# Patient Record
Sex: FEMALE | Race: WHITE | HISPANIC OR LATINO | Employment: UNEMPLOYED | URBAN - METROPOLITAN AREA
[De-identification: names, ages, dates, MRNs, and addresses within clinical notes are randomized per-mention and may not be internally consistent; named-entity substitution may affect disease eponyms.]

---

## 2017-01-17 ENCOUNTER — GENERIC CONVERSION - ENCOUNTER (OUTPATIENT)
Dept: FAMILY MEDICINE CLINIC | Facility: CLINIC | Age: 77
End: 2017-01-17

## 2017-01-17 ENCOUNTER — GENERIC CONVERSION - ENCOUNTER (OUTPATIENT)
Dept: OTHER | Facility: OTHER | Age: 77
End: 2017-01-17

## 2017-01-26 ENCOUNTER — ALLSCRIPTS OFFICE VISIT (OUTPATIENT)
Dept: OTHER | Facility: OTHER | Age: 77
End: 2017-01-26

## 2017-01-30 ENCOUNTER — TRANSCRIBE ORDERS (OUTPATIENT)
Dept: LAB | Facility: CLINIC | Age: 77
End: 2017-01-30

## 2017-01-30 ENCOUNTER — ALLSCRIPTS OFFICE VISIT (OUTPATIENT)
Dept: OTHER | Facility: OTHER | Age: 77
End: 2017-01-30

## 2017-01-30 ENCOUNTER — APPOINTMENT (OUTPATIENT)
Dept: LAB | Facility: CLINIC | Age: 77
End: 2017-01-30
Payer: COMMERCIAL

## 2017-01-30 ENCOUNTER — GENERIC CONVERSION - ENCOUNTER (OUTPATIENT)
Dept: OTHER | Facility: OTHER | Age: 77
End: 2017-01-30

## 2017-01-30 DIAGNOSIS — E11.9 TYPE 2 DIABETES MELLITUS WITHOUT COMPLICATIONS (HCC): ICD-10-CM

## 2017-01-30 DIAGNOSIS — M79.10 MYALGIA: ICD-10-CM

## 2017-01-30 DIAGNOSIS — M79.10 MYALGIA: Primary | ICD-10-CM

## 2017-01-30 LAB
CRP SERPL QL: <3 MG/L
ERYTHROCYTE [SEDIMENTATION RATE] IN BLOOD: 22 MM/HOUR (ref 0–20)
T3 SERPL-MCNC: 0.6 NG/ML (ref 0.6–1.8)
T4 FREE SERPL-MCNC: 0.87 NG/DL (ref 0.76–1.46)
T4 SERPL-MCNC: 7.7 UG/DL (ref 4.7–13.3)
TSH SERPL DL<=0.05 MIU/L-ACNC: 1.87 UIU/ML (ref 0.36–3.74)
URATE SERPL-MCNC: 7.1 MG/DL (ref 2–6.8)

## 2017-01-30 PROCEDURE — 86038 ANTINUCLEAR ANTIBODIES: CPT

## 2017-01-30 PROCEDURE — 86618 LYME DISEASE ANTIBODY: CPT

## 2017-01-30 PROCEDURE — 84439 ASSAY OF FREE THYROXINE: CPT

## 2017-01-30 PROCEDURE — 36415 COLL VENOUS BLD VENIPUNCTURE: CPT

## 2017-01-30 PROCEDURE — 86140 C-REACTIVE PROTEIN: CPT

## 2017-01-30 PROCEDURE — 85652 RBC SED RATE AUTOMATED: CPT

## 2017-01-30 PROCEDURE — 86430 RHEUMATOID FACTOR TEST QUAL: CPT

## 2017-01-30 PROCEDURE — 84436 ASSAY OF TOTAL THYROXINE: CPT

## 2017-01-30 PROCEDURE — 84443 ASSAY THYROID STIM HORMONE: CPT

## 2017-01-30 PROCEDURE — 84480 ASSAY TRIIODOTHYRONINE (T3): CPT

## 2017-01-30 PROCEDURE — 84479 ASSAY OF THYROID (T3 OR T4): CPT

## 2017-01-30 PROCEDURE — 84550 ASSAY OF BLOOD/URIC ACID: CPT

## 2017-01-31 LAB
RHEUMATOID FACT SER QL LA: NEGATIVE
T3RU NFR SERPL: 23 % (ref 24–39)

## 2017-02-01 LAB
B BURGDOR IGG SER IA-ACNC: 0.07
B BURGDOR IGM SER IA-ACNC: 0.16
RYE IGE QN: NEGATIVE

## 2017-02-06 ENCOUNTER — ALLSCRIPTS OFFICE VISIT (OUTPATIENT)
Dept: OTHER | Facility: OTHER | Age: 77
End: 2017-02-06

## 2017-02-20 ENCOUNTER — GENERIC CONVERSION - ENCOUNTER (OUTPATIENT)
Dept: OTHER | Facility: OTHER | Age: 77
End: 2017-02-20

## 2017-03-07 ENCOUNTER — GENERIC CONVERSION - ENCOUNTER (OUTPATIENT)
Dept: OTHER | Facility: OTHER | Age: 77
End: 2017-03-07

## 2017-03-24 ENCOUNTER — GENERIC CONVERSION - ENCOUNTER (OUTPATIENT)
Dept: OTHER | Facility: OTHER | Age: 77
End: 2017-03-24

## 2017-05-09 ENCOUNTER — TRANSCRIBE ORDERS (OUTPATIENT)
Dept: LAB | Facility: CLINIC | Age: 77
End: 2017-05-09

## 2017-05-09 ENCOUNTER — APPOINTMENT (OUTPATIENT)
Dept: LAB | Facility: CLINIC | Age: 77
End: 2017-05-09
Payer: COMMERCIAL

## 2017-05-09 DIAGNOSIS — E11.9 TYPE 2 DIABETES MELLITUS WITHOUT COMPLICATIONS (HCC): ICD-10-CM

## 2017-05-09 LAB
EST. AVERAGE GLUCOSE BLD GHB EST-MCNC: 171 MG/DL
GLUCOSE P FAST SERPL-MCNC: 103 MG/DL (ref 65–99)
HBA1C MFR BLD: 7.6 % (ref 4.2–6.3)

## 2017-05-09 PROCEDURE — 36415 COLL VENOUS BLD VENIPUNCTURE: CPT

## 2017-05-09 PROCEDURE — 83036 HEMOGLOBIN GLYCOSYLATED A1C: CPT

## 2017-05-09 PROCEDURE — 82947 ASSAY GLUCOSE BLOOD QUANT: CPT

## 2017-05-11 ENCOUNTER — ALLSCRIPTS OFFICE VISIT (OUTPATIENT)
Dept: OTHER | Facility: OTHER | Age: 77
End: 2017-05-11

## 2017-06-21 ENCOUNTER — GENERIC CONVERSION - ENCOUNTER (OUTPATIENT)
Dept: OTHER | Facility: OTHER | Age: 77
End: 2017-06-21

## 2017-07-28 ENCOUNTER — GENERIC CONVERSION - ENCOUNTER (OUTPATIENT)
Dept: OTHER | Facility: OTHER | Age: 77
End: 2017-07-28

## 2017-08-04 ENCOUNTER — APPOINTMENT (OUTPATIENT)
Dept: LAB | Facility: CLINIC | Age: 77
End: 2017-08-04
Payer: COMMERCIAL

## 2017-08-04 ENCOUNTER — TRANSCRIBE ORDERS (OUTPATIENT)
Dept: LAB | Facility: CLINIC | Age: 77
End: 2017-08-04

## 2017-08-04 DIAGNOSIS — E11.21 TYPE 2 DIABETES MELLITUS WITH DIABETIC NEPHROPATHY, WITHOUT LONG-TERM CURRENT USE OF INSULIN (HCC): ICD-10-CM

## 2017-08-04 DIAGNOSIS — E55.9 UNSPECIFIED VITAMIN D DEFICIENCY: ICD-10-CM

## 2017-08-04 DIAGNOSIS — N18.30 CHRONIC KIDNEY DISEASE, STAGE III (MODERATE) (HCC): Primary | ICD-10-CM

## 2017-08-04 DIAGNOSIS — E11.9 TYPE 2 DIABETES MELLITUS WITHOUT COMPLICATIONS (HCC): ICD-10-CM

## 2017-08-04 DIAGNOSIS — N18.30 CHRONIC KIDNEY DISEASE, STAGE III (MODERATE) (HCC): ICD-10-CM

## 2017-08-04 DIAGNOSIS — E78.2 MIXED HYPERLIPIDEMIA: ICD-10-CM

## 2017-08-04 LAB
25(OH)D3 SERPL-MCNC: 26.4 NG/ML (ref 30–100)
ALBUMIN SERPL BCP-MCNC: 3.8 G/DL (ref 3.5–5)
ANION GAP SERPL CALCULATED.3IONS-SCNC: 8 MMOL/L (ref 4–13)
BACTERIA UR QL AUTO: ABNORMAL /HPF
BASOPHILS # BLD AUTO: 0.02 THOUSANDS/ΜL (ref 0–0.1)
BASOPHILS NFR BLD AUTO: 0 % (ref 0–1)
BILIRUB UR QL STRIP: NEGATIVE
BUN SERPL-MCNC: 39 MG/DL (ref 5–25)
CALCIUM SERPL-MCNC: 9.6 MG/DL (ref 8.3–10.1)
CHLORIDE SERPL-SCNC: 111 MMOL/L (ref 100–108)
CLARITY UR: CLEAR
CO2 SERPL-SCNC: 22 MMOL/L (ref 21–32)
COLOR UR: YELLOW
CREAT SERPL-MCNC: 1.33 MG/DL (ref 0.6–1.3)
CREAT UR-MCNC: 107 MG/DL
EOSINOPHIL # BLD AUTO: 0.62 THOUSAND/ΜL (ref 0–0.61)
EOSINOPHIL NFR BLD AUTO: 11 % (ref 0–6)
ERYTHROCYTE [DISTWIDTH] IN BLOOD BY AUTOMATED COUNT: 14.1 % (ref 11.6–15.1)
EST. AVERAGE GLUCOSE BLD GHB EST-MCNC: 183 MG/DL
GFR SERPL CREATININE-BSD FRML MDRD: 39 ML/MIN/1.73SQ M
GLUCOSE P FAST SERPL-MCNC: 132 MG/DL (ref 65–99)
GLUCOSE P FAST SERPL-MCNC: 136 MG/DL (ref 65–99)
GLUCOSE UR STRIP-MCNC: NEGATIVE MG/DL
HBA1C MFR BLD: 8 % (ref 4.2–6.3)
HCT VFR BLD AUTO: 37 % (ref 34.8–46.1)
HGB BLD-MCNC: 11.9 G/DL (ref 11.5–15.4)
HGB UR QL STRIP.AUTO: NEGATIVE
HYALINE CASTS #/AREA URNS LPF: ABNORMAL /LPF
KETONES UR STRIP-MCNC: NEGATIVE MG/DL
LEUKOCYTE ESTERASE UR QL STRIP: ABNORMAL
LYMPHOCYTES # BLD AUTO: 1.71 THOUSANDS/ΜL (ref 0.6–4.47)
LYMPHOCYTES NFR BLD AUTO: 30 % (ref 14–44)
MAGNESIUM SERPL-MCNC: 2.1 MG/DL (ref 1.6–2.6)
MCH RBC QN AUTO: 26.3 PG (ref 26.8–34.3)
MCHC RBC AUTO-ENTMCNC: 32.2 G/DL (ref 31.4–37.4)
MCV RBC AUTO: 82 FL (ref 82–98)
MONOCYTES # BLD AUTO: 0.39 THOUSAND/ΜL (ref 0.17–1.22)
MONOCYTES NFR BLD AUTO: 7 % (ref 4–12)
NEUTROPHILS # BLD AUTO: 3.02 THOUSANDS/ΜL (ref 1.85–7.62)
NEUTS SEG NFR BLD AUTO: 52 % (ref 43–75)
NITRITE UR QL STRIP: NEGATIVE
NON-SQ EPI CELLS URNS QL MICRO: ABNORMAL /HPF
NRBC BLD AUTO-RTO: 0 /100 WBCS
PH UR STRIP.AUTO: 6 [PH] (ref 4.5–8)
PHOSPHATE SERPL-MCNC: 3 MG/DL (ref 2.3–4.1)
PLATELET # BLD AUTO: 249 THOUSANDS/UL (ref 149–390)
PMV BLD AUTO: 10 FL (ref 8.9–12.7)
POTASSIUM SERPL-SCNC: 4.3 MMOL/L (ref 3.5–5.3)
PROT UR STRIP-MCNC: NEGATIVE MG/DL
PROT UR-MCNC: 28 MG/DL
PROT/CREAT UR: 0.26 MG/G{CREAT} (ref 0–0.1)
RBC # BLD AUTO: 4.52 MILLION/UL (ref 3.81–5.12)
RBC #/AREA URNS AUTO: ABNORMAL /HPF
SODIUM SERPL-SCNC: 141 MMOL/L (ref 136–145)
SP GR UR STRIP.AUTO: 1.02 (ref 1–1.03)
UROBILINOGEN UR QL STRIP.AUTO: 0.2 E.U./DL
WBC # BLD AUTO: 5.76 THOUSAND/UL (ref 4.31–10.16)
WBC #/AREA URNS AUTO: ABNORMAL /HPF

## 2017-08-04 PROCEDURE — 83036 HEMOGLOBIN GLYCOSYLATED A1C: CPT

## 2017-08-04 PROCEDURE — 84156 ASSAY OF PROTEIN URINE: CPT

## 2017-08-04 PROCEDURE — 85025 COMPLETE CBC W/AUTO DIFF WBC: CPT

## 2017-08-04 PROCEDURE — 83735 ASSAY OF MAGNESIUM: CPT

## 2017-08-04 PROCEDURE — 81001 URINALYSIS AUTO W/SCOPE: CPT

## 2017-08-04 PROCEDURE — 82570 ASSAY OF URINE CREATININE: CPT

## 2017-08-04 PROCEDURE — 80069 RENAL FUNCTION PANEL: CPT

## 2017-08-04 PROCEDURE — 36415 COLL VENOUS BLD VENIPUNCTURE: CPT

## 2017-08-04 PROCEDURE — 84681 ASSAY OF C-PEPTIDE: CPT

## 2017-08-04 PROCEDURE — 82306 VITAMIN D 25 HYDROXY: CPT

## 2017-08-04 PROCEDURE — 82947 ASSAY GLUCOSE BLOOD QUANT: CPT

## 2017-08-05 LAB — C PEPTIDE SERPL-MCNC: 8.8 NG/ML (ref 1.1–4.4)

## 2017-08-11 DIAGNOSIS — E11.9 TYPE 2 DIABETES MELLITUS WITHOUT COMPLICATIONS (HCC): ICD-10-CM

## 2017-08-11 DIAGNOSIS — Z78.0 ASYMPTOMATIC MENOPAUSAL STATE: ICD-10-CM

## 2017-08-14 ENCOUNTER — GENERIC CONVERSION - ENCOUNTER (OUTPATIENT)
Dept: OTHER | Facility: OTHER | Age: 77
End: 2017-08-14

## 2017-08-16 ENCOUNTER — GENERIC CONVERSION - ENCOUNTER (OUTPATIENT)
Dept: FAMILY MEDICINE CLINIC | Facility: CLINIC | Age: 77
End: 2017-08-16

## 2017-08-16 ENCOUNTER — ALLSCRIPTS OFFICE VISIT (OUTPATIENT)
Dept: OTHER | Facility: OTHER | Age: 77
End: 2017-08-16

## 2017-08-30 ENCOUNTER — GENERIC CONVERSION - ENCOUNTER (OUTPATIENT)
Dept: OTHER | Facility: OTHER | Age: 77
End: 2017-08-30

## 2017-09-12 ENCOUNTER — GENERIC CONVERSION - ENCOUNTER (OUTPATIENT)
Dept: OTHER | Facility: OTHER | Age: 77
End: 2017-09-12

## 2017-10-03 ENCOUNTER — GENERIC CONVERSION - ENCOUNTER (OUTPATIENT)
Dept: OTHER | Facility: OTHER | Age: 77
End: 2017-10-03

## 2017-11-16 DIAGNOSIS — F32.9 MAJOR DEPRESSIVE DISORDER, SINGLE EPISODE: ICD-10-CM

## 2017-11-16 DIAGNOSIS — K57.92 DIVERTICULITIS OF INTESTINE WITHOUT PERFORATION OR ABSCESS WITHOUT BLEEDING: ICD-10-CM

## 2017-11-16 DIAGNOSIS — Z00.00 ENCOUNTER FOR GENERAL ADULT MEDICAL EXAMINATION WITHOUT ABNORMAL FINDINGS: ICD-10-CM

## 2017-11-16 DIAGNOSIS — M54.9 DORSALGIA: ICD-10-CM

## 2017-11-16 DIAGNOSIS — E11.65 TYPE 2 DIABETES MELLITUS WITH HYPERGLYCEMIA (HCC): ICD-10-CM

## 2017-11-16 DIAGNOSIS — Z78.0 ASYMPTOMATIC MENOPAUSAL STATE: ICD-10-CM

## 2017-11-21 ENCOUNTER — APPOINTMENT (OUTPATIENT)
Dept: LAB | Facility: CLINIC | Age: 77
End: 2017-11-21
Payer: COMMERCIAL

## 2017-11-21 ENCOUNTER — TRANSCRIBE ORDERS (OUTPATIENT)
Dept: LAB | Facility: CLINIC | Age: 77
End: 2017-11-21

## 2017-11-21 DIAGNOSIS — K57.92 DIVERTICULITIS OF INTESTINE WITHOUT PERFORATION OR ABSCESS WITHOUT BLEEDING: ICD-10-CM

## 2017-11-21 DIAGNOSIS — F32.9 MAJOR DEPRESSIVE DISORDER, SINGLE EPISODE: ICD-10-CM

## 2017-11-21 DIAGNOSIS — M54.9 DORSALGIA: ICD-10-CM

## 2017-11-21 DIAGNOSIS — Z00.00 ENCOUNTER FOR GENERAL ADULT MEDICAL EXAMINATION WITHOUT ABNORMAL FINDINGS: ICD-10-CM

## 2017-11-21 DIAGNOSIS — Z78.0 ASYMPTOMATIC MENOPAUSAL STATE: ICD-10-CM

## 2017-11-21 DIAGNOSIS — E11.65 TYPE 2 DIABETES MELLITUS WITH HYPERGLYCEMIA (HCC): ICD-10-CM

## 2017-11-21 LAB
EST. AVERAGE GLUCOSE BLD GHB EST-MCNC: 180 MG/DL
GLUCOSE P FAST SERPL-MCNC: 113 MG/DL (ref 65–99)
HBA1C MFR BLD: 7.9 % (ref 4.2–6.3)

## 2017-11-21 PROCEDURE — 84681 ASSAY OF C-PEPTIDE: CPT

## 2017-11-21 PROCEDURE — 82947 ASSAY GLUCOSE BLOOD QUANT: CPT

## 2017-11-21 PROCEDURE — 36415 COLL VENOUS BLD VENIPUNCTURE: CPT

## 2017-11-21 PROCEDURE — 83036 HEMOGLOBIN GLYCOSYLATED A1C: CPT

## 2017-11-24 LAB — C PEPTIDE SERPL-MCNC: 6.4 NG/ML (ref 1.1–4.4)

## 2017-11-29 ENCOUNTER — GENERIC CONVERSION - ENCOUNTER (OUTPATIENT)
Dept: OTHER | Facility: OTHER | Age: 77
End: 2017-11-29

## 2018-01-12 VITALS
SYSTOLIC BLOOD PRESSURE: 130 MMHG | HEIGHT: 58 IN | WEIGHT: 134.25 LBS | TEMPERATURE: 97.9 F | BODY MASS INDEX: 28.18 KG/M2 | DIASTOLIC BLOOD PRESSURE: 68 MMHG

## 2018-01-13 VITALS
HEIGHT: 58 IN | TEMPERATURE: 98.8 F | OXYGEN SATURATION: 91 % | WEIGHT: 139.2 LBS | SYSTOLIC BLOOD PRESSURE: 138 MMHG | HEART RATE: 88 BPM | DIASTOLIC BLOOD PRESSURE: 72 MMHG | BODY MASS INDEX: 29.22 KG/M2

## 2018-01-14 VITALS
SYSTOLIC BLOOD PRESSURE: 120 MMHG | HEIGHT: 58 IN | TEMPERATURE: 97.9 F | BODY MASS INDEX: 28.36 KG/M2 | DIASTOLIC BLOOD PRESSURE: 64 MMHG | WEIGHT: 135.13 LBS

## 2018-01-14 VITALS
HEIGHT: 58 IN | RESPIRATION RATE: 18 BRPM | TEMPERATURE: 98.1 F | WEIGHT: 139.13 LBS | SYSTOLIC BLOOD PRESSURE: 118 MMHG | BODY MASS INDEX: 29.21 KG/M2 | DIASTOLIC BLOOD PRESSURE: 78 MMHG

## 2018-01-14 VITALS
WEIGHT: 135.38 LBS | SYSTOLIC BLOOD PRESSURE: 130 MMHG | TEMPERATURE: 99.2 F | HEIGHT: 58 IN | BODY MASS INDEX: 28.42 KG/M2 | DIASTOLIC BLOOD PRESSURE: 82 MMHG

## 2018-01-15 NOTE — PROGRESS NOTES
Assessment    1  Encounter for preventive health examination (V70 0) (Z00 00)    Plan   Acute back pain, Acute diverticulitis, Depression, Diverticulitis, Health Maintenance    · (1) C-PEPTIDE; Status:Active; Requested for:[id number];   Asymptomatic postmenopausal state    · * DXA BONE DENSITY SPINE HIP AND PELVIS; Status:Hold For -  Scheduling,Retrospective By Protocol Authorization; Requested for:[id number]; Uncontrolled type 2 diabetes mellitus (250 02) (E11 65)          Discussion/Summary  Impression: Subsequent Annual Wellness Visit  Cardiovascular screening and counseling: screening is current  Diabetes screening and counseling: screening is current  Colorectal cancer screening and counseling: screening is current  Breast cancer screening and counseling: screening not indicated  Cervical cancer screening and counseling: screening not indicated  Osteoporosis screening and counseling: the patient declines screening  Abdominal aortic aneurysm screening and counseling: screening not indicated  Glaucoma screening and counseling: screening is current  Immunizations: the patient declines the influenza vaccination, pneumococcal vaccination status is unknown, Td vaccination status is unknown and Tdap vaccination status is unknown  Advance Directive Planning: paperwork and instructions were given to the patient  Patient Discussion: plan discussed with the patient, follow-up visit needed in one year  The treatment plan was reviewed with the patient/guardian  The patient/guardian understands and agrees with the treatment plan      Chief Complaint    Med Well      History of Present Illness  Welcome to Medicare and Wellness Visits: The patient is being seen for the subsequent annual wellness visit  Medicare Screening and Risk Factors   Hospitalizations: no previous hospitalizations    Medicare Screening Tests Risk Questions   Osteoporosis risk assessment: , female gender and low calcium diet    Drug and Alcohol Use: The patient is a former cigarette smoker  The patient reports drinking 1 drinks per week  Alcohol concern:   The patient has no concerns about alcohol abuse  She has never used illicit drugs  Diet and Physical Activity: Current diet includes servings of fruit per day, servings of vegetables per day and servings of dairy products per day  The patient does not exercise  Mood Disorder and Cognitive Impairment Screening: She reports feeling down, depressed, or hopeless over the past two weeks  She reports feeling little interest or pleasure in doing things over the past two weeks  Functional Ability/Level of Safety: Hearing is normal in the right ear and slightly decreased in the left ear  She does not use a hearing aid  Activities of daily living details: does not need help using the phone, no transportation help needed, does not need help shopping, no meal preparation help needed, does not need help doing housework, does not need help doing laundry, does not need help managing medications and does not need help managing money  Fall risk factors: The patient fell 0 times in the past 12 months  Home safety risk factors:  no unfamiliar surroundings, no loose rugs, no poor household lighting, no uneven floors, no household clutter, grab bars in the bathroom and handrails on the stairs  Advance Directives: Advance directives: no living will, no durable power of  for health care directives and no advance directives  Co-Managers and Medical Equipment/Suppliers: See Patient Care Team      Patient Care Team    Care Team Member Role Specialty Office Number   [phone] MD  Colon and Rectal Surgery [phone]     Active Problems    1  Acute back pain (724 5) (M54 9)   2  Acute diverticulitis (562 11) (K57 92)   3  Depression (311) (F32 9)   4  Diverticulitis (562 11) (K57 92)   5  Hypercholesterolemia (272 0) (E78 00)   6  Hyperlipidemia (272 4) (E78 5)   7  Hypertriglyceridemia (272 1) (E78 1)   8  IBS (irritable bowel syndrome) (564 1) (K58 9)   9  Kidney disease, chronic, stage I (GFR over 89 ml/min) (585 1) (N18 1)   10  Lower abdominal pain (789 09) (R10 30)   11  Myalgia (729 1) (M79 1)   12  Paresthesia of arm (782 0) (R20 2)   13  Refused influenza vaccine (V64 06) (Z28 21)   14  Screening for colon cancer (V76 51) (Z12 11)   15  Simple chronic anemia (281 9) (D53 9)   16  Type II diabetes mellitus (250 00) (E11 9)    Past Medical History    · History of Constipation (564 00) (K59 00)   · History of Encounter for mammogram to establish baseline mammogram (V76 12)  (Z12 31)   · History of Encounter for preoperative examination for general surgical procedure  (V72 84) (Z01 818)   · History of Hernia (553 9) (K46 9)   · History of Hip pain (719 45) (M25 559)   · History of UTI (V13 02) (Z87 440)   · History of Leg pain (729 5) (M79 606)   · History of Microalbuminuria (791 0) (R80 9)   · History of Postherpetic neuralgia (053 19) (B02 29)   · History of Toothache (525 9) (K08 89)    The active problems and past medical history were reviewed and updated today  Surgical History    · History of Cholecystectomy   · History of Hernia Repair   · History of Leg Repair   · History of Varicose Vein Ligation    The surgical history was reviewed and updated today  Family History  Mother    · No pertinent family history  Father    · No pertinent family history    The family history was reviewed and updated today  Social History    · Always uses seat belt   · Never a smoker   · No caffeine use   · Rarely consumes alcohol (V49 89) (Z78 9)  The social history was reviewed and is unchanged  Current Meds   1  Enalapril Maleate 10 MG Oral Tablet; TAKE 1 TABLET DAILY; Therapy: (Recorded:[id number]) to Recorded   2  Gabapentin 300 MG TABS; TAKE 1 TABLET 3 TIMES DAILY; Therapy: (Recorded:12Jpw7013) to Recorded   3   Gemfibrozil 600 MG Oral Tablet; TAKE 1 TABLET BY MOUTH BID  Requested for:   02VXV0272; Last Rx:60Fth5434 Ordered   4  GlipiZIDE ER 5 MG Oral Tablet Extended Release 24 Hour; Take 1 tablet by mouth qam   before breakfast  Requested for: 10NOO1570; Last Rx:39Axe9499 Ordered   5  Januvia 25 MG Oral Tablet; TAKE 1 TABLET DAILY  Requested for: 17ABL7164; Last   Rx:[id number] Ordered   6  Lovastatin 20 MG Oral Tablet; TAKE 2 TABLETS DAILY WITH THE EVENING MEAL; Therapy: 57TUW6621 to (Last GT:[id number])  Requested for: 30PLR1473 Ordered   7  MetFORMIN HCl - 1000 MG Oral Tablet; TAKE 1 TABLET BY MOUTH BID  Requested for:   47NDD1342; Last Rx:42Cfc1110 Ordered   8  Sertraline HCl - 25 MG Oral Tablet; take 1 tablet by mouth once daily; Therapy: 60OGK4886 to (Lavella Schwab)  Requested for: 50CDE0219; Last   Rx:98Axt5423 Ordered   9  Tramadol-Acetaminophen 37 5-325 MG Oral Tablet; TAKE 1 TABLET 3 TIMES DAILY AS   NEEDED; Therapy: (Recorded:07Nov3389) to Recorded    The medication list was reviewed and updated today  Allergies    1  Demerol TABS   2  Cipro TABS    Immunizations   1 2 3    Hepatitis B  26-Oct-2007 29-Nov-2007 30-Apr-2008    Influenza  Temporarily Deferred: Pt refuses, As of: 49PSB0374, Defer for 1 Years      PPSV  18-May-2005       Vitals  Signs    Weight: 139 lb 3 2 oz  BMI Calculated: 29 09  BSA Calculated: 1 56   Temperature: 98 8 F, Tympanic  Heart Rate: 88  Systolic: 980  Diastolic: 72  Height: 4 ft 10 in  O2 Saturation: 91    Physical Exam    Constitutional   General appearance: No acute distress, well appearing and well nourished         Signatures   Electronically signed by : Charlotte Michelle DO; Aug 16 2017  2:28PM EST                       (Author)

## 2018-01-17 NOTE — MISCELLANEOUS
Provider Comments  Provider Comments:   Patient was a no show for todays appointment        Signatures   Electronically signed by : Yuko Abdul DO; May 20 2016  3:54PM EST

## 2018-01-18 NOTE — MISCELLANEOUS
Provider Comments  Provider Comments:   Patient was a no show for her appointment today        Signatures   Electronically signed by : Johnathon Kingston DO; Sep 22 2016  9:14AM EST

## 2018-01-22 VITALS
WEIGHT: 135 LBS | SYSTOLIC BLOOD PRESSURE: 138 MMHG | HEIGHT: 58 IN | BODY MASS INDEX: 28.34 KG/M2 | DIASTOLIC BLOOD PRESSURE: 80 MMHG

## 2018-02-19 ENCOUNTER — APPOINTMENT (OUTPATIENT)
Dept: LAB | Facility: CLINIC | Age: 78
End: 2018-02-19
Payer: COMMERCIAL

## 2018-02-19 ENCOUNTER — TRANSCRIBE ORDERS (OUTPATIENT)
Dept: LAB | Facility: CLINIC | Age: 78
End: 2018-02-19

## 2018-02-19 DIAGNOSIS — E11.65 TYPE 2 DIABETES MELLITUS WITH HYPERGLYCEMIA (HCC): ICD-10-CM

## 2018-02-19 LAB
EST. AVERAGE GLUCOSE BLD GHB EST-MCNC: 169 MG/DL
GLUCOSE P FAST SERPL-MCNC: 136 MG/DL (ref 65–99)
HBA1C MFR BLD: 7.5 % (ref 4.2–6.3)

## 2018-02-19 PROCEDURE — 83036 HEMOGLOBIN GLYCOSYLATED A1C: CPT

## 2018-02-19 PROCEDURE — 82947 ASSAY GLUCOSE BLOOD QUANT: CPT

## 2018-02-19 PROCEDURE — 84681 ASSAY OF C-PEPTIDE: CPT

## 2018-02-19 PROCEDURE — 36415 COLL VENOUS BLD VENIPUNCTURE: CPT

## 2018-02-20 LAB — C PEPTIDE SERPL-MCNC: 8.6 NG/ML (ref 1.1–4.4)

## 2018-02-27 RX ORDER — TRIAMCINOLONE ACETONIDE 1 MG/G
CREAM TOPICAL AS NEEDED
COMMUNITY
Start: 2017-11-29 | End: 2022-01-21 | Stop reason: HOSPADM

## 2018-02-27 RX ORDER — ENALAPRIL MALEATE 10 MG/1
1 TABLET ORAL DAILY
COMMUNITY
End: 2018-02-28 | Stop reason: SDUPTHER

## 2018-02-27 RX ORDER — LOVASTATIN 20 MG/1
2 TABLET ORAL DAILY
COMMUNITY
Start: 2016-06-27 | End: 2018-02-28 | Stop reason: SDUPTHER

## 2018-02-27 RX ORDER — GEMFIBROZIL 600 MG/1
1 TABLET, FILM COATED ORAL 2 TIMES DAILY
COMMUNITY
End: 2018-02-28 | Stop reason: SDUPTHER

## 2018-02-27 RX ORDER — GLIPIZIDE 5 MG/1
TABLET, FILM COATED, EXTENDED RELEASE ORAL DAILY
COMMUNITY
End: 2018-02-28 | Stop reason: SDUPTHER

## 2018-02-28 ENCOUNTER — OFFICE VISIT (OUTPATIENT)
Dept: FAMILY MEDICINE CLINIC | Facility: CLINIC | Age: 78
End: 2018-02-28
Payer: COMMERCIAL

## 2018-02-28 VITALS
HEART RATE: 83 BPM | TEMPERATURE: 97.9 F | WEIGHT: 137 LBS | SYSTOLIC BLOOD PRESSURE: 140 MMHG | HEIGHT: 60 IN | BODY MASS INDEX: 26.9 KG/M2 | OXYGEN SATURATION: 96 % | DIASTOLIC BLOOD PRESSURE: 84 MMHG

## 2018-02-28 DIAGNOSIS — E78.00 HYPERCHOLESTEROLEMIA: ICD-10-CM

## 2018-02-28 DIAGNOSIS — E78.1 HYPERTRIGLYCERIDEMIA: ICD-10-CM

## 2018-02-28 DIAGNOSIS — R19.7 DIARRHEA, UNSPECIFIED TYPE: ICD-10-CM

## 2018-02-28 DIAGNOSIS — I10 ESSENTIAL HYPERTENSION: ICD-10-CM

## 2018-02-28 DIAGNOSIS — Z23 ENCOUNTER FOR IMMUNIZATION: ICD-10-CM

## 2018-02-28 DIAGNOSIS — K58.9 IRRITABLE BOWEL SYNDROME, UNSPECIFIED TYPE: ICD-10-CM

## 2018-02-28 DIAGNOSIS — E11.9 TYPE 2 DIABETES MELLITUS WITHOUT COMPLICATION, WITHOUT LONG-TERM CURRENT USE OF INSULIN (HCC): Primary | ICD-10-CM

## 2018-02-28 PROCEDURE — 90670 PCV13 VACCINE IM: CPT

## 2018-02-28 PROCEDURE — G0009 ADMIN PNEUMOCOCCAL VACCINE: HCPCS

## 2018-02-28 PROCEDURE — 99214 OFFICE O/P EST MOD 30 MIN: CPT | Performed by: FAMILY MEDICINE

## 2018-02-28 RX ORDER — GLIPIZIDE 5 MG/1
5 TABLET, FILM COATED, EXTENDED RELEASE ORAL DAILY
Qty: 90 TABLET | Refills: 3 | Status: SHIPPED | OUTPATIENT
Start: 2018-02-28 | End: 2019-03-13

## 2018-02-28 RX ORDER — ENALAPRIL MALEATE 10 MG/1
10 TABLET ORAL DAILY
Qty: 90 TABLET | Refills: 3 | Status: SHIPPED | OUTPATIENT
Start: 2018-02-28 | End: 2019-04-01 | Stop reason: SDUPTHER

## 2018-02-28 RX ORDER — LOVASTATIN 20 MG/1
40 TABLET ORAL DAILY
Qty: 90 TABLET | Refills: 3 | Status: SHIPPED | OUTPATIENT
Start: 2018-02-28 | End: 2018-03-08 | Stop reason: SDUPTHER

## 2018-02-28 RX ORDER — GEMFIBROZIL 600 MG/1
600 TABLET, FILM COATED ORAL 2 TIMES DAILY
Qty: 180 TABLET | Refills: 3 | Status: SHIPPED | OUTPATIENT
Start: 2018-02-28 | End: 2019-04-01 | Stop reason: SDUPTHER

## 2018-02-28 NOTE — PATIENT INSTRUCTIONS
Basic Carbohydrate Counting   WHAT YOU NEED TO KNOW:   Carbohydrate counting is a way to plan your meals by counting the amount of carbohydrate in foods  Carbohydrates are the sugars, starches, and fiber found in fruit, grains, vegetables, and milk products  Carbohydrates increase your blood sugar levels  Carbohydrate counting can help you eat the right amount of carbohydrate to keep your blood sugar levels under control  DISCHARGE INSTRUCTIONS:   What you need to know about planning meals using carbohydrate counting:  · A dietitian or healthcare provider will help you develop a healthy meal plan that works best for you  You will be taught how much carbohydrate to eat or drink for each meal and snack  Your meal plan will be based on your age, weight, usual food intake, and physical activity level  If you have diabetes, it will also include your blood sugar levels and diabetes medicine  Once you know how much carbohydrate you should eat, you can decide what type of food you want to eat  · You will need to know what foods contain carbohydrate and how much they contain  Keep track of the amount of carbohydrate in meals and snacks in order to follow your meal plan  Do not avoid carbohydrates or skip meals  Your blood sugar may fall too low if you do not eat enough carbohydrate or you skip meals  Foods that contain carbohydrate:   · Breads:  Each serving of food listed below contains about 15 g of carbohydrate   ¨ 1 slice of bread (1 ounce) or 1 flour or corn tortilla (6 inch)    ¨ ½ of a hamburger bun or ¼ of a large bagel (about 1 ounce)    ¨ 1 pancake (about 4 inches across and ¼ inch thick)    · Cereals and grains:  Serving sizes of ready-to-eat cereals vary  Look at the serving size and the total carbohydrate amount listed on the food label  Each serving of food listed below contains about 15 g of carbohydrate       ¨ ¾ cup of dry, unsweetened, ready-to-eat cereal or ¼ cup of low-fat granola     ¨ ½ cup of oatmeal or other cooked cereal     ¨ ? cup of cooked rice or pasta    · Starchy vegetables and beans:  Each serving of food listed below contains about 15 g of carbohydrate   ¨ ½ cup of corn, green peas, sweet potatoes, or mashed potatoes    ¨ ¼ of a large baked potato    ¨ ½ cup of beans, lentils, and peas (garbanzo, quintanilla, kidney, white, split, black-eyed)    · Crackers and snacks:  Each serving of food listed below contains about 15 g of carbohydrate   ¨ 3 yung cracker squares or 8 animal crackers     ¨ 6 saltine-type crackers    ¨ 3 cups of popcorn or ¾ ounce of pretzels, potato chips, or tortilla chips    · Fruit:  Each serving of food listed below contains about 15 g of carbohydrate   ¨ 1 small (4 ounce) piece of fresh fruit or ¾ to 1 cup of fresh fruit    ¨ ½ cup of canned or frozen fruit, packed in natural juice    ¨ ½ cup (4 ounces) of unsweetened fruit juice    ¨ 2 tablespoons of dried fruit    · Desserts or sugary foods:  Each serving of food listed below contains about 15 g of carbohydrate   ¨ 2-inch square unfrosted cake or brownie     ¨ 2 small cookies    ¨ ½ cup of ice cream, frozen yogurt, or nondairy frozen yogurt    ¨ ¼ cup of sherbet or sorbet    ¨ 1 tablespoon of regular syrup, jam, or jelly    ¨ 2 tablespoons of light syrup    · Milk and yogurt:  Foods from the milk group contain about 12 g of carbohydrate per serving  ¨ 1 cup of fat-free or low-fat milk    ¨ 1 cup of soy milk    ¨ ? cup of fat-free, yogurt sweetened with artificial sweetener    · Non-starchy vegetables:  Each serving contains about 5 g of carbohydrate   Three servings of non-starch vegetables count as 1 carbohydrate serving  ¨ ½ cup of cooked vegetables or 1 cup of raw vegetables   This includes beets, broccoli, cabbage, cauliflower, cucumber, mushrooms, tomatoes, and zucchini    ¨ ½ cup of vegetable juice  How to use carbohydrate counting to plan meals:   · Count carbohydrate amounts using serving sizes: ¨ Pasta dinner example: You plan to have pasta, tossed salad, and an 8-ounce glass of milk  Your healthcare provider tells you that you may have 4 carbohydrate servings for dinner  One carbohydrate serving of pasta is ? cup  One cup of pasta will equal 3 carbohydrate servings  An 8-ounce glass of milk will count as 1 carbohydrate serving  These amounts of food would equal 4 carbohydrate servings  One cup of tossed salad does not count toward your carbohydrate servings  · Count carbohydrate amounts using food labels:  Find the total amount of carbohydrate in a packaged food by reading the food label  Food labels tell you the serving size of the food and the total carbohydrate amount in each serving  Find the serving size on the food label and then decide how many servings you will eat  Multiply the number of servings you plan to eat by the carbohydrate amount per serving  ¨ Granola bar snack example: Your meal plan allows you to have 2 carbohydrate servings (30 grams) of carbohydrate for a snack  You plan to eat 1 package of granola bars, which contains 2 bars  According to the food label, the serving size of food in this package is 1 bar  Each serving (1 bar) contains 25 grams of carbohydrate  The total amount of carbohydrate in this package of granola bars would be 50 g  Based on your meal plan, you should eat only 1 bar  Follow up with your healthcare provider as directed:  Write down your questions so you remember to ask them during your visits  © 2017 [employer] Information is for End User's use only and may not be sold, redistributed or otherwise used for commercial purposes  All illustrations and images included in CareNotes® are the copyrighted property of [employer]  or [employer]  The above information is an  only  It is not intended as medical advice for individual conditions or treatments   Talk to your doctor, nurse or pharmacist before following any medical regimen to see if it is safe and effective for you

## 2018-02-28 NOTE — PROGRESS NOTES
Assessment/Plan:    No problem-specific Assessment & Plan notes found for this encounter  Diagnoses and all orders for this visit:    Type 2 diabetes mellitus without complication, without long-term current use of insulin ([facility] )  Comments:  pt counseled on diet and exercise and weight loss  Orders:  -     glipiZIDE (GLUCOTROL XL) 5 mg 24 hr tablet; Take 1 tablet (5 mg total) by mouth daily  -     metFORMIN (GLUCOPHAGE) 1000 MG tablet; Take 1 tablet (1,000 mg total) by mouth 2 (two) times a day  -     sitaGLIPtin (JANUVIA) 100 mg tablet; Take 1 tablet (100 mg total) by mouth daily  -     TSH, 3rd generation with T4 reflex; Future  -     C-peptide; Future  -     Glucose, fasting; Future  -     HEMOGLOBIN A1C W/ EAG ESTIMATION; Future    Diarrhea, unspecified type  Comments:  metamucil OTC daily    Irritable bowel syndrome, unspecified type    Hypercholesterolemia  -     lovastatin (MEVACOR) 20 mg tablet; Take 2 tablets (40 mg total) by mouth daily  -     CBC and differential; Future  -     Comprehensive metabolic panel; Future  -     Lipid panel; Future  -     TSH, 3rd generation with T4 reflex; Future    Essential hypertension  -     enalapril (VASOTEC) 10 mg tablet; Take 1 tablet (10 mg total) by mouth daily    Hypertriglyceridemia  -     gemfibrozil (LOPID) 600 mg tablet; Take 1 tablet (600 mg total) by mouth 2 (two) times a day    Other orders  -     Discontinue: enalapril (VASOTEC) 10 mg tablet; Take 1 tablet by mouth daily  -     Discontinue: gemfibrozil (LOPID) 600 mg tablet; Take 1 tablet by mouth 2 (two) times a day  -     Discontinue: glipiZIDE (GLUCOTROL XL) 5 mg 24 hr tablet; Take by mouth daily  -     Discontinue: sitaGLIPtin (JANUVIA) 100 mg tablet; Take 1 tablet by mouth daily  -     Discontinue: lovastatin (MEVACOR) 20 mg tablet; Take 2 tablets by mouth daily  -     Discontinue: metFORMIN (GLUCOPHAGE) 1000 MG tablet;  Take 1 tablet by mouth 2 (two) times a day  -     triamcinolone (KENALOG) 0 1 % cream; Apply topically 2 (two) times a day          Subjective:      Patient ID: Pat Nguyen is a 69 y o  female  Pt had januvia increased from 50mg to 100mg last visit, pt complains for diarrhea says her IBS is acting up but admits to not having a very good diet, pt also wants to start the pneumonia vaccination today      Diabetes   She presents for her follow-up diabetic visit  She has type 2 diabetes mellitus  There are no hypoglycemic associated symptoms  Pertinent negatives for diabetes include no chest pain  There are no hypoglycemic complications  There are no diabetic complications  Risk factors for coronary artery disease include obesity, post-menopausal and sedentary lifestyle  She is compliant with treatment most of the time  Her weight is increasing steadily  Diabetic current diet: pt is not watching her diet  When asked about meal planning, she reported none  She rarely participates in exercise  Her overall blood glucose range is 130-140 mg/dl  An ACE inhibitor/angiotensin II receptor blocker is being taken  Eye exam is current  The following portions of the patient's history were reviewed and updated as appropriate: allergies, current medications, past family history, past medical history, past social history, past surgical history and problem list     Review of Systems   Eyes: Negative for visual disturbance  Cardiovascular: Negative for chest pain and palpitations  Gastrointestinal: Positive for diarrhea  Negative for abdominal pain, nausea and vomiting  Genitourinary: Negative for frequency  Skin: Negative for wound  Neurological: Negative for numbness  Objective:      /84 (BP Location: Left arm, Patient Position: Sitting, Cuff Size: Adult)   Pulse 83   Temp 97 9 °F (36 6 °C) (Tympanic)   Ht 5' (1 524 m)   Wt 62 1 kg (137 lb)   SpO2 96%   BMI 26 76 kg/m²          Physical Exam   Constitutional: She is oriented to person, place, and time   She appears well-developed and well-nourished  No distress  HENT:   Head: Normocephalic and atraumatic  Eyes: Conjunctivae and EOM are normal  Pupils are equal, round, and reactive to light  No scleral icterus  Neck: Normal range of motion  Neck supple  Cardiovascular: Normal rate, regular rhythm and normal heart sounds  No murmur heard  Pulmonary/Chest: Effort normal and breath sounds normal  No respiratory distress  She has no wheezes  She has no rales  Abdominal: Soft  Bowel sounds are normal  She exhibits no distension and no mass  There is no tenderness  There is no rebound and no guarding  Musculoskeletal: Normal range of motion  She exhibits no edema or deformity  Lymphadenopathy:     She has no cervical adenopathy  Neurological: She is alert and oriented to person, place, and time  Skin: Skin is warm and dry  She is not diaphoretic  Psychiatric: She has a normal mood and affect  Her behavior is normal  Judgment and thought content normal    Nursing note and vitals reviewed

## 2018-03-08 DIAGNOSIS — E78.00 HYPERCHOLESTEROLEMIA: ICD-10-CM

## 2018-03-08 RX ORDER — LOVASTATIN 20 MG/1
40 TABLET ORAL DAILY
Qty: 180 TABLET | Refills: 3 | Status: SHIPPED | OUTPATIENT
Start: 2018-03-08 | End: 2019-04-01 | Stop reason: SDUPTHER

## 2018-05-14 LAB
LEFT EYE DIABETIC RETINOPATHY: NORMAL
RIGHT EYE DIABETIC RETINOPATHY: NORMAL

## 2018-05-29 ENCOUNTER — APPOINTMENT (OUTPATIENT)
Dept: LAB | Facility: CLINIC | Age: 78
End: 2018-05-29
Payer: COMMERCIAL

## 2018-05-29 ENCOUNTER — TRANSCRIBE ORDERS (OUTPATIENT)
Dept: LAB | Facility: CLINIC | Age: 78
End: 2018-05-29

## 2018-05-29 DIAGNOSIS — E11.9 TYPE 2 DIABETES MELLITUS WITHOUT COMPLICATION, WITHOUT LONG-TERM CURRENT USE OF INSULIN (HCC): ICD-10-CM

## 2018-05-29 DIAGNOSIS — E78.00 HYPERCHOLESTEROLEMIA: ICD-10-CM

## 2018-05-29 LAB
ALBUMIN SERPL BCP-MCNC: 4.2 G/DL (ref 3.5–5)
ALP SERPL-CCNC: 87 U/L (ref 46–116)
ALT SERPL W P-5'-P-CCNC: 18 U/L (ref 12–78)
ANION GAP SERPL CALCULATED.3IONS-SCNC: 10 MMOL/L (ref 4–13)
AST SERPL W P-5'-P-CCNC: 11 U/L (ref 5–45)
BASOPHILS # BLD AUTO: 0.01 THOUSANDS/ΜL (ref 0–0.1)
BASOPHILS NFR BLD AUTO: 0 % (ref 0–1)
BILIRUB SERPL-MCNC: 0.48 MG/DL (ref 0.2–1)
BUN SERPL-MCNC: 28 MG/DL (ref 5–25)
CALCIUM SERPL-MCNC: 9.2 MG/DL (ref 8.3–10.1)
CHLORIDE SERPL-SCNC: 108 MMOL/L (ref 100–108)
CHOLEST SERPL-MCNC: 185 MG/DL (ref 50–200)
CO2 SERPL-SCNC: 22 MMOL/L (ref 21–32)
CREAT SERPL-MCNC: 1.31 MG/DL (ref 0.6–1.3)
EOSINOPHIL # BLD AUTO: 0.17 THOUSAND/ΜL (ref 0–0.61)
EOSINOPHIL NFR BLD AUTO: 3 % (ref 0–6)
ERYTHROCYTE [DISTWIDTH] IN BLOOD BY AUTOMATED COUNT: 14.1 % (ref 11.6–15.1)
EST. AVERAGE GLUCOSE BLD GHB EST-MCNC: 157 MG/DL
GFR SERPL CREATININE-BSD FRML MDRD: 40 ML/MIN/1.73SQ M
GLUCOSE P FAST SERPL-MCNC: 84 MG/DL (ref 65–99)
HBA1C MFR BLD: 7.1 % (ref 4.2–6.3)
HCT VFR BLD AUTO: 39.5 % (ref 34.8–46.1)
HDLC SERPL-MCNC: 41 MG/DL (ref 40–60)
HGB BLD-MCNC: 12.9 G/DL (ref 11.5–15.4)
LDLC SERPL CALC-MCNC: 100 MG/DL (ref 0–100)
LYMPHOCYTES # BLD AUTO: 1.8 THOUSANDS/ΜL (ref 0.6–4.47)
LYMPHOCYTES NFR BLD AUTO: 32 % (ref 14–44)
MCH RBC QN AUTO: 27 PG (ref 26.8–34.3)
MCHC RBC AUTO-ENTMCNC: 32.7 G/DL (ref 31.4–37.4)
MCV RBC AUTO: 83 FL (ref 82–98)
MONOCYTES # BLD AUTO: 0.32 THOUSAND/ΜL (ref 0.17–1.22)
MONOCYTES NFR BLD AUTO: 6 % (ref 4–12)
NEUTROPHILS # BLD AUTO: 3.3 THOUSANDS/ΜL (ref 1.85–7.62)
NEUTS SEG NFR BLD AUTO: 59 % (ref 43–75)
NONHDLC SERPL-MCNC: 144 MG/DL
NRBC BLD AUTO-RTO: 0 /100 WBCS
PLATELET # BLD AUTO: 257 THOUSANDS/UL (ref 149–390)
PMV BLD AUTO: 9.7 FL (ref 8.9–12.7)
POTASSIUM SERPL-SCNC: 4.6 MMOL/L (ref 3.5–5.3)
PROT SERPL-MCNC: 8.8 G/DL (ref 6.4–8.2)
RBC # BLD AUTO: 4.78 MILLION/UL (ref 3.81–5.12)
SODIUM SERPL-SCNC: 140 MMOL/L (ref 136–145)
T4 FREE SERPL-MCNC: 0.92 NG/DL (ref 0.76–1.46)
TRIGL SERPL-MCNC: 222 MG/DL
TSH SERPL DL<=0.05 MIU/L-ACNC: 4.13 UIU/ML (ref 0.36–3.74)
WBC # BLD AUTO: 5.6 THOUSAND/UL (ref 4.31–10.16)

## 2018-05-29 PROCEDURE — 80061 LIPID PANEL: CPT

## 2018-05-29 PROCEDURE — 80053 COMPREHEN METABOLIC PANEL: CPT

## 2018-05-29 PROCEDURE — 84443 ASSAY THYROID STIM HORMONE: CPT

## 2018-05-29 PROCEDURE — 83036 HEMOGLOBIN GLYCOSYLATED A1C: CPT

## 2018-05-29 PROCEDURE — 84681 ASSAY OF C-PEPTIDE: CPT

## 2018-05-29 PROCEDURE — 84439 ASSAY OF FREE THYROXINE: CPT

## 2018-05-29 PROCEDURE — 85025 COMPLETE CBC W/AUTO DIFF WBC: CPT

## 2018-05-29 PROCEDURE — 36415 COLL VENOUS BLD VENIPUNCTURE: CPT

## 2018-05-30 LAB — C PEPTIDE SERPL-MCNC: 6.4 NG/ML (ref 1.1–4.4)

## 2018-06-05 ENCOUNTER — OFFICE VISIT (OUTPATIENT)
Dept: FAMILY MEDICINE CLINIC | Facility: CLINIC | Age: 78
End: 2018-06-05
Payer: COMMERCIAL

## 2018-06-05 VITALS
TEMPERATURE: 98.1 F | DIASTOLIC BLOOD PRESSURE: 78 MMHG | HEART RATE: 99 BPM | HEIGHT: 60 IN | BODY MASS INDEX: 26.55 KG/M2 | OXYGEN SATURATION: 95 % | SYSTOLIC BLOOD PRESSURE: 120 MMHG | WEIGHT: 135.2 LBS

## 2018-06-05 VITALS
SYSTOLIC BLOOD PRESSURE: 120 MMHG | DIASTOLIC BLOOD PRESSURE: 78 MMHG | HEIGHT: 60 IN | OXYGEN SATURATION: 95 % | WEIGHT: 135.2 LBS | BODY MASS INDEX: 26.55 KG/M2 | HEART RATE: 99 BPM | TEMPERATURE: 98.1 F

## 2018-06-05 DIAGNOSIS — Z00.00 HEALTH CARE MAINTENANCE: ICD-10-CM

## 2018-06-05 DIAGNOSIS — K58.0 IRRITABLE BOWEL SYNDROME WITH DIARRHEA: ICD-10-CM

## 2018-06-05 DIAGNOSIS — E11.9 ENCOUNTER FOR DIABETIC FOOT EXAM (HCC): ICD-10-CM

## 2018-06-05 DIAGNOSIS — Z00.00 MEDICARE ANNUAL WELLNESS VISIT, SUBSEQUENT: Primary | ICD-10-CM

## 2018-06-05 DIAGNOSIS — E78.1 HYPERTRIGLYCERIDEMIA: ICD-10-CM

## 2018-06-05 DIAGNOSIS — E11.9 TYPE 2 DIABETES MELLITUS WITHOUT COMPLICATION, WITHOUT LONG-TERM CURRENT USE OF INSULIN (HCC): Primary | ICD-10-CM

## 2018-06-05 DIAGNOSIS — I10 ESSENTIAL HYPERTENSION: ICD-10-CM

## 2018-06-05 DIAGNOSIS — Z13.5 SCREENING FOR DIABETIC RETINOPATHY: ICD-10-CM

## 2018-06-05 PROCEDURE — G0439 PPPS, SUBSEQ VISIT: HCPCS | Performed by: FAMILY MEDICINE

## 2018-06-05 PROCEDURE — 99214 OFFICE O/P EST MOD 30 MIN: CPT | Performed by: FAMILY MEDICINE

## 2018-06-05 RX ORDER — SILDENAFIL CITRATE 20 MG/1
20 TABLET ORAL 3 TIMES DAILY
Qty: 10 TABLET | Refills: 3 | Status: SHIPPED | OUTPATIENT
Start: 2018-06-05 | End: 2019-07-25

## 2018-06-05 RX ORDER — DICYCLOMINE HYDROCHLORIDE 10 MG/1
10 CAPSULE ORAL
Qty: 90 CAPSULE | Refills: 3 | Status: SHIPPED | OUTPATIENT
Start: 2018-06-05 | End: 2019-04-01 | Stop reason: SDUPTHER

## 2018-06-05 NOTE — PROGRESS NOTES
Assessment and Plan:    Problem List Items Addressed This Visit     None      Visit Diagnoses     Medicare annual wellness visit, subsequent    -  Primary        Health Maintenance Due   Topic Date Due    Depression Screening PHQ-9  1940    Annual Wellnes Visit (AWV)  1940    URINE MICROALBUMIN  10/02/1951    DTaP,Tdap,and Td Vaccines (1 - Tdap) 10/02/1962    Fall Risk  10/02/2006    Urinary Incontinence Screening  10/02/2006    GLAUCOMA SCREENING 67+ YR  10/02/2008    OPHTHALMOLOGY EXAM  01/17/2018    Diabetic Foot Exam  01/26/2018         HPI:  Destinee Marks is a 69 y o  female here for her Subsequent Wellness Visit  There is no problem list on file for this patient  Past Medical History:   Diagnosis Date    Postherpetic neuralgia      Past Surgical History:   Procedure Laterality Date    CHOLECYSTECTOMY      HERNIA REPAIR      VARICOSE VEIN SURGERY      Impression:  2/12/16 Meng Chavez     History reviewed  No pertinent family history    History   Smoking Status    Never Smoker   Smokeless Tobacco    Never Used     History   Alcohol Use    Yes     Comment: Rarely      History   Drug Use No       Current Outpatient Prescriptions   Medication Sig Dispense Refill    enalapril (VASOTEC) 10 mg tablet Take 1 tablet (10 mg total) by mouth daily 90 tablet 3    gemfibrozil (LOPID) 600 mg tablet Take 1 tablet (600 mg total) by mouth 2 (two) times a day 180 tablet 3    glipiZIDE (GLUCOTROL XL) 5 mg 24 hr tablet Take 1 tablet (5 mg total) by mouth daily 90 tablet 3    lovastatin (MEVACOR) 20 mg tablet Take 2 tablets (40 mg total) by mouth daily 180 tablet 3    metFORMIN (GLUCOPHAGE) 1000 MG tablet Take 1 tablet (1,000 mg total) by mouth 2 (two) times a day 180 tablet 3    sitaGLIPtin (JANUVIA) 100 mg tablet Take 1 tablet (100 mg total) by mouth daily 90 tablet 3    triamcinolone (KENALOG) 0 1 % cream Apply topically 2 (two) times a day      dicyclomine (BENTYL) 10 mg capsule Take 1 capsule (10 mg total) by mouth 3 (three) times a day before meals 90 capsule 3     No current facility-administered medications for this visit  Allergies   Allergen Reactions    Ciprofloxacin GI Intolerance and Headache     Confusion, arm weakness, dizziness, headache    Meperidine GI Intolerance and Hallucinations     Demarol    Propoxyphene GI Intolerance     Immunization History   Administered Date(s) Administered    Hep B, adult 10/26/2007, 11/29/2007, 04/30/2008    Pneumococcal Conjugate 13-Valent 02/28/2018    Pneumococcal Polysaccharide PPV23 05/18/2005    Zoster 06/03/2014       Patient Care Team:  Heron Mcardle, DO as PCP - General    Physical Exam   Constitutional: She is oriented to person, place, and time  She appears well-developed and well-nourished  No distress  Neurological: She is alert and oriented to person, place, and time  Skin: She is not diaphoretic  Psychiatric: She has a normal mood and affect  Her behavior is normal  Judgment and thought content normal    Nursing note and vitals reviewed        Medicare Screening Tests and Risk Assessments:  AWV Clinical     ISAR:   Previous hospitalizations?:  No       Once in a Lifetime Medicare Screening:   EKG performed?:  Yes    AAA screening performed? (if performed, please add date to Health Maintenance):  No       Medicare Screening Tests and Risk Assessment:   AAA Risk Assessment     Family history of AAA:  No   Osteoporosis Risk Assessment     Female:  Yes   :  Yes    Age over 48:  Yes Low body weight (<127lbs):  No   Tobacco use:  No Alcohol use:  No   Low calcium diet:  Yes PMHX of fractures:  No   FHX of fractures:  No    HIV Risk Assessment    None indicated:  Yes        Drug and Alcohol Use:   Tobacco use    Cigarettes:  never smoker    Tobacco use duration    Tobacco Cessation Readiness    Alcohol use    Alcohol use:  rare use    Concern about alcohol use:  No    Alcohol Treatment Readiness   Illicit Drug Use Drug use:  never        Diet & Exercise:   Diet   What is your diet?:  Regular   How many servings a day of the following:   Exercise    Do you currently exercise?:  yes    Frequency:  daily    Type of exercise:  walking       Cognitive Impairment Screening:   Depression screening preformed:  Yes     PHQ-9 Depression scale score:  0   Depression screening results:  negative for symptoms   Cognitive Impairment Screening    Do you have difficulty learning or retaining new information?:  No Do you have difficulty handling new tasks?:  No   Do you have difficulty with reasoning?:  No Do you have difficulty with spatial ability and orientation?:  No   Do you have difficulty with language?:  No Do you have difficulty with behavior?:  No       Functional Ability/Level of Safety:   Hearing    Hearing difficulties:  No    Hearing aid:  No    Hearing Impairment Assessment    Current Activities    Status:  unlimited ADL's   Help needed with the folllowing:    ADL    Fall Risk   Have you fallen in the last 12 months?:  No Are you unsteady on your feet?:  Yes    Are you taking any medications that may cause fatigue or dizziness?:  No   Do you have any chronic conditions that may contribute to a fall?:  Diabetes Do you rush to the bathroom potentially risking a fall?:  No   Injury History       Home Safety:   Are there hazards in your environment?:  No   If you fell, would you need help to get back up from the ground?:  Yes Do you have problems or concerns getting in/out of a bed, chair, tub, or toilet?:  Yes   Do you feel unsteady when walking?:  Yes Is your activity limited by pain?:  No   Do you have handrails and grab-bars in the home?:  Yes Are emergency numbers kept by the phone and regularly updated?:  Yes   Are you and/or family members aware of the dangers of smoking in bed?:  Yes    Do you have working smoke alarms and fire extinguisher?:  Yes Do all household members know how to use them?:  Yes   Have you left the stove on unsupervised?:  No    Home Safety Risk Factors   Unfamilar with surroundings:  No Uneven floors:  No   Stairs or handrail saftey risk:  No Loose rugs:  No   Household clutter:  No Poor household lighting:  No   No grab bars in bathroom:  No Further evaluation needed:  No       Advanced Directives:   Advanced Directives    Living Will:  Yes    Patient's End of Life Decisions        Urinary Incontinence:   Do you have urinary incontinence?:  Yes    Do you urinate frequently?:  Yes        Glaucoma:            Provider Screening     Preventative Screening/Counseling:   Cardiovascular Screening/Counseling:   (Labs Q5 years, EKG optional one-time)   General:  Screening Current           Diabetes Screening/Counseling:   (2 tests/year if Pre-Diabetes or 1 test/year if no Diabetes)   General:  Screening Current           Colorectal Cancer Screening/Counseling:   (FOBT Q1 yr; Flex Sig Q4 yrs or Q10 yrs after Screening Colonoscopy; Screening Colonoscpy Q2 yrs High Risk or Q10 yrs Low Risk; Barium Enema Q2 yrs High Risk or Q4 yrs Low Risk)   General:  Screening Current           Prostate Cancer Screening/Counseling:   (Annual)          Breast Cancer Screening/Counseling:   (Baseline Age 28 - 43; Annual Age 36+)   General:  Patient Declines          Cervical Cancer Screening/Counseling:   (Annual for High Risk or Childbearing Age with Abnormal Pap in Last 3 yrs; Every 2 all others)   General:  Patient Declines           Osteoporosis Screening/Counseling:   (Every 2 Yrs if at risk or more if medically necessary)   General:  Patient Declines           AAA Screening/Counseling:   (Once per Lifetime with risk factors)    Family History of AAA:  No     General:  Screening Not Indicated           Glaucoma Screening/Counseling:   (Annual)   General:  Screening Current          HIV Screening/Counseling:   (Voluntary;  Once annually for high risk OR 3 times for Pregnancy at diagnosis of IUP; 3rd trimester; and at Labor Hepatitis C Screening:             Immunizations:   Influenza (annual):  Patient Declines   TD (Non-Medicare Wellness  Visit required):  Vaccine Status Unknown   Tdap (Non-Medicare Wellness Visit required):  Vaccine Status Unknown       Other Preventative Couseling (Non-Medicare Wellness Visit Required):       Referrals (Non-Medicare Wellness Visit Required):       Medical Equipment/Suppliers:

## 2018-06-05 NOTE — PROGRESS NOTES
Assessment/Plan:    No problem-specific Assessment & Plan notes found for this encounter  Diagnoses and all orders for this visit:    Type 2 diabetes mellitus without complication, without long-term current use of insulin ([facility] )  Comments:  watch the diet more carefully  Orders:  -     C-peptide; Future  -     Glucose, fasting; Future  -     HEMOGLOBIN A1C W/ EAG ESTIMATION; Future    Essential hypertension    Hypertriglyceridemia  Comments:  pt counseled on diet and exercise    Irritable bowel syndrome with diarrhea  -     dicyclomine (BENTYL) 10 mg capsule; Take 1 capsule (10 mg total) by mouth 3 (three) times a day before meals    Screening for diabetic retinopathy  -     Ambulatory referral to Ophthalmology; Future    Encounter for diabetic foot exam ([facility] )  -     Diabetic foot exam    Health care maintenance  -     sildenafil (REVATIO) 20 mg tablet; Take 1 tablet (20 mg total) by mouth 3 (three) times a day          Subjective:      Patient ID: Lilibeth Cuenca is a 69 y o  female  Follow up for HTN, pt is taking enalapril daily, pt is not checking Bps at home, pt complains of diarrhea quickly after eating, pt has had a partial colectomy, pt has to rush to the bathroom about 45 minutes after eating, it started over a month ago, pt is set up to see gastro in August      Diabetes   She presents for her follow-up diabetic visit  She has type 2 diabetes mellitus  There are no hypoglycemic associated symptoms  Pertinent negatives for diabetes include no chest pain  There are no hypoglycemic complications  There are no diabetic complications  Risk factors for coronary artery disease include diabetes mellitus, obesity, post-menopausal and sedentary lifestyle  Current diabetic treatment includes diet and oral agent (triple therapy)  She is compliant with treatment most of the time  Her overall blood glucose range is 70-90 mg/dl  An ACE inhibitor/angiotensin II receptor blocker is being taken  Eye exam is current  The following portions of the patient's history were reviewed and updated as appropriate: allergies, current medications, past family history, past medical history, past social history, past surgical history and problem list     Review of Systems   Eyes: Negative for visual disturbance  Cardiovascular: Negative for chest pain and palpitations  Gastrointestinal: Negative for abdominal pain, nausea and vomiting  Genitourinary: Negative for frequency  Skin: Negative for wound  Neurological: Negative for numbness  Objective:      /78 (BP Location: Left arm, Patient Position: Sitting, Cuff Size: Adult)   Pulse 99   Temp 98 1 °F (36 7 °C) (Tympanic)   Ht 5' (1 524 m)   Wt 61 3 kg (135 lb 3 2 oz)   SpO2 95%   BMI 26 40 kg/m²          Physical Exam   Constitutional: She is oriented to person, place, and time  She appears well-developed and well-nourished  No distress  HENT:   Head: Normocephalic and atraumatic  Eyes: Conjunctivae and EOM are normal  Pupils are equal, round, and reactive to light  No scleral icterus  Neck: Normal range of motion  Neck supple  Cardiovascular: Normal rate, regular rhythm and normal heart sounds  Pulses are no weak pulses  No murmur heard  Pulses:       Dorsalis pedis pulses are 2+ on the right side, and 2+ on the left side  Pulmonary/Chest: Effort normal and breath sounds normal  No respiratory distress  She has no wheezes  She has no rales  Abdominal: Soft  Bowel sounds are normal  She exhibits no distension and no mass  There is no tenderness  There is no rebound and no guarding  Musculoskeletal: Normal range of motion  She exhibits no edema or deformity  Feet:   Right Foot:   Skin Integrity: Negative for ulcer, skin breakdown, erythema, warmth, callus or dry skin  Left Foot:   Skin Integrity: Negative for ulcer, skin breakdown, erythema, warmth, callus or dry skin  Lymphadenopathy:     She has no cervical adenopathy  Neurological: She is alert and oriented to person, place, and time  She exhibits normal muscle tone  Skin: Skin is warm and dry  No rash noted  She is not diaphoretic  No erythema  No pallor  Psychiatric: She has a normal mood and affect  Her behavior is normal  Judgment and thought content normal    Nursing note and vitals reviewed  Patient's shoes and socks removed  Right Foot/Ankle   Right Foot Inspection  Skin Exam: skin normal and skin intact no dry skin, no warmth, no callus, no erythema, no maceration, no abnormal color, no pre-ulcer, no ulcer and no callus                          Toe Exam: ROM and strength within normal limits    Vascular  Capillary refills: < 3 seconds  The right DP pulse is 2+  Left Foot/Ankle  Left Foot Inspection  Skin Exam: skin normal and skin intactno dry skin, no warmth, no erythema, no maceration, normal color, no pre-ulcer, no ulcer and no callus                         Toe Exam: ROM and strength within normal limits                     Vascular  Capillary refills: < 3 seconds  The left DP pulse is 2+  Assign Risk Category:  No deformity present; No loss of protective sensation;  No weak pulses       Risk: 0

## 2018-09-07 ENCOUNTER — OFFICE VISIT (OUTPATIENT)
Dept: FAMILY MEDICINE CLINIC | Facility: CLINIC | Age: 78
End: 2018-09-07
Payer: COMMERCIAL

## 2018-09-07 VITALS
DIASTOLIC BLOOD PRESSURE: 72 MMHG | SYSTOLIC BLOOD PRESSURE: 132 MMHG | HEIGHT: 60 IN | HEART RATE: 90 BPM | OXYGEN SATURATION: 96 % | WEIGHT: 134 LBS | BODY MASS INDEX: 26.31 KG/M2 | TEMPERATURE: 98.8 F

## 2018-09-07 DIAGNOSIS — K58.0 IRRITABLE BOWEL SYNDROME WITH DIARRHEA: ICD-10-CM

## 2018-09-07 DIAGNOSIS — I10 ESSENTIAL HYPERTENSION: ICD-10-CM

## 2018-09-07 DIAGNOSIS — E11.9 TYPE 2 DIABETES MELLITUS WITHOUT COMPLICATION, WITHOUT LONG-TERM CURRENT USE OF INSULIN (HCC): Primary | ICD-10-CM

## 2018-09-07 PROCEDURE — 3008F BODY MASS INDEX DOCD: CPT | Performed by: FAMILY MEDICINE

## 2018-09-07 PROCEDURE — 3078F DIAST BP <80 MM HG: CPT | Performed by: FAMILY MEDICINE

## 2018-09-07 PROCEDURE — 3075F SYST BP GE 130 - 139MM HG: CPT | Performed by: FAMILY MEDICINE

## 2018-09-07 PROCEDURE — 99214 OFFICE O/P EST MOD 30 MIN: CPT | Performed by: FAMILY MEDICINE

## 2018-09-07 NOTE — PROGRESS NOTES
Assessment/Plan:    No problem-specific Assessment & Plan notes found for this encounter  Diagnoses and all orders for this visit:    Type 2 diabetes mellitus without complication, without long-term current use of insulin ([facility])  Comments:  no change in the medication HbA1c was 6 8  Orders:  -     Glucose, fasting; Future  -     C-peptide; Future  -     Hemoglobin A1C; Future    Essential hypertension  Comments:  no change in the medications    Irritable bowel syndrome with diarrhea  Comments:  controlled          Subjective:      Patient ID: Meera Zuluaga is a 69 y o  female  Follow up for HTN pt is well controlled, pt is also doing well with IBS which is well controlled      Diabetes   She presents for her follow-up diabetic visit  She has type 2 diabetes mellitus  There are no hypoglycemic associated symptoms  Pertinent negatives for diabetes include no chest pain  There are no hypoglycemic complications  Symptoms are stable  There are no diabetic complications  Risk factors for coronary artery disease include obesity, sedentary lifestyle and post-menopausal  Current diabetic treatment includes oral agent (triple therapy)  She is compliant with treatment most of the time  Her weight is stable  She is following a diabetic diet  She participates in exercise intermittently  Her overall blood glucose range is 110-130 mg/dl  An ACE inhibitor/angiotensin II receptor blocker is being taken  Eye exam is current  The following portions of the patient's history were reviewed and updated as appropriate: allergies, current medications, past family history, past medical history, past social history, past surgical history and problem list     Review of Systems   Eyes: Negative for visual disturbance  Cardiovascular: Negative for chest pain and palpitations  Gastrointestinal: Negative for abdominal pain, nausea and vomiting  Genitourinary: Negative for frequency  Skin: Negative for wound     Neurological: Negative for numbness  Objective:      /72   Pulse 90   Temp 98 8 °F (37 1 °C) (Tympanic)   Ht 5' (1 524 m)   Wt 60 8 kg (134 lb)   SpO2 96%   BMI 26 17 kg/m²          Physical Exam   Constitutional: She is oriented to person, place, and time  She appears well-developed and well-nourished  No distress  HENT:   Head: Normocephalic and atraumatic  Eyes: Conjunctivae and EOM are normal  Pupils are equal, round, and reactive to light  No scleral icterus  Neck: Normal range of motion  Neck supple  Cardiovascular: Normal rate, regular rhythm and normal heart sounds  No murmur heard  Pulmonary/Chest: Effort normal and breath sounds normal  No respiratory distress  She has no wheezes  She has no rales  Abdominal: Soft  Bowel sounds are normal  She exhibits no distension and no mass  There is no tenderness  There is no rebound and no guarding  Musculoskeletal: Normal range of motion  She exhibits no edema or deformity  Lymphadenopathy:     She has no cervical adenopathy  Neurological: She is alert and oriented to person, place, and time  She exhibits normal muscle tone  Skin: Skin is warm and dry  No rash noted  She is not diaphoretic  No erythema  No pallor  Psychiatric: She has a normal mood and affect  Her behavior is normal  Judgment and thought content normal    Nursing note and vitals reviewed

## 2018-10-25 ENCOUNTER — OFFICE VISIT (OUTPATIENT)
Dept: FAMILY MEDICINE CLINIC | Facility: CLINIC | Age: 78
End: 2018-10-25
Payer: COMMERCIAL

## 2018-10-25 VITALS
DIASTOLIC BLOOD PRESSURE: 80 MMHG | HEIGHT: 60 IN | TEMPERATURE: 98.8 F | BODY MASS INDEX: 26.5 KG/M2 | SYSTOLIC BLOOD PRESSURE: 144 MMHG | WEIGHT: 135 LBS

## 2018-10-25 DIAGNOSIS — R10.32 GROIN PAIN, CHRONIC, LEFT: Primary | ICD-10-CM

## 2018-10-25 DIAGNOSIS — G89.29 GROIN PAIN, CHRONIC, LEFT: Primary | ICD-10-CM

## 2018-10-25 PROCEDURE — 99213 OFFICE O/P EST LOW 20 MIN: CPT | Performed by: FAMILY MEDICINE

## 2018-10-25 NOTE — PROGRESS NOTES
Assessment/Plan:    No problem-specific Assessment & Plan notes found for this encounter  Diagnoses and all orders for this visit:    Groin pain, chronic, left  -     Ambulatory referral to General Surgery; Future          Subjective:      Patient ID: Tommy Edmondson is a 69 y o  female  Pt complains of left groin pain, pt had hernia surgery in that area years ago, pt states she had problems with it even right after the surgery, so it has been years and she is tired of it, it feels like a "piercing pain" in the left groin, pt has not taken anything for it        The following portions of the patient's history were reviewed and updated as appropriate: allergies, current medications, past family history, past medical history, past social history, past surgical history and problem list     Review of Systems   Constitutional: Negative for chills and fever  Gastrointestinal: Negative for abdominal distention, blood in stool, constipation, diarrhea, nausea and vomiting  Objective:      /80   Temp 98 8 °F (37 1 °C)   Ht 5' (1 524 m)   Wt 61 2 kg (135 lb)   BMI 26 37 kg/m²          Physical Exam   Constitutional: She is oriented to person, place, and time  She appears well-developed and well-nourished  No distress  HENT:   Head: Normocephalic and atraumatic  Eyes: Pupils are equal, round, and reactive to light  Conjunctivae and EOM are normal  No scleral icterus  Neck: Normal range of motion  Neck supple  Cardiovascular: Normal rate, regular rhythm and normal heart sounds  No murmur heard  Pulmonary/Chest: Effort normal and breath sounds normal  No respiratory distress  She has no wheezes  She has no rales  Abdominal: Soft  Bowel sounds are normal  She exhibits no distension and no mass  There is no tenderness  There is no rebound and no guarding  Musculoskeletal: She exhibits no edema  Lymphadenopathy:     She has no cervical adenopathy     Neurological: She is alert and oriented to person, place, and time  She exhibits normal muscle tone  Skin: Skin is warm and dry  No rash noted  She is not diaphoretic  No erythema  No pallor  Psychiatric: She has a normal mood and affect  Her behavior is normal  Judgment and thought content normal    Nursing note and vitals reviewed

## 2018-11-14 ENCOUNTER — OFFICE VISIT (OUTPATIENT)
Dept: SURGERY | Facility: CLINIC | Age: 78
End: 2018-11-14
Payer: COMMERCIAL

## 2018-11-14 VITALS
SYSTOLIC BLOOD PRESSURE: 126 MMHG | WEIGHT: 137.6 LBS | HEIGHT: 60 IN | DIASTOLIC BLOOD PRESSURE: 80 MMHG | BODY MASS INDEX: 27.01 KG/M2

## 2018-11-14 DIAGNOSIS — R10.32 GROIN PAIN, CHRONIC, LEFT: ICD-10-CM

## 2018-11-14 DIAGNOSIS — G89.29 GROIN PAIN, CHRONIC, LEFT: ICD-10-CM

## 2018-11-14 PROCEDURE — 99203 OFFICE O/P NEW LOW 30 MIN: CPT | Performed by: SURGERY

## 2018-11-14 NOTE — PROGRESS NOTES
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  Will arrange for her to come back and get a nerve block in see if that helps  I told her there is no hernia or anything else that I would fix surgically  Chief Complaint:  Karyle Hermanns is a 69 y o  female who presents for Abdominal Pain (Consult left groin pain)    Subjective  81-year-old female who is been having left groin pain for some time  She had a hernia repair on that side twice a number of years ago  She has pain in the left groin region off and on  She noted after surgery she had warmth in that area and numbness  Sometimes it is worse than others  Sometimes laying flat with pressure on that area it gets worse  It feels like a heavy aching sensation  Sometimes nausea with the pain  Nothing seems to make it worse, ibuprofen seems to help it  No radiation      Past Medical History  Past Medical History:   Diagnosis Date    Diabetes mellitus ([address] )     Postherpetic neuralgia        Past Surgical History  Past Surgical History:   Procedure Laterality Date    CHOLECYSTECTOMY      HERNIA REPAIR      ROTATOR CUFF REPAIR Right     VARICOSE VEIN SURGERY      Impression:  2/12/16 Brittany Edmond       Family History  Family History   Problem Relation Age of Onset    Diabetes Mother     No Known Problems Father        Medications  Current Outpatient Prescriptions on File Prior to Visit   Medication Sig Dispense Refill    dicyclomine (BENTYL) 10 mg capsule Take 1 capsule (10 mg total) by mouth 3 (three) times a day before meals 90 capsule 3    enalapril (VASOTEC) 10 mg tablet Take 1 tablet (10 mg total) by mouth daily 90 tablet 3    gemfibrozil (LOPID) 600 mg tablet Take 1 tablet (600 mg total) by mouth 2 (two) times a day 180 tablet 3    glipiZIDE (GLUCOTROL XL) 5 mg 24 hr tablet Take 1 tablet (5 mg total) by mouth daily 90 tablet 3    lovastatin (MEVACOR) 20 mg tablet Take 2 tablets (40 mg total) by mouth daily 180 tablet 3    metFORMIN (GLUCOPHAGE) 1000 MG tablet Take 1 tablet (1,000 mg total) by mouth 2 (two) times a day 180 tablet 3    sitaGLIPtin (JANUVIA) 100 mg tablet Take 1 tablet (100 mg total) by mouth daily 90 tablet 3    triamcinolone (KENALOG) 0 1 % cream Apply topically 2 (two) times a day      sildenafil (REVATIO) 20 mg tablet Take 1 tablet (20 mg total) by mouth 3 (three) times a day (Patient not taking: Reported on 10/25/2018 ) 10 tablet 3     No current facility-administered medications on file prior to visit  Allergies  Allergies   Allergen Reactions    Ciprofloxacin GI Intolerance and Headache     Confusion, arm weakness, dizziness, headache    Meperidine GI Intolerance and Hallucinations     Demarol    Propoxyphene GI Intolerance       Review of Systems   Constitutional: Negative for chills and fever  HENT: Negative for trouble swallowing and voice change  Eyes: Negative for visual disturbance  Eye strain with a lot of reading   Respiratory: Negative for cough and shortness of breath  Cardiovascular: Negative for chest pain and leg swelling  Gastrointestinal: Positive for abdominal pain  Negative for vomiting  Endocrine: Negative for cold intolerance, heat intolerance, polydipsia, polyphagia and polyuria  Genitourinary: Negative for difficulty urinating and flank pain  Musculoskeletal: Negative for arthralgias and gait problem  Skin: Negative for rash and wound  Allergic/Immunologic: Negative for food allergies  Neurological: Negative for seizures, syncope, weakness and headaches     Hematological: Negative for adenopathy  Psychiatric/Behavioral: Negative for confusion  All other systems reviewed and are negative  Objective  Vitals:    11/14/18 1313   BP: 126/80       Physical Exam   Constitutional: She is oriented to person, place, and time  She appears well-developed and well-nourished  No distress  HENT:   Head: Normocephalic and atraumatic  Right Ear: External ear normal    Left Ear: External ear normal    Eyes: Conjunctivae are normal  No scleral icterus  Neck: Neck supple  No tracheal deviation present  No thyromegaly present  Cardiovascular: Normal rate, regular rhythm and normal heart sounds  Exam reveals no friction rub  No murmur heard  Pulmonary/Chest: Effort normal and breath sounds normal  No respiratory distress  She has no wheezes  She has no rales  Abdominal: Soft  She exhibits no distension and no mass  There is no rebound and no guarding  Right subcostal incision well healed several laparoscopic incisions well healed lower midline incision well healed  Right transverse groin incision well healed left groin incision well healed soft  Tenderness at the pubic tubercle  No evidence of hernia on the left, no femoral hernia   Musculoskeletal: Normal range of motion  She exhibits no edema  Lymphadenopathy:     She has no cervical adenopathy  Neurological: She is alert and oriented to person, place, and time  Skin: Skin is warm and dry  She is not diaphoretic  Psychiatric: She has a normal mood and affect  Her behavior is normal  Judgment and thought content normal    Nursing note and vitals reviewed

## 2018-11-14 NOTE — ASSESSMENT & PLAN NOTE
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

## 2018-12-10 ENCOUNTER — APPOINTMENT (OUTPATIENT)
Dept: LAB | Facility: CLINIC | Age: 78
End: 2018-12-10
Payer: COMMERCIAL

## 2018-12-10 DIAGNOSIS — E11.9 TYPE 2 DIABETES MELLITUS WITHOUT COMPLICATION, WITHOUT LONG-TERM CURRENT USE OF INSULIN (HCC): ICD-10-CM

## 2018-12-10 LAB
EST. AVERAGE GLUCOSE BLD GHB EST-MCNC: 180 MG/DL
GLUCOSE P FAST SERPL-MCNC: 114 MG/DL (ref 65–99)
HBA1C MFR BLD: 7.9 % (ref 4.2–6.3)

## 2018-12-10 PROCEDURE — 83036 HEMOGLOBIN GLYCOSYLATED A1C: CPT

## 2018-12-10 PROCEDURE — 82947 ASSAY GLUCOSE BLOOD QUANT: CPT

## 2018-12-10 PROCEDURE — 84681 ASSAY OF C-PEPTIDE: CPT

## 2018-12-10 PROCEDURE — 36415 COLL VENOUS BLD VENIPUNCTURE: CPT

## 2018-12-11 LAB — C PEPTIDE SERPL-MCNC: 5.2 NG/ML (ref 1.1–4.4)

## 2018-12-17 ENCOUNTER — OFFICE VISIT (OUTPATIENT)
Dept: FAMILY MEDICINE CLINIC | Facility: CLINIC | Age: 78
End: 2018-12-17
Payer: COMMERCIAL

## 2018-12-17 VITALS
SYSTOLIC BLOOD PRESSURE: 124 MMHG | TEMPERATURE: 99.1 F | DIASTOLIC BLOOD PRESSURE: 82 MMHG | HEIGHT: 60 IN | BODY MASS INDEX: 26.86 KG/M2 | WEIGHT: 136.8 LBS

## 2018-12-17 DIAGNOSIS — E11.9 TYPE 2 DIABETES MELLITUS WITHOUT COMPLICATION, WITHOUT LONG-TERM CURRENT USE OF INSULIN (HCC): Primary | ICD-10-CM

## 2018-12-17 DIAGNOSIS — J01.00 ACUTE NON-RECURRENT MAXILLARY SINUSITIS: ICD-10-CM

## 2018-12-17 DIAGNOSIS — I10 ESSENTIAL HYPERTENSION: ICD-10-CM

## 2018-12-17 PROCEDURE — 1160F RVW MEDS BY RX/DR IN RCRD: CPT | Performed by: FAMILY MEDICINE

## 2018-12-17 PROCEDURE — 99214 OFFICE O/P EST MOD 30 MIN: CPT | Performed by: FAMILY MEDICINE

## 2018-12-17 PROCEDURE — 3008F BODY MASS INDEX DOCD: CPT | Performed by: FAMILY MEDICINE

## 2018-12-17 PROCEDURE — 4040F PNEUMOC VAC/ADMIN/RCVD: CPT | Performed by: FAMILY MEDICINE

## 2018-12-17 PROCEDURE — 1036F TOBACCO NON-USER: CPT | Performed by: FAMILY MEDICINE

## 2018-12-17 PROCEDURE — 3074F SYST BP LT 130 MM HG: CPT | Performed by: FAMILY MEDICINE

## 2018-12-17 PROCEDURE — 3079F DIAST BP 80-89 MM HG: CPT | Performed by: FAMILY MEDICINE

## 2018-12-17 RX ORDER — AZITHROMYCIN 250 MG/1
TABLET, FILM COATED ORAL
Qty: 6 TABLET | Refills: 0 | Status: SHIPPED | OUTPATIENT
Start: 2018-12-17 | End: 2018-12-22

## 2018-12-17 NOTE — PROGRESS NOTES
Assessment/Plan:    No problem-specific Assessment & Plan notes found for this encounter  Diagnoses and all orders for this visit:    Type 2 diabetes mellitus without complication, without long-term current use of insulin ([facility])  -     Microalbumin / creatinine urine ratio  -     C-peptide; Future  -     Glucose, fasting; Future  -     Hemoglobin A1C; Future    Essential hypertension    Acute non-recurrent maxillary sinusitis  -     azithromycin (ZITHROMAX) 250 mg tablet; Take 2 tablets day 1 and 1 tablet daily the next 4 days          Subjective:      Patient ID: Marilia Patient is a 69 y o  female  Follow up for HTN which is well controlled, pt complains of cough, stuffy nose runny for the last week, pt has not tried anything for it      Diabetes   She presents for her follow-up diabetic visit  She has type 2 diabetes mellitus  There are no hypoglycemic associated symptoms  Pertinent negatives for diabetes include no chest pain  There are no hypoglycemic complications  Symptoms are stable  Risk factors for coronary artery disease include obesity, sedentary lifestyle and post-menopausal  Current diabetic treatment includes oral agent (triple therapy)  She is compliant with treatment most of the time  Her weight is stable  She is following a diabetic diet  Her overall blood glucose range is 140-180 mg/dl  An ACE inhibitor/angiotensin II receptor blocker is being taken  Eye exam is current  The following portions of the patient's history were reviewed and updated as appropriate: allergies, current medications, past family history, past medical history, past social history, past surgical history and problem list     Review of Systems   Eyes: Negative for visual disturbance  Cardiovascular: Negative for chest pain and palpitations  Gastrointestinal: Negative for abdominal pain, nausea and vomiting  Genitourinary: Negative for frequency  Skin: Negative for wound     Neurological: Negative for numbness  Objective:      /82 (BP Location: Right arm, Patient Position: Sitting, Cuff Size: Adult)   Temp 99 1 °F (37 3 °C) (Tympanic)   Ht 5' (1 524 m)   Wt 62 1 kg (136 lb 12 8 oz)   BMI 26 72 kg/m²          Physical Exam   Constitutional: She is oriented to person, place, and time  She appears well-developed and well-nourished  No distress  HENT:   Head: Normocephalic and atraumatic  Right Ear: Tympanic membrane, external ear and ear canal normal    Left Ear: Tympanic membrane, external ear and ear canal normal    Nose: Mucosal edema and rhinorrhea present  Mouth/Throat: Mucous membranes are normal  No oropharyngeal exudate  [place] OP   Eyes: Pupils are equal, round, and reactive to light  Conjunctivae and EOM are normal  No scleral icterus  Neck: Normal range of motion  Neck supple  Cardiovascular: Normal rate, regular rhythm and normal heart sounds  No murmur heard  Pulmonary/Chest: Effort normal and breath sounds normal  No stridor  No respiratory distress  She has no wheezes  She has no rales  Abdominal: Soft  Bowel sounds are normal  She exhibits no distension and no mass  There is no tenderness  There is no rebound and no guarding  Musculoskeletal: Normal range of motion  She exhibits no edema or deformity  Lymphadenopathy:     She has no cervical adenopathy  Neurological: She is alert and oriented to person, place, and time  She exhibits normal muscle tone  Skin: Skin is warm and dry  No rash noted  She is not diaphoretic  No erythema  No pallor  Psychiatric: She has a normal mood and affect  Her behavior is normal  Judgment and thought content normal    Nursing note and vitals reviewed

## 2019-02-22 ENCOUNTER — TRANSCRIBE ORDERS (OUTPATIENT)
Dept: LAB | Facility: CLINIC | Age: 79
End: 2019-02-22

## 2019-02-22 ENCOUNTER — APPOINTMENT (OUTPATIENT)
Dept: LAB | Facility: CLINIC | Age: 79
End: 2019-02-22
Payer: COMMERCIAL

## 2019-02-22 DIAGNOSIS — E78.2 MIXED HYPERLIPIDEMIA: ICD-10-CM

## 2019-02-22 DIAGNOSIS — N18.30 CONTROLLED TYPE 2 DIABETES MELLITUS WITH STAGE 3 CHRONIC KIDNEY DISEASE, UNSPECIFIED WHETHER LONG TERM INSULIN USE (HCC): ICD-10-CM

## 2019-02-22 DIAGNOSIS — D50.9 IRON DEFICIENCY ANEMIA, UNSPECIFIED IRON DEFICIENCY ANEMIA TYPE: ICD-10-CM

## 2019-02-22 DIAGNOSIS — I12.9 HYPERTENSION, RENAL DISEASE, STAGE 1-4 OR UNSPECIFIED CHRONIC KIDNEY DISEASE: ICD-10-CM

## 2019-02-22 DIAGNOSIS — N18.30 CHRONIC KIDNEY DISEASE, STAGE III (MODERATE) (HCC): ICD-10-CM

## 2019-02-22 DIAGNOSIS — E11.22 CONTROLLED TYPE 2 DIABETES MELLITUS WITH STAGE 3 CHRONIC KIDNEY DISEASE, UNSPECIFIED WHETHER LONG TERM INSULIN USE (HCC): ICD-10-CM

## 2019-02-22 DIAGNOSIS — E11.9 TYPE 2 DIABETES MELLITUS WITHOUT COMPLICATION, WITHOUT LONG-TERM CURRENT USE OF INSULIN (HCC): ICD-10-CM

## 2019-02-22 DIAGNOSIS — E55.9 VITAMIN D DEFICIENCY, UNSPECIFIED: ICD-10-CM

## 2019-02-22 DIAGNOSIS — R80.9 PROTEINURIA, UNSPECIFIED TYPE: ICD-10-CM

## 2019-02-22 DIAGNOSIS — D53.9 DEFICIENCY ANEMIA: ICD-10-CM

## 2019-02-22 DIAGNOSIS — N18.30 CHRONIC KIDNEY DISEASE, STAGE III (MODERATE) (HCC): Primary | ICD-10-CM

## 2019-02-22 LAB
ALBUMIN SERPL BCP-MCNC: 4.1 G/DL (ref 3.5–5)
ANION GAP SERPL CALCULATED.3IONS-SCNC: 9 MMOL/L (ref 4–13)
BASOPHILS # BLD AUTO: 0.03 THOUSANDS/ΜL (ref 0–0.1)
BASOPHILS NFR BLD AUTO: 1 % (ref 0–1)
BUN SERPL-MCNC: 32 MG/DL (ref 5–25)
CALCIUM SERPL-MCNC: 9.4 MG/DL (ref 8.3–10.1)
CHLORIDE SERPL-SCNC: 110 MMOL/L (ref 100–108)
CO2 SERPL-SCNC: 23 MMOL/L (ref 21–32)
CREAT SERPL-MCNC: 1.26 MG/DL (ref 0.6–1.3)
CREAT UR-MCNC: 105 MG/DL
CREAT UR-MCNC: 105 MG/DL
EOSINOPHIL # BLD AUTO: 0.13 THOUSAND/ΜL (ref 0–0.61)
EOSINOPHIL NFR BLD AUTO: 2 % (ref 0–6)
ERYTHROCYTE [DISTWIDTH] IN BLOOD BY AUTOMATED COUNT: 13.9 % (ref 11.6–15.1)
EST. AVERAGE GLUCOSE BLD GHB EST-MCNC: 197 MG/DL
GFR SERPL CREATININE-BSD FRML MDRD: 41 ML/MIN/1.73SQ M
GLUCOSE P FAST SERPL-MCNC: 80 MG/DL (ref 65–99)
GLUCOSE P FAST SERPL-MCNC: 80 MG/DL (ref 65–99)
HBA1C MFR BLD: 8.5 % (ref 4.2–6.3)
HCT VFR BLD AUTO: 40.7 % (ref 34.8–46.1)
HGB BLD-MCNC: 13 G/DL (ref 11.5–15.4)
IMM GRANULOCYTES # BLD AUTO: 0.02 THOUSAND/UL (ref 0–0.2)
IMM GRANULOCYTES NFR BLD AUTO: 0 % (ref 0–2)
LYMPHOCYTES # BLD AUTO: 1.72 THOUSANDS/ΜL (ref 0.6–4.47)
LYMPHOCYTES NFR BLD AUTO: 27 % (ref 14–44)
MCH RBC QN AUTO: 27.1 PG (ref 26.8–34.3)
MCHC RBC AUTO-ENTMCNC: 31.9 G/DL (ref 31.4–37.4)
MCV RBC AUTO: 85 FL (ref 82–98)
MICROALBUMIN UR-MCNC: 137 MG/L (ref 0–20)
MICROALBUMIN/CREAT 24H UR: 130 MG/G CREATININE (ref 0–30)
MONOCYTES # BLD AUTO: 0.41 THOUSAND/ΜL (ref 0.17–1.22)
MONOCYTES NFR BLD AUTO: 6 % (ref 4–12)
NEUTROPHILS # BLD AUTO: 4.05 THOUSANDS/ΜL (ref 1.85–7.62)
NEUTS SEG NFR BLD AUTO: 64 % (ref 43–75)
NRBC BLD AUTO-RTO: 0 /100 WBCS
PHOSPHATE SERPL-MCNC: 2.8 MG/DL (ref 2.3–4.1)
PLATELET # BLD AUTO: 236 THOUSANDS/UL (ref 149–390)
PMV BLD AUTO: 10.4 FL (ref 8.9–12.7)
POTASSIUM SERPL-SCNC: 3.8 MMOL/L (ref 3.5–5.3)
PROT UR-MCNC: 49 MG/DL
PROT/CREAT UR: 0.47 MG/G{CREAT} (ref 0–0.1)
RBC # BLD AUTO: 4.79 MILLION/UL (ref 3.81–5.12)
SODIUM SERPL-SCNC: 142 MMOL/L (ref 136–145)
WBC # BLD AUTO: 6.36 THOUSAND/UL (ref 4.31–10.16)

## 2019-02-22 PROCEDURE — 84156 ASSAY OF PROTEIN URINE: CPT | Performed by: INTERNAL MEDICINE

## 2019-02-22 PROCEDURE — 85025 COMPLETE CBC W/AUTO DIFF WBC: CPT

## 2019-02-22 PROCEDURE — 80069 RENAL FUNCTION PANEL: CPT

## 2019-02-22 PROCEDURE — 82570 ASSAY OF URINE CREATININE: CPT | Performed by: INTERNAL MEDICINE

## 2019-02-22 PROCEDURE — 82570 ASSAY OF URINE CREATININE: CPT | Performed by: FAMILY MEDICINE

## 2019-02-22 PROCEDURE — 84681 ASSAY OF C-PEPTIDE: CPT

## 2019-02-22 PROCEDURE — 82043 UR ALBUMIN QUANTITATIVE: CPT | Performed by: FAMILY MEDICINE

## 2019-02-22 PROCEDURE — 82947 ASSAY GLUCOSE BLOOD QUANT: CPT

## 2019-02-22 PROCEDURE — 36415 COLL VENOUS BLD VENIPUNCTURE: CPT

## 2019-02-22 PROCEDURE — 83036 HEMOGLOBIN GLYCOSYLATED A1C: CPT

## 2019-02-23 LAB — C PEPTIDE SERPL-MCNC: 7.4 NG/ML (ref 1.1–4.4)

## 2019-03-12 ENCOUNTER — OFFICE VISIT (OUTPATIENT)
Dept: URGENT CARE | Facility: CLINIC | Age: 79
End: 2019-03-12
Payer: COMMERCIAL

## 2019-03-12 VITALS
HEART RATE: 96 BPM | TEMPERATURE: 98.9 F | BODY MASS INDEX: 26.7 KG/M2 | RESPIRATION RATE: 16 BRPM | HEIGHT: 60 IN | OXYGEN SATURATION: 95 % | DIASTOLIC BLOOD PRESSURE: 77 MMHG | WEIGHT: 136 LBS | SYSTOLIC BLOOD PRESSURE: 165 MMHG

## 2019-03-12 DIAGNOSIS — S16.1XXA STRAIN OF MUSCLE, FASCIA AND TENDON AT NECK LEVEL, INITIAL ENCOUNTER: ICD-10-CM

## 2019-03-12 DIAGNOSIS — S09.90XA INJURY OF HEAD, INITIAL ENCOUNTER: Primary | ICD-10-CM

## 2019-03-12 PROCEDURE — 99213 OFFICE O/P EST LOW 20 MIN: CPT | Performed by: NURSE PRACTITIONER

## 2019-03-12 NOTE — PROGRESS NOTES
[facility]        NAME: Julian Cohen is a 69 y o  female  : 1940    MRN: [id number]  DATE: 2019  TIME: 7:36 PM    Assessment and Plan   Injury of head, initial encounter [S09 90XA]  1  Injury of head, initial encounter     2  Strain of muscle, fascia and tendon at neck level, initial encounter           Patient Instructions     Patient Instructions   Pt  Given the option of seeing Dr Zahida Vance tomorrow for her scheduled appt or going to the ER tonight  Pt  Wishes to go to ER for a CT scan- due to her pain feeling worse and concerning symptoms worsening  Keep appt with Dr Zahida Vance tomorrow if d/c from [address] [place] [address]    Follow up with PCP in 3-5 days  Proceed to  ER if symptoms worsen  Chief Complaint     Chief Complaint   Patient presents with    Head Injury     x 2 weeks ago    Decreased Visual Acuity     x 2 weeks         History of Present Illness       8 days ago pt  Hit the top of her head on her car when she slipped  She states that she not able to focus her eyes " I can drive, I just can't focus right", c/o neck pain, burning sensation on the left side of her face, pain in jaw  Denies nausea, denies memory loss  C/o "bad headache"  Has been taking Tylenol every 4 hours since this happened "to numb the pain/headache"  Has appointment with Dr Zahida Vance tomorrow but decided to come to [facility] (7pm) because "the pain is pretty bad"  Pt  Admits that the headache is worsening      Review of Systems   Review of Systems   Constitutional: Negative for activity change, diaphoresis, fatigue and fever  HENT: Negative for congestion, facial swelling, hearing loss, rhinorrhea, sinus pressure, sinus pain, sneezing, sore throat and voice change  Eyes: Positive for visual disturbance  Negative for discharge  Respiratory: Negative for cough, choking, chest tightness, shortness of breath, wheezing and stridor      Cardiovascular: Negative for chest pain, palpitations and leg swelling  Gastrointestinal: Negative for abdominal distention, abdominal pain, constipation, diarrhea, nausea and vomiting  Endocrine: Negative for polydipsia, polyphagia and polyuria  Genitourinary: Negative for difficulty urinating, dysuria, frequency and urgency  Musculoskeletal: Positive for neck pain  Negative for arthralgias, back pain, gait problem, joint swelling, myalgias and neck stiffness  Skin: Negative for color change, rash and wound  Neurological: Positive for numbness and headaches  Negative for dizziness, syncope, speech difficulty, weakness and light-headedness  Hematological: Negative for adenopathy  Does not bruise/bleed easily  Psychiatric/Behavioral: Negative for agitation, behavioral problems, confusion, hallucinations, sleep disturbance and suicidal ideas  The patient is not nervous/anxious            Current Medications       Current Outpatient Medications:     dicyclomine (BENTYL) 10 mg capsule, Take 1 capsule (10 mg total) by mouth 3 (three) times a day before meals, Disp: 90 capsule, Rfl: 3    enalapril (VASOTEC) 10 mg tablet, Take 1 tablet (10 mg total) by mouth daily, Disp: 90 tablet, Rfl: 3    gemfibrozil (LOPID) 600 mg tablet, Take 1 tablet (600 mg total) by mouth 2 (two) times a day, Disp: 180 tablet, Rfl: 3    glipiZIDE (GLUCOTROL XL) 5 mg 24 hr tablet, Take 1 tablet (5 mg total) by mouth daily, Disp: 90 tablet, Rfl: 3    lovastatin (MEVACOR) 20 mg tablet, Take 2 tablets (40 mg total) by mouth daily, Disp: 180 tablet, Rfl: 3    metFORMIN (GLUCOPHAGE) 1000 MG tablet, Take 1 tablet (1,000 mg total) by mouth 2 (two) times a day, Disp: 180 tablet, Rfl: 3    sildenafil (REVATIO) 20 mg tablet, Take 1 tablet (20 mg total) by mouth 3 (three) times a day, Disp: 10 tablet, Rfl: 3    sitaGLIPtin (JANUVIA) 100 mg tablet, Take 1 tablet (100 mg total) by mouth daily, Disp: 90 tablet, Rfl: 3    triamcinolone (KENALOG) 0 1 % cream, Apply topically 2 (two) times a day, Disp: , Rfl:     Current Allergies     Allergies as of 03/12/2019 - Reviewed 03/12/2019   Allergen Reaction Noted    Ciprofloxacin GI Intolerance and Headache 06/19/2016    Meperidine GI Intolerance and Hallucinations 02/12/2016    Propoxyphene GI Intolerance 07/27/2016            The following portions of the patient's history were reviewed and updated as appropriate: allergies, current medications, past family history, past medical history, past social history, past surgical history and problem list      Past Medical History:   Diagnosis Date    Diabetes mellitus ([address] )     Postherpetic neuralgia        Past Surgical History:   Procedure Laterality Date    CHOLECYSTECTOMY      HERNIA REPAIR      ROTATOR CUFF REPAIR Right     VARICOSE VEIN SURGERY      Impression:  2/12/16 Theta Deshaun       Family History   Problem Relation Age of Onset    Diabetes Mother     No Known Problems Father          Medications have been verified  Objective   /77   Pulse 96   Temp 98 9 °F (37 2 °C)   Resp 16   Ht 5' (1 524 m)   Wt 61 7 kg (136 lb)   SpO2 95%   BMI 26 56 kg/m²        Physical Exam     Physical Exam   Constitutional: She is oriented to person, place, and time  Vital signs are normal  She appears well-developed and well-nourished  She is active and cooperative  No distress  Eyes: Pupils are equal, round, and reactive to light  Conjunctivae and EOM are normal  Right eye exhibits no discharge  Left eye exhibits no discharge  Neck:       Cardiovascular: Normal rate, regular rhythm and normal heart sounds  No murmur heard  Pulmonary/Chest: Effort normal and breath sounds normal  No respiratory distress  She has no wheezes  Neurological: She is alert and oriented to person, place, and time  Skin: Skin is warm and dry  No rash noted  She is not diaphoretic  No erythema (no signs of trauma to scalp)  Psychiatric: She has a normal mood and affect   Her behavior is normal  Judgment and thought content normal    Nursing note and vitals reviewed

## 2019-03-12 NOTE — PATIENT INSTRUCTIONS
Pt  Given the option of seeing Dr Yuly Bradley tomorrow for her scheduled appt or going to the ER tonight  Pt   Wishes to go to ER for a CT scan- due to her pain feeling worse and concerning symptoms worsening  Keep appt with Dr Yuly Bradley tomorrow if d/c from [address] [place] [address]

## 2019-03-13 ENCOUNTER — OFFICE VISIT (OUTPATIENT)
Dept: FAMILY MEDICINE CLINIC | Facility: CLINIC | Age: 79
End: 2019-03-13
Payer: COMMERCIAL

## 2019-03-13 VITALS
DIASTOLIC BLOOD PRESSURE: 88 MMHG | SYSTOLIC BLOOD PRESSURE: 132 MMHG | WEIGHT: 137 LBS | HEIGHT: 60 IN | BODY MASS INDEX: 26.9 KG/M2 | TEMPERATURE: 98 F

## 2019-03-13 DIAGNOSIS — G44.309 POST-TRAUMATIC HEADACHE, NOT INTRACTABLE, UNSPECIFIED CHRONICITY PATTERN: ICD-10-CM

## 2019-03-13 DIAGNOSIS — E11.9 TYPE 2 DIABETES MELLITUS WITHOUT COMPLICATION, WITHOUT LONG-TERM CURRENT USE OF INSULIN (HCC): Primary | ICD-10-CM

## 2019-03-13 DIAGNOSIS — Z23 ENCOUNTER FOR IMMUNIZATION: ICD-10-CM

## 2019-03-13 DIAGNOSIS — E11.9 TYPE 2 DIABETES MELLITUS WITHOUT COMPLICATION, WITHOUT LONG-TERM CURRENT USE OF INSULIN (HCC): ICD-10-CM

## 2019-03-13 DIAGNOSIS — E66.3 OVERWEIGHT (BMI 25.0-29.9): ICD-10-CM

## 2019-03-13 DIAGNOSIS — I10 ESSENTIAL HYPERTENSION: ICD-10-CM

## 2019-03-13 PROCEDURE — 3079F DIAST BP 80-89 MM HG: CPT | Performed by: FAMILY MEDICINE

## 2019-03-13 PROCEDURE — 3075F SYST BP GE 130 - 139MM HG: CPT | Performed by: FAMILY MEDICINE

## 2019-03-13 PROCEDURE — 99214 OFFICE O/P EST MOD 30 MIN: CPT | Performed by: FAMILY MEDICINE

## 2019-03-13 PROCEDURE — 1036F TOBACCO NON-USER: CPT | Performed by: FAMILY MEDICINE

## 2019-03-13 RX ORDER — GLIPIZIDE 10 MG/1
10 TABLET, FILM COATED, EXTENDED RELEASE ORAL DAILY
Qty: 90 TABLET | Refills: 3 | Status: SHIPPED | OUTPATIENT
Start: 2019-03-13 | End: 2020-03-23

## 2019-03-13 NOTE — PROGRESS NOTES
Assessment/Plan:    No problem-specific Assessment & Plan notes found for this encounter  Diagnoses and all orders for this visit:    Type 2 diabetes mellitus without complication, without long-term current use of insulin ([facility])  -     glipiZIDE (GLUCOTROL XL) 10 mg 24 hr tablet; Take 1 tablet (10 mg total) by mouth daily  -     sitaGLIPtin (JANUVIA) 100 mg tablet; Take 1 tablet (100 mg total) by mouth daily  -     C-peptide; Future  -     Glucose, fasting; Future  -     Hemoglobin A1C; Future    Essential hypertension  -     CBC and differential; Future  -     Comprehensive metabolic panel; Future  -     Lipid panel; Future  -     TSH, 3rd generation with Free T4 reflex; Future    Overweight (BMI 25 0-29  9)  Comments:  pt counseled on diet and exercise    Post-traumatic headache, not intractable, unspecified chronicity pattern  Comments:  tylenol for pain    Encounter for immunization  -     PNEUMOCOCCAL POLYSACCHARIDE VACCINE 23-VALENT =>3YO SQ IM    Type 2 diabetes mellitus without complication, without long-term current use of insulin ([facility] )  Comments:  pt counseled on diet and exercise and weight loss  Orders:  -     glipiZIDE (GLUCOTROL XL) 10 mg 24 hr tablet; Take 1 tablet (10 mg total) by mouth daily  -     sitaGLIPtin (JANUVIA) 100 mg tablet; Take 1 tablet (100 mg total) by mouth daily  -     C-peptide; Future  -     Glucose, fasting; Future  -     Hemoglobin A1C; Future        BMI Counseling: Body mass index is 26 76 kg/m²  Discussed the patient's BMI with her  The BMI is above average  BMI counseling and education was provided to the patient  Nutrition recommendations include reducing portion sizes and 3-5 servings of fruits/vegetables daily  Exercise recommendations include moderate aerobic physical activity for 150 minutes/week and exercising 3-5 times per week  Subjective:      Patient ID: Lilibeth Delarosa is a 69 y o  female      Pt hit her head 2 days ago accidentally standing up in the car hitting the crown of her head, pt has a mild headache as a result, there was no LOC, pt was seen in the urgent care and ER who felt she was ok, pt is taking tylenol which is helping    Diabetes   She presents for her follow-up diabetic visit  She has type 2 diabetes mellitus  Hypoglycemia symptoms include headaches  Pertinent negatives for hypoglycemia include no dizziness  There are no diabetic associated symptoms  Pertinent negatives for diabetes include no chest pain  There are no hypoglycemic complications  Symptoms are stable  There are no diabetic complications  Risk factors for coronary artery disease include obesity, sedentary lifestyle and post-menopausal  Current diabetic treatment includes oral agent (triple therapy)  She is compliant with treatment most of the time  Her weight is stable  She is following a diabetic diet  Her overall blood glucose range is  mg/dl  An ACE inhibitor/angiotensin II receptor blocker is being taken  The following portions of the patient's history were reviewed and updated as appropriate: allergies, current medications, past family history, past medical history, past social history, past surgical history and problem list     Review of Systems   Eyes: Negative for visual disturbance  Cardiovascular: Negative for chest pain and palpitations  Gastrointestinal: Negative for abdominal pain, nausea and vomiting  Genitourinary: Negative for frequency  Skin: Negative for wound  Neurological: Positive for headaches  Negative for dizziness and numbness  Objective:      /88   Temp 98 °F (36 7 °C)   Ht 5' (1 524 m)   Wt 62 1 kg (137 lb)   BMI 26 76 kg/m²          Physical Exam   Constitutional: She is oriented to person, place, and time  She appears well-developed and well-nourished  No distress  HENT:   Head: Normocephalic and atraumatic  Eyes: Pupils are equal, round, and reactive to light  Conjunctivae and EOM are normal  No scleral icterus  Neck: Normal range of motion  Neck supple  Cardiovascular: Normal rate, regular rhythm and normal heart sounds  No murmur heard  Pulmonary/Chest: Effort normal and breath sounds normal  No respiratory distress  She has no wheezes  She has no rales  Abdominal: Soft  Bowel sounds are normal  She exhibits no distension and no mass  There is no tenderness  There is no rebound and no guarding  Musculoskeletal: Normal range of motion  She exhibits no edema or deformity  Lymphadenopathy:     She has no cervical adenopathy  Neurological: She is alert and oriented to person, place, and time  Skin: Skin is warm and dry  She is not diaphoretic  Psychiatric: She has a normal mood and affect  Her behavior is normal  Judgment and thought content normal    Nursing note and vitals reviewed

## 2019-03-18 PROBLEM — E11.9 DIABETES MELLITUS (HCC): Chronic | Status: ACTIVE | Noted: 2019-03-18

## 2019-04-01 DIAGNOSIS — E78.00 HYPERCHOLESTEROLEMIA: ICD-10-CM

## 2019-04-01 DIAGNOSIS — E11.9 TYPE 2 DIABETES MELLITUS WITHOUT COMPLICATION, WITHOUT LONG-TERM CURRENT USE OF INSULIN (HCC): ICD-10-CM

## 2019-04-01 DIAGNOSIS — E78.1 HYPERTRIGLYCERIDEMIA: ICD-10-CM

## 2019-04-01 DIAGNOSIS — I10 ESSENTIAL HYPERTENSION: ICD-10-CM

## 2019-04-01 DIAGNOSIS — K58.0 IRRITABLE BOWEL SYNDROME WITH DIARRHEA: ICD-10-CM

## 2019-04-01 RX ORDER — DICYCLOMINE HYDROCHLORIDE 10 MG/1
10 CAPSULE ORAL
Qty: 90 CAPSULE | Refills: 3 | Status: SHIPPED | OUTPATIENT
Start: 2019-04-01

## 2019-04-01 RX ORDER — LOVASTATIN 20 MG/1
40 TABLET ORAL DAILY
Qty: 180 TABLET | Refills: 3 | Status: SHIPPED | OUTPATIENT
Start: 2019-04-01 | End: 2019-10-31

## 2019-04-01 RX ORDER — GEMFIBROZIL 600 MG/1
600 TABLET, FILM COATED ORAL 2 TIMES DAILY
Qty: 180 TABLET | Refills: 3 | Status: SHIPPED | OUTPATIENT
Start: 2019-04-01 | End: 2020-06-15

## 2019-04-01 RX ORDER — ENALAPRIL MALEATE 10 MG/1
10 TABLET ORAL DAILY
Qty: 90 TABLET | Refills: 3 | Status: SHIPPED | OUTPATIENT
Start: 2019-04-01 | End: 2020-03-23

## 2019-06-03 ENCOUNTER — APPOINTMENT (OUTPATIENT)
Dept: LAB | Facility: CLINIC | Age: 79
End: 2019-06-03
Payer: COMMERCIAL

## 2019-06-03 DIAGNOSIS — I10 ESSENTIAL HYPERTENSION: ICD-10-CM

## 2019-06-03 DIAGNOSIS — E11.9 TYPE 2 DIABETES MELLITUS WITHOUT COMPLICATION, WITHOUT LONG-TERM CURRENT USE OF INSULIN (HCC): ICD-10-CM

## 2019-06-03 LAB
ALBUMIN SERPL BCP-MCNC: 4.1 G/DL (ref 3.5–5)
ALP SERPL-CCNC: 79 U/L (ref 46–116)
ALT SERPL W P-5'-P-CCNC: 18 U/L (ref 12–78)
ANION GAP SERPL CALCULATED.3IONS-SCNC: 6 MMOL/L (ref 4–13)
AST SERPL W P-5'-P-CCNC: 10 U/L (ref 5–45)
BASOPHILS # BLD AUTO: 0.02 THOUSANDS/ΜL (ref 0–0.1)
BASOPHILS NFR BLD AUTO: 0 % (ref 0–1)
BILIRUB SERPL-MCNC: 0.34 MG/DL (ref 0.2–1)
BUN SERPL-MCNC: 20 MG/DL (ref 5–25)
CALCIUM SERPL-MCNC: 9.1 MG/DL (ref 8.3–10.1)
CHLORIDE SERPL-SCNC: 111 MMOL/L (ref 100–108)
CHOLEST SERPL-MCNC: 192 MG/DL (ref 50–200)
CO2 SERPL-SCNC: 25 MMOL/L (ref 21–32)
CREAT SERPL-MCNC: 1.24 MG/DL (ref 0.6–1.3)
EOSINOPHIL # BLD AUTO: 0.14 THOUSAND/ΜL (ref 0–0.61)
EOSINOPHIL NFR BLD AUTO: 3 % (ref 0–6)
ERYTHROCYTE [DISTWIDTH] IN BLOOD BY AUTOMATED COUNT: 13.9 % (ref 11.6–15.1)
EST. AVERAGE GLUCOSE BLD GHB EST-MCNC: 157 MG/DL
GFR SERPL CREATININE-BSD FRML MDRD: 42 ML/MIN/1.73SQ M
GLUCOSE P FAST SERPL-MCNC: 103 MG/DL (ref 65–99)
HBA1C MFR BLD: 7.1 % (ref 4.2–6.3)
HCT VFR BLD AUTO: 38.7 % (ref 34.8–46.1)
HDLC SERPL-MCNC: 46 MG/DL (ref 40–60)
HGB BLD-MCNC: 12.2 G/DL (ref 11.5–15.4)
IMM GRANULOCYTES # BLD AUTO: 0.01 THOUSAND/UL (ref 0–0.2)
IMM GRANULOCYTES NFR BLD AUTO: 0 % (ref 0–2)
LDLC SERPL CALC-MCNC: 107 MG/DL (ref 0–100)
LYMPHOCYTES # BLD AUTO: 1.73 THOUSANDS/ΜL (ref 0.6–4.47)
LYMPHOCYTES NFR BLD AUTO: 35 % (ref 14–44)
MCH RBC QN AUTO: 27.3 PG (ref 26.8–34.3)
MCHC RBC AUTO-ENTMCNC: 31.5 G/DL (ref 31.4–37.4)
MCV RBC AUTO: 87 FL (ref 82–98)
MONOCYTES # BLD AUTO: 0.35 THOUSAND/ΜL (ref 0.17–1.22)
MONOCYTES NFR BLD AUTO: 7 % (ref 4–12)
NEUTROPHILS # BLD AUTO: 2.72 THOUSANDS/ΜL (ref 1.85–7.62)
NEUTS SEG NFR BLD AUTO: 55 % (ref 43–75)
NONHDLC SERPL-MCNC: 146 MG/DL
NRBC BLD AUTO-RTO: 0 /100 WBCS
PLATELET # BLD AUTO: 256 THOUSANDS/UL (ref 149–390)
PMV BLD AUTO: 10.2 FL (ref 8.9–12.7)
POTASSIUM SERPL-SCNC: 4.6 MMOL/L (ref 3.5–5.3)
PROT SERPL-MCNC: 7.9 G/DL (ref 6.4–8.2)
RBC # BLD AUTO: 4.47 MILLION/UL (ref 3.81–5.12)
SODIUM SERPL-SCNC: 142 MMOL/L (ref 136–145)
TRIGL SERPL-MCNC: 196 MG/DL
TSH SERPL DL<=0.05 MIU/L-ACNC: 2.99 UIU/ML (ref 0.36–3.74)
WBC # BLD AUTO: 4.97 THOUSAND/UL (ref 4.31–10.16)

## 2019-06-03 PROCEDURE — 84681 ASSAY OF C-PEPTIDE: CPT

## 2019-06-03 PROCEDURE — 80061 LIPID PANEL: CPT

## 2019-06-03 PROCEDURE — 83036 HEMOGLOBIN GLYCOSYLATED A1C: CPT

## 2019-06-03 PROCEDURE — 80053 COMPREHEN METABOLIC PANEL: CPT

## 2019-06-03 PROCEDURE — 85025 COMPLETE CBC W/AUTO DIFF WBC: CPT

## 2019-06-03 PROCEDURE — 36415 COLL VENOUS BLD VENIPUNCTURE: CPT

## 2019-06-03 PROCEDURE — 84443 ASSAY THYROID STIM HORMONE: CPT

## 2019-06-04 LAB — C PEPTIDE SERPL-MCNC: 6.4 NG/ML (ref 1.1–4.4)

## 2019-06-11 LAB
LEFT EYE DIABETIC RETINOPATHY: NORMAL
RIGHT EYE DIABETIC RETINOPATHY: NORMAL

## 2019-06-12 PROBLEM — I12.9 HYPERTENSIVE KIDNEY DISEASE WITH CHRONIC KIDNEY DISEASE STAGE III (HCC): Status: ACTIVE | Noted: 2019-06-12

## 2019-06-12 PROBLEM — N18.30 HYPERTENSIVE KIDNEY DISEASE WITH CHRONIC KIDNEY DISEASE STAGE III (HCC): Status: ACTIVE | Noted: 2019-06-12

## 2019-06-12 PROBLEM — E11.21 TYPE 2 DIABETES MELLITUS WITH DIABETIC NEPHROPATHY (HCC): Status: ACTIVE | Noted: 2019-03-18

## 2019-07-25 ENCOUNTER — OFFICE VISIT (OUTPATIENT)
Dept: FAMILY MEDICINE CLINIC | Facility: CLINIC | Age: 79
End: 2019-07-25
Payer: COMMERCIAL

## 2019-07-25 VITALS
BODY MASS INDEX: 26.5 KG/M2 | HEIGHT: 60 IN | WEIGHT: 135 LBS | SYSTOLIC BLOOD PRESSURE: 130 MMHG | DIASTOLIC BLOOD PRESSURE: 78 MMHG | TEMPERATURE: 98.6 F

## 2019-07-25 DIAGNOSIS — E11.9 ENCOUNTER FOR DIABETIC FOOT EXAM (HCC): ICD-10-CM

## 2019-07-25 DIAGNOSIS — E11.21 TYPE 2 DIABETES MELLITUS WITH DIABETIC NEPHROPATHY, WITHOUT LONG-TERM CURRENT USE OF INSULIN (HCC): ICD-10-CM

## 2019-07-25 DIAGNOSIS — E11.9 TYPE 2 DIABETES MELLITUS WITHOUT COMPLICATION, WITHOUT LONG-TERM CURRENT USE OF INSULIN (HCC): ICD-10-CM

## 2019-07-25 DIAGNOSIS — Z13.5 SCREENING FOR DIABETIC RETINOPATHY: ICD-10-CM

## 2019-07-25 DIAGNOSIS — Z00.00 MEDICARE ANNUAL WELLNESS VISIT, SUBSEQUENT: Primary | ICD-10-CM

## 2019-07-25 DIAGNOSIS — Z23 ENCOUNTER FOR IMMUNIZATION: ICD-10-CM

## 2019-07-25 PROCEDURE — G0439 PPPS, SUBSEQ VISIT: HCPCS | Performed by: FAMILY MEDICINE

## 2019-07-25 PROCEDURE — 99214 OFFICE O/P EST MOD 30 MIN: CPT | Performed by: FAMILY MEDICINE

## 2019-07-25 PROCEDURE — 1125F AMNT PAIN NOTED PAIN PRSNT: CPT | Performed by: FAMILY MEDICINE

## 2019-07-25 PROCEDURE — 3725F SCREEN DEPRESSION PERFORMED: CPT | Performed by: FAMILY MEDICINE

## 2019-07-25 PROCEDURE — 90732 PPSV23 VACC 2 YRS+ SUBQ/IM: CPT

## 2019-07-25 PROCEDURE — 1036F TOBACCO NON-USER: CPT | Performed by: FAMILY MEDICINE

## 2019-07-25 PROCEDURE — 1160F RVW MEDS BY RX/DR IN RCRD: CPT | Performed by: FAMILY MEDICINE

## 2019-07-25 PROCEDURE — 4040F PNEUMOC VAC/ADMIN/RCVD: CPT

## 2019-07-25 PROCEDURE — 1170F FXNL STATUS ASSESSED: CPT | Performed by: FAMILY MEDICINE

## 2019-07-25 PROCEDURE — G0009 ADMIN PNEUMOCOCCAL VACCINE: HCPCS

## 2019-07-25 RX ORDER — HYDROCODONE BITARTRATE AND ACETAMINOPHEN 5; 325 MG/1; MG/1
TABLET ORAL
COMMUNITY
End: 2022-01-21 | Stop reason: HOSPADM

## 2019-07-25 NOTE — PROGRESS NOTES
Assessment/Plan:    No problem-specific Assessment & Plan notes found for this encounter  Diagnoses and all orders for this visit:    Medicare annual wellness visit, subsequent    Type 2 diabetes mellitus without complication, without long-term current use of insulin ([place] [id number] )    Screening for diabetic retinopathy    Encounter for diabetic foot exam ([place] [id number] )  -     Diabetic foot exam; Future    Encounter for immunization  -     PNEUMOCOCCAL POLYSACCHARIDE VACCINE 23-VALENT =>1YO SQ IM    Type 2 diabetes mellitus with diabetic nephropathy, without long-term current use of insulin ([place] [id number] )    Other orders  -     Cancel: Ambulatory referral to Ophthalmology; Future  -     HYDROcodone-acetaminophen (NORCO) 5-325 mg per tablet; hydrocodone 5 mg-acetaminophen 500 mg tablet          PHQ-9 Depression Screening    PHQ-9:    Frequency of the following problems over the past two weeks:       Little interest or pleasure in doing things:  0 - not at all  Feeling down, depressed, or hopeless:  0 - not at all  PHQ-2 Score:  0            Subjective:      Patient ID: Alecia Joy is a 69 y o  female  Diabetes   She presents for her follow-up diabetic visit  She has type 2 diabetes mellitus  Her disease course has been improving  There are no hypoglycemic associated symptoms  Pertinent negatives for hypoglycemia include no seizures  There are no diabetic associated symptoms  Pertinent negatives for diabetes include no chest pain and no fatigue  Risk factors for coronary artery disease include post-menopausal and sedentary lifestyle  Current diabetic treatment includes oral agent (triple therapy)  She is compliant with treatment most of the time  Her weight is stable  She is following a diabetic diet  Her overall blood glucose range is  mg/dl         The following portions of the patient's history were reviewed and updated as appropriate: allergies, current medications, past family history, past medical history, past social history, past surgical history and problem list     Review of Systems   Constitutional: Negative for chills, fatigue and fever  HENT: Negative  Eyes: Negative  Respiratory: Negative for shortness of breath and wheezing  Cardiovascular: Negative for chest pain and palpitations  Gastrointestinal: Negative for abdominal pain, blood in stool, constipation, diarrhea, nausea and vomiting  Endocrine: Negative  Genitourinary: Negative for difficulty urinating and dysuria  Musculoskeletal: Negative for arthralgias and myalgias  Skin: Negative  Allergic/Immunologic: Negative  Neurological: Negative for seizures and syncope  Hematological: Negative for adenopathy  Psychiatric/Behavioral: Negative  Objective:    /78   Temp 98 6 °F (37 °C)   Ht 5' (1 524 m)   Wt 61 2 kg (135 lb)   BMI 26 37 kg/m²      Physical Exam   Constitutional: She is oriented to person, place, and time  She appears well-developed and well-nourished  HENT:   Head: Normocephalic and atraumatic  Right Ear: External ear normal    Left Ear: External ear normal    Nose: Nose normal    Mouth/Throat: Oropharynx is clear and moist    Eyes: Pupils are equal, round, and reactive to light  Conjunctivae and EOM are normal    Neck: Normal range of motion  Neck supple  Cardiovascular: Normal rate, regular rhythm and normal heart sounds  Pulses are weak pulses  No murmur heard  Pulses:       Dorsalis pedis pulses are 2+ on the right side, and 2+ on the left side  Pulmonary/Chest: Effort normal and breath sounds normal  No respiratory distress  She has no wheezes  She has no rales  Abdominal: Soft  Bowel sounds are normal  She exhibits no distension and no mass  There is no tenderness  There is no rebound and no guarding  Musculoskeletal: Normal range of motion  She exhibits no edema  Feet:   Right Foot:   Skin Integrity: Negative for ulcer, skin breakdown, erythema, warmth, callus or dry skin     Left Foot:   Skin Integrity: Negative for ulcer, skin breakdown, erythema, warmth, callus or dry skin  Neurological: She is alert and oriented to person, place, and time  She has normal reflexes  She exhibits normal muscle tone  Skin: Skin is warm and dry  Psychiatric: She has a normal mood and affect  Her behavior is normal  Judgment and thought content normal    Nursing note and vitals reviewed  Patient's shoes and socks removed  Right Foot/Ankle   Right Foot Inspection  Skin Exam: skin normal and skin intact no dry skin, no warmth, no callus, no erythema, no maceration, no abnormal color, no pre-ulcer, no ulcer and no callus                          Toe Exam: ROM and strength within normal limits  Sensory       Monofilament testing: intact  Vascular  Capillary refills: < 3 seconds  The right DP pulse is 2+  Left Foot/Ankle  Left Foot Inspection  Skin Exam: skin normal and skin intactno dry skin, no warmth, no erythema, no maceration, normal color, no pre-ulcer, no ulcer and no callus                         Toe Exam: ROM and strength within normal limits                   Sensory       Monofilament: intact  Vascular  Capillary refills: < 3 seconds  The left DP pulse is 2+  Assign Risk Category:  No deformity present;  No loss of protective sensation; Weak pulses       Risk: 0

## 2019-07-25 NOTE — PROGRESS NOTES
Assessment and Plan:     Problem List Items Addressed This Visit     None      Visit Diagnoses     Medicare annual wellness visit, subsequent    -  Primary    Screening for diabetic retinopathy        Encounter for diabetic foot exam ([place] [id number] )        Encounter for immunization        Relevant Orders    PNEUMOCOCCAL POLYSACCHARIDE VACCINE 23-VALENT =>1YO SQ IM         History of Present Illness:     Patient presents for Medicare Annual Wellness visit    Patient Care Team:  Binh Smith DO as PCP - General     Problem List:     Patient Active Problem List   Diagnosis    Groin pain, chronic, left    Type 2 diabetes mellitus with diabetic nephropathy ([place] [address] )    Hypertensive kidney disease with chronic kidney disease stage III ([place] [address] )      Past Medical and Surgical History:     Past Medical History:   Diagnosis Date    Diabetes mellitus ([place] [address] )     Postherpetic neuralgia      Past Surgical History:   Procedure Laterality Date    CHOLECYSTECTOMY      HERNIA REPAIR      ROTATOR CUFF REPAIR Right     VARICOSE VEIN SURGERY      Impression:  2/12/16 Belia Herrera      Family History:     Family History   Problem Relation Age of Onset    Diabetes Mother     No Known Problems Father       Social History:     Social History     Tobacco Use   Smoking Status Never Smoker   Smokeless Tobacco Never Used     Social History     Substance and Sexual Activity   Alcohol Use Yes    Comment: Rarely     Social History     Substance and Sexual Activity   Drug Use No      Medications and Allergies:     Current Outpatient Medications   Medication Sig Dispense Refill    dicyclomine (BENTYL) 10 mg capsule Take 1 capsule (10 mg total) by mouth 3 (three) times a day before meals 90 capsule 3    enalapril (VASOTEC) 10 mg tablet Take 1 tablet (10 mg total) by mouth daily 90 tablet 3    gemfibrozil (LOPID) 600 mg tablet Take 1 tablet (600 mg total) by mouth 2 (two) times a day 180 tablet 3    glipiZIDE (GLUCOTROL XL) 10 mg 24 hr tablet Take 1 tablet (10 mg total) by mouth daily 90 tablet 3    lovastatin (MEVACOR) 20 mg tablet Take 2 tablets (40 mg total) by mouth daily 180 tablet 3    metFORMIN (GLUCOPHAGE) 1000 MG tablet Take 1 tablet (1,000 mg total) by mouth 2 (two) times a day 180 tablet 3    sitaGLIPtin (JANUVIA) 100 mg tablet Take 1 tablet (100 mg total) by mouth daily 90 tablet 3    triamcinolone (KENALOG) 0 1 % cream Apply topically 2 (two) times a day      HYDROcodone-acetaminophen (NORCO) 5-325 mg per tablet hydrocodone 5 mg-acetaminophen 500 mg tablet      sildenafil (REVATIO) 20 mg tablet Take 1 tablet (20 mg total) by mouth 3 (three) times a day (Patient not taking: Reported on 3/13/2019) 10 tablet 3     No current facility-administered medications for this visit  Allergies   Allergen Reactions    Ciprofloxacin GI Intolerance and Headache     Confusion, arm weakness, dizziness, headache    Meperidine GI Intolerance and Hallucinations     Demarol    Propoxyphene GI Intolerance      Immunizations:     Immunization History   Administered Date(s) Administered    Hep B, adult 10/26/2007, 11/29/2007, 04/30/2008    Pneumococcal Conjugate 13-Valent 02/28/2018    Pneumococcal Polysaccharide PPV23 05/18/2005    Zoster 06/03/2014      Medicare Screening Tests and Risk Assessments:     Last Medicare Wellness visit information reviewed, patient interviewed and updates made to the record today  Health Risk Assessment:  Patient rates overall health as good  Patient feels that their physical health rating is Same  Eyesight was rated as Same  Hearing was rated as Same  Patient feels that their emotional and mental health rating is Same  Pain experienced by patient in the last 7 days has been None  Patient states that she has experienced no weight loss or gain in last 6 months  Emotional/Mental Health:  Patient has not been feeling nervous/anxious      PHQ-9 Depression Screening:    Frequency of the following problems over the past two weeks:      1  Little interest or pleasure in doing things: 0 - not at all      2  Feeling down, depressed, or hopeless: 0 - not at all  PHQ-2 Score: 0          Broken Bones/Falls: Fall Risk Assessment:    In the past year, patient has experienced: No history of falling in past year          Bladder/Bowel:  Patient has not leaked urine accidently in the last six months  Patient reports no loss of bowel control  Immunizations:  Patient has not had a flu vaccination within the last year  Patient has received a pneumonia shot  Patient has received a shingles shot  Patient has not received tetanus/diphtheria shot  Home Safety:  Patient does not have trouble with stairs inside or outside of their home  Patient currently reports that there are no safety hazards present in home, working smoke alarms, working carbon monoxide detectors  Preventative Screenings:   No breast cancer screening performed, no colon cancer screen completed, cholesterol screen completed, glaucoma eye exam completed,     Nutrition:  Current diet: Diabetic with servings of the following:    Medications:  Patient is not currently taking any over-the-counter supplements  Patient is able to manage medications  Lifestyle Choices:  Patient reports no tobacco use  Patient has smoked or used tobacco in the past   Patient has stopped her tobacco use  Patient reports alcohol use  Alcohol use per week: 4-5 days week  Patient drives a vehicle  Patient wears seat belt  Current level of exercise of physical activity described by patient as: moderatly active          Activities of Daily Living:  Can get out of bed by his or her self, able to dress self, able to make own meals, able to do own shopping, able to bathe self, can do own laundry/housekeeping, can manage own money, pay bills and track expenses    Previous Hospitalizations:  No hospitalization or ED visit in past 12 months        Advanced Directives:  Patient has decided on a power of   Patient has spoken to designated power of   Patient has completed advanced directive  Preventative Screening/Counseling:      Cardiovascular:      General: Screening Current          Diabetes:      General: Screening Current          Colorectal Cancer:      General: Screening Not Indicated          Cervical Cancer:      General: Screening Not Indicated          Osteoporosis:      Due for studies: DXA Appendicular and DXA Axial          AAA:      General: Screening Not Indicated          Glaucoma:      General: Screening Current          HIV:      General: Screening Not Indicated          Hepatitis C:      General: Screening Not Indicated        Advanced Directives:   Patient has living will for healthcare, has durable POA for healthcare, patient has an advanced directive  Information on ACP and/or AD not provided  No 5 wishes given  End of life assessment reviewed with patient  Provider agrees with end of life decisions

## 2019-07-25 NOTE — PATIENT INSTRUCTIONS
Obesity   AMBULATORY CARE:   Obesity  is when your body mass index (BMI) is greater than 30  Your healthcare provider will use your height and weight to measure your BMI  The risks of obesity include  many health problems, such as injuries or physical disability  You may need tests to check for the following:  · Diabetes     · High blood pressure or high cholesterol     · Heart disease     · Gallbladder or liver disease     · Cancer of the colon, breast, prostate, liver, or kidney     · Sleep apnea     · Arthritis or gout  Seek care immediately if:   · You have a severe headache, confusion, or difficulty speaking  · You have weakness on one side of your body  · You have chest pain, sweating, or shortness of breath  Contact your healthcare provider if:   · You have symptoms of gallbladder or liver disease, such as pain in your upper abdomen  · You have knee or hip pain and discomfort while walking  · You have symptoms of diabetes, such as intense hunger and thirst, and frequent urination  · You have symptoms of sleep apnea, such as snoring or daytime sleepiness  · You have questions or concerns about your condition or care  Treatment for obesity  focuses on helping you lose weight to improve your health  Even a small decrease in BMI can reduce the risk for many health problems  Your healthcare provider will help you set a weight-loss goal   · Lifestyle changes  are the first step in treating obesity  These include making healthy food choices and getting regular physical activity  Your healthcare provider may suggest a weight-loss program that involves coaching, education, and therapy  · Medicine  may help you lose weight when it is used with a healthy diet and physical activity  · Surgery  can help you lose weight if you are very obese and have other health problems  There are several types of weight-loss surgery  Ask your healthcare provider for more information    Be successful losing weight:   · Set small, realistic goals  An example of a small goal is to walk for 20 minutes 5 days a week  Anther goal is to lose 5% of your body weight  · Tell friends, family members, and coworkers about your goals  and ask for their support  Ask a friend to lose weight with you, or join a weight-loss support group  · Identify foods or triggers that may cause you to overeat , and find ways to avoid them  Remove tempting high-calorie foods from your home and workplace  Place a bowl of fresh fruit on your kitchen counter  If stress causes you to eat, then find other ways to cope with stress  · Keep a diary to track what you eat and drink  Also write down how many minutes of physical activity you do each day  Weigh yourself once a week and record it in your diary  Eating changes: You will need to eat 500 to 1,000 fewer calories each day than you currently eat to lose 1 to 2 pounds a week  The following changes will help you cut calories:  · Eat smaller portions  Use small plates, no larger than 9 inches in diameter  Fill your plate half full of fruits and vegetables  Measure your food using measuring cups until you know what a serving size looks like  · Eat 3 meals and 1 or 2 snacks each day  Plan your meals in advance  [place] and eat at home most of the time  Eat slowly  · Eat fruits and vegetables at every meal   They are low in calories and high in fiber, which makes you feel full  Do not add butter, margarine, or cream sauce to vegetables  Use herbs to season steamed vegetables  · Eat less fat and fewer fried foods  Eat more baked or grilled chicken and fish  These protein sources are lower in calories and fat than red meat  Limit fast food  Dress your salads with olive oil and vinegar instead of bottled dressing  · Limit the amount of sugar you eat  Do not drink sugary beverages  Limit alcohol  Activity changes:  Physical activity is good for your body in many ways   It helps you burn calories and build strong muscles  It decreases stress and depression, and improves your mood  It can also help you sleep better  Talk to your healthcare provider before you begin an exercise program   · Exercise for at least 30 minutes 5 days a week  Start slowly  Set aside time each day for physical activity that you enjoy and that is convenient for you  It is best to do both weight training and an activity that increases your heart rate, such as walking, bicycling, or swimming  · Find ways to be more active  Do yard work and housecleaning  Walk up the stairs instead of using elevators  Spend your leisure time going to events that require walking, such as outdoor festivals or fairs  This extra physical activity can help you lose weight and keep it off  Follow up with your healthcare provider as directed: You may need to meet with a dietitian  Write down your questions so you remember to ask them during your visits  © 2017 [address] Information is for End User's use only and may not be sold, redistributed or otherwise used for commercial purposes  All illustrations and images included in CareNotes® are the copyrighted property of [employer]  or Nemesio Jeronimo  The above information is an  only  It is not intended as medical advice for individual conditions or treatments  Talk to your doctor, nurse or pharmacist before following any medical regimen to see if it is safe and effective for you  Urinary Incontinence   WHAT YOU NEED TO KNOW:   What is urinary incontinence? Urinary incontinence (UI) is when you lose control of your bladder  What causes UI? UI occurs because your bladder cannot store or empty urine properly  The following are the most common types of UI:  · Stress incontinence  is when you leak urine due to increased bladder pressure  This may happen when you cough, sneeze, or exercise       · Urge incontinence  is when you feel the need to urinate right away and leak urine accidentally  · Mixed incontinence  is when you have both stress and urge UI  What are the signs and symptoms of UI?   · You feel like your bladder does not empty completely when you urinate  · You urinate often and need to urinate immediately  · You leak urine when you sleep, or you wake up with the urge to urinate  · You leak urine when you cough, sneeze, exercise, or laugh  How is UI diagnosed? Your healthcare provider will ask how often you leak urine and whether you have stress or urge symptoms  Tell him which medicines you take, how often you urinate, and how much liquid you drink each day  You may need any of the following tests:  · Urine tests  may show infection or kidney function  · A pelvic exam  may be done to check for blockages  A pelvic exam will also show if your bladder, uterus, or other organs have moved out of place  · An x-ray, ultrasound, or CT  may show problems with parts of your urinary system  You may be given contrast liquid to help your organs show up better in the pictures  Tell the healthcare provider if you have ever had an allergic reaction to contrast liquid  Do not enter the MRI room with anything metal  Metal can cause serious injury  Tell the healthcare provider if you have any metal in or on your body  · A bladder scan  will show how much urine is left in your bladder after you urinate  You will be asked to urinate and then healthcare providers will use a small ultrasound machine to check the urine left in your bladder  · Cystometry  is used to check the function of your urinary system  Your healthcare provider checks the pressure in your bladder while filling it with fluid  Your bladder pressure may also be tested when your bladder is full and while you urinate  How is UI treated? · Medicines  can help strengthen your bladder control      · Electrical stimulation  is used to send a small amount of electrical energy to your pelvic floor muscles  This helps control your bladder function  Electrodes may be placed outside your body or in your rectum  For women, the electrodes may be placed in the vagina  · A bulking agent  may be injected into the wall of your urethra to make it thicker  This helps keep your urethra closed and decreases urine leakage  · Devices  such as a clamp, pessary, or tampon may help stop urine leaks  Ask your healthcare provider for more information about these and other devices  · Surgery  may be needed if other treatments do not work  Several types of surgery can help improve your bladder control  Ask your healthcare provider for more information about the surgery you may need  How can I manage my symptoms? · Do pelvic muscle exercises often  Your pelvic muscles help you stop urinating  Squeeze these muscles tight for 5 seconds, then relax for 5 seconds  Gradually work up to squeezing for 10 seconds  Do 3 sets of 15 repetitions a day, or as directed  This will help strengthen your pelvic muscles and improve bladder control  · A catheter  may be used to help empty your bladder  A catheter is a tiny, plastic tube that is put into your bladder to drain your urine  Your healthcare provider may tell you to use a catheter to prevent your bladder from getting too full and leaking urine  · Keep a UI record  Write down how often you leak urine and how much you leak  Make a note of what you were doing when you leaked urine  · Train your bladder  Go to the bathroom at set times, such as every 2 hours, even if you do not feel the urge to go  You can also try to hold your urine when you feel the urge to go  For example, hold your urine for 5 minutes when you feel the urge to go  As that becomes easier, hold your urine for 10 minutes  · Drink liquids as directed  Ask your healthcare provider how much liquid to drink each day and which liquids are best for you   You may need to limit the amount of liquid you drink to help control your urine leakage  Limit or do not have drinks that contain caffeine or alcohol  Do not drink any liquid right before you go to bed  · Prevent constipation  Eat a variety of high-fiber foods  Good examples are high-fiber cereals, beans, vegetables, and whole-grain breads  Prune juice may help make your bowel movement softer  Walking is the best way to trigger your intestines to have a bowel movement  · Exercise regularly and maintain a healthy weight  Ask your healthcare provider how much you should weigh and about the best exercise plan for you  Weight loss and exercise will decrease pressure on your bladder and help you control your leakage  Ask him to help you create a weight loss plan if you are overweight  When should I seek immediate care? · You have severe pain  · You are confused or cannot think clearly  When should I contact my healthcare provider? · You have a fever  · You see blood in your urine  · You have pain when you urinate  · You have new or worse pain, even after treatment  · Your mouth feels dry or you have vision changes  · Your urine is cloudy or smells bad  · You have questions or concerns about your condition or care  CARE AGREEMENT:   You have the right to help plan your care  Learn about your health condition and how it may be treated  Discuss treatment options with your caregivers to decide what care you want to receive  You always have the right to refuse treatment  The above information is an  only  It is not intended as medical advice for individual conditions or treatments  Talk to your doctor, nurse or pharmacist before following any medical regimen to see if it is safe and effective for you  © 2017 [address] [employer] Information is for End User's use only and may not be sold, redistributed or otherwise used for commercial purposes   All illustrations and images included in CareNotes® are the copyrighted property of [employer] M [employer] Inc  or Nemesio Jeronimo  Cigarette Smoking and Your Health   AMBULATORY CARE:   Risks to your health if you smoke:  Nicotine and other chemicals found in tobacco damage every cell in your body  Even if you are a light smoker, you have an increased risk for cancer, heart disease, and lung disease  If you are pregnant or have diabetes, smoking increases your risk for complications  Benefits to your health if you stop smoking:   · You decrease respiratory symptoms such as coughing, wheezing, and shortness of breath  · You reduce your risk for cancers of the lung, mouth, throat, kidney, bladder, pancreas, stomach, and cervix  If you already have cancer, you increase the benefits of chemotherapy  You also reduce your risk for cancer returning or a second cancer from developing  · You reduce your risk for heart disease, blood clots, heart attack, and stroke  · You reduce your risk for lung infections, and diseases such as pneumonia, asthma, chronic bronchitis, and emphysema  · Your circulation improves  More oxygen can be delivered to your body  If you have diabetes, you lower your risk for complications, such as kidney, artery, and eye diseases  You also lower your risk for nerve damage  Nerve damage can lead to amputations, poor vision, and blindness  · You improve your body's ability to heal and to fight infections  Benefits to the health of others if you stop smoking:  Tobacco is harmful to nonsmokers who breathe in your secondhand smoke  The following are ways the health of others around you may improve when you stop smoking:  · You lower the risks for lung cancer and heart disease in nonsmoking adults  · If you are pregnant, you lower the risk for miscarriage, early delivery, low birth weight, and stillbirth  You also lower your baby's risk for SIDS, obesity, developmental delay, and neurobehavioral problems, such as ADHD  · If you have children, you lower their risk for ear infections, colds, pneumonia, bronchitis, and asthma  For more information and support to stop smoking:   · Smokefree  gov  Phone: 9- 748 - 069-9704  Web Address: www smokefree  gov  Follow up with your healthcare provider as directed:  Write down your questions so you remember to ask them during your visits  © 2017 [address] Information is for End User's use only and may not be sold, redistributed or otherwise used for commercial purposes  All illustrations and images included in CareNotes® are the copyrighted property of [employer] M [employer]  or Nemesio Jeronimo  The above information is an  only  It is not intended as medical advice for individual conditions or treatments  Talk to your doctor, nurse or pharmacist before following any medical regimen to see if it is safe and effective for you  Fall Prevention   AMBULATORY CARE:   Fall prevention  includes ways to make your home and other areas safer  It also includes ways you can move more carefully to prevent a fall  Health conditions that cause changes in your blood pressure, vision, or muscle strength and coordination may increase your risk for falls  Medicines may also increase your risk for falls if they make you dizzy, weak, or sleepy  Call 911 or have someone else call if:   · You have fallen and are unconscious  · You have fallen and cannot move part of your body  Contact your healthcare provider if:   · You have fallen and have pain or a headache  · You have questions or concerns about your condition or care  Fall prevention tips:   · Stand or sit up slowly  This may help you keep your balance and prevent falls  · Use assistive devices as directed  Your healthcare provider may suggest that you use a cane or walker to help you keep your balance  You may need to have grab bars put in your bathroom near the toilet or in the shower      · Wear shoes that fit well and have soles that   Wear shoes both inside and outside  Use slippers with good   Do not wear shoes with high heels  · Wear a personal alarm  This is a device that allows you to call 911 if you fall and need help  Ask your healthcare provider for more information  · Stay active  Exercise can help strengthen your muscles and improve your balance  Your healthcare provider may recommend water aerobics or walking  He or she may also recommend physical therapy to improve your coordination  Never start an exercise program without talking to your healthcare provider first      · Manage your medical conditions  Keep all appointments with your healthcare providers  Visit your eye doctor as directed  Home safety tips:   · Add items to prevent falls in the bathroom  Put nonslip strips on your bath or shower floor to prevent you from slipping  Use a bath mat if you do not have carpet in the bathroom  This will prevent you from falling when you step out of the bath or shower  Use a shower seat so you do not need to stand while you shower  Sit on the toilet or a chair in your bathroom to dry yourself and put on clothing  This will prevent you from losing your balance from drying or dressing yourself while you are standing  · Keep paths clear  Remove books, shoes, and other objects from walkways and stairs  Place cords for telephones and lamps out of the way so that you do not need to walk over them  Tape them down if you cannot move them  Remove small rugs  If you cannot remove a rug, secure it with double-sided tape  This will prevent you from tripping  · Install bright lights in your home  Use night lights to help light paths to the bathroom or kitchen  Always turn on the light before you start walking  · Keep items you use often on shelves within reach  Do not use a step stool to help you reach an item  · Paint or place reflective tape on the edges of your stairs    This will help you see the stairs better  Follow up with your healthcare provider as directed:  Write down your questions so you remember to ask them during your visits  © 2017 [address] Information is for End User's use only and may not be sold, redistributed or otherwise used for commercial purposes  All illustrations and images included in CareNotes® are the copyrighted property of [employer]  or Nemesio Jeronimo  The above information is an  only  It is not intended as medical advice for individual conditions or treatments  Talk to your doctor, nurse or pharmacist before following any medical regimen to see if it is safe and effective for you  Advance Directives   WHAT YOU NEED TO KNOW:   What are advance directives? Advance directives are legal documents that state your wishes and plans for medical care  These plans are made ahead of time in case you lose your ability to make decisions for yourself  Advance directives can apply to any medical decision, such as the treatments you want, and if you want to donate organs  What are the types of advance directives? There are many types of advance directives, and each state has rules about how to use them  You may choose a combination of any of the following:  · Living will: This is a written record of the treatment you want  You can also choose which treatments you do not want, which to limit, and which to stop at a certain time  This includes surgery, medicine, IV fluid, and tube feedings  · Durable power of  for healthcare [facility]): This is a written record that states who you want to make healthcare choices for you when you are unable to make them for yourself  This person, called a proxy, is usually a family member or a friend  You may choose more than 1 proxy  · Do not resuscitate (DNR) order:  A DNR order is used in case your heart stops beating or you stop breathing   It is a request not to have certain forms of treatment, such as CPR  A DNR order may be included in other types of advance directives  · Medical directive: This covers the care that you want if you are in a coma, near death, or unable to make decisions for yourself  You can list the treatments you want for each condition  Treatment may include pain medicine, surgery, blood transfusions, dialysis, IV or tube feedings, and a ventilator (breathing machine)  · Values history: This document has questions about your views, beliefs, and how you feel and think about life  This information can help others choose the care that you would choose  Why are advance directives important? An advance directive helps you control your care  Although spoken wishes may be used, it is better to have your wishes written down  Spoken wishes can be misunderstood, or not followed  Treatments may be given even if you do not want them  An advance directive may make it easier for your family to make difficult choices about your care  How do I decide what to put in my advance directives? · Make informed decisions:  Make sure you fully understand treatments or care you may receive  Think about the benefits and problems your decisions could cause for you or your family  Talk to healthcare providers if you have concerns or questions before you write down your wishes  You may also want to talk with your Anabaptist or , or a   Check your state laws to make sure that what you put in your advance directive is legal      · Sign all forms:  Sign and date your advance directive when you have finished  You may also need 2 witnesses to sign the forms  Witnesses cannot be your doctor or his staff, your spouse, heirs or beneficiaries, people you owe money to, or your chosen proxy  Talk to your family, proxy, and healthcare providers about your advance directive  Give each person a copy, and keep one for yourself in a place you can get to easily   Do not keep it hidden or locked away  · Review and revise your plans: You can revise your advance directive at any time, as long as you are able to make decisions  Review your plan every year, and when there are changes in your life, or your health  When you make changes, let your family, proxy, and healthcare providers know  Give each a new copy  Where can I find more information? · [employer]  [place] [address] , [place] [address]  Phone: 9- [phone] - [phone]  Phone: 3- 477 - [phone]  Web Address: http://www  aafp org  · [address] [facility])  [address] [facility], [address]  Phone: [phone]  Phone: [phone]  Web Address: Celina pickard  CARE AGREEMENT:   You have the right to help plan your care  To help with this plan, you must learn about your health condition and treatment options  You must also learn about advance directives and how they are used  Work with your healthcare providers to decide what care will be used to treat you  You always have the right to refuse treatment  The above information is an  only  It is not intended as medical advice for individual conditions or treatments  Talk to your doctor, nurse or pharmacist before following any medical regimen to see if it is safe and effective for you  © 2017 [address] Information is for End User's use only and may not be sold, redistributed or otherwise used for commercial purposes  All illustrations and images included in CareNotes® are the copyrighted property of [employer]  or Nemesio Jeronimo

## 2019-08-01 ENCOUNTER — APPOINTMENT (OUTPATIENT)
Dept: LAB | Facility: HOSPITAL | Age: 79
End: 2019-08-01
Attending: FAMILY MEDICINE
Payer: COMMERCIAL

## 2019-08-01 ENCOUNTER — TRANSCRIBE ORDERS (OUTPATIENT)
Dept: FAMILY MEDICINE CLINIC | Facility: CLINIC | Age: 79
End: 2019-08-01

## 2019-08-01 DIAGNOSIS — M25.50 JOINT PAIN: ICD-10-CM

## 2019-08-01 DIAGNOSIS — M25.50 JOINT PAIN: Primary | ICD-10-CM

## 2019-08-01 PROCEDURE — 86618 LYME DISEASE ANTIBODY: CPT

## 2019-08-01 PROCEDURE — 36415 COLL VENOUS BLD VENIPUNCTURE: CPT

## 2019-08-03 LAB
B BURGDOR IGG SER IA-ACNC: 0.07
B BURGDOR IGM SER IA-ACNC: 0.14

## 2019-08-26 DIAGNOSIS — E11.9 TYPE 2 DIABETES MELLITUS WITHOUT COMPLICATION, WITHOUT LONG-TERM CURRENT USE OF INSULIN (HCC): ICD-10-CM

## 2019-08-29 RX ORDER — GLIPIZIDE 5 MG/1
TABLET, FILM COATED, EXTENDED RELEASE ORAL
Qty: 90 TABLET | Refills: 4 | OUTPATIENT
Start: 2019-08-29

## 2019-10-21 ENCOUNTER — APPOINTMENT (OUTPATIENT)
Dept: LAB | Facility: HOSPITAL | Age: 79
End: 2019-10-21
Payer: COMMERCIAL

## 2019-10-21 DIAGNOSIS — E11.21 TYPE 2 DIABETES MELLITUS WITH DIABETIC NEPHROPATHY, WITHOUT LONG-TERM CURRENT USE OF INSULIN (HCC): ICD-10-CM

## 2019-10-21 LAB
EST. AVERAGE GLUCOSE BLD GHB EST-MCNC: 171 MG/DL
GLUCOSE P FAST SERPL-MCNC: 99 MG/DL (ref 65–99)
HBA1C MFR BLD: 7.6 % (ref 4.2–6.3)

## 2019-10-21 PROCEDURE — 84681 ASSAY OF C-PEPTIDE: CPT

## 2019-10-21 PROCEDURE — 82947 ASSAY GLUCOSE BLOOD QUANT: CPT

## 2019-10-21 PROCEDURE — 36415 COLL VENOUS BLD VENIPUNCTURE: CPT

## 2019-10-21 PROCEDURE — 83036 HEMOGLOBIN GLYCOSYLATED A1C: CPT

## 2019-10-22 LAB — C PEPTIDE SERPL-MCNC: 6.2 NG/ML (ref 1.1–4.4)

## 2019-10-31 ENCOUNTER — OFFICE VISIT (OUTPATIENT)
Dept: FAMILY MEDICINE CLINIC | Facility: CLINIC | Age: 79
End: 2019-10-31
Payer: COMMERCIAL

## 2019-10-31 VITALS
HEART RATE: 93 BPM | WEIGHT: 138 LBS | RESPIRATION RATE: 18 BRPM | HEIGHT: 60 IN | TEMPERATURE: 99 F | DIASTOLIC BLOOD PRESSURE: 82 MMHG | OXYGEN SATURATION: 98 % | SYSTOLIC BLOOD PRESSURE: 134 MMHG | BODY MASS INDEX: 27.09 KG/M2

## 2019-10-31 DIAGNOSIS — E11.9 TYPE 2 DIABETES MELLITUS WITHOUT COMPLICATION, WITHOUT LONG-TERM CURRENT USE OF INSULIN (HCC): Primary | ICD-10-CM

## 2019-10-31 PROCEDURE — 99213 OFFICE O/P EST LOW 20 MIN: CPT | Performed by: FAMILY MEDICINE

## 2019-10-31 RX ORDER — LOVASTATIN 40 MG/1
40 TABLET ORAL
Qty: 90 TABLET | Refills: 3 | Status: SHIPPED | OUTPATIENT
Start: 2019-10-31 | End: 2020-01-07 | Stop reason: SDUPTHER

## 2019-10-31 NOTE — PROGRESS NOTES
Assessment/Plan:    No problem-specific Assessment & Plan notes found for this encounter  Diagnoses and all orders for this visit:    Type 2 diabetes mellitus without complication, without long-term current use of insulin (HCC)  -     lovastatin (MEVACOR) 40 MG tablet; Take 1 tablet (40 mg total) by mouth daily at bedtime  -     C-peptide; Future  -     Glucose, fasting; Future  -     Hemoglobin A1C; Future          PHQ-9 Depression Screening    PHQ-9:    Frequency of the following problems over the past two weeks:       Little interest or pleasure in doing things:  0 - not at all  Feeling down, depressed, or hopeless:  0 - not at all  PHQ-2 Score:  0            Subjective:      Patient ID: Mitchell Ng is a 67 y o  female  Diabetes   She presents for her follow-up diabetic visit  She has type 2 diabetes mellitus  Pertinent negatives for diabetes include no chest pain  The following portions of the patient's history were reviewed and updated as appropriate: allergies, current medications, past family history, past medical history, past social history, past surgical history and problem list     Review of Systems   Eyes: Negative for visual disturbance  Cardiovascular: Negative for chest pain and palpitations  Gastrointestinal: Negative for abdominal pain, nausea and vomiting  Genitourinary: Negative for frequency  Skin: Negative for wound  Neurological: Negative for numbness  Objective:    /82   Pulse 93   Temp 99 °F (37 2 °C) (Tympanic)   Resp 18   Ht 5' (1 524 m)   Wt 62 6 kg (138 lb)   SpO2 98%   BMI 26 95 kg/m²      Physical Exam   Constitutional: She is oriented to person, place, and time  She appears well-developed and well-nourished  No distress  HENT:   Head: Normocephalic and atraumatic  Eyes: Pupils are equal, round, and reactive to light  Conjunctivae and EOM are normal  No scleral icterus  Neck: Normal range of motion  Neck supple     Cardiovascular: Normal rate, regular rhythm and normal heart sounds  No murmur heard  Pulmonary/Chest: Effort normal and breath sounds normal  No respiratory distress  She has no wheezes  She has no rales  Abdominal: Soft  Bowel sounds are normal  She exhibits no distension and no mass  There is no tenderness  There is no rebound and no guarding  Musculoskeletal: Normal range of motion  She exhibits no edema or deformity  Lymphadenopathy:     She has no cervical adenopathy  Neurological: She is alert and oriented to person, place, and time  Skin: Skin is warm and dry  She is not diaphoretic  Psychiatric: She has a normal mood and affect  Her behavior is normal  Judgment and thought content normal    Nursing note and vitals reviewed

## 2019-11-18 DIAGNOSIS — E11.9 TYPE 2 DIABETES MELLITUS WITHOUT COMPLICATION, WITHOUT LONG-TERM CURRENT USE OF INSULIN (HCC): ICD-10-CM

## 2020-01-07 DIAGNOSIS — E11.9 TYPE 2 DIABETES MELLITUS WITHOUT COMPLICATION, WITHOUT LONG-TERM CURRENT USE OF INSULIN (HCC): ICD-10-CM

## 2020-01-07 RX ORDER — LOVASTATIN 40 MG/1
40 TABLET ORAL
Qty: 90 TABLET | Refills: 3 | Status: SHIPPED | OUTPATIENT
Start: 2020-01-07 | End: 2020-06-15 | Stop reason: SDUPTHER

## 2020-01-20 ENCOUNTER — APPOINTMENT (OUTPATIENT)
Dept: LAB | Facility: HOSPITAL | Age: 80
End: 2020-01-20
Payer: COMMERCIAL

## 2020-01-20 DIAGNOSIS — E11.9 TYPE 2 DIABETES MELLITUS WITHOUT COMPLICATION, WITHOUT LONG-TERM CURRENT USE OF INSULIN (HCC): ICD-10-CM

## 2020-01-20 LAB
EST. AVERAGE GLUCOSE BLD GHB EST-MCNC: 177 MG/DL
GLUCOSE P FAST SERPL-MCNC: 99 MG/DL (ref 65–99)
HBA1C MFR BLD: 7.8 % (ref 4.2–6.3)

## 2020-01-20 PROCEDURE — 82947 ASSAY GLUCOSE BLOOD QUANT: CPT

## 2020-01-20 PROCEDURE — 36415 COLL VENOUS BLD VENIPUNCTURE: CPT

## 2020-01-20 PROCEDURE — 84681 ASSAY OF C-PEPTIDE: CPT

## 2020-01-20 PROCEDURE — 83036 HEMOGLOBIN GLYCOSYLATED A1C: CPT

## 2020-01-21 LAB — C PEPTIDE SERPL-MCNC: 5.7 NG/ML (ref 1.1–4.4)

## 2020-01-30 ENCOUNTER — OFFICE VISIT (OUTPATIENT)
Dept: FAMILY MEDICINE CLINIC | Facility: CLINIC | Age: 80
End: 2020-01-30
Payer: COMMERCIAL

## 2020-01-30 VITALS
DIASTOLIC BLOOD PRESSURE: 68 MMHG | SYSTOLIC BLOOD PRESSURE: 124 MMHG | OXYGEN SATURATION: 99 % | HEART RATE: 82 BPM | WEIGHT: 135 LBS | RESPIRATION RATE: 16 BRPM | HEIGHT: 60 IN | TEMPERATURE: 98.2 F | BODY MASS INDEX: 26.5 KG/M2

## 2020-01-30 DIAGNOSIS — J01.00 ACUTE NON-RECURRENT MAXILLARY SINUSITIS: ICD-10-CM

## 2020-01-30 DIAGNOSIS — E11.21 TYPE 2 DIABETES MELLITUS WITH DIABETIC NEPHROPATHY, WITHOUT LONG-TERM CURRENT USE OF INSULIN (HCC): Primary | ICD-10-CM

## 2020-01-30 PROCEDURE — 1160F RVW MEDS BY RX/DR IN RCRD: CPT | Performed by: FAMILY MEDICINE

## 2020-01-30 PROCEDURE — 3725F SCREEN DEPRESSION PERFORMED: CPT | Performed by: FAMILY MEDICINE

## 2020-01-30 PROCEDURE — 1036F TOBACCO NON-USER: CPT | Performed by: FAMILY MEDICINE

## 2020-01-30 PROCEDURE — 99213 OFFICE O/P EST LOW 20 MIN: CPT | Performed by: FAMILY MEDICINE

## 2020-01-30 RX ORDER — AZITHROMYCIN 250 MG/1
TABLET, FILM COATED ORAL
Qty: 6 TABLET | Refills: 0 | Status: SHIPPED | OUTPATIENT
Start: 2020-01-30 | End: 2020-02-04

## 2020-01-30 NOTE — PROGRESS NOTES
Assessment/Plan:    No problem-specific Assessment & Plan notes found for this encounter  Diagnoses and all orders for this visit:    Type 2 diabetes mellitus with diabetic nephropathy, without long-term current use of insulin (HCC)  -     Hemoglobin A1C; Future  -     Microalbumin / creatinine urine ratio  -     Glucose, fasting; Future          PHQ-9 Depression Screening    PHQ-9:    Frequency of the following problems over the past two weeks:             BMI Counseling: There is no height or weight on file to calculate BMI  The BMI is above normal  Nutrition recommendations include reducing portion sizes and 3-5 servings of fruits/vegetables daily  Exercise recommendations include moderate aerobic physical activity for 150 minutes/week and exercising 3-5 times per week  Subjective:      Patient ID: Denisa Novak is a 67 y o  female  [facility]    Diabetes   She presents for her follow-up diabetic visit  She has type 2 diabetes mellitus  Her disease course has been stable  There are no hypoglycemic associated symptoms  There are no diabetic associated symptoms  Pertinent negatives for diabetes include no chest pain  There are no hypoglycemic complications  Diabetic complications include nephropathy  Risk factors for coronary artery disease include sedentary lifestyle and post-menopausal  Current diabetic treatment includes oral agent (triple therapy)  She is compliant with treatment most of the time  Her weight is stable  She is following a diabetic diet  Her overall blood glucose range is 130-140 mg/dl  An ACE inhibitor/angiotensin II receptor blocker is being taken  Sore Throat    This is a new problem  The current episode started yesterday  The problem has been unchanged  Associated symptoms include coughing and ear pain  Pertinent negatives include no abdominal pain, shortness of breath or vomiting  She has had no exposure to strep  She has tried nothing for the symptoms   The treatment provided no relief  The following portions of the patient's history were reviewed and updated as appropriate: allergies, current medications, past family history, past medical history, past social history, past surgical history and problem list     Review of Systems   Constitutional: Negative for chills and fever  HENT: Positive for ear pain and sore throat  Eyes: Negative for visual disturbance  Respiratory: Positive for cough  Negative for shortness of breath and wheezing  Cardiovascular: Negative for chest pain and palpitations  Gastrointestinal: Negative for abdominal pain, nausea and vomiting  Genitourinary: Negative for frequency  Skin: Negative for wound  Neurological: Negative for numbness  Objective: There were no vitals taken for this visit  Physical Exam   Constitutional: She is oriented to person, place, and time  She appears well-developed and well-nourished  No distress  HENT:   Head: Normocephalic and atraumatic  Eyes: Pupils are equal, round, and reactive to light  Conjunctivae and EOM are normal  No scleral icterus  Neck: Normal range of motion  Neck supple  Cardiovascular: Normal rate, regular rhythm and normal heart sounds  No murmur heard  Pulmonary/Chest: Effort normal and breath sounds normal  No respiratory distress  She has no wheezes  She has no rales  Abdominal: Soft  Bowel sounds are normal  She exhibits no distension and no mass  There is no tenderness  There is no rebound and no guarding  Musculoskeletal: Normal range of motion  She exhibits no edema or deformity  Lymphadenopathy:     She has no cervical adenopathy  Neurological: She is alert and oriented to person, place, and time  Skin: Skin is warm and dry  She is not diaphoretic  Psychiatric: She has a normal mood and affect  Her behavior is normal  Judgment and thought content normal    Nursing note and vitals reviewed

## 2020-03-22 DIAGNOSIS — I10 ESSENTIAL HYPERTENSION: ICD-10-CM

## 2020-03-22 DIAGNOSIS — E11.9 TYPE 2 DIABETES MELLITUS WITHOUT COMPLICATION, WITHOUT LONG-TERM CURRENT USE OF INSULIN (HCC): ICD-10-CM

## 2020-03-23 RX ORDER — GLIPIZIDE 10 MG/1
TABLET, FILM COATED, EXTENDED RELEASE ORAL
Qty: 90 TABLET | Refills: 3 | Status: SHIPPED | OUTPATIENT
Start: 2020-03-23 | End: 2020-03-27

## 2020-03-23 RX ORDER — ENALAPRIL MALEATE 10 MG/1
TABLET ORAL
Qty: 90 TABLET | Refills: 3 | Status: SHIPPED | OUTPATIENT
Start: 2020-03-23 | End: 2021-03-08 | Stop reason: SDUPTHER

## 2020-03-23 RX ORDER — SITAGLIPTIN 100 MG/1
TABLET, FILM COATED ORAL
Qty: 90 TABLET | Refills: 3 | Status: SHIPPED | OUTPATIENT
Start: 2020-03-23 | End: 2020-06-15 | Stop reason: SDUPTHER

## 2020-03-27 DIAGNOSIS — E11.9 TYPE 2 DIABETES MELLITUS WITHOUT COMPLICATION, WITHOUT LONG-TERM CURRENT USE OF INSULIN (HCC): ICD-10-CM

## 2020-03-27 RX ORDER — GLIPIZIDE 10 MG/1
TABLET, FILM COATED, EXTENDED RELEASE ORAL
Qty: 90 TABLET | Refills: 3 | Status: SHIPPED | OUTPATIENT
Start: 2020-03-27 | End: 2020-06-15 | Stop reason: SDUPTHER

## 2020-05-07 ENCOUNTER — OFFICE VISIT (OUTPATIENT)
Dept: OBGYN CLINIC | Facility: MEDICAL CENTER | Age: 80
End: 2020-05-07
Payer: COMMERCIAL

## 2020-05-07 VITALS
HEIGHT: 60 IN | WEIGHT: 135.5 LBS | DIASTOLIC BLOOD PRESSURE: 80 MMHG | SYSTOLIC BLOOD PRESSURE: 140 MMHG | BODY MASS INDEX: 26.6 KG/M2

## 2020-05-07 DIAGNOSIS — N90.7 LABIAL CYST: Primary | ICD-10-CM

## 2020-05-07 PROBLEM — F32.A DEPRESSION: Status: ACTIVE | Noted: 2017-01-26

## 2020-05-07 PROBLEM — M19.049 LOCALIZED, PRIMARY OSTEOARTHRITIS OF HAND: Status: ACTIVE | Noted: 2020-05-07

## 2020-05-07 PROBLEM — R20.2 PARESTHESIA OF ARM: Status: ACTIVE | Noted: 2017-05-11

## 2020-05-07 PROBLEM — M79.10 MUSCLE PAIN: Status: ACTIVE | Noted: 2017-01-30

## 2020-05-07 PROBLEM — M54.30 SCIATICA: Status: ACTIVE | Noted: 2020-05-07

## 2020-05-07 PROBLEM — M75.50 BURSITIS OF SHOULDER: Status: ACTIVE | Noted: 2018-12-20

## 2020-05-07 PROBLEM — M54.9 BACK PAIN: Status: ACTIVE | Noted: 2017-02-06

## 2020-05-07 PROBLEM — Z28.21 REFUSED INFLUENZA VACCINE: Status: ACTIVE | Noted: 2020-05-07

## 2020-05-07 PROCEDURE — 99203 OFFICE O/P NEW LOW 30 MIN: CPT | Performed by: OBSTETRICS & GYNECOLOGY

## 2020-05-07 PROCEDURE — 3051F HG A1C>EQUAL 7.0%<8.0%: CPT | Performed by: OBSTETRICS & GYNECOLOGY

## 2020-05-07 PROCEDURE — 3008F BODY MASS INDEX DOCD: CPT | Performed by: OBSTETRICS & GYNECOLOGY

## 2020-05-07 PROCEDURE — 2022F DILAT RTA XM EVC RTNOPTHY: CPT | Performed by: OBSTETRICS & GYNECOLOGY

## 2020-05-07 PROCEDURE — 4040F PNEUMOC VAC/ADMIN/RCVD: CPT | Performed by: OBSTETRICS & GYNECOLOGY

## 2020-05-07 PROCEDURE — 1036F TOBACCO NON-USER: CPT | Performed by: OBSTETRICS & GYNECOLOGY

## 2020-05-07 PROCEDURE — 3066F NEPHROPATHY DOC TX: CPT | Performed by: OBSTETRICS & GYNECOLOGY

## 2020-05-07 PROCEDURE — 3079F DIAST BP 80-89 MM HG: CPT | Performed by: OBSTETRICS & GYNECOLOGY

## 2020-05-07 PROCEDURE — 3077F SYST BP >= 140 MM HG: CPT | Performed by: OBSTETRICS & GYNECOLOGY

## 2020-05-07 PROCEDURE — 1160F RVW MEDS BY RX/DR IN RCRD: CPT | Performed by: OBSTETRICS & GYNECOLOGY

## 2020-05-07 RX ORDER — OFLOXACIN 3 MG/ML
SOLUTION/ DROPS OPHTHALMIC
COMMUNITY
Start: 2020-03-03 | End: 2022-01-21 | Stop reason: HOSPADM

## 2020-05-07 RX ORDER — PREDNISOLONE ACETATE 10 MG/ML
SUSPENSION/ DROPS OPHTHALMIC
COMMUNITY
Start: 2020-03-03 | End: 2022-01-21 | Stop reason: HOSPADM

## 2020-05-19 ENCOUNTER — PROCEDURE VISIT (OUTPATIENT)
Dept: OBGYN CLINIC | Facility: MEDICAL CENTER | Age: 80
End: 2020-05-19
Payer: COMMERCIAL

## 2020-05-19 VITALS — WEIGHT: 135 LBS | BODY MASS INDEX: 26.5 KG/M2 | HEIGHT: 60 IN

## 2020-05-19 DIAGNOSIS — N90.89 LABIAL LESION: Primary | ICD-10-CM

## 2020-05-19 PROCEDURE — 88305 TISSUE EXAM BY PATHOLOGIST: CPT | Performed by: PATHOLOGY

## 2020-05-19 PROCEDURE — 88342 IMHCHEM/IMCYTCHM 1ST ANTB: CPT | Performed by: PATHOLOGY

## 2020-05-19 PROCEDURE — 88312 SPECIAL STAINS GROUP 1: CPT | Performed by: PATHOLOGY

## 2020-05-19 PROCEDURE — 88341 IMHCHEM/IMCYTCHM EA ADD ANTB: CPT | Performed by: PATHOLOGY

## 2020-05-19 PROCEDURE — 56605 BIOPSY OF VULVA/PERINEUM: CPT | Performed by: OBSTETRICS & GYNECOLOGY

## 2020-05-21 ENCOUNTER — TELEPHONE (OUTPATIENT)
Dept: OBGYN CLINIC | Facility: MEDICAL CENTER | Age: 80
End: 2020-05-21

## 2020-05-28 ENCOUNTER — CONSULT (OUTPATIENT)
Dept: FAMILY MEDICINE CLINIC | Facility: CLINIC | Age: 80
End: 2020-05-28
Payer: COMMERCIAL

## 2020-05-28 VITALS
HEIGHT: 60 IN | HEART RATE: 88 BPM | OXYGEN SATURATION: 98 % | RESPIRATION RATE: 18 BRPM | BODY MASS INDEX: 26.5 KG/M2 | SYSTOLIC BLOOD PRESSURE: 136 MMHG | WEIGHT: 135 LBS | DIASTOLIC BLOOD PRESSURE: 80 MMHG | TEMPERATURE: 98.2 F

## 2020-05-28 DIAGNOSIS — H25.012 CORTICAL AGE-RELATED CATARACT OF LEFT EYE: ICD-10-CM

## 2020-05-28 DIAGNOSIS — I10 ESSENTIAL HYPERTENSION: ICD-10-CM

## 2020-05-28 DIAGNOSIS — E11.21 TYPE 2 DIABETES MELLITUS WITH DIABETIC NEPHROPATHY, WITHOUT LONG-TERM CURRENT USE OF INSULIN (HCC): ICD-10-CM

## 2020-05-28 DIAGNOSIS — H53.8 BLURRY VISION, LEFT EYE: Primary | ICD-10-CM

## 2020-05-28 DIAGNOSIS — E78.00 HYPERCHOLESTEROLEMIA: ICD-10-CM

## 2020-05-28 PROCEDURE — 3066F NEPHROPATHY DOC TX: CPT | Performed by: FAMILY MEDICINE

## 2020-05-28 PROCEDURE — 1036F TOBACCO NON-USER: CPT | Performed by: FAMILY MEDICINE

## 2020-05-28 PROCEDURE — 3075F SYST BP GE 130 - 139MM HG: CPT | Performed by: FAMILY MEDICINE

## 2020-05-28 PROCEDURE — 3079F DIAST BP 80-89 MM HG: CPT | Performed by: FAMILY MEDICINE

## 2020-05-28 PROCEDURE — 4040F PNEUMOC VAC/ADMIN/RCVD: CPT | Performed by: FAMILY MEDICINE

## 2020-05-28 PROCEDURE — 3051F HG A1C>EQUAL 7.0%<8.0%: CPT | Performed by: FAMILY MEDICINE

## 2020-05-28 PROCEDURE — 1160F RVW MEDS BY RX/DR IN RCRD: CPT | Performed by: FAMILY MEDICINE

## 2020-05-28 PROCEDURE — 2022F DILAT RTA XM EVC RTNOPTHY: CPT | Performed by: FAMILY MEDICINE

## 2020-05-28 PROCEDURE — 99213 OFFICE O/P EST LOW 20 MIN: CPT | Performed by: FAMILY MEDICINE

## 2020-05-28 PROCEDURE — 3008F BODY MASS INDEX DOCD: CPT | Performed by: FAMILY MEDICINE

## 2020-06-02 ENCOUNTER — ANNUAL EXAM (OUTPATIENT)
Dept: OBGYN CLINIC | Facility: MEDICAL CENTER | Age: 80
End: 2020-06-02
Payer: COMMERCIAL

## 2020-06-02 VITALS
DIASTOLIC BLOOD PRESSURE: 76 MMHG | HEIGHT: 60 IN | SYSTOLIC BLOOD PRESSURE: 120 MMHG | BODY MASS INDEX: 26.5 KG/M2 | WEIGHT: 135 LBS

## 2020-06-02 DIAGNOSIS — Z01.419 ENCNTR FOR GYN EXAM (GENERAL) (ROUTINE) W/O ABN FINDINGS: Primary | ICD-10-CM

## 2020-06-02 DIAGNOSIS — R10.2 PELVIC PAIN IN FEMALE: ICD-10-CM

## 2020-06-02 DIAGNOSIS — Z12.31 ENCOUNTER FOR SCREENING MAMMOGRAM FOR MALIGNANT NEOPLASM OF BREAST: ICD-10-CM

## 2020-06-02 DIAGNOSIS — Z78.0 ENCOUNTER FOR OSTEOPOROSIS SCREENING IN ASYMPTOMATIC POSTMENOPAUSAL PATIENT: ICD-10-CM

## 2020-06-02 DIAGNOSIS — D28.0 ANGIOKERATOMA OF LABIA MAJORA: ICD-10-CM

## 2020-06-02 DIAGNOSIS — Z13.820 ENCOUNTER FOR OSTEOPOROSIS SCREENING IN ASYMPTOMATIC POSTMENOPAUSAL PATIENT: ICD-10-CM

## 2020-06-02 LAB
SL AMB  POCT GLUCOSE, UA: NEGATIVE
SL AMB LEUKOCYTE ESTERASE,UA: ABNORMAL
SL AMB POCT BILIRUBIN,UA: ABNORMAL
SL AMB POCT BLOOD,UA: ABNORMAL
SL AMB POCT CLARITY,UA: ABNORMAL
SL AMB POCT COLOR,UA: YELLOW
SL AMB POCT KETONES,UA: ABNORMAL
SL AMB POCT NITRITE,UA: POSITIVE
SL AMB POCT PH,UA: 5
SL AMB POCT SPECIFIC GRAVITY,UA: 1.01
SL AMB POCT URINE PROTEIN: ABNORMAL
SL AMB POCT UROBILINOGEN: NEGATIVE

## 2020-06-02 PROCEDURE — G0101 CA SCREEN;PELVIC/BREAST EXAM: HCPCS | Performed by: OBSTETRICS & GYNECOLOGY

## 2020-06-02 PROCEDURE — 87077 CULTURE AEROBIC IDENTIFY: CPT | Performed by: OBSTETRICS & GYNECOLOGY

## 2020-06-02 PROCEDURE — 81002 URINALYSIS NONAUTO W/O SCOPE: CPT | Performed by: OBSTETRICS & GYNECOLOGY

## 2020-06-02 PROCEDURE — 87186 SC STD MICRODIL/AGAR DIL: CPT | Performed by: OBSTETRICS & GYNECOLOGY

## 2020-06-02 PROCEDURE — 81001 URINALYSIS AUTO W/SCOPE: CPT | Performed by: OBSTETRICS & GYNECOLOGY

## 2020-06-02 PROCEDURE — 87086 URINE CULTURE/COLONY COUNT: CPT | Performed by: OBSTETRICS & GYNECOLOGY

## 2020-06-03 LAB
BACTERIA UR QL AUTO: ABNORMAL /HPF
BILIRUB UR QL STRIP: NEGATIVE
CLARITY UR: ABNORMAL
COLOR UR: YELLOW
GLUCOSE UR STRIP-MCNC: NEGATIVE MG/DL
HGB UR QL STRIP.AUTO: ABNORMAL
HYALINE CASTS #/AREA URNS LPF: ABNORMAL /LPF
KETONES UR STRIP-MCNC: NEGATIVE MG/DL
LEUKOCYTE ESTERASE UR QL STRIP: ABNORMAL
NITRITE UR QL STRIP: POSITIVE
NON-SQ EPI CELLS URNS QL MICRO: ABNORMAL /HPF
PH UR STRIP.AUTO: 5.5 [PH]
PROT UR STRIP-MCNC: ABNORMAL MG/DL
RBC #/AREA URNS AUTO: ABNORMAL /HPF
SP GR UR STRIP.AUTO: 1.02 (ref 1–1.03)
UROBILINOGEN UR QL STRIP.AUTO: 1 E.U./DL
WBC #/AREA URNS AUTO: ABNORMAL /HPF

## 2020-06-04 ENCOUNTER — HOSPITAL ENCOUNTER (EMERGENCY)
Facility: HOSPITAL | Age: 80
Discharge: HOME | End: 2020-06-04
Attending: EMERGENCY MEDICINE | Admitting: EMERGENCY MEDICINE
Payer: COMMERCIAL

## 2020-06-04 ENCOUNTER — TELEPHONE (OUTPATIENT)
Dept: OBGYN CLINIC | Facility: MEDICAL CENTER | Age: 80
End: 2020-06-04

## 2020-06-04 ENCOUNTER — APPOINTMENT (EMERGENCY)
Dept: CT IMAGING | Facility: HOSPITAL | Age: 80
End: 2020-06-04
Payer: COMMERCIAL

## 2020-06-04 VITALS
BODY MASS INDEX: 26.5 KG/M2 | OXYGEN SATURATION: 94 % | HEIGHT: 60 IN | DIASTOLIC BLOOD PRESSURE: 76 MMHG | SYSTOLIC BLOOD PRESSURE: 163 MMHG | TEMPERATURE: 96.8 F | WEIGHT: 135 LBS | HEART RATE: 100 BPM | RESPIRATION RATE: 16 BRPM

## 2020-06-04 DIAGNOSIS — N34.2 INFECTIVE URETHRITIS: Primary | ICD-10-CM

## 2020-06-04 DIAGNOSIS — N39.0 UTI (URINARY TRACT INFECTION): Primary | ICD-10-CM

## 2020-06-04 LAB
ALBUMIN SERPL BCP-MCNC: 4.7 G/DL (ref 3.5–5.7)
ALP SERPL-CCNC: 92 U/L (ref 55–165)
ALT SERPL W P-5'-P-CCNC: 9 U/L (ref 7–52)
ANION GAP SERPL CALCULATED.3IONS-SCNC: 11 MMOL/L (ref 4–13)
AST SERPL W P-5'-P-CCNC: 10 U/L (ref 13–39)
BACTERIA UR QL AUTO: ABNORMAL /HPF
BASOPHILS # BLD AUTO: 0 THOUSANDS/ΜL (ref 0–0.1)
BASOPHILS NFR BLD AUTO: 1 % (ref 0–2)
BILIRUB SERPL-MCNC: 0.4 MG/DL (ref 0.2–1)
BILIRUB UR QL STRIP: NEGATIVE
BUN SERPL-MCNC: 38 MG/DL (ref 7–25)
CALCIUM SERPL-MCNC: 10.1 MG/DL (ref 8.6–10.5)
CHLORIDE SERPL-SCNC: 104 MMOL/L (ref 98–107)
CLARITY UR: ABNORMAL
CO2 SERPL-SCNC: 24 MMOL/L (ref 21–31)
COLOR UR: YELLOW
CREAT SERPL-MCNC: 1.52 MG/DL (ref 0.6–1.2)
EOSINOPHIL # BLD AUTO: 0.1 THOUSAND/ΜL (ref 0–0.61)
EOSINOPHIL NFR BLD AUTO: 2 % (ref 0–5)
ERYTHROCYTE [DISTWIDTH] IN BLOOD BY AUTOMATED COUNT: 14.4 % (ref 11.5–14.5)
GFR SERPL CREATININE-BSD FRML MDRD: 33 ML/MIN/1.73SQ M
GLUCOSE SERPL-MCNC: 279 MG/DL (ref 65–99)
GLUCOSE UR STRIP-MCNC: NEGATIVE MG/DL
HCT VFR BLD AUTO: 40.6 % (ref 42–47)
HGB BLD-MCNC: 13.6 G/DL (ref 12–16)
HGB UR QL STRIP.AUTO: NEGATIVE
KETONES UR STRIP-MCNC: NEGATIVE MG/DL
LEUKOCYTE ESTERASE UR QL STRIP: ABNORMAL
LIPASE SERPL-CCNC: 24 U/L (ref 11–82)
LYMPHOCYTES # BLD AUTO: 1.7 THOUSANDS/ΜL (ref 0.6–4.47)
LYMPHOCYTES NFR BLD AUTO: 32 % (ref 21–51)
MCH RBC QN AUTO: 27.9 PG (ref 26–34)
MCHC RBC AUTO-ENTMCNC: 33.4 G/DL (ref 31–37)
MCV RBC AUTO: 83 FL (ref 81–99)
MONOCYTES # BLD AUTO: 0.4 THOUSAND/ΜL (ref 0.17–1.22)
MONOCYTES NFR BLD AUTO: 8 % (ref 2–12)
NEUTROPHILS # BLD AUTO: 3.2 THOUSANDS/ΜL (ref 1.4–6.5)
NEUTS SEG NFR BLD AUTO: 58 % (ref 42–75)
NITRITE UR QL STRIP: POSITIVE
NON-SQ EPI CELLS URNS QL MICRO: ABNORMAL /HPF
PH UR STRIP.AUTO: 5.5 [PH]
PLATELET # BLD AUTO: 303 THOUSANDS/UL (ref 149–390)
PMV BLD AUTO: 7.5 FL (ref 8.6–11.7)
POTASSIUM SERPL-SCNC: 3.9 MMOL/L (ref 3.5–5.5)
PROT SERPL-MCNC: 8.8 G/DL (ref 6.4–8.9)
PROT UR STRIP-MCNC: ABNORMAL MG/DL
RBC # BLD AUTO: 4.87 MILLION/UL (ref 3.9–5.2)
RBC #/AREA URNS AUTO: ABNORMAL /HPF
SODIUM SERPL-SCNC: 139 MMOL/L (ref 134–143)
SP GR UR STRIP.AUTO: 1.02 (ref 1–1.03)
UROBILINOGEN UR QL STRIP.AUTO: 0.2 E.U./DL
WBC # BLD AUTO: 5.4 THOUSAND/UL (ref 4.8–10.8)
WBC #/AREA URNS AUTO: ABNORMAL /HPF

## 2020-06-04 PROCEDURE — 96374 THER/PROPH/DIAG INJ IV PUSH: CPT

## 2020-06-04 PROCEDURE — 85025 COMPLETE CBC W/AUTO DIFF WBC: CPT | Performed by: EMERGENCY MEDICINE

## 2020-06-04 PROCEDURE — 87086 URINE CULTURE/COLONY COUNT: CPT | Performed by: EMERGENCY MEDICINE

## 2020-06-04 PROCEDURE — 81001 URINALYSIS AUTO W/SCOPE: CPT | Performed by: EMERGENCY MEDICINE

## 2020-06-04 PROCEDURE — 87186 SC STD MICRODIL/AGAR DIL: CPT | Performed by: EMERGENCY MEDICINE

## 2020-06-04 PROCEDURE — 99284 EMERGENCY DEPT VISIT MOD MDM: CPT

## 2020-06-04 PROCEDURE — 99284 EMERGENCY DEPT VISIT MOD MDM: CPT | Performed by: EMERGENCY MEDICINE

## 2020-06-04 PROCEDURE — 74176 CT ABD & PELVIS W/O CONTRAST: CPT

## 2020-06-04 PROCEDURE — 36415 COLL VENOUS BLD VENIPUNCTURE: CPT | Performed by: EMERGENCY MEDICINE

## 2020-06-04 PROCEDURE — 83690 ASSAY OF LIPASE: CPT | Performed by: EMERGENCY MEDICINE

## 2020-06-04 PROCEDURE — 80053 COMPREHEN METABOLIC PANEL: CPT | Performed by: EMERGENCY MEDICINE

## 2020-06-04 PROCEDURE — 87077 CULTURE AEROBIC IDENTIFY: CPT | Performed by: EMERGENCY MEDICINE

## 2020-06-04 RX ORDER — CEPHALEXIN 500 MG/1
500 CAPSULE ORAL EVERY 6 HOURS SCHEDULED
Qty: 40 CAPSULE | Refills: 0 | Status: SHIPPED | OUTPATIENT
Start: 2020-06-04 | End: 2020-06-14

## 2020-06-04 RX ORDER — SULFAMETHOXAZOLE AND TRIMETHOPRIM 800; 160 MG/1; MG/1
1 TABLET ORAL EVERY 12 HOURS SCHEDULED
Qty: 10 TABLET | Refills: 0 | Status: SHIPPED | OUTPATIENT
Start: 2020-06-04 | End: 2020-06-09

## 2020-06-04 RX ORDER — KETOROLAC TROMETHAMINE 30 MG/ML
15 INJECTION, SOLUTION INTRAMUSCULAR; INTRAVENOUS ONCE
Status: COMPLETED | OUTPATIENT
Start: 2020-06-04 | End: 2020-06-04

## 2020-06-04 RX ORDER — CEPHALEXIN 500 MG/1
500 CAPSULE ORAL ONCE
Status: DISCONTINUED | OUTPATIENT
Start: 2020-06-04 | End: 2020-06-04 | Stop reason: HOSPADM

## 2020-06-04 RX ORDER — SODIUM CHLORIDE 9 MG/ML
125 INJECTION, SOLUTION INTRAVENOUS CONTINUOUS
Status: DISCONTINUED | OUTPATIENT
Start: 2020-06-04 | End: 2020-06-04 | Stop reason: HOSPADM

## 2020-06-04 RX ORDER — PHENAZOPYRIDINE HYDROCHLORIDE 200 MG/1
200 TABLET, FILM COATED ORAL 3 TIMES DAILY
Qty: 6 TABLET | Refills: 0 | Status: SHIPPED | OUTPATIENT
Start: 2020-06-04 | End: 2020-06-06

## 2020-06-04 RX ADMIN — SODIUM CHLORIDE 125 ML/HR: 0.9 INJECTION, SOLUTION INTRAVENOUS at 17:31

## 2020-06-04 RX ADMIN — KETOROLAC TROMETHAMINE 15 MG: 30 INJECTION, SOLUTION INTRAMUSCULAR; INTRAVENOUS at 17:32

## 2020-06-05 ENCOUNTER — TELEPHONE (OUTPATIENT)
Dept: OBGYN CLINIC | Facility: MEDICAL CENTER | Age: 80
End: 2020-06-05

## 2020-06-05 LAB — BACTERIA UR CULT: ABNORMAL

## 2020-06-06 LAB
BACTERIA UR CULT: ABNORMAL
BACTERIA UR CULT: ABNORMAL

## 2020-06-13 DIAGNOSIS — E78.1 HYPERTRIGLYCERIDEMIA: ICD-10-CM

## 2020-06-15 DIAGNOSIS — E11.9 TYPE 2 DIABETES MELLITUS WITHOUT COMPLICATION, WITHOUT LONG-TERM CURRENT USE OF INSULIN (HCC): ICD-10-CM

## 2020-06-15 DIAGNOSIS — E78.1 HYPERTRIGLYCERIDEMIA: ICD-10-CM

## 2020-06-15 RX ORDER — GEMFIBROZIL 600 MG/1
TABLET, FILM COATED ORAL
Qty: 180 TABLET | Refills: 3 | Status: SHIPPED | OUTPATIENT
Start: 2020-06-15 | End: 2021-06-09 | Stop reason: SDUPTHER

## 2020-06-23 ENCOUNTER — APPOINTMENT (OUTPATIENT)
Dept: LAB | Facility: CLINIC | Age: 80
End: 2020-06-23
Payer: COMMERCIAL

## 2020-06-23 ENCOUNTER — TRANSCRIBE ORDERS (OUTPATIENT)
Dept: URGENT CARE | Facility: CLINIC | Age: 80
End: 2020-06-23

## 2020-06-23 DIAGNOSIS — N18.30 CHRONIC KIDNEY DISEASE, STAGE III (MODERATE) (HCC): ICD-10-CM

## 2020-06-23 DIAGNOSIS — R80.9 PROTEINURIA, UNSPECIFIED TYPE: Primary | ICD-10-CM

## 2020-06-23 DIAGNOSIS — E78.00 HYPERCHOLESTEROLEMIA: ICD-10-CM

## 2020-06-23 DIAGNOSIS — I10 ESSENTIAL HYPERTENSION: ICD-10-CM

## 2020-06-23 DIAGNOSIS — R80.9 PROTEINURIA, UNSPECIFIED TYPE: ICD-10-CM

## 2020-06-23 DIAGNOSIS — N18.3 TYPE 2 DIABETES MELLITUS WITH STAGE 3 CHRONIC KIDNEY DISEASE, UNSPECIFIED WHETHER LONG TERM INSULIN USE: ICD-10-CM

## 2020-06-23 DIAGNOSIS — D53.9 DEFICIENCY ANEMIA: ICD-10-CM

## 2020-06-23 DIAGNOSIS — I12.9 HYPERTENSIVE KIDNEY DISEASE: ICD-10-CM

## 2020-06-23 DIAGNOSIS — E11.21 TYPE 2 DIABETES MELLITUS WITH DIABETIC NEPHROPATHY, WITHOUT LONG-TERM CURRENT USE OF INSULIN (HCC): ICD-10-CM

## 2020-06-23 DIAGNOSIS — E11.22 TYPE 2 DIABETES MELLITUS WITH STAGE 3 CHRONIC KIDNEY DISEASE, UNSPECIFIED WHETHER LONG TERM INSULIN USE: ICD-10-CM

## 2020-06-23 DIAGNOSIS — D50.9 IRON DEFICIENCY ANEMIA, UNSPECIFIED IRON DEFICIENCY ANEMIA TYPE: ICD-10-CM

## 2020-06-23 LAB
ALBUMIN SERPL BCP-MCNC: 4.2 G/DL (ref 3.5–5)
ALP SERPL-CCNC: 95 U/L (ref 46–116)
ALT SERPL W P-5'-P-CCNC: 14 U/L (ref 12–78)
ANION GAP SERPL CALCULATED.3IONS-SCNC: 6 MMOL/L (ref 4–13)
AST SERPL W P-5'-P-CCNC: 6 U/L (ref 5–45)
BACTERIA UR QL AUTO: ABNORMAL /HPF
BASOPHILS # BLD AUTO: 0.04 THOUSANDS/ΜL (ref 0–0.1)
BASOPHILS NFR BLD AUTO: 1 % (ref 0–1)
BILIRUB SERPL-MCNC: 0.42 MG/DL (ref 0.2–1)
BILIRUB UR QL STRIP: NEGATIVE
BUN SERPL-MCNC: 40 MG/DL (ref 5–25)
CALCIUM SERPL-MCNC: 9.8 MG/DL (ref 8.3–10.1)
CHLORIDE SERPL-SCNC: 113 MMOL/L (ref 100–108)
CHOLEST SERPL-MCNC: 199 MG/DL (ref 50–200)
CLARITY UR: CLEAR
CO2 SERPL-SCNC: 22 MMOL/L (ref 21–32)
COLOR UR: YELLOW
CREAT SERPL-MCNC: 1.65 MG/DL (ref 0.6–1.3)
CREAT UR-MCNC: 101 MG/DL
CREAT UR-MCNC: 101 MG/DL
EOSINOPHIL # BLD AUTO: 0.13 THOUSAND/ΜL (ref 0–0.61)
EOSINOPHIL NFR BLD AUTO: 3 % (ref 0–6)
ERYTHROCYTE [DISTWIDTH] IN BLOOD BY AUTOMATED COUNT: 14.1 % (ref 11.6–15.1)
GFR SERPL CREATININE-BSD FRML MDRD: 30 ML/MIN/1.73SQ M
GLUCOSE P FAST SERPL-MCNC: 79 MG/DL (ref 65–99)
GLUCOSE UR STRIP-MCNC: NEGATIVE MG/DL
HCT VFR BLD AUTO: 36.2 % (ref 34.8–46.1)
HDLC SERPL-MCNC: 46 MG/DL
HGB BLD-MCNC: 11.3 G/DL (ref 11.5–15.4)
HGB UR QL STRIP.AUTO: NEGATIVE
HYALINE CASTS #/AREA URNS LPF: ABNORMAL /LPF
IMM GRANULOCYTES # BLD AUTO: 0.02 THOUSAND/UL (ref 0–0.2)
IMM GRANULOCYTES NFR BLD AUTO: 0 % (ref 0–2)
KETONES UR STRIP-MCNC: NEGATIVE MG/DL
LDLC SERPL CALC-MCNC: 116 MG/DL (ref 0–100)
LEUKOCYTE ESTERASE UR QL STRIP: NEGATIVE
LYMPHOCYTES # BLD AUTO: 1.5 THOUSANDS/ΜL (ref 0.6–4.47)
LYMPHOCYTES NFR BLD AUTO: 31 % (ref 14–44)
MAGNESIUM SERPL-MCNC: 2.1 MG/DL (ref 1.6–2.6)
MCH RBC QN AUTO: 27.4 PG (ref 26.8–34.3)
MCHC RBC AUTO-ENTMCNC: 31.2 G/DL (ref 31.4–37.4)
MCV RBC AUTO: 88 FL (ref 82–98)
MICROALBUMIN UR-MCNC: 82.7 MG/L (ref 0–20)
MICROALBUMIN/CREAT 24H UR: 82 MG/G CREATININE (ref 0–30)
MONOCYTES # BLD AUTO: 0.4 THOUSAND/ΜL (ref 0.17–1.22)
MONOCYTES NFR BLD AUTO: 8 % (ref 4–12)
NEUTROPHILS # BLD AUTO: 2.78 THOUSANDS/ΜL (ref 1.85–7.62)
NEUTS SEG NFR BLD AUTO: 57 % (ref 43–75)
NITRITE UR QL STRIP: NEGATIVE
NON-SQ EPI CELLS URNS QL MICRO: ABNORMAL /HPF
NONHDLC SERPL-MCNC: 153 MG/DL
NRBC BLD AUTO-RTO: 0 /100 WBCS
PH UR STRIP.AUTO: 6 [PH]
PHOSPHATE SERPL-MCNC: 3 MG/DL (ref 2.3–4.1)
PLATELET # BLD AUTO: 241 THOUSANDS/UL (ref 149–390)
PMV BLD AUTO: 10.2 FL (ref 8.9–12.7)
POTASSIUM SERPL-SCNC: 4.5 MMOL/L (ref 3.5–5.3)
PROT SERPL-MCNC: 8.4 G/DL (ref 6.4–8.2)
PROT UR STRIP-MCNC: ABNORMAL MG/DL
PROT UR-MCNC: 36 MG/DL
PROT/CREAT UR: 0.36 MG/G{CREAT} (ref 0–0.1)
RBC # BLD AUTO: 4.12 MILLION/UL (ref 3.81–5.12)
RBC #/AREA URNS AUTO: ABNORMAL /HPF
SODIUM SERPL-SCNC: 141 MMOL/L (ref 136–145)
SP GR UR STRIP.AUTO: 1.02 (ref 1–1.03)
TRIGL SERPL-MCNC: 184 MG/DL
TSH SERPL DL<=0.05 MIU/L-ACNC: 2.94 UIU/ML (ref 0.36–3.74)
UROBILINOGEN UR QL STRIP.AUTO: 0.2 E.U./DL
WBC # BLD AUTO: 4.87 THOUSAND/UL (ref 4.31–10.16)
WBC #/AREA URNS AUTO: ABNORMAL /HPF

## 2020-06-23 PROCEDURE — 83735 ASSAY OF MAGNESIUM: CPT

## 2020-06-23 PROCEDURE — 84443 ASSAY THYROID STIM HORMONE: CPT

## 2020-06-23 PROCEDURE — 85025 COMPLETE CBC W/AUTO DIFF WBC: CPT | Performed by: INTERNAL MEDICINE

## 2020-06-23 PROCEDURE — 82570 ASSAY OF URINE CREATININE: CPT | Performed by: INTERNAL MEDICINE

## 2020-06-23 PROCEDURE — 82570 ASSAY OF URINE CREATININE: CPT | Performed by: FAMILY MEDICINE

## 2020-06-23 PROCEDURE — 80053 COMPREHEN METABOLIC PANEL: CPT

## 2020-06-23 PROCEDURE — 36415 COLL VENOUS BLD VENIPUNCTURE: CPT | Performed by: INTERNAL MEDICINE

## 2020-06-23 PROCEDURE — 83036 HEMOGLOBIN GLYCOSYLATED A1C: CPT

## 2020-06-23 PROCEDURE — 84156 ASSAY OF PROTEIN URINE: CPT | Performed by: INTERNAL MEDICINE

## 2020-06-23 PROCEDURE — 82043 UR ALBUMIN QUANTITATIVE: CPT | Performed by: FAMILY MEDICINE

## 2020-06-23 PROCEDURE — 81001 URINALYSIS AUTO W/SCOPE: CPT | Performed by: INTERNAL MEDICINE

## 2020-06-23 PROCEDURE — 80061 LIPID PANEL: CPT

## 2020-06-23 PROCEDURE — 84100 ASSAY OF PHOSPHORUS: CPT

## 2020-06-24 LAB
EST. AVERAGE GLUCOSE BLD GHB EST-MCNC: 151 MG/DL
HBA1C MFR BLD: 6.9 %

## 2020-07-10 RX ORDER — GLIPIZIDE 10 MG/1
10 TABLET, FILM COATED, EXTENDED RELEASE ORAL DAILY
Qty: 90 TABLET | Refills: 3 | Status: SHIPPED | OUTPATIENT
Start: 2020-07-10 | End: 2021-03-08 | Stop reason: SDUPTHER

## 2020-07-10 RX ORDER — LOVASTATIN 40 MG/1
40 TABLET ORAL
Qty: 90 TABLET | Refills: 3 | Status: SHIPPED | OUTPATIENT
Start: 2020-07-10 | End: 2021-01-04

## 2020-07-10 RX ORDER — GEMFIBROZIL 600 MG/1
600 TABLET, FILM COATED ORAL 2 TIMES DAILY
Qty: 180 TABLET | Refills: 3 | Status: SHIPPED | OUTPATIENT
Start: 2020-07-10 | End: 2021-03-08 | Stop reason: SDUPTHER

## 2020-08-24 RX ORDER — VITAMIN B COMPLEX
TABLET ORAL DAILY
COMMUNITY

## 2020-08-24 RX ORDER — CALCIUM CARBONATE 200(500)MG
1 TABLET,CHEWABLE ORAL
COMMUNITY

## 2020-08-24 RX ORDER — MULTIVITAMIN
1 TABLET ORAL
COMMUNITY

## 2020-08-24 RX ORDER — FERROUS SULFATE 325(65) MG
325 TABLET ORAL
COMMUNITY

## 2020-08-27 ENCOUNTER — OFFICE VISIT (OUTPATIENT)
Dept: FAMILY MEDICINE CLINIC | Facility: CLINIC | Age: 80
End: 2020-08-27
Payer: COMMERCIAL

## 2020-08-27 VITALS
TEMPERATURE: 98.8 F | WEIGHT: 133 LBS | HEART RATE: 103 BPM | HEIGHT: 60 IN | DIASTOLIC BLOOD PRESSURE: 76 MMHG | BODY MASS INDEX: 26.11 KG/M2 | OXYGEN SATURATION: 94 % | RESPIRATION RATE: 22 BRPM | SYSTOLIC BLOOD PRESSURE: 134 MMHG

## 2020-08-27 DIAGNOSIS — E11.9 ENCOUNTER FOR DIABETIC FOOT EXAM (HCC): ICD-10-CM

## 2020-08-27 DIAGNOSIS — E11.21 TYPE 2 DIABETES MELLITUS WITH DIABETIC NEPHROPATHY, WITHOUT LONG-TERM CURRENT USE OF INSULIN (HCC): ICD-10-CM

## 2020-08-27 DIAGNOSIS — Z00.00 MEDICARE ANNUAL WELLNESS VISIT, SUBSEQUENT: Primary | ICD-10-CM

## 2020-08-27 DIAGNOSIS — E78.00 HYPERCHOLESTEROLEMIA: ICD-10-CM

## 2020-08-27 DIAGNOSIS — Z23 ENCOUNTER FOR IMMUNIZATION: ICD-10-CM

## 2020-08-27 PROCEDURE — 1160F RVW MEDS BY RX/DR IN RCRD: CPT | Performed by: FAMILY MEDICINE

## 2020-08-27 PROCEDURE — 3078F DIAST BP <80 MM HG: CPT | Performed by: FAMILY MEDICINE

## 2020-08-27 PROCEDURE — G0439 PPPS, SUBSEQ VISIT: HCPCS | Performed by: FAMILY MEDICINE

## 2020-08-27 PROCEDURE — 1036F TOBACCO NON-USER: CPT | Performed by: FAMILY MEDICINE

## 2020-08-27 PROCEDURE — 3060F POS MICROALBUMINURIA REV: CPT | Performed by: FAMILY MEDICINE

## 2020-08-27 PROCEDURE — 3044F HG A1C LEVEL LT 7.0%: CPT | Performed by: FAMILY MEDICINE

## 2020-08-27 PROCEDURE — 3075F SYST BP GE 130 - 139MM HG: CPT | Performed by: FAMILY MEDICINE

## 2020-08-27 PROCEDURE — 3008F BODY MASS INDEX DOCD: CPT | Performed by: FAMILY MEDICINE

## 2020-08-27 PROCEDURE — 1170F FXNL STATUS ASSESSED: CPT | Performed by: FAMILY MEDICINE

## 2020-08-27 PROCEDURE — 99214 OFFICE O/P EST MOD 30 MIN: CPT | Performed by: FAMILY MEDICINE

## 2020-08-27 PROCEDURE — 3066F NEPHROPATHY DOC TX: CPT | Performed by: FAMILY MEDICINE

## 2020-08-27 PROCEDURE — 2022F DILAT RTA XM EVC RTNOPTHY: CPT | Performed by: FAMILY MEDICINE

## 2020-08-27 PROCEDURE — 4040F PNEUMOC VAC/ADMIN/RCVD: CPT | Performed by: FAMILY MEDICINE

## 2020-08-27 PROCEDURE — 1125F AMNT PAIN NOTED PAIN PRSNT: CPT | Performed by: FAMILY MEDICINE

## 2020-08-27 NOTE — PROGRESS NOTES
Assessment/Plan:    No problem-specific Assessment & Plan notes found for this encounter  Diagnoses and all orders for this visit:    Medicare annual wellness visit, subsequent    Encounter for immunization    Encounter for diabetic foot exam ([facility] )  -     Diabetic foot exam; Future    Type 2 diabetes mellitus with diabetic nephropathy, without long-term current use of insulin ([facility] )    Hypercholesterolemia  Comments:  pt counseled on diet and exercise    Other orders  -     Multiple Vitamin (multivitamin) tablet; Take 1 tablet by mouth  -     ferrous sulfate 325 (65 Fe) mg tablet; Take 325 mg by mouth  -     cholecalciferol (VITAMIN D3) 25 mcg (1,000 units) tablet; daily  -     calcium carbonate (TUMS) 500 mg chewable tablet; Chew 1 tablet          PHQ-9 Depression Screening    PHQ-9:    Frequency of the following problems over the past two weeks:                 Subjective:      Patient ID: Karyle Hermanns is a 67 y o  female  Follow up for cholesterol    Diabetes   She presents for her follow-up diabetic visit  She has type 2 diabetes mellitus  Her disease course has been stable  There are no hypoglycemic associated symptoms  Pertinent negatives for hypoglycemia include no seizures  There are no diabetic associated symptoms  Pertinent negatives for diabetes include no chest pain and no fatigue  There are no hypoglycemic complications  Symptoms are stable  Risk factors for coronary artery disease include sedentary lifestyle and post-menopausal  Current diabetic treatment includes oral agent (triple therapy)  She is compliant with treatment most of the time  Her weight is stable  She is following a diabetic diet  Her overall blood glucose range is  mg/dl  An ACE inhibitor/angiotensin II receptor blocker is being taken         The following portions of the patient's history were reviewed and updated as appropriate: allergies, current medications, past family history, past medical history, past social history, past surgical history and problem list     Review of Systems   Constitutional: Negative for chills, fatigue and fever  HENT: Negative  Eyes: Negative  Respiratory: Negative for shortness of breath and wheezing  Cardiovascular: Negative for chest pain and palpitations  Gastrointestinal: Negative for abdominal pain, blood in stool, constipation, diarrhea, nausea and vomiting  Endocrine: Negative  Genitourinary: Negative for dysuria  Musculoskeletal: Negative for arthralgias and myalgias  Skin: Negative  Allergic/Immunologic: Negative  Neurological: Negative for seizures and syncope  Hematological: Negative for adenopathy  Psychiatric/Behavioral: Negative  Objective:    /76   Pulse 103   Temp 98 8 °F (37 1 °C) (Tympanic)   Resp 22   Ht 5' (1 524 m)   Wt 60 3 kg (133 lb)   LMP  (LMP Unknown)   SpO2 94%   BMI 25 97 kg/m²      Physical Exam  Vitals signs and nursing note reviewed  Constitutional:       General: She is not in acute distress  Appearance: Normal appearance  She is well-developed  She is not ill-appearing, toxic-appearing or diaphoretic  HENT:      Head: Normocephalic and atraumatic  Right Ear: Tympanic membrane, ear canal and external ear normal  There is no impacted cerumen  Left Ear: Tympanic membrane, ear canal and external ear normal  There is no impacted cerumen  Nose: Nose normal  No congestion or rhinorrhea  Mouth/Throat:      Mouth: Mucous membranes are moist       Pharynx: No oropharyngeal exudate or posterior oropharyngeal erythema  Eyes:      Conjunctiva/sclera: Conjunctivae normal       Pupils: Pupils are equal, round, and reactive to light  Neck:      Musculoskeletal: Normal range of motion and neck supple  No neck rigidity  Cardiovascular:      Rate and Rhythm: Normal rate and regular rhythm        Pulses: no weak pulses          Dorsalis pedis pulses are 2+ on the right side and 2+ on the left side       Heart sounds: Normal heart sounds  No murmur  Pulmonary:      Effort: Pulmonary effort is normal  No respiratory distress  Breath sounds: Normal breath sounds  No wheezing or rales  Abdominal:      General: Bowel sounds are normal  There is no distension  Palpations: Abdomen is soft  There is no mass  Tenderness: There is no abdominal tenderness  There is no guarding or rebound  Musculoskeletal: Normal range of motion  Feet:      Right foot:      Skin integrity: No ulcer, skin breakdown, erythema, warmth, callus or dry skin  Left foot:      Skin integrity: No ulcer, skin breakdown, erythema, warmth, callus or dry skin  Lymphadenopathy:      Cervical: No cervical adenopathy  Skin:     General: Skin is warm and dry  Neurological:      General: No focal deficit present  Mental Status: She is alert and oriented to person, place, and time  Sensory: No sensory deficit  Motor: No weakness or abnormal muscle tone  Coordination: Coordination normal       Gait: Gait normal       Deep Tendon Reflexes: Reflexes are normal and symmetric  Psychiatric:         Mood and Affect: Mood normal          Behavior: Behavior normal          Thought Content: Thought content normal          Judgment: Judgment normal        Patient's shoes and socks removed  Right Foot/Ankle   Right Foot Inspection  Skin Exam: skin normal and skin intact no dry skin, no warmth, no callus, no erythema, no maceration, no abnormal color, no pre-ulcer, no ulcer and no callus                          Toe Exam: ROM and strength within normal limits  Sensory       Monofilament testing: intact  Vascular  Capillary refills: < 3 seconds  The right DP pulse is 2+       Left Foot/Ankle  Left Foot Inspection  Skin Exam: skin normal and skin intactno dry skin, no warmth, no erythema, no maceration, normal color, no pre-ulcer, no ulcer and no callus                         Toe Exam: ROM and strength within normal limits                   Sensory       Monofilament: intact  Vascular  Capillary refills: < 3 seconds  The left DP pulse is 2+  Assign Risk Category:  No deformity present; No loss of protective sensation;  No weak pulses       Risk: 0

## 2020-08-27 NOTE — PATIENT INSTRUCTIONS
Medicare Preventive Visit Patient Instructions  Thank you for completing your Welcome to Medicare Visit or Medicare Annual Wellness Visit today  Your next wellness visit will be due in one year (8/27/2021)  The screening/preventive services that you may require over the next 5-10 years are detailed below  Some tests may not apply to you based off risk factors and/or age  Screening tests ordered at today's visit but not completed yet may show as past due  Also, please note that scanned in results may not display below  Preventive Screenings:  Service Recommendations Previous Testing/Comments   Colorectal Cancer Screening  * Colonoscopy    * Fecal Occult Blood Test (FOBT)/Fecal Immunochemical Test (FIT)  * Fecal DNA/Cologuard Test  * Flexible Sigmoidoscopy Age: 54-65 years old   Colonoscopy: every 10 years (may be performed more frequently if at higher risk)  OR  FOBT/FIT: every 1 year  OR  Cologuard: every 3 years  OR  Sigmoidoscopy: every 5 years  Screening may be recommended earlier than age 48 if at higher risk for colorectal cancer  Also, an individualized decision between you and your healthcare provider will decide whether screening between the ages of 74-80 would be appropriate  Colonoscopy: 05/26/2011  FOBT/FIT: Not on file  Cologuard: Not on file  Sigmoidoscopy: Not on file         Breast Cancer Screening Age: 36 years old  Frequency: every 1-2 years  Not required if history of left and right mastectomy Mammogram: Not on file       Cervical Cancer Screening Between the ages of 21-29, pap smear recommended once every 3 years  Between the ages of 33-67, can perform pap smear with HPV co-testing every 5 years     Recommendations may differ for women with a history of total hysterectomy, cervical cancer, or abnormal pap smears in past  Pap Smear: 06/02/2020       Hepatitis C Screening Once for adults born between 1945 and 1965  More frequently in patients at high risk for Hepatitis C Hep C Antibody: Not on file       Diabetes Screening 1-2 times per year if you're at risk for diabetes or have pre-diabetes Fasting glucose: 79 mg/dL   A1C: 6 9 %       Cholesterol Screening Once every 5 years if you don't have a lipid disorder  May order more often based on risk factors  Lipid panel: 06/23/2020         Other Preventive Screenings Covered by Medicare:  1  Abdominal Aortic Aneurysm (AAA) Screening: covered once if your at risk  You're considered to be at risk if you have a family history of AAA  2  Lung Cancer Screening: covers low dose CT scan once per year if you meet all of the following conditions: (1) Age 50-69; (2) No signs or symptoms of lung cancer; (3) Current smoker or have quit smoking within the last 15 years; (4) You have a tobacco smoking history of at least 30 pack years (packs per day multiplied by number of years you smoked); (5) You get a written order from a healthcare provider  3  Glaucoma Screening: covered annually if you're considered high risk: (1) You have diabetes OR (2) Family history of glaucoma OR (3)  aged 48 and older OR (3)  American aged 72 and older  3  Osteoporosis Screening: covered every 2 years if you meet one of the following conditions: (1) You're estrogen deficient and at risk for osteoporosis based off medical history and other findings; (2) Have a vertebral abnormality; (3) On glucocorticoid therapy for more than 3 months; (4) Have primary hyperparathyroidism; (5) On osteoporosis medications and need to assess response to drug therapy  · Last bone density test (DXA Scan): Not on file  5  HIV Screening: covered annually if you're between the age of 12-76  Also covered annually if you are younger than 13 and older than 72 with risk factors for HIV infection  For pregnant patients, it is covered up to 3 times per pregnancy      Immunizations:  Immunization Recommendations   Influenza Vaccine Annual influenza vaccination during flu season is recommended for all persons aged >= 6 months who do not have contraindications   Pneumococcal Vaccine (Prevnar and Pneumovax)  * Prevnar = PCV13  * Pneumovax = PPSV23   Adults 25-60 years old: 1-3 doses may be recommended based on certain risk factors  Adults 72 years old: Prevnar (PCV13) vaccine recommended followed by Pneumovax (PPSV23) vaccine  If already received PPSV23 since turning 65, then PCV13 recommended at least one year after PPSV23 dose  Hepatitis B Vaccine 3 dose series if at intermediate or high risk (ex: diabetes, end stage renal disease, liver disease)   Tetanus (Td) Vaccine - COST NOT COVERED BY MEDICARE PART B Following completion of primary series, a booster dose should be given every 10 years to maintain immunity against tetanus  Td may also be given as tetanus wound prophylaxis  Tdap Vaccine - COST NOT COVERED BY MEDICARE PART B Recommended at least once for all adults  For pregnant patients, recommended with each pregnancy  Shingles Vaccine (Shingrix) - COST NOT COVERED BY MEDICARE PART B  2 shot series recommended in those aged 48 and above     Health Maintenance Due:  There are no preventive care reminders to display for this patient  Immunizations Due:      Topic Date Due    DTaP,Tdap,and Td Vaccines (1 - Tdap) 10/02/1961    Influenza Vaccine  07/01/2020     Advance Directives   What are advance directives? Advance directives are legal documents that state your wishes and plans for medical care  These plans are made ahead of time in case you lose your ability to make decisions for yourself  Advance directives can apply to any medical decision, such as the treatments you want, and if you want to donate organs  What are the types of advance directives? There are many types of advance directives, and each state has rules about how to use them  You may choose a combination of any of the following:  · Living will: This is a written record of the treatment you want   You can also choose which treatments you do not want, which to limit, and which to stop at a certain time  This includes surgery, medicine, IV fluid, and tube feedings  · Durable power of  for healthcare [facility]): This is a written record that states who you want to make healthcare choices for you when you are unable to make them for yourself  This person, called a proxy, is usually a family member or a friend  You may choose more than 1 proxy  · Do not resuscitate (DNR) order:  A DNR order is used in case your heart stops beating or you stop breathing  It is a request not to have certain forms of treatment, such as CPR  A DNR order may be included in other types of advance directives  · Medical directive: This covers the care that you want if you are in a coma, near death, or unable to make decisions for yourself  You can list the treatments you want for each condition  Treatment may include pain medicine, surgery, blood transfusions, dialysis, IV or tube feedings, and a ventilator (breathing machine)  · Values history: This document has questions about your views, beliefs, and how you feel and think about life  This information can help others choose the care that you would choose  Why are advance directives important? An advance directive helps you control your care  Although spoken wishes may be used, it is better to have your wishes written down  Spoken wishes can be misunderstood, or not followed  Treatments may be given even if you do not want them  An advance directive may make it easier for your family to make difficult choices about your care  Weight Management   Why it is important to manage your weight:  Being overweight increases your risk of health conditions such as heart disease, high blood pressure, type 2 diabetes, and certain types of cancer  It can also increase your risk for osteoarthritis, sleep apnea, and other respiratory problems  Aim for a slow, steady weight loss   Even a small amount of weight loss can lower your risk of health problems  How to lose weight safely:  A safe and healthy way to lose weight is to eat fewer calories and get regular exercise  You can lose up about 1 pound a week by decreasing the number of calories you eat by 500 calories each day  Healthy meal plan for weight management:  A healthy meal plan includes a variety of foods, contains fewer calories, and helps you stay healthy  A healthy meal plan includes the following:  · Eat whole-grain foods more often  A healthy meal plan should contain fiber  Fiber is the part of grains, fruits, and vegetables that is not broken down by your body  Whole-grain foods are healthy and provide extra fiber in your diet  Some examples of whole-grain foods are whole-wheat breads and pastas, oatmeal, brown rice, and bulgur  · Eat a variety of vegetables every day  Include dark, leafy greens such as spinach, kale, ryan greens, and mustard greens  Eat yellow and orange vegetables such as carrots, sweet potatoes, and winter squash  · Eat a variety of fruits every day  Choose fresh or canned fruit (canned in its own juice or light syrup) instead of juice  Fruit juice has very little or no fiber  · Eat low-fat dairy foods  Drink fat-free (skim) milk or 1% milk  Eat fat-free yogurt and low-fat cottage cheese  Try low-fat cheeses such as mozzarella and other reduced-fat cheeses  · Choose meat and other protein foods that are low in fat  Choose beans or other legumes such as split peas or lentils  Choose fish, skinless poultry (chicken or turkey), or lean cuts of red meat (beef or pork)  Before you cook meat or poultry, cut off any visible fat  · Use less fat and oil  Try baking foods instead of frying them  Add less fat, such as margarine, sour cream, regular salad dressing and mayonnaise to foods  Eat fewer high-fat foods  Some examples of high-fat foods include french fries, doughnuts, ice cream, and cakes  · Eat fewer sweets    Limit foods and drinks that are high in sugar  This includes candy, cookies, regular soda, and sweetened drinks  Exercise:  Exercise at least 30 minutes per day on most days of the week  Some examples of exercise include walking, biking, dancing, and swimming  You can also fit in more physical activity by taking the stairs instead of the elevator or parking farther away from stores  Ask your healthcare provider about the best exercise plan for you  © Copyright [employer] 2018 Information is for End User's use only and may not be sold, redistributed or otherwise used for commercial purposes   All illustrations and images included in CareNotes® are the copyrighted property of [employer]  or [address] [place] [address]

## 2020-08-27 NOTE — PROGRESS NOTES
Assessment and Plan:     Problem List Items Addressed This Visit     None      Visit Diagnoses     Medicare annual wellness visit, subsequent    -  Primary    Encounter for immunization        Encounter for diabetic foot exam [facility])               Preventive health issues were discussed with patient, and age appropriate screening tests were ordered as noted in patient's After Visit Summary  Personalized health advice and appropriate referrals for health education or preventive services given if needed, as noted in patient's After Visit Summary       History of Present Illness:     Patient presents for Medicare Annual Wellness visit    Patient Care Team:  Maldonado Finn, DO as PCP - General     Problem List:     Patient Active Problem List   Diagnosis    Groin pain, chronic, left    Type 2 diabetes mellitus with diabetic nephropathy [facility] [address] [facility]    Hypertensive kidney disease with chronic kidney disease stage III ([facility] [address] )    Abnormal ECG    Articular cartilage disorder of shoulder region    Back pain    Bursitis of shoulder    Atrophic vaginitis    Depression    Disorder of shoulder    Diverticulitis large intestine w/o perforation or abscess w/o bleeding    HTN (hypertension)    Hypercholesterolemia    Irritable bowel syndrome    Localized, primary osteoarthritis of hand    Loose body in joint of shoulder region    Muscle pain    Paresthesia of arm    Refused influenza vaccine    Sciatica    Simple chronic anemia    Lower abdominal pain      Past Medical and Surgical History:     Past Medical History:   Diagnosis Date    Diabetes mellitus ([address] )     Diverticulitis     Postherpetic neuralgia      Past Surgical History:   Procedure Laterality Date    CHOLECYSTECTOMY      COLON SURGERY      HERNIA REPAIR      ROTATOR CUFF REPAIR Right     VARICOSE VEIN SURGERY      Impression:  2/12/16 Regla Wright      Family History:     Family History   Problem Relation Age of Onset    Diabetes Mother     No Known Problems Father       Social History:        Social History     Socioeconomic History    Marital status: /Civil Union     Spouse name: None    Number of children: None    Years of education: None    Highest education level: None   Occupational History    None   Social Needs    Financial resource strain: None    Food insecurity     Worry: None     Inability: None    Transportation needs     Medical: None     Non-medical: None   Tobacco Use    Smoking status: Never Smoker    Smokeless tobacco: Never Used   Substance and Sexual Activity    Alcohol use: Yes     Frequency: 2-4 times a month     Drinks per session: 3 or 4     Comment: Rarely    Drug use: No    Sexual activity: None   Lifestyle    Physical activity     Days per week: None     Minutes per session: None    Stress: None   Relationships    Social connections     Talks on phone: None     Gets together: None     Attends [employer] service: None     Active member of club or organization: None     Attends meetings of clubs or organizations: None     Relationship status: None    Intimate partner violence     Fear of current or ex partner: None     Emotionally abused: None     Physically abused: None     Forced sexual activity: None   Other Topics Concern    None   Social History Narrative    Always uses seatbelt    No caffeine use      Medications and Allergies:     Current Outpatient Medications   Medication Sig Dispense Refill    calcium carbonate (TUMS) 500 mg chewable tablet Chew 1 tablet      cholecalciferol (VITAMIN D3) 25 mcg (1,000 units) tablet daily      dicyclomine (BENTYL) 10 mg capsule Take 1 capsule (10 mg total) by mouth 3 (three) times a day before meals (Patient not taking: Reported on 6/4/2020) 90 capsule 3    enalapril (VASOTEC) 10 mg tablet TAKE 1 TABLET DAILY 90 tablet 3    ferrous sulfate 325 (65 Fe) mg tablet Take 325 mg by mouth      gemfibrozil (LOPID) 600 mg tablet TAKE 1 TABLET TWICE A  tablet 3  gemfibrozil (LOPID) 600 mg tablet Take 1 tablet (600 mg total) by mouth 2 (two) times a day 180 tablet 3    glipiZIDE (GLUCOTROL XL) 10 mg 24 hr tablet Take 1 tablet (10 mg total) by mouth daily 90 tablet 3    HYDROcodone-acetaminophen (NORCO) 5-325 mg per tablet hydrocodone 5 mg-acetaminophen 500 mg tablet      lovastatin (MEVACOR) 40 MG tablet Take 1 tablet (40 mg total) by mouth daily at bedtime 90 tablet 3    metFORMIN (GLUCOPHAGE) 1000 MG tablet Take 1 tablet (1,000 mg total) by mouth 2 (two) times a day 180 tablet 3    Multiple Vitamin (multivitamin) tablet Take 1 tablet by mouth      ofloxacin (OCUFLOX) 0 3 % ophthalmic solution instill 1 drop into left eye four times a day START 3 DAYS PRIOR      (REFER TO PRESCRIPTION NOTES)   prednisoLONE acetate (PRED FORTE) 1 % ophthalmic suspension instill 1 drop into left eye every 2 hours TO BE STARTED AFTER THE SURGERY IS COMPLETED      sitaGLIPtin (Januvia) 100 mg tablet Take 1 tablet (100 mg total) by mouth daily 90 tablet 3    triamcinolone (KENALOG) 0 1 % cream Apply topically 2 (two) times a day       No current facility-administered medications for this visit  Allergies   Allergen Reactions    Ciprofloxacin GI Intolerance and Headache     Confusion, arm weakness, dizziness, headache    Meperidine GI Intolerance, Hallucinations and Other (See Comments)     Demarol  Reaction Date:Unknown    Propoxyphene GI Intolerance      Immunizations:     Immunization History   Administered Date(s) Administered    Hep B, adult 10/26/2007, 11/29/2007, 04/30/2008    Pneumococcal Conjugate 13-Valent 02/28/2018    Pneumococcal Polysaccharide PPV23 05/18/2005, 07/25/2019    Zoster 06/03/2014      Health Maintenance: There are no preventive care reminders to display for this patient        Topic Date Due    DTaP,Tdap,and Td Vaccines (1 - Tdap) [birth year]/1962    Influenza Vaccine  07/01/2020      Medicare Health Risk Assessment:     /76   Pulse 103   Temp 98 8 °F (37 1 °C) (Tympanic)   Resp 22   Ht 5' (1 524 m)   Wt 60 3 kg (133 lb)   LMP  (LMP Unknown)   SpO2 94%   BMI 25 97 kg/m²      Debbie is here for her Subsequent Wellness visit  Last Medicare Wellness visit information reviewed, patient interviewed and updates made to the record today  Health Risk Assessment:   Patient rates overall health as fair  Patient feels that their physical health rating is slightly worse  Eyesight was rated as slightly worse  Hearing was rated as slightly worse  Patient feels that their emotional and mental health rating is same  Pain experienced in the last 7 days has been none  Patient states that she has experienced no weight loss or gain in last 6 months  Fall Risk Screening: In the past year, patient has experienced: no history of falling in past year      Urinary Incontinence Screening:   Patient has not leaked urine accidently in the last six months  Home Safety:  Patient does not have trouble with stairs inside or outside of their home  Patient has working smoke alarms and has working carbon monoxide detector  Home safety hazards include: none  Nutrition:   Current diet is Regular  Medications:   Patient is currently taking over-the-counter supplements  OTC medications include: see medication list  Patient is able to manage medications  Activities of Daily Living (ADLs)/Instrumental Activities of Daily Living (IADLs):   Walk and transfer into and out of bed and chair?: Yes  Dress and groom yourself?: Yes    Bathe or shower yourself?: Yes    Feed yourself? Yes  Do your laundry/housekeeping?: Yes  Manage your money, pay your bills and track your expenses?: Yes  Make your own meals?: Yes    Do your own shopping?: Yes    Previous Hospitalizations:   Any hospitalizations or ED visits within the last 12 months?: No      Advance Care Planning:   Living will: Yes    Durable POA for healthcare:  Yes    Advanced directive: Yes    Advanced directive counseling given: No    Five wishes given: No    Patient declined ACP directive: No    End of Life Decisions reviewed with patient: Yes    Provider agrees with end of life decisions: Yes      Cognitive Screening:   Provider or family/friend/caregiver concerned regarding cognition?: No    PREVENTIVE SCREENINGS      Cardiovascular Screening:    General: Screening Not Indicated and History Lipid Disorder      Diabetes Screening:     General: Screening Not Indicated and History Diabetes      Colorectal Cancer Screening:     General: Screening Current      Breast Cancer Screening:     General: Screening Not Indicated      Cervical Cancer Screening:    General: Screening Not Indicated      Osteoporosis Screening:    General: Screening Not Indicated      Abdominal Aortic Aneurysm (AAA) Screening:        General: Screening Not Indicated      Lung Cancer Screening:     General: Screening Not Indicated      Hepatitis C Screening:    General: Screening Not Indicated      Shaneka Wallace DO

## 2020-09-09 DIAGNOSIS — E11.9 TYPE 2 DIABETES MELLITUS WITHOUT COMPLICATION, WITHOUT LONG-TERM CURRENT USE OF INSULIN (HCC): ICD-10-CM

## 2020-10-29 ENCOUNTER — TRANSCRIBE ORDERS (OUTPATIENT)
Dept: URGENT CARE | Facility: CLINIC | Age: 80
End: 2020-10-29

## 2020-10-29 ENCOUNTER — LAB (OUTPATIENT)
Dept: LAB | Facility: CLINIC | Age: 80
End: 2020-10-29
Payer: COMMERCIAL

## 2020-10-29 DIAGNOSIS — R80.1 PERSISTENT PROTEINURIA: ICD-10-CM

## 2020-10-29 DIAGNOSIS — I10 ESSENTIAL HYPERTENSION: ICD-10-CM

## 2020-10-29 DIAGNOSIS — E11.21 TYPE 2 DIABETES MELLITUS WITH DIABETIC NEPHROPATHY, WITHOUT LONG-TERM CURRENT USE OF INSULIN (HCC): ICD-10-CM

## 2020-10-29 DIAGNOSIS — N18.30 STAGE 3 CHRONIC KIDNEY DISEASE, UNSPECIFIED WHETHER STAGE 3A OR 3B CKD (HCC): Primary | ICD-10-CM

## 2020-10-29 DIAGNOSIS — N18.30 STAGE 3 CHRONIC KIDNEY DISEASE, UNSPECIFIED WHETHER STAGE 3A OR 3B CKD (HCC): ICD-10-CM

## 2020-10-29 LAB
25(OH)D3 SERPL-MCNC: 27.1 NG/ML (ref 30–100)
ALBUMIN SERPL BCP-MCNC: 4.2 G/DL (ref 3.5–5)
ANION GAP SERPL CALCULATED.3IONS-SCNC: 5 MMOL/L (ref 4–13)
BUN SERPL-MCNC: 26 MG/DL (ref 5–25)
CALCIUM SERPL-MCNC: 9.5 MG/DL (ref 8.3–10.1)
CHLORIDE SERPL-SCNC: 108 MMOL/L (ref 100–108)
CO2 SERPL-SCNC: 26 MMOL/L (ref 21–32)
CREAT SERPL-MCNC: 1.28 MG/DL (ref 0.6–1.3)
CREAT UR-MCNC: 98.8 MG/DL
ERYTHROCYTE [DISTWIDTH] IN BLOOD BY AUTOMATED COUNT: 13.3 % (ref 11.6–15.1)
EST. AVERAGE GLUCOSE BLD GHB EST-MCNC: 169 MG/DL
FERRITIN SERPL-MCNC: 157 NG/ML (ref 8–388)
GFR SERPL CREATININE-BSD FRML MDRD: 40 ML/MIN/1.73SQ M
GLUCOSE P FAST SERPL-MCNC: 160 MG/DL (ref 65–99)
GLUCOSE P FAST SERPL-MCNC: 170 MG/DL (ref 65–99)
HBA1C MFR BLD: 7.5 %
HCT VFR BLD AUTO: 37.1 % (ref 34.8–46.1)
HGB BLD-MCNC: 11.9 G/DL (ref 11.5–15.4)
IRON SATN MFR SERPL: 25 %
IRON SERPL-MCNC: 88 UG/DL (ref 50–170)
MCH RBC QN AUTO: 27.5 PG (ref 26.8–34.3)
MCHC RBC AUTO-ENTMCNC: 32.1 G/DL (ref 31.4–37.4)
MCV RBC AUTO: 86 FL (ref 82–98)
PHOSPHATE SERPL-MCNC: 2.8 MG/DL (ref 2.3–4.1)
PLATELET # BLD AUTO: 277 THOUSANDS/UL (ref 149–390)
PMV BLD AUTO: 9.9 FL (ref 8.9–12.7)
POTASSIUM SERPL-SCNC: 3.9 MMOL/L (ref 3.5–5.3)
PROT UR-MCNC: 108 MG/DL
PROT/CREAT UR: 1.09 MG/G{CREAT} (ref 0–0.1)
PTH-INTACT SERPL-MCNC: 70.4 PG/ML (ref 18.4–80.1)
RBC # BLD AUTO: 4.33 MILLION/UL (ref 3.81–5.12)
SODIUM SERPL-SCNC: 139 MMOL/L (ref 136–145)
TIBC SERPL-MCNC: 347 UG/DL (ref 250–450)
WBC # BLD AUTO: 5.45 THOUSAND/UL (ref 4.31–10.16)

## 2020-10-29 PROCEDURE — 83970 ASSAY OF PARATHORMONE: CPT

## 2020-10-29 PROCEDURE — 82947 ASSAY GLUCOSE BLOOD QUANT: CPT

## 2020-10-29 PROCEDURE — 83540 ASSAY OF IRON: CPT

## 2020-10-29 PROCEDURE — 36415 COLL VENOUS BLD VENIPUNCTURE: CPT

## 2020-10-29 PROCEDURE — 85027 COMPLETE CBC AUTOMATED: CPT

## 2020-10-29 PROCEDURE — 82570 ASSAY OF URINE CREATININE: CPT | Performed by: NURSE PRACTITIONER

## 2020-10-29 PROCEDURE — 84156 ASSAY OF PROTEIN URINE: CPT | Performed by: NURSE PRACTITIONER

## 2020-10-29 PROCEDURE — 82306 VITAMIN D 25 HYDROXY: CPT

## 2020-10-29 PROCEDURE — 83550 IRON BINDING TEST: CPT

## 2020-10-29 PROCEDURE — 80069 RENAL FUNCTION PANEL: CPT

## 2020-10-29 PROCEDURE — 82728 ASSAY OF FERRITIN: CPT

## 2020-10-29 PROCEDURE — 83036 HEMOGLOBIN GLYCOSYLATED A1C: CPT

## 2021-01-03 DIAGNOSIS — E11.9 TYPE 2 DIABETES MELLITUS WITHOUT COMPLICATION, WITHOUT LONG-TERM CURRENT USE OF INSULIN (HCC): ICD-10-CM

## 2021-01-04 RX ORDER — LOVASTATIN 40 MG/1
TABLET ORAL
Qty: 90 TABLET | Refills: 3 | Status: SHIPPED | OUTPATIENT
Start: 2021-01-04 | End: 2021-03-08 | Stop reason: SDUPTHER

## 2021-01-07 ENCOUNTER — OFFICE VISIT (OUTPATIENT)
Dept: FAMILY MEDICINE CLINIC | Facility: CLINIC | Age: 81
End: 2021-01-07
Payer: COMMERCIAL

## 2021-01-07 VITALS
SYSTOLIC BLOOD PRESSURE: 139 MMHG | DIASTOLIC BLOOD PRESSURE: 80 MMHG | HEIGHT: 60 IN | HEART RATE: 83 BPM | BODY MASS INDEX: 25.68 KG/M2 | TEMPERATURE: 98.3 F | WEIGHT: 130.8 LBS | OXYGEN SATURATION: 96 %

## 2021-01-07 DIAGNOSIS — F41.9 ANXIETY: ICD-10-CM

## 2021-01-07 DIAGNOSIS — I10 ESSENTIAL HYPERTENSION: ICD-10-CM

## 2021-01-07 DIAGNOSIS — E11.9 TYPE 2 DIABETES MELLITUS WITHOUT COMPLICATION, WITHOUT LONG-TERM CURRENT USE OF INSULIN (HCC): Primary | ICD-10-CM

## 2021-01-07 DIAGNOSIS — F32.A DEPRESSION, UNSPECIFIED DEPRESSION TYPE: ICD-10-CM

## 2021-01-07 DIAGNOSIS — E66.3 OVERWEIGHT (BMI 25.0-29.9): ICD-10-CM

## 2021-01-07 PROBLEM — Z13.31 NEGATIVE DEPRESSION SCREENING: Status: ACTIVE | Noted: 2021-01-07

## 2021-01-07 PROCEDURE — 99214 OFFICE O/P EST MOD 30 MIN: CPT | Performed by: FAMILY MEDICINE

## 2021-01-07 PROCEDURE — 1160F RVW MEDS BY RX/DR IN RCRD: CPT | Performed by: FAMILY MEDICINE

## 2021-01-07 PROCEDURE — 3079F DIAST BP 80-89 MM HG: CPT | Performed by: FAMILY MEDICINE

## 2021-01-07 PROCEDURE — 3075F SYST BP GE 130 - 139MM HG: CPT | Performed by: FAMILY MEDICINE

## 2021-01-07 PROCEDURE — 1036F TOBACCO NON-USER: CPT | Performed by: FAMILY MEDICINE

## 2021-01-07 PROCEDURE — 3725F SCREEN DEPRESSION PERFORMED: CPT | Performed by: FAMILY MEDICINE

## 2021-01-07 RX ORDER — ALPRAZOLAM 0.25 MG/1
0.25 TABLET ORAL
Qty: 30 TABLET | Refills: 2 | Status: SHIPPED | OUTPATIENT
Start: 2021-01-07

## 2021-01-07 NOTE — PROGRESS NOTES
Assessment/Plan:    No problem-specific Assessment & Plan notes found for this encounter  Diagnoses and all orders for this visit:    Type 2 diabetes mellitus without complication, without long-term current use of insulin ([facility])  Comments:  no change in medication  Orders:  -     Glucose, fasting; Future  -     Hemoglobin A1C; Future    Depression, unspecified depression type  -     sertraline (ZOLOFT) 50 mg tablet; Take 1 tablet (50 mg total) by mouth daily    Essential hypertension  Comments:  no change in the medication    Overweight (BMI 25 0-29  9)    Anxiety  -     ALPRAZolam (XANAX) 0 25 mg tablet; Take 1 tablet (0 25 mg total) by mouth daily at bedtime as needed for anxiety          PHQ-9 Depression Screening    PHQ-9:   Frequency of the following problems over the past two weeks:      Little interest or pleasure in doing things: 2 - more than half the days  Feeling down, depressed, or hopeless: 2 - more than half the days  Trouble falling or staying asleep, or sleeping too much: 2 - more than half the days  Feeling tired or having little energy: 2 - more than half the days  Poor appetite or overeatin - more than half the days  Feeling bad about yourself - or that you are a failure or have let yourself or your family down: 0 - not at all  Trouble concentrating on things, such as reading the newspaper or watching television: 0 - not at all  Moving or speaking so slowly that other people could have noticed  Or the opposite - being so fidgety or restless that you have been moving around a lot more than usual: 0 - not at all  Thoughts that you would be better off dead, or of hurting yourself in some way: 0 - not at all  PHQ-2 Score: 4  PHQ-9 Score: 10        Depression Screening Follow-up Plan: Patient's depression screening was positive with a PHQ-2 score of 4  Their PHQ-9 score was 10  Patient assessed for underlying major depression  They have no active suicidal ideations   Brief counseling provided and recommend additional follow-up/re-evaluation next office visit  BMI Counseling: Body mass index is 25 55 kg/m²  The BMI is above normal  Nutrition recommendations include reducing portion sizes and 3-5 servings of fruits/vegetables daily  Exercise recommendations include moderate aerobic physical activity for 150 minutes/week and exercising 3-5 times per week  Subjective:      Patient ID: Brenton Roque is a 79 y o  female  Follow up for HTN which is controlled    Diabetes  She presents for her follow-up diabetic visit  She has type 2 diabetes mellitus  Her disease course has been stable  There are no hypoglycemic associated symptoms  There are no diabetic associated symptoms  Pertinent negatives for diabetes include no chest pain  There are no hypoglycemic complications  Symptoms are stable  Risk factors for coronary artery disease include sedentary lifestyle and post-menopausal  Current diabetic treatment includes oral agent (dual therapy)  She is compliant with treatment most of the time  Her weight is decreasing steadily  She is following a diabetic diet  Her overall blood glucose range is 70-90 mg/dl  An ACE inhibitor/angiotensin II receptor blocker is being taken  The following portions of the patient's history were reviewed and updated as appropriate: allergies, current medications, past family history, past medical history, past social history, past surgical history and problem list     Review of Systems   Eyes: Negative for visual disturbance  Cardiovascular: Negative for chest pain and palpitations  Gastrointestinal: Negative for abdominal pain, nausea and vomiting  Genitourinary: Negative for frequency  Skin: Negative for wound  Neurological: Negative for numbness           Objective:    /80   Pulse 83   Temp 98 3 °F (36 8 °C)   Ht 5' (1 524 m)   Wt 59 3 kg (130 lb 12 8 oz)   LMP  (LMP Unknown)   SpO2 96%   BMI 25 55 kg/m²      Physical Exam  Vitals signs and nursing note reviewed  Constitutional:       General: She is not in acute distress  Appearance: She is well-developed  She is not diaphoretic  HENT:      Head: Normocephalic and atraumatic  Eyes:      General: No scleral icterus  Conjunctiva/sclera: Conjunctivae normal       Pupils: Pupils are equal, round, and reactive to light  Neck:      Musculoskeletal: Normal range of motion and neck supple  Cardiovascular:      Rate and Rhythm: Normal rate and regular rhythm  Pulses: no weak pulses          Dorsalis pedis pulses are 2+ on the right side and 2+ on the left side  Heart sounds: Normal heart sounds  No murmur  Pulmonary:      Effort: Pulmonary effort is normal  No respiratory distress  Breath sounds: Normal breath sounds  No wheezing or rales  Abdominal:      General: Bowel sounds are normal  There is no distension  Palpations: Abdomen is soft  There is no mass  Tenderness: There is no abdominal tenderness  There is no guarding or rebound  Musculoskeletal: Normal range of motion  General: No deformity  Feet:      Right foot:      Skin integrity: No ulcer, skin breakdown, erythema, warmth, callus or dry skin  Left foot:      Skin integrity: No ulcer, skin breakdown, erythema, warmth, callus or dry skin  Lymphadenopathy:      Cervical: No cervical adenopathy  Skin:     General: Skin is warm and dry  Neurological:      Mental Status: She is alert and oriented to person, place, and time  Psychiatric:         Behavior: Behavior normal          Thought Content: Thought content normal          Judgment: Judgment normal        Patient's shoes and socks removed  Right Foot/Ankle   Right Foot Inspection  Skin Exam: skin normal and skin intact no dry skin, no warmth, no callus, no erythema, no maceration, no abnormal color, no pre-ulcer, no ulcer and no callus                          Toe Exam:  no right toe deformity  Sensory       Monofilament testing: intact  Vascular  Capillary refills: < 3 seconds  The right DP pulse is 2+  Left Foot/Ankle  Left Foot Inspection  Skin Exam: skin normal and skin intactno dry skin, no warmth, no erythema, no maceration, normal color, no pre-ulcer, no ulcer and no callus                         Toe Exam: no left toe deformity                   Sensory       Monofilament: intact  Vascular  Capillary refills: < 3 seconds  The left DP pulse is 2+  Assign Risk Category:  No deformity present; No loss of protective sensation;  No weak pulses       Risk: 0

## 2021-02-24 LAB — HBA1C MFR BLD HPLC: 7.9 %

## 2021-03-01 ENCOUNTER — RA CDI HCC (OUTPATIENT)
Dept: OTHER | Facility: HOSPITAL | Age: 81
End: 2021-03-01

## 2021-03-01 NOTE — PROGRESS NOTES
Based on clinical documentation indicated in your record, it appears that the patient may have the following conditions:    F33 9 Major depressive disorder, recurrent    If this is correct, please document and assess at your next visit March 8th  [facility]  coding oppertunities             Chart reviewed, (number of) suggestions sent to provider: 1                 NO RESPONSE

## 2021-03-08 ENCOUNTER — OFFICE VISIT (OUTPATIENT)
Dept: FAMILY MEDICINE CLINIC | Facility: CLINIC | Age: 81
End: 2021-03-08
Payer: COMMERCIAL

## 2021-03-08 VITALS
DIASTOLIC BLOOD PRESSURE: 78 MMHG | HEIGHT: 60 IN | OXYGEN SATURATION: 97 % | WEIGHT: 131 LBS | HEART RATE: 96 BPM | BODY MASS INDEX: 25.72 KG/M2 | SYSTOLIC BLOOD PRESSURE: 122 MMHG | TEMPERATURE: 98.8 F

## 2021-03-08 DIAGNOSIS — E11.9 TYPE 2 DIABETES MELLITUS WITHOUT COMPLICATION, WITHOUT LONG-TERM CURRENT USE OF INSULIN (HCC): ICD-10-CM

## 2021-03-08 DIAGNOSIS — E78.1 HYPERTRIGLYCERIDEMIA: ICD-10-CM

## 2021-03-08 DIAGNOSIS — I10 ESSENTIAL HYPERTENSION: ICD-10-CM

## 2021-03-08 DIAGNOSIS — E11.9 TYPE 2 DIABETES MELLITUS WITHOUT COMPLICATION, WITHOUT LONG-TERM CURRENT USE OF INSULIN (HCC): Primary | ICD-10-CM

## 2021-03-08 PROCEDURE — 3078F DIAST BP <80 MM HG: CPT | Performed by: FAMILY MEDICINE

## 2021-03-08 PROCEDURE — 3725F SCREEN DEPRESSION PERFORMED: CPT | Performed by: FAMILY MEDICINE

## 2021-03-08 PROCEDURE — 3074F SYST BP LT 130 MM HG: CPT | Performed by: FAMILY MEDICINE

## 2021-03-08 PROCEDURE — 1160F RVW MEDS BY RX/DR IN RCRD: CPT | Performed by: FAMILY MEDICINE

## 2021-03-08 PROCEDURE — 99213 OFFICE O/P EST LOW 20 MIN: CPT | Performed by: FAMILY MEDICINE

## 2021-03-08 PROCEDURE — 1036F TOBACCO NON-USER: CPT | Performed by: FAMILY MEDICINE

## 2021-03-08 RX ORDER — GEMFIBROZIL 600 MG/1
600 TABLET, FILM COATED ORAL 2 TIMES DAILY
Qty: 180 TABLET | Refills: 0 | Status: SHIPPED | OUTPATIENT
Start: 2021-03-08 | End: 2022-01-21 | Stop reason: HOSPADM

## 2021-03-08 RX ORDER — LOVASTATIN 40 MG/1
40 TABLET ORAL
Qty: 90 TABLET | Refills: 0 | Status: SHIPPED | OUTPATIENT
Start: 2021-03-08 | End: 2021-06-09 | Stop reason: SDUPTHER

## 2021-03-08 RX ORDER — GLIPIZIDE 10 MG/1
10 TABLET, FILM COATED, EXTENDED RELEASE ORAL DAILY
Qty: 90 TABLET | Refills: 0 | Status: SHIPPED | OUTPATIENT
Start: 2021-03-08 | End: 2021-06-09 | Stop reason: SDUPTHER

## 2021-03-08 RX ORDER — ENALAPRIL MALEATE 10 MG/1
10 TABLET ORAL DAILY
Qty: 90 TABLET | Refills: 0 | Status: SHIPPED | OUTPATIENT
Start: 2021-03-08 | End: 2021-06-09 | Stop reason: SDUPTHER

## 2021-03-08 NOTE — PROGRESS NOTES
Assessment/Plan:    No problem-specific Assessment & Plan notes found for this encounter  Diagnoses and all orders for this visit:    Type 2 diabetes mellitus without complication, without long-term current use of insulin ([facility])  Comments:  pt to take both dosages of metformin  Orders:  -     metFORMIN (GLUCOPHAGE) 1000 MG tablet; Take 1 tablet (1,000 mg total) by mouth 2 (two) times a day  -     sitaGLIPtin (Januvia) 100 mg tablet; Take 1 tablet (100 mg total) by mouth daily  -     lovastatin (MEVACOR) 40 MG tablet; Take 1 tablet (40 mg total) by mouth daily at bedtime  -     glipiZIDE (GLUCOTROL XL) 10 mg 24 hr tablet; Take 1 tablet (10 mg total) by mouth daily  -     Glucose, fasting; Future  -     Hemoglobin A1C; Future  -     C-peptide; Future    Essential hypertension  Comments:  no change in the medication  Orders:  -     enalapril (VASOTEC) 10 mg tablet; Take 1 tablet (10 mg total) by mouth daily    Type 2 diabetes mellitus without complication, without long-term current use of insulin ([facility])  Comments:  pt counseled on diet and exercise and weight loss  Orders:  -     metFORMIN (GLUCOPHAGE) 1000 MG tablet; Take 1 tablet (1,000 mg total) by mouth 2 (two) times a day  -     sitaGLIPtin (Januvia) 100 mg tablet; Take 1 tablet (100 mg total) by mouth daily  -     lovastatin (MEVACOR) 40 MG tablet; Take 1 tablet (40 mg total) by mouth daily at bedtime  -     glipiZIDE (GLUCOTROL XL) 10 mg 24 hr tablet; Take 1 tablet (10 mg total) by mouth daily  -     Glucose, fasting; Future  -     Hemoglobin A1C; Future  -     C-peptide; Future    Hypertriglyceridemia  -     gemfibrozil (LOPID) 600 mg tablet;  Take 1 tablet (600 mg total) by mouth 2 (two) times a day          PHQ-9 Depression Screening    PHQ-9:   Frequency of the following problems over the past two weeks:      Little interest or pleasure in doing things: 0 - not at all  Feeling down, depressed, or hopeless: 0 - not at all  Trouble falling or staying asleep, or sleeping too much: 0 - not at all  Feeling tired or having little energy: 0 - not at all  Poor appetite or overeatin - not at all  Feeling bad about yourself - or that you are a failure or have let yourself or your family down: 0 - not at all  Trouble concentrating on things, such as reading the newspaper or watching television: 0 - not at all  Moving or speaking so slowly that other people could have noticed  Or the opposite - being so fidgety or restless that you have been moving around a lot more than usual: 0 - not at all  Thoughts that you would be better off dead, or of hurting yourself in some way: 0 - not at all  PHQ-2 Score: 0  PHQ-9 Score: 0            Subjective:      Patient ID: Isa Davis is a 79 y o  female  Pt is skipping 2nd metformin dose    Diabetes  She presents for her follow-up diabetic visit  She has type 2 diabetes mellitus  Her disease course has been stable  There are no hypoglycemic associated symptoms  There are no diabetic associated symptoms  Pertinent negatives for diabetes include no chest pain  There are no hypoglycemic complications  Symptoms are stable  There are no diabetic complications  Risk factors for coronary artery disease include post-menopausal and sedentary lifestyle  Current diabetic treatment includes oral agent (triple therapy)  She is compliant with treatment most of the time  Her weight is stable  She is following a diabetic diet  Her overall blood glucose range is 140-180 mg/dl  An ACE inhibitor/angiotensin II receptor blocker is being taken  The following portions of the patient's history were reviewed and updated as appropriate: allergies, current medications, past family history, past medical history, past social history, past surgical history and problem list     Review of Systems   Eyes: Negative for visual disturbance  Cardiovascular: Negative for chest pain and palpitations     Gastrointestinal: Negative for abdominal pain, nausea and vomiting  Genitourinary: Negative for frequency  Skin: Negative for wound  Neurological: Negative for numbness  Objective:    /78 (BP Location: Left arm, Patient Position: Sitting, Cuff Size: Standard)   Pulse 96   Temp 98 8 °F (37 1 °C) (Tympanic)   Ht 5' (1 524 m)   Wt 59 4 kg (131 lb)   LMP  (LMP Unknown)   SpO2 97%   BMI 25 58 kg/m²      Physical Exam  Vitals signs and nursing note reviewed  Constitutional:       General: She is not in acute distress  Appearance: Normal appearance  She is well-developed  She is not ill-appearing, toxic-appearing or diaphoretic  HENT:      Head: Normocephalic and atraumatic  Eyes:      General: No scleral icterus  Conjunctiva/sclera: Conjunctivae normal       Pupils: Pupils are equal, round, and reactive to light  Neck:      Musculoskeletal: Normal range of motion and neck supple  Cardiovascular:      Rate and Rhythm: Normal rate and regular rhythm  Heart sounds: Normal heart sounds  No murmur  Pulmonary:      Effort: Pulmonary effort is normal  No respiratory distress  Breath sounds: Normal breath sounds  No wheezing, rhonchi or rales  Abdominal:      General: Bowel sounds are normal  There is no distension  Palpations: Abdomen is soft  There is no mass  Tenderness: There is no abdominal tenderness  There is no guarding or rebound  Musculoskeletal: Normal range of motion  General: No deformity  Right lower leg: No edema  Left lower leg: No edema  Lymphadenopathy:      Cervical: No cervical adenopathy  Skin:     General: Skin is warm and dry  Neurological:      General: No focal deficit present  Mental Status: She is alert and oriented to person, place, and time  Psychiatric:         Mood and Affect: Mood normal          Behavior: Behavior normal          Thought Content:  Thought content normal          Judgment: Judgment normal

## 2021-05-07 ENCOUNTER — APPOINTMENT (OUTPATIENT)
Dept: RADIOLOGY | Facility: CLINIC | Age: 81
End: 2021-05-07
Payer: COMMERCIAL

## 2021-05-07 ENCOUNTER — TELEPHONE (OUTPATIENT)
Dept: URGENT CARE | Facility: CLINIC | Age: 81
End: 2021-05-07

## 2021-05-07 ENCOUNTER — OFFICE VISIT (OUTPATIENT)
Dept: URGENT CARE | Facility: CLINIC | Age: 81
End: 2021-05-07
Payer: COMMERCIAL

## 2021-05-07 VITALS
DIASTOLIC BLOOD PRESSURE: 82 MMHG | HEIGHT: 60 IN | OXYGEN SATURATION: 95 % | TEMPERATURE: 97.3 F | HEART RATE: 103 BPM | SYSTOLIC BLOOD PRESSURE: 188 MMHG | RESPIRATION RATE: 18 BRPM | BODY MASS INDEX: 25.13 KG/M2 | WEIGHT: 128 LBS

## 2021-05-07 DIAGNOSIS — S29.9XXA RIB INJURY: Primary | ICD-10-CM

## 2021-05-07 DIAGNOSIS — S29.9XXA RIB INJURY: ICD-10-CM

## 2021-05-07 PROCEDURE — 71101 X-RAY EXAM UNILAT RIBS/CHEST: CPT

## 2021-05-07 PROCEDURE — 99213 OFFICE O/P EST LOW 20 MIN: CPT | Performed by: NURSE PRACTITIONER

## 2021-05-07 NOTE — TELEPHONE ENCOUNTER
Patient notified of positive chest x-ray results  Discussed use of incentive spirometer and icing left ribs  Return precautions discussed in great length  Patient verbalizes understanding  Will call PCP for follow-up

## 2021-05-07 NOTE — PATIENT INSTRUCTIONS
Rest and ice  Use incentive spirometer as directed  tylenol as needed for pain  If you develop any increased pain, swelling, numbness, tingling,  Shortness of breath, chest pain, palpitations, fever  or any new or concerning symptoms please return or proceed to ER  Follow up with pcp in 3-5 days  Rib Contusion   WHAT YOU NEED TO KNOW:   A rib contusion is a bruise on one or more of your ribs  DISCHARGE INSTRUCTIONS:   Return to the emergency department if:   · You have increased chest pain  · You have shortness of breath  · You start to cough up blood  · Your pain does not improve with pain medicine  Contact your healthcare provider if:   · You have a cough  · You have a fever  · You have questions or concerns about your condition or care  Medicines: You may need any of the following:  · NSAIDs , such as ibuprofen, help decrease swelling, pain, and fever  This medicine is available with or without a doctor's order  NSAIDs can cause stomach bleeding or kidney problems in certain people  If you take blood thinner medicine, always ask if NSAIDs are safe for you  Always read the medicine label and follow directions  Do not give these medicines to children under 10months of age without direction from your child's healthcare provider  · Prescription pain medicine  may be given  Ask how to take this medicine safely  · Take your medicine as directed  Contact your healthcare provider if you think your medicine is not helping or if you have side effects  Tell him of her if you are allergic to any medicine  Keep a list of the medicines, vitamins, and herbs you take  Include the amounts, and when and why you take them  Bring the list or the pill bottles to follow-up visits  Carry your medicine list with you in case of an emergency  Deep breathing:   · To help prevent pneumonia, take 10 deep breaths every hour, even when you wake up during the night   Brace your ribs with your hands or a pillow while you take deep breaths or cough  This will help decrease your pain  · You may need to use an incentive spirometer to help you take deeper breaths  Put the plastic piece into your mouth and take a very deep breath  Hold your breath as long as you can  Then let out your breath  Do this 10 times in a row every hour while you are awake  Rest:  Rest your ribs to decrease swelling and allow the injury to heal faster  Avoid activities that may cause more pain or damage to your ribs  As your pain decreases, begin movements slowly  Ice:  Ice helps decrease swelling and pain  Ice may also help prevent tissue damage  Use an ice pack or put crushed ice in a plastic bag  Cover it with a towel and place it on your bruised area for 15 to 20 minutes every hour as directed  Follow up with your healthcare provider as directed:  Write down your questions so you remember to ask them during your visits  © Copyright [address] Information is for End User's use only and may not be sold, redistributed or otherwise used for commercial purposes  All illustrations and images included in CareNotes® are the copyrighted property of [employer]  or [address] [place], [place] [address]tan[place]   The above information is an  only  It is not intended as medical advice for individual conditions or treatments  Talk to your doctor, nurse or pharmacist before following any medical regimen to see if it is safe and effective for you

## 2021-05-07 NOTE — PROGRESS NOTES
[facility]        NAME: Vicente Cassidy is a 79 y o  female  : 1940    MRN: [id number]  DATE: May 7, 2021  TIME: 2:15 PM    Assessment and Plan   Rib injury [S29  9XXA]  1  Rib injury  XR ribs left w pa chest min 3 views      x-ray shows no acute osseous abnormality per my read  In tin spirometer provided  Patient Instructions     Patient Instructions     Rest and ice  Use incentive spirometer as directed  tylenol as needed for pain  If you develop any increased pain, swelling, numbness, tingling,  Shortness of breath, chest pain, palpitations, fever  or any new or concerning symptoms please return or proceed to ER  Follow up with pcp in 3-5 days  Rib Contusion   WHAT YOU NEED TO KNOW:   A rib contusion is a bruise on one or more of your ribs  DISCHARGE INSTRUCTIONS:   Return to the emergency department if:   · You have increased chest pain  · You have shortness of breath  · You start to cough up blood  · Your pain does not improve with pain medicine  Contact your healthcare provider if:   · You have a cough  · You have a fever  · You have questions or concerns about your condition or care  Medicines: You may need any of the following:  · NSAIDs , such as ibuprofen, help decrease swelling, pain, and fever  This medicine is available with or without a doctor's order  NSAIDs can cause stomach bleeding or kidney problems in certain people  If you take blood thinner medicine, always ask if NSAIDs are safe for you  Always read the medicine label and follow directions  Do not give these medicines to children under 10months of age without direction from your child's healthcare provider  · Prescription pain medicine  may be given  Ask how to take this medicine safely  · Take your medicine as directed  Contact your healthcare provider if you think your medicine is not helping or if you have side effects  Tell him of her if you are allergic to any medicine  Keep a list of the medicines, vitamins, and herbs you take  Include the amounts, and when and why you take them  Bring the list or the pill bottles to follow-up visits  Carry your medicine list with you in case of an emergency  Deep breathing:   · To help prevent pneumonia, take 10 deep breaths every hour, even when you wake up during the night  Brace your ribs with your hands or a pillow while you take deep breaths or cough  This will help decrease your pain  · You may need to use an incentive spirometer to help you take deeper breaths  Put the plastic piece into your mouth and take a very deep breath  Hold your breath as long as you can  Then let out your breath  Do this 10 times in a row every hour while you are awake  Rest:  Rest your ribs to decrease swelling and allow the injury to heal faster  Avoid activities that may cause more pain or damage to your ribs  As your pain decreases, begin movements slowly  Ice:  Ice helps decrease swelling and pain  Ice may also help prevent tissue damage  Use an ice pack or put crushed ice in a plastic bag  Cover it with a towel and place it on your bruised area for 15 to 20 minutes every hour as directed  Follow up with your healthcare provider as directed:  Write down your questions so you remember to ask them during your visits  © Copyright [address] Information is for End User's use only and may not be sold, redistributed or otherwise used for commercial purposes  All illustrations and images included in CareNotes® are the copyrighted property of [employer] Inc  or [facility] [employer]   The above information is an  only  It is not intended as medical advice for individual conditions or treatments  Talk to your doctor, nurse or pharmacist before following any medical regimen to see if it is safe and effective for you  Follow up with PCP in 3-5 days  Proceed to  ER if symptoms worsen      Chief Complaint     Chief Complaint   Patient presents with    Rib Injury     has some rib pain on left side started on wednesday injury it with seat belt         History of Present Illness         Patient is a 71-year-old female who presents with a 2 day history of left lower rib pain  Patient states that she was sitting in the passenger seat of the car when she saw a piece of paper on the ground and leaned to the left side  To pick it up  States that she hit her left lower ribs on the plastic buckle where the seat belt clicks in  Patient is complaining of pain with inspiration  Denies any chest pain, shortness of breath, palpitations, or hemoptysis  Denies any previous rib injury or trauma  Denies any fever, chills, or body aches  Has not tried any over-the-counter medication  Review of Systems   Review of Systems   Constitutional: Negative for chills, diaphoresis, fatigue and fever  HENT: Negative  Respiratory: Negative for cough, chest tightness, shortness of breath, wheezing and stridor  Cardiovascular: Negative for chest pain and palpitations  Left sided rib pain     Gastrointestinal: Negative  Musculoskeletal: Negative  Skin: Negative  Negative for wound  Neurological: Negative            Current Medications       Current Outpatient Medications:     ALPRAZolam (XANAX) 0 25 mg tablet, Take 1 tablet (0 25 mg total) by mouth daily at bedtime as needed for anxiety, Disp: 30 tablet, Rfl: 2    calcium carbonate (TUMS) 500 mg chewable tablet, Chew 1 tablet, Disp: , Rfl:     cholecalciferol (VITAMIN D3) 25 mcg (1,000 units) tablet, daily, Disp: , Rfl:     enalapril (VASOTEC) 10 mg tablet, Take 1 tablet (10 mg total) by mouth daily, Disp: 90 tablet, Rfl: 0    ferrous sulfate 325 (65 Fe) mg tablet, Take 325 mg by mouth, Disp: , Rfl:     gemfibrozil (LOPID) 600 mg tablet, TAKE 1 TABLET TWICE A DAY, Disp: 180 tablet, Rfl: 3    gemfibrozil (LOPID) 600 mg tablet, Take 1 tablet (600 mg total) by mouth 2 (two) times a day, Disp: 180 tablet, Rfl: 0    glipiZIDE (GLUCOTROL XL) 10 mg 24 hr tablet, Take 1 tablet (10 mg total) by mouth daily, Disp: 90 tablet, Rfl: 0    HYDROcodone-acetaminophen (NORCO) 5-325 mg per tablet, hydrocodone 5 mg-acetaminophen 500 mg tablet, Disp: , Rfl:     lovastatin (MEVACOR) 40 MG tablet, Take 1 tablet (40 mg total) by mouth daily at bedtime, Disp: 90 tablet, Rfl: 0    metFORMIN (GLUCOPHAGE) 1000 MG tablet, Take 1 tablet (1,000 mg total) by mouth 2 (two) times a day, Disp: 180 tablet, Rfl: 0    Multiple Vitamin (multivitamin) tablet, Take 1 tablet by mouth, Disp: , Rfl:     ofloxacin (OCUFLOX) 0 3 % ophthalmic solution, instill 1 drop into left eye four times a day START 3 DAYS PRIOR      (REFER TO PRESCRIPTION NOTES)  , Disp: , Rfl:     prednisoLONE acetate (PRED FORTE) 1 % ophthalmic suspension, instill 1 drop into left eye every 2 hours TO BE STARTED AFTER THE SURGERY IS COMPLETED, Disp: , Rfl:     sertraline (ZOLOFT) 50 mg tablet, Take 1 tablet (50 mg total) by mouth daily, Disp: 30 tablet, Rfl: 6    sitaGLIPtin (Januvia) 100 mg tablet, Take 1 tablet (100 mg total) by mouth daily, Disp: 30 tablet, Rfl: 3    triamcinolone (KENALOG) 0 1 % cream, Apply topically 2 (two) times a day, Disp: , Rfl:     dicyclomine (BENTYL) 10 mg capsule, Take 1 capsule (10 mg total) by mouth 3 (three) times a day before meals (Patient not taking: Reported on 6/4/2020), Disp: 90 capsule, Rfl: 3    Current Allergies     Allergies as of 05/07/2021 - Reviewed 05/07/2021   Allergen Reaction Noted    Ciprofloxacin GI Intolerance and Headache 06/19/2016    Meperidine GI Intolerance, Hallucinations, and Other (See Comments) 02/12/2016    Propoxyphene GI Intolerance 07/27/2016            The following portions of the patient's history were reviewed and updated as appropriate: allergies, current medications, past family history, past medical history, past social history, past surgical history and problem list      Past Medical History:   Diagnosis Date    Diabetes mellitus ([address] )     Diverticulitis     Postherpetic neuralgia        Past Surgical History:   Procedure Laterality Date    CHOLECYSTECTOMY      COLON SURGERY      HERNIA REPAIR      ROTATOR CUFF REPAIR Right     VARICOSE VEIN SURGERY      Impression:  2/12/16 Nayely Montague       Family History   Problem Relation Age of Onset    Diabetes Mother     No Known Problems Father          Medications have been verified  Objective   BP (!) 188/82   Pulse 103   Temp (!) 97 3 °F (36 3 °C)   Resp 18   Ht 5' (1 524 m)   Wt 58 1 kg (128 lb)   LMP  (LMP Unknown)   SpO2 95%   BMI 25 00 kg/m²   No LMP recorded (lmp unknown)  Patient is postmenopausal        Physical Exam     Physical Exam  Constitutional:       General: She is not in acute distress  Appearance: Normal appearance  She is not diaphoretic  HENT:      Head: Normocephalic and atraumatic  Cardiovascular:      Rate and Rhythm: Normal rate and regular rhythm  Pulses: Normal pulses  Heart sounds: Normal heart sounds, S1 normal and S2 normal    Pulmonary:      Effort: Pulmonary effort is normal       Breath sounds: Normal breath sounds and air entry  Chest:      Chest wall: Tenderness (TTP over left lower anterior ribs  no crepitus  lung expansion symmetrical  ) present  No lacerations, deformity, swelling or crepitus  Skin:     General: Skin is warm and dry  Capillary Refill: Capillary refill takes less than 2 seconds  Neurological:      Mental Status: She is alert

## 2021-05-25 ENCOUNTER — APPOINTMENT (OUTPATIENT)
Dept: LAB | Facility: CLINIC | Age: 81
End: 2021-05-25
Payer: COMMERCIAL

## 2021-05-25 DIAGNOSIS — E11.9 TYPE 2 DIABETES MELLITUS WITHOUT COMPLICATION, WITHOUT LONG-TERM CURRENT USE OF INSULIN (HCC): ICD-10-CM

## 2021-05-25 LAB
EST. AVERAGE GLUCOSE BLD GHB EST-MCNC: 148 MG/DL
GLUCOSE P FAST SERPL-MCNC: 167 MG/DL (ref 65–99)
HBA1C MFR BLD: 6.8 %

## 2021-05-25 PROCEDURE — 36415 COLL VENOUS BLD VENIPUNCTURE: CPT

## 2021-05-25 PROCEDURE — 82947 ASSAY GLUCOSE BLOOD QUANT: CPT

## 2021-05-25 PROCEDURE — 84681 ASSAY OF C-PEPTIDE: CPT

## 2021-05-25 PROCEDURE — 83036 HEMOGLOBIN GLYCOSYLATED A1C: CPT

## 2021-05-26 LAB — C PEPTIDE SERPL-MCNC: 4.9 NG/ML (ref 1.1–4.4)

## 2021-06-02 ENCOUNTER — RA CDI HCC (OUTPATIENT)
Dept: OTHER | Facility: HOSPITAL | Age: 81
End: 2021-06-02

## 2021-06-02 NOTE — PROGRESS NOTES
Based on clinical documentation indicated in your record, it appears that the patient may have the following conditions:     F33 9 Major depressive disorder, recurrent     If this is correct, please document and assess at your next visit June 9th  [facility] [place]  coding opportunities             Chart reviewed, (number of) suggestions sent to provider: 1           Patients insurance company: [employer] (Medicare Advantage and [employer])             [facility] [place] [facility]  coding opportunities             Chart reviewed, (number of) suggestions sent to provider: 1                  Patients insurance company: [employer] (Medicare Advantage and [employer])     Visit status: Patient arrived for their scheduled appointment     Provider never responded to [employer]  coding request     Did not use

## 2021-06-09 ENCOUNTER — OFFICE VISIT (OUTPATIENT)
Dept: FAMILY MEDICINE CLINIC | Facility: CLINIC | Age: 81
End: 2021-06-09
Payer: COMMERCIAL

## 2021-06-09 VITALS
WEIGHT: 128.5 LBS | BODY MASS INDEX: 25.23 KG/M2 | SYSTOLIC BLOOD PRESSURE: 108 MMHG | HEART RATE: 100 BPM | HEIGHT: 60 IN | OXYGEN SATURATION: 95 % | DIASTOLIC BLOOD PRESSURE: 68 MMHG | TEMPERATURE: 99.1 F

## 2021-06-09 DIAGNOSIS — E11.9 TYPE 2 DIABETES MELLITUS WITHOUT COMPLICATION, WITHOUT LONG-TERM CURRENT USE OF INSULIN (HCC): ICD-10-CM

## 2021-06-09 DIAGNOSIS — I10 ESSENTIAL HYPERTENSION: ICD-10-CM

## 2021-06-09 DIAGNOSIS — E11.9 TYPE 2 DIABETES MELLITUS WITHOUT COMPLICATION, WITHOUT LONG-TERM CURRENT USE OF INSULIN (HCC): Primary | ICD-10-CM

## 2021-06-09 DIAGNOSIS — E78.1 HYPERTRIGLYCERIDEMIA: ICD-10-CM

## 2021-06-09 DIAGNOSIS — E78.00 HYPERCHOLESTEROLEMIA: ICD-10-CM

## 2021-06-09 PROCEDURE — 3725F SCREEN DEPRESSION PERFORMED: CPT | Performed by: FAMILY MEDICINE

## 2021-06-09 PROCEDURE — 99214 OFFICE O/P EST MOD 30 MIN: CPT | Performed by: FAMILY MEDICINE

## 2021-06-09 PROCEDURE — 1160F RVW MEDS BY RX/DR IN RCRD: CPT | Performed by: FAMILY MEDICINE

## 2021-06-09 PROCEDURE — 1036F TOBACCO NON-USER: CPT | Performed by: FAMILY MEDICINE

## 2021-06-09 RX ORDER — GLIPIZIDE 10 MG/1
10 TABLET, FILM COATED, EXTENDED RELEASE ORAL DAILY
Qty: 90 TABLET | Refills: 0 | Status: SHIPPED | OUTPATIENT
Start: 2021-06-09 | End: 2021-09-13 | Stop reason: SDUPTHER

## 2021-06-09 RX ORDER — GEMFIBROZIL 600 MG/1
600 TABLET, FILM COATED ORAL 2 TIMES DAILY
Qty: 180 TABLET | Refills: 0 | Status: SHIPPED | OUTPATIENT
Start: 2021-06-09 | End: 2022-01-21 | Stop reason: HOSPADM

## 2021-06-09 RX ORDER — LOVASTATIN 40 MG/1
40 TABLET ORAL
Qty: 90 TABLET | Refills: 0 | Status: SHIPPED | OUTPATIENT
Start: 2021-06-09 | End: 2021-09-13 | Stop reason: SDUPTHER

## 2021-06-09 RX ORDER — ENALAPRIL MALEATE 10 MG/1
10 TABLET ORAL DAILY
Qty: 90 TABLET | Refills: 0 | Status: SHIPPED | OUTPATIENT
Start: 2021-06-09 | End: 2021-09-13 | Stop reason: SDUPTHER

## 2021-06-09 NOTE — PROGRESS NOTES
Assessment/Plan:    No problem-specific Assessment & Plan notes found for this encounter  Diagnoses and all orders for this visit:    Type 2 diabetes mellitus without complication, without long-term current use of insulin ([facility])  Comments:  no change in the medication  Orders:  -     sitaGLIPtin (Januvia) 100 mg tablet; Take 1 tablet (100 mg total) by mouth daily  -     metFORMIN (GLUCOPHAGE) 1000 MG tablet; Take 1 tablet (1,000 mg total) by mouth 2 (two) times a day  -     glipiZIDE (GLUCOTROL XL) 10 mg 24 hr tablet; Take 1 tablet (10 mg total) by mouth daily  -     lovastatin (MEVACOR) 40 MG tablet; Take 1 tablet (40 mg total) by mouth daily at bedtime  -     Hemoglobin A1C; Future    Type 2 diabetes mellitus without complication, without long-term current use of insulin ([facility])  Comments:  pt counseled on diet and exercise and weight loss  Orders:  -     sitaGLIPtin (Januvia) 100 mg tablet; Take 1 tablet (100 mg total) by mouth daily  -     metFORMIN (GLUCOPHAGE) 1000 MG tablet; Take 1 tablet (1,000 mg total) by mouth 2 (two) times a day  -     glipiZIDE (GLUCOTROL XL) 10 mg 24 hr tablet; Take 1 tablet (10 mg total) by mouth daily  -     lovastatin (MEVACOR) 40 MG tablet; Take 1 tablet (40 mg total) by mouth daily at bedtime  -     Hemoglobin A1C; Future    Type 2 diabetes mellitus without complication, without long-term current use of insulin ([facility])  Comments:  pt to take both dosages of metformin  Orders:  -     sitaGLIPtin (Januvia) 100 mg tablet; Take 1 tablet (100 mg total) by mouth daily  -     metFORMIN (GLUCOPHAGE) 1000 MG tablet; Take 1 tablet (1,000 mg total) by mouth 2 (two) times a day  -     glipiZIDE (GLUCOTROL XL) 10 mg 24 hr tablet; Take 1 tablet (10 mg total) by mouth daily  -     lovastatin (MEVACOR) 40 MG tablet; Take 1 tablet (40 mg total) by mouth daily at bedtime  -     Hemoglobin A1C; Future    Hypertriglyceridemia  -     gemfibrozil (LOPID) 600 mg tablet;  Take 1 tablet (600 mg total) by mouth 2 (two) times a day    Essential hypertension  Comments:  no change in the medication  Orders:  -     enalapril (VASOTEC) 10 mg tablet; Take 1 tablet (10 mg total) by mouth daily  -     CBC and differential; Future  -     TSH, 3rd generation with Free T4 reflex; Future    Hypercholesterolemia  -     Comprehensive metabolic panel; Future  -     Lipid panel; Future          PHQ-9 Depression Screening    PHQ-9:   Frequency of the following problems over the past two weeks:      Little interest or pleasure in doing things: 0 - not at all  Feeling down, depressed, or hopeless: 0 - not at all  Trouble falling or staying asleep, or sleeping too much: 0 - not at all  Feeling tired or having little energy: 0 - not at all  Poor appetite or overeatin - not at all  Feeling bad about yourself - or that you are a failure or have let yourself or your family down: 0 - not at all  Trouble concentrating on things, such as reading the newspaper or watching television: 0 - not at all  Moving or speaking so slowly that other people could have noticed  Or the opposite - being so fidgety or restless that you have been moving around a lot more than usual: 0 - not at all  Thoughts that you would be better off dead, or of hurting yourself in some way: 0 - not at all  PHQ-2 Score: 0  PHQ-9 Score: 0            Subjective:      Patient ID: Ed Murphy is a 79 y o  female  Diabetes  She presents for her follow-up diabetic visit  She has type 2 diabetes mellitus  Her disease course has been improving  There are no hypoglycemic associated symptoms  Pertinent negatives for diabetes include no chest pain  There are no hypoglycemic complications  Symptoms are stable  There are no diabetic complications  Current diabetic treatment includes oral agent (triple therapy)  She is compliant with treatment most of the time  Her weight is stable  She is following a diabetic diet  Her breakfast blood glucose range is generally 70-90 mg/dl  The following portions of the patient's history were reviewed and updated as appropriate: allergies, current medications, past family history, past medical history, past social history, past surgical history and problem list     Review of Systems   Eyes: Negative for visual disturbance  Cardiovascular: Negative for chest pain and palpitations  Gastrointestinal: Negative for abdominal pain, nausea and vomiting  Genitourinary: Negative for frequency  Skin: Negative for wound  Neurological: Negative for numbness  Objective:    /68 (BP Location: Left arm, Patient Position: Sitting, Cuff Size: Standard)   Pulse 100   Temp 99 1 °F (37 3 °C) (Tympanic)   Ht 5' (1 524 m)   Wt 58 3 kg (128 lb 8 oz)   LMP  (LMP Unknown)   SpO2 95%   BMI 25 10 kg/m²      Physical Exam  Vitals signs and nursing note reviewed  Constitutional:       General: She is not in acute distress  Appearance: Normal appearance  She is not ill-appearing or diaphoretic  HENT:      Head: Normocephalic and atraumatic  Mouth/Throat:      Mouth: Mucous membranes are moist    Eyes:      Conjunctiva/sclera: Conjunctivae normal    Neck:      Musculoskeletal: Normal range of motion  No neck rigidity  Cardiovascular:      Rate and Rhythm: Normal rate and regular rhythm  Heart sounds: Normal heart sounds  No murmur  Pulmonary:      Effort: Pulmonary effort is normal  No respiratory distress  Breath sounds: Normal breath sounds  No wheezing, rhonchi or rales  Abdominal:      General: There is no distension  Palpations: Abdomen is soft  There is no mass  Tenderness: There is no abdominal tenderness  There is no guarding or rebound  Musculoskeletal:      Right lower leg: No edema  Left lower leg: No edema  Lymphadenopathy:      Cervical: No cervical adenopathy  Neurological:      Mental Status: She is alert and oriented to person, place, and time     Psychiatric:         Mood and Affect: Mood normal          Behavior: Behavior normal          Thought Content:  Thought content normal          Judgment: Judgment normal

## 2021-07-23 ENCOUNTER — APPOINTMENT (OUTPATIENT)
Dept: LAB | Facility: CLINIC | Age: 81
End: 2021-07-23
Payer: COMMERCIAL

## 2021-07-23 DIAGNOSIS — N18.32 CHRONIC KIDNEY DISEASE (CKD) STAGE G3B/A1, MODERATELY DECREASED GLOMERULAR FILTRATION RATE (GFR) BETWEEN 30-44 ML/MIN/1.73 SQUARE METER AND ALBUMINURIA CREATININE RATIO LESS THAN 30 MG/G (HCC): ICD-10-CM

## 2021-07-23 DIAGNOSIS — I10 ESSENTIAL HYPERTENSION: ICD-10-CM

## 2021-07-23 DIAGNOSIS — E78.00 HYPERCHOLESTEROLEMIA: ICD-10-CM

## 2021-07-23 DIAGNOSIS — E11.9 TYPE 2 DIABETES MELLITUS WITHOUT COMPLICATION, WITHOUT LONG-TERM CURRENT USE OF INSULIN (HCC): ICD-10-CM

## 2021-07-23 LAB
ALBUMIN SERPL BCP-MCNC: 4.1 G/DL (ref 3.5–5)
ALP SERPL-CCNC: 98 U/L (ref 46–116)
ALT SERPL W P-5'-P-CCNC: 32 U/L (ref 12–78)
ANION GAP SERPL CALCULATED.3IONS-SCNC: 9 MMOL/L (ref 4–13)
AST SERPL W P-5'-P-CCNC: 15 U/L (ref 5–45)
BASOPHILS # BLD AUTO: 0.03 THOUSANDS/ΜL (ref 0–0.1)
BASOPHILS NFR BLD AUTO: 1 % (ref 0–1)
BILIRUB SERPL-MCNC: 0.29 MG/DL (ref 0.2–1)
BUN SERPL-MCNC: 35 MG/DL (ref 5–25)
CALCIUM SERPL-MCNC: 9.1 MG/DL (ref 8.3–10.1)
CHLORIDE SERPL-SCNC: 112 MMOL/L (ref 100–108)
CHOLEST SERPL-MCNC: 160 MG/DL (ref 50–200)
CO2 SERPL-SCNC: 20 MMOL/L (ref 21–32)
CREAT SERPL-MCNC: 1.72 MG/DL (ref 0.6–1.3)
EOSINOPHIL # BLD AUTO: 0.2 THOUSAND/ΜL (ref 0–0.61)
EOSINOPHIL NFR BLD AUTO: 4 % (ref 0–6)
ERYTHROCYTE [DISTWIDTH] IN BLOOD BY AUTOMATED COUNT: 14 % (ref 11.6–15.1)
EST. AVERAGE GLUCOSE BLD GHB EST-MCNC: 137 MG/DL
GFR SERPL CREATININE-BSD FRML MDRD: 28 ML/MIN/1.73SQ M
GLUCOSE P FAST SERPL-MCNC: 64 MG/DL (ref 65–99)
HBA1C MFR BLD: 6.4 %
HCT VFR BLD AUTO: 38.6 % (ref 34.8–46.1)
HDLC SERPL-MCNC: 41 MG/DL
HGB BLD-MCNC: 12.4 G/DL (ref 11.5–15.4)
IMM GRANULOCYTES # BLD AUTO: 0.02 THOUSAND/UL (ref 0–0.2)
IMM GRANULOCYTES NFR BLD AUTO: 0 % (ref 0–2)
LDLC SERPL CALC-MCNC: 77 MG/DL (ref 0–100)
LYMPHOCYTES # BLD AUTO: 1.68 THOUSANDS/ΜL (ref 0.6–4.47)
LYMPHOCYTES NFR BLD AUTO: 32 % (ref 14–44)
MCH RBC QN AUTO: 27.9 PG (ref 26.8–34.3)
MCHC RBC AUTO-ENTMCNC: 32.1 G/DL (ref 31.4–37.4)
MCV RBC AUTO: 87 FL (ref 82–98)
MONOCYTES # BLD AUTO: 0.39 THOUSAND/ΜL (ref 0.17–1.22)
MONOCYTES NFR BLD AUTO: 8 % (ref 4–12)
NEUTROPHILS # BLD AUTO: 2.87 THOUSANDS/ΜL (ref 1.85–7.62)
NEUTS SEG NFR BLD AUTO: 55 % (ref 43–75)
NONHDLC SERPL-MCNC: 119 MG/DL
NRBC BLD AUTO-RTO: 0 /100 WBCS
PHOSPHATE SERPL-MCNC: 3.4 MG/DL (ref 2.3–4.1)
PLATELET # BLD AUTO: 299 THOUSANDS/UL (ref 149–390)
PMV BLD AUTO: 10.3 FL (ref 8.9–12.7)
POTASSIUM SERPL-SCNC: 4.1 MMOL/L (ref 3.5–5.3)
PROT SERPL-MCNC: 8.2 G/DL (ref 6.4–8.2)
PTH-INTACT SERPL-MCNC: 73.6 PG/ML (ref 18.4–80.1)
RBC # BLD AUTO: 4.45 MILLION/UL (ref 3.81–5.12)
SODIUM SERPL-SCNC: 141 MMOL/L (ref 136–145)
TRIGL SERPL-MCNC: 208 MG/DL
TSH SERPL DL<=0.05 MIU/L-ACNC: 3.2 UIU/ML (ref 0.36–3.74)
WBC # BLD AUTO: 5.19 THOUSAND/UL (ref 4.31–10.16)

## 2021-07-23 PROCEDURE — 36415 COLL VENOUS BLD VENIPUNCTURE: CPT

## 2021-07-23 PROCEDURE — 83036 HEMOGLOBIN GLYCOSYLATED A1C: CPT

## 2021-07-23 PROCEDURE — 83970 ASSAY OF PARATHORMONE: CPT

## 2021-07-23 PROCEDURE — 80061 LIPID PANEL: CPT

## 2021-07-23 PROCEDURE — 84100 ASSAY OF PHOSPHORUS: CPT

## 2021-07-23 PROCEDURE — 84443 ASSAY THYROID STIM HORMONE: CPT

## 2021-07-23 PROCEDURE — 80053 COMPREHEN METABOLIC PANEL: CPT

## 2021-07-23 PROCEDURE — 85025 COMPLETE CBC W/AUTO DIFF WBC: CPT

## 2021-08-16 ENCOUNTER — APPOINTMENT (OUTPATIENT)
Dept: LAB | Facility: CLINIC | Age: 81
End: 2021-08-16
Payer: COMMERCIAL

## 2021-08-16 DIAGNOSIS — N18.32 CHRONIC KIDNEY DISEASE (CKD) STAGE G3B/A1, MODERATELY DECREASED GLOMERULAR FILTRATION RATE (GFR) BETWEEN 30-44 ML/MIN/1.73 SQUARE METER AND ALBUMINURIA CREATININE RATIO LESS THAN 30 MG/G (HCC): ICD-10-CM

## 2021-08-16 LAB
ANION GAP SERPL CALCULATED.3IONS-SCNC: 6 MMOL/L (ref 4–13)
BUN SERPL-MCNC: 31 MG/DL (ref 5–25)
CALCIUM SERPL-MCNC: 9.9 MG/DL (ref 8.3–10.1)
CHLORIDE SERPL-SCNC: 112 MMOL/L (ref 100–108)
CO2 SERPL-SCNC: 22 MMOL/L (ref 21–32)
CREAT SERPL-MCNC: 1.51 MG/DL (ref 0.6–1.3)
GFR SERPL CREATININE-BSD FRML MDRD: 33 ML/MIN/1.73SQ M
GLUCOSE P FAST SERPL-MCNC: 134 MG/DL (ref 65–99)
POTASSIUM SERPL-SCNC: 4.2 MMOL/L (ref 3.5–5.3)
SODIUM SERPL-SCNC: 140 MMOL/L (ref 136–145)

## 2021-08-16 PROCEDURE — 80048 BASIC METABOLIC PNL TOTAL CA: CPT

## 2021-08-16 PROCEDURE — 36415 COLL VENOUS BLD VENIPUNCTURE: CPT

## 2021-08-23 ENCOUNTER — RA CDI HCC (OUTPATIENT)
Dept: OTHER | Facility: HOSPITAL | Age: 81
End: 2021-08-23

## 2021-08-23 NOTE — PROGRESS NOTES
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     If this is correct, please document and assess at your next visit August 30th    [facility]  coding opportunities             Chart Reviewed * (Number of) Inbasket suggestions sent to Provider: 2                  Patients insurance company: [employer] (Medicare Advantage and [employer])             [facility]  coding opportunities             Chart Reviewed * (Number of) Inbasket suggestions sent to Provider: 2               Number of suggestions NOT actually used: 2     Patients insurance company: [employer] (Medicare Advantage and [employer])        Provider never responded to [place] [address]  coding request

## 2021-08-30 ENCOUNTER — OFFICE VISIT (OUTPATIENT)
Dept: FAMILY MEDICINE CLINIC | Facility: CLINIC | Age: 81
End: 2021-08-30
Payer: COMMERCIAL

## 2021-08-30 VITALS
BODY MASS INDEX: 24.81 KG/M2 | OXYGEN SATURATION: 96 % | WEIGHT: 126.38 LBS | DIASTOLIC BLOOD PRESSURE: 64 MMHG | HEIGHT: 60 IN | SYSTOLIC BLOOD PRESSURE: 122 MMHG | HEART RATE: 90 BPM | TEMPERATURE: 98.5 F

## 2021-08-30 DIAGNOSIS — I10 ESSENTIAL HYPERTENSION: ICD-10-CM

## 2021-08-30 DIAGNOSIS — E11.9 TYPE 2 DIABETES MELLITUS WITHOUT COMPLICATION, WITHOUT LONG-TERM CURRENT USE OF INSULIN (HCC): ICD-10-CM

## 2021-08-30 DIAGNOSIS — Z00.00 MEDICARE ANNUAL WELLNESS VISIT, SUBSEQUENT: Primary | ICD-10-CM

## 2021-08-30 PROCEDURE — 3078F DIAST BP <80 MM HG: CPT | Performed by: FAMILY MEDICINE

## 2021-08-30 PROCEDURE — 1170F FXNL STATUS ASSESSED: CPT | Performed by: FAMILY MEDICINE

## 2021-08-30 PROCEDURE — 3288F FALL RISK ASSESSMENT DOCD: CPT | Performed by: FAMILY MEDICINE

## 2021-08-30 PROCEDURE — 3074F SYST BP LT 130 MM HG: CPT | Performed by: FAMILY MEDICINE

## 2021-08-30 PROCEDURE — 1160F RVW MEDS BY RX/DR IN RCRD: CPT | Performed by: FAMILY MEDICINE

## 2021-08-30 PROCEDURE — G0439 PPPS, SUBSEQ VISIT: HCPCS | Performed by: FAMILY MEDICINE

## 2021-08-30 PROCEDURE — 99213 OFFICE O/P EST LOW 20 MIN: CPT | Performed by: FAMILY MEDICINE

## 2021-08-30 PROCEDURE — 1125F AMNT PAIN NOTED PAIN PRSNT: CPT | Performed by: FAMILY MEDICINE

## 2021-08-30 PROCEDURE — 1036F TOBACCO NON-USER: CPT | Performed by: FAMILY MEDICINE

## 2021-08-30 PROCEDURE — 3725F SCREEN DEPRESSION PERFORMED: CPT | Performed by: FAMILY MEDICINE

## 2021-08-30 NOTE — PROGRESS NOTES
Assessment and Plan:     Problem List Items Addressed This Visit        Endocrine    Type 2 diabetes mellitus without complication, without long-term current use of insulin ([facility] [address] )       Cardiovascular and Mediastinum    Essential hypertension      Other Visit Diagnoses     Medicare annual wellness visit, subsequent    -  Primary           Preventive health issues were discussed with patient, and age appropriate screening tests were ordered as noted in patient's After Visit Summary  Personalized health advice and appropriate referrals for health education or preventive services given if needed, as noted in patient's After Visit Summary  History of Present Illness:     Patient presents for Medicare Annual Wellness visit    Patient Care Team:  Mardell Kanner, DO as PCP - General     Problem List:     Patient Active Problem List   Diagnosis    Groin pain, chronic, left    Type 2 diabetes mellitus without complication, without long-term current use of insulin ([facility] [address] )    Hypertensive kidney disease with chronic kidney disease stage III (HCC)    Abnormal ECG    Articular cartilage disorder of shoulder region    Back pain    Bursitis of shoulder    Atrophic vaginitis    Depression    Disorder of shoulder    Diverticulitis large intestine w/o perforation or abscess w/o bleeding    Essential hypertension    Hypercholesterolemia    Irritable bowel syndrome    Localized, primary osteoarthritis of hand    Loose body in joint of shoulder region    Muscle pain    Paresthesia of arm    Refused influenza vaccine    Sciatica    Simple chronic anemia    Lower abdominal pain    Encounter for diabetic foot exam ([place] [address] )    Negative depression screening    Overweight (BMI 25 0-29  9)      Past Medical and Surgical History:     Past Medical History:   Diagnosis Date    Diabetes mellitus ([facility] [address] )     Diverticulitis     Postherpetic neuralgia      Past Surgical History:   Procedure Laterality Date    CHOLECYSTECTOMY      COLON SURGERY      HERNIA REPAIR      ROTATOR CUFF REPAIR Right     VARICOSE VEIN SURGERY      Impression:  2/12/16 Singh Sams      Family History:     Family History   Problem Relation Age of Onset    Diabetes Mother     No Known Problems Father       Social History:     Social History     Socioeconomic History    Marital status: /Civil Union     Spouse name: None    Number of children: None    Years of education: None    Highest education level: None   Occupational History    None   Tobacco Use    Smoking status: Never Smoker    Smokeless tobacco: Never Used   Vaping Use    Vaping Use: Never used   Substance and Sexual Activity    Alcohol use: Yes     Comment: Rarely    Drug use: No    Sexual activity: None   Other Topics Concern    None   Social History Narrative    Always uses seatbelt    No caffeine use     Social Determinants of Health     Financial Resource Strain:     Difficulty of Paying Living Expenses:    Food Insecurity:     Worried About Running Out of Food in the Last Year:     Ran Out of Food in the Last Year:    Transportation Needs:     Lack of Transportation (Medical):      Lack of Transportation (Non-Medical):    Physical Activity:     Days of Exercise per Week:     Minutes of Exercise per Session:    Stress:     Feeling of Stress :    Social Connections:     Frequency of Communication with Friends and Family:     Frequency of Social Gatherings with Friends and Family:     Attends Faith Services:     Active Member of Clubs or Organizations:     Attends Club or Organization Meetings:     Marital Status:    Intimate Partner Violence:     Fear of Current or Ex-Partner:     Emotionally Abused:     Physically Abused:     Sexually Abused:       Medications and Allergies:     Current Outpatient Medications   Medication Sig Dispense Refill    ALPRAZolam (XANAX) 0 25 mg tablet Take 1 tablet (0 25 mg total) by mouth daily at bedtime as needed for anxiety 30 tablet 2    calcium carbonate (TUMS) 500 mg chewable tablet Chew 1 tablet      cholecalciferol (VITAMIN D3) 25 mcg (1,000 units) tablet daily      enalapril (VASOTEC) 10 mg tablet Take 1 tablet (10 mg total) by mouth daily 90 tablet 0    ferrous sulfate 325 (65 Fe) mg tablet Take 325 mg by mouth      gemfibrozil (LOPID) 600 mg tablet Take 1 tablet (600 mg total) by mouth 2 (two) times a day 180 tablet 0    glipiZIDE (GLUCOTROL XL) 10 mg 24 hr tablet Take 1 tablet (10 mg total) by mouth daily 90 tablet 0    HYDROcodone-acetaminophen (NORCO) 5-325 mg per tablet hydrocodone 5 mg-acetaminophen 500 mg tablet      lovastatin (MEVACOR) 40 MG tablet Take 1 tablet (40 mg total) by mouth daily at bedtime 90 tablet 0    metFORMIN (GLUCOPHAGE) 1000 MG tablet Take 1 tablet (1,000 mg total) by mouth 2 (two) times a day 180 tablet 0    Multiple Vitamin (multivitamin) tablet Take 1 tablet by mouth      ofloxacin (OCUFLOX) 0 3 % ophthalmic solution instill 1 drop into left eye four times a day START 3 DAYS PRIOR      (REFER TO PRESCRIPTION NOTES)        sitaGLIPtin (Januvia) 100 mg tablet Take 1 tablet (100 mg total) by mouth daily 90 tablet 1    triamcinolone (KENALOG) 0 1 % cream Apply topically 2 (two) times a day      dicyclomine (BENTYL) 10 mg capsule Take 1 capsule (10 mg total) by mouth 3 (three) times a day before meals (Patient not taking: Reported on 6/4/2020) 90 capsule 3    gemfibrozil (LOPID) 600 mg tablet Take 1 tablet (600 mg total) by mouth 2 (two) times a day (Patient not taking: Reported on 6/9/2021) 180 tablet 0    prednisoLONE acetate (PRED FORTE) 1 % ophthalmic suspension instill 1 drop into left eye every 2 hours TO BE STARTED AFTER THE SURGERY IS COMPLETED (Patient not taking: Reported on 8/30/2021)      sertraline (ZOLOFT) 50 mg tablet Take 1 tablet (50 mg total) by mouth daily (Patient not taking: Reported on 8/30/2021) 30 tablet 6     No current facility-administered medications for this visit  Allergies   Allergen Reactions    Ciprofloxacin GI Intolerance and Headache     Confusion, arm weakness, dizziness, headache    Meperidine GI Intolerance, Hallucinations and Other (See Comments)     Demarol  Reaction Date:Unknown    Propoxyphene GI Intolerance      Immunizations:     Immunization History   Administered Date(s) Administered    Hep B, adult 10/26/2007, 11/29/2007, 04/30/2008    Pneumococcal Conjugate 13-Valent 02/28/2018    Pneumococcal Polysaccharide PPV23 05/18/2005, 07/25/2019    Tdap 01/09/2021    Zoster 06/03/2014      Health Maintenance:         Topic Date Due    Hepatitis C Screening  Never done         Topic Date Due    COVID-19 Vaccine (1) Never done    Influenza Vaccine (1) 09/01/2021      Medicare Health Risk Assessment:     /64 (BP Location: Left arm, Patient Position: Sitting, Cuff Size: Standard)   Pulse 90   Temp 98 5 °F (36 9 °C) (Tympanic)   Ht 5' (1 524 m)   Wt 57 3 kg (126 lb 6 oz)   LMP  (LMP Unknown)   SpO2 96%   BMI 24 68 kg/m²      Debbie is here for her Subsequent Wellness visit  Last Medicare Wellness visit information reviewed, patient interviewed and updates made to the record today  Health Risk Assessment:   Patient rates overall health as good  Patient feels that their physical health rating is same  Eyesight was rated as same  Hearing was rated as same  Patient feels that their emotional and mental health rating is same  Patients states they are often angry  Patient states they are sometimes unusually tired/fatigued  Pain experienced in the last 7 days has been some  Patient's pain rating has been 5/10  Patient states that she has experienced no weight loss or gain in last 6 months  Depression Screening:   PHQ-2 Score: 0  PHQ-9 Score: 0      Fall Risk Screening:    In the past year, patient has experienced: no history of falling in past year      Urinary Incontinence Screening:   Patient has not leaked urine accidently in the last six months  Home Safety:  Patient has trouble with stairs inside or outside of their home  Patient has working smoke alarms and has working carbon monoxide detector  Home safety hazards include: none  Nutrition:   Current diet is Regular  Medications:   Patient is currently taking over-the-counter supplements  OTC medications include: see medication list  Patient is able to manage medications  Activities of Daily Living (ADLs)/Instrumental Activities of Daily Living (IADLs):   Walk and transfer into and out of bed and chair?: Yes  Dress and groom yourself?: Yes    Bathe or shower yourself?: Yes    Feed yourself?  Yes  Do your laundry/housekeeping?: Yes  Manage your money, pay your bills and track your expenses?: Yes  Make your own meals?: Yes    Do your own shopping?: Yes    Previous Hospitalizations:   Any hospitalizations or ED visits within the last 12 months?: No      Advance Care Planning:   Living will: No    Durable POA for healthcare: No    Advanced directive: No    Advanced directive counseling given: Yes    Five wishes given: Yes    Patient declined ACP directive: Yes    End of Life Decisions reviewed with patient: No    Provider agrees with end of life decisions: No      Cognitive Screening:   Provider or family/friend/caregiver concerned regarding cognition?: No    PREVENTIVE SCREENINGS      Cardiovascular Screening:    General: Screening Not Indicated and History Lipid Disorder      Diabetes Screening:     General: Screening Not Indicated and History Diabetes      Breast Cancer Screening:     General: Screening Current      Cervical Cancer Screening:    General: Screening Not Indicated      Osteoporosis Screening:    General: Screening Not Indicated      Abdominal Aortic Aneurysm (AAA) Screening:        General: Screening Not Indicated      Lung Cancer Screening:     General: Screening Not Indicated    Screening, Brief Intervention, and Referral to Treatment (SBIRT)    Screening  Typical number of drinks in a day: 1  Typical number of drinks in a week: 1  Interpretation: Low risk drinking behavior      Single Item Drug Screening:  How often have you used an illegal drug (including marijuana) or a prescription medication for non-medical reasons in the past year? never    Single Item Drug Screen Score: 0  Interpretation: Negative screen for possible drug use disorder    Review of Current Opioid Use    Opioid Risk Tool (ORT) Interpretation: Complete Opioid Risk Tool (ORT)      Demaris Govern, DO

## 2021-08-30 NOTE — PATIENT INSTRUCTIONS
Medicare Preventive Visit Patient Instructions  Thank you for completing your Welcome to Medicare Visit or Medicare Annual Wellness Visit today  Your next wellness visit will be due in one year (8/31/2022)  The screening/preventive services that you may require over the next 5-10 years are detailed below  Some tests may not apply to you based off risk factors and/or age  Screening tests ordered at today's visit but not completed yet may show as past due  Also, please note that scanned in results may not display below  Preventive Screenings:  Service Recommendations Previous Testing/Comments   Colorectal Cancer Screening  * Colonoscopy    * Fecal Occult Blood Test (FOBT)/Fecal Immunochemical Test (FIT)  * Fecal DNA/Cologuard Test  * Flexible Sigmoidoscopy Age: 54-65 years old   Colonoscopy: every 10 years (may be performed more frequently if at higher risk)  OR  FOBT/FIT: every 1 year  OR  Cologuard: every 3 years  OR  Sigmoidoscopy: every 5 years  Screening may be recommended earlier than age 48 if at higher risk for colorectal cancer  Also, an individualized decision between you and your healthcare provider will decide whether screening between the ages of 74-80 would be appropriate  Colonoscopy: 05/26/2011  FOBT/FIT: Not on file  Cologuard: Not on file  Sigmoidoscopy: Not on file          Breast Cancer Screening Age: 36 years old  Frequency: every 1-2 years  Not required if history of left and right mastectomy Mammogram: 03/23/2021    Screening Current   Cervical Cancer Screening Between the ages of 21-29, pap smear recommended once every 3 years  Between the ages of 33-67, can perform pap smear with HPV co-testing every 5 years     Recommendations may differ for women with a history of total hysterectomy, cervical cancer, or abnormal pap smears in past  Pap Smear: 06/02/2020    Screening Not Indicated   Hepatitis C Screening Once for adults born between 1945 and 1965  More frequently in patients at high risk for Hepatitis C Hep C Antibody: Not on file        Diabetes Screening 1-2 times per year if you're at risk for diabetes or have pre-diabetes Fasting glucose: 134 mg/dL   A1C: 6 4 %    Screening Not Indicated  History Diabetes   Cholesterol Screening Once every 5 years if you don't have a lipid disorder  May order more often based on risk factors  Lipid panel: 07/23/2021    Screening Not Indicated  History Lipid Disorder     Other Preventive Screenings Covered by Medicare:  1  Abdominal Aortic Aneurysm (AAA) Screening: covered once if your at risk  You're considered to be at risk if you have a family history of AAA  2  Lung Cancer Screening: covers low dose CT scan once per year if you meet all of the following conditions: (1) Age 50-69; (2) No signs or symptoms of lung cancer; (3) Current smoker or have quit smoking within the last 15 years; (4) You have a tobacco smoking history of at least 30 pack years (packs per day multiplied by number of years you smoked); (5) You get a written order from a healthcare provider  3  Glaucoma Screening: covered annually if you're considered high risk: (1) You have diabetes OR (2) Family history of glaucoma OR (3)  aged 48 and older OR (3)  American aged 72 and older  3  Osteoporosis Screening: covered every 2 years if you meet one of the following conditions: (1) You're estrogen deficient and at risk for osteoporosis based off medical history and other findings; (2) Have a vertebral abnormality; (3) On glucocorticoid therapy for more than 3 months; (4) Have primary hyperparathyroidism; (5) On osteoporosis medications and need to assess response to drug therapy  · Last bone density test (DXA Scan): Not on file  5  HIV Screening: covered annually if you're between the age of 12-76  Also covered annually if you are younger than 13 and older than 72 with risk factors for HIV infection   For pregnant patients, it is covered up to 3 times per pregnancy  Immunizations:  Immunization Recommendations   Influenza Vaccine Annual influenza vaccination during flu season is recommended for all persons aged >= 6 months who do not have contraindications   Pneumococcal Vaccine (Prevnar and Pneumovax)  * Prevnar = PCV13  * Pneumovax = PPSV23   Adults 25-60 years old: 1-3 doses may be recommended based on certain risk factors  Adults 72 years old: Prevnar (PCV13) vaccine recommended followed by Pneumovax (PPSV23) vaccine  If already received PPSV23 since turning 65, then PCV13 recommended at least one year after PPSV23 dose  Hepatitis B Vaccine 3 dose series if at intermediate or high risk (ex: diabetes, end stage renal disease, liver disease)   Tetanus (Td) Vaccine - COST NOT COVERED BY MEDICARE PART B Following completion of primary series, a booster dose should be given every 10 years to maintain immunity against tetanus  Td may also be given as tetanus wound prophylaxis  Tdap Vaccine - COST NOT COVERED BY MEDICARE PART B Recommended at least once for all adults  For pregnant patients, recommended with each pregnancy  Shingles Vaccine (Shingrix) - COST NOT COVERED BY MEDICARE PART B  2 shot series recommended in those aged 48 and above     Health Maintenance Due:      Topic Date Due    Hepatitis C Screening  Never done     Immunizations Due:      Topic Date Due    COVID-19 Vaccine (1) Never done    Influenza Vaccine (1) 09/01/2021     Advance Directives   What are advance directives? Advance directives are legal documents that state your wishes and plans for medical care  These plans are made ahead of time in case you lose your ability to make decisions for yourself  Advance directives can apply to any medical decision, such as the treatments you want, and if you want to donate organs  What are the types of advance directives? There are many types of advance directives, and each state has rules about how to use them   You may choose a combination of any of the following:  · Living will: This is a written record of the treatment you want  You can also choose which treatments you do not want, which to limit, and which to stop at a certain time  This includes surgery, medicine, IV fluid, and tube feedings  · Durable power of  for healthcare [facility]): This is a written record that states who you want to make healthcare choices for you when you are unable to make them for yourself  This person, called a proxy, is usually a family member or a friend  You may choose more than 1 proxy  · Do not resuscitate (DNR) order:  A DNR order is used in case your heart stops beating or you stop breathing  It is a request not to have certain forms of treatment, such as CPR  A DNR order may be included in other types of advance directives  · Medical directive: This covers the care that you want if you are in a coma, near death, or unable to make decisions for yourself  You can list the treatments you want for each condition  Treatment may include pain medicine, surgery, blood transfusions, dialysis, IV or tube feedings, and a ventilator (breathing machine)  · Values history: This document has questions about your views, beliefs, and how you feel and think about life  This information can help others choose the care that you would choose  Why are advance directives important? An advance directive helps you control your care  Although spoken wishes may be used, it is better to have your wishes written down  Spoken wishes can be misunderstood, or not followed  Treatments may be given even if you do not want them  An advance directive may make it easier for your family to make difficult choices about your care     Narcotic (Opioid) Safety    Use narcotics safely:  · Take prescribed narcotics exactly as directed  · Do not give narcotics to others or take narcotics that belong to someone else  · Do not mix narcotics without medicines or alcohol  · Do not drive or operate heavy machinery after you take the narcotic  · Monitor for side effects and notify your healthcare provider if you experienced side effects such as nausea, sleepiness, itching, or trouble thinking clearly  Manage constipation:    Constipation is the most common side effect of narcotic medicine  Constipation is when you have hard, dry bowel movements, or you go longer than usual between bowel movements  Tell your healthcare provider about all changes in your bowel movements while you are taking narcotics  He or she may recommend laxative medicine to help you have a bowel movement  He or she may also change the kind of narcotic you are taking, or change when you take it  The following are more ways you can prevent or relieve constipation:    · Drink liquids as directed  You may need to drink extra liquids to help soften and move your bowels  Ask how much liquid to drink each day and which liquids are best for you  · Eat high-fiber foods  This may help decrease constipation by adding bulk to your bowel movements  High-fiber foods include fruits, vegetables, whole-grain breads and cereals, and beans  Your healthcare provider or dietitian can help you create a high-fiber meal plan  Your provider may also recommend a fiber supplement if you cannot get enough fiber from food  · Exercise regularly  Regular physical activity can help stimulate your intestines  Walking is a good exercise to prevent or relieve constipation  Ask which exercises are best for you  · Schedule a time each day to have a bowel movement  This may help train your body to have regular bowel movements  Bend forward while you are on the toilet to help move the bowel movement out  Sit on the toilet for at least 10 minutes, even if you do not have a bowel movement  Store narcotics safely:   · Store narcotics where others cannot easily get them  Keep them in a locked cabinet or secure area   Do not  keep them in a purse or other bag you carry with you  A person may be looking for something else and find the narcotics  · Make sure narcotics are stored out of the reach of children  A child can easily overdose on narcotics  Narcotics may look like candy to a small child  The best way to dispose of narcotics: The laws vary by country and area  In the [place], the best way is to return the narcotics through a take-back program  This program is offered by the [employer])  The following are options for using the program:  · Take the narcotics to a [employer] collection site  The site is often a law enforcement center  Call your local law enforcement center for scheduled take-back days in your area  You will be given information on where to go if the collection site is in a different location  · Take the narcotics to an approved pharmacy or hospital   A pharmacy or hospital may be set up as a collection site  You will need to ask if it is a [employer] collection site if you were not directed there  A pharmacy or doctor's office may not be able to take back narcotics unless it is a [employer] site  · Use a mail-back system  This means you are given containers to put the narcotics into  You will then mail them in the containers  · Use a take-back drop box  This is a place to leave the narcotics at any time  People and animals will not be able to get into the box  Your local law enforcement agency can tell you where to find a drop box in your area  Other ways to manage pain:   · Ask your healthcare provider about non-narcotic medicines to control pain  Nonprescription medicines include NSAIDs (such as ibuprofen) and acetaminophen  Prescription medicines include muscle relaxers, antidepressants, and steroids  · Pain may be managed without any medicines  Some ways to relieve pain include massage, aromatherapy, or meditation  Physical or occupational therapy may also help      For more information:   · Drug Enforcement Administration  [address] [facility]  [address]  Phone: 6- 544 - 169-5201  Web Address: [facility]/drug_disposal/    · [address]  and Drug Administration  [place]  [place] , [address]  Phone: 7- 914 - 179-6886  Web Address: http://[employer]/     © Copyright [employer] 2018 Information is for End User's use only and may not be sold, redistributed or otherwise used for commercial purposes   All illustrations and images included in CareNotes® are the copyrighted property of [employer]  or [address] [place], [place] [address]tania

## 2021-08-30 NOTE — PROGRESS NOTES
Assessment/Plan:    No problem-specific Assessment & Plan notes found for this encounter  Diagnoses and all orders for this visit:    Medicare annual wellness visit, subsequent    Type 2 diabetes mellitus without complication, without long-term current use of insulin ([facility] [address] )  Comments:  no change in the medication  Orders:  -     Ambulatory Referral to Ophthalmology; Future  -     Glucose, fasting; Future  -     Hemoglobin A1C; Future    Essential hypertension  Comments:  no change in the medication          PHQ-9 Depression Screening    PHQ-9:   Frequency of the following problems over the past two weeks:      Little interest or pleasure in doing things: 0 - not at all  Feeling down, depressed, or hopeless: 0 - not at all  Trouble falling or staying asleep, or sleeping too much: 0 - not at all  Feeling tired or having little energy: 0 - not at all  Poor appetite or overeatin - not at all  Feeling bad about yourself - or that you are a failure or have let yourself or your family down: 0 - not at all  Trouble concentrating on things, such as reading the newspaper or watching television: 0 - not at all  Moving or speaking so slowly that other people could have noticed  Or the opposite - being so fidgety or restless that you have been moving around a lot more than usual: 0 - not at all  Thoughts that you would be better off dead, or of hurting yourself in some way: 0 - not at all  PHQ-2 Score: 0  PHQ-9 Score: 0            Subjective:      Patient ID: Blair Prescott is a 79 y o  female  Diabetes  She presents for her follow-up diabetic visit  She has type 2 diabetes mellitus  Her disease course has been improving  There are no hypoglycemic associated symptoms  Pertinent negatives for hypoglycemia include no seizures  Associated symptoms include fatigue  Pertinent negatives for diabetes include no chest pain  Symptoms are stable  Her breakfast blood glucose range is generally 70-90 mg/dl         The following portions of the patient's history were reviewed and updated as appropriate: allergies, current medications, past family history, past medical history, past social history, past surgical history and problem list     Review of Systems   Constitutional: Positive for fatigue  Negative for chills and fever  HENT: Negative  Negative for hearing loss  Eyes: Negative  Negative for visual disturbance  Respiratory: Negative for shortness of breath and wheezing  Cardiovascular: Negative for chest pain and palpitations  Gastrointestinal: Positive for diarrhea  Negative for abdominal pain, blood in stool, constipation, nausea and vomiting  Endocrine: Negative  Genitourinary: Negative for difficulty urinating and dysuria  Musculoskeletal: Negative for arthralgias and myalgias  Skin: Negative  Allergic/Immunologic: Negative  Neurological: Negative for seizures and syncope  Hematological: Negative for adenopathy  Psychiatric/Behavioral: Negative  Objective:    /64 (BP Location: Left arm, Patient Position: Sitting, Cuff Size: Standard)   Pulse 90   Temp 98 5 °F (36 9 °C) (Tympanic)   Ht 5' (1 524 m)   Wt 57 3 kg (126 lb 6 oz)   LMP  (LMP Unknown)   SpO2 96%   BMI 24 68 kg/m²      Physical Exam  Vitals and nursing note reviewed  Constitutional:       General: She is not in acute distress  Appearance: Normal appearance  She is well-developed  She is not ill-appearing, toxic-appearing or diaphoretic  HENT:      Head: Normocephalic and atraumatic  Right Ear: Tympanic membrane, ear canal and external ear normal  There is no impacted cerumen  Left Ear: Tympanic membrane, ear canal and external ear normal  There is no impacted cerumen  Nose: Nose normal  No congestion or rhinorrhea  Mouth/Throat:      Mouth: Mucous membranes are moist       Pharynx: Oropharynx is clear  No oropharyngeal exudate or posterior oropharyngeal erythema     Eyes:      General: No scleral icterus  Right eye: No discharge  Left eye: No discharge  Conjunctiva/sclera: Conjunctivae normal       Pupils: Pupils are equal, round, and reactive to light  Cardiovascular:      Rate and Rhythm: Normal rate and regular rhythm  Pulses: Normal pulses  Heart sounds: Normal heart sounds  No murmur heard  Pulmonary:      Effort: Pulmonary effort is normal  No respiratory distress  Breath sounds: Normal breath sounds  No wheezing, rhonchi or rales  Abdominal:      General: Bowel sounds are normal  There is no distension  Palpations: Abdomen is soft  There is no mass  Tenderness: There is no abdominal tenderness  There is no guarding or rebound  Musculoskeletal:         General: Normal range of motion  Cervical back: Normal range of motion and neck supple  No rigidity  Right lower leg: No edema  Left lower leg: No edema  Lymphadenopathy:      Cervical: No cervical adenopathy  Skin:     General: Skin is warm and dry  Findings: No rash  Neurological:      General: No focal deficit present  Mental Status: She is alert and oriented to person, place, and time  Sensory: No sensory deficit  Motor: No weakness or abnormal muscle tone  Coordination: Coordination normal       Gait: Gait normal       Deep Tendon Reflexes: Reflexes are normal and symmetric  Psychiatric:         Mood and Affect: Mood normal          Behavior: Behavior normal          Thought Content:  Thought content normal          Judgment: Judgment normal

## 2021-09-13 DIAGNOSIS — E11.9 TYPE 2 DIABETES MELLITUS WITHOUT COMPLICATION, WITHOUT LONG-TERM CURRENT USE OF INSULIN (HCC): ICD-10-CM

## 2021-09-13 DIAGNOSIS — I10 ESSENTIAL HYPERTENSION: ICD-10-CM

## 2021-09-13 RX ORDER — LOVASTATIN 40 MG/1
40 TABLET ORAL
Qty: 90 TABLET | Refills: 1 | Status: SHIPPED | OUTPATIENT
Start: 2021-09-13 | End: 2022-05-06 | Stop reason: SDUPTHER

## 2021-09-13 RX ORDER — GLIPIZIDE 10 MG/1
10 TABLET, FILM COATED, EXTENDED RELEASE ORAL DAILY
Qty: 90 TABLET | Refills: 1 | Status: SHIPPED | OUTPATIENT
Start: 2021-09-13 | End: 2021-12-02 | Stop reason: SDUPTHER

## 2021-09-13 RX ORDER — ENALAPRIL MALEATE 10 MG/1
10 TABLET ORAL DAILY
Qty: 90 TABLET | Refills: 1 | Status: SHIPPED | OUTPATIENT
Start: 2021-09-13 | End: 2022-01-21 | Stop reason: HOSPADM

## 2021-09-23 ENCOUNTER — VBI (OUTPATIENT)
Dept: ADMINISTRATIVE | Facility: OTHER | Age: 81
End: 2021-09-23

## 2021-11-10 DIAGNOSIS — E11.9 TYPE 2 DIABETES MELLITUS WITHOUT COMPLICATION, WITHOUT LONG-TERM CURRENT USE OF INSULIN (HCC): ICD-10-CM

## 2021-11-22 ENCOUNTER — APPOINTMENT (OUTPATIENT)
Dept: LAB | Facility: CLINIC | Age: 81
End: 2021-11-22
Payer: COMMERCIAL

## 2021-11-22 DIAGNOSIS — E11.9 TYPE 2 DIABETES MELLITUS WITHOUT COMPLICATION, WITHOUT LONG-TERM CURRENT USE OF INSULIN (HCC): ICD-10-CM

## 2021-11-22 DIAGNOSIS — I10 ESSENTIAL HYPERTENSION: ICD-10-CM

## 2021-11-22 DIAGNOSIS — R80.1 PERSISTENT PROTEINURIA, UNSPECIFIED: ICD-10-CM

## 2021-11-22 DIAGNOSIS — N18.32 STAGE 3B CHRONIC KIDNEY DISEASE (HCC): ICD-10-CM

## 2021-11-22 LAB
ALBUMIN SERPL BCP-MCNC: 3.9 G/DL (ref 3.5–5)
ANION GAP SERPL CALCULATED.3IONS-SCNC: 8 MMOL/L (ref 4–13)
BUN SERPL-MCNC: 32 MG/DL (ref 5–25)
CALCIUM SERPL-MCNC: 9.8 MG/DL (ref 8.3–10.1)
CHLORIDE SERPL-SCNC: 110 MMOL/L (ref 100–108)
CO2 SERPL-SCNC: 24 MMOL/L (ref 21–32)
CREAT SERPL-MCNC: 1.72 MG/DL (ref 0.6–1.3)
ERYTHROCYTE [DISTWIDTH] IN BLOOD BY AUTOMATED COUNT: 13.5 % (ref 11.6–15.1)
EST. AVERAGE GLUCOSE BLD GHB EST-MCNC: 131 MG/DL
GFR SERPL CREATININE-BSD FRML MDRD: 28 ML/MIN/1.73SQ M
GLUCOSE P FAST SERPL-MCNC: 73 MG/DL (ref 65–99)
GLUCOSE P FAST SERPL-MCNC: 76 MG/DL (ref 65–99)
HBA1C MFR BLD: 6.2 %
HCT VFR BLD AUTO: 35.9 % (ref 34.8–46.1)
HGB BLD-MCNC: 11.5 G/DL (ref 11.5–15.4)
MCH RBC QN AUTO: 27.7 PG (ref 26.8–34.3)
MCHC RBC AUTO-ENTMCNC: 32 G/DL (ref 31.4–37.4)
MCV RBC AUTO: 87 FL (ref 82–98)
PHOSPHATE SERPL-MCNC: 3.4 MG/DL (ref 2.3–4.1)
PLATELET # BLD AUTO: 270 THOUSANDS/UL (ref 149–390)
PMV BLD AUTO: 10.1 FL (ref 8.9–12.7)
POTASSIUM SERPL-SCNC: 4.1 MMOL/L (ref 3.5–5.3)
RBC # BLD AUTO: 4.15 MILLION/UL (ref 3.81–5.12)
SODIUM SERPL-SCNC: 142 MMOL/L (ref 136–145)
WBC # BLD AUTO: 4.93 THOUSAND/UL (ref 4.31–10.16)

## 2021-11-22 PROCEDURE — 85027 COMPLETE CBC AUTOMATED: CPT

## 2021-11-22 PROCEDURE — 83036 HEMOGLOBIN GLYCOSYLATED A1C: CPT

## 2021-11-22 PROCEDURE — 80069 RENAL FUNCTION PANEL: CPT

## 2021-11-22 PROCEDURE — 82947 ASSAY GLUCOSE BLOOD QUANT: CPT

## 2021-11-22 PROCEDURE — 36415 COLL VENOUS BLD VENIPUNCTURE: CPT

## 2021-11-24 ENCOUNTER — RA CDI HCC (OUTPATIENT)
Dept: OTHER | Facility: HOSPITAL | Age: 81
End: 2021-11-24

## 2021-12-02 ENCOUNTER — OFFICE VISIT (OUTPATIENT)
Dept: FAMILY MEDICINE CLINIC | Facility: CLINIC | Age: 81
End: 2021-12-02
Payer: COMMERCIAL

## 2021-12-02 VITALS
OXYGEN SATURATION: 95 % | TEMPERATURE: 98.1 F | HEART RATE: 68 BPM | WEIGHT: 123 LBS | HEIGHT: 60 IN | DIASTOLIC BLOOD PRESSURE: 80 MMHG | SYSTOLIC BLOOD PRESSURE: 128 MMHG | BODY MASS INDEX: 24.15 KG/M2

## 2021-12-02 DIAGNOSIS — Z13.5 SCREENING FOR DIABETIC RETINOPATHY: ICD-10-CM

## 2021-12-02 DIAGNOSIS — E11.9 TYPE 2 DIABETES MELLITUS WITHOUT COMPLICATION, WITHOUT LONG-TERM CURRENT USE OF INSULIN (HCC): ICD-10-CM

## 2021-12-02 DIAGNOSIS — E11.9 TYPE 2 DIABETES MELLITUS WITHOUT COMPLICATION, WITHOUT LONG-TERM CURRENT USE OF INSULIN (HCC): Primary | ICD-10-CM

## 2021-12-02 DIAGNOSIS — Z23 ENCOUNTER FOR IMMUNIZATION: ICD-10-CM

## 2021-12-02 PROCEDURE — 1160F RVW MEDS BY RX/DR IN RCRD: CPT | Performed by: FAMILY MEDICINE

## 2021-12-02 PROCEDURE — 99213 OFFICE O/P EST LOW 20 MIN: CPT | Performed by: FAMILY MEDICINE

## 2021-12-02 PROCEDURE — 3074F SYST BP LT 130 MM HG: CPT | Performed by: FAMILY MEDICINE

## 2021-12-02 PROCEDURE — 3725F SCREEN DEPRESSION PERFORMED: CPT | Performed by: FAMILY MEDICINE

## 2021-12-02 PROCEDURE — 1036F TOBACCO NON-USER: CPT | Performed by: FAMILY MEDICINE

## 2021-12-02 PROCEDURE — 3079F DIAST BP 80-89 MM HG: CPT | Performed by: FAMILY MEDICINE

## 2021-12-02 RX ORDER — GLIPIZIDE 10 MG/1
10 TABLET, FILM COATED, EXTENDED RELEASE ORAL DAILY
Qty: 90 TABLET | Refills: 1 | Status: SHIPPED | OUTPATIENT
Start: 2021-12-02 | End: 2022-05-06 | Stop reason: SDUPTHER

## 2021-12-13 ENCOUNTER — APPOINTMENT (OUTPATIENT)
Dept: RADIOLOGY | Facility: CLINIC | Age: 81
End: 2021-12-13
Payer: COMMERCIAL

## 2021-12-13 DIAGNOSIS — M25.562 LEFT KNEE PAIN, UNSPECIFIED CHRONICITY: Primary | ICD-10-CM

## 2021-12-13 DIAGNOSIS — M25.562 LEFT KNEE PAIN, UNSPECIFIED CHRONICITY: ICD-10-CM

## 2021-12-13 PROCEDURE — 73562 X-RAY EXAM OF KNEE 3: CPT

## 2021-12-16 ENCOUNTER — HOSPITAL ENCOUNTER (INPATIENT)
Facility: HOSPITAL | Age: 81
LOS: 26 days | DRG: 871 | End: 2022-01-12
Attending: EMERGENCY MEDICINE | Admitting: INTERNAL MEDICINE
Payer: COMMERCIAL

## 2021-12-16 ENCOUNTER — APPOINTMENT (EMERGENCY)
Dept: RADIOLOGY | Facility: HOSPITAL | Age: 81
DRG: 871 | End: 2021-12-16
Payer: COMMERCIAL

## 2021-12-16 DIAGNOSIS — J93.12 SECONDARY SPONTANEOUS PNEUMOTHORAX: ICD-10-CM

## 2021-12-16 DIAGNOSIS — R09.02 HYPOXIA: ICD-10-CM

## 2021-12-16 DIAGNOSIS — J93.9 PNEUMOTHORAX ON RIGHT: ICD-10-CM

## 2021-12-16 DIAGNOSIS — U07.1 COVID-19: Primary | ICD-10-CM

## 2021-12-16 DIAGNOSIS — D62 ACUTE BLOOD LOSS ANEMIA: ICD-10-CM

## 2021-12-16 DIAGNOSIS — R93.5 ABNORMAL CT OF THE ABDOMEN: ICD-10-CM

## 2021-12-16 PROBLEM — J96.01 ACUTE RESPIRATORY FAILURE WITH HYPOXIA (HCC): Status: ACTIVE | Noted: 2021-12-16

## 2021-12-16 LAB
2HR DELTA HS TROPONIN: -3 NG/L
4HR DELTA HS TROPONIN: -4 NG/L
ALBUMIN SERPL BCP-MCNC: 4.4 G/DL (ref 3.5–5)
ALP SERPL-CCNC: 78 U/L (ref 34–104)
ALT SERPL W P-5'-P-CCNC: 11 U/L (ref 7–52)
ANION GAP SERPL CALCULATED.3IONS-SCNC: 11 MMOL/L (ref 4–13)
AST SERPL W P-5'-P-CCNC: 20 U/L (ref 13–39)
BASOPHILS # BLD AUTO: 0.01 THOUSANDS/ΜL (ref 0–0.1)
BASOPHILS NFR BLD AUTO: 0 % (ref 0–1)
BILIRUB SERPL-MCNC: 0.36 MG/DL (ref 0.2–1)
BNP SERPL-MCNC: 51 PG/ML (ref 1–100)
BUN SERPL-MCNC: 24 MG/DL (ref 5–25)
CA-I BLD-SCNC: 1.08 MMOL/L (ref 1.12–1.32)
CALCIUM SERPL-MCNC: 8.9 MG/DL (ref 8.4–10.2)
CARDIAC TROPONIN I PNL SERPL HS: 12 NG/L
CARDIAC TROPONIN I PNL SERPL HS: 13 NG/L
CARDIAC TROPONIN I PNL SERPL HS: 16 NG/L
CHLORIDE SERPL-SCNC: 104 MMOL/L (ref 96–108)
CK MB SERPL-MCNC: 0.4 % (ref 0–2.5)
CK MB SERPL-MCNC: 0.6 NG/ML (ref 0.6–6.3)
CK SERPL-CCNC: 137 U/L (ref 26–192)
CO2 SERPL-SCNC: 24 MMOL/L (ref 21–32)
CREAT SERPL-MCNC: 1.56 MG/DL (ref 0.6–1.3)
CRP SERPL QL: 90.9 MG/L
D DIMER PPP FEU-MCNC: 1.21 UG/ML FEU
EOSINOPHIL # BLD AUTO: 0 THOUSAND/ΜL (ref 0–0.61)
EOSINOPHIL NFR BLD AUTO: 0 % (ref 0–6)
ERYTHROCYTE [DISTWIDTH] IN BLOOD BY AUTOMATED COUNT: 13.1 % (ref 11.6–15.1)
FERRITIN SERPL-MCNC: 424 NG/ML (ref 8–388)
FLUAV RNA RESP QL NAA+PROBE: NEGATIVE
FLUBV RNA RESP QL NAA+PROBE: NEGATIVE
GFR SERPL CREATININE-BSD FRML MDRD: 31 ML/MIN/1.73SQ M
GLUCOSE SERPL-MCNC: 48 MG/DL (ref 65–140)
GLUCOSE SERPL-MCNC: 57 MG/DL (ref 65–140)
GLUCOSE SERPL-MCNC: 82 MG/DL (ref 65–140)
HCT VFR BLD AUTO: 33.7 % (ref 34.8–46.1)
HGB BLD-MCNC: 10.8 G/DL (ref 11.5–15.4)
IMM GRANULOCYTES # BLD AUTO: 0.01 THOUSAND/UL (ref 0–0.2)
IMM GRANULOCYTES NFR BLD AUTO: 0 % (ref 0–2)
INR PPP: 1.12 (ref 0.84–1.19)
LACTATE SERPL-SCNC: 0.5 MMOL/L (ref 0.5–2)
LYMPHOCYTES # BLD AUTO: 0.73 THOUSANDS/ΜL (ref 0.6–4.47)
LYMPHOCYTES NFR BLD AUTO: 18 % (ref 14–44)
MAGNESIUM SERPL-MCNC: 1.9 MG/DL (ref 1.9–2.7)
MCH RBC QN AUTO: 27.2 PG (ref 26.8–34.3)
MCHC RBC AUTO-ENTMCNC: 32 G/DL (ref 31.4–37.4)
MCV RBC AUTO: 85 FL (ref 82–98)
MONOCYTES # BLD AUTO: 0.2 THOUSAND/ΜL (ref 0.17–1.22)
MONOCYTES NFR BLD AUTO: 5 % (ref 4–12)
NEUTROPHILS # BLD AUTO: 3.18 THOUSANDS/ΜL (ref 1.85–7.62)
NEUTS SEG NFR BLD AUTO: 77 % (ref 43–75)
NRBC BLD AUTO-RTO: 0 /100 WBCS
PLATELET # BLD AUTO: 143 THOUSANDS/UL (ref 149–390)
PLATELET # BLD AUTO: 158 THOUSANDS/UL (ref 149–390)
PMV BLD AUTO: 9.5 FL (ref 8.9–12.7)
PMV BLD AUTO: 9.9 FL (ref 8.9–12.7)
POTASSIUM SERPL-SCNC: 3.5 MMOL/L (ref 3.5–5.3)
PROCALCITONIN SERPL-MCNC: <0.05 NG/ML
PROT SERPL-MCNC: 7.9 G/DL (ref 6.4–8.4)
PROTHROMBIN TIME: 14.3 SECONDS (ref 11.6–14.5)
RBC # BLD AUTO: 3.97 MILLION/UL (ref 3.81–5.12)
RSV RNA RESP QL NAA+PROBE: NEGATIVE
SARS-COV-2 RNA RESP QL NAA+PROBE: POSITIVE
SODIUM SERPL-SCNC: 139 MMOL/L (ref 135–147)
TRIGL SERPL-MCNC: 170 MG/DL
WBC # BLD AUTO: 4.13 THOUSAND/UL (ref 4.31–10.16)

## 2021-12-16 PROCEDURE — 82948 REAGENT STRIP/BLOOD GLUCOSE: CPT

## 2021-12-16 PROCEDURE — 82728 ASSAY OF FERRITIN: CPT | Performed by: EMERGENCY MEDICINE

## 2021-12-16 PROCEDURE — 85610 PROTHROMBIN TIME: CPT | Performed by: EMERGENCY MEDICINE

## 2021-12-16 PROCEDURE — 99285 EMERGENCY DEPT VISIT HI MDM: CPT

## 2021-12-16 PROCEDURE — 84484 ASSAY OF TROPONIN QUANT: CPT | Performed by: INTERNAL MEDICINE

## 2021-12-16 PROCEDURE — 82955 ASSAY OF G6PD ENZYME: CPT | Performed by: EMERGENCY MEDICINE

## 2021-12-16 PROCEDURE — 80053 COMPREHEN METABOLIC PANEL: CPT | Performed by: EMERGENCY MEDICINE

## 2021-12-16 PROCEDURE — 96360 HYDRATION IV INFUSION INIT: CPT

## 2021-12-16 PROCEDURE — 93005 ELECTROCARDIOGRAM TRACING: CPT

## 2021-12-16 PROCEDURE — 36415 COLL VENOUS BLD VENIPUNCTURE: CPT | Performed by: EMERGENCY MEDICINE

## 2021-12-16 PROCEDURE — 85049 AUTOMATED PLATELET COUNT: CPT | Performed by: INTERNAL MEDICINE

## 2021-12-16 PROCEDURE — 99285 EMERGENCY DEPT VISIT HI MDM: CPT | Performed by: EMERGENCY MEDICINE

## 2021-12-16 PROCEDURE — 82550 ASSAY OF CK (CPK): CPT | Performed by: EMERGENCY MEDICINE

## 2021-12-16 PROCEDURE — 99220 PR INITIAL OBSERVATION CARE/DAY 70 MINUTES: CPT | Performed by: INTERNAL MEDICINE

## 2021-12-16 PROCEDURE — 87040 BLOOD CULTURE FOR BACTERIA: CPT | Performed by: EMERGENCY MEDICINE

## 2021-12-16 PROCEDURE — 87186 SC STD MICRODIL/AGAR DIL: CPT | Performed by: EMERGENCY MEDICINE

## 2021-12-16 PROCEDURE — 83880 ASSAY OF NATRIURETIC PEPTIDE: CPT | Performed by: EMERGENCY MEDICINE

## 2021-12-16 PROCEDURE — 85025 COMPLETE CBC W/AUTO DIFF WBC: CPT | Performed by: EMERGENCY MEDICINE

## 2021-12-16 PROCEDURE — 84484 ASSAY OF TROPONIN QUANT: CPT | Performed by: EMERGENCY MEDICINE

## 2021-12-16 PROCEDURE — 83735 ASSAY OF MAGNESIUM: CPT | Performed by: EMERGENCY MEDICINE

## 2021-12-16 PROCEDURE — 86140 C-REACTIVE PROTEIN: CPT | Performed by: EMERGENCY MEDICINE

## 2021-12-16 PROCEDURE — 84145 PROCALCITONIN (PCT): CPT | Performed by: EMERGENCY MEDICINE

## 2021-12-16 PROCEDURE — 82553 CREATINE MB FRACTION: CPT | Performed by: EMERGENCY MEDICINE

## 2021-12-16 PROCEDURE — 84478 ASSAY OF TRIGLYCERIDES: CPT | Performed by: EMERGENCY MEDICINE

## 2021-12-16 PROCEDURE — 0241U HB NFCT DS VIR RESP RNA 4 TRGT: CPT | Performed by: EMERGENCY MEDICINE

## 2021-12-16 PROCEDURE — 83605 ASSAY OF LACTIC ACID: CPT | Performed by: EMERGENCY MEDICINE

## 2021-12-16 PROCEDURE — XW033E5 INTRODUCTION OF REMDESIVIR ANTI-INFECTIVE INTO PERIPHERAL VEIN, PERCUTANEOUS APPROACH, NEW TECHNOLOGY GROUP 5: ICD-10-PCS | Performed by: INTERNAL MEDICINE

## 2021-12-16 PROCEDURE — 85379 FIBRIN DEGRADATION QUANT: CPT | Performed by: EMERGENCY MEDICINE

## 2021-12-16 PROCEDURE — 71045 X-RAY EXAM CHEST 1 VIEW: CPT

## 2021-12-16 PROCEDURE — 82330 ASSAY OF CALCIUM: CPT | Performed by: EMERGENCY MEDICINE

## 2021-12-16 RX ORDER — LISINOPRIL 20 MG/1
20 TABLET ORAL DAILY
Status: DISCONTINUED | OUTPATIENT
Start: 2021-12-17 | End: 2021-12-25

## 2021-12-16 RX ORDER — PRAVASTATIN SODIUM 40 MG
40 TABLET ORAL
Status: DISCONTINUED | OUTPATIENT
Start: 2021-12-16 | End: 2021-12-20

## 2021-12-16 RX ORDER — ACETAMINOPHEN 325 MG/1
975 TABLET ORAL ONCE
Status: COMPLETED | OUTPATIENT
Start: 2021-12-16 | End: 2021-12-16

## 2021-12-16 RX ORDER — HEPARIN SODIUM 5000 [USP'U]/ML
5000 INJECTION, SOLUTION INTRAVENOUS; SUBCUTANEOUS EVERY 8 HOURS SCHEDULED
Status: DISCONTINUED | OUTPATIENT
Start: 2021-12-16 | End: 2021-12-21

## 2021-12-16 RX ORDER — ACETAMINOPHEN 325 MG/1
650 TABLET ORAL EVERY 4 HOURS PRN
Status: DISCONTINUED | OUTPATIENT
Start: 2021-12-16 | End: 2022-01-02

## 2021-12-16 RX ORDER — FERROUS SULFATE 325(65) MG
325 TABLET ORAL
Status: DISCONTINUED | OUTPATIENT
Start: 2021-12-17 | End: 2022-01-12 | Stop reason: HOSPADM

## 2021-12-16 RX ORDER — DEXAMETHASONE SODIUM PHOSPHATE 4 MG/ML
6 INJECTION, SOLUTION INTRA-ARTICULAR; INTRALESIONAL; INTRAMUSCULAR; INTRAVENOUS; SOFT TISSUE EVERY 24 HOURS
Status: DISCONTINUED | OUTPATIENT
Start: 2021-12-16 | End: 2021-12-21

## 2021-12-16 RX ORDER — ALPRAZOLAM 0.25 MG/1
0.25 TABLET ORAL
Status: DISCONTINUED | OUTPATIENT
Start: 2021-12-16 | End: 2022-01-04

## 2021-12-16 RX ORDER — ONDANSETRON 2 MG/ML
4 INJECTION INTRAMUSCULAR; INTRAVENOUS EVERY 6 HOURS PRN
Status: DISCONTINUED | OUTPATIENT
Start: 2021-12-16 | End: 2022-01-12 | Stop reason: HOSPADM

## 2021-12-16 RX ORDER — CALCIUM GLUCONATE 20 MG/ML
1 INJECTION, SOLUTION INTRAVENOUS ONCE
Status: COMPLETED | OUTPATIENT
Start: 2021-12-16 | End: 2021-12-16

## 2021-12-16 RX ADMIN — PRAVASTATIN SODIUM 40 MG: 20 TABLET ORAL at 18:26

## 2021-12-16 RX ADMIN — ACETAMINOPHEN 975 MG: 325 TABLET ORAL at 17:12

## 2021-12-16 RX ADMIN — DEXAMETHASONE SODIUM PHOSPHATE 6 MG: 4 INJECTION, SOLUTION INTRA-ARTICULAR; INTRALESIONAL; INTRAMUSCULAR; INTRAVENOUS; SOFT TISSUE at 18:27

## 2021-12-16 RX ADMIN — SODIUM CHLORIDE 1000 ML: 0.9 INJECTION, SOLUTION INTRAVENOUS at 17:12

## 2021-12-16 RX ADMIN — CALCIUM GLUCONATE 1 G: 20 INJECTION, SOLUTION INTRAVENOUS at 18:28

## 2021-12-16 RX ADMIN — HEPARIN SODIUM 5000 UNITS: 5000 INJECTION INTRAVENOUS; SUBCUTANEOUS at 21:28

## 2021-12-16 RX ADMIN — REMDESIVIR 200 MG: 100 INJECTION, POWDER, LYOPHILIZED, FOR SOLUTION INTRAVENOUS at 20:15

## 2021-12-16 NOTE — ASSESSMENT & PLAN NOTE
Secondary to COVID -19 viral pneumonia  Requiring 2 L nasal cannula O2 on presentation    · Wean O2 as tolerated  · Goal SpO2 > 90%

## 2021-12-16 NOTE — ED PROVIDER NOTES
History  Chief Complaint   Patient presents with    Medical Problem     Patient arrived via EMS, per EMS patient Covid positive 88% on room air upon EMS arrival      Patient is an 81-year-old female with history of diabetes, who presents for evaluation of fever, chills, cough, shortness of breath  Patient has had the symptoms over the las 3 days  She was brought in by EMS along with her  who has similar symptoms  According to EMS, the patient's daughter tested positive for COVID in the recent past   The patient admits to some lightheadedness, headache, chills  She denies any chest pain, significant shortness of breath, abdominal pain, vomiting  Per EMS, the patient was satting 88% on room air on their arrival           Prior to Admission Medications   Prescriptions Last Dose Informant Patient Reported? Taking?    ALPRAZolam (XANAX) 0 25 mg tablet  Self No No   Sig: Take 1 tablet (0 25 mg total) by mouth daily at bedtime as needed for anxiety   HYDROcodone-acetaminophen (NORCO) 5-325 mg per tablet  Self Yes No   Sig: hydrocodone 5 mg-acetaminophen 500 mg tablet   Multiple Vitamin (multivitamin) tablet  Self Yes No   Sig: Take 1 tablet by mouth   calcium carbonate (TUMS) 500 mg chewable tablet  Self Yes No   Sig: Chew 1 tablet   cholecalciferol (VITAMIN D3) 25 mcg (1,000 units) tablet  Self Yes No   Sig: daily   dicyclomine (BENTYL) 10 mg capsule  Self No No   Sig: Take 1 capsule (10 mg total) by mouth 3 (three) times a day before meals   Patient not taking: Reported on 6/4/2020   enalapril (VASOTEC) 10 mg tablet   No No   Sig: Take 1 tablet (10 mg total) by mouth daily   ferrous sulfate 325 (65 Fe) mg tablet  Self Yes No   Sig: Take 325 mg by mouth   gemfibrozil (LOPID) 600 mg tablet  Self No No   Sig: Take 1 tablet (600 mg total) by mouth 2 (two) times a day   Patient not taking: Reported on 6/9/2021   gemfibrozil (LOPID) 600 mg tablet  Self No No   Sig: Take 1 tablet (600 mg total) by mouth 2 (two) times a day   Patient not taking: Reported on 12/2/2021    glipiZIDE (GLUCOTROL XL) 10 mg 24 hr tablet   No No   Sig: Take 1 tablet (10 mg total) by mouth daily   lovastatin (MEVACOR) 40 MG tablet   No No   Sig: Take 1 tablet (40 mg total) by mouth daily at bedtime   metFORMIN (GLUCOPHAGE) 1000 MG tablet   No No   Sig: Take 1 tablet (1,000 mg total) by mouth 2 (two) times a day   ofloxacin (OCUFLOX) 0 3 % ophthalmic solution  Self Yes No   Sig: instill 1 drop into left eye four times a day START 3 DAYS PRIOR      (REFER TO PRESCRIPTION NOTES)  prednisoLONE acetate (PRED FORTE) 1 % ophthalmic suspension  Self Yes No   Sig: instill 1 drop into left eye every 2 hours TO BE STARTED AFTER THE SURGERY IS COMPLETED   Patient not taking: Reported on 8/30/2021   sertraline (ZOLOFT) 50 mg tablet  Self No No   Sig: Take 1 tablet (50 mg total) by mouth daily   Patient not taking: Reported on 8/30/2021   sitaGLIPtin (Januvia) 100 mg tablet   No No   Sig: Take 1 tablet (100 mg total) by mouth daily   triamcinolone (KENALOG) 0 1 % cream  Self Yes No   Sig: Apply topically 2 (two) times a day      Facility-Administered Medications: None       Past Medical History:   Diagnosis Date    Diabetes mellitus ([address] )     Diverticulitis     Postherpetic neuralgia        Past Surgical History:   Procedure Laterality Date    CHOLECYSTECTOMY      COLON SURGERY      HERNIA REPAIR      ROTATOR CUFF REPAIR Right     VARICOSE VEIN SURGERY      Impression:  2/12/16 Teresa Alanis       Family History   Problem Relation Age of Onset    Diabetes Mother     No Known Problems Father      I have reviewed and agree with the history as documented      E-Cigarette/Vaping    E-Cigarette Use Never User      E-Cigarette/Vaping Substances     Social History     Tobacco Use    Smoking status: Never Smoker    Smokeless tobacco: Never Used   Vaping Use    Vaping Use: Never used   Substance Use Topics    Alcohol use: Yes     Comment: Rarely    Drug use: No       Review of Systems   Constitutional: Positive for chills and fever  Negative for diaphoresis  HENT: Negative for congestion, sinus pressure, sore throat and trouble swallowing  Eyes: Negative for pain, discharge and itching  Respiratory: Positive for cough and shortness of breath  Negative for chest tightness and wheezing  Cardiovascular: Negative for chest pain, palpitations and leg swelling  Gastrointestinal: Negative for abdominal distention, abdominal pain, blood in stool, diarrhea, nausea and vomiting  Endocrine: Negative for polyphagia and polyuria  Genitourinary: Negative for difficulty urinating, dysuria, flank pain, hematuria, pelvic pain and vaginal bleeding  Musculoskeletal: Negative for arthralgias and back pain  Skin: Negative for rash  Neurological: Positive for light-headedness and headaches  Negative for dizziness, syncope and weakness  Physical Exam  Physical Exam  Vitals and nursing note reviewed  Constitutional:       General: She is not in acute distress  Appearance: She is well-developed  HENT:      Head: Normocephalic and atraumatic  Right Ear: External ear normal       Left Ear: External ear normal       Nose: Nose normal       Mouth/Throat:      Mouth: Mucous membranes are moist       Pharynx: No oropharyngeal exudate  Eyes:      Conjunctiva/sclera: Conjunctivae normal       Pupils: Pupils are equal, round, and reactive to light  Cardiovascular:      Rate and Rhythm: Regular rhythm  Tachycardia present  Heart sounds: Normal heart sounds  No murmur heard  No friction rub  No gallop  Pulmonary:      Effort: Pulmonary effort is normal  No respiratory distress  Breath sounds: Normal breath sounds  No wheezing or rales  Abdominal:      General: There is no distension  Palpations: Abdomen is soft  Tenderness: There is no abdominal tenderness  There is no guarding     Musculoskeletal:         General: No swelling, tenderness or deformity  Normal range of motion  Cervical back: Normal range of motion and neck supple  Lymphadenopathy:      Cervical: No cervical adenopathy  Skin:     General: Skin is warm and dry  Neurological:      General: No focal deficit present  Mental Status: She is alert and oriented to person, place, and time  Mental status is at baseline  Cranial Nerves: No cranial nerve deficit  Sensory: No sensory deficit  Motor: No weakness or abnormal muscle tone        Coordination: Coordination normal          Vital Signs  ED Triage Vitals   Temperature Pulse Respirations Blood Pressure SpO2   12/16/21 1659 12/16/21 1659 12/16/21 1659 12/16/21 1659 12/16/21 1659   (!) 102 2 °F (39 °C) 105 18 142/68 96 %      Temp Source Heart Rate Source Patient Position - Orthostatic VS BP Location FiO2 (%)   12/16/21 1659 12/16/21 1659 12/16/21 1659 12/16/21 1659 --   Oral Monitor Lying Left arm       Pain Score       12/16/21 1748       No Pain           Vitals:    12/16/21 1659   BP: 142/68   Pulse: 105   Patient Position - Orthostatic VS: Lying         Visual Acuity      ED Medications  Medications   calcium gluconate 1 g in sodium chloride 0 9% 50 mL (premix) (has no administration in time range)   ALPRAZolam (XANAX) tablet 0 25 mg (has no administration in time range)   lisinopril (ZESTRIL) tablet 20 mg (has no administration in time range)   ferrous sulfate tablet 325 mg (has no administration in time range)   pravastatin (PRAVACHOL) tablet 40 mg (has no administration in time range)   acetaminophen (TYLENOL) tablet 650 mg (has no administration in time range)   ondansetron (ZOFRAN) injection 4 mg (has no administration in time range)   heparin (porcine) subcutaneous injection 5,000 Units (has no administration in time range)   dexamethasone (DECADRON) injection 6 mg (has no administration in time range)   remdesivir (Veklury) 200 mg in sodium chloride 0 9 % 290 mL IVPB (has no administration in time range)     Followed by   remdesivir Monisha Dace) 100 mg in sodium chloride 0 9 % 270 mL IVPB (has no administration in time range)   insulin lispro (HumaLOG) 100 units/mL subcutaneous injection 1-5 Units (has no administration in time range)   insulin lispro (HumaLOG) 100 units/mL subcutaneous injection 1-5 Units (has no administration in time range)   sodium chloride 0 9 % bolus 1,000 mL (1,000 mL Intravenous New Bag 12/16/21 1712)   acetaminophen (TYLENOL) tablet 975 mg (975 mg Oral Given 12/16/21 1712)       Diagnostic Studies  Results Reviewed     Procedure Component Value Units Date/Time    Platelet count [[id number]]     Lab Status: No result Specimen: Blood     CKMB [[id number]]  (Normal) Collected: 12/16/21 1707    Lab Status: Final result Specimen: Blood from Arm, Right Updated: 12/16/21 1807     CK-MB Index 0 4 %      CK-MB 0 6 ng/mL     COVID/FLU/RSV - 2 hour TAT [[id number]]  (Abnormal) Collected: 12/16/21 1707    Lab Status: Final result Specimen: Nares from Nose Updated: 12/16/21 1806     SARS-CoV-2 Positive     INFLUENZA A PCR Negative     INFLUENZA B PCR Negative     RSV PCR Negative    Narrative:      FOR PEDIATRIC PATIENTS - copy/paste COVID Guidelines URL to browser: [email or web address]     This test has been authorized by FDA under an EUA (Emergency Use Assay) for use by authorized laboratories  Clinical caution and judgement should be used with the interpretation of these results with consideration of the clinical impression and other laboratory testing  Testing reported as "Positive" or "Negative" has been proven to be accurate according to standard laboratory validation requirements  All testing is performed with control materials showing appropriate reactivity at standard intervals      B-Type Natriuretic Peptide(BNP) CA, GH, EA Campuses Only [[id number]]  (Normal) Collected: 12/16/21 1707    Lab Status: Final result Specimen: Blood from Arm, Right Updated: 12/16/21 1751     BNP 51 pg/mL     HS Troponin I 2hr [[id number]]     Lab Status: No result Specimen: Blood     HS Troponin 0hr (reflex protocol) [[id number]]  (Normal) Collected: 12/16/21 1707    Lab Status: Final result Specimen: Blood from Arm, Right Updated: 12/16/21 1748     hs TnI 0hr 16 ng/L     Lactic acid, plasma [[id number]]  (Normal) Collected: 12/16/21 1707    Lab Status: Final result Specimen: Blood from Arm, Right Updated: 12/16/21 1744     LACTIC ACID 0 5 mmol/L     Narrative:      Result may be elevated if tourniquet was used during collection      CK (with reflex to MB) [[id number]]  (Normal) Collected: 12/16/21 1707    Lab Status: Final result Specimen: Blood from Arm, Right Updated: 12/16/21 1743     Total  U/L     Comprehensive metabolic panel [[id number]]  (Abnormal) Collected: 12/16/21 1707    Lab Status: Final result Specimen: Blood from Arm, Right Updated: 12/16/21 1743     Sodium 139 mmol/L      Potassium 3 5 mmol/L      Chloride 104 mmol/L      CO2 24 mmol/L      ANION GAP 11 mmol/L      BUN 24 mg/dL      Creatinine 1 56 mg/dL      Glucose 57 mg/dL      Calcium 8 9 mg/dL      AST 20 U/L      ALT 11 U/L      Alkaline Phosphatase 78 U/L      Total Protein 7 9 g/dL      Albumin 4 4 g/dL      Total Bilirubin 0 36 mg/dL      eGFR 31 ml/min/1 73sq m     Narrative:      Rima guidelines for Chronic Kidney Disease (CKD):     Stage 1 with normal or high GFR (GFR > 90 mL/min/1 73 square meters)    Stage 2 Mild CKD (GFR = 60-89 mL/min/1 73 square meters)    Stage 3A Moderate CKD (GFR = 45-59 mL/min/1 73 square meters)    Stage 3B Moderate CKD (GFR = 30-44 mL/min/1 73 square meters)    Stage 4 Severe CKD (GFR = 15-29 mL/min/1 73 square meters)    Stage 5 End Stage CKD (GFR <15 mL/min/1 73 square meters)  Note: GFR calculation is accurate only with a steady state creatinine    Magnesium [[id number]]  (Normal) Collected: 12/16/21 1707    Lab Status: Final result Specimen: Blood from Arm, Right Updated: 12/16/21 1743     Magnesium 1 9 mg/dL     C-reactive protein [[id number]]  (Abnormal) Collected: 12/16/21 1707    Lab Status: Final result Specimen: Blood from Arm, Right Updated: 12/16/21 1743     CRP 90 9 mg/L     Triglycerides [[id number]]  (Abnormal) Collected: 12/16/21 1707    Lab Status: Final result Specimen: Blood from Arm, Right Updated: 12/16/21 1743     Triglycerides 170 mg/dL     D-dimer, quantitative [[id number]]  (Abnormal) Collected: 12/16/21 1707    Lab Status: Final result Specimen: Blood from Arm, Right Updated: 12/16/21 1738     D-Dimer, Quant 1 21 ug/ml FEU     Protime-INR [[id number]]  (Normal) Collected: 12/16/21 1707    Lab Status: Final result Specimen: Blood from Arm, Right Updated: 12/16/21 1734     Protime 14 3 seconds      INR 1 12    Calcium, ionized [[id number]]  (Abnormal) Collected: 12/16/21 1707    Lab Status: Final result Specimen: Blood from Arm, Right Updated: 12/16/21 1724     Calcium, Ionized 1 08 mmol/L     CBC and differential [[id number]]  (Abnormal) Collected: 12/16/21 1707    Lab Status: Final result Specimen: Blood from Arm, Right Updated: 12/16/21 1720     WBC 4 13 Thousand/uL      RBC 3 97 Million/uL      Hemoglobin 10 8 g/dL      Hematocrit 33 7 %      MCV 85 fL      MCH 27 2 pg      MCHC 32 0 g/dL      RDW 13 1 %      MPV 9 5 fL      Platelets 721 Thousands/uL      nRBC 0 /100 WBCs      Neutrophils Relative 77 %      Immat GRANS % 0 %      Lymphocytes Relative 18 %      Monocytes Relative 5 %      Eosinophils Relative 0 %      Basophils Relative 0 %      Neutrophils Absolute 3 18 Thousands/µL      Immature Grans Absolute 0 01 Thousand/uL      Lymphocytes Absolute 0 73 Thousands/µL      Monocytes Absolute 0 20 Thousand/µL      Eosinophils Absolute 0 00 Thousand/µL      Basophils Absolute 0 01 Thousands/µL     Blood culture #2 [[id number]] Collected: 12/16/21 1707    Lab Status:  In process Specimen: Blood from Arm, Right Updated: 12/16/21 1717    Blood culture #1 [[id number]] Collected: 12/16/21 1708    Lab Status: In process Specimen: Blood from Arm, Left Updated: 12/16/21 1717    Ferritin [[id number]] Collected: 12/16/21 1707    Lab Status: In process Specimen: Blood from Arm, Right Updated: 12/16/21 1717    Glucose 6 phosphate dehydrogenase [[id number]] Collected: 12/16/21 1707    Lab Status: In process Specimen: Blood from Arm, Right Updated: 12/16/21 1716    Procalcitonin with AM Reflex [[id number]] Collected: 12/16/21 1707    Lab Status: In process Specimen: Blood from Arm, Right Updated: 12/16/21 1716                 XR chest 1 view portable    (Results Pending)              Procedures  Procedures         ED Course                               SBIRT 22yo+      Most Recent Value   SBIRT (25 yo +)    In order to provide better care to our patients, we are screening all of our patients for alcohol and drug use  Would it be okay to ask you these screening questions? Yes Filed at: 12/16/2021 1722   Initial Alcohol Screen: US AUDIT-C     1  How often do you have a drink containing alcohol? 0 Filed at: 12/16/2021 1722   2  How many drinks containing alcohol do you have on a typical day you are drinking? 0 Filed at: 12/16/2021 1722   3a  Male UNDER 65: How often do you have five or more drinks on one occasion? 0 Filed at: 12/16/2021 1722   3b  FEMALE Any Age, or MALE 65+: How often do you have 4 or more drinks on one occassion? 0 Filed at: 12/16/2021 1722   Audit-C Score 0 Filed at: 12/16/2021 1722   TK: How many times in the past year have you    Used an illegal drug or used a prescription medication for non-medical reasons? Never Filed at: 12/16/2021 1722                    MDM  Number of Diagnoses or Management Options  Diagnosis management comments: 73-year-old female presenting for cough, fevers, chills, shortness of breath x3 days  Has been here with similar symptoms for the past 5 days    Was on vaccinated against COVID-19  Was satting 80% for EMS on arrival   Will obtain COVID workup, septic workup  COVID positive  Patient requiring 2 L of nasal cannula oxygen while in the department after oxygen dropped to 88%  Will admit for observation  Disposition  Final diagnoses:   COVID-19   Hypoxia     Time reflects when diagnosis was documented in both MDM as applicable and the Disposition within this note     Time User Action Codes Description Comment    12/16/2021  6:23 PM Yenny Momin Add [U07 1] COVID-19     12/16/2021  6:23 PM Yenny Momin Add [R09 02] Hypoxia       ED Disposition     ED Disposition Condition Date/Time Comment    Admit Stable Thu Dec 16, 2021  6:15 PM Case was discussed with MERNA and the patient's admission status was agreed to be Admission Status: observation status to the service of Dr Jamil Duval    Follow-up Information    None         Patient's Medications   Discharge Prescriptions    No medications on file       No discharge procedures on file      PDMP Review     None          ED Provider  Electronically Signed by           Shelton Bernal DO  12/16/21 1660

## 2021-12-16 NOTE — H&P
42 [address] 1940, [de-identified] y o  female MRN: [id number]  Unit/Bed#: ED 20 Encounter: [id number]  Primary Care Provider: Delbert Romo DO   Date and time admitted to hospital: 12/16/2021  4:49 PM    * COVID-19 virus RNA test result positive at limit of detection  Assessment & Plan  Unvaccinated  COVID-19 positive on 12/16/2021  Endorses cough, shortness of breath  Requiring 2 L nasal cannula O2  · Remdesivir  · Decadron  · Heparin DVT ppx  · Pravastatin  · Incentive spirometry  · Ambulate patient  · Encourage self-proning    Acute respiratory failure with hypoxia ([address] )  Assessment & Plan  Secondary to COVID -19 viral pneumonia  Requiring 2 L nasal cannula O2 on presentation  · Wean O2 as tolerated  · Goal SpO2 > 90%    Type 2 diabetes mellitus without complication, without long-term current use of insulin ([facility])  Assessment & Plan  Lab Results   Component Value Date    HGBA1C 6 2 (H) 11/22/2021       No results for input(s): POCGLU in the last 72 hours  Blood Sugar Average: Last 72 hrs:     Hold home oral hypoglycemic regimen  SSI algorithm 1 TID AC & qHS      VTE Prophylaxis: Heparin  Code Status: Level 1 Full Code    Anticipated Length of Stay:  Patient will be admitted on an Observation basis with an anticipated length of stay of  < 2 midnights  Justification for Hospital Stay:  COVID -19 viral pneumonia    Total Time for Visit, including Counseling / Coordination of Care: 60 minutes  Greater than 50% of this total time spent on direct patient counseling and coordination of care  Chief Complaint:  Fevers/chills    History of Present Illness:    Karyle Hermanns is a [de-identified] y o  female with a past medical history significant for type 2 diabetes mellitus who presents with complaints of fevers, chills, cough, shortness of breath  The patient is unvaccinated against COVID-19  She tested positive in the ED on 12/16/2021 for COVID -19   She was found to be hypoxic to 88% on room air which improved with 2 L nasal cannula O2 supplementation  She was admitted under observation for further evaluation and treatment of COVID -19 viral pneumonia  Review of Systems:    Review of Systems   Constitutional: Negative for chills and fever  HENT: Negative for ear pain and sore throat  Eyes: Negative for pain and visual disturbance  Respiratory: Positive for cough and shortness of breath  Cardiovascular: Negative for chest pain and palpitations  Gastrointestinal: Negative for abdominal pain and vomiting  Genitourinary: Negative for dysuria and hematuria  Musculoskeletal: Negative for arthralgias and back pain  Skin: Negative for color change and rash  Neurological: Negative for seizures and syncope  All other systems reviewed and are negative  Past Medical and Surgical History:     Past Medical History:   Diagnosis Date    Diabetes mellitus ([facility] [address] )     Diverticulitis     Postherpetic neuralgia        Past Surgical History:   Procedure Laterality Date    CHOLECYSTECTOMY      COLON SURGERY      HERNIA REPAIR      ROTATOR CUFF REPAIR Right     VARICOSE VEIN SURGERY      Impression:  2/12/16 Kristie Coffman       Meds/Allergies:    Prior to Admission medications    Medication Sig Start Date End Date Taking?  Authorizing Provider   ALPRAZolam Erling Le) 0 25 mg tablet Take 1 tablet (0 25 mg total) by mouth daily at bedtime as needed for anxiety 1/7/21   Twilla Self, DO   calcium carbonate (TUMS) 500 mg chewable tablet Chew 1 tablet    Historical Provider, MD   cholecalciferol (VITAMIN D3) 25 mcg (1,000 units) tablet daily    Historical Provider, MD   dicyclomine (BENTYL) 10 mg capsule Take 1 capsule (10 mg total) by mouth 3 (three) times a day before meals  Patient not taking: Reported on 6/4/2020 4/1/19   Twilla Self, DO   enalapril (VASOTEC) 10 mg tablet Take 1 tablet (10 mg total) by mouth daily 9/13/21   Twilla Self, DO   ferrous sulfate 325 (65 Fe) mg tablet Take 325 mg by mouth    Historical Provider, MD   gemfibrozil (LOPID) 600 mg tablet Take 1 tablet (600 mg total) by mouth 2 (two) times a day  Patient not taking: Reported on 6/9/2021 3/8/21   Re Guerrero DO   gemfibrozil (LOPID) 600 mg tablet Take 1 tablet (600 mg total) by mouth 2 (two) times a day  Patient not taking: Reported on 12/2/2021 6/9/21   Re Guerrero DO   glipiZIDE (GLUCOTROL XL) 10 mg 24 hr tablet Take 1 tablet (10 mg total) by mouth daily 12/2/21   Re Guerrero, DO   HYDROcodone-acetaminophen (NORCO) 5-325 mg per tablet hydrocodone 5 mg-acetaminophen 500 mg tablet    Historical Provider, MD   lovastatin (MEVACOR) 40 MG tablet Take 1 tablet (40 mg total) by mouth daily at bedtime 9/13/21   Re Guerrero, DO   metFORMIN (GLUCOPHAGE) 1000 MG tablet Take 1 tablet (1,000 mg total) by mouth 2 (two) times a day 12/2/21   Re Guerrero,    Multiple Vitamin (multivitamin) tablet Take 1 tablet by mouth    Historical Provider, MD   ofloxacin (OCUFLOX) 0 3 % ophthalmic solution instill 1 drop into left eye four times a day START 3 DAYS PRIOR      (REFER TO PRESCRIPTION NOTES)  3/3/20   Historical Provider, MD   prednisoLONE acetate (PRED FORTE) 1 % ophthalmic suspension instill 1 drop into left eye every 2 hours TO BE STARTED AFTER THE SURGERY IS COMPLETED  Patient not taking: Reported on 8/30/2021 3/3/20   Historical Provider, MD   sertraline (ZOLOFT) 50 mg tablet Take 1 tablet (50 mg total) by mouth daily  Patient not taking: Reported on 8/30/2021 1/7/21   Re Guerrero DO   sitaGLIPtin (Januvia) 100 mg tablet Take 1 tablet (100 mg total) by mouth daily 12/2/21   Re Guerrero, DO   triamcinolone (KENALOG) 0 1 % cream Apply topically 2 (two) times a day 11/29/17   Historical Provider, MD       Allergies:    Allergies   Allergen Reactions    Ciprofloxacin GI Intolerance and Headache     Confusion, arm weakness, dizziness, headache    Meperidine GI Intolerance, Hallucinations and Other (See Comments)     Demarol  Reaction Date:Unknown    Propoxyphene GI Intolerance       Social History:     Marital Status: /Civil Union   Substance Use History:   Social History     Substance and Sexual Activity   Alcohol Use Yes    Comment: Rarely     Social History     Tobacco Use   Smoking Status Never Smoker   Smokeless Tobacco Never Used     Social History     Substance and Sexual Activity   Drug Use No       Family History:    non-contributory    Physical Exam:     Vitals:   Blood Pressure: 142/68 (12/16/21 1659)  Pulse: 105 (12/16/21 1659)  Temperature: (!) 102 2 °F (39 °C) (12/16/21 1659)  Temp Source: Oral (12/16/21 1659)  Respirations: 18 (12/16/21 1659)  SpO2: 96 % (12/16/21 1659)    Physical Exam  Vitals and nursing note reviewed  Constitutional:       General: She is not in acute distress  Appearance: She is well-developed  HENT:      Head: Normocephalic and atraumatic  Eyes:      Conjunctiva/sclera: Conjunctivae normal    Cardiovascular:      Rate and Rhythm: Normal rate and regular rhythm  Heart sounds: No murmur heard  Pulmonary:      Effort: Pulmonary effort is normal  No respiratory distress  Breath sounds: Normal breath sounds  Abdominal:      Palpations: Abdomen is soft  Tenderness: There is no abdominal tenderness  Musculoskeletal:      Cervical back: Neck supple  Skin:     General: Skin is warm and dry  Neurological:      Mental Status: She is alert            Additional Data:     Lab Results: I have reviewed pertinent results     Results from last 7 days   Lab Units 12/16/21  1707   WBC Thousand/uL 4 13*   HEMOGLOBIN g/dL 10 8*   HEMATOCRIT % 33 7*   PLATELETS Thousands/uL 158   NEUTROS PCT % 77*   LYMPHS PCT % 18   MONOS PCT % 5   EOS PCT % 0     Results from last 7 days   Lab Units 12/16/21  1707   SODIUM mmol/L 139   POTASSIUM mmol/L 3 5   CHLORIDE mmol/L 104   CO2 mmol/L 24   BUN mg/dL 24   CREATININE mg/dL 1 56*   ANION GAP mmol/L 11   CALCIUM mg/dL 8 9   ALBUMIN g/dL 4 4   TOTAL BILIRUBIN mg/dL 0 36   ALK PHOS U/L 78   ALT U/L 11   AST U/L 20   GLUCOSE RANDOM mg/dL 57*     Results from last 7 days   Lab Units 12/16/21  1707   INR  1 12             Results from last 7 days   Lab Units 12/16/21  1707   LACTIC ACID mmol/L 0 5       Imaging: I have reviewed pertinent imaging     XR chest 1 view portable    (Results Pending)       EKG, Pathology, and Other Studies Reviewed on Admission:   · EKG: Reviewed     Allscripts / Epic Records Reviewed    ** Please Note: This note has been constructed using a voice recognition system   **

## 2021-12-16 NOTE — ASSESSMENT & PLAN NOTE
Unvaccinated  COVID-19 positive on 12/16/2021  Endorses cough, shortness of breath  Requiring 2 L nasal cannula O2    · Remdesivir  · Decadron  · Heparin DVT ppx  · Pravastatin  · Incentive spirometry  · Ambulate patient  · Encourage self-proning

## 2021-12-16 NOTE — ASSESSMENT & PLAN NOTE
Lab Results   Component Value Date    HGBA1C 6 2 (H) 11/22/2021       No results for input(s): POCGLU in the last 72 hours      Blood Sugar Average: Last 72 hrs:     Hold home oral hypoglycemic regimen  SSI algorithm 1 TID AC & qHS

## 2021-12-17 LAB
ANION GAP SERPL CALCULATED.3IONS-SCNC: 11 MMOL/L (ref 4–13)
ATRIAL RATE: 101 BPM
BASOPHILS # BLD AUTO: 0.02 THOUSANDS/ΜL (ref 0–0.1)
BASOPHILS NFR BLD AUTO: 1 % (ref 0–1)
BUN SERPL-MCNC: 23 MG/DL (ref 5–25)
CALCIUM SERPL-MCNC: 8.9 MG/DL (ref 8.4–10.2)
CHLORIDE SERPL-SCNC: 107 MMOL/L (ref 96–108)
CO2 SERPL-SCNC: 21 MMOL/L (ref 21–32)
CREAT SERPL-MCNC: 1.39 MG/DL (ref 0.6–1.3)
EOSINOPHIL # BLD AUTO: 0 THOUSAND/ΜL (ref 0–0.61)
EOSINOPHIL NFR BLD AUTO: 0 % (ref 0–6)
ERYTHROCYTE [DISTWIDTH] IN BLOOD BY AUTOMATED COUNT: 13.2 % (ref 11.6–15.1)
GFR SERPL CREATININE-BSD FRML MDRD: 35 ML/MIN/1.73SQ M
GLUCOSE P FAST SERPL-MCNC: 110 MG/DL (ref 65–99)
GLUCOSE SERPL-MCNC: 106 MG/DL (ref 65–140)
GLUCOSE SERPL-MCNC: 110 MG/DL (ref 65–140)
GLUCOSE SERPL-MCNC: 130 MG/DL (ref 65–140)
GLUCOSE SERPL-MCNC: 90 MG/DL (ref 65–140)
GLUCOSE SERPL-MCNC: 93 MG/DL (ref 65–140)
HCT VFR BLD AUTO: 33.9 % (ref 34.8–46.1)
HGB BLD-MCNC: 10.7 G/DL (ref 11.5–15.4)
IMM GRANULOCYTES # BLD AUTO: 0.01 THOUSAND/UL (ref 0–0.2)
IMM GRANULOCYTES NFR BLD AUTO: 0 % (ref 0–2)
LYMPHOCYTES # BLD AUTO: 0.75 THOUSANDS/ΜL (ref 0.6–4.47)
LYMPHOCYTES NFR BLD AUTO: 24 % (ref 14–44)
MCH RBC QN AUTO: 27.2 PG (ref 26.8–34.3)
MCHC RBC AUTO-ENTMCNC: 31.6 G/DL (ref 31.4–37.4)
MCV RBC AUTO: 86 FL (ref 82–98)
MONOCYTES # BLD AUTO: 0.17 THOUSAND/ΜL (ref 0.17–1.22)
MONOCYTES NFR BLD AUTO: 6 % (ref 4–12)
NEUTROPHILS # BLD AUTO: 2.16 THOUSANDS/ΜL (ref 1.85–7.62)
NEUTS SEG NFR BLD AUTO: 69 % (ref 43–75)
NRBC BLD AUTO-RTO: 0 /100 WBCS
P AXIS: 14 DEGREES
PLATELET # BLD AUTO: 162 THOUSANDS/UL (ref 149–390)
PMV BLD AUTO: 9.6 FL (ref 8.9–12.7)
POTASSIUM SERPL-SCNC: 3.8 MMOL/L (ref 3.5–5.3)
PR INTERVAL: 166 MS
PROCALCITONIN SERPL-MCNC: 0.05 NG/ML
QRS AXIS: -41 DEGREES
QRSD INTERVAL: 86 MS
QT INTERVAL: 348 MS
QTC INTERVAL: 451 MS
RBC # BLD AUTO: 3.93 MILLION/UL (ref 3.81–5.12)
SODIUM SERPL-SCNC: 139 MMOL/L (ref 135–147)
T WAVE AXIS: 25 DEGREES
VENTRICULAR RATE: 101 BPM
WBC # BLD AUTO: 3.11 THOUSAND/UL (ref 4.31–10.16)

## 2021-12-17 PROCEDURE — 80048 BASIC METABOLIC PNL TOTAL CA: CPT | Performed by: INTERNAL MEDICINE

## 2021-12-17 PROCEDURE — 36415 COLL VENOUS BLD VENIPUNCTURE: CPT | Performed by: EMERGENCY MEDICINE

## 2021-12-17 PROCEDURE — 82948 REAGENT STRIP/BLOOD GLUCOSE: CPT

## 2021-12-17 PROCEDURE — 84145 PROCALCITONIN (PCT): CPT | Performed by: EMERGENCY MEDICINE

## 2021-12-17 PROCEDURE — 99232 SBSQ HOSP IP/OBS MODERATE 35: CPT | Performed by: STUDENT IN AN ORGANIZED HEALTH CARE EDUCATION/TRAINING PROGRAM

## 2021-12-17 PROCEDURE — 93010 ELECTROCARDIOGRAM REPORT: CPT | Performed by: INTERNAL MEDICINE

## 2021-12-17 PROCEDURE — 85025 COMPLETE CBC W/AUTO DIFF WBC: CPT | Performed by: INTERNAL MEDICINE

## 2021-12-17 RX ADMIN — HEPARIN SODIUM 5000 UNITS: 5000 INJECTION INTRAVENOUS; SUBCUTANEOUS at 07:42

## 2021-12-17 RX ADMIN — DEXAMETHASONE SODIUM PHOSPHATE 6 MG: 4 INJECTION, SOLUTION INTRA-ARTICULAR; INTRALESIONAL; INTRAMUSCULAR; INTRAVENOUS; SOFT TISSUE at 19:19

## 2021-12-17 RX ADMIN — FERROUS SULFATE TAB 325 MG (65 MG ELEMENTAL FE) 325 MG: 325 (65 FE) TAB at 07:48

## 2021-12-17 RX ADMIN — LISINOPRIL 20 MG: 20 TABLET ORAL at 13:55

## 2021-12-17 RX ADMIN — PRAVASTATIN SODIUM 40 MG: 20 TABLET ORAL at 16:02

## 2021-12-17 RX ADMIN — HEPARIN SODIUM 5000 UNITS: 5000 INJECTION INTRAVENOUS; SUBCUTANEOUS at 23:13

## 2021-12-17 RX ADMIN — ACETAMINOPHEN 650 MG: 325 TABLET ORAL at 20:45

## 2021-12-17 RX ADMIN — REMDESIVIR 100 MG: 100 INJECTION, POWDER, LYOPHILIZED, FOR SOLUTION INTRAVENOUS at 19:18

## 2021-12-17 RX ADMIN — HEPARIN SODIUM 5000 UNITS: 5000 INJECTION INTRAVENOUS; SUBCUTANEOUS at 13:55

## 2021-12-17 NOTE — ASSESSMENT & PLAN NOTE
Lab Results   Component Value Date    HGBA1C 6 2 (H) 11/22/2021       Recent Labs     12/16/21 1927 12/16/21 2118 12/17/21  0629   POCGLU 48* 82 90       Blood Sugar Average: Last 72 hrs:  (P) 81 [id number]   Hold home oral hypoglycemic regimen  SSI algorithm 1 TID AC & qHS

## 2021-12-17 NOTE — PROGRESS NOTES
[address]  Progress Note - Karyle Hermanns 1940, [de-identified] y o  female MRN: [id number]  Unit/Bed#: ED 20 Encounter: [id number]  Primary Care Provider: Delbert Romo DO   Date and time admitted to hospital: 12/16/2021  4:49 PM    * COVID-19 virus RNA test result positive at limit of detection  Assessment & Plan  Unvaccinated  COVID-19 positive on 12/16/2021  Endorses cough, shortness of breath  Requiring 2 L nasal cannula O2  · Remdesivir  · Decadron  · Heparin DVT ppx  · Pravastatin  · Incentive spirometry  · Ambulate patient  · Encourage self-proning    Acute respiratory failure with hypoxia ([facility] [address] )  Assessment & Plan  Secondary to COVID -19 viral pneumonia  Requiring 2 L nasal cannula O2 on presentation  · Wean O2 as tolerated  · Goal SpO2 > 90%    Type 2 diabetes mellitus without complication, without long-term current use of insulin [facility])  Assessment & Plan  Lab Results   Component Value Date    HGBA1C 6 2 (H) 11/22/2021       Recent Labs     12/16/21 1927 12/16/21 2118 12/17/21  0629   POCGLU 48* 82 90       Blood Sugar Average: Last 72 hrs:  (P) 46 [id number]   Hold home oral hypoglycemic regimen  SSI algorithm 1 TID AC & qHS        VTE Pharmacologic Prophylaxis: VTE Score: 6 High Risk (Score >/= 5) - Pharmacological DVT Prophylaxis Ordered: enoxaparin (Lovenox)  Sequential Compression Devices Ordered  Patient Centered Rounds: I performed bedside rounds with nursing staff today  Discussions with Specialists or Other Care Team Provider:  None    Education and Discussions with Family / Patient: Patient declined call to   Time Spent for Care: 45 minutes  More than 50% of total time spent on counseling and coordination of care as described above      Current Length of Stay: 0 day(s)  Current Patient Status: Inpatient   Certification Statement: The patient will continue to require additional inpatient hospital stay due to COVID-19 pneumonia  Discharge Plan: Anticipate discharge tomorrow to home  Code Status: Level 1 - Full Code    Subjective:   Patient seen and examined the bedside  Prior to being seen by me this morning patient vomiting, states he feels somewhat better than yesterday but still feels pretty sick overall  Objective:     Vitals:   Temp (24hrs), Av 2 °F (39 °C), Min:102 2 °F (39 °C), Max:102 2 °F (39 °C)    Temp:  [102 2 °F (39 °C)] 102 2 °F (39 °C)  HR:  [] 79  Resp:  [18] 18  BP: (107-142)/(54-68) 107/54  SpO2:  [96 %-97 %] 97 %  There is no height or weight on file to calculate BMI  Input and Output Summary (last 24 hours):   No intake or output data in the 24 hours ending 21 1121    Physical Exam:   Physical Exam  Cardiovascular:      Rate and Rhythm: Normal rate and regular rhythm  Pulses: Normal pulses  Pulmonary:      Comments: Coarse breath sounds throughout  2 L nasala cannula  Abdominal:      General: Abdomen is flat  Bowel sounds are normal       Palpations: Abdomen is soft  Musculoskeletal:         General: Normal range of motion  Cervical back: Normal range of motion  Skin:     General: Skin is warm and dry  Neurological:      General: No focal deficit present  Mental Status: She is alert and oriented to person, place, and time  Mental status is at baseline  Psychiatric:         Mood and Affect: Mood normal          Behavior: Behavior normal          Thought Content:  Thought content normal          Judgment: Judgment normal           Additional Data:     Labs:  Results from last 7 days   Lab Units 21  0459   WBC Thousand/uL 3 11*   HEMOGLOBIN g/dL 10 7*   HEMATOCRIT % 33 9*   PLATELETS Thousands/uL 162   NEUTROS PCT % 69   LYMPHS PCT % 24   MONOS PCT % 6   EOS PCT % 0     Results from last 7 days   Lab Units 21  0459 21  1707 21  1707   SODIUM mmol/L 139   < > 139   POTASSIUM mmol/L 3 8   < > 3 5   CHLORIDE mmol/L 107   < > 104   CO2 mmol/L 21   < > 24   BUN mg/dL 23   < > 24   CREATININE mg/dL 1 39*   < > 1 56*   ANION GAP mmol/L 11   < > 11   CALCIUM mg/dL 8 9   < > 8 9   ALBUMIN g/dL  --   --  4 4   TOTAL BILIRUBIN mg/dL  --   --  0 36   ALK PHOS U/L  --   --  78   ALT U/L  --   --  11   AST U/L  --   --  20   GLUCOSE RANDOM mg/dL 110   < > 57*    < > = values in this interval not displayed  Results from last 7 days   Lab Units 12/16/21  1707   INR  1 12     Results from last 7 days   Lab Units 12/17/21  0629 12/16/21  2118 12/16/21  1927   POC GLUCOSE mg/dl 90 82 48*         Results from last 7 days   Lab Units 12/17/21  0459 12/16/21  1707   LACTIC ACID mmol/L  --  0 5   PROCALCITONIN ng/ml 0 05 <0 05       Lines/Drains:  Invasive Devices  Report    Peripheral Intravenous Line            Peripheral IV 12/16/21 Left Antecubital <1 day                      Imaging: No pertinent imaging reviewed  Recent Cultures (last 7 days):   Results from last 7 days   Lab Units 12/16/21  1708 12/16/21  1707   BLOOD CULTURE  Received in Microbiology Lab  Culture in Progress  Received in Microbiology Lab  Culture in Progress         Last 24 Hours Medication List:   Current Facility-Administered Medications   Medication Dose Route Frequency Provider Last Rate    acetaminophen  650 mg Oral Q4H PRN Phyllis Earnest, DO      ALPRAZolam  0 25 mg Oral HS PRN Phyllis Earnest, DO      dexamethasone  6 mg Intravenous Q24H Phyllis Earnest, DO      ferrous sulfate  325 mg Oral Daily With Breakfast Phyllis Earnest, DO      heparin (porcine)  5,000 Units Subcutaneous [facility] Phyllis Earnest, DO      insulin lispro  1-5 Units Subcutaneous TID AC Phyllis Earnest, DO      insulin lispro  1-5 Units Subcutaneous HS Phyllis Earnest, DO      lisinopril  20 mg Oral Daily Phyllis Earnest, DO      ondansetron  4 mg Intravenous Q6H PRN Phyllis Earnest, DO      pravastatin  40 mg Oral Daily With [employer], DO      remdesivir  100 mg Intravenous Q24H Phyllis Earnest, DO Today, Patient Was Seen By: Francheska Kaet DO    **Please Note: This note may have been constructed using a voice recognition system  **

## 2021-12-17 NOTE — UTILIZATION REVIEW
Initial Clinical Review    PLEASE NOTE: Patient admitted to Observation 12/16 @ [id number];  Converted to IP 12/17 @ 1120 d/t   COVID Pneumonia  Admission: Date/Time/Statement:   Start   Ordered   12/17/21 1120  Inpatient Admission  Once        Transfer Service: Hospitalist       Question Answer Comment   Level of Care Med Surg    Estimated length of stay More than 2 Midnights    Certification I certify that inpatient services are medically necessary for this patient for a duration of greater than two midnights  See H&P and MD Progress Notes for additional information about the patient's course of treatment  12/17/21 1120       ED Arrival Information     Expected Arrival Acuity    - 12/16/2021 16:43 Urgent         Means of arrival Escorted by Service Admission type    Ambulance [facility] Hospitalist Urgent         Arrival complaint    [address]        Chief Complaint   Patient presents with   [facility] Medical Problem     Patient arrived via EMS, per EMS patient Covid positive 88% on room air upon EMS arrival        Initial Presentation: [de-identified] y o  female with Hx of type 2 DM  presents to ED via EMS from home with c/o  fevers, chills, cough, SOB x 3 days  The patient is unvaccinated against COVID-19  Per EMS RA sat 88%  Tested + for COVID in ED  Febril, tachycardic  Cr 1 56, Glu 57,  Hg 10 8, elevated ferritin, CRP, D-dimer  CXR shows Low lung volumes with left basilar patchy density could reflect developing infiltrate  Patient requiring 2 L NC while in the ED after oxygen dropped to 88%  Admitted to Observation COVID -19 viral pneumonia, Acute Respiratory Failure w/ hypoxia and Plan is for supplemental O2, IV decadron & remdesivir, IS, self proning  Date: 12/17  Day 2: Vomitted this am - states feels somewhat better than yesterday but still feels pretty sick overall  Initially requiring O2 @ 2 L - weaned to RA   Continue decadron & remdesivir    ED Triage Vitals   Temperature Pulse Respirations Blood Pressure SpO2   12/16/21 1659 12/16/21 1659 12/16/21 1659 12/16/21 1659 12/16/21 1659   (!) 102 2 °F (39 °C) 105 18 142/68 96 %      Temp Source Heart Rate Source Patient Position - Orthostatic VS BP Location FiO2 (%)   12/16/21 1659 12/16/21 1659 12/16/21 1659 12/16/21 1659 --   Oral Monitor Lying Left arm       Pain Score       12/16/21 1748       No Pain          Wt Readings from Last 1 Encounters:   12/02/21 55 8 kg (123 lb)     Additional Vital Signs:   12/17/21 1530 -- 97 -- 147/72 104 95 % -- -- -- --   12/17/21 1500 -- 87 -- 162/72 103 94 % -- -- -- --   12/17/21 1330 -- 88 -- 168/70 101 96 % -- -- -- --   12/17/21 1230 -- 80 17 139/65 94 97 % -- -- -- --   12/17/21 1229 -- 79 -- 139/65 -- 97 % -- -- None (Room air) Lying   12/16/21 2230 -- 79 18 107/54 78 97 % 28 2 L/min Nasal cannula Lying   12/16/21 2030 -- 84 18 134/63 91 96 % 28 2 L/min Nasal cannula Lying   12/16/21 1830 -- 100 18 120/60 84 96 % 28 2 L/min Nasal cannula Lying         Pertinent Labs/Diagnostic Test Results:   Results from last 7 days   Lab Units 12/16/21  1707   SARS-COV-2  Positive*     Results from last 7 days   Lab Units 12/17/21  0459 12/16/21  1926 12/16/21  1707   WBC Thousand/uL 3 11*  --  4 13*   HEMOGLOBIN g/dL 10 7*  --  10 8*   HEMATOCRIT % 33 9*  --  33 7*   PLATELETS Thousands/uL 162 143* 158   NEUTROS ABS Thousands/µL 2 16  --  3 18     Results from last 7 days   Lab Units 12/17/21  0459 12/16/21  1707   SODIUM mmol/L 139 139   POTASSIUM mmol/L 3 8 3 5   CHLORIDE mmol/L 107 104   CO2 mmol/L 21 24   ANION GAP mmol/L 11 11   BUN mg/dL 23 24   CREATININE mg/dL 1 39* 1 56*   EGFR ml/min/1 73sq m 35 31   CALCIUM mg/dL 8 9 8 9   CALCIUM, IONIZED mmol/L  --  1 08*   MAGNESIUM mg/dL  --  1 9     Results from last 7 days   Lab Units 12/16/21  1707   AST U/L 20   ALT U/L 11   ALK PHOS U/L 78   TOTAL PROTEIN g/dL 7 9   ALBUMIN g/dL 4 4   TOTAL BILIRUBIN mg/dL 0 36     Results from last 7 days   Lab Units 12/17/21  5574 12/16/21 2118 12/16/21  1927   POC GLUCOSE mg/dl 90 82 48*     Results from last 7 days   Lab Units 12/17/21  0459 12/16/21  1707   GLUCOSE RANDOM mg/dL 110 57*     Results from last 7 days   Lab Units 12/16/21  1707   CK TOTAL U/L 137   CK MB INDEX % 0 4   CK MB ng/mL 0 6     Results from last 7 days   Lab Units 12/16/21 2128 12/16/21  1926 12/16/21  1707   HS TNI 0HR ng/L  --   --  16   HS TNI 2HR ng/L  --  13  --    HSTNI D2 ng/L  --  -3  --    HS TNI 4HR ng/L 12  --   --    HSTNI D4 ng/L -4  --   --      Results from last 7 days   Lab Units 12/16/21  1707   D-DIMER QUANTITATIVE ug/ml FEU 1 21*     Results from last 7 days   Lab Units 12/16/21  1707   PROTIME seconds 14 3   INR  1 12     Results from last 7 days   Lab Units 12/16/21  1707   PROCALCITONIN ng/ml <0 05     Results from last 7 days   Lab Units 12/16/21  1707   LACTIC ACID mmol/L 0 5     Results from last 7 days   Lab Units 12/16/21  1707   BNP pg/mL 51     Results from last 7 days   Lab Units 12/16/21  1707   FERRITIN ng/mL 424*     Results from last 7 days   Lab Units 12/16/21  1707   CRP mg/L 90 9*     Results from last 7 days   Lab Units 12/16/21  1707   INFLUENZA A PCR  Negative   INFLUENZA B PCR  Negative   RSV PCR  Negative     Results from last 7 days   Lab Units 12/16/21  1708 12/16/21  1707   BLOOD CULTURE  Received in Microbiology Lab  Culture in Progress  Received in Microbiology Lab  Culture in Progress       12/16 CXR: Low lung volumes with left basilar patchy density could reflect developing infiltrate    ED Treatment:   Medication Administration from 12/16/2021 1643 to 12/17/2021 2839       Date/Time Order Dose Route Action     12/16/2021 1712 sodium chloride 0 9 % bolus 1,000 mL 1,000 mL Intravenous New Bag     12/16/2021 1712 acetaminophen (TYLENOL) tablet 975 mg 975 mg Oral Given     12/16/2021 1828 calcium gluconate 1 g in sodium chloride 0 9% 50 mL (premix) 1 g Intravenous New Bag     12/17/2021 0753 ferrous sulfate tablet 325 mg 325 mg Oral Given     12/16/2021 1826 pravastatin (PRAVACHOL) tablet 40 mg 40 mg Oral Given     12/17/2021 0742 heparin (porcine) subcutaneous injection 5,000 Units 5,000 Units Subcutaneous Given     12/16/2021 2128 heparin (porcine) subcutaneous injection 5,000 Units 5,000 Units Subcutaneous Given     12/16/2021 1827 dexamethasone (DECADRON) injection 6 mg 6 mg Intravenous Given     12/16/2021 2015 remdesivir (Veklury) 200 mg in sodium chloride 0 9 % 290 mL IVPB 200 mg Intravenous Given        Past Medical History:   Diagnosis Date    Diabetes mellitus ([facility] [address] )     Diverticulitis     Postherpetic neuralgia      Present on Admission:   COVID-19 virus RNA test result positive at limit of detection   Type 2 diabetes mellitus without complication, without long-term current use of insulin (HCC)   Acute respiratory failure with hypoxia (HCC)      Admitting Diagnosis: COVID-19 virus RNA test result positive at limit of detection [U07 1]  Shortness of breath [R06 02]  Age/Sex: [de-identified] y o  female     Admission Orders:  Scheduled Medications:  dexamethasone, 6 mg, Intravenous, Q24H  ferrous sulfate, 325 mg, Oral, Daily With Breakfast  heparin (porcine), 5,000 Units, Subcutaneous, Q8H [facility]  insulin lispro, 1-5 Units, Subcutaneous, TID AC  insulin lispro, 1-5 Units, Subcutaneous, HS  lisinopril, 20 mg, Oral, Daily  pravastatin, 40 mg, Oral, Daily With Dinner  remdesivir, 100 mg, Intravenous, Q24H    Continuous IV Infusions:     PRN Meds:  acetaminophen, 650 mg, Oral, Q4H PRN  ALPRAZolam, 0 25 mg, Oral, HS PRN  ondansetron, 4 mg, Intravenous, Q6H PRN    IS q1h w/a  SCDS    Network Utilization Review Department  ATTENTION: Please call with any questions or concerns to [phone] and carefully listen to the prompts so that you are directed to the right person   All voicemails are confidential   Prajapati Marilin all requests for admission clinical reviews, approved or denied determinations and any other requests to dedicated fax number below belonging to the campus where the patient is receiving treatment   List of dedicated fax numbers for the Facilities:  [address] [place] [address] DENIALS (Administrative/Medical Necessity) [phone]   [address] (Maternity/NICU/Pediatrics) [phone]   [address]  [address] [facility] [place] [address] [address] [facility] [place] [address] [phone]

## 2021-12-18 LAB
ALBUMIN SERPL BCP-MCNC: 3.8 G/DL (ref 3.5–5)
ALP SERPL-CCNC: 65 U/L (ref 34–104)
ALT SERPL W P-5'-P-CCNC: 12 U/L (ref 7–52)
ANION GAP SERPL CALCULATED.3IONS-SCNC: 15 MMOL/L (ref 4–13)
AST SERPL W P-5'-P-CCNC: 25 U/L (ref 13–39)
BILIRUB SERPL-MCNC: 0.39 MG/DL (ref 0.2–1)
BUN SERPL-MCNC: 33 MG/DL (ref 5–25)
CALCIUM SERPL-MCNC: 8.7 MG/DL (ref 8.4–10.2)
CHLORIDE SERPL-SCNC: 105 MMOL/L (ref 96–108)
CO2 SERPL-SCNC: 17 MMOL/L (ref 21–32)
CREAT SERPL-MCNC: 1.48 MG/DL (ref 0.6–1.3)
CRP SERPL QL: 109 MG/L
ERYTHROCYTE [DISTWIDTH] IN BLOOD BY AUTOMATED COUNT: 13.1 % (ref 11.6–15.1)
GFR SERPL CREATININE-BSD FRML MDRD: 33 ML/MIN/1.73SQ M
GLUCOSE SERPL-MCNC: 191 MG/DL (ref 65–140)
GLUCOSE SERPL-MCNC: 207 MG/DL (ref 65–140)
GLUCOSE SERPL-MCNC: 221 MG/DL (ref 65–140)
GLUCOSE SERPL-MCNC: 225 MG/DL (ref 65–140)
GLUCOSE SERPL-MCNC: 246 MG/DL (ref 65–140)
HCT VFR BLD AUTO: 32.8 % (ref 34.8–46.1)
HGB BLD-MCNC: 10.7 G/DL (ref 11.5–15.4)
MCH RBC QN AUTO: 27.6 PG (ref 26.8–34.3)
MCHC RBC AUTO-ENTMCNC: 32.6 G/DL (ref 31.4–37.4)
MCV RBC AUTO: 85 FL (ref 82–98)
PLATELET # BLD AUTO: 156 THOUSANDS/UL (ref 149–390)
PMV BLD AUTO: 9.5 FL (ref 8.9–12.7)
POTASSIUM SERPL-SCNC: 3.7 MMOL/L (ref 3.5–5.3)
PROT SERPL-MCNC: 7 G/DL (ref 6.4–8.4)
RBC # BLD AUTO: 3.87 MILLION/UL (ref 3.81–5.12)
SODIUM SERPL-SCNC: 137 MMOL/L (ref 135–147)
WBC # BLD AUTO: 3.32 THOUSAND/UL (ref 4.31–10.16)

## 2021-12-18 PROCEDURE — 85027 COMPLETE CBC AUTOMATED: CPT | Performed by: STUDENT IN AN ORGANIZED HEALTH CARE EDUCATION/TRAINING PROGRAM

## 2021-12-18 PROCEDURE — 99232 SBSQ HOSP IP/OBS MODERATE 35: CPT | Performed by: STUDENT IN AN ORGANIZED HEALTH CARE EDUCATION/TRAINING PROGRAM

## 2021-12-18 PROCEDURE — 86140 C-REACTIVE PROTEIN: CPT | Performed by: STUDENT IN AN ORGANIZED HEALTH CARE EDUCATION/TRAINING PROGRAM

## 2021-12-18 PROCEDURE — 36415 COLL VENOUS BLD VENIPUNCTURE: CPT | Performed by: STUDENT IN AN ORGANIZED HEALTH CARE EDUCATION/TRAINING PROGRAM

## 2021-12-18 PROCEDURE — 82948 REAGENT STRIP/BLOOD GLUCOSE: CPT

## 2021-12-18 PROCEDURE — 80053 COMPREHEN METABOLIC PANEL: CPT | Performed by: STUDENT IN AN ORGANIZED HEALTH CARE EDUCATION/TRAINING PROGRAM

## 2021-12-18 RX ADMIN — DEXAMETHASONE SODIUM PHOSPHATE 6 MG: 4 INJECTION, SOLUTION INTRA-ARTICULAR; INTRALESIONAL; INTRAMUSCULAR; INTRAVENOUS; SOFT TISSUE at 18:07

## 2021-12-18 RX ADMIN — REMDESIVIR 100 MG: 100 INJECTION, POWDER, LYOPHILIZED, FOR SOLUTION INTRAVENOUS at 19:00

## 2021-12-18 RX ADMIN — LISINOPRIL 20 MG: 20 TABLET ORAL at 09:12

## 2021-12-18 RX ADMIN — FERROUS SULFATE TAB 325 MG (65 MG ELEMENTAL FE) 325 MG: 325 (65 FE) TAB at 09:12

## 2021-12-18 RX ADMIN — INSULIN LISPRO 1 UNITS: 100 INJECTION, SOLUTION INTRAVENOUS; SUBCUTANEOUS at 13:51

## 2021-12-18 RX ADMIN — INSULIN LISPRO 1 UNITS: 100 INJECTION, SOLUTION INTRAVENOUS; SUBCUTANEOUS at 17:58

## 2021-12-18 RX ADMIN — INSULIN LISPRO 1 UNITS: 100 INJECTION, SOLUTION INTRAVENOUS; SUBCUTANEOUS at 21:36

## 2021-12-18 RX ADMIN — HEPARIN SODIUM 5000 UNITS: 5000 INJECTION INTRAVENOUS; SUBCUTANEOUS at 21:35

## 2021-12-18 RX ADMIN — ONDANSETRON 4 MG: 2 INJECTION INTRAMUSCULAR; INTRAVENOUS at 18:43

## 2021-12-18 RX ADMIN — INSULIN LISPRO 2 UNITS: 100 INJECTION, SOLUTION INTRAVENOUS; SUBCUTANEOUS at 09:14

## 2021-12-18 RX ADMIN — PRAVASTATIN SODIUM 40 MG: 20 TABLET ORAL at 18:06

## 2021-12-18 RX ADMIN — HEPARIN SODIUM 5000 UNITS: 5000 INJECTION INTRAVENOUS; SUBCUTANEOUS at 05:15

## 2021-12-18 RX ADMIN — HEPARIN SODIUM 5000 UNITS: 5000 INJECTION INTRAVENOUS; SUBCUTANEOUS at 13:53

## 2021-12-18 NOTE — ASSESSMENT & PLAN NOTE
Unvaccinated  COVID-19 positive on 12/16/2021  Endorses cough, shortness of breath     · Remdesivir  · Decadron  · Heparin DVT ppx  · Pravastatin  · Incentive spirometry  · Ambulate patient  · Encourage self-proning

## 2021-12-18 NOTE — UTILIZATION REVIEW
Inpatient Admission Authorization Request   NOTIFICATION OF INPATIENT ADMISSION/INPATIENT AUTHORIZATION REQUEST   SERVICING FACILITY:   [place] [address]  [address] [facility] [address]ison Wheeler, [address]  Tax ID: [id number]  NPI: [id number]  Place of Service: Inpatient [address] [facility]  [address]  Place of Service Code: 24     ATTENDING PROVIDER:  Attending Name and NPI#: Bridget Frederick [[id number]]  Address: [address] [facility] [address], [address]  Phone: [phone]     UTILIZATION REVIEW CONTACT:  Kaylee Berg, Utilization Review Supervisor  Network Utilization Review Department  Phone: [phone]  Fax [phone]  Email: [email or web address]     PHYSICIAN ADVISORY SERVICES:  FOR XZSN-PI-FNKI REVIEW - MEDICAL NECESSITY DENIAL  Phone: [phone]  Fax: [phone]  Email: [email or web address]     TYPE OF REQUEST:  Inpatient Status     ADMISSION INFORMATION:  ADMISSION DATE/TIME: 12/17/21 11:20 AM  PATIENT DIAGNOSIS CODE/DESCRIPTION:  COVID-19 virus RNA test result positive at limit of detection [U07 1]  Shortness of breath [R06 02]  DISCHARGE DATE/TIME: No discharge date for patient encounter  DISCHARGE DISPOSITION (IF DISCHARGED): Home/Self Care     IMPORTANT INFORMATION:  Please contact the Kaylee Berg directly with any questions or concerns regarding this request  Department voicemails are confidential     Send requests for admission clinical reviews, concurrent reviews, approvals, and administrative denials due to lack of clinical to fax [phone]

## 2021-12-18 NOTE — PLAN OF CARE
Problem: Potential for Falls  Goal: Patient will remain free of falls  Description: INTERVENTIONS:  - Educate patient/family on patient safety including physical limitations  - Instruct patient to call for assistance with activity   - Consult OT/PT to assist with strengthening/mobility   - Keep Call bell within reach  - Keep bed low and locked with side rails adjusted as appropriate  - Keep care items and personal belongings within reach  - Initiate and maintain comfort rounds  - Make Fall Risk Sign visible to staff  - Offer Toileting every  Hours, in advance of need  - Initiate/Maintain alarm  - Obtain necessary fall risk management equipment:   - Apply yellow socks and bracelet for high fall risk patients  - Consider moving patient to room near nurses station  Outcome: Progressing     Problem: PAIN - ADULT  Goal: Verbalizes/displays adequate comfort level or baseline comfort level  Description: Interventions:  - Encourage patient to monitor pain and request assistance  - Assess pain using appropriate pain scale  - Administer analgesics based on type and severity of pain and evaluate response  - Implement non-pharmacological measures as appropriate and evaluate response  - Consider cultural and social influences on pain and pain management  - Notify physician/advanced practitioner if interventions unsuccessful or patient reports new pain  Outcome: Progressing     Problem: INFECTION - ADULT  Goal: Absence or prevention of progression during hospitalization  Description: INTERVENTIONS:  - Assess and monitor for signs and symptoms of infection  - Monitor lab/diagnostic results  - Monitor all insertion sites, i e  indwelling lines, tubes, and drains  - Monitor endotracheal if appropriate and nasal secretions for changes in amount and color  - [place] appropriate cooling/warming therapies per order  - Administer medications as ordered  - Instruct and encourage patient and family to use good hand hygiene technique  - Identify and instruct in appropriate isolation precautions for identified infection/condition  Outcome: Progressing  Goal: Absence of fever/infection during neutropenic period  Description: INTERVENTIONS:  - Monitor WBC    Outcome: Progressing     Problem: SAFETY ADULT  Goal: Patient will remain free of falls  Description: INTERVENTIONS:  - Educate patient/family on patient safety including physical limitations  - Instruct patient to call for assistance with activity   - Consult OT/PT to assist with strengthening/mobility   - Keep Call bell within reach  - Keep bed low and locked with side rails adjusted as appropriate  - Keep care items and personal belongings within reach  - Initiate and maintain comfort rounds  - Make Fall Risk Sign visible to staff  - Offer Toileting every  Hours, in advance of need  - Initiate/Maintain alarm  - Obtain necessary fall risk management equipment:   - Apply yellow socks and bracelet for high fall risk patients  - Consider moving patient to room near nurses station  Outcome: Progressing  Goal: Maintain or return to baseline ADL function  Description: INTERVENTIONS:  -  Assess patient's ability to carry out ADLs; assess patient's baseline for ADL function and identify physical deficits which impact ability to perform ADLs (bathing, care of mouth/teeth, toileting, grooming, dressing, etc )  - Assess/evaluate cause of self-care deficits   - Assess range of motion  - Assess patient's mobility; develop plan if impaired  - Assess patient's need for assistive devices and provide as appropriate  - Encourage maximum independence but intervene and supervise when necessary  - Involve family in performance of ADLs  - Assess for home care needs following discharge   - Consider OT consult to assist with ADL evaluation and planning for discharge  - Provide patient education as appropriate  Outcome: Progressing  Goal: Maintains/Returns to pre admission functional level  Description: INTERVENTIONS:  - Perform BMAT or MOVE assessment daily    - Set and communicate daily mobility goal to care team and patient/family/caregiver  - Collaborate with rehabilitation services on mobility goals if consulted  - Perform Range of Motion  times a day  - Reposition patient every  hours  - Dangle patient  times a day  - Stand patient  times a day  - Ambulate patient  times a day  - Out of bed to chair  times a day   - Out of bed for meals times a day  - Out of bed for toileting  - Record patient progress and toleration of activity level   Outcome: Progressing     Problem: DISCHARGE PLANNING  Goal: Discharge to home or other facility with appropriate resources  Description: INTERVENTIONS:  - Identify barriers to discharge w/patient and caregiver  - Arrange for needed discharge resources and transportation as appropriate  - Identify discharge learning needs (meds, wound care, etc )  - Arrange for interpretive services to assist at discharge as needed  - Refer to Case Management Department for coordinating discharge planning if the patient needs post-hospital services based on physician/advanced practitioner order or complex needs related to functional status, cognitive ability, or social support system  Outcome: Progressing     Problem: Knowledge Deficit  Goal: Patient/family/caregiver demonstrates understanding of disease process, treatment plan, medications, and discharge instructions  Description: Complete learning assessment and assess knowledge base    Interventions:  - Provide teaching at level of understanding  - Provide teaching via preferred learning methods  Outcome: Progressing

## 2021-12-18 NOTE — PROGRESS NOTES
[address]  Progress Note - Mariajose Rolle 1940, [de-identified] y o  female MRN: [id number]  Unit/Bed#: ED 20 Encounter: [id number]  Primary Care Provider: Cuba Muro DO   Date and time admitted to hospital: 12/16/2021  4:49 PM    * COVID-19 virus RNA test result positive at limit of detection  Assessment & Plan  Unvaccinated  COVID-19 positive on 12/16/2021  Endorses cough, shortness of breath  · Remdesivir  · Decadron  · Heparin DVT ppx  · Pravastatin  · Incentive spirometry  · Ambulate patient  · Encourage self-proning    Acute respiratory failure with hypoxia ([address] )  Assessment & Plan  Secondary to COVID -19 viral pneumonia  Requiring 2 L nasal cannula O2 on presentation  · Wean O2 as tolerated  · Goal SpO2 > 90%    Type 2 diabetes mellitus without complication, without long-term current use of insulin [facility])  Assessment & Plan  Lab Results   Component Value Date    HGBA1C 6 2 (H) 11/22/2021       Recent Labs     12/17/21  1230 12/17/21  1522 12/17/21  2119 12/18/21  0724   POCGLU 93 106 130 225*       Blood Sugar Average: Last 72 hrs:  (P) 110 [id number]   Hold home oral hypoglycemic regimen  SSI algorithm 1 TID AC & qHS          VTE Pharmacologic Prophylaxis: VTE Score: 6 High Risk (Score >/= 5) - Pharmacological DVT Prophylaxis Ordered: enoxaparin (Lovenox)  Sequential Compression Devices Ordered  Patient Centered Rounds: I performed bedside rounds with nursing staff today  Discussions with Specialists or Other Care Team Provider: None    Education and Discussions with Family / Patient: Updated  (daughter) via phone  Time Spent for Care: 45 minutes  More than 50% of total time spent on counseling and coordination of care as described above      Current Length of Stay: 1 day(s)  Current Patient Status: Inpatient   Certification Statement: The patient will continue to require additional inpatient hospital stay due to COVID-19 pneumonia  Discharge Plan: Anticipate discharge in 24-48 hrs to home  Code Status: Level 1 - Full Code    Subjective:   Patient seen and examined at the bedside  Patient resting comfortably in bed in no apparent distress  Patient saturating 90% on room air, with desaturations with minimal exertion  Objective:     Vitals:   Temp (24hrs), Av 2 °F (37 3 °C), Min:98 °F (36 7 °C), Max:102 2 °F (39 °C)    Temp:  [98 °F (36 7 °C)-102 2 °F (39 °C)] 98 °F (36 7 °C)  HR:  [63-97] 63  Resp:  [16-19] 16  BP: (116-168)/(57-77) 129/61  SpO2:  [93 %-98 %] 98 %  Body mass index is 23 63 kg/m²  Input and Output Summary (last 24 hours): Intake/Output Summary (Last 24 hours) at 2021 1100  Last data filed at 2021  Gross per 24 hour   Intake 500 ml   Output --   Net 500 ml       Physical Exam:   Physical Exam  Cardiovascular:      Rate and Rhythm: Normal rate  Pulmonary:      Comments: 2 L nasal cannula  Coarse breath sounds throughout  Abdominal:      General: Abdomen is flat  Bowel sounds are normal       Palpations: Abdomen is soft  Musculoskeletal:         General: Normal range of motion  Cervical back: Normal range of motion  Skin:     General: Skin is warm  Neurological:      General: No focal deficit present  Mental Status: She is alert and oriented to person, place, and time  Mental status is at baseline  Psychiatric:         Mood and Affect: Mood normal          Behavior: Behavior normal          Thought Content: Thought content normal          Judgment: Judgment normal           Additional Data:     Labs:  Results from last 7 days   Lab Units 21  0524 21  0459 21  0459   WBC Thousand/uL 3 32*   < > 3 11*   HEMOGLOBIN g/dL 10 7*   < > 10 7*   HEMATOCRIT % 32 8*   < > 33 9*   PLATELETS Thousands/uL 156   < > 162   NEUTROS PCT %  --   --  69   LYMPHS PCT %  --   --  24   MONOS PCT %  --   --  6   EOS PCT %  --   --  0    < > = values in this interval not displayed       Results from last 7 days   Lab Units 12/18/21  0524   SODIUM mmol/L 137   POTASSIUM mmol/L 3 7   CHLORIDE mmol/L 105   CO2 mmol/L 17*   BUN mg/dL 33*   CREATININE mg/dL 1 48*   ANION GAP mmol/L 15*   CALCIUM mg/dL 8 7   ALBUMIN g/dL 3 8   TOTAL BILIRUBIN mg/dL 0 39   ALK PHOS U/L 65   ALT U/L 12   AST U/L 25   GLUCOSE RANDOM mg/dL 246*     Results from last 7 days   Lab Units 12/16/21  1707   INR  1 12     Results from last 7 days   Lab Units 12/18/21  0724 12/17/21  2119 12/17/21  1522 12/17/21  1230 12/17/21  0629 12/16/21  2118 12/16/21  1927   POC GLUCOSE mg/dl 225* 130 106 93 90 82 48*         Results from last 7 days   Lab Units 12/17/21  0459 12/16/21  1707   LACTIC ACID mmol/L  --  0 5   PROCALCITONIN ng/ml 0 05 <0 05       Lines/Drains:  Invasive Devices  Report    Peripheral Intravenous Line            Peripheral IV 12/16/21 Left Antecubital 1 day                      Imaging: No pertinent imaging reviewed  Recent Cultures (last 7 days):   Results from last 7 days   Lab Units 12/16/21  1708 12/16/21  1707   BLOOD CULTURE  No Growth at 24 hrs  No Growth at 24 hrs         Last 24 Hours Medication List:   Current Facility-Administered Medications   Medication Dose Route Frequency Provider Last Rate    acetaminophen  650 mg Oral Q4H PRN Rexie Awais, DO      ALPRAZolam  0 25 mg Oral HS PRN Rexie Awais, DO      dexamethasone  6 mg Intravenous Q24H Rexie Awais, DO      ferrous sulfate  325 mg Oral Daily With Breakfast Rexie Awais, DO      heparin (porcine)  5,000 Units Subcutaneous [facility] Rexie Awais, DO      insulin lispro  1-5 Units Subcutaneous TID AC Rexie Awais, DO      insulin lispro  1-5 Units Subcutaneous HS Rexie Awais, DO      lisinopril  20 mg Oral Daily Rexie Awais, DO      ondansetron  4 mg Intravenous Q6H PRN Rexie Awais, DO      pravastatin  40 mg Oral Daily With [employer], DO      remdesivir  100 mg Intravenous Q24H Rexie Awais, DO Stopped (12/17/21 2035)        Today, Patient Was Seen By: Nilsa Aschoff, DO    **Please Note: This note may have been constructed using a voice recognition system  **

## 2021-12-18 NOTE — UTILIZATION REVIEW
Continued Stay Review    Date: 12/18/21                    Current Patient Class: IP Current Level of Care: MS    HPI:81 y o  female initially admitted on 12/17 with Covid     Assessment/Plan:   Continue covid specific therapy  Ambulate  Wean oxygen - still on 2L today  Has desats with minimal exertion and coarse breath sounds          Vital Signs:   12/18/21 1115 -- 66 -- -- -- 96 % -- -- -- --   12/18/21 1030 -- 63 -- -- -- 98 % -- -- -- --   12/18/21 0751 98 °F (36 7 °C) 64 16 -- -- 97 % 28 2 L/min Nasal cannula Lying   12/18/21 0500 98 2 °F (36 8 °C) 64 16 129/61 -- 96 % 28 2 L/min Nasal cannula Sitting   12/17/21 2313 98 5 °F (36 9 °C) 78 16 116/57 -- 95 % 28 2 L/min Nasal cannula Sitting   12/17/21 2036 102 2 °F (39 °C) Abnormal  89 19 152/77 -- 95 % -- -- -- --   12/17/21 1800 -- 83 -- 167/77 110 94 % -- -- -- --   12/17/21 1730 -- 90 -- 167/70 101 93 % 28 2 L/min Nasal cannula --   12/17/21 1645 -- 77 -- -- -- 94 % -- -- -- --   12/17/21 1530 -- 97 -- 147/72 104 95 % -- -- -- --   12/17/21 1500 -- 87 -- 162/72 103 94 % -- -- -- --   12/17/21 1330 -- 88 -- 168/70 101 96 % -- -- -- --   12/17/21 1230 -- 80 17 139/65 94 97 % -- -- -- --   12/17/21 1229 -- 79 -- 139/65 -- 97 % -- -- -- Lying   12/16/21 2230 -- 79 18 107/54 78 97 % 28 2 L/min Nasal cannula Lying   12/16/21 2030 -- 84 18 134/63 91 96 % 28 2 L/min Nasal cannula Lying   12/16/21 1830 -- 100 18 120/60 84 96 % 28 2 L/min Nasal cannula Lying   12/16/21 1722 -- -- -- -- -- -- -- -- Nasal cannula --   12/16/21 1659 102 2 °F (39 °C) Abnormal  105 18 142/68 -- 96 % 28 2 L/min Nasal cannula Lying     Pertinent Labs/Diagnostic Results:   Results from last 7 days   Lab Units 12/16/21  1707   SARS-COV-2  Positive*     Results from last 7 days   Lab Units 12/18/21  0524 12/17/21  0459 12/16/21  1926 12/16/21  1707 12/16/21  1707   WBC Thousand/uL 3 32* 3 11*  --   --  4 13*   HEMOGLOBIN g/dL 10 7* 10 7*  --   --  10 8*   HEMATOCRIT % 32 8* 33 9*  --   --  33 7* PLATELETS Thousands/uL 156 162 143*   < > 158   NEUTROS ABS Thousands/µL  --  2 16  --   --  3 18    < > = values in this interval not displayed           Results from last 7 days   Lab Units 12/18/21  0524 12/17/21 0459 12/16/21  1707   SODIUM mmol/L 137 139 139   POTASSIUM mmol/L 3 7 3 8 3 5   CHLORIDE mmol/L 105 107 104   CO2 mmol/L 17* 21 24   ANION GAP mmol/L 15* 11 11   BUN mg/dL 33* 23 24   CREATININE mg/dL 1 48* 1 39* 1 56*   EGFR ml/min/1 73sq m 33 35 31   CALCIUM mg/dL 8 7 8 9 8 9   CALCIUM, IONIZED mmol/L  --   --  1 08*   MAGNESIUM mg/dL  --   --  1 9     Results from last 7 days   Lab Units 12/18/21  0524 12/16/21  1707   AST U/L 25 20   ALT U/L 12 11   ALK PHOS U/L 65 78   TOTAL PROTEIN g/dL 7 0 7 9   ALBUMIN g/dL 3 8 4 4   TOTAL BILIRUBIN mg/dL 0 39 0 36     Results from last 7 days   Lab Units 12/18/21  0724 12/17/21 2119 12/17/21  1522 12/17/21  1230 12/17/21  0629 12/16/21 2118 12/16/21  1927   POC GLUCOSE mg/dl 225* 130 106 93 90 82 48*     Results from last 7 days   Lab Units 12/18/21  0524 12/17/21 0459 12/16/21  1707   GLUCOSE RANDOM mg/dL 246* 110 57*     Results from last 7 days   Lab Units 12/16/21  1707   CK TOTAL U/L 137   CK MB INDEX % 0 4   CK MB ng/mL 0 6     Results from last 7 days   Lab Units 12/16/21 2128 12/16/21 1926 12/16/21  1707   HS TNI 0HR ng/L  --   --  16   HS TNI 2HR ng/L  --  13  --    HSTNI D2 ng/L  --  -3  --    HS TNI 4HR ng/L 12  --   --    HSTNI D4 ng/L -4  --   --      Results from last 7 days   Lab Units 12/16/21  1707   D-DIMER QUANTITATIVE ug/ml FEU 1 21*     Results from last 7 days   Lab Units 12/16/21  1707   PROTIME seconds 14 3   INR  1 12         Results from last 7 days   Lab Units 12/17/21  0459 12/16/21  1707   PROCALCITONIN ng/ml 0 05 <0 05     Results from last 7 days   Lab Units 12/16/21  1707   LACTIC ACID mmol/L 0 5             Results from last 7 days   Lab Units 12/16/21  1707   BNP pg/mL 51     Results from last 7 days   Lab Units 12/16/21  1707   FERRITIN ng/mL 424*             Results from last 7 days   Lab Units 12/16/21  1707   CRP mg/L 90 9*             Results from last 7 days   Lab Units 12/16/21  1707   INFLUENZA A PCR  Negative   INFLUENZA B PCR  Negative   RSV PCR  Negative     Results from last 7 days   Lab Units 12/16/21  1708 12/16/21  1707   BLOOD CULTURE  No Growth at 24 hrs  No Growth at 24 hrs  Medications:   Scheduled Medications:  dexamethasone, 6 mg, Intravenous, Q24H  ferrous sulfate, 325 mg, Oral, Daily With Breakfast  heparin (porcine), 5,000 Units, Subcutaneous, Q8H JUAN ANTONIO  insulin lispro, 1-5 Units, Subcutaneous, TID AC  insulin lispro, 1-5 Units, Subcutaneous, HS  lisinopril, 20 mg, Oral, Daily  pravastatin, 40 mg, Oral, Daily With Dinner  remdesivir, 100 mg, Intravenous, Q24H      Continuous IV Infusions:     PRN Meds:  acetaminophen, 650 mg, Oral, Q4H PRN - x 1 12/17  ALPRAZolam, 0 25 mg, Oral, HS PRN  ondansetron, 4 mg, Intravenous, Q6H PRN    Discharge Plan: D    Network Utilization Review Department  ATTENTION: Please call with any questions or concerns to [phone] and carefully listen to the prompts so that you are directed to the right person  All voicemails are confidential   Kelsey Grand all requests for admission clinical reviews, approved or denied determinations and any other requests to dedicated fax number below belonging to the campus where the patient is receiving treatment   List of dedicated fax numbers for the Facilities:  [address] DENIALS (Administrative/Medical Necessity) [phone]   [address] (Maternity/NICU/Pediatrics) [phone]   [address] 125 [facility]  [address] [facility] [place] [address]   [address] [place] [address] Samantha Tereza [address] [phone]

## 2021-12-18 NOTE — ASSESSMENT & PLAN NOTE
Lab Results   Component Value Date    HGBA1C 6 2 (H) 11/22/2021       Recent Labs     12/17/21  1230 12/17/21  1522 12/17/21  2119 12/18/21  0724   POCGLU 93 106 130 225*       Blood Sugar Average: Last 72 hrs:  (P) 110 [id number]   Hold home oral hypoglycemic regimen  SSI algorithm 1 TID AC & qHS

## 2021-12-19 LAB
ALBUMIN SERPL BCP-MCNC: 3.8 G/DL (ref 3.5–5)
ALP SERPL-CCNC: 64 U/L (ref 34–104)
ALT SERPL W P-5'-P-CCNC: 12 U/L (ref 7–52)
ANION GAP SERPL CALCULATED.3IONS-SCNC: 12 MMOL/L (ref 4–13)
AST SERPL W P-5'-P-CCNC: 24 U/L (ref 13–39)
BILIRUB SERPL-MCNC: 0.44 MG/DL (ref 0.2–1)
BUN SERPL-MCNC: 43 MG/DL (ref 5–25)
CALCIUM SERPL-MCNC: 9 MG/DL (ref 8.4–10.2)
CHLORIDE SERPL-SCNC: 104 MMOL/L (ref 96–108)
CO2 SERPL-SCNC: 20 MMOL/L (ref 21–32)
CREAT SERPL-MCNC: 1.39 MG/DL (ref 0.6–1.3)
CRP SERPL QL: 88.7 MG/L
ERYTHROCYTE [DISTWIDTH] IN BLOOD BY AUTOMATED COUNT: 13.1 % (ref 11.6–15.1)
GFR SERPL CREATININE-BSD FRML MDRD: 35 ML/MIN/1.73SQ M
GLUCOSE SERPL-MCNC: 259 MG/DL (ref 65–140)
GLUCOSE SERPL-MCNC: 296 MG/DL (ref 65–140)
GLUCOSE SERPL-MCNC: 332 MG/DL (ref 65–140)
GLUCOSE SERPL-MCNC: 354 MG/DL (ref 65–140)
GLUCOSE SERPL-MCNC: 375 MG/DL (ref 65–140)
HCT VFR BLD AUTO: 33.6 % (ref 34.8–46.1)
HGB BLD-MCNC: 10.9 G/DL (ref 11.5–15.4)
MCH RBC QN AUTO: 27 PG (ref 26.8–34.3)
MCHC RBC AUTO-ENTMCNC: 32.4 G/DL (ref 31.4–37.4)
MCV RBC AUTO: 83 FL (ref 82–98)
PLATELET # BLD AUTO: 178 THOUSANDS/UL (ref 149–390)
PMV BLD AUTO: 10 FL (ref 8.9–12.7)
POTASSIUM SERPL-SCNC: 4.2 MMOL/L (ref 3.5–5.3)
PROT SERPL-MCNC: 7 G/DL (ref 6.4–8.4)
RBC # BLD AUTO: 4.04 MILLION/UL (ref 3.81–5.12)
SODIUM SERPL-SCNC: 136 MMOL/L (ref 135–147)
WBC # BLD AUTO: 3.79 THOUSAND/UL (ref 4.31–10.16)

## 2021-12-19 PROCEDURE — 97163 PT EVAL HIGH COMPLEX 45 MIN: CPT

## 2021-12-19 PROCEDURE — 85027 COMPLETE CBC AUTOMATED: CPT | Performed by: STUDENT IN AN ORGANIZED HEALTH CARE EDUCATION/TRAINING PROGRAM

## 2021-12-19 PROCEDURE — 82948 REAGENT STRIP/BLOOD GLUCOSE: CPT

## 2021-12-19 PROCEDURE — 86140 C-REACTIVE PROTEIN: CPT | Performed by: STUDENT IN AN ORGANIZED HEALTH CARE EDUCATION/TRAINING PROGRAM

## 2021-12-19 PROCEDURE — 99232 SBSQ HOSP IP/OBS MODERATE 35: CPT | Performed by: STUDENT IN AN ORGANIZED HEALTH CARE EDUCATION/TRAINING PROGRAM

## 2021-12-19 PROCEDURE — 97167 OT EVAL HIGH COMPLEX 60 MIN: CPT

## 2021-12-19 PROCEDURE — 80053 COMPREHEN METABOLIC PANEL: CPT | Performed by: STUDENT IN AN ORGANIZED HEALTH CARE EDUCATION/TRAINING PROGRAM

## 2021-12-19 RX ADMIN — DEXAMETHASONE SODIUM PHOSPHATE 6 MG: 4 INJECTION, SOLUTION INTRA-ARTICULAR; INTRALESIONAL; INTRAMUSCULAR; INTRAVENOUS; SOFT TISSUE at 18:13

## 2021-12-19 RX ADMIN — PRAVASTATIN SODIUM 40 MG: 20 TABLET ORAL at 18:19

## 2021-12-19 RX ADMIN — INSULIN LISPRO 4 UNITS: 100 INJECTION, SOLUTION INTRAVENOUS; SUBCUTANEOUS at 18:16

## 2021-12-19 RX ADMIN — HEPARIN SODIUM 5000 UNITS: 5000 INJECTION INTRAVENOUS; SUBCUTANEOUS at 05:48

## 2021-12-19 RX ADMIN — INSULIN LISPRO 3 UNITS: 100 INJECTION, SOLUTION INTRAVENOUS; SUBCUTANEOUS at 07:59

## 2021-12-19 RX ADMIN — ONDANSETRON 4 MG: 2 INJECTION INTRAMUSCULAR; INTRAVENOUS at 18:13

## 2021-12-19 RX ADMIN — HEPARIN SODIUM 5000 UNITS: 5000 INJECTION INTRAVENOUS; SUBCUTANEOUS at 13:32

## 2021-12-19 RX ADMIN — ACETAMINOPHEN 650 MG: 325 TABLET ORAL at 18:14

## 2021-12-19 RX ADMIN — LISINOPRIL 20 MG: 20 TABLET ORAL at 08:00

## 2021-12-19 RX ADMIN — REMDESIVIR 100 MG: 100 INJECTION, POWDER, LYOPHILIZED, FOR SOLUTION INTRAVENOUS at 18:48

## 2021-12-19 RX ADMIN — HEPARIN SODIUM 5000 UNITS: 5000 INJECTION INTRAVENOUS; SUBCUTANEOUS at 21:05

## 2021-12-19 RX ADMIN — INSULIN LISPRO 2 UNITS: 100 INJECTION, SOLUTION INTRAVENOUS; SUBCUTANEOUS at 12:09

## 2021-12-19 RX ADMIN — FERROUS SULFATE TAB 325 MG (65 MG ELEMENTAL FE) 325 MG: 325 (65 FE) TAB at 08:00

## 2021-12-19 RX ADMIN — INSULIN LISPRO 2 UNITS: 100 INJECTION, SOLUTION INTRAVENOUS; SUBCUTANEOUS at 21:06

## 2021-12-19 NOTE — PROGRESS NOTES
[address]  Progress Note - Cecilio Simon 1940, [de-identified] y o  female MRN: [id number]  Unit/Bed#: -01 Encounter: [id number]  Primary Care Provider: Daniela Nino DO   Date and time admitted to hospital: 12/16/2021  4:49 PM    * COVID-19 virus RNA test result positive at limit of detection  Assessment & Plan  Unvaccinated  COVID-19 positive on 12/16/2021  Endorses cough, shortness of breath  · Remdesivir  · Decadron  · Heparin DVT ppx  · Pravastatin  · Incentive spirometry  · Ambulate patient  · Encourage self-proning    Acute respiratory failure with hypoxia ([facility] [address] )  Assessment & Plan  Secondary to COVID -19 viral pneumonia  Requiring 2 L nasal cannula O2 on presentation  · Wean O2 as tolerated  · Goal SpO2 > 90%    Type 2 diabetes mellitus without complication, without long-term current use of insulin [facility])  Assessment & Plan  Lab Results   Component Value Date    HGBA1C 6 2 (H) 11/22/2021       Recent Labs     12/18/21  1752 12/18/21 2012 12/19/21  0534 12/19/21  1149   POCGLU 207* 191* 332* 259*       Blood Sugar Average: Last 72 hrs:  (P) 797 [id number]   Hold home oral hypoglycemic regimen  SSI algorithm 1 TID AC & qHS          VTE Pharmacologic Prophylaxis: VTE Score: 6 High Risk (Score >/= 5) - Pharmacological DVT Prophylaxis Ordered: heparin  Sequential Compression Devices Ordered  Patient Centered Rounds: I performed bedside rounds with nursing staff today  Discussions with Specialists or Other Care Team Provider:  None    Education and Discussions with Family / Patient: Updated  (son and daughter) via phone  Time Spent for Care: 45 minutes  More than 50% of total time spent on counseling and coordination of care as described above      Current Length of Stay: 2 day(s)  Current Patient Status: Inpatient   Certification Statement: The patient will continue to require additional inpatient hospital stay due to COVID-19 pneumonia  Discharge Plan: Anticipate discharge in 48-72 hrs to home  Code Status: Level 1 - Full Code    Subjective:   Patient seen and examined the bedside  Patient with increased oxygen requirements from 2 L to 3 L  Patient states she otherwise feels better than yesterday  Objective:     Vitals:   Temp (24hrs), Av °F (37 2 °C), Min:98 °F (36 7 °C), Max:100 °F (37 8 °C)    Temp:  [98 °F (36 7 °C)-100 °F (37 8 °C)] 98 °F (36 7 °C)  HR:  [68-80] 68  Resp:  [18] 18  BP: (133-141)/(67-96) 133/67  SpO2:  [87 %-92 %] 92 %  Body mass index is 23 63 kg/m²  Input and Output Summary (last 24 hours):   No intake or output data in the 24 hours ending 21 1430    Physical Exam:   Physical Exam  Constitutional:       General: She is not in acute distress  Appearance: She is not ill-appearing  Cardiovascular:      Rate and Rhythm: Normal rate and regular rhythm  Pulses: Normal pulses  Heart sounds: Normal heart sounds  Pulmonary:      Comments: Coarse breath sounds throughout  3 L nasal cannula  Abdominal:      General: Abdomen is flat  Bowel sounds are normal       Palpations: Abdomen is soft  Musculoskeletal:         General: Normal range of motion  Cervical back: Normal range of motion  Skin:     General: Skin is warm and dry  Neurological:      General: No focal deficit present  Mental Status: She is alert and oriented to person, place, and time  Mental status is at baseline  Psychiatric:         Mood and Affect: Mood normal          Behavior: Behavior normal          Thought Content:  Thought content normal          Judgment: Judgment normal           Additional Data:     Labs:  Results from last 7 days   Lab Units 21  0429 21  0524 21  0459   WBC Thousand/uL 3 79*   < > 3 11*   HEMOGLOBIN g/dL 10 9*   < > 10 7*   HEMATOCRIT % 33 6*   < > 33 9*   PLATELETS Thousands/uL 178   < > 162   NEUTROS PCT %  --   --  69   LYMPHS PCT %  --   --  24   MONOS PCT %  --   --  6   EOS PCT % --   --  0    < > = values in this interval not displayed  Results from last 7 days   Lab Units 12/19/21  0429   SODIUM mmol/L 136   POTASSIUM mmol/L 4 2   CHLORIDE mmol/L 104   CO2 mmol/L 20*   BUN mg/dL 43*   CREATININE mg/dL 1 39*   ANION GAP mmol/L 12   CALCIUM mg/dL 9 0   ALBUMIN g/dL 3 8   TOTAL BILIRUBIN mg/dL 0 44   ALK PHOS U/L 64   ALT U/L 12   AST U/L 24   GLUCOSE RANDOM mg/dL 354*     Results from last 7 days   Lab Units 12/16/21  1707   INR  1 12     Results from last 7 days   Lab Units 12/19/21  1149 12/19/21  0534 12/18/21  2012 12/18/21  1752 12/18/21  1348 12/18/21  0724 12/17/21  2119 12/17/21  1522 12/17/21  1230 12/17/21  0629 12/16/21  2118 12/16/21  1927   POC GLUCOSE mg/dl 259* 332* 191* 207* 221* 225* 130 106 93 90 82 48*         Results from last 7 days   Lab Units 12/17/21  0459 12/16/21  1707   LACTIC ACID mmol/L  --  0 5   PROCALCITONIN ng/ml 0 05 <0 05       Lines/Drains:  Invasive Devices  Report    Peripheral Intravenous Line            Peripheral IV 12/16/21 Left Antecubital 2 days          Drain            External Urinary Catheter <1 day                      Imaging: No pertinent imaging reviewed  Recent Cultures (last 7 days):   Results from last 7 days   Lab Units 12/16/21  1708 12/16/21  1707   BLOOD CULTURE  No Growth at 48 hrs  No Growth at 48 hrs         Last 24 Hours Medication List:   Current Facility-Administered Medications   Medication Dose Route Frequency Provider Last Rate    acetaminophen  650 mg Oral Q4H PRN Ron Lundberg DO      ALPRAZolam  0 25 mg Oral HS PRN Ron Lundberg DO      dexamethasone  6 mg Intravenous Q24H Ron Lundberg DO      ferrous sulfate  325 mg Oral Daily With Breakfast Ron Lundberg DO      heparin (porcine)  5,000 Units Subcutaneous [facility] Ron Lundberg DO      insulin lispro  1-5 Units Subcutaneous TID AC Malvin Kraft, DO      insulin lispro  1-5 Units Subcutaneous HS Ron Lundberg DO      lisinopril  20 mg Oral Daily Jesica Beebe DO      ondansetron  4 mg Intravenous Q6H PRN Jesica Beebe DO      pravastatin  40 mg Oral Daily With [employer] DO Kiara      remdesivir  100 mg Intravenous Q24H Jesica Beebe DO 0 mg (12/17/21 2035)        Today, Patient Was Seen By: Alexx Bae DO    **Please Note: This note may have been constructed using a voice recognition system  **

## 2021-12-19 NOTE — NURSING NOTE
ASLEEP AND 02 MAINTAINED 3 LITERS N/C AND 02 SAT ABOVE 90% WITH SAME  RESP ARE EASY AT REST NOW SELF PRONED ENCOURAGED AND PATIENT IS LYING TO LEFT SIDE IN BED   36 [facility] Detail Level: Generalized General Sunscreen Counseling: I recommended a broad spectrum sunscreen with a SPF of 30 or higher.  I explained that SPF 30 sunscreens block approximately 97 percent of the sun's harmful rays.  Sunscreens should be applied at least 15 minutes prior to expected sun exposure and then every 2 hours after that as long as sun exposure continues. If swimming or exercising sunscreen should be reapplied every 45 minutes to an hour after getting wet or sweating.  One ounce, or the equivalent of a shot glass full of sunscreen, is adequate to protect the skin not covered by a bathing suit. I also recommended a lip balm with a sunscreen as well. Sun protective clothing can be used in lieu of sunscreen but must be worn the entire time you are exposed to the sun's rays.

## 2021-12-19 NOTE — OCCUPATIONAL THERAPY NOTE
Occupational Therapy Evaluation      Debbie Manrique    12/19/2021    Principal Problem:    COVID-19 virus RNA test result positive at limit of detection  Active Problems:    Type 2 diabetes mellitus without complication, without long-term current use of insulin (HCC)    Acute respiratory failure with hypoxia [facility])      Past Medical History:   Diagnosis Date    Diabetes mellitus ([address] )     Diverticulitis     Postherpetic neuralgia        Past Surgical History:   Procedure Laterality Date    CHOLECYSTECTOMY      COLON SURGERY      HERNIA REPAIR      ROTATOR CUFF REPAIR Right     VARICOSE VEIN SURGERY      Impression:  2/12/16 Polo Railing      12/19/21 1037   Note Type   Note type Evaluation   Restrictions/Precautions   Other Precautions O2;Fall Risk;Droplet precautions; Airborne/isolation  (4 L cont O2 via NC)   Pain Assessment   Pain Assessment Tool 0-10   Pain Score No Pain   Home Living   Type of Home House   Home Layout Two level;Bed/bath upstairs;Stairs to enter with rails; Laundry in basement   Status Overload Grab bars in shower; Shower chair;Grab bars around toilet   [address]   (none used prior)   Additional Comments drives   Prior Function   Level of [place] Independent with ADLs and functional mobility   Lives With Spouse;Daughter  (cares for handicapped daughter)   ADL Assistance Independent   IADLs Independent   Falls in the last 6 months 0   Vocational Retired   Comments enjoys going shooting   ADL   Eating Assistance 6  Modified independent   [facility] 4  700 [facility] 4  700 [facility] 2  Maximal [facility] 200 4  [address] 2  Maximal [address]  2  Maximal Assistance   Bed Mobility   Supine to Sit 4  Minimal assistance   Sit to Supine 4 Minimal assistance   Transfers   Sit to Stand 4  Minimal assistance   Stand to Sit 4  Minimal assistance   Toilet transfer 4  Minimal assistance  (bedside commode)   Functional Mobility   Functional Mobility 4  Minimal assistance  (a few steps to bedside commode)   Balance   Static Sitting Good   Dynamic Sitting Good   Static Standing Fair +   Dynamic Standing Fair   Activity Tolerance   Activity Tolerance Patient limited by fatigue  (O2 sat remained between 87 and 85% throughout eval)   RUE Assessment   RUE Assessment WFL   LUE Assessment   LUE Assessment WFL   Hand Function   Gross Motor Coordination Functional   Fine Motor Coordination Functional   Vision-Basic Assessment   Current Vision Wears glasses only for reading   Cognition   Overall Cognitive Status WFL   Arousal/Participation Lethargic;Cooperative;Responsive   Orientation Level Oriented X4   Memory Within functional limits   Following Commands Follows all commands and directions without difficulty   Assessment   Limitation Decreased ADL status; Decreased endurance   Prognosis Fair   Assessment Pt is a [de-identified] y o  female seen for OT evaluation s/p admit to [place] [id number] on 12/16/2021 w/ COVID-19 virus RNA test result positive at limit of detection  Comorbidities affecting pt's functional performance at time of assessment include: DM and acute respiratory failure with hypoxia  Personal factors affecting pt at time of IE include:steps to enter environment, difficulty performing ADLS and difficulty performing IADLS   Prior to admission, pt was independent with ADL's and IADL's and functional mobility without DME or AD  Upon evaluation: the following deficits impact occupational performance: weakness, decreased balance and decreased tolerance  Pt to benefit from continued skilled OT tx while in the hospital to address deficits as defined above and maximize level of functional independence w ADL's and functional mobility   Occupational Performance areas to address include: grooming, bathing/shower, toilet hygiene, dressing, functional mobility and clothing management  From OT standpoint, recommendation at time of d/c would be post acute rehab  Goals   Patient Goals " to feel better and go home"   Short Term Goal #1 patient will participate in ADLs and maintaing O2 sat above 90%   Functional Transfer Goals   Pt Will Perform All Functional Transfers   (CGA)   ADL Goals   Pt Will Perform Grooming With mod indep   Pt Will Perform UE Dressing With stand by assist   Pt Will Perform LE Dressing With min assist   Pt Will Perform Toileting With min assist   Plan   Treatment Interventions ADL retraining;Functional transfer training; Endurance training;Patient/family training   Goal Expiration Date 12/29/21   OT Frequency 3-5x/wk   Recommendation   OT Discharge Recommendation Post acute rehabilitation services   Additional Comments   The patient's raw score on the AM-PAC Daily Activity inpatient short form is 16, standardized score is 35 96, less than 39 4  Patients at this level are likely to benefit from discharge to post-acute rehabilitation services  Please refer to the recommendation of the Occupational Therapist for safe discharge planning     AM-PAC Daily Activity Inpatient   Lower Body Dressing 2   Bathing 2   Toileting 2   Upper Body Dressing 3   Grooming 3   Eating 4   Daily Activity Raw Score 16   Daily Activity Standardized Score (Calc for Raw Score >=11) 35 96   AM-PAC Applied Cognition Inpatient   Following a Speech/Presentation 4   Understanding Ordinary Conversation 4   Taking Medications 4   Remembering Where Things Are Placed or Put Away 4   Remembering List of 4-5 Errands 4   Taking Care of Complicated Tasks 3   Applied Cognition Raw Score 23   Applied Cognition Standardized Score 53 08   Tone Motley, OT

## 2021-12-19 NOTE — PLAN OF CARE
Problem: OCCUPATIONAL THERAPY ADULT  Goal: Performs self-care activities at highest level of function for planned discharge setting  See evaluation for individualized goals  Description: Treatment Interventions: ADL retraining,Functional transfer training,Endurance training,Patient/family training          See flowsheet documentation for full assessment, interventions and recommendations  Outcome: Progressing  Note: Limitation: Decreased ADL status,Decreased endurance  Prognosis: Fair  Assessment: Pt is a [de-identified] y o  female seen for OT evaluation s/p admit to [place] [id number] on 12/16/2021 w/ COVID-19 virus RNA test result positive at limit of detection  Comorbidities affecting pt's functional performance at time of assessment include: DM and acute respiratory failure with hypoxia  Personal factors affecting pt at time of IE include:steps to enter environment, difficulty performing ADLS and difficulty performing IADLS   Prior to admission, pt was independent with ADL's and IADL's and functional mobility without DME or AD  Upon evaluation: the following deficits impact occupational performance: weakness, decreased balance and decreased tolerance  Pt to benefit from continued skilled OT tx while in the hospital to address deficits as defined above and maximize level of functional independence w ADL's and functional mobility  Occupational Performance areas to address include: grooming, bathing/shower, toilet hygiene, dressing, functional mobility and clothing management  From OT standpoint, recommendation at time of d/c would be post acute rehab         OT Discharge Recommendation: Post acute rehabilitation services

## 2021-12-19 NOTE — PLAN OF CARE
Problem: PHYSICAL THERAPY ADULT  Goal: Performs mobility at highest level of function for planned discharge setting  See evaluation for individualized goals  Description: Treatment/Interventions: Functional transfer training,Elevations,Therapeutic exercise,Endurance training,Bed mobility,Gait training  Equipment Recommended:  (unknown at this time)       See flowsheet documentation for full assessment, interventions and recommendations  Outcome: Progressing  Note: Prognosis: Good  Problem List: Decreased endurance,Impaired balance,Decreased mobility  Assessment: Pt is [de-identified] y o  female seen for PT evaluation s/p admit to [address] on 12/16/2021 w/ COVID-19 virus RNA test result positive at limit of detection  PT consulted to assess pt's functional mobility and d/c needs  Order placed for PT eval and tx, w/ ambulate patient order  Pt agreeable to PT  session upon arrival, pt found supine in bed  Pt requires Min A assistance for bed mobility, transfers, and ambulation with HHA  PTA, pt was independent w/ all functional mobility w/ no AD, ambulates community distances and elevations, has 6 TRAM, lives w/ family in 2 level  home and retired  The following objective measures performed on IE also reveal limitations: AM-PAC 6-Clicks: 51/57  Pt to benefit from continued PT tx to address deficits and maximize level of functional independent mobility and consistency  From PT/mobility standpoint, recommendation at time of d/c would be post acute rehabilitation services pending progress in order to facilitate return to PLOF  Upon conclusion pt  supine in bed  Barriers to Discharge: Decreased caregiver support        PT Discharge Recommendation: Post acute rehabilitation services          See flowsheet documentation for full assessment   Jolie Norton PT

## 2021-12-19 NOTE — ASSESSMENT & PLAN NOTE
Lab Results   Component Value Date    HGBA1C 6 2 (H) 11/22/2021       Recent Labs     12/18/21  1752 12/18/21 2012 12/19/21  0534 12/19/21  1149   POCGLU 207* 191* 332* 259*       Blood Sugar Average: Last 72 hrs:  (P) 206 [id number]   Hold home oral hypoglycemic regimen  SSI algorithm 1 TID AC & qHS

## 2021-12-19 NOTE — NURSING NOTE
02 SAT 86%  PT AWAKENED AND DEEP BREATHING ENCOURAGED, WITH 02 MAINTAINED 3 LITERS N/C  SAT TO 89% ONLY  INCREASED 02 TO 4 LITERS N/C AND SAT TO 92%  MOIST NP COUGH    Christopher Lu

## 2021-12-19 NOTE — PLAN OF CARE
Problem: Potential for Falls  Goal: Patient will remain free of falls  Description: INTERVENTIONS:  - Educate patient/family on patient safety including physical limitations  - Instruct patient to call for assistance with activity   - Consult OT/PT to assist with strengthening/mobility   - Keep Call bell within reach  - Keep bed low and locked with side rails adjusted as appropriate  - Keep care items and personal belongings within reach  - Initiate and maintain comfort rounds  - Make Fall Risk Sign visible to staff  - Offer Toileting every 2 Hours, in advance of need  - Initiate/Maintain bed alarm  - Obtain necessary fall risk management equipment: non slip socks  - Apply yellow socks and bracelet for high fall risk patients  - Consider moving patient to room near nurses station  Outcome: Progressing     Problem: PAIN - ADULT  Goal: Verbalizes/displays adequate comfort level or baseline comfort level  Description: Interventions:  - Encourage patient to monitor pain and request assistance  - Assess pain using appropriate pain scale  - Administer analgesics based on type and severity of pain and evaluate response  - Implement non-pharmacological measures as appropriate and evaluate response  - Consider cultural and social influences on pain and pain management  - Notify physician/advanced practitioner if interventions unsuccessful or patient reports new pain  Outcome: Progressing     Problem: INFECTION - ADULT  Goal: Absence or prevention of progression during hospitalization  Description: INTERVENTIONS:  - Assess and monitor for signs and symptoms of infection  - Monitor lab/diagnostic results  - Monitor all insertion sites, i e  indwelling lines, tubes, and drains  - Monitor endotracheal if appropriate and nasal secretions for changes in amount and color  - [place] appropriate cooling/warming therapies per order  - Administer medications as ordered  - Instruct and encourage patient and family to use good hand hygiene technique  - Identify and instruct in appropriate isolation precautions for identified infection/condition  Outcome: Progressing  Goal: Absence of fever/infection during neutropenic period  Description: INTERVENTIONS:  - Monitor WBC    Outcome: Progressing     Problem: SAFETY ADULT  Goal: Patient will remain free of falls  Description: INTERVENTIONS:  - Educate patient/family on patient safety including physical limitations  - Instruct patient to call for assistance with activity   - Consult OT/PT to assist with strengthening/mobility   - Keep Call bell within reach  - Keep bed low and locked with side rails adjusted as appropriate  - Keep care items and personal belongings within reach  - Initiate and maintain comfort rounds  - Make Fall Risk Sign visible to staff  - Offer Toileting every 2 Hours, in advance of need  - Initiate/Maintain bed alarm  - Obtain necessary fall risk management equipment: side rails  - Apply yellow socks and bracelet for high fall risk patients  - Consider moving patient to room near nurses station  Outcome: Progressing  Goal: Maintain or return to baseline ADL function  Description: INTERVENTIONS:  -  Assess patient's ability to carry out ADLs; assess patient's baseline for ADL function and identify physical deficits which impact ability to perform ADLs (bathing, care of mouth/teeth, toileting, grooming, dressing, etc )  - Assess/evaluate cause of self-care deficits   - Assess range of motion  - Assess patient's mobility; develop plan if impaired  - Assess patient's need for assistive devices and provide as appropriate  - Encourage maximum independence but intervene and supervise when necessary  - Involve family in performance of ADLs  - Assess for home care needs following discharge   - Consider OT consult to assist with ADL evaluation and planning for discharge  - Provide patient education as appropriate  Outcome: Progressing  Goal: Maintains/Returns to pre admission functional level  Description: INTERVENTIONS:  - Perform BMAT or MOVE assessment daily    - Set and communicate daily mobility goal to care team and patient/family/caregiver  - Collaborate with rehabilitation services on mobility goals if consulted  - Perform Range of Motion 2 times a day  - Reposition patient every 2 hours  - Dangle patient 2 times a day  - Stand patient 2 times a day  - Ambulate patient 2 times a day  - Out of bed to chair 2 times a day   - Out of bed for meals 2 times a day  - Out of bed for toileting  - Record patient progress and toleration of activity level   Outcome: Progressing     Problem: DISCHARGE PLANNING  Goal: Discharge to home or other facility with appropriate resources  Description: INTERVENTIONS:  - Identify barriers to discharge w/patient and caregiver  - Arrange for needed discharge resources and transportation as appropriate  - Identify discharge learning needs (meds, wound care, etc )  - Arrange for interpretive services to assist at discharge as needed  - Refer to Case Management Department for coordinating discharge planning if the patient needs post-hospital services based on physician/advanced practitioner order or complex needs related to functional status, cognitive ability, or social support system  Outcome: Progressing     Problem: Knowledge Deficit  Goal: Patient/family/caregiver demonstrates understanding of disease process, treatment plan, medications, and discharge instructions  Description: Complete learning assessment and assess knowledge base    Interventions:  - Provide teaching at level of understanding  - Provide teaching via preferred learning methods  Outcome: Progressing     Problem: MOBILITY - ADULT  Goal: Maintain or return to baseline ADL function  Description: INTERVENTIONS:  -  Assess patient's ability to carry out ADLs; assess patient's baseline for ADL function and identify physical deficits which impact ability to perform ADLs (bathing, care of mouth/teeth, toileting, grooming, dressing, etc )  - Assess/evaluate cause of self-care deficits   - Assess range of motion  - Assess patient's mobility; develop plan if impaired  - Assess patient's need for assistive devices and provide as appropriate  - Encourage maximum independence but intervene and supervise when necessary  - Involve family in performance of ADLs  - Assess for home care needs following discharge   - Consider OT consult to assist with ADL evaluation and planning for discharge  - Provide patient education as appropriate  Outcome: Progressing  Goal: Maintains/Returns to pre admission functional level  Description: INTERVENTIONS:  - Perform BMAT or MOVE assessment daily    - Set and communicate daily mobility goal to care team and patient/family/caregiver  - Collaborate with rehabilitation services on mobility goals if consulted  - Perform Range of Motion 2 times a day  - Reposition patient every 2 hours    - Dangle patient 2 times a day  - Stand patient 2 times a day  - Ambulate patient 2 times a day  - Out of bed to chair 2 times a day   - Out of bed for meals 2 times a day  - Out of bed for toileting  - Record patient progress and toleration of activity level   Outcome: Progressing

## 2021-12-19 NOTE — PHYSICAL THERAPY NOTE
PHYSICAL THERAPY EVALUATION  NAME:  Gerson Rodriguez  DATE: 12/19/21    AGE:   [de-identified] y o  Mrn:   [id number]  ADMIT DX:  Shortness of breath [R06 02]  Hypoxia [R09 02]  COVID-19 virus RNA test result positive at limit of detection [U07 1]  COVID-19 [U07 1]  Problem List:   Patient Active Problem List   Diagnosis    Groin pain, chronic, left    Type 2 diabetes mellitus without complication, without long-term current use of insulin (HCC)    Hypertensive kidney disease with chronic kidney disease stage III (HCC)    Abnormal ECG    Articular cartilage disorder of shoulder region    Back pain    Bursitis of shoulder    Atrophic vaginitis    Depression    Disorder of shoulder    Diverticulitis large intestine w/o perforation or abscess w/o bleeding    Essential hypertension    Hypercholesterolemia    Irritable bowel syndrome    Localized, primary osteoarthritis of hand    Loose body in joint of shoulder region    Muscle pain    Paresthesia of arm    Refused influenza vaccine    Sciatica    Simple chronic anemia    Lower abdominal pain    Medicare annual wellness visit, subsequent    Negative depression screening    Overweight (BMI 25 0-29  9)    COVID-19 virus RNA test result positive at limit of detection    Acute respiratory failure with hypoxia [facility])       Past Medical History  Past Medical History:   Diagnosis Date    Diabetes mellitus ([facility] [address] )     Diverticulitis     Postherpetic neuralgia        Past Surgical History  Past Surgical History:   Procedure Laterality Date    CHOLECYSTECTOMY      COLON SURGERY      HERNIA REPAIR      ROTATOR CUFF REPAIR Right     VARICOSE VEIN SURGERY      Impression:  2/12/16 Joey Pagan       Length Of Stay: 2  Performed at least 2 patient identifiers during session: Name and Birthday       12/19/21 1036   PT Last Visit   PT Visit Date 12/19/21   Note Type   Note type Evaluation   Pain Assessment   Pain Assessment Tool 0-10   Pain Score No Pain Restrictions/Precautions   Weight Bearing Precautions Per Order No   Braces or Orthoses   (none reported)   Other Precautions O2;Fall Risk; Airborne/isolation;Droplet precautions  (4L/min)   Home Living   Type of [address] Two level;Bed/bath upstairs;Stairs to enter with rails; Laundry in basement  (10 steps to 2nd floor with unilater HE; 6 TRAM)   Bathroom Shower/Tub Walk-in shower   Bathroom Toilet Standard   Bathroom Equipment Grab bars in shower; Shower chair;Grab bars around toilet   [address]   (none used at baseline)   Additional Comments +    Prior Function   Level of [place] Independent with ADLs and functional mobility   Lives With Spouse;Daughter  (daughter is handicapped)   ADL Assistance Independent  ([address])   IADLs Independent   Falls in the last 6 months 0   Vocational Retired   Comments pt enjoys going shooting   General   Family/Caregiver Present No   Cognition   Overall Cognitive Status WFL   Arousal/Participation Alert   Orientation Level Oriented X4   Memory Within functional limits   Following Commands Follows all commands and directions without difficulty   RLE Assessment   RLE Assessment WFL   LLE Assessment   LLE Assessment WFL   Vision-Basic Assessment   Current Vision Wears glasses only for reading   Coordination   Movements are Fluid and Coordinated 1   Sensation   (pt reports having arthritis in hands)   Bed Mobility   Supine to Sit 4  Minimal assistance   Additional items Assist x 1;Verbal cues; Increased time required;HOB elevated   Sit to Supine 4  Minimal assistance   Additional items Assist x 1;Verbal cues; Increased time required   Additional Comments pt reported slight dizziness with sitting EOB that resolved within 20 seconds   Transfers   Sit to Stand 4  Minimal assistance   Additional items Assist x 1;Verbal cues; Increased time required   Stand to Sit 4  Minimal assistance   Additional items Assist x 1;Verbal cues;Increased time required;Armrests   Toilet transfer 4  Minimal assistance   Additional items Assist x 1;Commode; Increased time required;Armrests   Additional Comments pt incontinent of urine, pericare performed; clothing and bedding changed   Ambulation/Elevation   Gait pattern Short stride; Excessively slow; Foward flexed;Decreased foot clearance; Improper Weight shift   Gait Assistance 4  Minimal assist   Additional items Assist x 1;Verbal cues   Assistive Device   (HHA)   Distance 4 ft x 2   Balance   Static Sitting Good   Dynamic Sitting Good   Static Standing Fair +   Dynamic Standing Fair   Ambulatory Fair   Endurance Deficit   Endurance Deficit Yes   Endurance Deficit Description SaO2 ranged from 85-87%; mobility limted due to this   Activity Tolerance   Activity Tolerance Patient limited by fatigue   Nurse Made Aware KYLER Wilde made aware of findings   Assessment   Prognosis Good   Problem List Decreased endurance; Impaired balance;Decreased mobility   Assessment Pt is [de-identified] y o  female seen for PT evaluation s/p admit to [address] on 12/16/2021 w/ COVID-19 virus RNA test result positive at limit of detection  PT consulted to assess pt's functional mobility and d/c needs  Order placed for PT eval and tx, w/ ambulate patient order  Pt agreeable to PT  session upon arrival, pt found supine in bed  Pt requires Min A assistance for bed mobility, transfers, and ambulation with HHA  PTA, pt was independent w/ all functional mobility w/ no AD, ambulates community distances and elevations, has 6 TRAM, lives w/ family in 2 level  home and retired  The following objective measures performed on IE also reveal limitations: AM-PAC 6-Clicks: 20/25  Pt to benefit from continued PT tx to address deficits and maximize level of functional independent mobility and consistency   From PT/mobility standpoint, recommendation at time of d/c would be post acute rehabilitation services pending progress in order to facilitate return to PLOF  Upon conclusion pt  supine in bed   Barriers to Discharge Decreased caregiver support   Goals   Patient Goals to get better   LTG Expiration Date 12/29/21   Long Term Goal #1 Pt will: Perform bed mobility tasks to modified I to improve ease of bed mobility  Perform transfers to modified I to improve ease of transfers  Perform ambulation with MI and AD for 250 feet to  increase Indep in home environment  Increase dynamic standing balance to F+ to decrease fall risk  Increase OOB activity tolerance to 10 minutes without s/s of exertion to decrease fall risk  Navigate up and down 6 steps with MI so patient can enter and exit home  PT Treatment Day 0   Plan   Treatment/Interventions Functional transfer training;Elevations; Therapeutic exercise; Endurance training;Bed mobility;Gait training   PT Frequency 3-5x/wk   Recommendation   PT Discharge Recommendation Post acute rehabilitation services   Equipment Recommended   (unknown at this time)   AM-PAC Basic Mobility Inpatient   Turning in Bed Without Bedrails 4   Lying on Back to Sitting on Edge of Flat Bed 3   Moving Bed to Chair 3   Standing Up From Chair 3   Walk in Room 3   Climb 3-5 Stairs 3   Basic Mobility Inpatient Raw Score 19   Basic Mobility Standardized Score 42 48   Highest Level Of Mobility   -[facility] Goal 6: Walk 10 steps or more     Pt seen as a co-eval with OT due to the patient's co-morbidities, clinically unstable presentation, and present impairments which are a regression from the patient's baseline       Time In: 1036  Time Out: 1104  Total Evaluation Minutes: [address], [place]

## 2021-12-20 ENCOUNTER — APPOINTMENT (INPATIENT)
Dept: RADIOLOGY | Facility: HOSPITAL | Age: 81
DRG: 871 | End: 2021-12-20
Payer: COMMERCIAL

## 2021-12-20 ENCOUNTER — TELEPHONE (OUTPATIENT)
Dept: OTHER | Facility: OTHER | Age: 81
End: 2021-12-20

## 2021-12-20 PROBLEM — R26.2 AMBULATORY DYSFUNCTION: Status: ACTIVE | Noted: 2021-12-20

## 2021-12-20 LAB
ALBUMIN SERPL BCP-MCNC: 3.6 G/DL (ref 3.5–5)
ALP SERPL-CCNC: 64 U/L (ref 34–104)
ALT SERPL W P-5'-P-CCNC: 13 U/L (ref 7–52)
ANION GAP SERPL CALCULATED.3IONS-SCNC: 9 MMOL/L (ref 4–13)
AST SERPL W P-5'-P-CCNC: 21 U/L (ref 13–39)
BILIRUB SERPL-MCNC: 0.42 MG/DL (ref 0.2–1)
BUN SERPL-MCNC: 50 MG/DL (ref 5–25)
CALCIUM SERPL-MCNC: 9 MG/DL (ref 8.4–10.2)
CHLORIDE SERPL-SCNC: 104 MMOL/L (ref 96–108)
CO2 SERPL-SCNC: 22 MMOL/L (ref 21–32)
CREAT SERPL-MCNC: 1.45 MG/DL (ref 0.6–1.3)
CRP SERPL QL: 58.2 MG/L
ERYTHROCYTE [DISTWIDTH] IN BLOOD BY AUTOMATED COUNT: 13.1 % (ref 11.6–15.1)
G6PD BLD QN: 248 U/10E12 RBC (ref 127–427)
GFR SERPL CREATININE-BSD FRML MDRD: 34 ML/MIN/1.73SQ M
GLUCOSE SERPL-MCNC: 284 MG/DL (ref 65–140)
GLUCOSE SERPL-MCNC: 285 MG/DL (ref 65–140)
GLUCOSE SERPL-MCNC: 302 MG/DL (ref 65–140)
GLUCOSE SERPL-MCNC: 400 MG/DL (ref 65–140)
GLUCOSE SERPL-MCNC: 406 MG/DL (ref 65–140)
GLUCOSE SERPL-MCNC: 420 MG/DL (ref 65–140)
HCT VFR BLD AUTO: 32.6 % (ref 34.8–46.1)
HGB BLD-MCNC: 10.5 G/DL (ref 11.5–15.4)
MCH RBC QN AUTO: 27.2 PG (ref 26.8–34.3)
MCHC RBC AUTO-ENTMCNC: 32.2 G/DL (ref 31.4–37.4)
MCV RBC AUTO: 85 FL (ref 82–98)
PLATELET # BLD AUTO: 197 THOUSANDS/UL (ref 149–390)
PMV BLD AUTO: 9.9 FL (ref 8.9–12.7)
POTASSIUM SERPL-SCNC: 4.5 MMOL/L (ref 3.5–5.3)
PROT SERPL-MCNC: 6.9 G/DL (ref 6.4–8.4)
RBC # BLD AUTO: 3.86 MILLION/UL (ref 3.81–5.12)
RBC # BLD AUTO: 3.95 X10E6/UL (ref 3.77–5.28)
SODIUM SERPL-SCNC: 135 MMOL/L (ref 135–147)
WBC # BLD AUTO: 2.92 THOUSAND/UL (ref 4.31–10.16)

## 2021-12-20 PROCEDURE — 80053 COMPREHEN METABOLIC PANEL: CPT | Performed by: STUDENT IN AN ORGANIZED HEALTH CARE EDUCATION/TRAINING PROGRAM

## 2021-12-20 PROCEDURE — 86140 C-REACTIVE PROTEIN: CPT | Performed by: STUDENT IN AN ORGANIZED HEALTH CARE EDUCATION/TRAINING PROGRAM

## 2021-12-20 PROCEDURE — 71045 X-RAY EXAM CHEST 1 VIEW: CPT

## 2021-12-20 PROCEDURE — 99232 SBSQ HOSP IP/OBS MODERATE 35: CPT | Performed by: STUDENT IN AN ORGANIZED HEALTH CARE EDUCATION/TRAINING PROGRAM

## 2021-12-20 PROCEDURE — 82948 REAGENT STRIP/BLOOD GLUCOSE: CPT

## 2021-12-20 PROCEDURE — 85027 COMPLETE CBC AUTOMATED: CPT | Performed by: STUDENT IN AN ORGANIZED HEALTH CARE EDUCATION/TRAINING PROGRAM

## 2021-12-20 PROCEDURE — 94760 N-INVAS EAR/PLS OXIMETRY 1: CPT

## 2021-12-20 RX ORDER — INSULIN GLARGINE 100 [IU]/ML
10 INJECTION, SOLUTION SUBCUTANEOUS EVERY MORNING
Status: DISCONTINUED | OUTPATIENT
Start: 2021-12-20 | End: 2021-12-21

## 2021-12-20 RX ORDER — ATORVASTATIN CALCIUM 40 MG/1
40 TABLET, FILM COATED ORAL
Status: DISCONTINUED | OUTPATIENT
Start: 2021-12-20 | End: 2022-01-12 | Stop reason: HOSPADM

## 2021-12-20 RX ADMIN — ONDANSETRON 4 MG: 2 INJECTION INTRAMUSCULAR; INTRAVENOUS at 15:17

## 2021-12-20 RX ADMIN — INSULIN LISPRO 4 UNITS: 100 INJECTION, SOLUTION INTRAVENOUS; SUBCUTANEOUS at 08:58

## 2021-12-20 RX ADMIN — LISINOPRIL 20 MG: 20 TABLET ORAL at 08:56

## 2021-12-20 RX ADMIN — HEPARIN SODIUM 5000 UNITS: 5000 INJECTION INTRAVENOUS; SUBCUTANEOUS at 22:26

## 2021-12-20 RX ADMIN — REMDESIVIR 100 MG: 100 INJECTION, POWDER, LYOPHILIZED, FOR SOLUTION INTRAVENOUS at 18:06

## 2021-12-20 RX ADMIN — ATORVASTATIN CALCIUM 40 MG: 40 TABLET, FILM COATED ORAL at 22:26

## 2021-12-20 RX ADMIN — INSULIN LISPRO 4 UNITS: 100 INJECTION, SOLUTION INTRAVENOUS; SUBCUTANEOUS at 22:57

## 2021-12-20 RX ADMIN — DEXAMETHASONE SODIUM PHOSPHATE 6 MG: 4 INJECTION, SOLUTION INTRA-ARTICULAR; INTRALESIONAL; INTRAMUSCULAR; INTRAVENOUS; SOFT TISSUE at 18:06

## 2021-12-20 RX ADMIN — INSULIN LISPRO 2 UNITS: 100 INJECTION, SOLUTION INTRAVENOUS; SUBCUTANEOUS at 18:07

## 2021-12-20 RX ADMIN — HEPARIN SODIUM 5000 UNITS: 5000 INJECTION INTRAVENOUS; SUBCUTANEOUS at 14:47

## 2021-12-20 RX ADMIN — INSULIN GLARGINE 10 UNITS: 100 INJECTION, SOLUTION SUBCUTANEOUS at 14:47

## 2021-12-20 RX ADMIN — HEPARIN SODIUM 5000 UNITS: 5000 INJECTION INTRAVENOUS; SUBCUTANEOUS at 05:55

## 2021-12-20 RX ADMIN — BARICITINIB 2 MG: 2 TABLET, FILM COATED ORAL at 14:47

## 2021-12-20 RX ADMIN — INSULIN LISPRO 3 UNITS: 100 INJECTION, SOLUTION INTRAVENOUS; SUBCUTANEOUS at 12:24

## 2021-12-20 RX ADMIN — FERROUS SULFATE TAB 325 MG (65 MG ELEMENTAL FE) 325 MG: 325 (65 FE) TAB at 08:56

## 2021-12-20 NOTE — PHYSICAL THERAPY NOTE
Physical Therapy Cancellation Note       12/20/21 1040   PT Last Visit   PT Visit Date 12/20/21   Note Type   Note Type Cancelled Session   Cancel Reasons   (pt refusal d/t fatigue and tiredness)       Chart review performed  At this time, PT treatment session cancelled as pt refusing d/t fatigue/tiredness  PT will follow and provide PT interventions as appropriate      Juan Ramon Yeboah, PT

## 2021-12-20 NOTE — ASSESSMENT & PLAN NOTE
Unvaccinated  COVID-19 positive on 12/16/2021  Endorses cough, shortness of breath     · Remdesivir 5/5  · Decadron 5/10  · Baricitinib day 1/14  · Heparin DVT ppx  · Lipitor  · Incentive spirometry  · Ambulate patient  · Encourage self-proning  · Currently saturating 91% on 4 L    Starting on 12/21 please call son Christel Lara for updates as daughter will be unavailable

## 2021-12-20 NOTE — NURSING NOTE
Patient's oxygen demand increasing, respiratory therapist made aware  Patient is placed on 12L midflow at this time  Hospitalist made aware

## 2021-12-20 NOTE — NURSING NOTE
Awake and alert/oriented x4  02 is maintained 3 liters n/c lungs are decreased/corse  Heart is regular  No edema  Plus 2 pedal/radial pulses  Denies pain  Iv is capped and maintained  scds maintained lbm today/loose  Call bell near   No complaints or distress 02 sat is 92%

## 2021-12-20 NOTE — UTILIZATION REVIEW
Continued Stay Review    Date: 12/20    Current Patient Class: INpatient   Current Level of Care: MEd/surg    HPI:81 y o  female initially admitted on 12/17     Assessment/Plan:   Increase in Oxygen needs today  Now requiring 12L Midflow  Continue with COVID treatment  Encourage self proning  Vital Signs:   Date/Time Temp Pulse Resp BP MAP (mmHg) SpO2 Calculated FIO2 (%) - Nasal Cannula Nasal Cannula O2 Flow Rate (L/min) O2 Device Patient Position - Orthostatic VS   12/20/21 15:07:35 -- 78 19 128/72 91 88 % Abnormal  -- -- -- --   12/20/21 0908 -- -- -- -- -- 89 % Abnormal  36 4 L/min Nasal cannula --   12/20/21 0903 -- -- -- -- -- -- 36 4 L/min Nasal cannula --   12/20/21 08:07:06 99 3 °F (37 4 °C) 68 18 156/83 107 91 % -- -- -- --   12/20/21 0603 -- -- -- -- -- 88 % Abnormal  36 4 L/min Nasal cannula --   12/20/21 0602 -- -- -- -- -- -- 36 4 L/min Nasal cannula --   12/19/21 23:47:50 -- 65 19 128/64 85 93 % -- -- -- --   12/19/21 1946 -- -- -- -- -- -- -- -- Nasal cannula --   12/19/21 14:35:53 97 7 °F (36 5 °C) 71 19 130/69 89 91 % -- -- -- --   12/19/21 07:26:46 98 °F (36 7 °C) 68 18 133/67 89 92 % -- -- -- --   12/18/21 2000 -- -- -- -- -- -- 32 3 L/min Nasal cannula --   12/18/21 17:53:17 100 °F (37 8 °C) 80 18 141/96 111 87 % Abnormal  -- -- --          Pertinent Labs/Diagnostic Results:   Results from last 7 days   Lab Units 12/16/21  1707   SARS-COV-2  Positive*     Results from last 7 days   Lab Units 12/20/21  7564 12/19/21  2329 12/18/21  0524 12/17/21  0459 12/17/21  0459 12/16/21  1926 12/16/21  1707   WBC Thousand/uL 2 92* 3 79* 3 32*   < > 3 11*   < > 4 13*   HEMOGLOBIN g/dL 10 5* 10 9* 10 7*  --  10 7*  --  10 8*   HEMATOCRIT % 32 6* 33 6* 32 8*  --  33 9*  --  33 7*   PLATELETS Thousands/uL 197 178 156   < > 162   < > 158   NEUTROS ABS Thousands/µL  --   --   --   --  2 16  --  3 18    < > = values in this interval not displayed           Results from last 7 days   Lab Units 12/20/21  0434 12/19/21  0429 12/18/21  0524 12/17/21  0459 12/16/21  1707   SODIUM mmol/L 135 136 137 139 139   POTASSIUM mmol/L 4 5 4 2 3 7 3 8 3 5   CHLORIDE mmol/L 104 104 105 107 104   CO2 mmol/L 22 20* 17* 21 24   ANION GAP mmol/L 9 12 15* 11 11   BUN mg/dL 50* 43* 33* 23 24   CREATININE mg/dL 1 45* 1 39* 1 48* 1 39* 1 56*   EGFR ml/min/1 73sq m 34 35 33 35 31   CALCIUM mg/dL 9 0 9 0 8 7 8 9 8 9   CALCIUM, IONIZED mmol/L  --   --   --   --  1 08*   MAGNESIUM mg/dL  --   --   --   --  1 9     Results from last 7 days   Lab Units 12/20/21  0434 12/19/21  0429 12/18/21  0524 12/16/21  1707   AST U/L 21 24 25 20   ALT U/L 13 12 12 11   ALK PHOS U/L 64 64 65 78   TOTAL PROTEIN g/dL 6 9 7 0 7 0 7 9   ALBUMIN g/dL 3 6 3 8 3 8 4 4   TOTAL BILIRUBIN mg/dL 0 42 0 44 0 39 0 36     Results from last 7 days   Lab Units 12/20/21  1130 12/20/21  0604 12/19/21  1942 12/19/21  1548 12/19/21  1149 12/19/21  0534 12/18/21  2012 12/18/21  1752 12/18/21  1348 12/18/21  0724 12/17/21  2119 12/17/21  1522   POC GLUCOSE mg/dl 302* 400* 296* 375* 259* 332* 191* 207* 221* 225* 130 106     Results from last 7 days   Lab Units 12/20/21  0434 12/19/21  0429 12/18/21  0524 12/17/21  0459 12/16/21  1707   GLUCOSE RANDOM mg/dL 420* 354* 246* 110 57*           Results from last 7 days   Lab Units 12/16/21  1707   CK TOTAL U/L 137   CK MB INDEX % 0 4   CK MB ng/mL 0 6     Results from last 7 days   Lab Units 12/16/21 2128 12/16/21  1926 12/16/21  1707   HS TNI 0HR ng/L  --   --  16   HS TNI 2HR ng/L  --  13  --    HSTNI D2 ng/L  --  -3  --    HS TNI 4HR ng/L 12  --   --    HSTNI D4 ng/L -4  --   --      Results from last 7 days   Lab Units 12/16/21  1707   D-DIMER QUANTITATIVE ug/ml FEU 1 21*     Results from last 7 days   Lab Units 12/16/21  1707   PROTIME seconds 14 3   INR  1 12         Results from last 7 days   Lab Units 12/17/21  0459 12/16/21  1707   PROCALCITONIN ng/ml 0 05 <0 05     Results from last 7 days   Lab Units 12/16/21  1707   LACTIC ACID mmol/L 0 5             Results from last 7 days   Lab Units 12/16/21  1707   BNP pg/mL 51     Results from last 7 days   Lab Units 12/16/21  1707   FERRITIN ng/mL 424*             Results from last 7 days   Lab Units 12/20/21  0434 12/19/21  0429 12/18/21  0524 12/16/21  1707   CRP mg/L 58 2* 88 7* 109 0* 90 9*           Results from last 7 days   Lab Units 12/16/21  1707   INFLUENZA A PCR  Negative   INFLUENZA B PCR  Negative   RSV PCR  Negative     Results from last 7 days   Lab Units 12/16/21  1708 12/16/21  1707   BLOOD CULTURE  No Growth at 72 hrs   --    GRAM STAIN RESULT   --  Gram positive cocci in pairs*         Medications:   Scheduled Medications:  atorvastatin, 40 mg, Oral, HS  Baricitinib, 2 mg, Oral, Q24H  dexamethasone, 6 mg, Intravenous, Q24H  ferrous sulfate, 325 mg, Oral, Daily With Breakfast  heparin (porcine), 5,000 Units, Subcutaneous, Q8H JUAN ANTONIO  insulin glargine, 10 Units, Subcutaneous, QAM  insulin lispro, 1-5 Units, Subcutaneous, TID AC  insulin lispro, 1-5 Units, Subcutaneous, HS  lisinopril, 20 mg, Oral, Daily  remdesivir, 100 mg, Intravenous, Q24H      Continuous IV Infusions:     PRN Meds:  acetaminophen, 650 mg, Oral, Q4H PRN  ALPRAZolam, 0 25 mg, Oral, HS PRN  ondansetron, 4 mg, Intravenous, Q6H PRN        Discharge Plan: D  Network Utilization Review Department  ATTENTION: Please call with any questions or concerns to [phone] and carefully listen to the prompts so that you are directed to the right person  All voicemails are confidential   Candia Siemens all requests for admission clinical reviews, approved or denied determinations and any other requests to dedicated fax number below belonging to the campus where the patient is receiving treatment   List of dedicated fax numbers for the Facilities:  [address] DENIALS (Administrative/Medical Necessity) [phone]   [address] (Maternity/NICU/Pediatrics) [phone]   [address] [facility] 40 125 [facility]  [phone]   [address] [facility] [address] [address] [place] [address] Samantha Razo [address] [phone]

## 2021-12-20 NOTE — ASSESSMENT & PLAN NOTE
Lab Results   Component Value Date    HGBA1C 6 2 (H) 11/22/2021       Recent Labs     12/19/21  1548 12/19/21  1942 12/20/21  0604 12/20/21  1130   POCGLU 375* 296* 400* 302*       Blood Sugar Average: Last 72 hrs:  (P) 230 5   Hold home oral hypoglycemic regimen  SSI algorithm 1 TID AC & qHS  Will add 10 units Lantus q a m

## 2021-12-20 NOTE — NURSING NOTE
Pt out to commode and disconnected 02   02 sat to 80%   reapplied same and 02 sat to increase to 85 INCREASED 02 N/C TO 4 L  AND SAT 88%

## 2021-12-20 NOTE — PROGRESS NOTES
[facility]  Progress Note - Rene Duran 1940, [de-identified] y o  female MRN: [id number]  Unit/Bed#: -[facility]: [id number]  Primary Care Provider: Kasey Ramos DO   Date and time admitted to hospital: 12/16/2021  4:49 PM    * COVID-19 virus RNA test result positive at limit of detection  Assessment & Plan  Unvaccinated  COVID-19 positive on 12/16/2021  Endorses cough, shortness of breath  · Remdesivir 5/5  · Decadron 5/10  · Baricitinib day 1/14  · Heparin DVT ppx  · Lipitor  · Incentive spirometry  · Ambulate patient  · Encourage self-proning  · Currently saturating 91% on 4 L    Starting on 12/21 please call son Christel Lara for updates as daughter will be unavailable    Ambulatory dysfunction  Assessment & Plan  PT OT recommending acute care rehab    Acute respiratory failure with hypoxia [facility])  Assessment & Plan  Secondary to COVID -19 viral pneumonia  · Wean O2 as tolerated  · Goal SpO2 > 90%    Type 2 diabetes mellitus without complication, without long-term current use of insulin [facility])  Assessment & Plan  Lab Results   Component Value Date    HGBA1C 6 2 (H) 11/22/2021       Recent Labs     12/19/21  1548 12/19/21  1942 12/20/21  0604 12/20/21  1130   POCGLU 375* 296* 400* 302*       Blood Sugar Average: Last 72 hrs:  (P) 230 5   Hold home oral hypoglycemic regimen  SSI algorithm 1 TID AC & qHS  Will add 10 units Lantus q a m  VTE Pharmacologic Prophylaxis: VTE Score: 6 High Risk (Score >/= 5) - Pharmacological DVT Prophylaxis Ordered: heparin  Sequential Compression Devices Ordered  Patient Centered Rounds: I performed bedside rounds with nursing staff today  Discussions with Specialists or Other Care Team Provider: None    Education and Discussions with Family / Patient: Updated  (daughter) via phone  Time Spent for Care: 30 minutes  More than 50% of total time spent on counseling and coordination of care as described above      Current Length of Stay: 3 day(s)  Current Patient Status: Inpatient   Certification Statement: The patient will continue to require additional inpatient hospital stay due to COVID-19 pneumonia  Discharge Plan: Anticipate discharge in >72 hrs to rehab facility  Code Status: Level 1 - Full Code    Subjective:   Patient seen and examined at bedside  Patient resting comfortably in no apparent distress  Patient with increased oxygen requirements as opposed yesterday  Currently on 4 L nasal cannula saturating 91%  Patient otherwise states she feels better today as compared to yesterday    Objective:     Vitals:   Temp (24hrs), Av 5 °F (36 9 °C), Min:97 7 °F (36 5 °C), Max:99 3 °F (37 4 °C)    Temp:  [97 7 °F (36 5 °C)-99 3 °F (37 4 °C)] 99 3 °F (37 4 °C)  HR:  [65-71] 68  Resp:  [18-19] 18  BP: (128-156)/(64-83) 156/83  SpO2:  [88 %-93 %] 89 %  Body mass index is 23 63 kg/m²  Input and Output Summary (last 24 hours): Intake/Output Summary (Last 24 hours) at 2021 1249  Last data filed at 2021 1946  Gross per 24 hour   Intake 240 ml   Output 300 ml   Net -60 ml       Physical Exam:   Physical Exam  Cardiovascular:      Rate and Rhythm: Normal rate and regular rhythm  Pulses: Normal pulses  Pulmonary:      Comments: Coarse breath sounds throughout  4 L nasal cannula  Abdominal:      General: Abdomen is flat  Bowel sounds are normal       Palpations: Abdomen is soft  Musculoskeletal:      Cervical back: Normal range of motion  Skin:     General: Skin is warm and dry  Neurological:      General: No focal deficit present  Mental Status: She is alert and oriented to person, place, and time  Mental status is at baseline  Psychiatric:         Mood and Affect: Mood normal          Behavior: Behavior normal          Thought Content:  Thought content normal          Judgment: Judgment normal           Additional Data:     Labs:  Results from last 7 days   Lab Units 21  0435 21  3978 12/17/21  0459   WBC Thousand/uL 2 92*   < > 3 11*   HEMOGLOBIN g/dL 10 5*   < > 10 7*   HEMATOCRIT % 32 6*   < > 33 9*   PLATELETS Thousands/uL 197   < > 162   NEUTROS PCT %  --   --  69   LYMPHS PCT %  --   --  24   MONOS PCT %  --   --  6   EOS PCT %  --   --  0    < > = values in this interval not displayed  Results from last 7 days   Lab Units 12/20/21  0434   SODIUM mmol/L 135   POTASSIUM mmol/L 4 5   CHLORIDE mmol/L 104   CO2 mmol/L 22   BUN mg/dL 50*   CREATININE mg/dL 1 45*   ANION GAP mmol/L 9   CALCIUM mg/dL 9 0   ALBUMIN g/dL 3 6   TOTAL BILIRUBIN mg/dL 0 42   ALK PHOS U/L 64   ALT U/L 13   AST U/L 21   GLUCOSE RANDOM mg/dL 420*     Results from last 7 days   Lab Units 12/16/21  1707   INR  1 12     Results from last 7 days   Lab Units 12/20/21  1130 12/20/21  0604 12/19/21  1942 12/19/21  1548 12/19/21  1149 12/19/21  0534 12/18/21  2012 12/18/21  1752 12/18/21  1348 12/18/21  0724 12/17/21  2119 12/17/21  1522   POC GLUCOSE mg/dl 302* 400* 296* 375* 259* 332* 191* 207* 221* 225* 130 106         Results from last 7 days   Lab Units 12/17/21  0459 12/16/21  1707   LACTIC ACID mmol/L  --  0 5   PROCALCITONIN ng/ml 0 05 <0 05       Lines/Drains:  Invasive Devices  Report    Peripheral Intravenous Line            Peripheral IV 12/16/21 Left Antecubital 3 days          Drain            External Urinary Catheter 1 day                      Imaging: No pertinent imaging reviewed      Recent Cultures (last 7 days):   Results from last 7 days   Lab Units 12/16/21  1708 12/16/21  1707   BLOOD CULTURE  No Growth at 72 hrs   --    GRAM STAIN RESULT   --  Gram positive cocci in pairs*       Last 24 Hours Medication List:   Current Facility-Administered Medications   Medication Dose Route Frequency Provider Last Rate    acetaminophen  650 mg Oral Q4H PRN [place], DO      ALPRAZolam  0 25 mg Oral HS PRN [place], DO      atorvastatin  40 mg Oral HS Guido [place], DO      Baricitinib  2 mg Oral Q24H Marcelino Sepulveda,       dexamethasone  6 mg Intravenous Q24H Cady Sheets, DO      ferrous sulfate  325 mg Oral Daily With Breakfast Cady Sheets, DO      heparin (porcine)  5,000 Units Subcutaneous [facility] Cady Sheets, DO      insulin glargine  10 Units Subcutaneous QAM Marcelino Sepulveda, DO      insulin lispro  1-5 Units Subcutaneous TID AC Cady Sheets, DO      insulin lispro  1-5 Units Subcutaneous HS Cady Sheets, DO      lisinopril  20 mg Oral Daily Cady Sheets, DO      ondansetron  4 mg Intravenous Q6H PRN Cady Sheets, DO      remdesivir  100 mg Intravenous Q24H Cady Sheets, DO 0 mg (12/17/21 2035)        Today, Patient Was Seen By: Marcelino Sepulveda DO    **Please Note: This note may have been constructed using a voice recognition system  **

## 2021-12-21 ENCOUNTER — APPOINTMENT (INPATIENT)
Dept: RADIOLOGY | Facility: HOSPITAL | Age: 81
DRG: 871 | End: 2021-12-21
Payer: COMMERCIAL

## 2021-12-21 PROBLEM — F41.9 ANXIETY: Status: ACTIVE | Noted: 2017-01-26

## 2021-12-21 PROBLEM — D64.9 ANEMIA: Status: ACTIVE | Noted: 2021-12-21

## 2021-12-21 PROBLEM — R65.10 SIRS (SYSTEMIC INFLAMMATORY RESPONSE SYNDROME) (HCC): Status: ACTIVE | Noted: 2021-12-21

## 2021-12-21 PROBLEM — N18.32 CHRONIC KIDNEY DISEASE, STAGE 3B (HCC): Status: ACTIVE | Noted: 2021-12-21

## 2021-12-21 LAB
ALBUMIN SERPL BCP-MCNC: 3.7 G/DL (ref 3.5–5)
ALP SERPL-CCNC: 68 U/L (ref 34–104)
ALT SERPL W P-5'-P-CCNC: 15 U/L (ref 7–52)
ANION GAP SERPL CALCULATED.3IONS-SCNC: 8 MMOL/L (ref 4–13)
APTT PPP: 46 SECONDS (ref 23–37)
AST SERPL W P-5'-P-CCNC: 19 U/L (ref 13–39)
BACTERIA BLD CULT: NORMAL
BILIRUB SERPL-MCNC: 0.49 MG/DL (ref 0.2–1)
BUN SERPL-MCNC: 41 MG/DL (ref 5–25)
CALCIUM SERPL-MCNC: 9.4 MG/DL (ref 8.4–10.2)
CHLORIDE SERPL-SCNC: 105 MMOL/L (ref 96–108)
CO2 SERPL-SCNC: 24 MMOL/L (ref 21–32)
CREAT SERPL-MCNC: 1.34 MG/DL (ref 0.6–1.3)
CRP SERPL QL: 57.8 MG/L
D DIMER PPP FEU-MCNC: 0.93 UG/ML FEU
ERYTHROCYTE [DISTWIDTH] IN BLOOD BY AUTOMATED COUNT: 13.1 % (ref 11.6–15.1)
GFR SERPL CREATININE-BSD FRML MDRD: 37 ML/MIN/1.73SQ M
GLUCOSE SERPL-MCNC: 209 MG/DL (ref 65–140)
GLUCOSE SERPL-MCNC: 265 MG/DL (ref 65–140)
GLUCOSE SERPL-MCNC: 278 MG/DL (ref 65–140)
GLUCOSE SERPL-MCNC: 289 MG/DL (ref 65–140)
GLUCOSE SERPL-MCNC: 300 MG/DL (ref 65–140)
GLUCOSE SERPL-MCNC: 300 MG/DL (ref 65–140)
GLUCOSE SERPL-MCNC: 381 MG/DL (ref 65–140)
HCT VFR BLD AUTO: 32.4 % (ref 34.8–46.1)
HGB BLD-MCNC: 10.7 G/DL (ref 11.5–15.4)
INR PPP: 1.18 (ref 0.84–1.19)
MCH RBC QN AUTO: 27.4 PG (ref 26.8–34.3)
MCHC RBC AUTO-ENTMCNC: 33 G/DL (ref 31.4–37.4)
MCV RBC AUTO: 83 FL (ref 82–98)
PLATELET # BLD AUTO: 234 THOUSANDS/UL (ref 149–390)
PMV BLD AUTO: 10.3 FL (ref 8.9–12.7)
POTASSIUM SERPL-SCNC: 4.5 MMOL/L (ref 3.5–5.3)
PROCALCITONIN SERPL-MCNC: 0.09 NG/ML
PROT SERPL-MCNC: 7.2 G/DL (ref 6.4–8.4)
PROTHROMBIN TIME: 14.9 SECONDS (ref 11.6–14.5)
RBC # BLD AUTO: 3.91 MILLION/UL (ref 3.81–5.12)
SODIUM SERPL-SCNC: 137 MMOL/L (ref 135–147)
WBC # BLD AUTO: 6.15 THOUSAND/UL (ref 4.31–10.16)

## 2021-12-21 PROCEDURE — 82948 REAGENT STRIP/BLOOD GLUCOSE: CPT

## 2021-12-21 PROCEDURE — 86140 C-REACTIVE PROTEIN: CPT | Performed by: STUDENT IN AN ORGANIZED HEALTH CARE EDUCATION/TRAINING PROGRAM

## 2021-12-21 PROCEDURE — 93005 ELECTROCARDIOGRAM TRACING: CPT

## 2021-12-21 PROCEDURE — 94760 N-INVAS EAR/PLS OXIMETRY 1: CPT

## 2021-12-21 PROCEDURE — 99291 CRITICAL CARE FIRST HOUR: CPT | Performed by: STUDENT IN AN ORGANIZED HEALTH CARE EDUCATION/TRAINING PROGRAM

## 2021-12-21 PROCEDURE — 94762 N-INVAS EAR/PLS OXIMTRY CONT: CPT

## 2021-12-21 PROCEDURE — 84145 PROCALCITONIN (PCT): CPT | Performed by: NURSE PRACTITIONER

## 2021-12-21 PROCEDURE — 85027 COMPLETE CBC AUTOMATED: CPT | Performed by: STUDENT IN AN ORGANIZED HEALTH CARE EDUCATION/TRAINING PROGRAM

## 2021-12-21 PROCEDURE — 80053 COMPREHEN METABOLIC PANEL: CPT | Performed by: STUDENT IN AN ORGANIZED HEALTH CARE EDUCATION/TRAINING PROGRAM

## 2021-12-21 PROCEDURE — 85610 PROTHROMBIN TIME: CPT | Performed by: NURSE PRACTITIONER

## 2021-12-21 PROCEDURE — 71045 X-RAY EXAM CHEST 1 VIEW: CPT

## 2021-12-21 PROCEDURE — 85379 FIBRIN DEGRADATION QUANT: CPT | Performed by: NURSE PRACTITIONER

## 2021-12-21 PROCEDURE — 85730 THROMBOPLASTIN TIME PARTIAL: CPT | Performed by: NURSE PRACTITIONER

## 2021-12-21 RX ORDER — INSULIN GLARGINE 100 [IU]/ML
10 INJECTION, SOLUTION SUBCUTANEOUS EVERY 12 HOURS SCHEDULED
Status: DISCONTINUED | OUTPATIENT
Start: 2021-12-21 | End: 2021-12-21

## 2021-12-21 RX ORDER — HEPARIN SODIUM 5000 [USP'U]/ML
5000 INJECTION, SOLUTION INTRAVENOUS; SUBCUTANEOUS EVERY 8 HOURS SCHEDULED
Status: DISCONTINUED | OUTPATIENT
Start: 2021-12-21 | End: 2022-01-12

## 2021-12-21 RX ORDER — HEPARIN SODIUM 10000 [USP'U]/100ML
3-20 INJECTION, SOLUTION INTRAVENOUS
Status: DISCONTINUED | OUTPATIENT
Start: 2021-12-21 | End: 2021-12-21

## 2021-12-21 RX ORDER — DEXAMETHASONE SODIUM PHOSPHATE 4 MG/ML
0.1 INJECTION, SOLUTION INTRA-ARTICULAR; INTRALESIONAL; INTRAMUSCULAR; INTRAVENOUS; SOFT TISSUE EVERY 12 HOURS
Status: COMPLETED | OUTPATIENT
Start: 2021-12-21 | End: 2021-12-30

## 2021-12-21 RX ORDER — CEFTRIAXONE 1 G/50ML
1000 INJECTION, SOLUTION INTRAVENOUS EVERY 24 HOURS
Status: DISCONTINUED | OUTPATIENT
Start: 2021-12-21 | End: 2021-12-22

## 2021-12-21 RX ORDER — DOXYCYCLINE HYCLATE 100 MG/1
100 CAPSULE ORAL EVERY 12 HOURS
Status: DISCONTINUED | OUTPATIENT
Start: 2021-12-21 | End: 2021-12-22

## 2021-12-21 RX ORDER — HEPARIN SODIUM 1000 [USP'U]/ML
3000 INJECTION, SOLUTION INTRAVENOUS; SUBCUTANEOUS
Status: DISCONTINUED | OUTPATIENT
Start: 2021-12-21 | End: 2021-12-21

## 2021-12-21 RX ORDER — HEPARIN SODIUM 1000 [USP'U]/ML
1500 INJECTION, SOLUTION INTRAVENOUS; SUBCUTANEOUS
Status: DISCONTINUED | OUTPATIENT
Start: 2021-12-21 | End: 2021-12-21

## 2021-12-21 RX ORDER — FUROSEMIDE 10 MG/ML
20 INJECTION INTRAMUSCULAR; INTRAVENOUS ONCE
Status: COMPLETED | OUTPATIENT
Start: 2021-12-21 | End: 2021-12-21

## 2021-12-21 RX ADMIN — INSULIN LISPRO 3 UNITS: 100 INJECTION, SOLUTION INTRAVENOUS; SUBCUTANEOUS at 16:45

## 2021-12-21 RX ADMIN — DOXYCYCLINE 100 MG: 100 CAPSULE ORAL at 22:15

## 2021-12-21 RX ADMIN — FUROSEMIDE 20 MG: 10 INJECTION, SOLUTION INTRAMUSCULAR; INTRAVENOUS at 11:48

## 2021-12-21 RX ADMIN — INSULIN LISPRO 2 UNITS: 100 INJECTION, SOLUTION INTRAVENOUS; SUBCUTANEOUS at 11:51

## 2021-12-21 RX ADMIN — HEPARIN SODIUM 5000 UNITS: 5000 INJECTION INTRAVENOUS; SUBCUTANEOUS at 22:15

## 2021-12-21 RX ADMIN — INSULIN GLARGINE 10 UNITS: 100 INJECTION, SOLUTION SUBCUTANEOUS at 08:45

## 2021-12-21 RX ADMIN — ATORVASTATIN CALCIUM 40 MG: 40 TABLET, FILM COATED ORAL at 22:15

## 2021-12-21 RX ADMIN — HEPARIN SODIUM 5000 UNITS: 5000 INJECTION INTRAVENOUS; SUBCUTANEOUS at 14:26

## 2021-12-21 RX ADMIN — DEXAMETHASONE SODIUM PHOSPHATE 5.48 MG: 4 INJECTION, SOLUTION INTRA-ARTICULAR; INTRALESIONAL; INTRAMUSCULAR; INTRAVENOUS; SOFT TISSUE at 11:46

## 2021-12-21 RX ADMIN — BARICITINIB 2 MG: 2 TABLET, FILM COATED ORAL at 12:30

## 2021-12-21 RX ADMIN — DEXAMETHASONE SODIUM PHOSPHATE 5.48 MG: 4 INJECTION, SOLUTION INTRA-ARTICULAR; INTRALESIONAL; INTRAMUSCULAR; INTRAVENOUS; SOFT TISSUE at 22:15

## 2021-12-21 RX ADMIN — DOXYCYCLINE 100 MG: 100 CAPSULE ORAL at 11:50

## 2021-12-21 RX ADMIN — SODIUM CHLORIDE 8 UNITS/HR: 9 INJECTION, SOLUTION INTRAVENOUS at 18:24

## 2021-12-21 RX ADMIN — FERROUS SULFATE TAB 325 MG (65 MG ELEMENTAL FE) 325 MG: 325 (65 FE) TAB at 07:26

## 2021-12-21 RX ADMIN — ONDANSETRON 4 MG: 2 INJECTION INTRAMUSCULAR; INTRAVENOUS at 08:05

## 2021-12-21 RX ADMIN — INSULIN LISPRO 3 UNITS: 100 INJECTION, SOLUTION INTRAVENOUS; SUBCUTANEOUS at 11:50

## 2021-12-21 RX ADMIN — ONDANSETRON 4 MG: 2 INJECTION INTRAMUSCULAR; INTRAVENOUS at 22:32

## 2021-12-21 RX ADMIN — INSULIN LISPRO 2 UNITS: 100 INJECTION, SOLUTION INTRAVENOUS; SUBCUTANEOUS at 07:27

## 2021-12-21 RX ADMIN — HEPARIN SODIUM 5000 UNITS: 5000 INJECTION INTRAVENOUS; SUBCUTANEOUS at 05:36

## 2021-12-21 RX ADMIN — CEFTRIAXONE 1000 MG: 1 INJECTION, SOLUTION INTRAVENOUS at 11:53

## 2021-12-21 RX ADMIN — ACETAMINOPHEN 650 MG: 325 TABLET ORAL at 22:13

## 2021-12-21 NOTE — ASSESSMENT & PLAN NOTE
Lab Results   Component Value Date    EGFR 37 12/21/2021    EGFR 34 12/20/2021    EGFR 35 12/19/2021    CREATININE 1 34 (H) 12/21/2021    CREATININE 1 45 (H) 12/20/2021    CREATININE 1 39 (H) 12/19/2021     · Creatinine 1 56 on admission  · Baseline creatinine appears to be around 1 2 - 1 6 - currently at baseline  · Received IV Lasix 20 mg x 1 today  · Avoid nephrotoxic agents and hypotension  · Trend renal indices  · Strict I&O  · Daily weights

## 2021-12-21 NOTE — QUICK NOTE
Patient has an increase requirement for O2 from 4L yesterday up to 11L NC this AM and now on 15L high flow with non breather with SpO2 88-89%  Discussed case with critical care  Patient will be upgraded to critical care  Updates given to Deny Whitley via phone

## 2021-12-21 NOTE — PLAN OF CARE
Problem: Potential for Falls  Goal: Patient will remain free of falls  Description: INTERVENTIONS:  - Educate patient/family on patient safety including physical limitations  - Instruct patient to call for assistance with activity   - Consult OT/PT to assist with strengthening/mobility   - Keep Call bell within reach  - Keep bed low and locked with side rails adjusted as appropriate  - Keep care items and personal belongings within reach  - Initiate and maintain comfort rounds  - Make Fall Risk Sign visible to staff  - Offer Toileting every 2 Hours, in advance of need  - Initiate/Maintain bed alarm  - Apply yellow socks and bracelet for high fall risk patients  - Consider moving patient to room near nurses station  Outcome: Progressing     Problem: PAIN - ADULT  Goal: Verbalizes/displays adequate comfort level or baseline comfort level  Description: Interventions:  - Encourage patient to monitor pain and request assistance  - Assess pain using appropriate pain scale  - Administer analgesics based on type and severity of pain and evaluate response  - Implement non-pharmacological measures as appropriate and evaluate response  - Consider cultural and social influences on pain and pain management  - Notify physician/advanced practitioner if interventions unsuccessful or patient reports new pain  Outcome: Progressing     Problem: INFECTION - ADULT  Goal: Absence or prevention of progression during hospitalization  Description: INTERVENTIONS:  - Assess and monitor for signs and symptoms of infection  - Monitor lab/diagnostic results  - Monitor all insertion sites, i e  indwelling lines, tubes, and drains  - Monitor endotracheal if appropriate and nasal secretions for changes in amount and color  - [place] appropriate cooling/warming therapies per order  - Administer medications as ordered  - Instruct and encourage patient and family to use good hand hygiene technique  - Identify and instruct in appropriate isolation precautions for identified infection/condition  Outcome: Progressing  Goal: Absence of fever/infection during neutropenic period  Description: INTERVENTIONS:  - Monitor WBC    Outcome: Progressing     Problem: SAFETY ADULT  Goal: Patient will remain free of falls  Description: INTERVENTIONS:  - Educate patient/family on patient safety including physical limitations  - Instruct patient to call for assistance with activity   - Consult OT/PT to assist with strengthening/mobility   - Keep Call bell within reach  - Keep bed low and locked with side rails adjusted as appropriate  - Keep care items and personal belongings within reach  - Initiate and maintain comfort rounds  - Make Fall Risk Sign visible to staff  - Offer Toileting every 2 Hours, in advance of need  - Initiate/Maintain bed alarm  - Apply yellow socks and bracelet for high fall risk patients  - Consider moving patient to room near nurses station  Outcome: Progressing  Goal: Maintain or return to baseline ADL function  Description: INTERVENTIONS:  -  Assess patient's ability to carry out ADLs; assess patient's baseline for ADL function and identify physical deficits which impact ability to perform ADLs (bathing, care of mouth/teeth, toileting, grooming, dressing, etc )  - Assess/evaluate cause of self-care deficits   - Assess range of motion  - Assess patient's mobility; develop plan if impaired  - Assess patient's need for assistive devices and provide as appropriate  - Encourage maximum independence but intervene and supervise when necessary  - Involve family in performance of ADLs  - Assess for home care needs following discharge   - Consider OT consult to assist with ADL evaluation and planning for discharge  - Provide patient education as appropriate  Outcome: Progressing  Goal: Maintains/Returns to pre admission functional level  Description: INTERVENTIONS:  - Perform BMAT or MOVE assessment daily    - Set and communicate daily mobility goal to care team and patient/family/caregiver  - Collaborate with rehabilitation services on mobility goals if consulted  - Perform Range of Motion 3 times a day  - Reposition patient every 2 hours  - Dangle patient 3 times a day  - Stand patient 2 times a day  - Ambulate patient 2 times a day  - Out of bed to chair 3 times a day   - Out of bed for meals 3 times a day  - Out of bed for toileting  - Record patient progress and toleration of activity level   Outcome: Progressing     Problem: DISCHARGE PLANNING  Goal: Discharge to home or other facility with appropriate resources  Description: INTERVENTIONS:  - Identify barriers to discharge w/patient and caregiver  - Arrange for needed discharge resources and transportation as appropriate  - Identify discharge learning needs (meds, wound care, etc )  - Arrange for interpretive services to assist at discharge as needed  - Refer to Case Management Department for coordinating discharge planning if the patient needs post-hospital services based on physician/advanced practitioner order or complex needs related to functional status, cognitive ability, or social support system  Outcome: Progressing     Problem: Knowledge Deficit  Goal: Patient/family/caregiver demonstrates understanding of disease process, treatment plan, medications, and discharge instructions  Description: Complete learning assessment and assess knowledge base    Interventions:  - Provide teaching at level of understanding  - Provide teaching via preferred learning methods  Outcome: Progressing     Problem: MOBILITY - ADULT  Goal: Maintain or return to baseline ADL function  Description: INTERVENTIONS:  -  Assess patient's ability to carry out ADLs; assess patient's baseline for ADL function and identify physical deficits which impact ability to perform ADLs (bathing, care of mouth/teeth, toileting, grooming, dressing, etc )  - Assess/evaluate cause of self-care deficits   - Assess range of motion  - Assess patient's mobility; develop plan if impaired  - Assess patient's need for assistive devices and provide as appropriate  - Encourage maximum independence but intervene and supervise when necessary  - Involve family in performance of ADLs  - Assess for home care needs following discharge   - Consider OT consult to assist with ADL evaluation and planning for discharge  - Provide patient education as appropriate  Outcome: Progressing  Goal: Maintains/Returns to pre admission functional level  Description: INTERVENTIONS:  - Perform BMAT or MOVE assessment daily    - Set and communicate daily mobility goal to care team and patient/family/caregiver  - Collaborate with rehabilitation services on mobility goals if consulted  - Perform Range of Motion 3 times a day  - Reposition patient every 2 hours    - Dangle patient 2 times a day  - Stand patient 2 times a day  - Ambulate patient 2 times a day  - Out of bed to chair 3 times a day   - Out of bed for meals 3 times a day  - Out of bed for toileting  - Record patient progress and toleration of activity level   Outcome: Progressing     Problem: Prexisting or High Potential for Compromised Skin Integrity  Goal: Skin integrity is maintained or improved  Description: INTERVENTIONS:  - Identify patients at risk for skin breakdown  - Assess and monitor skin integrity  - Assess and monitor nutrition and hydration status  - Monitor labs   - Assess for incontinence   - Turn and reposition patient  - Assist with mobility/ambulation  - Relieve pressure over bony prominences  - Avoid friction and shearing  - Provide appropriate hygiene as needed including keeping skin clean and dry  - Evaluate need for skin moisturizer/barrier cream  - Collaborate with interdisciplinary team   - Patient/family teaching  - Consider wound care consult   Outcome: Progressing

## 2021-12-21 NOTE — PROGRESS NOTES
[address]  ICU Acceptance Note - Andrew Gibson 1940, [de-identified] y o  female MRN: [id number]  Unit/Bed#: ICU 04-01 Encounter: [id number]  Primary Care Provider: Rick Ferrara DO   Date and time admitted to hospital: 12/16/2021  4:49 PM    * COVID-19  Assessment & Plan  · Presented on 12/16 with fever, chills, shortness of breath, cough  · COVID-19 positive on 12/16  · Unvaccinated  · Transferred on 12/21 to ICU 2/2 escalating O2 requirements  · Severe pathway:  · Decadron 0 1 mg/kg D1, total D6  · Baricitinib D2 - maintain lower dose 2/2 CKD3b with decreased GFR  · Remdesivir completed  · Prophylactic heparin  · Statin  · Ceftriaxone/Doxy D1 - consider DC if procalcitonin negative x2  · Check D-Dimer  · Encourage side-lying/prone positioning  · Encourage I/S, aggressive pulmonary hygiene  · Titrate oxygen to maintain SpO2 > 88% - currently requiring HFNC 100%/55L +/- NRB  · Trend CRP    Acute respiratory failure with hypoxia (HCC)  Assessment & Plan  · Upgraded to SD1 2/2 increasing O2 requirement  · CXR today reveals RML/LLL infiltrates concerning for superimposed bacterial pneumonia  · Continue COVID-19 protocol as noted above  · Started on IV Ceftriaxone/Doxycycline per COVID-19 protocol  · Will give IV Lasix 20 mg x1 - monitor I/O  · Reassess daily for diuresis  · PRN Xopenex nebs  · Consider addition of Veletri if oxygenation status continues to worsen  · Titrate O2 to maintain SpO2 > 88% - currently requiring HFNC 100%/55L +/- NRB  · Aggressive pulmonary hygiene    SIRS (systemic inflammatory response syndrome) ([facility] [address] )  Assessment & Plan  · As evidenced by tachycardia, tachypnea, hypoxia  · Suspect this is in setting of worsening COVID-19 pneumonia vs superimposed bacterial infection  · Procalcitonin pending  · Continue IV antibiotics as noted above  · Trend fever and WBC curve  · Trend procalcitonin    Type 2 diabetes mellitus without complication, without long-term current use of insulin [facility])  Assessment & Plan  Lab Results   Component Value Date    HGBA1C 6 2 (H) 11/22/2021       Recent Labs     12/21/21  0639 12/21/21  1100 12/21/21  1533 12/21/21  1822   POCGLU 278* 289* 300* 300*       Blood Sugar Average: Last 72 hrs:  (P) 291 [id number]     · Takes metformin, Januvia, glipizide at home  · Was taking Lantus, mealtime/SSI, but continues to be hyperglycemic  · Will start insulin gtt  · Fingersticks q2 hours  · Diabetic diet when able to tolerate PO intake    Anemia  Assessment & Plan  · Hemoglobin 10 8 on admission, currently 10 7  · Likely multifactorial in setting of iron deficiency anemia vs CKD  · Baseline appears to be around 11 5 - 13  · Concurrently with no signs of active bleeding  · Continue home iron supplementation  · Transfuse for hemoglobin < 7 or with hemodynamic compromise  · Trend hemoglobin and monitor for signs of active bleeding    Chronic kidney disease, stage 3b [facility])  Assessment & Plan  Lab Results   Component Value Date    EGFR 37 12/21/2021    EGFR 34 12/20/2021    EGFR 35 12/19/2021    CREATININE 1 34 (H) 12/21/2021    CREATININE 1 45 (H) 12/20/2021    CREATININE 1 39 (H) 12/19/2021     · Creatinine 1 56 on admission  · Baseline creatinine appears to be around 1 2 - 1 6 - currently at baseline  · Received IV Lasix 20 mg x 1 today  · Avoid nephrotoxic agents and hypotension  · Trend renal indices  · Strict I&O  · Daily weights    Ambulatory dysfunction  Assessment & Plan  · PT/OT - recommending acute rehab on discharge    Hypercholesterolemia  Assessment & Plan  · Takes lovastatin at home - continue statin here per COVID protocol    Essential hypertension  Assessment & Plan  · Takes enalapril at home - appears to be taking lisinopril while inpatient  · Continue as patient seems to be at baseline renal function  · Monitor BP    Depression  Assessment & Plan  · Continue home PRN Xanax  · Zoloft appears to be on patient's home medication list, however patient states she is not taking this      ----------------------------------------------------------------------------------------  HPI/24hr events:  Su Hernandez is an 81-year-old female with past medical history of hypertension, hyperlipidemia, CKD3b, iron deficiency anemia as well as anemia of chronic disease, DM2, anxiety who initially presented on 12/16 for fever, chills, cough, shortness of breath  On admission, she was found to be COVID positive and was admitted to hospitalist service  Over last couple of days, patient has had increasing O2 requirements  Today her oxygen saturations dropped into the upper 70s despite 15 L MFNC + NRB and so she was transferred to ICU for HFNC  She was escalated to severe pathway and started on antibiotics per protocol  She received IV Lasix 20 mg x 1  Patient confirms that she does want to remain a full code at this time  She will remain in ICU as a step-down level 1 at this time 2/2 increased O2 requirements  Patient appropriate for transfer out of the ICU today?: No  Disposition: Transfer to Stepdown Level 1   Code Status: Level 1 - Full Code  ---------------------------------------------------------------------------------------  SUBJECTIVE  "I feel a little short of breath and I am not hungry " Denies any chest pain  States that she has no appetite  Review of Systems   Constitutional: Positive for fatigue  Negative for chills and fever  HENT: Positive for congestion  Eyes: Negative for visual disturbance  Respiratory: Positive for cough and shortness of breath  Negative for wheezing  Cardiovascular: Negative for chest pain, palpitations and leg swelling  Gastrointestinal: Negative for abdominal distention, abdominal pain, nausea and vomiting  Genitourinary: Negative for difficulty urinating  Musculoskeletal:        Generalized achiness   Neurological: Negative for dizziness, light-headedness and headaches  Psychiatric/Behavioral: Negative  ---------------------------------------------------------------------------------------  OBJECTIVE    Vitals   Vitals:    21 0935 21 1054 21 1204 21 1535   BP:  169/92  150/84   BP Location:  Right arm     Pulse:  97  95   Resp:  22     Temp:  98 9 °F (37 2 °C)  99 2 °F (37 3 °C)   TempSrc:       SpO2: (!) 89% 91% (!) 89% 92%   Weight:       Height:         Temp (24hrs), Av 4 °F (37 4 °C), Min:98 9 °F (37 2 °C), Max:99 9 °F (37 7 °C)  Current: Temperature: 99 2 °F (37 3 °C)          Respiratory:  SpO2: SpO2: 92 %, SpO2 Activity: SpO2 Activity: At Rest, SpO2 Device: O2 Device: High flow nasal cannula  Nasal Cannula O2 Flow Rate (L/min): 12 L/min    Invasive/non-invasive ventilation settings   Respiratory  Report   Lab Data (Last 4 hours)    None         O2/Vent Data (Last 4 hours)    None                Physical Exam  Vitals and nursing note reviewed  Constitutional:       General: She is awake  She is in acute distress  Appearance: She is ill-appearing  Interventions: Nasal cannula in place  Comments: Placed on [facility]   HENT:      Head: Normocephalic and atraumatic  Mouth/Throat:      Mouth: Mucous membranes are dry  Eyes:      Extraocular Movements: Extraocular movements intact  Conjunctiva/sclera: Conjunctivae normal       Pupils: Pupils are equal, round, and reactive to light  Cardiovascular:      Rate and Rhythm: Regular rhythm  Tachycardia present  Pulses: Normal pulses  Heart sounds: Normal heart sounds  No murmur heard  Pulmonary:      Effort: Tachypnea present  No accessory muscle usage or respiratory distress  Breath sounds: Decreased breath sounds and rales present  No wheezing or rhonchi  Abdominal:      General: Bowel sounds are normal  There is no distension  Palpations: Abdomen is soft  Tenderness: There is no abdominal tenderness  There is no guarding     Musculoskeletal:         General: Normal range of motion  Cervical back: Normal range of motion and neck supple  Right lower leg: Edema present  Left lower leg: Edema present  Comments: Trace bilateral lower extremity edema   Skin:     General: Skin is warm and dry  Capillary Refill: Capillary refill takes less than 2 seconds  Neurological:      General: No focal deficit present  Mental Status: She is alert and oriented to person, place, and time  Psychiatric:         Mood and Affect: Mood normal          Behavior: Behavior normal  Behavior is cooperative  Thought Content:  Thought content normal          Judgment: Judgment normal          Laboratory and Diagnostics:  Results from last 7 days   Lab Units 12/21/21  0435 12/20/21 0434 12/19/21  0429 12/18/21  0524 12/17/21  0459 12/16/21  1926 12/16/21  1707   WBC Thousand/uL 6 15 2 92* 3 79* 3 32* 3 11*  --  4 13*   HEMOGLOBIN g/dL 10 7* 10 5* 10 9* 10 7* 10 7*  --  10 8*   HEMATOCRIT % 32 4* 32 6* 33 6* 32 8* 33 9*  --  33 7*   PLATELETS Thousands/uL 234 197 178 156 162 143* 158   NEUTROS PCT %  --   --   --   --  69  --  77*   MONOS PCT %  --   --   --   --  6  --  5     Results from last 7 days   Lab Units 12/21/21  0435 12/20/21 0434 12/19/21 0429 12/18/21  0524 12/17/21  0459 12/16/21  1707   SODIUM mmol/L 137 135 136 137 139 139   POTASSIUM mmol/L 4 5 4 5 4 2 3 7 3 8 3 5   CHLORIDE mmol/L 105 104 104 105 107 104   CO2 mmol/L 24 22 20* 17* 21 24   ANION GAP mmol/L 8 9 12 15* 11 11   BUN mg/dL 41* 50* 43* 33* 23 24   CREATININE mg/dL 1 34* 1 45* 1 39* 1 48* 1 39* 1 56*   CALCIUM mg/dL 9 4 9 0 9 0 8 7 8 9 8 9   GLUCOSE RANDOM mg/dL 381* 420* 354* 246* 110 57*   ALT U/L 15 13 12 12  --  11   AST U/L 19 21 24 25  --  20   ALK PHOS U/L 68 64 64 65  --  78   ALBUMIN g/dL 3 7 3 6 3 8 3 8  --  4 4   TOTAL BILIRUBIN mg/dL 0 49 0 42 0 44 0 39  --  0 36     Results from last 7 days   Lab Units 12/16/21  1707   MAGNESIUM mg/dL 1 9      Results from last 7 days   Lab Units 12/21/21  1133 12/16/21  1707   INR  1 18 1 12   PTT seconds 46*  --           Results from last 7 days   Lab Units 12/16/21  1707   LACTIC ACID mmol/L 0 5     ABG:    VBG:    Results from last 7 days   Lab Units 12/17/21  0459 12/16/21  1707   PROCALCITONIN ng/ml 0 05 <0 05       Micro  Results from last 7 days   Lab Units 12/16/21  1708 12/16/21  1707   BLOOD CULTURE  No Growth After 4 Days  --    GRAM STAIN RESULT   --  Gram positive cocci in pairs*       EKG:  Sinus tachycardia  Imaging: I have personally reviewed pertinent reports  and I have personally reviewed pertinent films in PACS    Intake and Output  I/O       12/19 0701 12/20 0700 12/20 0701 12/21 0700 12/21 0701 12/22 0700    P  O  240 240 220    Total Intake(mL/kg) 240 (4 4) 240 (4 4) 220 (4)    Urine (mL/kg/hr) 300 (0 2) 200 (0 2)     Total Output 300 200     Net -60 +40 +220                 Height and Weights   Height: 5' (152 4 cm)     Body mass index is 23 63 kg/m²  Weight (last 2 days)     None            Nutrition       Diet Orders   (From admission, onward)             Start     Ordered    12/21/21 1750  Diet Rodríguez/CHO Controlled; Consistent Carbohydrate Diet Level 2 (5 carb servings/75 grams CHO/meal)  Diet effective now        References:    Nutrtion Support Algorithm Enteral vs  Parenteral   Question Answer Comment   Diet Type Rodríguez/CHO Controlled    Rodríguez/CHO Controlled Consistent Carbohydrate Diet Level 2 (5 carb servings/75 grams CHO/meal)    RD to adjust diet per protocol?  Yes        12/21/21 1749                  Active Medications  Scheduled Meds:  Current Facility-Administered Medications   Medication Dose Route Frequency Provider Last Rate    acetaminophen  650 mg Oral Q4H PRN BENTON Brown      ALPRAZolam  0 25 mg Oral HS PRN BENTON Kirby      atorvastatin  40 mg Oral HS BENTON Brown      Baricitinib  2 mg Oral Q24H BENTON Brown      cefTRIAXone  1,000 mg Intravenous Q24H Matrha JAMISON BENTON Conway 1,000 mg (12/21/21 1153)    dexamethasone  0 1 mg/kg Intravenous Q12H BENTON Brown      doxycycline hyclate  100 mg Oral Q12H BENTON Brown      ferrous sulfate  325 mg Oral Daily With Breakfast BENTON Brown      heparin (porcine)  5,000 Units Subcutaneous Q8H BernaBENTON Sotomayor      insulin regular (HumuLIN R,NovoLIN R) infusion  0 3-21 Units/hr Intravenous Titrated BENTON Brown 9 Units/hr (12/21/21 1955)    lisinopril  20 mg Oral Daily BENTON Brown      ondansetron  4 mg Intravenous Q6H PRN BENTON Brown       Continuous Infusions:  insulin regular (HumuLIN R,NovoLIN R) infusion, 0 3-21 Units/hr, Last Rate: 9 Units/hr (12/21/21 1955)      PRN Meds:   acetaminophen, 650 mg, Q4H PRN  ALPRAZolam, 0 25 mg, HS PRN  ondansetron, 4 mg, Q6H PRN        Invasive Devices Review  Invasive Devices  Report    Peripheral Intravenous Line            Peripheral IV 12/20/21 Left Forearm <1 day          Drain            External Urinary Catheter 3 days                ---------------------------------------------------------------------------------------  Advance Directive and Living Will:      Power of :    POLST:    ---------------------------------------------------------------------------------------  Care Time Delivered:   Upon my evaluation, this patient had a high probability of imminent or life-threatening deterioration due to Acute respiratory failure in setting of COVID-19 pneumonia, SIRS, DM 2 with hyperglycemia, which required my direct attention, intervention, and personal management  I have personally provided 25 minutes ([phone]) of critical care time, exclusive of procedures, teaching, family meetings, and any prior time recorded by providers other than myself  BENTON Ellison      Portions of the record may have been created with voice recognition software    Occasional wrong word or "sound a like" substitutions may have occurred due to the inherent limitations of voice recognition software    Read the chart carefully and recognize, using context, where substitutions have occurred

## 2021-12-21 NOTE — RESPIRATORY THERAPY NOTE
12/21/21 1204   Non-Invasive Information   O2 Interface Device HFNC prongs   Non-Invasive Ventilation Mode HFNC (High flow)   SpO2 (!) 89 %   $ Pulse Oximetry Spot Check Charge Completed   Non-Invasive Settings   FiO2 (%) 100   Flow (lpm) 55   Temperature (Set) 33   Non-Invasive Readings   Heater Temperature (Obs) 33   Skin Intervention Skin intact

## 2021-12-21 NOTE — QUICK NOTE
I updated patient's daughter, Tonya Almodovar, and patient's , Kathy Miller, regarding patient's overall status and plan of care  All questions/concerns were answered and addressed

## 2021-12-21 NOTE — PHYSICAL THERAPY NOTE
PHYSICAL THERAPY NOTE          Patient Name: Ronnell Gomes     12/21/21 4388   Note Type   Note Type Cancelled Session   Cancel Reasons   (Pt refusing)     Chart reviewed  Attempted to see pt this AM with pt refusing stating she was feeling unwell and did not want to move  SpO2 reading at 86% with pt sidelying on left side  Will continue to follow and attempt to see as schedule allows and as pt is appropriate       Simin Sharp, PTA

## 2021-12-21 NOTE — NURSING NOTE
Unable to maintain adequate oxygenation levels on 15L mid janie nasal cannula  Discussed care with Dr Eleno York and to place patient on non-rebreather along with the 15 liters of mid janie  Respiratory at bedside to apply non-rebreather  Will monitor

## 2021-12-21 NOTE — NURSING NOTE
S:  CC: Worsening shortness of breath today  HPI: Patient is an [de-identified]year old female who presented to the ER 12/16 with shortness of breath that required 2L of oxygen via nasal cannula  Per conversation with nursing staff today, she arrived to the floor on 4L yesterday and continued to have increased oxygen needs overnight  The RN in charge of the patient received her on 12Lmidflow and patient's oxygen saturation was in the lower 80's  The attending was made aware and she was increased to 15L midflow plus a non rebreather was added at 15L  Patient is currently on same oxygen regimen and SaO2 is 89% on same  She has labored respirations at rest and an occasional moist productive cough for white sputum  O: Vitals: [id number], 91% on midflow nasal cannula at 15L plus a nonrebreather at 15L  ROS: Patient reports fatigue and shortness of breath at rest  Reports productive cough  Denies and chest pain or discomfort  Physical assessment:  On exam patient appears visibly short of bretah at rest, SaO2 running 89% on midflow nasal cannula at 15L plus NRB at 15L  Fine crackles noted in B/L bases on ascultation  Moist cough noted with thick white sputum small amounts  She denies any chest pain or any discomfort  Assessment/Plan:  1  Acute respiratory failure with hypoxemia due to COVID 19  -Patient presented to ED on 12/16 with shortness of breath requiring 2L of oxygen   Worsening oxygen requirements overnight currently on 15l midflow plus 15NRB mask to achieve SaO2 89%  -COVID positive 12/16  -Patient receiving finished 5 days of Remdesivir yesterday, receiving Decadron to  day is day 6 of 10, and baricitinib day 2 of 14   -trend procalcitonin yesterday <0 05, today 0 05  -trend CRP, continues to trend downward 12/18 was 109, today 57 8  -D dimer trending down on 12/16 was 1 21 today 0 93  - was on heparin infusion discontinued today  -continue lipitor 40mg daily  -monitor CBC  Daily  -monitor chemistries daily, BUN has been in 40-50's last 3 days creatinine down today yesterday was 1 45 today 1 34  -encourage proning  -encourage cough and deep breathe  -encourage use of IS  -encourage mobilization and ambulation    2  Type II diabetes  -Continue blood sugars ac/hs with sliding scale coverage as ordered  -monitor chemistries daily  -HgA1C trending downward since 2/21 was 7 9, 11/21 was 6 2  -continue lantus insulin 10 units at bedtime  -resume glipizide, januvia, and metformin  home meds on discharge    3  Ambulatory dysfunction  -pt/ot eval and treatment    The patient was ordered 20mg of IV Lasix and is being transferred to the intensive care unit due to increasing oxygen requirements

## 2021-12-21 NOTE — PLAN OF CARE
Problem: Potential for Falls  Goal: Patient will remain free of falls  Description: INTERVENTIONS:  - Educate patient/family on patient safety including physical limitations  - Instruct patient to call for assistance with activity   - Consult OT/PT to assist with strengthening/mobility   - Keep Call bell within reach  - Keep bed low and locked with side rails adjusted as appropriate  - Keep care items and personal belongings within reach  - Initiate and maintain comfort rounds  - Make Fall Risk Sign visible to staff  Problem: MOBILITY - ADULT  Goal: Maintain or return to baseline ADL function  Description: INTERVENTIONS:  -  Assess patient's ability to carry out ADLs; assess patient's baseline for ADL function and identify physical deficits which impact ability to perform ADLs (bathing, care of mouth/teeth, toileting, grooming, dressing, etc )  - Assess/evaluate cause of self-care deficits   - Assess range of motion  - Assess patient's mobility; develop plan if impaired  - Assess patient's need for assistive devices and provide as appropriate  - Encourage maximum independence but intervene and supervise when necessary  - Involve family in performance of ADLs  - Assess for home care needs following discharge   - Consider OT consult to assist with ADL evaluation and planning for discharge  - Provide patient education as appropriate  Outcome: Progressing  Goal: Maintains/Returns to pre admission functional level  Description: INTERVENTIONS:  - Perform BMAT or MOVE assessment daily    - Set and communicate daily mobility goal to care team and patient/family/caregiver     - Out of bed for toileting  - Record patient progress and toleration of activity level   Outcome: Progressing     - Apply yellow socks and bracelet for high fall risk patients  - Consider moving patient to room near nurses station  Outcome: Progressing

## 2021-12-21 NOTE — CASE MANAGEMENT
Case Management Assessment & Discharge Planning Note    Patient name Terri Candelaria  Location [address] [id number] MRN [id number]  : 1940 Date 2021       Current Admission Date: 2021  Current Admission Diagnosis:COVID-19 virus RNA test result positive at limit of detection   Patient Active Problem List    Diagnosis Date Noted    Ambulatory dysfunction 2021    COVID-19 virus RNA test result positive at limit of detection 2021    Acute respiratory failure with hypoxia ([facility] ) 2021    Negative depression screening 2021    Overweight (BMI 25 0-29 9) 2021    Medicare annual wellness visit, subsequent 2020    Localized, primary osteoarthritis of hand 2020    Refused influenza vaccine 2020    Sciatica 2020    Hypertensive kidney disease with chronic kidney disease stage III ([facility] [facility] ) 2019    Type 2 diabetes mellitus without complication, without long-term current use of insulin ([facility] ) 2019    Bursitis of shoulder 2018    Groin pain, chronic, left 2018    Paresthesia of arm 2017    Back pain 2017    Muscle pain 2017    Depression 2017    Abnormal ECG 2016    Diverticulitis large intestine w/o perforation or abscess w/o bleeding 2016    Essential hypertension 2016    Lower abdominal pain 2016    Atrophic vaginitis 2016    Hypercholesterolemia 2016    Irritable bowel syndrome 2016    Simple chronic anemia 2016    Disorder of shoulder 2008    Articular cartilage disorder of shoulder region 2008    Loose body in joint of shoulder region 2008      LOS (days): 3  Geometric Mean LOS (GMLOS) (days): 5 40  Days to GMLOS:2     OBJECTIVE:    Risk of Unplanned Readmission Score: 15         Current admission status: Inpatient  Referral Reason: Other (d/c planning)    Preferred Pharmacy:   [address] [facility] [address]   [place] [facility] [address] [address]  Phone: [phone] Fax: [phone]    [address], [place] [address]  Phone: [phone] Fax: [phone]    Primary Care Provider: Bhavin Portillo DO    Primary Insurance: [address] [facility] RE[facility]  Secondary Insurance:     ASSESSMENT:  Active Health Care Agents    There are no active Health Care Agents on file  Readmission Root Cause  30 Day Readmission: No    Patient Information  Admitted from[de-identified] Home  Mental Status: Alert  During Assessment patient was accompanied by: Not accompanied during assessment  Assessment information provided by[de-identified] Daughter  Primary Caregiver: Self  Support Systems: Spouse/significant other,Daughter  [place] of Residence: [address] do you live in?: [address] entry access options   Select all that apply : Stairs (there is an outside elevator to a ramp for her handicap child)  Number of steps to enter home : 10  Do the steps have railings?: Yes  Type of Current Residence: 2 story home  Upon entering residence, is there a bathroom on the main floor (no further steps)?: Yes (handicap bathroom on 1st  and a bathroom on the 2nd)  Indicate which floors of current residence have a bathroom (select all the apply):: 2nd Floor  Number of steps to 2nd floor from main floor: One Flight  In the last 12 months, was there a time when you were not able to pay the mortgage or rent on time?: No  In the last 12 months, how many places have you lived?: 1  In the last 12 months, was there a time when you did not have a steady place to sleep or slept in a shelter (including now)?: No  Homeless/housing insecurity resource given?: N/A  Living Arrangements: Lives w/ Spouse/significant other  Is patient a ?: No    Activities of Daily Living Prior to Admission  Functional Status: Independent  Completes ADLs independently?: Yes  Ambulates independently?: Yes  Does patient use assisted devices?: No  Does patient currently own DME?: Yes  What DME does the patient currently own?: Other (Comment) (pulse ox and bp cuff)  Does patient have a history of Outpatient Therapy (PT/OT)?: Yes  Does the patient have a history of Short-Term Rehab?: No  Does patient have a history of HHC?: No  Does patient currently have [facility]?: No         Patient Information Continued  Income Source: Pension/residential  Does patient have prescription coverage?: Yes  Within the past 12 months, you worried that your food would run out before you got the money to buy more : Never true  Within the past 12 months, the food you bought just didnt last and you didnt have money to get more : Never true  Food insecurity resource given?: N/A  Does patient receive dialysis treatments?: No  Does patient have a history of substance abuse?: No  Does patient have a history of Mental Health Diagnosis?: No         Means of Transportation  Means of Transport to Appts[de-identified] Drives Self  In the past 12 months, has lack of transportation kept you from medical appointments or from getting medications?: No  In the past 12 months, has lack of transportation kept you from meetings, work, or from getting things needed for daily living?: No  Was application for public transport provided?: N/A        DISCHARGE DETAILS:    Discharge planning discussed with[de-identified] cm attempted to call the pt's room and there was no answer,  judson did meet wiht her daughter Mary Stern of Choice: Yes  Comments - Freedom of Choice: rehab was recommended  permission given to snen to facilities that accept covid patients  CM contacted family/caregiver?: Yes             Contacts  Patient Contacts: Unique(daughter)  Relationship to Patient[de-identified] Family (daughter)  Contact Method: Phone,In Person  Phone Number: her  phone # [phone]  cm met her in person  Reason/Outcome: Discharge Planning              Other Referral/Resources/Interventions Provided:  Interventions: Short Term Rehab  Referral Comments: permission was given to send referrals to [facility], Ajay Lenz  pt will need authorization         Treatment Team Recommendation: Short Term Rehab (referrals have been sent, authorization will be needed)

## 2021-12-22 LAB
ALBUMIN SERPL BCP-MCNC: 3.6 G/DL (ref 3.5–5)
ALP SERPL-CCNC: 66 U/L (ref 34–104)
ALT SERPL W P-5'-P-CCNC: 14 U/L (ref 7–52)
ANION GAP SERPL CALCULATED.3IONS-SCNC: 9 MMOL/L (ref 4–13)
AST SERPL W P-5'-P-CCNC: 16 U/L (ref 13–39)
ATRIAL RATE: 113 BPM
ATRIAL RATE: 114 BPM
BASOPHILS # BLD AUTO: 0.01 THOUSANDS/ΜL (ref 0–0.1)
BASOPHILS NFR BLD AUTO: 0 % (ref 0–1)
BILIRUB SERPL-MCNC: 0.6 MG/DL (ref 0.2–1)
BUN SERPL-MCNC: 48 MG/DL (ref 5–25)
CALCIUM SERPL-MCNC: 9.4 MG/DL (ref 8.4–10.2)
CHLORIDE SERPL-SCNC: 105 MMOL/L (ref 96–108)
CO2 SERPL-SCNC: 26 MMOL/L (ref 21–32)
CREAT SERPL-MCNC: 1.63 MG/DL (ref 0.6–1.3)
CRP SERPL QL: 116 MG/L
EOSINOPHIL # BLD AUTO: 0 THOUSAND/ΜL (ref 0–0.61)
EOSINOPHIL NFR BLD AUTO: 0 % (ref 0–6)
ERYTHROCYTE [DISTWIDTH] IN BLOOD BY AUTOMATED COUNT: 13.2 % (ref 11.6–15.1)
GFR SERPL CREATININE-BSD FRML MDRD: 29 ML/MIN/1.73SQ M
GLUCOSE SERPL-MCNC: 132 MG/DL (ref 65–140)
GLUCOSE SERPL-MCNC: 140 MG/DL (ref 65–140)
GLUCOSE SERPL-MCNC: 159 MG/DL (ref 65–140)
GLUCOSE SERPL-MCNC: 165 MG/DL (ref 65–140)
GLUCOSE SERPL-MCNC: 183 MG/DL (ref 65–140)
GLUCOSE SERPL-MCNC: 183 MG/DL (ref 65–140)
GLUCOSE SERPL-MCNC: 188 MG/DL (ref 65–140)
GLUCOSE SERPL-MCNC: 191 MG/DL (ref 65–140)
GLUCOSE SERPL-MCNC: 196 MG/DL (ref 65–140)
GLUCOSE SERPL-MCNC: 203 MG/DL (ref 65–140)
GLUCOSE SERPL-MCNC: 204 MG/DL (ref 65–140)
GLUCOSE SERPL-MCNC: 227 MG/DL (ref 65–140)
GLUCOSE SERPL-MCNC: 243 MG/DL (ref 65–140)
GLUCOSE SERPL-MCNC: 52 MG/DL (ref 65–140)
GLUCOSE SERPL-MCNC: 71 MG/DL (ref 65–140)
GLUCOSE SERPL-MCNC: 81 MG/DL (ref 65–140)
HCT VFR BLD AUTO: 31.5 % (ref 34.8–46.1)
HGB BLD-MCNC: 10.4 G/DL (ref 11.5–15.4)
IMM GRANULOCYTES # BLD AUTO: 0.04 THOUSAND/UL (ref 0–0.2)
IMM GRANULOCYTES NFR BLD AUTO: 1 % (ref 0–2)
LYMPHOCYTES # BLD AUTO: 1.14 THOUSANDS/ΜL (ref 0.6–4.47)
LYMPHOCYTES NFR BLD AUTO: 14 % (ref 14–44)
MAGNESIUM SERPL-MCNC: 1.9 MG/DL (ref 1.9–2.7)
MCH RBC QN AUTO: 27.4 PG (ref 26.8–34.3)
MCHC RBC AUTO-ENTMCNC: 33 G/DL (ref 31.4–37.4)
MCV RBC AUTO: 83 FL (ref 82–98)
MONOCYTES # BLD AUTO: 0.33 THOUSAND/ΜL (ref 0.17–1.22)
MONOCYTES NFR BLD AUTO: 4 % (ref 4–12)
NEUTROPHILS # BLD AUTO: 6.85 THOUSANDS/ΜL (ref 1.85–7.62)
NEUTS SEG NFR BLD AUTO: 81 % (ref 43–75)
NRBC BLD AUTO-RTO: 0 /100 WBCS
P AXIS: 11 DEGREES
P AXIS: 6 DEGREES
PHOSPHATE SERPL-MCNC: 2.8 MG/DL (ref 2.3–4.1)
PLATELET # BLD AUTO: 283 THOUSANDS/UL (ref 149–390)
PMV BLD AUTO: 9.7 FL (ref 8.9–12.7)
POTASSIUM SERPL-SCNC: 3.9 MMOL/L (ref 3.5–5.3)
PR INTERVAL: 146 MS
PR INTERVAL: 150 MS
PROCALCITONIN SERPL-MCNC: 0.21 NG/ML
PROT SERPL-MCNC: 7.2 G/DL (ref 6.4–8.4)
QRS AXIS: -39 DEGREES
QRS AXIS: -40 DEGREES
QRSD INTERVAL: 84 MS
QRSD INTERVAL: 86 MS
QT INTERVAL: 292 MS
QT INTERVAL: 332 MS
QTC INTERVAL: 402 MS
QTC INTERVAL: 455 MS
RBC # BLD AUTO: 3.8 MILLION/UL (ref 3.81–5.12)
SODIUM SERPL-SCNC: 140 MMOL/L (ref 135–147)
T WAVE AXIS: 4 DEGREES
T WAVE AXIS: 9 DEGREES
VENTRICULAR RATE: 113 BPM
VENTRICULAR RATE: 114 BPM
WBC # BLD AUTO: 8.37 THOUSAND/UL (ref 4.31–10.16)

## 2021-12-22 PROCEDURE — 94760 N-INVAS EAR/PLS OXIMETRY 1: CPT

## 2021-12-22 PROCEDURE — 87186 SC STD MICRODIL/AGAR DIL: CPT | Performed by: PHYSICIAN ASSISTANT

## 2021-12-22 PROCEDURE — 84100 ASSAY OF PHOSPHORUS: CPT | Performed by: NURSE PRACTITIONER

## 2021-12-22 PROCEDURE — 82948 REAGENT STRIP/BLOOD GLUCOSE: CPT

## 2021-12-22 PROCEDURE — 99291 CRITICAL CARE FIRST HOUR: CPT | Performed by: STUDENT IN AN ORGANIZED HEALTH CARE EDUCATION/TRAINING PROGRAM

## 2021-12-22 PROCEDURE — 93010 ELECTROCARDIOGRAM REPORT: CPT | Performed by: INTERNAL MEDICINE

## 2021-12-22 PROCEDURE — 87040 BLOOD CULTURE FOR BACTERIA: CPT | Performed by: PHYSICIAN ASSISTANT

## 2021-12-22 PROCEDURE — 85025 COMPLETE CBC W/AUTO DIFF WBC: CPT | Performed by: NURSE PRACTITIONER

## 2021-12-22 PROCEDURE — 83735 ASSAY OF MAGNESIUM: CPT | Performed by: NURSE PRACTITIONER

## 2021-12-22 PROCEDURE — 80053 COMPREHEN METABOLIC PANEL: CPT | Performed by: NURSE PRACTITIONER

## 2021-12-22 PROCEDURE — 84145 PROCALCITONIN (PCT): CPT | Performed by: NURSE PRACTITIONER

## 2021-12-22 PROCEDURE — 86140 C-REACTIVE PROTEIN: CPT | Performed by: NURSE PRACTITIONER

## 2021-12-22 RX ORDER — DOCUSATE SODIUM 100 MG/1
100 CAPSULE, LIQUID FILLED ORAL DAILY
Status: DISCONTINUED | OUTPATIENT
Start: 2021-12-22 | End: 2022-01-12 | Stop reason: HOSPADM

## 2021-12-22 RX ORDER — FUROSEMIDE 10 MG/ML
20 INJECTION INTRAMUSCULAR; INTRAVENOUS ONCE
Status: COMPLETED | OUTPATIENT
Start: 2021-12-22 | End: 2021-12-22

## 2021-12-22 RX ORDER — MAGNESIUM SULFATE HEPTAHYDRATE 40 MG/ML
2 INJECTION, SOLUTION INTRAVENOUS ONCE
Status: COMPLETED | OUTPATIENT
Start: 2021-12-22 | End: 2021-12-22

## 2021-12-22 RX ADMIN — DOCUSATE SODIUM 100 MG: 100 CAPSULE, LIQUID FILLED ORAL at 10:45

## 2021-12-22 RX ADMIN — CEFTRIAXONE 1000 MG: 1 INJECTION, SOLUTION INTRAVENOUS at 11:15

## 2021-12-22 RX ADMIN — HEPARIN SODIUM 5000 UNITS: 5000 INJECTION INTRAVENOUS; SUBCUTANEOUS at 14:26

## 2021-12-22 RX ADMIN — SODIUM CHLORIDE 6 UNITS/HR: 9 INJECTION, SOLUTION INTRAVENOUS at 19:47

## 2021-12-22 RX ADMIN — ATORVASTATIN CALCIUM 40 MG: 40 TABLET, FILM COATED ORAL at 21:13

## 2021-12-22 RX ADMIN — DEXAMETHASONE SODIUM PHOSPHATE 5.48 MG: 4 INJECTION, SOLUTION INTRA-ARTICULAR; INTRALESIONAL; INTRAMUSCULAR; INTRAVENOUS; SOFT TISSUE at 11:02

## 2021-12-22 RX ADMIN — HEPARIN SODIUM 5000 UNITS: 5000 INJECTION INTRAVENOUS; SUBCUTANEOUS at 21:13

## 2021-12-22 RX ADMIN — BARICITINIB 2 MG: 2 TABLET, FILM COATED ORAL at 14:00

## 2021-12-22 RX ADMIN — FUROSEMIDE 20 MG: 10 INJECTION, SOLUTION INTRAMUSCULAR; INTRAVENOUS at 21:14

## 2021-12-22 RX ADMIN — SODIUM CHLORIDE 6 UNITS/HR: 9 INJECTION, SOLUTION INTRAVENOUS at 14:50

## 2021-12-22 RX ADMIN — DEXAMETHASONE SODIUM PHOSPHATE 5.48 MG: 4 INJECTION, SOLUTION INTRA-ARTICULAR; INTRALESIONAL; INTRAMUSCULAR; INTRAVENOUS; SOFT TISSUE at 22:06

## 2021-12-22 RX ADMIN — POTASSIUM PHOSPHATE, MONOBASIC POTASSIUM PHOSPHATE, DIBASIC 12 MMOL: 224; 236 INJECTION, SOLUTION, CONCENTRATE INTRAVENOUS at 08:30

## 2021-12-22 RX ADMIN — FUROSEMIDE 20 MG: 10 INJECTION, SOLUTION INTRAMUSCULAR; INTRAVENOUS at 10:46

## 2021-12-22 RX ADMIN — MAGNESIUM SULFATE HEPTAHYDRATE 2 G: 40 INJECTION, SOLUTION INTRAVENOUS at 08:30

## 2021-12-22 RX ADMIN — LISINOPRIL 20 MG: 20 TABLET ORAL at 09:21

## 2021-12-22 RX ADMIN — FERROUS SULFATE TAB 325 MG (65 MG ELEMENTAL FE) 325 MG: 325 (65 FE) TAB at 09:20

## 2021-12-22 RX ADMIN — HEPARIN SODIUM 5000 UNITS: 5000 INJECTION INTRAVENOUS; SUBCUTANEOUS at 05:48

## 2021-12-22 RX ADMIN — DOXYCYCLINE 100 MG: 100 CAPSULE ORAL at 10:45

## 2021-12-22 NOTE — RESPIRATORY THERAPY NOTE
12/21/21 2035   Respiratory Assessment   Assessment Type Assess only   General Appearance Awake; Alert   Respiratory Pattern Dyspnea with exertion;Dyspnea at rest   Chest Assessment Chest expansion symmetrical   Bilateral Breath Sounds Diminished;Crackles   Resp Comments pt natacha optiflo well will cont to monitor no changes made    O2 Device optiflo    Non-Invasive Information   O2 Interface Device HFNC prongs   Non-Invasive Ventilation Mode HFNC (High flow)  (opti )   SpO2 90 %   $ Pulse Oximetry Spot Check Charge Completed   Non-Invasive Settings   FiO2 (%) 100   Flow (lpm) 55   Temperature (Set) 31   Non-Invasive Readings   Heater Temperature (Obs) 31   Skin Intervention Skin intact   Maintenance   Water bag changed No

## 2021-12-22 NOTE — ASSESSMENT & PLAN NOTE
· Takes enalapril at home - appears to be taking lisinopril while inpatient  · Continue as patient seems to be at baseline renal function  · Monitor BP

## 2021-12-22 NOTE — ASSESSMENT & PLAN NOTE
Lab Results   Component Value Date    HGBA1C 6 2 (H) 11/22/2021       Recent Labs     12/21/21  0639 12/21/21  1100 12/21/21  1533 12/21/21  1822   POCGLU 278* 289* 300* 300*       Blood Sugar Average: Last 72 hrs:  (P) 291 [id number]     · Takes metformin, Januvia, glipizide at home  · Was taking Lantus, mealtime/SSI, but continues to be hyperglycemic  · Will start insulin gtt  · Fingersticks q2 hours  · Diabetic diet when able to tolerate PO intake

## 2021-12-22 NOTE — ASSESSMENT & PLAN NOTE
Lab Results   Component Value Date    HGBA1C 6 2 (H) 11/22/2021       Recent Labs     12/21/21  2207 12/22/21  0006 12/22/21  0035 12/22/21  0141   POCGLU 209* 52* 71 140       Blood Sugar Average: Last 72 hrs:  (P) 869 [id number]     · Takes metformin, Januvia, glipizide at home  · Was taking Lantus, mealtime/SSI, but continues to be hyperglycemic  · Will start insulin gtt  · Fingersticks q2 hours  · Diabetic diet when able to tolerate PO intake

## 2021-12-22 NOTE — ASSESSMENT & PLAN NOTE
· As evidenced by tachycardia, tachypnea, hypoxia  · Suspect this is in setting of worsening COVID-19 pneumonia vs superimposed bacterial infection  · Procalcitonin pending  · Continue IV antibiotics as noted above  · Trend fever and WBC curve  · Trend procalcitonin

## 2021-12-22 NOTE — NURSING NOTE
POC glucose down to 52  Insulin gtt decreased to 0 5 units per hour  Pt drank 222 ml OJ  POC glucose is now 71  Will recheck glucose in 1hour

## 2021-12-22 NOTE — CASE MANAGEMENT
Case Management Discharge Planning Note    Patient name Nicki Found  Location ICU 04/ICU 04- MRN [id number]  : 1940 Date 2021       Current Admission Date: 2021  Current Admission Diagnosis:COVID-19   Patient Active Problem List    Diagnosis Date Noted    Chronic kidney disease, stage 3b ([facility] ) 2021    Anemia 2021    S[facility]ic inflammatory response syndrome) [facility] 2021    Ambulatory dysfunction 2021    COVID-19 2021    Acute respiratory failure with hypoxia ([facility] ) 2021    Negative depression screening 2021    Overweight (BMI 25 0-29 9) 2021    Medicare annual wellness visit, subsequent 2020    Localized, primary osteoarthritis of hand 2020    Refused influenza vaccine 2020    Sciatica 2020    Hypertensive kidney disease with chronic kidney disease stage III ([facility] ) 2019    Type 2 diabetes mellitus without complication, without long-term current use of insulin ([facility] ) 2019    Bursitis of shoulder 2018    Groin pain, chronic, left 2018    Paresthesia of arm 2017    Back pain 2017    Muscle pain 2017    Anxiety 2017    Abnormal ECG 2016    Diverticulitis large intestine w/o perforation or abscess w/o bleeding 2016    Essential hypertension 2016    Lower abdominal pain 2016    Atrophic vaginitis 2016    Hypercholesterolemia 2016    Irritable bowel syndrome 2016    Simple chronic anemia 2016    Disorder of shoulder 2008    Articular cartilage disorder of shoulder region 2008    Loose body in joint of shoulder region 2008      LOS (days): 5  Geometric Mean LOS (GMLOS) (days): 5 40  Days to GMLOS:0 5     OBJECTIVE:  Risk of Unplanned Readmission Score: 18         Current admission status: Inpatient   Preferred Pharmacy:   [address] [facility] [address]   - [place] - [address] [facility]  [place] [address]  Phone: [phone] Fax: [phone]    [facility] [address], [facility] [address]  Phone: [phone] Fax: [phone]    Primary Care Provider: Shaneka Wallace DO    Primary Insurance: [address] [facility]  Secondary Insurance:     DISCHARGE DETAILS:          Comments - [place] of Choice: [address] [facility], they do not have beds at present, pt remains in ICU and is not stable for d/c                               Other Referral/Resources/Interventions Provided:  Interventions: Short Term Rehab  Referral Comments: [facility]

## 2021-12-22 NOTE — ASSESSMENT & PLAN NOTE
Lab Results   Component Value Date    EGFR 37 12/21/2021    EGFR 34 12/20/2021    EGFR 35 12/19/2021    CREATININE 1 34 (H) 12/21/2021    CREATININE 1 45 (H) 12/20/2021    CREATININE 1 39 (H) 12/19/2021     · Creatinine 1 56 on admission  · Baseline creatinine appears to be around 1 2 - 1 6 - currently at baseline  · Received IV Lasix 20 mg 12/21  · Avoid nephrotoxic agents and hypotension  · Trend renal indices  · Strict I&O  · Daily weights

## 2021-12-22 NOTE — ASSESSMENT & PLAN NOTE
· As evidenced by tachycardia, tachypnea, hypoxia  · Suspect this is in setting of worsening COVID-19 pneumonia vs superimposed bacterial infection  · Procalcitonin negative  · Continue IV antibiotics as noted above  · Trend fever and WBC curve  · Trend procalcitonin

## 2021-12-22 NOTE — ASSESSMENT & PLAN NOTE
· Upgraded to SD1 2/2 increasing O2 requirement  · CXR today reveals RML/LLL infiltrates concerning for superimposed bacterial pneumonia  · Continue COVID-19 protocol as noted above  · Started on IV Ceftriaxone/Doxycycline per COVID-19 protocol  · Will give IV Lasix 20 mg x1 - monitor I/O  · Reassess daily for diuresis  · PRN Xopenex nebs  · Consider addition of Veletri if oxygenation status continues to worsen  · Titrate O2 to maintain SpO2 > 88% - currently requiring HFNC 100%/55L +/- NRB  · Aggressive pulmonary hygiene

## 2021-12-22 NOTE — PLAN OF CARE
Problem: RESPIRATORY - ADULT  Goal: Achieves optimal ventilation and oxygenation  Description: INTERVENTIONS:  - Assess for changes in respiratory status  - Assess for changes in mentation and behavior  - Position to facilitate oxygenation and minimize respiratory effort  - Oxygen administered by appropriate delivery if ordered  - Initiate smoking cessation education as indicated  - Encourage broncho-pulmonary hygiene including cough, deep breathe, Incentive Spirometry  - Assess the need for suctioning and aspirate as needed  - Assess and instruct to report SOB or any respiratory difficulty  - Respiratory Therapy support as indicated  Outcome: Progressing   Assumed care  Full assessment complete and documented  Pt on optiflow 100% 55liters  T pt denies resp distress at this time  Her vitals are stable  She denies pain  Will cont to monitor

## 2021-12-22 NOTE — ASSESSMENT & PLAN NOTE
· Presented on 12/16 with fever, chills, shortness of breath, cough  · COVID-19 positive on 12/16  · Unvaccinated  · Transferred on 12/21 to ICU 2/2 escalating O2 requirements  · Severe pathway:  · Decadron 0 1 mg/kg D1, total D6  · Baricitinib D2 - maintain lower dose 2/2 CKD3b with decreased GFR  · Remdesivir completed  · Prophylactic heparin  · Statin  · Ceftriaxone/Doxy D1 - consider DC if procalcitonin negative x2  · Check D-Dimer  · Encourage side-lying/prone positioning  · Encourage I/S, aggressive pulmonary hygiene  · Titrate oxygen to maintain SpO2 > 88% - currently requiring HFNC 100%/55L +/- NRB  · Trend CRP

## 2021-12-22 NOTE — RESPIRATORY THERAPY NOTE
12/22/21 0814   Oxygen Therapy/Pulse Ox   O2 Device High flow nasal cannula   O2 Therapy Oxygen humidified   FiO2 (%) 70   O2 Flow Rate (L/min) 40 L/min   SpO2 91 %   SpO2 Activity At Rest   $ Pulse Oximetry Spot Check Charge Completed

## 2021-12-22 NOTE — RESPIRATORY THERAPY NOTE
12/22/21 0010   Respiratory Assessment   Assessment Type Assess only   General Appearance Sleeping   Respiratory Pattern Dyspnea with exertion;Dyspnea at rest   Chest Assessment Chest expansion symmetrical   Bilateral Breath Sounds Diminished;Crackles   Resp Comments pt asleep natacha optiflo well    O2 Device optiflo    Non-Invasive Information   O2 Interface Device HFNC prongs   Non-Invasive Ventilation Mode HFNC (High flow)  (opti)   SpO2 93 %   $ Pulse Oximetry Spot Check Charge Completed   Non-Invasive Settings   FiO2 (%) 90   Flow (lpm) 50   Temperature (Set) 31   Non-Invasive Readings   Heater Temperature (Obs) 31   Skin Intervention Skin intact   Maintenance   Water bag changed No        12/22/21 0010   Respiratory Assessment   Assessment Type Assess only   General Appearance Sleeping   Respiratory Pattern Dyspnea with exertion;Dyspnea at rest   Chest Assessment Chest expansion symmetrical   Bilateral Breath Sounds Diminished;Crackles   Resp Comments pt asleep natacha optiflo well    O2 Device optiflo    Non-Invasive Information   O2 Interface Device HFNC prongs   Non-Invasive Ventilation Mode HFNC (High flow)  (opti)   SpO2 93 %   $ Pulse Oximetry Spot Check Charge Completed   Non-Invasive Settings   FiO2 (%) 90   Flow (lpm) 50   Temperature (Set) 31   Non-Invasive Readings   Heater Temperature (Obs) 31   Skin Intervention Skin intact   Maintenance   Water bag changed No

## 2021-12-22 NOTE — ASSESSMENT & PLAN NOTE
· Continue home PRN Xanax  · Zoloft appears to be on patient's home medication list, however patient states she is not taking this

## 2021-12-22 NOTE — ASSESSMENT & PLAN NOTE
· Hemoglobin 10 8 on admission, currently 10 7  · Likely multifactorial in setting of iron deficiency anemia vs CKD  · Baseline appears to be around 11 5 - 13  · Concurrently with no signs of active bleeding  · Continue home iron supplementation  · Transfuse for hemoglobin < 7 or with hemodynamic compromise  · Trend hemoglobin and monitor for signs of active bleeding

## 2021-12-22 NOTE — RESPIRATORY THERAPY NOTE
12/22/21 1153   Non-Invasive Information   O2 Interface Device HFNC prongs   Non-Invasive Ventilation Mode HFNC (High flow)   SpO2 (!) 88 %   $ Pulse Oximetry Spot Check Charge Completed   Non-Invasive Settings   FiO2 (%) 70   Flow (lpm) 40   Temperature (Set) 31   Non-Invasive Readings   Heater Temperature (Obs) 31   Skin Intervention Skin intact

## 2021-12-22 NOTE — PROGRESS NOTES
[address]  Progress Note - Yane Patterson 1940, [de-identified] y o  female MRN: [id number]  Unit/Bed#: ICU 04-01 Encounter: [id number]  Primary Care Provider: Vazquez Bird DO   Date and time admitted to hospital: 12/16/2021  4:49 PM    * COVID-19  Assessment & Plan  · Presented on 12/16 with fever, chills, shortness of breath, cough  · COVID-19 positive on 12/16  · Unvaccinated  · Transferred on 12/21 to ICU 2/2 escalating O2 requirements  · Severe pathway:  · Decadron 0 1 mg/kg D2, total D6  · Baricitinib D3 - maintain lower dose 2/2 CKD3b with decreased GFR  · Remdesivir completed  · Prophylactic heparin  · Statin  · Ceftriaxone/Doxy D1 - consider DC if procalcitonin negative x2  · Check D-Dimer  · Encourage side-lying/prone positioning  · Encourage I/S, aggressive pulmonary hygiene  · Titrate oxygen to maintain SpO2 > 88% - currently requiring HFNC 100%/55L +/- NRB  · Trend CRP    Acute respiratory failure with hypoxia (HCC)  Assessment & Plan  · Upgraded to SD1 2/2 increasing O2 requirement  · CXR today reveals RML/LLL infiltrates concerning for superimposed bacterial pneumonia  · Continue COVID-19 protocol as noted above  · Started on IV Ceftriaxone/Doxycycline per COVID-19 protocol  · Will give IV Lasix 20 mg x1 - monitor I/O  · Reassess daily for diuresis  · PRN Xopenex nebs  · Consider addition of Veletri if oxygenation status continues to worsen  · Titrate O2 to maintain SpO2 > 88% - currently requiring HFNC 90%/50L +/- NRB  · Aggressive pulmonary hygiene    SIRS (systemic inflammatory response syndrome) ([facility] [address] )  Assessment & Plan  · As evidenced by tachycardia, tachypnea, hypoxia  · Suspect this is in setting of worsening COVID-19 pneumonia vs superimposed bacterial infection  · Procalcitonin negative  · Continue IV antibiotics as noted above  · Trend fever and WBC curve  · Trend procalcitonin    Type 2 diabetes mellitus without complication, without long-term current use of insulin [facility])  Assessment & Plan  Lab Results   Component Value Date    HGBA1C 6 2 (H) 11/22/2021       Recent Labs     12/21/21  2207 12/22/21  0006 12/22/21  0035 12/22/21  0141   POCGLU 209* 52* 71 140       Blood Sugar Average: Last 72 hrs:  (P) 320 [id number]     · Takes metformin, Januvia, glipizide at home  · Was taking Lantus, mealtime/SSI, but continues to be hyperglycemic  · Will start insulin gtt  · Fingersticks q2 hours  · Diabetic diet when able to tolerate PO intake    Anemia  Assessment & Plan  · Hemoglobin 10 8 on admission, currently 10 7  · Likely multifactorial in setting of iron deficiency anemia vs CKD  · Baseline appears to be around 11 5 - 13  · Concurrently with no signs of active bleeding  · Continue home iron supplementation  · Transfuse for hemoglobin < 7 or with hemodynamic compromise  · Trend hemoglobin and monitor for signs of active bleeding    Chronic kidney disease, stage 3b [facility])  Assessment & Plan  Lab Results   Component Value Date    EGFR 37 12/21/2021    EGFR 34 12/20/2021    EGFR 35 12/19/2021    CREATININE 1 34 (H) 12/21/2021    CREATININE 1 45 (H) 12/20/2021    CREATININE 1 39 (H) 12/19/2021     · Creatinine 1 56 on admission  · Baseline creatinine appears to be around 1 2 - 1 6 - currently at baseline  · Received IV Lasix 20 mg 12/21  · Avoid nephrotoxic agents and hypotension  · Trend renal indices  · Strict I&O  · Daily weights    Ambulatory dysfunction  Assessment & Plan  · PT/OT - recommending acute rehab on discharge    Hypercholesterolemia  Assessment & Plan  · Takes lovastatin at home - continue statin here per COVID protocol    Essential hypertension  Assessment & Plan  · Takes enalapril at home - appears to be taking lisinopril while inpatient  · Continue as patient seems to be at baseline renal function  · Monitor BP    Anxiety  Assessment & Plan  · Continue home PRN Xanax  · Zoloft appears to be on patient's home medication list, however patient states she is not taking this        ----------------------------------------------------------------------------------------  HPI/24hr events: Transferred to the ICU for escalating oxygen requirements of HFNC  Side lying in the bed and tolerating HFNC  Patient appropriate for transfer out of the ICU today?: No  Disposition: Continue Stepdown Level 1 level of care   Code Status: Level 1 - Full Code  ---------------------------------------------------------------------------------------  SUBJECTIVE  " I feel a little short of breath with movement " Denied harsh cough and increased shortness of breath  Review of Systems  Review of systems was reviewed and negative unless stated above in HPI/24-hour events   ---------------------------------------------------------------------------------------  OBJECTIVE    Vitals   Vitals:    21 0010 21 0100 21 0200 21 0300   BP:  109/59 111/66 96/54   BP Location:       Pulse: 84 85 80 73   Resp: 20  (!) 23 (!) 23   Temp:       TempSrc:       SpO2: 93% 92% 94% 92%   Weight:       Height:         Temp (24hrs), Av 8 °F (37 1 °C), Min:97 7 °F (36 5 °C), Max:99 9 °F (37 7 °C)  Current: Temperature: 98 4 °F (36 9 °C)          Respiratory:  HFNC 50L/90%    Invasive/non-invasive ventilation settings   Respiratory  Report   Lab Data (Last 4 hours)    None         O2/Vent Data (Last 4 hours)       0010          Non-Invasive Ventilation Mode HFNC (High flow)  Comment: opti                   Physical Exam  Constitutional:       Appearance: She is ill-appearing  HENT:      Head: Normocephalic and atraumatic  Eyes:      Extraocular Movements: Extraocular movements intact  Conjunctiva/sclera: Conjunctivae normal       Pupils: Pupils are equal, round, and reactive to light  Cardiovascular:      Rate and Rhythm: Normal rate and regular rhythm  Pulses: Normal pulses  Heart sounds: Normal heart sounds  No murmur heard        Pulmonary:      Effort: Pulmonary effort is normal  No respiratory distress  Breath sounds: No wheezing or rales  Comments: Diminished at marga bases and fine crackles at right base  Musculoskeletal:         General: Normal range of motion  Cervical back: Normal range of motion  Right lower leg: No edema  Left lower leg: No edema  Skin:     General: Skin is warm and dry  Capillary Refill: Capillary refill takes 2 to 3 seconds  Neurological:      GCS: GCS eye subscore is 4  GCS verbal subscore is 5  GCS motor subscore is 6  Cranial Nerves: Cranial nerves are intact  Sensory: Sensation is intact  Psychiatric:         Attention and Perception: Attention and perception normal          Mood and Affect: Mood normal          Speech: Speech normal          Behavior: Behavior normal          Thought Content:  Thought content normal              Laboratory and Diagnostics:  Results from last 7 days   Lab Units 12/21/21  0435 12/20/21  0434 12/19/21  0429 12/18/21  0524 12/17/21  0459 12/16/21  1926 12/16/21  1707   WBC Thousand/uL 6 15 2 92* 3 79* 3 32* 3 11*  --  4 13*   HEMOGLOBIN g/dL 10 7* 10 5* 10 9* 10 7* 10 7*  --  10 8*   HEMATOCRIT % 32 4* 32 6* 33 6* 32 8* 33 9*  --  33 7*   PLATELETS Thousands/uL 234 197 178 156 162 143* 158   NEUTROS PCT %  --   --   --   --  69  --  77*   MONOS PCT %  --   --   --   --  6  --  5     Results from last 7 days   Lab Units 12/21/21  0435 12/20/21  0434 12/19/21  0429 12/18/21  0524 12/17/21  0459 12/16/21  1707   SODIUM mmol/L 137 135 136 137 139 139   POTASSIUM mmol/L 4 5 4 5 4 2 3 7 3 8 3 5   CHLORIDE mmol/L 105 104 104 105 107 104   CO2 mmol/L 24 22 20* 17* 21 24   ANION GAP mmol/L 8 9 12 15* 11 11   BUN mg/dL 41* 50* 43* 33* 23 24   CREATININE mg/dL 1 34* 1 45* 1 39* 1 48* 1 39* 1 56*   CALCIUM mg/dL 9 4 9 0 9 0 8 7 8 9 8 9   GLUCOSE RANDOM mg/dL 381* 420* 354* 246* 110 57*   ALT U/L 15 13 12 12  --  11   AST U/L 19 21 24 25  --  20   ALK PHOS U/L 68 64 64 65 --  78   ALBUMIN g/dL 3 7 3 6 3 8 3 8  --  4 4   TOTAL BILIRUBIN mg/dL 0 49 0 42 0 44 0 39  --  0 36     Results from last 7 days   Lab Units 12/16/21  1707   MAGNESIUM mg/dL 1 9      Results from last 7 days   Lab Units 12/21/21  1133 12/16/21  1707   INR  1 18 1 12   PTT seconds 46*  --           Results from last 7 days   Lab Units 12/16/21  1707   LACTIC ACID mmol/L 0 5     ABG:    VBG:    Results from last 7 days   Lab Units 12/21/21  1133 12/17/21  0459 12/16/21  1707   PROCALCITONIN ng/ml 0 09 0 05 <0 05       Micro  Results from last 7 days   Lab Units 12/16/21  1708 12/16/21  1707   BLOOD CULTURE  No Growth After 5 Days  --    GRAM STAIN RESULT   --  Gram positive cocci in pairs*       EKG: NSR  Imaging: I have personally reviewed pertinent reports  and I have personally reviewed pertinent films in PACS    Intake and Output  I/O       12/20 0701  12/21 0700 12/21 0701  12/22 0700    P  O  240 220    I V  (mL/kg)  51 4 (0 9)    Total Intake(mL/kg) 240 (4 4) 271 4 (4 9)    Urine (mL/kg/hr) 200 (0 2) 250 (0 2)    Total Output 200 250    Net +40 +21 4                Height and Weights   Height: 5' (152 4 cm)     Body mass index is 23 63 kg/m²  Weight (last 2 days)     None            Nutrition       Diet Orders   (From admission, onward)             Start     Ordered    12/21/21 1750  Diet Rodríguez/CHO Controlled; Consistent Carbohydrate Diet Level 2 (5 carb servings/75 grams CHO/meal)  Diet effective now        References:    Nutrtion Support Algorithm Enteral vs  Parenteral   Question Answer Comment   Diet Type Rodríguez/CHO Controlled    Rodríguez/CHO Controlled Consistent Carbohydrate Diet Level 2 (5 carb servings/75 grams CHO/meal)    RD to adjust diet per protocol?  Yes        12/21/21 1749                  Active Medications  Scheduled Meds:  Current Facility-Administered Medications   Medication Dose Route Frequency Provider Last Rate    acetaminophen  650 mg Oral Q4H PRN BENTON Waldron      ALPRAZolam 0 25 mg Oral HS PRN Andrea Castleman, CRNP      atorvastatin  40 mg Oral HS BENTON Brown      Baricitinib  2 mg Oral Q24H BENTON Brown      cefTRIAXone  1,000 mg Intravenous Q24H BENTON Brown 1,000 mg (12/21/21 1153)    dexamethasone  0 1 mg/kg Intravenous Q12H BENTON Brown      doxycycline hyclate  100 mg Oral Q12H BENTON Brown      ferrous sulfate  325 mg Oral Daily With Breakfast BENTON Brown      heparin (porcine)  5,000 Units Subcutaneous Q8H [facility]      insulin regular (HumuLIN R,NovoLIN R) infusion  0 3-21 Units/hr Intravenous Titrated BENTON Brown 1 Units/hr (12/22/21 0140)    lisinopril  20 mg Oral Daily BENTON Brown      ondansetron  4 mg Intravenous Q6H PRN BENTON Brown       Continuous Infusions:  insulin regular (HumuLIN R,NovoLIN R) infusion, 0 3-21 Units/hr, Last Rate: 1 Units/hr (12/22/21 0140)      PRN Meds:   acetaminophen, 650 mg, Q4H PRN  ALPRAZolam, 0 25 mg, HS PRN  ondansetron, 4 mg, Q6H PRN        Invasive Devices Review  Invasive Devices  Report    Peripheral Intravenous Line            Peripheral IV 12/20/21 Left Forearm 1 day    Peripheral IV 12/21/21 Right;Ventral (anterior) Forearm <1 day          Drain            External Urinary Catheter 3 days                Rationale for remaining devices: NA  ---------------------------------------------------------------------------------------  Advance Directive and Living Will:      Power of :    POLST:    ---------------------------------------------------------------------------------------  Care Time Delivered:   No Critical Care time spent       [employer]      Portions of the record may have been created with voice recognition software  Occasional wrong word or "sound a like" substitutions may have occurred due to the inherent limitations of voice recognition software    Read the chart carefully and recognize, using context, where substitutions have occurred

## 2021-12-22 NOTE — RESPIRATORY THERAPY NOTE
12/22/21 1734   Oxygen Therapy/Pulse Ox   O2 Device High flow nasal cannula   O2 Therapy Oxygen humidified   FiO2 (%) 65   O2 Flow Rate (L/min) 35 L/min   SpO2 93 %   SpO2 Activity At Rest   $ Pulse Oximetry Spot Check Charge Completed

## 2021-12-22 NOTE — RESPIRATORY THERAPY NOTE
12/22/21 0434   Respiratory Assessment   Assessment Type Assess only   General Appearance Sleeping   Respiratory Pattern Dyspnea with exertion;Dyspnea at rest   Chest Assessment Chest expansion symmetrical   Bilateral Breath Sounds Diminished;Crackles   Resp Comments pt asleep natacha well    O2 Device optiflo    Non-Invasive Information   O2 Interface Device HFNC prongs  (opti)   Non-Invasive Ventilation Mode HFNC (High flow)   SpO2 95 %   $ Pulse Oximetry Spot Check Charge Completed   Non-Invasive Settings   FiO2 (%) 70  (found on 80)   Flow (lpm) 40  (found on 45)   Temperature (Set) 31   Non-Invasive Readings   Heater Temperature (Obs) 31   Skin Intervention Skin intact   Maintenance   Water bag changed Yes Home Suture Removal Text: Patient was provided instructions on removing sutures and will remove their sutures at home.  If they have any questions or difficulties they will call the office.

## 2021-12-22 NOTE — QUICK NOTE
I updated patient's daughter, Delaney Spears, and patient's , Pritesh Murdock, regarding patient's overall status and plan of care  All questions/concerns were answered and addressed

## 2021-12-22 NOTE — ASSESSMENT & PLAN NOTE
· Upgraded to SD1 2/2 increasing O2 requirement  · CXR today reveals RML/LLL infiltrates concerning for superimposed bacterial pneumonia  · Continue COVID-19 protocol as noted above  · Started on IV Ceftriaxone/Doxycycline per COVID-19 protocol  · Will give IV Lasix 20 mg x1 - monitor I/O  · Reassess daily for diuresis  · PRN Xopenex nebs  · Consider addition of Veletri if oxygenation status continues to worsen  · Titrate O2 to maintain SpO2 > 88% - currently requiring HFNC 90%/50L +/- NRB  · Aggressive pulmonary hygiene

## 2021-12-22 NOTE — ASSESSMENT & PLAN NOTE
· Presented on 12/16 with fever, chills, shortness of breath, cough  · COVID-19 positive on 12/16  · Unvaccinated  · Transferred on 12/21 to ICU 2/2 escalating O2 requirements  · Severe pathway:  · Decadron 0 1 mg/kg D2, total D6  · Baricitinib D3 - maintain lower dose 2/2 CKD3b with decreased GFR  · Remdesivir completed  · Prophylactic heparin  · Statin  · Ceftriaxone/Doxy D1 - consider DC if procalcitonin negative x2  · Check D-Dimer  · Encourage side-lying/prone positioning  · Encourage I/S, aggressive pulmonary hygiene  · Titrate oxygen to maintain SpO2 > 88% - currently requiring HFNC 100%/55L +/- NRB  · Trend CRP

## 2021-12-23 LAB
ANION GAP SERPL CALCULATED.3IONS-SCNC: 12 MMOL/L (ref 4–13)
BUN SERPL-MCNC: 58 MG/DL (ref 5–25)
CALCIUM SERPL-MCNC: 9.7 MG/DL (ref 8.4–10.2)
CHLORIDE SERPL-SCNC: 102 MMOL/L (ref 96–108)
CO2 SERPL-SCNC: 26 MMOL/L (ref 21–32)
CREAT SERPL-MCNC: 1.83 MG/DL (ref 0.6–1.3)
ERYTHROCYTE [DISTWIDTH] IN BLOOD BY AUTOMATED COUNT: 13.2 % (ref 11.6–15.1)
ERYTHROCYTE [DISTWIDTH] IN BLOOD BY AUTOMATED COUNT: 13.2 % (ref 11.6–15.1)
GFR SERPL CREATININE-BSD FRML MDRD: 25 ML/MIN/1.73SQ M
GLUCOSE SERPL-MCNC: 100 MG/DL (ref 65–140)
GLUCOSE SERPL-MCNC: 117 MG/DL (ref 65–140)
GLUCOSE SERPL-MCNC: 132 MG/DL (ref 65–140)
GLUCOSE SERPL-MCNC: 137 MG/DL (ref 65–140)
GLUCOSE SERPL-MCNC: 145 MG/DL (ref 65–140)
GLUCOSE SERPL-MCNC: 145 MG/DL (ref 65–140)
GLUCOSE SERPL-MCNC: 147 MG/DL (ref 65–140)
GLUCOSE SERPL-MCNC: 193 MG/DL (ref 65–140)
GLUCOSE SERPL-MCNC: 258 MG/DL (ref 65–140)
GLUCOSE SERPL-MCNC: 272 MG/DL (ref 65–140)
GLUCOSE SERPL-MCNC: 279 MG/DL (ref 65–140)
GLUCOSE SERPL-MCNC: 58 MG/DL (ref 65–140)
GLUCOSE SERPL-MCNC: 90 MG/DL (ref 65–140)
HCT VFR BLD AUTO: 36 % (ref 34.8–46.1)
HCT VFR BLD AUTO: 36 % (ref 34.8–46.1)
HGB BLD-MCNC: 11.8 G/DL (ref 11.5–15.4)
HGB BLD-MCNC: 11.8 G/DL (ref 11.5–15.4)
MCH RBC QN AUTO: 27.4 PG (ref 26.8–34.3)
MCH RBC QN AUTO: 27.4 PG (ref 26.8–34.3)
MCHC RBC AUTO-ENTMCNC: 32.8 G/DL (ref 31.4–37.4)
MCHC RBC AUTO-ENTMCNC: 32.8 G/DL (ref 31.4–37.4)
MCV RBC AUTO: 84 FL (ref 82–98)
MCV RBC AUTO: 84 FL (ref 82–98)
PLATELET # BLD AUTO: 400 THOUSANDS/UL (ref 149–390)
PLATELET # BLD AUTO: 400 THOUSANDS/UL (ref 149–390)
PMV BLD AUTO: 10.2 FL (ref 8.9–12.7)
PMV BLD AUTO: 10.2 FL (ref 8.9–12.7)
POTASSIUM SERPL-SCNC: 4 MMOL/L (ref 3.5–5.3)
RBC # BLD AUTO: 4.31 MILLION/UL (ref 3.81–5.12)
RBC # BLD AUTO: 4.31 MILLION/UL (ref 3.81–5.12)
SODIUM SERPL-SCNC: 140 MMOL/L (ref 135–147)
WBC # BLD AUTO: 13.01 THOUSAND/UL (ref 4.31–10.16)
WBC # BLD AUTO: 13.01 THOUSAND/UL (ref 4.31–10.16)

## 2021-12-23 PROCEDURE — 82948 REAGENT STRIP/BLOOD GLUCOSE: CPT

## 2021-12-23 PROCEDURE — 99291 CRITICAL CARE FIRST HOUR: CPT | Performed by: STUDENT IN AN ORGANIZED HEALTH CARE EDUCATION/TRAINING PROGRAM

## 2021-12-23 PROCEDURE — 80048 BASIC METABOLIC PNL TOTAL CA: CPT | Performed by: NURSE PRACTITIONER

## 2021-12-23 PROCEDURE — 97110 THERAPEUTIC EXERCISES: CPT

## 2021-12-23 PROCEDURE — 94760 N-INVAS EAR/PLS OXIMETRY 1: CPT

## 2021-12-23 PROCEDURE — 85027 COMPLETE CBC AUTOMATED: CPT | Performed by: NURSE PRACTITIONER

## 2021-12-23 PROCEDURE — 85025 COMPLETE CBC W/AUTO DIFF WBC: CPT | Performed by: STUDENT IN AN ORGANIZED HEALTH CARE EDUCATION/TRAINING PROGRAM

## 2021-12-23 RX ADMIN — SODIUM CHLORIDE 3 UNITS/HR: 9 INJECTION, SOLUTION INTRAVENOUS at 14:25

## 2021-12-23 RX ADMIN — FERROUS SULFATE TAB 325 MG (65 MG ELEMENTAL FE) 325 MG: 325 (65 FE) TAB at 08:26

## 2021-12-23 RX ADMIN — HEPARIN SODIUM 5000 UNITS: 5000 INJECTION INTRAVENOUS; SUBCUTANEOUS at 14:24

## 2021-12-23 RX ADMIN — DOCUSATE SODIUM 100 MG: 100 CAPSULE, LIQUID FILLED ORAL at 08:26

## 2021-12-23 RX ADMIN — LISINOPRIL 20 MG: 20 TABLET ORAL at 08:26

## 2021-12-23 RX ADMIN — HEPARIN SODIUM 5000 UNITS: 5000 INJECTION INTRAVENOUS; SUBCUTANEOUS at 05:01

## 2021-12-23 RX ADMIN — DEXAMETHASONE SODIUM PHOSPHATE 5.48 MG: 4 INJECTION, SOLUTION INTRA-ARTICULAR; INTRALESIONAL; INTRAMUSCULAR; INTRAVENOUS; SOFT TISSUE at 22:15

## 2021-12-23 RX ADMIN — BARICITINIB 2 MG: 2 TABLET, FILM COATED ORAL at 14:00

## 2021-12-23 RX ADMIN — DEXAMETHASONE SODIUM PHOSPHATE 5.48 MG: 4 INJECTION, SOLUTION INTRA-ARTICULAR; INTRALESIONAL; INTRAMUSCULAR; INTRAVENOUS; SOFT TISSUE at 12:15

## 2021-12-23 RX ADMIN — HEPARIN SODIUM 5000 UNITS: 5000 INJECTION INTRAVENOUS; SUBCUTANEOUS at 22:05

## 2021-12-23 RX ADMIN — ATORVASTATIN CALCIUM 40 MG: 40 TABLET, FILM COATED ORAL at 22:05

## 2021-12-23 NOTE — ASSESSMENT & PLAN NOTE
· Presented on 12/16 with fever, chills, shortness of breath, cough  · COVID-19 positive on 12/16  · Unvaccinated  · Transferred on 12/21 to ICU 2/2 escalating O2 requirements  · Severe pathway:  · Decadron 0 1 mg/kg D3, total D8  · Baricitinib D4 - maintain lower dose 2/2 CKD3b with decreased GFR  · Remdesivir completed  · Prophylactic heparin  · Statin  · Ceftriaxone/Doxy D1 -  DC'd procalcitonin negative x2  · Encourage side-lying/prone positioning  · Encourage I/S, aggressive pulmonary hygiene  · Titrate oxygen to maintain SpO2 > 88% - currently requiring HFNC 100%/55L +/- NRB  · Trend CRP

## 2021-12-23 NOTE — ASSESSMENT & PLAN NOTE
Lab Results   Component Value Date    EGFR 29 12/22/2021    EGFR 37 12/21/2021    EGFR 34 12/20/2021    CREATININE 1 63 (H) 12/22/2021    CREATININE 1 34 (H) 12/21/2021    CREATININE 1 45 (H) 12/20/2021     · Creatinine 1 56 on admission  · Baseline creatinine appears to be around 1 2 - 1 6 - currently at baseline  · Received IV Lasix 20 mg 12/21  · Avoid nephrotoxic agents and hypotension  · Trend renal indices  · Strict I&O  · Daily weights

## 2021-12-23 NOTE — PLAN OF CARE
Problem: Potential for Falls  Goal: Patient will remain free of falls  Description: INTERVENTIONS:  - Educate patient/family on patient safety including physical limitations  - Instruct patient to call for assistance with activity   - Consult OT/PT to assist with strengthening/mobility   - Keep Call bell within reach  - Keep bed low and locked with side rails adjusted as appropriate  - Keep care items and personal belongings within reach  - Initiate and maintain comfort rounds  - Make Fall Risk Sign visible to staff  - Offer Toileting every  Hours, in advance of need  - Initiate/Maintain alarm  - Obtain necessary fall risk management equipment:   - Apply yellow socks and bracelet for high fall risk patients  - Consider moving patient to room near nurses station  Outcome: Progressing     Problem: INFECTION - ADULT  Goal: Absence or prevention of progression during hospitalization  Description: INTERVENTIONS:  - Assess and monitor for signs and symptoms of infection  - Monitor lab/diagnostic results  - Monitor all insertion sites, i e  indwelling lines, tubes, and drains  - Monitor endotracheal if appropriate and nasal secretions for changes in amount and color  - [place] appropriate cooling/warming therapies per order  - Administer medications as ordered  - Instruct and encourage patient and family to use good hand hygiene technique  - Identify and instruct in appropriate isolation precautions for identified infection/condition  Outcome: Progressing     Problem: SAFETY ADULT  Goal: Patient will remain free of falls  Description: INTERVENTIONS:  - Educate patient/family on patient safety including physical limitations  - Instruct patient to call for assistance with activity   - Consult OT/PT to assist with strengthening/mobility   - Keep Call bell within reach  - Keep bed low and locked with side rails adjusted as appropriate  - Keep care items and personal belongings within reach  - Initiate and maintain comfort rounds  - Make Fall Risk Sign visible to staff  - Offer Toileting even Hours, in advance of need  - Initiate/Maintain alarm  - Obtain necessary fall risk management equipment:   - Apply yellow socks and bracelet for high fall risk patients  - Consider moving patient to room near nurses station  Outcome: Progressing

## 2021-12-23 NOTE — PLAN OF CARE
Problem: OCCUPATIONAL THERAPY ADULT  Goal: Performs self-care activities at highest level of function for planned discharge setting  See evaluation for individualized goals  Outcome: Progressing  Note: Limitation: Decreased ADL status,Decreased endurance  Prognosis: Fair  Assessment: Patient participated in Skilled OT session this date with interventions consisting of Energy Conservation techniques and therapeutic exercise to: increase functional use of BUEs, increase BUE muscle strength    Patient agreeable to OT treatment session, upon arrival patient was found supine in bed  Patient performs UB TE AROM x 20 reps as detailed in flow sheet  Patient requires frequent rest breaks after each set due to mild fatigue with activity  O2 sats remained in 90s for duration of OT session  Patient had no c/o of pain  Patient declined further activity following TE  Session ended with patient supine in bed and left with all needs met  In comparison to previous session, patient with improvements in UB strength and endurance   Patient requiring verbal cues for correct technique, verbal cues for pacing thru activity steps and frequent rest periods  Patient performance demonstrated good carryover of learned techniques and strategies to facilitate safety during functional tasks  Patient continues to be functioning below baseline level, occupational performance remains limited secondary to factors listed above and increased risk for falls and injury  From OT standpoint, recommendation at time of d/c would be Short Term Rehab  Patient to benefit from continued Occupational Therapy treatment while in the hospital to address deficits as defined above and maximize level of functional independence with ADLs and functional mobility in order to return to OF        OT Discharge Recommendation: Post acute rehabilitation services

## 2021-12-23 NOTE — ASSESSMENT & PLAN NOTE
· As evidenced by tachycardia, tachypnea, hypoxia  · Suspect this is in setting of worsening COVID-19 pneumonia vs superimposed bacterial infection  · Procalcitonin negative  · ABX DC'd  · Trend fever and WBC curve  · Trend procalcitonin

## 2021-12-23 NOTE — OCCUPATIONAL THERAPY NOTE
12/23/21 0925   OT Last Visit   OT Visit Date 12/23/21   Note Type   Note Type Treatment   Restrictions/Precautions   Weight Bearing Precautions Per Order No   Other Precautions Airborne/isolation;Droplet precautions;O2;Fall Risk   Pain Assessment   Pain Assessment Tool 0-10   Pain Score No Pain   Therapeutic Exercise - ROM   UE-ROM Yes   ROM- Right Upper Extremities   R Shoulder AROM; Flexion; Extension;Horizontal ABduction  (horiz  add, scapular pro/ret)   R Elbow AROM;Elbow flexion;Elbow extension   R Wrist Wrist flexion;Wrist extension  (wrist rotation)   R Weight/Reps/Sets 20 reps, 1 set with no weight   ROM - Left Upper Extremities    L Weight/Reps/Sets TE same as R UE above   Cognition   Overall Cognitive Status WFL   Arousal/Participation Lethargic;Cooperative   Orientation Level Oriented X4   Memory Within functional limits   Following Commands Follows all commands and directions without difficulty   Comments PT agreeable to Ot treatment   Activity Tolerance   Activity Tolerance Patient limited by fatigue   Assessment   Assessment Patient participated in Skilled OT session this date with interventions consisting of Energy Conservation techniques and therapeutic exercise to: increase functional use of BUEs, increase BUE muscle strength    Patient agreeable to OT treatment session, upon arrival patient was found supine in bed  Patient performs UB TE AROM x 20 reps as detailed in flow sheet  Patient requires frequent rest breaks after each set due to mild fatigue with activity  O2 sats remained in 90s for duration of OT session  Patient had no c/o of pain  Patient declined further activity following TE  Session ended with patient supine in bed and left with all needs met  In comparison to previous session, patient with improvements in UB strength and endurance   Patient requiring verbal cues for correct technique, verbal cues for pacing thru activity steps and frequent rest periods   Patient performance demonstrated good carryover of learned techniques and strategies to facilitate safety during functional tasks  Patient continues to be functioning below baseline level, occupational performance remains limited secondary to factors listed above and increased risk for falls and injury  From OT standpoint, recommendation at time of d/c would be Short Term Rehab  Patient to benefit from continued Occupational Therapy treatment while in the hospital to address deficits as defined above and maximize level of functional independence with ADLs and functional mobility in order to return to PLOF  Plan   Treatment Interventions UE strengthening/ROM; Endurance training;Energy conservation   Goal Expiration Date 12/29/21   OT Treatment Day 1   OT Frequency 3-5x/wk   Recommendation   OT Discharge Recommendation Post acute rehabilitation services   AM-PAC Daily Activity Inpatient   Lower Body Dressing 2   Bathing 2   Toileting 2   Upper Body Dressing 3   Grooming 3   Eating 4   Daily Activity Raw Score 16   Daily Activity Standardized Score (Calc for Raw Score >=11) 35 96   AM-PAC Applied Cognition Inpatient   Following a Speech/Presentation 4   Understanding Ordinary Conversation 4   Taking Medications 4   Remembering Where Things Are Placed or Put Away 4   Remembering List of 4-5 Errands 4   Taking Care of Complicated Tasks 3   Applied Cognition Raw Score 23   Applied Cognition Standardized Score 53 08   STAN Doherty

## 2021-12-23 NOTE — UTILIZATION REVIEW
Continued Stay Review    Date: 12/23                          Current Patient Class: IP  Current Level of Care: ICU    HPI:81 y o  female initially admitted on 12/17 w/ COVID      Assessment/Plan: diuresed w / lasix , tolerating high flow and lying on side  C/o cough   + rhonchi and rales  currently requiring HFNC 90%/50L   Cont decadron and baricitinib          Vital Signs:   12/23/21 0929 -- -- -- -- -- 93 % 80   -- 45 L/min   -- High flow nasal cannula  HFNC prongs  --   12/23/21 0826 -- -- -- 114/59 -- -- -- -- -- -- -- -- --   12/23/21 0800 97 8 °F (36 6 °C) 70 16 114/59 82 -- -- -- -- -- -- -- --   12/23/21 0700 -- 83 26 Abnormal  122/65 88 90 % -- -- -- -- -- -- --   12/23/21 0600 -- 62 19 135/64 92 91 % -- -- -- -- -- -- --   12/23/21 0537 -- -- -- -- -- 90 % 90 220 15 L/min  50 L/min High flow nasal cannula HFNC prongs --   12/23/21 0500 -- 72 17 147/73 103 90 % -- -- -- -- -- -- --   12/23/21 0400 98 °F (36 7 °C) 82 25 Abnormal  144/81 105 89 % Abnormal  -- -- -- -- -- -- --   12/23/21 0300 -- 70 19 118/64 85 87 % Abnormal  -- -- -- -- -- -- --   12/23/21 0200 -- 68 21 120/63 86 88 % Abnormal  -- -- -- -- -- -- --   12/23/21 0113 -- -- -- -- -- -- -- -- -- -- -- HFNC prongs --   12/23/21 0100 -- 66 15 128/69 92 93 % -- -- -- -- -- -- --   12/23/21 0000 -- 78 23 Abnormal  161/83 115 91 % 90 --              Pertinent Labs/Diagnostic Results:   Results from last 7 days   Lab Units 12/16/21  1707   SARS-COV-2  Positive*     Results from last 7 days   Lab Units 12/23/21  0509 12/22/21  0546 12/21/21  0435 12/20/21  0434 12/20/21  0434 12/19/21  0429 12/19/21  0429 12/18/21  0524 12/17/21  0459 12/16/21  1926 12/16/21  1707   WBC Thousand/uL 13 01* 8 37 6 15   < > 2 92*   < > 3 79*   < > 3 11*   < > 4 13*   HEMOGLOBIN g/dL 11 8 10 4* 10 7*  --  10 5*  --  10 9*   < > 10 7*   < > 10 8*   HEMATOCRIT % 36 0 31 5* 32 4*  --  32 6*  --  33 6*   < > 33 9*   < > 33 7*   PLATELETS Thousands/uL 400* 283 234   < > 197   < > 178   < > 162   < > 158   NEUTROS ABS Thousands/µL  --  6 85  --   --   --   --   --   --  2 16  --  3 18    < > = values in this interval not displayed  Results from last 7 days   Lab Units 12/23/21  0509 12/22/21  0546 12/21/21  0435 12/20/21  0434 12/19/21  0429 12/17/21  0459 12/16/21  1707   SODIUM mmol/L 140 140 137 135 136   < > 139   POTASSIUM mmol/L 4 0 3 9 4 5 4 5 4 2   < > 3 5   CHLORIDE mmol/L 102 105 105 104 104   < > 104   CO2 mmol/L 26 26 24 22 20*   < > 24   ANION GAP mmol/L 12 9 8 9 12   < > 11   BUN mg/dL 58* 48* 41* 50* 43*   < > 24   CREATININE mg/dL 1 83* 1 63* 1 34* 1 45* 1 39*   < > 1 56*   EGFR ml/min/1 73sq m 25 29 37 34 35   < > 31   CALCIUM mg/dL 9 7 9 4 9 4 9 0 9 0   < > 8 9   CALCIUM, IONIZED mmol/L  --   --   --   --   --   --  1 08*   MAGNESIUM mg/dL  --  1 9  --   --   --   --  1 9   PHOSPHORUS mg/dL  --  2 8  --   --   --   --   --     < > = values in this interval not displayed       Results from last 7 days   Lab Units 12/22/21  0546 12/21/21  0435 12/20/21  0434 12/19/21  0429 12/18/21  0524   AST U/L 16 19 21 24 25   ALT U/L 14 15 13 12 12   ALK PHOS U/L 66 68 64 64 65   TOTAL PROTEIN g/dL 7 2 7 2 6 9 7 0 7 0   ALBUMIN g/dL 3 6 3 7 3 6 3 8 3 8   TOTAL BILIRUBIN mg/dL 0 60 0 49 0 42 0 44 0 39     Results from last 7 days   Lab Units 12/23/21  1011 12/23/21  0823 12/23/21  0615 12/23/21  0507 12/23/21  0414 12/23/21  0217 12/23/21  0110 12/22/21  2352 12/22/21  2157 12/22/21  2105 12/22/21  1943 12/22/21  1748   POC GLUCOSE mg/dl 193* 117 145* 90 58* 258* 279* 81 183* 159* 203* 132     Results from last 7 days   Lab Units 12/23/21  0509 12/22/21  0546 12/21/21  0435 12/20/21  0434 12/19/21  0429 12/18/21  0524 12/17/21  0459 12/16/21  1707   GLUCOSE RANDOM mg/dL 100 188* 381* 420* 354* 246* 110 57*       Results from last 7 days   Lab Units 12/16/21  1707   CK TOTAL U/L 137   CK MB INDEX % 0 4   CK MB ng/mL 0 6     Results from last 7 days   Lab Units 12/16/21 2128 12/16/21  1926 12/16/21  1707   HS TNI 0HR ng/L  --   --  16   HS TNI 2HR ng/L  --  13  --    HSTNI D2 ng/L  --  -3  --    HS TNI 4HR ng/L 12  --   --    HSTNI D4 ng/L -4  --   --      Results from last 7 days   Lab Units 12/21/21  1221 12/16/21  1707   D-DIMER QUANTITATIVE ug/ml FEU 0 93* 1 21*     Results from last 7 days   Lab Units 12/21/21  1133 12/16/21  1707   PROTIME seconds 14 9* 14 3   INR  1 18 1 12   PTT seconds 46*  --      Results from last 7 days   Lab Units 12/22/21  0546 12/21/21  1133 12/17/21  0459 12/16/21  1707   PROCALCITONIN ng/ml 0 21 0 09 0 05 <0 05     Results from last 7 days   Lab Units 12/16/21  1707   LACTIC ACID mmol/L 0 5     Results from last 7 days   Lab Units 12/16/21  1707   BNP pg/mL 51     Results from last 7 days   Lab Units 12/16/21  1707   FERRITIN ng/mL 424*     Results from last 7 days   Lab Units 12/22/21  0546 12/21/21  0435 12/20/21  0434 12/19/21  0429 12/18/21  0524   CRP mg/L 116 0* 57 8* 58 2* 88 7* 109 0*     Results from last 7 days   Lab Units 12/16/21  1707   INFLUENZA A PCR  Negative   INFLUENZA B PCR  Negative   RSV PCR  Negative     Results from last 7 days   Lab Units 12/22/21  1043 12/16/21  1708 12/16/21  1707   BLOOD CULTURE  Received in Microbiology Lab  Culture in Progress  Received in Microbiology Lab  Culture in Progress  No Growth After 5 Days    --    GRAM STAIN RESULT   --   --  Gram positive cocci in pairs*       Medications:   Scheduled Medications:  atorvastatin, 40 mg, Oral, HS  Baricitinib, 2 mg, Oral, Q24H  dexamethasone, 0 1 mg/kg, Intravenous, Q12H  docusate sodium, 100 mg, Oral, Daily  ferrous sulfate, 325 mg, Oral, Daily With Breakfast  heparin (porcine), 5,000 Units, Subcutaneous, Q8H JUAN ANTONIO  lisinopril, 20 mg, Oral, Daily      Continuous IV Infusions:  insulin regular (HumuLIN R,NovoLIN R) infusion, 0 3-21 Units/hr, Intravenous, Titrated      PRN Meds:  acetaminophen, 650 mg, Oral, Q4H PRN  ALPRAZolam, 0 25 mg, Oral, HS PRN  ondansetron, 4 mg, Intravenous, Q6H PRN        Discharge Plan: TBD     Network Utilization Review Department  ATTENTION: Please call with any questions or concerns to [phone] and carefully listen to the prompts so that you are directed to the right person  All voicemails are confidential   Brandy Edgar all requests for admission clinical reviews, approved or denied determinations and any other requests to dedicated fax number below belonging to the campus where the patient is receiving treatment   List of dedicated fax numbers for the Facilities:  [address] DENIALS (Administrative/Medical Necessity) [phone]   [address] (Maternity/NICU/Pediatrics) [phone]   [address]  [address] [facility] [place] [address] [address] [address] [facility] [facility] [phone]

## 2021-12-23 NOTE — PLAN OF CARE
Problem: RESPIRATORY - ADULT  Goal: Achieves optimal ventilation and oxygenation  Description: INTERVENTIONS:  - Assess for changes in respiratory status  - Assess for changes in mentation and behavior  - Position to facilitate oxygenation and minimize respiratory effort  - Oxygen administered by appropriate delivery if ordered  - Initiate smoking cessation education as indicated  - Encourage broncho-pulmonary hygiene including cough, deep breathe, Incentive Spirometry  - Assess the need for suctioning and aspirate as needed  - Assess and instruct to report SOB or any respiratory difficulty  - Respiratory Therapy support as indicated  Outcome: Progressing   Assumed pt care  Assessment complete  Pt now on high flow 35 liters 65%  Pt with crackles in the bases she has a moist productive cough  Her vitals are stable  Will cont to monitor

## 2021-12-23 NOTE — PROGRESS NOTES
[address] [address]  Progress Note - Haydee Dior 1940, [de-identified] y o  female MRN: [id number]  Unit/Bed#: ICU 04-01 Encounter: [id number]  Primary Care Provider: Stacie Taylor DO   Date and time admitted to hospital: 12/16/2021  4:49 PM    * COVID-19  Assessment & Plan  · Presented on 12/16 with fever, chills, shortness of breath, cough  · COVID-19 positive on 12/16  · Unvaccinated  · Transferred on 12/21 to ICU 2/2 escalating O2 requirements  · Severe pathway:  · Decadron 0 1 mg/kg D3, total D8  · Baricitinib D4 - maintain lower dose 2/2 CKD3b with decreased GFR  · Remdesivir completed  · Prophylactic heparin  · Statin  · Ceftriaxone/Doxy D1 -  DC'd procalcitonin negative x2  · Encourage side-lying/prone positioning  · Encourage I/S, aggressive pulmonary hygiene  · Titrate oxygen to maintain SpO2 > 88% - currently requiring HFNC 100%/55L +/- NRB  · Trend CRP    Acute respiratory failure with hypoxia (HCC)  Assessment & Plan  · Upgraded to SD1 2/2 increasing O2 requirement  · CXR  reveals RML/LLL infiltrates concerning for superimposed bacterial pneumonia  · Continue COVID-19 protocol as noted above  · Started on IV Ceftriaxone/Doxycycline per COVID-19 protocol  · monitor I/O  · Reassess daily for diuresis  · PRN Xopenex nebs  · Consider addition of Veletri if oxygenation status continues to worsen  · Titrate O2 to maintain SpO2 > 88% - currently requiring HFNC 90%/50L   · Aggressive pulmonary hygiene    SIRS (systemic inflammatory response syndrome) ([facility] [address] )  Assessment & Plan  · As evidenced by tachycardia, tachypnea, hypoxia  · Suspect this is in setting of worsening COVID-19 pneumonia vs superimposed bacterial infection  · Procalcitonin negative  · ABX DC'd  · Trend fever and WBC curve  · Trend procalcitonin    Type 2 diabetes mellitus without complication, without long-term current use of insulin [facility])  Assessment & Plan  Lab Results   Component Value Date    HGBA1C 6 2 (H) 11/22/2021 Recent Labs     12/22/21  2352 12/23/21  0110 12/23/21  0217 12/23/21  0414   POCGLU 81 279* 258* 58*       Blood Sugar Average: Last 72 hrs:  (P) 813 [id number]     · Takes metformin, Januvia, glipizide at home  · Was taking Lantus, mealtime/SSI, but continues to be hyperglycemic  · Will start insulin gtt  · Fingersticks q2 hours  · Diabetic diet when able to tolerate PO intake    Anemia  Assessment & Plan  · Hemoglobin 10 8 on admission, currently 10 7  · Likely multifactorial in setting of iron deficiency anemia vs CKD  · Baseline appears to be around 11 5 - 13  · Concurrently with no signs of active bleeding  · Continue home iron supplementation  · Transfuse for hemoglobin < 7 or with hemodynamic compromise  · Trend hemoglobin and monitor for signs of active bleeding    Chronic kidney disease, stage 3b [facility])  Assessment & Plan  Lab Results   Component Value Date    EGFR 29 12/22/2021    EGFR 37 12/21/2021    EGFR 34 12/20/2021    CREATININE 1 63 (H) 12/22/2021    CREATININE 1 34 (H) 12/21/2021    CREATININE 1 45 (H) 12/20/2021     · Creatinine 1 56 on admission  · Baseline creatinine appears to be around 1 2 - 1 6 - currently at baseline  · Received IV Lasix 20 mg 12/21  · Avoid nephrotoxic agents and hypotension  · Trend renal indices  · Strict I&O  · Daily weights    Ambulatory dysfunction  Assessment & Plan  · PT/OT - recommending acute rehab on discharge    Hypercholesterolemia  Assessment & Plan  · Takes lovastatin at home - continue statin here per COVID protocol    Essential hypertension  Assessment & Plan  · Takes enalapril at home - appears to be taking lisinopril while inpatient  · Continue as patient seems to be at baseline renal function  · Monitor BP    Anxiety  Assessment & Plan  · Continue home PRN Xanax  · Zoloft appears to be on patient's home medication list, however patient states she is not taking this      ----------------------------------------------------------------------------------------  HPI/24hr events: diuresed with lasix  Tolerating high flow and lying on side  Patient appropriate for transfer out of the ICU today?: No  Disposition: Continue Stepdown Level 1 level of care   Code Status: Level 1 - Full Code  ---------------------------------------------------------------------------------------  SUBJECTIVE  "I feel okay  Feel little tired" denied increased shortness of breath  Complained of cough  Denied nausea vomiting or diarrhea  Review of Systems  Review of systems was reviewed and negative unless stated above in HPI/24-hour events   ---------------------------------------------------------------------------------------  OBJECTIVE    Vitals   Vitals:    21 0100 21 0200 21 0300 21 0400   BP: 128/69 120/63 118/64 144/81   BP Location:       Pulse: 66 68 70 82   Resp: 15 21 19 (!) 25   Temp:    98 °F (36 7 °C)   TempSrc:    Temporal   SpO2: 93% (!) 88% (!) 87% (!) 89%   Weight:       Height:         Temp (24hrs), Av 2 °F (36 8 °C), Min:97 9 °F (36 6 °C), Max:99 °F (37 2 °C)  Current: Temperature: 98 °F (36 7 °C)          Respiratory:  50L/90%    Invasive/non-invasive ventilation settings   Respiratory  Report   Lab Data (Last 4 hours)    None         O2/Vent Data (Last 4 hours)       0113          Non-Invasive Ventilation Mode HFNC (High flow)                   Physical Exam  Constitutional:       Appearance: She is ill-appearing  HENT:      Head: Normocephalic and atraumatic  Mouth/Throat:      Mouth: Mucous membranes are dry  Eyes:      Extraocular Movements: Extraocular movements intact  Conjunctiva/sclera: Conjunctivae normal       Pupils: Pupils are equal, round, and reactive to light  Cardiovascular:      Rate and Rhythm: Normal rate and regular rhythm  Pulses: Normal pulses  Heart sounds: Murmur heard         Pulmonary: Effort: Pulmonary effort is normal  No respiratory distress  Breath sounds: Rhonchi and rales present  Abdominal:      General: Abdomen is flat  Bowel sounds are normal  There is no distension  Palpations: Abdomen is soft  Tenderness: There is no abdominal tenderness  Musculoskeletal:         General: Normal range of motion  Cervical back: Normal range of motion  Skin:     General: Skin is warm and dry  Capillary Refill: Capillary refill takes 2 to 3 seconds  Neurological:      Mental Status: She is oriented to person, place, and time  Mental status is at baseline  Psychiatric:         Mood and Affect: Mood normal          Behavior: Behavior normal          Thought Content: Thought content normal          Judgment: Judgment normal              Laboratory and Diagnostics:  Results from last 7 days   Lab Units 12/22/21  0546 12/21/21  0435 12/20/21  0434 12/19/21  0429 12/18/21  0524 12/17/21  0459 12/16/21  1926 12/16/21  1707 12/16/21  1707   WBC Thousand/uL 8 37 6 15 2 92* 3 79* 3 32* 3 11*  --   --  4 13*   HEMOGLOBIN g/dL 10 4* 10 7* 10 5* 10 9* 10 7* 10 7*  --   --  10 8*   HEMATOCRIT % 31 5* 32 4* 32 6* 33 6* 32 8* 33 9*  --   --  33 7*   PLATELETS Thousands/uL 283 234 197 178 156 162 143*   < > 158   NEUTROS PCT % 81*  --   --   --   --  69  --   --  77*   MONOS PCT % 4  --   --   --   --  6  --   --  5    < > = values in this interval not displayed       Results from last 7 days   Lab Units 12/22/21  0546 12/21/21  0435 12/20/21  0434 12/19/21 0429 12/18/21  0524 12/17/21  0459 12/16/21  1707   SODIUM mmol/L 140 137 135 136 137 139 139   POTASSIUM mmol/L 3 9 4 5 4 5 4 2 3 7 3 8 3 5   CHLORIDE mmol/L 105 105 104 104 105 107 104   CO2 mmol/L 26 24 22 20* 17* 21 24   ANION GAP mmol/L 9 8 9 12 15* 11 11   BUN mg/dL 48* 41* 50* 43* 33* 23 24   CREATININE mg/dL 1 63* 1 34* 1 45* 1 39* 1 48* 1 39* 1 56*   CALCIUM mg/dL 9 4 9 4 9 0 9 0 8 7 8 9 8 9   GLUCOSE RANDOM mg/dL 188* 381* 420* 354* 246* 110 57*   ALT U/L 14 15 13 12 12  --  11   AST U/L 16 19 21 24 25  --  20   ALK PHOS U/L 66 68 64 64 65  --  78   ALBUMIN g/dL 3 6 3 7 3 6 3 8 3 8  --  4 4   TOTAL BILIRUBIN mg/dL 0 60 0 49 0 42 0 44 0 39  --  0 36     Results from last 7 days   Lab Units 12/22/21  0546 12/16/21  1707   MAGNESIUM mg/dL 1 9 1 9   PHOSPHORUS mg/dL 2 8  --       Results from last 7 days   Lab Units 12/21/21  1133 12/16/21  1707   INR  1 18 1 12   PTT seconds 46*  --           Results from last 7 days   Lab Units 12/16/21  1707   LACTIC ACID mmol/L 0 5     ABG:    VBG:    Results from last 7 days   Lab Units 12/22/21  0546 12/21/21  1133 12/17/21  0459 12/16/21  1707   PROCALCITONIN ng/ml 0 21 0 09 0 05 <0 05       Micro  Results from last 7 days   Lab Units 12/22/21  1043 12/16/21  1708 12/16/21  1707   BLOOD CULTURE  Received in Microbiology Lab  Culture in Progress  Received in Microbiology Lab  Culture in Progress  No Growth After 5 Days  --    GRAM STAIN RESULT   --   --  Gram positive cocci in pairs*       EKG:  Normal sinus rhythm  Imaging: I have personally reviewed pertinent reports  and I have personally reviewed pertinent films in PACS    Intake and Output  I/O       12/21 0701  12/22 0700 12/22 0701  12/23 0700    P  O  220 480    I V  (mL/kg) 57 5 (1) 23 5 (0 4)    IV Piggyback  350    Total Intake(mL/kg) 277 5 (5) 853 5 (15 3)    Urine (mL/kg/hr) 350 (0 3) 675 (0 5)    Total Output 350 675    Net -72 5 +178 5                Height and Weights   Height: 5' (152 4 cm)     Body mass index is 24 03 kg/m²    Weight (last 2 days)     Date/Time Weight    12/22/21 0550 55 8 (123 02)            Nutrition       Diet Orders   (From admission, onward)             Start     Ordered    12/21/21 1750  Diet Rodríguez/CHO Controlled; Consistent Carbohydrate Diet Level 2 (5 carb servings/75 grams CHO/meal)  Diet effective now        References:    Nutrtion Support Algorithm Enteral vs  Parenteral   Question Answer Comment   Diet Type Rodríguez/CHO Controlled    Rodríguez/CHO Controlled Consistent Carbohydrate Diet Level 2 (5 carb servings/75 grams CHO/meal)    RD to adjust diet per protocol?  Yes        12/21/21 6019                  Active Medications  Scheduled Meds:  Current Facility-Administered Medications   Medication Dose Route Frequency Provider Last Rate    acetaminophen  650 mg Oral Q4H PRN BENTON Brown      ALPRAZolam  0 25 mg Oral HS PRN BENTON Simons      atorvastatin  40 mg Oral HS BENTON Brown      Baricitinib  2 mg Oral Q24H BENTON Brown      dexamethasone  0 1 mg/kg Intravenous Q12H BENTON Brown      docusate sodium  100 mg Oral Daily Megan Lynn PA-C      ferrous sulfate  325 mg Oral Daily With Breakfast BENTON Brown      heparin (porcine)  5,000 Units Subcutaneous Q8H [address] BENTON Brown      insulin regular (HumuLIN R,NovoLIN R) infusion  0 3-21 Units/hr Intravenous Titrated BENTON Brown Stopped (12/23/21 0414)    lisinopril  20 mg Oral Daily BENTON Brown      ondansetron  4 mg Intravenous Q6H PRN BENTON Brown       Continuous Infusions:  insulin regular (HumuLIN R,NovoLIN R) infusion, 0 3-21 Units/hr, Last Rate: Stopped (12/23/21 0414)      PRN Meds:   acetaminophen, 650 mg, Q4H PRN  ALPRAZolam, 0 25 mg, HS PRN  ondansetron, 4 mg, Q6H PRN        Invasive Devices Review  Invasive Devices  Report    Peripheral Intravenous Line            Peripheral IV 12/20/21 Left Forearm 2 days    Peripheral IV 12/21/21 Right;Ventral (anterior) Forearm 1 day                Rationale for remaining devices: NA  ---------------------------------------------------------------------------------------  Advance Directive and Living Will:      Power of :    POLST:    ---------------------------------------------------------------------------------------  Care Time Delivered:   No Critical Care time spent       Sarah JAMISON Jonelle Arenas      Portions of the record may have been created with voice recognition software  Occasional wrong word or "sound a like" substitutions may have occurred due to the inherent limitations of voice recognition software    Read the chart carefully and recognize, using context, where substitutions have occurred

## 2021-12-23 NOTE — RESPIRATORY THERAPY NOTE
12/23/21 1538   Oxygen Therapy/Pulse Ox   O2 Device High flow nasal cannula   O2 Therapy Oxygen humidified   FiO2 (%) 65   O2 Flow Rate (L/min) 40 L/min   SpO2 95 %   SpO2 Activity At Rest   $ Pulse Oximetry Spot Check Charge Completed

## 2021-12-23 NOTE — ASSESSMENT & PLAN NOTE
Lab Results   Component Value Date    HGBA1C 6 2 (H) 11/22/2021       Recent Labs     12/22/21  2352 12/23/21  0110 12/23/21  0217 12/23/21  0414   POCGLU 81 279* 258* 58*       Blood Sugar Average: Last 72 hrs:  (P) 008 [id number]     · Takes metformin, Januvia, glipizide at home  · Was taking Lantus, mealtime/SSI, but continues to be hyperglycemic  · Will start insulin gtt  · Fingersticks q2 hours  · Diabetic diet when able to tolerate PO intake

## 2021-12-23 NOTE — CASE MANAGEMENT
Case Management Discharge Planning Note    Patient name Adán Lloyd  Location ICU 04/ICU 04- MRN [id number]  : 1940 Date 2021       Current Admission Date: 2021  Current Admission Diagnosis:COVID-19   Patient Active Problem List    Diagnosis Date Noted    [facility] 2021    Anemia 2021    SIRS (systemic inflammatory response syndrome) ([facility] ) 2021    Ambulatory dysfunction 2021    COVID-19 2021    Acute respiratory failure with hypoxia ([place] [address] ) 2021    Negative depression screening 2021    Overweight (BMI 25 0-29 9) 2021    Medicare annual wellness visit, subsequent 2020    Localized, primary osteoarthritis of hand 2020    Refused influenza vaccine 2020    Sciatica 2020    Hypertensive kidney disease with chronic kidney disease stage III ([facility] ) 2019    Type 2 diabetes mellitus without complication, without long-term current use of insulin ([place] [address] ) 2019    Bursitis of shoulder 2018    Groin pain, chronic, left 2018    Paresthesia of arm 2017    Back pain 2017    Muscle pain 2017    Anxiety 2017    Abnormal ECG 2016    Diverticulitis large intestine w/o perforation or abscess w/o bleeding 2016    Essential hypertension 2016    Lower abdominal pain 2016    Atrophic vaginitis 2016    Hypercholesterolemia 2016    Irritable bowel syndrome 2016    Simple chronic anemia 2016    Disorder of shoulder 2008    Articular cartilage disorder of shoulder region 2008    Loose body in joint of shoulder region 2008      LOS (days): 6  Geometric Mean LOS (GMLOS) (days): 5 40  Days to GMLOS:-0 9     OBJECTIVE:  Risk of Unplanned Readmission Score: 16         Current admission status: Inpatient   Preferred Pharmacy:   [address] [facility] [address]   [place] [address] [address] [facility] [address]  Phone: [phone] Fax: [phone]    [facility] [place] , [facility] [address]  Phone: [phone] Fax: [phone]    Primary Care Provider: Christin Balderas DO    Primary Insurance: [address] REP  Secondary Insurance:     DISCHARGE DETAILS:    Discharge planning discussed with[de-identified] Flako Christensen called cm at 11:16 am  Freedom of Choice: Yes  Comments - Freedom of Choice: rehab has been recommended, permission given to send additonal referral  CM contacted family/caregiver?: Yes             Contacts  Patient Contacts: Lauryn Weber  Relationship to Patient[de-identified] Family (daughter)  Contact Method: Phone  Phone Number: [phone]  Reason/Outcome: Discharge Planning              Other Referral/Resources/Interventions Provided:  Interventions: Short Term Rehab  Referral Comments: referrals et cherry Acevedo,,  chris unable to accept, [facility] and [facility] are interested

## 2021-12-23 NOTE — ASSESSMENT & PLAN NOTE
· Upgraded to SD1 2/2 increasing O2 requirement  · CXR  reveals RML/LLL infiltrates concerning for superimposed bacterial pneumonia  · Continue COVID-19 protocol as noted above  · Started on IV Ceftriaxone/Doxycycline per COVID-19 protocol  · monitor I/O  · Reassess daily for diuresis  · PRN Xopenex nebs  · Consider addition of Veletri if oxygenation status continues to worsen  · Titrate O2 to maintain SpO2 > 88% - currently requiring HFNC 90%/50L   · Aggressive pulmonary hygiene

## 2021-12-24 LAB
ALBUMIN SERPL BCP-MCNC: 3.7 G/DL (ref 3.5–5)
ALP SERPL-CCNC: 70 U/L (ref 34–104)
ALT SERPL W P-5'-P-CCNC: 19 U/L (ref 7–52)
ANION GAP SERPL CALCULATED.3IONS-SCNC: 10 MMOL/L (ref 4–13)
ANISOCYTOSIS BLD QL SMEAR: PRESENT
AST SERPL W P-5'-P-CCNC: 20 U/L (ref 13–39)
BASOPHILS # BLD MANUAL: 0 THOUSAND/UL (ref 0–0.1)
BASOPHILS NFR MAR MANUAL: 0 % (ref 0–1)
BILIRUB SERPL-MCNC: 0.48 MG/DL (ref 0.2–1)
BUN SERPL-MCNC: 71 MG/DL (ref 5–25)
CALCIUM SERPL-MCNC: 9.6 MG/DL (ref 8.4–10.2)
CHLORIDE SERPL-SCNC: 106 MMOL/L (ref 96–108)
CO2 SERPL-SCNC: 25 MMOL/L (ref 21–32)
CREAT SERPL-MCNC: 1.72 MG/DL (ref 0.6–1.3)
EOSINOPHIL # BLD MANUAL: 0 THOUSAND/UL (ref 0–0.4)
EOSINOPHIL NFR BLD MANUAL: 0 % (ref 0–6)
ERYTHROCYTE [DISTWIDTH] IN BLOOD BY AUTOMATED COUNT: 13.2 % (ref 11.6–15.1)
GFR SERPL CREATININE-BSD FRML MDRD: 27 ML/MIN/1.73SQ M
GLUCOSE SERPL-MCNC: 115 MG/DL (ref 65–140)
GLUCOSE SERPL-MCNC: 115 MG/DL (ref 65–140)
GLUCOSE SERPL-MCNC: 118 MG/DL (ref 65–140)
GLUCOSE SERPL-MCNC: 127 MG/DL (ref 65–140)
GLUCOSE SERPL-MCNC: 144 MG/DL (ref 65–140)
GLUCOSE SERPL-MCNC: 177 MG/DL (ref 65–140)
GLUCOSE SERPL-MCNC: 200 MG/DL (ref 65–140)
GLUCOSE SERPL-MCNC: 204 MG/DL (ref 65–140)
GLUCOSE SERPL-MCNC: 253 MG/DL (ref 65–140)
GLUCOSE SERPL-MCNC: 278 MG/DL (ref 65–140)
GLUCOSE SERPL-MCNC: 92 MG/DL (ref 65–140)
GLUCOSE SERPL-MCNC: 97 MG/DL (ref 65–140)
HCT VFR BLD AUTO: 35.8 % (ref 34.8–46.1)
HGB BLD-MCNC: 11.5 G/DL (ref 11.5–15.4)
LG PLATELETS BLD QL SMEAR: PRESENT
LYMPHOCYTES # BLD AUTO: 1.65 THOUSAND/UL (ref 0.6–4.47)
LYMPHOCYTES # BLD AUTO: 13 % (ref 14–44)
MAGNESIUM SERPL-MCNC: 2.6 MG/DL (ref 1.9–2.7)
MCH RBC QN AUTO: 26.6 PG (ref 26.8–34.3)
MCHC RBC AUTO-ENTMCNC: 32.1 G/DL (ref 31.4–37.4)
MCV RBC AUTO: 83 FL (ref 82–98)
MONOCYTES # BLD AUTO: 0.51 THOUSAND/UL (ref 0–1.22)
MONOCYTES NFR BLD: 4 % (ref 4–12)
NEUTROPHILS # BLD MANUAL: 10.51 THOUSAND/UL (ref 1.85–7.62)
NEUTS BAND NFR BLD MANUAL: 5 % (ref 0–8)
NEUTS SEG NFR BLD AUTO: 78 % (ref 43–75)
OVALOCYTES BLD QL SMEAR: PRESENT
PHOSPHATE SERPL-MCNC: 4.5 MG/DL (ref 2.3–4.1)
PLATELET # BLD AUTO: 450 THOUSANDS/UL (ref 149–390)
PLATELET BLD QL SMEAR: ADEQUATE
PMV BLD AUTO: 9.8 FL (ref 8.9–12.7)
POTASSIUM SERPL-SCNC: 4.1 MMOL/L (ref 3.5–5.3)
PROT SERPL-MCNC: 7.5 G/DL (ref 6.4–8.4)
RBC # BLD AUTO: 4.33 MILLION/UL (ref 3.81–5.12)
RBC MORPH BLD: PRESENT
SODIUM SERPL-SCNC: 141 MMOL/L (ref 135–147)
WBC # BLD AUTO: 12.66 THOUSAND/UL (ref 4.31–10.16)

## 2021-12-24 PROCEDURE — 94760 N-INVAS EAR/PLS OXIMETRY 1: CPT

## 2021-12-24 PROCEDURE — 83735 ASSAY OF MAGNESIUM: CPT | Performed by: NURSE PRACTITIONER

## 2021-12-24 PROCEDURE — 80053 COMPREHEN METABOLIC PANEL: CPT | Performed by: NURSE PRACTITIONER

## 2021-12-24 PROCEDURE — 82948 REAGENT STRIP/BLOOD GLUCOSE: CPT

## 2021-12-24 PROCEDURE — 97116 GAIT TRAINING THERAPY: CPT

## 2021-12-24 PROCEDURE — XW0DXM6 INTRODUCTION OF BARICITINIB INTO MOUTH AND PHARYNX, EXTERNAL APPROACH, NEW TECHNOLOGY GROUP 6: ICD-10-PCS | Performed by: STUDENT IN AN ORGANIZED HEALTH CARE EDUCATION/TRAINING PROGRAM

## 2021-12-24 PROCEDURE — 84100 ASSAY OF PHOSPHORUS: CPT | Performed by: NURSE PRACTITIONER

## 2021-12-24 PROCEDURE — 85007 BL SMEAR W/DIFF WBC COUNT: CPT | Performed by: NURSE PRACTITIONER

## 2021-12-24 PROCEDURE — 97530 THERAPEUTIC ACTIVITIES: CPT

## 2021-12-24 PROCEDURE — 97535 SELF CARE MNGMENT TRAINING: CPT

## 2021-12-24 PROCEDURE — 99291 CRITICAL CARE FIRST HOUR: CPT | Performed by: STUDENT IN AN ORGANIZED HEALTH CARE EDUCATION/TRAINING PROGRAM

## 2021-12-24 PROCEDURE — 85027 COMPLETE CBC AUTOMATED: CPT | Performed by: NURSE PRACTITIONER

## 2021-12-24 RX ADMIN — HEPARIN SODIUM 5000 UNITS: 5000 INJECTION INTRAVENOUS; SUBCUTANEOUS at 22:38

## 2021-12-24 RX ADMIN — DOCUSATE SODIUM 100 MG: 100 CAPSULE, LIQUID FILLED ORAL at 08:08

## 2021-12-24 RX ADMIN — HEPARIN SODIUM 5000 UNITS: 5000 INJECTION INTRAVENOUS; SUBCUTANEOUS at 05:59

## 2021-12-24 RX ADMIN — ATORVASTATIN CALCIUM 40 MG: 40 TABLET, FILM COATED ORAL at 22:39

## 2021-12-24 RX ADMIN — LISINOPRIL 20 MG: 20 TABLET ORAL at 08:09

## 2021-12-24 RX ADMIN — DEXAMETHASONE SODIUM PHOSPHATE 5.48 MG: 4 INJECTION, SOLUTION INTRA-ARTICULAR; INTRALESIONAL; INTRAMUSCULAR; INTRAVENOUS; SOFT TISSUE at 12:12

## 2021-12-24 RX ADMIN — HEPARIN SODIUM 5000 UNITS: 5000 INJECTION INTRAVENOUS; SUBCUTANEOUS at 14:05

## 2021-12-24 RX ADMIN — DEXAMETHASONE SODIUM PHOSPHATE 5.48 MG: 4 INJECTION, SOLUTION INTRA-ARTICULAR; INTRALESIONAL; INTRAMUSCULAR; INTRAVENOUS; SOFT TISSUE at 22:38

## 2021-12-24 RX ADMIN — BARICITINIB 2 MG: 2 TABLET, FILM COATED ORAL at 12:11

## 2021-12-24 RX ADMIN — FERROUS SULFATE TAB 325 MG (65 MG ELEMENTAL FE) 325 MG: 325 (65 FE) TAB at 08:08

## 2021-12-24 RX ADMIN — SODIUM CHLORIDE 2 UNITS/HR: 9 INJECTION, SOLUTION INTRAVENOUS at 06:25

## 2021-12-24 NOTE — ASSESSMENT & PLAN NOTE
Lab Results   Component Value Date    EGFR 25 12/23/2021    EGFR 29 12/22/2021    EGFR 37 12/21/2021    CREATININE 1 83 (H) 12/23/2021    CREATININE 1 63 (H) 12/22/2021    CREATININE 1 34 (H) 12/21/2021     · Creatinine 1 56 on admission  · Baseline creatinine appears to be around 1 2 - 1 6   · 12/23 held further diuretics due to increase in creat  · Avoid nephrotoxic agents and hypotension  · Trend renal indices  · Strict I&O  · Daily weights

## 2021-12-24 NOTE — PLAN OF CARE
Problem: RESPIRATORY - ADULT  Goal: Achieves optimal ventilation and oxygenation  Description: INTERVENTIONS:  - Assess for changes in respiratory status  - Assess for changes in mentation and behavior  - Position to facilitate oxygenation and minimize respiratory effort  - Oxygen administered by appropriate delivery if ordered  - Initiate smoking cessation education as indicated  - Encourage broncho-pulmonary hygiene including cough, deep breathe, Incentive Spirometry  - Assess the need for suctioning and aspirate as needed  - Assess and instruct to report SOB or any respiratory difficulty  - Respiratory Therapy support as indicated  Outcome: Progressing   Pt progressing  Fio2 down to 65%  She denies resp distress  Her vitals are stable  She denies pain or needs  Will cont to monitor

## 2021-12-24 NOTE — ASSESSMENT & PLAN NOTE
Lab Results   Component Value Date    HGBA1C 6 2 (H) 11/22/2021       Recent Labs     12/23/21  1619 12/23/21  1754 12/23/21 2013 12/24/21  0001   POCGLU 132 137 272* 204*       Blood Sugar Average: Last 72 hrs:  (P) 949 [id number]     · Takes metformin, Januvia and glipizide at home  · Was taking Lantus, mealtime/SSI, but continues to be hyperglycemic  · Continue insulin infusion  · Monitor glucose  · Diabetic diet

## 2021-12-24 NOTE — ASSESSMENT & PLAN NOTE
Lab Results   Component Value Date    HGBA1C 6 2 (H) 11/22/2021       Recent Labs     12/23/21  1423 12/23/21  1619 12/23/21  1754 12/23/21 2013   POCGLU 145* 132 137 272*       Blood Sugar Average: Last 72 hrs:  (P) 183 [id number]     · Takes metformin, Januvia, glipizide at home  · Was taking Lantus, mealtime/SSI, but continues to be hyperglycemic  · Will start insulin gtt  · Fingersticks q2 hours  · Diabetic diet when able to tolerate PO intake

## 2021-12-24 NOTE — ASSESSMENT & PLAN NOTE
· Presented on 12/16 with fever, chills, shortness of breath, cough  · COVID-19 positive on 12/16  · Unvaccinated  · Transferred on 12/21 to ICU 2/2 escalating O2 requirements  · Severe pathway:  · Decadron 0 1 mg/kg D5, total D10  · Baricitinib D6 - maintain lower dose 2/2 CKD3b with decreased GFR  · Remdesivir completed  · Prophylactic heparin  · Statin  · Ceftriaxone/Doxy-  DC'd procalcitonin negative x2  · Encourage side-lying/prone positioning  · Encourage I/S, aggressive pulmonary hygiene  · Titrate oxygen to maintain SpO2 > 88% - currently requiring HFNC 60%/40L  · Trend CRP as needed

## 2021-12-24 NOTE — PROGRESS NOTES
[facility] [address]  Progress Note - Aura Michael 1940, [de-identified] y o  female MRN: [id number]  Unit/Bed#: ICU 04-01 Encounter: [id number]  Primary Care Provider: Alisha Nieto DO   Date and time admitted to hospital: 12/16/2021  4:49 PM    * COVID-19  Assessment & Plan  · Presented on 12/16 with fever, chills, shortness of breath, cough  · COVID-19 positive on 12/16  · Unvaccinated  · Transferred on 12/21 to ICU 2/2 escalating O2 requirements  · Severe pathway:  · Decadron 0 1 mg/kg D4, total D9  · Baricitinib D5 - maintain lower dose 2/2 CKD3b with decreased GFR  · Remdesivir completed  · Prophylactic heparin  · Statin  · Ceftriaxone/Doxy D1 -  DC'd procalcitonin negative x2  · Encourage side-lying/prone positioning  · Encourage I/S, aggressive pulmonary hygiene  · Titrate oxygen to maintain SpO2 > 88% - currently requiring HFNC 65%/40L  · Trend CRP as needed    Acute respiratory failure with hypoxia (HCC)  Assessment & Plan  · Upgraded to SD1 2/2 increasing O2 requirement  · CXR  reveals RML/LLL infiltrates concerning for superimposed bacterial pneumonia  · Continue COVID-19 protocol as noted above  · Started on IV Ceftriaxone/Doxycycline per COVID-19 protocol- now d/c'd  · monitor I/O  · Reassess daily for diuresis  · PRN Xopenex nebs  · Titrate O2 to maintain SpO2 > 88%   · Aggressive pulmonary hygiene    SIRS (systemic inflammatory response syndrome) ([facility], [place] [address] )  Assessment & Plan  · As evidenced by tachycardia, tachypnea, hypoxia  · Suspect this is in setting of worsening COVID-19 pneumonia vs superimposed bacterial infection  · Procalcitonin negative  · ABX DC'd  · Trend fever and WBC curve  · Trend procalcitonin    Type 2 diabetes mellitus without complication, without long-term current use of insulin [facility])  Assessment & Plan  Lab Results   Component Value Date    HGBA1C 6 2 (H) 11/22/2021       Recent Labs     12/23/21  1619 12/23/21  1754 12/23/21 2013 12/24/21  0001   POCGLU 132 137 272* 204*       Blood Sugar Average: Last 72 hrs:  (P) 825 [id number]     · Takes metformin, Januvia, glipizide at home  · Was taking Lantus, mealtime/SSI, but continues to be hyperglycemic  · Will start insulin gtt  · Fingersticks q2 hours  · Diabetic diet when able to tolerate PO intake    Anemia  Assessment & Plan  · Hemoglobin 10 8 on admission, currently 10 7  · Likely multifactorial in setting of iron deficiency anemia vs CKD  · Baseline appears to be around 11 5 - 13  · Concurrently with no signs of active bleeding  · Continue home iron supplementation  · Transfuse for hemoglobin < 7 or with hemodynamic compromise  · Trend hemoglobin and monitor for signs of active bleeding    Chronic kidney disease, stage 3b [facility])  Assessment & Plan  Lab Results   Component Value Date    EGFR 25 12/23/2021    EGFR 29 12/22/2021    EGFR 37 12/21/2021    CREATININE 1 83 (H) 12/23/2021    CREATININE 1 63 (H) 12/22/2021    CREATININE 1 34 (H) 12/21/2021     · Creatinine 1 56 on admission  · Baseline creatinine appears to be around 1 2 - 1 6   · 12/23 held further diuretics due to increase in creat  · Avoid nephrotoxic agents and hypotension  · Trend renal indices  · Strict I&O  · Daily weights    Ambulatory dysfunction  Assessment & Plan  · PT/OT - recommending acute rehab on discharge    Hypercholesterolemia  Assessment & Plan  · Takes lovastatin at home - continue statin here per COVID protocol    Essential hypertension  Assessment & Plan  · Takes enalapril at home - continue lisinopril 20 mg while inpatient   · Monitor BP    Anxiety  Assessment & Plan  · Continue home PRN Xanax  · Zoloft appears to be on patient's home medication list, however patient states she is not taking this    ----------------------------------------------------------------------------------------  HPI/24hr events: Weaning oxygen       Patient appropriate for transfer out of the ICU today?: No  Disposition: Continue Stepdown Level 1 level of care Code Status: Level 1 - Full Code  ---------------------------------------------------------------------------------------  SUBJECTIVE  "My breathing feels OK"    Review of Systems   Respiratory: Negative for chest tightness and shortness of breath  Cardiovascular: Negative for chest pain and palpitations  Gastrointestinal: Negative for nausea and vomiting  All other systems reviewed and are negative     ---------------------------------------------------------------------------------------  OBJECTIVE    Vitals   Vitals:    21 1939 21 2100 21 2200 21 0000   BP:  140/65 118/69    BP Location:       Pulse: 77 81 73    Resp: (!) 35 (!) 24 (!) 29    Temp: 97 7 °F (36 5 °C)   97 9 °F (36 6 °C)   TempSrc: Temporal   Axillary   SpO2: 91% 91% 92%    Weight:       Height:         Temp (24hrs), Av 9 °F (36 6 °C), Min:97 7 °F (36 5 °C), Max:98 °F (36 7 °C)  Current: Temperature: 97 9 °F (36 6 °C)    Respiratory:  SpO2: SpO2: 92 %, SpO2 Activity: SpO2 Activity: At Rest, SpO2 Device: O2 Device: High flow nasal cannula  Nasal Cannula O2 Flow Rate (L/min): 40 L/min   55% FiO2    Invasive/non-invasive ventilation settings   Respiratory  Report   Lab Data (Last 4 hours)    None         O2/Vent Data (Last 4 hours)      2234          Non-Invasive Ventilation Mode HFNC (High flow)                 Physical Exam  Vitals reviewed  Constitutional:       General: She is awake  Interventions: Nasal cannula in place  Cardiovascular:      Rate and Rhythm: Normal rate and regular rhythm  Heart sounds: No murmur heard  No friction rub  Pulmonary:      Effort: Pulmonary effort is normal       Breath sounds: Normal breath sounds  No wheezing, rhonchi or rales  Skin:     General: Skin is warm and dry  Neurological:      General: No focal deficit present  Mental Status: She is alert and oriented to person, place, and time  Mental status is at baseline     Psychiatric:         Mood and Affect: Mood and affect normal          Speech: Speech normal          Behavior: Behavior normal  Behavior is cooperative  Laboratory and Diagnostics:  Results from last 7 days   Lab Units 12/23/21  0509 12/22/21  0546 12/21/21  0435 12/20/21  0434 12/19/21  0429 12/18/21  0524 12/17/21  0459   WBC Thousand/uL 13 01*  13 01* 8 37 6 15 2 92* 3 79* 3 32* 3 11*   HEMOGLOBIN g/dL 11 8  11 8 10 4* 10 7* 10 5* 10 9* 10 7* 10 7*   HEMATOCRIT % 36 0  36 0 31 5* 32 4* 32 6* 33 6* 32 8* 33 9*   PLATELETS Thousands/uL 400*  400* 283 234 197 178 156 162   NEUTROS PCT %  --  81*  --   --   --   --  69   MONOS PCT %  --  4  --   --   --   --  6     Results from last 7 days   Lab Units 12/23/21  0509 12/22/21  0546 12/21/21  0435 12/20/21 0434 12/19/21  0429 12/18/21  0524 12/17/21  0459   SODIUM mmol/L 140 140 137 135 136 137 139   POTASSIUM mmol/L 4 0 3 9 4 5 4 5 4 2 3 7 3 8   CHLORIDE mmol/L 102 105 105 104 104 105 107   CO2 mmol/L 26 26 24 22 20* 17* 21   ANION GAP mmol/L 12 9 8 9 12 15* 11   BUN mg/dL 58* 48* 41* 50* 43* 33* 23   CREATININE mg/dL 1 83* 1 63* 1 34* 1 45* 1 39* 1 48* 1 39*   CALCIUM mg/dL 9 7 9 4 9 4 9 0 9 0 8 7 8 9   GLUCOSE RANDOM mg/dL 100 188* 381* 420* 354* 246* 110   ALT U/L  --  14 15 13 12 12  --    AST U/L  --  16 19 21 24 25  --    ALK PHOS U/L  --  66 68 64 64 65  --    ALBUMIN g/dL  --  3 6 3 7 3 6 3 8 3 8  --    TOTAL BILIRUBIN mg/dL  --  0 60 0 49 0 42 0 44 0 39  --      Results from last 7 days   Lab Units 12/22/21  0546   MAGNESIUM mg/dL 1 9   PHOSPHORUS mg/dL 2 8      Results from last 7 days   Lab Units 12/21/21  1133   INR  1 18   PTT seconds 46*      VBG:    Results from last 7 days   Lab Units 12/22/21  0546 12/21/21  1133 12/17/21  0459   PROCALCITONIN ng/ml 0 21 0 09 0 05       Micro  Results from last 7 days   Lab Units 12/22/21  1043   BLOOD CULTURE  No Growth at 24 hrs  No Growth at 24 hrs       Intake and Output  I/O       12/22 0701  12/23 0700 12/23 0701  12/24 0700 P O  480 380    I V  (mL/kg) 107 3 (2) 31 1 (0 6)    IV Piggyback 350     Total Intake(mL/kg) 937 3 (17 6) 411 1 (7 7)    Urine (mL/kg/hr) 925 (0 7) 510 (0 4)    Total Output 925 510    Net +12 3 -98 9              Height and Weights   Height: 5' (152 4 cm)     Body mass index is 22 95 kg/m²  Weight (last 2 days)     Date/Time Weight    12/23/21 0600 53 3 (117 51)    12/22/21 0550 55 8 (123 02)        Nutrition       Diet Orders   (From admission, onward)             Start     Ordered    12/21/21 1750  Diet Rodríguez/CHO Controlled; Consistent Carbohydrate Diet Level 2 (5 carb servings/75 grams CHO/meal)  Diet effective now        References:    Nutrtion Support Algorithm Enteral vs  Parenteral   Question Answer Comment   Diet Type Rodríguez/CHO Controlled    Rodríguez/CHO Controlled Consistent Carbohydrate Diet Level 2 (5 carb servings/75 grams CHO/meal)    RD to adjust diet per protocol?  Yes        12/21/21 5229              Active Medications  Scheduled Meds:  Current Facility-Administered Medications   Medication Dose Route Frequency Provider Last Rate    acetaminophen  650 mg Oral Q4H PRN BENTON Brown      ALPRAZolam  0 25 mg Oral HS PRN BENTON Gordon      atorvastatin  40 mg Oral HS BENTON Brown      Baricitinib  2 mg Oral Q24H BENTON Brown      dexamethasone  0 1 mg/kg Intravenous Q12H BENTON Brown      docusate sodium  100 mg Oral Daily Megan Lynn PA-C      ferrous sulfate  325 mg Oral Daily With Breakfast BENTON Brown      heparin (porcine)  5,000 Units Subcutaneous Q8H [address] BENTON Brown      insulin regular (HumuLIN R,NovoLIN R) infusion  0 3-21 Units/hr Intravenous Titrated BENTON Brown 9 Units/hr (12/24/21 0003)    lisinopril  20 mg Oral Daily BENTON Brown      ondansetron  4 mg Intravenous Q6H PRN BENTON Brown       Continuous Infusions:  insulin regular (HumuLIN R,NovoLIN R) infusion, 0 3-21 Units/hr, Last Rate: 9 Units/hr (12/24/21 0003)      PRN Meds:   acetaminophen, 650 mg, Q4H PRN  ALPRAZolam, 0 25 mg, HS PRN  ondansetron, 4 mg, Q6H PRN        Invasive Devices Review  Invasive Devices  Report    Peripheral Intravenous Line            Peripheral IV 12/20/21 Left Forearm 3 days    Peripheral IV 12/21/21 Right;Ventral (anterior) Forearm 2 days              Rationale for remaining devices: n/a  ---------------------------------------------------------------------------------------  Care Time Delivered:   No Critical Care time spent     Zaria Schneider PA-C    Portions of the record may have been created with voice recognition software  Occasional wrong word or "sound a like" substitutions may have occurred due to the inherent limitations of voice recognition software    Read the chart carefully and recognize, using context, where substitutions have occurred

## 2021-12-24 NOTE — PHYSICAL THERAPY NOTE
Physical Therapy Treatment Session Note    Patient's Name: Aura Rodriguez    Admitting Diagnosis  Shortness of breath [R06 02]  Hypoxia [R09 02]  COVID-19 virus RNA test result positive at limit of detection [U07 1]  COVID-19 [U07 1]    Problem List  Patient Active Problem List   Diagnosis    Groin pain, chronic, left    Type 2 diabetes mellitus without complication, without long-term current use of insulin (HCC)    Hypertensive kidney disease with chronic kidney disease stage III (HCC)    Abnormal ECG    Articular cartilage disorder of shoulder region    Back pain    Bursitis of shoulder    Atrophic vaginitis    Anxiety    Disorder of shoulder    Diverticulitis large intestine w/o perforation or abscess w/o bleeding    Essential hypertension    Hypercholesterolemia    Irritable bowel syndrome    Localized, primary osteoarthritis of hand    Loose body in joint of shoulder region    Muscle pain    Paresthesia of arm    Refused influenza vaccine    Sciatica    Simple chronic anemia    Lower abdominal pain    Medicare annual wellness visit, subsequent    Negative depression screening    Overweight (BMI 25 0-29  9)    COVID-19    Acute respiratory failure with hypoxia (HCC)    Ambulatory dysfunction    Chronic kidney disease, stage 3b (HCC)    Anemia    SIRS (systemic inflammatory response syndrome) (HCC)       Past Medical History  Past Medical History:   Diagnosis Date    Diabetes mellitus ([address] )     Diverticulitis     Postherpetic neuralgia        Past Surgical History  Past Surgical History:   Procedure Laterality Date    CHOLECYSTECTOMY      COLON SURGERY      HERNIA REPAIR      ROTATOR CUFF REPAIR Right     VARICOSE VEIN SURGERY      Impression:  2/12/16 Arethatyjo Found        12/24/21 1001   PT Last Visit   PT Visit Date 12/24/21   Note Type   Note Type Treatment   Pain Assessment   Pain Assessment Tool 0-10   Pain Score No Pain  (denies)   Restrictions/Precautions   Weight Bearing Precautions Per Order No   Other Precautions Contact/isolation; Airborne/isolation;Multiple lines;Telemetry;O2;Fall Risk  (35 L HFNC)   General   Chart Reviewed Yes   Additional Pertinent History Rosibel OT present for co-treatment due to medical complexity,required skilled interventions of 2 clinicians for delivery of tasks  Response to Previous Treatment Patient unable to report, no changes reported from family or staff   Family/Caregiver Present No   Cognition   Overall Cognitive Status [facility]   Arousal/Participation Alert; Responsive; Cooperative   Attention Within functional limits   Orientation Level Oriented X4   Memory Within functional limits   Following Commands Follows one step commands with increased time or repetition   Comments Pt  agreeable to PT assessment, pleasant  Subjective   Subjective "I feel ok"   Bed Mobility   Supine to Sit 3  Moderate assistance   Additional items Assist x 2;HOB elevated; Bedrails; Increased time required;Verbal cues;LE management   Sit to Supine   (DNT as pt  was sitting OOB on recliner upon conclusion )   Additional Comments Dizziness reported upon achieving static sitting posture,close S maintained without needed external physical support  BP taken 108/70->sitting 150/72  Pt's symptoms resolved w/increased time  Transfers   Sit to Stand 4  Minimal assistance   Additional items Assist x 2;Bedrails; Increased time required;Verbal cues   Stand to Sit 4  Minimal assistance   Additional items Assist x 2;Bedrails; Increased time required;Verbal cues   Stand pivot 4  Minimal assistance   Additional items Assist x 2; Increased time required;Verbal cues   Additional Comments Full ADLs completed w/OT-please see documentation for additional information  Ambulation/Elevation   Gait pattern Improper Weight shift;Narrow ERICA; Forward Flexion; Short stride; Step to   Gait Assistance 4  Minimal assist   Additional items Assist x 2;Verbal cues; Tactile cues   Assistive Device Rolling walker   Distance 5 feet  (to recliner,could not safely advance due to medical complications)   Stair Management Assistance Not tested   Balance   Static Sitting Fair +   Dynamic Sitting Fair   Static Standing Fair   Dynamic Standing Fair -   Ambulatory Fair -   Endurance Deficit   Endurance Deficit Yes   Endurance Deficit Description O2 desaturation noted w/HFNC maintained throughout session  At lowest, reading noted 72% w/increased time & breathing conservation technique education/utilization for recovery  Improved to 80% upon conclusion  Activity Tolerance   Activity Tolerance Patient limited by fatigue;Treatment limited secondary to medical complications (O2 desaturation)   Nurse Made Aware yes, Michell RN & Aaliyah PCA   Assessment   Prognosis Fair   Problem List Decreased endurance; Impaired balance;Decreased mobility; Decreased strength   Assessment Pt seen for PT treatment session this date with interventions consisting of gait training to reduce the risk of medical complications w/ emphasis on improving pt's ability to ambulate level surfaces x 5 feet with min A of 2 provided by therapist with RW and therapeutic activity to reduce fall risk consisting of training: supine<>sit transfers, sit<>stand transfers, static sitting tolerance at EOB for 10 minutes w/ o UE support, static standing tolerance for 2 minutes w/ B UE support, vc and tactile cues for static sitting posture faciliation, vc and tactile cues for static standing posture faciliation, stand pivot transfers towards R direction and breathing conservation technique education,utilization  Pt agreeable to PT treatment session upon arrival, pt found supine in bed w/ HOB elevated, A&O x 4  In comparison to previous session, pt with improvements in ambulatory distance, OOB mobilization  Post session: pt returned back to recliner, all needs in reach and RN notified of session findings/recommendations   Continue to recommend post acute rehabilitation services at time of d/c in order to maximize pt's functional independence and safety w/ mobility  Pt continues to be functioning below baseline level, and remains limited 2* factors listed above and including weakness, impaired balance, decreased endurance, activity intolerance  PT will continue to see pt during current hospitalization in order to address the deficits listed above and provide interventions consistent w/ POC in effort to achieve LTGs  Barriers to Discharge Decreased caregiver support   Goals   Patient Goals to feel better soon   LTG Expiration Date 12/29/21   Long Term Goal #1 LTGs updated: 1 )Patient will complete bed mobility supervision of 1 for decrease need for caregiver assistance, decrease burden of care  2 ) Patient will complete transfers CGA of 1 to decrease risk of falls, facilitate upright standing posture  3 ) BLE strength to greater than/equal to 4/5 gross musculature to increase ability to safely transfer, control descent to chair  4 ) Patient will exhibit increase dynamic standing to Fair+ > 4 minutes  without LOB CGA of 1 to improve activity tolerance  5 ) Patient will exhibit increase dynamic ambulatory balance to Fair > 50 feet w/AD prn CGA of 1 to improve ability to mobilize to toilet, chair and decrease risk for additional medical complications  6 ) Patient will exhibit good self monitoring and ability to follow 2 step commands to increase complexity of tasks and resume ADL's without LOB  PT Treatment Day 1   Plan   Treatment/Interventions Functional transfer training;LE strengthening/ROM; Therapeutic exercise; Endurance training;Elevations; Patient/family training;Equipment eval/education; Bed mobility;Gait training; Compensatory technique education;Spoke to nursing   Progress Slow progress, decreased activity tolerance   PT Frequency 3-5x/wk   Recommendation   PT Discharge Recommendation Post acute rehabilitation services  (continued recommendation)   Additional Comments Upon conclusion, pt  was resting OOB on recliner with all needs within reach  Additional Comments 2 Pt's raw score on the AM-PAC Basic Mobility inpatient short form is 14, standardized score is 35 55  Patients at this level are likely to benefit from DC to [facility]  However, please refer to therapist recommendation for safe DC planning     AM-PAC Basic Mobility Inpatient   Turning in Bed Without Bedrails 3   Lying on Back to Sitting on Edge of Flat Bed 3   Moving Bed to Chair 2   Standing Up From Chair 2   Walk in Room 2   Climb 3-5 Stairs 2   Basic Mobility Inpatient Raw Score 14   Basic Mobility Standardized Score 35 55   Highest Level Of Mobility   -[facility] Goal 4: Move to chair/commode     Treatment Time: 0589-0647    Brittani Richter PT

## 2021-12-24 NOTE — OCCUPATIONAL THERAPY NOTE
Occupational Therapy Treatment Note     Patient Name: Pat Nguyen  LZNMI'X Date: 12/24/2021  Problem List  Principal Problem:    COVID-19  Active Problems:    Type 2 diabetes mellitus without complication, without long-term current use of insulin (HCC)    Anxiety    Essential hypertension    Hypercholesterolemia    Acute respiratory failure with hypoxia (HCC)    Ambulatory dysfunction    Chronic kidney disease, stage 3b (HCC)    Anemia    SIRS (systemic inflammatory response syndrome) ([facility] [address] )            12/24/21 0954   OT Last Visit   OT Visit Date 12/24/21   Note Type   Note Type Treatment   Restrictions/Precautions   Weight Bearing Precautions Per Order No   Other Precautions Contact/isolation; Airborne/isolation;Telemetry;O2;Fall Risk;Multiple lines  (35 L via HFNC )   General   Response to Previous Treatment Patient with no complaints from previous session   Lifestyle   Autonomy Pt reports being (I) c ADL/IADLs prior to admission, lives with  and daughter in 2 story home, does not use AD, is retired, and does drive     Reciprocal Relationships daughter, daughter    Service to [employer] retired; cares for handicapped daughter    [address] enjoys going shooting    Pain Assessment   Pain Assessment Tool 0-10   Pain Score No Pain   ADL   Eating Assistance 5  Supervision/Setup   Eating Deficit   (Pt eating banana without assistance at end of session )   Grooming Assistance 5  Supervision/Setup   Grooming Deficit Steadying;Verbal cueing;Supervision/safety; Increased time to complete   Grooming Comments Pt washed face and applied deoderant    UB Bathing Assistance 5  Supervision/Setup   UB Bathing Deficit Setup;Verbal cueing;Supervision/safety; Increased time to complete   LB Bathing Assistance 2  Maximal Assistance   LB Bathing Deficit Setup;Verbal cueing;Supervision/safety; Increased time to complete;Perineal area; Buttocks;Right lower leg including foot; Left lower leg including foot   LB Bathing Comments Pt able to wash lap and legs to knees, unable to reach to lower legs and feet due to decreased endurance and functional reach    UB Dressing Assistance 4  Minimal Assistance   UB Dressing Deficit Fasteners;Pull around back;Verbal cueing;Steadying   UB Dressing Comments Pt donned hospital gown    LB Dressing Assistance 2  Maximal Assistance   LB Dressing Deficit Setup;Steadying;Supervision/safety; Increased time to complete; Don/doff R sock; Don/doff L sock   LB Dressing Comments Pt total A to don/doff socks    Functional Standing Tolerance   Time 1 min   Bed Mobility   Supine to Sit 3  Moderate assistance   Additional items Assist x 2;HOB elevated; Bedrails;Verbal cues; Increased time required;LE management   Sit to Supine   (DNT: pt seated OOB in recliner upon conclusion )   Additional Comments Dizziness reported upon sitting EOB, pt sat EOB ~4 min  BP supine: 108-70mmHg, sitting 150/72 mmHg  Symptomatic resolution ~3 min seated rest     Transfers   Sit to Stand 4  Minimal assistance   Additional items Assist x 2;Bedrails; Increased time required;Verbal cues   Stand to Sit 4  Minimal assistance   Additional items Assist x 2;Bedrails; Increased time required;Verbal cues   Additional Comments VC for safe transfer techniques, external postural support for dynamic standing balance and functional mobility    Functional Mobility   Functional Mobility 5  Supervision  (CGA )   Additional Comments Pt ambulated bed--> recliner, MOREAU/SOB noted  Persistent VC to encourage deep breathing/pursed lip breathing  SpO2 76% following functional tasks in seated position, returned to mid 80s ~4 min with deep breathing  Additional items Rolling walker   Cognition   Overall Cognitive Status WFL   Arousal/Participation Alert; Responsive; Cooperative   Attention Within functional limits   Orientation Level Oriented X4   Memory Within functional limits   Following Commands Follows one step commands with increased time or repetition Comments Pt agreeable to OT session  Activity Tolerance   Activity Tolerance Patient limited by fatigue;Treatment limited secondary to medical complications (Comment)  (MOREAU/SOB )   Medical Staff Made Aware PT Surinder Sneed present for co-treat d/t pt's medical complexity and unstable medical presentation  Assessment   Assessment Patient seen for OT treatment on 12/24/2021 s/p admission for COVID-19 Patient presents with active orders for OT eval and treat and Up with assistance   Upon arrival, pt found resting in bed showing no signs of distress  Patient agreeable to OT session; elected to cotreat with PT d/t Pt's medical complexity and unstable medical presentation  Patient participated in bathing/showering, toileting, dressing , bed mobility , functional mobility, activity engagement , safety awareness , fall prevention , energy conservation  and home management  with intervention focus on increasing strength and activity tolerance for increased ADL safety and independence  Pt completed transfers with functional mobility with supervision and use of RW  Pt completed UB ADLs with supervision, but required max assist for LB ADLs d/t decreased activity tolerance, SOB, and decreased functional reach  Ronnell Gomes is showing improvements in activity tolerance   Patient is continuing to perform below baseline due to the following deficits: endurance , muscular strength , balance , functional reach  and trunk control   From OT standpoint, patient would benefit from skilled intervention to maximize independence with ADLs and functional mobility  Goals remain appropriate, continue POC  At this time, recommending D/C to: post-acute rehabilitation    Plan   Treatment Interventions ADL retraining;Functional transfer training; Activityengagement;Patient/family training   Goal Expiration Date 12/29/21   OT Treatment Day 2   OT Frequency 3-5x/wk   Recommendation   OT Discharge Recommendation Post acute rehabilitation services OT - OK to Discharge Yes  (Once medically clear)   Additional Comments  Pt seated OOB in recliner at end of session c meal set up  All needs met, call button in reach  Additional Comments 2 The patient's raw score on the AM-PAC Daily Activity inpatient short form is 15, standardized score is 34 69, less than 39 4  Patients at this level are likely to benefit from discharge to post-acute rehabilitation services  Please refer to the recommendation of the Occupational Therapist for safe discharge planning     AM-PAC Daily Activity Inpatient   Lower Body Dressing 1   Bathing 2   Toileting 1   Upper Body Dressing 3   Grooming 4   Eating 4   Daily Activity Raw Score 15   Daily Activity Standardized Score (Calc for Raw Score >=11) 34 69   AM-PAC Applied Cognition Inpatient   Following a Speech/Presentation 4   Understanding Ordinary Conversation 4   Taking Medications 4   Remembering Where Things Are Placed or Put Away 4   Remembering List of 4-5 Errands 4   Taking Care of Complicated Tasks 4   Applied Cognition Raw Score 24   Applied Cognition Standardized Score 62 21     Queen Alejandrina, OT

## 2021-12-24 NOTE — ASSESSMENT & PLAN NOTE
· Presented on 12/16 with fever, chills, shortness of breath, cough  · COVID-19 positive on 12/16  · Unvaccinated  · Transferred on 12/21 to ICU 2/2 escalating O2 requirements  · Severe pathway:  · Decadron 0 1 mg/kg D4, total D7  · Baricitinib D5 - maintain lower dose 2/2 CKD3b with decreased GFR  · Remdesivir completed  · Prophylactic heparin  · Statin  · Ceftriaxone/Doxy D1 -  DC'd procalcitonin negative x2  · Encourage side-lying/prone positioning  · Encourage I/S, aggressive pulmonary hygiene  · Titrate oxygen to maintain SpO2 > 88% - currently requiring HFNC 65%/40L  · Trend CRP as needed

## 2021-12-24 NOTE — CASE MANAGEMENT
Case Management Discharge Planning Note    Patient name Marilia Patient  Location ICU 04/ICU 04- MRN [id number]  : 1940 Date 2021       Current Admission Date: 2021  Current Admission Diagnosis:COVID-19   Patient Active Problem List    Diagnosis Date Noted    Chronic kidney disease, stage 3b ([facility] [address] ) 2021    Anemia 2021    [facility]    Ambulatory dysfunction 2021    COVID-19 2021    Acute respiratory failure with hypoxia ([place] [address] ) 2021    Negative depression screening 2021    Overweight (BMI 25 0-29 9) 2021    Medicare annual wellness visit, subsequent 2020    Localized, primary osteoarthritis of hand 2020    Refused influenza vaccine 2020    Sciatica 2020    Hypertensive kidney disease with chronic kidney disease stage III ([facility] ) 2019    Type 2 diabetes mellitus without complication, without long-term current use of insulin ([place] [address] ) 2019    Bursitis of shoulder 2018    Groin pain, chronic, left 2018    Paresthesia of arm 2017    Back pain 2017    Muscle pain 2017    Anxiety 2017    Abnormal ECG 2016    Diverticulitis large intestine w/o perforation or abscess w/o bleeding 2016    Essential hypertension 2016    Lower abdominal pain 2016    Atrophic vaginitis 2016    Hypercholesterolemia 2016    Irritable bowel syndrome 2016    Simple chronic anemia 2016    Disorder of shoulder 2008    Articular cartilage disorder of shoulder region 2008    Loose body in joint of shoulder region 2008      LOS (days): 7  Geometric Mean LOS (GMLOS) (days): 5 40  Days to GMLOS:-1 6     OBJECTIVE:  Risk of Unplanned Readmission Score: 16         Current admission status: Inpatient   Preferred Pharmacy:   [address] [facility] [address]   - [place] - [address] [facility] [address]  Phone: [phone] Fax: [phone] [address]  [place] [address]  Phone: [phone] Fax: [phone]    Primary Care Provider: Dennis Hernandez DO    Primary Insurance: [address] [facility] REP  Secondary Insurance:     DISCHARGE DETAILS:       Freedom of Choice: Yes  Comments - Freedom of Choice: referrals were sent as requested for rehab                               Other Referral/Resources/Interventions Provided:  Interventions: Short Term Rehab  Referral Comments: [facility] unable to accept, [address]  interested,  [facility] referral sent         Treatment Team Recommendation: Short Term Rehab     Transport at Discharge : BLS Ambulance                                      Additional Comments: pt remains in ICU with covid and high flow oxygen, insulin drip, plan is for the pt to go to rehab, referrals have been snet, pt will need authroization and transportation upon d/c , cm will continue follow and work on a safe d/c plan

## 2021-12-24 NOTE — ASSESSMENT & PLAN NOTE
· Upgraded to SD1 2/2 increasing O2 requirement  · CXR  reveals RML/LLL infiltrates concerning for superimposed bacterial pneumonia  · Continue COVID-19 protocol as noted above  · Started on IV Ceftriaxone/Doxycycline per COVID-19 protocol- now d/c'd  · monitor I/O  · Reassess daily for diuresis  · PRN Xopenex nebs  · Titrate O2 to maintain SpO2 > 88%   · Aggressive pulmonary hygiene

## 2021-12-24 NOTE — PLAN OF CARE
Problem: PHYSICAL THERAPY ADULT  Goal: Performs mobility at highest level of function for planned discharge setting  See evaluation for individualized goals  Description: Treatment/Interventions: Functional transfer training,Elevations,Therapeutic exercise,Endurance training,Bed mobility,Gait training     See flowsheet documentation for full assessment, interventions and recommendations  Outcome: Progressing  Note: Prognosis: Fair  Problem List: Decreased endurance,Impaired balance,Decreased mobility,Decreased strength  Assessment: Pt seen for PT treatment session this date with interventions consisting of gait training to reduce the risk of medical complications w/ emphasis on improving pt's ability to ambulate level surfaces x 5 feet with min A of 2 provided by therapist with RW and therapeutic activity to reduce fall risk consisting of training: supine<>sit transfers, sit<>stand transfers, static sitting tolerance at EOB for 10 minutes w/ o UE support, static standing tolerance for 2 minutes w/ B UE support, vc and tactile cues for static sitting posture faciliation, vc and tactile cues for static standing posture faciliation, stand pivot transfers towards R direction and breathing conservation technique education,utilization  Pt agreeable to PT treatment session upon arrival, pt found supine in bed w/ HOB elevated, A&O x 4  In comparison to previous session, pt with improvements in ambulatory distance, OOB mobilization  Post session: pt returned back to recliner, all needs in reach and RN notified of session findings/recommendations  Continue to recommend post acute rehabilitation services at time of d/c in order to maximize pt's functional independence and safety w/ mobility  Pt continues to be functioning below baseline level, and remains limited 2* factors listed above and including weakness, impaired balance, decreased endurance, activity intolerance   PT will continue to see pt during current hospitalization in order to address the deficits listed above and provide interventions consistent w/ POC in effort to achieve LTGs  Barriers to Discharge: Decreased caregiver support        PT Discharge Recommendation: Post acute rehabilitation services (continued recommendation)          See flowsheet documentation for full assessment

## 2021-12-24 NOTE — PLAN OF CARE
Problem: OCCUPATIONAL THERAPY ADULT  Goal: Performs self-care activities at highest level of function for planned discharge setting  See evaluation for individualized goals  Description: Treatment Interventions: ADL retraining,Functional transfer training,Activityengagement,Patient/family training          See flowsheet documentation for full assessment, interventions and recommendations  Outcome: Progressing  Note: Limitation: Decreased ADL status,Decreased endurance  Prognosis: Fair  Assessment: Patient seen for OT treatment on 12/24/2021 s/p admission for COVID-19 Patient presents with active orders for OT eval and treat and Up with assistance   Upon arrival, pt found resting in bed showing no signs of distress  Patient agreeable to OT session; elected to cotreat with PT d/t Pt's medical complexity and unstable medical presentation  Patient participated in bathing/showering, toileting, dressing , bed mobility , functional mobility, activity engagement , safety awareness , fall prevention , energy conservation  and home management  with intervention focus on increasing strength and activity tolerance for increased ADL safety and independence  Pt completed transfers with functional mobility with supervision and use of RW  Pt completed UB ADLs with supervision, but required max assist for LB ADLs d/t decreased activity tolerance, SOB, and decreased functional reach  Mireille Cummings is showing improvements in activity tolerance   Patient is continuing to perform below baseline due to the following deficits: endurance , muscular strength , balance , functional reach  and trunk control   From OT standpoint, patient would benefit from skilled intervention to maximize independence with ADLs and functional mobility  Goals remain appropriate, continue POC   At this time, recommending D/C to: post-acute rehabilitation      OT Discharge Recommendation: Post acute rehabilitation services  OT - OK to Discharge: Yes (Once medically clear)  Adriane Mins, OT

## 2021-12-25 ENCOUNTER — APPOINTMENT (INPATIENT)
Dept: RADIOLOGY | Facility: HOSPITAL | Age: 81
DRG: 871 | End: 2021-12-25
Payer: COMMERCIAL

## 2021-12-25 LAB
ALBUMIN SERPL BCP-MCNC: 3.9 G/DL (ref 3.5–5)
ALP SERPL-CCNC: 74 U/L (ref 34–104)
ALT SERPL W P-5'-P-CCNC: 21 U/L (ref 7–52)
ANION GAP SERPL CALCULATED.3IONS-SCNC: 9 MMOL/L (ref 4–13)
AST SERPL W P-5'-P-CCNC: 21 U/L (ref 13–39)
BILIRUB SERPL-MCNC: 0.6 MG/DL (ref 0.2–1)
BUN SERPL-MCNC: 68 MG/DL (ref 5–25)
CALCIUM SERPL-MCNC: 9.8 MG/DL (ref 8.4–10.2)
CHLORIDE SERPL-SCNC: 104 MMOL/L (ref 96–108)
CO2 SERPL-SCNC: 25 MMOL/L (ref 21–32)
CREAT SERPL-MCNC: 1.54 MG/DL (ref 0.6–1.3)
CRP SERPL QL: 37.8 MG/L
D DIMER PPP FEU-MCNC: 0.63 UG/ML FEU
ERYTHROCYTE [DISTWIDTH] IN BLOOD BY AUTOMATED COUNT: 13.2 % (ref 11.6–15.1)
GFR SERPL CREATININE-BSD FRML MDRD: 31 ML/MIN/1.73SQ M
GLUCOSE SERPL-MCNC: 122 MG/DL (ref 65–140)
GLUCOSE SERPL-MCNC: 122 MG/DL (ref 65–140)
GLUCOSE SERPL-MCNC: 129 MG/DL (ref 65–140)
GLUCOSE SERPL-MCNC: 139 MG/DL (ref 65–140)
GLUCOSE SERPL-MCNC: 171 MG/DL (ref 65–140)
GLUCOSE SERPL-MCNC: 263 MG/DL (ref 65–140)
GLUCOSE SERPL-MCNC: 362 MG/DL (ref 65–140)
GLUCOSE SERPL-MCNC: 391 MG/DL (ref 65–140)
GLUCOSE SERPL-MCNC: 69 MG/DL (ref 65–140)
GLUCOSE SERPL-MCNC: 82 MG/DL (ref 65–140)
GLUCOSE SERPL-MCNC: 91 MG/DL (ref 65–140)
GLUCOSE SERPL-MCNC: 95 MG/DL (ref 65–140)
HCT VFR BLD AUTO: 38.4 % (ref 34.8–46.1)
HGB BLD-MCNC: 12.5 G/DL (ref 11.5–15.4)
MAGNESIUM SERPL-MCNC: 2.5 MG/DL (ref 1.9–2.7)
MCH RBC QN AUTO: 27.4 PG (ref 26.8–34.3)
MCHC RBC AUTO-ENTMCNC: 32.6 G/DL (ref 31.4–37.4)
MCV RBC AUTO: 84 FL (ref 82–98)
PHOSPHATE SERPL-MCNC: 3.9 MG/DL (ref 2.3–4.1)
PLATELET # BLD AUTO: 497 THOUSANDS/UL (ref 149–390)
PMV BLD AUTO: 9.3 FL (ref 8.9–12.7)
POTASSIUM SERPL-SCNC: 4.2 MMOL/L (ref 3.5–5.3)
PROT SERPL-MCNC: 7.8 G/DL (ref 6.4–8.4)
RBC # BLD AUTO: 4.57 MILLION/UL (ref 3.81–5.12)
SODIUM SERPL-SCNC: 138 MMOL/L (ref 135–147)
WBC # BLD AUTO: 15.31 THOUSAND/UL (ref 4.31–10.16)

## 2021-12-25 PROCEDURE — NC001 PR NO CHARGE: Performed by: STUDENT IN AN ORGANIZED HEALTH CARE EDUCATION/TRAINING PROGRAM

## 2021-12-25 PROCEDURE — 85379 FIBRIN DEGRADATION QUANT: CPT | Performed by: NURSE PRACTITIONER

## 2021-12-25 PROCEDURE — 32551 INSERTION OF CHEST TUBE: CPT | Performed by: STUDENT IN AN ORGANIZED HEALTH CARE EDUCATION/TRAINING PROGRAM

## 2021-12-25 PROCEDURE — 71045 X-RAY EXAM CHEST 1 VIEW: CPT

## 2021-12-25 PROCEDURE — 85027 COMPLETE CBC AUTOMATED: CPT | Performed by: NURSE PRACTITIONER

## 2021-12-25 PROCEDURE — 86140 C-REACTIVE PROTEIN: CPT | Performed by: NURSE PRACTITIONER

## 2021-12-25 PROCEDURE — 99292 CRITICAL CARE ADDL 30 MIN: CPT | Performed by: STUDENT IN AN ORGANIZED HEALTH CARE EDUCATION/TRAINING PROGRAM

## 2021-12-25 PROCEDURE — 99291 CRITICAL CARE FIRST HOUR: CPT | Performed by: NURSE PRACTITIONER

## 2021-12-25 PROCEDURE — 80053 COMPREHEN METABOLIC PANEL: CPT | Performed by: NURSE PRACTITIONER

## 2021-12-25 PROCEDURE — 84100 ASSAY OF PHOSPHORUS: CPT | Performed by: NURSE PRACTITIONER

## 2021-12-25 PROCEDURE — 0W9930Z DRAINAGE OF RIGHT PLEURAL CAVITY WITH DRAINAGE DEVICE, PERCUTANEOUS APPROACH: ICD-10-PCS | Performed by: STUDENT IN AN ORGANIZED HEALTH CARE EDUCATION/TRAINING PROGRAM

## 2021-12-25 PROCEDURE — 82948 REAGENT STRIP/BLOOD GLUCOSE: CPT

## 2021-12-25 PROCEDURE — 94760 N-INVAS EAR/PLS OXIMETRY 1: CPT

## 2021-12-25 PROCEDURE — 83735 ASSAY OF MAGNESIUM: CPT | Performed by: NURSE PRACTITIONER

## 2021-12-25 RX ORDER — INSULIN GLARGINE 100 [IU]/ML
15 INJECTION, SOLUTION SUBCUTANEOUS
Status: DISCONTINUED | OUTPATIENT
Start: 2021-12-26 | End: 2021-12-26

## 2021-12-25 RX ORDER — OXYCODONE HYDROCHLORIDE 5 MG/1
2.5 TABLET ORAL EVERY 4 HOURS PRN
Status: DISCONTINUED | OUTPATIENT
Start: 2021-12-25 | End: 2021-12-30

## 2021-12-25 RX ORDER — INSULIN GLARGINE 100 [IU]/ML
15 INJECTION, SOLUTION SUBCUTANEOUS ONCE
Status: COMPLETED | OUTPATIENT
Start: 2021-12-25 | End: 2021-12-25

## 2021-12-25 RX ADMIN — HEPARIN SODIUM 5000 UNITS: 5000 INJECTION INTRAVENOUS; SUBCUTANEOUS at 22:23

## 2021-12-25 RX ADMIN — INSULIN LISPRO 5 UNITS: 100 INJECTION, SOLUTION INTRAVENOUS; SUBCUTANEOUS at 22:24

## 2021-12-25 RX ADMIN — INSULIN GLARGINE 15 UNITS: 100 INJECTION, SOLUTION SUBCUTANEOUS at 17:20

## 2021-12-25 RX ADMIN — DEXAMETHASONE SODIUM PHOSPHATE 5.48 MG: 4 INJECTION, SOLUTION INTRA-ARTICULAR; INTRALESIONAL; INTRAMUSCULAR; INTRAVENOUS; SOFT TISSUE at 12:44

## 2021-12-25 RX ADMIN — ACETAMINOPHEN 650 MG: 325 TABLET ORAL at 06:28

## 2021-12-25 RX ADMIN — FERROUS SULFATE TAB 325 MG (65 MG ELEMENTAL FE) 325 MG: 325 (65 FE) TAB at 12:46

## 2021-12-25 RX ADMIN — ATORVASTATIN CALCIUM 40 MG: 40 TABLET, FILM COATED ORAL at 22:17

## 2021-12-25 RX ADMIN — LISINOPRIL 20 MG: 20 TABLET ORAL at 12:46

## 2021-12-25 RX ADMIN — ACETAMINOPHEN 650 MG: 325 TABLET ORAL at 20:32

## 2021-12-25 RX ADMIN — HEPARIN SODIUM 5000 UNITS: 5000 INJECTION INTRAVENOUS; SUBCUTANEOUS at 05:29

## 2021-12-25 RX ADMIN — BARICITINIB 2 MG: 2 TABLET, FILM COATED ORAL at 12:46

## 2021-12-25 RX ADMIN — OXYCODONE HYDROCHLORIDE 2.5 MG: 5 TABLET ORAL at 13:16

## 2021-12-25 RX ADMIN — HEPARIN SODIUM 5000 UNITS: 5000 INJECTION INTRAVENOUS; SUBCUTANEOUS at 14:00

## 2021-12-25 RX ADMIN — DEXAMETHASONE SODIUM PHOSPHATE 5.48 MG: 4 INJECTION, SOLUTION INTRA-ARTICULAR; INTRALESIONAL; INTRAMUSCULAR; INTRAVENOUS; SOFT TISSUE at 22:22

## 2021-12-25 RX ADMIN — ONDANSETRON 4 MG: 2 INJECTION INTRAMUSCULAR; INTRAVENOUS at 06:28

## 2021-12-25 NOTE — RESPIRATORY THERAPY NOTE
12/24/21 2100   Respiratory Assessment   Assessment Type Assess only   General Appearance Awake;Drowsy   Respiratory Pattern Dyspnea with exertion   Chest Assessment Chest expansion symmetrical   Bilateral Breath Sounds Diminished;Crackles   Resp Comments pt was increased by Ap natacha well will cont to monitor    O2 Device optiflo    Non-Invasive Information   O2 Interface Device HFNC prongs   Non-Invasive Ventilation Mode HFNC (High flow)   $ Pulse Oximetry Spot Check Charge Completed   Non-Invasive Settings   FiO2 (%) 60   Flow (lpm) 40   Temperature (Set) 31   Non-Invasive Readings   Heater Temperature (Obs) 31   Skin Intervention Skin intact   Maintenance   Water bag changed No

## 2021-12-25 NOTE — PROCEDURES
Chest Tube Insertion    Date/Time: 12/25/2021 12:43 PM  Performed by: BENTON Langley  Authorized by: BENTON Langley     Patient location:  Bedside  Other Assisting Provider: Yes (comment) (Dr Mirtha Quiroz)    Consent:     Consent obtained:  Verbal    Consent given by:  Patient    Risks discussed:  Damage to surrounding structures, bleeding, infection, pain, nerve damage and incomplete drainage  Universal protocol:     Procedure explained and questions answered to patient or proxy's satisfaction: yes      Relevant documents present and verified: yes      Test results available and properly labeled: yes      Radiology Images displayed and confirmed  If images not available, report reviewed: yes      Required blood products, implants, devices, and special equipment available: yes      Site/side marked: yes      Immediately prior to procedure a time out was called: yes      Patient identity confirmed:  Verbally with patient  Pre-procedure details:     Skin preparation:  ChloraPrep  Indications:     Indications: pneumothroax    Anesthesia (see MAR for exact dosages): Anesthesia method:  Local infiltration    Local anesthetic:  Lidocaine 1% w/o epi  Procedure details:     Placement location:  Lateral    Laterality:  Right    Approach:  Percutaneous    Scalpel size:  11    Thal-Quick Chest Tube Kit:  16 Fr    Tube size (Fr):  16    Tension pneumothorax: no      Needle Decompression: no      Tube connected to:  Suction    Drainage characteristics:  Air only    Suture material:  0 silk    Dressing:  4x4 sterile gauze (with CT tape)  Post-procedure details:     Post-insertion x-ray findings: tube repositioned      Patient tolerance of procedure:   Tolerated well, no immediate complications  Comments:      Tube retracted 3 cm

## 2021-12-25 NOTE — NURSING NOTE
Cxr this am showed right pneumo  Pt to have chest tube placement this am  Pt awake, alert, and oriented x4 and without complaints of sob at rest    0900 physician and PA at bedside to place chest tube  Right chest tube placed and attached to suction  Cxr complete  Pt remains awake and without complaints

## 2021-12-25 NOTE — RESPIRATORY THERAPY NOTE
12/25/21 0200   Respiratory Assessment   Assessment Type Assess only   General Appearance Sleeping   Respiratory Pattern Normal   Chest Assessment Chest expansion symmetrical   Bilateral Breath Sounds Diminished;Crackles   Resp Comments pt asleep natacha optiflo will monitor    O2 Device opti   Non-Invasive Information   O2 Interface Device HFNC prongs  (optiflo )   Non-Invasive Ventilation Mode HFNC (High flow)   SpO2 94 %   $ Pulse Oximetry Spot Check Charge Completed   Non-Invasive Settings   FiO2 (%) 75  (found ion 85% decreased to 75% 7)   Flow (lpm)   (found on 55 l decreased to 45)   Temperature (Set) 31   Non-Invasive Readings   Heater Temperature (Obs) 31   Skin Intervention Skin intact   Maintenance   Water bag changed Yes

## 2021-12-25 NOTE — PLAN OF CARE
Problem: Potential for Falls  Goal: Patient will remain free of falls  Description: INTERVENTIONS:  - Educate patient/family on patient safety including physical limitations  - Instruct patient to call for assistance with activity   - Consult OT/PT to assist with strengthening/mobility   - Keep Call bell within reach  - Keep bed low and locked with side rails adjusted as appropriate  - Keep care items and personal belongings within reach  - Initiate and maintain comfort rounds  - Make Fall Risk Sign visible to staff  - Offer Toileting every 2 Hours, in advance of need  - Initiate/Maintain fall alarm  - Obtain necessary fall risk management equipment: fall alarm  - Apply yellow socks and bracelet for high fall risk patients  - Consider moving patient to room near nurses station  Outcome: Progressing     Problem: PAIN - ADULT  Goal: Verbalizes/displays adequate comfort level or baseline comfort level  Description: Interventions:  - Encourage patient to monitor pain and request assistance  - Assess pain using appropriate pain scale  - Administer analgesics based on type and severity of pain and evaluate response  - Implement non-pharmacological measures as appropriate and evaluate response  - Consider cultural and social influences on pain and pain management  - Notify physician/advanced practitioner if interventions unsuccessful or patient reports new pain  Outcome: Progressing     Problem: INFECTION - ADULT  Goal: Absence or prevention of progression during hospitalization  Description: INTERVENTIONS:  - Assess and monitor for signs and symptoms of infection  - Monitor lab/diagnostic results  - Monitor all insertion sites, i e  indwelling lines, tubes, and drains  - Monitor endotracheal if appropriate and nasal secretions for changes in amount and color  - Sod appropriate cooling/warming therapies per order  - Administer medications as ordered  - Instruct and encourage patient and family to use good hand hygiene technique  - Identify and instruct in appropriate isolation precautions for identified infection/condition  Outcome: Progressing  Goal: Absence of fever/infection during neutropenic period  Description: INTERVENTIONS:  - Monitor WBC    Outcome: Progressing     Problem: SAFETY ADULT  Goal: Patient will remain free of falls  Description: INTERVENTIONS:  - Educate patient/family on patient safety including physical limitations  - Instruct patient to call for assistance with activity   - Consult OT/PT to assist with strengthening/mobility   - Keep Call bell within reach  - Keep bed low and locked with side rails adjusted as appropriate  - Keep care items and personal belongings within reach  - Initiate and maintain comfort rounds  - Make Fall Risk Sign visible to staff  - Offer Toileting every 2 Hours, in advance of need  - Initiate/Maintain fall alarm  - Obtain necessary fall risk management equipment: fall alarm  - Apply yellow socks and bracelet for high fall risk patients  - Consider moving patient to room near nurses station  Outcome: Progressing  Goal: Maintain or return to baseline ADL function  Description: INTERVENTIONS:  - Educate patient/family on patient safety including physical limitations  - Instruct patient to call for assistance with activity   - Consult OT/PT to assist with strengthening/mobility   - Keep Call bell within reach  - Keep bed low and locked with side rails adjusted as appropriate  - Keep care items and personal belongings within reach  - Initiate and maintain comfort rounds  - Make Fall Risk Sign visible to staff  - Offer Toileting every 2 Hours, in advance of need  - Initiate/Maintain fall alarm  - Obtain necessary fall risk management equipment: fall alarm  - Apply yellow socks and bracelet for high fall risk patients  - Consider moving patient to room near nurses station  Outcome: Progressing  Goal: Maintains/Returns to pre admission functional level  Description: INTERVENTIONS:  - Perform BMAT or MOVE assessment daily    - Set and communicate daily mobility goal to care team and patient/family/caregiver  - Collaborate with rehabilitation services on mobility goals if consulted  - Perform Range of Motion 3 times a day  - Reposition patient every 2 hours  - Dangle patient 3 times a day  - Stand patient 3 times a day  - Ambulate patient 3 times a day  - Out of bed to chair 3 times a day   - Out of bed for meals 3 times a day  - Out of bed for toileting  - Record patient progress and toleration of activity level   Outcome: Progressing     Problem: DISCHARGE PLANNING  Goal: Discharge to home or other facility with appropriate resources  Description: INTERVENTIONS:  - Identify barriers to discharge w/patient and caregiver  - Arrange for needed discharge resources and transportation as appropriate  - Identify discharge learning needs (meds, wound care, etc )  - Arrange for interpretive services to assist at discharge as needed  - Refer to Case Management Department for coordinating discharge planning if the patient needs post-hospital services based on physician/advanced practitioner order or complex needs related to functional status, cognitive ability, or social support system  Outcome: Progressing     Problem: Knowledge Deficit  Goal: Patient/family/caregiver demonstrates understanding of disease process, treatment plan, medications, and discharge instructions  Description: Complete learning assessment and assess knowledge base    Interventions:  - Provide teaching at level of understanding  - Provide teaching via preferred learning methods  Outcome: Progressing     Problem: MOBILITY - ADULT  Goal: Maintain or return to baseline ADL function  Description: INTERVENTIONS:  - Educate patient/family on patient safety including physical limitations  - Instruct patient to call for assistance with activity   - Consult OT/PT to assist with strengthening/mobility   - Keep Call bell within reach  - Keep bed low and locked with side rails adjusted as appropriate  - Keep care items and personal belongings within reach  - Initiate and maintain comfort rounds  - Make Fall Risk Sign visible to staff  - Offer Toileting every 2 Hours, in advance of need  - Initiate/Maintain fall alarm  - Obtain necessary fall risk management equipment: fall alarm  - Apply yellow socks and bracelet for high fall risk patients  - Consider moving patient to room near nurses station  Outcome: Progressing  Goal: Maintains/Returns to pre admission functional level  Description: INTERVENTIONS:  - Perform BMAT or MOVE assessment daily    - Set and communicate daily mobility goal to care team and patient/family/caregiver  - Collaborate with rehabilitation services on mobility goals if consulted  - Perform Range of Motion 3 times a day  - Reposition patient every 2 hours    - Dangle patient 3 times a day  - Stand patient 3 times a day  - Ambulate patient 3 times a day  - Out of bed to chair 3 times a day   - Out of bed for meals 3 times a day  - Out of bed for toileting  - Record patient progress and toleration of activity level   Outcome: Progressing     Problem: Prexisting or High Potential for Compromised Skin Integrity  Goal: Skin integrity is maintained or improved  Description: INTERVENTIONS:  - Identify patients at risk for skin breakdown  - Assess and monitor skin integrity  - Assess and monitor nutrition and hydration status  - Monitor labs   - Assess for incontinence   - Turn and reposition patient  - Assist with mobility/ambulation  - Relieve pressure over bony prominences  - Avoid friction and shearing  - Provide appropriate hygiene as needed including keeping skin clean and dry  - Evaluate need for skin moisturizer/barrier cream  - Collaborate with interdisciplinary team   - Patient/family teaching  - Consider wound care consult   Outcome: Progressing     Problem: RESPIRATORY - ADULT  Goal: Achieves optimal ventilation and oxygenation  Description: INTERVENTIONS:  - Assess for changes in respiratory status  - Assess for changes in mentation and behavior  - Position to facilitate oxygenation and minimize respiratory effort  - Oxygen administered by appropriate delivery if ordered  - Initiate smoking cessation education as indicated  - Encourage broncho-pulmonary hygiene including cough, deep breathe, Incentive Spirometry  - Assess the need for suctioning and aspirate as needed  - Assess and instruct to report SOB or any respiratory difficulty  - Respiratory Therapy support as indicated  Outcome: Progressing

## 2021-12-25 NOTE — QUICK NOTE
Call placed to patients  THE [facility] to provide update   All questions answered

## 2021-12-25 NOTE — RESPIRATORY THERAPY NOTE
12/25/21 0500   Respiratory Assessment   Assessment Type Assess only   General Appearance Awake; Alert   Respiratory Pattern Dyspnea with exertion   Chest Assessment Chest expansion symmetrical   Bilateral Breath Sounds Diminished;Crackles   Resp Comments pt woke with prongs out placedd on NrB TO RECOVER THAN REMOVED    O2 Device OPTI    Oxygen Therapy/Pulse Ox   O2 Device High flow nasal cannula   O2 Therapy Oxygen humidified   FiO2 (%) 75   O2 Flow Rate (L/min) 45 L/min   SpO2 Activity At Rest   $ Pulse Oximetry Spot Check Charge Completed

## 2021-12-25 NOTE — PROGRESS NOTES
[address]  Progress Note - Terri Candelaria 1940, [de-identified] y o  female MRN: [id number]  Unit/Bed#: ICU 04-01 Encounter: [id number]  Primary Care Provider: Lilian Kwok DO   Date and time admitted to hospital: 12/16/2021  4:49 PM    * COVID-19  Assessment & Plan  · Presented on 12/16 with fever, chills, shortness of breath, cough  · COVID-19 positive on 12/16  · Unvaccinated  · Transferred on 12/21 to ICU 2/2 escalating O2 requirements  · Severe pathway:  · Decadron 0 1 mg/kg D5, total D10  · Baricitinib D6 - maintain lower dose 2/2 CKD3b with decreased GFR  · Remdesivir completed  · Prophylactic heparin  · Statin  · Ceftriaxone/Doxy-  DC'd procalcitonin negative x2  · Encourage side-lying/prone positioning  · Encourage I/S, aggressive pulmonary hygiene  · Titrate oxygen to maintain SpO2 > 88% - currently requiring HFNC 60%/40L  · Trend CRP as needed    Acute respiratory failure with hypoxia (HCC)  Assessment & Plan  · Upgraded to SD1 2/2 increasing O2 requirement  · CXR  reveals RML/LLL infiltrates concerning for superimposed bacterial pneumonia  · Continue COVID-19 protocol as noted above  · Started on IV Ceftriaxone/Doxycycline per COVID-19 protocol- now d/c'd  · Monitor I/O  · Reassess daily for diuresis  · PRN Xopenex nebs  · Titrate O2 to maintain SpO2 > 88%   · Aggressive pulmonary hygiene    SIRS (systemic inflammatory response syndrome) ([facility] [address] )  Assessment & Plan  · As evidenced by tachycardia, tachypnea, hypoxia  · Suspect this is in setting of worsening COVID-19 pneumonia vs superimposed bacterial infection  · Procalcitonin negative  · ABX DC'd  · Trend fever and WBC curve  · Trend procalcitonin    Type 2 diabetes mellitus without complication, without long-term current use of insulin [facility])  Assessment & Plan  Lab Results   Component Value Date    HGBA1C 6 2 (H) 11/22/2021       Recent Labs     12/23/21  1619 12/23/21  1754 12/23/21 2013 12/24/21  0001   POCGLU 132 137 272* 204* Blood Sugar Average: Last 72 hrs:  (P) 036 [id number]     · Takes metformin, Januvia and glipizide at home  · Was taking Lantus, mealtime/SSI, but continues to be hyperglycemic  · Continue insulin infusion  · Monitor glucose  · Diabetic diet     Anemia  Assessment & Plan  · Hemoglobin 10 8 on admission, currently 10 7  · Likely multifactorial in setting of iron deficiency anemia vs CKD  · Baseline appears to be around 11 5 - 13  · Concurrently with no signs of active bleeding  · Continue home iron supplementation  · Transfuse for hemoglobin < 7 or with hemodynamic compromise  · Trend hemoglobin and monitor for signs of active bleeding    Chronic kidney disease, stage 3b [facility])  Assessment & Plan  Lab Results   Component Value Date    EGFR 25 12/23/2021    EGFR 29 12/22/2021    EGFR 37 12/21/2021    CREATININE 1 83 (H) 12/23/2021    CREATININE 1 63 (H) 12/22/2021    CREATININE 1 34 (H) 12/21/2021     · Creatinine 1 56 on admission  · Baseline creatinine appears to be around 1 2 - 1 6   · 12/23 held further diuretics due to increase in creat  · Avoid nephrotoxic agents and hypotension  · Trend renal indices  · Strict I&O  · Daily weights    Ambulatory dysfunction  Assessment & Plan  · PT/OT - recommending acute rehab on discharge  · Encourage mobilization    Hypercholesterolemia  Assessment & Plan  · Takes lovastatin at home - continue statin here per COVID protocol    Essential hypertension  Assessment & Plan  · Takes enalapril at home - continue lisinopril 20 mg while inpatient   · Monitor BP    Anxiety  Assessment & Plan  · Continue home PRN Xanax  · Zoloft appears to be on patient's home medication list, however patient states she is not taking this    ----------------------------------------------------------------------------------------  HPI/24hr events: No acute events overnight      Patient appropriate for transfer out of the ICU today?: No  Disposition: Continue Stepdown Level 1 level of care Code Status: Level 1 - Full Code  ---------------------------------------------------------------------------------------  SUBJECTIVE  Offers no complaints    Review of Systems   Constitutional: Positive for fatigue  Respiratory: Negative for shortness of breath  Review of systems was reviewed and negative unless stated above in HPI/24-hour events   ---------------------------------------------------------------------------------------  OBJECTIVE    Vitals   Vitals:    21 0000 21 0018 21 0200 21 0400   BP: (!) 183/86 162/81  141/73   BP Location: Right arm      Pulse: 65  59 63   Resp: 19  (!) 24 15   Temp: 97 6 °F (36 4 °C)   98 1 °F (36 7 °C)   TempSrc: Axillary   Oral   SpO2: 97%  94% 93%   Weight:       Height:         Temp (24hrs), Av 1 °F (36 7 °C), Min:97 6 °F (36 4 °C), Max:98 8 °F (37 1 °C)  Current: Temperature: 98 1 °F (36 7 °C)          Respiratory:  SpO2: SpO2: 93 %, SpO2 Activity: SpO2 Activity: At Rest, SpO2 Device: O2 Device: High flow nasal cannula  Nasal Cannula O2 Flow Rate (L/min): 55 L/min    Invasive/non-invasive ventilation settings   Respiratory  Report   Lab Data (Last 4 hours)    None         O2/Vent Data (Last 4 hours)      200          Non-Invasive Ventilation Mode HFNC (High flow)                   Physical Exam  Vitals and nursing note reviewed  Constitutional:       Appearance: She is ill-appearing  HENT:      Head: Normocephalic  Mouth/Throat:      Mouth: Mucous membranes are dry  Pharynx: Oropharynx is clear  Eyes:      Pupils: Pupils are equal, round, and reactive to light  Cardiovascular:      Rate and Rhythm: Normal rate and regular rhythm  Pulses: Normal pulses  Heart sounds: Normal heart sounds  Pulmonary:      Effort: Pulmonary effort is normal       Breath sounds: Decreased breath sounds present  Abdominal:      General: Bowel sounds are normal       Palpations: Abdomen is soft     Musculoskeletal: General: Normal range of motion  Cervical back: Normal range of motion  Skin:     General: Skin is warm and dry  Neurological:      General: No focal deficit present  Mental Status: She is alert and oriented to person, place, and time               Laboratory and Diagnostics:  Results from last 7 days   Lab Units 12/24/21  0429 12/23/21  0509 12/22/21  0546 12/21/21  0435 12/20/21  0434 12/19/21  0429 12/18/21  0524   WBC Thousand/uL 12 66* 13 01*  13 01* 8 37 6 15 2 92* 3 79* 3 32*   HEMOGLOBIN g/dL 11 5 11 8  11 8 10 4* 10 7* 10 5* 10 9* 10 7*   HEMATOCRIT % 35 8 36 0  36 0 31 5* 32 4* 32 6* 33 6* 32 8*   PLATELETS Thousands/uL 450* 400*  400* 283 234 197 178 156   NEUTROS PCT %  --   --  81*  --   --   --   --    BANDS PCT % 5  --   --   --   --   --   --    MONOS PCT %  --   --  4  --   --   --   --    MONO PCT % 4  --   --   --   --   --   --      Results from last 7 days   Lab Units 12/24/21  0429 12/23/21  0509 12/22/21  0546 12/21/21  0435 12/20/21  0434 12/19/21  0429 12/18/21  0524   SODIUM mmol/L 141 140 140 137 135 136 137   POTASSIUM mmol/L 4 1 4 0 3 9 4 5 4 5 4 2 3 7   CHLORIDE mmol/L 106 102 105 105 104 104 105   CO2 mmol/L 25 26 26 24 22 20* 17*   ANION GAP mmol/L 10 12 9 8 9 12 15*   BUN mg/dL 71* 58* 48* 41* 50* 43* 33*   CREATININE mg/dL 1 72* 1 83* 1 63* 1 34* 1 45* 1 39* 1 48*   CALCIUM mg/dL 9 6 9 7 9 4 9 4 9 0 9 0 8 7   GLUCOSE RANDOM mg/dL 97 100 188* 381* 420* 354* 246*   ALT U/L 19  --  14 15 13 12 12   AST U/L 20  --  16 19 21 24 25   ALK PHOS U/L 70  --  66 68 64 64 65   ALBUMIN g/dL 3 7  --  3 6 3 7 3 6 3 8 3 8   TOTAL BILIRUBIN mg/dL 0 48  --  0 60 0 49 0 42 0 44 0 39     Results from last 7 days   Lab Units 12/24/21  0429 12/22/21  0546   MAGNESIUM mg/dL 2 6 1 9   PHOSPHORUS mg/dL 4 5* 2 8      Results from last 7 days   Lab Units 12/21/21  1133   INR  1 18   PTT seconds 46*              ABG:    VBG:    Results from last 7 days   Lab Units 12/22/21  0546 12/21/21  1133   PROCALCITONIN ng/ml 0 21 0 09       Micro  Results from last 7 days   Lab Units 12/22/21  1043   BLOOD CULTURE  No Growth at 48 hrs  No Growth at 48 hrs  EKG: normal sinus rhythm  Imaging: I have personally reviewed pertinent reports  and I have personally reviewed pertinent films in PACS    Intake and Output  I/O       12/23 0701  12/24 0700 12/24 0701  12/25 0700    P  O  380 480    I V  (mL/kg) 102 3 (1 9) 108 4 (2)    Total Intake(mL/kg) 482 3 (8 8) 588 4 (10 7)    Urine (mL/kg/hr) 735 (0 6) 250 (0 2)    Total Output 735 250    Net -252 7 +338 4          Unmeasured Urine Occurrence  2 x          Height and Weights   Height: 5' (152 4 cm)     Body mass index is 23 72 kg/m²  Weight (last 2 days)     Date/Time Weight    12/24/21 0600 55 1 (121 47)    12/23/21 0600 53 3 (117 51)            Nutrition       Diet Orders   (From admission, onward)             Start     Ordered    12/21/21 1750  Diet Rodríguez/CHO Controlled; Consistent Carbohydrate Diet Level 2 (5 carb servings/75 grams CHO/meal)  Diet effective now        References:    Nutrtion Support Algorithm Enteral vs  Parenteral   Question Answer Comment   Diet Type Rodríguez/CHO Controlled    Rodríguez/CHO Controlled Consistent Carbohydrate Diet Level 2 (5 carb servings/75 grams CHO/meal)    RD to adjust diet per protocol?  Yes        12/21/21 1749                  Active Medications  Scheduled Meds:  Current Facility-Administered Medications   Medication Dose Route Frequency Provider Last Rate    acetaminophen  650 mg Oral Q4H PRN BENTON Brown      ALPRAZolam  0 25 mg Oral HS PRN BENTON Julian      atorvastatin  40 mg Oral HS BENTON Brown      Baricitinib  2 mg Oral Q24H BENTON Brown      dexamethasone  0 1 mg/kg Intravenous Q12H BENTON Brown      docusate sodium  100 mg Oral Daily Megan Lynn PA-C      ferrous sulfate  325 mg Oral Daily With [address] heparin (porcine)  5,000 Units Subcutaneous Q8H [address] BENTON Brown      insulin regular (HumuLIN R,NovoLIN R) infusion  0 3-21 Units/hr Intravenous Titrated BENTON Brown Stopped (12/25/21 0405)    lisinopril  20 mg Oral Daily BENTON Brown      ondansetron  4 mg Intravenous Q6H PRN BENTON Brown       Continuous Infusions:  insulin regular (HumuLIN R,NovoLIN R) infusion, 0 3-21 Units/hr, Last Rate: Stopped (12/25/21 0405)      PRN Meds:   acetaminophen, 650 mg, Q4H PRN  ALPRAZolam, 0 25 mg, HS PRN  ondansetron, 4 mg, Q6H PRN        Invasive Devices Review  Invasive Devices  Report    Peripheral Intravenous Line            Peripheral IV 12/20/21 Left Forearm 4 days    Peripheral IV 12/21/21 Right;Ventral (anterior) Forearm 3 days                Rationale for remaining devices: n/a  ---------------------------------------------------------------------------------------  Advance Directive and Living Will:      Power of :    POLST:    ---------------------------------------------------------------------------------------  Care Time Delivered:   Upon my evaluation, this patient had a high probability of imminent or life-threatening deterioration due to acute hypoxic respiratory failure, which required my direct attention, intervention, and personal management  I have personally provided 35 minutes (0230 to 0305) of critical care time, exclusive of procedures, teaching, family meetings, and any prior time recorded by providers other than myself  Aracely Ouch, CRNP      Portions of the record may have been created with voice recognition software  Occasional wrong word or "sound a like" substitutions may have occurred due to the inherent limitations of voice recognition software    Read the chart carefully and recognize, using context, where substitutions have occurred

## 2021-12-26 ENCOUNTER — APPOINTMENT (INPATIENT)
Dept: RADIOLOGY | Facility: HOSPITAL | Age: 81
DRG: 871 | End: 2021-12-26
Payer: COMMERCIAL

## 2021-12-26 PROBLEM — J93.9 PNEUMOTHORAX: Status: ACTIVE | Noted: 2021-12-26

## 2021-12-26 LAB
ALBUMIN SERPL BCP-MCNC: 3.3 G/DL (ref 3.5–5)
ALP SERPL-CCNC: 64 U/L (ref 34–104)
ALT SERPL W P-5'-P-CCNC: 18 U/L (ref 7–52)
ANION GAP SERPL CALCULATED.3IONS-SCNC: 6 MMOL/L (ref 4–13)
AST SERPL W P-5'-P-CCNC: 16 U/L (ref 13–39)
BACTERIA BLD CULT: ABNORMAL
BASOPHILS # BLD AUTO: 0.03 THOUSANDS/ΜL (ref 0–0.1)
BASOPHILS NFR BLD AUTO: 0 % (ref 0–1)
BILIRUB SERPL-MCNC: 0.72 MG/DL (ref 0.2–1)
BUN SERPL-MCNC: 54 MG/DL (ref 5–25)
CALCIUM ALBUM COR SERPL-MCNC: 9.5 MG/DL (ref 8.3–10.1)
CALCIUM SERPL-MCNC: 8.9 MG/DL (ref 8.4–10.2)
CHLORIDE SERPL-SCNC: 104 MMOL/L (ref 96–108)
CO2 SERPL-SCNC: 25 MMOL/L (ref 21–32)
CREAT SERPL-MCNC: 1.31 MG/DL (ref 0.6–1.3)
EOSINOPHIL # BLD AUTO: 0 THOUSAND/ΜL (ref 0–0.61)
EOSINOPHIL NFR BLD AUTO: 0 % (ref 0–6)
ERYTHROCYTE [DISTWIDTH] IN BLOOD BY AUTOMATED COUNT: 13 % (ref 11.6–15.1)
ERYTHROCYTE [DISTWIDTH] IN BLOOD BY AUTOMATED COUNT: 13.2 % (ref 11.6–15.1)
GFR SERPL CREATININE-BSD FRML MDRD: 38 ML/MIN/1.73SQ M
GLUCOSE SERPL-MCNC: 304 MG/DL (ref 65–140)
GLUCOSE SERPL-MCNC: 348 MG/DL (ref 65–140)
GLUCOSE SERPL-MCNC: 384 MG/DL (ref 65–140)
GLUCOSE SERPL-MCNC: 422 MG/DL (ref 65–140)
GRAM STN SPEC: ABNORMAL
HCT VFR BLD AUTO: 32.2 % (ref 34.8–46.1)
HCT VFR BLD AUTO: 34.2 % (ref 34.8–46.1)
HGB BLD-MCNC: 10.4 G/DL (ref 11.5–15.4)
HGB BLD-MCNC: 10.9 G/DL (ref 11.5–15.4)
IMM GRANULOCYTES # BLD AUTO: 0.32 THOUSAND/UL (ref 0–0.2)
IMM GRANULOCYTES NFR BLD AUTO: 2 % (ref 0–2)
LYMPHOCYTES # BLD AUTO: 1.23 THOUSANDS/ΜL (ref 0.6–4.47)
LYMPHOCYTES NFR BLD AUTO: 8 % (ref 14–44)
MAGNESIUM SERPL-MCNC: 2.5 MG/DL (ref 1.9–2.7)
MCH RBC QN AUTO: 27 PG (ref 26.8–34.3)
MCH RBC QN AUTO: 27.1 PG (ref 26.8–34.3)
MCHC RBC AUTO-ENTMCNC: 31.9 G/DL (ref 31.4–37.4)
MCHC RBC AUTO-ENTMCNC: 32.3 G/DL (ref 31.4–37.4)
MCV RBC AUTO: 84 FL (ref 82–98)
MCV RBC AUTO: 85 FL (ref 82–98)
MONOCYTES # BLD AUTO: 0.46 THOUSAND/ΜL (ref 0.17–1.22)
MONOCYTES NFR BLD AUTO: 3 % (ref 4–12)
NEUTROPHILS # BLD AUTO: 12.89 THOUSANDS/ΜL (ref 1.85–7.62)
NEUTS SEG NFR BLD AUTO: 87 % (ref 43–75)
NRBC BLD AUTO-RTO: 0 /100 WBCS
PHOSPHATE SERPL-MCNC: 4.3 MG/DL (ref 2.3–4.1)
PLATELET # BLD AUTO: 432 THOUSANDS/UL (ref 149–390)
PLATELET # BLD AUTO: 444 THOUSANDS/UL (ref 149–390)
PMV BLD AUTO: 9.4 FL (ref 8.9–12.7)
PMV BLD AUTO: 9.5 FL (ref 8.9–12.7)
POTASSIUM SERPL-SCNC: 4.8 MMOL/L (ref 3.5–5.3)
PROT SERPL-MCNC: 6.6 G/DL (ref 6.4–8.4)
RBC # BLD AUTO: 3.84 MILLION/UL (ref 3.81–5.12)
RBC # BLD AUTO: 4.04 MILLION/UL (ref 3.81–5.12)
SODIUM SERPL-SCNC: 135 MMOL/L (ref 135–147)
WBC # BLD AUTO: 14.93 THOUSAND/UL (ref 4.31–10.16)
WBC # BLD AUTO: 15.15 THOUSAND/UL (ref 4.31–10.16)

## 2021-12-26 PROCEDURE — 85027 COMPLETE CBC AUTOMATED: CPT | Performed by: NURSE PRACTITIONER

## 2021-12-26 PROCEDURE — 99291 CRITICAL CARE FIRST HOUR: CPT | Performed by: NURSE PRACTITIONER

## 2021-12-26 PROCEDURE — 80053 COMPREHEN METABOLIC PANEL: CPT | Performed by: NURSE PRACTITIONER

## 2021-12-26 PROCEDURE — 83735 ASSAY OF MAGNESIUM: CPT | Performed by: NURSE PRACTITIONER

## 2021-12-26 PROCEDURE — 84100 ASSAY OF PHOSPHORUS: CPT | Performed by: NURSE PRACTITIONER

## 2021-12-26 PROCEDURE — 87040 BLOOD CULTURE FOR BACTERIA: CPT | Performed by: NURSE PRACTITIONER

## 2021-12-26 PROCEDURE — 94760 N-INVAS EAR/PLS OXIMETRY 1: CPT

## 2021-12-26 PROCEDURE — 82948 REAGENT STRIP/BLOOD GLUCOSE: CPT

## 2021-12-26 PROCEDURE — 85025 COMPLETE CBC W/AUTO DIFF WBC: CPT | Performed by: NURSE PRACTITIONER

## 2021-12-26 PROCEDURE — 71045 X-RAY EXAM CHEST 1 VIEW: CPT

## 2021-12-26 RX ORDER — INSULIN GLARGINE 100 [IU]/ML
15 INJECTION, SOLUTION SUBCUTANEOUS EVERY 12 HOURS SCHEDULED
Status: DISCONTINUED | OUTPATIENT
Start: 2021-12-26 | End: 2021-12-27

## 2021-12-26 RX ORDER — LIDOCAINE 50 MG/G
1 PATCH TOPICAL DAILY
Status: DISCONTINUED | OUTPATIENT
Start: 2021-12-26 | End: 2022-01-12 | Stop reason: HOSPADM

## 2021-12-26 RX ORDER — INSULIN GLARGINE 100 [IU]/ML
10 INJECTION, SOLUTION SUBCUTANEOUS EVERY 12 HOURS SCHEDULED
Status: DISCONTINUED | OUTPATIENT
Start: 2021-12-26 | End: 2021-12-26

## 2021-12-26 RX ADMIN — LIDOCAINE 1 PATCH: 50 PATCH TOPICAL at 12:02

## 2021-12-26 RX ADMIN — INSULIN GLARGINE 10 UNITS: 100 INJECTION, SOLUTION SUBCUTANEOUS at 08:31

## 2021-12-26 RX ADMIN — INSULIN LISPRO 3 UNITS: 100 INJECTION, SOLUTION INTRAVENOUS; SUBCUTANEOUS at 08:32

## 2021-12-26 RX ADMIN — BARICITINIB 2 MG: 2 TABLET, FILM COATED ORAL at 12:00

## 2021-12-26 RX ADMIN — INSULIN LISPRO 5 UNITS: 100 INJECTION, SOLUTION INTRAVENOUS; SUBCUTANEOUS at 23:48

## 2021-12-26 RX ADMIN — INSULIN GLARGINE 15 UNITS: 100 INJECTION, SOLUTION SUBCUTANEOUS at 23:47

## 2021-12-26 RX ADMIN — INSULIN LISPRO 4 UNITS: 100 INJECTION, SOLUTION INTRAVENOUS; SUBCUTANEOUS at 07:30

## 2021-12-26 RX ADMIN — DOCUSATE SODIUM 100 MG: 100 CAPSULE, LIQUID FILLED ORAL at 08:31

## 2021-12-26 RX ADMIN — HEPARIN SODIUM 5000 UNITS: 5000 INJECTION INTRAVENOUS; SUBCUTANEOUS at 23:47

## 2021-12-26 RX ADMIN — INSULIN LISPRO 4 UNITS: 100 INJECTION, SOLUTION INTRAVENOUS; SUBCUTANEOUS at 12:04

## 2021-12-26 RX ADMIN — DEXAMETHASONE SODIUM PHOSPHATE 5.48 MG: 4 INJECTION, SOLUTION INTRA-ARTICULAR; INTRALESIONAL; INTRAMUSCULAR; INTRAVENOUS; SOFT TISSUE at 23:47

## 2021-12-26 RX ADMIN — FERROUS SULFATE TAB 325 MG (65 MG ELEMENTAL FE) 325 MG: 325 (65 FE) TAB at 08:31

## 2021-12-26 RX ADMIN — OXYCODONE HYDROCHLORIDE 2.5 MG: 5 TABLET ORAL at 12:00

## 2021-12-26 RX ADMIN — DEXAMETHASONE SODIUM PHOSPHATE 5.48 MG: 4 INJECTION, SOLUTION INTRA-ARTICULAR; INTRALESIONAL; INTRAMUSCULAR; INTRAVENOUS; SOFT TISSUE at 11:57

## 2021-12-26 RX ADMIN — ATORVASTATIN CALCIUM 40 MG: 40 TABLET, FILM COATED ORAL at 23:46

## 2021-12-26 RX ADMIN — HEPARIN SODIUM 5000 UNITS: 5000 INJECTION INTRAVENOUS; SUBCUTANEOUS at 05:55

## 2021-12-26 RX ADMIN — INSULIN LISPRO 4 UNITS: 100 INJECTION, SOLUTION INTRAVENOUS; SUBCUTANEOUS at 15:54

## 2021-12-26 RX ADMIN — INSULIN LISPRO 4 UNITS: 100 INJECTION, SOLUTION INTRAVENOUS; SUBCUTANEOUS at 16:30

## 2021-12-26 RX ADMIN — HEPARIN SODIUM 5000 UNITS: 5000 INJECTION INTRAVENOUS; SUBCUTANEOUS at 15:54

## 2021-12-26 NOTE — PROGRESS NOTES
[facility] [address]  Progress Note - Julian Cohen 1940, [de-identified] y o  female MRN: [id number]  Unit/Bed#: ICU 04-01 Encounter: [id number]  Primary Care Provider: Mohan Krause DO   Date and time admitted to hospital: 12/16/2021  4:49 PM    * COVID-19  Assessment & Plan  · Presented on 12/16 with fever, chills, shortness of breath, cough  · COVID-19 positive on 12/16  · Unvaccinated  · Transferred on 12/21 to ICU 2/2 escalating O2 requirements  · Severe pathway:  · Decadron 0 1 mg/kg D6, total D11  · Baricitinib D7 - maintain lower dose 2/2 CKD3b with decreased GFR  · Remdesivir completed  · Prophylactic heparin  · Statin  · Ceftriaxone/Doxy-  DC'd procalcitonin negative x2  · Encourage side-lying/prone positioning  · Encourage I/S, aggressive pulmonary hygiene  · Titrate oxygen to maintain SpO2 > 88% - currently requiring HFNC 100%/55L  · Trend CRP as needed    Acute respiratory failure with hypoxia (HCC)  Assessment & Plan  · Upgraded to SD1 2/2 increasing O2 requirement  · CXR  reveals RML/LLL infiltrates concerning for superimposed bacterial pneumonia  · Continue COVID-19 protocol as noted above  · Started on IV Ceftriaxone/Doxycycline per COVID-19 protocol- now d/c'd  · Monitor I/O  · Reassess daily for diuresis  · PRN Xopenex nebs  · Titrate O2 to maintain SpO2 > 88%   · Aggressive pulmonary hygiene    SIRS (systemic inflammatory response syndrome) ([facility] [address] )  Assessment & Plan  · As evidenced by tachycardia, tachypnea, hypoxia  · Suspect this is in setting of worsening COVID-19 pneumonia vs superimposed bacterial infection  · Procalcitonin negative  · ABX DC'd  · Trend fever and WBC curve  · Trend procalcitonin    Type 2 diabetes mellitus without complication, without long-term current use of insulin [facility])  Assessment & Plan  Lab Results   Component Value Date    HGBA1C 6 2 (H) 11/22/2021       Recent Labs     12/25/21  1401 12/25/21  1617 12/25/21 2006 12/25/21 2053   POCGLU 171* 263* 362* 391*       Blood Sugar Average: Last 72 hrs:  (P) 105 [id number]     · Takes metformin, Januvia and glipizide at home  · Transitioned to Lantus and SSI, may require increase in dosing/algorithm today  · Monitor glucose  · Diabetic diet     Anemia  Assessment & Plan  · Hemoglobin 10 8 on admission, currently 10 7  · Likely multifactorial in setting of iron deficiency anemia vs CKD  · Baseline appears to be around 11 5 - 13  · Concurrently with no signs of active bleeding  · Continue home iron supplementation  · Transfuse for hemoglobin < 7 or with hemodynamic compromise  · Trend hemoglobin and monitor for signs of active bleeding    Chronic kidney disease, stage 3b [facility])  Assessment & Plan  Lab Results   Component Value Date    EGFR 31 12/25/2021    EGFR 27 12/24/2021    EGFR 25 12/23/2021    CREATININE 1 54 (H) 12/25/2021    CREATININE 1 72 (H) 12/24/2021    CREATININE 1 83 (H) 12/23/2021     · Creatinine 1 56 on admission  · Baseline creatinine appears to be around 1 2 - 1 6   · Avoid nephrotoxic agents and hypotension  · Trend renal indices  · Strict I&O  · Daily weights    Pneumothorax  Assessment & Plan  · Became hypoxic 12/25 AM, CXR obtained demonstrating right pneumothorax  · 16 Fr   CT placed   · Follow up CXR demonstrated improvement but not complete resolution  · Continue CT to -20cm wall suction  · Monitor air leak  · Repeat CXR pending this AM     Ambulatory dysfunction  Assessment & Plan  · PT/OT - recommending acute rehab on discharge  · Encourage mobilization    Hypercholesterolemia  Assessment & Plan  · Takes lovastatin at home - continue statin here per COVID protocol    Essential hypertension  Assessment & Plan  · Takes enalapril at home - was being maintained on Lisinopril 20mg, now discontinued  · Consider resuming today  · Monitor BP    Anxiety  Assessment & Plan  · Continue home PRN Xanax  · Zoloft appears to be on patient's home medication list, however patient has not been taking      ----------------------------------------------------------------------------------------  HPI/24hr events: No acute events overnight  Patient appropriate for transfer out of the ICU today?: No  Disposition: Continue Stepdown Level 1 level of care   Code Status: Level 1 - Full Code  ---------------------------------------------------------------------------------------  SUBJECTIVE  Offers no complaints    Review of Systems   Constitutional: Positive for fatigue  Respiratory: Positive for cough  Negative for shortness of breath  Review of systems was reviewed and negative unless stated above in HPI/24-hour events   ---------------------------------------------------------------------------------------  OBJECTIVE    Vitals   Vitals:    21 2356 21 0000 21 0400 21 0432   BP:  114/59 112/57    BP Location:  Right arm     Pulse:  73 75    Resp:  14 14    Temp:  98 5 °F (36 9 °C)     TempSrc:  Temporal Temporal    SpO2: 95% 95% 94% 96%   Weight:       Height:         Temp (24hrs), Av 7 °F (37 1 °C), Min:98 1 °F (36 7 °C), Max:99 5 °F (37 5 °C)  Current: Temperature: 98 5 °F (36 9 °C)          Respiratory:  SpO2: SpO2: 96 %, SpO2 Activity: SpO2 Activity: At Rest, SpO2 Device: O2 Device: High flow nasal cannula  Nasal Cannula O2 Flow Rate (L/min): 55 L/min    Invasive/non-invasive ventilation settings   Respiratory  Report   Lab Data (Last 4 hours)    None         O2/Vent Data (Last 4 hours)       0432          Non-Invasive Ventilation Mode HFNC (High flow)                   Physical Exam  Vitals and nursing note reviewed  HENT:      Head: Normocephalic  Mouth/Throat:      Mouth: Mucous membranes are dry  Pharynx: Oropharynx is clear  Eyes:      Pupils: Pupils are equal, round, and reactive to light  Cardiovascular:      Rate and Rhythm: Normal rate and regular rhythm  Pulses: Normal pulses  Heart sounds: Normal heart sounds     Pulmonary: Breath sounds: Decreased breath sounds present  Comments: Right chest tube in place to -20cm suction   Abdominal:      General: Bowel sounds are normal       Palpations: Abdomen is soft  Musculoskeletal:         General: Normal range of motion  Cervical back: Normal range of motion  Skin:     General: Skin is warm and dry  Neurological:      General: No focal deficit present  Mental Status: She is alert and oriented to person, place, and time               Laboratory and Diagnostics:  Results from last 7 days   Lab Units 12/25/21  0455 12/24/21  0429 12/23/21  0509 12/22/21  0546 12/21/21  0435 12/20/21  0434   WBC Thousand/uL 15 31* 12 66* 13 01*  13 01* 8 37 6 15 2 92*   HEMOGLOBIN g/dL 12 5 11 5 11 8  11 8 10 4* 10 7* 10 5*   HEMATOCRIT % 38 4 35 8 36 0  36 0 31 5* 32 4* 32 6*   PLATELETS Thousands/uL 497* 450* 400*  400* 283 234 197   NEUTROS PCT %  --   --   --  81*  --   --    BANDS PCT %  --  5  --   --   --   --    MONOS PCT %  --   --   --  4  --   --    MONO PCT %  --  4  --   --   --   --      Results from last 7 days   Lab Units 12/25/21  0455 12/24/21  0429 12/23/21  0509 12/22/21  0546 12/21/21  0435 12/20/21  0434   SODIUM mmol/L 138 141 140 140 137 135   POTASSIUM mmol/L 4 2 4 1 4 0 3 9 4 5 4 5   CHLORIDE mmol/L 104 106 102 105 105 104   CO2 mmol/L 25 25 26 26 24 22   ANION GAP mmol/L 9 10 12 9 8 9   BUN mg/dL 68* 71* 58* 48* 41* 50*   CREATININE mg/dL 1 54* 1 72* 1 83* 1 63* 1 34* 1 45*   CALCIUM mg/dL 9 8 9 6 9 7 9 4 9 4 9 0   GLUCOSE RANDOM mg/dL 82 97 100 188* 381* 420*   ALT U/L 21 19  --  14 15 13   AST U/L 21 20  --  16 19 21   ALK PHOS U/L 74 70  --  66 68 64   ALBUMIN g/dL 3 9 3 7  --  3 6 3 7 3 6   TOTAL BILIRUBIN mg/dL 0 60 0 48  --  0 60 0 49 0 42     Results from last 7 days   Lab Units 12/25/21  0455 12/24/21  0429 12/22/21  0546   MAGNESIUM mg/dL 2 5 2 6 1 9   PHOSPHORUS mg/dL 3 9 4 5* 2 8      Results from last 7 days   Lab Units 12/21/21  1133   INR  1 18 PTT seconds 46*              ABG:    VBG:    Results from last 7 days   Lab Units 12/22/21  0546 12/21/21  1133   PROCALCITONIN ng/ml 0 21 0 09       Micro  Results from last 7 days   Lab Units 12/22/21  1043   BLOOD CULTURE  No Growth at 72 hrs  GRAM STAIN RESULT  Gram positive cocci in clusters*       EKG: normal sinus rhythm  Imaging: I have personally reviewed pertinent reports  and I have personally reviewed pertinent films in PACS    Intake and Output  I/O       12/24 0701 12/25 0700 12/25 0701  12/26 0700    P  O  480 480    I V  (mL/kg) 119 1 (2 1) 31 5 (0 5)    Total Intake(mL/kg) 599 1 (10 5) 511 5 (8 9)    Urine (mL/kg/hr) 250 (0 2) 850 (0 6)    Chest Tube  30    Total Output 250 880    Net +349 1 -368 5          Unmeasured Urine Occurrence 2 x           Height and Weights   Height: 5' (152 4 cm)     Body mass index is 24 67 kg/m²  Weight (last 2 days)     Date/Time Weight    12/25/21 0532 57 3 (126 32)    12/24/21 0600 55 1 (121 47)            Nutrition       Diet Orders   (From admission, onward)             Start     Ordered    12/21/21 1750  Diet Rodríguez/CHO Controlled; Consistent Carbohydrate Diet Level 2 (5 carb servings/75 grams CHO/meal)  Diet effective now        References:    Nutrtion Support Algorithm Enteral vs  Parenteral   Question Answer Comment   Diet Type Rodríguez/CHO Controlled    Rodríguez/CHO Controlled Consistent Carbohydrate Diet Level 2 (5 carb servings/75 grams CHO/meal)    RD to adjust diet per protocol?  Yes        12/21/21 1749                  Active Medications  Scheduled Meds:  Current Facility-Administered Medications   Medication Dose Route Frequency Provider Last Rate    acetaminophen  650 mg Oral Q4H PRN BENTON Brown      ALPRAZolam  0 25 mg Oral HS PRN BENTON Norman      atorvastatin  40 mg Oral HS BENTON Brown      Baricitinib  2 mg Oral Q24H BENTON Brown      dexamethasone  0 1 mg/kg Intravenous Q12H BENTON Norman  docusate sodium  100 mg Oral Daily Megan Lynn PA-C      ferrous sulfate  325 mg Oral Daily With Breakfast BENTON Brown      heparin (porcine)  5,000 Units Subcutaneous Q8H [address] BENTON Brown      insulin glargine  15 Units Subcutaneous HS Sushma L Rachael, BENTON      insulin lispro  1-5 Units Subcutaneous HS Sushma L Georgesbert, BENTON      insulin lispro  1-5 Units Subcutaneous TID AC Sushma L Rachael, BENTON      insulin lispro  4 Units Subcutaneous Daily With Breakfast Sushma Stephanie Acevedo, BENTON      insulin lispro  4 Units Subcutaneous Daily With Lunch Sushmagilbert Cano, BENTON      insulin lispro  4 Units Subcutaneous Daily With Dinner SushmaBENTON Kohli      ondansetron  4 mg Intravenous Q6H PRN BENTON Brown      oxyCODONE  2 5 mg Oral Q4H PRN BENTON Davidson       Continuous Infusions:     PRN Meds:   acetaminophen, 650 mg, Q4H PRN  ALPRAZolam, 0 25 mg, HS PRN  ondansetron, 4 mg, Q6H PRN  oxyCODONE, 2 5 mg, Q4H PRN        Invasive Devices Review  Invasive Devices  Report    Peripheral Intravenous Line            Peripheral IV 12/20/21 Left Forearm 5 days    Peripheral IV 12/21/21 Right;Ventral (anterior) Forearm 4 days          Drain            External Urinary Catheter 3 days    Chest Tube Right <1 day                Rationale for remaining devices: right pneumothorax  ---------------------------------------------------------------------------------------  Advance Directive and Living Will:      Power of :    POLST:    ---------------------------------------------------------------------------------------  Care Time Delivered:   Upon my evaluation, this patient had a high probability of imminent or life-threatening deterioration due to acute hypoxic respiratory failure , which required my direct attention, intervention, and personal management    I have personally provided 35 minutes (0330 to 0405) of critical care time, exclusive of procedures, teaching, family meetings, and any prior time recorded by providers other than myself  BENTON Lovelace      Portions of the record may have been created with voice recognition software  Occasional wrong word or "sound a like" substitutions may have occurred due to the inherent limitations of voice recognition software    Read the chart carefully and recognize, using context, where substitutions have occurred

## 2021-12-26 NOTE — ASSESSMENT & PLAN NOTE
· Presented on 12/16 with fever, chills, shortness of breath, cough  · COVID-19 positive on 12/16  · Unvaccinated  · Transferred on 12/21 to ICU 2/2 escalating O2 requirements  · Severe pathway:  · Decadron 0 1 mg/kg D6, total D11  · Baricitinib D7 - maintain lower dose 2/2 CKD3b with decreased GFR  · Remdesivir completed  · Prophylactic heparin  · Statin  · Ceftriaxone/Doxy-  DC'd procalcitonin negative x2  · Encourage side-lying/prone positioning  · Encourage I/S, aggressive pulmonary hygiene  · Titrate oxygen to maintain SpO2 > 88% - currently requiring HFNC 100%/55L  · Trend CRP as needed Appropriate/Calm

## 2021-12-26 NOTE — PLAN OF CARE
Problem: Potential for Falls  Goal: Patient will remain free of falls  Description: INTERVENTIONS:  - Educate patient/family on patient safety including physical limitations  - Instruct patient to call for assistance with activity   - Consult OT/PT to assist with strengthening/mobility   - Keep Call bell within reach  - Keep bed low and locked with side rails adjusted as appropriate  - Keep care items and personal belongings within reach  - Initiate and maintain comfort rounds  - Make Fall Risk Sign visible to staff  - Offer Toileting every 2 Hours, in advance of need  - Initiate/Maintain fall alarm  - Obtain necessary fall risk management equipment: fall alarm  - Apply yellow socks and bracelet for high fall risk patients  - Consider moving patient to room near nurses station  Outcome: Progressing     Problem: PAIN - ADULT  Goal: Verbalizes/displays adequate comfort level or baseline comfort level  Description: Interventions:  - Encourage patient to monitor pain and request assistance  - Assess pain using appropriate pain scale  - Administer analgesics based on type and severity of pain and evaluate response  - Implement non-pharmacological measures as appropriate and evaluate response  - Consider cultural and social influences on pain and pain management  - Notify physician/advanced practitioner if interventions unsuccessful or patient reports new pain  Outcome: Progressing     Problem: INFECTION - ADULT  Goal: Absence or prevention of progression during hospitalization  Description: INTERVENTIONS:  - Assess and monitor for signs and symptoms of infection  - Monitor lab/diagnostic results  - Monitor all insertion sites, i e  indwelling lines, tubes, and drains  - Monitor endotracheal if appropriate and nasal secretions for changes in amount and color  - [place] appropriate cooling/warming therapies per order  - Administer medications as ordered  - Instruct and encourage patient and family to use good hand hygiene technique  - Identify and instruct in appropriate isolation precautions for identified infection/condition  Outcome: Progressing  Goal: Absence of fever/infection during neutropenic period  Description: INTERVENTIONS:  - Monitor WBC    Outcome: Progressing     Problem: SAFETY ADULT  Goal: Patient will remain free of falls  Description: INTERVENTIONS:  - Educate patient/family on patient safety including physical limitations  - Instruct patient to call for assistance with activity   - Consult OT/PT to assist with strengthening/mobility   - Keep Call bell within reach  - Keep bed low and locked with side rails adjusted as appropriate  - Keep care items and personal belongings within reach  - Initiate and maintain comfort rounds  - Make Fall Risk Sign visible to staff  - Offer Toileting every 2 Hours, in advance of need  - Initiate/Maintain fall alarm  - Obtain necessary fall risk management equipment: fall alarm  - Apply yellow socks and bracelet for high fall risk patients  - Consider moving patient to room near nurses station  Outcome: Progressing  Goal: Maintain or return to baseline ADL function  Description: INTERVENTIONS:  - Educate patient/family on patient safety including physical limitations  - Instruct patient to call for assistance with activity   - Consult OT/PT to assist with strengthening/mobility   - Keep Call bell within reach  - Keep bed low and locked with side rails adjusted as appropriate  - Keep care items and personal belongings within reach  - Initiate and maintain comfort rounds  - Make Fall Risk Sign visible to staff  - Offer Toileting every 2 Hours, in advance of need  - Initiate/Maintain fall alarm  - Obtain necessary fall risk management equipment: fall alarm  - Apply yellow socks and bracelet for high fall risk patients  - Consider moving patient to room near nurses station  Outcome: Progressing  Goal: Maintains/Returns to pre admission functional level  Description: INTERVENTIONS:  - Perform BMAT or MOVE assessment daily    - Set and communicate daily mobility goal to care team and patient/family/caregiver  - Collaborate with rehabilitation services on mobility goals if consulted  - Perform Range of Motion 3 times a day  - Reposition patient every 2 hours  - Dangle patient 3 times a day  - Stand patient 3 times a day  - Ambulate patient 3 times a day  - Out of bed to chair 3 times a day   - Out of bed for meals 3 times a day  - Out of bed for toileting  - Record patient progress and toleration of activity level   Outcome: Progressing     Problem: DISCHARGE PLANNING  Goal: Discharge to home or other facility with appropriate resources  Description: INTERVENTIONS:  - Identify barriers to discharge w/patient and caregiver  - Arrange for needed discharge resources and transportation as appropriate  - Identify discharge learning needs (meds, wound care, etc )  - Arrange for interpretive services to assist at discharge as needed  - Refer to Case Management Department for coordinating discharge planning if the patient needs post-hospital services based on physician/advanced practitioner order or complex needs related to functional status, cognitive ability, or social support system  Outcome: Progressing     Problem: Knowledge Deficit  Goal: Patient/family/caregiver demonstrates understanding of disease process, treatment plan, medications, and discharge instructions  Description: Complete learning assessment and assess knowledge base    Interventions:  - Provide teaching at level of understanding  - Provide teaching via preferred learning methods  Outcome: Progressing     Problem: MOBILITY - ADULT  Goal: Maintain or return to baseline ADL function  Description: INTERVENTIONS:  - Educate patient/family on patient safety including physical limitations  - Instruct patient to call for assistance with activity   - Consult OT/PT to assist with strengthening/mobility   - Keep Call bell within reach  - Keep bed low and locked with side rails adjusted as appropriate  - Keep care items and personal belongings within reach  - Initiate and maintain comfort rounds  - Make Fall Risk Sign visible to staff  - Offer Toileting every 2 Hours, in advance of need  - Initiate/Maintain fall alarm  - Obtain necessary fall risk management equipment: fall alarm  - Apply yellow socks and bracelet for high fall risk patients  - Consider moving patient to room near nurses station  Outcome: Progressing  Goal: Maintains/Returns to pre admission functional level  Description: INTERVENTIONS:  - Perform BMAT or MOVE assessment daily    - Set and communicate daily mobility goal to care team and patient/family/caregiver  - Collaborate with rehabilitation services on mobility goals if consulted  - Perform Range of Motion 3 times a day  - Reposition patient every 2 hours    - Dangle patient 3 times a day  - Stand patient 3 times a day  - Ambulate patient 3 times a day  - Out of bed to chair 3 times a day   - Out of bed for meals 3 times a day  - Out of bed for toileting  - Record patient progress and toleration of activity level   Outcome: Progressing     Problem: Prexisting or High Potential for Compromised Skin Integrity  Goal: Skin integrity is maintained or improved  Description: INTERVENTIONS:  - Identify patients at risk for skin breakdown  - Assess and monitor skin integrity  - Assess and monitor nutrition and hydration status  - Monitor labs   - Assess for incontinence   - Turn and reposition patient  - Assist with mobility/ambulation  - Relieve pressure over bony prominences  - Avoid friction and shearing  - Provide appropriate hygiene as needed including keeping skin clean and dry  - Evaluate need for skin moisturizer/barrier cream  - Collaborate with interdisciplinary team   - Patient/family teaching  - Consider wound care consult   Outcome: Progressing     Problem: RESPIRATORY - ADULT  Goal: Achieves optimal ventilation and oxygenation  Description: INTERVENTIONS:  - Assess for changes in respiratory status  - Assess for changes in mentation and behavior  - Position to facilitate oxygenation and minimize respiratory effort  - Oxygen administered by appropriate delivery if ordered  - Initiate smoking cessation education as indicated  - Encourage broncho-pulmonary hygiene including cough, deep breathe, Incentive Spirometry  - Assess the need for suctioning and aspirate as needed  - Assess and instruct to report SOB or any respiratory difficulty  - Respiratory Therapy support as indicated  Outcome: Progressing

## 2021-12-26 NOTE — ASSESSMENT & PLAN NOTE
· Became hypoxic 12/25 AM, CXR obtained demonstrating right pneumothorax  · 16 Fr   CT placed   · Follow up CXR demonstrated improvement but not complete resolution  · Continue CT to -20cm wall suction  · Monitor air leak  · Repeat CXR pending this AM

## 2021-12-26 NOTE — ASSESSMENT & PLAN NOTE
· Takes enalapril at home - was being maintained on Lisinopril 20mg, now discontinued  · Consider resuming today  · Monitor BP

## 2021-12-26 NOTE — ASSESSMENT & PLAN NOTE
· Continue home PRN Xanax  · Zoloft appears to be on patient's home medication list, however patient has not been taking

## 2021-12-26 NOTE — RESPIRATORY THERAPY NOTE
12/26/21 0432   Non-Invasive Information   O2 Interface Device HFNC prongs   Non-Invasive Ventilation Mode HFNC (High flow)   SpO2 96 %   $ Pulse Oximetry Spot Check Charge Completed   Non-Invasive Settings   FiO2 (%) 90   Flow (lpm) 55   Temperature (Set) 31   Non-Invasive Readings   Heater Temperature (Obs) 31   Skin Intervention Skin intact   Maintenance   Water bag changed Yes

## 2021-12-26 NOTE — RESPIRATORY THERAPY NOTE
12/25/21 2038   Non-Invasive Information   O2 Interface Device HFNC prongs   Non-Invasive Ventilation Mode HFNC (High flow)   SpO2 92 %   $ Pulse Oximetry Spot Check Charge Completed   Non-Invasive Settings   FiO2 (%) 100   Flow (lpm) 55   Temperature (Set) 31   Non-Invasive Readings   Heater Temperature (Obs) 31   Skin Intervention Skin intact   Maintenance   Water bag changed No

## 2021-12-26 NOTE — ASSESSMENT & PLAN NOTE
Lab Results   Component Value Date    HGBA1C 6 2 (H) 11/22/2021       Recent Labs     12/25/21  1401 12/25/21  1617 12/25/21 2006 12/25/21 2053   POCGLU 171* 263* 362* 391*       Blood Sugar Average: Last 72 hrs:  (P) 217 [id number]     · Takes metformin, Januvia and glipizide at home  · Transitioned to Lantus and SSI, may require increase in dosing/algorithm today  · Monitor glucose  · Diabetic diet

## 2021-12-26 NOTE — ASSESSMENT & PLAN NOTE
Lab Results   Component Value Date    EGFR 31 12/25/2021    EGFR 27 12/24/2021    EGFR 25 12/23/2021    CREATININE 1 54 (H) 12/25/2021    CREATININE 1 72 (H) 12/24/2021    CREATININE 1 83 (H) 12/23/2021     · Creatinine 1 56 on admission  · Baseline creatinine appears to be around 1 2 - 1 6   · Avoid nephrotoxic agents and hypotension  · Trend renal indices  · Strict I&O  · Daily weights

## 2021-12-26 NOTE — RESPIRATORY THERAPY NOTE
12/25/21 7810   Non-Invasive Information   O2 Interface Device HFNC prongs   Non-Invasive Ventilation Mode HFNC (High flow)   SpO2 95 %   $ Pulse Oximetry Spot Check Charge Completed   Non-Invasive Settings   FiO2 (%) 100   Flow (lpm) 55   Temperature (Set) 31   Non-Invasive Readings   Heater Temperature (Obs) 31   Skin Intervention Skin intact

## 2021-12-27 ENCOUNTER — APPOINTMENT (INPATIENT)
Dept: RADIOLOGY | Facility: HOSPITAL | Age: 81
DRG: 871 | End: 2021-12-27
Payer: COMMERCIAL

## 2021-12-27 LAB
ALBUMIN SERPL BCP-MCNC: 3.2 G/DL (ref 3.5–5)
ALP SERPL-CCNC: 66 U/L (ref 34–104)
ALT SERPL W P-5'-P-CCNC: 18 U/L (ref 7–52)
ANION GAP SERPL CALCULATED.3IONS-SCNC: 7 MMOL/L (ref 4–13)
AST SERPL W P-5'-P-CCNC: 12 U/L (ref 13–39)
BILIRUB SERPL-MCNC: 0.45 MG/DL (ref 0.2–1)
BUN SERPL-MCNC: 54 MG/DL (ref 5–25)
CALCIUM ALBUM COR SERPL-MCNC: 9.7 MG/DL (ref 8.3–10.1)
CALCIUM SERPL-MCNC: 9.1 MG/DL (ref 8.4–10.2)
CHLORIDE SERPL-SCNC: 103 MMOL/L (ref 96–108)
CO2 SERPL-SCNC: 24 MMOL/L (ref 21–32)
CREAT SERPL-MCNC: 1.33 MG/DL (ref 0.6–1.3)
ERYTHROCYTE [DISTWIDTH] IN BLOOD BY AUTOMATED COUNT: 13 % (ref 11.6–15.1)
GFR SERPL CREATININE-BSD FRML MDRD: 37 ML/MIN/1.73SQ M
GLUCOSE SERPL-MCNC: 349 MG/DL (ref 65–140)
GLUCOSE SERPL-MCNC: 357 MG/DL (ref 65–140)
GLUCOSE SERPL-MCNC: 364 MG/DL (ref 65–140)
GLUCOSE SERPL-MCNC: 366 MG/DL (ref 65–140)
GLUCOSE SERPL-MCNC: 407 MG/DL (ref 65–140)
HCT VFR BLD AUTO: 30.4 % (ref 34.8–46.1)
HGB BLD-MCNC: 9.9 G/DL (ref 11.5–15.4)
MAGNESIUM SERPL-MCNC: 2.4 MG/DL (ref 1.9–2.7)
MCH RBC QN AUTO: 27.3 PG (ref 26.8–34.3)
MCHC RBC AUTO-ENTMCNC: 32.6 G/DL (ref 31.4–37.4)
MCV RBC AUTO: 84 FL (ref 82–98)
PHOSPHATE SERPL-MCNC: 3.4 MG/DL (ref 2.3–4.1)
PLATELET # BLD AUTO: 433 THOUSANDS/UL (ref 149–390)
PMV BLD AUTO: 9.3 FL (ref 8.9–12.7)
POTASSIUM SERPL-SCNC: 4.9 MMOL/L (ref 3.5–5.3)
PROT SERPL-MCNC: 6.6 G/DL (ref 6.4–8.4)
RBC # BLD AUTO: 3.63 MILLION/UL (ref 3.81–5.12)
SODIUM SERPL-SCNC: 134 MMOL/L (ref 135–147)
WBC # BLD AUTO: 14.11 THOUSAND/UL (ref 4.31–10.16)

## 2021-12-27 PROCEDURE — 82948 REAGENT STRIP/BLOOD GLUCOSE: CPT

## 2021-12-27 PROCEDURE — 94760 N-INVAS EAR/PLS OXIMETRY 1: CPT

## 2021-12-27 PROCEDURE — 80053 COMPREHEN METABOLIC PANEL: CPT | Performed by: NURSE PRACTITIONER

## 2021-12-27 PROCEDURE — 99233 SBSQ HOSP IP/OBS HIGH 50: CPT | Performed by: ANESTHESIOLOGY

## 2021-12-27 PROCEDURE — 83735 ASSAY OF MAGNESIUM: CPT | Performed by: NURSE PRACTITIONER

## 2021-12-27 PROCEDURE — 85027 COMPLETE CBC AUTOMATED: CPT | Performed by: NURSE PRACTITIONER

## 2021-12-27 PROCEDURE — 84100 ASSAY OF PHOSPHORUS: CPT | Performed by: NURSE PRACTITIONER

## 2021-12-27 PROCEDURE — 71045 X-RAY EXAM CHEST 1 VIEW: CPT

## 2021-12-27 RX ORDER — INSULIN GLARGINE 100 [IU]/ML
20 INJECTION, SOLUTION SUBCUTANEOUS EVERY 12 HOURS SCHEDULED
Status: DISCONTINUED | OUTPATIENT
Start: 2021-12-27 | End: 2021-12-28

## 2021-12-27 RX ORDER — LISINOPRIL 20 MG/1
20 TABLET ORAL DAILY
Status: DISCONTINUED | OUTPATIENT
Start: 2021-12-27 | End: 2022-01-01

## 2021-12-27 RX ORDER — BISACODYL 10 MG
10 SUPPOSITORY, RECTAL RECTAL DAILY PRN
Status: DISCONTINUED | OUTPATIENT
Start: 2021-12-27 | End: 2022-01-12 | Stop reason: HOSPADM

## 2021-12-27 RX ORDER — POLYETHYLENE GLYCOL 3350 17 G/17G
17 POWDER, FOR SOLUTION ORAL DAILY
Status: DISCONTINUED | OUTPATIENT
Start: 2021-12-27 | End: 2022-01-12 | Stop reason: HOSPADM

## 2021-12-27 RX ORDER — SENNOSIDES 8.6 MG
1 TABLET ORAL
Status: DISCONTINUED | OUTPATIENT
Start: 2021-12-27 | End: 2022-01-12 | Stop reason: HOSPADM

## 2021-12-27 RX ADMIN — FERROUS SULFATE TAB 325 MG (65 MG ELEMENTAL FE) 325 MG: 325 (65 FE) TAB at 09:08

## 2021-12-27 RX ADMIN — ATORVASTATIN CALCIUM 40 MG: 40 TABLET, FILM COATED ORAL at 22:16

## 2021-12-27 RX ADMIN — INSULIN LISPRO 4 UNITS: 100 INJECTION, SOLUTION INTRAVENOUS; SUBCUTANEOUS at 09:08

## 2021-12-27 RX ADMIN — INSULIN LISPRO 6 UNITS: 100 INJECTION, SOLUTION INTRAVENOUS; SUBCUTANEOUS at 11:50

## 2021-12-27 RX ADMIN — DOCUSATE SODIUM 100 MG: 100 CAPSULE, LIQUID FILLED ORAL at 09:08

## 2021-12-27 RX ADMIN — HEPARIN SODIUM 5000 UNITS: 5000 INJECTION INTRAVENOUS; SUBCUTANEOUS at 13:43

## 2021-12-27 RX ADMIN — INSULIN LISPRO 4 UNITS: 100 INJECTION, SOLUTION INTRAVENOUS; SUBCUTANEOUS at 09:07

## 2021-12-27 RX ADMIN — HEPARIN SODIUM 5000 UNITS: 5000 INJECTION INTRAVENOUS; SUBCUTANEOUS at 05:19

## 2021-12-27 RX ADMIN — DEXAMETHASONE SODIUM PHOSPHATE 5.48 MG: 4 INJECTION, SOLUTION INTRA-ARTICULAR; INTRALESIONAL; INTRAMUSCULAR; INTRAVENOUS; SOFT TISSUE at 12:11

## 2021-12-27 RX ADMIN — INSULIN GLARGINE 15 UNITS: 100 INJECTION, SOLUTION SUBCUTANEOUS at 09:07

## 2021-12-27 RX ADMIN — HEPARIN SODIUM 5000 UNITS: 5000 INJECTION INTRAVENOUS; SUBCUTANEOUS at 22:17

## 2021-12-27 RX ADMIN — INSULIN LISPRO 6 UNITS: 100 INJECTION, SOLUTION INTRAVENOUS; SUBCUTANEOUS at 22:17

## 2021-12-27 RX ADMIN — LIDOCAINE 1 PATCH: 50 PATCH TOPICAL at 09:08

## 2021-12-27 RX ADMIN — BARICITINIB 2 MG: 2 TABLET, FILM COATED ORAL at 13:43

## 2021-12-27 RX ADMIN — SENNOSIDES 8.6 MG: 8.6 TABLET, FILM COATED ORAL at 22:16

## 2021-12-27 RX ADMIN — INSULIN LISPRO 4 UNITS: 100 INJECTION, SOLUTION INTRAVENOUS; SUBCUTANEOUS at 17:30

## 2021-12-27 RX ADMIN — POLYETHYLENE GLYCOL 3350 17 G: 17 POWDER, FOR SOLUTION ORAL at 12:10

## 2021-12-27 RX ADMIN — LISINOPRIL 20 MG: 20 TABLET ORAL at 09:08

## 2021-12-27 RX ADMIN — INSULIN GLARGINE 20 UNITS: 100 INJECTION, SOLUTION SUBCUTANEOUS at 22:16

## 2021-12-27 RX ADMIN — INSULIN LISPRO 6 UNITS: 100 INJECTION, SOLUTION INTRAVENOUS; SUBCUTANEOUS at 16:31

## 2021-12-27 RX ADMIN — INSULIN LISPRO 4 UNITS: 100 INJECTION, SOLUTION INTRAVENOUS; SUBCUTANEOUS at 12:12

## 2021-12-27 RX ADMIN — DEXAMETHASONE SODIUM PHOSPHATE 5.48 MG: 4 INJECTION, SOLUTION INTRA-ARTICULAR; INTRALESIONAL; INTRAMUSCULAR; INTRAVENOUS; SOFT TISSUE at 22:17

## 2021-12-27 NOTE — ASSESSMENT & PLAN NOTE
· Presented on 12/16 with fever, chills, shortness of breath, cough  · COVID-19 positive on 12/16  · Unvaccinated  · Transferred on 12/21 to ICU 2/2 escalating O2 requirements  · Severe pathway:  · Decadron 0 1 mg/kg D6, total D12  · Baricitinib D8 - maintain lower dose 2/2 CKD3b with decreased GFR  · Remdesivir completed  · Prophylactic heparin  · Statin  · Ceftriaxone/Doxy-  DC'd procalcitonin negative x2  · Encourage side-lying/prone positioning  · Encourage I/S, aggressive pulmonary hygiene  · Titrate oxygen to maintain SpO2 > 88% - currently requiring HFNC 80%/55L  · Trend CRP as needed

## 2021-12-27 NOTE — ASSESSMENT & PLAN NOTE
Lab Results   Component Value Date    HGBA1C 6 2 (H) 11/22/2021       Recent Labs     12/26/21  1553 12/26/21  2035 12/27/21  0906 12/27/21  1114   POCGLU 348* 422* 364* 407*       Blood Sugar Average: Last 72 hrs:  (P) 834 [id number]     · Takes metformin, Januvia and glipizide at home  · Transitioned to Lantus and SSI-increased sliding scale to Algorithm 5 and lantus to 20 units BID  · Monitor glucose  · Diabetic diet

## 2021-12-27 NOTE — RESPIRATORY THERAPY NOTE
12/27/21 1302   Non-Invasive Information   O2 Interface Device HFNC prongs   Non-Invasive Ventilation Mode HFNC (High flow)   SpO2 (!) 88 %   $ Pulse Oximetry Spot Check Charge Completed   Non-Invasive Settings   FiO2 (%) 70   Flow (lpm) 50   Temperature (Set) 31   Non-Invasive Readings   Heater Temperature (Obs) 31   Skin Intervention Skin intact

## 2021-12-27 NOTE — ASSESSMENT & PLAN NOTE
· Takes enalapril at home - was being maintained on Lisinopril 20mg, discontinued but will resume today  · Monitor BP

## 2021-12-27 NOTE — RESPIRATORY THERAPY NOTE
12/27/21 1633   Non-Invasive Information   O2 Interface Device HFNC prongs   Non-Invasive Ventilation Mode HFNC (High flow)   SpO2 94 %   $ Pulse Oximetry Spot Check Charge Completed   Non-Invasive Settings   FiO2 (%) 65   Flow (lpm) 50   Temperature (Set) 31   Non-Invasive Readings   Heater Temperature (Obs) 31   Skin Intervention Skin intact

## 2021-12-27 NOTE — RESPIRATORY THERAPY NOTE
12/27/21 0810   Non-Invasive Information   O2 Interface Device HFNC prongs   Non-Invasive Ventilation Mode HFNC (High flow)   SpO2 95 %   $ Pulse Oximetry Spot Check Charge Completed   Non-Invasive Settings   FiO2 (%) 75  (decreased to 70)   Flow (lpm) 55  (decreased to 50)   Temperature (Set) 31   Non-Invasive Readings   Heater Temperature (Obs) 31   Skin Intervention Skin intact   Maintenance   Water bag changed Yes

## 2021-12-27 NOTE — ASSESSMENT & PLAN NOTE
Lab Results   Component Value Date    HGBA1C 6 2 (H) 11/22/2021       Recent Labs     12/25/21 2053 12/26/21  1022 12/26/21  1553 12/26/21 2035   POCGLU 391* 384* 348* 422*       Blood Sugar Average: Last 72 hrs:  (P) 106 [id number]     · Takes metformin, Januvia and glipizide at home  · Transitioned to Lantus and SSI, may require increase in dosing/algorithm today  · Monitor glucose  · Diabetic diet

## 2021-12-27 NOTE — PLAN OF CARE
Problem: INFECTION - ADULT  Goal: Absence or prevention of progression during hospitalization  Description: INTERVENTIONS:  - Assess and monitor for signs and symptoms of infection  - Monitor lab/diagnostic results  - Monitor all insertion sites, i e  indwelling lines, tubes, and drains  - Monitor endotracheal if appropriate and nasal secretions for changes in amount and color  - Stanton appropriate cooling/warming therapies per order  - Administer medications as ordered  - Instruct and encourage patient and family to use good hand hygiene technique  - Identify and instruct in appropriate isolation precautions for identified infection/condition  Outcome: Progressing     Problem: SAFETY ADULT  Goal: Maintain or return to baseline ADL function  Description: INTERVENTIONS:  - Educate patient/family on patient safety including physical limitations  - Instruct patient to call for assistance with activity   - Consult OT/PT to assist with strengthening/mobility   - Keep Call bell within reach  - Keep bed low and locked with side rails adjusted as appropriate  - Keep care items and personal belongings within reach  - Initiate and maintain comfort rounds  - Make Fall Risk Sign visible to staff  - Offer Toileting every 2 Hours, in advance of need  - Initiate/Maintain fall alarm  - Obtain necessary fall risk management equipment: fall alarm  - Apply yellow socks and bracelet for high fall risk patients  - Consider moving patient to room near nurses station  Outcome: Progressing     Problem: MOBILITY - ADULT  Goal: Maintain or return to baseline ADL function  Description: INTERVENTIONS:  - Educate patient/family on patient safety including physical limitations  - Instruct patient to call for assistance with activity   - Consult OT/PT to assist with strengthening/mobility   - Keep Call bell within reach  - Keep bed low and locked with side rails adjusted as appropriate  - Keep care items and personal belongings within reach  - Initiate and maintain comfort rounds  - Make Fall Risk Sign visible to staff  - Offer Toileting every 2 Hours, in advance of need  - Initiate/Maintain fall alarm  - Obtain necessary fall risk management equipment: fall alarm  - Apply yellow socks and bracelet for high fall risk patients  - Consider moving patient to room near nurses station  Outcome: Progressing

## 2021-12-27 NOTE — ASSESSMENT & PLAN NOTE
· Hemoglobin 10 8 on admission, currently 10 9  · Likely multifactorial in setting of iron deficiency anemia vs CKD  · Baseline appears to be around 11 5 - 13  · Concurrently with no signs of active bleeding  · Continue home iron supplementation  · Transfuse for hemoglobin < 7 or with hemodynamic compromise  · Trend hemoglobin and monitor for signs of active bleeding

## 2021-12-27 NOTE — UTILIZATION REVIEW
Continued Stay Review    Date: 12/26/21                          Current Patient Class: inpatient  Current Level of Care: ICU    HPI:81 y o  female initially admitted on 12/17/21     Assessment/Plan: Overnight Events: Patient found to have a right sided ptx after acute SOB last night  Chest tube placed this am   Acute hypoxic resp failure 2/2 COVID requiring escalating oxygen requirements  On HFNC this am 80% 55L/min  Continue decadron (day 6), barcitinib (day 7)  Remdesivir completed -400cc on her own  Diuresis was held 12/25 2/2 FERCHO  Continue to hold diuresis today  CR now at baseline 1 3  Ptx: chest tube placed on right for pneumothorax which has now resolved on today's CXR  Daily CXR  T2DM: Dc'd insulin gtt  1/2 blood cultures positive 12/22 likely a contaminant: will resend cultures  No indication for abx at this time: no worsening WBC, no fevers, no change in quantity or color of sputum production  Continue ICU level of care      Vital Signs:   Date/Time Temp Pulse Resp BP MAP (mmHg) SpO2 FiO2 (%) Calculated FIO2 (%) - Nasal Cannula O2 Flow Rate (L/min) Nasal Cannula O2 Flow Rate (L/min) O2 Device O2 Interface Device Patient Position - Orthostatic VS   12/27/21 0900 98 °F (36 7 °C) 60 17 145/66 95 93 % 70 -- 50 L/min -- High flow nasal cannula -- --   12/27/21 0810 -- -- -- -- -- 95 % 70  -- 50 L/min  -- High flow nasal cannula HFNC prongs --   12/27/21 0800 -- 59 19 126/60 86 95 % -- -- -- -- -- -- --   12/27/21 0700 98 4 °F (36 9 °C) 62 18 130/63 90 94 % -- -- -- -- -- -- --   12/27/21 0600 -- 58 19 125/59 85 95 % 75 -- 55 L/min -- High flow nasal cannula -- Lying   12/27/21 0500 -- 70 15 148/71 102 94 % 75 -- 55 L/min -- High flow nasal cannula -- Lying   12/27/21 0400 98 6 °F (37 °C) 60 12 120/58 84 95 % 75 -- 55 L/min -- High flow nasal cannula -- Lying   12/27/21 0300 -- 60 14 135/65 93 96 % 80 -- 55 L/min -- High flow nasal cannula -- Lying   12/27/21 0200 -- 61 14 132/64 92 95 % 80 -- 55 L/min -- High flow nasal cannula -- Lying   12/27/21 0100 -- 61 28 Abnormal  141/69 99 98 % 80 -- 55 L/min -- High flow nasal cannula -- Lying   12/27/21 0000 98 5 °F (36 9 °C) 80 32 Abnormal  168/79 114 93 % 80 -- 55 L/min -- High flow nasal cannula -- Lying   12/26/21 2300 -- 77 29 Abnormal  183/80 Abnormal  115 96 % 80 -- 55 L/min -- High flow nasal cannula -- Lying   12/26/21 2200 -- 80 23 Abnormal  160/72 104 94 % 80 -- 55 L/min -- High flow nasal cannula -- Lying   12/26/21 2100 -- 86 24 Abnormal  147/68 98 92 % 80 -- 55 L/min -- High flow nasal cannula -- Lying   12/26/21 2000 97 9 °F (36 6 °C) 82 29 Abnormal  161/68 98 94 % 80 -- 55 L/min -- High flow nasal cannula -- Lying   12/26/21 1900 -- 80 30 Abnormal  134/64 92 91 % -- -- -- -- -- -- --   12/26/21 1800 -- 76 26 Abnormal  146/70 101 96 % -- -- -- -- -- -- --   12/26/21 1747 -- -- -- -- -- 95 % 80 -- 55 L/min -- High flow nasal cannula HFNC prongs --   12/26/21 1700 -- 67 27 Abnormal  159/72 103 95 % -- -- -- -- -- -- --   12/26/21 1600 -- 69 34 Abnormal  158/74 106 94 % -- -- -- -- High flow nasal cannula -- --   12/26/21 1500 98 6 °F (37 °C) 67 17 133/65 93 93 % 80 -- 55 L/min -- High flow nasal cannula -- Lying   12/26/21 1400 -- 74 26 Abnormal  120/60 86 91 % -- -- -- -- -- -- --   12/26/21 1300 -- 86 36 Abnormal  140/68 98 86 % Abnormal  -- -- -- -- -- -- --   12/26/21 1207 -- -- -- -- -- 91 % 80 -- 55 L/min -- High flow nasal cannula HFNC prongs --   12/26/21 1200 -- 84 37 Abnormal  -- -- 89 % Abnormal  -- -- -- -- -- -- --   12/26/21 1100 98 6 °F (37 °C) 75 31 Abnormal  148/66 95 94 % 80 -- 55 L/min -- High flow nasal cannula -- Lying   12/26/21 1000 -- 68 27 Abnormal  130/63 91 93 % -- -- -- -- -- -- --   12/26/21 0900 -- 79 48 Abnormal  144/66 95 91 % -- -- -- -- -- -- --   12/26/21 0844 -- -- -- -- -- 91 % 80 -- 55 L/min -- High flow nasal cannula HFNC prongs --   12/26/21 0800 97 4 °F (36 3 °C) Abnormal  63 20 146/70 101 95 % 80 -- 55 L/min -- High flow nasal cannula -- Lying   12/26/21 0700 -- 62 21 130/64 89 94 % -- -- -- -- -- -- --   12/26/21 0600 -- 67 33 Abnormal  166/74 107 93 % -- -- -- -- -- -- --   12/26/21 0432 -- -- -- -- -- 96 % 90 -- 55 L/min -- High flow nasal cannula HFNC prongs --   12/26/21 0400 98 6 °F (37 °C) 75 14 112/57 79 94 % -- -- -- -- -- -- --   12/26/21 0000 98 5 °F (36 9 °C) 73 14 114/59 84 95 % -- -- -- -- High flow nasal cannula -- Lying   12/25/21 2356 -- -- -- -- -- 95 % 100 -- 55 L/min -- High flow nasal cannula HFNC prongs --   12/25/21 2038 -- -- -- -- -- 92 % 100 -- 55 L/min -- High flow nasal cannula HFNC prongs --   12/25/21 2000 98 1 °F (36 7 °C) 70 37 Abnormal  155/73 105 96 % 100 240 -- 55 L/min High flow nasal cannula -- Lying   12/25/21 1800 -- 90 44 Abnormal  143/67 97 88 % Abnormal  -- -- -- -- -- -- --   12/25/21 1700 -- 77 32 Abnormal  149/65 93 97 % -- -- -- -- -- -- --   12/25/21 1600 -- 77 30 Abnormal  138/63 90 93 % -- -- -- -- -- -- --   12/25/21 1532 -- -- -- -- -- 92 % 100 -- 55 L/min -- High flow nasal cannula HFNC prongs --   12/25/21 1500 98 1 °F (36 7 °C) 75 22 114/59 84 95 % 100 -- 55 L/min -- High flow nasal cannula -- Lying   12/25/21 1400 -- 87 32 Abnormal  125/59 85 92 % -- -- -- -- -- -- --   12/25/21 1300 -- 85 37 Abnormal  124/67 88 93 % -- -- -- -- -- -- --   12/25/21 1200 99 2 °F (37 3 °C) 83 19 104/56 75 95 % 100 -- 55 L/min -- High flow nasal cannula -- Lying   12/25/21 1137 -- -- -- -- -- 94 % 100 -- 55 L/min -- High flow nasal cannula HFNC prongs --   12/25/21 1030 -- 85 36 Abnormal  124/61 86 94 % -- -- -- -- -- -- --   12/25/21 1000 -- 86 32 Abnormal  129/62 89 94 % -- -- -- -- -- -- --   12/25/21 0930 -- 90 30 Abnormal  143/65 97 90 % -- -- -- -- -- -- --   12/25/21 0900 -- 89 33 Abnormal  144/65 93 84 % Abnormal  -- -- -- -- -- -- --   12/25/21 0830 -- 97 33 Abnormal  149/70 101 82 % Abnormal  -- -- -- -- -- -- --   12/25/21 0815 -- -- -- -- -- 83 % Abnormal  100 -- 55 L/min  -- High flow nasal cannula HFNC prongs --   12/25/21 0800 99 5 °F (37 5 °C) 87 25 Abnormal  128/61 88 79 % Abnormal  100 -- 55 L/min -- High flow nasal cannula  -- Lying   12/25/21 0500 -- -- -- -- -- -- 75 -- 45 L/min -- High flow nasal cannula HFNC prongs  --   12/25/21 0400 98 1 °F (36 7 °C) 63 15 141/73 103 93 % -- -- -- -- -- -- --   12/25/21 0200 -- 59 24 Abnormal  -- -- 94 % 75 -- 45 L/min -- High flow nasal cannula HFNC prongs  --   12/25/21 0000 97 6 °F (36 4 °C) 65 19 183/86 Abnormal  123 97 % -- -- -- -- -- -- Lying     Pertinent Labs/Diagnostic Results:     Results from last 7 days   Lab Units 12/27/21  0525 12/26/21  1117 12/26/21  0555 12/25/21  0455 12/24/21  0429 12/22/21  0546   WBC Thousand/uL 14 11* 15 15* 14 93* 15 31* 12 66* 8 37   HEMOGLOBIN g/dL 9 9* 10 9* 10 4* 12 5 11 5 10 4*   HEMATOCRIT % 30 4* 34 2* 32 2* 38 4 35 8 31 5*   PLATELETS Thousands/uL 433* 444* 432* 497* 450* 283   NEUTROS ABS Thousands/µL  --   --  12 89*  --   --  6 85   BANDS PCT %  --   --   --   --  5  --      Results from last 7 days   Lab Units 12/27/21  0525 12/26/21  0555 12/25/21  0455 12/24/21  0429 12/23/21  0509 12/22/21  0546   SODIUM mmol/L 134* 135 138 141 140 140   POTASSIUM mmol/L 4 9 4 8 4 2 4 1 4 0 3 9   CHLORIDE mmol/L 103 104 104 106 102 105   CO2 mmol/L 24 25 25 25 26 26   ANION GAP mmol/L 7 6 9 10 12 9   BUN mg/dL 54* 54* 68* 71* 58* 48*   CREATININE mg/dL 1 33* 1 31* 1 54* 1 72* 1 83* 1 63*   EGFR ml/min/1 73sq m 37 38 31 27 25 29   CALCIUM mg/dL 9 1 8 9 9 8 9 6 9 7 9 4   MAGNESIUM mg/dL 2 4 2 5 2 5 2 6  --  1 9   PHOSPHORUS mg/dL 3 4 4 3* 3 9 4 5*  --  2 8     Results from last 7 days   Lab Units 12/27/21  0525 12/26/21  0555 12/25/21  0455 12/24/21  0429 12/22/21  0546   AST U/L 12* 16 21 20 16   ALT U/L 18 18 21 19 14   ALK PHOS U/L 66 64 74 70 66   TOTAL PROTEIN g/dL 6 6 6 6 7 8 7 5 7 2   ALBUMIN g/dL 3 2* 3 3* 3 9 3 7 3 6   TOTAL BILIRUBIN mg/dL 0 45 0 72 0 60 0 48 0 60     Results from last 7 days   Lab Units 12/27/21  0906 12/26/21  2035 12/26/21  1553 12/26/21  1022 12/25/21  2053 12/25/21  2006 12/25/21  1617 12/25/21  1401 12/25/21  1235 12/25/21  0951 12/25/21  0823 12/25/21  0611   POC GLUCOSE mg/dl 364* 422* 348* 384* 391* 362* 263* 171* 122 139 122 95     Results from last 7 days   Lab Units 12/27/21  0525 12/26/21  0555 12/25/21  0455 12/24/21  0429 12/23/21  0509 12/22/21  0546 12/21/21  0435   GLUCOSE RANDOM mg/dL 349* 304* 82 97 100 188* 381*     Results from last 7 days   Lab Units 12/25/21  1258 12/21/21  1221   D-DIMER QUANTITATIVE ug/ml FEU 0 63* 0 93*     Results from last 7 days   Lab Units 12/21/21  1133   PROTIME seconds 14 9*   INR  1 18   PTT seconds 46*     Results from last 7 days   Lab Units 12/22/21  0546 12/21/21  1133   PROCALCITONIN ng/ml 0 21 0 09     Results from last 7 days   Lab Units 12/25/21  1258 12/22/21  0546 12/21/21  0435   CRP mg/L 37 8* 116 0* 57 8*     Results from last 7 days   Lab Units 12/26/21  1156 12/22/21  1043   BLOOD CULTURE  Received in Microbiology Lab  Culture in Progress  Received in Microbiology Lab  Culture in Progress  No Growth After 4 Days    Staphylococcus aureus*   GRAM STAIN RESULT   --  Gram positive cocci in clusters*     Medications:   Scheduled Medications:  atorvastatin, 40 mg, Oral, HS  Baricitinib, 2 mg, Oral, Q24H  dexamethasone, 0 1 mg/kg, Intravenous, Q12H  docusate sodium, 100 mg, Oral, Daily  ferrous sulfate, 325 mg, Oral, Daily With Breakfast  heparin (porcine), 5,000 Units, Subcutaneous, Q8H JUAN ANTONIO  insulin glargine, 15 Units, Subcutaneous, Q12H JUAN ANTONIO  insulin lispro, 1-5 Units, Subcutaneous, HS  insulin lispro, 1-5 Units, Subcutaneous, TID AC  insulin lispro, 4 Units, Subcutaneous, Daily With Breakfast  insulin lispro, 4 Units, Subcutaneous, Daily With Lunch  insulin lispro, 4 Units, Subcutaneous, Daily With Dinner  lidocaine, 1 patch, Topical, Daily  lisinopril, 20 mg, Oral, Daily      Continuous IV Infusions:      insulin regular (HumuLIN R,NovoLIN R) 1 Units/mL in sodium chloride 0 9 % 100 mL infusion  Rate: 0 3-21 mL/hr Dose: 0 3-21 Units/hr  Freq: Titrated Route: IV  Last Dose: Stopped (12/25/21 1835)  Start: 12/21/21 1800 End: 12/25/21 1700    PRN Meds:  acetaminophen, 650 mg, Oral, Q4H PRN x2 thus far  ALPRAZolam, 0 25 mg, Oral, HS PRN  ondansetron, 4 mg, Intravenous, Q6H PRN x6 thus far  oxyCODONE, 2 5 mg, Oral, Q4H PRN x2 thus far        Discharge Plan: d    Network Utilization Review Department  ATTENTION: Please call with any questions or concerns to [phone] and carefully listen to the prompts so that you are directed to the right person  All voicemails are confidential   Harlo Large all requests for admission clinical reviews, approved or denied determinations and any other requests to dedicated fax number below belonging to the campus where the patient is receiving treatment   List of dedicated fax numbers for the Facilities:  [address] DENIALS (Administrative/Medical Necessity) [phone]   [address] (Maternity/NICU/Pediatrics) [phone] [address]  [address] Medical [place] [address] [address] [place] [address] [address]antha Tere[address] [facility] [place] [address] [phone]

## 2021-12-27 NOTE — ASSESSMENT & PLAN NOTE
· Became hypoxic 12/25 AM, CXR obtained demonstrating right pneumothorax  · 16 Fr   CT placed   · Follow up CXR demonstrated improvement in pneumothorax  · Can consider trialing chest tube to water seal today

## 2021-12-27 NOTE — QUICK NOTE
Called and updated family  ( spouse and daughter) on past 24 hours events and current treatment plan  All questions answered to their satisfaction

## 2021-12-27 NOTE — ASSESSMENT & PLAN NOTE
Lab Results   Component Value Date    EGFR 38 12/26/2021    EGFR 31 12/25/2021    EGFR 27 12/24/2021    CREATININE 1 31 (H) 12/26/2021    CREATININE 1 54 (H) 12/25/2021    CREATININE 1 72 (H) 12/24/2021     · Creatinine 1 56 on admission  · Baseline creatinine appears to be around 1 2 - 1 6   · Avoid nephrotoxic agents and hypotension  · Trend renal indices  · Strict I&O  · Daily weights

## 2021-12-27 NOTE — CASE MANAGEMENT
Case Management Discharge Planning Note    Patient name Sola Sepulveda  Location ICU 04/ICU 04- MRN [id number]  : 1940 Date 2021       Current Admission Date: 2021  Current Admission Diagnosis:COVID-19   Patient Active Problem List    Diagnosis Date Noted    Pneumothorax 2021    Chronic kidney disease, stage 3b ([facility] ) 2021    Anemia 2021    SIRS [facility]ic inflammatory response syndrome) [facility] 2021    Ambulatory dysfunction 2021    COVID-19 2021    Acute respiratory failure with hypoxia ([facility] ) 2021    Negative depression screening 2021    Overweight (BMI 25 0-29 9) 2021    Medicare annual wellness visit, subsequent 2020    Localized, primary osteoarthritis of hand 2020    Refused influenza vaccine 2020    Sciatica 2020    Hypertensive kidney disease with chronic kidney disease stage III ([facility] ) 2019    Type 2 diabetes mellitus without complication, without long-term current use of insulin ([facility] ) 2019    Bursitis of shoulder 2018    Groin pain, chronic, left 2018    Paresthesia of arm 2017    Back pain 2017    Muscle pain 2017    Anxiety 2017    Abnormal ECG 2016    Diverticulitis large intestine w/o perforation or abscess w/o bleeding 2016    Essential hypertension 2016    Lower abdominal pain 2016    Atrophic vaginitis 2016    Hypercholesterolemia 2016    Irritable bowel syndrome 2016    Simple chronic anemia 2016    Disorder of shoulder 2008    Articular cartilage disorder of shoulder region 2008    Loose body in joint of shoulder region 2008      LOS (days): 10  Geometric Mean LOS (GMLOS) (days): 5 40  Days to GMLOS:-4 5     OBJECTIVE:  Risk of Unplanned Readmission Score: 20         Current admission status: Inpatient   Preferred Pharmacy:   [facility]  [id number]   - [address], [address] [facility]  [place] [address]  Phone: [phone] Fax: [phone]    [address], [facility] [address]  Phone: [phone] Fax: [phone]    Primary Care Provider: Amadeo Varghese DO    Primary Insurance: [facility] REP  Secondary Insurance:     DISCHARGE DETAILS:       Freedom of Choice: Yes  Comments - Freedom of Choice: referrals [facility] for rehab whne pt is stable for d/c                               Other Referral/Resources/Interventions Provided:  Interventions: Short Term Rehab  Referral Comments: [facility] not able to accept,  [address] [facility], [address] [facility] are interested  pt will need authorizatriopnm            Discharge Destination Plan[de-identified] Short Term Rehab  Transport at Discharge : [facility] Ambulance

## 2021-12-27 NOTE — PROGRESS NOTES
Merlin 46  Progress Note - Bryn Alvarez 1940, [de-identified] y o  female MRN: [id number]  Unit/Bed#: ICU 04-01 Encounter: [id number]  Primary Care Provider: Emilee Miller DO   Date and time admitted to hospital: 12/16/2021  4:49 PM    * COVID-19  Assessment & Plan  · Presented on 12/16 with fever, chills, shortness of breath, cough  · COVID-19 positive on 12/16  · Unvaccinated  · Transferred on 12/21 to ICU 2/2 escalating O2 requirements  · Severe pathway:  · Decadron 0 1 mg/kg D6, total D12  · Baricitinib D8 - maintain lower dose 2/2 CKD3b with decreased GFR  · Remdesivir completed  · Prophylactic heparin  · Statin  · Ceftriaxone/Doxy-  DC'd procalcitonin negative x2  · Encourage side-lying/prone positioning  · Encourage I/S, aggressive pulmonary hygiene  · Titrate oxygen to maintain SpO2 > 88% - currently requiring HFNC 80%/55L  · Trend CRP as needed    Acute respiratory failure with hypoxia ([facility] [address] )  Assessment & Plan  · Upgraded to SD1 2/2 increasing O2 requirement  · CXR  reveals RML/LLL infiltrates concerning for superimposed bacterial pneumonia  · Continue COVID-19 protocol as noted above  · Started on IV Ceftriaxone/Doxycycline per COVID-19 protocol- now d/c'd  · Monitor I/O  · Reassess daily for diuresis  · PRN Xopenex nebs  · Titrate O2 to maintain SpO2 > 88%   · Aggressive pulmonary hygiene    SIRS (systemic inflammatory response syndrome) ([facility] [address] )  Assessment & Plan  · As evidenced by tachycardia, tachypnea, hypoxia  · Suspect this is in setting of worsening COVID-19 pneumonia vs superimposed bacterial infection  · Procalcitonin negative  · ABX DC'd  · Trend fever and WBC curve  · Trend procalcitonin    Type 2 diabetes mellitus without complication, without long-term current use of insulin [facility])  Assessment & Plan  Lab Results   Component Value Date    HGBA1C 6 2 (H) 11/22/2021       Recent Labs     12/25/21 2053 12/26/21  1022 12/26/21  1553 12/26/21 2035   POCGLU 391* 384* 348* 422* Blood Sugar Average: Last 72 hrs:  (P) 278 [id number]     · Takes metformin, Januvia and glipizide at home  · Transitioned to Lantus and SSI, may require increase in dosing/algorithm today  · Monitor glucose  · Diabetic diet     Anemia  Assessment & Plan  · Hemoglobin 10 8 on admission, currently 10 9  · Likely multifactorial in setting of iron deficiency anemia vs CKD  · Baseline appears to be around 11 5 - 13  · Concurrently with no signs of active bleeding  · Continue home iron supplementation  · Transfuse for hemoglobin < 7 or with hemodynamic compromise  · Trend hemoglobin and monitor for signs of active bleeding    Chronic kidney disease, stage 3b [facility])  Assessment & Plan  Lab Results   Component Value Date    EGFR 38 12/26/2021    EGFR 31 12/25/2021    EGFR 27 12/24/2021    CREATININE 1 31 (H) 12/26/2021    CREATININE 1 54 (H) 12/25/2021    CREATININE 1 72 (H) 12/24/2021     · Creatinine 1 56 on admission  · Baseline creatinine appears to be around 1 2 - 1 6   · Avoid nephrotoxic agents and hypotension  · Trend renal indices  · Strict I&O  · Daily weights    Pneumothorax  Assessment & Plan  · Became hypoxic 12/25 AM, CXR obtained demonstrating right pneumothorax  · 16 Fr   CT placed   · Follow up CXR demonstrated improvement in pneumothorax  · Can consider trialing chest tube to water seal today    Ambulatory dysfunction  Assessment & Plan  · PT/OT - recommending acute rehab on discharge  · Encourage mobilization    Hypercholesterolemia  Assessment & Plan  · Takes lovastatin at home - continue statin here per COVID protocol    Essential hypertension  Assessment & Plan  · Takes enalapril at home - was being maintained on Lisinopril 20mg, discontinued but will resume today  · Monitor BP    Anxiety  Assessment & Plan  · Continue home PRN Xanax  · Zoloft appears to be on patient's home medication list, however patient has not been taking      ----------------------------------------------------------------------------------------  HPI/24hr events: No acute events overnight  Patient appropriate for transfer out of the ICU today?: No  Disposition: Continue Stepdown Level 1 level of care   Code Status: Level 1 - Full Code  ---------------------------------------------------------------------------------------  SUBJECTIVE  Offers no complaints    Review of Systems   Constitutional: Positive for fatigue  Respiratory: Negative for shortness of breath  All other systems reviewed and are negative  Review of systems was reviewed and negative unless stated above in HPI/24-hour events   ---------------------------------------------------------------------------------------  OBJECTIVE    Vitals   Vitals:    21 0100 21 0200 21 0300 21 0400   BP: 141/69 132/64 135/65 120/58   BP Location: Left arm Left arm Left arm Left arm   Pulse: 61 61 60 60   Resp: (!) 28 14 14 12   Temp:    98 6 °F (37 °C)   TempSrc:    Axillary   SpO2: 98% 95% 96% 95%   Weight:       Height:         Temp (24hrs), Av 3 °F (36 8 °C), Min:97 4 °F (36 3 °C), Max:98 6 °F (37 °C)  Current: Temperature: 98 6 °F (37 °C)          Respiratory:  SpO2: SpO2: 95 %, SpO2 Activity: SpO2 Activity: At Rest, SpO2 Device: O2 Device: High flow nasal cannula  Nasal Cannula O2 Flow Rate (L/min): 55 L/min    Invasive/non-invasive ventilation settings   Respiratory  Report   Lab Data (Last 4 hours)    None         O2/Vent Data (Last 4 hours)       0345          Non-Invasive Ventilation Mode HFNC (High flow)                   Physical Exam  Vitals and nursing note reviewed  Constitutional:       Appearance: She is ill-appearing  HENT:      Head: Normocephalic  Mouth/Throat:      Mouth: Mucous membranes are dry  Pharynx: Oropharynx is clear  Eyes:      Pupils: Pupils are equal, round, and reactive to light     Cardiovascular:      Rate and Rhythm: Normal rate and regular rhythm  Pulses: Normal pulses  Heart sounds: Normal heart sounds  Pulmonary:      Effort: Pulmonary effort is normal       Breath sounds: Decreased breath sounds present  Comments: Right chest tube in place to -20cm suction  Abdominal:      General: Bowel sounds are normal       Palpations: Abdomen is soft  Musculoskeletal:         General: Normal range of motion  Cervical back: Normal range of motion  Skin:     General: Skin is warm and dry  Neurological:      General: No focal deficit present  Mental Status: She is alert and oriented to person, place, and time               Laboratory and Diagnostics:  Results from last 7 days   Lab Units 12/26/21  1117 12/26/21  0555 12/25/21  0455 12/24/21  0429 12/23/21  0509 12/22/21  0546 12/21/21  0435   WBC Thousand/uL 15 15* 14 93* 15 31* 12 66* 13 01*  13 01* 8 37 6 15   HEMOGLOBIN g/dL 10 9* 10 4* 12 5 11 5 11 8  11 8 10 4* 10 7*   HEMATOCRIT % 34 2* 32 2* 38 4 35 8 36 0  36 0 31 5* 32 4*   PLATELETS Thousands/uL 444* 432* 497* 450* 400*  400* 283 234   NEUTROS PCT %  --  87*  --   --   --  81*  --    BANDS PCT %  --   --   --  5  --   --   --    MONOS PCT %  --  3*  --   --   --  4  --    MONO PCT %  --   --   --  4  --   --   --      Results from last 7 days   Lab Units 12/26/21  0555 12/25/21  0455 12/24/21  0429 12/23/21  0509 12/22/21  0546 12/21/21  0435   SODIUM mmol/L 135 138 141 140 140 137   POTASSIUM mmol/L 4 8 4 2 4 1 4 0 3 9 4 5   CHLORIDE mmol/L 104 104 106 102 105 105   CO2 mmol/L 25 25 25 26 26 24   ANION GAP mmol/L 6 9 10 12 9 8   BUN mg/dL 54* 68* 71* 58* 48* 41*   CREATININE mg/dL 1 31* 1 54* 1 72* 1 83* 1 63* 1 34*   CALCIUM mg/dL 8 9 9 8 9 6 9 7 9 4 9 4   GLUCOSE RANDOM mg/dL 304* 82 97 100 188* 381*   ALT U/L 18 21 19  --  14 15   AST U/L 16 21 20  --  16 19   ALK PHOS U/L 64 74 70  --  66 68   ALBUMIN g/dL 3 3* 3 9 3 7  --  3 6 3 7   TOTAL BILIRUBIN mg/dL 0 72 0 60 0 48  --  0 60 0 49 Results from last 7 days   Lab Units 12/26/21  0555 12/25/21  0455 12/24/21  0429 12/22/21  0546   MAGNESIUM mg/dL 2 5 2 5 2 6 1 9   PHOSPHORUS mg/dL 4 3* 3 9 4 5* 2 8      Results from last 7 days   Lab Units 12/21/21  1133   INR  1 18   PTT seconds 46*              ABG:    VBG:    Results from last 7 days   Lab Units 12/22/21  0546 12/21/21  1133   PROCALCITONIN ng/ml 0 21 0 09       Micro  Results from last 7 days   Lab Units 12/26/21  1156 12/22/21  1043   BLOOD CULTURE  Received in Microbiology Lab  Culture in Progress  Received in Microbiology Lab  Culture in Progress  No Growth After 4 Days  Staphylococcus aureus*   GRAM STAIN RESULT   --  Gram positive cocci in clusters*       EKG: normal sinus rhythm  Imaging: I have personally reviewed pertinent reports  and I have personally reviewed pertinent films in PACS    Intake and Output  I/O       12/25 0701  12/26 0700 12/26 0701  12/27 0700    P  O  480 480    I V  (mL/kg) 31 5 (0 5) 10 (0 2)    Total Intake(mL/kg) 511 5 (8 5) 490 (8 2)    Urine (mL/kg/hr) 870 (0 6) 1160 (0 8)    Chest Tube 33 2    Total Output 903 1162    Net -391 5 -672                Height and Weights   Height: 5' (152 4 cm)     Body mass index is 25 88 kg/m²  Weight (last 2 days)     Date/Time Weight    12/26/21 0558 60 1 (132 5)    12/25/21 0532 57 3 (126 32)            Nutrition       Diet Orders   (From admission, onward)             Start     Ordered    12/21/21 1750  Diet Rodríguez/CHO Controlled; Consistent Carbohydrate Diet Level 2 (5 carb servings/75 grams CHO/meal)  Diet effective now        References:    Nutrtion Support Algorithm Enteral vs  Parenteral   Question Answer Comment   Diet Type Rodríguez/CHO Controlled    Rodríguez/CHO Controlled Consistent Carbohydrate Diet Level 2 (5 carb servings/75 grams CHO/meal)    RD to adjust diet per protocol?  Yes        12/21/21 1749                  Active Medications  Scheduled Meds:  Current Facility-Administered Medications   Medication Dose Route Frequency Provider Last Rate    acetaminophen  650 mg Oral Q4H PRN Martha Conway, [place]      ALPRAZolam  0 25 mg Oral HS PRN Debbie Matos, [place]      atorvastatin  40 mg Oral HS Martha Conway, [place]      Baricitinib  2 mg Oral Q24H Martha Conway, [place]      dexamethasone  0 1 mg/kg Intravenous Q12H Martha Conway, [place]      docusate sodium  100 mg Oral Daily Megan Lynn PA-C      ferrous sulfate  325 mg Oral Daily With Breakfast Martha Conway, [place]      heparin (porcine)  5,000 Units Subcutaneous Q8H [address] Martha Conway, [place]      insulin glargine  15 Units Subcutaneous Q12H [address] Sushmagilbert Cano, [place]      insulin lispro  1-5 Units Subcutaneous HS Sushmagilbert Cano, ROSA ISELANP      insulin lispro  1-5 Units Subcutaneous TID AC Sushma JOHNNY Cano, CRNP      insulin lispro  4 Units Subcutaneous Daily With Breakfast Sushma Cano, ROSA ISELANP      insulin lispro  4 Units Subcutaneous Daily With Lunch Sushma Cano, ROSA ISELANP      insulin lispro  4 Units Subcutaneous Daily With Dinner Sia Shelton, [place]      lidocaine  1 patch Topical Daily Sushma Cano, [place]      lisinopril  20 mg Oral Daily [place] Wilson      ondansetron  4 mg Intravenous Q6H PRN Debbie Matos, [place]      oxyCODONE  2 5 mg Oral Q4H PRN [place] Davidson       Continuous Infusions:     PRN Meds:   acetaminophen, 650 mg, Q4H PRN  ALPRAZolam, 0 25 mg, HS PRN  ondansetron, 4 mg, Q6H PRN  oxyCODONE, 2 5 mg, Q4H PRN        Invasive Devices Review  Invasive Devices  Report    Peripheral Intravenous Line            Peripheral IV 12/20/21 Left Forearm 6 days    Peripheral IV 12/21/21 Right;Ventral (anterior) Forearm 5 days          Drain            External Urinary Catheter 4 days    Chest Tube Right 1 day                Rationale for remaining devices: n/a  ---------------------------------------------------------------------------------------  Advance Directive and Living Will:      Power of :    POLST:    ---------------------------------------------------------------------------------------  Care Time Delivered:   Upon my evaluation, this patient had a high probability of imminent or life-threatening deterioration due to acute hypoxic respiratory failure, which required my direct attention, intervention, and personal management  I have personally provided 20 minutes (0340 to 0400) of critical care time, exclusive of procedures, teaching, family meetings, and any prior time recorded by providers other than myself  [place] Olson      Portions of the record may have been created with voice recognition software  Occasional wrong word or "sound a like" substitutions may have occurred due to the inherent limitations of voice recognition software    Read the chart carefully and recognize, using context, where substitutions have occurred

## 2021-12-28 ENCOUNTER — APPOINTMENT (INPATIENT)
Dept: RADIOLOGY | Facility: HOSPITAL | Age: 81
DRG: 871 | End: 2021-12-28
Payer: COMMERCIAL

## 2021-12-28 ENCOUNTER — APPOINTMENT (INPATIENT)
Dept: NON INVASIVE DIAGNOSTICS | Facility: HOSPITAL | Age: 81
DRG: 871 | End: 2021-12-28
Payer: COMMERCIAL

## 2021-12-28 PROBLEM — R78.81 BACTEREMIA DUE TO METHICILLIN SUSCEPTIBLE STAPHYLOCOCCUS AUREUS (MSSA): Status: ACTIVE | Noted: 2021-12-28

## 2021-12-28 PROBLEM — B95.61 BACTEREMIA DUE TO METHICILLIN SUSCEPTIBLE STAPHYLOCOCCUS AUREUS (MSSA): Status: ACTIVE | Noted: 2021-12-28

## 2021-12-28 LAB
AORTIC ROOT: 3.4 CM
AORTIC VALVE MEAN VELOCITY: 7.7 M/S
APICAL FOUR CHAMBER EJECTION FRACTION: 56 %
AV LVOT MEAN GRADIENT: 1 MMHG
AV LVOT PEAK GRADIENT: 3 MMHG
AV MEAN GRADIENT: 3 MMHG
AV PEAK GRADIENT: 5 MMHG
DOP CALC AO VTI: 21.53 CM
DOP CALC LVOT PEAK VEL VTI: 12.6 CM
DOP CALC LVOT PEAK VEL: 0.82 M/S
E WAVE DECELERATION TIME: 211 MS
GLUCOSE SERPL-MCNC: 236 MG/DL (ref 65–140)
GLUCOSE SERPL-MCNC: 249 MG/DL (ref 65–140)
GLUCOSE SERPL-MCNC: 276 MG/DL (ref 65–140)
GLUCOSE SERPL-MCNC: 295 MG/DL (ref 65–140)
GLUCOSE SERPL-MCNC: 426 MG/DL (ref 65–140)
LEFT VENTRICLE DIASTOLIC VOLUME (MOD BIPLANE): 46 ML
LEFT VENTRICLE SYSTOLIC VOLUME (MOD BIPLANE): 21 ML
LV EF: 55 %
MV E'TISSUE VEL-SEP: 4 CM/S
MV PEAK A VEL: 1.11 M/S
MV PEAK E VEL: 59 CM/S
MV STENOSIS PRESSURE HALF TIME: 0 MS
SL CV LV EF: 50
TRICUSPID VALVE S': 0.8 CM/S

## 2021-12-28 PROCEDURE — NC001 PR NO CHARGE: Performed by: NURSE PRACTITIONER

## 2021-12-28 PROCEDURE — 93325 DOPPLER ECHO COLOR FLOW MAPG: CPT | Performed by: INTERNAL MEDICINE

## 2021-12-28 PROCEDURE — 93325 DOPPLER ECHO COLOR FLOW MAPG: CPT

## 2021-12-28 PROCEDURE — 82948 REAGENT STRIP/BLOOD GLUCOSE: CPT

## 2021-12-28 PROCEDURE — 93321 DOPPLER ECHO F-UP/LMTD STD: CPT | Performed by: INTERNAL MEDICINE

## 2021-12-28 PROCEDURE — 99291 CRITICAL CARE FIRST HOUR: CPT | Performed by: NURSE PRACTITIONER

## 2021-12-28 PROCEDURE — 93308 TTE F-UP OR LMTD: CPT

## 2021-12-28 PROCEDURE — 94760 N-INVAS EAR/PLS OXIMETRY 1: CPT

## 2021-12-28 PROCEDURE — 93308 TTE F-UP OR LMTD: CPT | Performed by: INTERNAL MEDICINE

## 2021-12-28 PROCEDURE — 71045 X-RAY EXAM CHEST 1 VIEW: CPT

## 2021-12-28 PROCEDURE — 93321 DOPPLER ECHO F-UP/LMTD STD: CPT

## 2021-12-28 PROCEDURE — 0WP9X0Z REMOVAL OF DRAINAGE DEVICE FROM RIGHT PLEURAL CAVITY, EXTERNAL APPROACH: ICD-10-PCS | Performed by: ANESTHESIOLOGY

## 2021-12-28 RX ORDER — CEFAZOLIN SODIUM 2 G/50ML
2000 SOLUTION INTRAVENOUS EVERY 8 HOURS
Status: COMPLETED | OUTPATIENT
Start: 2021-12-28 | End: 2022-01-03

## 2021-12-28 RX ORDER — INSULIN GLARGINE 100 [IU]/ML
25 INJECTION, SOLUTION SUBCUTANEOUS EVERY 12 HOURS SCHEDULED
Status: DISCONTINUED | OUTPATIENT
Start: 2021-12-28 | End: 2022-01-01

## 2021-12-28 RX ADMIN — CEFAZOLIN SODIUM 2000 MG: 2 SOLUTION INTRAVENOUS at 11:40

## 2021-12-28 RX ADMIN — INSULIN LISPRO 4 UNITS: 100 INJECTION, SOLUTION INTRAVENOUS; SUBCUTANEOUS at 17:30

## 2021-12-28 RX ADMIN — INSULIN LISPRO 4 UNITS: 100 INJECTION, SOLUTION INTRAVENOUS; SUBCUTANEOUS at 17:00

## 2021-12-28 RX ADMIN — DEXAMETHASONE SODIUM PHOSPHATE 5.48 MG: 4 INJECTION, SOLUTION INTRA-ARTICULAR; INTRALESIONAL; INTRAMUSCULAR; INTRAVENOUS; SOFT TISSUE at 12:00

## 2021-12-28 RX ADMIN — INSULIN LISPRO 3 UNITS: 100 INJECTION, SOLUTION INTRAVENOUS; SUBCUTANEOUS at 08:00

## 2021-12-28 RX ADMIN — INSULIN LISPRO 4 UNITS: 100 INJECTION, SOLUTION INTRAVENOUS; SUBCUTANEOUS at 12:32

## 2021-12-28 RX ADMIN — INSULIN GLARGINE 20 UNITS: 100 INJECTION, SOLUTION SUBCUTANEOUS at 08:23

## 2021-12-28 RX ADMIN — HEPARIN SODIUM 5000 UNITS: 5000 INJECTION INTRAVENOUS; SUBCUTANEOUS at 22:42

## 2021-12-28 RX ADMIN — ATORVASTATIN CALCIUM 40 MG: 40 TABLET, FILM COATED ORAL at 22:43

## 2021-12-28 RX ADMIN — HEPARIN SODIUM 5000 UNITS: 5000 INJECTION INTRAVENOUS; SUBCUTANEOUS at 05:41

## 2021-12-28 RX ADMIN — BARICITINIB 2 MG: 2 TABLET, FILM COATED ORAL at 13:42

## 2021-12-28 RX ADMIN — DEXAMETHASONE SODIUM PHOSPHATE 5.48 MG: 4 INJECTION, SOLUTION INTRA-ARTICULAR; INTRALESIONAL; INTRAMUSCULAR; INTRAVENOUS; SOFT TISSUE at 22:42

## 2021-12-28 RX ADMIN — INSULIN LISPRO 4 UNITS: 100 INJECTION, SOLUTION INTRAVENOUS; SUBCUTANEOUS at 08:23

## 2021-12-28 RX ADMIN — SENNOSIDES 8.6 MG: 8.6 TABLET, FILM COATED ORAL at 22:43

## 2021-12-28 RX ADMIN — FERROUS SULFATE TAB 325 MG (65 MG ELEMENTAL FE) 325 MG: 325 (65 FE) TAB at 08:22

## 2021-12-28 RX ADMIN — LIDOCAINE 1 PATCH: 50 PATCH TOPICAL at 08:24

## 2021-12-28 RX ADMIN — POLYETHYLENE GLYCOL 3350 17 G: 17 POWDER, FOR SOLUTION ORAL at 08:24

## 2021-12-28 RX ADMIN — DOCUSATE SODIUM 100 MG: 100 CAPSULE, LIQUID FILLED ORAL at 08:24

## 2021-12-28 RX ADMIN — CEFAZOLIN SODIUM 2000 MG: 2 SOLUTION INTRAVENOUS at 18:21

## 2021-12-28 RX ADMIN — INSULIN LISPRO 6 UNITS: 100 INJECTION, SOLUTION INTRAVENOUS; SUBCUTANEOUS at 22:41

## 2021-12-28 RX ADMIN — HEPARIN SODIUM 5000 UNITS: 5000 INJECTION INTRAVENOUS; SUBCUTANEOUS at 13:43

## 2021-12-28 RX ADMIN — INSULIN LISPRO 4 UNITS: 100 INJECTION, SOLUTION INTRAVENOUS; SUBCUTANEOUS at 11:45

## 2021-12-28 RX ADMIN — INSULIN GLARGINE 25 UNITS: 100 INJECTION, SOLUTION SUBCUTANEOUS at 22:41

## 2021-12-28 RX ADMIN — LISINOPRIL 20 MG: 20 TABLET ORAL at 08:24

## 2021-12-28 NOTE — ASSESSMENT & PLAN NOTE
· Upgraded to SD1 2/2 increasing O2 requirement  · CXR  reveals RML/LLL infiltrates concerning for superimposed bacterial pneumonia  · Continue COVID-19 protocol as noted above  · Started on IV Ceftriaxone/Doxycycline per COVID-19 protocol - now d/c'd  · Monitor I/O  · Reassess daily for diuresis  · PRN Xopenex nebs  · Titrate O2 to maintain SpO2 > 88%   · Aggressive pulmonary hygiene

## 2021-12-28 NOTE — QUICK NOTE
Dr Sami Hampton updated family on the past 24 hour events and current treatment plan  All questions answered to their satisfaction

## 2021-12-28 NOTE — ASSESSMENT & PLAN NOTE
· Became hypoxic 12/25 AM, CXR obtained demonstrating right pneumothorax  · 16 Fr   CT placed   · Follow up CXR demonstrated improvement in pneumothorax  · Chest tube to water seal, CXR this AM pending

## 2021-12-28 NOTE — PROGRESS NOTES
Patient's right chest tubed removed intact and dressed with vaseline occlusive guaze, 4x4 guaze, ABD and elastoplast tape  Tolerated removal without difficulty  Patient saturating 93% on HFNC at 45L/60%

## 2021-12-28 NOTE — RESPIRATORY THERAPY NOTE
12/28/21 6551   Non-Invasive Information   O2 Interface Device HFNC prongs   Non-Invasive Ventilation Mode HFNC (High flow)   SpO2 92 %   $ Pulse Oximetry Spot Check Charge Completed   Non-Invasive Settings   FiO2 (%) 60   Flow (lpm) 40   Temperature (Set) 31   Non-Invasive Readings   Heater Temperature (Obs) 31   Skin Intervention Skin intact   Maintenance   Water bag changed Yes

## 2021-12-28 NOTE — RESPIRATORY THERAPY NOTE
12/28/21 0922   Non-Invasive Information   O2 Interface Device HFNC prongs   Non-Invasive Ventilation Mode HFNC (High flow)   SpO2 96 %   $ Pulse Oximetry Spot Check Charge Completed   Non-Invasive Settings   FiO2 (%) 60   Flow (lpm) 45   Temperature (Set) 31   Non-Invasive Readings   Heater Temperature (Obs) 31   Skin Intervention Skin intact

## 2021-12-28 NOTE — ASSESSMENT & PLAN NOTE
· 2/2 BC with MSSA  · Continue Ancef D2/5  · TTE:  · LV - EF 50% with increased wall thickness and G1DD   · Mitral Valve: mild regurgitation  · Tricuspid Valve: mild regurgitation

## 2021-12-28 NOTE — ASSESSMENT & PLAN NOTE
Lab Results   Component Value Date    EGFR 37 12/27/2021    EGFR 38 12/26/2021    EGFR 31 12/25/2021    CREATININE 1 33 (H) 12/27/2021    CREATININE 1 31 (H) 12/26/2021    CREATININE 1 54 (H) 12/25/2021     · Creatinine 1 56 on admission  · Baseline creatinine appears to be around 1 2 - 1 6   · Avoid nephrotoxic agents and hypotension  · Trend renal indices  · Strict I&O  · Daily weights

## 2021-12-28 NOTE — ASSESSMENT & PLAN NOTE
· As evidenced by tachycardia, tachypnea, hypoxia  · Suspect this is in setting of worsening COVID-19 pneumonia vs superimposed bacterial infection  · Blood cultures 1/2 positive, repeat blood cultures 2/2 positive for gram positive cocci in clusters, repeats pending   · Procalcitonin negative  · ABX DC'd  · Trend fever and WBC curve  · Trend procalcitonin

## 2021-12-28 NOTE — PROGRESS NOTES
Merlin 46  Progress Note - Scott Cheung 1940, [de-identified] y o  female MRN: [id number]  Unit/Bed#: ICU 04-01 Encounter: [id number]  Primary Care Provider: Alise Lam DO   Date and time admitted to hospital: 12/16/2021  4:49 PM    * COVID-19  Assessment & Plan  · Presented on 12/16 with fever, chills, shortness of breath, cough  · COVID-19 positive on 12/16  · Unvaccinated  · Transferred on 12/21 to ICU 2/2 escalating O2 requirements  · Severe pathway:  · Decadron 0 1 mg/kg D8  · Baricitinib D9 - maintain lower dose 2/2 CKD3b with decreased GFR  · Remdesivir completed  · Prophylactic heparin  · Statin  · Ceftriaxone/Doxy-  DC'd procalcitonin negative x2  · Encourage side-lying/prone positioning  · Encourage I/S, aggressive pulmonary hygiene  · Titrate oxygen to maintain SpO2 > 88% - currently requiring HFNC 70%/50L  · Trend CRP as needed    Acute respiratory failure with hypoxia (HCC)  Assessment & Plan  · Upgraded to SD1 2/2 increasing O2 requirement  · CXR  reveals RML/LLL infiltrates concerning for superimposed bacterial pneumonia  · Continue COVID-19 protocol as noted above  · Started on IV Ceftriaxone/Doxycycline per COVID-19 protocol- now d/c'd  · Monitor I/O  · Reassess daily for diuresis  · PRN Xopenex nebs  · Titrate O2 to maintain SpO2 > 88%   · Aggressive pulmonary hygiene    SIRS (systemic inflammatory response syndrome) ([facility] [address] )  Assessment & Plan  · As evidenced by tachycardia, tachypnea, hypoxia  · Suspect this is in setting of worsening COVID-19 pneumonia vs superimposed bacterial infection  · Blood cultures 1/2 positive, repeat blood cultures 2/2 positive for gram positive cocci in clusters, repeats pending   · Procalcitonin negative  · ABX DC'd  · Trend fever and WBC curve  · Trend procalcitonin    Type 2 diabetes mellitus without complication, without long-term current use of insulin [facility])  Assessment & Plan  Lab Results   Component Value Date    HGBA1C 6 2 (H) 11/22/2021 Recent Labs     12/26/21  1553 12/26/21  2035 12/27/21  0906 12/27/21  1114   POCGLU 348* 422* 364* 407*       Blood Sugar Average: Last 72 hrs:  (P) 282 [id number]     · Takes metformin, Januvia and glipizide at home  · Transitioned to Lantus and SSI-increased sliding scale to Algorithm 5 and lantus to 20 units BID  · Monitor glucose  · Diabetic diet     Anemia  Assessment & Plan  · Hemoglobin 10 8 on admission, currently 9 9, has been slowly downtrending  · Likely multifactorial in setting of iron deficiency anemia vs CKD  · Baseline appears to be around 11 5 - 13  · Concurrently with no signs of active bleeding  · Continue home iron supplementation  · Transfuse for hemoglobin < 7 or with hemodynamic compromise  · Trend hemoglobin and monitor for signs of active bleeding    Chronic kidney disease, stage 3b [facility])  Assessment & Plan  Lab Results   Component Value Date    EGFR 37 12/27/2021    EGFR 38 12/26/2021    EGFR 31 12/25/2021    CREATININE 1 33 (H) 12/27/2021    CREATININE 1 31 (H) 12/26/2021    CREATININE 1 54 (H) 12/25/2021     · Creatinine 1 56 on admission  · Baseline creatinine appears to be around 1 2 - 1 6   · Avoid nephrotoxic agents and hypotension  · Trend renal indices  · Strict I&O  · Daily weights    Pneumothorax  Assessment & Plan  · Became hypoxic 12/25 AM, CXR obtained demonstrating right pneumothorax  · 16 Fr   CT placed   · Follow up CXR demonstrated improvement in pneumothorax  · Chest tube to water seal, CXR this AM pending    Ambulatory dysfunction  Assessment & Plan  · PT/OT - recommending acute rehab on discharge  · Encourage mobilization    Hypercholesterolemia  Assessment & Plan  · Takes lovastatin at home - continue statin here per COVID protocol    Essential hypertension  Assessment & Plan  · Takes enalapril at home - was being maintained on Lisinopril 20mg, discontinued but will resume today  · Monitor BP    Anxiety  Assessment & Plan  · Continue home PRN Xanax  · Zoloft appears to be on patient's home medication list, however patient has not been taking      ----------------------------------------------------------------------------------------  HPI/24hr events: No acute events overnight, remains on HFNC     Patient appropriate for transfer out of the ICU today?: No  Disposition: Continue Stepdown Level 1 level of care   Code Status: Level 1 - Full Code  ---------------------------------------------------------------------------------------  SUBJECTIVE  Offers no complaints    Review of Systems   Constitutional: Positive for fatigue  Respiratory: Negative for shortness of breath  All other systems reviewed and are negative  Review of systems was reviewed and negative unless stated above in HPI/24-hour events   ---------------------------------------------------------------------------------------  OBJECTIVE    Vitals   Vitals:    21 1900 21 0000 21 0400   BP: 142/65 155/72 155/68 132/64   BP Location:  Right arm Right arm Right arm   Pulse: 86 80 68 64   Resp: (!)  (!)  17 14   Temp:  97 6 °F (36 4 °C) 98 °F (36 7 °C) 97 9 °F (36 6 °C)   TempSrc:  Oral Axillary Axillary   SpO2: 94% 96% 95% 96%   Weight:       Height:         Temp (24hrs), Av 8 °F (36 6 °C), Min:97 5 °F (36 4 °C), Max:98 4 °F (36 9 °C)  Current: Temperature: 97 9 °F (36 6 °C)          Respiratory:  SpO2: SpO2: 96 %, SpO2 Activity: SpO2 Activity: At Rest, SpO2 Device: O2 Device: High flow nasal cannula (OPTI)  Nasal Cannula O2 Flow Rate (L/min): 55 L/min    Invasive/non-invasive ventilation settings   Respiratory  Report   Lab Data (Last 4 hours)    None         O2/Vent Data (Last 4 hours)    None                Physical Exam  Vitals and nursing note reviewed  Constitutional:       Appearance: She is ill-appearing  HENT:      Head: Normocephalic  Mouth/Throat:      Mouth: Mucous membranes are dry  Pharynx: Oropharynx is clear     Eyes:      Pupils: Pupils are equal, round, and reactive to light  Cardiovascular:      Rate and Rhythm: Normal rate and regular rhythm  Pulses: Normal pulses  Heart sounds: Normal heart sounds  Pulmonary:      Effort: Pulmonary effort is normal       Breath sounds: Decreased breath sounds present  Abdominal:      General: Bowel sounds are normal       Palpations: Abdomen is soft  Musculoskeletal:         General: Normal range of motion  Cervical back: Normal range of motion  Skin:     General: Skin is warm and dry  Neurological:      General: No focal deficit present  Mental Status: She is alert and oriented to person, place, and time               Laboratory and Diagnostics:  Results from last 7 days   Lab Units 12/27/21  0525 12/26/21  1117 12/26/21  0555 12/25/21  0455 12/24/21  0429 12/23/21  0509 12/22/21  0546   WBC Thousand/uL 14 11* 15 15* 14 93* 15 31* 12 66* 13 01*  13 01* 8 37   HEMOGLOBIN g/dL 9 9* 10 9* 10 4* 12 5 11 5 11 8  11 8 10 4*   HEMATOCRIT % 30 4* 34 2* 32 2* 38 4 35 8 36 0  36 0 31 5*   PLATELETS Thousands/uL 433* 444* 432* 497* 450* 400*  400* 283   NEUTROS PCT %  --   --  87*  --   --   --  81*   BANDS PCT %  --   --   --   --  5  --   --    MONOS PCT %  --   --  3*  --   --   --  4   MONO PCT %  --   --   --   --  4  --   --      Results from last 7 days   Lab Units 12/27/21  0525 12/26/21  0555 12/25/21  0455 12/24/21  0429 12/23/21  0509 12/22/21  0546   SODIUM mmol/L 134* 135 138 141 140 140   POTASSIUM mmol/L 4 9 4 8 4 2 4 1 4 0 3 9   CHLORIDE mmol/L 103 104 104 106 102 105   CO2 mmol/L 24 25 25 25 26 26   ANION GAP mmol/L 7 6 9 10 12 9   BUN mg/dL 54* 54* 68* 71* 58* 48*   CREATININE mg/dL 1 33* 1 31* 1 54* 1 72* 1 83* 1 63*   CALCIUM mg/dL 9 1 8 9 9 8 9 6 9 7 9 4   GLUCOSE RANDOM mg/dL 349* 304* 82 97 100 188*   ALT U/L 18 18 21 19  --  14   AST U/L 12* 16 21 20  --  16   ALK PHOS U/L 66 64 74 70  --  66   ALBUMIN g/dL 3 2* 3 3* 3 9 3 7  --  3 6   TOTAL BILIRUBIN mg/dL 0 45 0  72 0 60 0 48  --  0 60     Results from last 7 days   Lab Units 12/27/21  0525 12/26/21  0555 12/25/21  0455 12/24/21  0429 12/22/21  0546   MAGNESIUM mg/dL 2 4 2 5 2 5 2 6 1 9   PHOSPHORUS mg/dL 3 4 4 3* 3 9 4 5* 2 8      Results from last 7 days   Lab Units 12/21/21  1133   INR  1 18   PTT seconds 46*              ABG:    VBG:    Results from last 7 days   Lab Units 12/22/21  0546 12/21/21  1133   PROCALCITONIN ng/ml 0 21 0 09       Micro  Results from last 7 days   Lab Units 12/26/21  1156 12/22/21  1043   BLOOD CULTURE  No Growth at 24 hrs  No Growth at 24 hrs  Staphylococcus aureus*   GRAM STAIN RESULT   --  Gram positive cocci in clusters*  Gram positive cocci in clusters*       EKG: normal sinus rhythm  Imaging: I have personally reviewed pertinent reports  and I have personally reviewed pertinent films in PACS    Intake and Output  I/O       12/26 0701  12/27 0700 12/27 0701  12/28 0700    P  O  480 1175    I V  (mL/kg) 10 (0 2)     Total Intake(mL/kg) 490 (8 3) 1175 (19 9)    Urine (mL/kg/hr) 1160 (0 8) 920 (0 7)    Chest Tube 2 0    Total Output 1162 920    Net -672 +255                Height and Weights   Height: 5' (152 4 cm)     Body mass index is 25 36 kg/m²    Weight (last 2 days)     Date/Time Weight    12/27/21 0532 58 9 (129 85)    12/26/21 0558 60 1 (132 5)            Nutrition       Diet Orders   (From admission, onward)             Start     Ordered    12/27/21 0958  Dietary nutrition supplements  Once        Question Answer Comment   Select Supplement: Glucerna-Vanilla    Frequency Breakfast, Lunch, Dinner        12/27/21 0958    12/21/21 1750  Diet Rodríguez/CHO Controlled; Consistent Carbohydrate Diet Level 2 (5 carb servings/75 grams CHO/meal)  Diet effective now        References:    Nutrtion Support Algorithm Enteral vs  Parenteral   Question Answer Comment   Diet Type Rodríguez/CHO Controlled    Rodríguez/CHO Controlled Consistent Carbohydrate Diet Level 2 (5 carb servings/75 grams CHO/meal)    RD to adjust diet per protocol?  Yes        12/21/21 1749                  Active Medications  Scheduled Meds:  Current Facility-Administered Medications   Medication Dose Route Frequency Provider Last Rate    acetaminophen  650 mg Oral Q4H PRN BENTON Brown      ALPRAZolam  0 25 mg Oral HS PRN Mardeconchita Yamilka, ROSA ISELANP      atorvastatin  40 mg Oral HS Martha Conway, BENTON      Baricitinib  2 mg Oral Q24H Martha Conway, BENTON      bisacodyl  10 mg Rectal Daily PRN Sarah A Niki, BENTON      dexamethasone  0 1 mg/kg Intravenous Q12H Martha Conway, BENTON      docusate sodium  100 mg Oral Daily Megan Lynn PA-C      ferrous sulfate  325 mg Oral Daily With Breakfast BENTON Brown      heparin (porcine)  5,000 Units Subcutaneous Q8H [facility] Martha Conway, BENTON      insulin glargine  20 Units Subcutaneous Q12H [facility] Sarah A Fabiolorenzo, BENTON      insulin lispro  1-6 Units Subcutaneous TID AC Sarah A Sedora, CRNP      insulin lispro  1-6 Units Subcutaneous HS Sarah A Sedora, CRNP      insulin lispro  4 Units Subcutaneous Daily With Breakfast Sushma Cano, CRNP      insulin lispro  4 Units Subcutaneous Daily With Lunch Sushma JOHNNY Cano, CRNP      insulin lispro  4 Units Subcutaneous Daily With Dinner Chago Pierre, BENTON      lidocaine  1 patch Topical Daily Sushmagilbert Cano, BENTON      lisinopril  20 mg Oral Daily Davilla Springfield, CRNP      ondansetron  4 mg Intravenous Q6H PRN Apolonia Prather, CREUSEBIO      oxyCODONE  2 5 mg Oral Q4H PRN Ramond Ficks, CRNP      polyethylene glycol  17 g Oral Daily Sarah A Sedora, CRNP      senna  1 tablet Oral HS Sarah A Sedora, BENTON       Continuous Infusions:     PRN Meds:   acetaminophen, 650 mg, Q4H PRN  ALPRAZolam, 0 25 mg, HS PRN  bisacodyl, 10 mg, Daily PRN  ondansetron, 4 mg, Q6H PRN  oxyCODONE, 2 5 mg, Q4H PRN        Invasive Devices Review  Invasive Devices  Report    Peripheral Intravenous Line Peripheral IV 12/20/21 Left Forearm 7 days    Peripheral IV 12/21/21 Right;Ventral (anterior) Forearm 6 days          Drain            External Urinary Catheter 5 days    Chest Tube Right 2 days                Rationale for remaining devices: n/a  ---------------------------------------------------------------------------------------  Advance Directive and Living Will:      Power of :    POLST:    ---------------------------------------------------------------------------------------  Care Time Delivered:   Upon my evaluation, this patient had a high probability of imminent or life-threatening deterioration due to acute hypoxic respiratory failure , which required my direct attention, intervention, and personal management  I have personally provided 30 minutes (0330 to 0400) of critical care time, exclusive of procedures, teaching, family meetings, and any prior time recorded by providers other than myself  BENTON Lovelace      Portions of the record may have been created with voice recognition software  Occasional wrong word or "sound a like" substitutions may have occurred due to the inherent limitations of voice recognition software    Read the chart carefully and recognize, using context, where substitutions have occurred

## 2021-12-28 NOTE — ASSESSMENT & PLAN NOTE
· Became hypoxic 12/25 AM, CXR obtained demonstrating right pneumothorax  · 16 Fr   CT placed   · Follow up CXR demonstrated improvement in pneumothorax  · Right sided chest tube removed 12/28  · Repeat CXR today pending

## 2021-12-28 NOTE — PLAN OF CARE
Problem: Potential for Falls  Goal: Patient will remain free of falls  Description: INTERVENTIONS:  - Educate patient/family on patient safety including physical limitations  - Instruct patient to call for assistance with activity   - Consult OT/PT to assist with strengthening/mobility   - Keep Call bell within reach  - Keep bed low and locked with side rails adjusted as appropriate  - Keep care items and personal belongings within reach  - Initiate and maintain comfort rounds  - Make Fall Risk Sign visible to staff  - Offer Toileting every 2 Hours, in advance of need  - Initiate/Maintain fall alarm  - Obtain necessary fall risk management equipment: fall alarm  - Apply yellow socks and bracelet for high fall risk patients  - Consider moving patient to room near nurses station  Outcome: Progressing     Problem: INFECTION - ADULT  Goal: Absence or prevention of progression during hospitalization  Description: INTERVENTIONS:  - Assess and monitor for signs and symptoms of infection  - Monitor lab/diagnostic results  - Monitor all insertion sites, i e  indwelling lines, tubes, and drains  - Monitor endotracheal if appropriate and nasal secretions for changes in amount and color  - [place] appropriate cooling/warming therapies per order  - Administer medications as ordered  - Instruct and encourage patient and family to use good hand hygiene technique  - Identify and instruct in appropriate isolation precautions for identified infection/condition  Outcome: Progressing     Problem: MOBILITY - ADULT  Goal: Maintain or return to baseline ADL function  Description: INTERVENTIONS:  - Educate patient/family on patient safety including physical limitations  - Instruct patient to call for assistance with activity   - Consult OT/PT to assist with strengthening/mobility   - Keep Call bell within reach  - Keep bed low and locked with side rails adjusted as appropriate  - Keep care items and personal belongings within reach  - Initiate and maintain comfort rounds  - Make Fall Risk Sign visible to staff  - Offer Toileting every 2 Hours, in advance of need  - Initiate/Maintain fall alarm  - Obtain necessary fall risk management equipment: fall alarm  - Apply yellow socks and bracelet for high fall risk patients  - Consider moving patient to room near nurses station  Outcome: Progressing

## 2021-12-28 NOTE — PLAN OF CARE
Problem: Potential for Falls  Goal: Patient will remain free of falls  Description: INTERVENTIONS:  - Educate patient/family on patient safety including physical limitations  - Instruct patient to call for assistance with activity   - Consult OT/PT to assist with strengthening/mobility   - Keep Call bell within reach  - Keep bed low and locked with side rails adjusted as appropriate  - Keep care items and personal belongings within reach  - Initiate and maintain comfort rounds  - Make Fall Risk Sign visible to staff  - Offer Toileting every 2 Hours, in advance of need  - Initiate/Maintain fall alarm  - Obtain necessary fall risk management equipment: fall alarm  - Apply yellow socks and bracelet for high fall risk patients  - Consider moving patient to room near nurses station  Outcome: Progressing     Problem: PAIN - ADULT  Goal: Verbalizes/displays adequate comfort level or baseline comfort level  Description: Interventions:  - Encourage patient to monitor pain and request assistance  - Assess pain using appropriate pain scale  - Administer analgesics based on type and severity of pain and evaluate response  - Implement non-pharmacological measures as appropriate and evaluate response  - Consider cultural and social influences on pain and pain management  - Notify physician/advanced practitioner if interventions unsuccessful or patient reports new pain  Outcome: Progressing     Problem: INFECTION - ADULT  Goal: Absence or prevention of progression during hospitalization  Description: INTERVENTIONS:  - Assess and monitor for signs and symptoms of infection  - Monitor lab/diagnostic results  - Monitor all insertion sites, i e  indwelling lines, tubes, and drains  - Monitor endotracheal if appropriate and nasal secretions for changes in amount and color  - [place] appropriate cooling/warming therapies per order  - Administer medications as ordered  - Instruct and encourage patient and family to use good hand hygiene technique  - Identify and instruct in appropriate isolation precautions for identified infection/condition  Outcome: Progressing  Goal: Absence of fever/infection during neutropenic period  Description: INTERVENTIONS:  - Monitor WBC    Outcome: Progressing     Problem: SAFETY ADULT  Goal: Patient will remain free of falls  Description: INTERVENTIONS:  - Educate patient/family on patient safety including physical limitations  - Instruct patient to call for assistance with activity   - Consult OT/PT to assist with strengthening/mobility   - Keep Call bell within reach  - Keep bed low and locked with side rails adjusted as appropriate  - Keep care items and personal belongings within reach  - Initiate and maintain comfort rounds  - Make Fall Risk Sign visible to staff  - Offer Toileting every 2 Hours, in advance of need  - Initiate/Maintain fall alarm  - Obtain necessary fall risk management equipment: fall alarm  - Apply yellow socks and bracelet for high fall risk patients  - Consider moving patient to room near nurses station  Outcome: Progressing  Goal: Maintain or return to baseline ADL function  Description: INTERVENTIONS:  - Educate patient/family on patient safety including physical limitations  - Instruct patient to call for assistance with activity   - Consult OT/PT to assist with strengthening/mobility   - Keep Call bell within reach  - Keep bed low and locked with side rails adjusted as appropriate  - Keep care items and personal belongings within reach  - Initiate and maintain comfort rounds  - Make Fall Risk Sign visible to staff  - Offer Toileting every 2 Hours, in advance of need  - Initiate/Maintain fall alarm  - Obtain necessary fall risk management equipment: fall alarm  - Apply yellow socks and bracelet for high fall risk patients  - Consider moving patient to room near nurses station  Outcome: Progressing  Goal: Maintains/Returns to pre admission functional level  Description: INTERVENTIONS:  - Perform BMAT or MOVE assessment daily    - Set and communicate daily mobility goal to care team and patient/family/caregiver  - Collaborate with rehabilitation services on mobility goals if consulted  - Perform Range of Motion 3 times a day  - Reposition patient every 2 hours  - Dangle patient 3 times a day  - Stand patient 3 times a day  - Ambulate patient 3 times a day  - Out of bed to chair 3 times a day   - Out of bed for meals 3 times a day  - Out of bed for toileting  - Record patient progress and toleration of activity level   Outcome: Progressing     Problem: DISCHARGE PLANNING  Goal: Discharge to home or other facility with appropriate resources  Description: INTERVENTIONS:  - Identify barriers to discharge w/patient and caregiver  - Arrange for needed discharge resources and transportation as appropriate  - Identify discharge learning needs (meds, wound care, etc )  - Arrange for interpretive services to assist at discharge as needed  - Refer to Case Management Department for coordinating discharge planning if the patient needs post-hospital services based on physician/advanced practitioner order or complex needs related to functional status, cognitive ability, or social support system  Outcome: Progressing     Problem: Knowledge Deficit  Goal: Patient/family/caregiver demonstrates understanding of disease process, treatment plan, medications, and discharge instructions  Description: Complete learning assessment and assess knowledge base    Interventions:  - Provide teaching at level of understanding  - Provide teaching via preferred learning methods  Outcome: Progressing     Problem: MOBILITY - ADULT  Goal: Maintain or return to baseline ADL function  Description: INTERVENTIONS:  - Educate patient/family on patient safety including physical limitations  - Instruct patient to call for assistance with activity   - Consult OT/PT to assist with strengthening/mobility   - Keep Call bell within reach  - Keep bed low and locked with side rails adjusted as appropriate  - Keep care items and personal belongings within reach  - Initiate and maintain comfort rounds  - Make Fall Risk Sign visible to staff  - Offer Toileting every 2 Hours, in advance of need  - Initiate/Maintain fall alarm  - Obtain necessary fall risk management equipment: fall alarm  - Apply yellow socks and bracelet for high fall risk patients  - Consider moving patient to room near nurses station  Outcome: Progressing  Goal: Maintains/Returns to pre admission functional level  Description: INTERVENTIONS:  - Perform BMAT or MOVE assessment daily    - Set and communicate daily mobility goal to care team and patient/family/caregiver  - Collaborate with rehabilitation services on mobility goals if consulted  - Perform Range of Motion 3 times a day  - Reposition patient every 2 hours    - Dangle patient 3 times a day  - Stand patient 3 times a day  - Ambulate patient 3 times a day  - Out of bed to chair 3 times a day   - Out of bed for meals 3 times a day  - Out of bed for toileting  - Record patient progress and toleration of activity level   Outcome: Progressing     Problem: Prexisting or High Potential for Compromised Skin Integrity  Goal: Skin integrity is maintained or improved  Description: INTERVENTIONS:  - Identify patients at risk for skin breakdown  - Assess and monitor skin integrity  - Assess and monitor nutrition and hydration status  - Monitor labs   - Assess for incontinence   - Turn and reposition patient  - Assist with mobility/ambulation  - Relieve pressure over bony prominences  - Avoid friction and shearing  - Provide appropriate hygiene as needed including keeping skin clean and dry  - Evaluate need for skin moisturizer/barrier cream  - Collaborate with interdisciplinary team   - Patient/family teaching  - Consider wound care consult   Outcome: Progressing     Problem: RESPIRATORY - ADULT  Goal: Achieves optimal ventilation and oxygenation  Description: INTERVENTIONS:  - Assess for changes in respiratory status  - Assess for changes in mentation and behavior  - Position to facilitate oxygenation and minimize respiratory effort  - Oxygen administered by appropriate delivery if ordered  - Initiate smoking cessation education as indicated  - Encourage broncho-pulmonary hygiene including cough, deep breathe, Incentive Spirometry  - Assess the need for suctioning and aspirate as needed  - Assess and instruct to report SOB or any respiratory difficulty  - Respiratory Therapy support as indicated  Outcome: Progressing     Problem: Nutrition/Hydration-ADULT  Goal: Nutrient/Hydration intake appropriate for improving, restoring or maintaining nutritional needs  Description: Monitor and assess patient's nutrition/hydration status for malnutrition  Collaborate with interdisciplinary team and initiate plan and interventions as ordered  Monitor patient's weight and dietary intake as ordered or per policy  Utilize nutrition screening tool and intervene as necessary  Determine patient's food preferences and provide high-protein, high-caloric foods as appropriate       INTERVENTIONS:  - Monitor oral intake, urinary output, labs, and treatment plans  - Assess nutrition and hydration status and recommend course of action  - Evaluate amount of meals eaten  - Assist patient with eating if necessary   - Allow adequate time for meals  - Recommend/ encourage appropriate diets, oral nutritional supplements, and vitamin/mineral supplements  - Order, calculate, and assess calorie counts as needed  - Recommend, monitor, and adjust tube feedings and TPN/PPN based on assessed needs  - Assess need for intravenous fluids  - Provide specific nutrition/hydration education as appropriate  - Include patient/family/caregiver in decisions related to nutrition  Outcome: Progressing

## 2021-12-28 NOTE — ASSESSMENT & PLAN NOTE
Lab Results   Component Value Date    HGBA1C 6 2 (H) 11/22/2021       Recent Labs     12/27/21  1550 12/27/21 2059 12/28/21  0522 12/28/21  0707   POCGLU 357* 366* 236* 249*       Blood Sugar Average: Last 72 hrs:  (P) 254 35     · Takes metformin, Januvia and glipizide at home  · Was on insulin gtt - now on Lantus 25 units BID, mealtime insulin 4 units TID, SSI Algorithm 5   · May require insulin gtt if BS continues to be elevated  · ACHS fingersticks  · Diabetic diet

## 2021-12-28 NOTE — ASSESSMENT & PLAN NOTE
· Hemoglobin 10 8 on admission, currently 9 9, has been slowly downtrending  · Likely multifactorial in setting of iron deficiency anemia vs CKD  · Baseline appears to be around 11 5 - 13  · Concurrently with no signs of active bleeding  · Continue home iron supplementation  · Transfuse for hemoglobin < 7 or with hemodynamic compromise  · Trend hemoglobin and monitor for signs of active bleeding

## 2021-12-28 NOTE — ASSESSMENT & PLAN NOTE
· Presented on 12/16 with fever, chills, shortness of breath, cough  · COVID-19 positive on 12/16  · Unvaccinated  · Transferred on 12/21 to ICU 2/2 escalating O2 requirements  · Severe pathway:  · Decadron 0 1 mg/kg D8  · Baricitinib D9 - maintain lower dose 2/2 CKD3b with decreased GFR  · Remdesivir completed  · Prophylactic heparin  · Statin  · Ceftriaxone/Doxy-  DC'd procalcitonin negative x2  · Encourage side-lying/prone positioning  · Encourage I/S, aggressive pulmonary hygiene  · Titrate oxygen to maintain SpO2 > 88% - currently requiring HFNC 70%/50L  · Trend CRP as needed

## 2021-12-29 ENCOUNTER — APPOINTMENT (INPATIENT)
Dept: RADIOLOGY | Facility: HOSPITAL | Age: 81
DRG: 871 | End: 2021-12-29
Payer: COMMERCIAL

## 2021-12-29 LAB
ANION GAP SERPL CALCULATED.3IONS-SCNC: 8 MMOL/L (ref 4–13)
BACTERIA BLD CULT: ABNORMAL
BACTERIA BLD CULT: ABNORMAL
BUN SERPL-MCNC: 53 MG/DL (ref 5–25)
CALCIUM SERPL-MCNC: 9.4 MG/DL (ref 8.4–10.2)
CHLORIDE SERPL-SCNC: 103 MMOL/L (ref 96–108)
CO2 SERPL-SCNC: 24 MMOL/L (ref 21–32)
CREAT SERPL-MCNC: 1.25 MG/DL (ref 0.6–1.3)
ERYTHROCYTE [DISTWIDTH] IN BLOOD BY AUTOMATED COUNT: 13 % (ref 11.6–15.1)
GFR SERPL CREATININE-BSD FRML MDRD: 40 ML/MIN/1.73SQ M
GLUCOSE SERPL-MCNC: 104 MG/DL (ref 65–140)
GLUCOSE SERPL-MCNC: 198 MG/DL (ref 65–140)
GLUCOSE SERPL-MCNC: 208 MG/DL (ref 65–140)
GLUCOSE SERPL-MCNC: 252 MG/DL (ref 65–140)
GLUCOSE SERPL-MCNC: 269 MG/DL (ref 65–140)
GLUCOSE SERPL-MCNC: 280 MG/DL (ref 65–140)
GRAM STN SPEC: ABNORMAL
GRAM STN SPEC: ABNORMAL
HCT VFR BLD AUTO: 32.2 % (ref 34.8–46.1)
HGB BLD-MCNC: 10.4 G/DL (ref 11.5–15.4)
MCH RBC QN AUTO: 26.7 PG (ref 26.8–34.3)
MCHC RBC AUTO-ENTMCNC: 32.3 G/DL (ref 31.4–37.4)
MCV RBC AUTO: 83 FL (ref 82–98)
PLATELET # BLD AUTO: 518 THOUSANDS/UL (ref 149–390)
PMV BLD AUTO: 9.3 FL (ref 8.9–12.7)
POTASSIUM SERPL-SCNC: 4.9 MMOL/L (ref 3.5–5.3)
RBC # BLD AUTO: 3.9 MILLION/UL (ref 3.81–5.12)
SODIUM SERPL-SCNC: 135 MMOL/L (ref 135–147)
WBC # BLD AUTO: 13.8 THOUSAND/UL (ref 4.31–10.16)

## 2021-12-29 PROCEDURE — 82948 REAGENT STRIP/BLOOD GLUCOSE: CPT

## 2021-12-29 PROCEDURE — 85027 COMPLETE CBC AUTOMATED: CPT | Performed by: NURSE PRACTITIONER

## 2021-12-29 PROCEDURE — 80048 BASIC METABOLIC PNL TOTAL CA: CPT | Performed by: NURSE PRACTITIONER

## 2021-12-29 PROCEDURE — 99233 SBSQ HOSP IP/OBS HIGH 50: CPT | Performed by: ANESTHESIOLOGY

## 2021-12-29 PROCEDURE — 97110 THERAPEUTIC EXERCISES: CPT

## 2021-12-29 PROCEDURE — 97530 THERAPEUTIC ACTIVITIES: CPT

## 2021-12-29 PROCEDURE — 97116 GAIT TRAINING THERAPY: CPT

## 2021-12-29 PROCEDURE — 71045 X-RAY EXAM CHEST 1 VIEW: CPT

## 2021-12-29 PROCEDURE — 94760 N-INVAS EAR/PLS OXIMETRY 1: CPT

## 2021-12-29 PROCEDURE — 87040 BLOOD CULTURE FOR BACTERIA: CPT | Performed by: NURSE PRACTITIONER

## 2021-12-29 RX ORDER — ONDANSETRON 2 MG/ML
4 INJECTION INTRAMUSCULAR; INTRAVENOUS ONCE
Status: COMPLETED | OUTPATIENT
Start: 2021-12-29 | End: 2021-12-29

## 2021-12-29 RX ADMIN — INSULIN GLARGINE 25 UNITS: 100 INJECTION, SOLUTION SUBCUTANEOUS at 22:50

## 2021-12-29 RX ADMIN — CEFAZOLIN SODIUM 2000 MG: 2 SOLUTION INTRAVENOUS at 18:10

## 2021-12-29 RX ADMIN — FERROUS SULFATE TAB 325 MG (65 MG ELEMENTAL FE) 325 MG: 325 (65 FE) TAB at 10:07

## 2021-12-29 RX ADMIN — LIDOCAINE 1 PATCH: 50 PATCH TOPICAL at 09:51

## 2021-12-29 RX ADMIN — INSULIN GLARGINE 25 UNITS: 100 INJECTION, SOLUTION SUBCUTANEOUS at 09:52

## 2021-12-29 RX ADMIN — HEPARIN SODIUM 5000 UNITS: 5000 INJECTION INTRAVENOUS; SUBCUTANEOUS at 22:48

## 2021-12-29 RX ADMIN — CEFAZOLIN SODIUM 2000 MG: 2 SOLUTION INTRAVENOUS at 10:58

## 2021-12-29 RX ADMIN — POLYETHYLENE GLYCOL 3350 17 G: 17 POWDER, FOR SOLUTION ORAL at 09:55

## 2021-12-29 RX ADMIN — INSULIN LISPRO 2 UNITS: 100 INJECTION, SOLUTION INTRAVENOUS; SUBCUTANEOUS at 22:46

## 2021-12-29 RX ADMIN — HEPARIN SODIUM 5000 UNITS: 5000 INJECTION INTRAVENOUS; SUBCUTANEOUS at 14:55

## 2021-12-29 RX ADMIN — LISINOPRIL 20 MG: 20 TABLET ORAL at 09:49

## 2021-12-29 RX ADMIN — INSULIN LISPRO 4 UNITS: 100 INJECTION, SOLUTION INTRAVENOUS; SUBCUTANEOUS at 08:50

## 2021-12-29 RX ADMIN — DOCUSATE SODIUM 100 MG: 100 CAPSULE, LIQUID FILLED ORAL at 09:49

## 2021-12-29 RX ADMIN — DEXAMETHASONE SODIUM PHOSPHATE 5.48 MG: 4 INJECTION, SOLUTION INTRA-ARTICULAR; INTRALESIONAL; INTRAMUSCULAR; INTRAVENOUS; SOFT TISSUE at 10:58

## 2021-12-29 RX ADMIN — INSULIN LISPRO 4 UNITS: 100 INJECTION, SOLUTION INTRAVENOUS; SUBCUTANEOUS at 09:00

## 2021-12-29 RX ADMIN — DEXAMETHASONE SODIUM PHOSPHATE 5.48 MG: 4 INJECTION, SOLUTION INTRA-ARTICULAR; INTRALESIONAL; INTRAMUSCULAR; INTRAVENOUS; SOFT TISSUE at 22:48

## 2021-12-29 RX ADMIN — ONDANSETRON 4 MG: 2 INJECTION INTRAMUSCULAR; INTRAVENOUS at 12:23

## 2021-12-29 RX ADMIN — ATORVASTATIN CALCIUM 40 MG: 40 TABLET, FILM COATED ORAL at 22:49

## 2021-12-29 RX ADMIN — INSULIN LISPRO 6 UNITS: 100 INJECTION, SOLUTION INTRAVENOUS; SUBCUTANEOUS at 12:00

## 2021-12-29 RX ADMIN — INSULIN LISPRO 4 UNITS: 100 INJECTION, SOLUTION INTRAVENOUS; SUBCUTANEOUS at 12:34

## 2021-12-29 RX ADMIN — BARICITINIB 2 MG: 2 TABLET, FILM COATED ORAL at 12:22

## 2021-12-29 RX ADMIN — HEPARIN SODIUM 5000 UNITS: 5000 INJECTION INTRAVENOUS; SUBCUTANEOUS at 05:13

## 2021-12-29 RX ADMIN — SENNOSIDES 8.6 MG: 8.6 TABLET, FILM COATED ORAL at 22:49

## 2021-12-29 RX ADMIN — CEFAZOLIN SODIUM 2000 MG: 2 SOLUTION INTRAVENOUS at 02:32

## 2021-12-29 NOTE — ASSESSMENT & PLAN NOTE
· Presented on 12/16 with fever, chills, shortness of breath, cough  · COVID-19 positive on 12/16  · Unvaccinated  · Transferred on 12/21 to ICU 2/2 escalating O2 requirements  · Severe pathway:  · Decadron 0 1 mg/kg D9, total D14  · Baricitinib D10 - maintain lower dose 2/2 CKD3b with decreased GFR  · Remdesivir completed  · DVT ppx with SQ heparin  · Statin  · Ceftriaxone/Doxy-  DC'd procalcitonin negative x2  · Encourage side-lying/prone positioning  · Encourage I/S, aggressive pulmonary hygiene  · Titrate oxygen to maintain SpO2 > 88% - currently requiring HFNC 70%/50L   · Trend CRP as needed Cheek Interpolation Flap Text: A decision was made to reconstruct the defect utilizing an interpolation axial flap and a staged reconstruction.  A telfa template was made of the defect.  This telfa template was then used to outline the Cheek Interpolation flap.  The donor area for the pedicle flap was then injected with anesthesia.  The flap was excised through the skin and subcutaneous tissue down to the layer of the underlying musculature.  The interpolation flap was carefully excised within this deep plane to maintain its blood supply.  The edges of the donor site were undermined.   The donor site was closed in a primary fashion.  The pedicle was then rotated into position and sutured.  Once the tube was sutured into place, adequate blood supply was confirmed with blanching and refill.  The pedicle was then wrapped with xeroform gauze and dressed appropriately with a telfa and gauze bandage to ensure continued blood supply and protect the attached pedicle.

## 2021-12-29 NOTE — PROGRESS NOTES
[address]  Progress Note - Rene Duran 1940, [de-identified] y o  female MRN: [id number]  Unit/Bed#: ICU 04-01 Encounter: [id number]  Primary Care Provider: Kasey Ramos DO   Date and time admitted to hospital: 12/16/2021  4:49 PM    * COVID-19  Assessment & Plan  · Presented on 12/16 with fever, chills, shortness of breath, cough  · COVID-19 positive on 12/16  · Unvaccinated  · Transferred on 12/21 to ICU 2/2 escalating O2 requirements  · Severe pathway:  · Decadron 0 1 mg/kg D9, total D14  · Baricitinib D10 - maintain lower dose 2/2 CKD3b with decreased GFR  · Remdesivir completed  · DVT ppx with SQ heparin  · Statin  · Ceftriaxone/Doxy-  DC'd procalcitonin negative x2  · Encourage side-lying/prone positioning  · Encourage I/S, aggressive pulmonary hygiene  · Titrate oxygen to maintain SpO2 > 88% - currently requiring HFNC 70%/50L   · Trend CRP as needed    Acute respiratory failure with hypoxia (HCC)  Assessment & Plan  · Upgraded to SD1 2/2 increasing O2 requirement  · CXR  reveals RML/LLL infiltrates concerning for superimposed bacterial pneumonia  · Continue COVID-19 protocol as noted above  · Started on IV Ceftriaxone/Doxycycline per COVID-19 protocol - now d/c'd  · Monitor I/O  · Reassess daily for diuresis  · PRN Xopenex nebs  · Titrate O2 to maintain SpO2 > 88%   · Aggressive pulmonary hygiene    Bacteremia due to methicillin susceptible Staphylococcus aureus (MSSA)  Assessment & Plan  · 2/2 BC with MSSA  · Continue Ancef D2/5  · TTE:  · LV - EF 50% with increased wall thickness and G1DD   · Mitral Valve: mild regurgitation  · Tricuspid Valve: mild regurgitation      Pneumothorax  Assessment & Plan  · Became hypoxic 12/25 AM, CXR obtained demonstrating right pneumothorax  · 16 Fr   CT placed   · Follow up CXR demonstrated improvement in pneumothorax  · Right sided chest tube removed 12/28  · Repeat CXR today pending    SIRS (systemic inflammatory response syndrome) (HCC)  Assessment & Plan  · As evidenced by tachycardia, tachypnea, hypoxia  · Suspect this is in setting of worsening COVID-19 pneumonia vs superimposed bacterial infection  · Blood cultures 1/2 positive, repeat blood cultures 2/2 positive for Staph, repeats from 12/26 NGTD  · Procalcitonin initially negative, repeat pending  · Continue Ancef D2  · Trend fever and WBC curve  · Trend procalcitonin    Type 2 diabetes mellitus without complication, without long-term current use of insulin [facility])  Assessment & Plan  Lab Results   Component Value Date    HGBA1C 6 2 (H) 11/22/2021       Recent Labs     12/27/21  1550 12/27/21  2059 12/28/21  0522 12/28/21  0707   POCGLU 357* 366* 236* 249*       Blood Sugar Average: Last 72 hrs:  (P) 254 35     · Takes metformin, Januvia and glipizide at home  · Was on insulin gtt - now on Lantus 25 units BID, mealtime insulin 4 units TID, SSI Algorithm 5   · May require insulin gtt if BS continues to be elevated  · ACHS fingersticks  · Diabetic diet     Anemia  Assessment & Plan  · Hemoglobin 10 8 on admission  · Likely multifactorial in setting of iron deficiency anemia vs CKD  · Baseline appears to be around 11 5 - 13  · Concurrently with no signs of active bleeding  · Continue home iron supplementation  · Transfuse for hemoglobin < 7 or with hemodynamic compromise  · Trend hemoglobin and monitor for signs of active bleeding    Chronic kidney disease, stage 3b [facility])  Assessment & Plan  Lab Results   Component Value Date    EGFR 37 12/27/2021    EGFR 38 12/26/2021    EGFR 31 12/25/2021    CREATININE 1 33 (H) 12/27/2021    CREATININE 1 31 (H) 12/26/2021    CREATININE 1 54 (H) 12/25/2021     · Creatinine 1 56 on admission  · Baseline creatinine appears to be around 1 2 - 1 6   · Avoid nephrotoxic agents and hypotension  · Trend renal indices  · Strict I&O  · Daily weights    Ambulatory dysfunction  Assessment & Plan  · PT/OT - recommending acute rehab on discharge  · Encourage mobilization    Hypercholesterolemia  Assessment & Plan  · Takes lovastatin at home - continue statin here per COVID protocol    Essential hypertension  Assessment & Plan  · Takes enalapril at home - continue lisinopril  · Monitor BP    Anxiety  Assessment & Plan  · Continue home PRN Xanax  · Zoloft appears to be on patient's home medication list, however patient has not been taking    ----------------------------------------------------------------------------------------  HPI/24hr events:     · R CT removed  · Currently requiring HFNC 60%/40L  · Started on Ancef for 2/2 MSSA positive blood cultures  · TTE completed and negative for vegetation  · Insulin regimen increased 2/2 persistent hyperglycemia    Patient appropriate for transfer out of the ICU today?: No  Disposition: Continue Stepdown Level 1 level of care   Code Status: Level 1 - Full Code  ---------------------------------------------------------------------------------------  SUBJECTIVE  Denies worsening SOB, pain      Review of Systems  Review of systems was reviewed and negative unless stated above in HPI/24-hour events   ---------------------------------------------------------------------------------------  OBJECTIVE    Vitals   Vitals:    21 0000 21 04021 0512   BP:  129/60     BP Location:  Right arm     Pulse: 74 65 63    Resp: 16 16 18    Temp: 97 9 °F (36 6 °C) 98 2 °F (36 8 °C)     TempSrc: Tympanic Axillary     SpO2: 94% 92% 93%    Weight:    57 7 kg (127 lb 3 3 oz)   Height:         Temp (24hrs), Av 1 °F (36 7 °C), Min:97 7 °F (36 5 °C), Max:98 4 °F (36 9 °C)  Current: Temperature: 98 2 °F (36 8 °C)          Respiratory:  SpO2: SpO2: 93 %, SpO2 Activity: SpO2 Activity: At Rest, SpO2 Device: O2 Device: High flow nasal cannula  Nasal Cannula O2 Flow Rate (L/min): 40 L/min    Invasive/non-invasive ventilation settings   Respiratory  Report   Lab Data (Last 4 hours)    None         O2/Vent Data (Last 4 hours) 12/29 0409          Non-Invasive Ventilation Mode HFNC (High flow)                   Physical Exam  Vitals and nursing note reviewed  Constitutional:       General: She is awake  She is not in acute distress  Appearance: She is ill-appearing  Interventions: Nasal cannula in place  Comments: HFNC   HENT:      Head: Normocephalic and atraumatic  Mouth/Throat:      Mouth: Mucous membranes are dry  Eyes:      Extraocular Movements: Extraocular movements intact  Conjunctiva/sclera: Conjunctivae normal       Pupils: Pupils are equal, round, and reactive to light  Cardiovascular:      Rate and Rhythm: Normal rate and regular rhythm  Pulses: Normal pulses  Heart sounds: Normal heart sounds  No murmur heard  Pulmonary:      Effort: Pulmonary effort is normal  No respiratory distress  Breath sounds: Examination of the right-lower field reveals rales  Examination of the left-lower field reveals rales  Decreased breath sounds and rales present  No wheezing or rhonchi  Abdominal:      General: Bowel sounds are normal  There is no distension  Palpations: Abdomen is soft  Tenderness: There is no abdominal tenderness  There is no guarding  Musculoskeletal:         General: Normal range of motion  Cervical back: Normal range of motion and neck supple  Skin:     General: Skin is warm and dry  Capillary Refill: Capillary refill takes less than 2 seconds  Neurological:      General: No focal deficit present  Mental Status: She is alert and oriented to person, place, and time  Psychiatric:         Mood and Affect: Mood normal          Behavior: Behavior normal  Behavior is cooperative  Thought Content:  Thought content normal          Judgment: Judgment normal        Laboratory and Diagnostics:  Results from last 7 days   Lab Units 12/29/21  0513 12/27/21  0525 12/26/21  1117 12/26/21  0555 12/25/21  0455 12/24/21  0429 12/23/21  0509   WBC Thousand/uL 13 80* 14 11* 15 15* 14 93* 15 31* 12 66* 13 01*  13 01*   HEMOGLOBIN g/dL 10 4* 9 9* 10 9* 10 4* 12 5 11 5 11 8  11 8   HEMATOCRIT % 32 2* 30 4* 34 2* 32 2* 38 4 35 8 36 0  36 0   PLATELETS Thousands/uL 518* 433* 444* 432* 497* 450* 400*  400*   NEUTROS PCT %  --   --   --  87*  --   --   --    BANDS PCT %  --   --   --   --   --  5  --    MONOS PCT %  --   --   --  3*  --   --   --    MONO PCT %  --   --   --   --   --  4  --      Results from last 7 days   Lab Units 12/29/21  0513 12/27/21  0525 12/26/21  0555 12/25/21  0455 12/24/21  0429 12/23/21  0509   SODIUM mmol/L 135 134* 135 138 141 140   POTASSIUM mmol/L 4 9 4 9 4 8 4 2 4 1 4 0   CHLORIDE mmol/L 103 103 104 104 106 102   CO2 mmol/L 24 24 25 25 25 26   ANION GAP mmol/L 8 7 6 9 10 12   BUN mg/dL 53* 54* 54* 68* 71* 58*   CREATININE mg/dL 1 25 1 33* 1 31* 1 54* 1 72* 1 83*   CALCIUM mg/dL 9 4 9 1 8 9 9 8 9 6 9 7   GLUCOSE RANDOM mg/dL 252* 349* 304* 82 97 100   ALT U/L  --  18 18 21 19  --    AST U/L  --  12* 16 21 20  --    ALK PHOS U/L  --  66 64 74 70  --    ALBUMIN g/dL  --  3 2* 3 3* 3 9 3 7  --    TOTAL BILIRUBIN mg/dL  --  0 45 0 72 0 60 0 48  --      Results from last 7 days   Lab Units 12/27/21  0525 12/26/21  0555 12/25/21  0455 12/24/21  0429   MAGNESIUM mg/dL 2 4 2 5 2 5 2 6   PHOSPHORUS mg/dL 3 4 4 3* 3 9 4 5*                   ABG:    VBG:          Micro  Results from last 7 days   Lab Units 12/26/21  1156 12/22/21  1043   BLOOD CULTURE  No Growth at 48 hrs  No Growth at 48 hrs  Staphylococcus aureus*  Staphylococcus aureus*   GRAM STAIN RESULT   --  Gram positive cocci in clusters*  Gram positive cocci in clusters*       EKG: sinus rhythm  Imaging: I have personally reviewed pertinent reports  and I have personally reviewed pertinent films in PACS    Intake and Output  I/O       12/27 0701 12/28 0700 12/28 0701 12/29 0700    P  O  1175 537    I V  (mL/kg)  60 (1)    IV Piggyback  100    Total Intake(mL/kg) 1175 (20 3) 697 (12 1)    Urine (mL/kg/hr) 1220 (0 9) 550 (0 4)    Chest Tube 0 0    Total Output 1220 550    Net -45 +147          Unmeasured Urine Occurrence  3 x          Height and Weights   Height: 5' (152 4 cm)     Body mass index is 24 84 kg/m²  Weight (last 2 days)     Date/Time Weight    12/29/21 0512 57 7 (127 21)    12/28/21 1131 57 6 (127)    12/28/21 0544 57 9 (127 65)    12/27/21 0532 58 9 (129 85)            Nutrition       Diet Orders   (From admission, onward)             Start     Ordered    12/27/21 0958  Dietary nutrition supplements  Once        Question Answer Comment   Select Supplement: Glucerna-Vanilla    Frequency Breakfast, Lunch, Dinner        12/27/21 0958    12/21/21 1750  Diet Rodríguez/CHO Controlled; Consistent Carbohydrate Diet Level 2 (5 carb servings/75 grams CHO/meal)  Diet effective now        References:    Nutrtion Support Algorithm Enteral vs  Parenteral   Question Answer Comment   Diet Type Rodríguez/CHO Controlled    Rodríguez/CHO Controlled Consistent Carbohydrate Diet Level 2 (5 carb servings/75 grams CHO/meal)    RD to adjust diet per protocol?  Yes        12/21/21 1749                  Active Medications  Scheduled Meds:  Current Facility-Administered Medications   Medication Dose Route Frequency Provider Last Rate    acetaminophen  650 mg Oral Q4H PRN BENTON Brown      ALPRAZolam  0 25 mg Oral HS PRN BENTON Brown      atorvastatin  40 mg Oral HS BENTON Brown      Baricitinib  2 mg Oral Q24H BENTON Brown      bisacodyl  10 mg Rectal Daily PRN BENTON Dhillon      cefazolin  2,000 mg Intravenous Q8H BENTON Dhillon Stopped (12/29/21 0342)    dexamethasone  0 1 mg/kg Intravenous Q12H BENTON Brown      docusate sodium  100 mg Oral Daily Megan Lynn PA-C      ferrous sulfate  325 mg Oral Daily With Breakfast BENTON Brown      heparin (porcine)  5,000 Units Subcutaneous Q8H [facility]  insulin glargine  25 Units Subcutaneous Q12H [address] Sarah Prince, BENTON      insulin lispro  1-6 Units Subcutaneous TID AC Sarah Prince, CRNP      insulin lispro  1-6 Units Subcutaneous HS Sarah Prince, CRNP      insulin lispro  4 Units Subcutaneous Daily With Breakfast Sushma Gonzalez, BENTON      insulin lispro  4 Units Subcutaneous Daily With Lunch Sushma Cano, BENTON      insulin lispro  4 Units Subcutaneous Daily With Dinner Kelsey Chaidez, BENTON      lidocaine  1 patch Topical Daily Sushma Gonzalez, BENTON      lisinopril  20 mg Oral Daily Shine Tierney, BENTON      ondansetron  4 mg Intravenous Q6H PRN Coletoby Jeonguce, BENTON      oxyCODONE  2 5 mg Oral Q4H PRN Kelsey Chaidez, BENTON      polyethylene glycol  17 g Oral Daily Sarah Prince, BENTON      senna  1 tablet Oral HS Sarah Prince, BENTON       Continuous Infusions:     PRN Meds:   acetaminophen, 650 mg, Q4H PRN  ALPRAZolam, 0 25 mg, HS PRN  bisacodyl, 10 mg, Daily PRN  ondansetron, 4 mg, Q6H PRN  oxyCODONE, 2 5 mg, Q4H PRN        Invasive Devices Review  Invasive Devices  Report    Peripheral Intravenous Line            Peripheral IV 12/28/21 Left Forearm 1 day          Drain            External Urinary Catheter 6 days                ---------------------------------------------------------------------------------------  Advance Directive and Living Will:      Power of :    POLST:    ---------------------------------------------------------------------------------------  Care Time Delivered:   Upon my evaluation, this patient had a high probability of imminent or life-threatening deterioration due to acute respiratory failure in setting of COVID-19 pneumonia, MSSA bacteremia, which required my direct attention, intervention, and personal management    I have personally provided 15 minutes ([id number] to [phone]) of critical care time, exclusive of procedures, teaching, family meetings, and any prior time recorded by providers other than myself  BENTON Marques      Portions of the record may have been created with voice recognition software  Occasional wrong word or "sound a like" substitutions may have occurred due to the inherent limitations of voice recognition software    Read the chart carefully and recognize, using context, where substitutions have occurred

## 2021-12-29 NOTE — RESPIRATORY THERAPY NOTE
12/29/21 0838   Non-Invasive Information   O2 Interface Device HFNC prongs   Non-Invasive Ventilation Mode HFNC (High flow)   SpO2 (!) 89 %   $ Pulse Oximetry Spot Check Charge Completed   Non-Invasive Settings   FiO2 (%) 60   Flow (lpm) 40   Temperature (Set) 31   Non-Invasive Readings   Heater Temperature (Obs) 31   Skin Intervention Skin intact

## 2021-12-29 NOTE — CASE MANAGEMENT
Case Management Discharge Planning Note    Patient name Ban Cheung  Location ICU 04/ICU 04- MRN [id number]  : 1940 Date 2021       Current Admission Date: 2021  Current Admission Diagnosis:COVID-19   Patient Active Problem List    Diagnosis Date Noted    Bacteremia due to methicillin susceptible Staphylococcus aureus (MSSA) 2021    Pneumothorax 2021    Chronic kidney disease, stage 3b ([facility] [address] ) 2021    Anemia 2021    SIRS (systemic inflammatory response syndrome) ([facility] ) 2021    Ambulatory dysfunction 2021    COVID-19 2021    Acute respiratory failure with hypoxia ([place] [address] ) 2021    Negative depression screening 2021    Overweight (BMI 25 0-29 9) 2021    Medicare annual wellness visit, subsequent 2020    Localized, primary osteoarthritis of hand 2020    Refused influenza vaccine 2020    Sciatica 2020    Hypertensive kidney disease with chronic [facility] disease stage III [facility] ) 2019    Type 2 diabetes mellitus without complication, without long-term current use of insulin ([place] [address] ) 2019    Bursitis of shoulder 2018    Groin pain, chronic, left 2018    Paresthesia of arm 2017    Back pain 2017    Muscle pain 2017    Anxiety 2017    Abnormal ECG 2016    Diverticulitis large intestine w/o perforation or abscess w/o bleeding 2016    Essential hypertension 2016    Lower abdominal pain 2016    Atrophic vaginitis 2016    Hypercholesterolemia 2016    Irritable bowel syndrome 2016    Simple chronic anemia 2016    Disorder of shoulder 2008    Articular cartilage disorder of shoulder region 2008    Loose body in joint of shoulder region 2008      LOS (days): 12  Geometric Mean LOS (GMLOS) (days): 5 40  Days to GMLOS:-6 8     OBJECTIVE:  Risk of Unplanned Readmission Score: 20 Current admission status: Inpatient   Preferred Pharmacy:   [address]  [place], [address]  [facility] [address]  Phone: [phone] Fax: [phone]    [address], [place]  [facility], [facility] [address]  Phone: [phone] Fax: [phone]    Primary Care Provider: Pavithra Chen DO    Primary Insurance: [address] REP  Secondary Insurance:     DISCHARGE DETAILS:    Discharge planning discussed with[de-identified] Tenzin Yanes  daughter  Freedom of Choice: Yes  Comments - Freedom of Choice: rehab recommended, permission was given to send additional referrals  pt is t is 10 days past covid  test,  CM contacted family/caregiver?: Yes             Contacts  Patient Contacts: Tenzin Yanes  Relationship to Patient[de-identified] Family (daughter)  Contact Method: Phone  Phone Number: [phone]  Reason/Outcome: Discharge Planning              Other Referral/Resources/Interventions Provided:  Interventions: Short Term Rehab  Referral Comments: [facility] aware [facility], [facility] and [facility] are interested but do not have nay open beds yet, she was made aware [employer] can accpet?  they need to fill out a financial application and the pt will have a $184 00 copay days ,  family gave permission to expand the snf search, pt is 10 days past postive test, family wants ne to find out what facilities allow unvaccinated vistitors,, new referrals sent            Discharge Destination Plan[de-identified] Short Term Rehab (new referrals sent)                                      Family notified[de-identified] spoke wiht the daughter, pt's  is in the ER she stated

## 2021-12-29 NOTE — UTILIZATION REVIEW
Continued Stay Review    Date: 12/29                          Current Patient Class: inpatient    Current Level of Care: level 1 ICU stepdown since 12/21 due to escalating o2 needs    HPI:81 y o  female initially admitted on 12/16 under obs, converted to IP 12/17     Assessment/Plan: 12/29 currently on HFNC 60%, 40 liters,  Episodes of desat into high 80's despite o2  Dry mm, has rales and decreased breath sounds  Chest tube removed yesterday; IV steroids, renal dose baricitinib due to decreased GFR, SQ heparin, statin  Determined to have MSSA bacteremia and is on day 2 of IV ancef  Echo negative for vegetation  Insulin needs increased to treat persistent hyperglycemia  Sugars were up to 400's yesterday, today in the 200's  If does not improve, will need insulin gtt again       Vital Signs:   Date/Time Temp Pulse Resp BP MAP (mmHg) SpO2 FiO2 (%) Calculated FIO2 (%) - Nasal Cannula O2 Flow Rate (L/min) Nasal Cannula O2 Flow Rate (L/min) O2 Device O2 Interface Device Patient Position - Orthostatic VS   12/29/21 0900 -- 85 22 -- -- 88 % Abnormal  -- -- -- -- -- -- --   12/29/21 0838 -- -- -- -- -- 89 % Abnormal  60 -- 40 L/min -- High flow nasal cannula HFNC prongs --   12/29/21 0800 97 6 °F (36 4 °C) 89 23 Abnormal  120/62 86 90 % -- -- -- -- -- -- Lying   12/29/21 0700 -- 74 16 109/60 80 91 % -- -- -- -- -- -- --   12/29/21 0409 -- 63 18 -- -- 93 % 60 -- 40 L/min -- High flow nasal cannula HFNC prongs --   12/29/21 0400 98 2 °F (36 8 °C) 65 16 129/60 86 92 % 60 180 -- 40 L/min High flow nasal cannula -- Lying   12/29/21 0000 97 9 °F (36 6 °C) 74 16 -- -- 94 % 60 -- 40 L/min -- High flow nasal cannula HFNC prongs  --   12/28/21 2100 -- -- -- -- -- -- 60 -- 40 L/min -- High flow nasal cannula HFNC prongs  --   12/28/21 2000 -- 89 41 Abnormal  158/76 109 90 % 60 180 -- 40 L/min High flow nasal cannula  -- Lying   12/28/21 1950 98 4 °F (36 9 °C) -- -- -- -- -- -- -- -- -- -- -- --   12/28/21 1800 -- 104 45 Abnormal  123/66 89 89 % Abnormal  -- -- -- -- -- -- --   12/28/21 1703 -- -- -- -- -- 92 % 60 -- 40 L/min -- High flow nasal cannula HFNC prongs --   12/28/21 1700 -- 79 27 Abnormal  138/73 101 92 % -- -- -- -- -- -- --   12/28/21 1600 -- 76 24 Abnormal  131/63 88 92 % -- -- -- -- -- -- --   12/28/21 1500 97 7 °F (36 5 °C) -- -- -- -- -- -- -- -- -- -- -- --   12/28/21 1400 -- 91 30 Abnormal  142/70 95 94 % -- -- -- -- High flow nasal cannula -- --   12/28/21 1300 -- 92 37 Abnormal  151/76 107 88 % Abnormal  60 -- 45 L/min -- High flow nasal cannula -- --   12/28/21 1221 -- -- -- -- -- 90 % 60 -- 45 L/min -- High flow nasal cannula HFNC prongs --   12/28/21 1131 98 3 °F (36 8 °C) 70 -- 148/66 -- -- -- -- -- -- -- -- --   12/28/21 1000 -- 70 18 148/66 95 97 % -- -- -- -- High flow nasal cannula -- --   12/28/21 0922 -- -- -- -- -- 96 % 60 -- 45 L/min  -- High flow nasal cannula HFNC prongs --   12/28/21 0800 -- 65 18 136/74 99 95 % 60 220 -- 50 L/min High flow nasal cannula -- --   12/28/21 0700 97 7 °F (36 5 °C) 65 18 148/73 104 96 % 60 220 -- 50 L/min High flow nasal cannula -- --   12/28/21 0518 -- -- -- -- -- -- -- -- -- -- -- HFNC prongs --   12/28/21 0400 97 9 °F (36 6 °C) 64 14 132/64 92 96 % 60 240 -- 55 L/min High flow nasal cannula  -- Lying   12/28/21 0000 98 °F (36 7 °C) 68 17 155/68 98 95 % 60 240 -- 55 L/min High flow nasal cannula  -- Lying   12/27/21 2125 -- -- -- -- -- -- -- -- -- -- -- HFNC prongs --   12/27/21 2000 97 6 °F (36 4 °C) 80 28 Abnormal  155/72 103 96 % 60 240 -- 55 L/min High flow nasal cannula  -- Lying   12/27/21 1900 -- 86 27 Abnormal  142/65 94 94 % 60 240 -- 55 L/min High flow nasal cannula  -- --   12/27/21 1700 -- 75 22 164/76 109 95 % -- -- -- -- -- -- --   12/27/21 1633 -- -- -- -- -- 94 % 65 -- 50 L/min -- High flow nasal cannula HFNC prongs --   12/27/21 1500 97 5 °F (36 4 °C) 79 26 Abnormal  146/66 95 93 % -- -- -- -- -- -- --   12/27/21 1400 -- 84 34 Abnormal  163/77 110 90 % -- -- -- -- -- -- --   12/27/21 1302 -- -- -- -- -- 88 % Abnormal  70 -- 50 L/min -- High flow nasal cannula HFNC prongs --   12/27/21 1200 -- 85 22 157/76 109 93 % -- -- -- -- -- -- --   12/27/21 1100 97 5 °F (36 4 °C) 72 24 Abnormal  152/72 103 92 % -- -- -- -- -- -- --   12/27/21 0900 98 °F (36 7 °C) 60 17 145/66 95 93 % 70 -- 50 L/min -- High flow nasal cannula -- --   12/27/21 0810 -- -- -- -- -- 95 % 70  -- 50 L/min  -- High flow nasal cannula HFNC prongs --   12/27/21 0800 -- 59 19 126/60 86 95 % -- -- -- -- High flow nasal cannula -- --   12/27/21 0700 98 4 °F (36 9 °C) 62 18 130/63 90 94 % -- -- -- -- -- -- --   12/27/21 0600 -- 58 19 125/59 85 95 % 75 -- 55 L/min -- High flow nasal cannula -- Lying   12/27/21 0500 -- 70 15 148/71 102 94 % 75 -- 55 L/min -- High flow nasal cannula -- Lying   12/27/21 0400 98 6 °F (37 °C) 60 12 120/58 84 95 % 75 -- 55 L/min -- High flow nasal cannula -- Lying   12/27/21 0345 -- -- -- -- -- -- -- -- -- -- -- HFNC prongs --   12/27/21 0300 -- 60 14 135/65 93 96 % 80 -- 55 L/min -- High flow nasal cannula -- Lying   12/27/21 0200 -- 61 14 132/64 92 95 % 80 -- 55 L/min -- High flow nasal cannula -- Lying   12/27/21 0100 -- 61 28 Abnormal  141/69 99 98 % 80 -- 55 L/min -- High flow nasal cannula -- Lying   12/27/21 0000 98 5 °F (36 9 °C) 80 32 Abnormal  168/79 114 93 % 80 -- 55 L/min -- High flow nasal cannula -- Lying         Pertinent Labs/Diagnostic Results:        XR chest portable ICU   Final Result by Dahlia Dobson MD (12/29 2849)      No pneumothorax  Unchanged left greater than right basilar airspace opacities, compatible with known Covid 19 pneumonia  Workstation performed: [id number]         XR chest portable   Final Result by Ross Summers MD (12/27 5622)      Right chest tube with no pneumothorax  Persistent moderate bibasilar groundglass opacity due to Covid-19                    Workstation performed: [id number]         XR chest portable ICU   Final Result by Mariela Alejo MD (12/27 1057)      Persistent small right apical pneumothorax with chest tube over right base  Persistent moderate bibasilar groundglass opacity due to Covid-19      12/28 echo   Left Ventricle: Left ventricular cavity size is normal  Wall thickness is increased  The left ventricular ejection fraction is 50%  Systolic function is low normal  Diastolic function is mildly abnormal, consistent with grade I (abnormal) relaxation    Right Ventricle: Systolic function is normal     Mitral Valve: There is mild regurgitation    Tricuspid Valve: There is mild regurgitation  Results from last 7 days   Lab Units 12/29/21  0513 12/27/21  0525 12/26/21  1117 12/26/21  0555 12/26/21  0555 12/25/21  0455 12/25/21  0455 12/24/21  0429 12/24/21  0429   WBC Thousand/uL 13 80* 14 11* 15 15*   < > 14 93*   < > 15 31*   < > 12 66*   HEMOGLOBIN g/dL 10 4* 9 9* 10 9*  --  10 4*  --  12 5   < > 11 5   HEMATOCRIT % 32 2* 30 4* 34 2*  --  32 2*  --  38 4   < > 35 8   PLATELETS Thousands/uL 518* 433* 444*   < > 432*   < > 497*   < > 450*   NEUTROS ABS Thousands/µL  --   --   --   --  12 89*  --   --   --   --    BANDS PCT %  --   --   --   --   --   --   --   --  5    < > = values in this interval not displayed           Results from last 7 days   Lab Units 12/29/21  0513 12/27/21  0525 12/26/21  0555 12/25/21 0455 12/24/21  0429   SODIUM mmol/L 135 134* 135 138 141   POTASSIUM mmol/L 4 9 4 9 4 8 4 2 4 1   CHLORIDE mmol/L 103 103 104 104 106   CO2 mmol/L 24 24 25 25 25   ANION GAP mmol/L 8 7 6 9 10   BUN mg/dL 53* 54* 54* 68* 71*   CREATININE mg/dL 1 25 1 33* 1 31* 1 54* 1 72*   EGFR ml/min/1 73sq m 40 37 38 31 27   CALCIUM mg/dL 9 4 9 1 8 9 9 8 9 6   MAGNESIUM mg/dL  --  2 4 2 5 2 5 2 6   PHOSPHORUS mg/dL  --  3 4 4 3* 3 9 4 5*     Results from last 7 days   Lab Units 12/27/21  0525 12/26/21  0555 12/25/21  0455 12/24/21  0429   AST U/L 12* 16 21 20   ALT U/L 18 18 21 19 ALK PHOS U/L 66 64 74 70   TOTAL PROTEIN g/dL 6 6 6 6 7 8 7 5   ALBUMIN g/dL 3 2* 3 3* 3 9 3 7   TOTAL BILIRUBIN mg/dL 0 45 0 72 0 60 0 48     Results from last 7 days   Lab Units 12/29/21  1105 12/29/21  0731 12/29/21  0520 12/28/21  2106 12/28/21  1618 12/28/21  1117 12/28/21  0707 12/28/21  0522 12/27/21  2059 12/27/21  1550 12/27/21  1114 12/27/21  0906   POC GLUCOSE mg/dl 280* 208* 269* 426* 276* 295* 249* 236* 366* 357* 407* 364*     Results from last 7 days   Lab Units 12/29/21  0513 12/27/21  0525 12/26/21  0555 12/25/21  0455 12/24/21  0429 12/23/21  0509   GLUCOSE RANDOM mg/dL 252* 349* 304* 82 97 100       Results from last 7 days   Lab Units 12/25/21  1258   D-DIMER QUANTITATIVE ug/ml FEU 0 63*     Results from last 7 days   Lab Units 12/25/21  1258   CRP mg/L 37 8*           Results from last 7 days   Lab Units 12/26/21  1156   BLOOD CULTURE  No Growth at 48 hrs  No Growth at 48 hrs         Medications:   Scheduled Medications:  atorvastatin, 40 mg, Oral, HS  Baricitinib, 2 mg, Oral, Q24H  cefazolin, 2,000 mg, Intravenous, Q8H started on 12/28  dexamethasone, 0 1 mg/kg, Intravenous, Q12H  docusate sodium, 100 mg, Oral, Daily  ferrous sulfate, 325 mg, Oral, Daily With Breakfast  heparin (porcine), 5,000 Units, Subcutaneous, Q8H JUAN ANTONIO  insulin glargine, 25 Units, Subcutaneous, Q12H JUAN ANTONIO  insulin lispro, 2-12 Units, Subcutaneous, HS  insulin lispro, 2-12 Units, Subcutaneous, TID AC  insulin lispro, 4 Units, Subcutaneous, Daily With Breakfast  insulin lispro, 4 Units, Subcutaneous, Daily With Lunch  insulin lispro, 4 Units, Subcutaneous, Daily With Dinner  lidocaine, 1 patch, Topical, Daily  lisinopril, 20 mg, Oral, Daily  polyethylene glycol, 17 g, Oral, Daily  senna, 1 tablet, Oral, HS      Continuous IV Infusions:none   PRN Meds: no doses diben  acetaminophen, 650 mg, Oral, Q4H PRN  ALPRAZolam, 0 25 mg, Oral, HS PRN  bisacodyl, 10 mg, Rectal, Daily PRN  ondansetron, 4 mg, Intravenous, Q6H PRN  oxyCODONE, 2 5 mg, Oral, Q4H PRN        Discharge Plan: TBD    Network Utilization Review Department  ATTENTION: Please call with any questions or concerns to [phone] and carefully listen to the prompts so that you are directed to the right person  All voicemails are confidential   Jhoana Bunch all requests for admission clinical reviews, approved or denied determinations and any other requests to dedicated fax number below belonging to the campus where the patient is receiving treatment   List of dedicated fax numbers for the Facilities:  [address] DENIALS (Administrative/Medical Necessity) [phone]   [address] (Maternity/NICU/Pediatrics) [phone]   [address]  [address] [facility] [place] [address] [address] [place] [address] Samantha Tereza [address] [facility] [facility] [phone]

## 2021-12-29 NOTE — RESPIRATORY THERAPY NOTE
12/29/21 0000   Respiratory Assessment   Assessment Type Assess only   General Appearance Alert; Awake   Respiratory Pattern Dyspnea with exertion   Chest Assessment Chest expansion symmetrical   Bilateral Breath Sounds Diminished   R Breath Sounds Diminished   L Breath Sounds Diminished   Cough Non-productive   Resp Comments pt asleep natacha well    O2 Device opti    Oxygen Therapy/Pulse Ox   O2 Device High flow nasal cannula   O2 Therapy Oxygen humidified   FiO2 (%) 60   O2 Flow Rate (L/min) 40 L/min   SpO2 94 %   SpO2 Activity At Rest   $ Pulse Oximetry Spot Check Charge Completed

## 2021-12-29 NOTE — PLAN OF CARE
Problem: Potential for Falls  Goal: Patient will remain free of falls  Description: INTERVENTIONS:  - Educate patient/family on patient safety including physical limitations  - Instruct patient to call for assistance with activity   - Consult OT/PT to assist with strengthening/mobility   - Keep Call bell within reach  - Keep bed low and locked with side rails adjusted as appropriate  - Keep care items and personal belongings within reach  - Initiate and maintain comfort rounds  - Make Fall Risk Sign visible to staff  - Offer Toileting every 2 Hours, in advance of need  - Initiate/Maintain fall alarm  - Obtain necessary fall risk management equipment: fall alarm  - Apply yellow socks and bracelet for high fall risk patients  - Consider moving patient to room near nurses station  Outcome: Progressing     Problem: PAIN - ADULT  Goal: Verbalizes/displays adequate comfort level or baseline comfort level  Description: Interventions:  - Encourage patient to monitor pain and request assistance  - Assess pain using appropriate pain scale  - Administer analgesics based on type and severity of pain and evaluate response  - Implement non-pharmacological measures as appropriate and evaluate response  - Consider cultural and social influences on pain and pain management  - Notify physician/advanced practitioner if interventions unsuccessful or patient reports new pain  Outcome: Progressing     Problem: INFECTION - ADULT  Goal: Absence or prevention of progression during hospitalization  Description: INTERVENTIONS:  - Assess and monitor for signs and symptoms of infection  - Monitor lab/diagnostic results  - Monitor all insertion sites, i e  indwelling lines, tubes, and drains  - Monitor endotracheal if appropriate and nasal secretions for changes in amount and color  - Indian appropriate cooling/warming therapies per order  - Administer medications as ordered  - Instruct and encourage patient and family to use good hand hygiene technique  - Identify and instruct in appropriate isolation precautions for identified infection/condition  Outcome: Progressing  Goal: Absence of fever/infection during neutropenic period  Description: INTERVENTIONS:  - Monitor WBC    Outcome: Progressing     Problem: SAFETY ADULT  Goal: Patient will remain free of falls  Description: INTERVENTIONS:  - Educate patient/family on patient safety including physical limitations  - Instruct patient to call for assistance with activity   - Consult OT/PT to assist with strengthening/mobility   - Keep Call bell within reach  - Keep bed low and locked with side rails adjusted as appropriate  - Keep care items and personal belongings within reach  - Initiate and maintain comfort rounds  - Make Fall Risk Sign visible to staff  - Offer Toileting every 2 Hours, in advance of need  - Initiate/Maintain fall alarm  - Obtain necessary fall risk management equipment: fall alarm  - Apply yellow socks and bracelet for high fall risk patients  - Consider moving patient to room near nurses station  Outcome: Progressing  Goal: Maintain or return to baseline ADL function  Description: INTERVENTIONS:  - Educate patient/family on patient safety including physical limitations  - Instruct patient to call for assistance with activity   - Consult OT/PT to assist with strengthening/mobility   - Keep Call bell within reach  - Keep bed low and locked with side rails adjusted as appropriate  - Keep care items and personal belongings within reach  - Initiate and maintain comfort rounds  - Make Fall Risk Sign visible to staff  - Offer Toileting every 2 Hours, in advance of need  - Initiate/Maintain fall alarm  - Obtain necessary fall risk management equipment: fall alarm  - Apply yellow socks and bracelet for high fall risk patients  - Consider moving patient to room near nurses station  Outcome: Progressing  Goal: Maintains/Returns to pre admission functional level  Description: INTERVENTIONS:  - Perform BMAT or MOVE assessment daily    - Set and communicate daily mobility goal to care team and patient/family/caregiver  - Collaborate with rehabilitation services on mobility goals if consulted  - Perform Range of Motion 3 times a day  - Reposition patient every 2 hours  - Dangle patient 3 times a day  - Stand patient 3 times a day  - Ambulate patient 3 times a day  - Out of bed to chair 3 times a day   - Out of bed for meals 3 times a day  - Out of bed for toileting  - Record patient progress and toleration of activity level   Outcome: Progressing     Problem: DISCHARGE PLANNING  Goal: Discharge to home or other facility with appropriate resources  Description: INTERVENTIONS:  - Identify barriers to discharge w/patient and caregiver  - Arrange for needed discharge resources and transportation as appropriate  - Identify discharge learning needs (meds, wound care, etc )  - Arrange for interpretive services to assist at discharge as needed  - Refer to Case Management Department for coordinating discharge planning if the patient needs post-hospital services based on physician/advanced practitioner order or complex needs related to functional status, cognitive ability, or social support system  Outcome: Progressing     Problem: Knowledge Deficit  Goal: Patient/family/caregiver demonstrates understanding of disease process, treatment plan, medications, and discharge instructions  Description: Complete learning assessment and assess knowledge base    Interventions:  - Provide teaching at level of understanding  - Provide teaching via preferred learning methods  Outcome: Progressing     Problem: MOBILITY - ADULT  Goal: Maintain or return to baseline ADL function  Description: INTERVENTIONS:  - Educate patient/family on patient safety including physical limitations  - Instruct patient to call for assistance with activity   - Consult OT/PT to assist with strengthening/mobility   - Keep Call bell within reach  - Keep bed low and locked with side rails adjusted as appropriate  - Keep care items and personal belongings within reach  - Initiate and maintain comfort rounds  - Make Fall Risk Sign visible to staff  - Offer Toileting every 2 Hours, in advance of need  - Initiate/Maintain fall alarm  - Obtain necessary fall risk management equipment: fall alarm  - Apply yellow socks and bracelet for high fall risk patients  - Consider moving patient to room near nurses station  Outcome: Progressing  Goal: Maintains/Returns to pre admission functional level  Description: INTERVENTIONS:  - Perform BMAT or MOVE assessment daily    - Set and communicate daily mobility goal to care team and patient/family/caregiver  - Collaborate with rehabilitation services on mobility goals if consulted  - Perform Range of Motion 3 times a day  - Reposition patient every 2 hours    - Dangle patient 3 times a day  - Stand patient 3 times a day  - Ambulate patient 3 times a day  - Out of bed to chair 3 times a day   - Out of bed for meals 3 times a day  - Out of bed for toileting  - Record patient progress and toleration of activity level   Outcome: Progressing     Problem: Prexisting or High Potential for Compromised Skin Integrity  Goal: Skin integrity is maintained or improved  Description: INTERVENTIONS:  - Identify patients at risk for skin breakdown  - Assess and monitor skin integrity  - Assess and monitor nutrition and hydration status  - Monitor labs   - Assess for incontinence   - Turn and reposition patient  - Assist with mobility/ambulation  - Relieve pressure over bony prominences  - Avoid friction and shearing  - Provide appropriate hygiene as needed including keeping skin clean and dry  - Evaluate need for skin moisturizer/barrier cream  - Collaborate with interdisciplinary team   - Patient/family teaching  - Consider wound care consult   Outcome: Progressing     Problem: RESPIRATORY - ADULT  Goal: Achieves optimal ventilation and oxygenation  Description: INTERVENTIONS:  - Assess for changes in respiratory status  - Assess for changes in mentation and behavior  - Position to facilitate oxygenation and minimize respiratory effort  - Oxygen administered by appropriate delivery if ordered  - Initiate smoking cessation education as indicated  - Encourage broncho-pulmonary hygiene including cough, deep breathe, Incentive Spirometry  - Assess the need for suctioning and aspirate as needed  - Assess and instruct to report SOB or any respiratory difficulty  - Respiratory Therapy support as indicated  Outcome: Progressing     Problem: Nutrition/Hydration-ADULT  Goal: Nutrient/Hydration intake appropriate for improving, restoring or maintaining nutritional needs  Description: Monitor and assess patient's nutrition/hydration status for malnutrition  Collaborate with interdisciplinary team and initiate plan and interventions as ordered  Monitor patient's weight and dietary intake as ordered or per policy  Utilize nutrition screening tool and intervene as necessary  Determine patient's food preferences and provide high-protein, high-caloric foods as appropriate       INTERVENTIONS:  - Monitor oral intake, urinary output, labs, and treatment plans  - Assess nutrition and hydration status and recommend course of action  - Evaluate amount of meals eaten  - Assist patient with eating if necessary   - Allow adequate time for meals  - Recommend/ encourage appropriate diets, oral nutritional supplements, and vitamin/mineral supplements  - Order, calculate, and assess calorie counts as needed  - Recommend, monitor, and adjust tube feedings and TPN/PPN based on assessed needs  - Assess need for intravenous fluids  - Provide specific nutrition/hydration education as appropriate  - Include patient/family/caregiver in decisions related to nutrition  Outcome: Progressing

## 2021-12-29 NOTE — PLAN OF CARE
Problem: PHYSICAL THERAPY ADULT  Goal: Performs mobility at highest level of function for planned discharge setting  See evaluation for individualized goals  Description: Treatment/Interventions: Functional transfer training,Elevations,Therapeutic exercise,Endurance training,Bed mobility,Gait training  Equipment Recommended:  (unknown at this time)       See flowsheet documentation for full assessment, interventions and recommendations  Outcome: Progressing  Note: Prognosis: Fair  Problem List: Decreased endurance,Impaired balance,Decreased mobility  Assessment: Pt seen for PT treatment session this date, consisting of ther act focused on functional transfer training + static standing attempt, ther ex focused on LB strengthening in order to facilitate improved activity tolerance and gt training on level surfaces to improve pt safety in household environment  Since previous session, pt has made minimal progress in terms of continued OOB mobility attempt d/t hypoxia and MOREAU/SOB  Pertinent barriers during this session include SOB  Current goals and POC remain appropriate, pt continues to have rehab potential and is making progress towards STGs  Pt prognosis for achieving goals is fair, pending pt progress with hospitalization/medical status improvements, and indicated by orientation, ability to follow directions, attention span, self-expression (thoughts, feelings, needs) and motivation  Pt limited d/t medical instability  PT recommends post acute rehabilitation services upon discharge  Pt continues to be functioning below baseline level, and remains limited 2* factors listed above  PT will continue to see pt during current hospitalization in order to address the deficits listed above and provide interventions consistent w/ POC in effort to achieve STGs    Barriers to Discharge: Decreased caregiver support        PT Discharge Recommendation: Post acute rehabilitation services          See flowsheet documentation for full assessment

## 2021-12-29 NOTE — ASSESSMENT & PLAN NOTE
Lab Results   Component Value Date    HGBA1C 6 2 (H) 11/22/2021       Recent Labs     12/28/21  1618 12/28/21  2106 12/29/21  0520 12/29/21  0731   POCGLU 276* 426* 269* 208*       Blood Sugar Average: Last 72 hrs:  (P) 410 [id number]     · Takes metformin, Januvia and glipizide at home  · Was on insulin gtt - now on Lantus 25 units BID, mealtime insulin 4 units TID, SSI Algorithm 4  · May require insulin gtt if BS continues to be elevated  · ACHS fingersticks  · Diabetic diet

## 2021-12-29 NOTE — QUICK NOTE
Dr Rouse Public called and updated daughter Juanis Danielle about past 24 hour events including removal of chest tube and treatment plan  All questions answered to her satisfaction

## 2021-12-29 NOTE — ASSESSMENT & PLAN NOTE
· As evidenced by tachycardia, tachypnea, hypoxia  · Suspect this is in setting of worsening COVID-19 pneumonia vs superimposed bacterial infection  · Blood cultures 1/2 positive, repeat blood cultures 2/2 positive for Staph, repeats from 12/26 NGTD  · Procalcitonin initially negative, repeat pending  · Continue Ancef D2  · Trend fever and WBC curve  · Trend procalcitonin

## 2021-12-29 NOTE — PLAN OF CARE
Problem: Potential for Falls  Goal: Patient will remain free of falls  Description: INTERVENTIONS:  - Educate patient/family on patient safety including physical limitations  - Instruct patient to call for assistance with activity   - Consult OT/PT to assist with strengthening/mobility   - Keep Call bell within reach  - Keep bed low and locked with side rails adjusted as appropriate  - Keep care items and personal belongings within reach  - Initiate and maintain comfort rounds  - Make Fall Risk Sign visible to staff  - Offer Toileting every 2 Hours, in advance of need  - Initiate/Maintain fall alarm  - Obtain necessary fall risk management equipment: fall alarm  - Apply yellow socks and bracelet for high fall risk patients  - Consider moving patient to room near nurses station  Outcome: Progressing     Problem: PAIN - ADULT  Goal: Verbalizes/displays adequate comfort level or baseline comfort level  Description: Interventions:  - Encourage patient to monitor pain and request assistance  - Assess pain using appropriate pain scale  - Administer analgesics based on type and severity of pain and evaluate response  - Implement non-pharmacological measures as appropriate and evaluate response  - Consider cultural and social influences on pain and pain management  - Notify physician/advanced practitioner if interventions unsuccessful or patient reports new pain  Outcome: Progressing     Problem: INFECTION - ADULT  Goal: Absence of fever/infection during neutropenic period  Description: INTERVENTIONS:  - Monitor WBC    Outcome: Progressing

## 2021-12-29 NOTE — RESPIRATORY THERAPY NOTE
12/29/21 1317   Non-Invasive Information   O2 Interface Device HFNC prongs   Non-Invasive Ventilation Mode HFNC (High flow)   SpO2 (!) 88 %   $ Pulse Oximetry Spot Check Charge Completed   Non-Invasive Settings   FiO2 (%) 60   Flow (lpm) 40   Temperature (Set) 31   Non-Invasive Readings   Heater Temperature (Obs) 31   Skin Intervention Skin intact

## 2021-12-29 NOTE — PHYSICAL THERAPY NOTE
Physical Therapy Treatment Note       12/29/21 1204   PT Last Visit   PT Visit Date 12/29/21   Note Type   Note Type Treatment   Pain Assessment   Pain Assessment Tool 0-10   Pain Score No Pain   Pain Location/Orientation   ("my stomach feels upset")   Restrictions/Precautions   Weight Bearing Precautions Per Order No   Other Precautions Airborne/isolation;Contact/isolation;Multiple lines;Telemetry;O2;Fall Risk  (40 L/min HFNC)   General   Chart Reviewed Yes   Response to Previous Treatment Patient unable to report, no changes reported from family or staff   Family/Caregiver Present No   Cognition   Arousal/Participation Alert; Responsive; Cooperative   Attention Within functional limits   Orientation Level Oriented X4   Memory Within functional limits   Following Commands Follows all commands and directions without difficulty   Comments pt agreeable to PT session   Subjective   Subjective "I can sit up in the chair for another hour or so"   Bed Mobility   Supine to Sit Unable to assess  (pt received OOB in recliner upon arrival)   Transfers   Sit to Stand 5  Supervision  (SBA for safety)   Additional items Assist x 1; Armrests; Verbal cues   Stand to Sit 5  Supervision   Additional items Assist x 1; Armrests   Additional Comments VCs for improving awareness with hand placement   Ambulation/Elevation   Gait pattern Improper Weight shift;Decreased foot clearance; Short stride   Gait Assistance 5  Supervision  (SBA for safety and line management)   Additional items Assist x 1   Assistive Device Rolling walker   Distance 5 ft forward/backward x 2 trials with seated rest period btwn x 10 mins for energy conservation/recovery  (further distance unable to be performed d/t HFNC tubing)   Balance   Static Sitting Fair +   Dynamic Sitting Fair   Static Standing Fair -  (static standing attempts x 2 of 1 min duration)   Dynamic Standing Fair -   Ambulatory Fair -   Endurance Deficit   Endurance Deficit Yes   Endurance Deficit Description SpO2 ranged from 82% at lowest following 2nd amb trial, recovered to >88-90% on HFNC within 3-4 mins   Activity Tolerance   Activity Tolerance Patient limited by fatigue;Treatment limited secondary to medical complications (Comment)  (hypoxia)   Nurse Made Aware Yes, RN Letha Thakkar verbalized pt appropriate for PT session   Exercises   Glute Sets Sitting;20 reps;AROM; Bilateral   Hip Abduction Sitting;20 reps;AROM; Right;Left   Hip Adduction Sitting;20 reps;AROM; Right;Left   Knee AROM Long Arc Quad Sitting;20 reps;AROM; Right;Left   Ankle Pumps Sitting;20 reps;AROM; Bilateral   Assessment   Prognosis Fair   Problem List Decreased endurance; Impaired balance;Decreased mobility   Assessment Pt seen for PT treatment session this date, consisting of ther act focused on functional transfer training + static standing attempt, ther ex focused on LB strengthening in order to facilitate improved activity tolerance and gt training on level surfaces to improve pt safety in household environment  Since previous session, pt has made minimal progress in terms of continued OOB mobility attempt d/t hypoxia and MOREAU/SOB  Pertinent barriers during this session include SOB  Current goals and POC remain appropriate, pt continues to have rehab potential and is making progress towards STGs  Pt prognosis for achieving goals is fair, pending pt progress with hospitalization/medical status improvements, and indicated by orientation, ability to follow directions, attention span, self-expression (thoughts, feelings, needs) and motivation  Pt limited d/t medical instability  PT recommends post acute rehabilitation services upon discharge  Pt continues to be functioning below baseline level, and remains limited 2* factors listed above  PT will continue to see pt during current hospitalization in order to address the deficits listed above and provide interventions consistent w/ POC in effort to achieve STGs     Barriers to Discharge Decreased caregiver support   Goals   Patient Goals "to feel better"   LTG Expiration Date 01/08/22   Long Term Goal #1 goals remain appropriate   PT Treatment Day 2   Plan   Treatment/Interventions Functional transfer training;LE strengthening/ROM; Therapeutic exercise; Endurance training;Patient/family training;Equipment eval/education;Gait training;Spoke to nursing   Progress Slow progress, decreased activity tolerance   PT Frequency 3-5x/wk   Recommendation   PT Discharge Recommendation Post acute rehabilitation services   Equipment Recommended   (continue RW use)   Additional Comments Pt's raw score on the AM-PAC Basic Mobility inpatient short form is 17, standardized score is 39 67  Patients at this level are likely to benefit from DC to [facility], however, please refer to therapist recommendation for safe DC planning  AM-[facility] Basic Mobility Inpatient   Turning in Bed Without Bedrails 3   Lying on Back to Sitting on Edge of Flat Bed 3   Moving Bed to Chair 3   Standing Up From Chair 3   Walk in Room 3   Climb 3-5 Stairs 2   Basic Mobility Inpatient Raw Score 17   Basic Mobility Standardized Score 39 67   Highest Level Of Mobility   JH-HLM Goal 5: Stand one or more mins   JH-HLM Highest Level of Mobility 5: Stand (1 or more minutes)   JH-HLM Goal Achieved Yes   Education   Education Provided Mobility training;Assistive device;Home exercise program   Patient Demonstrates acceptance/verbal understanding;Explanation/teachback used;Demonstrates verbal understanding   End of Consult   Patient Position at End of Consult Bedside chair; All needs within reach       [facility], PT    Time of PT treatment session: 5044-8060 (total treatment time = 40 minutes)

## 2021-12-30 ENCOUNTER — APPOINTMENT (INPATIENT)
Dept: RADIOLOGY | Facility: HOSPITAL | Age: 81
DRG: 871 | End: 2021-12-30
Payer: COMMERCIAL

## 2021-12-30 LAB
GLUCOSE SERPL-MCNC: 132 MG/DL (ref 65–140)
GLUCOSE SERPL-MCNC: 152 MG/DL (ref 65–140)
GLUCOSE SERPL-MCNC: 156 MG/DL (ref 65–140)
GLUCOSE SERPL-MCNC: 193 MG/DL (ref 65–140)
GLUCOSE SERPL-MCNC: 97 MG/DL (ref 65–140)

## 2021-12-30 PROCEDURE — 71045 X-RAY EXAM CHEST 1 VIEW: CPT

## 2021-12-30 PROCEDURE — NC001 PR NO CHARGE: Performed by: ANESTHESIOLOGY

## 2021-12-30 PROCEDURE — 99291 CRITICAL CARE FIRST HOUR: CPT | Performed by: NURSE PRACTITIONER

## 2021-12-30 PROCEDURE — 94760 N-INVAS EAR/PLS OXIMETRY 1: CPT

## 2021-12-30 PROCEDURE — 0W9930Z DRAINAGE OF RIGHT PLEURAL CAVITY WITH DRAINAGE DEVICE, PERCUTANEOUS APPROACH: ICD-10-PCS | Performed by: ANESTHESIOLOGY

## 2021-12-30 PROCEDURE — 82948 REAGENT STRIP/BLOOD GLUCOSE: CPT

## 2021-12-30 PROCEDURE — 97110 THERAPEUTIC EXERCISES: CPT

## 2021-12-30 PROCEDURE — 32551 INSERTION OF CHEST TUBE: CPT | Performed by: ANESTHESIOLOGY

## 2021-12-30 PROCEDURE — 99292 CRITICAL CARE ADDL 30 MIN: CPT | Performed by: ANESTHESIOLOGY

## 2021-12-30 RX ORDER — LIDOCAINE HYDROCHLORIDE 10 MG/ML
10 INJECTION, SOLUTION EPIDURAL; INFILTRATION; INTRACAUDAL; PERINEURAL ONCE
Status: DISCONTINUED | OUTPATIENT
Start: 2021-12-30 | End: 2022-01-01

## 2021-12-30 RX ORDER — FENTANYL CITRATE 50 UG/ML
INJECTION, SOLUTION INTRAMUSCULAR; INTRAVENOUS
Status: COMPLETED
Start: 2021-12-30 | End: 2021-12-30

## 2021-12-30 RX ORDER — METHYLPREDNISOLONE SODIUM SUCCINATE 125 MG/2ML
50 INJECTION, POWDER, LYOPHILIZED, FOR SOLUTION INTRAMUSCULAR; INTRAVENOUS DAILY
Status: DISCONTINUED | OUTPATIENT
Start: 2021-12-31 | End: 2022-01-02

## 2021-12-30 RX ORDER — FENTANYL CITRATE 50 UG/ML
25 INJECTION, SOLUTION INTRAMUSCULAR; INTRAVENOUS ONCE
Status: COMPLETED | OUTPATIENT
Start: 2021-12-30 | End: 2021-12-30

## 2021-12-30 RX ORDER — OXYCODONE HYDROCHLORIDE 5 MG/1
2.5 TABLET ORAL EVERY 6 HOURS PRN
Status: DISCONTINUED | OUTPATIENT
Start: 2021-12-30 | End: 2022-01-12 | Stop reason: HOSPADM

## 2021-12-30 RX ADMIN — CEFAZOLIN SODIUM 2000 MG: 2 SOLUTION INTRAVENOUS at 02:35

## 2021-12-30 RX ADMIN — DEXAMETHASONE SODIUM PHOSPHATE 5.48 MG: 4 INJECTION, SOLUTION INTRA-ARTICULAR; INTRALESIONAL; INTRAMUSCULAR; INTRAVENOUS; SOFT TISSUE at 22:22

## 2021-12-30 RX ADMIN — HEPARIN SODIUM 5000 UNITS: 5000 INJECTION INTRAVENOUS; SUBCUTANEOUS at 13:02

## 2021-12-30 RX ADMIN — OXYCODONE HYDROCHLORIDE 2.5 MG: 5 TABLET ORAL at 22:21

## 2021-12-30 RX ADMIN — ATORVASTATIN CALCIUM 40 MG: 40 TABLET, FILM COATED ORAL at 22:22

## 2021-12-30 RX ADMIN — OXYCODONE HYDROCHLORIDE 2.5 MG: 5 TABLET ORAL at 16:50

## 2021-12-30 RX ADMIN — INSULIN GLARGINE 25 UNITS: 100 INJECTION, SOLUTION SUBCUTANEOUS at 22:20

## 2021-12-30 RX ADMIN — HEPARIN SODIUM 5000 UNITS: 5000 INJECTION INTRAVENOUS; SUBCUTANEOUS at 22:21

## 2021-12-30 RX ADMIN — DOCUSATE SODIUM 100 MG: 100 CAPSULE, LIQUID FILLED ORAL at 09:53

## 2021-12-30 RX ADMIN — FENTANYL CITRATE 25 MCG: 50 INJECTION, SOLUTION INTRAMUSCULAR; INTRAVENOUS at 09:02

## 2021-12-30 RX ADMIN — BARICITINIB 2 MG: 2 TABLET, FILM COATED ORAL at 12:56

## 2021-12-30 RX ADMIN — SENNOSIDES 8.6 MG: 8.6 TABLET, FILM COATED ORAL at 22:22

## 2021-12-30 RX ADMIN — HEPARIN SODIUM 5000 UNITS: 5000 INJECTION INTRAVENOUS; SUBCUTANEOUS at 05:40

## 2021-12-30 RX ADMIN — ACETAMINOPHEN 650 MG: 325 TABLET ORAL at 13:14

## 2021-12-30 RX ADMIN — CEFAZOLIN SODIUM 2000 MG: 2 SOLUTION INTRAVENOUS at 18:44

## 2021-12-30 RX ADMIN — LIDOCAINE 1 PATCH: 50 PATCH TOPICAL at 09:53

## 2021-12-30 RX ADMIN — CEFAZOLIN SODIUM 2000 MG: 2 SOLUTION INTRAVENOUS at 09:53

## 2021-12-30 RX ADMIN — FERROUS SULFATE TAB 325 MG (65 MG ELEMENTAL FE) 325 MG: 325 (65 FE) TAB at 09:53

## 2021-12-30 RX ADMIN — DEXAMETHASONE SODIUM PHOSPHATE 5.48 MG: 4 INJECTION, SOLUTION INTRA-ARTICULAR; INTRALESIONAL; INTRAMUSCULAR; INTRAVENOUS; SOFT TISSUE at 12:57

## 2021-12-30 RX ADMIN — INSULIN GLARGINE 25 UNITS: 100 INJECTION, SOLUTION SUBCUTANEOUS at 09:53

## 2021-12-30 RX ADMIN — INSULIN LISPRO 2 UNITS: 100 INJECTION, SOLUTION INTRAVENOUS; SUBCUTANEOUS at 07:34

## 2021-12-30 RX ADMIN — INSULIN LISPRO 2 UNITS: 100 INJECTION, SOLUTION INTRAVENOUS; SUBCUTANEOUS at 22:20

## 2021-12-30 RX ADMIN — LISINOPRIL 20 MG: 20 TABLET ORAL at 09:53

## 2021-12-30 RX ADMIN — INSULIN LISPRO 4 UNITS: 100 INJECTION, SOLUTION INTRAVENOUS; SUBCUTANEOUS at 13:02

## 2021-12-30 NOTE — ASSESSMENT & PLAN NOTE
· Became hypoxic 12/25 AM, CXR obtained demonstrating right pneumothorax  · 16 Fr   CT placed   · Follow up CXR demonstrated improvement in pneumothorax  · Right sided chest tube removed 12/28  · R chest tube replaced on 12/30 for recurrent R ptx  · Continue R CT to wall suction  · Follow up on CXR this AM

## 2021-12-30 NOTE — ASSESSMENT & PLAN NOTE
· As evidenced by tachycardia, tachypnea, hypoxia  · Suspect this is in setting of worsening COVID-19 pneumonia vs superimposed bacterial infection  · Blood cultures 1/2 positive, repeat blood cultures 2/2 positive for Staph, repeats from 12/26 NGTD  · Procal negative thus far  · Continue Ancef D3  · Trend fever and WBC curve  · Trend procalcitonin

## 2021-12-30 NOTE — RESPIRATORY THERAPY NOTE
12/29/21 2300   Respiratory Assessment   Assessment Type Assess only   General Appearance Awake;Drowsy   Respiratory Pattern Dyspnea with exertion   Chest Assessment Chest expansion symmetrical   Bilateral Breath Sounds Diminished;Clear   Resp Comments no changes will ocnt to monitor    O2 Device optiflo    Non-Invasive Information   O2 Interface Device HFNC prongs  (opti)   Non-Invasive Ventilation Mode HFNC (High flow)   $ Pulse Oximetry Spot Check Charge Completed   Non-Invasive Settings   FiO2 (%) 55   Flow (lpm) 40   Temperature (Set) 31   Non-Invasive Readings   Heater Temperature (Obs) 31   Skin Intervention Skin intact   Maintenance   Water bag changed No

## 2021-12-30 NOTE — QUICK NOTE
Spoke to daughter Debora Lozano and updated her on the past 24 hours events, placement of chest tube and current treatment plan  All questions were answered to her satisfaction

## 2021-12-30 NOTE — ASSESSMENT & PLAN NOTE
· 2/2 BC with MSSA  · Continue Ancef D3/5  · TTE:  · LV - EF 50% with increased wall thickness and G1DD   · Mitral Valve: mild regurgitation  · Tricuspid Valve: mild regurgitation  · Follow up on repeat BC, currently NGTD

## 2021-12-30 NOTE — RESPIRATORY THERAPY NOTE
12/29/21 2049   Respiratory Assessment   Assessment Type Assess only   General Appearance Alert; Awake   Respiratory Pattern Dyspnea with exertion   Chest Assessment Chest expansion symmetrical   Bilateral Breath Sounds Diminished;Clear   Resp Comments pt natacha optiflo well will cont to monitor and titrate when able    O2 Device optilfo    Non-Invasive Information   O2 Interface Device HFNC prongs  (opti)   Non-Invasive Ventilation Mode HFNC (High flow)   SpO2 91 %   $ Pulse Oximetry Spot Check Charge Completed   Non-Invasive Settings   FiO2 (%) 55   Flow (lpm) 40   Temperature (Set) 31   Non-Invasive Readings   Heater Temperature (Obs) 31   Skin Intervention Skin intact   Maintenance   Water bag changed No

## 2021-12-30 NOTE — PLAN OF CARE
Problem: PAIN - ADULT  Goal: Verbalizes/displays adequate comfort level or baseline comfort level  Description: Interventions:  - Encourage patient to monitor pain and request assistance  - Assess pain using appropriate pain scale  - Administer analgesics based on type and severity of pain and evaluate response  - Implement non-pharmacological measures as appropriate and evaluate response  - Consider cultural and social influences on pain and pain management  - Notify physician/advanced practitioner if interventions unsuccessful or patient reports new pain  Outcome: Progressing     Problem: INFECTION - ADULT  Goal: Absence or prevention of progression during hospitalization  Description: INTERVENTIONS:  - Assess and monitor for signs and symptoms of infection  - Monitor lab/diagnostic results  - Monitor all insertion sites, i e  indwelling lines, tubes, and drains  - Monitor endotracheal if appropriate and nasal secretions for changes in amount and color  - [place] appropriate cooling/warming therapies per order  - Administer medications as ordered  - Instruct and encourage patient and family to use good hand hygiene technique  - Identify and instruct in appropriate isolation precautions for identified infection/condition  Outcome: Progressing     Problem: SAFETY ADULT  Goal: Maintain or return to baseline ADL function  Description: INTERVENTIONS:  - Educate patient/family on patient safety including physical limitations  - Instruct patient to call for assistance with activity   - Consult OT/PT to assist with strengthening/mobility   - Keep Call bell within reach  - Keep bed low and locked with side rails adjusted as appropriate  - Keep care items and personal belongings within reach  - Initiate and maintain comfort rounds  - Make Fall Risk Sign visible to staff  - Offer Toileting every 2 Hours, in advance of need  - Initiate/Maintain fall alarm  - Obtain necessary fall risk management equipment: fall alarm  - Apply yellow socks and bracelet for high fall risk patients  - Consider moving patient to room near nurses station  Outcome: Progressing

## 2021-12-30 NOTE — PHYSICAL THERAPY NOTE
Physical Therapy Treatment Note       12/30/21 1503   PT Last Visit   PT Visit Date 12/30/21   Note Type   Note Type Treatment   Pain Assessment   Pain Assessment Tool 0-10   Pain Score 4   Pain Location/Orientation Orientation: Right;Location: Rib Cage   Pain Onset/Description Onset: Ongoing   Patient's Stated Pain Goal No pain   Hospital Pain Intervention(s) Repositioned; Emotional support   Restrictions/Precautions   Weight Bearing Precautions Per Order No   Other Precautions Airborne/isolation;Contact/isolation;Multiple lines;Telemetry;O2;Fall Risk;Pain  (50 L HFNC, chest tube)   General   Chart Reviewed Yes   Response to Previous Treatment Patient with no complaints from previous session  Family/Caregiver Present No   Cognition   Arousal/Participation Alert; Responsive; Cooperative   Attention Attends with cues to redirect   Orientation Level Oriented X4   Memory Within functional limits   Following Commands Follows all commands and directions without difficulty   Comments pt agreeable to PT session   Subjective   Subjective "I can try a few exercises"   Bed Mobility   Supine to Sit Unable to assess  (PT opted for bed-level [place] per critical care team convo)   Balance   Static Sitting Fair +  (in chair position of bed)   Endurance Deficit   Endurance Deficit Yes   Activity Tolerance   Activity Tolerance Patient tolerated treatment well  (SpO2 ranged btwn 88-96% during session)   Nurse Made Aware Yes, RN Hayden Nogueira verbalized pt appropriate for PT session, made aware of outcomes/recs   Exercises   Heelslides Supine;20 reps;AROM; Right;Left   Glute Sets Supine;20 reps;AROM; Bilateral   Hip Abduction Supine;20 reps;AROM; Right;Left   Hip Adduction Supine;20 reps;AROM; Right;Left   Knee AROM Short Arc Quad Supine;20 reps;AROM; Right;Left   Ankle Pumps Supine;20 reps;AROM; Bilateral   Assessment   Prognosis Fair   Problem List Decreased endurance; Impaired balance;Decreased mobility   Assessment Pt seen for PT treatment session this date, consisting of ther ex focused on LB strengthening in order to facilitate improved LB endurance and activity tolerance  Since previous session, pt has made good progress in terms of continued progression of HEP  Pertinent barriers during this session include pain and SOB  Current goals and POC remain appropriate, pt continues to have rehab potential and is making progress towards STGs  Pt prognosis for achieving goals is fair, pending pt progress with hospitalization/medical status improvements, and indicated by ability to follow directions, attention span, self-expression (thoughts, feelings, needs), learning potential and eye contact  Pt limited d/t fear of pain provocation and medical instability  PT recommends post acute rehabilitation services upon discharge  Pt continues to be functioning below baseline level, and remains limited 2* factors listed above  PT will continue to see pt during current hospitalization in order to address the deficits listed above and provide interventions consistent w/ POC in effort to achieve STGs  Barriers to Discharge Decreased caregiver support   Goals   Patient Goals "to get home"   LTG Expiration Date 01/08/22   Long Term Goal #1 goals remain appropriate   PT Treatment Day 3   Plan   Treatment/Interventions Functional transfer training;LE strengthening/ROM; Therapeutic exercise; Endurance training;Patient/family training;Equipment eval/education; Bed mobility;Gait training;Spoke to nursing;Spoke to advanced practitioner   Progress Slow progress, decreased activity tolerance   PT Frequency 3-5x/wk   Recommendation   PT Discharge Recommendation Post acute rehabilitation services   Equipment Recommended   (continue RW use)   AM-PAC Basic Mobility Inpatient   Turning in Bed Without Bedrails 3   Lying on Back to Sitting on Edge of Flat Bed 3   Moving Bed to Chair 3   Standing Up From Chair 3   Walk in Room 3   Climb 3-5 Stairs 2   Basic Mobility Inpatient Raw Score 17   Basic Mobility Standardized Score 39 67   Highest Level Of Mobility   -HL Goal 5: Stand one or more mins   JH-HLM Highest Level of Mobility 5: Stand (1 or more minutes)   -HL Goal Achieved Yes   Education   Education Provided Home exercise program   Patient Demonstrates acceptance/verbal understanding;Explanation/teachback used   End of Consult   Patient Position at End of Consult Supine;Bed/Chair alarm activated; All needs within reach  (pt in chair position of bed at end of session)             Dionne Wisdom, PT    Time of PT treatment session: 1289-6378 (total treatment time = 26 minutes)

## 2021-12-30 NOTE — ASSESSMENT & PLAN NOTE
Lab Results   Component Value Date    EGFR 40 12/29/2021    EGFR 37 12/27/2021    EGFR 38 12/26/2021    CREATININE 1 25 12/29/2021    CREATININE 1 33 (H) 12/27/2021    CREATININE 1 31 (H) 12/26/2021     · Creatinine 1 56 on admission, now improved   · Baseline creatinine appears to be around 1 2 - 1 6   · Avoid nephrotoxic agents and hypotension  · Trend renal indices  · Strict I&O  · Daily weights

## 2021-12-30 NOTE — ASSESSMENT & PLAN NOTE
· Became hypoxic 12/25 AM, CXR obtained demonstrating right pneumothorax  · 16 Fr   CT placed   · Follow up CXR demonstrated improvement in pneumothorax  · Right sided chest tube removed 12/28  · Repeat CXR shows small residual R pneumothorax  · Repeat CXR this AM

## 2021-12-30 NOTE — ASSESSMENT & PLAN NOTE
· Presented on 12/16 with fever, chills, shortness of breath, cough  · COVID-19 positive on 12/16  · Unvaccinated  · Transferred on 12/21 to ICU 2/2 escalating O2 requirements  · Severe pathway:  · Decadron 0 1 mg/kg D10, total D15  · Baricitinib D11 - maintain lower dose 2/2 CKD3b with decreased GFR  · Remdesivir completed  · DVT ppx with SQ heparin  · Statin  · Ceftriaxone/Doxy-  DC'd procalcitonin negative x2  · Encourage side-lying/prone positioning  · Encourage I/S, aggressive pulmonary hygiene  · Titrate oxygen to maintain SpO2 > 88% - currently requiring HFNC 70%/50L   · Trend CRP as needed

## 2021-12-30 NOTE — PROGRESS NOTES
Merlin 46  Progress Note - Mariajose Rolle 1940, [de-identified] y o  female MRN: [id number]  Unit/Bed#: ICU 04-01 Encounter: [id number]  Primary Care Provider: Cuba Muro DO   Date and time admitted to hospital: 12/16/2021  4:49 PM    * COVID-19  Assessment & Plan  · Presented on 12/16 with fever, chills, shortness of breath, cough  · COVID-19 positive on 12/16  · Unvaccinated  · Transferred on 12/21 to ICU 2/2 escalating O2 requirements  · Severe pathway:  · Decadron 0 1 mg/kg D10, total D15  · Baricitinib D11 - maintain lower dose 2/2 CKD3b with decreased GFR  · Remdesivir completed  · DVT ppx with SQ heparin  · Statin  · Ceftriaxone/Doxy-  DC'd procalcitonin negative x2  · Encourage side-lying/prone positioning  · Encourage I/S, aggressive pulmonary hygiene  · Titrate oxygen to maintain SpO2 > 88% - currently requiring HFNC 70%/50L   · Trend CRP as needed    Acute respiratory failure with hypoxia (HCC)  Assessment & Plan  · In the setting of COVID 19 viral PNA and R ptx   · Continue HFNC 55% 40L  · CXR  reveals RML/LLL infiltrates concerning for superimposed bacterial pneumonia  · Continue COVID-19 protocol as noted above  · PRN Xopenex nebs  · Titrate O2 to maintain SpO2 > 88%   · Aggressive pulmonary hygiene    Bacteremia due to methicillin susceptible Staphylococcus aureus (MSSA)  Assessment & Plan  · 2/2 BC with MSSA  · Continue Ancef D3/5  · TTE:  · LV - EF 50% with increased wall thickness and G1DD   · Mitral Valve: mild regurgitation  · Tricuspid Valve: mild regurgitation  · Follow up on repeat BC, currently NGTD    Pneumothorax  Assessment & Plan  · Became hypoxic 12/25 AM, CXR obtained demonstrating right pneumothorax  · 16 Fr   CT placed   · Follow up CXR demonstrated improvement in pneumothorax  · Right sided chest tube removed 12/28  · Repeat CXR shows small residual R pneumothorax  · Repeat CXR this AM     SIRS (systemic inflammatory response syndrome) ([facility] [address] )  Assessment & Plan  · As evidenced by tachycardia, tachypnea, hypoxia  · Suspect this is in setting of worsening COVID-19 pneumonia vs superimposed bacterial infection  · Blood cultures 1/2 positive, repeat blood cultures 2/2 positive for Staph, repeats from 12/26 NGTD  · Procal negative thus far  · Continue Ancef D3  · Trend fever and WBC curve  · Trend procalcitonin    Type 2 diabetes mellitus without complication, without long-term current use of insulin [facility])  Assessment & Plan  Lab Results   Component Value Date    HGBA1C 6 2 (H) 11/22/2021       Recent Labs     12/28/21  1618 12/28/21  2106 12/29/21  0520 12/29/21  0731   POCGLU 276* 426* 269* 208*       Blood Sugar Average: Last 72 hrs:  (P) 258 [id number]     · Takes metformin, Januvia and glipizide at home  · Was on insulin gtt - now on Lantus 25 units BID, mealtime insulin 4 units TID, SSI Algorithm 4  · May require insulin gtt if BS continues to be elevated  · ACHS fingersticks  · Diabetic diet     Anemia  Assessment & Plan  · Hemoglobin 10 8 on admission  · Likely multifactorial in setting of iron deficiency anemia vs CKD  · Baseline appears to be around 11 5 - 13  · Concurrently with no signs of active bleeding  · Continue home iron supplementation  · Transfuse for hemoglobin < 7 or with hemodynamic compromise  · Trend hemoglobin and monitor for signs of active bleeding    Chronic kidney disease, stage 3b [facility])  Assessment & Plan  Lab Results   Component Value Date    EGFR 40 12/29/2021    EGFR 37 12/27/2021    EGFR 38 12/26/2021    CREATININE 1 25 12/29/2021    CREATININE 1 33 (H) 12/27/2021    CREATININE 1 31 (H) 12/26/2021     · Creatinine 1 56 on admission, now improved   · Baseline creatinine appears to be around 1 2 - 1 6   · Avoid nephrotoxic agents and hypotension  · Trend renal indices  · Strict I&O  · Daily weights    Ambulatory dysfunction  Assessment & Plan  · PT/OT - recommending acute rehab on discharge  · Encourage mobilization    Hypercholesterolemia  Assessment & Plan  · Continue statin    Essential hypertension  Assessment & Plan  · Takes enalapril at home - continue lisinopril  · Monitor BP    Anxiety  Assessment & Plan  · Continue home PRN Xanax  · Zoloft appears to be on patient's home medication list, however patient has not been taking      ----------------------------------------------------------------------------------------  HPI/24hr events: FiO2 titrated to 55%40L  CXR shows small R residual ptx  No other acute events  Patient appropriate for transfer out of the ICU today?: No  Disposition: Continue Stepdown Level 1 level of care   Code Status: Level 1 - Full Code  ---------------------------------------------------------------------------------------  SUBJECTIVE  "I feel good"    Review of Systems   Constitutional: Negative  HENT: Negative  Eyes: Negative  Respiratory: Positive for cough  Cardiovascular: Negative  Gastrointestinal: Negative  Endocrine: Negative  Genitourinary: Negative  Musculoskeletal: Negative  Skin: Negative  Allergic/Immunologic: Negative  Neurological: Negative  Hematological: Negative  Psychiatric/Behavioral: Negative        Review of systems was reviewed and negative unless stated above in HPI/24-hour events   ---------------------------------------------------------------------------------------  OBJECTIVE    Vitals   Vitals:    21 1600 21 0000   BP: 132/64 145/68  148/72   BP Location:       Pulse: 84 86 76 65   Resp: (!) 28 (!) 27 20 16   Temp: 97 7 °F (36 5 °C) 98 2 °F (36 8 °C)  97 6 °F (36 4 °C)   TempSrc:  Tympanic  Temporal   SpO2: (!) 89% 90% 91% 90%   Weight:       Height:         Temp (24hrs), Av 9 °F (36 6 °C), Min:97 6 °F (36 4 °C), Max:98 2 °F (36 8 °C)  Current: Temperature: 97 6 °F (36 4 °C)          Respiratory:  SpO2: SpO2: 90 %, SpO2 Device: O2 Device: High flow nasal cannula  Nasal Cannula O2 Flow Rate (L/min): 40 L/min    Invasive/non-invasive ventilation settings   Respiratory  Report   Lab Data (Last 4 hours)    None         O2/Vent Data (Last 4 hours)      12/29 2049 12/29 2300        Non-Invasive Ventilation Mode HFNC (High flow) HFNC (High flow)                  Physical Exam  Constitutional:       General: She is not in acute distress  Appearance: She is ill-appearing  HENT:      Head: Normocephalic and atraumatic  Mouth/Throat:      Mouth: Mucous membranes are moist    Eyes:      Pupils: Pupils are equal, round, and reactive to light  Cardiovascular:      Rate and Rhythm: Normal rate and regular rhythm  Pulses: Normal pulses  Heart sounds: Normal heart sounds  No murmur heard  No friction rub  No gallop  Pulmonary:      Effort: Pulmonary effort is normal  No respiratory distress  Breath sounds: Normal breath sounds  No wheezing, rhonchi or rales  Abdominal:      General: Bowel sounds are normal  There is no distension  Palpations: Abdomen is soft  Tenderness: There is no abdominal tenderness  Musculoskeletal:         General: No swelling  Normal range of motion  Cervical back: Neck supple  Skin:     General: Skin is warm and dry  Capillary Refill: Capillary refill takes less than 2 seconds  Neurological:      General: No focal deficit present  Mental Status: She is alert and oriented to person, place, and time  Cranial Nerves: No cranial nerve deficit  Sensory: No sensory deficit  Motor: No weakness               Laboratory and Diagnostics:  Results from last 7 days   Lab Units 12/29/21  0513 12/27/21  0525 12/26/21  1117 12/26/21  0555 12/25/21  0455 12/24/21  0429 12/23/21  0509   WBC Thousand/uL 13 80* 14 11* 15 15* 14 93* 15 31* 12 66* 13 01*  13 01*   HEMOGLOBIN g/dL 10 4* 9 9* 10 9* 10 4* 12 5 11 5 11 8  11 8   HEMATOCRIT % 32 2* 30 4* 34 2* 32 2* 38 4 35 8 36 0  36 0   PLATELETS Thousands/uL 518* 433* 444* 432* 497* 450* 400*  400*   NEUTROS PCT %  --   --   --  87*  --   --   --    BANDS PCT %  --   --   --   --   --  5  --    MONOS PCT %  --   --   --  3*  --   --   --    MONO PCT %  --   --   --   --   --  4  --      Results from last 7 days   Lab Units 12/29/21  0513 12/27/21  0525 12/26/21  0555 12/25/21  0455 12/24/21  0429 12/23/21  0509   SODIUM mmol/L 135 134* 135 138 141 140   POTASSIUM mmol/L 4 9 4 9 4 8 4 2 4 1 4 0   CHLORIDE mmol/L 103 103 104 104 106 102   CO2 mmol/L 24 24 25 25 25 26   ANION GAP mmol/L 8 7 6 9 10 12   BUN mg/dL 53* 54* 54* 68* 71* 58*   CREATININE mg/dL 1 25 1 33* 1 31* 1 54* 1 72* 1 83*   CALCIUM mg/dL 9 4 9 1 8 9 9 8 9 6 9 7   GLUCOSE RANDOM mg/dL 252* 349* 304* 82 97 100   ALT U/L  --  18 18 21 19  --    AST U/L  --  12* 16 21 20  --    ALK PHOS U/L  --  66 64 74 70  --    ALBUMIN g/dL  --  3 2* 3 3* 3 9 3 7  --    TOTAL BILIRUBIN mg/dL  --  0 45 0 72 0 60 0 48  --      Results from last 7 days   Lab Units 12/27/21  0525 12/26/21  0555 12/25/21  0455 12/24/21  0429   MAGNESIUM mg/dL 2 4 2 5 2 5 2 6   PHOSPHORUS mg/dL 3 4 4 3* 3 9 4 5*                   ABG:    VBG:          Micro  Results from last 7 days   Lab Units 12/29/21  1357 12/29/21  1356 12/26/21  1156   BLOOD CULTURE  Received in Microbiology Lab  Culture in Progress  Received in Microbiology Lab  Culture in Progress  No Growth at 72 hrs  No Growth at 72 hrs  EKG: NSR rate 80  Imaging: I have personally reviewed pertinent reports  and I have personally reviewed pertinent films in PACS    Intake and Output  I/O       12/28 0701 12/29 0700 12/29 0701  12/30 0700    P  O  537 460    I V  (mL/kg) 60 (1) 30 (0 5)    IV Piggyback 100 50    Total Intake(mL/kg) 697 (12 1) 540 (9 4)    Urine (mL/kg/hr) 550 (0 4) 735 (0 5)    Chest Tube 0     Total Output 550 735    Net +147 -195          Unmeasured Urine Occurrence 3 x           Height and Weights   Height: 5' (152 4 cm)     Body mass index is 24 84 kg/m²  Weight (last 2 days)     Date/Time Weight    12/29/21 0512 57 7 (127 21)    12/28/21 1131 57 6 (127)    12/28/21 0544 57 9 (127 65)            Nutrition       Diet Orders   (From admission, onward)             Start     Ordered    12/27/21 0958  Dietary nutrition supplements  Once        Question Answer Comment   Select Supplement: Glucerna-Vanilla    Frequency Breakfast, Lunch, Dinner        12/27/21 0958    12/21/21 1750  Diet Rodríguez/CHO Controlled; Consistent Carbohydrate Diet Level 2 (5 carb servings/75 grams CHO/meal)  Diet effective now        References:    Nutrtion Support Algorithm Enteral vs  Parenteral   Question Answer Comment   Diet Type Rodríguez/CHO Controlled    Rodríguez/CHO Controlled Consistent Carbohydrate Diet Level 2 (5 carb servings/75 grams CHO/meal)    RD to adjust diet per protocol?  Yes        12/21/21 1749                  Active Medications  Scheduled Meds:  Current Facility-Administered Medications   Medication Dose Route Frequency Provider Last Rate    acetaminophen  650 mg Oral Q4H PRN BENTON Brown      ALPRAZolam  0 25 mg Oral HS PRN BENTON Iyer      atorvastatin  40 mg Oral HS BENTON Brown      Baricitinib  2 mg Oral Q24H BENTON Brown      bisacodyl  10 mg Rectal Daily PRN BENTON Kim      cefazolin  2,000 mg Intravenous Q8H BENTON Dhillon Stopped (12/1940)    dexamethasone  0 1 mg/kg Intravenous Q12H BENTON Brown      docusate sodium  100 mg Oral Daily Megan Lynn PA-C      ferrous sulfate  325 mg Oral Daily With Breakfast BENTON Borwn      heparin (porcine)  5,000 Units Subcutaneous Q8H [address] BENTON Brown      insulin glargine  25 Units Subcutaneous Q12H [address] BENTON Dhillon      insulin lispro  2-12 Units Subcutaneous HS BENTON Dhillon      insulin lispro  2-12 Units Subcutaneous TID AC BENTON Dhillon      insulin lispro  4 Units Subcutaneous Daily With Breakfast Sushma Lobo, BENTON      insulin lispro  4 Units Subcutaneous Daily With Lunch Phineas Vladislav Kirk, BENTON      insulin lispro  4 Units Subcutaneous Daily With Dinner BENTON Farmer      lidocaine  1 patch Topical Daily Sushmagilbert Fernandez Mutbeltran, BENTON      lisinopril  20 mg Oral Daily [place], BENT[place]      ondansetron  4 mg Intravenous Q6H PRN Andrea Castleman, B[place]ON      oxyCODONE  2 5 mg Oral Q4H PRN BENTON Farmer      polyethylene glycol  17 g Oral Daily Sarah A Sedora, [place]ON      senna  1 tablet Oral HS Sarah A [place], [place]       Continuous Infusions:     PRN Meds:   acetaminophen, 650 mg, Q4H PRN  ALPRAZolam, 0 25 mg, HS PRN  bisacodyl, 10 mg, Daily PRN  ondansetron, 4 mg, Q6H PRN  oxyCODONE, 2 5 mg, Q4H PRN        Invasive Devices Review  Invasive Devices  Report    Peripheral Intravenous Line            Peripheral IV 12/28/21 Left Forearm 1 day          Drain            External Urinary Catheter 7 days                ---------------------------------------------------------------------------------------  Advance Directive and Living Will:      Power of :    POLST:    ---------------------------------------------------------------------------------------  Care Time Delivered:   Upon my evaluation, this patient had a high probability of imminent or life-threatening deterioration due to hypoxia, COVID, which required my direct attention, intervention, and personal management  I have personally provided 32 minutes ([phone] [id number] [phone]) of critical care time, exclusive of procedures, teaching, family meetings, and any prior time recorded by providers other than myself  BENTON Farmer      Portions of the record may have been created with voice recognition software  Occasional wrong word or "sound a like" substitutions may have occurred due to the inherent limitations of voice recognition software    Read the chart carefully and recognize, using context, where substitutions have occurred

## 2021-12-30 NOTE — PLAN OF CARE
Problem: PHYSICAL THERAPY ADULT  Goal: Performs mobility at highest level of function for planned discharge setting  See evaluation for individualized goals  Description: Treatment/Interventions: Functional transfer training,Elevations,Therapeutic exercise,Endurance training,Bed mobility,Gait training  Equipment Recommended:  (unknown at this time)       See flowsheet documentation for full assessment, interventions and recommendations  Outcome: Progressing  Note: Prognosis: Fair  Problem List: Decreased endurance,Impaired balance,Decreased mobility  Assessment: Pt seen for PT treatment session this date, consisting of ther ex focused on LB strengthening in order to facilitate improved LB endurance and activity tolerance  Since previous session, pt has made good progress in terms of continued progression of HEP  Pertinent barriers during this session include pain and SOB  Current goals and POC remain appropriate, pt continues to have rehab potential and is making progress towards STGs  Pt prognosis for achieving goals is fair, pending pt progress with hospitalization/medical status improvements, and indicated by ability to follow directions, attention span, self-expression (thoughts, feelings, needs), learning potential and eye contact  Pt limited d/t fear of pain provocation and medical instability  PT recommends post acute rehabilitation services upon discharge  Pt continues to be functioning below baseline level, and remains limited 2* factors listed above  PT will continue to see pt during current hospitalization in order to address the deficits listed above and provide interventions consistent w/ POC in effort to achieve STGs  Barriers to Discharge: Decreased caregiver support        PT Discharge Recommendation: Post acute rehabilitation services          See flowsheet documentation for full assessment

## 2021-12-30 NOTE — ASSESSMENT & PLAN NOTE
· Presented on 12/16 with fever, chills, shortness of breath, cough  · COVID-19 positive on 12/16  · Unvaccinated  · Transferred on 12/21 to ICU 2/2 escalating O2 requirements  · Severe pathway:  · Decadron 0 1 mg/kg D10, total [id number] 12/30  · Continue solumedrol 50 mg daily D 2/5  · Baricitinib D12 - maintain lower dose 2/2 CKD3b with decreased GFR  · Remdesivir completed  · DVT ppx with SQ heparin  · Statin  · Ceftriaxone/Doxy-  DC'd procalcitonin negative x2  · Encourage side-lying/prone positioning  · Encourage I/S, aggressive pulmonary hygiene  · Titrate oxygen to maintain SpO2 > 88% - currently requiring HFNC 70%/50L   · Trend CRP as needed

## 2021-12-30 NOTE — ASSESSMENT & PLAN NOTE
· 2/2 BC with MSSA  · Continue Ancef D4/7  · TTE:  · LV - EF 50% with increased wall thickness and G1DD   · Mitral Valve: mild regurgitation  · Tricuspid Valve: mild regurgitation  · Follow up on repeat BC, currently NGTD

## 2021-12-30 NOTE — ASSESSMENT & PLAN NOTE
· In the setting of COVID 19 viral PNA and R ptx   · Continue HFNC 55% 40L  · CXR  reveals RML/LLL infiltrates concerning for superimposed bacterial pneumonia  · Continue COVID-19 protocol as noted above  · PRN Xopenex nebs  · Titrate O2 to maintain SpO2 > 88%   · Aggressive pulmonary hygiene

## 2021-12-30 NOTE — NURSING NOTE
Pt is alert and oriented  C/o pain to chest tube site  Will medicate per order  Moves gingerly in bed  No focal neuro deficit  C/o fatigue and very weak  Heart tones clear with palpable pulses  No edema  Color is fair  Skin is warm and dry  Lungs are diminished throughout  Respirations are labored and shallow  Chest tube to oasis at -20 is positive for visible air evacuation  No drainage  Dressing intact with no drainage noted at window  Abdomen is large and soft with good bowel sounds  No BM  purwick in place  IV site intact

## 2021-12-30 NOTE — ASSESSMENT & PLAN NOTE
· Hemoglobin 10 8 on admission  · Likely multifactorial in setting of iron deficiency anemia vs CKD  · Baseline appears to be around 11 5 - 13  · Concurrently with no signs of active bleeding  · Continue home iron supplementation  · Transfuse for hemoglobin < 7 or with hemodynamic compromise  · Trend hemoglobin and monitor for signs of active bleeding

## 2021-12-30 NOTE — PLAN OF CARE
Problem: Potential for Falls  Goal: Patient will remain free of falls  Description: INTERVENTIONS:  - Educate patient/family on patient safety including physical limitations  - Instruct patient to call for assistance with activity   - Consult OT/PT to assist with strengthening/mobility   - Keep Call bell within reach  - Keep bed low and locked with side rails adjusted as appropriate  - Keep care items and personal belongings within reach  - Initiate and maintain comfort rounds  - Make Fall Risk Sign visible to staff  - Offer Toileting every 2 Hours, in advance of need  - Initiate/Maintain fall alarm  - Obtain necessary fall risk management equipment: fall alarm  - Apply yellow socks and bracelet for high fall risk patients  - Consider moving patient to room near nurses station  Outcome: Progressing     Problem: PAIN - ADULT  Goal: Verbalizes/displays adequate comfort level or baseline comfort level  Description: Interventions:  - Encourage patient to monitor pain and request assistance  - Assess pain using appropriate pain scale  - Administer analgesics based on type and severity of pain and evaluate response  - Implement non-pharmacological measures as appropriate and evaluate response  - Consider cultural and social influences on pain and pain management  - Notify physician/advanced practitioner if interventions unsuccessful or patient reports new pain  Outcome: Progressing     Problem: INFECTION - ADULT  Goal: Absence or prevention of progression during hospitalization  Description: INTERVENTIONS:  - Assess and monitor for signs and symptoms of infection  - Monitor lab/diagnostic results  - Monitor all insertion sites, i e  indwelling lines, tubes, and drains  - Monitor endotracheal if appropriate and nasal secretions for changes in amount and color  - [place] appropriate cooling/warming therapies per order  - Administer medications as ordered  - Instruct and encourage patient and family to use good hand hygiene technique  - Identify and instruct in appropriate isolation precautions for identified infection/condition  Outcome: Progressing  Goal: Absence of fever/infection during neutropenic period  Description: INTERVENTIONS:  - Monitor WBC    Outcome: Progressing     Problem: SAFETY ADULT  Goal: Patient will remain free of falls  Description: INTERVENTIONS:  - Educate patient/family on patient safety including physical limitations  - Instruct patient to call for assistance with activity   - Consult OT/PT to assist with strengthening/mobility   - Keep Call bell within reach  - Keep bed low and locked with side rails adjusted as appropriate  - Keep care items and personal belongings within reach  - Initiate and maintain comfort rounds  - Make Fall Risk Sign visible to staff  - Offer Toileting every 2 Hours, in advance of need  - Initiate/Maintain fall alarm  - Obtain necessary fall risk management equipment: fall alarm  - Apply yellow socks and bracelet for high fall risk patients  - Consider moving patient to room near nurses station  Outcome: Progressing  Goal: Maintain or return to baseline ADL function  Description: INTERVENTIONS:  - Educate patient/family on patient safety including physical limitations  - Instruct patient to call for assistance with activity   - Consult OT/PT to assist with strengthening/mobility   - Keep Call bell within reach  - Keep bed low and locked with side rails adjusted as appropriate  - Keep care items and personal belongings within reach  - Initiate and maintain comfort rounds  - Make Fall Risk Sign visible to staff  - Offer Toileting every 2 Hours, in advance of need  - Initiate/Maintain fall alarm  - Obtain necessary fall risk management equipment: fall alarm  - Apply yellow socks and bracelet for high fall risk patients  - Consider moving patient to room near nurses station  Outcome: Progressing  Goal: Maintains/Returns to pre admission functional level  Description: INTERVENTIONS:  - Perform BMAT or MOVE assessment daily    - Set and communicate daily mobility goal to care team and patient/family/caregiver  - Collaborate with rehabilitation services on mobility goals if consulted  - Perform Range of Motion 3 times a day  - Reposition patient every 2 hours  - Dangle patient 3 times a day  - Stand patient 3 times a day  - Ambulate patient 3 times a day  - Out of bed to chair 3 times a day   - Out of bed for meals 3 times a day  - Out of bed for toileting  - Record patient progress and toleration of activity level   Outcome: Progressing     Problem: DISCHARGE PLANNING  Goal: Discharge to home or other facility with appropriate resources  Description: INTERVENTIONS:  - Identify barriers to discharge w/patient and caregiver  - Arrange for needed discharge resources and transportation as appropriate  - Identify discharge learning needs (meds, wound care, etc )  - Arrange for interpretive services to assist at discharge as needed  - Refer to Case Management Department for coordinating discharge planning if the patient needs post-hospital services based on physician/advanced practitioner order or complex needs related to functional status, cognitive ability, or social support system  Outcome: Progressing     Problem: Knowledge Deficit  Goal: Patient/family/caregiver demonstrates understanding of disease process, treatment plan, medications, and discharge instructions  Description: Complete learning assessment and assess knowledge base    Interventions:  - Provide teaching at level of understanding  - Provide teaching via preferred learning methods  Outcome: Progressing     Problem: MOBILITY - ADULT  Goal: Maintain or return to baseline ADL function  Description: INTERVENTIONS:  - Educate patient/family on patient safety including physical limitations  - Instruct patient to call for assistance with activity   - Consult OT/PT to assist with strengthening/mobility   - Keep Call bell within reach  - Keep bed low and locked with side rails adjusted as appropriate  - Keep care items and personal belongings within reach  - Initiate and maintain comfort rounds  - Make Fall Risk Sign visible to staff  - Offer Toileting every 2 Hours, in advance of need  - Initiate/Maintain fall alarm  - Obtain necessary fall risk management equipment: fall alarm  - Apply yellow socks and bracelet for high fall risk patients  - Consider moving patient to room near nurses station  Outcome: Progressing  Goal: Maintains/Returns to pre admission functional level  Description: INTERVENTIONS:  - Perform BMAT or MOVE assessment daily    - Set and communicate daily mobility goal to care team and patient/family/caregiver  - Collaborate with rehabilitation services on mobility goals if consulted  - Perform Range of Motion 3 times a day  - Reposition patient every 2 hours    - Dangle patient 3 times a day  - Stand patient 3 times a day  - Ambulate patient 3 times a day  - Out of bed to chair 3 times a day   - Out of bed for meals 3 times a day  - Out of bed for toileting  - Record patient progress and toleration of activity level   Outcome: Progressing     Problem: Prexisting or High Potential for Compromised Skin Integrity  Goal: Skin integrity is maintained or improved  Description: INTERVENTIONS:  - Identify patients at risk for skin breakdown  - Assess and monitor skin integrity  - Assess and monitor nutrition and hydration status  - Monitor labs   - Assess for incontinence   - Turn and reposition patient  - Assist with mobility/ambulation  - Relieve pressure over bony prominences  - Avoid friction and shearing  - Provide appropriate hygiene as needed including keeping skin clean and dry  - Evaluate need for skin moisturizer/barrier cream  - Collaborate with interdisciplinary team   - Patient/family teaching  - Consider wound care consult   Outcome: Progressing     Problem: RESPIRATORY - ADULT  Goal: Achieves optimal ventilation and oxygenation  Description: INTERVENTIONS:  - Assess for changes in respiratory status  - Assess for changes in mentation and behavior  - Position to facilitate oxygenation and minimize respiratory effort  - Oxygen administered by appropriate delivery if ordered  - Initiate smoking cessation education as indicated  - Encourage broncho-pulmonary hygiene including cough, deep breathe, Incentive Spirometry  - Assess the need for suctioning and aspirate as needed  - Assess and instruct to report SOB or any respiratory difficulty  - Respiratory Therapy support as indicated  Outcome: Progressing     Problem: Nutrition/Hydration-ADULT  Goal: Nutrient/Hydration intake appropriate for improving, restoring or maintaining nutritional needs  Description: Monitor and assess patient's nutrition/hydration status for malnutrition  Collaborate with interdisciplinary team and initiate plan and interventions as ordered  Monitor patient's weight and dietary intake as ordered or per policy  Utilize nutrition screening tool and intervene as necessary  Determine patient's food preferences and provide high-protein, high-caloric foods as appropriate       INTERVENTIONS:  - Monitor oral intake, urinary output, labs, and treatment plans  - Assess nutrition and hydration status and recommend course of action  - Evaluate amount of meals eaten  - Assist patient with eating if necessary   - Allow adequate time for meals  - Recommend/ encourage appropriate diets, oral nutritional supplements, and vitamin/mineral supplements  - Order, calculate, and assess calorie counts as needed  - Recommend, monitor, and adjust tube feedings and TPN/PPN based on assessed needs  - Assess need for intravenous fluids  - Provide specific nutrition/hydration education as appropriate  - Include patient/family/caregiver in decisions related to nutrition  Outcome: Progressing

## 2021-12-31 ENCOUNTER — APPOINTMENT (INPATIENT)
Dept: RADIOLOGY | Facility: HOSPITAL | Age: 81
DRG: 871 | End: 2021-12-31
Payer: COMMERCIAL

## 2021-12-31 PROBLEM — D75.839 THROMBOCYTOSIS: Status: ACTIVE | Noted: 2021-12-31

## 2021-12-31 LAB
ANION GAP SERPL CALCULATED.3IONS-SCNC: 7 MMOL/L (ref 4–13)
BUN SERPL-MCNC: 57 MG/DL (ref 5–25)
CALCIUM SERPL-MCNC: 9.2 MG/DL (ref 8.4–10.2)
CHLORIDE SERPL-SCNC: 105 MMOL/L (ref 96–108)
CO2 SERPL-SCNC: 25 MMOL/L (ref 21–32)
CREAT SERPL-MCNC: 1.38 MG/DL (ref 0.6–1.3)
ERYTHROCYTE [DISTWIDTH] IN BLOOD BY AUTOMATED COUNT: 13.3 % (ref 11.6–15.1)
GFR SERPL CREATININE-BSD FRML MDRD: 36 ML/MIN/1.73SQ M
GLUCOSE SERPL-MCNC: 136 MG/DL (ref 65–140)
GLUCOSE SERPL-MCNC: 164 MG/DL (ref 65–140)
GLUCOSE SERPL-MCNC: 222 MG/DL (ref 65–140)
GLUCOSE SERPL-MCNC: 314 MG/DL (ref 65–140)
GLUCOSE SERPL-MCNC: 92 MG/DL (ref 65–140)
HCT VFR BLD AUTO: 37.5 % (ref 34.8–46.1)
HGB BLD-MCNC: 11.8 G/DL (ref 11.5–15.4)
MCH RBC QN AUTO: 27.3 PG (ref 26.8–34.3)
MCHC RBC AUTO-ENTMCNC: 31.5 G/DL (ref 31.4–37.4)
MCV RBC AUTO: 87 FL (ref 82–98)
PLATELET # BLD AUTO: 523 THOUSANDS/UL (ref 149–390)
PMV BLD AUTO: 9.3 FL (ref 8.9–12.7)
POTASSIUM SERPL-SCNC: 5 MMOL/L (ref 3.5–5.3)
RBC # BLD AUTO: 4.33 MILLION/UL (ref 3.81–5.12)
SODIUM SERPL-SCNC: 137 MMOL/L (ref 135–147)
WBC # BLD AUTO: 14.58 THOUSAND/UL (ref 4.31–10.16)

## 2021-12-31 PROCEDURE — 85027 COMPLETE CBC AUTOMATED: CPT | Performed by: NURSE PRACTITIONER

## 2021-12-31 PROCEDURE — 82948 REAGENT STRIP/BLOOD GLUCOSE: CPT

## 2021-12-31 PROCEDURE — 80048 BASIC METABOLIC PNL TOTAL CA: CPT | Performed by: NURSE PRACTITIONER

## 2021-12-31 PROCEDURE — 99291 CRITICAL CARE FIRST HOUR: CPT | Performed by: NURSE PRACTITIONER

## 2021-12-31 PROCEDURE — 94760 N-INVAS EAR/PLS OXIMETRY 1: CPT

## 2021-12-31 PROCEDURE — 71045 X-RAY EXAM CHEST 1 VIEW: CPT

## 2021-12-31 RX ADMIN — HEPARIN SODIUM 5000 UNITS: 5000 INJECTION INTRAVENOUS; SUBCUTANEOUS at 05:07

## 2021-12-31 RX ADMIN — METHYLPREDNISOLONE SODIUM SUCCINATE 50 MG: 125 INJECTION, POWDER, FOR SOLUTION INTRAMUSCULAR; INTRAVENOUS at 08:28

## 2021-12-31 RX ADMIN — INSULIN LISPRO 4 UNITS: 100 INJECTION, SOLUTION INTRAVENOUS; SUBCUTANEOUS at 21:30

## 2021-12-31 RX ADMIN — CEFAZOLIN SODIUM 2000 MG: 2 SOLUTION INTRAVENOUS at 02:37

## 2021-12-31 RX ADMIN — HEPARIN SODIUM 5000 UNITS: 5000 INJECTION INTRAVENOUS; SUBCUTANEOUS at 13:12

## 2021-12-31 RX ADMIN — BARICITINIB 2 MG: 2 TABLET, FILM COATED ORAL at 13:12

## 2021-12-31 RX ADMIN — LISINOPRIL 20 MG: 20 TABLET ORAL at 08:27

## 2021-12-31 RX ADMIN — ATORVASTATIN CALCIUM 40 MG: 40 TABLET, FILM COATED ORAL at 21:30

## 2021-12-31 RX ADMIN — LIDOCAINE 1 PATCH: 50 PATCH TOPICAL at 08:28

## 2021-12-31 RX ADMIN — INSULIN GLARGINE 25 UNITS: 100 INJECTION, SOLUTION SUBCUTANEOUS at 21:30

## 2021-12-31 RX ADMIN — CEFAZOLIN SODIUM 2000 MG: 2 SOLUTION INTRAVENOUS at 10:24

## 2021-12-31 RX ADMIN — FERROUS SULFATE TAB 325 MG (65 MG ELEMENTAL FE) 325 MG: 325 (65 FE) TAB at 08:28

## 2021-12-31 RX ADMIN — INSULIN LISPRO 8 UNITS: 100 INJECTION, SOLUTION INTRAVENOUS; SUBCUTANEOUS at 16:03

## 2021-12-31 RX ADMIN — DOCUSATE SODIUM 100 MG: 100 CAPSULE, LIQUID FILLED ORAL at 08:27

## 2021-12-31 RX ADMIN — OXYCODONE HYDROCHLORIDE 2.5 MG: 5 TABLET ORAL at 21:29

## 2021-12-31 RX ADMIN — HEPARIN SODIUM 5000 UNITS: 5000 INJECTION INTRAVENOUS; SUBCUTANEOUS at 21:30

## 2021-12-31 RX ADMIN — CEFAZOLIN SODIUM 2000 MG: 2 SOLUTION INTRAVENOUS at 18:12

## 2021-12-31 RX ADMIN — INSULIN LISPRO 2 UNITS: 100 INJECTION, SOLUTION INTRAVENOUS; SUBCUTANEOUS at 11:08

## 2021-12-31 RX ADMIN — SENNOSIDES 8.6 MG: 8.6 TABLET, FILM COATED ORAL at 21:30

## 2021-12-31 NOTE — PLAN OF CARE
Problem: Potential for Falls  Goal: Patient will remain free of falls  Description: INTERVENTIONS:  - Educate patient/family on patient safety including physical limitations  - Instruct patient to call for assistance with activity   - Consult OT/PT to assist with strengthening/mobility   - Keep Call bell within reach  - Keep bed low and locked with side rails adjusted as appropriate  - Keep care items and personal belongings within reach  - Initiate and maintain comfort rounds  - Make Fall Risk Sign visible to staff  - Offer Toileting every 2 Hours, in advance of need  - Initiate/Maintain fall alarm  - Obtain necessary fall risk management equipment: fall alarm  - Apply yellow socks and bracelet for high fall risk patients  - Consider moving patient to room near nurses station  Outcome: Progressing     Problem: PAIN - ADULT  Goal: Verbalizes/displays adequate comfort level or baseline comfort level  Description: Interventions:  - Encourage patient to monitor pain and request assistance  - Assess pain using appropriate pain scale  - Administer analgesics based on type and severity of pain and evaluate response  - Implement non-pharmacological measures as appropriate and evaluate response  - Consider cultural and social influences on pain and pain management  - Notify physician/advanced practitioner if interventions unsuccessful or patient reports new pain  Outcome: Progressing     Problem: INFECTION - ADULT  Goal: Absence or prevention of progression during hospitalization  Description: INTERVENTIONS:  - Assess and monitor for signs and symptoms of infection  - Monitor lab/diagnostic results  - Monitor all insertion sites, i e  indwelling lines, tubes, and drains  - Monitor endotracheal if appropriate and nasal secretions for changes in amount and color  - Mather appropriate cooling/warming therapies per order  - Administer medications as ordered  - Instruct and encourage patient and family to use good hand hygiene technique  - Identify and instruct in appropriate isolation precautions for identified infection/condition  Outcome: Progressing  Goal: Absence of fever/infection during neutropenic period  Description: INTERVENTIONS:  - Monitor WBC    Outcome: Progressing     Problem: SAFETY ADULT  Goal: Patient will remain free of falls  Description: INTERVENTIONS:  - Educate patient/family on patient safety including physical limitations  - Instruct patient to call for assistance with activity   - Consult OT/PT to assist with strengthening/mobility   - Keep Call bell within reach  - Keep bed low and locked with side rails adjusted as appropriate  - Keep care items and personal belongings within reach  - Initiate and maintain comfort rounds  - Make Fall Risk Sign visible to staff  - Offer Toileting every 2 Hours, in advance of need  - Initiate/Maintain fall alarm  - Obtain necessary fall risk management equipment: fall alarm  - Apply yellow socks and bracelet for high fall risk patients  - Consider moving patient to room near nurses station  Outcome: Progressing  Goal: Maintain or return to baseline ADL function  Description: INTERVENTIONS:  - Educate patient/family on patient safety including physical limitations  - Instruct patient to call for assistance with activity   - Consult OT/PT to assist with strengthening/mobility   - Keep Call bell within reach  - Keep bed low and locked with side rails adjusted as appropriate  - Keep care items and personal belongings within reach  - Initiate and maintain comfort rounds  - Make Fall Risk Sign visible to staff  - Offer Toileting every 2 Hours, in advance of need  - Initiate/Maintain fall alarm  - Obtain necessary fall risk management equipment: fall alarm  - Apply yellow socks and bracelet for high fall risk patients  - Consider moving patient to room near nurses station  Outcome: Progressing  Goal: Maintains/Returns to pre admission functional level  Description: INTERVENTIONS:  - Perform BMAT or MOVE assessment daily    - Set and communicate daily mobility goal to care team and patient/family/caregiver  - Collaborate with rehabilitation services on mobility goals if consulted  - Perform Range of Motion 3 times a day  - Reposition patient every 2 hours  - Dangle patient 3 times a day  - Stand patient 3 times a day  - Ambulate patient 3 times a day  - Out of bed to chair 3 times a day   - Out of bed for meals 3 times a day  - Out of bed for toileting  - Record patient progress and toleration of activity level   Outcome: Progressing     Problem: DISCHARGE PLANNING  Goal: Discharge to home or other facility with appropriate resources  Description: INTERVENTIONS:  - Identify barriers to discharge w/patient and caregiver  - Arrange for needed discharge resources and transportation as appropriate  - Identify discharge learning needs (meds, wound care, etc )  - Arrange for interpretive services to assist at discharge as needed  - Refer to Case Management Department for coordinating discharge planning if the patient needs post-hospital services based on physician/advanced practitioner order or complex needs related to functional status, cognitive ability, or social support system  Outcome: Progressing     Problem: Knowledge Deficit  Goal: Patient/family/caregiver demonstrates understanding of disease process, treatment plan, medications, and discharge instructions  Description: Complete learning assessment and assess knowledge base    Interventions:  - Provide teaching at level of understanding  - Provide teaching via preferred learning methods  Outcome: Progressing     Problem: MOBILITY - ADULT  Goal: Maintain or return to baseline ADL function  Description: INTERVENTIONS:  - Educate patient/family on patient safety including physical limitations  - Instruct patient to call for assistance with activity   - Consult OT/PT to assist with strengthening/mobility   - Keep Call bell within reach  - Keep bed low and locked with side rails adjusted as appropriate  - Keep care items and personal belongings within reach  - Initiate and maintain comfort rounds  - Make Fall Risk Sign visible to staff  - Offer Toileting every 2 Hours, in advance of need  - Initiate/Maintain fall alarm  - Obtain necessary fall risk management equipment: fall alarm  - Apply yellow socks and bracelet for high fall risk patients  - Consider moving patient to room near nurses station  Outcome: Progressing  Goal: Maintains/Returns to pre admission functional level  Description: INTERVENTIONS:  - Perform BMAT or MOVE assessment daily    - Set and communicate daily mobility goal to care team and patient/family/caregiver  - Collaborate with rehabilitation services on mobility goals if consulted  - Perform Range of Motion 3 times a day  - Reposition patient every 2 hours    - Dangle patient 3 times a day  - Stand patient 3 times a day  - Ambulate patient 3 times a day  - Out of bed to chair 3 times a day   - Out of bed for meals 3 times a day  - Out of bed for toileting  - Record patient progress and toleration of activity level   Outcome: Progressing     Problem: Prexisting or High Potential for Compromised Skin Integrity  Goal: Skin integrity is maintained or improved  Description: INTERVENTIONS:  - Identify patients at risk for skin breakdown  - Assess and monitor skin integrity  - Assess and monitor nutrition and hydration status  - Monitor labs   - Assess for incontinence   - Turn and reposition patient  - Assist with mobility/ambulation  - Relieve pressure over bony prominences  - Avoid friction and shearing  - Provide appropriate hygiene as needed including keeping skin clean and dry  - Evaluate need for skin moisturizer/barrier cream  - Collaborate with interdisciplinary team   - Patient/family teaching  - Consider wound care consult   Outcome: Progressing     Problem: RESPIRATORY - ADULT  Goal: Achieves optimal ventilation and oxygenation  Description: INTERVENTIONS:  - Assess for changes in respiratory status  - Assess for changes in mentation and behavior  - Position to facilitate oxygenation and minimize respiratory effort  - Oxygen administered by appropriate delivery if ordered  - Initiate smoking cessation education as indicated  - Encourage broncho-pulmonary hygiene including cough, deep breathe, Incentive Spirometry  - Assess the need for suctioning and aspirate as needed  - Assess and instruct to report SOB or any respiratory difficulty  - Respiratory Therapy support as indicated  Outcome: Progressing     Problem: Nutrition/Hydration-ADULT  Goal: Nutrient/Hydration intake appropriate for improving, restoring or maintaining nutritional needs  Description: Monitor and assess patient's nutrition/hydration status for malnutrition  Collaborate with interdisciplinary team and initiate plan and interventions as ordered  Monitor patient's weight and dietary intake as ordered or per policy  Utilize nutrition screening tool and intervene as necessary  Determine patient's food preferences and provide high-protein, high-caloric foods as appropriate       INTERVENTIONS:  - Monitor oral intake, urinary output, labs, and treatment plans  - Assess nutrition and hydration status and recommend course of action  - Evaluate amount of meals eaten  - Assist patient with eating if necessary   - Allow adequate time for meals  - Recommend/ encourage appropriate diets, oral nutritional supplements, and vitamin/mineral supplements  - Order, calculate, and assess calorie counts as needed  - Recommend, monitor, and adjust tube feedings and TPN/PPN based on assessed needs  - Assess need for intravenous fluids  - Provide specific nutrition/hydration education as appropriate  - Include patient/family/caregiver in decisions related to nutrition  Outcome: Progressing     Problem: Potential for Falls  Goal: Patient will remain free of falls  Description: INTERVENTIONS:  - Educate patient/family on patient safety including physical limitations  - Instruct patient to call for assistance with activity   - Consult OT/PT to assist with strengthening/mobility   - Keep Call bell within reach  - Keep bed low and locked with side rails adjusted as appropriate  - Keep care items and personal belongings within reach  - Initiate and maintain comfort rounds  - Make Fall Risk Sign visible to staff  - Offer Toileting every 2 Hours, in advance of need  - Initiate/Maintain fall alarm  - Obtain necessary fall risk management equipment: fall alarm  - Apply yellow socks and bracelet for high fall risk patients  - Consider moving patient to room near nurses station  Outcome: Progressing     Problem: PAIN - ADULT  Goal: Verbalizes/displays adequate comfort level or baseline comfort level  Description: Interventions:  - Encourage patient to monitor pain and request assistance  - Assess pain using appropriate pain scale  - Administer analgesics based on type and severity of pain and evaluate response  - Implement non-pharmacological measures as appropriate and evaluate response  - Consider cultural and social influences on pain and pain management  - Notify physician/advanced practitioner if interventions unsuccessful or patient reports new pain  Outcome: Progressing     Problem: INFECTION - ADULT  Goal: Absence or prevention of progression during hospitalization  Description: INTERVENTIONS:  - Assess and monitor for signs and symptoms of infection  - Monitor lab/diagnostic results  - Monitor all insertion sites, i e  indwelling lines, tubes, and drains  - Monitor endotracheal if appropriate and nasal secretions for changes in amount and color  - Alfred appropriate cooling/warming therapies per order  - Administer medications as ordered  - Instruct and encourage patient and family to use good hand hygiene technique  - Identify and instruct in appropriate isolation precautions for identified infection/condition  Outcome: Progressing  Goal: Absence of fever/infection during neutropenic period  Description: INTERVENTIONS:  - Monitor WBC    Outcome: Progressing     Problem: SAFETY ADULT  Goal: Patient will remain free of falls  Description: INTERVENTIONS:  - Educate patient/family on patient safety including physical limitations  - Instruct patient to call for assistance with activity   - Consult OT/PT to assist with strengthening/mobility   - Keep Call bell within reach  - Keep bed low and locked with side rails adjusted as appropriate  - Keep care items and personal belongings within reach  - Initiate and maintain comfort rounds  - Make Fall Risk Sign visible to staff  - Offer Toileting every 2 Hours, in advance of need  - Initiate/Maintain fall alarm  - Obtain necessary fall risk management equipment: fall alarm  - Apply yellow socks and bracelet for high fall risk patients  - Consider moving patient to room near nurses station  Outcome: Progressing  Goal: Maintain or return to baseline ADL function  Description: INTERVENTIONS:  - Educate patient/family on patient safety including physical limitations  - Instruct patient to call for assistance with activity   - Consult OT/PT to assist with strengthening/mobility   - Keep Call bell within reach  - Keep bed low and locked with side rails adjusted as appropriate  - Keep care items and personal belongings within reach  - Initiate and maintain comfort rounds  - Make Fall Risk Sign visible to staff  - Offer Toileting every 2 Hours, in advance of need  - Initiate/Maintain fall alarm  - Obtain necessary fall risk management equipment: fall alarm  - Apply yellow socks and bracelet for high fall risk patients  - Consider moving patient to room near nurses station  Outcome: Progressing  Goal: Maintains/Returns to pre admission functional level  Description: INTERVENTIONS:  - Perform BMAT or MOVE assessment daily    - Set and communicate daily mobility goal to care team and patient/family/caregiver  - Collaborate with rehabilitation services on mobility goals if consulted  - Perform Range of Motion 3 times a day  - Reposition patient every 2 hours  - Dangle patient 3 times a day  - Stand patient 3 times a day  - Ambulate patient 3 times a day  - Out of bed to chair 3 times a day   - Out of bed for meals 3 times a day  - Out of bed for toileting  - Record patient progress and toleration of activity level   Outcome: Progressing     Problem: DISCHARGE PLANNING  Goal: Discharge to home or other facility with appropriate resources  Description: INTERVENTIONS:  - Identify barriers to discharge w/patient and caregiver  - Arrange for needed discharge resources and transportation as appropriate  - Identify discharge learning needs (meds, wound care, etc )  - Arrange for interpretive services to assist at discharge as needed  - Refer to Case Management Department for coordinating discharge planning if the patient needs post-hospital services based on physician/advanced practitioner order or complex needs related to functional status, cognitive ability, or social support system  Outcome: Progressing     Problem: Knowledge Deficit  Goal: Patient/family/caregiver demonstrates understanding of disease process, treatment plan, medications, and discharge instructions  Description: Complete learning assessment and assess knowledge base    Interventions:  - Provide teaching at level of understanding  - Provide teaching via preferred learning methods  Outcome: Progressing     Problem: MOBILITY - ADULT  Goal: Maintain or return to baseline ADL function  Description: INTERVENTIONS:  - Educate patient/family on patient safety including physical limitations  - Instruct patient to call for assistance with activity   - Consult OT/PT to assist with strengthening/mobility   - Keep Call bell within reach  - Keep bed low and locked with side rails adjusted as appropriate  - Keep care items and personal belongings within reach  - Initiate and maintain comfort rounds  - Make Fall Risk Sign visible to staff  - Offer Toileting every 2 Hours, in advance of need  - Initiate/Maintain fall alarm  - Obtain necessary fall risk management equipment: fall alarm  - Apply yellow socks and bracelet for high fall risk patients  - Consider moving patient to room near nurses station  Outcome: Progressing  Goal: Maintains/Returns to pre admission functional level  Description: INTERVENTIONS:  - Perform BMAT or MOVE assessment daily    - Set and communicate daily mobility goal to care team and patient/family/caregiver  - Collaborate with rehabilitation services on mobility goals if consulted  - Perform Range of Motion 3 times a day  - Reposition patient every 2 hours    - Dangle patient 3 times a day  - Stand patient 3 times a day  - Ambulate patient 3 times a day  - Out of bed to chair 3 times a day   - Out of bed for meals 3 times a day  - Out of bed for toileting  - Record patient progress and toleration of activity level   Outcome: Progressing     Problem: Prexisting or High Potential for Compromised Skin Integrity  Goal: Skin integrity is maintained or improved  Description: INTERVENTIONS:  - Identify patients at risk for skin breakdown  - Assess and monitor skin integrity  - Assess and monitor nutrition and hydration status  - Monitor labs   - Assess for incontinence   - Turn and reposition patient  - Assist with mobility/ambulation  - Relieve pressure over bony prominences  - Avoid friction and shearing  - Provide appropriate hygiene as needed including keeping skin clean and dry  - Evaluate need for skin moisturizer/barrier cream  - Collaborate with interdisciplinary team   - Patient/family teaching  - Consider wound care consult   Outcome: Progressing     Problem: RESPIRATORY - ADULT  Goal: Achieves optimal ventilation and oxygenation  Description: INTERVENTIONS:  - Assess for changes in respiratory status  - Assess for changes in mentation and behavior  - Position to facilitate oxygenation and minimize respiratory effort  - Oxygen administered by appropriate delivery if ordered  - Initiate smoking cessation education as indicated  - Encourage broncho-pulmonary hygiene including cough, deep breathe, Incentive Spirometry  - Assess the need for suctioning and aspirate as needed  - Assess and instruct to report SOB or any respiratory difficulty  - Respiratory Therapy support as indicated  Outcome: Progressing     Problem: Nutrition/Hydration-ADULT  Goal: Nutrient/Hydration intake appropriate for improving, restoring or maintaining nutritional needs  Description: Monitor and assess patient's nutrition/hydration status for malnutrition  Collaborate with interdisciplinary team and initiate plan and interventions as ordered  Monitor patient's weight and dietary intake as ordered or per policy  Utilize nutrition screening tool and intervene as necessary  Determine patient's food preferences and provide high-protein, high-caloric foods as appropriate       INTERVENTIONS:  - Monitor oral intake, urinary output, labs, and treatment plans  - Assess nutrition and hydration status and recommend course of action  - Evaluate amount of meals eaten  - Assist patient with eating if necessary   - Allow adequate time for meals  - Recommend/ encourage appropriate diets, oral nutritional supplements, and vitamin/mineral supplements  - Order, calculate, and assess calorie counts as needed  - Recommend, monitor, and adjust tube feedings and TPN/PPN based on assessed needs  - Assess need for intravenous fluids  - Provide specific nutrition/hydration education as appropriate  - Include patient/family/caregiver in decisions related to nutrition  Outcome: Progressing

## 2021-12-31 NOTE — PROCEDURES
Chest Tube Insertion    Date/Time: 12/30/2021 8:45 AM  Performed by: Ladonna Hunter MD  Authorized by: Ladonna Hunter MD     Patient location:  Bedside  Other Assisting Provider: Yes (comment) Lupe Solomon)    Consent:     Consent obtained:  Written    Consent given by:  Patient    Risks discussed:  Bleeding, incomplete drainage, damage to surrounding structures, infection, nerve damage and pain    Alternatives discussed:  No treatment (No treatment which would likley lead to death from CV collapse if left untreated)  Universal protocol:     Procedure explained and questions answered to patient or proxy's satisfaction: yes      Radiology Images displayed and confirmed  If images not available, report reviewed: yes      Immediately prior to procedure a time out was called: yes      Patient identity confirmed:  Verbally with patient, hospital-assigned identification number, arm band, anonymous protocol, patient vented/unresponsive and provided demographic data  Pre-procedure details:     Skin preparation:  ChloraPrep  Indications:     Indications: pneumothroax    Anesthesia (see MAR for exact dosages): Anesthesia method:  Local infiltration    Local anesthetic:  Lidocaine 1% w/o epi  Procedure details:     Placement location:  Lateral    Laterality:  Right    Approach:  Percutaneous    Scalpel size:  11    Angiocath size:  16G x 1 1/4    Access kit used: 12 F  Tube size (Fr):  12    Ultrasound guidance: yes      Tension pneumothorax: no      Needle Decompression: no      Tube connected to:  Suction    Drainage characteristics:  Air only    Suture material:  2-0 silk    Dressing:  4x4 sterile gauze and Xeroform gauze  Post-procedure details:     Post-insertion x-ray findings: tube in good position      Patient tolerance of procedure:   Tolerated well, no immediate complications

## 2021-12-31 NOTE — RESPIRATORY THERAPY NOTE
12/30/21 2040   Respiratory Assessment   Assessment Type Assess only   General Appearance Awake;Drowsy   Respiratory Pattern Labored; Accessory muscle use; Dyspnea with exertion;Dyspnea at rest;Hyperventilation; Shallow; Tachypneic   Chest Assessment Chest expansion symmetrical   Bilateral Breath Sounds Diminished;Clear   Resp Comments pt has Rt chest tube doing well natacha optiflo no changes made will cont to monitor    O2 Device optiflo    Non-Invasive Information   O2 Interface Device HFNC prongs  (optiflo )   Non-Invasive Ventilation Mode HFNC (High flow)   SpO2 98 %   $ Pulse Oximetry Spot Check Charge Completed   Non-Invasive Settings   FiO2 (%) 85   Flow (lpm) 50   Temperature (Set) 31   Non-Invasive Readings   Heater Temperature (Obs) 31   Skin Intervention Skin intact   Maintenance   Water bag changed No

## 2021-12-31 NOTE — ASSESSMENT & PLAN NOTE
Lab Results   Component Value Date    HGBA1C 6 2 (H) 11/22/2021       Recent Labs     12/30/21  0656 12/30/21  1053 12/30/21  1631 12/30/21  2104   POCGLU 156* 132 97 193*       Blood Sugar Average: Last 72 hrs:  (P) 900 [id number]     · Takes metformin, Januvia and glipizide at home  · Was on insulin gtt - now on Lantus 25 units BID, mealtime insulin 4 units TID, SSI Algorithm 4  · ACHS fingersticks  · Diabetic diet

## 2021-12-31 NOTE — RESPIRATORY THERAPY NOTE
12/31/21 0020   Respiratory Assessment   Assessment Type Assess only   Respiratory Pattern Dyspnea with exertion   Chest Assessment Chest expansion symmetrical   Bilateral Breath Sounds Diminished;Clear   Resp Comments pt natacha optiflo well titrated to 60% 35 from 75% 40 will cont to monitor    O2 Device optiflo    Non-Invasive Information   O2 Interface Device HFNC prongs  (opti)   Non-Invasive Ventilation Mode HFNC (High flow)   SpO2 97 %   $ Pulse Oximetry Spot Check Charge Completed   Non-Invasive Settings   FiO2 (%) 60   Flow (lpm) 35   Temperature (Set) 31   Non-Invasive Readings   Heater Temperature (Obs) 31   Skin Intervention Skin intact   Maintenance   Water bag changed Yes

## 2021-12-31 NOTE — PLAN OF CARE
Problem: Potential for Falls  Goal: Patient will remain free of falls  Description: INTERVENTIONS:  - Educate patient/family on patient safety including physical limitations  - Instruct patient to call for assistance with activity   - Consult OT/PT to assist with strengthening/mobility   - Keep Call bell within reach  - Keep bed low and locked with side rails adjusted as appropriate  - Keep care items and personal belongings within reach  - Initiate and maintain comfort rounds  - Make Fall Risk Sign visible to staff  - Offer Toileting every 2 Hours, in advance of need  - Initiate/Maintain fall alarm  - Obtain necessary fall risk management equipment: fall alarm  - Apply yellow socks and bracelet for high fall risk patients  - Consider moving patient to room near nurses station  Outcome: Progressing     Problem: PAIN - ADULT  Goal: Verbalizes/displays adequate comfort level or baseline comfort level  Description: Interventions:  - Encourage patient to monitor pain and request assistance  - Assess pain using appropriate pain scale  - Administer analgesics based on type and severity of pain and evaluate response  - Implement non-pharmacological measures as appropriate and evaluate response  - Consider cultural and social influences on pain and pain management  - Notify physician/advanced practitioner if interventions unsuccessful or patient reports new pain  Outcome: Progressing     Problem: SAFETY ADULT  Goal: Patient will remain free of falls  Description: INTERVENTIONS:  - Educate patient/family on patient safety including physical limitations  - Instruct patient to call for assistance with activity   - Consult OT/PT to assist with strengthening/mobility   - Keep Call bell within reach  - Keep bed low and locked with side rails adjusted as appropriate  - Keep care items and personal belongings within reach  - Initiate and maintain comfort rounds  - Make Fall Risk Sign visible to staff  - Offer Toileting every 2 Hours, in advance of need  - Initiate/Maintain fall alarm  - Obtain necessary fall risk management equipment: fall alarm  - Apply yellow socks and bracelet for high fall risk patients  - Consider moving patient to room near nurses station  Outcome: Progressing

## 2021-12-31 NOTE — PROGRESS NOTES
[address]  Progress Note - Tommy Edmondson 1940, [de-identified] y o  female MRN: [id number]  Unit/Bed#: ICU 04-01 Encounter: [id number]  Primary Care Provider: Gene Magallon DO   Date and time admitted to hospital: 12/16/2021  4:49 PM    * COVID-19  Assessment & Plan  · Presented on 12/16 with fever, chills, shortness of breath, cough  · COVID-19 positive on 12/16  · Unvaccinated  · Transferred on 12/21 to ICU 2/2 escalating O2 requirements  · Severe pathway:  · Decadron 0 1 mg/kg D10, total [id number] 12/30  · Continue solumedrol 50 mg daily D 2/5  · Baricitinib D12 - maintain lower dose 2/2 CKD3b with decreased GFR  · Remdesivir completed  · DVT ppx with SQ heparin  · Statin  · Ceftriaxone/Doxy-  DC'd procalcitonin negative x2  · Encourage side-lying/prone positioning  · Encourage I/S, aggressive pulmonary hygiene  · Titrate oxygen to maintain SpO2 > 88% - currently requiring HFNC 70%/50L   · Trend CRP as needed    Acute respiratory failure with hypoxia (HCC)  Assessment & Plan  · In the setting of COVID 19 viral PNA and R ptx s/p R chest tube   · Continue HFNC 85% 50L  · Continue COVID-19 protocol as noted above  · PRN Xopenex nebs  · Titrate O2 to maintain SpO2 > 88%   · Aggressive pulmonary hygiene    Bacteremia due to methicillin susceptible Staphylococcus aureus (MSSA)  Assessment & Plan  · 2/2 BC with MSSA  · Continue Ancef D4/7  · TTE:  · LV - EF 50% with increased wall thickness and G1DD   · Mitral Valve: mild regurgitation  · Tricuspid Valve: mild regurgitation  · Follow up on repeat BC, currently NGTD    Pneumothorax  Assessment & Plan  · Became hypoxic 12/25 AM, CXR obtained demonstrating right pneumothorax  · 16 Fr   CT placed   · Follow up CXR demonstrated improvement in pneumothorax  · Right sided chest tube removed 12/28  · R chest tube replaced on 12/30 for recurrent R ptx  · Continue R CT to wall suction  · Follow up on CXR this AM     SIRS (systemic inflammatory response syndrome) ([facility] [address] )  Assessment & Plan  · As evidenced by tachycardia, tachypnea, hypoxia  · Suspect this is in setting of worsening COVID-19 pneumonia vs superimposed bacterial infection  · Blood cultures 1/2 positive, repeat blood cultures 2/2 positive for Staph, repeats from 12/26 NGTD  · Procal negative thus far  · Continue Ancef D4  · Trend fever and WBC curve  · Trend procalcitonin    Type 2 diabetes mellitus without complication, without long-term current use of insulin [facility])  Assessment & Plan  Lab Results   Component Value Date    HGBA1C 6 2 (H) 11/22/2021       Recent Labs     12/30/21  0656 12/30/21  1053 12/30/21  1631 12/30/21  2104   POCGLU 156* 132 97 193*       Blood Sugar Average: Last 72 hrs:  (P) 585 [id number]     · Takes metformin, Januvia and glipizide at home  · Was on insulin gtt - now on Lantus 25 units BID, mealtime insulin 4 units TID, SSI Algorithm 4  · ACHS fingersticks  · Diabetic diet     Anemia  Assessment & Plan  · Hemoglobin 10 8 on admission  · Likely multifactorial in setting of iron deficiency anemia vs CKD  · Baseline appears to be around 11 5 - 13  · Concurrently with no signs of active bleeding  · Continue home iron supplementation  · Transfuse for hemoglobin < 7 or with hemodynamic compromise  · Trend hemoglobin and monitor for signs of active bleeding    Chronic kidney disease, stage 3b [facility])  Assessment & Plan  Lab Results   Component Value Date    EGFR 40 12/29/2021    EGFR 37 12/27/2021    EGFR 38 12/26/2021    CREATININE 1 25 12/29/2021    CREATININE 1 33 (H) 12/27/2021    CREATININE 1 31 (H) 12/26/2021     · Creatinine 1 56 on admission, now improved   · Baseline creatinine appears to be around 1 2 - 1 6   · Avoid nephrotoxic agents and hypotension  · Trend renal indices  · Strict I&O  · Daily weights    Ambulatory dysfunction  Assessment & Plan  · PT/OT - recommending acute rehab on discharge  · Encourage mobilization    Hypercholesterolemia  Assessment & Plan  · Continue statin    Essential hypertension  Assessment & Plan  · Takes enalapril at home - continue lisinopril  · Monitor BP    Anxiety  Assessment & Plan  · Continue home PRN Xanax  · Zoloft appears to be on patient's home medication list, however patient has not been taking        ----------------------------------------------------------------------------------------  HPI/24hr events: Follow up CXR showed large R ptx, R 12F CT placed to wall suction with resolution  Remained on HFNC 85% 50L  Patient appropriate for transfer out of the ICU today?: No  Disposition: Continue Critical Care   Code Status: Level 1 - Full Code  ---------------------------------------------------------------------------------------  SUBJECTIVE  "I feel better today"    Review of Systems   Constitutional: Negative  HENT: Negative  Eyes: Negative  Respiratory: Negative  Cardiovascular: Negative  Gastrointestinal: Negative  Endocrine: Negative  Genitourinary: Negative  Musculoskeletal: Negative  Skin: Negative  Allergic/Immunologic: Negative  Neurological: Negative  Hematological: Negative  Psychiatric/Behavioral: Negative        Review of systems was reviewed and negative unless stated above in HPI/24-hour events   ---------------------------------------------------------------------------------------  OBJECTIVE    Vitals   Vitals:    21 0000 21 0020   BP: 105/58  150/74    Pulse: 82 76 65 64   Resp: 22 20 17 18   Temp: (!) 96 8 °F (36 °C)      TempSrc: Axillary      SpO2: 98% 98% 100% 97%   Weight:       Height:         Temp (24hrs), Av 6 °F (36 4 °C), Min:96 8 °F (36 °C), Max:98 5 °F (36 9 °C)  Current: Temperature: (!) 96 8 °F (36 °C)          Respiratory:  SpO2: SpO2: 97 %, SpO2 Device: O2 Device: High flow nasal cannula  Nasal Cannula O2 Flow Rate (L/min): 40 L/min    Invasive/non-invasive ventilation settings   Respiratory  Report   Lab Data (Last 4 hours)    None         O2/Vent Data (Last 4 hours)      12/30 2040 12/31 0020        Non-Invasive Ventilation Mode HFNC (High flow) HFNC (High flow)                  Physical Exam  Constitutional:       General: She is not in acute distress  Appearance: She is ill-appearing  HENT:      Head: Normocephalic and atraumatic  Mouth/Throat:      Mouth: Mucous membranes are moist    Eyes:      Pupils: Pupils are equal, round, and reactive to light  Cardiovascular:      Rate and Rhythm: Normal rate and regular rhythm  Pulses: Normal pulses  Heart sounds: Normal heart sounds  Pulmonary:      Effort: Pulmonary effort is normal  No respiratory distress  Breath sounds: No wheezing, rhonchi or rales  Comments: Diminished  R CT present to wall suction   Abdominal:      General: Bowel sounds are normal  There is no distension  Palpations: Abdomen is soft  Tenderness: There is no abdominal tenderness  Musculoskeletal:         General: No swelling  Normal range of motion  Cervical back: Neck supple  Skin:     General: Skin is warm and dry  Capillary Refill: Capillary refill takes less than 2 seconds  Neurological:      General: No focal deficit present  Mental Status: She is alert and oriented to person, place, and time  Cranial Nerves: No cranial nerve deficit  Sensory: No sensory deficit  Motor: No weakness               Laboratory and Diagnostics:  Results from last 7 days   Lab Units 12/29/21  0513 12/27/21  0525 12/26/21  1117 12/26/21  0555 12/25/21  0455 12/24/21  0429   WBC Thousand/uL 13 80* 14 11* 15 15* 14 93* 15 31* 12 66*   HEMOGLOBIN g/dL 10 4* 9 9* 10 9* 10 4* 12 5 11 5   HEMATOCRIT % 32 2* 30 4* 34 2* 32 2* 38 4 35 8   PLATELETS Thousands/uL 518* 433* 444* 432* 497* 450*   NEUTROS PCT %  --   --   --  87*  --   --    BANDS PCT %  --   --   --   --   --  5   MONOS PCT %  --   --   --  3*  --   --    MONO PCT %  --   --   --   -- --  4     Results from last 7 days   Lab Units 12/29/21  0513 12/27/21  0525 12/26/21  0555 12/25/21  0455 12/24/21  0429   SODIUM mmol/L 135 134* 135 138 141   POTASSIUM mmol/L 4 9 4 9 4 8 4 2 4 1   CHLORIDE mmol/L 103 103 104 104 106   CO2 mmol/L 24 24 25 25 25   ANION GAP mmol/L 8 7 6 9 10   BUN mg/dL 53* 54* 54* 68* 71*   CREATININE mg/dL 1 25 1 33* 1 31* 1 54* 1 72*   CALCIUM mg/dL 9 4 9 1 8 9 9 8 9 6   GLUCOSE RANDOM mg/dL 252* 349* 304* 82 97   ALT U/L  --  18 18 21 19   AST U/L  --  12* 16 21 20   ALK PHOS U/L  --  66 64 74 70   ALBUMIN g/dL  --  3 2* 3 3* 3 9 3 7   TOTAL BILIRUBIN mg/dL  --  0 45 0 72 0 60 0 48     Results from last 7 days   Lab Units 12/27/21  0525 12/26/21  0555 12/25/21  0455 12/24/21  0429   MAGNESIUM mg/dL 2 4 2 5 2 5 2 6   PHOSPHORUS mg/dL 3 4 4 3* 3 9 4 5*                   ABG:    VBG:          Micro  Results from last 7 days   Lab Units 12/29/21  1357 12/29/21  1356 12/26/21  1156   BLOOD CULTURE  No Growth at 24 hrs  No Growth at 24 hrs  No Growth After 4 Days  No Growth After 4 Days  EKG: NSR rate 70  Imaging: I have personally reviewed pertinent reports  and I have personally reviewed pertinent films in PACS    Intake and Output  I/O       12/29 0701 12/30 0700 12/30 0701  12/31 0700    P  O  460 170    I V  (mL/kg) 60 (1) 30 (0 5)    IV Piggyback 100 100    Total Intake(mL/kg) 620 (10 7) 300 (5 2)    Urine (mL/kg/hr) 735 (0 5) 700 (0 5)    Chest Tube  10    Total Output 735 710    Net -115 -410                Height and Weights   Height: 5' (152 4 cm)     Body mass index is 25 02 kg/m²    Weight (last 2 days)     Date/Time Weight    12/30/21 0539 58 1 (128 09)    12/29/21 0512 57 7 (127 21)            Nutrition       Diet Orders   (From admission, onward)             Start     Ordered    12/27/21 0958  Dietary nutrition supplements  Once        Question Answer Comment   Select Supplement: Glucerna-Vanilla    Frequency Breakfast, Lunch, Dinner        12/27/21 6477 12/21/21 1750  Diet Rodríguez/CHO Controlled; Consistent Carbohydrate Diet Level 2 (5 carb servings/75 grams CHO/meal)  Diet effective now        References:    Nutrtion Support Algorithm Enteral vs  Parenteral   Question Answer Comment   Diet Type Rodríguez/CHO Controlled    Rodríguez/CHO Controlled Consistent Carbohydrate Diet Level 2 (5 carb servings/75 grams CHO/meal)    RD to adjust diet per protocol?  Yes        12/21/21 1749                  Active Medications  Scheduled Meds:  Current Facility-Administered Medications   Medication Dose Route Frequency Provider Last Rate    acetaminophen  650 mg Oral Q4H PRN BENTON Brown      ALPRAZolam  0 25 mg Oral HS PRN Andrea Castleman, CRNP      atorvastatin  40 mg Oral HS BENTON Brown      Baricitinib  2 mg Oral Q24H BENTON Brown      bisacodyl  10 mg Rectal Daily PRN BENTON Wright      cefazolin  2,000 mg Intravenous Q8H BENTON Dhillon Stopped (12/30/21 2030)    docusate sodium  100 mg Oral Daily Megan Lynn PA-C      ferrous sulfate  325 mg Oral Daily With Breakfast BENTON Brown      heparin (porcine)  5,000 Units Subcutaneous Q8H [facility] BENTON Brown      insulin glargine  25 Units Subcutaneous Q12H [facility] BENTON Dhillon      insulin lispro  2-12 Units Subcutaneous HS Sarah A BENTON Prince      insulin lispro  2-12 Units Subcutaneous TID AC Sarah A BENTON Prince      insulin lispro  4 Units Subcutaneous Daily With Breakfast BENTON Davidson      insulin lispro  4 Units Subcutaneous Daily With Lunch BENTON Davidson      insulin lispro  4 Units Subcutaneous Daily With Dinner BENTON Farmer      lidocaine  1 patch Topical Daily BENTON Davidson      lidocaine (PF)  10 mL Infiltration Once BENTON Dhillon      lisinopril  20 mg Oral Daily BENTON Araiza      methylPREDNISolone sodium succinate  50 mg Intravenous Daily Sarah Reyez, [facility]  ondansetron  4 mg Intravenous Q6H PRN BENTON Bocanegra      oxyCODONE  2 5 mg Oral Q6H PRN BENTON Dhillon      polyethylene glycol  17 g Oral Daily BENTON Dhillon      senna  1 tablet Oral HS BENTON Dhillon       Continuous Infusions:     PRN Meds:   acetaminophen, 650 mg, Q4H PRN  ALPRAZolam, 0 25 mg, HS PRN  bisacodyl, 10 mg, Daily PRN  ondansetron, 4 mg, Q6H PRN  oxyCODONE, 2 5 mg, Q6H PRN        Invasive Devices Review  Invasive Devices  Report    Peripheral Intravenous Line            Peripheral IV 12/28/21 Left Forearm 2 days          Drain            External Urinary Catheter 8 days    Chest Tube Right Midaxillary 1 day                ---------------------------------------------------------------------------------------  Advance Directive and Living Will:      Power of :    POLST:    ---------------------------------------------------------------------------------------  Care Time Delivered:   Upon my evaluation, this patient had a high probability of imminent or life-threatening deterioration due to hypoxia, COVID, which required my direct attention, intervention, and personal management  I have personally provided 31 minutes (0400 to [phone]) of critical care time, exclusive of procedures, teaching, family meetings, and any prior time recorded by providers other than myself  BENTON Bhagat      Portions of the record may have been created with voice recognition software  Occasional wrong word or "sound a like" substitutions may have occurred due to the inherent limitations of voice recognition software    Read the chart carefully and recognize, using context, where substitutions have occurred

## 2021-12-31 NOTE — ASSESSMENT & PLAN NOTE
· As evidenced by tachycardia, tachypnea, hypoxia  · Suspect this is in setting of worsening COVID-19 pneumonia vs superimposed bacterial infection  · Blood cultures 1/2 positive, repeat blood cultures 2/2 positive for Staph, repeats from 12/26 NGTD  · Procal negative thus far  · Continue Ancef D4  · Trend fever and WBC curve  · Trend procalcitonin

## 2022-01-01 LAB
ANION GAP SERPL CALCULATED.3IONS-SCNC: 7 MMOL/L (ref 4–13)
BACTERIA BLD CULT: NORMAL
BACTERIA BLD CULT: NORMAL
BUN SERPL-MCNC: 65 MG/DL (ref 5–25)
CALCIUM SERPL-MCNC: 9 MG/DL (ref 8.4–10.2)
CHLORIDE SERPL-SCNC: 105 MMOL/L (ref 96–108)
CO2 SERPL-SCNC: 26 MMOL/L (ref 21–32)
CREAT SERPL-MCNC: 1.5 MG/DL (ref 0.6–1.3)
ERYTHROCYTE [DISTWIDTH] IN BLOOD BY AUTOMATED COUNT: 13.6 % (ref 11.6–15.1)
GFR SERPL CREATININE-BSD FRML MDRD: 32 ML/MIN/1.73SQ M
GLUCOSE SERPL-MCNC: 101 MG/DL (ref 65–140)
GLUCOSE SERPL-MCNC: 269 MG/DL (ref 65–140)
GLUCOSE SERPL-MCNC: 291 MG/DL (ref 65–140)
GLUCOSE SERPL-MCNC: 440 MG/DL (ref 65–140)
GLUCOSE SERPL-MCNC: 59 MG/DL (ref 65–140)
GLUCOSE SERPL-MCNC: 60 MG/DL (ref 65–140)
HCT VFR BLD AUTO: 33.6 % (ref 34.8–46.1)
HGB BLD-MCNC: 10.7 G/DL (ref 11.5–15.4)
MCH RBC QN AUTO: 27.4 PG (ref 26.8–34.3)
MCHC RBC AUTO-ENTMCNC: 31.8 G/DL (ref 31.4–37.4)
MCV RBC AUTO: 86 FL (ref 82–98)
PLATELET # BLD AUTO: 449 THOUSANDS/UL (ref 149–390)
PMV BLD AUTO: 9.3 FL (ref 8.9–12.7)
POTASSIUM SERPL-SCNC: 4.8 MMOL/L (ref 3.5–5.3)
RBC # BLD AUTO: 3.9 MILLION/UL (ref 3.81–5.12)
SODIUM SERPL-SCNC: 138 MMOL/L (ref 135–147)
WBC # BLD AUTO: 14.06 THOUSAND/UL (ref 4.31–10.16)

## 2022-01-01 PROCEDURE — 80048 BASIC METABOLIC PNL TOTAL CA: CPT | Performed by: NURSE PRACTITIONER

## 2022-01-01 PROCEDURE — 85027 COMPLETE CBC AUTOMATED: CPT | Performed by: NURSE PRACTITIONER

## 2022-01-01 PROCEDURE — 82948 REAGENT STRIP/BLOOD GLUCOSE: CPT

## 2022-01-01 PROCEDURE — 99233 SBSQ HOSP IP/OBS HIGH 50: CPT | Performed by: ANESTHESIOLOGY

## 2022-01-01 PROCEDURE — 97530 THERAPEUTIC ACTIVITIES: CPT

## 2022-01-01 PROCEDURE — 94760 N-INVAS EAR/PLS OXIMETRY 1: CPT

## 2022-01-01 PROCEDURE — 97116 GAIT TRAINING THERAPY: CPT

## 2022-01-01 RX ORDER — INSULIN GLARGINE 100 [IU]/ML
15 INJECTION, SOLUTION SUBCUTANEOUS EVERY 12 HOURS SCHEDULED
Status: DISCONTINUED | OUTPATIENT
Start: 2022-01-01 | End: 2022-01-02

## 2022-01-01 RX ADMIN — INSULIN LISPRO 6 UNITS: 100 INJECTION, SOLUTION INTRAVENOUS; SUBCUTANEOUS at 22:12

## 2022-01-01 RX ADMIN — BARICITINIB 2 MG: 2 TABLET, FILM COATED ORAL at 13:22

## 2022-01-01 RX ADMIN — INSULIN LISPRO 5 UNITS: 100 INJECTION, SOLUTION INTRAVENOUS; SUBCUTANEOUS at 18:24

## 2022-01-01 RX ADMIN — CEFAZOLIN SODIUM 2000 MG: 2 SOLUTION INTRAVENOUS at 10:30

## 2022-01-01 RX ADMIN — LISINOPRIL 20 MG: 20 TABLET ORAL at 08:00

## 2022-01-01 RX ADMIN — INSULIN GLARGINE 15 UNITS: 100 INJECTION, SOLUTION SUBCUTANEOUS at 22:12

## 2022-01-01 RX ADMIN — ATORVASTATIN CALCIUM 40 MG: 40 TABLET, FILM COATED ORAL at 22:12

## 2022-01-01 RX ADMIN — SENNOSIDES 8.6 MG: 8.6 TABLET, FILM COATED ORAL at 22:12

## 2022-01-01 RX ADMIN — CEFAZOLIN SODIUM 2000 MG: 2 SOLUTION INTRAVENOUS at 18:24

## 2022-01-01 RX ADMIN — HEPARIN SODIUM 5000 UNITS: 5000 INJECTION INTRAVENOUS; SUBCUTANEOUS at 13:22

## 2022-01-01 RX ADMIN — LIDOCAINE 1 PATCH: 50 PATCH TOPICAL at 08:00

## 2022-01-01 RX ADMIN — METHYLPREDNISOLONE SODIUM SUCCINATE 50 MG: 125 INJECTION, POWDER, FOR SOLUTION INTRAMUSCULAR; INTRAVENOUS at 08:00

## 2022-01-01 RX ADMIN — CEFAZOLIN SODIUM 2000 MG: 2 SOLUTION INTRAVENOUS at 02:49

## 2022-01-01 RX ADMIN — HEPARIN SODIUM 5000 UNITS: 5000 INJECTION INTRAVENOUS; SUBCUTANEOUS at 06:01

## 2022-01-01 RX ADMIN — INSULIN LISPRO 6 UNITS: 100 INJECTION, SOLUTION INTRAVENOUS; SUBCUTANEOUS at 11:26

## 2022-01-01 RX ADMIN — INSULIN LISPRO 12 UNITS: 100 INJECTION, SOLUTION INTRAVENOUS; SUBCUTANEOUS at 16:09

## 2022-01-01 RX ADMIN — HEPARIN SODIUM 5000 UNITS: 5000 INJECTION INTRAVENOUS; SUBCUTANEOUS at 22:12

## 2022-01-01 RX ADMIN — FERROUS SULFATE TAB 325 MG (65 MG ELEMENTAL FE) 325 MG: 325 (65 FE) TAB at 07:58

## 2022-01-01 RX ADMIN — DOCUSATE SODIUM 100 MG: 100 CAPSULE, LIQUID FILLED ORAL at 08:00

## 2022-01-01 NOTE — ASSESSMENT & PLAN NOTE
· POA: tachycardia, tachypnea, hypoxia  · Suspect this is in setting of worsening COVID-19 pneumonia vs superimposed bacterial infection  · Blood cultures 1/2 positive, repeat blood cultures 2/2 positive for Staph, repeats from 12/26 NGTD  · Procal negative thus far  · Continue Ancef D5  · Trend fever and WBC curve  · Trend procalcitonin

## 2022-01-01 NOTE — PLAN OF CARE
Problem: Potential for Falls  Goal: Patient will remain free of falls  Description: INTERVENTIONS:  - Educate patient/family on patient safety including physical limitations  - Instruct patient to call for assistance with activity   - Consult OT/PT to assist with strengthening/mobility   - Keep Call bell within reach  - Keep bed low and locked with side rails adjusted as appropriate  - Keep care items and personal belongings within reach  - Initiate and maintain comfort rounds  - Make Fall Risk Sign visible to staff  - Offer Toileting every 2 Hours, in advance of need  - Initiate/Maintain fall alarm  - Obtain necessary fall risk management equipment: fall alarm  - Apply yellow socks and bracelet for high fall risk patients  - Consider moving patient to room near nurses station  Outcome: Progressing

## 2022-01-01 NOTE — PLAN OF CARE
Problem: PHYSICAL THERAPY ADULT  Goal: Performs mobility at highest level of function for planned discharge setting  See evaluation for individualized goals  Description: Treatment/Interventions: Functional transfer training,Elevations,Therapeutic exercise,Endurance training,Bed mobility,Gait training  Equipment Recommended:  (unknown at this time)       See flowsheet documentation for full assessment, interventions and recommendations  Outcome: Progressing  Note: Prognosis: Fair  Problem List: Decreased endurance,Impaired balance,Decreased mobility  Assessment: Pt seen for PT treatment session this date, consisting of ther act focused on bed mobility and transfer training and gt training on level surfaces to improve pt safety in household environment x 6 ft with RW use from bed>recliner - further mobility unable to be achieved d/t line management of optiflo tubing and hypoxia evident following short mobility attempt  Since previous session, pt has made good progress in terms of continued level surface mobility attempts, however + hypoxia following amb x short household distance  RN made aware of O2 drop to 81% at lowest  Pertinent barriers during this session include SOB  Current goals and POC remain appropriate, pt continues to have rehab potential and is making progress towards STGs  Pt prognosis for achieving goals is fair, pending pt progress with hospitalization/medical status improvements, and indicated by ability to follow directions, attention span and self-expression (thoughts, feelings, needs)  Pt limited d/t medical instability  PT recommends post acute rehabilitation services upon discharge  Pt continues to be functioning below baseline level, and remains limited 2* factors listed above  PT will continue to see pt during current hospitalization in order to address the deficits listed above and provide interventions consistent w/ POC in effort to achieve STGs    Barriers to Discharge: Decreased caregiver support        PT Discharge Recommendation: Post acute rehabilitation services          See flowsheet documentation for full assessment

## 2022-01-01 NOTE — PHYSICAL THERAPY NOTE
Physical Therapy Treatment Note       01/01/22 1308   PT Last Visit   PT Visit Date 01/01/22   Note Type   Note Type Treatment   Pain Assessment   Pain Assessment Tool 0-10   Pain Score No Pain   Restrictions/Precautions   Weight Bearing Precautions Per Order No   Other Precautions Airborne/isolation;Contact/isolation;Multiple lines;Telemetry;O2;Fall Risk  (optiflo 40% 30L O2, +chest tube to R side)   General   Chart Reviewed Yes   Response to Previous Treatment Patient with no complaints from previous session  Family/Caregiver Present No   Cognition   Arousal/Participation Alert; Responsive; Cooperative   Attention Within functional limits   Orientation Level Oriented X4   Memory Within functional limits   Following Commands Follows all commands and directions without difficulty   Comments pt agreeable to PT session   Subjective   Subjective "I want to sit up over by the window"   Bed Mobility   Supine to Sit 4  Minimal assistance   Additional items Assist x 1;HOB elevated; Increased time required   Transfers   Sit to Stand   (CGA for stability)   Additional items Assist x 1; Increased time required;Verbal cues  (VCs for management and increasing safety awareness)   Stand to Sit 5  Supervision   Additional items Assist x 1; Armrests; Increased time required   Ambulation/Elevation   Gait pattern Improper Weight shift;Decreased foot clearance; Short stride   Gait Assistance 5  Supervision   Additional items Assist x 1   Assistive Device Rolling walker   Distance 6 ft  (unable to progress d/t optiflo tubing)   Stair Management Assistance Not tested   Balance   Static Sitting Fair +   Dynamic Sitting Fair   Static Standing Fair -   Dynamic Standing Fair -   Ambulatory Fair -   Endurance Deficit   Endurance Deficit Yes   Endurance Deficit Description SpO2 prior to OOB mobility = 92-94% on 30 L optiflo, declined to 81% at lowest following transition into bedside recliner, recovered to >84% following 10 mins seated rest period Activity Tolerance   Activity Tolerance Patient limited by fatigue   Nurse Made Aware Yes, RN Michael Valdivia verbalized pt appropriate for PT session, made aware of outcomes/recs   Assessment   Prognosis Fair   Problem List Decreased endurance; Impaired balance;Decreased mobility   Assessment Pt seen for PT treatment session this date, consisting of ther act focused on bed mobility and transfer training and gt training on level surfaces to improve pt safety in household environment x 6 ft with RW use from bed>recliner - further mobility unable to be achieved d/t line management of optiflo tubing and hypoxia evident following short mobility attempt  Since previous session, pt has made good progress in terms of continued level surface mobility attempts, however + hypoxia following amb x short household distance  RN made aware of O2 drop to 81% at lowest  Pertinent barriers during this session include SOB  Current goals and POC remain appropriate, pt continues to have rehab potential and is making progress towards STGs  Pt prognosis for achieving goals is fair, pending pt progress with hospitalization/medical status improvements, and indicated by ability to follow directions, attention span and self-expression (thoughts, feelings, needs)  Pt limited d/t medical instability  PT recommends post acute rehabilitation services upon discharge  Pt continues to be functioning below baseline level, and remains limited 2* factors listed above  PT will continue to see pt during current hospitalization in order to address the deficits listed above and provide interventions consistent w/ POC in effort to achieve STGs  Barriers to Discharge Decreased caregiver support   Goals   Patient Goals "to get better"   LTG Expiration Date 01/08/22   Long Term Goal #1 goals remain appropriate   PT Treatment Day 4   Plan   Treatment/Interventions Functional transfer training;LE strengthening/ROM; Therapeutic exercise; Endurance training;Patient/family training;Equipment eval/education; Bed mobility;Gait training;Spoke to nursing;OT;Family   Progress Slow progress, decreased activity tolerance   PT Frequency 3-5x/wk   Recommendation   PT Discharge Recommendation Post acute rehabilitation services   Equipment Recommended   (continue RW use)   Additional Comments Pt's raw score on the [facility] Basic Mobility inpatient short form is 17, standardized score is 39 67  Patients at this level are likely to benefit from DC to [facility], however, please refer to therapist recommendation for safe DC planning  [facility] Basic Mobility Inpatient   Turning in Bed Without Bedrails 4   Lying on Back to Sitting on Edge of Flat Bed 3   Moving Bed to Chair 3   Standing Up From Chair 3   Walk in Room 2   Climb 3-5 Stairs 2   Basic Mobility Inpatient Raw Score 17   Basic Mobility Standardized Score 39 67   Highest Level Of Mobility   JH-HLM Goal 5: Stand one or more mins   J[facility]-HLM Highest Level of Mobility 5: Stand (1 or more minutes)   JH-HLM Goal Achieved Yes   Education   Education Provided Mobility training;Assistive device   Patient Demonstrates acceptance/verbal understanding;Explanation/teachback used   End of Consult   Patient Position at End of Consult Bedside chair; All needs within reach       Rubin Singh PT    Time of PT treatment session: [id number] (total treatment time = 27 minutes)

## 2022-01-01 NOTE — PROGRESS NOTES
[id number]: Received report from day shift RN  2130: Pt given 2 5 mg PO Oxy for c/o 4/10 "annoying" pain in RCW near chest tube site  0400: Pt cleaned incontinence of urine/malfunction purewick  New purewick applied  5431: Attempted to insert new peripheral iv, unsuccessful  Morning blood work obtained via peripheral stick & sent to lab

## 2022-01-01 NOTE — ASSESSMENT & PLAN NOTE
Lab Results   Component Value Date    EGFR 36 12/31/2021    EGFR 40 12/29/2021    EGFR 37 12/27/2021    CREATININE 1 38 (H) 12/31/2021    CREATININE 1 25 12/29/2021    CREATININE 1 33 (H) 12/27/2021     · Creatinine 1 56 on admission, now improved   · Baseline creatinine appears to be around 1 2 - 1 6   · Avoid nephrotoxic agents and hypotension  · Consider holding lisinopril  · Trend renal indices  · Strict I&O  · Daily weights

## 2022-01-01 NOTE — PROGRESS NOTES
[address]  Progress Note - Tommy Edmondson 1940, [de-identified] y o  female MRN: [id number]  Unit/Bed#: ICU 04-01 Encounter: [id number]  Primary Care Provider: Gene Magallon DO   Date and time admitted to hospital: 12/16/2021  4:49 PM    * COVID-19  Assessment & Plan  · Presented on 12/16 with fever, chills, shortness of breath, cough  · COVID-19 positive on 12/16  Admitted to [address] [place] [address] on 2L NC  · Unvaccinated  · Transferred on 12/21 to ICU 2/2 escalating O2 requirements  · Severe pathway:  · Decadron 0 1 mg/kg D10, total [id number] 12/30  · Continue solumedrol 50 mg daily D 2/5  · Baricitinib D13 - maintain lower dose 2/2 CKD3b with decreased GFR  · Remdesivir completed 12/20  · DVT ppx with SQ heparin 5000 q8  D-dimer1 21-0 93-0 63  · Statin-continue given takes at home  · Encourage side-lying/prone positioning  · Encourage I/S, aggressive pulmonary hygiene  · Titrate oxygen to maintain SpO2 > 88% - currently requiring HFNC 70%/50L   · Trend CRP as needed    Acute respiratory failure with hypoxia ([address] )  Assessment & Plan  · In the setting of COVID 19/viral PNA/ recurrent R ptx s/p R chest tube   · Continue to titrate HFNC for SpO2 >88%  · Continue COVID-19 protocol as noted above  · F/u am CXR  Continue chest tube to wall suction  · Titrate O2 to maintain SpO2 > 88%   · Aggressive pulmonary hygiene    Bacteremia due to methicillin susceptible Staphylococcus aureus (MSSA)  Assessment & Plan  · BC + MSSA 12/22  Repeat BC on 12/26 negative 4 days  · Unclear source  · Continue 2g q8 Cefazolin D5/7 for empiric coverage  · TTE:  · LV - EF 50% with increased wall thickness and G1DD   · Mitral Valve: mild regurgitation  · Tricuspid Valve: mild regurgitation  · Follow up on repeat BC, currently NGTD    Pneumothorax  Assessment & Plan  · Became hypoxic 12/25 AM, CXR obtained demonstrating right pneumothorax  · 16 Fr   CT placed   · Follow up CXR demonstrated improvement in pneumothorax  · Right sided chest tube removed 12/28  · R chest tube replaced on 12/30 for recurrent R ptx  · Continue R CT to wall suction  · STAT CXR at any respiratory decompensation    SIRS (systemic inflammatory response syndrome) (HCC)  Assessment & Plan  · POA: tachycardia, tachypnea, hypoxia  · Suspect this is in setting of worsening COVID-19 pneumonia vs superimposed bacterial infection  · Blood cultures 1/2 positive, repeat blood cultures 2/2 positive for Staph, repeats from 12/26 NGTD  · Procal negative thus far  · Continue Ancef D5  · Trend fever and WBC curve  · Trend procalcitonin    Type 2 diabetes mellitus without complication, without long-term current use of insulin [facility])  Assessment & Plan  Lab Results   Component Value Date    HGBA1C 6 2 (H) 11/22/2021       Recent Labs     12/30/21  2104 12/31/21  0749 12/31/21  1048 12/31/21  1547   POCGLU 193* 136 164* 314*       Blood Sugar Average: Last 72 hrs:  (P) 791 [id number]     · Home regimen: metformin, Januvia and glipizide at home  · Likely exacerbated by steroids  Was on insulin gtt - now on Lantus 25 units BID, mealtime insulin 4 units TID, SSI Algorithm 4  · ACHS fingersticks  · Diabetic diet     Anemia  Assessment & Plan  · Normocytic/Normochromic   Hemoglobin 10 8 on admission  · Baseline appears to be around 11 5 - 13  · Concurrently with no signs of active bleeding  · Continue home iron supplementation  · Transfuse for hemoglobin < 7 or with hemodynamic compromise  · Trend hemoglobin and monitor for signs of active bleeding    Chronic kidney disease, stage 3b [facility])  Assessment & Plan  Lab Results   Component Value Date    EGFR 36 12/31/2021    EGFR 40 12/29/2021    EGFR 37 12/27/2021    CREATININE 1 38 (H) 12/31/2021    CREATININE 1 25 12/29/2021    CREATININE 1 33 (H) 12/27/2021     · Creatinine 1 56 on admission, now improved   · Baseline creatinine appears to be around 1 2 - 1 6   · Avoid nephrotoxic agents and hypotension  · Consider holding lisinopril  · Trend renal indices  · Strict I&O  · Daily weights    Thrombocytosis  Assessment & Plan  · Suspect reactive  Trend    Ambulatory dysfunction  Assessment & Plan  · PT/OT - recommending acute rehab on discharge  · Encourage mobilization    Hypercholesterolemia  Assessment & Plan  · Continue statin    Essential hypertension  Assessment & Plan  · Takes enalapril at home - consider holding lisinopril  · Monitor BP    Anxiety  Assessment & Plan  · Continue home PRN Xanax  · Zoloft appears to be on patient's home medication list, however patient has not been taking      ----------------------------------------------------------------------------------------  HPI/24hr events:   No significant overnight events    O2 requirements: HFNC 65/35    Lines/Drains:   R Chest tube to suction  day #3  Peripherals    Infusions:   none    Patient appropriate for transfer out of the ICU today?: No  Disposition: Continue Stepdown Level 1 level of care   Code Status: Level 1 - Full Code  ---------------------------------------------------------------------------------------  SUBJECTIVE  n/a    Review of Systems   Respiratory: Positive for shortness of breath  Cardiovascular: Negative for chest pain  All other systems reviewed and are negative      Review of systems was reviewed and negative unless stated above in HPI/24-hour events   ---------------------------------------------------------------------------------------  OBJECTIVE    Vitals   Vitals:    21/01/22 0000   BP: 122/63 110/58 132/67    BP Location: Right arm      Pulse: 82 77 75    Resp: 21 15 (!) 25    Temp: (!) 97 2 °F (36 2 °C)   97 8 °F (36 6 °C)   TempSrc: Axillary   Oral   SpO2: 94% 96% 98%    Weight:       Height:         Temp (24hrs), Av 7 °F (36 5 °C), Min:97 2 °F (36 2 °C), Max:98 2 °F (36 8 °C)  Current: Temperature: 97 8 °F (36 6 °C)          Respiratory:  SpO2: SpO2: 98 %  Nasal Cannula O2 Flow Rate (L/min): 40 L/min    Invasive/non-invasive ventilation settings   Respiratory  Report   Lab Data (Last 4 hours)    None         O2/Vent Data (Last 4 hours)      01/01 0020          Non-Invasive Ventilation Mode HFNC (High flow)                   Physical Exam  Vitals reviewed  HENT:      Head: Normocephalic  Nose: Nose normal       Mouth/Throat:      Mouth: Mucous membranes are moist    Eyes:      Pupils: Pupils are equal, round, and reactive to light  Cardiovascular:      Rate and Rhythm: Normal rate and regular rhythm  Pulses: Normal pulses  Pulmonary:      Effort: Pulmonary effort is normal       Breath sounds: Decreased air movement present  Comments: R chest tube to suction  No leak  Abdominal:      General: Bowel sounds are normal  There is no distension  Palpations: Abdomen is soft  Tenderness: There is no abdominal tenderness  Skin:     General: Skin is warm and dry  Capillary Refill: Capillary refill takes less than 2 seconds  Neurological:      General: No focal deficit present  Mental Status: She is alert  GCS: GCS eye subscore is 4  GCS verbal subscore is 5  GCS motor subscore is 6  Motor: Motor function is intact     Psychiatric:         Attention and Perception: Attention normal              Laboratory and Diagnostics:  Results from last 7 days   Lab Units 12/31/21  0507 12/29/21  0513 12/27/21  0525 12/26/21  1117 12/26/21  0555 12/25/21  0455   WBC Thousand/uL 14 58* 13 80* 14 11* 15 15* 14 93* 15 31*   HEMOGLOBIN g/dL 11 8 10 4* 9 9* 10 9* 10 4* 12 5   HEMATOCRIT % 37 5 32 2* 30 4* 34 2* 32 2* 38 4   PLATELETS Thousands/uL 523* 518* 433* 444* 432* 497*   NEUTROS PCT %  --   --   --   --  87*  --    MONOS PCT %  --   --   --   --  3*  --      Results from last 7 days   Lab Units 12/31/21  0507 12/29/21  0513 12/27/21  0525 12/26/21  0555 12/25/21  0455   SODIUM mmol/L 137 135 134* 135 138   POTASSIUM mmol/L 5 0 4 9 4 9 4 8 4 2   CHLORIDE mmol/L 105 103 103 104 104   CO2 mmol/L 25 24 24 25 25   ANION GAP mmol/L 7 8 7 6 9   BUN mg/dL 57* 53* 54* 54* 68*   CREATININE mg/dL 1 38* 1 25 1 33* 1 31* 1 54*   CALCIUM mg/dL 9 2 9 4 9 1 8 9 9 8   GLUCOSE RANDOM mg/dL 92 252* 349* 304* 82   ALT U/L  --   --  18 18 21   AST U/L  --   --  12* 16 21   ALK PHOS U/L  --   --  66 64 74   ALBUMIN g/dL  --   --  3 2* 3 3* 3 9   TOTAL BILIRUBIN mg/dL  --   --  0 45 0 72 0 60     Results from last 7 days   Lab Units 12/27/21  0525 12/26/21  0555 12/25/21  0455   MAGNESIUM mg/dL 2 4 2 5 2 5   PHOSPHORUS mg/dL 3 4 4 3* 3 9                   ABG:    VBG:          Micro  Results from last 7 days   Lab Units 12/29/21  1357 12/29/21  1356 12/26/21  1156   BLOOD CULTURE  No Growth at 48 hrs  No Growth at 48 hrs  No Growth After 5 Days  No Growth After 5 Days  EKG: NSR  Imaging: I have personally reviewed pertinent reports  CXR 12/31: Trace right pneumothorax with persistent subcutaneous emphysema  Redemonstration of ground glass opacity due to Covid 19  Intake and Output  I/O       12/30 0701  12/31 0700 12/31 0701  01/01 0700    P  O  170     I V  (mL/kg) 60 (1)     IV Piggyback 150 50    Total Intake(mL/kg) 380 (6 4) 50 (0 8)    Urine (mL/kg/hr) 700 (0 5) 350 (0 2)    Chest Tube 10 3    Total Output 710 353    Net -330 -303          Unmeasured Urine Occurrence  2 x          Height and Weights   Height: 5' (152 4 cm)     Body mass index is 25 49 kg/m²    Weight (last 2 days)     Date/Time Weight    12/31/21 0516 59 2 (130 51)    12/30/21 0539 58 1 (128 09)            Nutrition       Diet Orders   (From admission, onward)             Start     Ordered    12/27/21 0958  Dietary nutrition supplements  Once        Question Answer Comment   Select Supplement: Glucerna-Vanilla    Frequency Breakfast, Lunch, Dinner        12/27/21 0958    12/21/21 3481  Diet Rodríguez/CHO Controlled; Consistent Carbohydrate Diet Level 2 (5 carb servings/75 grams CHO/meal)  Diet effective now        References:    Nutrtion Support Algorithm Enteral vs  Parenteral   Question Answer Comment   Diet Type Rodríguez/CHO Controlled    Rodríguez/CHO Controlled Consistent Carbohydrate Diet Level 2 (5 carb servings/75 grams CHO/meal)    RD to adjust diet per protocol?  Yes        12/21/21 1749                Active Medications  Scheduled Meds:  Current Facility-Administered Medications   Medication Dose Route Frequency Provider Last Rate    acetaminophen  650 mg Oral Q4H PRN BENTON Brown      ALPRAZolam  0 25 mg Oral HS PRN BENTON Johnson      atorvastatin  40 mg Oral HS BENTON Brown      Baricitinib  2 mg Oral Q24H BENTON Brown      bisacodyl  10 mg Rectal Daily PRN BENTON Kasper      cefazolin  2,000 mg Intravenous Q8H Sarah A Niki, ROSA ISELANP 2,000 mg (12/31/21 1812)    docusate sodium  100 mg Oral Daily Megan Lynn PA-C      ferrous sulfate  325 mg Oral Daily With Breakfast BENTON Brown      heparin (porcine)  5,000 Units Subcutaneous Q8H [facility] BENTON Brown      insulin glargine  25 Units Subcutaneous Q12H [facility] Sarah A Sedora, BENTON      insulin lispro  2-12 Units Subcutaneous HS Sarah A Sedora, BENTON      insulin lispro  2-12 Units Subcutaneous TID AC Sarah A Sedora, BENTON      lidocaine  1 patch Topical Daily BENTON Davidson      lidocaine (PF)  10 mL Infiltration Once Sarah A Sedora, BENTON      lisinopril  20 mg Oral Daily Syliva Christopher, BENTON      methylPREDNISolone sodium succinate  50 mg Intravenous Daily Sarah A Sedora, BENTON      ondansetron  4 mg Intravenous Q6H PRN BENTON Johnson      oxyCODONE  2 5 mg Oral Q6H PRN Sarah A Sedora, BENTON      polyethylene glycol  17 g Oral Daily Sarah A Sedora, BENTON      senna  1 tablet Oral HS Sarah A Sedora, BENTON       Continuous Infusions:     PRN Meds:   acetaminophen, 650 mg, Q4H PRN  ALPRAZolam, 0 25 mg, HS PRN  bisacodyl, 10 mg, Daily PRN  ondansetron, 4 mg, Q6H PRN  oxyCODONE, 2 5 mg, Q6H PRN        Invasive Devices Review  Invasive Devices  Report    Peripheral Intravenous Line            Peripheral IV 12/28/21 Left Forearm 3 days          Drain            External Urinary Catheter 9 days    Chest Tube Right Midaxillary 2 days                Rationale for remaining devices: persistent pneumothorax  ---------------------------------------------------------------------------------------  Advance Directive and Living Will:      Power of :    POLST:    ---------------------------------------------------------------------------------------  Care Time Delivered:   see attending attestation      Ronna Frazier PA-C      Portions of the record may have been created with voice recognition software  Occasional wrong word or "sound a like" substitutions may have occurred due to the inherent limitations of voice recognition software    Read the chart carefully and recognize, using context, where substitutions have occurred

## 2022-01-01 NOTE — ASSESSMENT & PLAN NOTE
· In the setting of COVID 19/viral PNA/ recurrent R ptx s/p R chest tube   · Continue to titrate HFNC for SpO2 >88%  · Continue COVID-19 protocol as noted above  · F/u am CXR   Continue chest tube to wall suction  · Titrate O2 to maintain SpO2 > 88%   · Aggressive pulmonary hygiene

## 2022-01-01 NOTE — ASSESSMENT & PLAN NOTE
· Became hypoxic 12/25 AM, CXR obtained demonstrating right pneumothorax  · 16 Fr   CT placed   · Follow up CXR demonstrated improvement in pneumothorax  · Right sided chest tube removed 12/28  · R chest tube replaced on 12/30 for recurrent R ptx  · Continue R CT to wall suction  · STAT CXR at any respiratory decompensation

## 2022-01-01 NOTE — ASSESSMENT & PLAN NOTE
Lab Results   Component Value Date    HGBA1C 6 2 (H) 11/22/2021       Recent Labs     12/30/21  2104 12/31/21  0749 12/31/21  1048 12/31/21  1547   POCGLU 193* 136 164* 314*       Blood Sugar Average: Last 72 hrs:  (P) 435 [id number]     · Home regimen: metformin, Januvia and glipizide at home  · Likely exacerbated by steroids   Was on insulin gtt - now on Lantus 25 units BID, mealtime insulin 4 units TID, SSI Algorithm 4  · ACHS fingersticks  · Diabetic diet

## 2022-01-01 NOTE — ASSESSMENT & PLAN NOTE
· BC + MSSA 12/22  Repeat BC on 12/26 negative 4 days  · Unclear source  · Continue 2g q8 Cefazolin D5/7 for empiric coverage     · TTE:  · LV - EF 50% with increased wall thickness and G1DD   · Mitral Valve: mild regurgitation  · Tricuspid Valve: mild regurgitation  · Follow up on repeat BC, currently NGTD

## 2022-01-01 NOTE — ASSESSMENT & PLAN NOTE
· Normocytic/Normochromic   Hemoglobin 10 8 on admission  · Baseline appears to be around 11 5 - 13  · Concurrently with no signs of active bleeding  · Continue home iron supplementation  · Transfuse for hemoglobin < 7 or with hemodynamic compromise  · Trend hemoglobin and monitor for signs of active bleeding

## 2022-01-01 NOTE — ASSESSMENT & PLAN NOTE
· Presented on 12/16 with fever, chills, shortness of breath, cough  · COVID-19 positive on 12/16   Admitted to [facility] on 2L NC  · Unvaccinated  · Transferred on 12/21 to ICU 2/2 escalating O2 requirements  · Severe pathway:  · Decadron 0 1 mg/kg D10, total [id number] 12/30  · Continue solumedrol 50 mg daily D 2/5  · Baricitinib D13 - maintain lower dose 2/2 CKD3b with decreased GFR  · Remdesivir completed 12/20  · DVT ppx with SQ heparin 5000 q8  D-dimer1 21-0 93-0 63  · Statin-continue given takes at home  · Encourage side-lying/prone positioning  · Encourage I/S, aggressive pulmonary hygiene  · Titrate oxygen to maintain SpO2 > 88% - currently requiring HFNC 70%/50L   · Trend CRP as needed

## 2022-01-01 NOTE — QUICK NOTE
Called patient's daughter Lisa Polo and updated over the phone  All questions and concerns addressed

## 2022-01-02 ENCOUNTER — APPOINTMENT (INPATIENT)
Dept: RADIOLOGY | Facility: HOSPITAL | Age: 82
DRG: 871 | End: 2022-01-02
Payer: COMMERCIAL

## 2022-01-02 LAB
ANION GAP SERPL CALCULATED.3IONS-SCNC: 7 MMOL/L (ref 4–13)
BUN SERPL-MCNC: 70 MG/DL (ref 5–25)
CALCIUM SERPL-MCNC: 9 MG/DL (ref 8.4–10.2)
CHLORIDE SERPL-SCNC: 107 MMOL/L (ref 96–108)
CO2 SERPL-SCNC: 24 MMOL/L (ref 21–32)
CREAT SERPL-MCNC: 1.38 MG/DL (ref 0.6–1.3)
ERYTHROCYTE [DISTWIDTH] IN BLOOD BY AUTOMATED COUNT: 13.5 % (ref 11.6–15.1)
GFR SERPL CREATININE-BSD FRML MDRD: 36 ML/MIN/1.73SQ M
GLUCOSE SERPL-MCNC: 123 MG/DL (ref 65–140)
GLUCOSE SERPL-MCNC: 222 MG/DL (ref 65–140)
GLUCOSE SERPL-MCNC: 227 MG/DL (ref 65–140)
GLUCOSE SERPL-MCNC: 274 MG/DL (ref 65–140)
GLUCOSE SERPL-MCNC: 285 MG/DL (ref 65–140)
GLUCOSE SERPL-MCNC: 78 MG/DL (ref 65–140)
GLUCOSE SERPL-MCNC: 92 MG/DL (ref 65–140)
HCT VFR BLD AUTO: 32.3 % (ref 34.8–46.1)
HGB BLD-MCNC: 10.3 G/DL (ref 11.5–15.4)
MCH RBC QN AUTO: 27.1 PG (ref 26.8–34.3)
MCHC RBC AUTO-ENTMCNC: 31.9 G/DL (ref 31.4–37.4)
MCV RBC AUTO: 85 FL (ref 82–98)
PLATELET # BLD AUTO: 429 THOUSANDS/UL (ref 149–390)
PMV BLD AUTO: 9.4 FL (ref 8.9–12.7)
POTASSIUM SERPL-SCNC: 4.7 MMOL/L (ref 3.5–5.3)
RBC # BLD AUTO: 3.8 MILLION/UL (ref 3.81–5.12)
SODIUM SERPL-SCNC: 138 MMOL/L (ref 135–147)
WBC # BLD AUTO: 11.7 THOUSAND/UL (ref 4.31–10.16)

## 2022-01-02 PROCEDURE — 82948 REAGENT STRIP/BLOOD GLUCOSE: CPT

## 2022-01-02 PROCEDURE — 85027 COMPLETE CBC AUTOMATED: CPT | Performed by: NURSE PRACTITIONER

## 2022-01-02 PROCEDURE — 99233 SBSQ HOSP IP/OBS HIGH 50: CPT | Performed by: ANESTHESIOLOGY

## 2022-01-02 PROCEDURE — 94760 N-INVAS EAR/PLS OXIMETRY 1: CPT

## 2022-01-02 PROCEDURE — 71045 X-RAY EXAM CHEST 1 VIEW: CPT

## 2022-01-02 PROCEDURE — 80048 BASIC METABOLIC PNL TOTAL CA: CPT | Performed by: NURSE PRACTITIONER

## 2022-01-02 RX ORDER — METHYLPREDNISOLONE SODIUM SUCCINATE 40 MG/ML
40 INJECTION, POWDER, LYOPHILIZED, FOR SOLUTION INTRAMUSCULAR; INTRAVENOUS DAILY
Status: DISCONTINUED | OUTPATIENT
Start: 2022-01-03 | End: 2022-01-03

## 2022-01-02 RX ORDER — INSULIN GLARGINE 100 [IU]/ML
10 INJECTION, SOLUTION SUBCUTANEOUS EVERY 12 HOURS SCHEDULED
Status: DISCONTINUED | OUTPATIENT
Start: 2022-01-02 | End: 2022-01-05

## 2022-01-02 RX ORDER — ACETAMINOPHEN 325 MG/1
975 TABLET ORAL EVERY 6 HOURS SCHEDULED
Status: DISCONTINUED | OUTPATIENT
Start: 2022-01-02 | End: 2022-01-12 | Stop reason: HOSPADM

## 2022-01-02 RX ADMIN — CEFAZOLIN SODIUM 2000 MG: 2 SOLUTION INTRAVENOUS at 01:53

## 2022-01-02 RX ADMIN — LIDOCAINE 1 PATCH: 50 PATCH TOPICAL at 08:11

## 2022-01-02 RX ADMIN — INSULIN LISPRO 8 UNITS: 100 INJECTION, SOLUTION INTRAVENOUS; SUBCUTANEOUS at 22:27

## 2022-01-02 RX ADMIN — METHYLPREDNISOLONE SODIUM SUCCINATE 50 MG: 125 INJECTION, POWDER, FOR SOLUTION INTRAMUSCULAR; INTRAVENOUS at 08:11

## 2022-01-02 RX ADMIN — DOCUSATE SODIUM 100 MG: 100 CAPSULE, LIQUID FILLED ORAL at 08:11

## 2022-01-02 RX ADMIN — INSULIN LISPRO 5 UNITS: 100 INJECTION, SOLUTION INTRAVENOUS; SUBCUTANEOUS at 16:25

## 2022-01-02 RX ADMIN — ACETAMINOPHEN 975 MG: 325 TABLET ORAL at 11:23

## 2022-01-02 RX ADMIN — BARICITINIB 2 MG: 2 TABLET, FILM COATED ORAL at 13:13

## 2022-01-02 RX ADMIN — CEFAZOLIN SODIUM 2000 MG: 2 SOLUTION INTRAVENOUS at 10:19

## 2022-01-02 RX ADMIN — CEFAZOLIN SODIUM 2000 MG: 2 SOLUTION INTRAVENOUS at 18:00

## 2022-01-02 RX ADMIN — SENNOSIDES 8.6 MG: 8.6 TABLET, FILM COATED ORAL at 22:27

## 2022-01-02 RX ADMIN — ACETAMINOPHEN 975 MG: 325 TABLET ORAL at 17:59

## 2022-01-02 RX ADMIN — HEPARIN SODIUM 5000 UNITS: 5000 INJECTION INTRAVENOUS; SUBCUTANEOUS at 13:12

## 2022-01-02 RX ADMIN — INSULIN GLARGINE 10 UNITS: 100 INJECTION, SOLUTION SUBCUTANEOUS at 22:26

## 2022-01-02 RX ADMIN — ATORVASTATIN CALCIUM 40 MG: 40 TABLET, FILM COATED ORAL at 22:27

## 2022-01-02 RX ADMIN — INSULIN LISPRO 5 UNITS: 100 INJECTION, SOLUTION INTRAVENOUS; SUBCUTANEOUS at 11:24

## 2022-01-02 RX ADMIN — FERROUS SULFATE TAB 325 MG (65 MG ELEMENTAL FE) 325 MG: 325 (65 FE) TAB at 08:11

## 2022-01-02 RX ADMIN — HEPARIN SODIUM 5000 UNITS: 5000 INJECTION INTRAVENOUS; SUBCUTANEOUS at 22:27

## 2022-01-02 RX ADMIN — HEPARIN SODIUM 5000 UNITS: 5000 INJECTION INTRAVENOUS; SUBCUTANEOUS at 06:19

## 2022-01-02 NOTE — PLAN OF CARE

## 2022-01-02 NOTE — ASSESSMENT & PLAN NOTE
· Presented on 12/16 with fever, chills, shortness of breath, cough  · COVID-19 positive on 12/16   Admitted to [facility] on 2L NC  · Unvaccinated  · Transferred on 12/21 to ICU 2/2 escalating O2 requirements  · Severe pathway:  · Decadron 0 1 mg/kg D10, total [id number] 12/30  · Continue solumedrol 50 mg daily D 3/5  · Baricitinib D14 - maintain lower dose 2/2 CKD3b with decreased GFR  · Remdesivir completed 12/20  · DVT ppx with SQ heparin 5000 q8  D-dimer1 21-0 93-0 63  · Statin-continue given takes at home  · Encourage side-lying/prone positioning  · Encourage I/S, aggressive pulmonary hygiene  · Titrate oxygen to maintain SpO2 > 88% - currently requiring HFNC 25L 40%  · Trend CRP as needed

## 2022-01-02 NOTE — ASSESSMENT & PLAN NOTE
· POA: tachycardia, tachypnea, hypoxia  · Suspect this is in setting of worsening COVID-19 pneumonia vs superimposed bacterial infection  · Blood cultures 1/2 positive, repeat blood cultures 2/2 positive for Staph, repeats from 12/26 NGTD  · Procal negative thus far  · Continue Ancef D6  · Trend fever and WBC curve  · Trend procalcitonin

## 2022-01-02 NOTE — ASSESSMENT & PLAN NOTE
· In the setting of COVID 19/viral PNA/ recurrent R ptx s/p R chest tube   · Continue to titrate HFNC for SpO2 >88%  · Continue COVID-19 protocol as noted above  · F/u am CXR   Continue chest tube to water seal  · Titrate O2 to maintain SpO2 > 88%   · Aggressive pulmonary hygiene

## 2022-01-02 NOTE — ASSESSMENT & PLAN NOTE
· BC positive MSSA 12/22; Repeat BC on 12/26 negative 4 days  · Unclear source  · Continue 2g q8 Cefazolin D7/7 for empiric coverage  · TTE:  · LV - EF 50% with increased wall thickness and G1DD   · Mitral Valve: mild regurgitation  · Tricuspid Valve: mild regurgitation  · Follow up on repeat BC, currently NGTD

## 2022-01-02 NOTE — ASSESSMENT & PLAN NOTE
Lab Results   Component Value Date    HGBA1C 6 2 (H) 11/22/2021       Recent Labs     01/01/22  0752 01/01/22  1118 01/01/22  1600 01/01/22 2042   POCGLU 101 269* 440* 291*       Blood Sugar Average: Last 72 hrs:  (P) 194 [id number]     · Home regimen: metformin, Januvia and glipizide at home  · Likely exacerbated by steroids   Was on insulin gtt - now on Lantus 25 units BID, mealtime insulin 4 units TID, SSI Algorithm 4  · ACHS fingersticks  · Diabetic diet

## 2022-01-02 NOTE — ASSESSMENT & PLAN NOTE
Lab Results   Component Value Date    EGFR 32 01/01/2022    EGFR 36 12/31/2021    EGFR 40 12/29/2021    CREATININE 1 50 (H) 01/01/2022    CREATININE 1 38 (H) 12/31/2021    CREATININE 1 25 12/29/2021     · Creatinine 1 56 on admission, now improved   · Baseline creatinine appears to be around 1 2 - 1 6   · Avoid nephrotoxic agents and hypotension  · Lisinopril on hold  · Trend renal indices  · Strict I&O  · Daily weights

## 2022-01-02 NOTE — PROGRESS NOTES
Merlin 46  Progress Note - Cecilio Simon 1940, [de-identified] y o  female MRN: [id number]  Unit/Bed#: ICU 04-01 Encounter: [id number]  Primary Care Provider: Daniela Nino DO   Date and time admitted to hospital: 12/16/2021  4:49 PM    * COVID-19  Assessment & Plan  · Presented on 12/16 with fever, chills, shortness of breath, cough  · COVID-19 positive on 12/16  Admitted to [address] [place] [address] on 2L NC  · Unvaccinated  · Transferred on 12/21 to ICU 2/2 escalating O2 requirements  · Severe pathway:  · Decadron 0 1 mg/kg D10, total [id number] 12/30  · Continue solumedrol 50 mg daily D 3/5  · Baricitinib D14 - maintain lower dose 2/2 CKD3b with decreased GFR  · Remdesivir completed 12/20  · DVT ppx with SQ heparin 5000 q8  D-dimer1 21-0 93-0 63  · Statin-continue given takes at home  · Encourage side-lying/prone positioning  · Encourage I/S, aggressive pulmonary hygiene  · Titrate oxygen to maintain SpO2 > 88% - currently requiring HFNC 25L 40%  · Trend CRP as needed    Acute respiratory failure with hypoxia (HCC)  Assessment & Plan  · In the setting of COVID 19/viral PNA/ recurrent R ptx s/p R chest tube   · Continue to titrate HFNC for SpO2 >88%  · Continue COVID-19 protocol as noted above  · F/u am CXR  Continue chest tube to wall suction  · Titrate O2 to maintain SpO2 > 88%   · Aggressive pulmonary hygiene    Bacteremia due to methicillin susceptible Staphylococcus aureus (MSSA)  Assessment & Plan  · BC + MSSA 12/22  Repeat BC on 12/26 negative 4 days  · Unclear source  · Continue 2g q8 Cefazolin D5/7 for empiric coverage  · TTE:  · LV - EF 50% with increased wall thickness and G1DD   · Mitral Valve: mild regurgitation  · Tricuspid Valve: mild regurgitation  · Follow up on repeat BC, currently NGTD    Pneumothorax  Assessment & Plan  · Became hypoxic 12/25 AM, CXR obtained demonstrating right pneumothorax  · 16 Fr   CT placed   · Follow up CXR demonstrated improvement in pneumothorax  · Right sided chest tube removed 12/28  · R chest tube replaced on 12/30 for recurrent R ptx  · Continue R CT to wall suction  · STAT CXR at any respiratory decompensation    SIRS (systemic inflammatory response syndrome) (HCC)  Assessment & Plan  · POA: tachycardia, tachypnea, hypoxia  · Suspect this is in setting of worsening COVID-19 pneumonia vs superimposed bacterial infection  · Blood cultures 1/2 positive, repeat blood cultures 2/2 positive for Staph, repeats from 12/26 NGTD  · Procal negative thus far  · Continue Ancef D6  · Trend fever and WBC curve  · Trend procalcitonin    Type 2 diabetes mellitus without complication, without long-term current use of insulin [facility])  Assessment & Plan  Lab Results   Component Value Date    HGBA1C 6 2 (H) 11/22/2021       Recent Labs     01/01/22  0752 01/01/22  1118 01/01/22  1600 01/01/22 2042   POCGLU 101 269* 440* 291*       Blood Sugar Average: Last 72 hrs:  (P) 194 [id number]     · Home regimen: metformin, Januvia and glipizide at home  · Likely exacerbated by steroids  Was on insulin gtt - now on Lantus 25 units BID, mealtime insulin 4 units TID, SSI Algorithm 4  · ACHS fingersticks  · Diabetic diet     Anemia  Assessment & Plan  · Normocytic/Normochromic   Hemoglobin 10 8 on admission  · Baseline appears to be around 11 5 - 13  · Concurrently with no signs of active bleeding  · Continue home iron supplementation  · Transfuse for hemoglobin < 7 or with hemodynamic compromise  · Trend hemoglobin and monitor for signs of active bleeding    Chronic kidney disease, stage 3b [facility])  Assessment & Plan  Lab Results   Component Value Date    EGFR 32 01/01/2022    EGFR 36 12/31/2021    EGFR 40 12/29/2021    CREATININE 1 50 (H) 01/01/2022    CREATININE 1 38 (H) 12/31/2021    CREATININE 1 25 12/29/2021     · Creatinine 1 56 on admission, now improved   · Baseline creatinine appears to be around 1 2 - 1 6   · Avoid nephrotoxic agents and hypotension  · Lisinopril on hold  · Trend renal indices  · Strict I&O  · Daily weights    Thrombocytosis  Assessment & Plan  · Suspect reactive  Trend    Ambulatory dysfunction  Assessment & Plan  · PT/OT - recommending acute rehab on discharge  · Encourage mobilization    Hypercholesterolemia  Assessment & Plan  · Continue statin    Essential hypertension  Assessment & Plan  · Takes enalapril at home - consider holding lisinopril  · Monitor BP    Anxiety  Assessment & Plan  · Continue home PRN Xanax  · Zoloft appears to be on patient's home medication list, however patient has not been taking        ----------------------------------------------------------------------------------------  HPI/24hr events: NO acute events overnight   HFNC weaned to 25L 40%/     Patient appropriate for transfer out of the ICU today?: No  Disposition: Continue Stepdown Level 1 level of care   Code Status: Level 1 - Full Code  ---------------------------------------------------------------------------------------  SUBJECTIVE  " I feel good"    Review of Systems  Review of systems was reviewed and negative unless stated above in HPI/24-hour events   ---------------------------------------------------------------------------------------  OBJECTIVE    Vitals   Vitals:    22 2100 22 2200 22 2300 22 0000   BP: 119/69 137/66 139/62    BP Location:       Pulse: 78 77 88    Resp: (!) 29 (!) 25 20    Temp:    98 °F (36 7 °C)   TempSrc:    Oral   SpO2: 91% 91% 92%    Weight:       Height:         Temp (24hrs), Av 5 °F (36 4 °C), Min:97 °F (36 1 °C), Max:98 °F (36 7 °C)  Current: Temperature: 98 °F (36 7 °C)          Respiratory:  SpO2: SpO2: 92 %  Nasal Cannula O2 Flow Rate (L/min): 40 L/min    Invasive/non-invasive ventilation settings   Respiratory  Report   Lab Data (Last 4 hours)    None         O2/Vent Data (Last 4 hours)       2334        Non-Invasive Ventilation Mode HFNC (High flow) HFNC (High flow)                  Physical Exam  Constitutional:       General: She is not in acute distress  Appearance: She is not toxic-appearing  HENT:      Head: Normocephalic  Mouth/Throat:      Mouth: Mucous membranes are moist    Eyes:      Extraocular Movements: Extraocular movements intact  Pupils: Pupils are equal, round, and reactive to light  Cardiovascular:      Rate and Rhythm: Normal rate and regular rhythm  Pulses: Normal pulses  Heart sounds: No murmur heard  No friction rub  No gallop  Pulmonary:      Effort: Pulmonary effort is normal       Breath sounds: No wheezing, rhonchi or rales  Comments: R CT in place to suction    Abdominal:      General: Bowel sounds are normal       Palpations: Abdomen is soft  Musculoskeletal:         General: Normal range of motion  Cervical back: Neck supple  Skin:     General: Skin is warm  Neurological:      General: No focal deficit present  Mental Status: She is alert and oriented to person, place, and time  Mental status is at baseline     Psychiatric:         Mood and Affect: Mood normal              Laboratory and Diagnostics:  Results from last 7 days   Lab Units 01/01/22  0622 12/31/21  0507 12/29/21  0513 12/27/21  0525 12/26/21  1117 12/26/21  0555   WBC Thousand/uL 14 06* 14 58* 13 80* 14 11* 15 15* 14 93*   HEMOGLOBIN g/dL 10 7* 11 8 10 4* 9 9* 10 9* 10 4*   HEMATOCRIT % 33 6* 37 5 32 2* 30 4* 34 2* 32 2*   PLATELETS Thousands/uL 449* 523* 518* 433* 444* 432*   NEUTROS PCT %  --   --   --   --   --  87*   MONOS PCT %  --   --   --   --   --  3*     Results from last 7 days   Lab Units 01/01/22  0622 12/31/21  0507 12/29/21  0513 12/27/21  0525 12/26/21  0555   SODIUM mmol/L 138 137 135 134* 135   POTASSIUM mmol/L 4 8 5 0 4 9 4 9 4 8   CHLORIDE mmol/L 105 105 103 103 104   CO2 mmol/L 26 25 24 24 25   ANION GAP mmol/L 7 7 8 7 6   BUN mg/dL 65* 57* 53* 54* 54*   CREATININE mg/dL 1 50* 1 38* 1 25 1 33* 1 31*   CALCIUM mg/dL 9 0 9 2 9 4 9 1 8 9 GLUCOSE RANDOM mg/dL 60* 92 252* 349* 304*   ALT U/L  --   --   --  18 18   AST U/L  --   --   --  12* 16   ALK PHOS U/L  --   --   --  66 64   ALBUMIN g/dL  --   --   --  3 2* 3 3*   TOTAL BILIRUBIN mg/dL  --   --   --  0 45 0 72     Results from last 7 days   Lab Units 12/27/21  0525 12/26/21  0555   MAGNESIUM mg/dL 2 4 2 5   PHOSPHORUS mg/dL 3 4 4 3*                   ABG:    VBG:          Micro  Results from last 7 days   Lab Units 12/29/21  1357 12/29/21  1356 12/26/21  1156   BLOOD CULTURE  No Growth at 72 hrs  No Growth at 72 hrs  No Growth After 5 Days  No Growth After 5 Days  EKG: Per tele NSR  Imaging: I have personally reviewed pertinent reports  and I have personally reviewed pertinent films in PACS    Intake and Output  I/O       12/31 0701  01/01 0700 01/01 0701  01/02 0700    P  O   420    IV Piggyback 50 50    Total Intake(mL/kg) 50 (0 9) 470 (8 7)    Urine (mL/kg/hr) 350 (0 3) 900 (0 7)    Chest Tube 10 10    Total Output 360 910    Net -310 -440          Unmeasured Urine Occurrence 2 x           Height and Weights   Height: 5' (152 4 cm)     Body mass index is 23 29 kg/m²  Weight (last 2 days)     Date/Time Weight    01/01/22 0600 54 1 (119 27)    12/31/21 0516 59 2 (130 51)            Nutrition       Diet Orders   (From admission, onward)             Start     Ordered    12/27/21 0958  Dietary nutrition supplements  Once        Question Answer Comment   Select Supplement: Glucerna-Vanilla    Frequency Breakfast, Lunch, Dinner        12/27/21 0958    12/21/21 1750  Diet Rodríguez/CHO Controlled; Consistent Carbohydrate Diet Level 2 (5 carb servings/75 grams CHO/meal)  Diet effective now        References:    Nutrtion Support Algorithm Enteral vs  Parenteral   Question Answer Comment   Diet Type Rodríguez/CHO Controlled    Rodríguez/CHO Controlled Consistent Carbohydrate Diet Level 2 (5 carb servings/75 grams CHO/meal)    RD to adjust diet per protocol?  Yes        12/21/21 8620                  Active Medications  Scheduled Meds:  Current Facility-Administered Medications   Medication Dose Route Frequency Provider Last Rate    acetaminophen  650 mg Oral Q4H PRN BENTON Brown      ALPRAZolam  0 25 mg Oral HS PRN [facility], BENTON      atorvastatin  40 mg Oral HS Martha Conway, [place]      Baricitinib  2 mg Oral Q24H Martha Conway, [place]      bisacodyl  10 mg Rectal Daily PRN Samantha Elizabeth, [place]      cefazolin  2,000 mg Intravenous Q8H Sarah Prince, ROSA ISELANP 2,000 mg (01/01/22 1824)    docusate sodium  100 mg Oral Daily Megan Lynn PA-C      ferrous sulfate  325 mg Oral Daily With Breakfast BENTON Brown      heparin (porcine)  5,000 Units Subcutaneous Q8H [facility] BENTON Brown      insulin glargine  15 Units Subcutaneous Q12H [facility] Mercedes Maldonado, [place]      insulin lispro  2-12 Units Subcutaneous HS Sarah A Fabioora, [place]      insulin lispro  2-12 Units Subcutaneous TID AC Sarah A Fabioora, CRNP      insulin lispro  5 Units Subcutaneous TID With Meals BENTON Worthy      lidocaine  1 patch Topical Daily Sushma Flores, [place]      methylPREDNISolone sodium succinate  50 mg Intravenous Daily Sarah A Fabioora, [place]      ondansetron  4 mg Intravenous Q6H PRN [employer], [place]      oxyCODONE  2 5 mg Oral Q6H PRN Sarah A Sedora, CREUSEBIO      polyethylene glycol  17 g Oral Daily Sarah A Sedora, [place]      senna  1 tablet Oral HS Sarah A Niki, CRNP       Continuous Infusions:     PRN Meds:   acetaminophen, 650 mg, Q4H PRN  ALPRAZolam, 0 25 mg, HS PRN  bisacodyl, 10 mg, Daily PRN  ondansetron, 4 mg, Q6H PRN  oxyCODONE, 2 5 mg, Q6H PRN        Invasive Devices Review  Invasive Devices  Report    Peripheral Intravenous Line            Peripheral IV 12/28/21 Left Forearm 4 days    Peripheral IV 01/01/22 Left;Ventral (anterior) Forearm <1 day          Drain            External Urinary Catheter 10 days    Chest Tube Right Midaxillary 3 days Rationale for remaining devices: Treatment therapy   ---------------------------------------------------------------------------------------  Advance Directive and Living Will:      Power of :    POLST:    ---------------------------------------------------------------------------------------  Care Time Delivered:   No Critical Care time spent       [facility]      Portions of the record may have been created with voice recognition software  Occasional wrong word or "sound a like" substitutions may have occurred due to the inherent limitations of voice recognition software    Read the chart carefully and recognize, using context, where substitutions have occurred

## 2022-01-02 NOTE — ASSESSMENT & PLAN NOTE
· Presented on 12/16 with fever, chills, shortness of breath, cough - admitted to [facility] on 2L NC  · COVID-19 positive on 12/16  · Unvaccinated  · Transferred on 12/21 to ICU 2/2 escalating O2 requirements  · Severe pathway:  · Decadron 0 1 mg/kg D10, total D14 - completed 12/30  · Continue Solu-Medrol 50 mg daily D4/5  · Baricitinib D14 - maintain lower dose 2/2 CKD3b with decreased GFR  · Remdesivir completed 12/20  · DVT ppx with SQ heparin 5000 q8 - D-Dimer (1 21 - 0 93 - 0 63)  · Encourage side-lying/prone positioning  · Encourage I/S, aggressive pulmonary hygiene  · Titrate oxygen to maintain SpO2 > 88% - currently requiring  6L NC  · Trend CRP as needed (57 - 174 - 37)

## 2022-01-03 ENCOUNTER — APPOINTMENT (INPATIENT)
Dept: RADIOLOGY | Facility: HOSPITAL | Age: 82
DRG: 871 | End: 2022-01-03
Payer: COMMERCIAL

## 2022-01-03 LAB
ALBUMIN SERPL BCP-MCNC: 3 G/DL (ref 3.5–5)
ALP SERPL-CCNC: 85 U/L (ref 34–104)
ALT SERPL W P-5'-P-CCNC: 3 U/L (ref 7–52)
ANION GAP SERPL CALCULATED.3IONS-SCNC: 6 MMOL/L (ref 4–13)
AST SERPL W P-5'-P-CCNC: 18 U/L (ref 13–39)
BACTERIA BLD CULT: NORMAL
BACTERIA BLD CULT: NORMAL
BILIRUB SERPL-MCNC: 0.34 MG/DL (ref 0.2–1)
BUN SERPL-MCNC: 66 MG/DL (ref 5–25)
CALCIUM ALBUM COR SERPL-MCNC: 9.6 MG/DL (ref 8.3–10.1)
CALCIUM SERPL-MCNC: 8.8 MG/DL (ref 8.4–10.2)
CHLORIDE SERPL-SCNC: 106 MMOL/L (ref 96–108)
CO2 SERPL-SCNC: 23 MMOL/L (ref 21–32)
CREAT SERPL-MCNC: 1.46 MG/DL (ref 0.6–1.3)
CRP SERPL QL: <3 MG/L
ERYTHROCYTE [DISTWIDTH] IN BLOOD BY AUTOMATED COUNT: 13.4 % (ref 11.6–15.1)
GFR SERPL CREATININE-BSD FRML MDRD: 33 ML/MIN/1.73SQ M
GLUCOSE SERPL-MCNC: 117 MG/DL (ref 65–140)
GLUCOSE SERPL-MCNC: 151 MG/DL (ref 65–140)
GLUCOSE SERPL-MCNC: 212 MG/DL (ref 65–140)
GLUCOSE SERPL-MCNC: 246 MG/DL (ref 65–140)
GLUCOSE SERPL-MCNC: 259 MG/DL (ref 65–140)
HCT VFR BLD AUTO: 31.1 % (ref 34.8–46.1)
HGB BLD-MCNC: 10 G/DL (ref 11.5–15.4)
MAGNESIUM SERPL-MCNC: 2.4 MG/DL (ref 1.9–2.7)
MCH RBC QN AUTO: 27.7 PG (ref 26.8–34.3)
MCHC RBC AUTO-ENTMCNC: 32.2 G/DL (ref 31.4–37.4)
MCV RBC AUTO: 86 FL (ref 82–98)
PHOSPHATE SERPL-MCNC: 3.8 MG/DL (ref 2.3–4.1)
PLATELET # BLD AUTO: 380 THOUSANDS/UL (ref 149–390)
PMV BLD AUTO: 9.4 FL (ref 8.9–12.7)
POTASSIUM SERPL-SCNC: 4.7 MMOL/L (ref 3.5–5.3)
PROT SERPL-MCNC: 5.6 G/DL (ref 6.4–8.4)
RBC # BLD AUTO: 3.61 MILLION/UL (ref 3.81–5.12)
SODIUM SERPL-SCNC: 135 MMOL/L (ref 135–147)
WBC # BLD AUTO: 10.03 THOUSAND/UL (ref 4.31–10.16)

## 2022-01-03 PROCEDURE — 99233 SBSQ HOSP IP/OBS HIGH 50: CPT | Performed by: INTERNAL MEDICINE

## 2022-01-03 PROCEDURE — 86140 C-REACTIVE PROTEIN: CPT | Performed by: NURSE PRACTITIONER

## 2022-01-03 PROCEDURE — 97116 GAIT TRAINING THERAPY: CPT

## 2022-01-03 PROCEDURE — 94760 N-INVAS EAR/PLS OXIMETRY 1: CPT

## 2022-01-03 PROCEDURE — 97535 SELF CARE MNGMENT TRAINING: CPT

## 2022-01-03 PROCEDURE — 71045 X-RAY EXAM CHEST 1 VIEW: CPT

## 2022-01-03 PROCEDURE — 82948 REAGENT STRIP/BLOOD GLUCOSE: CPT

## 2022-01-03 PROCEDURE — 97530 THERAPEUTIC ACTIVITIES: CPT

## 2022-01-03 PROCEDURE — 80053 COMPREHEN METABOLIC PANEL: CPT | Performed by: NURSE PRACTITIONER

## 2022-01-03 PROCEDURE — 84100 ASSAY OF PHOSPHORUS: CPT | Performed by: NURSE PRACTITIONER

## 2022-01-03 PROCEDURE — 83735 ASSAY OF MAGNESIUM: CPT | Performed by: NURSE PRACTITIONER

## 2022-01-03 PROCEDURE — 85027 COMPLETE CBC AUTOMATED: CPT | Performed by: NURSE PRACTITIONER

## 2022-01-03 PROCEDURE — NC001 PR NO CHARGE: Performed by: INTERNAL MEDICINE

## 2022-01-03 RX ORDER — METHYLPREDNISOLONE SODIUM SUCCINATE 40 MG/ML
40 INJECTION, POWDER, LYOPHILIZED, FOR SOLUTION INTRAMUSCULAR; INTRAVENOUS DAILY
Status: COMPLETED | OUTPATIENT
Start: 2022-01-04 | End: 2022-01-05

## 2022-01-03 RX ORDER — METHYLPREDNISOLONE SODIUM SUCCINATE 40 MG/ML
20 INJECTION, POWDER, LYOPHILIZED, FOR SOLUTION INTRAMUSCULAR; INTRAVENOUS DAILY
Status: COMPLETED | OUTPATIENT
Start: 2022-01-09 | End: 2022-01-11

## 2022-01-03 RX ORDER — METHYLPREDNISOLONE SODIUM SUCCINATE 40 MG/ML
30 INJECTION, POWDER, LYOPHILIZED, FOR SOLUTION INTRAMUSCULAR; INTRAVENOUS DAILY
Status: COMPLETED | OUTPATIENT
Start: 2022-01-06 | End: 2022-01-08

## 2022-01-03 RX ORDER — METHYLPREDNISOLONE SODIUM SUCCINATE 40 MG/ML
10 INJECTION, POWDER, LYOPHILIZED, FOR SOLUTION INTRAMUSCULAR; INTRAVENOUS DAILY
Status: DISCONTINUED | OUTPATIENT
Start: 2022-01-12 | End: 2022-01-12 | Stop reason: HOSPADM

## 2022-01-03 RX ADMIN — DOCUSATE SODIUM 100 MG: 100 CAPSULE, LIQUID FILLED ORAL at 08:07

## 2022-01-03 RX ADMIN — POLYETHYLENE GLYCOL 3350 17 G: 17 POWDER, FOR SOLUTION ORAL at 08:08

## 2022-01-03 RX ADMIN — INSULIN GLARGINE 10 UNITS: 100 INJECTION, SOLUTION SUBCUTANEOUS at 21:55

## 2022-01-03 RX ADMIN — INSULIN LISPRO 8 UNITS: 100 INJECTION, SOLUTION INTRAVENOUS; SUBCUTANEOUS at 17:33

## 2022-01-03 RX ADMIN — INSULIN LISPRO 5 UNITS: 100 INJECTION, SOLUTION INTRAVENOUS; SUBCUTANEOUS at 08:08

## 2022-01-03 RX ADMIN — ACETAMINOPHEN 975 MG: 325 TABLET ORAL at 00:45

## 2022-01-03 RX ADMIN — FERROUS SULFATE TAB 325 MG (65 MG ELEMENTAL FE) 325 MG: 325 (65 FE) TAB at 08:07

## 2022-01-03 RX ADMIN — INSULIN LISPRO 5 UNITS: 100 INJECTION, SOLUTION INTRAVENOUS; SUBCUTANEOUS at 11:55

## 2022-01-03 RX ADMIN — INSULIN LISPRO 8 UNITS: 100 INJECTION, SOLUTION INTRAVENOUS; SUBCUTANEOUS at 11:55

## 2022-01-03 RX ADMIN — HEPARIN SODIUM 5000 UNITS: 5000 INJECTION INTRAVENOUS; SUBCUTANEOUS at 21:54

## 2022-01-03 RX ADMIN — INSULIN LISPRO 8 UNITS: 100 INJECTION, SOLUTION INTRAVENOUS; SUBCUTANEOUS at 21:55

## 2022-01-03 RX ADMIN — HEPARIN SODIUM 5000 UNITS: 5000 INJECTION INTRAVENOUS; SUBCUTANEOUS at 14:04

## 2022-01-03 RX ADMIN — HEPARIN SODIUM 5000 UNITS: 5000 INJECTION INTRAVENOUS; SUBCUTANEOUS at 06:42

## 2022-01-03 RX ADMIN — ACETAMINOPHEN 975 MG: 325 TABLET ORAL at 17:34

## 2022-01-03 RX ADMIN — ATORVASTATIN CALCIUM 40 MG: 40 TABLET, FILM COATED ORAL at 21:53

## 2022-01-03 RX ADMIN — SENNOSIDES 8.6 MG: 8.6 TABLET, FILM COATED ORAL at 21:53

## 2022-01-03 RX ADMIN — LIDOCAINE 1 PATCH: 50 PATCH TOPICAL at 08:08

## 2022-01-03 RX ADMIN — ACETAMINOPHEN 975 MG: 325 TABLET ORAL at 11:52

## 2022-01-03 RX ADMIN — INSULIN GLARGINE 10 UNITS: 100 INJECTION, SOLUTION SUBCUTANEOUS at 08:09

## 2022-01-03 RX ADMIN — METHYLPREDNISOLONE SODIUM SUCCINATE 40 MG: 40 INJECTION, POWDER, FOR SOLUTION INTRAMUSCULAR; INTRAVENOUS at 08:09

## 2022-01-03 RX ADMIN — CEFAZOLIN SODIUM 2000 MG: 2 SOLUTION INTRAVENOUS at 04:36

## 2022-01-03 RX ADMIN — INSULIN LISPRO 5 UNITS: 100 INJECTION, SOLUTION INTRAVENOUS; SUBCUTANEOUS at 17:34

## 2022-01-03 RX ADMIN — ACETAMINOPHEN 975 MG: 325 TABLET ORAL at 06:42

## 2022-01-03 NOTE — ASSESSMENT & PLAN NOTE
Lab Results   Component Value Date    HGBA1C 6 2 (H) 11/22/2021       Recent Labs     01/02/22  1114 01/02/22  1615 01/02/22 2043 01/02/22  2224   POCGLU 222* 285* 274* 227*       Blood Sugar Average: Last 72 hrs:  (P) 214 6     · Home regimen: metformin, Januvia and glipizide at home  · Likely exacerbated by steroids   Was on insulin gtt - now on Lantus 10 units BID, mealtime insulin 5 units TID, SSI Algorithm 4  · ACHS fingersticks  · Diabetic diet

## 2022-01-03 NOTE — PLAN OF CARE
Problem: PAIN - ADULT  Goal: Verbalizes/displays adequate comfort level or baseline comfort level  Description: Interventions:  - Encourage patient to monitor pain and request assistance  - Assess pain using appropriate pain scale  - Administer analgesics based on type and severity of pain and evaluate response  - Implement non-pharmacological measures as appropriate and evaluate response  - Consider cultural and social influences on pain and pain management  - Notify physician/advanced practitioner if interventions unsuccessful or patient reports new pain  Outcome: Progressing     Problem: RESPIRATORY - ADULT  Goal: Achieves optimal ventilation and oxygenation  Description: INTERVENTIONS:  - Assess for changes in respiratory status  - Assess for changes in mentation and behavior  - Position to facilitate oxygenation and minimize respiratory effort  - Oxygen administered by appropriate delivery if ordered  - Initiate smoking cessation education as indicated  - Encourage broncho-pulmonary hygiene including cough, deep breathe, Incentive Spirometry  - Assess the need for suctioning and aspirate as needed  - Assess and instruct to report SOB or any respiratory difficulty  - Respiratory Therapy support as indicated  Outcome: Progressing

## 2022-01-03 NOTE — PLAN OF CARE
Complex physical assessment as noted, see chart for details  Pleasant and talkative  Denies discomfort  Chest tube to water seal right chest wall  Call bell in reach  Verbalizing needs

## 2022-01-03 NOTE — PLAN OF CARE
Problem: PHYSICAL THERAPY ADULT  Goal: Performs mobility at highest level of function for planned discharge setting  See evaluation for individualized goals  Description: Treatment/Interventions: Functional transfer training,Elevations,Therapeutic exercise,Endurance training,Bed mobility,Gait training  Equipment Recommended:  (unknown at this time)       See flowsheet documentation for full assessment, interventions and recommendations  Outcome: Progressing  Note: Prognosis: Good  Problem List: Decreased endurance,Impaired balance,Decreased mobility  Assessment: Pt seen for PT treatment session this date, consisting of ther act focused on functional mobility tasks including bed mobility & transfers and gt training on level surfaces to improve pt safety in household environment  Since previous session, pt has made good progress in terms of continuation of OOB mobility, pt on less O2 requirement  Pertinent barriers during this session include SOB  Current goals and POC remain appropriate, pt continues to have rehab potential and is making progress towards STGs  Pt prognosis for achieving goals is good, pending pt progress with hospitalization/medical status improvements, and indicated by ability to follow directions, attention span, self-expression (thoughts, feelings, needs), motivation to walk, talk, and achieve self-help skills and recent history of independence with functional skills/High PLOF  PT recommends post acute rehabilitation services vs HHPT, upon discharge  Pt continues to be functioning below baseline level, and remains limited 2* factors listed above  PT will continue to see pt during current hospitalization in order to address the deficits listed above and provide interventions consistent w/ POC in effort to achieve STGs    Barriers to Discharge: Decreased caregiver support        PT Discharge Recommendation: Post acute rehabilitation services (vs HHPT, pending progress)          See flowsheet documentation for full assessment

## 2022-01-03 NOTE — PROGRESS NOTES
Merlin 46  Progress Note - Andrew Lópezo 1940, [de-identified] y o  female MRN: [id number]  Unit/Bed#: ICU 04-01 Encounter: [id number]  Primary Care Provider: Rick Ferrara DO   Date and time admitted to hospital: 12/16/2021  4:49 PM    * COVID-19  Assessment & Plan  · Presented on 12/16 with fever, chills, shortness of breath, cough - admitted to [facility] on 2L NC  · COVID-19 positive on 12/16  · Unvaccinated  · Transferred on 12/21 to ICU 2/2 escalating O2 requirements  · Severe pathway:  · Decadron 0 1 mg/kg D10, total D14 - completed 12/30  · Continue Solu-Medrol 50 mg daily D4/5  · Baricitinib D14 - maintain lower dose 2/2 CKD3b with decreased GFR  · Remdesivir completed 12/20  · DVT ppx with SQ heparin 5000 q8 - D-Dimer (1 21 - 0 93 - 0 63)  · Encourage side-lying/prone positioning  · Encourage I/S, aggressive pulmonary hygiene  · Titrate oxygen to maintain SpO2 > 88% - currently requiring  6L NC  · Trend CRP as needed (57  116 - 37)    Acute respiratory failure with hypoxia (HCC)  Assessment & Plan  · In the setting of COVID 19/viral PNA/ recurrent R ptx s/p R chest tube   · Continue to titrate HFNC for SpO2 >88%  · Continue COVID-19 protocol as noted above  · F/u am CXR  Continue chest tube to water seal  · Titrate O2 to maintain SpO2 > 88%   · Aggressive pulmonary hygiene    Bacteremia due to methicillin susceptible Staphylococcus aureus (MSSA)  Assessment & Plan  · BC positive MSSA 12/22; Repeat BC on 12/26 negative 4 days  · Unclear source  · Continue 2g q8 Cefazolin D7/7 for empiric coverage  · TTE:  · LV - EF 50% with increased wall thickness and G1DD   · Mitral Valve: mild regurgitation  · Tricuspid Valve: mild regurgitation  · Follow up on repeat BC, currently NGTD    Pneumothorax  Assessment & Plan  · Became hypoxic 12/25 AM, CXR obtained demonstrating right pneumothorax  · 16 Fr   CT placed   · Follow up CXR demonstrated improvement in pneumothorax  · Right sided chest tube removed 12/28  · R chest tube replaced on 12/30 for recurrent R ptx  · R CT placed to water seal 1/2  · Follow AM CXR  · STAT CXR at any respiratory decompensation    SIRS (systemic inflammatory response syndrome) (HCC)  Assessment & Plan  · POA: tachycardia, tachypnea, hypoxia  · Suspect this is in setting of worsening COVID-19 pneumonia vs superimposed bacterial infection  · Blood cultures 1/2 positive, repeat blood cultures 2/2 positive for Staph, repeats from 12/26 NGTD  · Procal negative thus far  · Continue Ancef D7/7  · Trend fever and WBC curve  · Trend procalcitonin    Type 2 diabetes mellitus without complication, without long-term current use of insulin [facility])  Assessment & Plan  Lab Results   Component Value Date    HGBA1C 6 2 (H) 11/22/2021       Recent Labs     01/02/22  1114 01/02/22  1615 01/02/22  2043 01/02/22  2224   POCGLU 222* 285* 274* 227*       Blood Sugar Average: Last 72 hrs:  (P) 214 6     · Home regimen: metformin, Januvia and glipizide at home  · Likely exacerbated by steroids  Was on insulin gtt - now on Lantus 10 units BID, mealtime insulin 5 units TID, SSI Algorithm 4  · ACHS fingersticks  · Diabetic diet     Anemia  Assessment & Plan  · Normocytic/Normochromic   Hemoglobin 10 8 on admission  · Baseline appears to be around 11 5 - 13  · Concurrently with no signs of active bleeding  · Continue home iron supplementation  · Transfuse for hemoglobin < 7 or with hemodynamic compromise  · Trend hemoglobin and monitor for signs of active bleeding    Chronic kidney disease, stage 3b [facility])  Assessment & Plan  Lab Results   Component Value Date    EGFR 36 01/02/2022    EGFR 32 01/01/2022    EGFR 36 12/31/2021    CREATININE 1 38 (H) 01/02/2022    CREATININE 1 50 (H) 01/01/2022    CREATININE 1 38 (H) 12/31/2021     · Creatinine 1 56 on admission, now improved   · Baseline creatinine appears to be around 1 2 - 1 6   · Avoid nephrotoxic agents and hypotension  · Lisinopril on hold  · Trend renal indices  · Strict I&O  · Daily weights    Thrombocytosis  Assessment & Plan  · Suspect reactive  Trend    Ambulatory dysfunction  Assessment & Plan  · PT/OT - recommending acute rehab on discharge  · Encourage mobilization    Hypercholesterolemia  Assessment & Plan  · Continue statin    Essential hypertension  Assessment & Plan  · Takes enalapril at home - consider holding lisinopril  · Monitor BP    Anxiety  Assessment & Plan  · Continue home PRN Xanax  · Zoloft appears to be on patient's home medication list, however patient has not been taking        ----------------------------------------------------------------------------------------  HPI/24hr events:   · CT placed to water seal yesterday   · Weaned to 6L NC    Patient appropriate for transfer out of the ICU today?: No  Disposition: Transfer to Med-Surg   Code Status: Level 1 - Full Code  ---------------------------------------------------------------------------------------  SUBJECTIVE  No complaints this morning  Resting comfortably       Review of Systems  Review of systems was reviewed and negative unless stated above in HPI/24-hour events   ---------------------------------------------------------------------------------------  OBJECTIVE    Vitals   Vitals:    22 2100 22 2200 22 2300   BP:  126/72 117/62 139/69   BP Location:  Right arm     Pulse: 76 72 63 72   Resp: (!) 24 (!) 23 18 21   Temp: 98 2 °F (36 8 °C)      TempSrc: Tympanic      SpO2: 97% 97% 98% 98%   Weight:       Height:         Temp (24hrs), Av 9 °F (36 6 °C), Min:97 7 °F (36 5 °C), Max:98 2 °F (36 8 °C)  Current: Temperature: 98 2 °F (36 8 °C)          Respiratory:  SpO2: SpO2: 98 %  Nasal Cannula O2 Flow Rate (L/min): (S) 5 L/min    Invasive/non-invasive ventilation settings   Respiratory  Report   Lab Data (Last 4 hours)    None         O2/Vent Data (Last 4 hours)    None                Physical Exam  Constitutional:       General: She is not in acute distress  Appearance: She is not ill-appearing or diaphoretic  Comments: Appears younger than stated age     HENT:      Head: Normocephalic  Mouth/Throat:      Mouth: Mucous membranes are moist    Eyes:      Extraocular Movements: Extraocular movements intact  Pupils: Pupils are equal, round, and reactive to light  Cardiovascular:      Rate and Rhythm: Normal rate and regular rhythm  Pulses: Normal pulses  Heart sounds: No murmur heard  No friction rub  No gallop  Pulmonary:      Effort: Pulmonary effort is normal       Breath sounds: No wheezing, rhonchi or rales  Comments: R CT to water seal    Abdominal:      General: Bowel sounds are normal  There is no distension  Palpations: Abdomen is soft  Musculoskeletal:         General: No swelling  Normal range of motion  Cervical back: Neck supple  Skin:     General: Skin is warm  Neurological:      General: No focal deficit present  Mental Status: She is alert and oriented to person, place, and time  Mental status is at baseline     Psychiatric:         Mood and Affect: Mood normal          Behavior: Behavior normal              Laboratory and Diagnostics:  Results from last 7 days   Lab Units 01/02/22  0629 01/01/22  0622 12/31/21  0507 12/29/21  0513 12/27/21  0525   WBC Thousand/uL 11 70* 14 06* 14 58* 13 80* 14 11*   HEMOGLOBIN g/dL 10 3* 10 7* 11 8 10 4* 9 9*   HEMATOCRIT % 32 3* 33 6* 37 5 32 2* 30 4*   PLATELETS Thousands/uL 429* 449* 523* 518* 433*     Results from last 7 days   Lab Units 01/02/22  0629 01/01/22  0622 12/31/21  0507 12/29/21  0513 12/27/21  0525   SODIUM mmol/L 138 138 137 135 134*   POTASSIUM mmol/L 4 7 4 8 5 0 4 9 4 9   CHLORIDE mmol/L 107 105 105 103 103   CO2 mmol/L 24 26 25 24 24   ANION GAP mmol/L 7 7 7 8 7   BUN mg/dL 70* 65* 57* 53* 54*   CREATININE mg/dL 1 38* 1 50* 1 38* 1 25 1 33*   CALCIUM mg/dL 9 0 9 0 9 2 9 4 9 1   GLUCOSE RANDOM mg/dL 78 60* 92 252* 349*   ALT U/L  -- --   --   --  18   AST U/L  --   --   --   --  12*   ALK PHOS U/L  --   --   --   --  66   ALBUMIN g/dL  --   --   --   --  3 2*   TOTAL BILIRUBIN mg/dL  --   --   --   --  0 45     Results from last 7 days   Lab Units 12/27/21  0525   MAGNESIUM mg/dL 2 4   PHOSPHORUS mg/dL 3 4                   ABG:    VBG:          Micro  Results from last 7 days   Lab Units 12/29/21  1357 12/29/21  1356   BLOOD CULTURE  No Growth After 4 Days  No Growth After 4 Days  EKG: per tele NSR  Imaging: I have personally reviewed pertinent reports  and I have personally reviewed pertinent films in PACS    Intake and Output  I/O       01/01 0701 01/02 0700 01/02 0701 01/03 0700    P  O  420 237    IV Piggyback 100 100    Total Intake(mL/kg) 520 (9 6) 337 (6 2)    Urine (mL/kg/hr) 1350 (1) 600 (0 5)    Chest Tube 10 5    Total Output 1360 605    Net -840 -268          Unmeasured Urine Occurrence  1 x          Height and Weights   Height: 5' (152 4 cm)     Body mass index is 23 34 kg/m²  Weight (last 2 days)     Date/Time Weight    01/02/22 0600 54 2 (119 49)    01/01/22 0600 54 1 (119 27)            Nutrition       Diet Orders   (From admission, onward)             Start     Ordered    12/27/21 0958  Dietary nutrition supplements  Once        Question Answer Comment   Select Supplement: Glucerna-Vanilla    Frequency Breakfast, Lunch, Dinner        12/27/21 0958    12/21/21 1750  Diet Rodríguez/CHO Controlled; Consistent Carbohydrate Diet Level 2 (5 carb servings/75 grams CHO/meal)  Diet effective now        References:    Nutrtion Support Algorithm Enteral vs  Parenteral   Question Answer Comment   Diet Type Rodríguez/CHO Controlled    Rodríguez/CHO Controlled Consistent Carbohydrate Diet Level 2 (5 carb servings/75 grams CHO/meal)    RD to adjust diet per protocol?  Yes        12/21/21 1749                  Active Medications  Scheduled Meds:  Current Facility-Administered Medications   Medication Dose Route Frequency Provider Last Rate    acetaminophen  975 mg Oral Q6H [facility] Martha Conway, ROSA ISELANP      ALPRAZolam  0 25 mg Oral HS PRN Don Gibson, ROSA ISELANP      atorvastatin  40 mg Oral HS Martha Conway, BENTON      bisacodyl  10 mg Rectal Daily PRN Talib Sailors, CRNP      cefazolin  2,000 mg Intravenous Q8H Sarah A Fabioora, CRNP Stopped (01/02/22 1845)    docusate sodium  100 mg Oral Daily Megan Lynn PA-C      ferrous sulfate  325 mg Oral Daily With Breakfast Martha Conway, BENTON      heparin (porcine)  5,000 Units Subcutaneous Q8H [facility] Martha Conway, ROSA ISELANP      insulin glargine  10 Units Subcutaneous Q12[facility], ROSA ISELANP      insulin lispro  4-20 Units Subcutaneous TID AC Martha Conway, ROSA ISELANP      insulin lispro  4-20 Units Subcutaneous HS Martha Conway, ROSA ISELANP      insulin lispro  5 Units Subcutaneous TID With Meals Mercedes Maldonado, BENTON      lidocaine  1 patch Topical Daily Sushma Donald, CRNP      methylPREDNISolone sodium succinate  40 mg Intravenous Daily Martha Conway, CRNP      ondansetron  4 mg Intravenous Q6H PRN Don Gibson, BENTON      oxyCODONE  2 5 mg Oral Q6H PRN Sarah A Sedora, CRNP      polyethylene glycol  17 g Oral Daily Sarah A Sedora, CRNP      senna  1 tablet Oral HS Sarah A Niki, CRNP       Continuous Infusions:     PRN Meds:   ALPRAZolam, 0 25 mg, HS PRN  bisacodyl, 10 mg, Daily PRN  ondansetron, 4 mg, Q6H PRN  oxyCODONE, 2 5 mg, Q6H PRN        Invasive Devices Review  Invasive Devices  Report    Peripheral Intravenous Line            Peripheral IV 12/28/21 Left Forearm 5 days    Peripheral IV 01/01/22 Left;Ventral (anterior) Forearm 1 day          Drain            External Urinary Catheter 11 days    Chest Tube Right Midaxillary 4 days                Rationale for remaining devices: Treatment therapy   ---------------------------------------------------------------------------------------  Advance Directive and Living Will:      Power of : POLST:    ---------------------------------------------------------------------------------------  Care Time Delivered:   No Critical Care time spent       [facility]      Portions of the record may have been created with voice recognition software  Occasional wrong word or "sound a like" substitutions may have occurred due to the inherent limitations of voice recognition software    Read the chart carefully and recognize, using context, where substitutions have occurred

## 2022-01-03 NOTE — ASSESSMENT & PLAN NOTE
· Became hypoxic 12/25 AM, CXR obtained demonstrating right pneumothorax  · 16 Fr   CT placed   · Follow up CXR demonstrated improvement in pneumothorax  · Right sided chest tube removed 12/28  · R chest tube replaced on 12/30 for recurrent R ptx  · R CT placed to water seal 1/2  · Follow AM CXR  · STAT CXR at any respiratory decompensation

## 2022-01-03 NOTE — ASSESSMENT & PLAN NOTE
Lab Results   Component Value Date    EGFR 36 01/02/2022    EGFR 32 01/01/2022    EGFR 36 12/31/2021    CREATININE 1 38 (H) 01/02/2022    CREATININE 1 50 (H) 01/01/2022    CREATININE 1 38 (H) 12/31/2021     · Creatinine 1 56 on admission, now improved   · Baseline creatinine appears to be around 1 2 - 1 6   · Avoid nephrotoxic agents and hypotension  · Lisinopril on hold  · Trend renal indices  · Strict I&O  · Daily weights

## 2022-01-03 NOTE — PROGRESS NOTES
[id number]: Received report from day shift RN  1930: Pt bathed, turned/repositioned  Pt denies pain  Chest tube to water seal  See flowsheets for full assessment  2230: Pt given yung crackers with bedtime insulin dose, as she had been hypoglycemic the previous two mornings & hadn't taken a snack at HS      0100: Midflow oxygen titrated from 5 to 4L  O2 sats 96%  0445: Morning blood work obtained & sent to lab

## 2022-01-03 NOTE — PHYSICAL THERAPY NOTE
Physical Therapy Treatment Note       01/03/22 1132   PT Last Visit   PT Visit Date 01/03/22   Note Type   Note Type Treatment   Pain Assessment   Pain Assessment Tool 0-10   Pain Score No Pain   Restrictions/Precautions   Weight Bearing Precautions Per Order No   Other Precautions Airborne/isolation;Contact/isolation;O2;Fall Risk  (4 L O2 via NC, + chest tube)   General   Chart Reviewed Yes   Additional Pertinent History Rosibel OT present for co-treatment due to medical complexity,required skilled interventions of 2 clinicians for delivery of tasks  Response to Previous Treatment Patient unable to report, no changes reported from family or staff   Family/Caregiver Present No   Cognition   Arousal/Participation Alert; Responsive; Cooperative   Attention Within functional limits   Orientation Level Oriented X4   Memory Within functional limits   Following Commands Follows all commands and directions without difficulty   Comments pt agreeable to PT session   Subjective   Subjective "I want to get home"   Bed Mobility   Supine to Sit 5  Supervision   Additional items Assist x 1; Increased time required;Verbal cues   Transfers   Sit to Stand 4  Minimal assistance   Additional items Assist x 1; Increased time required   Stand to Sit 4  Minimal assistance   Additional items Assist x 1; Armrests; Increased time required   Ambulation/Elevation   Gait pattern Forward Flexion;Decreased foot clearance; Short stride   Gait Assistance 4  Minimal assist   Additional items Assist x 1   Assistive Device Rolling walker   Distance 10 ft   Stair Management Assistance Not tested   Balance   Static Sitting Good   Dynamic Sitting Fair +   Static Standing Fair   Dynamic Standing Fair -   Ambulatory Fair -   Endurance Deficit   Endurance Deficit Yes   Endurance Deficit Description SpO2 prior to OOB mobility = 95-96% on 4 L NC, declined to 80% at lowest following transition into bedside recliner, recovered to >89% following 10 mins seated rest period   Activity Tolerance   Activity Tolerance Patient limited by fatigue   Nurse Made Aware Yes, RN Jenna Sales made aware of outcomes/recs   Assessment   Prognosis Good   Problem List Decreased endurance; Impaired balance;Decreased mobility   Assessment Pt seen for PT treatment session this date, consisting of ther act focused on functional mobility tasks including bed mobility & transfers and gt training on level surfaces to improve pt safety in household environment  Since previous session, pt has made good progress in terms of continuation of OOB mobility, pt on less O2 requirement  Pertinent barriers during this session include SOB  Current goals and POC remain appropriate, pt continues to have rehab potential and is making progress towards STGs  Pt prognosis for achieving goals is good, pending pt progress with hospitalization/medical status improvements, and indicated by ability to follow directions, attention span, self-expression (thoughts, feelings, needs), motivation to walk, talk, and achieve self-help skills and recent history of independence with functional skills/High PLOF  PT recommends post acute rehabilitation services vs HHPT, upon discharge  Pt continues to be functioning below baseline level, and remains limited 2* factors listed above  PT will continue to see pt during current hospitalization in order to address the deficits listed above and provide interventions consistent w/ POC in effort to achieve STGs  Barriers to Discharge Decreased caregiver support   Goals   Patient Goals "to keep getting better"   LTG Expiration Date 01/08/22   Long Term Goal #1 goals remain appropriate   PT Treatment Day 5   Plan   Treatment/Interventions Functional transfer training;LE strengthening/ROM; Elevations; Therapeutic exercise; Endurance training;Patient/family training;Gait training;Spoke to nursing   Progress Slow progress, decreased activity tolerance   PT Frequency 3-5x/wk   Recommendation   PT Discharge Recommendation Post acute rehabilitation services  (vs HHPT, pending progress)   Equipment Recommended   (continue RW use)   Additional Comments Pt's raw score on the AM-PAC Basic Mobility inpatient short form is 17, standardized score is 39 67  Patients at this level are likely to benefit from DC to [facility], however, please refer to therapist recommendation for safe DC planning  AM-PAC Basic Mobility Inpatient   Turning in Bed Without Bedrails 4   Lying on Back to Sitting on Edge of Flat Bed 3   Moving Bed to Chair 3   Standing Up From Chair 3   Walk in Room 2   Climb 3-5 Stairs 2   Basic Mobility Inpatient Raw Score 17   Basic Mobility Standardized Score 39 67   Highest Level Of Mobility   JH-HLM Goal 5: Stand one or more mins   JH-HLM Highest Level of Mobility 6: Walk 10 steps or more   JH-HLM Goal Achieved Yes   Education   Education Provided Mobility training;Assistive device   Patient Demonstrates acceptance/verbal understanding;Demonstrates verbal understanding   End of Consult   Patient Position at End of Consult Bedside chair; All needs within reach       [facility], PT    Time of PT treatment session: [id number] (total treatment time = 26 minutes)

## 2022-01-03 NOTE — ASSESSMENT & PLAN NOTE
· POA: tachycardia, tachypnea, hypoxia  · Suspect this is in setting of worsening COVID-19 pneumonia vs superimposed bacterial infection  · Blood cultures 1/2 positive, repeat blood cultures 2/2 positive for Staph, repeats from 12/26 NGTD  · Procal negative thus far  · Continue Ancef D7/7  · Trend fever and WBC curve  · Trend procalcitonin

## 2022-01-03 NOTE — OCCUPATIONAL THERAPY NOTE
Occupational Therapy Treatment Note     Patient Name: Mariajose KARIMI'S Date: 1/3/2022  Problem List  Principal Problem:    COVID-19  Active Problems:    Type 2 diabetes mellitus without complication, without long-term current use of insulin ([address] )    Anxiety    Essential hypertension    Hypercholesterolemia    Acute respiratory failure with hypoxia (HCC)    Ambulatory dysfunction    Chronic kidney disease, stage 3b (HCC)    Anemia    SIRS (systemic inflammatory response syndrome) (HCC)    Pneumothorax    Bacteremia due to methicillin susceptible Staphylococcus aureus (MSSA)    Thrombocytosis              01/03/22 1112   OT Last Visit   OT Visit Date 01/03/22   Note Type   Note Type Treatment   Restrictions/Precautions   Weight Bearing Precautions Per Order No   Other Precautions Contact/isolation; Airborne/isolation; Fall Risk;O2  (4L O2 via NC + chest tube )   General   Response to Previous Treatment Patient with no complaints from previous session   Lifestyle   Autonomy Pt reports being (I) c ADL/IADLs prior to admission, lives with  and daughter in 2 story home, does not use AD, is retired, and does drive     Reciprocal Relationships daughter,     [employer] retired, cares for handicapped daughter    Kylee Hudson enjoys going shooting    Pain Assessment   Pain Assessment Tool 0-10   Pain Score No Pain   ADL   Where Assessed Chair  (recliner)   Grooming Assistance 5  Supervision/Setup   Grooming Deficit Setup; Increased time to complete   Grooming Comments pt washed face, applied lotion to face, combed hair c set up    [address] 5  Supervision/Setup   UB Bathing Deficit Setup; Increased time to complete;Supervision/safety   UB Bathing Comments pt washed UB body with set up    LB Bathing Assistance 2  Maximal Assistance   LB Bathing Deficit Setup;Supervision/safety; Increased time to complete;Right lower leg including foot; Left lower leg including foot   LB Bathing Comments Pt able to wash lap and legs to knees, unable to reach to lower legs and feet due to decreased endurance and functional reach    UB Dressing Assistance 4  Minimal Assistance   UB Dressing Deficit Fasteners;Pull around back;Verbal cueing;Steadying   UB Dressing Comments pt [facility] gown, assistance for line management    LB Dressing Deficit Setup;Verbal cueing; Don/doff R sock; Don/doff L sock; Increased time to complete   LB Dressing Comments pt total A to don/doff B/L socks    Bed Mobility   Supine to Sit 5  Supervision   Additional items Assist x 1; Increased time required;Verbal cues   Additional items   (DNT: pt seated OOB in recliner at end of session )   Additional Comments denied lightheaded/dizziness c positional changes    Transfers   Sit to Stand 4  Minimal assistance   Additional items Assist x 1; Increased time required   Stand to Sit 4  Minimal assistance   Additional items Assist x 1; Armrests; Increased time required   Additional Comments VCs for proper hand placement    Functional Mobility   Functional Mobility 4  Minimal assistance   Additional Comments Pt ambulated bed--> recliner, min SOB noted  Persistent VC to encourage deep breathing/pursed lip breathing  SpO2 down to 81% following functional mobility, returned to mid 90% ~1 min with deep breathing  Additional items Rolling walker   Cognition   Overall Cognitive Status WFL   Arousal/Participation Alert; Responsive; Cooperative   Attention Within functional limits   Orientation Level Oriented X4   Memory Within functional limits   Following Commands Follows all commands and directions without difficulty   Comments Pt agreeable to OT session, pleasant  "I am feeling much better"    Activity Tolerance   Activity Tolerance Patient tolerated treatment well   Medical Staff Made Aware PT Megan present for co-treat d/t pt's medical complexity and unstable medical presentation     (KYLER Hayes made aware of outcomes)   Assessment   Assessment Patient seen for OT treatment on 1/3/22 s/p admission for COVID-19 Patient presents with active orders for OT eval and treat and Up with assistance   Upon arrival, pt found resting in bed showing no signs of distress  Patient agreeable to OT session; elected to cotreat with PT d/t Pt's medical complexity and unstable medical presentation  Patient participated in bathing/showering, dressing , bed mobility , functional mobility, functional transfer training, activity engagement , safety awareness , fall prevention, with intervention focus on increasing strength and activity tolerance for increased ADL safety and independence  Pt completed transfers with functional mobility with supervision and use of RW  Pt completed UB ADLs with supervision, but required max assist for LB ADLs d/t decreased activity tolerance, SOB, and decreased functional reach  Terri Candelaria is showing improvements in activity tolerance, but is continuing to perform below baseline due to the following deficits: decreased endurance , muscular strength , balance , functional reach  and trunk control   From OT standpoint, patient would benefit from skilled intervention to maximize independence with ADLs and functional mobility  Goals remain appropriate, continue POC  At this time, recommending D/C to: post-acute rehabilitation    Plan   Treatment Interventions ADL retraining;Functional transfer training; Endurance training;Patient/family training;Energy conservation   Goal Expiration Date 01/13/22  (pt goals exp, continue POC )   OT Treatment Day 3   OT Frequency 3-5x/wk   Recommendation   OT Discharge Recommendation Post acute rehabilitation services   OT - OK to Discharge Yes  (once medically clear)   Additional Comments  Pt seated OOB in recliner at end of session c all needs met, call bell in reach    Additional Comments 2 The patient's raw score on the AM-PAC Daily Activity inpatient short form is 15, standardized score is 34 69, less than 39 4  Patients at this level are likely to benefit from discharge to post-acute rehabilitation services  Please refer to the recommendation of the Occupational Therapist for safe discharge planning  AM-PAC Daily Activity Inpatient   Lower Body Dressing 1   Bathing 2   Toileting 2   Upper Body Dressing 3   Grooming 4   Eating 4   Daily Activity Raw Score 16   Daily Activity Standardized Score (Calc for Raw Score >=11) 35 96   AM-PAC Applied Cognition Inpatient   Following a Speech/Presentation 3   Understanding Ordinary Conversation 4   Taking Medications 3   Remembering Where Things Are Placed or Put Away 3   Remembering List of 4-5 Errands 3   Taking Care of Complicated Tasks 3   Applied Cognition Raw Score 19   Applied Cognition Standardized Score 39 77     Pt's goals ; chart review and reassessment of functional independence performed  Goals remain appropriate, extended to 22, continue POC       Chayo Zhang, OT

## 2022-01-03 NOTE — UTILIZATION REVIEW
Continued Stay Review    Date: 1/3/22                          Current Patient Class: inpatient  Current Level of Care: level 1 stepdown    HPI:81 y o  female initially admitted on 12/17/21  · Acute hypoxic respiratory failure  · COVID 19 PNA - unvaccinated +PCR on 12/16  · MSSA bacteremia - 7 days of cefazolin completed on 1/2  TTE was negative  · Left spontaneous pneumothorax  · Sepsis POA  · CKD 3  · DM2  · HTN  · Depression     Assessment/Plan:   Chest tube replaced 12/30, pending CXR this am  May start clamp trial today if no pneumothorax  Continue drainage to water seal  Completed 10 ds of decadron 12/30, on solumedrol taper, now on 40mg daily day 2/3 then go down by 10 every 3 days  Barcitinib completed 1/2 and remdsivir on 12/20  Respiratory failure 2nd COVID 19, bacteremia, PTX  L chest tube in place, IV steroids continued  O2 weaned to 4ltr/36% FiO2 via nc  Lungs with coarse breath sounds posteiorly, mild right upper chest crepitus  Vital Signs:   /61   Pulse 91   Temp 97 8 °F (36 6 °C) (Tympanic)   Resp (!) 23   Ht 5' (1 524 m)   Wt 54 5 kg (120 lb 2 4 oz)   LMP  (LMP Unknown)   SpO2 94%   BMI 23 47 kg/m²     Pertinent Labs/Diagnostic Results:   Results from last 7 days   Lab Units 01/03/22 0446 01/02/22  0629 01/01/22  0622 12/31/21  0507 12/31/21  0507 12/29/21  0513 12/29/21  0513   WBC Thousand/uL 10 03 11 70* 14 06*   < > 14 58*   < > 13 80*   HEMOGLOBIN g/dL 10 0* 10 3* 10 7*  --  11 8  --  10 4*   HEMATOCRIT % 31 1* 32 3* 33 6*  --  37 5  --  32 2*   PLATELETS Thousands/uL 380 429* 449*   < > 523*   < > 518*    < > = values in this interval not displayed       Results from last 7 days   Lab Units 01/03/22  0446 01/02/22  0629 01/01/22  0622 12/31/21  0507 12/29/21  0513   SODIUM mmol/L 135 138 138 137 135   POTASSIUM mmol/L 4 7 4 7 4 8 5 0 4 9   CHLORIDE mmol/L 106 107 105 105 103   CO2 mmol/L 23 24 26 25 24   ANION GAP mmol/L 6 7 7 7 8   BUN mg/dL 66* 70* 65* 57* 53* CREATININE mg/dL 1 46* 1 38* 1 50* 1 38* 1 25   EGFR ml/min/1 73sq m 33 36 32 36 40   CALCIUM mg/dL 8 8 9 0 9 0 9 2 9 4   MAGNESIUM mg/dL 2 4  --   --   --   --    PHOSPHORUS mg/dL 3 8  --   --   --   --      Results from last 7 days   Lab Units 01/03/22  0446   AST U/L 18   ALT U/L 3*   ALK PHOS U/L 85   TOTAL PROTEIN g/dL 5 6*   ALBUMIN g/dL 3 0*   TOTAL BILIRUBIN mg/dL 0 34     Results from last 7 days   Lab Units 01/03/22  1154 01/03/22  0756 01/02/22  2224 01/02/22  2043 01/02/22  1615 01/02/22  1114 01/02/22  0934 01/02/22  0728 01/01/22  2042 01/01/22  1600 01/01/22  1118 01/01/22  0752   POC GLUCOSE mg/dl 246* 117 227* 274* 285* 222* 123 92 291* 440* 269* 101     Results from last 7 days   Lab Units 01/03/22  0446 01/02/22  0629 01/01/22  0622 12/31/21  0507 12/29/21  0513   GLUCOSE RANDOM mg/dL 151* 78 60* 92 252*     Results from last 7 days   Lab Units 01/03/22  0446   CRP mg/L <3 0     Results from last 7 days   Lab Units 12/29/21  1357 12/29/21  1356   BLOOD CULTURE  No Growth After 4 Days  No Growth After 4 Days  1/3  Cxr=  Bilateral subsegmental atelectasis  Tiny right apical pneumothorax, decreased in size since 1/2/2022      Medications:   acetaminophen, 975 mg, Oral, Q6H JUAN ANTONIO  atorvastatin, 40 mg, Oral, HS  docusate sodium, 100 mg, Oral, Daily  ferrous sulfate, 325 mg, Oral, Daily With Breakfast  heparin (porcine), 5,000 Units, Subcutaneous, Q8H JUAN ANTONIO  insulin glargine, 10 Units, Subcutaneous, Q12H JUAN ANTONIO  insulin lispro, 4-20 Units, Subcutaneous, TID AC  insulin lispro, 4-20 Units, Subcutaneous, HS  insulin lispro, 5 Units, Subcutaneous, TID With Meals  lidocaine, 1 patch, Topical, Daily  methylPREDNISolone sodium succinate, 40 mg, Intravenous, Daily  polyethylene glycol, 17 g, Oral, Daily  senna, 1 tablet, Oral, HS    PRN Meds:  ALPRAZolam, 0 25 mg, Oral, HS PRN  bisacodyl, 10 mg, Rectal, Daily PRN  ondansetron, 4 mg, Intravenous, Q6H PRN  oxyCODONE, 2 5 mg, Oral, Q6H PRN    Discharge Plan: tbd    Network Utilization Review Department  ATTENTION: Please call with any questions or concerns to [phone] and carefully listen to the prompts so that you are directed to the right person  All voicemails are confidential   Bee Dean all requests for admission clinical reviews, approved or denied determinations and any other requests to dedicated fax number below belonging to the campus where the patient is receiving treatment   List of dedicated fax numbers for the Facilities:  [address] DENIALS (Administrative/Medical Necessity) [phone]   [address] (Maternity/NICU/Pediatrics) [phone]   [address]  [address] [facility] [place] [address] [address] [address] [place] [address] [address] [facility] [place] [address] [phone]

## 2022-01-03 NOTE — PLAN OF CARE
Problem: OCCUPATIONAL THERAPY ADULT  Goal: Performs self-care activities at highest level of function for planned discharge setting  See evaluation for individualized goals  Description: Treatment Interventions: ADL retraining,Functional transfer training,Endurance training,Patient/family training,Energy conservation          See flowsheet documentation for full assessment, interventions and recommendations  Outcome: Progressing  Note: Limitation: Decreased ADL status,Decreased endurance  Prognosis: Fair  Assessment: Patient seen for OT treatment on 1/3/22 s/p admission for COVID-19 Patient presents with active orders for OT eval and treat and Up with assistance   Upon arrival, pt found resting in bed showing no signs of distress  Patient agreeable to OT session; elected to cotreat with PT d/t Pt's medical complexity and unstable medical presentation  Patient participated in bathing/showering, dressing , bed mobility , functional mobility, functional transfer training, activity engagement , safety awareness , fall prevention, with intervention focus on increasing strength and activity tolerance for increased ADL safety and independence  Pt completed transfers with functional mobility with supervision and use of RW  Pt completed UB ADLs with supervision, but required max assist for LB ADLs d/t decreased activity tolerance, SOB, and decreased functional reach  Gene Laming is showing improvements in activity tolerance, but is continuing to perform below baseline due to the following deficits: decreased endurance , muscular strength , balance , functional reach  and trunk control   From OT standpoint, patient would benefit from skilled intervention to maximize independence with ADLs and functional mobility  Goals remain appropriate, continue POC   At this time, recommending D/C to: post-acute rehabilitation      OT Discharge Recommendation: Post acute rehabilitation services  OT - OK to Discharge: Yes (once medically clear)    Pt's goals ; chart review and reassessment of functional independence performed  Goals remain appropriate, extended to 22, continue POC       Ramy Muhammad, OT

## 2022-01-03 NOTE — NURSING NOTE
Patient was OOB in chair since just before lunch, requests to go back to bed at this time for nap  Transferred to bed, made comfortable  Chest tube right chest to water seal  Patients SaO2 drops to mid 80's with transfer but rebounds quickly back to low 90's

## 2022-01-04 ENCOUNTER — APPOINTMENT (INPATIENT)
Dept: RADIOLOGY | Facility: HOSPITAL | Age: 82
DRG: 871 | End: 2022-01-04
Payer: COMMERCIAL

## 2022-01-04 LAB
ANION GAP SERPL CALCULATED.3IONS-SCNC: 6 MMOL/L (ref 4–13)
BUN SERPL-MCNC: 61 MG/DL (ref 5–25)
CALCIUM SERPL-MCNC: 8.8 MG/DL (ref 8.4–10.2)
CHLORIDE SERPL-SCNC: 110 MMOL/L (ref 96–108)
CO2 SERPL-SCNC: 23 MMOL/L (ref 21–32)
CREAT SERPL-MCNC: 1.27 MG/DL (ref 0.6–1.3)
ERYTHROCYTE [DISTWIDTH] IN BLOOD BY AUTOMATED COUNT: 13.4 % (ref 11.6–15.1)
GFR SERPL CREATININE-BSD FRML MDRD: 39 ML/MIN/1.73SQ M
GLUCOSE SERPL-MCNC: 108 MG/DL (ref 65–140)
GLUCOSE SERPL-MCNC: 118 MG/DL (ref 65–140)
GLUCOSE SERPL-MCNC: 126 MG/DL (ref 65–140)
GLUCOSE SERPL-MCNC: 227 MG/DL (ref 65–140)
GLUCOSE SERPL-MCNC: 330 MG/DL (ref 65–140)
HCT VFR BLD AUTO: 31.2 % (ref 34.8–46.1)
HGB BLD-MCNC: 9.6 G/DL (ref 11.5–15.4)
MCH RBC QN AUTO: 26.5 PG (ref 26.8–34.3)
MCHC RBC AUTO-ENTMCNC: 30.8 G/DL (ref 31.4–37.4)
MCV RBC AUTO: 86 FL (ref 82–98)
PLATELET # BLD AUTO: 356 THOUSANDS/UL (ref 149–390)
PMV BLD AUTO: 9.4 FL (ref 8.9–12.7)
POTASSIUM SERPL-SCNC: 4.7 MMOL/L (ref 3.5–5.3)
RBC # BLD AUTO: 3.62 MILLION/UL (ref 3.81–5.12)
SODIUM SERPL-SCNC: 139 MMOL/L (ref 135–147)
WBC # BLD AUTO: 9.66 THOUSAND/UL (ref 4.31–10.16)

## 2022-01-04 PROCEDURE — 71045 X-RAY EXAM CHEST 1 VIEW: CPT

## 2022-01-04 PROCEDURE — 82948 REAGENT STRIP/BLOOD GLUCOSE: CPT

## 2022-01-04 PROCEDURE — 97530 THERAPEUTIC ACTIVITIES: CPT

## 2022-01-04 PROCEDURE — 85027 COMPLETE CBC AUTOMATED: CPT | Performed by: NURSE PRACTITIONER

## 2022-01-04 PROCEDURE — 97116 GAIT TRAINING THERAPY: CPT

## 2022-01-04 PROCEDURE — 99233 SBSQ HOSP IP/OBS HIGH 50: CPT | Performed by: INTERNAL MEDICINE

## 2022-01-04 PROCEDURE — 80048 BASIC METABOLIC PNL TOTAL CA: CPT | Performed by: NURSE PRACTITIONER

## 2022-01-04 PROCEDURE — 94760 N-INVAS EAR/PLS OXIMETRY 1: CPT

## 2022-01-04 PROCEDURE — 97535 SELF CARE MNGMENT TRAINING: CPT

## 2022-01-04 PROCEDURE — 97110 THERAPEUTIC EXERCISES: CPT

## 2022-01-04 RX ADMIN — INSULIN LISPRO 16 UNITS: 100 INJECTION, SOLUTION INTRAVENOUS; SUBCUTANEOUS at 11:54

## 2022-01-04 RX ADMIN — ACETAMINOPHEN 975 MG: 325 TABLET ORAL at 11:56

## 2022-01-04 RX ADMIN — INSULIN LISPRO 5 UNITS: 100 INJECTION, SOLUTION INTRAVENOUS; SUBCUTANEOUS at 08:42

## 2022-01-04 RX ADMIN — DOCUSATE SODIUM 100 MG: 100 CAPSULE, LIQUID FILLED ORAL at 08:41

## 2022-01-04 RX ADMIN — METHYLPREDNISOLONE SODIUM SUCCINATE 40 MG: 40 INJECTION, POWDER, FOR SOLUTION INTRAMUSCULAR; INTRAVENOUS at 08:42

## 2022-01-04 RX ADMIN — SENNOSIDES 8.6 MG: 8.6 TABLET, FILM COATED ORAL at 22:54

## 2022-01-04 RX ADMIN — HEPARIN SODIUM 5000 UNITS: 5000 INJECTION INTRAVENOUS; SUBCUTANEOUS at 14:42

## 2022-01-04 RX ADMIN — ATORVASTATIN CALCIUM 40 MG: 40 TABLET, FILM COATED ORAL at 22:54

## 2022-01-04 RX ADMIN — INSULIN LISPRO 8 UNITS: 100 INJECTION, SOLUTION INTRAVENOUS; SUBCUTANEOUS at 17:27

## 2022-01-04 RX ADMIN — INSULIN GLARGINE 10 UNITS: 100 INJECTION, SOLUTION SUBCUTANEOUS at 23:54

## 2022-01-04 RX ADMIN — INSULIN LISPRO 5 UNITS: 100 INJECTION, SOLUTION INTRAVENOUS; SUBCUTANEOUS at 17:27

## 2022-01-04 RX ADMIN — HEPARIN SODIUM 5000 UNITS: 5000 INJECTION INTRAVENOUS; SUBCUTANEOUS at 22:54

## 2022-01-04 RX ADMIN — HEPARIN SODIUM 5000 UNITS: 5000 INJECTION INTRAVENOUS; SUBCUTANEOUS at 06:11

## 2022-01-04 RX ADMIN — LIDOCAINE 1 PATCH: 50 PATCH TOPICAL at 08:40

## 2022-01-04 RX ADMIN — INSULIN LISPRO 5 UNITS: 100 INJECTION, SOLUTION INTRAVENOUS; SUBCUTANEOUS at 11:55

## 2022-01-04 RX ADMIN — ACETAMINOPHEN 975 MG: 325 TABLET ORAL at 23:54

## 2022-01-04 RX ADMIN — INSULIN GLARGINE 10 UNITS: 100 INJECTION, SOLUTION SUBCUTANEOUS at 08:41

## 2022-01-04 RX ADMIN — FERROUS SULFATE TAB 325 MG (65 MG ELEMENTAL FE) 325 MG: 325 (65 FE) TAB at 08:41

## 2022-01-04 RX ADMIN — ACETAMINOPHEN 975 MG: 325 TABLET ORAL at 17:27

## 2022-01-04 RX ADMIN — ACETAMINOPHEN 975 MG: 325 TABLET ORAL at 00:07

## 2022-01-04 NOTE — PLAN OF CARE
Complex physical assessment as noted see chart for details  Pleasant, offers no c/o discomfort  Call bell in reach  Verbalizing needs

## 2022-01-04 NOTE — PHYSICAL THERAPY NOTE
Physical Therapy Treatment Note       01/04/22 1052   PT Last Visit   PT Visit Date 01/04/22   Note Type   Note Type Treatment   Pain Assessment   Pain Assessment Tool 0-10   Pain Score No Pain   Restrictions/Precautions   Weight Bearing Precautions Per Order No   Other Precautions Airborne/isolation;Contact/isolation; Fall Risk;O2;Telemetry;Multiple lines   General   Chart Reviewed Yes   Response to Previous Treatment Patient with no complaints from previous session  Family/Caregiver Present No   Cognition   Overall Cognitive Status WFL   Arousal/Participation Alert; Responsive; Cooperative   Attention Within functional limits   Orientation Level Oriented X4   Memory Within functional limits   Following Commands Follows all commands and directions without difficulty   Comments pt agreeable to PT session   Subjective   Subjective "I feel ok right now"   Bed Mobility   Supine to Sit Unable to assess  (pt received OOB in recliner upon arrival)   Transfers   Sit to Stand 4  Minimal assistance   Additional items Assist x 1; Armrests; Increased time required   Stand to Sit 4  Minimal assistance   Additional items Assist x 1; Increased time required;Verbal cues   Toilet transfer 4  Minimal assistance   Additional items Assist x 1;Commode;Verbal cues; Increased time required  (VCs for hand placement)   Additional Comments [facility] recliner = 144/63   Ambulation/Elevation   Gait pattern Improper Weight shift;Decreased foot clearance;Shuffling; Step to   Gait Assistance 4  Minimal assist   Additional items Assist x 1;Verbal cues  (VCs for RW management)   Assistive Device Rolling walker   Distance 5 ft forward/backward   Stair Management Assistance Not tested   Balance   Static Sitting Good   Dynamic Sitting Fair +   Static Standing Fair   Dynamic Standing Fair -   Ambulatory Fair -   Endurance Deficit   Endurance Deficit Yes   Endurance Deficit Description O2 initially on 4 L = 90-91%, decreased to high 70's following short mobility tasks, PT required to increase supplemental O2 throughout session to 6 L->8L->10L in order to maintain O2 sats > 80%  PT able to decrease O2 requirement to 6 L at end of session, Lindsay RN made aware  Activity Tolerance   Activity Tolerance Patient limited by fatigue   Nurse Made Aware Yes, RN Lindsay made aware of outcomes/recs   Exercises   Knee AROM Long Arc Quad Sitting;20 reps;AROM; Right;Left   Marching Sitting;20 reps;AROM; Right;Left   Assessment   Prognosis Good   Problem List Decreased endurance; Impaired balance;Decreased mobility   Assessment Pt seen for PT treatment session this date, consisting of ther act focused on functional mobility tasks including transfers and gt training on level surfaces to improve pt safety in household environment x 5 ft forward/backward  Since previous session, pt has made good progress in terms of continuation of OOB mobility, pt on less O2 requirement  Pertinent barriers during this session include SOB  Current goals and POC remain appropriate, pt continues to have rehab potential and is making progress towards STGs  Pt prognosis for achieving goals is good, pending pt progress with hospitalization/medical status improvements, and indicated by ability to follow directions, attention span, self-expression (thoughts, feelings, needs), motivation to walk, talk, and achieve self-help skills and recent history of independence with functional skills/High PLOF  PT recommends post acute rehabilitation services vs HHPT, upon discharge  Pt continues to be functioning below baseline level, and remains limited 2* factors listed above  PT will continue to see pt during current hospitalization in order to address the deficits listed above and provide interventions consistent w/ POC in effort to achieve STGs     Barriers to Discharge Decreased caregiver support   Goals   Patient Goals "to get home"   LTG Expiration Date 01/08/22   Long Term Goal #1 goals remain appropriate   PT Treatment Day 6   Plan   Treatment/Interventions Functional transfer training;LE strengthening/ROM; Endurance training; Therapeutic exercise;Patient/family training;Equipment eval/education; Bed mobility;Gait training   Progress Slow progress, decreased activity tolerance   PT Frequency 3-5x/wk   Recommendation   PT Discharge Recommendation Post acute rehabilitation services   Equipment Recommended   (continue RW use)   AM-PAC Basic Mobility Inpatient   Turning in Bed Without Bedrails 4   Lying on Back to Sitting on Edge of Flat Bed 3   Moving Bed to Chair 3   Standing Up From Chair 3   Walk in Room 2   Climb 3-5 Stairs 2   Basic Mobility Inpatient Raw Score 17   Basic Mobility Standardized Score 39 67   Highest Level Of Mobility   -HL Goal 5: Stand one or more mins   -Health system Highest Level of Mobility 5: Stand (1 or more minutes)   -HL Goal Achieved Yes   Education   Education Provided Mobility training;Assistive device   Patient Demonstrates acceptance/verbal understanding;Explanation/teachback used;Demonstrates verbal understanding;Reinforcement needed   End of Consult   Patient Position at End of Consult Bedside chair; All needs within reach       [facility], PT    Time of PT treatment session: 4853-3974 (total treatment time = 54 minutes)

## 2022-01-04 NOTE — PLAN OF CARE
Problem: Potential for Falls  Goal: Patient will remain free of falls  Description: INTERVENTIONS:  - Educate patient/family on patient safety including physical limitations  - Instruct patient to call for assistance with activity   - Consult OT/PT to assist with strengthening/mobility   - Keep Call bell within reach  - Keep bed low and locked with side rails adjusted as appropriate  - Keep care items and personal belongings within reach  - Initiate and maintain comfort rounds  - Make Fall Risk Sign visible to staff  - Offer Toileting every 2 Hours, in advance of need  - Initiate/Maintain fall alarm  - Obtain necessary fall risk management equipment: fall alarm  - Apply yellow socks and bracelet for high fall risk patients  - Consider moving patient to room near nurses station  Outcome: Progressing     Problem: PAIN - ADULT  Goal: Verbalizes/displays adequate comfort level or baseline comfort level  Description: Interventions:  - Encourage patient to monitor pain and request assistance  - Assess pain using appropriate pain scale  - Administer analgesics based on type and severity of pain and evaluate response  - Implement non-pharmacological measures as appropriate and evaluate response  - Consider cultural and social influences on pain and pain management  - Notify physician/advanced practitioner if interventions unsuccessful or patient reports new pain  Outcome: Progressing     Problem: INFECTION - ADULT  Goal: Absence or prevention of progression during hospitalization  Description: INTERVENTIONS:  - Assess and monitor for signs and symptoms of infection  - Monitor lab/diagnostic results  - Monitor all insertion sites, i e  indwelling lines, tubes, and drains  - Monitor endotracheal if appropriate and nasal secretions for changes in amount and color  - [place] appropriate cooling/warming therapies per order  - Administer medications as ordered  - Instruct and encourage patient and family to use good hand hygiene technique  - Identify and instruct in appropriate isolation precautions for identified infection/condition  Outcome: Progressing  Goal: Absence of fever/infection during neutropenic period  Description: INTERVENTIONS:  - Monitor WBC    Outcome: Progressing     Problem: SAFETY ADULT  Goal: Patient will remain free of falls  Description: INTERVENTIONS:  - Educate patient/family on patient safety including physical limitations  - Instruct patient to call for assistance with activity   - Consult OT/PT to assist with strengthening/mobility   - Keep Call bell within reach  - Keep bed low and locked with side rails adjusted as appropriate  - Keep care items and personal belongings within reach  - Initiate and maintain comfort rounds  - Make Fall Risk Sign visible to staff  - Offer Toileting every 2 Hours, in advance of need  - Initiate/Maintain fall alarm  - Obtain necessary fall risk management equipment: fall alarm  - Apply yellow socks and bracelet for high fall risk patients  - Consider moving patient to room near nurses station  Outcome: Progressing  Goal: Maintain or return to baseline ADL function  Description: INTERVENTIONS:  - Educate patient/family on patient safety including physical limitations  - Instruct patient to call for assistance with activity   - Consult OT/PT to assist with strengthening/mobility   - Keep Call bell within reach  - Keep bed low and locked with side rails adjusted as appropriate  - Keep care items and personal belongings within reach  - Initiate and maintain comfort rounds  - Make Fall Risk Sign visible to staff  - Offer Toileting every 2 Hours, in advance of need  - Initiate/Maintain fall alarm  - Obtain necessary fall risk management equipment: fall alarm  - Apply yellow socks and bracelet for high fall risk patients  - Consider moving patient to room near nurses station  Outcome: Progressing  Goal: Maintains/Returns to pre admission functional level  Description: INTERVENTIONS:  - Perform BMAT or MOVE assessment daily    - Set and communicate daily mobility goal to care team and patient/family/caregiver  - Collaborate with rehabilitation services on mobility goals if consulted  - Perform Range of Motion 3 times a day  - Reposition patient every 2 hours  - Dangle patient 3 times a day  - Stand patient 3 times a day  - Ambulate patient 3 times a day  - Out of bed to chair 3 times a day   - Out of bed for meals 3 times a day  - Out of bed for toileting  - Record patient progress and toleration of activity level   Outcome: Progressing     Problem: DISCHARGE PLANNING  Goal: Discharge to home or other facility with appropriate resources  Description: INTERVENTIONS:  - Identify barriers to discharge w/patient and caregiver  - Arrange for needed discharge resources and transportation as appropriate  - Identify discharge learning needs (meds, wound care, etc )  - Arrange for interpretive services to assist at discharge as needed  - Refer to Case Management Department for coordinating discharge planning if the patient needs post-hospital services based on physician/advanced practitioner order or complex needs related to functional status, cognitive ability, or social support system  Outcome: Progressing     Problem: Knowledge Deficit  Goal: Patient/family/caregiver demonstrates understanding of disease process, treatment plan, medications, and discharge instructions  Description: Complete learning assessment and assess knowledge base    Interventions:  - Provide teaching at level of understanding  - Provide teaching via preferred learning methods  Outcome: Progressing     Problem: MOBILITY - ADULT  Goal: Maintain or return to baseline ADL function  Description: INTERVENTIONS:  - Educate patient/family on patient safety including physical limitations  - Instruct patient to call for assistance with activity   - Consult OT/PT to assist with strengthening/mobility   - Keep Call bell within reach  - Keep bed low and locked with side rails adjusted as appropriate  - Keep care items and personal belongings within reach  - Initiate and maintain comfort rounds  - Make Fall Risk Sign visible to staff  - Offer Toileting every 2 Hours, in advance of need  - Initiate/Maintain fall alarm  - Obtain necessary fall risk management equipment: fall alarm  - Apply yellow socks and bracelet for high fall risk patients  - Consider moving patient to room near nurses station  Outcome: Progressing  Goal: Maintains/Returns to pre admission functional level  Description: INTERVENTIONS:  - Perform BMAT or MOVE assessment daily    - Set and communicate daily mobility goal to care team and patient/family/caregiver  - Collaborate with rehabilitation services on mobility goals if consulted  - Perform Range of Motion 3 times a day  - Reposition patient every 2 hours    - Dangle patient 3 times a day  - Stand patient 3 times a day  - Ambulate patient 3 times a day  - Out of bed to chair 3 times a day   - Out of bed for meals 3 times a day  - Out of bed for toileting  - Record patient progress and toleration of activity level   Outcome: Progressing     Problem: Prexisting or High Potential for Compromised Skin Integrity  Goal: Skin integrity is maintained or improved  Description: INTERVENTIONS:  - Identify patients at risk for skin breakdown  - Assess and monitor skin integrity  - Assess and monitor nutrition and hydration status  - Monitor labs   - Assess for incontinence   - Turn and reposition patient  - Assist with mobility/ambulation  - Relieve pressure over bony prominences  - Avoid friction and shearing  - Provide appropriate hygiene as needed including keeping skin clean and dry  - Evaluate need for skin moisturizer/barrier cream  - Collaborate with interdisciplinary team   - Patient/family teaching  - Consider wound care consult   Outcome: Progressing     Problem: RESPIRATORY - ADULT  Goal: Achieves optimal ventilation and oxygenation  Description: INTERVENTIONS:  - Assess for changes in respiratory status  - Assess for changes in mentation and behavior  - Position to facilitate oxygenation and minimize respiratory effort  - Oxygen administered by appropriate delivery if ordered  - Initiate smoking cessation education as indicated  - Encourage broncho-pulmonary hygiene including cough, deep breathe, Incentive Spirometry  - Assess the need for suctioning and aspirate as needed  - Assess and instruct to report SOB or any respiratory difficulty  - Respiratory Therapy support as indicated  Outcome: Progressing     Problem: Nutrition/Hydration-ADULT  Goal: Nutrient/Hydration intake appropriate for improving, restoring or maintaining nutritional needs  Description: Monitor and assess patient's nutrition/hydration status for malnutrition  Collaborate with interdisciplinary team and initiate plan and interventions as ordered  Monitor patient's weight and dietary intake as ordered or per policy  Utilize nutrition screening tool and intervene as necessary  Determine patient's food preferences and provide high-protein, high-caloric foods as appropriate       INTERVENTIONS:  - Monitor oral intake, urinary output, labs, and treatment plans  - Assess nutrition and hydration status and recommend course of action  - Evaluate amount of meals eaten  - Assist patient with eating if necessary   - Allow adequate time for meals  - Recommend/ encourage appropriate diets, oral nutritional supplements, and vitamin/mineral supplements  - Order, calculate, and assess calorie counts as needed  - Recommend, monitor, and adjust tube feedings and TPN/PPN based on assessed needs  - Assess need for intravenous fluids  - Provide specific nutrition/hydration education as appropriate  - Include patient/family/caregiver in decisions related to nutrition  Outcome: Progressing

## 2022-01-04 NOTE — ASSESSMENT & PLAN NOTE
· POA: tachycardia, tachypnea, hypoxia  · Suspect this is in setting of worsening COVID-19 pneumonia vs superimposed bacterial infection  · Blood cultures 1/2 positive, repeat blood cultures 2/2 positive for Staph, repeats from 12/26 NGTD  · Procal negative thus far  · Completed 7 days of Ancef on 1/3  · Trend fever and WBC curve  · Trend procalcitonin

## 2022-01-04 NOTE — PROGRESS NOTES
Merlin 46  Progress Note - Nicki Found 1940, [de-identified] y o  female MRN: [id number]  Unit/Bed#: ICU 04-01 Encounter: [id number]  Primary Care Provider: Senthil Bhatia DO   Date and time admitted to hospital: 12/16/2021  4:49 PM    * COVID-19  Assessment & Plan  · Presented on 12/16 with fever, chills, shortness of breath, cough - admitted to [address] [place] [address] on 2L NC  · COVID-19 positive on 12/16  · Unvaccinated  · Transferred on 12/21 to ICU 2/2 escalating O2 requirements  · Severe pathway:  · Decadron 0 1 mg/kg completed 12/30  · Completed Solu-Medrol 50 mg daily x 5 days  · Baricitinib x 14 days - completed  · Remdesivir completed 12/20  · DVT ppx with SQ heparin 5000 q8 - D-Dimer (1 21 - 0 93 - 0 63)  · Encourage side-lying/prone positioning  · Encourage I/S, aggressive pulmonary hygiene  · Titrate oxygen to maintain SpO2 > 88% - currently requiring  6L NC  · Trend CRP as needed (57  116 - 37)    Acute respiratory failure with hypoxia (HCC)  Assessment & Plan  · In the setting of COVID 19/viral PNA/ recurrent R ptx s/p R chest tube   · Continue to titrate HFNC for SpO2 >88%  · Continue COVID-19 protocol as noted above  · F/u am CXR  Continue chest tube to water seal  · Titrate O2 to maintain SpO2 > 88%   · Aggressive pulmonary hygiene    Bacteremia due to methicillin susceptible Staphylococcus aureus (MSSA)  Assessment & Plan  · BC positive MSSA 12/22; Repeat BC on 12/26 negative 4 days  · Unclear source  · Completed 7 days of Ancef 1/3  · TTE:  · LV - EF 50% with increased wall thickness and G1DD   · Mitral Valve: mild regurgitation  · Tricuspid Valve: mild regurgitation  · Follow up on repeat BC, currently NGTD    Pneumothorax  Assessment & Plan  · Became hypoxic 12/25 AM, CXR obtained demonstrating right pneumothorax  · 16 Fr   CT placed   · Follow up CXR demonstrated improvement in pneumothorax  · Right sided chest tube removed 12/28  · R chest tube replaced on 12/30 for recurrent R ptx  · R CT placed to water seal 1/2  · Follow AM CXR, can consider removal today if no ptx  · STAT CXR at any respiratory decompensation    SIRS (systemic inflammatory response syndrome) (HCC)  Assessment & Plan  · POA: tachycardia, tachypnea, hypoxia  · Suspect this is in setting of worsening COVID-19 pneumonia vs superimposed bacterial infection  · Blood cultures 1/2 positive, repeat blood cultures 2/2 positive for Staph, repeats from 12/26 NGTD  · Procal negative thus far  · Completed 7 days of Ancef on 1/3  · Trend fever and WBC curve  · Trend procalcitonin    Type 2 diabetes mellitus without complication, without long-term current use of insulin [facility])  Assessment & Plan  Lab Results   Component Value Date    HGBA1C 6 2 (H) 11/22/2021       Recent Labs     01/02/22  2224 01/03/22  0756 01/03/22  1154 01/03/22  1610   POCGLU 227* 117 246* 212*       Blood Sugar Average: Last 72 hrs:  (P) 210 [id number]     · Home regimen: metformin, Januvia and glipizide at home  · Likely exacerbated by steroids  Was on insulin gtt - now on Lantus 10 units BID, mealtime insulin 5 units TID, SSI Algorithm 4  · ACHS fingersticks  · Diabetic diet     Anemia  Assessment & Plan  · Normocytic/Normochromic   Hemoglobin 10 8 on admission  · Baseline appears to be around 11 5 - 13  · Concurrently with no signs of active bleeding  · Continue home iron supplementation  · Transfuse for hemoglobin < 7 or with hemodynamic compromise  · Trend hemoglobin and monitor for signs of active bleeding    Chronic kidney disease, stage 3b [facility])  Assessment & Plan  Lab Results   Component Value Date    EGFR 33 01/03/2022    EGFR 36 01/02/2022    EGFR 32 01/01/2022    CREATININE 1 46 (H) 01/03/2022    CREATININE 1 38 (H) 01/02/2022    CREATININE 1 50 (H) 01/01/2022     · Creatinine 1 56 on admission, now improved   · Baseline creatinine appears to be around 1 2 - 1 6   · Avoid nephrotoxic agents and hypotension  · Lisinopril on hold  · Trend renal indices  · Strict I&O  · Daily weights    Thrombocytosis  Assessment & Plan  · Suspect reactive  Trend    Ambulatory dysfunction  Assessment & Plan  · PT/OT - recommending acute rehab on discharge  · Encourage mobilization    Hypercholesterolemia  Assessment & Plan  · Continue statin    Essential hypertension  Assessment & Plan  · Takes enalapril at home - holding lisinopril  · Monitor BP    Anxiety  Assessment & Plan  · Continue home PRN Xanax  · Zoloft appears to be on patient's home medication list, however patient has not been taking    ----------------------------------------------------------------------------------------  HPI/24hr events: Weaned to 4L during the day yesterday  CT remains on water seal x 48 hours  Patient appropriate for transfer out of the ICU today?: Patient does not meet criteria for referral to the ICU Follow-Up Clinic; referral has not been made      Disposition: Transfer to Med-Surg   Code Status: Level 1 - Full Code  ---------------------------------------------------------------------------------------  SUBJECTIVE  Offers no complaints this morning    Review of Systems  Review of systems was reviewed and negative unless stated above in HPI/24-hour events   ---------------------------------------------------------------------------------------  OBJECTIVE    Vitals   Vitals:    22 2200 22 2300 22 0000 22 0100   BP: 148/67 111/52 127/60 107/56   BP Location:       Pulse: 73 76 81 74   Resp: 21 14 18 13   Temp:   (!) 96 5 °F (35 8 °C)    TempSrc:       SpO2: 97% 96% 94% 98%   Weight:       Height:         Temp (24hrs), Av 8 °F (36 6 °C), Min:96 5 °F (35 8 °C), Max:99 °F (37 2 °C)  Current: Temperature: (!) 96 5 °F (35 8 °C)          Respiratory:  SpO2: SpO2: 98 %  Nasal Cannula O2 Flow Rate (L/min): 4 L/min    Invasive/non-invasive ventilation settings   Respiratory  Report   Lab Data (Last 4 hours)    None         O2/Vent Data (Last 4 hours)    None Physical Exam  Vitals and nursing note reviewed  Constitutional:       General: She is not in acute distress  Appearance: She is normal weight  She is not ill-appearing  Interventions: Nasal cannula in place  HENT:      Head: Normocephalic and atraumatic  Eyes:      Pupils: Pupils are equal, round, and reactive to light  Cardiovascular:      Rate and Rhythm: Normal rate and regular rhythm  Heart sounds: Normal heart sounds  Heart sounds not distant  No murmur heard  No friction rub  No gallop  Pulmonary:      Effort: Pulmonary effort is normal       Breath sounds: Examination of the right-lower field reveals decreased breath sounds  Examination of the left-lower field reveals decreased breath sounds  Decreased breath sounds present  No wheezing, rhonchi or rales  Abdominal:      General: There is no distension  Palpations: Abdomen is soft  Tenderness: There is no abdominal tenderness  Musculoskeletal:      Right lower leg: Edema present  Left lower leg: Edema present  Skin:     General: Skin is warm and dry  Neurological:      General: No focal deficit present  Mental Status: She is alert and oriented to person, place, and time  Psychiatric:         Behavior: Behavior is cooperative         Laboratory and Diagnostics:  Results from last 7 days   Lab Units 01/03/22  0446 01/02/22  0629 01/01/22  0622 12/31/21  0507 12/29/21  0513   WBC Thousand/uL 10 03 11 70* 14 06* 14 58* 13 80*   HEMOGLOBIN g/dL 10 0* 10 3* 10 7* 11 8 10 4*   HEMATOCRIT % 31 1* 32 3* 33 6* 37 5 32 2*   PLATELETS Thousands/uL 380 429* 449* 523* 518*     Results from last 7 days   Lab Units 01/03/22  0446 01/02/22  0629 01/01/22  0622 12/31/21  0507 12/29/21  0513   SODIUM mmol/L 135 138 138 137 135   POTASSIUM mmol/L 4 7 4 7 4 8 5 0 4 9   CHLORIDE mmol/L 106 107 105 105 103   CO2 mmol/L 23 24 26 25 24   ANION GAP mmol/L 6 7 7 7 8   BUN mg/dL 66* 70* 65* 57* 53*   CREATININE mg/dL 1 46* 1 38* 1 50* 1 38* 1 25   CALCIUM mg/dL 8 8 9 0 9 0 9 2 9 4   GLUCOSE RANDOM mg/dL 151* 78 60* 92 252*   ALT U/L 3*  --   --   --   --    AST U/L 18  --   --   --   --    ALK PHOS U/L 85  --   --   --   --    ALBUMIN g/dL 3 0*  --   --   --   --    TOTAL BILIRUBIN mg/dL 0 34  --   --   --   --      Results from last 7 days   Lab Units 01/03/22  0446   MAGNESIUM mg/dL 2 4   PHOSPHORUS mg/dL 3 8                   ABG:    VBG:          Micro  Results from last 7 days   Lab Units 12/29/21  1357 12/29/21  1356   BLOOD CULTURE  No Growth After 5 Days  No Growth After 5 Days  Imaging: AM CXR pending I have personally reviewed pertinent reports  Intake and Output  I/O       01/02 0701 01/03 0700 01/03 0701 01/04 0700    P  O  237 237    IV Piggyback 150     Total Intake(mL/kg) 387 (7 1) 237 (4 3)    Urine (mL/kg/hr) 1000 (0 8) 700 (0 5)    Stool  0    Chest Tube 5     Total Output 1005 700    Net -618 463          Unmeasured Urine Occurrence 1 x     Unmeasured Stool Occurrence  1 x          Height and Weights   Height: 5' (152 4 cm)     Body mass index is 23 47 kg/m²  Weight (last 2 days)     Date/Time Weight    01/03/22 0600 54 5 (120 15)    01/02/22 0600 54 2 (119 49)            Nutrition       Diet Orders   (From admission, onward)             Start     Ordered    12/27/21 0958  Dietary nutrition supplements  Once        Question Answer Comment   Select Supplement: Glucerna-Vanilla    Frequency Breakfast, Lunch, Dinner        12/27/21 0958    12/21/21 1750  Diet Rodríguez/CHO Controlled; Consistent Carbohydrate Diet Level 2 (5 carb servings/75 grams CHO/meal)  Diet effective now        References:    Nutrtion Support Algorithm Enteral vs  Parenteral   Question Answer Comment   Diet Type Rodríguez/CHO Controlled    Rodríguez/CHO Controlled Consistent Carbohydrate Diet Level 2 (5 carb servings/75 grams CHO/meal)    RD to adjust diet per protocol?  Yes        12/21/21 1749                  Active Medications  Scheduled Meds:  Current Facility-Administered Medications   Medication Dose Route Frequency Provider Last Rate    acetaminophen  975 mg Oral Q6H [address] BENTON Brown      ALPRAZolam  0 25 mg Oral HS PRN Foster MedalBENTON      atorvastatin  40 mg Oral HS BENTON Brown      bisacodyl  10 mg Rectal Daily PRN BENTON Hewitt      docusate sodium  100 mg Oral Daily Megan Lynn PA-C      ferrous sulfate  325 mg Oral Daily With Breakfast BENTON Brown      heparin (porcine)  5,000 Units Subcutaneous Q8H [address] BENTON Brown      insulin glargine  10 Units Subcutaneous Q12H [address] BENTON Brown      insulin lispro  4-20 Units Subcutaneous TID AC BENTON Brown      insulin lispro  4-20 Units Subcutaneous HS BENTON Brown      insulin lispro  5 Units Subcutaneous TID With Meals BENTON Worthy      lidocaine  1 patch Topical Daily BENTON Lund      methylPREDNISolone sodium succinate  40 mg Intravenous Daily BENTON Brown      Followed by   Leslie Mills ON 1/6/2022] methylPREDNISolone sodium succinate  30 mg Intravenous Daily BENTON Brown      Followed by   Leslie Mills ON 1/9/2022] methylPREDNISolone sodium succinate  20 mg Intravenous Daily BENTON Brown      Followed by   Leslie Mills ON 1/12/2022] methylPREDNISolone sodium succinate  10 mg Intravenous Daily BENTON Brown      ondansetron  4 mg Intravenous Q6H PRN BENTON Gordon      oxyCODONE  2 5 mg Oral Q6H PRN BENTON Dhillon      polyethylene glycol  17 g Oral Daily BENTON Dhillon      senna  1 tablet Oral HS BENTON Dhillon       Continuous Infusions:     PRN Meds:   ALPRAZolam, 0 25 mg, HS PRN  bisacodyl, 10 mg, Daily PRN  ondansetron, 4 mg, Q6H PRN  oxyCODONE, 2 5 mg, Q6H PRN        Invasive Devices Review  Invasive Devices  Report    Peripheral Intravenous Line            Peripheral IV 12/28/21 Left Forearm 6 days    Peripheral IV 01/01/22 Left;Ventral (anterior) Forearm 2 days          Drain            External Urinary Catheter 12 days    Chest Tube Right Midaxillary 5 days                ---------------------------------------------------------------------------------------  Advance Directive and Living Will:      Power of :    POLST:    ---------------------------------------------------------------------------------------  Care Time Delivered:   No Critical Care time spent       Valentino Fordyce, PA-C

## 2022-01-04 NOTE — ASSESSMENT & PLAN NOTE
· BC positive MSSA 12/22; Repeat BC on 12/26 negative 4 days  · Unclear source  · Completed 7 days of Ancef 1/3  · TTE:  · LV - EF 50% with increased wall thickness and G1DD   · Mitral Valve: mild regurgitation  · Tricuspid Valve: mild regurgitation  · Follow up on repeat BC, currently NGTD

## 2022-01-04 NOTE — PLAN OF CARE
Problem: PHYSICAL THERAPY ADULT  Goal: Performs mobility at highest level of function for planned discharge setting  See evaluation for individualized goals  Description: Treatment/Interventions: Functional transfer training,Elevations,Therapeutic exercise,Endurance training,Bed mobility,Gait training  Equipment Recommended:  (unknown at this time)       See flowsheet documentation for full assessment, interventions and recommendations  Outcome: Progressing  Note: Prognosis: Good  Problem List: Decreased endurance,Impaired balance,Decreased mobility  Assessment: Pt seen for PT treatment session this date, consisting of ther act focused on functional mobility tasks including transfers and gt training on level surfaces to improve pt safety in household environment x 5 ft forward/backward  Since previous session, pt has made good progress in terms of continuation of OOB mobility, pt on less O2 requirement  Pertinent barriers during this session include SOB  Current goals and POC remain appropriate, pt continues to have rehab potential and is making progress towards STGs  Pt prognosis for achieving goals is good, pending pt progress with hospitalization/medical status improvements, and indicated by ability to follow directions, attention span, self-expression (thoughts, feelings, needs), motivation to walk, talk, and achieve self-help skills and recent history of independence with functional skills/High PLOF  PT recommends post acute rehabilitation services vs HHPT, upon discharge  Pt continues to be functioning below baseline level, and remains limited 2* factors listed above  PT will continue to see pt during current hospitalization in order to address the deficits listed above and provide interventions consistent w/ POC in effort to achieve STGs    Barriers to Discharge: Decreased caregiver support        PT Discharge Recommendation: Post acute rehabilitation services          See flowsheet documentation for full assessment

## 2022-01-04 NOTE — ASSESSMENT & PLAN NOTE
Lab Results   Component Value Date    HGBA1C 6 2 (H) 11/22/2021       Recent Labs     01/02/22  2224 01/03/22  0756 01/03/22  1154 01/03/22  1610   POCGLU 227* 117 246* 212*       Blood Sugar Average: Last 72 hrs:  (P) 210 [id number]     · Home regimen: metformin, Januvia and glipizide at home  · Likely exacerbated by steroids   Was on insulin gtt - now on Lantus 10 units BID, mealtime insulin 5 units TID, SSI Algorithm 4  · ACHS fingersticks  · Diabetic diet

## 2022-01-04 NOTE — NURSING NOTE
Pt is alert and oriented with no c/o pain  Moves gingerly in bed  No focal neuro deficit  Heart tones clear with palpable pulses  No edema  Color is tan  Skin is warm and dry   Lungs are decreased with fine bibasilar crackles  Cough is dry and non productive  Chest tube to water seal with no air leak  Site dressing is intact  Respirations are easy and unlabored at rest   Mild dyspnea on exertion  Using IS with encouragement  Abdomen is unremarkable  No BM  Voiding clear yellow with purewick  IV sites intact

## 2022-01-04 NOTE — ASSESSMENT & PLAN NOTE
· Presented on 12/16 with fever, chills, shortness of breath, cough - admitted to [facility] on 2L NC  · COVID-19 positive on 12/16  · Unvaccinated  · Transferred on 12/21 to ICU 2/2 escalating O2 requirements  · Severe pathway:  · Decadron 0 1 mg/kg completed 12/30  · Completed Solu-Medrol 50 mg daily x 5 days  · Baricitinib x 14 days - completed  · Remdesivir completed 12/20  · DVT ppx with SQ heparin 5000 q8 - D-Dimer (1 21 - 0 93 - 0 63)  · Encourage side-lying/prone positioning  · Encourage I/S, aggressive pulmonary hygiene  · Titrate oxygen to maintain SpO2 > 88% - currently requiring  6L NC  · Trend CRP as needed (69 - 264 - 43)

## 2022-01-04 NOTE — OCCUPATIONAL THERAPY NOTE
Occupational Therapy Treatment Note     Patient Name: Adán BELLO Date: 1/4/2022  Problem List  Principal Problem:    COVID-19  Active Problems:    Type 2 diabetes mellitus without complication, without long-term current use of insulin ([facility] [address] )    Anxiety    Essential hypertension    Hypercholesterolemia    Acute respiratory failure with hypoxia (HCC)    Ambulatory dysfunction    Chronic kidney disease, stage 3b (HCC)    Anemia    SIRS (systemic inflammatory response syndrome) (HCC)    Pneumothorax    Bacteremia due to methicillin susceptible Staphylococcus aureus (MSSA)    Thrombocytosis          01/04/22 1058   OT Last Visit   OT Visit Date 01/04/22   Note Type   Note Type Treatment   Restrictions/Precautions   Weight Bearing Precautions Per Order No   Other Precautions Airborne/isolation;Contact/isolation;Multiple lines;Telemetry;O2;Fall Risk  (4 L O2 via NC upon arrival- increased throughout session )   General   Response to Previous Treatment Patient with no complaints from previous session   Lifestyle   Autonomy Pt reports being (I) c ADL/IADLs prior to admission, lives with  and daughter in 2 story home, does not use AD, is retired, and does drive     Reciprocal Relationships daughter,     [employer] to [employer] retired, cares for handicapped daughter    Sammie Loredo enjoys going shooting    Pain Assessment   Pain Assessment Tool 0-10   Pain Score No Pain   ADL   Where Assessed   (Recliner )   Grooming Assistance 5  Supervision/Setup   Grooming Deficit Setup;Supervision/safety; Increased time to complete   Grooming Comments Pt completed oral hyigene with set-up assist   UB Bathing Assistance 5  Supervision/Setup   UB Bathing Deficit Setup;Verbal cueing;Supervision/safety; Increased time to complete   LB Bathing Assistance 3  Moderate Assistance   LB Bathing Deficit Setup;Steadying;Verbal cueing;Supervision/safety; Increased time to complete; Buttocks;Right lower leg including foot;Left lower leg including foot   UB Dressing Assistance 4  Minimal Assistance   UB Dressing Deficit Setup;Verbal cueing;Supervision/safety; Increased time to complete;Pull around back; Fasteners   UB Dressing Comments Pt donned hospital gown    LB Dressing Comments Pt total A to don/doff socks    Toileting Assistance  2  Maximal Assistance   Toileting Deficit Setup;Steadying;Verbal cueing;Supervison/safety; Increased time to complete; Bedside commode;Perineal hygiene   Bed Mobility   Additional Comments DNT bed mobility: pt seated OOB in recliner upon arrival and at end of session    Transfers   Sit to Stand 4  Minimal assistance   Additional items Assist x 1; Armrests; Increased time required;Verbal cues   Stand to Sit 4  Minimal assistance   Additional items Assist x 1; Armrests; Increased time required;Verbal cues   Functional Mobility   Functional Mobility 4  Minimal assistance   Additional Comments Pt ambulated ~5 feet forwards/backwards and from recliner<>commode  Pt noted to have significant desaturation with mobility to 78%  O2 increased with desaturation and encouraged pursed lip breathing  SpO2 returned to 88% with increased seated rest after each mobility trial     Additional items Rolling walker   Toilet Transfers   Toilet Transfer From   (Recliner )   Toilet Transfer Type To and from   Toilet Transfer to Standard bedside commode   Toilet Transfer Technique Ambulating   Toilet Transfers Minimal assistance   Cognition   Overall Cognitive Status WFL   Arousal/Participation Alert; Responsive; Cooperative   Attention Within functional limits   Orientation Level Oriented X4   Memory Within functional limits   Following Commands Follows all commands and directions without difficulty   Comments Pt agreeable to OT session    Activity Tolerance   Activity Tolerance Patient limited by fatigue;Treatment limited secondary to medical complications (Comment)  (SOB/MOREAU)   Medical Staff Made Aware PT Megan present for co-treat d/t pt's medical complexity and unstable medical presentation  Pt unable to tolerate more than one therapy session at this time d/t decreased endurance and desaturation  Assessment   Assessment Patient participated in Skilled OT session this date with interventions consisting of ADL re training with the use of correct body mechnaics, deep breathing technique and  therapeutic activities to: increase activity tolerance   Patient agreeable to OT treatment session, upon arrival patient was found seated OOB to Recliner and in no apparent distress  Patient completed functional transfers and mobility with min assist  While seated on recliner, patient required sup-min assist for UB ADLs and mod-total assist for LB ADLs  Patient completed toilet transfer with min assist and required max assist for daniela care  In comparison to previous session, patient with improvements in endurance and LB bathing  Patient requiring verbal cues for pacing thru activity steps, one step directives and frequent rest periods  Patient continues to be functioning below baseline level, occupational performance remains limited secondary to factors listed above and increased risk for falls and injury  From OT standpoint, recommendation at time of d/c would be Post acute rehabilitation services  Patient to benefit from continued Occupational Therapy treatment while in the hospital to address deficits as defined above and maximize level of functional independence with ADLs and functional mobility  Plan   Treatment Interventions ADL retraining;Functional transfer training; Endurance training;Patient/family training   Goal Expiration Date 01/13/22   OT Treatment Day 4   OT Frequency 3-5x/wk   Recommendation   OT Discharge Recommendation Post acute rehabilitation services   OT - OK to Discharge Yes  (Once medically cleared )   Additional Comments  At end of session, pt seated OOB in recliner on 6 L O2 and SpO2 reading 87%   KYLER tracy aware of all outcomes  Additional Comments 2 The patient's raw score on the AM-PAC Daily Activity inpatient short form is 14, standardized score is 33 39, less than 39 4  Patients at this level are likely to benefit from discharge to post-acute rehabilitation services  Please refer to the recommendation of the Occupational Therapist for safe discharge planning     AM-PAC Daily Activity Inpatient   Lower Body Dressing 1   Bathing 2   Toileting 1   Upper Body Dressing 3   Grooming 3   Eating 4   Daily Activity Raw Score 14   Daily Activity Standardized Score (Calc for Raw Score >=11) 33 39   AM-[facility] Applied Cognition Inpatient   Following a Speech/Presentation 3   Understanding Ordinary Conversation 4   Taking Medications 3   Remembering Where Things Are Placed or Put Away 3   Remembering List of 4-5 Errands 3   Taking Care of Complicated Tasks 3   Applied Cognition Raw Score 19   Applied Cognition Standardized Score 39 77     Ro Hernandez, OT

## 2022-01-04 NOTE — ASSESSMENT & PLAN NOTE
Lab Results   Component Value Date    EGFR 33 01/03/2022    EGFR 36 01/02/2022    EGFR 32 01/01/2022    CREATININE 1 46 (H) 01/03/2022    CREATININE 1 38 (H) 01/02/2022    CREATININE 1 50 (H) 01/01/2022     · Creatinine 1 56 on admission, now improved   · Baseline creatinine appears to be around 1 2 - 1 6   · Avoid nephrotoxic agents and hypotension  · Lisinopril on hold  · Trend renal indices  · Strict I&O  · Daily weights

## 2022-01-04 NOTE — ASSESSMENT & PLAN NOTE
· Became hypoxic 12/25 AM, CXR obtained demonstrating right pneumothorax  · 16 Fr   CT placed   · Follow up CXR demonstrated improvement in pneumothorax  · Right sided chest tube removed 12/28  · R chest tube replaced on 12/30 for recurrent R ptx  · R CT placed to water seal 1/2  · Follow AM CXR, can consider removal today if no ptx  · STAT CXR at any respiratory decompensation

## 2022-01-04 NOTE — PLAN OF CARE
Problem: OCCUPATIONAL THERAPY ADULT  Goal: Performs self-care activities at highest level of function for planned discharge setting  See evaluation for individualized goals  Description: Treatment Interventions: ADL retraining,Functional transfer training,Endurance training,Patient/family training          See flowsheet documentation for full assessment, interventions and recommendations  Outcome: Progressing  Note: Limitation: Decreased ADL status,Decreased endurance  Prognosis: Fair  Assessment: Patient participated in Skilled OT session this date with interventions consisting of ADL re training with the use of correct body mechnaics, deep breathing technique and  therapeutic activities to: increase activity tolerance   Patient agreeable to OT treatment session, upon arrival patient was found seated OOB to Recliner and in no apparent distress  Patient completed functional transfers and mobility with min assist  While seated on recliner, patient required sup-min assist for UB ADLs and mod-total assist for LB ADLs  Patient completed toilet transfer with min assist and required max assist for daniela care  In comparison to previous session, patient with improvements in endurance and LB bathing  Patient requiring verbal cues for pacing thru activity steps, one step directives and frequent rest periods  Patient continues to be functioning below baseline level, occupational performance remains limited secondary to factors listed above and increased risk for falls and injury  From OT standpoint, recommendation at time of d/c would be Post acute rehabilitation services  Patient to benefit from continued Occupational Therapy treatment while in the hospital to address deficits as defined above and maximize level of functional independence with ADLs and functional mobility        OT Discharge Recommendation: Post acute rehabilitation services  OT - OK to Discharge: Yes (Once medically cleared )     Dru Matta OT

## 2022-01-05 ENCOUNTER — APPOINTMENT (INPATIENT)
Dept: RADIOLOGY | Facility: HOSPITAL | Age: 82
DRG: 871 | End: 2022-01-05
Payer: COMMERCIAL

## 2022-01-05 PROBLEM — A41.01 SEPSIS DUE TO METHICILLIN SUSCEPTIBLE STAPHYLOCOCCUS AUREUS (HCC): Status: ACTIVE | Noted: 2021-12-21

## 2022-01-05 LAB
ANION GAP SERPL CALCULATED.3IONS-SCNC: 7 MMOL/L (ref 4–13)
BUN SERPL-MCNC: 51 MG/DL (ref 5–25)
CALCIUM SERPL-MCNC: 8.9 MG/DL (ref 8.4–10.2)
CHLORIDE SERPL-SCNC: 108 MMOL/L (ref 96–108)
CO2 SERPL-SCNC: 24 MMOL/L (ref 21–32)
CREAT SERPL-MCNC: 1.19 MG/DL (ref 0.6–1.3)
ERYTHROCYTE [DISTWIDTH] IN BLOOD BY AUTOMATED COUNT: 13.8 % (ref 11.6–15.1)
GFR SERPL CREATININE-BSD FRML MDRD: 43 ML/MIN/1.73SQ M
GLUCOSE SERPL-MCNC: 120 MG/DL (ref 65–140)
GLUCOSE SERPL-MCNC: 184 MG/DL (ref 65–140)
GLUCOSE SERPL-MCNC: 61 MG/DL (ref 65–140)
GLUCOSE SERPL-MCNC: 71 MG/DL (ref 65–140)
GLUCOSE SERPL-MCNC: 73 MG/DL (ref 65–140)
HCT VFR BLD AUTO: 31 % (ref 34.8–46.1)
HGB BLD-MCNC: 9.7 G/DL (ref 11.5–15.4)
MCH RBC QN AUTO: 26.9 PG (ref 26.8–34.3)
MCHC RBC AUTO-ENTMCNC: 31.3 G/DL (ref 31.4–37.4)
MCV RBC AUTO: 86 FL (ref 82–98)
PLATELET # BLD AUTO: 352 THOUSANDS/UL (ref 149–390)
PMV BLD AUTO: 9.6 FL (ref 8.9–12.7)
POTASSIUM SERPL-SCNC: 4.9 MMOL/L (ref 3.5–5.3)
RBC # BLD AUTO: 3.6 MILLION/UL (ref 3.81–5.12)
SODIUM SERPL-SCNC: 139 MMOL/L (ref 135–147)
WBC # BLD AUTO: 10.15 THOUSAND/UL (ref 4.31–10.16)

## 2022-01-05 PROCEDURE — 71045 X-RAY EXAM CHEST 1 VIEW: CPT

## 2022-01-05 PROCEDURE — 80048 BASIC METABOLIC PNL TOTAL CA: CPT | Performed by: NURSE PRACTITIONER

## 2022-01-05 PROCEDURE — 82948 REAGENT STRIP/BLOOD GLUCOSE: CPT

## 2022-01-05 PROCEDURE — 85027 COMPLETE CBC AUTOMATED: CPT | Performed by: NURSE PRACTITIONER

## 2022-01-05 PROCEDURE — 99232 SBSQ HOSP IP/OBS MODERATE 35: CPT | Performed by: INTERNAL MEDICINE

## 2022-01-05 PROCEDURE — 94760 N-INVAS EAR/PLS OXIMETRY 1: CPT

## 2022-01-05 PROCEDURE — 0WP9X0Z REMOVAL OF DRAINAGE DEVICE FROM RIGHT PLEURAL CAVITY, EXTERNAL APPROACH: ICD-10-PCS | Performed by: INTERNAL MEDICINE

## 2022-01-05 RX ORDER — INSULIN GLARGINE 100 [IU]/ML
10 INJECTION, SOLUTION SUBCUTANEOUS
Status: DISCONTINUED | OUTPATIENT
Start: 2022-01-05 | End: 2022-01-06

## 2022-01-05 RX ADMIN — FERROUS SULFATE TAB 325 MG (65 MG ELEMENTAL FE) 325 MG: 325 (65 FE) TAB at 08:51

## 2022-01-05 RX ADMIN — METHYLPREDNISOLONE SODIUM SUCCINATE 40 MG: 40 INJECTION, POWDER, FOR SOLUTION INTRAMUSCULAR; INTRAVENOUS at 08:52

## 2022-01-05 RX ADMIN — ATORVASTATIN CALCIUM 40 MG: 40 TABLET, FILM COATED ORAL at 22:00

## 2022-01-05 RX ADMIN — INSULIN GLARGINE 10 UNITS: 100 INJECTION, SOLUTION SUBCUTANEOUS at 22:00

## 2022-01-05 RX ADMIN — ACETAMINOPHEN 975 MG: 325 TABLET ORAL at 12:07

## 2022-01-05 RX ADMIN — INSULIN LISPRO 5 UNITS: 100 INJECTION, SOLUTION INTRAVENOUS; SUBCUTANEOUS at 08:54

## 2022-01-05 RX ADMIN — HEPARIN SODIUM 5000 UNITS: 5000 INJECTION INTRAVENOUS; SUBCUTANEOUS at 21:59

## 2022-01-05 RX ADMIN — ACETAMINOPHEN 975 MG: 325 TABLET ORAL at 17:49

## 2022-01-05 RX ADMIN — ACETAMINOPHEN 975 MG: 325 TABLET ORAL at 06:06

## 2022-01-05 RX ADMIN — LIDOCAINE 1 PATCH: 50 PATCH TOPICAL at 08:52

## 2022-01-05 RX ADMIN — INSULIN LISPRO 4 UNITS: 100 INJECTION, SOLUTION INTRAVENOUS; SUBCUTANEOUS at 17:49

## 2022-01-05 RX ADMIN — INSULIN LISPRO 5 UNITS: 100 INJECTION, SOLUTION INTRAVENOUS; SUBCUTANEOUS at 17:49

## 2022-01-05 RX ADMIN — HEPARIN SODIUM 5000 UNITS: 5000 INJECTION INTRAVENOUS; SUBCUTANEOUS at 15:00

## 2022-01-05 RX ADMIN — HEPARIN SODIUM 5000 UNITS: 5000 INJECTION INTRAVENOUS; SUBCUTANEOUS at 06:06

## 2022-01-05 NOTE — PROGRESS NOTES
Merlin 46  Progress Note - [place] 1940, [de-identified] y o  female MRN: [id number]  Unit/Bed#: -Golden Encounter: [id number]  Primary Care Provider: Daniela Nino DO   Date and time admitted to hospital: 12/16/2021  4:49 PM    * Acute respiratory failure with hypoxia ([address] )  Assessment & Plan  · Likely due  to COVID -19 viral pneumonia and pneumothorax  · Oxygen saturations 97% on 5 L nasal cannula this morning  · Further management as below  · Wean oxygen as tolerated      COVID-19  Assessment & Plan  · COVID-19 positive on 12/16/2021; Unvaccinated  · Decadron 0 1 mg/kg completed 12/30  · Completed 14 day course of Baricitinib   · Completed 5 day course of Remdesivir   · Continue Solu-Medrol taper as recommended by Pulmonary/Critical Care  · Isolation precautions may be discontinued at this time      Pneumothorax  Assessment & Plan  · 12/25/2021 CXR demonstrated right pneumothorax and 16 Fr chest tube was placed; Chest tube was removed on 12/28  · R chest tube replaced on 12/30 for recurrent R ptx  · Chest tube clamped yesterday  · CXR (1/5): Chest tube over right base with no pneumothorax  No change in left greater than right ground glass opacity due to Covid-19  · Pulmonology following, hopefully can remove chest tube at this time    Ambulatory dysfunction  Assessment & Plan  · PT OT recommending post acute rehab placement  · Case management following to aid with discharge plan    Type 2 diabetes mellitus without complication, without long-term current use of insulin [facility])  Assessment & Plan  Lab Results   Component Value Date    HGBA1C 6 2 (H) 11/22/2021       Recent Labs     01/04/22  1631 01/04/22  2039 01/05/22  0601 01/05/22  1048   POCGLU 227* 126 71 61*       Blood Sugar Average: Last 72 hrs:  (P) 186 875     · Decrease Lantus to 10 units q h s  Only  · Initiate list pro 5 units t i d   With meals  · Continue Accu-Cheks, corrective sliding-scale insulin, and hypoglycemic protocol    Sepsis due to methicillin susceptible Staphylococcus aureus ([facility] [address] )  Assessment & Plan  · Sepsis present on admission due to COVID pneumonia and MSSA bacteremia  · TTE was negative  · Completed 7 days of cefazolin completed on     Bacteremia due to methicillin susceptible Staphylococcus aureus (MSSA)  Assessment & Plan  · Blood Clx: MSSA   · Repeat BClx : NGTD  · Completed course of cefazolin    Chronic kidney disease, stage 3b [facility])  Assessment & Plan  Lab Results   Component Value Date    EGFR 43 2022    EGFR 39 2022    EGFR 33 2022    CREATININE 1 19 2022    CREATININE 1 27 2022    CREATININE 1 46 (H) 2022     · Creatinine back to baseline  · Avoid nephrotoxic agents and hypotension  · Continue to monitor kidney function periodically      VTE Pharmacologic Prophylaxis: VTE Score: 6 High Risk (Score >/= 5) - Pharmacological DVT Prophylaxis Ordered: heparin  Sequential Compression Devices Ordered  Patient Centered Rounds: I performed bedside rounds with nursing staff today  Discussions with Specialists or Other Care Team Provider:  Case Management, nursing, PT    Education and Discussions with Family / Patient: Updated  (daughter) via phone  Time Spent for Care: 45 minutes  More than 50% of total time spent on counseling and coordination of care as described above  Current Length of Stay: 19 day(s)  Current Patient Status: Inpatient   Certification Statement: The patient will continue to require additional inpatient hospital stay due to Hypoxic respiratory failure and pneumothorax  Discharge Plan: To be determined based on clinical improvement    Code Status: Level 1 - Full Code    Subjective:   Patient resting comfortably in bed without any acute complaints  No overnight events      Objective:     Vitals:   Temp (24hrs), Av 6 °F (36 4 °C), Min:96 6 °F (35 9 °C), Max:98 °F (36 7 °C)    Temp:  [96 6 °F (35 9 °C)-98 °F (36 7 °C)] 97 9 °F (36 6 °C)  HR:  [70-97] 78  Resp:  [18-30] 18  BP: (110-143)/(53-78) 120/66  SpO2:  [90 %-99 %] 95 %  Body mass index is 23 42 kg/m²  Input and Output Summary (last 24 hours): Intake/Output Summary (Last 24 hours) at 1/5/2022 1327  Last data filed at 1/5/2022 0945  Gross per 24 hour   Intake 440 ml   Output 700 ml   Net -260 ml       Physical Exam:   Physical Exam  Vitals and nursing note reviewed  Constitutional:       General: She is not in acute distress  Appearance: Normal appearance  She is well-developed  HENT:      Head: Normocephalic and atraumatic  Mouth/Throat:      Mouth: Mucous membranes are moist       Pharynx: Oropharynx is clear  Eyes:      Extraocular Movements: Extraocular movements intact  Conjunctiva/sclera: Conjunctivae normal    Cardiovascular:      Rate and Rhythm: Normal rate and regular rhythm  Pulses: Normal pulses  Heart sounds: Normal heart sounds  No murmur heard  Pulmonary:      Effort: Pulmonary effort is normal  No respiratory distress  Breath sounds: Normal breath sounds  Comments: Right-sided chest tube in place  Abdominal:      General: Bowel sounds are normal  There is no distension  Palpations: Abdomen is soft  Tenderness: There is no abdominal tenderness  Musculoskeletal:         General: Normal range of motion  Cervical back: Normal range of motion and neck supple  Skin:     General: Skin is warm and dry  Neurological:      General: No focal deficit present  Mental Status: She is alert and oriented to person, place, and time     Psychiatric:         Mood and Affect: Mood normal          Behavior: Behavior normal          Judgment: Judgment normal         Labs:  Results from last 7 days   Lab Units 01/05/22  0430   WBC Thousand/uL 10 15   HEMOGLOBIN g/dL 9 7*   HEMATOCRIT % 31 0*   PLATELETS Thousands/uL 352     Results from last 7 days   Lab Units 01/05/22  0430 01/04/22  0509 01/03/22  0446 SODIUM mmol/L 139   < > 135   POTASSIUM mmol/L 4 9   < > 4 7   CHLORIDE mmol/L 108   < > 106   CO2 mmol/L 24   < > 23   BUN mg/dL 51*   < > 66*   CREATININE mg/dL 1 19   < > 1 46*   ANION GAP mmol/L 7   < > 6   CALCIUM mg/dL 8 9   < > 8 8   ALBUMIN g/dL  --   --  3 0*   TOTAL BILIRUBIN mg/dL  --   --  0 34   ALK PHOS U/L  --   --  85   ALT U/L  --   --  3*   AST U/L  --   --  18   GLUCOSE RANDOM mg/dL 73   < > 151*    < > = values in this interval not displayed  Results from last 7 days   Lab Units 01/05/22  1048 01/05/22  0601 01/04/22  2039 01/04/22  1631 01/04/22  1112 01/04/22  0728 01/03/22  2058 01/03/22  1610 01/03/22  1154 01/03/22  0756 01/02/22  2224 01/02/22  2043   POC GLUCOSE mg/dl 61* 71 126 227* 330* 118 259* 212* 246* 117 227* 274*               Lines/Drains:  Invasive Devices  Report    Peripheral Intravenous Line            Peripheral IV 01/01/22 Left;Ventral (anterior) Forearm 3 days          Drain            External Urinary Catheter 13 days    Chest Tube Right Midaxillary 6 days              Imaging: Reviewed radiology reports from this admission including: chest xray    Recent Cultures (last 7 days):   Results from last 7 days   Lab Units 12/29/21  1357 12/29/21  1356   BLOOD CULTURE  No Growth After 5 Days  No Growth After 5 Days         Last 24 Hours Medication List:   Current Facility-Administered Medications   Medication Dose Route Frequency Provider Last Rate    acetaminophen  975 mg Oral Q6H [address] BENTON Worthy      atorvastatin  40 mg Oral HS BENTON Worthy      bisacodyl  10 mg Rectal Daily PRN BENTON Burton      docusate sodium  100 mg Oral Daily BENTON Worthy      ferrous sulfate  325 mg Oral Daily With Breakfast BENTON Worthy      heparin (porcine)  5,000 Units Subcutaneous Counts include [facility] beds at the [facility] BENTON Worthy      insulin glargine  10 Units Subcutaneous HS [place] JOHNNY Bearden, DO      insulin lispro  4-20 Units Subcutaneous TID AC BENTON Worthy      insulin lispro  4-20 Units Subcutaneous HS BENTON Worthy      insulin lispro  5 [facility]cutaneous [facility]      lidocaine  1 patch Topical Daily [facility]      [START ON 1/6/2022] methylPREDNISolone sodium succinate  30 mg Intravenous Daily BENTON Worthy      Followed by   Clemente Martinez ON 1/9/2022] methylPREDNISolone sodium succinate  20 mg Intravenous Daily B[place]ON Worthy      Followed by   Clemente Martinez ON 1/12/2022] methylPREDNISolone sodium succinate  10 mg Intravenous Daily BENTON Worthy      ondansetron  4 mg Intravenous Q6H PRN [facility]      oxyCODONE  2 5 mg Oral Q6H PRN BENTON Worthy      polyethylene glycol  17 g [facility]      senna  1 tablet [facility] HS [facility]          Today, Patient Was Seen By: Damián Hartman DO    **Please Note: This note may have been constructed using a voice recognition system  **

## 2022-01-05 NOTE — PLAN OF CARE
Problem: Potential for Falls  Goal: Patient will remain free of falls  Description: INTERVENTIONS:  - Educate patient/family on patient safety including physical limitations  - Instruct patient to call for assistance with activity   - Consult OT/PT to assist with strengthening/mobility   - Keep Call bell within reach  - Keep bed low and locked with side rails adjusted as appropriate  - Keep care items and personal belongings within reach  - Initiate and maintain comfort rounds  - Make Fall Risk Sign visible to staff  - Offer Toileting every 2 Hours, in advance of need  - Initiate/Maintain fall alarm  - Obtain necessary fall risk management equipment: fall alarm  - Apply yellow socks and bracelet for high fall risk patients  - Consider moving patient to room near nurses station  Outcome: Progressing     Problem: PAIN - ADULT  Goal: Verbalizes/displays adequate comfort level or baseline comfort level  Description: Interventions:  - Encourage patient to monitor pain and request assistance  - Assess pain using appropriate pain scale  - Administer analgesics based on type and severity of pain and evaluate response  - Implement non-pharmacological measures as appropriate and evaluate response  - Consider cultural and social influences on pain and pain management  - Notify physician/advanced practitioner if interventions unsuccessful or patient reports new pain  Outcome: Progressing     Problem: INFECTION - ADULT  Goal: Absence or prevention of progression during hospitalization  Description: INTERVENTIONS:  - Assess and monitor for signs and symptoms of infection  - Monitor lab/diagnostic results  - Monitor all insertion sites, i e  indwelling lines, tubes, and drains  - Monitor endotracheal if appropriate and nasal secretions for changes in amount and color  - [place] appropriate cooling/warming therapies per order  - Administer medications as ordered  - Instruct and encourage patient and family to use good hand hygiene technique  - Identify and instruct in appropriate isolation precautions for identified infection/condition  Outcome: Progressing  Goal: Absence of fever/infection during neutropenic period  Description: INTERVENTIONS:  - Monitor WBC    Outcome: Progressing     Problem: SAFETY ADULT  Goal: Patient will remain free of falls  Description: INTERVENTIONS:  - Educate patient/family on patient safety including physical limitations  - Instruct patient to call for assistance with activity   - Consult OT/PT to assist with strengthening/mobility   - Keep Call bell within reach  - Keep bed low and locked with side rails adjusted as appropriate  - Keep care items and personal belongings within reach  - Initiate and maintain comfort rounds  - Make Fall Risk Sign visible to staff  - Offer Toileting every 2 Hours, in advance of need  - Initiate/Maintain fall alarm  - Obtain necessary fall risk management equipment: fall alarm  - Apply yellow socks and bracelet for high fall risk patients  - Consider moving patient to room near nurses station  Outcome: Progressing  Goal: Maintain or return to baseline ADL function  Description: INTERVENTIONS:  - Educate patient/family on patient safety including physical limitations  - Instruct patient to call for assistance with activity   - Consult OT/PT to assist with strengthening/mobility   - Keep Call bell within reach  - Keep bed low and locked with side rails adjusted as appropriate  - Keep care items and personal belongings within reach  - Initiate and maintain comfort rounds  - Make Fall Risk Sign visible to staff  - Offer Toileting every 2 Hours, in advance of need  - Initiate/Maintain fall alarm  - Obtain necessary fall risk management equipment: fall alarm  - Apply yellow socks and bracelet for high fall risk patients  - Consider moving patient to room near nurses station  Outcome: Progressing  Goal: Maintains/Returns to pre admission functional level  Description: INTERVENTIONS:  - Perform BMAT or MOVE assessment daily    - Set and communicate daily mobility goal to care team and patient/family/caregiver  - Collaborate with rehabilitation services on mobility goals if consulted  - Perform Range of Motion 3 times a day  - Reposition patient every 2 hours  - Dangle patient 3 times a day  - Stand patient 3 times a day  - Ambulate patient 3 times a day  - Out of bed to chair 3 times a day   - Out of bed for meals 3 times a day  - Out of bed for toileting  - Record patient progress and toleration of activity level   Outcome: Progressing     Problem: DISCHARGE PLANNING  Goal: Discharge to home or other facility with appropriate resources  Description: INTERVENTIONS:  - Identify barriers to discharge w/patient and caregiver  - Arrange for needed discharge resources and transportation as appropriate  - Identify discharge learning needs (meds, wound care, etc )  - Arrange for interpretive services to assist at discharge as needed  - Refer to Case Management Department for coordinating discharge planning if the patient needs post-hospital services based on physician/advanced practitioner order or complex needs related to functional status, cognitive ability, or social support system  Outcome: Progressing     Problem: Knowledge Deficit  Goal: Patient/family/caregiver demonstrates understanding of disease process, treatment plan, medications, and discharge instructions  Description: Complete learning assessment and assess knowledge base    Interventions:  - Provide teaching at level of understanding  - Provide teaching via preferred learning methods  Outcome: Progressing     Problem: MOBILITY - ADULT  Goal: Maintain or return to baseline ADL function  Description: INTERVENTIONS:  - Educate patient/family on patient safety including physical limitations  - Instruct patient to call for assistance with activity   - Consult OT/PT to assist with strengthening/mobility   - Keep Call bell within reach  - Keep bed low and locked with side rails adjusted as appropriate  - Keep care items and personal belongings within reach  - Initiate and maintain comfort rounds  - Make Fall Risk Sign visible to staff  - Offer Toileting every 2 Hours, in advance of need  - Initiate/Maintain fall alarm  - Obtain necessary fall risk management equipment: fall alarm  - Apply yellow socks and bracelet for high fall risk patients  - Consider moving patient to room near nurses station  Outcome: Progressing  Goal: Maintains/Returns to pre admission functional level  Description: INTERVENTIONS:  - Perform BMAT or MOVE assessment daily    - Set and communicate daily mobility goal to care team and patient/family/caregiver  - Collaborate with rehabilitation services on mobility goals if consulted  - Perform Range of Motion 3 times a day  - Reposition patient every 2 hours    - Dangle patient 3 times a day  - Stand patient 3 times a day  - Ambulate patient 3 times a day  - Out of bed to chair 3 times a day   - Out of bed for meals 3 times a day  - Out of bed for toileting  - Record patient progress and toleration of activity level   Outcome: Progressing     Problem: Prexisting or High Potential for Compromised Skin Integrity  Goal: Skin integrity is maintained or improved  Description: INTERVENTIONS:  - Identify patients at risk for skin breakdown  - Assess and monitor skin integrity  - Assess and monitor nutrition and hydration status  - Monitor labs   - Assess for incontinence   - Turn and reposition patient  - Assist with mobility/ambulation  - Relieve pressure over bony prominences  - Avoid friction and shearing  - Provide appropriate hygiene as needed including keeping skin clean and dry  - Evaluate need for skin moisturizer/barrier cream  - Collaborate with interdisciplinary team   - Patient/family teaching  - Consider wound care consult   Outcome: Progressing     Problem: RESPIRATORY - ADULT  Goal: Achieves optimal ventilation and oxygenation  Description: INTERVENTIONS:  - Assess for changes in respiratory status  - Assess for changes in mentation and behavior  - Position to facilitate oxygenation and minimize respiratory effort  - Oxygen administered by appropriate delivery if ordered  - Initiate smoking cessation education as indicated  - Encourage broncho-pulmonary hygiene including cough, deep breathe, Incentive Spirometry  - Assess the need for suctioning and aspirate as needed  - Assess and instruct to report SOB or any respiratory difficulty  - Respiratory Therapy support as indicated  Outcome: Progressing     Problem: Nutrition/Hydration-ADULT  Goal: Nutrient/Hydration intake appropriate for improving, restoring or maintaining nutritional needs  Description: Monitor and assess patient's nutrition/hydration status for malnutrition  Collaborate with interdisciplinary team and initiate plan and interventions as ordered  Monitor patient's weight and dietary intake as ordered or per policy  Utilize nutrition screening tool and intervene as necessary  Determine patient's food preferences and provide high-protein, high-caloric foods as appropriate       INTERVENTIONS:  - Monitor oral intake, urinary output, labs, and treatment plans  - Assess nutrition and hydration status and recommend course of action  - Evaluate amount of meals eaten  - Assist patient with eating if necessary   - Allow adequate time for meals  - Recommend/ encourage appropriate diets, oral nutritional supplements, and vitamin/mineral supplements  - Order, calculate, and assess calorie counts as needed  - Recommend, monitor, and adjust tube feedings and TPN/PPN based on assessed needs  - Assess need for intravenous fluids  - Provide specific nutrition/hydration education as appropriate  - Include patient/family/caregiver in decisions related to nutrition  Outcome: Progressing

## 2022-01-05 NOTE — ASSESSMENT & PLAN NOTE
· Sepsis present on admission due to COVID pneumonia and MSSA bacteremia  · TTE was negative  · Completed 7 days of cefazolin completed on 1/2

## 2022-01-05 NOTE — RESPIRATORY THERAPY NOTE
01/05/22 1441   Oxygen Therapy/Pulse Ox   O2 Device None (Room air)   SpO2 93 %   SpO2 Activity At Rest   $ Pulse Oximetry Spot Check Charge Completed

## 2022-01-05 NOTE — ASSESSMENT & PLAN NOTE
· 12/25/2021 CXR demonstrated right pneumothorax and 16 Fr chest tube was placed; Chest tube was removed on 12/28  · R chest tube replaced on 12/30 for recurrent R ptx  · Chest tube clamped yesterday  · CXR (1/5): Chest tube over right base with no pneumothorax  No change in left greater than right ground glass opacity due to Covid-19     · Pulmonology following, hopefully can remove chest tube at this time

## 2022-01-05 NOTE — ASSESSMENT & PLAN NOTE
· Likely due  to COVID -19 viral pneumonia and pneumothorax  · Oxygen saturations 97% on 5 L nasal cannula this morning  · Further management as below  · Wean oxygen as tolerated

## 2022-01-05 NOTE — ASSESSMENT & PLAN NOTE
· COVID-19 positive on 12/16/2021;  Unvaccinated  · Decadron 0 1 mg/kg completed 12/30  · Completed 14 day course of Baricitinib   · Completed 5 day course of Remdesivir   · Continue Solu-Medrol taper as recommended by Pulmonary/Critical Care  · Isolation precautions may be discontinued at this time

## 2022-01-05 NOTE — CASE MANAGEMENT
Case Management Discharge Planning Note    Patient name Rene Duran  Location /-75 MRN [id number]  : 1940 Date 2022       Current Admission Date: 2021  Current Admission Diagnosis:Acute respiratory failure with hypoxia [facility])   Patient Active Problem List    Diagnosis Date Noted    Thrombocytosis 2021    Bacteremia due to methicillin susceptible Staphylococcus aureus (MSSA) 2021    Pneumothorax 2021    Chronic kidney disease, stage 3b ([facility] ) 2021    Anemia 2021    Sepsis due to methicillin susceptible Staphylococcus aureus ([facility] ) 2021    Ambulatory dysfunction 2021    COVID-19 2021    Acute respiratory failure with hypoxia ([facility] ) 2021    Negative depression screening 2021    Overweight (BMI 25 0-29 9) 2021    Medicare annual wellness visit, subsequent 2020    Localized, primary osteoarthritis of hand 2020    Refused influenza vaccine 2020    Sciatica 2020    Hypertensive kidney disease with chronic kidney disease stage III ([facility] ) 2019    Type 2 diabetes mellitus without complication, without long-term current use of insulin ([facility] ) 2019    Bursitis of shoulder 2018    Groin pain, chronic, left 2018    Paresthesia of arm 2017    Back pain 2017    Muscle pain 2017    Anxiety 2017    Abnormal ECG 2016    Diverticulitis large intestine w/o perforation or abscess w/o bleeding 2016    Essential hypertension 2016    Lower abdominal pain 2016    Atrophic vaginitis 2016    Hypercholesterolemia 2016    Irritable bowel syndrome 2016    Simple chronic anemia 2016    Disorder of shoulder 2008    Articular cartilage disorder of shoulder region 2008    Loose body in joint of shoulder region 2008      LOS (days): 19  Geometric Mean LOS (GMLOS) (days): 5 40  Days to GMLOS:-13 9     OBJECTIVE:  Risk of Unplanned Readmission Score: 21         Current admission status: Inpatient   Preferred Pharmacy:   [address] [facility] [address]   [place], [address]  [facility] [address]  Phone: [phone] Fax: [phone]    [facility] [address]  Phone: [phone] Fax: [phone]    Primary Care Provider: Zach Bergman DO    Primary Insurance: [address] [place] [address]  Secondary Insurance:     DISCHARGE DETAILS:    Discharge planning discussed with[de-identified] patient  Freedom of Choice: Yes  Comments - Freedom of Choice: rehab was recommended   pt was made aware of the referrals that have been sent, she would like to go home with [facility] if possibel, therapy will need to reassess the pt tomorrow to see if the d/c plan is able to be changed                               Other Referral/Resources/Interventions Provided:  Interventions: Short Term Rehab  Referral Comments: [facility] are interested, pt will need authorization

## 2022-01-05 NOTE — PROGRESS NOTES
Patient transferred to 209  All belongings returned to patient; transferred to room 209  IV intact  Report given to [facility] by Malia Pineda during prior shift; all questions answered to satisfaction

## 2022-01-05 NOTE — PLAN OF CARE
Problem: PAIN - ADULT  Goal: Verbalizes/displays adequate comfort level or baseline comfort level  Description: Interventions:  - Encourage patient to monitor pain and request assistance  - Assess pain using appropriate pain scale  - Administer analgesics based on type and severity of pain and evaluate response  - Implement non-pharmacological measures as appropriate and evaluate response  - Consider cultural and social influences on pain and pain management  - Notify physician/advanced practitioner if interventions unsuccessful or patient reports new pain  Outcome: Progressing     Problem: INFECTION - ADULT  Goal: Absence or prevention of progression during hospitalization  Description: INTERVENTIONS:  - Assess and monitor for signs and symptoms of infection  - Monitor lab/diagnostic results  - Monitor all insertion sites, i e  indwelling lines, tubes, and drains  - Monitor endotracheal if appropriate and nasal secretions for changes in amount and color  - [place] appropriate cooling/warming therapies per order  - Administer medications as ordered  - Instruct and encourage patient and family to use good hand hygiene technique  - Identify and instruct in appropriate isolation precautions for identified infection/condition  Outcome: Progressing  Goal: Absence of fever/infection during neutropenic period  Description: INTERVENTIONS:  - Monitor WBC    Outcome: Progressing no

## 2022-01-05 NOTE — ASSESSMENT & PLAN NOTE
· PT OT recommending post acute rehab placement  · Case management following to aid with discharge plan

## 2022-01-05 NOTE — PROGRESS NOTES
Progress Note - Pulmonary   Debbie Peraza [de-identified] y o  female MRN: [id number]  Unit/Bed#: -01 Encounter: [id number]      Assessment:  1  Acute hypoxemic respiratory failure  2  COVID-19 pneumonia  3  Right pneumothorax-spontaneous, likely from COVID-19 pneumonia  Initial chest tube 16 Sami placed 12/25, removed on 12/28  Noted recurrence of moderate to large pneumothorax on the right, 2nd chest tube 12 Sami placed on 12/30    Plan:  · Chest tube clamped since yesterday a m  · Serial chest x-ray while it is clamped showed resolution of the pneumothorax  · I removed the chest tube at bedside this afternoon  · Repeat chest x-ray at 3:30 a m  today  · Continue all other treatment per the COVID 19 protocol per the primary team including steroid taper    Subjective:   No overnight acute events  Remains stable on 1-2 LPM via nasal cannula  Reports improving right chest soreness around the chest tube    Vitals: Blood pressure 120/66, pulse 78, temperature 97 9 °F (36 6 °C), temperature source Oral, resp  rate 18, height 5' (1 524 m), weight 54 4 kg (119 lb 14 9 oz), SpO2 95 %, not currently breastfeeding , Body mass index is 23 42 kg/m²  Intake/Output Summary (Last 24 hours) at 1/5/2022 1429  Last data filed at 1/5/2022 0945  Gross per 24 hour   Intake 440 ml   Output 700 ml   Net -260 ml       Physical Exam  Gen: not in acute distress, Body mass index is 23 42 kg/m²  HEENT:supple, no accessory muscle use, no JVD appreciated  Cardiac: RRR, no murmurs appreciated  Lungs: normal respiratory effort, fine crackles posteriorly/basal, otherwise clear breath sounds bilaterally  Abdomen: soft, nontender, normoactive bowel sounds  Extremities: no cyanosis, no clubbing, no edema  Neuro: AAO X3, no focal motor deficit    Labs: I have personally reviewed pertinent lab results          Aleja Hernadez MD

## 2022-01-05 NOTE — ASSESSMENT & PLAN NOTE
Lab Results   Component Value Date    EGFR 43 01/05/2022    EGFR 39 01/04/2022    EGFR 33 01/03/2022    CREATININE 1 19 01/05/2022    CREATININE 1 27 01/04/2022    CREATININE 1 46 (H) 01/03/2022     · Creatinine back to baseline  · Avoid nephrotoxic agents and hypotension  · Continue to monitor kidney function periodically

## 2022-01-05 NOTE — ASSESSMENT & PLAN NOTE
Lab Results   Component Value Date    HGBA1C 6 2 (H) 11/22/2021       Recent Labs     01/04/22  1631 01/04/22 2039 01/05/22  0601 01/05/22  1048   POCGLU 227* 126 71 61*       Blood Sugar Average: Last 72 hrs:  (P) 186 875     · Decrease Lantus to 10 units q h s  Only  · Initiate list pro 5 units t i d   With meals  · Continue Accu-Cheks, corrective sliding-scale insulin, and hypoglycemic protocol

## 2022-01-06 ENCOUNTER — APPOINTMENT (INPATIENT)
Dept: RADIOLOGY | Facility: HOSPITAL | Age: 82
DRG: 871 | End: 2022-01-06
Payer: COMMERCIAL

## 2022-01-06 LAB
ANION GAP SERPL CALCULATED.3IONS-SCNC: 6 MMOL/L (ref 4–13)
BUN SERPL-MCNC: 54 MG/DL (ref 5–25)
CALCIUM SERPL-MCNC: 8.7 MG/DL (ref 8.4–10.2)
CHLORIDE SERPL-SCNC: 108 MMOL/L (ref 96–108)
CO2 SERPL-SCNC: 23 MMOL/L (ref 21–32)
CREAT SERPL-MCNC: 1.22 MG/DL (ref 0.6–1.3)
ERYTHROCYTE [DISTWIDTH] IN BLOOD BY AUTOMATED COUNT: 13.7 % (ref 11.6–15.1)
GFR SERPL CREATININE-BSD FRML MDRD: 41 ML/MIN/1.73SQ M
GLUCOSE SERPL-MCNC: 107 MG/DL (ref 65–140)
GLUCOSE SERPL-MCNC: 126 MG/DL (ref 65–140)
GLUCOSE SERPL-MCNC: 202 MG/DL (ref 65–140)
GLUCOSE SERPL-MCNC: 215 MG/DL (ref 65–140)
GLUCOSE SERPL-MCNC: 244 MG/DL (ref 65–140)
HCT VFR BLD AUTO: 30.3 % (ref 34.8–46.1)
HGB BLD-MCNC: 9.6 G/DL (ref 11.5–15.4)
MCH RBC QN AUTO: 27.7 PG (ref 26.8–34.3)
MCHC RBC AUTO-ENTMCNC: 31.7 G/DL (ref 31.4–37.4)
MCV RBC AUTO: 87 FL (ref 82–98)
PLATELET # BLD AUTO: 283 THOUSANDS/UL (ref 149–390)
PMV BLD AUTO: 9.3 FL (ref 8.9–12.7)
POTASSIUM SERPL-SCNC: 5.1 MMOL/L (ref 3.5–5.3)
POTASSIUM SERPL-SCNC: 5.2 MMOL/L (ref 3.5–5.3)
RBC # BLD AUTO: 3.47 MILLION/UL (ref 3.81–5.12)
SODIUM SERPL-SCNC: 137 MMOL/L (ref 135–147)
WBC # BLD AUTO: 8.67 THOUSAND/UL (ref 4.31–10.16)

## 2022-01-06 PROCEDURE — 71045 X-RAY EXAM CHEST 1 VIEW: CPT

## 2022-01-06 PROCEDURE — 99232 SBSQ HOSP IP/OBS MODERATE 35: CPT | Performed by: INTERNAL MEDICINE

## 2022-01-06 PROCEDURE — 97110 THERAPEUTIC EXERCISES: CPT

## 2022-01-06 PROCEDURE — 97530 THERAPEUTIC ACTIVITIES: CPT

## 2022-01-06 PROCEDURE — 97116 GAIT TRAINING THERAPY: CPT

## 2022-01-06 PROCEDURE — 82948 REAGENT STRIP/BLOOD GLUCOSE: CPT

## 2022-01-06 PROCEDURE — 85027 COMPLETE CBC AUTOMATED: CPT | Performed by: INTERNAL MEDICINE

## 2022-01-06 PROCEDURE — 80048 BASIC METABOLIC PNL TOTAL CA: CPT | Performed by: INTERNAL MEDICINE

## 2022-01-06 PROCEDURE — 97535 SELF CARE MNGMENT TRAINING: CPT

## 2022-01-06 PROCEDURE — 84132 ASSAY OF SERUM POTASSIUM: CPT | Performed by: INTERNAL MEDICINE

## 2022-01-06 RX ORDER — SODIUM POLYSTYRENE SULFONATE 4.1 MEQ/G
15 POWDER, FOR SUSPENSION ORAL; RECTAL ONCE
Status: COMPLETED | OUTPATIENT
Start: 2022-01-06 | End: 2022-01-06

## 2022-01-06 RX ORDER — INSULIN GLARGINE 100 [IU]/ML
15 INJECTION, SOLUTION SUBCUTANEOUS
Status: DISCONTINUED | OUTPATIENT
Start: 2022-01-06 | End: 2022-01-12 | Stop reason: HOSPADM

## 2022-01-06 RX ADMIN — SODIUM POLYSTYRENE SULFONATE 15 G: 1 POWDER ORAL; RECTAL at 14:25

## 2022-01-06 RX ADMIN — METHYLPREDNISOLONE SODIUM SUCCINATE 30 MG: 40 INJECTION, POWDER, FOR SOLUTION INTRAMUSCULAR; INTRAVENOUS at 09:39

## 2022-01-06 RX ADMIN — LIDOCAINE 1 PATCH: 50 PATCH TOPICAL at 09:40

## 2022-01-06 RX ADMIN — FERROUS SULFATE TAB 325 MG (65 MG ELEMENTAL FE) 325 MG: 325 (65 FE) TAB at 09:39

## 2022-01-06 RX ADMIN — ATORVASTATIN CALCIUM 40 MG: 40 TABLET, FILM COATED ORAL at 21:45

## 2022-01-06 RX ADMIN — INSULIN LISPRO 8 UNITS: 100 INJECTION, SOLUTION INTRAVENOUS; SUBCUTANEOUS at 11:58

## 2022-01-06 RX ADMIN — ACETAMINOPHEN 975 MG: 325 TABLET ORAL at 00:56

## 2022-01-06 RX ADMIN — HEPARIN SODIUM 5000 UNITS: 5000 INJECTION INTRAVENOUS; SUBCUTANEOUS at 05:16

## 2022-01-06 RX ADMIN — INSULIN LISPRO 5 UNITS: 100 INJECTION, SOLUTION INTRAVENOUS; SUBCUTANEOUS at 16:35

## 2022-01-06 RX ADMIN — INSULIN LISPRO 5 UNITS: 100 INJECTION, SOLUTION INTRAVENOUS; SUBCUTANEOUS at 11:58

## 2022-01-06 RX ADMIN — INSULIN LISPRO 8 UNITS: 100 INJECTION, SOLUTION INTRAVENOUS; SUBCUTANEOUS at 16:35

## 2022-01-06 RX ADMIN — DOCUSATE SODIUM 100 MG: 100 CAPSULE, LIQUID FILLED ORAL at 09:39

## 2022-01-06 RX ADMIN — ACETAMINOPHEN 975 MG: 325 TABLET ORAL at 17:09

## 2022-01-06 RX ADMIN — HEPARIN SODIUM 5000 UNITS: 5000 INJECTION INTRAVENOUS; SUBCUTANEOUS at 21:45

## 2022-01-06 RX ADMIN — INSULIN LISPRO 4 UNITS: 100 INJECTION, SOLUTION INTRAVENOUS; SUBCUTANEOUS at 21:46

## 2022-01-06 RX ADMIN — SENNOSIDES 8.6 MG: 8.6 TABLET, FILM COATED ORAL at 21:45

## 2022-01-06 RX ADMIN — INSULIN GLARGINE 15 UNITS: 100 INJECTION, SOLUTION SUBCUTANEOUS at 21:45

## 2022-01-06 RX ADMIN — HEPARIN SODIUM 5000 UNITS: 5000 INJECTION INTRAVENOUS; SUBCUTANEOUS at 14:25

## 2022-01-06 NOTE — OCCUPATIONAL THERAPY NOTE
01/06/22 0932   OT Last Visit   OT Visit Date 01/06/22   Note Type   Note Type Treatment   Restrictions/Precautions   Weight Bearing Precautions Per Order No   Other Precautions Telemetry; Fall Risk   General   Response to Previous Treatment Patient with no complaints from previous session   Lifestyle   Reciprocal Relationships home with     Pain Assessment   Pain Assessment Tool 0-10   Pain Score No Pain   ADL   Where Assessed Other (Comment)  (semi-reclined in bed)   Grooming Assistance 5  Supervision/Setup   Grooming Comments patient brushed teeth with good results    UB Bathing Assistance 5  Supervision/Setup   UB Bathing Deficit Setup;Verbal cueing;Supervision/safety; Increased time to complete   LB Bathing Assistance 3  Moderate Assistance   LB Bathing Deficit Setup;Verbal cueing;Supervision/safety; Increased time to complete; Buttocks;Right lower leg including foot; Left lower leg including foot   UB Dressing Assistance 4  Minimal Assistance   UB Dressing Comments *hospital gown change    LB Dressing Comments not tested at this time   Toileting Comments patient has purewick intact at this time    Bed Mobility   Rolling R 6  Modified independent   Additional items Bedrails; Increased time required   Rolling L 6  Modified independent   Additional items Bedrails; Increased time required   Therapeutic Excerise-Strength   UE Strength Yes   Right Upper Extremity- Strength   R Shoulder Flexion; Horizontal ABduction; Other (Comment)  (pro/re-traction )   R Elbow Elbow flexion;Elbow extension  (in forward And reverse)   R Weight/Reps/Sets 10 reps each   Left Upper Extremity-Strength   L Weights/Reps/Sets all exers  same as RUE above   Coordination   Gross Motor appears WFL    Dexterity appears WFL    Cognition   Overall Cognitive Status WFL   Arousal/Participation Alert; Cooperative   Attention Within functional limits   Orientation Level Oriented X4   Following Commands Follows one step commands without difficulty Comments patient eager for safe return home - states her family is "holding Courtney" for her till when she gets home    Activity Tolerance   Activity Tolerance Patient limited by fatigue   Medical Staff Made Aware nurse Diana Harp present for portion of session - patient was on RA at start of session, and then began reading at high 80's - Sara re-applied nasal cannula @ 1 L of O2    Assessment   Assessment Patient participated in Skilled OT session this date with interventions consisting of ADL re training with the use of correct body mechnaics, Energy Conservation techniques, deep breathing technique, therapeutic exercise to: increase functional use of BUEs, increase BUE muscle strength  and  therapeutic activities to: increase activity tolerance   Patient agreeable to OT treatment session, upon arrival patient was found semi-reclined in bed (back supported)  In comparison to previous session, patient with improvements in activity tolerance  Patient requiring verbal cues for correct technique, verbal cues for pacing thru activity steps, one step directives and frequent rest periods, and self-care assistance as noted in flow sheet  Patient continues to be functioning below baseline level, occupational performance remains limited secondary to factors listed above and increased risk for falls and injury  From OT standpoint, recommendation at time of d/c would be post-acute rehabilitation services  Patient to benefit from continued Occupational Therapy treatment while in the hospital to address deficits as defined above and maximize level of functional independence with ADLs and functional mobility  Plan   Treatment Interventions ADL retraining;Functional transfer training;UE strengthening/ROM; Patient/family training;Energy conservation; Activityengagement   Goal Expiration Date 01/13/22   OT Treatment Day 5   Recommendation   Additional Comments 2 The patient's raw score on the AM-PAC Daily Activity inpatient short form is 16, standardized score is 35 96, less than 39 4  Patients at this level are likely to benefit from discharge to post-acute rehabilitation services  Please refer to the recommendation of the Occupational Therapist for safe discharge planning     AM-PAC Daily Activity Inpatient   Lower Body Dressing 1   Bathing 3   Toileting 2   Upper Body Dressing 3   Grooming 3   Eating 4   Daily Activity Raw Score 16   Daily Activity Standardized Score (Calc for Raw Score >=11) 35 96   AM-PAC Applied Cognition Inpatient   Following a Speech/Presentation 3   Understanding Ordinary Conversation 4   Taking Medications 3   Remembering Where Things Are Placed or Put Away 3   Remembering List of 4-5 Errands 3   Taking Care of Complicated Tasks 3   Applied Cognition Raw Score 19   Applied Cognition Standardized Score 39 77   STAN Shore/JOHNNY

## 2022-01-06 NOTE — ASSESSMENT & PLAN NOTE
Lab Results   Component Value Date    EGFR 41 01/06/2022    EGFR 43 01/05/2022    EGFR 39 01/04/2022    CREATININE 1 22 01/06/2022    CREATININE 1 19 01/05/2022    CREATININE 1 27 01/04/2022     · Creatinine back to baseline  · Avoid nephrotoxic agents and hypotension  · Continue to monitor kidney function periodically

## 2022-01-06 NOTE — PLAN OF CARE
Problem: PHYSICAL THERAPY ADULT  Goal: Performs mobility at highest level of function for planned discharge setting  See evaluation for individualized goals  Description: Treatment/Interventions: Functional transfer training,Elevations,Therapeutic exercise,Endurance training,Bed mobility,Gait training  Equipment Recommended:  rolling walker       See flowsheet documentation for full assessment, interventions and recommendations  Outcome: Progressing  Note: Prognosis: Fair  Problem List: Decreased endurance,Impaired balance,Decreased mobility,Decreased strength,Impaired hearing  Assessment: Pt seen for PT treatment session this date with interventions consisting of gait training to reduce the risk of medical complications w/ emphasis on improving pt's ability to ambulate level surfaces x 6 feet total with min A provided by therapist with RW and therapeutic activity to reduce fall risk consisting of training: supine<>sit transfers, sit<>stand transfers, static sitting tolerance at EOB for 5 minutes w/ B UE support, static standing tolerance for 2 minutes w/ B UE support, vc and tactile cues for static sitting posture faciliation, vc and tactile cues for static standing posture faciliation, stand pivot transfers towards B direction and continued breathing conservation technique education, POST pericare s/p BSC usage  Pt agreeable to PT treatment session upon arrival, pt found supine in bed w/ HOB elevated, A&O x 4  In comparison to previous session, pt with improvements in static and dynamic balance  Post session: pt returned back to recliner, chair alarm engaged, all needs in reach and RN notified of session findings/recommendations  Continue to recommend post acute rehabilitation services at time of d/c in order to maximize pt's functional independence and safety w/ mobility   Pt continues to be functioning below baseline level, and remains limited 2* factors listed above and including weakness, impaired balance, decreased endurance, gait deviations, activity intolerance  PT will continue to see pt during current hospitalization in order to address the deficits listed above and provide interventions consistent w/ POC in effort to achieve LTGs  Barriers to Discharge: Decreased caregiver support        PT Discharge Recommendation: Post acute rehabilitation services (continued recommendation)          See flowsheet documentation for full assessment

## 2022-01-06 NOTE — CASE MANAGEMENT
Case Management Discharge Planning Note    Patient name Mireille Cummings  Location /-85 MRN [id number]  : 1940 Date 2022       Current Admission Date: 2021  Current Admission Diagnosis:Acute respiratory failure with hypoxia [facility])   Patient Active Problem List    Diagnosis Date Noted    Thrombocytosis 2021    Bacteremia due to methicillin susceptible Staphylococcus aureus (MSSA) 2021    Pneumothorax 2021    Chronic kidney disease, stage 3b ([facility] ) 2021    Anemia 2021    [facility]epsis [facility] to [facility] 2021    Ambulatory dysfunction 2021    COVID-19 2021    Acute respiratory failure with hypoxia ([facility] ) 2021    Negative depression screening 2021    Overweight (BMI 25 0-29 9) 2021    Medicare annual wellness visit, subsequent 2020    Localized, primary osteoarthritis of hand 2020    Refused influenza vaccine 2020    Sciatica 2020    Hypertensive kidney disease with chronic kidney disease stage III ([facility] ) 2019    Type 2 diabetes mellitus without complication, without long-term current use of insulin ([facility] ) 2019    Bursitis of shoulder 2018    Groin pain, chronic, left 2018    Paresthesia of arm 2017    Back pain 2017    Muscle pain 2017    Anxiety 2017    Abnormal ECG 2016    Diverticulitis large intestine w/o perforation or abscess w/o bleeding 2016    Essential hypertension 2016    Lower abdominal pain 2016    Atrophic vaginitis 2016    Hypercholesterolemia 2016    Irritable bowel syndrome 2016    Simple chronic anemia 2016    Disorder of shoulder 2008    Articular cartilage disorder of shoulder region 2008    Loose body in joint of shoulder region 2008      LOS (days): 20  Geometric Mean LOS (GMLOS) (days): 4 80  Days to GMLOS:-15 4     OBJECTIVE:  Risk of Unplanned Readmission Score: 20         Current admission status: Inpatient   Preferred Pharmacy:   [address]   [place]  [place] [address]  Phone: [phone] Fax: [phone] [address] , [facility] [address]  Phone: [phone] Fax: [phone]    Primary Care Provider: Juan Murcia DO    Primary Insurance: [address] [facility]  Secondary Insurance:     DISCHARGE DETAILS:    Discharge planning discussed with[de-identified] patient and daughter Trish Westbrook at 15:01 pm  Freedom of Choice: Yes  Comments - Freedom of Choice: pt was reasessed and rehab is recommended, family gave me more more choices  CM contacted family/caregiver?: Yes             Contacts  Patient Contacts: Trish Westbrook  Relationship to Patient[de-identified] Family (daughter)  Contact Method: Phone  Phone Number: [phone]  Reason/Outcome: Discharge Planning              Other Referral/Resources/Interventions Provided:  Interventions: Short Term Rehab  Referral Comments: additional referrals sent  [facility]   [facility] reviewing         Treatment Team Recommendation: Short Term Rehab

## 2022-01-06 NOTE — PROGRESS NOTES
[address]  Progress Note - Pat Nguyen 1940, [de-identified] y o  female MRN: [id number]  Unit/Bed#: -01 Encounter: [id number]  Primary Care Provider: Maldonado Finn DO   Date and time admitted to hospital: 12/16/2021  4:49 PM    * Acute respiratory failure with hypoxia ([address] )  Assessment & Plan  · Likely due  to COVID -19 viral pneumonia and pneumothorax  · Oxygen saturations 93% on 1 L nasal cannula  · Further management as below  · Wean oxygen as tolerated  · Patient medically stable for discharge at this time  Awaiting short-term rehab placement  COVID-19  Assessment & Plan  · COVID-19 positive on 12/16/2021; Unvaccinated  · Decadron 0 1 mg/kg completed 12/30  · Completed 14 day course of Baricitinib   · Completed 5 day course of Remdesivir   · Continue Solu-Medrol taper as recommended by Pulmonary/Critical Care  · Isolation precautions may be discontinued at this time      Pneumothorax  Assessment & Plan  · 12/25/2021 CXR demonstrated right pneumothorax and 16 Fr chest tube was placed; Chest tube was removed on 12/28  · R chest tube replaced on 12/30 for recurrent R ptx  · Chest tube removed on 01/05/2022  · Post removal x-ray showed no pneumothorax recurrent   · Pulmonology following, hopefully can remove chest tube at this time    Ambulatory dysfunction  Assessment & Plan  · PT OT recommending post acute rehab placement  · Case management following to aid with discharge plan    Type 2 diabetes mellitus without complication, without long-term current use of insulin [facility])  Assessment & Plan  Lab Results   Component Value Date    HGBA1C 6 2 (H) 11/22/2021       Recent Labs     01/05/22  1625 01/05/22  2158 01/06/22  0714 01/06/22  1107   POCGLU 184* 120 107 215*       Blood Sugar Average: Last 72 hrs:  (P) 170 [id number]     · Increase Lantus to 15 units q h s  And continue Lispro 5 units t i d   With meals  · Continue Accu-Cheks, corrective sliding-scale insulin, and hypoglycemic protocol    Sepsis due to methicillin susceptible Staphylococcus aureus ([facility] )  Assessment & Plan  · Sepsis present on admission due to COVID pneumonia and MSSA bacteremia  · TTE was negative  · Completed 7 days of cefazolin completed on 1/2    Bacteremia due to methicillin susceptible Staphylococcus aureus (MSSA)  Assessment & Plan  · Blood Clx: MSSA 12/22  · Repeat BClx 12/26: NGTD  · Completed course of cefazolin    Chronic kidney disease, stage 3b [facility])  Assessment & Plan  Lab Results   Component Value Date    EGFR 41 01/06/2022    EGFR 43 01/05/2022    EGFR 39 01/04/2022    CREATININE 1 22 01/06/2022    CREATININE 1 19 01/05/2022    CREATININE 1 27 01/04/2022     · Creatinine back to baseline  · Avoid nephrotoxic agents and hypotension  · Continue to monitor kidney function periodically      VTE Pharmacologic Prophylaxis: VTE Score: 6 High Risk (Score >/= 5) - Pharmacological DVT Prophylaxis Ordered: heparin  Sequential Compression Devices Ordered  Patient Centered Rounds: I performed bedside rounds with nursing staff today  Discussions with Specialists or Other Care Team Provider:  Pulmonology, case management, and nursing    Education and Discussions with Family / Patient: Updated  (daughter) via phone  Time Spent for Care: 30 minutes  More than 50% of total time spent on counseling and coordination of care as described above  Current Length of Stay: 20 day(s)  Current Patient Status: Inpatient   Certification Statement: The patient will continue to require additional inpatient hospital stay due to Awaiting short-term rehab placement  Discharge Plan: To be determined based on except facility in bed availability    Code Status: Level 1 - Full Code    Subjective:   Patient resting comfortably in bed without any acute complaints  She denies any significant shortness of breath or discomfort at this time  No overnight events reported by nursing      Objective:     Vitals: Temp (24hrs), Av 1 °F (36 7 °C), Min:97 6 °F (36 4 °C), Max:98 6 °F (37 °C)    Temp:  [97 6 °F (36 4 °C)-98 6 °F (37 °C)] 98 °F (36 7 °C)  HR:  [81-89] 89  Resp:  [16-19] 18  BP: (104-118)/(54-61) 104/54  SpO2:  [88 %-94 %] 88 %  Body mass index is 23 42 kg/m²  Input and Output Summary (last 24 hours):   No intake or output data in the 24 hours ending 22 1210    Physical Exam:   Physical Exam  Vitals and nursing note reviewed  Constitutional:       General: She is not in acute distress  Appearance: Normal appearance  She is well-developed  HENT:      Head: Normocephalic and atraumatic  Mouth/Throat:      Mouth: Mucous membranes are moist       Pharynx: Oropharynx is clear  Eyes:      Extraocular Movements: Extraocular movements intact  Conjunctiva/sclera: Conjunctivae normal    Cardiovascular:      Rate and Rhythm: Normal rate and regular rhythm  Pulses: Normal pulses  Heart sounds: Normal heart sounds  No murmur heard  Pulmonary:      Effort: Pulmonary effort is normal  No respiratory distress  Breath sounds: Normal breath sounds  Comments: 1 L nasal cannula  Abdominal:      General: Bowel sounds are normal  There is no distension  Palpations: Abdomen is soft  Tenderness: There is no abdominal tenderness  Musculoskeletal:         General: Normal range of motion  Cervical back: Normal range of motion and neck supple  Skin:     General: Skin is warm and dry  Neurological:      General: No focal deficit present  Mental Status: She is alert and oriented to person, place, and time     Psychiatric:         Mood and Affect: Mood normal          Behavior: Behavior normal          Judgment: Judgment normal         Labs:  Results from last 7 days   Lab Units 22  0515   WBC Thousand/uL 8 67   HEMOGLOBIN g/dL 9 6*   HEMATOCRIT % 30 3*   PLATELETS Thousands/uL 283     Results from last 7 days   Lab Units 22  1052 22  0515 01/06/22  0515 01/04/22  0509 01/03/22  0446   SODIUM mmol/L  --   --  137   < > 135   POTASSIUM mmol/L 5 1   < > 5 2   < > 4 7   CHLORIDE mmol/L  --   --  108   < > 106   CO2 mmol/L  --   --  23   < > 23   BUN mg/dL  --   --  54*   < > 66*   CREATININE mg/dL  --   --  1 22   < > 1 46*   ANION GAP mmol/L  --   --  6   < > 6   CALCIUM mg/dL  --   --  8 7   < > 8 8   ALBUMIN g/dL  --   --   --   --  3 0*   TOTAL BILIRUBIN mg/dL  --   --   --   --  0 34   ALK PHOS U/L  --   --   --   --  85   ALT U/L  --   --   --   --  3*   AST U/L  --   --   --   --  18   GLUCOSE RANDOM mg/dL  --   --  126   < > 151*    < > = values in this interval not displayed           Results from last 7 days   Lab Units 01/06/22  1107 01/06/22  0714 01/05/22  2158 01/05/22  1625 01/05/22  1048 01/05/22  0601 01/04/22  2039 01/04/22  1631 01/04/22  1112 01/04/22  0728 01/03/22 2058 01/03/22  1610   POC GLUCOSE mg/dl 215* 107 120 184* 61* 71 126 227* 330* 118 259* 212*               Lines/Drains:  Invasive Devices  Report    Peripheral Intravenous Line            Peripheral IV 01/01/22 Left;Ventral (anterior) Forearm 4 days          Drain            External Urinary Catheter 14 days    Chest Tube Right Midaxillary 7 days              Imaging: Reviewed radiology reports from this admission including: chest xray    Recent Cultures (last 7 days):         Last 24 Hours Medication List:   Current Facility-Administered Medications   Medication Dose Route Frequency Provider Last Rate    acetaminophen  975 mg Oral Q6H [address] BENTON Worthy      atorvastatin  40 mg Oral HS BENTON Worthy      bisacodyl  10 mg Rectal Daily PRN BENTON Drummond      docusate sodium  100 mg Oral Daily BENTON Worthy      ferrous sulfate  325 mg Oral Daily With Breakfast Mercedes Dawe, CRNP      heparin (porcine)  5,000 Units Subcutaneous Q8H [address] BENTON Worthy      insulin glargine  15 Units Subcutaneous HS [facility] L DO Suleiman      insulin lispro  4-20 Units Subcutaneous TID AC BENTON Worthy      insulin lispro  4-20 Units Subcutaneous HS BENTON Worthy      insulin lispro  5 Units Subcutaneous TID With Meals BENTON Banks      lidocaine  1 patch Topical Daily OriBENTON Corbett      methylPREDNISolone sodium succinate  30 mg Intravenous Daily BENTON Worthy      Followed by   Rissa Moore ON 1/9/2022] methylPREDNISolone sodium succinate  20 mg Intravenous Daily BENTON Worthy      Followed by   Rissa Moore ON 1/12/2022] methylPREDNISolone sodium succinate  10 mg Intravenous Daily BENTON Worthy      ondansetron  4 mg Intravenous Q6H PRN BENTON Banks      oxyCODONE  2 5 mg Oral Q6H PRN BENTON Worthy      polyethylene glycol  17 g Oral Daily BENTON Worthy      senna  1 tablet Oral HS BENTON oWrthy      sodium polystyrene  15 g Oral Once 5000 Karen Larsen DO          Today, Patient Was Seen By: Maurisio Bearden, DO    **Please Note: This note may have been constructed using a voice recognition system  **

## 2022-01-06 NOTE — UTILIZATION REVIEW
Continued Stay Review    Date: 1/6/22                          Current Patient Class: inpatient  Current Level of Care: med surg  HPI:81 y o  female initially admitted on 12/17/21     Assessment/Plan:   Acute respiratory failure 2nd COVID 19  S/p chest tube removal yesterday, 1/5  O2 requirements weaned to 1ltr nc, desats to 88% on RA  Continue med/tx per the COVID 19 protocol, including steroid taper  Vital Signs:   /54   Pulse 89   Temp 98 °F (36 7 °C)   Resp 16   Ht 5' (1 524 m)   Wt 54 4 kg (119 lb 14 9 oz)   LMP  (LMP Unknown)   SpO2 (!) 88%   BMI 23 42 kg/m²     Pertinent Labs/Diagnostic Results:   Results from last 7 days   Lab Units 01/06/22 0515 01/05/22 0430 01/04/22  0509 01/03/22 0446 01/03/22  0446 01/02/22  0629 01/02/22  0629   WBC Thousand/uL 8 67 10 15 9 66   < > 10 03   < > 11 70*   HEMOGLOBIN g/dL 9 6* 9 7* 9 6*  --  10 0*  --  10 3*   HEMATOCRIT % 30 3* 31 0* 31 2*  --  31 1*  --  32 3*   PLATELETS Thousands/uL 283 352 356   < > 380   < > 429*    < > = values in this interval not displayed       Results from last 7 days   Lab Units 01/06/22 0515 01/05/22 0430 01/04/22  0509 01/03/22 0446 01/02/22  0629   SODIUM mmol/L 137 139 139 135 138   POTASSIUM mmol/L 5 2 4 9 4 7 4 7 4 7   CHLORIDE mmol/L 108 108 110* 106 107   CO2 mmol/L 23 24 23 23 24   ANION GAP mmol/L 6 7 6 6 7   BUN mg/dL 54* 51* 61* 66* 70*   CREATININE mg/dL 1 22 1 19 1 27 1 46* 1 38*   EGFR ml/min/1 73sq m 41 43 39 33 36   CALCIUM mg/dL 8 7 8 9 8 8 8 8 9 0   MAGNESIUM mg/dL  --   --   --  2 4  --    PHOSPHORUS mg/dL  --   --   --  3 8  --      Results from last 7 days   Lab Units 01/03/22 0446   AST U/L 18   ALT U/L 3*   ALK PHOS U/L 85   TOTAL PROTEIN g/dL 5 6*   ALBUMIN g/dL 3 0*   TOTAL BILIRUBIN mg/dL 0 34     Results from last 7 days   Lab Units 01/06/22  0714 01/05/22  2158 01/05/22  1625 01/05/22  1048 01/05/22  0601 01/04/22  2039 01/04/22  1631 01/04/22  1112 01/04/22  2624 01/03/22 2058 01/03/22  1610 01/03/22  1154   POC GLUCOSE mg/dl 107 120 184* 61* 71 126 227* 330* 118 259* 212* 246*     Results from last 7 days   Lab Units 01/06/22  0515 01/05/22  0430 01/04/22  0509 01/03/22  0446 01/02/22  0629 01/01/22  0622 12/31/21  0507   GLUCOSE RANDOM mg/dL 126 73 108 151* 78 60* 92     Results from last 7 days   Lab Units 01/03/22  0446   CRP mg/L <3 0     1/5  Cxr=  Chest tube over right base with no pneumothorax  No change in left greater than right ground glass opacity due to Covid-19  Cxr=  No pneumothorax status post chest tube removal   Stable airspace opacities    Medications:   acetaminophen, 975 mg, Oral, Q6H JUAN ANTONIO  atorvastatin, 40 mg, Oral, HS  docusate sodium, 100 mg, Oral, Daily  ferrous sulfate, 325 mg, Oral, Daily With Breakfast  heparin (porcine), 5,000 Units, Subcutaneous, Q8H JUAN ANTONIO  insulin glargine, 10 Units, Subcutaneous, HS  insulin lispro, 4-20 Units, Subcutaneous, TID AC  insulin lispro, 4-20 Units, Subcutaneous, HS  insulin lispro, 5 Units, Subcutaneous, TID With Meals  lidocaine, 1 patch, Topical, Daily  methylPREDNISolone sodium succinate, 30 mg, Intravenous, Daily  polyethylene glycol, 17 g, Oral, Daily  senna, 1 tablet, Oral, HS    PRN Meds:  bisacodyl, 10 mg, Rectal, Daily PRN  ondansetron, 4 mg, Intravenous, Q6H PRN  oxyCODONE, 2 5 mg, Oral, Q6H PRN    Discharge Plan: d    Network Utilization Review Department  ATTENTION: Please call with any questions or concerns to [phone] and carefully listen to the prompts so that you are directed to the right person  All voicemails are confidential   Jim Ruiz all requests for admission clinical reviews, approved or denied determinations and any other requests to dedicated fax number below belonging to the campus where the patient is receiving treatment   List of dedicated fax numbers for the Facilities:  [address] [place] [address] DENIALS (Administrative/Medical Necessity) [phone]   [address] (Maternity/NICU/Pediatrics) 261 [facility] 40 125 [facility]  [phone]   [address] [place] [address] [place] [address] [address] [place] [address] [address] [facility] [address] [facility] [phone]

## 2022-01-06 NOTE — NURSING NOTE
Upon nursing assessment patient's SpO2 is 85% on 2L n/c with a HR of 101  Coarse crackles throughout right lung  Patient's O2 increased to 3L, spo2 increased to 90% at this time, [facility] Bearden made aware  Chest X-ray ordered at this time

## 2022-01-06 NOTE — PHYSICAL THERAPY NOTE
Physical Therapy Treatment Session Note    Patient's Name: Pat Nguyen    Admitting Diagnosis  Shortness of breath [R06 02]  Hypoxia [R09 02]  COVID-19 virus RNA test result positive at limit of detection [U07 1]  COVID-19 [U07 1]    Problem List  Patient Active Problem List   Diagnosis    Groin pain, chronic, left    Type 2 diabetes mellitus without complication, without long-term current use of insulin (HCC)    Hypertensive kidney disease with chronic kidney disease stage III (HCC)    Abnormal ECG    Articular cartilage disorder of shoulder region    Back pain    Bursitis of shoulder    Atrophic vaginitis    Anxiety    Disorder of shoulder    Diverticulitis large intestine w/o perforation or abscess w/o bleeding    Essential hypertension    Hypercholesterolemia    Irritable bowel syndrome    Localized, primary osteoarthritis of hand    Loose body in joint of shoulder region    Muscle pain    Paresthesia of arm    Refused influenza vaccine    Sciatica    Simple chronic anemia    Lower abdominal pain    Medicare annual wellness visit, subsequent    Negative depression screening    Overweight (BMI 25 0-29  9)    COVID-19    Acute respiratory failure with hypoxia (HCC)    Ambulatory dysfunction    Chronic kidney disease, stage 3b (HCC)    Anemia    Sepsis due to methicillin susceptible Staphylococcus aureus (HCC)    Pneumothorax    Bacteremia due to methicillin susceptible Staphylococcus aureus (MSSA)    Thrombocytosis       Past Medical History  Past Medical History:   Diagnosis Date    Diabetes mellitus ([address] )     Diverticulitis     Postherpetic neuralgia        Past Surgical History  Past Surgical History:   Procedure Laterality Date    CHOLECYSTECTOMY      COLON SURGERY      HERNIA REPAIR      ROTATOR CUFF REPAIR Right     VARICOSE VEIN SURGERY      Impression:  2/12/16 Regla Oms        01/06/22 1027   PT Last Visit   PT Visit Date 01/06/22   Note Type   Note Type Treatment   Pain Assessment   Pain Assessment Tool 0-10   Pain Score No Pain  (denies)   Restrictions/Precautions   Weight Bearing Precautions Per Order No   Other Precautions Multiple lines;Telemetry;O2;Fall Risk  (1 L O2 via NC)   General   Chart Reviewed Yes   Response to Previous Treatment Patient with no complaints from previous session  Family/Caregiver Present No   Cognition   Overall Cognitive Status WFL   Arousal/Participation Alert; Responsive; Cooperative   Attention Within functional limits   Orientation Level Oriented X4   Memory Within functional limits   Following Commands Follows one step commands with increased time or repetition   Comments Pt  agreeable to PT tx session,pleasant  Subjective   Subjective "I have to go to the bathroom"   Bed Mobility   Supine to Sit   (CGA)   Additional items Assist x 1;HOB elevated; Bedrails; Increased time required;Verbal cues   Sit to Supine   (DNT as pt  was sitting OOB on recliner upon conclusion )   Additional Comments Pt  denied lightheadedness/dizziness with positional changes  Transfers   Sit to Stand 4  Minimal assistance   Additional items Assist x 1;Bedrails; Increased time required;Verbal cues   Stand to Sit 4  Minimal assistance   Additional items Assist x 1;Bedrails; Increased time required;Verbal cues   Stand pivot 4  Minimal assistance   Additional items Assist x 1; Increased time required;Verbal cues   Toilet transfer 4  Minimal assistance   Additional items Assist x 1; Armrests; Increased time required;Verbal cues; Commode  (dependence POST pericare s/p BM)   Additional Comments Patient exhibited MOREAU w/all positional changes  O2 saturation at 89% and 1 L O2  upon session initiation  With progression, O2 desaturation to 72% at lowest and supplemental O2 increased to 2 L  Improvement with significant increased time and reiterated breathing conservation technique education/utilization to 89%   At session conclusion and maintained 2 L, patient's saturation was 90%  Ambulation/Elevation   Gait pattern Improper Weight shift; Forward Flexion;Decreased foot clearance; Short stride; Excessively slow   Gait Assistance 4  Minimal assist   Additional items Assist x 1;Verbal cues; Tactile cues   Assistive Device Rolling walker   Distance 6 feet total  (bedside->BSC->recliner,did not advance due to complications)   Stair Management Assistance Not tested   Balance   Static Sitting Good   Dynamic Sitting Fair +   Static Standing Fair   Dynamic Standing Fair   Ambulatory Fair   Endurance Deficit   Endurance Deficit Yes   Endurance Deficit Description MOREAU, O2 desaturation as outlined above   Activity Tolerance   Activity Tolerance Patient limited by fatigue;Patient limited by pain; Other (MOREAU, O2 desaturation)   Nurse Made Aware Yes, nursing staff informed of tx outcome  Assessment   Prognosis Fair   Problem List Decreased endurance; Impaired balance;Decreased mobility; Decreased strength; Impaired hearing   Assessment Pt seen for PT treatment session this date with interventions consisting of gait training to reduce the risk of medical complications w/ emphasis on improving pt's ability to ambulate level surfaces x 6 feet total with min A provided by therapist with RW and therapeutic activity to reduce fall risk consisting of training: supine<>sit transfers, sit<>stand transfers, static sitting tolerance at EOB for 5 minutes w/ B UE support, static standing tolerance for 2 minutes w/ B UE support, vc and tactile cues for static sitting posture faciliation, vc and tactile cues for static standing posture faciliation, stand pivot transfers towards B direction and continued breathing conservation technique education, POST pericare s/p BSC usage  Pt agreeable to PT treatment session upon arrival, pt found supine in bed w/ HOB elevated, A&O x 4  In comparison to previous session, pt with improvements in static and dynamic balance   Post session: pt returned back to recliner, chair alarm engaged, all needs in reach and RN notified of session findings/recommendations  Continue to recommend post acute rehabilitation services at time of d/c in order to maximize pt's functional independence and safety w/ mobility  Pt continues to be functioning below baseline level, and remains limited 2* factors listed above and including weakness, impaired balance, decreased endurance, gait deviations, activity intolerance  PT will continue to see pt during current hospitalization in order to address the deficits listed above and provide interventions consistent w/ POC in effort to achieve LTGs  Barriers to Discharge Decreased caregiver support   Goals   Patient Goals to feel better and to get out of the hospital   LTG Expiration Date 01/08/22   Long Term Goal #1 LTGs remain appropriate   PT Treatment Day 7   Plan   Treatment/Interventions Functional transfer training;LE strengthening/ROM; Elevations; Therapeutic exercise; Endurance training;Patient/family training;Equipment eval/education; Bed mobility;Gait training; Compensatory technique education;Spoke to nursing   Progress Slow progress, decreased activity tolerance   PT Frequency 3-5x/wk   Recommendation   PT Discharge Recommendation Post acute rehabilitation services  (continued recommendation)   Equipment Recommended [address] [facility] Recommended Wheeled walker   Change/add to [employer]? No   Additional Comments Upon conclusion, pt  was sitting OOB on recliner w/chair alarm engaged, B SCDs active, and all needs within reach  Additional Comments 2 Pt's raw score on the [facility] Basic Mobility inpatient short form is 15, standardized score is 36 97  Patients at this level are likely to benefit from DC to [facility]  However, please refer to therapist recommendation for safe DC planning     AM-[facility] Basic Mobility Inpatient   Turning in Bed Without Bedrails 4   Lying on Back to Sitting on Edge of Flat Bed 3   Moving Bed to Chair 2   Standing Up From Chair 2   Walk in Room 2   Climb 3-5 Stairs 2   Basic Mobility Inpatient Raw Score 15   Basic Mobility Standardized Score 36 97   Highest Level Of Mobility   [facility] Goal 4: Move to chair/commode     Treatment Time: 2594-3525    Lorelei Fan, PT

## 2022-01-06 NOTE — ASSESSMENT & PLAN NOTE
Lab Results   Component Value Date    HGBA1C 6 2 (H) 11/22/2021       Recent Labs     01/05/22  1625 01/05/22  2158 01/06/22  0714 01/06/22  1107   POCGLU 184* 120 107 215*       Blood Sugar Average: Last 72 hrs:  (P) 170 [id number]     · Increase Lantus to 15 units q h s  And continue Lispro 5 units t i d   With meals  · Continue Accu-Cheks, corrective sliding-scale insulin, and hypoglycemic protocol

## 2022-01-06 NOTE — PLAN OF CARE
Problem: OCCUPATIONAL THERAPY ADULT  Goal: Performs self-care activities at highest level of function for planned discharge setting  See evaluation for individualized goals  Description: Treatment Interventions: ADL retraining,Functional transfer training,UE strengthening/ROM,Patient/family training,Energy conservation,Activityengagement          See flowsheet documentation for full assessment, interventions and recommendations  Outcome: Progressing  Note: Limitation: Decreased ADL status,Decreased endurance  Prognosis: Fair  Assessment: Patient participated in Skilled OT session this date with interventions consisting of ADL re training with the use of correct body mechnaics, Energy Conservation techniques, deep breathing technique, therapeutic exercise to: increase functional use of BUEs, increase BUE muscle strength  and  therapeutic activities to: increase activity tolerance   Patient agreeable to OT treatment session, upon arrival patient was found semi-reclined in bed (back supported)  In comparison to previous session, patient with improvements in activity tolerance  Patient requiring verbal cues for correct technique, verbal cues for pacing thru activity steps, one step directives and frequent rest periods, and self-care assistance as noted in flow sheet  Patient continues to be functioning below baseline level, occupational performance remains limited secondary to factors listed above and increased risk for falls and injury  From OT standpoint, recommendation at time of d/c would be post-acute rehabilitation services  Patient to benefit from continued Occupational Therapy treatment while in the hospital to address deficits as defined above and maximize level of functional independence with ADLs and functional mobility        OT Discharge Recommendation: Post acute rehabilitation services  OT - OK to Discharge: Yes (Once medically cleared )     STAN Chapman/JOHNNY

## 2022-01-06 NOTE — PLAN OF CARE
Problem: Potential for Falls  Goal: Patient will remain free of falls  Description: INTERVENTIONS:  - Educate patient/family on patient safety including physical limitations  - Instruct patient to call for assistance with activity   - Consult OT/PT to assist with strengthening/mobility   - Keep Call bell within reach  - Keep bed low and locked with side rails adjusted as appropriate  - Keep care items and personal belongings within reach  - Initiate and maintain comfort rounds  - Make Fall Risk Sign visible to staff  - Offer Toileting every 2 Hours, in advance of need  - Initiate/Maintain fall alarm  - Obtain necessary fall risk management equipment: fall alarm  - Apply yellow socks and bracelet for high fall risk patients  - Consider moving patient to room near nurses station  Outcome: Progressing     Problem: PAIN - ADULT  Goal: Verbalizes/displays adequate comfort level or baseline comfort level  Description: Interventions:  - Encourage patient to monitor pain and request assistance  - Assess pain using appropriate pain scale  - Administer analgesics based on type and severity of pain and evaluate response  - Implement non-pharmacological measures as appropriate and evaluate response  - Consider cultural and social influences on pain and pain management  - Notify physician/advanced practitioner if interventions unsuccessful or patient reports new pain  Outcome: Progressing     Problem: INFECTION - ADULT  Goal: Absence or prevention of progression during hospitalization  Description: INTERVENTIONS:  - Assess and monitor for signs and symptoms of infection  - Monitor lab/diagnostic results  - Monitor all insertion sites, i e  indwelling lines, tubes, and drains  - Monitor endotracheal if appropriate and nasal secretions for changes in amount and color  - [place] appropriate cooling/warming therapies per order  - Administer medications as ordered  - Instruct and encourage patient and family to use good hand hygiene technique  - Identify and instruct in appropriate isolation precautions for identified infection/condition  Outcome: Progressing  Goal: Absence of fever/infection during neutropenic period  Description: INTERVENTIONS:  - Monitor WBC    Outcome: Progressing     Problem: SAFETY ADULT  Goal: Patient will remain free of falls  Description: INTERVENTIONS:  - Educate patient/family on patient safety including physical limitations  - Instruct patient to call for assistance with activity   - Consult OT/PT to assist with strengthening/mobility   - Keep Call bell within reach  - Keep bed low and locked with side rails adjusted as appropriate  - Keep care items and personal belongings within reach  - Initiate and maintain comfort rounds  - Make Fall Risk Sign visible to staff  - Offer Toileting every 2 Hours, in advance of need  - Initiate/Maintain fall alarm  - Obtain necessary fall risk management equipment: fall alarm  - Apply yellow socks and bracelet for high fall risk patients  - Consider moving patient to room near nurses station  Outcome: Progressing  Goal: Maintain or return to baseline ADL function  Description: INTERVENTIONS:  - Educate patient/family on patient safety including physical limitations  - Instruct patient to call for assistance with activity   - Consult OT/PT to assist with strengthening/mobility   - Keep Call bell within reach  - Keep bed low and locked with side rails adjusted as appropriate  - Keep care items and personal belongings within reach  - Initiate and maintain comfort rounds  - Make Fall Risk Sign visible to staff  - Offer Toileting every 2 Hours, in advance of need  - Initiate/Maintain fall alarm  - Obtain necessary fall risk management equipment: fall alarm  - Apply yellow socks and bracelet for high fall risk patients  - Consider moving patient to room near nurses station  Outcome: Progressing  Goal: Maintains/Returns to pre admission functional level  Description: INTERVENTIONS:  - Perform BMAT or MOVE assessment daily    - Set and communicate daily mobility goal to care team and patient/family/caregiver  - Collaborate with rehabilitation services on mobility goals if consulted  - Perform Range of Motion 3 times a day  - Reposition patient every 2 hours  - Dangle patient 3 times a day  - Stand patient 3 times a day  - Ambulate patient 3 times a day  - Out of bed to chair 3 times a day   - Out of bed for meals 3 times a day  - Out of bed for toileting  - Record patient progress and toleration of activity level   Outcome: Progressing     Problem: DISCHARGE PLANNING  Goal: Discharge to home or other facility with appropriate resources  Description: INTERVENTIONS:  - Identify barriers to discharge w/patient and caregiver  - Arrange for needed discharge resources and transportation as appropriate  - Identify discharge learning needs (meds, wound care, etc )  - Arrange for interpretive services to assist at discharge as needed  - Refer to Case Management Department for coordinating discharge planning if the patient needs post-hospital services based on physician/advanced practitioner order or complex needs related to functional status, cognitive ability, or social support system  Outcome: Progressing     Problem: Knowledge Deficit  Goal: Patient/family/caregiver demonstrates understanding of disease process, treatment plan, medications, and discharge instructions  Description: Complete learning assessment and assess knowledge base    Interventions:  - Provide teaching at level of understanding  - Provide teaching via preferred learning methods  Outcome: Progressing     Problem: MOBILITY - ADULT  Goal: Maintain or return to baseline ADL function  Description: INTERVENTIONS:  - Educate patient/family on patient safety including physical limitations  - Instruct patient to call for assistance with activity   - Consult OT/PT to assist with strengthening/mobility   - Keep Call bell within reach  - Keep bed low and locked with side rails adjusted as appropriate  - Keep care items and personal belongings within reach  - Initiate and maintain comfort rounds  - Make Fall Risk Sign visible to staff  - Offer Toileting every 2 Hours, in advance of need  - Initiate/Maintain fall alarm  - Obtain necessary fall risk management equipment: fall alarm  - Apply yellow socks and bracelet for high fall risk patients  - Consider moving patient to room near nurses station  Outcome: Progressing  Goal: Maintains/Returns to pre admission functional level  Description: INTERVENTIONS:  - Perform BMAT or MOVE assessment daily    - Set and communicate daily mobility goal to care team and patient/family/caregiver  - Collaborate with rehabilitation services on mobility goals if consulted  - Perform Range of Motion 3 times a day  - Reposition patient every 2 hours    - Dangle patient 3 times a day  - Stand patient 3 times a day  - Ambulate patient 3 times a day  - Out of bed to chair 3 times a day   - Out of bed for meals 3 times a day  - Out of bed for toileting  - Record patient progress and toleration of activity level   Outcome: Progressing     Problem: Prexisting or High Potential for Compromised Skin Integrity  Goal: Skin integrity is maintained or improved  Description: INTERVENTIONS:  - Identify patients at risk for skin breakdown  - Assess and monitor skin integrity  - Assess and monitor nutrition and hydration status  - Monitor labs   - Assess for incontinence   - Turn and reposition patient  - Assist with mobility/ambulation  - Relieve pressure over bony prominences  - Avoid friction and shearing  - Provide appropriate hygiene as needed including keeping skin clean and dry  - Evaluate need for skin moisturizer/barrier cream  - Collaborate with interdisciplinary team   - Patient/family teaching  - Consider wound care consult   Outcome: Progressing     Problem: RESPIRATORY - ADULT  Goal: Achieves optimal ventilation and oxygenation  Description: INTERVENTIONS:  - Assess for changes in respiratory status  - Assess for changes in mentation and behavior  - Position to facilitate oxygenation and minimize respiratory effort  - Oxygen administered by appropriate delivery if ordered  - Initiate smoking cessation education as indicated  - Encourage broncho-pulmonary hygiene including cough, deep breathe, Incentive Spirometry  - Assess the need for suctioning and aspirate as needed  - Assess and instruct to report SOB or any respiratory difficulty  - Respiratory Therapy support as indicated  Outcome: Progressing     Problem: Nutrition/Hydration-ADULT  Goal: Nutrient/Hydration intake appropriate for improving, restoring or maintaining nutritional needs  Description: Monitor and assess patient's nutrition/hydration status for malnutrition  Collaborate with interdisciplinary team and initiate plan and interventions as ordered  Monitor patient's weight and dietary intake as ordered or per policy  Utilize nutrition screening tool and intervene as necessary  Determine patient's food preferences and provide high-protein, high-caloric foods as appropriate       INTERVENTIONS:  - Monitor oral intake, urinary output, labs, and treatment plans  - Assess nutrition and hydration status and recommend course of action  - Evaluate amount of meals eaten  - Assist patient with eating if necessary   - Allow adequate time for meals  - Recommend/ encourage appropriate diets, oral nutritional supplements, and vitamin/mineral supplements  - Order, calculate, and assess calorie counts as needed  - Recommend, monitor, and adjust tube feedings and TPN/PPN based on assessed needs  - Assess need for intravenous fluids  - Provide specific nutrition/hydration education as appropriate  - Include patient/family/caregiver in decisions related to nutrition  Outcome: Progressing

## 2022-01-06 NOTE — ASSESSMENT & PLAN NOTE
· Likely due  to COVID -19 viral pneumonia and pneumothorax  · Oxygen saturations 93% on 1 L nasal cannula  · Further management as below  · Wean oxygen as tolerated  · Patient medically stable for discharge at this time  Awaiting short-term rehab placement

## 2022-01-06 NOTE — PROGRESS NOTES
Progress Note - Pulmonary   Debbie Del Rio [de-identified] y o  female MRN: [id number]  Unit/Bed#: -[facility] Encounter: [id number]      Assessment:  1  Acute hypoxemic respiratory failure  2  COVID-19 pneumonia   3  Right spontaneous pneumothorax-COVID-19 related  Initial chest tube/16 [place] placed on 12/25, removed 12/28  Noted a recurrence of moderate to large pneumothorax, 2nd chest tube 12 [place] placed on 12/30 after successful clamp trial, removed on 01/05    Plan:  · Stable status post chest tube removal yesterday  · Treatment for COVID-19 as per the protocol  · We will arrange outpatient follow-up with Pulmonary in 4-6 weeks    Pulmonary will sign off, please do not hesitate to call with any questions    Subjective:   No overnight acute events  No reported chest pain or dyspnea  Vitals: Blood pressure 104/54, pulse 89, temperature 98 °F (36 7 °C), temperature source Oral, resp  rate 18, height 5' (1 524 m), weight 54 4 kg (119 lb 14 9 oz), SpO2 (!) 88 %, not currently breastfeeding , Body mass index is 23 42 kg/m²  Physical Exam  Gen: not in acute distress, Body mass index is 23 42 kg/m²  HEENT:supple, no accessory muscle use, no JVD appreciated  Cardiac: RRR, no murmurs appreciated,   Lungs: normal respiratory effort, moderate air movement bilaterally, mild right upper chest crepitus  Abdomen: soft, nontender, normoactive bowel sounds  Extremities: no cyanosis, no clubbing, no edema  Neuro: AAO X3, no focal motor deficit    Labs: I have personally reviewed pertinent lab results          Dex Ford MD

## 2022-01-06 NOTE — ASSESSMENT & PLAN NOTE
· 12/25/2021 CXR demonstrated right pneumothorax and 16 Fr chest tube was placed;  Chest tube was removed on 12/28  · R chest tube replaced on 12/30 for recurrent R ptx  · Chest tube removed on 01/05/2022  · Post removal x-ray showed no pneumothorax recurrent   · Pulmonology following, hopefully can remove chest tube at this time

## 2022-01-07 PROBLEM — J12.82 PNEUMONIA DUE TO COVID-19 VIRUS: Status: ACTIVE | Noted: 2021-12-16

## 2022-01-07 LAB
ANION GAP SERPL CALCULATED.3IONS-SCNC: 7 MMOL/L (ref 4–13)
BUN SERPL-MCNC: 55 MG/DL (ref 5–25)
CALCIUM SERPL-MCNC: 8.8 MG/DL (ref 8.4–10.2)
CHLORIDE SERPL-SCNC: 108 MMOL/L (ref 96–108)
CO2 SERPL-SCNC: 25 MMOL/L (ref 21–32)
CREAT SERPL-MCNC: 1.34 MG/DL (ref 0.6–1.3)
ERYTHROCYTE [DISTWIDTH] IN BLOOD BY AUTOMATED COUNT: 13.7 % (ref 11.6–15.1)
GFR SERPL CREATININE-BSD FRML MDRD: 37 ML/MIN/1.73SQ M
GLUCOSE SERPL-MCNC: 170 MG/DL (ref 65–140)
GLUCOSE SERPL-MCNC: 205 MG/DL (ref 65–140)
GLUCOSE SERPL-MCNC: 379 MG/DL (ref 65–140)
GLUCOSE SERPL-MCNC: 63 MG/DL (ref 65–140)
GLUCOSE SERPL-MCNC: 72 MG/DL (ref 65–140)
HCT VFR BLD AUTO: 30 % (ref 34.8–46.1)
HGB BLD-MCNC: 9.5 G/DL (ref 11.5–15.4)
MCH RBC QN AUTO: 27 PG (ref 26.8–34.3)
MCHC RBC AUTO-ENTMCNC: 31.7 G/DL (ref 31.4–37.4)
MCV RBC AUTO: 85 FL (ref 82–98)
PLATELET # BLD AUTO: 253 THOUSANDS/UL (ref 149–390)
PMV BLD AUTO: 9.8 FL (ref 8.9–12.7)
POTASSIUM SERPL-SCNC: 4.2 MMOL/L (ref 3.5–5.3)
RBC # BLD AUTO: 3.52 MILLION/UL (ref 3.81–5.12)
SODIUM SERPL-SCNC: 140 MMOL/L (ref 135–147)
WBC # BLD AUTO: 9.73 THOUSAND/UL (ref 4.31–10.16)

## 2022-01-07 PROCEDURE — 85027 COMPLETE CBC AUTOMATED: CPT | Performed by: INTERNAL MEDICINE

## 2022-01-07 PROCEDURE — 99231 SBSQ HOSP IP/OBS SF/LOW 25: CPT | Performed by: INTERNAL MEDICINE

## 2022-01-07 PROCEDURE — 80048 BASIC METABOLIC PNL TOTAL CA: CPT | Performed by: INTERNAL MEDICINE

## 2022-01-07 PROCEDURE — 82948 REAGENT STRIP/BLOOD GLUCOSE: CPT

## 2022-01-07 PROCEDURE — 97110 THERAPEUTIC EXERCISES: CPT

## 2022-01-07 RX ADMIN — INSULIN GLARGINE 15 UNITS: 100 INJECTION, SOLUTION SUBCUTANEOUS at 22:54

## 2022-01-07 RX ADMIN — ACETAMINOPHEN 975 MG: 325 TABLET ORAL at 00:18

## 2022-01-07 RX ADMIN — ACETAMINOPHEN 975 MG: 325 TABLET ORAL at 23:19

## 2022-01-07 RX ADMIN — ACETAMINOPHEN 975 MG: 325 TABLET ORAL at 07:22

## 2022-01-07 RX ADMIN — HEPARIN SODIUM 5000 UNITS: 5000 INJECTION INTRAVENOUS; SUBCUTANEOUS at 15:49

## 2022-01-07 RX ADMIN — INSULIN LISPRO 4 UNITS: 100 INJECTION, SOLUTION INTRAVENOUS; SUBCUTANEOUS at 22:53

## 2022-01-07 RX ADMIN — INSULIN LISPRO 4 UNITS: 100 INJECTION, SOLUTION INTRAVENOUS; SUBCUTANEOUS at 12:30

## 2022-01-07 RX ADMIN — ACETAMINOPHEN 975 MG: 325 TABLET ORAL at 18:35

## 2022-01-07 RX ADMIN — FERROUS SULFATE TAB 325 MG (65 MG ELEMENTAL FE) 325 MG: 325 (65 FE) TAB at 09:17

## 2022-01-07 RX ADMIN — HEPARIN SODIUM 5000 UNITS: 5000 INJECTION INTRAVENOUS; SUBCUTANEOUS at 22:56

## 2022-01-07 RX ADMIN — INSULIN LISPRO 5 UNITS: 100 INJECTION, SOLUTION INTRAVENOUS; SUBCUTANEOUS at 12:30

## 2022-01-07 RX ADMIN — DOCUSATE SODIUM 100 MG: 100 CAPSULE, LIQUID FILLED ORAL at 09:17

## 2022-01-07 RX ADMIN — METHYLPREDNISOLONE SODIUM SUCCINATE 30 MG: 40 INJECTION, POWDER, FOR SOLUTION INTRAMUSCULAR; INTRAVENOUS at 09:17

## 2022-01-07 RX ADMIN — HEPARIN SODIUM 5000 UNITS: 5000 INJECTION INTRAVENOUS; SUBCUTANEOUS at 07:22

## 2022-01-07 RX ADMIN — LIDOCAINE 1 PATCH: 50 PATCH TOPICAL at 09:18

## 2022-01-07 RX ADMIN — SENNOSIDES 8.6 MG: 8.6 TABLET, FILM COATED ORAL at 22:57

## 2022-01-07 RX ADMIN — ATORVASTATIN CALCIUM 40 MG: 40 TABLET, FILM COATED ORAL at 22:56

## 2022-01-07 RX ADMIN — INSULIN LISPRO 5 UNITS: 100 INJECTION, SOLUTION INTRAVENOUS; SUBCUTANEOUS at 16:56

## 2022-01-07 RX ADMIN — INSULIN LISPRO 16 UNITS: 100 INJECTION, SOLUTION INTRAVENOUS; SUBCUTANEOUS at 16:55

## 2022-01-07 NOTE — ASSESSMENT & PLAN NOTE
Lab Results   Component Value Date    EGFR 37 01/07/2022    EGFR 41 01/06/2022    EGFR 43 01/05/2022    CREATININE 1 34 (H) 01/07/2022    CREATININE 1 22 01/06/2022    CREATININE 1 19 01/05/2022     · Creatinine at baseline at this time  · Avoid nephrotoxic agents and hypotension  · Continue to monitor kidney function periodically

## 2022-01-07 NOTE — ASSESSMENT & PLAN NOTE
Lab Results   Component Value Date    HGBA1C 6 2 (H) 11/22/2021       Recent Labs     01/06/22  1107 01/06/22  1634 01/06/22 2007 01/07/22  0625   POCGLU 215* 244* 202* 72       Blood Sugar Average: Last 72 hrs:  (P) 159 [id number]     · Continue Lantus to 15 units q h s  And Lispro 5 units t i d   With meals  · Continue Accu-Cheks, corrective sliding-scale insulin, and hypoglycemic protocol

## 2022-01-07 NOTE — PLAN OF CARE
Problem: OCCUPATIONAL THERAPY ADULT  Goal: Performs self-care activities at highest level of function for planned discharge setting  See evaluation for individualized goals  Outcome: Progressing  Note: Limitation: Decreased ADL status,Decreased endurance  Prognosis: Fair  Assessment: Patient participated in Skilled OT session this date with interventions consisting of Energy Conservation techniques and therapeutic exercise to: increase functional use of BUEs, increase BUE muscle strength    Patient agreeable to OT treatment session, upon arrival patient was found supine in bed  Patient performs UB TE using 1# weight as detailed in flow sheet  Patient had no c/o of pain  Following TE, patient declined further activity  Session ended with patient supine in bed, all needs met, and call bell within reach  In comparison to previous session, patient with improvements in UB strength and endurance  Patient requiring verbal cues for correct technique, verbal cues for pacing thru activity steps and frequent rest periods  Patient performance  demonstrated good carryover of learned techniques and strategies to facilitate safety during functional tasks  Patient continues to be functioning below baseline level, occupational performance remains limited secondary to factors listed above and increased risk for falls and injury  From OT standpoint, recommendation at time of d/c would be Short Term Rehab  Patient to benefit from continued Occupational Therapy treatment while in the hospital to address deficits as defined above and maximize level of functional independence with ADLs and functional mobility in order to return to OF        OT Discharge Recommendation: Post acute rehabilitation services  OT - OK to Discharge: Yes (when medically cleared)

## 2022-01-07 NOTE — ASSESSMENT & PLAN NOTE
· 12/25/2021 CXR demonstrated right pneumothorax and 16 Fr chest tube was placed;  Chest tube was removed on 12/28  · R chest tube replaced on 12/30 for recurrent R ptx  · Chest tube removed on 01/05/2022  · Post removal x-ray showed no pneumothorax recurrent

## 2022-01-07 NOTE — OCCUPATIONAL THERAPY NOTE
01/07/22 1133   OT Last Visit   OT Visit Date 01/07/22   Note Type   Note Type Treatment   Restrictions/Precautions   Weight Bearing Precautions Per Order No   Other Precautions Multiple lines;Telemetry;O2;Fall Risk   Pain Assessment   Pain Assessment Tool 0-10   Pain Score No Pain   Therapeutic Excerise-Strength   UE Strength Yes   Right Upper Extremity- Strength   R Shoulder Flexion; Extension;Horizontal ABduction  (horiz  add, scapular pro/ret)   R Elbow Elbow flexion;Elbow extension   R Wrist   (wrist rotation)   R Weight/Reps/Sets 1# weight x 20 reps   Left Upper Extremity-Strength   L Weights/Reps/Sets TE same as R UE above   Cognition   Overall Cognitive Status WFL   Arousal/Participation Alert; Cooperative   Attention Within functional limits   Orientation Level Oriented X4   Memory Within functional limits   Following Commands Follows one step commands with increased time or repetition   Comments Pt agreeable to OT treatment  Activity Tolerance   Activity Tolerance Patient limited by fatigue   Assessment   Assessment Patient participated in Skilled OT session this date with interventions consisting of Energy Conservation techniques and therapeutic exercise to: increase functional use of BUEs, increase BUE muscle strength    Patient agreeable to OT treatment session, upon arrival patient was found supine in bed  Patient performs UB TE using 1# weight as detailed in flow sheet  Patient had no c/o of pain  Following TE, patient declined further activity  Session ended with patient supine in bed, all needs met, and call bell within reach  In comparison to previous session, patient with improvements in UB strength and endurance  Patient requiring verbal cues for correct technique, verbal cues for pacing thru activity steps and frequent rest periods  Patient performance  demonstrated good carryover of learned techniques and strategies to facilitate safety during functional tasks   Patient continues to be functioning below baseline level, occupational performance remains limited secondary to factors listed above and increased risk for falls and injury  From OT standpoint, recommendation at time of d/c would be Short Term Rehab  Patient to benefit from continued Occupational Therapy treatment while in the hospital to address deficits as defined above and maximize level of functional independence with ADLs and functional mobility in order to return to PLOF  Plan   Treatment Interventions UE strengthening/ROM; Endurance training;Energy conservation   Goal Expiration Date 01/13/22   OT Treatment Day 6   OT Frequency 3-5x/wk   Recommendation   OT Discharge Recommendation Post acute rehabilitation services   OT - OK to Discharge Yes  (when medically cleared)   AM-PAC Daily Activity Inpatient   Lower Body Dressing 1   Bathing 3   Toileting 2   Upper Body Dressing 3   Grooming 3   Eating 4   Daily Activity Raw Score 16   Daily Activity Standardized Score (Calc for Raw Score >=11) 35 96   AM-PAC Applied Cognition Inpatient   Following a Speech/Presentation 3   Understanding Ordinary Conversation 4   Taking Medications 3   Remembering Where Things Are Placed or Put Away 3   Remembering List of 4-5 Errands 3   Taking Care of Complicated Tasks 3   Applied Cognition Raw Score 19   Applied Cognition Standardized Score 39 77   STAN Cabrera

## 2022-01-07 NOTE — ASSESSMENT & PLAN NOTE
· Likely due  to COVID -19 viral pneumonia and pneumothorax  · Oxygen saturations 94% on 3 L nasal cannula  · Repeat chest x-ray performed on 01/06/2022 as supplemental oxygen requirements were increasing, this demonstrated findings consistent with known COVID pneumonia but no other acute abnormality  · Further management as below  · Wean oxygen as tolerated  · Patient medically stable for discharge at this time  Awaiting short-term rehab placement

## 2022-01-07 NOTE — PROGRESS NOTES
[address]  Progress Note - Jeremiah Ferguson 1940, [de-identified] y o  female MRN: [id number]  Unit/Bed#: -01 Encounter: [id number]  Primary Care Provider: Destiny Forbes DO   Date and time admitted to hospital: 12/16/2021  4:49 PM    * Acute respiratory failure with hypoxia ([address] )  Assessment & Plan  · Likely due  to COVID -19 viral pneumonia and pneumothorax  · Oxygen saturations 94% on 3 L nasal cannula  · Repeat chest x-ray performed on 01/06/2022 as supplemental oxygen requirements were increasing, this demonstrated findings consistent with known COVID pneumonia but no other acute abnormality  · Further management as below  · Wean oxygen as tolerated  · Patient medically stable for discharge at this time  Awaiting short-term rehab placement  Pneumonia due to COVID-19 virus  Assessment & Plan  · COVID-19 positive on 12/16/2021; Unvaccinated  · Decadron 0 1 mg/kg completed 12/30  · Completed 14 day course of Baricitinib   · Completed 5 day course of Remdesivir   · Continue Solu-Medrol taper as recommended by Pulmonary/Critical Care  · Isolation precautions may be discontinued at this time  · Medically clear for discharge pending short-term rehab placement  Pneumothorax- Resolved  Assessment & Plan  · 12/25/2021 CXR demonstrated right pneumothorax and 16 Fr chest tube was placed;  Chest tube was removed on 12/28  · R chest tube replaced on 12/30 for recurrent R ptx  · Chest tube removed on 01/05/2022  · Post removal x-ray showed no pneumothorax recurrent    Ambulatory dysfunction  Assessment & Plan  · PT OT recommending post acute rehab placement  · Case management following to aid with discharge plan    Type 2 diabetes mellitus without complication, without long-term current use of insulin [facility])  Assessment & Plan  Lab Results   Component Value Date    HGBA1C 6 2 (H) 11/22/2021       Recent Labs     01/06/22  1107 01/06/22  1634 01/06/22 2007 01/07/22  0625   POCGLU 215* 244* 202* 72       Blood Sugar Average: Last 72 hrs:  (P) 226 [id number]     · Continue Lantus to 15 units q h s  And Lispro 5 units t i d  With meals  · Continue Accu-Cheks, corrective sliding-scale insulin, and hypoglycemic protocol    Sepsis due to methicillin susceptible Staphylococcus aureus (HCC)  Assessment & Plan  · Sepsis present on admission due to COVID pneumonia and MSSA bacteremia  · TTE was negative  · Completed 7 days of cefazolin completed on 1/2    Bacteremia due to methicillin susceptible Staphylococcus aureus (MSSA)  Assessment & Plan  · Blood Clx: MSSA 12/22  · Repeat BClx 12/26: NGTD  · Completed course of cefazolin    Chronic kidney disease, stage 3b [facility])  Assessment & Plan  Lab Results   Component Value Date    EGFR 37 01/07/2022    EGFR 41 01/06/2022    EGFR 43 01/05/2022    CREATININE 1 34 (H) 01/07/2022    CREATININE 1 22 01/06/2022    CREATININE 1 19 01/05/2022     · Creatinine at baseline at this time  · Avoid nephrotoxic agents and hypotension  · Continue to monitor kidney function periodically        VTE Pharmacologic Prophylaxis: VTE Score: 6 High Risk (Score >/= 5) - Pharmacological DVT Prophylaxis Ordered: heparin  Sequential Compression Devices Ordered  Patient Centered Rounds: I performed bedside rounds with nursing staff today  Discussions with Specialists or Other Care Team Provider:  Nursing, PT, and case management    Education and Discussions with Family / Patient: Patient declined call to   Time Spent for Care: 30 minutes  More than 50% of total time spent on counseling and coordination of care as described above  Current Length of Stay: 21 day(s)  Current Patient Status: Inpatient   Certification Statement: The patient will continue to require additional inpatient hospital stay due to Awaiting placement to short-term rehab  Discharge Plan:  To be determined based on accepting facility and bed availability    Code Status: Level 1 - Full Code    Subjective:   Patient resting comfortably in bed without any acute complaints  She noted some worsening of her shortness of breath yesterday afternoon but feels better this morning  No overnight events reported  Objective:     Vitals:   Temp (24hrs), Av 2 °F (36 8 °C), Min:97 7 °F (36 5 °C), Max:98 8 °F (37 1 °C)    Temp:  [97 7 °F (36 5 °C)-98 8 °F (37 1 °C)] 98 2 °F (36 8 °C)  HR:  [] 89  Resp:  [17-20] 17  BP: ()/(50-64) 95/50  SpO2:  [87 %-94 %] 93 %  Body mass index is 23 34 kg/m²  Input and Output Summary (last 24 hours):   No intake or output data in the 24 hours ending 22 1042    Physical Exam:   Physical Exam  Vitals and nursing note reviewed  Constitutional:       General: She is not in acute distress  Appearance: Normal appearance  She is well-developed and normal weight  HENT:      Head: Normocephalic and atraumatic  Mouth/Throat:      Mouth: Mucous membranes are moist       Pharynx: Oropharynx is clear  Eyes:      Extraocular Movements: Extraocular movements intact  Conjunctiva/sclera: Conjunctivae normal    Cardiovascular:      Rate and Rhythm: Normal rate and regular rhythm  Pulses: Normal pulses  Heart sounds: Normal heart sounds  No murmur heard  Pulmonary:      Effort: Pulmonary effort is normal  No respiratory distress  Breath sounds: Normal breath sounds  No wheezing  Comments: 3 L nasal cannula  Abdominal:      General: Bowel sounds are normal  There is no distension  Palpations: Abdomen is soft  Tenderness: There is no abdominal tenderness  Musculoskeletal:         General: Normal range of motion  Cervical back: Normal range of motion and neck supple  Skin:     General: Skin is warm and dry  Neurological:      General: No focal deficit present  Mental Status: She is alert and oriented to person, place, and time  Mental status is at baseline     Psychiatric:         Mood and Affect: Mood normal          Behavior: Behavior normal          Judgment: Judgment normal        Labs:  Results from last 7 days   Lab Units 01/07/22  0436   WBC Thousand/uL 9 73   HEMOGLOBIN g/dL 9 5*   HEMATOCRIT % 30 0*   PLATELETS Thousands/uL 253     Results from last 7 days   Lab Units 01/07/22  0436 01/04/22  0509 01/03/22  0446   SODIUM mmol/L 140   < > 135   POTASSIUM mmol/L 4 2   < > 4 7   CHLORIDE mmol/L 108   < > 106   CO2 mmol/L 25   < > 23   BUN mg/dL 55*   < > 66*   CREATININE mg/dL 1 34*   < > 1 46*   ANION GAP mmol/L 7   < > 6   CALCIUM mg/dL 8 8   < > 8 8   ALBUMIN g/dL  --   --  3 0*   TOTAL BILIRUBIN mg/dL  --   --  0 34   ALK PHOS U/L  --   --  85   ALT U/L  --   --  3*   AST U/L  --   --  18   GLUCOSE RANDOM mg/dL 63*   < > 151*    < > = values in this interval not displayed  Results from last 7 days   Lab Units 01/07/22  0625 01/06/22  2007 01/06/22  1634 01/06/22  1107 01/06/22  0714 01/05/22  2158 01/05/22  1625 01/05/22  1048 01/05/22  0601 01/04/22  2039 01/04/22  1631 01/04/22  1112   POC GLUCOSE mg/dl 72 202* 244* 215* 107 120 184* 61* 71 126 227* 330*               Lines/Drains:  Invasive Devices  Report    Peripheral Intravenous Line            Peripheral IV 01/06/22 Dorsal (posterior); Left Forearm <1 day          Drain            External Urinary Catheter 15 days    Chest Tube Right Midaxillary 8 days              Imaging: Reviewed radiology reports from this admission including: chest xray    Recent Cultures (last 7 days):         Last 24 Hours Medication List:   Current Facility-Administered Medications   Medication Dose Route Frequency Provider Last Rate    acetaminophen  975 mg Oral Q6H [address] BENTON Worthy      atorvastatin  40 mg Oral HS BENTON Worthy      bisacodyl  10 mg Rectal Daily PRN ISE, ROSA ISELANP      docusate sodium  100 mg Oral Daily BENTON Worthy      ferrous sulfate  325 mg Oral Daily With Breakfast BENTON Worthy      heparin (porcine)  5,000 Units Subcutaneous Q8H [address] BENTON Worthy      insulin glargine  15 Units Subcutaneous HS [facility] L Bearden, DO      insulin lispro  4-20 Units Subcutaneous TID AC BENTON Worthy      insulin lispro  4-20 Units Subcutaneous HS BENTON Worthy      insulin lispro  5 Units Subcutaneous TID With Meals BENTON Drummond      lidocaine  1 patch Topical Daily BENTON Drummond      methylPREDNISolone sodium succinate  30 mg Intravenous Daily BENTON Worthy      Followed by   Reyes Ortiz ON 1/9/2022] methylPREDNISolone sodium succinate  20 mg Intravenous Daily BENTON Worthy      Followed by   Reyes Ortiz ON 1/12/2022] methylPREDNISolone sodium succinate  10 mg Intravenous Daily BENTON Wotrhy      ondansetron  4 mg Intravenous Q6H PRN BENTON Drummond      oxyCODONE  2 5 mg Oral Q6H PRN BENTON Worthy      polyethylene glycol  17 g Oral Daily BENTON Drummond      senna  1 tablet Oral HS BENTON Drummond          Today, Patient Was Seen By: Kaleb Bearden, DO    **Please Note: This note may have been constructed using a voice recognition system  **

## 2022-01-07 NOTE — PLAN OF CARE
Problem: PAIN - ADULT  Goal: Verbalizes/displays adequate comfort level or baseline comfort level  Description: Interventions:  - Encourage patient to monitor pain and request assistance  - Assess pain using appropriate pain scale  - Administer analgesics based on type and severity of pain and evaluate response  - Implement non-pharmacological measures as appropriate and evaluate response  - Consider cultural and social influences on pain and pain management  - Notify physician/advanced practitioner if interventions unsuccessful or patient reports new pain  Outcome: Progressing     Problem: INFECTION - ADULT  Goal: Absence or prevention of progression during hospitalization  Description: INTERVENTIONS:  - Assess and monitor for signs and symptoms of infection  - Monitor lab/diagnostic results  - Monitor all insertion sites, i e  indwelling lines, tubes, and drains  - Monitor endotracheal if appropriate and nasal secretions for changes in amount and color  - [place] appropriate cooling/warming therapies per order  - Administer medications as ordered  - Instruct and encourage patient and family to use good hand hygiene technique  - Identify and instruct in appropriate isolation precautions for identified infection/condition  Outcome: Progressing  Goal: Absence of fever/infection during neutropenic period  Description: INTERVENTIONS:  - Monitor WBC    Outcome: Progressing

## 2022-01-07 NOTE — ASSESSMENT & PLAN NOTE
· COVID-19 positive on 12/16/2021; Unvaccinated  · Decadron 0 1 mg/kg completed 12/30  · Completed 14 day course of Baricitinib   · Completed 5 day course of Remdesivir   · Continue Solu-Medrol taper as recommended by Pulmonary/Critical Care  · Isolation precautions may be discontinued at this time  · Medically clear for discharge pending short-term rehab placement

## 2022-01-08 LAB
ANION GAP SERPL CALCULATED.3IONS-SCNC: 8 MMOL/L (ref 4–13)
BUN SERPL-MCNC: 46 MG/DL (ref 5–25)
CALCIUM SERPL-MCNC: 8.7 MG/DL (ref 8.4–10.2)
CHLORIDE SERPL-SCNC: 108 MMOL/L (ref 96–108)
CO2 SERPL-SCNC: 24 MMOL/L (ref 21–32)
CREAT SERPL-MCNC: 1.23 MG/DL (ref 0.6–1.3)
ERYTHROCYTE [DISTWIDTH] IN BLOOD BY AUTOMATED COUNT: 13.8 % (ref 11.6–15.1)
GFR SERPL CREATININE-BSD FRML MDRD: 41 ML/MIN/1.73SQ M
GLUCOSE SERPL-MCNC: 125 MG/DL (ref 65–140)
GLUCOSE SERPL-MCNC: 148 MG/DL (ref 65–140)
GLUCOSE SERPL-MCNC: 162 MG/DL (ref 65–140)
GLUCOSE SERPL-MCNC: 364 MG/DL (ref 65–140)
GLUCOSE SERPL-MCNC: 55 MG/DL (ref 65–140)
GLUCOSE SERPL-MCNC: 59 MG/DL (ref 65–140)
HCT VFR BLD AUTO: 28.7 % (ref 34.8–46.1)
HGB BLD-MCNC: 9 G/DL (ref 11.5–15.4)
MCH RBC QN AUTO: 27 PG (ref 26.8–34.3)
MCHC RBC AUTO-ENTMCNC: 31.4 G/DL (ref 31.4–37.4)
MCV RBC AUTO: 86 FL (ref 82–98)
PLATELET # BLD AUTO: 226 THOUSANDS/UL (ref 149–390)
PMV BLD AUTO: 10.3 FL (ref 8.9–12.7)
POTASSIUM SERPL-SCNC: 3.8 MMOL/L (ref 3.5–5.3)
RBC # BLD AUTO: 3.33 MILLION/UL (ref 3.81–5.12)
SODIUM SERPL-SCNC: 140 MMOL/L (ref 135–147)
WBC # BLD AUTO: 7.82 THOUSAND/UL (ref 4.31–10.16)

## 2022-01-08 PROCEDURE — 85027 COMPLETE CBC AUTOMATED: CPT | Performed by: INTERNAL MEDICINE

## 2022-01-08 PROCEDURE — 82948 REAGENT STRIP/BLOOD GLUCOSE: CPT

## 2022-01-08 PROCEDURE — 99232 SBSQ HOSP IP/OBS MODERATE 35: CPT | Performed by: INTERNAL MEDICINE

## 2022-01-08 PROCEDURE — 80048 BASIC METABOLIC PNL TOTAL CA: CPT | Performed by: INTERNAL MEDICINE

## 2022-01-08 RX ADMIN — METHYLPREDNISOLONE SODIUM SUCCINATE 30 MG: 40 INJECTION, POWDER, FOR SOLUTION INTRAMUSCULAR; INTRAVENOUS at 08:23

## 2022-01-08 RX ADMIN — LIDOCAINE 1 PATCH: 50 PATCH TOPICAL at 08:21

## 2022-01-08 RX ADMIN — INSULIN GLARGINE 15 UNITS: 100 INJECTION, SOLUTION SUBCUTANEOUS at 21:21

## 2022-01-08 RX ADMIN — INSULIN LISPRO 5 UNITS: 100 INJECTION, SOLUTION INTRAVENOUS; SUBCUTANEOUS at 08:27

## 2022-01-08 RX ADMIN — HEPARIN SODIUM 5000 UNITS: 5000 INJECTION INTRAVENOUS; SUBCUTANEOUS at 06:38

## 2022-01-08 RX ADMIN — HEPARIN SODIUM 5000 UNITS: 5000 INJECTION INTRAVENOUS; SUBCUTANEOUS at 21:20

## 2022-01-08 RX ADMIN — INSULIN LISPRO 5 UNITS: 100 INJECTION, SOLUTION INTRAVENOUS; SUBCUTANEOUS at 16:30

## 2022-01-08 RX ADMIN — ACETAMINOPHEN 975 MG: 325 TABLET ORAL at 06:38

## 2022-01-08 RX ADMIN — INSULIN LISPRO 5 UNITS: 100 INJECTION, SOLUTION INTRAVENOUS; SUBCUTANEOUS at 12:22

## 2022-01-08 RX ADMIN — ATORVASTATIN CALCIUM 40 MG: 40 TABLET, FILM COATED ORAL at 21:20

## 2022-01-08 RX ADMIN — INSULIN LISPRO 16 UNITS: 100 INJECTION, SOLUTION INTRAVENOUS; SUBCUTANEOUS at 16:29

## 2022-01-08 RX ADMIN — HEPARIN SODIUM 5000 UNITS: 5000 INJECTION INTRAVENOUS; SUBCUTANEOUS at 13:45

## 2022-01-08 RX ADMIN — FERROUS SULFATE TAB 325 MG (65 MG ELEMENTAL FE) 325 MG: 325 (65 FE) TAB at 08:22

## 2022-01-08 RX ADMIN — ACETAMINOPHEN 975 MG: 325 TABLET ORAL at 17:12

## 2022-01-08 RX ADMIN — INSULIN LISPRO 4 UNITS: 100 INJECTION, SOLUTION INTRAVENOUS; SUBCUTANEOUS at 21:21

## 2022-01-08 RX ADMIN — ACETAMINOPHEN 975 MG: 325 TABLET ORAL at 12:35

## 2022-01-08 NOTE — ASSESSMENT & PLAN NOTE
Lab Results   Component Value Date    EGFR 41 01/08/2022    EGFR 37 01/07/2022    EGFR 41 01/06/2022    CREATININE 1 23 01/08/2022    CREATININE 1 34 (H) 01/07/2022    CREATININE 1 22 01/06/2022     · Creatinine at baseline at this time  · Avoid nephrotoxic agents and hypotension  · Continue to monitor kidney function periodically

## 2022-01-08 NOTE — ASSESSMENT & PLAN NOTE
Lab Results   Component Value Date    HGBA1C 6 2 (H) 11/22/2021       Recent Labs     01/07/22  1654 01/07/22  2252 01/08/22  0551 01/08/22  0638   POCGLU 379* 170* 59* 125       Blood Sugar Average: Last 72 hrs:  (P) 158 [id number]     · Continue Lantus to 15 units q h s  And Lispro 5 units t i d   With meals  · Continue Accu-Cheks, corrective sliding-scale insulin, and hypoglycemic protocol

## 2022-01-08 NOTE — PROGRESS NOTES
[address]  67   Progress Note - Michele Marie 1940, [de-identified] y o  female MRN: [id number]  Unit/Bed#: -01 Encounter: [id number]  Primary Care Provider: Yolande Villarreal DO   Date and time admitted to hospital: 12/16/2021  4:49 PM    * Acute respiratory failure with hypoxia ([address] )  Assessment & Plan  · Likely due  to COVID -19 viral pneumonia and pneumothorax  · Oxygen saturations 94% on 3 L nasal cannula  · Repeat chest x-ray performed on 01/06/2022 as supplemental oxygen requirements were increasing, this demonstrated findings consistent with known COVID pneumonia but no other acute abnormality  · Further management as below  · Wean oxygen as tolerated  · Patient medically stable for discharge at this time  Awaiting short-term rehab placement  Pneumonia due to COVID-19 virus  Assessment & Plan  · COVID-19 positive on 12/16/2021; Unvaccinated  · Decadron 0 1 mg/kg completed 12/30  · Completed 14 day course of Baricitinib   · Completed 5 day course of Remdesivir   · Continue Solu-Medrol taper as recommended by Pulmonary/Critical Care  · Isolation precautions may be discontinued at this time  · Medically clear for discharge pending short-term rehab placement  Pneumothorax- Resolved  Assessment & Plan  · 12/25/2021 CXR demonstrated right pneumothorax and 16 Fr chest tube was placed;  Chest tube was removed on 12/28  · R chest tube replaced on 12/30 for recurrent R ptx  · Chest tube removed on 01/05/2022  · Post removal x-ray showed no pneumothorax recurrent    Ambulatory dysfunction  Assessment & Plan  · PT OT recommending post acute rehab placement  · Case management following to aid with discharge plan    Type 2 diabetes mellitus without complication, without long-term current use of insulin [facility])  Assessment & Plan  Lab Results   Component Value Date    HGBA1C 6 2 (H) 11/22/2021       Recent Labs     01/07/22  1654 01/07/22  2252 01/08/22  0551 01/08/22  0638   POCGLU 379* 170* 59* 125       Blood Sugar Average: Last 72 hrs:  (P) 158 [id number]     · Continue Lantus to 15 units q h s  And Lispro 5 units t i d  With meals  · Continue Accu-Cheks, corrective sliding-scale insulin, and hypoglycemic protocol    Sepsis due to methicillin susceptible Staphylococcus aureus (HCC)  Assessment & Plan  · Sepsis present on admission due to COVID pneumonia and MSSA bacteremia  · TTE was negative  · Completed 7 days of cefazolin completed on 1/2    Bacteremia due to methicillin susceptible Staphylococcus aureus (MSSA)  Assessment & Plan  · Blood Clx: MSSA 12/22  · Repeat BClx 12/26: NGTD  · Completed course of cefazolin    Chronic kidney disease, stage 3b [facility])  Assessment & Plan  Lab Results   Component Value Date    EGFR 41 01/08/2022    EGFR 37 01/07/2022    EGFR 41 01/06/2022    CREATININE 1 23 01/08/2022    CREATININE 1 34 (H) 01/07/2022    CREATININE 1 22 01/06/2022     · Creatinine at baseline at this time  · Avoid nephrotoxic agents and hypotension  · Continue to monitor kidney function periodically      VTE Pharmacologic Prophylaxis: VTE Score: 6 High Risk (Score >/= 5) - Pharmacological DVT Prophylaxis Ordered: heparin  Sequential Compression Devices Ordered  Patient Centered Rounds: I performed bedside rounds with nursing staff today  Discussions with Specialists or Other Care Team Provider: CM, PT, and Nursing    Time Spent for Care: 30 minutes  More than 50% of total time spent on counseling and coordination of care as described above  Current Length of Stay: 22 day(s)  Current Patient Status: Inpatient   Certification Statement: The patient will continue to require additional inpatient hospital stay due to Awaiting short-term rehab placement  Discharge Plan: To be determined based on accepting facility in bed availability    Code Status: Level 1 - Full Code    Subjective:   Patient resting comfortably in bed without any acute complaints  No overnight events reported      Objective: Vitals:   Temp (24hrs), Av 6 °F (36 4 °C), Min:97 5 °F (36 4 °C), Max:97 7 °F (36 5 °C)    Temp:  [97 5 °F (36 4 °C)-97 7 °F (36 5 °C)] 97 5 °F (36 4 °C)  HR:  [76-94] 94  Resp:  [18] 18  BP: (120-123)/(66-73) 120/66  SpO2:  [93 %-96 %] 94 %  Body mass index is 23 29 kg/m²  Input and Output Summary (last 24 hours):   No intake or output data in the 24 hours ending 22 1113    Physical Exam:   Physical Exam  Vitals and nursing note reviewed  Constitutional:       General: She is not in acute distress  Appearance: Normal appearance  She is well-developed and normal weight  HENT:      Head: Normocephalic and atraumatic  Mouth/Throat:      Mouth: Mucous membranes are moist       Pharynx: Oropharynx is clear  Eyes:      Extraocular Movements: Extraocular movements intact  Conjunctiva/sclera: Conjunctivae normal    Cardiovascular:      Rate and Rhythm: Normal rate and regular rhythm  Pulses: Normal pulses  Heart sounds: Normal heart sounds  No murmur heard  Pulmonary:      Effort: Pulmonary effort is normal  No respiratory distress  Breath sounds: Normal breath sounds  Comments: 3L NC  Abdominal:      General: Bowel sounds are normal  There is no distension  Palpations: Abdomen is soft  Tenderness: There is no abdominal tenderness  Musculoskeletal:         General: Normal range of motion  Cervical back: Normal range of motion and neck supple  Skin:     General: Skin is warm and dry  Neurological:      General: No focal deficit present  Mental Status: She is alert and oriented to person, place, and time  Mental status is at baseline     Psychiatric:         Mood and Affect: Mood normal          Behavior: Behavior normal          Judgment: Judgment normal         Labs:  Results from last 7 days   Lab Units 22  0606   WBC Thousand/uL 7 82   HEMOGLOBIN g/dL 9 0*   HEMATOCRIT % 28 7*   PLATELETS Thousands/uL 226     Results from last 7 days   Lab Units 01/08/22  0606 01/04/22  0509 01/03/22  0446   SODIUM mmol/L 140   < > 135   POTASSIUM mmol/L 3 8   < > 4 7   CHLORIDE mmol/L 108   < > 106   CO2 mmol/L 24   < > 23   BUN mg/dL 46*   < > 66*   CREATININE mg/dL 1 23   < > 1 46*   ANION GAP mmol/L 8   < > 6   CALCIUM mg/dL 8 7   < > 8 8   ALBUMIN g/dL  --   --  3 0*   TOTAL BILIRUBIN mg/dL  --   --  0 34   ALK PHOS U/L  --   --  85   ALT U/L  --   --  3*   AST U/L  --   --  18   GLUCOSE RANDOM mg/dL 55*   < > 151*    < > = values in this interval not displayed  Results from last 7 days   Lab Units 01/08/22  0638 01/08/22  0551 01/07/22  2252 01/07/22  1654 01/07/22  1138 01/07/22  0625 01/06/22  2007 01/06/22  1634 01/06/22  1107 01/06/22  0714 01/05/22  2158 01/05/22  1625   POC GLUCOSE mg/dl 125 59* 170* 379* 205* 72 202* 244* 215* 107 120 184*               Lines/Drains:  Invasive Devices  Report    Peripheral Intravenous Line            Peripheral IV 01/06/22 Dorsal (posterior); Left Forearm 1 day          Drain            External Urinary Catheter 16 days    Chest Tube Right Midaxillary 9 days              Imaging:  No new imaging at this time    Recent Cultures (last 7 days):         Last 24 Hours Medication List:   Current Facility-Administered Medications   Medication Dose Route Frequency Provider Last Rate    acetaminophen  975 mg Oral Q6H [address] BENTON Worthy      atorvastatin  40 mg Oral HS BENTON Worthy      bisacodyl  10 mg Rectal Daily PRN BENT[place] Khalil      docusate sodium  100 mg Oral Daily BENTON Worthy      ferrous sulfate  325 mg Oral Daily With Breakfast BENTON Worthy      heparin (porcine)  5,000 Units Subcutaneous [facility] BENTON Worthy      insulin glargine  15 Units Subcutaneous HS HungAlplaus L Bearden, DO      insulin lispro  4-20 Units Subcutaneous TID AC Mercedes Dawe, CRNP      insulin lispro  4-20 Units Subcutaneous HS BENTON Worthy      insulin lispro  5 Units Subcutaneous TID With Meals BENTON Hurley      lidocaine  1 patch Topical Daily BENTON Hurley      [START ON 1/9/2022] methylPREDNISolone sodium succinate  20 mg Intravenous Daily BENTON Worthy      Followed by   Netta Bain ON 1/12/2022] methylPREDNISolone sodium succinate  10 mg Intravenous Daily BENTON Worthy      ondansetron  4 mg Intravenous Q6H PRN Karla Henry, B[place]      oxyCODONE  2 5 mg Oral Q6H PRN BENTON Worthy      polyethylene glycol  17 g Oral Daily Karla Elaine, [place]      senna  1 tablet Oral HS BENT[place] Hurley          Today, Patient Was Seen By: Karen Bearden DO    **Please Note: This note may have been constructed using a voice recognition system  **

## 2022-01-08 NOTE — ASSESSMENT & PLAN NOTE
· Blood Clx: MSSA 12/22  · Repeat BClx 12/26: NGTD  · Completed course of cefazolin Abdominal Pain, N/V/D

## 2022-01-08 NOTE — PLAN OF CARE
Problem: Potential for Falls  Goal: Patient will remain free of falls  Description: INTERVENTIONS:  - Educate patient/family on patient safety including physical limitations  - Instruct patient to call for assistance with activity   - Consult OT/PT to assist with strengthening/mobility   - Keep Call bell within reach  - Keep bed low and locked with side rails adjusted as appropriate  - Keep care items and personal belongings within reach  - Initiate and maintain comfort rounds  - Make Fall Risk Sign visible to staff  - Offer Toileting every 2 Hours, in advance of need  - Initiate/Maintain fall alarm  - Obtain necessary fall risk management equipment: fall alarm  - Apply yellow socks and bracelet for high fall risk patients  - Consider moving patient to room near nurses station  Outcome: Progressing     Problem: PAIN - ADULT  Goal: Verbalizes/displays adequate comfort level or baseline comfort level  Description: Interventions:  - Encourage patient to monitor pain and request assistance  - Assess pain using appropriate pain scale  - Administer analgesics based on type and severity of pain and evaluate response  - Implement non-pharmacological measures as appropriate and evaluate response  - Consider cultural and social influences on pain and pain management  - Notify physician/advanced practitioner if interventions unsuccessful or patient reports new pain  Outcome: Progressing     Problem: INFECTION - ADULT  Goal: Absence or prevention of progression during hospitalization  Description: INTERVENTIONS:  - Assess and monitor for signs and symptoms of infection  - Monitor lab/diagnostic results  - Monitor all insertion sites, i e  indwelling lines, tubes, and drains  - Monitor endotracheal if appropriate and nasal secretions for changes in amount and color  - [place] appropriate cooling/warming therapies per order  - Administer medications as ordered  - Instruct and encourage patient and family to use good hand hygiene technique  - Identify and instruct in appropriate isolation precautions for identified infection/condition  Outcome: Progressing  Goal: Absence of fever/infection during neutropenic period  Description: INTERVENTIONS:  - Monitor WBC    Outcome: Progressing     Problem: SAFETY ADULT  Goal: Patient will remain free of falls  Description: INTERVENTIONS:  - Educate patient/family on patient safety including physical limitations  - Instruct patient to call for assistance with activity   - Consult OT/PT to assist with strengthening/mobility   - Keep Call bell within reach  - Keep bed low and locked with side rails adjusted as appropriate  - Keep care items and personal belongings within reach  - Initiate and maintain comfort rounds  - Make Fall Risk Sign visible to staff  - Offer Toileting every 2 Hours, in advance of need  - Initiate/Maintain fall alarm  - Obtain necessary fall risk management equipment: fall alarm  - Apply yellow socks and bracelet for high fall risk patients  - Consider moving patient to room near nurses station  Outcome: Progressing  Goal: Maintain or return to baseline ADL function  Description: INTERVENTIONS:  - Educate patient/family on patient safety including physical limitations  - Instruct patient to call for assistance with activity   - Consult OT/PT to assist with strengthening/mobility   - Keep Call bell within reach  - Keep bed low and locked with side rails adjusted as appropriate  - Keep care items and personal belongings within reach  - Initiate and maintain comfort rounds  - Make Fall Risk Sign visible to staff  - Offer Toileting every 2 Hours, in advance of need  - Initiate/Maintain fall alarm  - Obtain necessary fall risk management equipment: fall alarm  - Apply yellow socks and bracelet for high fall risk patients  - Consider moving patient to room near nurses station  Outcome: Progressing  Goal: Maintains/Returns to pre admission functional level  Description: INTERVENTIONS:  - Perform BMAT or MOVE assessment daily    - Set and communicate daily mobility goal to care team and patient/family/caregiver  - Collaborate with rehabilitation services on mobility goals if consulted  - Perform Range of Motion 3 times a day  - Reposition patient every 2 hours  - Dangle patient 3 times a day  - Stand patient 3 times a day  - Ambulate patient 3 times a day  - Out of bed to chair 3 times a day   - Out of bed for meals 3 times a day  - Out of bed for toileting  - Record patient progress and toleration of activity level   Outcome: Progressing     Problem: DISCHARGE PLANNING  Goal: Discharge to home or other facility with appropriate resources  Description: INTERVENTIONS:  - Identify barriers to discharge w/patient and caregiver  - Arrange for needed discharge resources and transportation as appropriate  - Identify discharge learning needs (meds, wound care, etc )  - Refer to Case Management Department for coordinating discharge planning if the patient needs post-hospital services based on physician/advanced practitioner order or complex needs related to functional status, cognitive ability, or social support system  Outcome: Progressing     Problem: Knowledge Deficit  Goal: Patient/family/caregiver demonstrates understanding of disease process, treatment plan, medications, and discharge instructions  Description: Complete learning assessment and assess knowledge base    Interventions:  - Provide teaching at level of understanding  - Provide teaching via preferred learning methods  Outcome: Progressing     Problem: MOBILITY - ADULT  Goal: Maintain or return to baseline ADL function  Description: INTERVENTIONS:  - Educate patient/family on patient safety including physical limitations  - Instruct patient to call for assistance with activity   - Consult OT/PT to assist with strengthening/mobility   - Keep Call bell within reach  - Keep bed low and locked with side rails adjusted as appropriate  - Keep care items and personal belongings within reach  - Initiate and maintain comfort rounds  - Make Fall Risk Sign visible to staff  - Offer Toileting every 2 Hours, in advance of need  - Initiate/Maintain fall alarm  - Obtain necessary fall risk management equipment: fall alarm  - Apply yellow socks and bracelet for high fall risk patients  - Consider moving patient to room near nurses station  Outcome: Progressing  Goal: Maintains/Returns to pre admission functional level  Description: INTERVENTIONS:  - Perform BMAT or MOVE assessment daily    - Set and communicate daily mobility goal to care team and patient/family/caregiver  - Collaborate with rehabilitation services on mobility goals if consulted  - Perform Range of Motion 3 times a day  - Reposition patient every 2 hours    - Dangle patient 3 times a day  - Stand patient 3 times a day  - Ambulate patient 3 times a day  - Out of bed to chair 3 times a day   - Out of bed for meals 3 times a day  - Out of bed for toileting  - Record patient progress and toleration of activity level   Outcome: Progressing     Problem: Prexisting or High Potential for Compromised Skin Integrity  Goal: Skin integrity is maintained or improved  Description: INTERVENTIONS:  - Identify patients at risk for skin breakdown  - Assess and monitor skin integrity  - Assess and monitor nutrition and hydration status  - Monitor labs   - Assess for incontinence   - Turn and reposition patient  - Assist with mobility/ambulation  - Relieve pressure over bony prominences  - Avoid friction and shearing  - Provide appropriate hygiene as needed including keeping skin clean and dry  - Evaluate need for skin moisturizer/barrier cream  - Collaborate with interdisciplinary team   - Patient/family teaching  - Consider wound care consult   Outcome: Progressing     Problem: RESPIRATORY - ADULT  Goal: Achieves optimal ventilation and oxygenation  Description: INTERVENTIONS:  - Assess for changes in respiratory status  - Assess for changes in mentation and behavior  - Position to facilitate oxygenation and minimize respiratory effort  - Oxygen administered by appropriate delivery if ordered  - Initiate smoking cessation education as indicated  - Encourage broncho-pulmonary hygiene including cough, deep breathe, Incentive Spirometry  - Assess the need for suctioning and aspirate as needed  - Assess and instruct to report SOB or any respiratory difficulty  - Respiratory Therapy support as indicated  Outcome: Progressing     Problem: Nutrition/Hydration-ADULT  Goal: Nutrient/Hydration intake appropriate for improving, restoring or maintaining nutritional needs  Description: Monitor and assess patient's nutrition/hydration status for malnutrition  Collaborate with interdisciplinary team and initiate plan and interventions as ordered  Monitor patient's weight and dietary intake as ordered or per policy  Utilize nutrition screening tool and intervene as necessary  Determine patient's food preferences and provide high-protein, high-caloric foods as appropriate       INTERVENTIONS:  - Monitor oral intake, urinary output, labs, and treatment plans  - Assess nutrition and hydration status and recommend course of action  - Evaluate amount of meals eaten  - Assist patient with eating if necessary   - Allow adequate time for meals  - Recommend/ encourage appropriate diets, oral nutritional supplements, and vitamin/mineral supplements  - Order, calculate, and assess calorie counts as needed  - Assess need for intravenous fluids  - Provide specific nutrition/hydration education as appropriate  - Include patient/family/caregiver in decisions related to nutrition  Outcome: Progressing

## 2022-01-09 LAB
GLUCOSE SERPL-MCNC: 102 MG/DL (ref 65–140)
GLUCOSE SERPL-MCNC: 229 MG/DL (ref 65–140)
GLUCOSE SERPL-MCNC: 234 MG/DL (ref 65–140)
GLUCOSE SERPL-MCNC: 277 MG/DL (ref 65–140)

## 2022-01-09 PROCEDURE — 82948 REAGENT STRIP/BLOOD GLUCOSE: CPT

## 2022-01-09 PROCEDURE — 99232 SBSQ HOSP IP/OBS MODERATE 35: CPT | Performed by: INTERNAL MEDICINE

## 2022-01-09 RX ADMIN — INSULIN LISPRO 5 UNITS: 100 INJECTION, SOLUTION INTRAVENOUS; SUBCUTANEOUS at 16:51

## 2022-01-09 RX ADMIN — ATORVASTATIN CALCIUM 40 MG: 40 TABLET, FILM COATED ORAL at 21:49

## 2022-01-09 RX ADMIN — INSULIN LISPRO 5 UNITS: 100 INJECTION, SOLUTION INTRAVENOUS; SUBCUTANEOUS at 08:21

## 2022-01-09 RX ADMIN — HEPARIN SODIUM 5000 UNITS: 5000 INJECTION INTRAVENOUS; SUBCUTANEOUS at 05:20

## 2022-01-09 RX ADMIN — INSULIN LISPRO 8 UNITS: 100 INJECTION, SOLUTION INTRAVENOUS; SUBCUTANEOUS at 21:50

## 2022-01-09 RX ADMIN — INSULIN LISPRO 5 UNITS: 100 INJECTION, SOLUTION INTRAVENOUS; SUBCUTANEOUS at 12:00

## 2022-01-09 RX ADMIN — ACETAMINOPHEN 975 MG: 325 TABLET ORAL at 23:05

## 2022-01-09 RX ADMIN — METHYLPREDNISOLONE SODIUM SUCCINATE 20 MG: 40 INJECTION, POWDER, FOR SOLUTION INTRAMUSCULAR; INTRAVENOUS at 09:22

## 2022-01-09 RX ADMIN — INSULIN LISPRO 8 UNITS: 100 INJECTION, SOLUTION INTRAVENOUS; SUBCUTANEOUS at 16:51

## 2022-01-09 RX ADMIN — LIDOCAINE 1 PATCH: 50 PATCH TOPICAL at 09:20

## 2022-01-09 RX ADMIN — HEPARIN SODIUM 5000 UNITS: 5000 INJECTION INTRAVENOUS; SUBCUTANEOUS at 21:49

## 2022-01-09 RX ADMIN — INSULIN GLARGINE 15 UNITS: 100 INJECTION, SOLUTION SUBCUTANEOUS at 21:49

## 2022-01-09 RX ADMIN — FERROUS SULFATE TAB 325 MG (65 MG ELEMENTAL FE) 325 MG: 325 (65 FE) TAB at 09:25

## 2022-01-09 RX ADMIN — ACETAMINOPHEN 975 MG: 325 TABLET ORAL at 05:20

## 2022-01-09 RX ADMIN — INSULIN LISPRO 12 UNITS: 100 INJECTION, SOLUTION INTRAVENOUS; SUBCUTANEOUS at 12:00

## 2022-01-09 RX ADMIN — HEPARIN SODIUM 5000 UNITS: 5000 INJECTION INTRAVENOUS; SUBCUTANEOUS at 13:03

## 2022-01-09 NOTE — ASSESSMENT & PLAN NOTE
· 12/25/2021 CXR demonstrated right pneumothorax and 16 Fr chest tube was placed;  Chest tube was removed on 12/28  · R chest tube replaced on 12/30 for recurrent R ptx  · Chest tube removed on 01/05/2022  · Post removal x-ray showed no pneumothorax recurrence

## 2022-01-09 NOTE — ASSESSMENT & PLAN NOTE
Lab Results   Component Value Date    HGBA1C 6 2 (H) 11/22/2021       Recent Labs     01/08/22  1511 01/08/22  2108 01/09/22  0617 01/09/22  1111   POCGLU 364* 162* 102 277*       Blood Sugar Average: Last 72 hrs:  (P) 888 [id number]     · Continue Lantus to 15 units q h s  And Lispro 5 units t i d   With meals  · Continue Accu-Cheks, corrective sliding-scale insulin, and hypoglycemic protocol

## 2022-01-09 NOTE — ASSESSMENT & PLAN NOTE
· Resolved, on room air this morning  · Likely due  to COVID -19 viral pneumonia and pneumothorax  · Repeat chest x-ray performed on 01/06/2022 as supplemental oxygen requirements were increasing, this demonstrated findings consistent with known COVID pneumonia but no other acute abnormality  · Patient medically stable for discharge at this time  Awaiting short-term rehab placement

## 2022-01-09 NOTE — PROGRESS NOTES
[address]  Progress Note - Jeremias Inch 1940, [de-identified] y o  female MRN: [id number]  Unit/Bed#: [facility] Encounter: [id number]  Primary Care Provider: Sarah Cushing,    Date and time admitted to hospital: 12/16/2021  4:49 PM    * Acute respiratory failure with hypoxia [facility])  Assessment & Plan  · Resolved, on room air this morning  · Likely due  to COVID -19 viral pneumonia and pneumothorax  · Repeat chest x-ray performed on 01/06/2022 as supplemental oxygen requirements were increasing, this demonstrated findings consistent with known COVID pneumonia but no other acute abnormality  · Patient medically stable for discharge at this time  Awaiting short-term rehab placement  Pneumonia due to COVID-19 virus  Assessment & Plan  · COVID-19 positive on 12/16/2021; Unvaccinated  · Decadron 0 1 mg/kg completed 12/30  · Completed 14 day course of Baricitinib   · Completed 5 day course of Remdesivir   · Continue Solu-Medrol taper as recommended by Pulmonary/Critical Care  · Isolation precautions may be discontinued at this time  · Medically clear for discharge pending short-term rehab placement  Pneumothorax- Resolved  Assessment & Plan  · 12/25/2021 CXR demonstrated right pneumothorax and 16 Fr chest tube was placed;  Chest tube was removed on 12/28  · R chest tube replaced on 12/30 for recurrent R ptx  · Chest tube removed on 01/05/2022  · Post removal x-ray showed no pneumothorax recurrence     Ambulatory dysfunction  Assessment & Plan  · PT OT recommending post acute rehab placement  · Case management following to aid with discharge plan    Type 2 diabetes mellitus without complication, without long-term current use of insulin [facility])  Assessment & Plan  Lab Results   Component Value Date    HGBA1C 6 2 (H) 11/22/2021       Recent Labs     01/08/22  1511 01/08/22  2108 01/09/22  0617 01/09/22  1111   POCGLU 364* 162* 102 277*       Blood Sugar Average: Last 72 hrs:  (P) 235 [id number]     · Continue Lantus to 15 units q h s  And Lispro 5 units t i d  With meals  · Continue Accu-Cheks, corrective sliding-scale insulin, and hypoglycemic protocol    Sepsis due to methicillin susceptible Staphylococcus aureus (HCC)  Assessment & Plan  · Sepsis present on admission due to COVID pneumonia and MSSA bacteremia  · TTE was negative  · Completed 7 days of cefazolin completed on 1/2    Bacteremia due to methicillin susceptible Staphylococcus aureus (MSSA)  Assessment & Plan  · Blood Clx: MSSA 12/22  · Repeat BClx 12/26: NGTD  · Completed course of cefazolin    Chronic kidney disease, stage 3b [facility])  Assessment & Plan  Lab Results   Component Value Date    EGFR 41 01/08/2022    EGFR 37 01/07/2022    EGFR 41 01/06/2022    CREATININE 1 23 01/08/2022    CREATININE 1 34 (H) 01/07/2022    CREATININE 1 22 01/06/2022     · Creatinine at baseline at this time  · Avoid nephrotoxic agents and hypotension  · Continue to monitor kidney function periodically      VTE Pharmacologic Prophylaxis: VTE Score: 6 High Risk (Score >/= 5) - Pharmacological DVT Prophylaxis Ordered: heparin  Sequential Compression Devices Ordered  Patient Centered Rounds: I performed bedside rounds with nursing staff today  Discussions with Specialists or Other Care Team Provider:  Case management and Nursing    Time Spent for Care: 30 minutes  More than 50% of total time spent on counseling and coordination of care as described above  Current Length of Stay: 23 day(s)  Current Patient Status: Inpatient   Certification Statement: The patient will continue to require additional inpatient hospital stay due to Awaiting placement to short-term rehab  Discharge Plan: To be determined based on accepting facility in bed availability    Code Status: Level 1 - Full Code    Subjective:   Patient sitting up in bedside chair without any acute complaints  No overnight events reported by nursing      Objective:     Vitals:   Temp (24hrs), Av 4 °F (36 9 °C), Min:98 3 °F (36 8 °C), Max:98 4 °F (36 9 °C)    Temp:  [98 3 °F (36 8 °C)-98 4 °F (36 9 °C)] 98 3 °F (36 8 °C)  HR:  [76-96] 85  Resp:  [18-20] 18  BP: (121-125)/(68-69) 122/68  SpO2:  [94 %-97 %] 95 %  Body mass index is 23 77 kg/m²  Input and Output Summary (last 24 hours): Intake/Output Summary (Last 24 hours) at 2022 1256  Last data filed at 2022 1119  Gross per 24 hour   Intake 600 ml   Output 200 ml   Net 400 ml       Physical Exam:   Physical Exam  Vitals and nursing note reviewed  Constitutional:       General: She is not in acute distress  Appearance: Normal appearance  She is well-developed  HENT:      Head: Normocephalic and atraumatic  Mouth/Throat:      Mouth: Mucous membranes are moist       Pharynx: Oropharynx is clear  Eyes:      Extraocular Movements: Extraocular movements intact  Conjunctiva/sclera: Conjunctivae normal    Cardiovascular:      Rate and Rhythm: Normal rate and regular rhythm  Pulses: Normal pulses  Heart sounds: Normal heart sounds  No murmur heard  Pulmonary:      Effort: Pulmonary effort is normal  No respiratory distress  Breath sounds: Normal breath sounds  Abdominal:      General: Bowel sounds are normal  There is no distension  Palpations: Abdomen is soft  Tenderness: There is no abdominal tenderness  Musculoskeletal:         General: Normal range of motion  Cervical back: Normal range of motion and neck supple  Skin:     General: Skin is warm and dry  Neurological:      General: No focal deficit present  Mental Status: She is alert and oriented to person, place, and time  Mental status is at baseline     Psychiatric:         Mood and Affect: Mood normal          Behavior: Behavior normal          Judgment: Judgment normal        Labs:  Results from last 7 days   Lab Units 22  0606   WBC Thousand/uL 7 82   HEMOGLOBIN g/dL 9 0*   HEMATOCRIT % 28 7*   PLATELETS Thousands/uL 226     Results from last 7 days   Lab Units 01/08/22  0606 01/04/22  0509 01/03/22  0446   SODIUM mmol/L 140   < > 135   POTASSIUM mmol/L 3 8   < > 4 7   CHLORIDE mmol/L 108   < > 106   CO2 mmol/L 24   < > 23   BUN mg/dL 46*   < > 66*   CREATININE mg/dL 1 23   < > 1 46*   ANION GAP mmol/L 8   < > 6   CALCIUM mg/dL 8 7   < > 8 8   ALBUMIN g/dL  --   --  3 0*   TOTAL BILIRUBIN mg/dL  --   --  0 34   ALK PHOS U/L  --   --  85   ALT U/L  --   --  3*   AST U/L  --   --  18   GLUCOSE RANDOM mg/dL 55*   < > 151*    < > = values in this interval not displayed  Results from last 7 days   Lab Units 01/09/22  1111 01/09/22  0617 01/08/22  2108 01/08/22  1511 01/08/22  1109 01/08/22  6774 01/08/22  0551 01/07/22  2252 01/07/22  1654 01/07/22  1138 01/07/22  0625 01/06/22 2007   POC GLUCOSE mg/dl 277* 102 162* 364* 148* 125 59* 170* 379* 205* 72 202*               Lines/Drains:  Invasive Devices  Report    Peripheral Intravenous Line            Peripheral IV 01/06/22 Dorsal (posterior); Left Forearm 2 days          Drain            External Urinary Catheter 17 days    Chest Tube Right Midaxillary 10 days              Imaging:  No new imaging at this time    Recent Cultures (last 7 days):         Last 24 Hours Medication List:   Current Facility-Administered Medications   Medication Dose Route Frequency Provider Last Rate    acetaminophen  975 mg Oral Q6H [facility] BENTON Worthy      atorvastatin  40 mg Oral HS BENTON Worthy      bisacodyl  10 mg Rectal Daily PRN Duane Campanile, CRNP      docusate sodium  100 mg Oral Daily BENTON Worthy      ferrous sulfate  325 mg Oral Daily With Breakfast BENTON Worthy      heparin (porcine)  5,000 Units Subcutaneous [facility] BENTON Worthy      insulin glargine  15 Units Subcutaneous HS Whit TURNER Bearden, DO      insulin lispro  4-20 Units Subcutaneous TID AC BENTON Worthy      insulin lispro  4-20 Units Subcutaneous HS BENTON Worthy      insulin lispro  5 Units Subcutaneous TID With Meals Mayorga Pete, BENTON      lidocaine  1 patch Topical Daily Mayorga Pete, CREUSEBIO      methylPREDNISolone sodium succinate  20 mg Intravenous Daily BENTON Worthy      Followed by   Harman Bergman ON 1/12/2022] methylPREDNISolone sodium succinate  10 mg Intravenous Daily BENTON Worthy      ondansetron  4 mg Intravenous Q6H PRN BENTON Ochoa      oxyCODONE  2 5 mg Oral Q6H PRN BENTON Worthy      polyethylene glycol  17 g Oral Daily Mayorga Pete, BENTON      senna  1 tablet Oral HS BENTON Ochoa          Today, Patient Was Seen By: Willard Bearden, DO    **Please Note: This note may have been constructed using a voice recognition system  **

## 2022-01-09 NOTE — ASSESSMENT & PLAN NOTE
· Sepsis present on admission due to COVID pneumonia and MSSA bacteremia  · TTE was negative  · Completed 7 days of cefazolin completed on 1/2 No

## 2022-01-10 ENCOUNTER — APPOINTMENT (INPATIENT)
Dept: RADIOLOGY | Facility: HOSPITAL | Age: 82
DRG: 871 | End: 2022-01-10
Payer: COMMERCIAL

## 2022-01-10 ENCOUNTER — APPOINTMENT (INPATIENT)
Dept: CT IMAGING | Facility: HOSPITAL | Age: 82
DRG: 871 | End: 2022-01-10
Payer: COMMERCIAL

## 2022-01-10 LAB
GLUCOSE SERPL-MCNC: 103 MG/DL (ref 65–140)
GLUCOSE SERPL-MCNC: 120 MG/DL (ref 65–140)
GLUCOSE SERPL-MCNC: 241 MG/DL (ref 65–140)
GLUCOSE SERPL-MCNC: 247 MG/DL (ref 65–140)

## 2022-01-10 PROCEDURE — 71045 X-RAY EXAM CHEST 1 VIEW: CPT

## 2022-01-10 PROCEDURE — 71250 CT THORAX DX C-: CPT

## 2022-01-10 PROCEDURE — G1004 CDSM NDSC: HCPCS

## 2022-01-10 PROCEDURE — 82948 REAGENT STRIP/BLOOD GLUCOSE: CPT

## 2022-01-10 PROCEDURE — 99232 SBSQ HOSP IP/OBS MODERATE 35: CPT | Performed by: PHYSICIAN ASSISTANT

## 2022-01-10 RX ADMIN — ACETAMINOPHEN 975 MG: 325 TABLET ORAL at 11:23

## 2022-01-10 RX ADMIN — ONDANSETRON 4 MG: 2 INJECTION INTRAMUSCULAR; INTRAVENOUS at 07:34

## 2022-01-10 RX ADMIN — INSULIN LISPRO 8 UNITS: 100 INJECTION, SOLUTION INTRAVENOUS; SUBCUTANEOUS at 16:50

## 2022-01-10 RX ADMIN — HEPARIN SODIUM 5000 UNITS: 5000 INJECTION INTRAVENOUS; SUBCUTANEOUS at 23:00

## 2022-01-10 RX ADMIN — ONDANSETRON 4 MG: 2 INJECTION INTRAMUSCULAR; INTRAVENOUS at 13:36

## 2022-01-10 RX ADMIN — INSULIN LISPRO 5 UNITS: 100 INJECTION, SOLUTION INTRAVENOUS; SUBCUTANEOUS at 16:50

## 2022-01-10 RX ADMIN — ACETAMINOPHEN 975 MG: 325 TABLET ORAL at 05:41

## 2022-01-10 RX ADMIN — LIDOCAINE 1 PATCH: 50 PATCH TOPICAL at 10:06

## 2022-01-10 RX ADMIN — ACETAMINOPHEN 975 MG: 325 TABLET ORAL at 17:09

## 2022-01-10 RX ADMIN — INSULIN GLARGINE 15 UNITS: 100 INJECTION, SOLUTION SUBCUTANEOUS at 23:00

## 2022-01-10 RX ADMIN — INSULIN LISPRO 8 UNITS: 100 INJECTION, SOLUTION INTRAVENOUS; SUBCUTANEOUS at 23:00

## 2022-01-10 RX ADMIN — METHYLPREDNISOLONE SODIUM SUCCINATE 20 MG: 40 INJECTION, POWDER, FOR SOLUTION INTRAMUSCULAR; INTRAVENOUS at 10:06

## 2022-01-10 RX ADMIN — OXYCODONE HYDROCHLORIDE 2.5 MG: 5 TABLET ORAL at 07:34

## 2022-01-10 RX ADMIN — INSULIN LISPRO 5 UNITS: 100 INJECTION, SOLUTION INTRAVENOUS; SUBCUTANEOUS at 07:36

## 2022-01-10 RX ADMIN — OXYCODONE HYDROCHLORIDE 2.5 MG: 5 TABLET ORAL at 13:34

## 2022-01-10 RX ADMIN — HEPARIN SODIUM 5000 UNITS: 5000 INJECTION INTRAVENOUS; SUBCUTANEOUS at 13:35

## 2022-01-10 RX ADMIN — ATORVASTATIN CALCIUM 40 MG: 40 TABLET, FILM COATED ORAL at 23:00

## 2022-01-10 RX ADMIN — FERROUS SULFATE TAB 325 MG (65 MG ELEMENTAL FE) 325 MG: 325 (65 FE) TAB at 07:29

## 2022-01-10 NOTE — ASSESSMENT & PLAN NOTE
· COVID-19 positive on 12/16/2021; Unvaccinated  · Decadron 0 1 mg/kg completed 12/30  · Completed 14 day course of Baricitinib   · Completed 5 day course of Remdesivir   · Continue Solu-Medrol taper as recommended by Pulmonary/Critical Care  · Isolation precautions no longer needed at this time  · Medically clear for discharge pending short-term rehab placement

## 2022-01-10 NOTE — PROGRESS NOTES
[address] 46  Progress Note - Layman Krabbe 1940, [de-identified] y o  female MRN: [id number]  Unit/Bed#: -01 Encounter: [id number]  Primary Care Provider: Shannon Costa,    Date and time admitted to hospital: 12/16/2021  4:49 PM    * Acute respiratory failure with hypoxia [facility])  Assessment & Plan  · Resolved, on room air this morning  · Likely due  to COVID -19 viral pneumonia and pneumothorax  · Repeat chest x-ray performed on 01/06/2022 as supplemental oxygen requirements were increasing, this demonstrated findings consistent with known COVID pneumonia but no other acute abnormality  · Patient medically stable for discharge at this time  Awaiting short-term rehab placement  Pneumonia due to COVID-19 virus  Assessment & Plan  · COVID-19 positive on 12/16/2021; Unvaccinated  · Decadron 0 1 mg/kg completed 12/30  · Completed 14 day course of Baricitinib   · Completed 5 day course of Remdesivir   · Continue Solu-Medrol taper as recommended by Pulmonary/Critical Care  · Isolation precautions no longer needed at this time  · Medically clear for discharge pending short-term rehab placement  Pneumothorax- Resolved  Assessment & Plan  · 12/25/2021 CXR demonstrated right pneumothorax and 16 Fr chest tube was placed;  Chest tube was removed on 12/28  · R chest tube replaced on 12/30 for recurrent R ptx  · Chest tube removed on 01/05/2022  · Post removal x-ray showed no pneumothorax recurrence     Ambulatory dysfunction  Assessment & Plan  · PT OT recommending post acute rehab placement  · Case management following to aid with discharge plan    Type 2 diabetes mellitus without complication, without long-term current use of insulin [facility])  Assessment & Plan  Lab Results   Component Value Date    HGBA1C 6 2 (H) 11/22/2021       Recent Labs     01/09/22  1644 01/09/22  2022 01/10/22  0545 01/10/22  1058   POCGLU 234* 229* 120 103       Blood Sugar Average: Last 72 hrs:  (P) 023 [id number]     · Continue Lantus 15 units q h s  And Lispro 5 units t i d  With meals  · Continue Accu-Cheks, corrective sliding-scale insulin, and hypoglycemic protocol    Sepsis due to methicillin susceptible Staphylococcus aureus (HCC)  Assessment & Plan  · Sepsis present on admission due to COVID pneumonia and MSSA bacteremia  · TTE was negative  · Completed 7 days of cefazolin on 1/2/2022    Bacteremia due to methicillin susceptible Staphylococcus aureus (MSSA)  Assessment & Plan  · Blood Clx: MSSA 12/22  · Repeat BClx 12/26: NGTD  · Completed course of cefazolin    Chronic kidney disease, stage 3b [facility])  Assessment & Plan  Lab Results   Component Value Date    EGFR 41 01/08/2022    EGFR 37 01/07/2022    EGFR 41 01/06/2022    CREATININE 1 23 01/08/2022    CREATININE 1 34 (H) 01/07/2022    CREATININE 1 22 01/06/2022     · Creatinine at baseline at this time  · Avoid nephrotoxic agents and hypotension  · Continue to monitor kidney function periodically        VTE Pharmacologic Prophylaxis: VTE Score: 6 High Risk (Score >/= 5) - Pharmacological DVT Prophylaxis Ordered: heparin  Sequential Compression Devices Ordered  Patient Centered Rounds: I performed bedside rounds with nursing staff today  Discussions with Specialists or Other Care Team Provider: nursing, CM    Education and Discussions with Family / Patient: Updated  (daughter) via phone  Time Spent for Care: 20 minutes  More than 50% of total time spent on counseling and coordination of care as described above  Current Length of Stay: 24 day(s)  Current Patient Status: Inpatient   Certification Statement: The patient will continue to require additional inpatient hospital stay due to need for placement to STR  Discharge Plan: Anticipate discharge in 24-48 hrs to rehab facility  Code Status: Level 1 - Full Code    Subjective: The patient was seen and examined   The patient states she had right shoulder and right flank pain      Objective:     Vitals:   Temp (24hrs), Av °F (36 7 °C), Min:97 8 °F (36 6 °C), Max:98 3 °F (36 8 °C)    Temp:  [97 8 °F (36 6 °C)-98 3 °F (36 8 °C)] 98 3 °F (36 8 °C)  HR:  [] 98  Resp:  [14-18] 14  BP: ()/(51-75) 99/57  SpO2:  [88 %-95 %] 88 %  Body mass index is 23 81 kg/m²  Input and Output Summary (last 24 hours): Intake/Output Summary (Last 24 hours) at 1/10/2022 1546  Last data filed at 1/10/2022 1300  Gross per 24 hour   Intake 120 ml   Output 250 ml   Net -130 ml       Physical Exam:   Physical Exam  Vitals and nursing note reviewed  Constitutional:       Appearance: Normal appearance  Cardiovascular:      Rate and Rhythm: Normal rate and regular rhythm  Pulmonary:      Effort: Pulmonary effort is normal       Breath sounds: Normal breath sounds  Abdominal:      General: Bowel sounds are normal       Palpations: Abdomen is soft  Comments: Crepitus noted right flank/right upper quadrant    Skin:     General: Skin is warm and dry  Neurological:      General: No focal deficit present  Mental Status: She is alert and oriented to person, place, and time  Psychiatric:         Attention and Perception: Attention normal          Mood and Affect: Mood normal          Speech: Speech normal          Behavior: Behavior is cooperative           Additional Data:     Labs:  Results from last 7 days   Lab Units 22  0606   WBC Thousand/uL 7 82   HEMOGLOBIN g/dL 9 0*   HEMATOCRIT % 28 7*   PLATELETS Thousands/uL 226     Results from last 7 days   Lab Units 22  0606   SODIUM mmol/L 140   POTASSIUM mmol/L 3 8   CHLORIDE mmol/L 108   CO2 mmol/L 24   BUN mg/dL 46*   CREATININE mg/dL 1 23   ANION GAP mmol/L 8   CALCIUM mg/dL 8 7   GLUCOSE RANDOM mg/dL 55*         Results from last 7 days   Lab Units 01/10/22  1058 01/10/22  0545 22  2022 22  1644 22  1111 22  0617 22  2108 22  1511 22  1109 22  9916 22  0551 01/07/22  2252   POC GLUCOSE mg/dl 103 120 229* 234* 277* 102 162* 364* 148* 125 59* 170*               Lines/Drains:  Invasive Devices  Report    Peripheral Intravenous Line            Peripheral IV 01/06/22 Dorsal (posterior); Left Forearm 3 days          Drain            Chest Tube Right Midaxillary 11 days                      Imaging: Reviewed radiology reports from this admission including: chest xray    Recent Cultures (last 7 days):         Last 24 Hours Medication List:   Current Facility-Administered Medications   Medication Dose Route Frequency Provider Last Rate    acetaminophen  975 mg Oral Q6H [facility] BENTON Worthy      atorvastatin  40 mg Oral HS BENTON Worthy      bisacodyl  10 mg Rectal Daily PRN BENTON Hunt      docusate sodium  100 mg Oral Daily BENTON Worthy      ferrous sulfate  325 mg Oral Daily With Breakfast BENTON Worthy      heparin (porcine)  5,000 Units Subcutaneous Q8H [facility] BENTON Worthy      insulin glargine  15 Units Subcutaneous HS [place] L Bearden, DO      insulin lispro  4-20 Units Subcutaneous TID AC BENTON Worthy      insulin lispro  4-20 Units Subcutaneous HS BENTON Worthy      insulin lispro  5 Units Subcutaneous TID With Meals BENTON Hunt      lidocaine  1 patch Topical Daily BENTON Worthy      methylPREDNISolone sodium succinate  20 mg Intravenous Daily BENTON Worthy      Followed by   Emperatriz Marina ON 1/12/2022] methylPREDNISolone sodium succinate  10 mg Intravenous Daily BENTON Worthy      ondansetron  4 mg Intravenous Q6H PRN BENTON Hunt      oxyCODONE  2 5 mg Oral Q6H PRN BENTON Worthy      polyethylene glycol  17 g Oral Daily BENTON Worthy      senna  1 tablet Oral HS BENTON Hunt          Today, Patient Was Seen By: Gregory Harper PA-C    **Please Note: This note may have been constructed using a voice recognition system  **

## 2022-01-10 NOTE — PLAN OF CARE
Problem: PAIN - ADULT  Goal: Verbalizes/displays adequate comfort level or baseline comfort level  Description: Interventions:  - Encourage patient to monitor pain and request assistance  - Assess pain using appropriate pain scale  - Administer analgesics based on type and severity of pain and evaluate response  - Implement non-pharmacological measures as appropriate and evaluate response  - Consider cultural and social influences on pain and pain management  - Notify physician/advanced practitioner if interventions unsuccessful or patient reports new pain  Outcome: Progressing     Problem: MOBILITY - ADULT  Goal: Maintain or return to baseline ADL function  Description: INTERVENTIONS:  - Educate patient/family on patient safety including physical limitations  - Instruct patient to call for assistance with activity   - Consult OT/PT to assist with strengthening/mobility   - Keep Call bell within reach  - Keep bed low and locked with side rails adjusted as appropriate  - Keep care items and personal belongings within reach  - Initiate and maintain comfort rounds  - Make Fall Risk Sign visible to staff  - Offer Toileting every 2 Hours, in advance of need  - Initiate/Maintain fall alarm  - Obtain necessary fall risk management equipment: fall alarm  - Apply yellow sockss and bracelet for high fall risk patients  - Consider moving patient to room near nurses station  Outcome: Progressing     Problem: Nutrition/Hydration-ADULT  Goal: Nutrient/Hydration intake appropriate for improving, restoring or maintaining nutritional needs  Description: Monitor and assess patient's nutrition/hydration status for malnutrition  Collaborate with interdisciplinary team and initiate plan and interventions as ordered  Monitor patient's weight and dietary intake as ordered or per policy  Utilize nutrition screening tool and intervene as necessary   Determine patient's food preferences and provide high-protein, high-caloric foods as appropriate       INTERVENTIONS:  - Monitor oral intake, urinary output, labs, and treatment plans  - Assess nutrition and hydration status and recommend course of action  - Evaluate amount of meals eaten  - Assist patient with eating if necessary   - Allow adequate time for meals  - Recommend/ encourage appropriate diets, oral nutritional supplements, and vitamin/mineral supplements  - Order, calculate, and assess calorie counts as needed  - Assess need for intravenous fluids  - Provide specific nutrition/hydration education as appropriate  - Include patient/family/caregiver in decisions related to nutrition  Outcome: Progressing

## 2022-01-10 NOTE — UTILIZATION REVIEW
Continued Stay Review    Date: 1/10/22                          Current Patient Class: inpatient  Current Level of Care: med surg    HPI:81 y o  female initially admitted on 12/17/21 Acute respiratory failure w/ hypoxia    Assessment/Plan: Patient medically stable for discharge at this time  Awaiting short-term rehab placement  Isolation precautions no longer needed at this time  Chest tube removal on 1/5, post xr no pneumothorax recurrence  Continue accuchecks w/ssi, continue to monitor kidney function periodically  Completed iv abx and covid pathway meds  CM following, pt/ot recommending post acute rehab placement  Vital Signs:   Temp:  [97 8 °F (36 6 °C)-98 3 °F (36 8 °C)] 98 3 °F (36 8 °C)  HR:  [] 98  Resp:  [14-18] 14  BP: ()/(51-75) 99/57  SpO2:  [88 %-95 %] 88 %  Body mass index is 23 81 kg/m²       Pertinent Labs/Diagnostic Results:       Results from last 7 days   Lab Units 01/08/22  0606 01/07/22  0436 01/06/22  0515 01/05/22  0430 01/04/22  0509   WBC Thousand/uL 7 82 9 73 8 67 10 15 9 66   HEMOGLOBIN g/dL 9 0* 9 5* 9 6* 9 7* 9 6*   HEMATOCRIT % 28 7* 30 0* 30 3* 31 0* 31 2*   PLATELETS Thousands/uL 226 253 283 352 356         Results from last 7 days   Lab Units 01/08/22  0606 01/07/22  0436 01/06/22  1052 01/06/22  0515 01/05/22  0430 01/04/22  0509   SODIUM mmol/L 140 140  --  137 139 139   POTASSIUM mmol/L 3 8 4 2 5 1 5 2 4 9 4 7   CHLORIDE mmol/L 108 108  --  108 108 110*   CO2 mmol/L 24 25  --  23 24 23   ANION GAP mmol/L 8 7  --  6 7 6   BUN mg/dL 46* 55*  --  54* 51* 61*   CREATININE mg/dL 1 23 1 34*  --  1 22 1 19 1 27   EGFR ml/min/1 73sq m 41 37  --  41 43 39   CALCIUM mg/dL 8 7 8 8  --  8 7 8 9 8 8         Results from last 7 days   Lab Units 01/10/22  1058 01/10/22  0545 01/09/22  2022 01/09/22  1644 01/09/22  1111 01/09/22  0617 01/08/22  2108 01/08/22  1511 01/08/22  1109 01/08/22  0638 01/08/22  0551 01/07/22  2252   POC GLUCOSE mg/dl 103 120 229* 234* 277* 102 162* 364* 148* 125 59* 170*     Results from last 7 days   Lab Units 01/08/22  0606 01/07/22  0436 01/06/22  0515 01/05/22  0430 01/04/22  0509   GLUCOSE RANDOM mg/dL 55* 63* 126 73 108       Medications:   Scheduled Medications:  acetaminophen, 975 mg, Oral, Q6H JUAN ANTONIO  atorvastatin, 40 mg, Oral, HS  docusate sodium, 100 mg, Oral, Daily  ferrous sulfate, 325 mg, Oral, Daily With Breakfast  heparin (porcine), 5,000 Units, Subcutaneous, Q8H JUAN ANTONIO  insulin glargine, 15 Units, Subcutaneous, HS  insulin lispro, 4-20 Units, Subcutaneous, TID AC  insulin lispro, 4-20 Units, Subcutaneous, HS  insulin lispro, 5 Units, Subcutaneous, TID With Meals  lidocaine, 1 patch, Topical, Daily  methylPREDNISolone sodium succinate, 20 mg, Intravenous, Daily   Followed by  Natividad Wright ON 1/12/2022] methylPREDNISolone sodium succinate, 10 mg, Intravenous, Daily  polyethylene glycol, 17 g, Oral, Daily  senna, 1 tablet, Oral, HS      Continuous IV Infusions: none     PRN Meds:  bisacodyl, 10 mg, Rectal, Daily PRN  ondansetron, 4 mg, Intravenous, Q6H PRN  oxyCODONE, 2 5 mg, Oral, Q6H PRN        Discharge Plan: [facility]    Network Utilization Review Department  ATTENTION: Please call with any questions or concerns to [phone] and carefully listen to the prompts so that you are directed to the right person  All voicemails are confidential   Bee Dean all requests for admission clinical reviews, approved or denied determinations and any other requests to dedicated fax number below belonging to the campus where the patient is receiving treatment   List of dedicated fax numbers for the Facilities:  [address] [place] [address] DENIALS (Administrative/Medical Necessity) [phone]   [address] (Maternity/NICU/Pediatrics) [phone]   [address] [address] Jarad Rod Allé  [phone]     [address] [address] [place] [address] Samantha Razo [facility] [address] [place] [id number] [phone]

## 2022-01-10 NOTE — CASE MANAGEMENT
Case Management Discharge Planning Note    Patient name Taurus Lung  Location /-01 MRN [id number]  : 1940 Date 1/10/2022       Current Admission Date: 2021  Current Admission Diagnosis:Acute respiratory failure with hypoxia [facility])   Patient Active Problem List    Diagnosis Date Noted    Thrombocytosis 2021    Bacteremia due to methicillin susceptible Staphylococcus aureus (MSSA) 2021    Pneumothorax- Resolved 2021    Chronic kidney disease, stage 3b ([facility] ) 2021    Anemia 2021    Sepsis due to methicillin susceptible Staphylococcus aureus [facility] ) 2021    Ambulatory dysfunction 2021    Pneumonia due to COVID-19 virus 2021    Acute respiratory failure with hypoxia [facility] ) 2021    Negative depression screening 2021    Overweight (BMI 25 0-29 9) 2021    Medicare annual wellness visit, subsequent 2020    Localized, primary osteoarthritis of hand 2020    Refused influenza vaccine 2020    Sciatica 2020    Hypertensive kidney disease with chronic kidney disease stage III ([facility] ) 2019    Type 2 diabetes mellitus without complication, without long-term current use of insulin ([facility] ) 2019    Bursitis of shoulder 2018    Groin pain, chronic, left 2018    Paresthesia of arm 2017    Back pain 2017    Muscle pain 2017    Anxiety 2017    Abnormal ECG 2016    Diverticulitis large intestine w/o perforation or abscess w/o bleeding 2016    Essential hypertension 2016    Lower abdominal pain 2016    Atrophic vaginitis 2016    Hypercholesterolemia 2016    Irritable bowel syndrome 2016    Simple chronic anemia 2016    Disorder of shoulder 2008    Articular cartilage disorder of shoulder region 2008    Loose body in joint of shoulder region 2008      LOS (days): 24  Geometric Mean LOS (GMLOS) (days): 4 80  Days to GMLOS:-19 3     OBJECTIVE:  Risk of Unplanned Readmission Score: 21         Current admission status: Inpatient   Preferred Pharmacy:   [address]   [place] Fra[place]  [place] [address]  Phone: [phone] Fax: [phone] [address] , [place] [address]  Phone: [phone] Fax: [phone]    Primary Care Provider: Amber Gtz DO    Primary Insurance: [address] REP  Secondary Insurance:     DISCHARGE DETAILS:    Discharge planning discussed with[de-identified] patient  , Siva Villatoro was called at 12:48pm and message was left for Salina Simon to call to discuss d/c plan  Freedom of Choice: Yes  Comments - Freedom of Choice: rehab has been recommended, pt needs a coivis recovered bed for a non -vaccinated bed, permission was given to expand the search  CM contacted family/caregiver?: Yes             Contacts  Patient Contacts: Siva Villatoro  spoke at 12:48 pm  and Salina Simon 1:06 p and a mesage was left  Relationship to Patient[de-identified] Family  Contact Method: Phone  Phone Number: [phone]   Dalia Duvall left a message to call # [phone]  Reason/Outcome: Discharge [address]         Is the patient interested in [address] at discharge?: No    DME Referral Provided  Referral made for DME?: No    Other Referral/Resources/Interventions Provided:  Interventions: Short Term Rehab  Referral Comments: perrmission to send additional referrals and expand the search  [facility] to [facility], [facility], [facility], [facility], [facility] [address] and [facility] are [facility] their beds are tight         Treatment Team Recommendation: Short Term Rehab (additional referrals have been sent, pt will need authorization)  Discharge Destination Plan[de-identified] Short Term Rehab  Transport at Discharge : [facility] (pt is now on oxygen and may need authorization for transport)                                   Family notified[de-identified] daughters were called  Additional Comments: Daisy Talbert had asked if the pt would be vaccinated and she has declined

## 2022-01-10 NOTE — ASSESSMENT & PLAN NOTE
· Sepsis present on admission due to COVID pneumonia and MSSA bacteremia  · TTE was negative  · Completed 7 days of cefazolin on 1/2/2022

## 2022-01-10 NOTE — ASSESSMENT & PLAN NOTE
Lab Results   Component Value Date    HGBA1C 6 2 (H) 11/22/2021       Recent Labs     01/09/22  1644 01/09/22  2022 01/10/22  0545 01/10/22  1058   POCGLU 234* 229* 120 103       Blood Sugar Average: Last 72 hrs:  (P) 221 [id number]     · Continue Lantus 15 units q h s  And Lispro 5 units t i d   With meals  · Continue Accu-Cheks, corrective sliding-scale insulin, and hypoglycemic protocol

## 2022-01-11 LAB
GLUCOSE SERPL-MCNC: 146 MG/DL (ref 65–140)
GLUCOSE SERPL-MCNC: 198 MG/DL (ref 65–140)
GLUCOSE SERPL-MCNC: 256 MG/DL (ref 65–140)
GLUCOSE SERPL-MCNC: 77 MG/DL (ref 65–140)

## 2022-01-11 PROCEDURE — 99232 SBSQ HOSP IP/OBS MODERATE 35: CPT | Performed by: PHYSICIAN ASSISTANT

## 2022-01-11 PROCEDURE — 82948 REAGENT STRIP/BLOOD GLUCOSE: CPT

## 2022-01-11 PROCEDURE — 97110 THERAPEUTIC EXERCISES: CPT

## 2022-01-11 RX ADMIN — LIDOCAINE 1 PATCH: 50 PATCH TOPICAL at 08:15

## 2022-01-11 RX ADMIN — HEPARIN SODIUM 5000 UNITS: 5000 INJECTION INTRAVENOUS; SUBCUTANEOUS at 21:43

## 2022-01-11 RX ADMIN — INSULIN LISPRO 5 UNITS: 100 INJECTION, SOLUTION INTRAVENOUS; SUBCUTANEOUS at 08:14

## 2022-01-11 RX ADMIN — ACETAMINOPHEN 975 MG: 325 TABLET ORAL at 23:00

## 2022-01-11 RX ADMIN — POLYETHYLENE GLYCOL 3350 17 G: 17 POWDER, FOR SOLUTION ORAL at 08:15

## 2022-01-11 RX ADMIN — METHYLPREDNISOLONE SODIUM SUCCINATE 20 MG: 40 INJECTION, POWDER, FOR SOLUTION INTRAMUSCULAR; INTRAVENOUS at 08:16

## 2022-01-11 RX ADMIN — INSULIN LISPRO 5 UNITS: 100 INJECTION, SOLUTION INTRAVENOUS; SUBCUTANEOUS at 16:37

## 2022-01-11 RX ADMIN — INSULIN GLARGINE 15 UNITS: 100 INJECTION, SOLUTION SUBCUTANEOUS at 21:43

## 2022-01-11 RX ADMIN — FERROUS SULFATE TAB 325 MG (65 MG ELEMENTAL FE) 325 MG: 325 (65 FE) TAB at 08:14

## 2022-01-11 RX ADMIN — INSULIN LISPRO 4 UNITS: 100 INJECTION, SOLUTION INTRAVENOUS; SUBCUTANEOUS at 21:44

## 2022-01-11 RX ADMIN — INSULIN LISPRO 8 UNITS: 100 INJECTION, SOLUTION INTRAVENOUS; SUBCUTANEOUS at 16:38

## 2022-01-11 RX ADMIN — HEPARIN SODIUM 5000 UNITS: 5000 INJECTION INTRAVENOUS; SUBCUTANEOUS at 05:59

## 2022-01-11 RX ADMIN — INSULIN LISPRO 5 UNITS: 100 INJECTION, SOLUTION INTRAVENOUS; SUBCUTANEOUS at 12:13

## 2022-01-11 RX ADMIN — ATORVASTATIN CALCIUM 40 MG: 40 TABLET, FILM COATED ORAL at 21:43

## 2022-01-11 RX ADMIN — HEPARIN SODIUM 5000 UNITS: 5000 INJECTION INTRAVENOUS; SUBCUTANEOUS at 13:03

## 2022-01-11 NOTE — CASE MANAGEMENT
Case Management Discharge Planning Note    Patient name Chayo Newman  Location /-01 MRN [id number]  : 1940 Date 2022       Current Admission Date: 2021  Current Admission Diagnosis:Acute respiratory failure with hypoxia [facility])   Patient Active Problem List    Diagnosis Date Noted    Thrombocytosis 2021    Bacteremia due to methicillin susceptible Staphylococcus aureus (MSSA) 2021    Pneumothorax- Resolved 2021    Chronic kidney disease, stage 3b ([facility] [address] ) 2021    Anemia 2021    Sepsis [facility] to [facility]icillin susceptible Staphylococcus aureus [facility] 2021    Ambulatory dysfunction 2021    Pneumonia due to COVID-19 virus 2021    Acute respiratory failure with hypoxia [facility] ) 2021    Negative depression screening 2021    Overweight (BMI 25 0-29 9) 2021    Medicare annual wellness visit, subsequent 2020    Localized, primary osteoarthritis of hand 2020    Refused influenza vaccine 2020    Sciatica 2020    Hypertensive kidney disease with chronic kidney disease stage III ([facility] [facility] ) 2019    Type 2 diabetes mellitus without complication, without long-term current use of insulin ([facility] ) 2019    Bursitis of shoulder 2018    Groin pain, chronic, left 2018    Paresthesia of arm 2017    Back pain 2017    Muscle pain 2017    Anxiety 2017    Abnormal ECG 2016    Diverticulitis large intestine w/o perforation or abscess w/o bleeding 2016    Essential hypertension 2016    Lower abdominal pain 2016    Atrophic vaginitis 2016    Hypercholesterolemia 2016    Irritable bowel syndrome 2016    Simple chronic anemia 2016    Disorder of shoulder 2008    Articular cartilage disorder of shoulder region 2008    Loose body in joint of shoulder region 2008      LOS (days): 25  Geometric Mean LOS (GMLOS) (days): 4 80  Days to GMLOS:-20 3     OBJECTIVE:  Risk of Unplanned Readmission Score: 17         Current admission status: Inpatient   Preferred Pharmacy:   [address]  [place]  [place] [address]  Phone: [phone] Fax: [phone]    [address] [place] [address]  Phone: [phone] Fax: [phone]    Primary Care Provider: Stacie Taylor DO    Primary Insurance: [facility]  Secondary Insurance:     DISCHARGE DETAILS:     Comments - Freedom of Choice: [facility] is interested in pt but no beds today or tomorrow in [place]  [facility] is reviewing  This CM sent message via [employer] to [facility] re: bed availabilty; await response   Will need auth       Other Referral/Resources/Interventions Provided:  Interventions: Short Term Rehab      Treatment Team Recommendation: Short Term Rehab  Discharge Destination Plan[de-identified] Short Term Rehab

## 2022-01-11 NOTE — ASSESSMENT & PLAN NOTE
· COVID-19 positive on 12/16/2021; Unvaccinated  · Decadron 0 1 mg/kg completed 12/30  · Completed 14 day course of Baricitinib on 1/2/22  · Completed 5 day course of Remdesivir on 12/21/2022  · Continue Solu-Medrol taper as recommended by Pulmonary/Critical Care  · Isolation precautions no longer needed at this time  · Medically clear for discharge pending short-term rehab placement

## 2022-01-11 NOTE — ASSESSMENT & PLAN NOTE
· Patient currently on 2 L  · Likely due  to COVID -19 viral pneumonia and pneumothorax  · Repeat chest x-ray performed on 01/06/2022 as supplemental oxygen requirements were increasing, this demonstrated findings consistent with known COVID pneumonia but no other acute abnormality  · Patient medically stable for discharge at this time  Awaiting short-term rehab placement

## 2022-01-11 NOTE — ASSESSMENT & PLAN NOTE
· 12/25/2021 CXR demonstrated right pneumothorax and 16 Fr chest tube was placed; Chest tube was removed on 12/28  · R chest tube replaced on 12/30 for recurrent R ptx  · Chest tube removed on 01/05/2022  · Post removal x-ray showed no pneumothorax recurrence   · Repeat CXR 1/10/22- Stable opacities compatible with Covid-19 pneumonia  · CT chest 1/10/22  1  Right chest wall subcutaneous emphysema after prior intervention  No evidence of pneumothorax  2   Peripheral and basilar groundglass and airspace opacities consistent with Covid 19 infiltrates  3   Mild perihepatic ascites of uncertain etiology  Correlate with volume status

## 2022-01-11 NOTE — PLAN OF CARE
Problem: Potential for Falls  Goal: Patient will remain free of falls  Description: INTERVENTIONS:  - Educate patient/family on patient safety including physical limitations  - Instruct patient to call for assistance with activity   - Consult OT/PT to assist with strengthening/mobility   - Keep Call bell within reach  - Keep bed low and locked with side rails adjusted as appropriate  - Keep care items and personal belongings within reach  - Initiate and maintain comfort rounds  - Make Fall Risk Sign visible to staff  - Offer Toileting every 2 Hours, in advance of need  - Initiate/Maintain fall alarm  - Obtain necessary fall risk management equipment: fall alarm  - Apply yellow socks and bracelet for high fall risk patients  - Consider moving patient to room near nurses station  Outcome: Progressing     Problem: PAIN - ADULT  Goal: Verbalizes/displays adequate comfort level or baseline comfort level  Description: Interventions:  - Encourage patient to monitor pain and request assistance  - Assess pain using appropriate pain scale  - Administer analgesics based on type and severity of pain and evaluate response  - Implement non-pharmacological measures as appropriate and evaluate response  - Consider cultural and social influences on pain and pain management  - Notify physician/advanced practitioner if interventions unsuccessful or patient reports new pain  Outcome: Progressing     Problem: INFECTION - ADULT  Goal: Absence or prevention of progression during hospitalization  Description: INTERVENTIONS:  - Assess and monitor for signs and symptoms of infection  - Monitor lab/diagnostic results  - Monitor all insertion sites, i e  indwelling lines, tubes, and drains  - Monitor endotracheal if appropriate and nasal secretions for changes in amount and color  - [place] appropriate cooling/warming therapies per order  - Administer medications as ordered  - Instruct and encourage patient and family to use good hand hygiene technique  - Identify and instruct in appropriate isolation precautions for identified infection/condition  Outcome: Progressing  Goal: Absence of fever/infection during neutropenic period  Description: INTERVENTIONS:  - Monitor WBC    Outcome: Progressing     Problem: SAFETY ADULT  Goal: Patient will remain free of falls  Description: INTERVENTIONS:  - Educate patient/family on patient safety including physical limitations  - Instruct patient to call for assistance with activity   - Consult OT/PT to assist with strengthening/mobility   - Keep Call bell within reach  - Keep bed low and locked with side rails adjusted as appropriate  - Keep care items and personal belongings within reach  - Initiate and maintain comfort rounds  - Make Fall Risk Sign visible to staff  - Offer Toileting every 2 Hours, in advance of need  - Initiate/Maintain fall alarm  - Obtain necessary fall risk management equipment: fall alarm  - Apply yellow socks and bracelet for high fall risk patients  - Consider moving patient to room near nurses station  Outcome: Progressing  Goal: Maintain or return to baseline ADL function  Description: INTERVENTIONS:  - Educate patient/family on patient safety including physical limitations  - Instruct patient to call for assistance with activity   - Consult OT/PT to assist with strengthening/mobility   - Keep Call bell within reach  - Keep bed low and locked with side rails adjusted as appropriate  - Keep care items and personal belongings within reach  - Initiate and maintain comfort rounds  - Make Fall Risk Sign visible to staff  - Offer Toileting every 2 Hours, in advance of need  - Initiate/Maintain fall alarm  - Obtain necessary fall risk management equipment: fall alarm  - Apply yellow socks and bracelet for high fall risk patients  - Consider moving patient to room near nurses station  Outcome: Progressing  Goal: Maintains/Returns to pre admission functional level  Description: INTERVENTIONS:  - Perform BMAT or MOVE assessment daily    - Set and communicate daily mobility goal to care team and patient/family/caregiver  - Collaborate with rehabilitation services on mobility goals if consulted  - Perform Range of Motion 3 times a day  - Reposition patient every 2 hours  - Dangle patient 3 times a day  - Stand patient 3 times a day  - Ambulate patient 3 times a day  - Out of bed to chair 3 times a day   - Out of bed for meals 3 times a day  - Out of bed for toileting  - Record patient progress and toleration of activity level   Outcome: Progressing     Problem: DISCHARGE PLANNING  Goal: Discharge to home or other facility with appropriate resources  Description: INTERVENTIONS:  - Identify barriers to discharge w/patient and caregiver  - Arrange for needed discharge resources and transportation as appropriate  - Identify discharge learning needs (meds, wound care, etc )  - Refer to Case Management Department for coordinating discharge planning if the patient needs post-hospital services based on physician/advanced practitioner order or complex needs related to functional status, cognitive ability, or social support system  Outcome: Progressing     Problem: Knowledge Deficit  Goal: Patient/family/caregiver demonstrates understanding of disease process, treatment plan, medications, and discharge instructions  Description: Complete learning assessment and assess knowledge base    Interventions:  - Provide teaching at level of understanding  - Provide teaching via preferred learning methods  Outcome: Progressing     Problem: MOBILITY - ADULT  Goal: Maintain or return to baseline ADL function  Description: INTERVENTIONS:  - Educate patient/family on patient safety including physical limitations  - Instruct patient to call for assistance with activity   - Consult OT/PT to assist with strengthening/mobility   - Keep Call bell within reach  - Keep bed low and locked with side rails adjusted as appropriate  - Keep care items and personal belongings within reach  - Initiate and maintain comfort rounds  - Make Fall Risk Sign visible to staff  - Offer Toileting every 2 Hours, in advance of need  - Initiate/Maintain fall alarm  - Obtain necessary fall risk management equipment: fall alarm  - Apply yellow socks and bracelet for high fall risk patients  - Consider moving patient to room near nurses station  Outcome: Progressing  Goal: Maintains/Returns to pre admission functional level  Description: INTERVENTIONS:  - Perform BMAT or MOVE assessment daily    - Set and communicate daily mobility goal to care team and patient/family/caregiver  - Collaborate with rehabilitation services on mobility goals if consulted  - Perform Range of Motion 3 times a day  - Reposition patient every 2 hours    - Dangle patient 3 times a day  - Stand patient 3 times a day  - Ambulate patient 3 times a day  - Out of bed to chair 3 times a day   - Out of bed for meals 3 times a day  - Out of bed for toileting  - Record patient progress and toleration of activity level   Outcome: Progressing     Problem: Prexisting or High Potential for Compromised Skin Integrity  Goal: Skin integrity is maintained or improved  Description: INTERVENTIONS:  - Identify patients at risk for skin breakdown  - Assess and monitor skin integrity  - Assess and monitor nutrition and hydration status  - Monitor labs   - Assess for incontinence   - Turn and reposition patient  - Assist with mobility/ambulation  - Relieve pressure over bony prominences  - Avoid friction and shearing  - Provide appropriate hygiene as needed including keeping skin clean and dry  - Evaluate need for skin moisturizer/barrier cream  - Collaborate with interdisciplinary team   - Patient/family teaching  - Consider wound care consult   Outcome: Progressing     Problem: RESPIRATORY - ADULT  Goal: Achieves optimal ventilation and oxygenation  Description: INTERVENTIONS:  - Assess for changes in respiratory status  - Assess for changes in mentation and behavior  - Position to facilitate oxygenation and minimize respiratory effort  - Oxygen administered by appropriate delivery if ordered  - Initiate smoking cessation education as indicated  - Encourage broncho-pulmonary hygiene including cough, deep breathe, Incentive Spirometry  - Assess the need for suctioning and aspirate as needed  - Assess and instruct to report SOB or any respiratory difficulty  - Respiratory Therapy support as indicated  Outcome: Progressing     Problem: Nutrition/Hydration-ADULT  Goal: Nutrient/Hydration intake appropriate for improving, restoring or maintaining nutritional needs  Description: Monitor and assess patient's nutrition/hydration status for malnutrition  Collaborate with interdisciplinary team and initiate plan and interventions as ordered  Monitor patient's weight and dietary intake as ordered or per policy  Utilize nutrition screening tool and intervene as necessary  Determine patient's food preferences and provide high-protein, high-caloric foods as appropriate       INTERVENTIONS:  - Monitor oral intake, urinary output, labs, and treatment plans  - Assess nutrition and hydration status and recommend course of action  - Evaluate amount of meals eaten  - Assist patient with eating if necessary   - Allow adequate time for meals  - Recommend/ encourage appropriate diets, oral nutritional supplements, and vitamin/mineral supplements  - Order, calculate, and assess calorie counts as needed  - Assess need for intravenous fluids  - Provide specific nutrition/hydration education as appropriate  - Include patient/family/caregiver in decisions related to nutrition  Outcome: Progressing

## 2022-01-11 NOTE — PROGRESS NOTES
Merlin 46  Progress Note - Chayo Newman 1940, [de-identified] y o  female MRN: [id number]  Unit/Bed#: -01 Encounter: [id number]  Primary Care Provider: Willam Urias DO   Date and time admitted to hospital: 12/16/2021  4:49 PM    * Acute respiratory failure with hypoxia [facility])  Assessment & Plan  · Patient currently on 2 L  · Likely due  to COVID -19 viral pneumonia and pneumothorax  · Repeat chest x-ray performed on 01/06/2022 as supplemental oxygen requirements were increasing, this demonstrated findings consistent with known COVID pneumonia but no other acute abnormality  · Patient medically stable for discharge at this time  Awaiting short-term rehab placement  Pneumonia due to COVID-19 virus  Assessment & Plan  · COVID-19 positive on 12/16/2021; Unvaccinated  · Decadron 0 1 mg/kg completed 12/30  · Completed 14 day course of Baricitinib on 1/2/22  · Completed 5 day course of Remdesivir on 12/21/2022  · Continue Solu-Medrol taper as recommended by Pulmonary/Critical Care  · Isolation precautions no longer needed at this time  · Medically clear for discharge pending short-term rehab placement  Pneumothorax- Resolved  Assessment & Plan  · 12/25/2021 CXR demonstrated right pneumothorax and 16 Fr chest tube was placed; Chest tube was removed on 12/28  · R chest tube replaced on 12/30 for recurrent R ptx  · Chest tube removed on 01/05/2022  · Post removal x-ray showed no pneumothorax recurrence   · Repeat CXR 1/10/22- Stable opacities compatible with Covid-19 pneumonia  · CT chest 1/10/22  1  Right chest wall subcutaneous emphysema after prior intervention  No evidence of pneumothorax  2   Peripheral and basilar groundglass and airspace opacities consistent with Covid 19 infiltrates  3   Mild perihepatic ascites of uncertain etiology  Correlate with volume status      Ambulatory dysfunction  Assessment & Plan  · PT OT recommending post acute rehab placement  · Case management following to aid with discharge plan    Type 2 diabetes mellitus without complication, without long-term current use of insulin [facility])  Assessment & Plan  Lab Results   Component Value Date    HGBA1C 6 2 (H) 11/22/2021       Recent Labs     01/10/22  1629 01/10/22  2145 01/11/22  0601 01/11/22  1046   POCGLU 241* 247* 77 146*       Blood Sugar Average: Last 72 hrs:  (P) 175 6     · Continue Lantus 15 units q h s  And Lispro 5 units t i d  With meals  · Continue Accu-Cheks, corrective sliding-scale insulin, and hypoglycemic protocol    Sepsis due to methicillin susceptible Staphylococcus aureus (HCC)  Assessment & Plan  · Sepsis present on admission due to COVID pneumonia and MSSA bacteremia  · TTE was negative  · Completed 7 days of cefazolin on 1/2/2022    Bacteremia due to methicillin susceptible Staphylococcus aureus (MSSA)  Assessment & Plan  · Blood Clx: MSSA 12/22  · Repeat BClx 12/26: NGTD  · Completed course of cefazolin    Chronic kidney disease, stage 3b [facility])  Assessment & Plan  Lab Results   Component Value Date    EGFR 41 01/08/2022    EGFR 37 01/07/2022    EGFR 41 01/06/2022    CREATININE 1 23 01/08/2022    CREATININE 1 34 (H) 01/07/2022    CREATININE 1 22 01/06/2022     · Creatinine at baseline at this time  · Avoid nephrotoxic agents and hypotension  · Continue to monitor kidney function periodically        VTE Pharmacologic Prophylaxis: VTE Score: 6 High Risk (Score >/= 5) - Pharmacological DVT Prophylaxis Ordered: heparin  Sequential Compression Devices Ordered  Patient Centered Rounds: I performed bedside rounds with nursing staff today  Discussions with Specialists or Other Care Team Provider: nursing, CM    Education and Discussions with Family / Patient: Updated  (daughter) via phone  Time Spent for Care: 20 minutes  More than 50% of total time spent on counseling and coordination of care as described above      Current Length of Stay: 25 day(s)  Current Patient Status: Inpatient   Certification Statement: The patient will continue to require additional inpatient hospital stay due to need for placement to [facility]  Discharge Plan: Anticipate discharge in 24-48 hrs to rehab facility  Code Status: Level 1 - Full Code    Subjective: The patient was seen and examined  The patient states she's feeling better today, pain resolved  Objective:     Vitals:   Temp (24hrs), Av 1 °F (36 7 °C), Min:97 7 °F (36 5 °C), Max:98 3 °F (36 8 °C)    Temp:  [97 7 °F (36 5 °C)-98 3 °F (36 8 °C)] 97 7 °F (36 5 °C)  HR:  [] 81  Resp:  [14-18] 16  BP: ()/(52-57) 92/53  SpO2:  [87 %-95 %] 95 %  Body mass index is 23 85 kg/m²  Input and Output Summary (last 24 hours): Intake/Output Summary (Last 24 hours) at 2022 1157  Last data filed at 2022 1022  Gross per 24 hour   Intake 120 ml   Output 300 ml   Net -180 ml       Physical Exam:   Physical Exam  Vitals and nursing note reviewed  Constitutional:       General: She is awake  Appearance: Normal appearance  Interventions: Nasal cannula in place  Cardiovascular:      Rate and Rhythm: Normal rate and regular rhythm  Pulmonary:      Effort: Pulmonary effort is normal       Breath sounds: Normal breath sounds  No wheezing, rhonchi or rales  Abdominal:      Palpations: Abdomen is soft  Tenderness: There is no abdominal tenderness  Skin:     General: Skin is warm and dry  Neurological:      General: No focal deficit present  Mental Status: She is alert and oriented to person, place, and time  Psychiatric:         Attention and Perception: Attention normal          Mood and Affect: Mood normal          Speech: Speech normal          Behavior: Behavior is cooperative            Additional Data:     Labs:  Results from last 7 days   Lab Units 22  0606   WBC Thousand/uL 7 82   HEMOGLOBIN g/dL 9 0*   HEMATOCRIT % 28 7*   PLATELETS Thousands/uL 226     Results from last 7 days   Lab Units 01/08/22  0606   SODIUM mmol/L 140   POTASSIUM mmol/L 3 8   CHLORIDE mmol/L 108   CO2 mmol/L 24   BUN mg/dL 46*   CREATININE mg/dL 1 23   ANION GAP mmol/L 8   CALCIUM mg/dL 8 7   GLUCOSE RANDOM mg/dL 55*         Results from last 7 days   Lab Units 01/11/22  1046 01/11/22  0601 01/10/22  2145 01/10/22  1629 01/10/22  1058 01/10/22  0545 01/09/22 2022 01/09/22  1644 01/09/22  1111 01/09/22  0617 01/08/22  2108 01/08/22  1511   POC GLUCOSE mg/dl 146* 77 247* 241* 103 120 229* 234* 277* 102 162* 364*               Lines/Drains:  Invasive Devices  Report    Peripheral Intravenous Line            Peripheral IV 01/10/22 Left Forearm <1 day          Drain            Chest Tube Right Midaxillary 12 days                      Imaging: Reviewed radiology reports from this admission including: chest CT scan    Recent Cultures (last 7 days):         Last 24 Hours Medication List:   Current Facility-Administered Medications   Medication Dose Route Frequency Provider Last Rate    acetaminophen  975 mg Oral Q6H [facility] BENTON Worthy      atorvastatin  40 mg Oral HS BENTON Worthy      bisacodyl  10 mg Rectal Daily PRN BENTON Drummond      docusate sodium  100 mg Oral Daily BENTON Worthy      ferrous sulfate  325 mg Oral Daily With Breakfast BENTON Worthy      heparin (porcine)  5,000 Units Subcutaneous Q8H [facility] BENTON Worthy      insulin glargine  15 Units Subcutaneous HS [place] L Bearden, DO      insulin lispro  4-20 Units Subcutaneous TID AC BENTON Worthy      insulin lispro  4-20 Units Subcutaneous HS BENTON Worthy      insulin lispro  5 Units Subcutaneous TID With Meals BENTON Worthy      lidocaine  1 patch Topical Daily BENTON Drummond      [START ON 1/12/2022] methylPREDNISolone sodium succinate  10 mg Intravenous Daily BENTNO Worthy      ondansetron  4 mg Intravenous Q6H PRN BENTON Drummond      oxyCODONE  2 5 mg Oral Q6H PRN BENTON Worthy      polyethylene glycol  17 g Oral Daily Lysbeth Antu, CRNP      senna  1 tablet Oral HS Lysbeth ROSA ISELA JoyNP          Today, Patient Was Seen By: Becca Sandoval PA-C    **Please Note: This note may have been constructed using a voice recognition system  **

## 2022-01-11 NOTE — PLAN OF CARE
Problem: OCCUPATIONAL THERAPY ADULT  Goal: Performs self-care activities at highest level of function for planned discharge setting  See evaluation for individualized goals  Outcome: Progressing  Note: Limitation: Decreased ADL status,Decreased endurance  Prognosis: Fair  Assessment: Patient participated in Skilled OT session this date with interventions consisting of Energy Conservation techniques and therapeutic exercise to: increase functional use of BUEs, increase BUE muscle strength    Patient agreeable to OT treatment session, upon arrival patient was found seated OOB to Chair  Patient performs UB TE using 1# weight as detailed in flow sheet  Following TE, patient declined additional activity, stating she is tired  Session ended with patient seated OOB  In chair, all  Needs met and call bell within reach  In comparison to previous session, patient with improvements in UB strength and activity tolerance  Patient requiring  verbal cues for correct technique, verbal cues for pacing thru activity steps and frequent rest periods  Patient performance  demonstrated good carryover of learned techniques and strategies to facilitate safety during functional tasks  Patient continues to be functioning below baseline level, occupational performance remains limited secondary to factors listed above and increased risk for falls and injury  From OT standpoint, recommendation at time of d/c would be Short Term Rehab  Patient to benefit from continued Occupational Therapy treatment while in the hospital to address deficits as defined above and maximize level of functional independence with ADLs and functional mobility in order to return to OF        OT Discharge Recommendation: Post acute rehabilitation services  OT - OK to Discharge: Yes (when medicaLLY CLEARED)

## 2022-01-11 NOTE — ASSESSMENT & PLAN NOTE
Lab Results   Component Value Date    HGBA1C 6 2 (H) 11/22/2021       Recent Labs     01/10/22  1629 01/10/22  2145 01/11/22  0601 01/11/22  1046   POCGLU 241* 247* 77 146*       Blood Sugar Average: Last 72 hrs:  (P) 175 6     · Continue Lantus 15 units q h s  And Lispro 5 units t i d   With meals  · Continue Accu-Cheks, corrective sliding-scale insulin, and hypoglycemic protocol

## 2022-01-11 NOTE — OCCUPATIONAL THERAPY NOTE
01/11/22 1352   OT Last Visit   OT Visit Date 01/11/22   Note Type   Note Type Treatment   Restrictions/Precautions   Weight Bearing Precautions Per Order No   Other Precautions Multiple lines;Telemetry;O2;Fall Risk   Pain Assessment   Pain Assessment Tool 0-10   Pain Score No Pain   Therapeutic Excerise-Strength   UE Strength Yes   Right Upper Extremity- Strength   R Shoulder Flexion; Extension;Horizontal ABduction  (horiz  add, scapular pro/ret)   R Elbow Elbow flexion;Elbow extension   R Wrist   (wrist rotation)   R Weight/Reps/Sets 1# weight x 20 reps   Left Upper Extremity-Strength   L Weights/Reps/Sets TE same as R UE above   Cognition   Overall Cognitive Status WFL   Arousal/Participation Alert; Cooperative   Attention Within functional limits   Orientation Level Oriented X4   Memory Within functional limits   Following Commands Follows one step commands with increased time or repetition   Comments Pt agreeable to OT treatment  Activity Tolerance   Activity Tolerance Patient limited by fatigue   Assessment   Assessment Patient participated in Skilled OT session this date with interventions consisting of Energy Conservation techniques and therapeutic exercise to: increase functional use of BUEs, increase BUE muscle strength    Patient agreeable to OT treatment session, upon arrival patient was found seated OOB to Chair  Patient performs UB TE using 1# weight as detailed in flow sheet  Following TE, patient declined additional activity, stating she is tired  Session ended with patient seated OOB  In chair, all  Needs met and call bell within reach  In comparison to previous session, patient with improvements in UB strength and activity tolerance  Patient requiring  verbal cues for correct technique, verbal cues for pacing thru activity steps and frequent rest periods  Patient performance  demonstrated good carryover of learned techniques and strategies to facilitate safety during functional tasks   Patient continues to be functioning below baseline level, occupational performance remains limited secondary to factors listed above and increased risk for falls and injury  From OT standpoint, recommendation at time of d/c would be Short Term Rehab  Patient to benefit from continued Occupational Therapy treatment while in the hospital to address deficits as defined above and maximize level of functional independence with ADLs and functional mobility in order to return to PLOF  Plan   Treatment Interventions UE strengthening/ROM; Energy conservation   Goal Expiration Date 01/13/22   OT Treatment Day 7   OT Frequency 3-5x/wk   Recommendation   OT Discharge Recommendation Post acute rehabilitation services   OT - OK to Discharge Yes  (when medicaLLY CLEARED)   AM-PAC Daily Activity Inpatient   Lower Body Dressing 1   Bathing 3   Toileting 2   Upper Body Dressing 3   Grooming 3   Eating 4   Daily Activity Raw Score 16   Daily Activity Standardized Score (Calc for Raw Score >=11) 35 96   AM-PAC Applied Cognition Inpatient   Following a Speech/Presentation 3   Understanding Ordinary Conversation 4   Taking Medications 3   Remembering Where Things Are Placed or Put Away 3   Remembering List of 4-5 Errands 3   Taking Care of Complicated Tasks 3   Applied Cognition Raw Score 19   Applied Cognition Standardized Score 39 77    me

## 2022-01-12 ENCOUNTER — APPOINTMENT (INPATIENT)
Dept: CT IMAGING | Facility: HOSPITAL | Age: 82
DRG: 871 | End: 2022-01-12
Payer: COMMERCIAL

## 2022-01-12 ENCOUNTER — ANESTHESIA EVENT (INPATIENT)
Dept: RADIOLOGY | Facility: HOSPITAL | Age: 82
DRG: 907 | End: 2022-01-12
Payer: COMMERCIAL

## 2022-01-12 ENCOUNTER — TRANSITIONAL CARE MANAGEMENT (OUTPATIENT)
Dept: FAMILY MEDICINE CLINIC | Facility: CLINIC | Age: 82
End: 2022-01-12

## 2022-01-12 ENCOUNTER — ANESTHESIA (INPATIENT)
Dept: RADIOLOGY | Facility: HOSPITAL | Age: 82
DRG: 907 | End: 2022-01-12
Payer: COMMERCIAL

## 2022-01-12 ENCOUNTER — HOSPITAL ENCOUNTER (INPATIENT)
Facility: HOSPITAL | Age: 82
LOS: 9 days | Discharge: HOME HEALTH | DRG: 907 | End: 2022-01-21
Attending: INTERNAL MEDICINE | Admitting: INTERNAL MEDICINE
Payer: COMMERCIAL

## 2022-01-12 ENCOUNTER — APPOINTMENT (INPATIENT)
Dept: RADIOLOGY | Facility: HOSPITAL | Age: 82
DRG: 907 | End: 2022-01-12
Attending: INTERNAL MEDICINE
Payer: COMMERCIAL

## 2022-01-12 ENCOUNTER — APPOINTMENT (INPATIENT)
Dept: RADIOLOGY | Facility: HOSPITAL | Age: 82
DRG: 907 | End: 2022-01-12
Payer: COMMERCIAL

## 2022-01-12 VITALS
RESPIRATION RATE: 16 BRPM | HEART RATE: 92 BPM | SYSTOLIC BLOOD PRESSURE: 91 MMHG | OXYGEN SATURATION: 99 % | DIASTOLIC BLOOD PRESSURE: 59 MMHG | BODY MASS INDEX: 23.94 KG/M2 | WEIGHT: 121.91 LBS | TEMPERATURE: 98.6 F | HEIGHT: 60 IN

## 2022-01-12 DIAGNOSIS — J12.82 PNEUMONIA DUE TO COVID-19 VIRUS: ICD-10-CM

## 2022-01-12 DIAGNOSIS — K59.00 CONSTIPATION: ICD-10-CM

## 2022-01-12 DIAGNOSIS — R26.2 AMBULATORY DYSFUNCTION: ICD-10-CM

## 2022-01-12 DIAGNOSIS — U07.1 PNEUMONIA DUE TO COVID-19 VIRUS: ICD-10-CM

## 2022-01-12 DIAGNOSIS — K66.1 HEMOPERITONEUM: Primary | ICD-10-CM

## 2022-01-12 PROBLEM — D62 ACUTE BLOOD LOSS ANEMIA: Status: ACTIVE | Noted: 2021-12-21

## 2022-01-12 LAB
2HR DELTA HS TROPONIN: -2 NG/L
4HR DELTA HS TROPONIN: -4 NG/L
ABO GROUP BLD: NORMAL
ALBUMIN SERPL BCP-MCNC: 2.5 G/DL (ref 3.5–5)
ALBUMIN SERPL BCP-MCNC: 2.7 G/DL (ref 3.5–5)
ALBUMIN SERPL BCP-MCNC: 2.8 G/DL (ref 3.5–5)
ALP SERPL-CCNC: 54 U/L (ref 34–104)
ALP SERPL-CCNC: 66 U/L (ref 46–116)
ALP SERPL-CCNC: 69 U/L (ref 46–116)
ALT SERPL W P-5'-P-CCNC: 22 U/L (ref 7–52)
ALT SERPL W P-5'-P-CCNC: 29 U/L (ref 12–78)
ALT SERPL W P-5'-P-CCNC: 30 U/L (ref 12–78)
ANION GAP SERPL CALCULATED.3IONS-SCNC: 10 MMOL/L (ref 4–13)
ANION GAP SERPL CALCULATED.3IONS-SCNC: 11 MMOL/L (ref 4–13)
ANION GAP SERPL CALCULATED.3IONS-SCNC: 16 MMOL/L (ref 4–13)
ARTERIAL PATENCY WRIST A: NO
AST SERPL W P-5'-P-CCNC: 14 U/L (ref 13–39)
AST SERPL W P-5'-P-CCNC: 19 U/L (ref 5–45)
AST SERPL W P-5'-P-CCNC: 23 U/L (ref 5–45)
ATRIAL RATE: 62 BPM
ATRIAL RATE: 67 BPM
ATRIAL RATE: 78 BPM
BASE EXCESS BLDA CALC-SCNC: -7.8 MMOL/L
BASOPHILS # BLD AUTO: 0.01 THOUSANDS/ΜL (ref 0–0.1)
BASOPHILS # BLD AUTO: 0.01 THOUSANDS/ΜL (ref 0–0.1)
BASOPHILS # BLD AUTO: 0.02 THOUSANDS/ΜL (ref 0–0.1)
BASOPHILS # BLD MANUAL: 0 THOUSAND/UL (ref 0–0.1)
BASOPHILS NFR BLD AUTO: 0 % (ref 0–1)
BASOPHILS NFR MAR MANUAL: 0 % (ref 0–1)
BILIRUB SERPL-MCNC: 0.45 MG/DL (ref 0.2–1)
BILIRUB SERPL-MCNC: 0.47 MG/DL (ref 0.2–1)
BILIRUB SERPL-MCNC: 0.78 MG/DL (ref 0.2–1)
BLD GP AB SCN SERPL QL: NEGATIVE
BLD GP AB SCN SERPL QL: NEGATIVE
BUN SERPL-MCNC: 53 MG/DL (ref 5–25)
BUN SERPL-MCNC: 62 MG/DL (ref 5–25)
BUN SERPL-MCNC: 68 MG/DL (ref 5–25)
CA-I BLD-SCNC: 1.16 MMOL/L (ref 1.12–1.32)
CALCIUM ALBUM COR SERPL-MCNC: 8.8 MG/DL (ref 8.3–10.1)
CALCIUM ALBUM COR SERPL-MCNC: 9.2 MG/DL (ref 8.3–10.1)
CALCIUM ALBUM COR SERPL-MCNC: 9.3 MG/DL (ref 8.3–10.1)
CALCIUM SERPL-MCNC: 7.6 MG/DL (ref 8.3–10.1)
CALCIUM SERPL-MCNC: 8.2 MG/DL (ref 8.3–10.1)
CALCIUM SERPL-MCNC: 8.3 MG/DL (ref 8.4–10.2)
CARDIAC TROPONIN I PNL SERPL HS: 10 NG/L
CARDIAC TROPONIN I PNL SERPL HS: 12 NG/L
CARDIAC TROPONIN I PNL SERPL HS: 8 NG/L
CHLORIDE SERPL-SCNC: 103 MMOL/L (ref 96–108)
CHLORIDE SERPL-SCNC: 106 MMOL/L (ref 100–108)
CHLORIDE SERPL-SCNC: 108 MMOL/L (ref 100–108)
CO2 SERPL-SCNC: 16 MMOL/L (ref 21–32)
CO2 SERPL-SCNC: 20 MMOL/L (ref 21–32)
CO2 SERPL-SCNC: 20 MMOL/L (ref 21–32)
CREAT SERPL-MCNC: 1.62 MG/DL (ref 0.6–1.3)
CREAT SERPL-MCNC: 2.02 MG/DL (ref 0.6–1.3)
CREAT SERPL-MCNC: 2.09 MG/DL (ref 0.6–1.3)
EOSINOPHIL # BLD AUTO: 0 THOUSAND/ΜL (ref 0–0.61)
EOSINOPHIL # BLD AUTO: 0.01 THOUSAND/ΜL (ref 0–0.61)
EOSINOPHIL # BLD AUTO: 0.05 THOUSAND/ΜL (ref 0–0.61)
EOSINOPHIL # BLD MANUAL: 0 THOUSAND/UL (ref 0–0.4)
EOSINOPHIL NFR BLD AUTO: 0 % (ref 0–6)
EOSINOPHIL NFR BLD MANUAL: 0 % (ref 0–6)
ERYTHROCYTE [DISTWIDTH] IN BLOOD BY AUTOMATED COUNT: 14.9 % (ref 11.6–15.1)
ERYTHROCYTE [DISTWIDTH] IN BLOOD BY AUTOMATED COUNT: 15.1 % (ref 11.6–15.1)
ERYTHROCYTE [DISTWIDTH] IN BLOOD BY AUTOMATED COUNT: 15.1 % (ref 11.6–15.1)
ERYTHROCYTE [DISTWIDTH] IN BLOOD BY AUTOMATED COUNT: 15.4 % (ref 11.6–15.1)
ERYTHROCYTE [DISTWIDTH] IN BLOOD BY AUTOMATED COUNT: 15.5 % (ref 11.6–15.1)
FERRITIN SERPL-MCNC: 684 NG/ML (ref 8–388)
FOLATE SERPL-MCNC: 8.1 NG/ML (ref 3.1–17.5)
GFR SERPL CREATININE-BSD FRML MDRD: 21 ML/MIN/1.73SQ M
GFR SERPL CREATININE-BSD FRML MDRD: 22 ML/MIN/1.73SQ M
GFR SERPL CREATININE-BSD FRML MDRD: 29 ML/MIN/1.73SQ M
GLUCOSE SERPL-MCNC: 151 MG/DL (ref 65–140)
GLUCOSE SERPL-MCNC: 170 MG/DL (ref 65–140)
GLUCOSE SERPL-MCNC: 182 MG/DL (ref 65–140)
GLUCOSE SERPL-MCNC: 281 MG/DL (ref 65–140)
GLUCOSE SERPL-MCNC: 299 MG/DL (ref 65–140)
GLUCOSE SERPL-MCNC: 308 MG/DL (ref 65–140)
GLUCOSE SERPL-MCNC: 349 MG/DL (ref 65–140)
HCO3 BLDA-SCNC: 17.3 MMOL/L (ref 22–28)
HCT VFR BLD AUTO: 17.8 % (ref 34.8–46.1)
HCT VFR BLD AUTO: 19.2 % (ref 34.8–46.1)
HCT VFR BLD AUTO: 19.2 % (ref 34.8–46.1)
HCT VFR BLD AUTO: 27.8 % (ref 34.8–46.1)
HCT VFR BLD AUTO: 29.2 % (ref 34.8–46.1)
HGB BLD-MCNC: 5.3 G/DL (ref 11.5–15.4)
HGB BLD-MCNC: 5.8 G/DL (ref 11.5–15.4)
HGB BLD-MCNC: 6.1 G/DL (ref 11.5–15.4)
HGB BLD-MCNC: 8.6 G/DL (ref 11.5–15.4)
HGB BLD-MCNC: 9.6 G/DL (ref 11.5–15.4)
HOROWITZ INDEX BLDA+IHG-RTO: 100 MM[HG]
IMM GRANULOCYTES # BLD AUTO: 0.13 THOUSAND/UL (ref 0–0.2)
IMM GRANULOCYTES # BLD AUTO: 0.18 THOUSAND/UL (ref 0–0.2)
IMM GRANULOCYTES # BLD AUTO: 0.39 THOUSAND/UL (ref 0–0.2)
IMM GRANULOCYTES NFR BLD AUTO: 2 % (ref 0–2)
IMM GRANULOCYTES NFR BLD AUTO: 2 % (ref 0–2)
IMM GRANULOCYTES NFR BLD AUTO: 3 % (ref 0–2)
INR PPP: 1.24 (ref 0.84–1.19)
IRON SATN MFR SERPL: 27 % (ref 15–50)
IRON SERPL-MCNC: 55 UG/DL (ref 50–170)
LACTATE SERPL-SCNC: 1.9 MMOL/L (ref 0.5–2)
LACTATE SERPL-SCNC: 1.9 MMOL/L (ref 0.5–2)
LYMPHOCYTES # BLD AUTO: 0.6 THOUSANDS/ΜL (ref 0.6–4.47)
LYMPHOCYTES # BLD AUTO: 0.71 THOUSANDS/ΜL (ref 0.6–4.47)
LYMPHOCYTES # BLD AUTO: 1.4 THOUSANDS/ΜL (ref 0.6–4.47)
LYMPHOCYTES # BLD AUTO: 1.51 THOUSAND/UL (ref 0.6–4.47)
LYMPHOCYTES # BLD AUTO: 10 % (ref 14–44)
LYMPHOCYTES NFR BLD AUTO: 10 % (ref 14–44)
LYMPHOCYTES NFR BLD AUTO: 10 % (ref 14–44)
LYMPHOCYTES NFR BLD AUTO: 6 % (ref 14–44)
MAGNESIUM SERPL-MCNC: 1.9 MG/DL (ref 1.6–2.6)
MAGNESIUM SERPL-MCNC: 2 MG/DL (ref 1.9–2.7)
MCH RBC QN AUTO: 27.9 PG (ref 26.8–34.3)
MCH RBC QN AUTO: 27.9 PG (ref 26.8–34.3)
MCH RBC QN AUTO: 28 PG (ref 26.8–34.3)
MCH RBC QN AUTO: 28.2 PG (ref 26.8–34.3)
MCH RBC QN AUTO: 28.8 PG (ref 26.8–34.3)
MCHC RBC AUTO-ENTMCNC: 29.8 G/DL (ref 31.4–37.4)
MCHC RBC AUTO-ENTMCNC: 30.2 G/DL (ref 31.4–37.4)
MCHC RBC AUTO-ENTMCNC: 30.9 G/DL (ref 31.4–37.4)
MCHC RBC AUTO-ENTMCNC: 31.8 G/DL (ref 31.4–37.4)
MCHC RBC AUTO-ENTMCNC: 32.9 G/DL (ref 31.4–37.4)
MCV RBC AUTO: 86 FL (ref 82–98)
MCV RBC AUTO: 90 FL (ref 82–98)
MCV RBC AUTO: 91 FL (ref 82–98)
MCV RBC AUTO: 93 FL (ref 82–98)
MCV RBC AUTO: 94 FL (ref 82–98)
MONOCYTES # BLD AUTO: 0.15 THOUSAND/ΜL (ref 0.17–1.22)
MONOCYTES # BLD AUTO: 0.3 THOUSAND/UL (ref 0–1.22)
MONOCYTES # BLD AUTO: 0.45 THOUSAND/ΜL (ref 0.17–1.22)
MONOCYTES # BLD AUTO: 0.75 THOUSAND/ΜL (ref 0.17–1.22)
MONOCYTES NFR BLD AUTO: 2 % (ref 4–12)
MONOCYTES NFR BLD AUTO: 5 % (ref 4–12)
MONOCYTES NFR BLD AUTO: 5 % (ref 4–12)
MONOCYTES NFR BLD: 2 % (ref 4–12)
NEUTROPHILS # BLD AUTO: 11.97 THOUSANDS/ΜL (ref 1.85–7.62)
NEUTROPHILS # BLD AUTO: 6.29 THOUSANDS/ΜL (ref 1.85–7.62)
NEUTROPHILS # BLD AUTO: 8.83 THOUSANDS/ΜL (ref 1.85–7.62)
NEUTROPHILS # BLD MANUAL: 13.27 THOUSAND/UL (ref 1.85–7.62)
NEUTS SEG NFR BLD AUTO: 82 % (ref 43–75)
NEUTS SEG NFR BLD AUTO: 86 % (ref 43–75)
NEUTS SEG NFR BLD AUTO: 87 % (ref 43–75)
NEUTS SEG NFR BLD AUTO: 88 % (ref 43–75)
NRBC BLD AUTO-RTO: 0 /100 WBCS
NRBC BLD AUTO-RTO: 0 /100 WBCS
NRBC BLD AUTO-RTO: 1 /100 WBCS
O2 CT BLDA-SCNC: 14.8 ML/DL (ref 16–23)
OVALOCYTES BLD QL SMEAR: PRESENT
OXYHGB MFR BLDA: 98.6 % (ref 94–97)
P AXIS: 12 DEGREES
P AXIS: 12 DEGREES
P AXIS: 5 DEGREES
PCO2 BLDA: 33.5 MM HG (ref 36–44)
PEEP RESPIRATORY: 6 CM[H2O]
PH BLDA: 7.33 [PH] (ref 7.35–7.45)
PLATELET # BLD AUTO: 101 THOUSANDS/UL (ref 149–390)
PLATELET # BLD AUTO: 120 THOUSANDS/UL (ref 149–390)
PLATELET # BLD AUTO: 135 THOUSANDS/UL (ref 149–390)
PLATELET # BLD AUTO: 170 THOUSANDS/UL (ref 149–390)
PLATELET # BLD AUTO: 68 THOUSANDS/UL (ref 149–390)
PLATELET BLD QL SMEAR: ABNORMAL
PMV BLD AUTO: 9.2 FL (ref 8.9–12.7)
PMV BLD AUTO: 9.5 FL (ref 8.9–12.7)
PMV BLD AUTO: 9.5 FL (ref 8.9–12.7)
PMV BLD AUTO: 9.6 FL (ref 8.9–12.7)
PMV BLD AUTO: 9.9 FL (ref 8.9–12.7)
PO2 BLDA: 249.2 MM HG (ref 75–129)
POTASSIUM SERPL-SCNC: 5 MMOL/L (ref 3.5–5.3)
POTASSIUM SERPL-SCNC: 5.2 MMOL/L (ref 3.5–5.3)
POTASSIUM SERPL-SCNC: 5.5 MMOL/L (ref 3.5–5.3)
PR INTERVAL: 138 MS
PR INTERVAL: 138 MS
PR INTERVAL: 160 MS
PROT SERPL-MCNC: 5.1 G/DL (ref 6.4–8.4)
PROT SERPL-MCNC: 5.3 G/DL (ref 6.4–8.2)
PROT SERPL-MCNC: 5.6 G/DL (ref 6.4–8.2)
PROTHROMBIN TIME: 15.2 SECONDS (ref 11.6–14.5)
QRS AXIS: -25 DEGREES
QRS AXIS: -31 DEGREES
QRS AXIS: -35 DEGREES
QRSD INTERVAL: 82 MS
QRSD INTERVAL: 84 MS
QRSD INTERVAL: 88 MS
QT INTERVAL: 390 MS
QT INTERVAL: 398 MS
QT INTERVAL: 406 MS
QTC INTERVAL: 412 MS
QTC INTERVAL: 420 MS
QTC INTERVAL: 444 MS
RBC # BLD AUTO: 1.9 MILLION/UL (ref 3.81–5.12)
RBC # BLD AUTO: 2.07 MILLION/UL (ref 3.81–5.12)
RBC # BLD AUTO: 2.12 MILLION/UL (ref 3.81–5.12)
RBC # BLD AUTO: 3.08 MILLION/UL (ref 3.81–5.12)
RBC # BLD AUTO: 3.41 MILLION/UL (ref 3.81–5.12)
RH BLD: POSITIVE
SODIUM SERPL-SCNC: 133 MMOL/L (ref 135–147)
SODIUM SERPL-SCNC: 138 MMOL/L (ref 136–145)
SODIUM SERPL-SCNC: 139 MMOL/L (ref 136–145)
SPECIMEN EXPIRATION DATE: NORMAL
SPECIMEN EXPIRATION DATE: NORMAL
SPECIMEN SOURCE: ABNORMAL
T WAVE AXIS: 146 DEGREES
T WAVE AXIS: 6 DEGREES
T WAVE AXIS: 9 DEGREES
TIBC SERPL-MCNC: 205 UG/DL (ref 250–450)
VENT AC: 16
VENT- AC: AC
VENTRICULAR RATE: 62 BPM
VENTRICULAR RATE: 67 BPM
VENTRICULAR RATE: 78 BPM
VIT B12 SERPL-MCNC: 463 PG/ML (ref 100–900)
VT SETTING VENT: 350 ML
WBC # BLD AUTO: 10.08 THOUSAND/UL (ref 4.31–10.16)
WBC # BLD AUTO: 14.57 THOUSAND/UL (ref 4.31–10.16)
WBC # BLD AUTO: 15.08 THOUSAND/UL (ref 4.31–10.16)
WBC # BLD AUTO: 15.41 THOUSAND/UL (ref 4.31–10.16)
WBC # BLD AUTO: 7.3 THOUSAND/UL (ref 4.31–10.16)

## 2022-01-12 PROCEDURE — 99238 HOSP IP/OBS DSCHRG MGMT 30/<: CPT | Performed by: PHYSICIAN ASSISTANT

## 2022-01-12 PROCEDURE — 86900 BLOOD TYPING SEROLOGIC ABO: CPT | Performed by: PHYSICIAN ASSISTANT

## 2022-01-12 PROCEDURE — 86920 COMPATIBILITY TEST SPIN: CPT

## 2022-01-12 PROCEDURE — 74176 CT ABD & PELVIS W/O CONTRAST: CPT

## 2022-01-12 PROCEDURE — 94002 VENT MGMT INPAT INIT DAY: CPT

## 2022-01-12 PROCEDURE — C1887 CATHETER, GUIDING: HCPCS

## 2022-01-12 PROCEDURE — 75756 ARTERY X-RAYS CHEST: CPT

## 2022-01-12 PROCEDURE — 80053 COMPREHEN METABOLIC PANEL: CPT | Performed by: ANESTHESIOLOGY

## 2022-01-12 PROCEDURE — C1769 GUIDE WIRE: HCPCS

## 2022-01-12 PROCEDURE — 30233N1 TRANSFUSION OF NONAUTOLOGOUS RED BLOOD CELLS INTO PERIPHERAL VEIN, PERCUTANEOUS APPROACH: ICD-10-PCS | Performed by: INTERNAL MEDICINE

## 2022-01-12 PROCEDURE — 37244 VASC EMBOLIZE/OCCLUDE BLEED: CPT | Performed by: INTERNAL MEDICINE

## 2022-01-12 PROCEDURE — 82948 REAGENT STRIP/BLOOD GLUCOSE: CPT

## 2022-01-12 PROCEDURE — 75625 CONTRAST EXAM ABDOMINL AORTA: CPT

## 2022-01-12 PROCEDURE — 85025 COMPLETE CBC W/AUTO DIFF WBC: CPT | Performed by: PHYSICIAN ASSISTANT

## 2022-01-12 PROCEDURE — 83605 ASSAY OF LACTIC ACID: CPT

## 2022-01-12 PROCEDURE — 36556 INSERT NON-TUNNEL CV CATH: CPT | Performed by: ANESTHESIOLOGY

## 2022-01-12 PROCEDURE — 85610 PROTHROMBIN TIME: CPT | Performed by: ANESTHESIOLOGY

## 2022-01-12 PROCEDURE — 84295 ASSAY OF SERUM SODIUM: CPT

## 2022-01-12 PROCEDURE — 74174 CTA ABD&PLVS W/CONTRAST: CPT

## 2022-01-12 PROCEDURE — 83735 ASSAY OF MAGNESIUM: CPT | Performed by: NURSE PRACTITIONER

## 2022-01-12 PROCEDURE — 36217 PLACE CATHETER IN ARTERY: CPT

## 2022-01-12 PROCEDURE — 83540 ASSAY OF IRON: CPT | Performed by: PHYSICIAN ASSISTANT

## 2022-01-12 PROCEDURE — G1004 CDSM NDSC: HCPCS

## 2022-01-12 PROCEDURE — 76937 US GUIDE VASCULAR ACCESS: CPT | Performed by: INTERNAL MEDICINE

## 2022-01-12 PROCEDURE — 85027 COMPLETE CBC AUTOMATED: CPT | Performed by: STUDENT IN AN ORGANIZED HEALTH CARE EDUCATION/TRAINING PROGRAM

## 2022-01-12 PROCEDURE — 86901 BLOOD TYPING SEROLOGIC RH(D): CPT | Performed by: ANESTHESIOLOGY

## 2022-01-12 PROCEDURE — B31L1ZZ FLUOROSCOPY OF INTERCOSTAL AND BRONCHIAL ARTERIES USING LOW OSMOLAR CONTRAST: ICD-10-PCS | Performed by: INTERNAL MEDICINE

## 2022-01-12 PROCEDURE — P9017 PLASMA 1 DONOR FRZ W/IN 8 HR: HCPCS

## 2022-01-12 PROCEDURE — 80053 COMPREHEN METABOLIC PANEL: CPT | Performed by: STUDENT IN AN ORGANIZED HEALTH CARE EDUCATION/TRAINING PROGRAM

## 2022-01-12 PROCEDURE — 82728 ASSAY OF FERRITIN: CPT | Performed by: PHYSICIAN ASSISTANT

## 2022-01-12 PROCEDURE — 86850 RBC ANTIBODY SCREEN: CPT | Performed by: ANESTHESIOLOGY

## 2022-01-12 PROCEDURE — 99222 1ST HOSP IP/OBS MODERATE 55: CPT | Performed by: SURGERY

## 2022-01-12 PROCEDURE — P9016 RBC LEUKOCYTES REDUCED: HCPCS

## 2022-01-12 PROCEDURE — 84132 ASSAY OF SERUM POTASSIUM: CPT

## 2022-01-12 PROCEDURE — 0BH17EZ INSERTION OF ENDOTRACHEAL AIRWAY INTO TRACHEA, VIA NATURAL OR ARTIFICIAL OPENING: ICD-10-PCS | Performed by: INTERNAL MEDICINE

## 2022-01-12 PROCEDURE — 82803 BLOOD GASES ANY COMBINATION: CPT

## 2022-01-12 PROCEDURE — 37244 VASC EMBOLIZE/OCCLUDE BLEED: CPT

## 2022-01-12 PROCEDURE — 30233K1 TRANSFUSION OF NONAUTOLOGOUS FROZEN PLASMA INTO PERIPHERAL VEIN, PERCUTANEOUS APPROACH: ICD-10-PCS | Performed by: INTERNAL MEDICINE

## 2022-01-12 PROCEDURE — 71250 CT THORAX DX C-: CPT

## 2022-01-12 PROCEDURE — 83605 ASSAY OF LACTIC ACID: CPT | Performed by: STUDENT IN AN ORGANIZED HEALTH CARE EDUCATION/TRAINING PROGRAM

## 2022-01-12 PROCEDURE — 86900 BLOOD TYPING SEROLOGIC ABO: CPT | Performed by: ANESTHESIOLOGY

## 2022-01-12 PROCEDURE — 85027 COMPLETE CBC AUTOMATED: CPT | Performed by: ANESTHESIOLOGY

## 2022-01-12 PROCEDURE — B3111ZZ FLUOROSCOPY OF RIGHT BRACHIOCEPHALIC-SUBCLAVIAN ARTERY USING LOW OSMOLAR CONTRAST: ICD-10-PCS | Performed by: INTERNAL MEDICINE

## 2022-01-12 PROCEDURE — 99291 CRITICAL CARE FIRST HOUR: CPT | Performed by: INTERNAL MEDICINE

## 2022-01-12 PROCEDURE — NC001 PR NO CHARGE: Performed by: INTERNAL MEDICINE

## 2022-01-12 PROCEDURE — NC001 PR NO CHARGE: Performed by: ANESTHESIOLOGY

## 2022-01-12 PROCEDURE — 93005 ELECTROCARDIOGRAM TRACING: CPT

## 2022-01-12 PROCEDURE — B3101ZZ FLUOROSCOPY OF THORACIC AORTA USING LOW OSMOLAR CONTRAST: ICD-10-PCS | Performed by: INTERNAL MEDICINE

## 2022-01-12 PROCEDURE — 82330 ASSAY OF CALCIUM: CPT | Performed by: INTERNAL MEDICINE

## 2022-01-12 PROCEDURE — C1894 INTRO/SHEATH, NON-LASER: HCPCS

## 2022-01-12 PROCEDURE — 05HN33Z INSERTION OF INFUSION DEVICE INTO LEFT INTERNAL JUGULAR VEIN, PERCUTANEOUS APPROACH: ICD-10-PCS | Performed by: ANESTHESIOLOGY

## 2022-01-12 PROCEDURE — 86850 RBC ANTIBODY SCREEN: CPT | Performed by: PHYSICIAN ASSISTANT

## 2022-01-12 PROCEDURE — 30233R1 TRANSFUSION OF NONAUTOLOGOUS PLATELETS INTO PERIPHERAL VEIN, PERCUTANEOUS APPROACH: ICD-10-PCS | Performed by: INTERNAL MEDICINE

## 2022-01-12 PROCEDURE — 37244 VASC EMBOLIZE/OCCLUDE BLEED: CPT | Performed by: STUDENT IN AN ORGANIZED HEALTH CARE EDUCATION/TRAINING PROGRAM

## 2022-01-12 PROCEDURE — 75710 ARTERY X-RAYS ARM/LEG: CPT

## 2022-01-12 PROCEDURE — 84484 ASSAY OF TROPONIN QUANT: CPT | Performed by: STUDENT IN AN ORGANIZED HEALTH CARE EDUCATION/TRAINING PROGRAM

## 2022-01-12 PROCEDURE — 82805 BLOOD GASES W/O2 SATURATION: CPT

## 2022-01-12 PROCEDURE — 83550 IRON BINDING TEST: CPT | Performed by: PHYSICIAN ASSISTANT

## 2022-01-12 PROCEDURE — 85007 BL SMEAR W/DIFF WBC COUNT: CPT | Performed by: ANESTHESIOLOGY

## 2022-01-12 PROCEDURE — 93010 ELECTROCARDIOGRAM REPORT: CPT | Performed by: INTERNAL MEDICINE

## 2022-01-12 PROCEDURE — 85025 COMPLETE CBC W/AUTO DIFF WBC: CPT | Performed by: ANESTHESIOLOGY

## 2022-01-12 PROCEDURE — 83735 ASSAY OF MAGNESIUM: CPT | Performed by: STUDENT IN AN ORGANIZED HEALTH CARE EDUCATION/TRAINING PROGRAM

## 2022-01-12 PROCEDURE — 71045 X-RAY EXAM CHEST 1 VIEW: CPT

## 2022-01-12 PROCEDURE — 99291 CRITICAL CARE FIRST HOUR: CPT | Performed by: ANESTHESIOLOGY

## 2022-01-12 PROCEDURE — 85025 COMPLETE CBC W/AUTO DIFF WBC: CPT

## 2022-01-12 PROCEDURE — 82947 ASSAY GLUCOSE BLOOD QUANT: CPT

## 2022-01-12 PROCEDURE — 5A1935Z RESPIRATORY VENTILATION, LESS THAN 24 CONSECUTIVE HOURS: ICD-10-PCS | Performed by: INTERNAL MEDICINE

## 2022-01-12 PROCEDURE — 03L03DZ OCCLUSION OF RIGHT INTERNAL MAMMARY ARTERY WITH INTRALUMINAL DEVICE, PERCUTANEOUS APPROACH: ICD-10-PCS | Performed by: INTERNAL MEDICINE

## 2022-01-12 PROCEDURE — 82607 VITAMIN B-12: CPT | Performed by: PHYSICIAN ASSISTANT

## 2022-01-12 PROCEDURE — 82746 ASSAY OF FOLIC ACID SERUM: CPT | Performed by: PHYSICIAN ASSISTANT

## 2022-01-12 PROCEDURE — 86901 BLOOD TYPING SEROLOGIC RH(D): CPT | Performed by: PHYSICIAN ASSISTANT

## 2022-01-12 PROCEDURE — 36217 PLACE CATHETER IN ARTERY: CPT | Performed by: INTERNAL MEDICINE

## 2022-01-12 PROCEDURE — 80053 COMPREHEN METABOLIC PANEL: CPT

## 2022-01-12 PROCEDURE — C1760 CLOSURE DEV, VASC: HCPCS

## 2022-01-12 PROCEDURE — P9037 PLATE PHERES LEUKOREDU IRRAD: HCPCS

## 2022-01-12 PROCEDURE — 85014 HEMATOCRIT: CPT

## 2022-01-12 RX ORDER — OXYCODONE HYDROCHLORIDE 5 MG/1
2.5 TABLET ORAL EVERY 6 HOURS PRN
Status: CANCELLED | OUTPATIENT
Start: 2022-01-12

## 2022-01-12 RX ORDER — SODIUM CHLORIDE, SODIUM GLUCONATE, SODIUM ACETATE, POTASSIUM CHLORIDE, MAGNESIUM CHLORIDE, SODIUM PHOSPHATE, DIBASIC, AND POTASSIUM PHOSPHATE .53; .5; .37; .037; .03; .012; .00082 G/100ML; G/100ML; G/100ML; G/100ML; G/100ML; G/100ML; G/100ML
75 INJECTION, SOLUTION INTRAVENOUS CONTINUOUS
Status: DISCONTINUED | OUTPATIENT
Start: 2022-01-12 | End: 2022-01-14

## 2022-01-12 RX ORDER — ATORVASTATIN CALCIUM 40 MG/1
40 TABLET, FILM COATED ORAL
Status: DISCONTINUED | OUTPATIENT
Start: 2022-01-12 | End: 2022-01-13

## 2022-01-12 RX ORDER — METHYLPREDNISOLONE SODIUM SUCCINATE 40 MG/ML
10 INJECTION, POWDER, LYOPHILIZED, FOR SOLUTION INTRAMUSCULAR; INTRAVENOUS DAILY
Status: COMPLETED | OUTPATIENT
Start: 2022-01-13 | End: 2022-01-14

## 2022-01-12 RX ORDER — ACETAMINOPHEN 325 MG/1
975 TABLET ORAL EVERY 6 HOURS SCHEDULED
Status: DISCONTINUED | OUTPATIENT
Start: 2022-01-12 | End: 2022-01-14

## 2022-01-12 RX ORDER — SODIUM CHLORIDE 9 MG/ML
100 INJECTION, SOLUTION INTRAVENOUS CONTINUOUS
Status: DISCONTINUED | OUTPATIENT
Start: 2022-01-12 | End: 2022-01-12 | Stop reason: HOSPADM

## 2022-01-12 RX ORDER — SODIUM CHLORIDE 9 MG/ML
100 INJECTION, SOLUTION INTRAVENOUS CONTINUOUS
Status: CANCELLED | OUTPATIENT
Start: 2022-01-12

## 2022-01-12 RX ORDER — OXYCODONE HYDROCHLORIDE 5 MG/1
2.5 TABLET ORAL EVERY 6 HOURS PRN
Status: DISCONTINUED | OUTPATIENT
Start: 2022-01-12 | End: 2022-01-21 | Stop reason: HOSPADM

## 2022-01-12 RX ORDER — SENNOSIDES 8.6 MG
1 TABLET ORAL
Status: DISCONTINUED | OUTPATIENT
Start: 2022-01-12 | End: 2022-01-13 | Stop reason: SDUPTHER

## 2022-01-12 RX ORDER — POLYETHYLENE GLYCOL 3350 17 G/17G
17 POWDER, FOR SOLUTION ORAL DAILY
Status: DISCONTINUED | OUTPATIENT
Start: 2022-01-13 | End: 2022-01-21 | Stop reason: HOSPADM

## 2022-01-12 RX ORDER — CALCIUM CHLORIDE 100 MG/ML
INJECTION INTRAVENOUS; INTRAVENTRICULAR AS NEEDED
Status: DISCONTINUED | OUTPATIENT
Start: 2022-01-12 | End: 2022-01-12

## 2022-01-12 RX ORDER — PROPOFOL 10 MG/ML
INJECTION, EMULSION INTRAVENOUS AS NEEDED
Status: DISCONTINUED | OUTPATIENT
Start: 2022-01-12 | End: 2022-01-12

## 2022-01-12 RX ORDER — BISACODYL 10 MG
10 SUPPOSITORY, RECTAL RECTAL DAILY PRN
Status: CANCELLED | OUTPATIENT
Start: 2022-01-12

## 2022-01-12 RX ORDER — FENTANYL CITRATE 50 UG/ML
50 INJECTION, SOLUTION INTRAMUSCULAR; INTRAVENOUS EVERY 2 HOUR PRN
Status: DISCONTINUED | OUTPATIENT
Start: 2022-01-12 | End: 2022-01-14

## 2022-01-12 RX ORDER — FENTANYL CITRATE 50 UG/ML
INJECTION, SOLUTION INTRAMUSCULAR; INTRAVENOUS AS NEEDED
Status: DISCONTINUED | OUTPATIENT
Start: 2022-01-12 | End: 2022-01-12

## 2022-01-12 RX ORDER — ACETAMINOPHEN 325 MG/1
975 TABLET ORAL EVERY 6 HOURS SCHEDULED
Status: CANCELLED | OUTPATIENT
Start: 2022-01-12

## 2022-01-12 RX ORDER — VERAPAMIL HYDROCHLORIDE 2.5 MG/ML
INJECTION, SOLUTION INTRAVENOUS CODE/TRAUMA/SEDATION MEDICATION
Status: COMPLETED | OUTPATIENT
Start: 2022-01-12 | End: 2022-01-12

## 2022-01-12 RX ORDER — ONDANSETRON 2 MG/ML
4 INJECTION INTRAMUSCULAR; INTRAVENOUS EVERY 6 HOURS PRN
Status: DISCONTINUED | OUTPATIENT
Start: 2022-01-12 | End: 2022-01-21 | Stop reason: HOSPADM

## 2022-01-12 RX ORDER — ONDANSETRON 2 MG/ML
4 INJECTION INTRAMUSCULAR; INTRAVENOUS EVERY 6 HOURS PRN
Status: CANCELLED | OUTPATIENT
Start: 2022-01-12

## 2022-01-12 RX ORDER — SENNOSIDES 8.6 MG
1 TABLET ORAL
Status: CANCELLED | OUTPATIENT
Start: 2022-01-12

## 2022-01-12 RX ORDER — PROPOFOL 10 MG/ML
5-50 INJECTION, EMULSION INTRAVENOUS
Status: DISCONTINUED | OUTPATIENT
Start: 2022-01-12 | End: 2022-01-14

## 2022-01-12 RX ORDER — DOCUSATE SODIUM 100 MG/1
100 CAPSULE, LIQUID FILLED ORAL DAILY
Status: CANCELLED | OUTPATIENT
Start: 2022-01-13

## 2022-01-12 RX ORDER — INSULIN GLARGINE 100 [IU]/ML
15 INJECTION, SOLUTION SUBCUTANEOUS
Status: DISCONTINUED | OUTPATIENT
Start: 2022-01-12 | End: 2022-01-13

## 2022-01-12 RX ORDER — LIDOCAINE WITH 8.4% SOD BICARB 0.9%(10ML)
SYRINGE (ML) INJECTION CODE/TRAUMA/SEDATION MEDICATION
Status: COMPLETED | OUTPATIENT
Start: 2022-01-12 | End: 2022-01-12

## 2022-01-12 RX ORDER — FERROUS SULFATE 325(65) MG
325 TABLET ORAL
Status: CANCELLED | OUTPATIENT
Start: 2022-01-13

## 2022-01-12 RX ORDER — METHYLPREDNISOLONE SODIUM SUCCINATE 40 MG/ML
10 INJECTION, POWDER, LYOPHILIZED, FOR SOLUTION INTRAMUSCULAR; INTRAVENOUS DAILY
Status: CANCELLED | OUTPATIENT
Start: 2022-01-13 | End: 2022-01-15

## 2022-01-12 RX ORDER — ONDANSETRON 2 MG/ML
INJECTION INTRAMUSCULAR; INTRAVENOUS AS NEEDED
Status: DISCONTINUED | OUTPATIENT
Start: 2022-01-12 | End: 2022-01-12

## 2022-01-12 RX ORDER — CHLORHEXIDINE GLUCONATE 0.12 MG/ML
15 RINSE ORAL EVERY 12 HOURS SCHEDULED
Status: DISCONTINUED | OUTPATIENT
Start: 2022-01-12 | End: 2022-01-14

## 2022-01-12 RX ORDER — LIDOCAINE 50 MG/G
1 PATCH TOPICAL DAILY
Status: CANCELLED | OUTPATIENT
Start: 2022-01-13

## 2022-01-12 RX ORDER — SODIUM CHLORIDE 9 MG/ML
100 INJECTION, SOLUTION INTRAVENOUS CONTINUOUS
Status: DISCONTINUED | OUTPATIENT
Start: 2022-01-12 | End: 2022-01-12

## 2022-01-12 RX ORDER — POLYETHYLENE GLYCOL 3350 17 G/17G
17 POWDER, FOR SOLUTION ORAL DAILY
Status: CANCELLED | OUTPATIENT
Start: 2022-01-13

## 2022-01-12 RX ORDER — ATORVASTATIN CALCIUM 40 MG/1
40 TABLET, FILM COATED ORAL
Status: CANCELLED | OUTPATIENT
Start: 2022-01-12

## 2022-01-12 RX ORDER — SUCCINYLCHOLINE/SOD CL,ISO/PF 100 MG/5ML
SYRINGE (ML) INTRAVENOUS AS NEEDED
Status: DISCONTINUED | OUTPATIENT
Start: 2022-01-12 | End: 2022-01-12

## 2022-01-12 RX ORDER — FERROUS SULFATE 325(65) MG
325 TABLET ORAL
Status: DISCONTINUED | OUTPATIENT
Start: 2022-01-13 | End: 2022-01-21 | Stop reason: HOSPADM

## 2022-01-12 RX ORDER — ROCURONIUM BROMIDE 10 MG/ML
INJECTION, SOLUTION INTRAVENOUS AS NEEDED
Status: DISCONTINUED | OUTPATIENT
Start: 2022-01-12 | End: 2022-01-12

## 2022-01-12 RX ORDER — DEXAMETHASONE SODIUM PHOSPHATE 4 MG/ML
INJECTION, SOLUTION INTRA-ARTICULAR; INTRALESIONAL; INTRAMUSCULAR; INTRAVENOUS; SOFT TISSUE AS NEEDED
Status: DISCONTINUED | OUTPATIENT
Start: 2022-01-12 | End: 2022-01-12

## 2022-01-12 RX ORDER — INSULIN GLARGINE 100 [IU]/ML
15 INJECTION, SOLUTION SUBCUTANEOUS
Status: CANCELLED | OUTPATIENT
Start: 2022-01-12

## 2022-01-12 RX ORDER — BISACODYL 10 MG
10 SUPPOSITORY, RECTAL RECTAL DAILY PRN
Status: DISCONTINUED | OUTPATIENT
Start: 2022-01-12 | End: 2022-01-21 | Stop reason: HOSPADM

## 2022-01-12 RX ORDER — LIDOCAINE 50 MG/G
1 PATCH TOPICAL DAILY
Status: DISCONTINUED | OUTPATIENT
Start: 2022-01-13 | End: 2022-01-21 | Stop reason: HOSPADM

## 2022-01-12 RX ORDER — MIDAZOLAM HYDROCHLORIDE 2 MG/2ML
INJECTION, SOLUTION INTRAMUSCULAR; INTRAVENOUS AS NEEDED
Status: DISCONTINUED | OUTPATIENT
Start: 2022-01-12 | End: 2022-01-12

## 2022-01-12 RX ORDER — NITROGLYCERIN 20 MG/100ML
INJECTION INTRAVENOUS CODE/TRAUMA/SEDATION MEDICATION
Status: COMPLETED | OUTPATIENT
Start: 2022-01-12 | End: 2022-01-12

## 2022-01-12 RX ORDER — DOCUSATE SODIUM 100 MG/1
100 CAPSULE, LIQUID FILLED ORAL DAILY
Status: DISCONTINUED | OUTPATIENT
Start: 2022-01-13 | End: 2022-01-13 | Stop reason: SDUPTHER

## 2022-01-12 RX ADMIN — SODIUM CHLORIDE, SODIUM GLUCONATE, SODIUM ACETATE, POTASSIUM CHLORIDE, MAGNESIUM CHLORIDE, SODIUM PHOSPHATE, DIBASIC, AND POTASSIUM PHOSPHATE 75 ML/HR: .53; .5; .37; .037; .03; .012; .00082 INJECTION, SOLUTION INTRAVENOUS at 22:07

## 2022-01-12 RX ADMIN — PROPOFOL 50 MG: 10 INJECTION, EMULSION INTRAVENOUS at 14:44

## 2022-01-12 RX ADMIN — FENTANYL CITRATE 25 MCG: 50 INJECTION INTRAMUSCULAR; INTRAVENOUS at 15:20

## 2022-01-12 RX ADMIN — SODIUM CHLORIDE: 0.9 INJECTION, SOLUTION INTRAVENOUS at 14:14

## 2022-01-12 RX ADMIN — LIDOCAINE 1 PATCH: 50 PATCH TOPICAL at 09:37

## 2022-01-12 RX ADMIN — ATORVASTATIN CALCIUM 40 MG: 40 TABLET, FILM COATED ORAL at 21:31

## 2022-01-12 RX ADMIN — FENTANYL CITRATE 25 MCG: 50 INJECTION INTRAMUSCULAR; INTRAVENOUS at 15:14

## 2022-01-12 RX ADMIN — ONDANSETRON 4 MG: 2 INJECTION INTRAMUSCULAR; INTRAVENOUS at 17:42

## 2022-01-12 RX ADMIN — INSULIN GLARGINE 15 UNITS: 100 INJECTION, SOLUTION SUBCUTANEOUS at 21:33

## 2022-01-12 RX ADMIN — IOHEXOL 250 ML: 350 INJECTION, SOLUTION INTRAVENOUS at 19:36

## 2022-01-12 RX ADMIN — INSULIN LISPRO 5 UNITS: 100 INJECTION, SOLUTION INTRAVENOUS; SUBCUTANEOUS at 11:30

## 2022-01-12 RX ADMIN — SODIUM CHLORIDE 1000 ML: 0.9 INJECTION, SOLUTION INTRAVENOUS at 11:22

## 2022-01-12 RX ADMIN — Medication 10 ML: at 14:48

## 2022-01-12 RX ADMIN — NITROGLYCERIN 200 MCG: 20 INJECTION INTRAVENOUS at 18:30

## 2022-01-12 RX ADMIN — ACETAMINOPHEN 975 MG: 325 TABLET, FILM COATED ORAL at 21:32

## 2022-01-12 RX ADMIN — FENTANYL CITRATE 25 MCG: 50 INJECTION INTRAMUSCULAR; INTRAVENOUS at 14:44

## 2022-01-12 RX ADMIN — HEPARIN SODIUM 5000 UNITS: 5000 INJECTION INTRAVENOUS; SUBCUTANEOUS at 05:04

## 2022-01-12 RX ADMIN — POLYETHYLENE GLYCOL 3350 17 G: 17 POWDER, FOR SOLUTION ORAL at 09:37

## 2022-01-12 RX ADMIN — PROPOFOL 50 MCG/KG/MIN: 10 INJECTION, EMULSION INTRAVENOUS at 22:20

## 2022-01-12 RX ADMIN — DOCUSATE SODIUM 100 MG: 100 CAPSULE, LIQUID FILLED ORAL at 09:37

## 2022-01-12 RX ADMIN — DEXAMETHASONE SODIUM PHOSPHATE 4 MG: 4 INJECTION INTRA-ARTICULAR; INTRALESIONAL; INTRAMUSCULAR; INTRAVENOUS; SOFT TISSUE at 17:42

## 2022-01-12 RX ADMIN — CALCIUM CHLORIDE 1 G: 100 INJECTION INTRAVENOUS; INTRAVENTRICULAR at 16:53

## 2022-01-12 RX ADMIN — ONDANSETRON 4 MG: 2 INJECTION INTRAMUSCULAR; INTRAVENOUS at 05:04

## 2022-01-12 RX ADMIN — ROCURONIUM BROMIDE 20 MG: 50 INJECTION, SOLUTION INTRAVENOUS at 16:03

## 2022-01-12 RX ADMIN — MIDAZOLAM 2 MG: 1 INJECTION INTRAMUSCULAR; INTRAVENOUS at 19:11

## 2022-01-12 RX ADMIN — VERAPAMIL HYDROCHLORIDE 2.5 MG: 2.5 INJECTION INTRAVENOUS at 18:30

## 2022-01-12 RX ADMIN — PROPOFOL 50 MCG/KG/MIN: 10 INJECTION, EMULSION INTRAVENOUS at 19:54

## 2022-01-12 RX ADMIN — ROCURONIUM BROMIDE 50 MG: 50 INJECTION, SOLUTION INTRAVENOUS at 17:30

## 2022-01-12 RX ADMIN — ACETAMINOPHEN 975 MG: 325 TABLET ORAL at 05:04

## 2022-01-12 RX ADMIN — FENTANYL CITRATE 25 MCG: 50 INJECTION INTRAMUSCULAR; INTRAVENOUS at 14:59

## 2022-01-12 RX ADMIN — PROPOFOL 150 MG: 10 INJECTION, EMULSION INTRAVENOUS at 14:18

## 2022-01-12 RX ADMIN — METHYLPREDNISOLONE SODIUM SUCCINATE 10 MG: 40 INJECTION, POWDER, FOR SOLUTION INTRAMUSCULAR; INTRAVENOUS at 09:37

## 2022-01-12 RX ADMIN — IOHEXOL 100 ML: 350 INJECTION, SOLUTION INTRAVENOUS at 11:30

## 2022-01-12 RX ADMIN — SODIUM CHLORIDE 1000 ML: 0.9 INJECTION, SOLUTION INTRAVENOUS at 07:22

## 2022-01-12 RX ADMIN — ROCURONIUM BROMIDE 5 MG: 50 INJECTION, SOLUTION INTRAVENOUS at 14:18

## 2022-01-12 RX ADMIN — ROCURONIUM BROMIDE 10 MG: 50 INJECTION, SOLUTION INTRAVENOUS at 14:58

## 2022-01-12 RX ADMIN — CHLORHEXIDINE GLUCONATE 0.12% ORAL RINSE 15 ML: 1.2 LIQUID ORAL at 21:33

## 2022-01-12 RX ADMIN — ROCURONIUM BROMIDE 10 MG: 50 INJECTION, SOLUTION INTRAVENOUS at 15:16

## 2022-01-12 RX ADMIN — SENNOSIDES 8.6 MG: 8.6 TABLET ORAL at 21:31

## 2022-01-12 RX ADMIN — Medication 80 MG: at 14:18

## 2022-01-12 NOTE — ASSESSMENT & PLAN NOTE
· Likely due  to COVID -19 viral pneumonia and pneumothorax  · Repeat chest x-ray performed on 01/06/2022 as supplemental oxygen requirements were increasing, this demonstrated  findings consistent with known COVID pneumonia but no other acute abnormality  · Currently on 2 L     Plan:  Continue O2 supplementation to maintain SpO2 >90%  Wean appropriately

## 2022-01-12 NOTE — SEDATION DOCUMENTATION
Patient arrived to department via [facility] and aeromedical staff  She is awake and alert, the risks and benefits of the procedure were explained to the patient by Dr Jonna Parekh, patient is agreeable to proceed at this time  Anesthesia is at bedside  Patient was moved from stretcher, to IR bed, bear hugger under patient, attention was taken to positioning and bony prominences were padded

## 2022-01-12 NOTE — SEDATION DOCUMENTATION
Patient arrived to unit via [facility] and aeromeidcal crew  Patient awake and alert, the procedure was explained to her by Dr Jaffe Co she verbalizes an understanding and is agreeable to proceed  Anesthesia is at bedside  Patient transferred from [facility] to  table  Bear hugger under patient, Patient was positioned with care and prominences padded

## 2022-01-12 NOTE — ANESTHESIA PROCEDURE NOTES
Arterial Line Insertion  Performed by: Magaly Posey DO  Authorized by: Pippa Kc DO   Consent: The procedure was performed in an emergent situation  Verbal consent obtained  Preparation: Patient was prepped and draped in the usual sterile fashion    Indications: multiple ABGs and hemodynamic monitoring  Orientation:  Left  Location: radial artery  Sedation:  Patient sedated: yes (general anesthesia)    Procedure Details:  Aurelio's test normal: yes  Needle gauge: 18  Seldinger technique: Seldinger technique used  Number of attempts: 2    Post-procedure:  Post-procedure: chlorhexidine patch applied and dressing applied  Waveform: good waveform  Post-procedure CNS: unchanged and normal  Patient tolerance: patient tolerated the procedure well with no immediate complications

## 2022-01-12 NOTE — PROGRESS NOTES
Interventional Radiology:    [de-identified]year old female with acute blood loss anemia secondary to likely right 8th intercostal bleed in region of prior chest tube  CTA from 1/12/21 demonstrates active extravasation from this region and large perihepatic hematoma  HB drop from 10-5  Plan for angiogram and possible embolization  Procedure, risks, benefits, alternatives discussed with the patient  Consent obtained at bedside upon arrival to [facility]

## 2022-01-12 NOTE — CONSULTS
Consultation - General Surgery   Bryn Alvarez [de-identified] y o  female MRN: [id number]  Unit/Bed#: -01 Encounter: [id number]    Chief Complaint: abdominal pain x 2 days    HPI:  Bryn Alvarez is a [de-identified] y o  female with past medical history significant for diabetes mellitus and diverticulitis who has been admitted at [facility] since 12/26/2022  Initially presented with COVID symptoms and found to be COVID positive  Required ICU admission 12/21/2021 on severe pathway  During admission required chest tube on 12/25/2021 for right pneumothorax which was subsequently removed 12/28/2021  Subsequently required chest tube placement on right side for recurrent pneumothorax on 12/30/2021 and removed 01/05/2022  Transferred to floor on 01/04/2022  During admission was being managed for COVID-19 pneumonia with acute respiratory failure since resolved, sepsis, and bacteremia for which she completed 7 day course of cefazolin  Prior to consult was medically stable for discharge but was reevalauted with CXR and CT chest on 01/10/2022 secondary to right sided crepitus  CXR with stable opacities compatible with COVID-19  CT chest with right chest wall subcutaneous emphysema and no evidence of pneumothorax, peripheral and basilar ground glass opacities consistent with COVID, and mild perihepatic ascites of unknown etiology  Patient's hemoglobin continued to decrease with hypotension prior to discharge resulting in CTAP 01/12/2022 with large hemoperitoneum and bowel wall thickening at splenic flexure concerning for ischemic colitis  Upon evaluation this morning, patient admits to right sided abdominal pain that worsened last night  Per nurse, was reporting abdominal pain two days ago that had since resolved prior to last evening  Patient reports pain as constant 7/10  Admits to increased fatigue, nausea and vomiting x 2 today  Denies passing flatus or recent bowel movement  No urinary complaints   Denies chest pain, palpitations, shortness of breath  Denies fever, chills  Review of Systems   Constitutional: Positive for appetite change and fatigue  Negative for chills and fever  HENT: Negative  Respiratory: Negative for cough and shortness of breath  Cardiovascular: Negative for chest pain, palpitations and leg swelling  Gastrointestinal: Positive for abdominal pain, nausea and vomiting  Negative for abdominal distention, constipation and diarrhea  Genitourinary: Negative for dysuria, frequency and urgency  Musculoskeletal: Negative for arthralgias and myalgias  Skin: Negative  Neurological: Positive for dizziness, weakness and light-headedness  Negative for headaches  All other systems reviewed and are negative        Historical Information   Past Medical History:   Diagnosis Date    Diabetes mellitus ([facility] [address] )     Diverticulitis     Postherpetic neuralgia      Past Surgical History:   Procedure Laterality Date    CHOLECYSTECTOMY      COLON SURGERY      HERNIA REPAIR      ROTATOR CUFF REPAIR Right     VARICOSE VEIN SURGERY      Impression:  2/12/16 Evelyn Sarabia     Social History   Social History     Substance and Sexual Activity   Alcohol Use Never    Comment: Rarely     Social History     Substance and Sexual Activity   Drug Use Never     Social History     Tobacco Use   Smoking Status Never Smoker   Smokeless Tobacco Never Used     Family History: non-contributory    Meds/Allergies   all current active meds have been reviewed and current meds:   Current Facility-Administered Medications   Medication Dose Route Frequency    acetaminophen (TYLENOL) tablet 975 mg  975 mg Oral Q6H [address]    atorvastatin (LIPITOR) tablet 40 mg  40 mg Oral HS    bisacodyl (DULCOLAX) rectal suppository 10 mg  10 mg Rectal Daily PRN    docusate sodium (COLACE) capsule 100 mg  100 mg Oral Daily    ferrous sulfate tablet 325 mg  325 mg Oral Daily With Breakfast    heparin (porcine) subcutaneous injection 5,000 Units  5,000 Units Subcutaneous Q8H [address]    insulin glargine (LANTUS) subcutaneous injection 15 Units 0 15 mL  15 Units Subcutaneous HS    insulin lispro (HumaLOG) 100 units/mL subcutaneous injection 4-20 Units  4-20 Units Subcutaneous TID AC    insulin lispro (HumaLOG) 100 units/mL subcutaneous injection 4-20 Units  4-20 Units Subcutaneous HS    insulin lispro (HumaLOG) 100 units/mL subcutaneous injection 5 Units  5 Units Subcutaneous TID With Meals    lidocaine (LIDODERM) 5 % patch 1 patch  1 patch Topical Daily    methylPREDNISolone sodium succinate (Solu-MEDROL) injection 10 mg  10 mg Intravenous Daily    ondansetron (ZOFRAN) injection 4 mg  4 mg Intravenous Q6H PRN    oxyCODONE (ROXICODONE) IR tablet 2 5 mg  2 5 mg Oral Q6H PRN    polyethylene glycol (MIRALAX) packet 17 g  17 g Oral Daily    senna (SENOKOT) tablet 8 6 mg  1 tablet Oral HS    sodium chloride 0 9 % bolus 1,000 mL  1,000 mL Intravenous Once    sodium chloride 0 9 % infusion  100 mL/hr Intravenous Continuous     Allergies   Allergen Reactions    Ciprofloxacin GI Intolerance and Headache     Confusion, arm weakness, dizziness, headache    Meperidine GI Intolerance, Hallucinations and Other (See Comments)     Demarol  Reaction Date:Unknown    Propoxyphene GI Intolerance       Objective   First Vitals:   Blood Pressure: 142/68 (12/16/21 1659)  Pulse: 105 (12/16/21 1659)  Temperature: (!) 102 2 °F (39 °C) (12/16/21 1659)  Temp Source: Oral (12/16/21 1659)  Respirations: 18 (12/16/21 1659)  Height: 5' (152 4 cm) (12/18/21 0751)  Weight - Scale: 54 9 kg (121 lb) (12/18/21 0751)  SpO2: 96 % (12/16/21 1659)    Current Vitals:   Blood Pressure: (!) 86/40 (01/12/22 0838)  Pulse: 73 (01/12/22 0838)  Temperature: 98 5 °F (36 9 °C) (01/11/22 2216)  Temp Source: Oral (01/09/22 1458)  Respirations: 17 (01/12/22 0759)  Height: 5' (152 4 cm) (12/28/21 1131)  Weight - Scale: 55 3 kg (121 lb 14 6 oz) (01/12/22 5298)  SpO2: 94 % (01/12/22 8290)    No intake or output data in the 24 hours ending 01/12/22 1042    Invasive Devices  Report    Peripheral Intravenous Line            Peripheral IV 01/10/22 Left Forearm 1 day          Drain            Chest Tube Right Midaxillary 13 days                Physical Exam  Vitals and nursing note reviewed  Constitutional:       General: She is not in acute distress  Appearance: Normal appearance  She is ill-appearing  She is not toxic-appearing  HENT:      Head: Normocephalic and atraumatic  Cardiovascular:      Rate and Rhythm: Normal rate and regular rhythm  Pulses: Normal pulses  Heart sounds: Normal heart sounds  No murmur heard  No gallop  Pulmonary:      Effort: Pulmonary effort is normal  No respiratory distress  Breath sounds: Normal breath sounds  No wheezing, rhonchi or rales  Abdominal:      General: There is no distension  Palpations: Abdomen is soft  Tenderness: There is abdominal tenderness  There is no right CVA tenderness, left CVA tenderness, guarding or rebound  Comments: Hypoactive bowel sounds, bruising noted over abdomen likely from injections   Skin:     General: Skin is warm and dry  Capillary Refill: Capillary refill takes less than 2 seconds  Neurological:      General: No focal deficit present  Mental Status: She is alert and oriented to person, place, and time  Lab Results:   I have personally reviewed pertinent lab results      CBC:   Lab Results   Component Value Date    WBC 14 57 (H) 01/12/2022    HGB 5 3 (LL) 01/12/2022    HCT 17 8 (L) 01/12/2022    MCV 94 01/12/2022     (L) 01/12/2022    MCH 27 9 01/12/2022    MCHC 29 8 (L) 01/12/2022    RDW 15 4 (H) 01/12/2022    MPV 9 6 01/12/2022    NRBC 0 01/12/2022     CMP:   Lab Results   Component Value Date    SODIUM 133 (L) 01/12/2022    K 5 0 01/12/2022     01/12/2022    CO2 20 (L) 01/12/2022    BUN 68 (H) 01/12/2022    CREATININE 2 02 (H) 01/12/2022    CALCIUM 8 3 (L) 01/12/2022    AST 14 01/12/2022 ALT 22 01/12/2022    ALKPHOS 54 01/12/2022    EGFR 22 01/12/2022     Imaging: I have personally reviewed pertinent reports  CT 01/12/2022: Large amount of hemoperitoneum as described  This is most concentrated surrounding the liver  Consider contrast enhanced CT abdomen and pelvis to evaluate for parenchymal laceration  Bowel wall thickening at the splenic flexure  Given severe anemia, and the location of this finding in a watershed location, consider ischemic colitis  Lung findings compatible with COVID-19, not significantly changed  EKG, Pathology, and Other Studies: I have personally reviewed pertinent reports  Assessment:  [de-identified] y o  female with large hemoperitoneum and possible ischemic colitis on CT   Afebrile, hypotensive 86/40, low O2 sats 85%   Leukocytosis, 14 57   Hgb 5 3 today from 5 8/9 0/9 5/9 6  CT 01/12/2022 with large amount of hemoperitoneum as described  This is most concentrated surrounding the liver  Bowel wall thickening at the splenic flexure  Given severe anemia, and the location of this finding in a watershed location, consider ischemic colitis    COVID-19 history     Plan:  CT with contrast to evaluate   To be discussed with Dr Miri Patel / Coordination of Care  Total floor / unit time spent today 20 minutes  Greater than 50% of total time was spent with the patient and / or family counseling and / or coordination of care    A description of the counseling / coordination of care: reviewing pertinent laboratory studies and diagnostic images, evaluation of patient     Giovanni Martinez PA-C

## 2022-01-12 NOTE — ASSESSMENT & PLAN NOTE
Lab Results   Component Value Date    EGFR 32 01/13/2022    EGFR 29 01/12/2022    EGFR 21 01/12/2022    CREATININE 1 50 (H) 01/13/2022    CREATININE 1 62 (H) 01/12/2022    CREATININE 2 09 (H) 01/12/2022     · Baseline creatinine appears to be around 1 2 - 1 6     Plan:  · Avoid nephrotoxic agents and hypotension  · Lisinopril on hold  · Trend kidney function  · Strict I&O  · Daily weights

## 2022-01-12 NOTE — ASSESSMENT & PLAN NOTE
Lab Results   Component Value Date    HGBA1C 6 2 (H) 11/22/2021       Recent Labs     01/12/22 2039 01/13/22  0026 01/13/22  0516 01/13/22  0712   POCGLU 170* 136 63* 104       Blood Sugar Average: Last 72 hrs:       Patient is on Glipizide 10 mg qd, Metformin 1000 mg qd, and Januvia 100 mg qd  at home  Was on 15 units Lantus when inpatient at [facility]  Plan:  · SSI  · Hypoglycemia protocol  · POCT glucose checks q6h    · NPO

## 2022-01-12 NOTE — ASSESSMENT & PLAN NOTE
· Patient lightheaded/dizzy this am and noted to be hypotensive with BP down in the 70's to 80's  The patient's blood pressure improved with fluid bolus  · The patient received 1 unit of PRBC's prior to transport and was sent with 2nd  unit of PRBCS and 1 unit of FFP to be given during transport  · CT chest/abd/pelvis:  1  Large amount of hemoperitoneum as described  This is most concentrated surrounding the liver  Consider contrast enhanced CT abdomen and pelvis to evaluate for parenchymal laceration  2   Bowel wall thickening at the splenic flexure  Given severe anemia, and the location of this finding in a watershed location, consider ischemic colitis  3  Lung findings compatible with COVID-19, not significantly changed  · CTA abd/pelvis:  Right perihepatic hematoma with hemoperitoneum and active extravasation arising adjacent to the 7th rib  Bilateral lower lobe groundglass opacities consistent with the patient's history of COVID pneumonia  Subcutaneous emphysema adjacent to the hematoma of unclear etiology  Numerous soft tissue nodules in the mid and lower abdominal wall similar to the prior study consistent with hematomas    · General surgery consultation appreciated  · I spoke with IR on call Dr Meryle Larsen not available at [facility] for an angiogram and recommended transfer to [place]   I spoke to Critical care at [place]  Dr Richy Nicholas however not bed available  · The patient was accepted at [facility] onto Dr Eva Gilman service  The patient's case was dicussed with Critical care SHERIE Connolly Michael  The patient will be going to IR once arriving at [facility]

## 2022-01-12 NOTE — ASSESSMENT & PLAN NOTE
Recent Labs     01/12/22 1951 01/13/22  0007 01/13/22  0512   HGB 9 6* 9 5* 8 9*     Lab Results   Component Value Date    IRON 55 01/12/2022    FERRITIN 684 (H) 01/12/2022    TIBC 205 (L) 01/12/2022    CONCFE 27 01/12/2022     Patient had acute blood loss secondary to right 8th intercostal bleeding at the site of prior chest tube placement  Hb dropped from 9 ( 1/8/22) to 5 (1/12/22)  Received 2 units of pRBCs and 1 unit of FFP prior to arrival to [facility]  She received 2 units of pRBCs during IR procedure and 1 unit of platelets and 1 FFP  Alvarezambrose Jordan Patient is s/p IR procedure performed soon after arrival [place] to control hemorrhage  Plan:  Continue to monitor H/H q8h  Repeat CBC in the afternoon  Transfuse for Hb >7  Monitor for persistent bleeding    Consider abd ultrasound if hematoma on right flank persists

## 2022-01-12 NOTE — ASSESSMENT & PLAN NOTE
Lab Results   Component Value Date    HGBA1C 6 2 (H) 11/22/2021       Recent Labs     01/11/22 2014 01/12/22  0554 01/12/22  1038 01/12/22  1127   POCGLU 198* 151* 299* 349*       Blood Sugar Average: Last 72 hrs:       Patient is on Glipizide 10 mg qd, Metformin 1000 mg qd, and Januvia 100 mg qd  at home  Plan:  · SSI  · Hypoglycemia protocol  · POCT glucose checks q6h    · NPO

## 2022-01-12 NOTE — ASSESSMENT & PLAN NOTE
· COVID-19 positive on 12/16/2021;  Unvaccinated  · Decadron 0 1 mg/kg completed 12/30  · Completed 14 day course of Baricitinib on 1/2/22  · Completed 5 day course of Remdesivir on 12/21/2022  · Continue Solu-Medrol taper as recommended by Pulmonary/Critical Care  · Isolation precautions no longer needed at this time

## 2022-01-12 NOTE — ASSESSMENT & PLAN NOTE
· COVID-19 positive on 12/16/2021; Unvaccinated  · Decadron 0 1 mg/kg completed 12/30  · Completed 14 day course of Baricitinib on 1/2/22  · Completed 5 day course of Remdesivir on 12/21/2022  · On Solu-Medrol taper  · Isolation precautions no longer needed at this time    Plan:  Continue Solu Medrol taper  Continue supplemental O2

## 2022-01-12 NOTE — NURSING NOTE
At approx 0650, answered call bell, pt needed to use the bathroom  Pt was feeling nauseous, instead of having pt walk to the bathroom, I brought BSC to the bedside  Attempted to get pt OOB to the commode, pt was not able to get up on the first try  Asked pt if she would rather use the bedpan since she wasn't feeling well, pt said she wanted to try again to get up to the commode  Attempted a second time to get her up, she swung her legs over the side of the bed, was attempting to sit up on side of bed, when pt let go of my hand and layed back in against the pillow and her eyes rolled back  Notified RN at this time of what occurred

## 2022-01-12 NOTE — ASSESSMENT & PLAN NOTE
Lab Results   Component Value Date    EGFR 22 01/12/2022    EGFR 41 01/08/2022    EGFR 37 01/07/2022    CREATININE 2 02 (H) 01/12/2022    CREATININE 1 23 01/08/2022    CREATININE 1 34 (H) 01/07/2022     · Creatinine elevated this am at 2 02  · Patient received IV fluids, 2 units PRBC's, 1 unit FFP  · Avoid nephrotoxic agents and hypotension  · Continue to monitor kidney function

## 2022-01-12 NOTE — H&P
[address] 1940, [de-identified] y o  female MRN: [id number]  Unit/Bed#: ICU OVR Encounter: [id number]  Primary Care Provider: Dennis Hernandez DO   Date and time admitted to hospital: 1/12/2022  1:52 PM    Hemoperitoneum  Assessment & Plan  · Patient was reported to be lightheaded/dizzy this am at [facility] and noted to be hypotensive with  SBP 70's to 80's  BP improved with fluid bolus  · Patient received 1 unit of pRBC's prior to transport and was sent with 2nd  unit of PRBCS and 1 unit of  FFP to be given during transport  · CT chest/abd/pelvis:  1   Large amount of hemoperitoneum as described   This is most concentrated surrounding the liver   Consider contrast enhanced CT abdomen and pelvis to evaluate for parenchymal laceration  2   Bowel wall thickening at the splenic flexure   Given severe anemia, and the location of this finding in a watershed location, consider ischemic colitis  3   Lung findings compatible with COVID-19, not significantly changed  · CTA abd/pelvis:  Right perihepatic hematoma with hemoperitoneum and active extravasation arising adjacent to the 7th rib  Bilateral lower lobe groundglass opacities consistent with the patient's history of COVID pneumonia  Subcutaneous emphysema adjacent to the hematoma of unclear etiology  Numerous soft tissue nodules in the mid and lower abdominal wall similar to the prior study consistent with hematomas  ·   Patient was transferred to [facility] for IR procedure  Plan:  Continue to monitor site for acute bleeding  Transfuse for Hb<7   IR on board      * Acute blood loss anemia  Assessment & Plan  Recent Labs     01/12/22  0825 01/12/22  1417 01/12/22  1620   HGB 5 3* 8 6* 6 1*     Lab Results   Component Value Date    IRON 88 10/29/2020    FERRITIN 424 (H) 12/16/2021    TIBC 347 10/29/2020    CONCFE 25 10/29/2020     Patient had acute blood loss secondary to right 8th intercostal bleeding at the site of prior chest tube placement  Hb dropped from 9 ( 1/8/22) to 5 (1/12/22)  Received 2 units of pRBCs and 1 unit of FFP prior to arrival to [facility]  She received 4 units of pRBCs during IR procedure and 1 unit of platelets  Patient is s/p IR procedure performed soon after arrival [place] to control hemorrhage  Plan:  Continue to monitor H/H q4h  Repeat Type and screen  Transfuse for Hb >7  Monitor for persistent bleeding  Acute respiratory failure with hypoxia (HCC)  Assessment & Plan  · Likely due  to COVID -19 viral pneumonia and pneumothorax  · Repeat chest x-ray performed on 01/06/2022 as supplemental oxygen requirements were increasing, this demonstrated  findings consistent with known COVID pneumonia but no other acute abnormality  · Currently on 2 L     Plan:  Continue O2 supplementation to maintain SpO2 >90%  Wean appropriately  Pneumonia due to COVID-19 virus  Assessment & Plan  · COVID-19 positive on 12/16/2021; Unvaccinated  · Decadron 0 1 mg/kg completed 12/30  · Completed 14 day course of Baricitinib on 1/2/22  · Completed 5 day course of Remdesivir on 12/21/2022  · On Solu-Medrol taper  · Isolation precautions no longer needed at this time    Plan:  Continue Solu Medrol taper  Continue supplemental O2  Type 2 diabetes mellitus without complication, without long-term current use of insulin [facility])  Assessment & Plan  Lab Results   Component Value Date    HGBA1C 6 2 (H) 11/22/2021       Recent Labs     01/11/22  2014 01/12/22  0554 01/12/22  1038 01/12/22  1127   POCGLU 198* 151* 299* 349*       Blood Sugar Average: Last 72 hrs:       Patient is on Glipizide 10 mg qd, Metformin 1000 mg qd, and Januvia 100 mg qd  at home  Plan:  · SSI  · Hypoglycemia protocol  · POCT glucose checks q6h    · NPO    Chronic kidney disease, stage 3b [facility])  Assessment & Plan  Lab Results   Component Value Date    EGFR 21 01/12/2022    EGFR 22 01/12/2022    EGFR 41 01/08/2022    CREATININE 2 09 (H) 01/12/2022    CREATININE 2 02 (H) 01/12/2022    CREATININE 1 23 01/08/2022     · Baseline creatinine appears to be around 1 2 - 1 6     Plan:  · Avoid nephrotoxic agents and hypotension  · Lisinopril on hold  · Trend kidney function  · Strict I&O  · Daily weights  Ambulatory dysfunction  Assessment & Plan  · Evaluation by PT and  OT from [place] recommending post acute rehab placement  Plan:   · PT/OT eval and treatment     -------------------------------------------------------------------------------------------------------------  Chief Complaint: Acute blood loss anemia secondary to hemoperitoneum  History of Present Illness   HX and PE limited by: Acuity of illness  Ban Cheung is a [de-identified] y o  female with PMH of DM2, CKD IIIb,diverticulitis who presents to [facility] ICU s/p IR procedure for acute blood loss secondary to 8th rib intercostal bleed from prior chest tube placement for PTX  Patient was reported to be very dizzy and hypotensive (SBP 70-80s)  Hb dropped to 5 this morning from 9  On 01/08/22  CT chest/abd/prelvis showed a large hemoperitoneum mostly surrounding the liver  Patient was given a fluid bolus and BP improved  She also received a unit of pRBCs and a product of FFP  It was determined that she required IR intervention for Hemoperitoneum and was transferred to [facility] for procedure  She was airlifted to [facility] and received her second unit of pRBC's enroute       Patient was admitted on 12/16/21 with Covid Pneumonia  She is unvaccinated  She was found to be hypoxic at 88% SpO2 on room air which improved with 2L NC  During her hospitalization, patient decompensated and required transfer to the ICU on 12/21 for Covid PNA management via severe pathway  She was found to have a right PTX on 12/25/21 and had a chest tube placed  Chest tube was removed on 12/28/21  Chest tube was re-inserted on 12/30/21-  for persistent right PTX and s/s improved   Patient was transferred to the floor on 01/4/22 and chest tube was removed on 01/05/22 as PTX resolved  Hb trended down during hospitalization  CT chest on 01/10/22 showed idiopathic mild perihepatic ascites  Patient started complaining of abdominal pain and had low BP this morning  Repeat Hb was down to 5  And repeat CT showed moderate hemoperitoneum  When patient arrived at [facility] she underwent IR procedure to localize and treat the bleed  Patient was then transferred to the ICU for further management and care  History obtained from chart review  -------------------------------------------------------------------------------------------------------------  Dispo: Admit to Critical Care     Code Status: Level 1 - Full Code  -------------------------------------------------------------------------------------------------------------  Review of Systems   Unable to perform ROS: Intubated       Review of systems was unable to be performed secondary to Patient is intubated  Physical Exam  Vitals and nursing note reviewed  Constitutional:       General: She is not in acute distress  Appearance: Normal appearance  She is not ill-appearing, toxic-appearing or diaphoretic  HENT:      Head: Normocephalic and atraumatic  Nose: Nose normal    Eyes:      General: No scleral icterus  Right eye: No discharge  Left eye: No discharge  Extraocular Movements: Extraocular movements intact  Pupils: Pupils are equal, round, and reactive to light  Neck:      Vascular: No carotid bruit  Cardiovascular:      Rate and Rhythm: Normal rate and regular rhythm  Pulses: Normal pulses  Heart sounds: Normal heart sounds  No murmur heard  No friction rub  No gallop  Pulmonary:      Effort: Pulmonary effort is normal       Breath sounds: Normal breath sounds  No wheezing, rhonchi or rales  Abdominal:      General: Bowel sounds are normal       Palpations: Abdomen is soft  Tenderness:  There is no abdominal tenderness  There is no guarding or rebound  Comments: Dressing on right flank area  Musculoskeletal:         General: No swelling or tenderness  Cervical back: Normal range of motion and neck supple  No rigidity  No muscular tenderness  Right lower leg: No edema  Left lower leg: No edema  Skin:     Capillary Refill: Capillary refill takes less than 2 seconds  Coloration: Skin is not jaundiced  Findings: No bruising, erythema, lesion or rash  Neurological:      Motor: No weakness  Gait: Gait normal       Comments: Sedated and intubated  Psychiatric:      Comments: Sedated and intubated  --------------------------------------------------------------------------------------------------------------  Vitals: There were no vitals filed for this visit  Temp  Min: 96 2 °F (35 7 °C)  Max: 102 2 °F (39 °C)        There is no height or weight on file to calculate BMI      Laboratory and Diagnostics:  Results from last 7 days   Lab Units 01/12/22  1620 01/12/22  1417 01/12/22  0825 01/12/22  0736 01/08/22  0606 01/07/22  0436 01/06/22  0515   WBC Thousand/uL 10 08 15 08* 14 57* 15 41* 7 82 9 73 8 67   HEMOGLOBIN g/dL 6 1* 8 6* 5 3* 5 8* 9 0* 9 5* 9 6*   HEMATOCRIT % 19 2* 27 8* 17 8* 19 2* 28 7* 30 0* 30 3*   PLATELETS Thousands/uL 68* 120* 135* 170 226 253 283   NEUTROS PCT % 87*  --  82*  --   --   --   --    MONOS PCT % 5  --  5  --   --   --   --    MONO PCT %  --  2*  --   --   --   --   --      Results from last 7 days   Lab Units 01/12/22  1416 01/12/22  0736 01/08/22  0606 01/07/22  0436 01/06/22  1052 01/06/22  0515   SODIUM mmol/L 138 133* 140 140  --  137   POTASSIUM mmol/L 5 5* 5 0 3 8 4 2 5 1 5 2   CHLORIDE mmol/L 106 103 108 108  --  108   CO2 mmol/L 16* 20* 24 25  --  23   ANION GAP mmol/L 16* 10 8 7  --  6   BUN mg/dL 62* 68* 46* 55*  --  54*   CREATININE mg/dL 2 09* 2 02* 1 23 1 34*  --  1 22   CALCIUM mg/dL 7 6* 8 3* 8 7 8 8  --  8 7   GLUCOSE RANDOM mg/dL 308* 281* 55* 63*  --  126   ALT U/L 29 22  --   --   --   --    AST U/L 19 14  --   --   --   --    ALK PHOS U/L 66 54  --   --   --   --    ALBUMIN g/dL 2 5* 2 8*  --   --   --   --    TOTAL BILIRUBIN mg/dL 0 47 0 45  --   --   --   --      Results from last 7 days   Lab Units 01/12/22  0736   MAGNESIUM mg/dL 2 0      Results from last 7 days   Lab Units 01/12/22  1416   INR  1 24*          Results from last 7 days   Lab Units 01/12/22  0756   LACTIC ACID mmol/L 1 9     ABG:    VBG:          Micro:        EKG:   Imaging: I have personally reviewed pertinent films in PACS      Historical Information   Past Medical History:   Diagnosis Date    Diabetes mellitus ([address] )     Diverticulitis     Postherpetic neuralgia      Past Surgical History:   Procedure Laterality Date    CHOLECYSTECTOMY      COLON SURGERY      HERNIA REPAIR      ROTATOR CUFF REPAIR Right     VARICOSE VEIN SURGERY      Impression:  2/12/16 Fadumo Sarabia     Social History   Social History     Substance and Sexual Activity   Alcohol Use Never    Comment: Rarely     Social History     Substance and Sexual Activity   Drug Use Never     Social History     Tobacco Use   Smoking Status Never Smoker   Smokeless Tobacco Never Used     Exercise History: Unknown  Family History:   Family History   Problem Relation Age of Onset    Diabetes Mother     No Known Problems Father      Family history unknown and I have reviewed this patient's family history and commented on sigificant items within the HPI      Medications:  Current Facility-Administered Medications   Medication Dose Route Frequency    acetaminophen (TYLENOL) tablet 975 mg  975 mg Oral Q6H [address]    atorvastatin (LIPITOR) tablet 40 mg  40 mg Oral HS    bisacodyl (DULCOLAX) rectal suppository 10 mg  10 mg Rectal Daily PRN    [START ON 1/13/2022] docusate sodium (COLACE) capsule 100 mg  100 mg Oral Daily    [START ON 1/13/2022] ferrous sulfate tablet 325 mg  325 mg Oral Daily With Breakfast  insulin glargine (LANTUS) subcutaneous injection 15 Units 0 15 mL  15 Units Subcutaneous HS    insulin lispro (HumaLOG) 100 units/mL subcutaneous injection 4-20 Units  4-20 Units Subcutaneous HS    insulin lispro (HumaLOG) 100 units/mL subcutaneous injection 4-20 Units  4-20 Units Subcutaneous TID AC    insulin lispro (HumaLOG) 100 units/mL subcutaneous injection 5 Units  5 Units Subcutaneous TID With Meals    [START ON 1/13/2022] lidocaine (LIDODERM) 5 % patch 1 patch  1 patch Topical Daily    lidocaine 1% buffered   Infiltration Code/Trauma/Sedation Med    [START ON 1/13/2022] methylPREDNISolone sodium succinate (Solu-MEDROL) injection 10 mg  10 mg Intravenous Daily    ondansetron (ZOFRAN) injection 4 mg  4 mg Intravenous Q6H PRN    oxyCODONE (ROXICODONE) IR tablet 2 5 mg  2 5 mg Oral Q6H PRN    [START ON 1/13/2022] polyethylene glycol (MIRALAX) packet 17 g  17 g Oral Daily    senna (SENOKOT) tablet 8 6 mg  1 tablet Oral HS    sodium chloride 0 9 % infusion  100 mL/hr Intravenous Continuous     Facility-Administered Medications Ordered in Other Encounters   Medication Dose Route Frequency    calcium chloride 10 % injection   Intravenous PRN    fentanyl citrate (PF) 100 MCG/2ML   Intravenous PRN    phenylephrine bolus from bag   Intravenous PRN    propofol (DIPRIVAN) 200 MG/20ML bolus injection   Intravenous PRN    ROCuronium (ZEMURON) injection   Intravenous PRN    Succinylcholine Chloride 100 mg/5 mL syringe   Intravenous PRN     Home medications:  Prior to Admission Medications   Prescriptions Last Dose Informant Patient Reported? Taking?    ALPRAZolam (XANAX) 0 25 mg tablet  Self No No   Sig: Take 1 tablet (0 25 mg total) by mouth daily at bedtime as needed for anxiety   HYDROcodone-acetaminophen (NORCO) 5-325 mg per tablet  Self Yes No   Sig: hydrocodone 5 mg-acetaminophen 500 mg tablet   Patient not taking: Reported on 12/18/2021   Multiple Vitamin (multivitamin) tablet  Self Yes No   Sig: Take 1 tablet by mouth   calcium carbonate (TUMS) 500 mg chewable tablet  Self Yes No   Sig: Chew 1 tablet   cholecalciferol (VITAMIN D3) 25 mcg (1,000 units) tablet  Self Yes No   Sig: daily   dicyclomine (BENTYL) 10 mg capsule  Self No No   Sig: Take 1 capsule (10 mg total) by mouth 3 (three) times a day before meals   Patient taking differently: Take 10 mg by mouth as needed     enalapril (VASOTEC) 10 mg tablet   No No   Sig: Take 1 tablet (10 mg total) by mouth daily   ferrous sulfate 325 (65 Fe) mg tablet  Self Yes No   Sig: Take 325 mg by mouth   gemfibrozil (LOPID) 600 mg tablet  Self No No   Sig: Take 1 tablet (600 mg total) by mouth 2 (two) times a day   gemfibrozil (LOPID) 600 mg tablet  Self No No   Sig: Take 1 tablet (600 mg total) by mouth 2 (two) times a day   Patient not taking: Reported on 12/2/2021    glipiZIDE (GLUCOTROL XL) 10 mg 24 hr tablet   No No   Sig: Take 1 tablet (10 mg total) by mouth daily   lovastatin (MEVACOR) 40 MG tablet   No No   Sig: Take 1 tablet (40 mg total) by mouth daily at bedtime   metFORMIN (GLUCOPHAGE) 1000 MG tablet   No No   Sig: Take 1 tablet (1,000 mg total) by mouth 2 (two) times a day   ofloxacin (OCUFLOX) 0 3 % ophthalmic solution  Self Yes No   Sig: instill 1 drop into left eye four times a day START 3 DAYS PRIOR      (REFER TO PRESCRIPTION NOTES)     Patient not taking: Reported on 12/18/2021   prednisoLONE acetate (PRED FORTE) 1 % ophthalmic suspension  Self Yes No   Sig: instill 1 drop into left eye every 2 hours TO BE STARTED AFTER THE SURGERY IS COMPLETED   Patient not taking: Reported on 8/30/2021   sertraline (ZOLOFT) 50 mg tablet  Self No No   Sig: Take 1 tablet (50 mg total) by mouth daily   Patient not taking: Reported on 8/30/2021   sitaGLIPtin (Januvia) 100 mg tablet   No No   Sig: Take 1 tablet (100 mg total) by mouth daily   triamcinolone (KENALOG) 0 1 % cream  Self Yes No   Sig: Apply topically as needed        Facility-Administered Medications: None Allergies: Allergies   Allergen Reactions    Ciprofloxacin GI Intolerance and Headache     Confusion, arm weakness, dizziness, headache    Meperidine GI Intolerance, Hallucinations and Other (See Comments)     Demarol  Reaction Date:Unknown    Propoxyphene GI Intolerance       ------------------------------------------------------------------------------------------------------------  Advance Directive and Living Will:      Power of :    POLST:    ------------------------------------------------------------------------------------------------------------  Anticipated Length of Stay is > 2 midnights    Care Time Delivered:   No Critical Care time spent       Cameron Flores MD    Portions of the record may have been created with voice recognition software  Occasional wrong word or "sound a like" substitutions may have occurred due to the inherent limitations of voice recognition software    Read the chart carefully and recognize, using context, where substitutions have occurred

## 2022-01-12 NOTE — PROCEDURES
Central Line Insertion    Date/Time: 1/12/2022 1:24 PM  Performed by: Maria Teresa Foss MD  Authorized by: Maria Teresa Foss MD     Patient location:  Bedside  Consent:     Consent obtained:  Emergent situation    Consent given by:  Patient    Alternatives discussed:  No treatment  Universal protocol:     Procedure explained and questions answered to patient or proxy's satisfaction: yes      Required blood products, implants, devices, and special equipment available: yes      Patient identity confirmed:  Provided demographic data  Pre-procedure details:     Hand hygiene: Hand hygiene performed prior to insertion      Sterile barrier technique: All elements of maximal sterile technique followed      Skin preparation:  ChloraPrep    Skin preparation agent: Skin preparation agent completely dried prior to procedure    Indications:     Central line indications: medications requiring central line and no peripheral vascular access      Site selection rationale:  Left IJ due to concern that Right IJ needs to be accessed for procedure  Anesthesia (see MAR for exact dosages): Anesthesia method:  Local infiltration    Local anesthetic:  Lidocaine 1% w/o epi  Procedure details:     Location:  Left internal jugular    Vessel type: vein      Laterality:  Left    Approach: percutaneous technique used      Patient position:  Trendelenburg    Catheter type:  Triple lumen 20cm    Catheter size:  7 Fr    Landmarks identified: yes      Ultrasound guidance: yes      Ultrasound image availability:  Images available in PACS    Sterile ultrasound techniques: Sterile gel and sterile probe covers were used      Manometry confirmation: yes      Number of attempts:  2    Successful placement: yes    Post-procedure details:     Post-procedure:  Line sutured    Assessment:  Blood return through all ports and free fluid flow    Post-procedure complications: none      Patient tolerance of procedure:   Tolerated well, no immediate complications  Comments:      Very small central line, CVP very low with minimal blood return during manometry, unable to obtain CXR due to urgency for transfer for intervention, ok to use based on free fluid flow and flushing and ultrasound imaging confirming wire in vein prior to placement

## 2022-01-12 NOTE — CASE MANAGEMENT
Case Management Discharge Planning Note    Patient name Cecilio Simon  Location /-87 MRN [id number]  : 1940 Date 2022       Current Admission Date: 2021  Current Admission Diagnosis:Acute respiratory failure with hypoxia [facility])   Patient Active Problem List    Diagnosis Date Noted    Hemoperitoneum 2022    Thrombocytosis 2021    Bacteremia due to methicillin susceptible Staphylococcus aureus (MSSA) 2021    Pneumothorax- Resolved 2021    Chronic kidney disease, stage 3b ([facility] ) 2021    Acute blood loss anemia 2021    Sepsis due to methicillin susceptible Staphylococcus aureus ([facility] ) 2021    Ambulatory dysfunction 2021    Pneumonia due to COVID-19 virus 2021    [facility] 2021    Negative depression screening 2021    Overweight (BMI 25 0-29 9) 2021    Medicare annual wellness visit, subsequent 2020    Localized, primary osteoarthritis of hand 2020    Refused influenza vaccine 2020    Sciatica 2020    Hypertensive kidney disease with chronic kidney disease stage III ([facility] ) 2019    Type 2 diabetes mellitus without complication, without long-term current use of insulin ([facility] ) 2019    Bursitis of shoulder 2018    Groin pain, chronic, left 2018    Paresthesia of arm 2017    Back pain 2017    Muscle pain 2017    Anxiety 2017    Abnormal ECG 2016    Diverticulitis large intestine w/o perforation or abscess w/o bleeding 2016    Essential hypertension 2016    Lower abdominal pain 2016    Atrophic vaginitis 2016    Hypercholesterolemia 2016    Irritable bowel syndrome 2016    Simple chronic anemia 2016    Disorder of shoulder 2008    Articular cartilage disorder of shoulder region 2008    Loose body in joint of shoulder region 06/09/2008      LOS (days): 26  Geometric Mean LOS (GMLOS) (days): 4 80  Days to GMLOS:-21 3     OBJECTIVE:  Risk of Unplanned Readmission Score: 24         Current admission status: Inpatient   Preferred Pharmacy:   [address]   [address]  [place] [address]  Phone: [phone] Fax: [phone] [address] [facility] [address]  Phone: [phone] Fax: [phone]    Primary Care Provider: Re Guerrero DO    Primary Insurance: [address] REP  Secondary Insurance:     DISCHARGE DETAILS:    Discharge planning discussed with[de-identified] patient and daughters were called  Freedom of Choice: Yes  Comments - Freedom of Choice: pt needs to be transfered for higher level of care  CM contacted family/caregiver?: Yes  Were Treatment Team discharge recommendations reviewed with patient/caregiver?: Yes  Did patient/caregiver verbalize understanding of patient care needs?: Yes  Were patient/caregiver advised of the risks associated with not following Treatment Team discharge recommendations?: Yes    Contacts  Patient Contacts: Marcela Alcocer 09:09 and 12:27pm   [facility] 12:40 pm  Relationship to Patient[de-identified] Family (aquiles)  Contact Method: Phone  Phone Number: [place] [phone]  [facility] [phone]  Reason/Outcome: Discharge [address]         Is the patient interested in [place] [address] at discharge?: No    DME Referral Provided  Referral made for DME?: No    Other Referral/Resources/Interventions Provided:  Interventions: Acute Hospital Transfer  Referral Comments: pt needed to be transfered to [facility] ,, pt needed IR     pt was accepted to go to [facility] for rehab today, pt was not able to be d/c ,,cm made the facility aware    Would you like to participate in our [address] service program?  : No - Declined (transfer)    Treatment Team Recommendation: Acute Hospital Transfer [facility])  Discharge Destination Plan[de-identified] Acute Hospital Transfer  Transport at Discharge : Other (Comment) (Life flight)        Transported by [employer] and Unit #): life flight  ETA of Transport (Date): 01/12/22     Transport Service Arrived:  Yes     Accompanied by: EMS personnel      pt and family were in agreement with the transfer        Additional Comments: cm placed a call to the insurance company at 2:01pm # [phone]  no authorization fro transport needed for an emergency

## 2022-01-12 NOTE — UTILIZATION REVIEW
Continued Stay Review    Date: 1/12/22                          Current Patient Class: inpatient  Current Level of Care: med surg  HPI:81 y o  female initially admitted on 12/17/22     Assessment/Plan:   Abd pain & nausea x 2 days, no flatus or BM  Hgb dropping, with hypotension  CTAP showing large hemoperitoneum and bowel wall thickening at splenic flexure concerning for ischemic colitis  C/o right sided abdominal pain that worsened last night  Per nurse, abdominal pain two days ago that had since resolved prior to last evening  Patient reports pain as constant 7/10  Admits to increased fatigue, nausea and vomiting x 2 today  Denies passing flatus or recent bowel movement  Surgery consulted  Per surg:  large hemoperitoneum and possible ischemic colitis on CT   Afebrile, hypotensive 86/40, low O2 sats 85%  Leukocytosis, 14 57  Hgb 5 3 today from 5 8/9 0/9 5/9 6  CT 01/12/2022 with large amount of hemoperitoneum as described   This is most concentrated surrounding the liver  Bowel wall thickening at the splenic flexure   Given severe anemia, and the location of this finding in a watershed location, consider ischemic colitis  Plan: CT with contrast to evaluate   Exam: abdominal tenderness  Hypoactive bowel sounds, bruising noted over abdomen likely from injections  Critical care contacted by the hospitalist to assess this patient  She became orthostatic and lightheaded with PT this morning  Her Hb was found to 5 3  She received a CT scan with showed active extravasation of blood from a known pericapsular liver hematoma  IR is not available on site so patient is awaiting priority 1 transport to alternative facility  Critical care team to go to bedside to assist in stabilizing and resuscitating patient prior to transport      Vital Signs:   01/12/22 0838 -- 73 -- 86/40 Abnormal  -- 94 % -- 40 5 L/min Nasal cannula Lying   01/12/22 07:59:50 -- 65 17 93/37 Abnormal  56 96 % -- -- -- -- --   01/12/22 07:19:03 -- 72 -- 74/49 Abnormal   57 92 % -- -- -- -- --   01/12/22 06:51:54 -- 105 20 108/73 85 85 % Abnormal   -- -- -- -- --     Pertinent Labs/Diagnostic Results:   Results from last 7 days   Lab Units 01/12/22  0825 01/12/22  0736 01/08/22  0606 01/07/22  0436 01/07/22  0436 01/06/22  0515 01/06/22  0515   WBC Thousand/uL 14 57* 15 41* 7 82   < > 9 73   < > 8 67   HEMOGLOBIN g/dL 5 3* 5 8* 9 0*  --  9 5*  --  9 6*   HEMATOCRIT % 17 8* 19 2* 28 7*  --  30 0*  --  30 3*   PLATELETS Thousands/uL 135* 170 226   < > 253   < > 283   NEUTROS ABS Thousands/µL 11 97*  --   --   --   --   --   --     < > = values in this interval not displayed       Results from last 7 days   Lab Units 01/12/22  0736 01/08/22  0606 01/07/22  0436 01/06/22  1052 01/06/22  0515   SODIUM mmol/L 133* 140 140  --  137   POTASSIUM mmol/L 5 0 3 8 4 2 5 1 5 2   CHLORIDE mmol/L 103 108 108  --  108   CO2 mmol/L 20* 24 25  --  23   ANION GAP mmol/L 10 8 7  --  6   BUN mg/dL 68* 46* 55*  --  54*   CREATININE mg/dL 2 02* 1 23 1 34*  --  1 22   EGFR ml/min/1 73sq m 22 41 37  --  41   CALCIUM mg/dL 8 3* 8 7 8 8  --  8 7   MAGNESIUM mg/dL 2 0  --   --   --   --      Results from last 7 days   Lab Units 01/12/22  0736   AST U/L 14   ALT U/L 22   ALK PHOS U/L 54   TOTAL PROTEIN g/dL 5 1*   ALBUMIN g/dL 2 8*   TOTAL BILIRUBIN mg/dL 0 45     Results from last 7 days   Lab Units 01/12/22  1038 01/12/22  0554 01/11/22  2014 01/11/22  1533 01/11/22  1046 01/11/22  0601 01/10/22  2145 01/10/22  1629 01/10/22  1058 01/10/22  0545 01/09/22  2022 01/09/22  1644   POC GLUCOSE mg/dl 299* 151* 198* 256* 146* 77 247* 241* 103 120 229* 234*     Results from last 7 days   Lab Units 01/12/22  0736 01/08/22  0606 01/07/22  0436 01/06/22  0515   GLUCOSE RANDOM mg/dL 281* 55* 63* 126     Results from last 7 days   Lab Units 01/12/22  0932 01/12/22  0736   HS TNI 0HR ng/L  --  12   HS TNI 2HR ng/L 10  --    HSTNI D2 ng/L -2  --      Results from last 7 days   Lab Units 01/12/22  0756 LACTIC ACID mmol/L 1 9     1/12  CT chest, a/p w/o contrast=  1  Large amount of hemoperitoneum as described  This is most concentrated surrounding the liver  Consider contrast enhanced CT abdomen and pelvis to evaluate for parenchymal laceration  2   Bowel wall thickening at the splenic flexure  Given severe anemia, and the location of this finding in a watershed location, consider ischemic colitis  3  Lung findings compatible with COVID-19, not significantly changed  CT chest, a/p w & w/o contrast=  Right perihepatic hematoma with hemoperitoneum and active extravasation arising adjacent to the 7th rib  Bilateral lower lobe groundglass opacities consistent with the patient's history of COVID pneumonia  Subcutaneous emphysema adjacent to the hematoma of unclear etiology  Numerous soft tissue nodules in the mid and lower abdominal wall similar to the prior study consistent with hematomas      Medications:   acetaminophen, 975 mg, Oral, Q6H JUAN ANTONIO  atorvastatin, 40 mg, Oral, HS  docusate sodium, 100 mg, Oral, Daily  ferrous sulfate, 325 mg, Oral, Daily With Breakfast  heparin (porcine), 5,000 Units, Subcutaneous, Q8H JUAN ANTONIO  insulin glargine, 15 Units, Subcutaneous, HS  insulin lispro, 4-20 Units, Subcutaneous, TID AC  insulin lispro, 4-20 Units, Subcutaneous, HS  insulin lispro, 5 Units, Subcutaneous, TID With Meals  lidocaine, 1 patch, Topical, Daily  methylPREDNISolone sodium succinate, 10 mg, Intravenous, Daily  polyethylene glycol, 17 g, Oral, Daily  senna, 1 tablet, Oral, HS  sodium chloride, 1,000 mL, Intravenous, Once    Continuous IV Infusions:  sodium chloride, 100 mL/hr, Intravenous, Continuous    PRN Meds:  bisacodyl, 10 mg, Rectal, Daily PRN  ondansetron, 4 mg, Intravenous, Q6H PRN  oxyCODONE, 2 5 mg, Oral, Q6H PRN    Discharge Plan: d    Network Utilization Review Department  ATTENTION: Please call with any questions or concerns to [phone] and carefully listen to the prompts so that you are directed to the right person  All voicemails are confidential   Ty Limb all requests for admission clinical reviews, approved or denied determinations and any other requests to dedicated fax number below belonging to the campus where the patient is receiving treatment   List of dedicated fax numbers for the Facilities:  [address] DENIALS (Administrative/Medical Necessity) [phone]   [address] (Maternity/NICU/Pediatrics) [phone] [address] [place] [address]  [address] [facility] [place] [address] [id number] [place] [address] Samantha Tereza [address] [facility] [phone]

## 2022-01-12 NOTE — NURSING NOTE
Change of shift report with Night RN, CNA reports pt feeling weak upon transfer to commode  Pt awake alert, oriented x 4  Pt hypotensive, hospitalist called to bedside, O2 increased to 5L NC  NSS Bolus initiated, labs drawn, blood glucose stable, pt c/o mild headache nausea and x1 episode of emesis clear, small

## 2022-01-12 NOTE — ASSESSMENT & PLAN NOTE
· Evaluation by PT and  OT from [place] recommending post acute rehab placement  Plan:   · PT/OT eval and treatment

## 2022-01-12 NOTE — QUICK NOTE
IR quick note    Contacted regarding this patient with evidence of bleeding in the chest and abdomen  CTA reviewed  Active extravasation from the chest wall  This may be related to prior chest tube    Recommend emergent angiography, patient will require transfer to another campus where there is on interventional radiology team and anesthesia/ICU support    This will be arranged immediately    Discussed with critical care team and IR staff

## 2022-01-12 NOTE — ASSESSMENT & PLAN NOTE
· Evaluation by PT and  OT from [place] recommending post acute rehab placement  Plan:   · PT/OT eval and treatment

## 2022-01-12 NOTE — ASSESSMENT & PLAN NOTE
· Patient was reported to be lightheaded/dizzy this am at [facility] and noted to be hypotensive with  SBP 70's to 80's  BP improved with fluid bolus  · Patient received 1 unit of pRBC's prior to transport and was sent with 2nd  unit of PRBCS and 1 unit of  FFP to be given during transport  · CT chest/abd/pelvis:  1   Large amount of hemoperitoneum as described   This is most concentrated surrounding the liver   Consider contrast enhanced CT abdomen and pelvis to evaluate for parenchymal laceration  2   Bowel wall thickening at the splenic flexure   Given severe anemia, and the location of this finding in a watershed location, consider ischemic colitis  3   Lung findings compatible with COVID-19, not significantly changed  · CTA abd/pelvis:  Right perihepatic hematoma with hemoperitoneum and active extravasation arising adjacent to the 7th rib  Bilateral lower lobe groundglass opacities consistent with the patient's history of COVID pneumonia  Subcutaneous emphysema adjacent to the hematoma of unclear etiology  Numerous soft tissue nodules in the mid and lower abdominal wall similar to the prior study consistent with hematomas  ·   Patient was transferred to [facility] for IR procedure  Plan:  Continue to monitor site for acute bleeding  Transfuse for Hb<7   IR on board

## 2022-01-12 NOTE — TRANSPORTATION MEDICAL NECESSITY
Section I - General Information    Name of Patient: Sola Sepulveda                 : 1940    Medicare #: [id number]  Transport Date: 22 (PCS is valid for round trips on this date and for all repetitive trips in the 60-day range as noted below )  Origin: [address]: [address], [place], [address]  Is the pt's stay covered under Medicare Part A (PPS/DRG)   []     Closest appropriate facility? If no, why is transport to more distant facility required? Yes  If hospice pt, is this transport related to pt's terminal illness? NA       Section II - Medical Necessity Questionnaire  Ambulance transportation is medically necessary only if other means of transport are contraindicated or would be potentially harmful to the patient  To meet this requirement, the patient must either be "bed confined" or suffer from a condition such that transport by means other than ambulance is contraindicated by the patient's condition  The following questions must be answered by the medical professional signing below for this form to be valid:    1)  Describe the MEDICAL CONDITION (physical and/or mental) of this patient AT [address] [place] [address] that requires the patient to be transported in an ambulance and why transport by other means is contraindicated by the patient's condition:  Covid-19 pneumonia , acute blood loss, anemia, intercostal bleeding vs liver bleeding, pt needs higher level of care pt needs   IR services not available at this hospital  2) Is the patient "bed confined" as defined below? No  To be "be confined" the patient must satisfy all three of the following conditions: (1) unable to get up from bed without Assistance; AND (2) unable to ambulate; AND (3) unable to sit in a chair or wheelchair      3) Can this patient safely be transported by car or wheelchair Madeleine Evans (i e , seated during transport without a medical attendant or monitoring)? No    4) In addition to completing questions 1-3 above, please check any of the following conditions that apply*:   *Note: supporting documentation for any boxes checked must be maintained in the patient's medical records  If hosp-hosp transfer, describe services needed at 2nd facility not available at 1st facility? IV meds/fluids required   Medical attendant required   Requires oxygen-unable to self administer  Special handling/isolation/infection control precautions required   Unable to tolerate seated position for time needed to transport       Section III - Signature of Physician or Healthcare Professional  I certify that the above information is true and correct based on my evaluation of this patient, and represent that the patient requires transport by ambulance and that other forms of transport are contraindicated  I understand that this information will be used by the Centers for Medicare and Medicaid Services (CMS) to support the determination of medical necessity for ambulance services, and I represent that I have personal knowledge of the patient's condition at time of transport  []  If this box is checked, I also certify that the patient is physically or mentally incapable of signing the ambulance service's claim and that the institution with which I am affiliated has furnished care, services, or assistance to the patient  My signature below is made on behalf of the patient pursuant to 42 CFR §424 36(b)(4)   In accordance with 42 CFR §424 37, the specific reason(s) that the patient is physically or mentally incapable of signing the claim form is as follows:       Signature of Physician* or Healthcare Professional______________________________________________________________  Signature Date 01/12/22 (For scheduled repetitive transports, this form is not valid for transports performed more than 60 days after this date)    Printed Name & Credentials of Physician or Healthcare Professional (MD, DO, RN, etc )________________________________  *Form must be signed by patient's attending physician for scheduled, repetitive transports   For non-repetitive, unscheduled ambulance transports, if unable to obtain the signature of the attending physician, any of the following may sign (choose appropriate option below)  [] Physician Assistant []  Clinical Nurse Specialist [x]  Registered Nurse  []  Nurse Practitioner  [x] Discharge Planner

## 2022-01-12 NOTE — ASSESSMENT & PLAN NOTE
Recent Labs     01/12/22  0736 01/12/22  0825 01/12/22  1417   HGB 5 8* 5 3* 8 6*     Lab Results   Component Value Date    IRON 88 10/29/2020    FERRITIN 424 (H) 12/16/2021    TIBC 347 10/29/2020    CONCFE 25 10/29/2020     Patient had acute blood loss secondary to right 8th intercostal bleeding at the site of prior chest tube placement  Hb dropped from 9 ( 1/8/22) to 5 (1/12/22)  Received 2 units of pRBCs and 1 unit of FFP prior to arrival to [facility]  Patient is s/p IR procedure performed soon after arrival [facility] to control hemorrhage  Plan:  Continue to monitor H/H q4h  Repeat Type and screen  Transfuse for Hb >7  Monitor for persistent bleeding

## 2022-01-12 NOTE — QUICK NOTE
I was contacted by the hospitalist to assess this patient  She became orthostatic and lightheaded with PT this morning  Her Hb was found to 5 3  She received a CT scan with showed active extravasation of blood from a known pericapsular liver hematoma  IR is not available on site so patient is awaiting priority 1 transport to alternative facility  Critical care team to go to bedside to assist in stabilizing and resuscitating patient prior to transport      Miranda Granda MD  Critical Care Attending

## 2022-01-12 NOTE — ASSESSMENT & PLAN NOTE
Lab Results   Component Value Date    EGFR 21 01/12/2022    EGFR 22 01/12/2022    EGFR 41 01/08/2022    CREATININE 2 09 (H) 01/12/2022    CREATININE 2 02 (H) 01/12/2022    CREATININE 1 23 01/08/2022     · Baseline creatinine appears to be around 1 2 - 1 6     Plan:  · Avoid nephrotoxic agents and hypotension  · Patient received IV fluids, 2 units PRBC's, 1 unit FFP  · Lisinopril on hold  · Trend kidney function  · Strict I&O  · Daily weights

## 2022-01-12 NOTE — PROGRESS NOTES
Critical Care Services- Progress Note   Debbie Huston [de-identified] y o  female MRN: [id number]  Unit/Bed#: -01 Encounter: [id number]  Assessment and Plan  Interval Events:  Seen acutely in setting of arterial bleeding with hypotension and pre-syncope  Brief review of history shows that patient was admitted in December for COVID-19 Pneumonia  She required multiple chest tubes for pneumonia associated pneumothorax, These have been discontinued  She had an acute drop in BP with associated near syncope this morning  Hemoglobin found to be 5 3 and there is active extravasation seen on CTA of abdomen   Diagnosis: Acute blood loss Anemia  o Plan: obtain large bore IV access, start balanced transfusion with RBC and FFP as primary resuscitation, avoid crystalloid and use albumin if blood products unavailable     Diagnosis: acute arterial liver vs intercostal bleed  o Plan: based on location, presumed secondary to intercostal arterial bleed at site of prior Chest tube placement, needs emergent IR intervention for embolization, transfer process underway     Diagnosis: COVID-19 Pneumonia  o Plan: has been weaned to 6-8L midflow per records, on my assessment is saturating 99% on 15L NRB face mask while lying flat, continue O2 supplementation      Upon my evaluation, this patient had a high probability of imminent or life-threatening deterioration due to acute blood loss anemia, intercostal bleeding vs liver bleeding, covid 19 pneumonia, which required my direct attention, intervention, and personal management  I have personally provided 45 minutes (12:45-13:30) of critical care time, exclusive of procedures, teaching, family meetings, and any prior time recorded by providers other than myself  Time includes review of laboratory data, review of imaging/radiology results, discussion with consultants, monitoring for potential decompensation  Interventions were performed as documented above  -------------------------------------------------------------------------------------------------------------------------------------  Hemodynamic Monitoring:  Vital Signs:   HR: 98   BP: 90/55  MAP: 62  RR: 28  SpO2: 99% on NRB  Cardiac Monitoring:   Telemetry rhythm: n/a  CVP: n/a  CI: n/a  SVR: n/a    Laboratory   Results from last 7 days   Lab Units 01/12/22  0825 01/12/22  0736 01/08/22  0606 01/07/22  0436 01/06/22  0515   WBC Thousand/uL 14 57* 15 41* 7 82 9 73 8 67   HEMOGLOBIN g/dL 5 3* 5 8* 9 0* 9 5* 9 6*   HEMATOCRIT % 17 8* 19 2* 28 7* 30 0* 30 3*   PLATELETS Thousands/uL 135* 170 226 253 283   NEUTROS PCT % 82*  --   --   --   --    MONOS PCT % 5  --   --   --   --      Results from last 7 days   Lab Units 01/12/22  0736 01/08/22  0606 01/07/22  0436 01/06/22  1052 01/06/22  0515   SODIUM mmol/L 133* 140 140  --  137   POTASSIUM mmol/L 5 0 3 8 4 2 5 1 5 2   CHLORIDE mmol/L 103 108 108  --  108   CO2 mmol/L 20* 24 25  --  23   ANION GAP mmol/L 10 8 7  --  6   BUN mg/dL 68* 46* 55*  --  54*   CREATININE mg/dL 2 02* 1 23 1 34*  --  1 22   CALCIUM mg/dL 8 3* 8 7 8 8  --  8 7   GLUCOSE RANDOM mg/dL 281* 55* 63*  --  126   ALT U/L 22  --   --   --   --    AST U/L 14  --   --   --   --    ALK PHOS U/L 54  --   --   --   --    ALBUMIN g/dL 2 8*  --   --   --   --    TOTAL BILIRUBIN mg/dL 0 45  --   --   --   --      Results from last 7 days   Lab Units 01/12/22  0736   MAGNESIUM mg/dL 2 0               Results from last 7 days   Lab Units 01/12/22  0756   LACTIC ACID mmol/L 1 9     ABG:    VBG:          Micro        Diagnostic Imaging / Data: I have personally reviewed pertinent films in PACS    Medications:  Current Facility-Administered Medications   Medication Dose Route Frequency    acetaminophen (TYLENOL) tablet 975 mg  975 mg Oral Q6H [address]    atorvastatin (LIPITOR) tablet 40 mg  40 mg Oral HS    bisacodyl (DULCOLAX) rectal suppository 10 mg  10 mg Rectal Daily PRN    docusate sodium (COLACE) capsule 100 mg  100 mg Oral Daily    ferrous sulfate tablet 325 mg  325 mg Oral Daily With Breakfast    insulin glargine (LANTUS) subcutaneous injection 15 Units 0 15 mL  15 Units Subcutaneous HS    insulin lispro (HumaLOG) 100 units/mL subcutaneous injection 4-20 Units  4-20 Units Subcutaneous TID AC    insulin lispro (HumaLOG) 100 units/mL subcutaneous injection 4-20 Units  4-20 Units Subcutaneous HS    insulin lispro (HumaLOG) 100 units/mL subcutaneous injection 5 Units  5 Units Subcutaneous TID With Meals    lidocaine (LIDODERM) 5 % patch 1 patch  1 patch Topical Daily    methylPREDNISolone sodium succinate (Solu-MEDROL) injection 10 mg  10 mg Intravenous Daily    ondansetron (ZOFRAN) injection 4 mg  4 mg Intravenous Q6H PRN    oxyCODONE (ROXICODONE) IR tablet 2 5 mg  2 5 mg Oral Q6H PRN    polyethylene glycol (MIRALAX) packet 17 g  17 g Oral Daily    senna (SENOKOT) tablet 8 6 mg  1 tablet Oral HS    sodium chloride 0 9 % infusion  100 mL/hr Intravenous Continuous       SIGNATURE: Ap Sanchez MD    Portions of the record may have been created with voice recognition software  Occasional wrong word or "sound a like" substitutions may have occurred due to the inherent limitations of voice recognition software    Read the chart carefully and recognize, using context, where substitutions have occurred

## 2022-01-12 NOTE — ASSESSMENT & PLAN NOTE
· Patient currently on 2 L  · Likely due  to COVID -19 viral pneumonia and pneumothorax  · Repeat chest x-ray performed on 01/06/2022 as supplemental oxygen requirements were increasing, this demonstrated findings consistent with known COVID pneumonia but no other acute abnormality

## 2022-01-12 NOTE — ANESTHESIA PREPROCEDURE EVALUATION
Procedure:  IR EMBOLIZATION (SPECIFY VESSEL OR SITE)    Relevant Problems   CARDIO   (+) Essential hypertension   (+) Hypercholesterolemia      ENDO   (+) Type 2 diabetes mellitus without complication, without long-term current use of insulin (HCC)      /RENAL   (+) Chronic kidney disease, stage 3b (HCC)   (+) Hypertensive kidney disease with chronic kidney disease stage III (HCC)      HEMATOLOGY   (+) Anemia   (+) Simple chronic anemia      MUSCULOSKELETAL   (+) Back pain   (+) Localized, primary osteoarthritis of hand   (+) Sciatica      NEURO/PSYCH   (+) Anxiety   (+) Paresthesia of arm      PULMONARY   (+) Acute respiratory failure with hypoxia (HCC)   (+) Pneumonia due to COVID-19 virus   (+) Pneumothorax- Resolved             Anesthesia Plan  ASA Score- 3 Emergent    Anesthesia Type- general with ASA Monitors  Additional Monitors: arterial line and central venous line  Airway Plan: ETT  Comment: Emergency case, consent implied  Spoke with pt, she is agreeable to proceed  Plan Factors-Exercise tolerance (METS): <4 METS  Chart reviewed  Imaging results reviewed  Existing labs reviewed  Patient summary reviewed  Patient is not a current smoker  Induction- intravenous and rapid sequence induction  Postoperative Plan- Plan for postoperative opioid use  Planned trial extubation    Informed Consent- Anesthetic plan and risks discussed with patient

## 2022-01-12 NOTE — DISCHARGE SUMMARY
Skye U  67   Discharge- Sola Sepulveda 1940, [de-identified] y o  female MRN: [id number]  Unit/Bed#: -01 Encounter: [id number]  Primary Care Provider: Sandra Mruo DO   Date and time admitted to hospital: 12/16/2021  4:49 PM    Hemoperitoneum  Assessment & Plan  · Patient lightheaded/dizzy this am and noted to be hypotensive with BP down in the 70's to 80's  The patient's blood pressure improved with fluid bolus  · The patient received 1 unit of PRBC's prior to transport and was sent with 2nd  unit of PRBCS and 1 unit of FFP to be given during transport  · CT chest/abd/pelvis:  1  Large amount of hemoperitoneum as described  This is most concentrated surrounding the liver  Consider contrast enhanced CT abdomen and pelvis to evaluate for parenchymal laceration  2   Bowel wall thickening at the splenic flexure  Given severe anemia, and the location of this finding in a watershed location, consider ischemic colitis  3  Lung findings compatible with COVID-19, not significantly changed  · CTA abd/pelvis:  Right perihepatic hematoma with hemoperitoneum and active extravasation arising adjacent to the 7th rib  Bilateral lower lobe groundglass opacities consistent with the patient's history of COVID pneumonia  Subcutaneous emphysema adjacent to the hematoma of unclear etiology  Numerous soft tissue nodules in the mid and lower abdominal wall similar to the prior study consistent with hematomas    · General surgery consultation appreciated  · I spoke with IR on call Dr Sury Stacy not available at [facility] for an angiogram and recommended transfer to [facility]  I spoke to Critical care at [facility] Dr Mary Saavedra however not bed available  · The patient was accepted at [place] [address]  onto Dr Edward Nicholas service  The patient's case was dicussed with Critical care AP Ronaldo Lassiter  The patient will be going to IR once arriving at [place] [address]       * Acute respiratory failure with hypoxia (HCC)  Assessment & Plan  · Patient currently on 2 L  · Likely due  to COVID -19 viral pneumonia and pneumothorax  · Repeat chest x-ray performed on 01/06/2022 as supplemental oxygen requirements were increasing, this demonstrated findings consistent with known COVID pneumonia but no other acute abnormality      Pneumonia due to COVID-19 virus  Assessment & Plan  · COVID-19 positive on 12/16/2021; Unvaccinated  · Decadron 0 1 mg/kg completed 12/30  · Completed 14 day course of Baricitinib on 1/2/22  · Completed 5 day course of Remdesivir on 12/21/2022  · Continue Solu-Medrol taper as recommended by Pulmonary/Critical Care  · Isolation precautions no longer needed at this time      Pneumothorax- Resolved  Assessment & Plan  · 12/25/2021 CXR demonstrated right pneumothorax and 16 Fr chest tube was placed; Chest tube was removed on 12/28  · R chest tube replaced on 12/30 for recurrent R ptx  · Chest tube removed on 01/05/2022  · Post removal x-ray showed no pneumothorax recurrence   · Repeat CXR 1/10/22- Stable opacities compatible with Covid-19 pneumonia  · CT chest 1/10/22  1  Right chest wall subcutaneous emphysema after prior intervention  No evidence of pneumothorax  2   Peripheral and basilar groundglass and airspace opacities consistent with Covid 19 infiltrates  3   Mild perihepatic ascites of uncertain etiology  Correlate with volume status  Ambulatory dysfunction  Assessment & Plan  · PT OT recommending post acute rehab placement      Type 2 diabetes mellitus without complication, without long-term current use of insulin [facility])  Assessment & Plan  Lab Results   Component Value Date    HGBA1C 6 2 (H) 11/22/2021       Recent Labs     01/11/22 2014 01/12/22  0554 01/12/22  1038 01/12/22  1127   POCGLU 198* 151* 299* 349*       Blood Sugar Average: Last 72 hrs:  (P) 658 [id number]     · Continue Lantus 15 units q h s  And Lispro 5 units t i d   With meals  · Continue Accu-Cheks, corrective sliding-scale insulin, and hypoglycemic protocol    Sepsis due to methicillin susceptible Staphylococcus aureus (HCC)  Assessment & Plan  · Sepsis present on admission due to COVID pneumonia and MSSA bacteremia  · TTE was negative  · Completed 7 days of cefazolin on 1/2/2022    Bacteremia due to methicillin susceptible Staphylococcus aureus (MSSA)  Assessment & Plan  · Blood Clx: MSSA 12/22  · Repeat BClx 12/26: NGTD  · Completed course of cefazolin    Chronic kidney disease, stage 3b [facility])  Assessment & Plan  Lab Results   Component Value Date    EGFR 22 01/12/2022    EGFR 41 01/08/2022    EGFR 37 01/07/2022    CREATININE 2 02 (H) 01/12/2022    CREATININE 1 23 01/08/2022    CREATININE 1 34 (H) 01/07/2022     · Creatinine elevated this am at 2 02  · Patient received IV fluids, 2 units PRBC's, 1 unit FFP  · Avoid nephrotoxic agents and hypotension  · Continue to monitor kidney function       Medical Problems             Resolved Problems  Date Reviewed: 1/12/2022    None              Discharging Physician / Practitioner: Romain Laureano PA-C  PCP: Amadeo Varghese DO  Admission Date:   Admission Orders (From admission, onward)     Ordered        12/17/21 1120  Inpatient Admission  Once            12/16/21 1815  Place in Observation  Once            12/16/21 1816  Place in Observation  Once                      Discharge Date: 01/12/22    Consultations During Hospital Stay:  · General surgery  · Critical Care    Procedures Performed:   · Chest tube placement 12/25/21 with removal on 12/28/2021  · Chest tube placement 12/30/21 with removal on 1/5/2022  · Central line placement on 1/12/2022    Significant Findings / Test Results:     CTA abdomen pelvis  IMPRESSION:     Right perihepatic hematoma with hemoperitoneum and active extravasation arising adjacent to the 7th rib      Bilateral lower lobe groundglass opacities consistent with the patient's history of COVID pneumonia      Subcutaneous emphysema adjacent to the hematoma of unclear etiology      Numerous soft tissue nodules in the mid and lower abdominal wall similar to the prior study consistent with hematomas      CT chest abd pelvis  1  Large amount of hemoperitoneum as described  This is most concentrated surrounding the liver  Consider contrast enhanced CT abdomen and pelvis to evaluate for parenchymal laceration  2   Bowel wall thickening at the splenic flexure  Given severe anemia, and the location of this finding in a watershed location, consider ischemic colitis  3  Lung findings compatible with COVID-19, not significantly changed    XR chest portable    Result Date: 12/21/2021  Impression: No change in moderate bilateral lung opacity due to Covid 19  Workstation performed: [id number]     XR chest portable    Result Date: 12/21/2021  Impression: Worsening bilateral lung opacity due to Covid 19  Workstation performed: [id number]     XR chest 1 view portable    Result Date: 12/17/2021  · Impression: Low lung volumes with left basilar patchy density could reflect developing infiltrate  Workstation performed: [id number]       Complications:  Right perihepatic hematoma with hemoperitoneum and active extravasation arising adjacent to the 7th rib  Reason for Admission: COVID pneumonia    Hospital Course:   Yane Patterson is a [de-identified] y o  female patient who originally presented to the hospital on 12/16/2021 due to fevers/chills  As per HPI on admission "Yane Patterson is a [de-identified] y o  female with a past medical history significant for type 2 diabetes mellitus who presents with complaints of fevers, chills, cough, shortness of breath  The patient is unvaccinated against COVID-19  She tested positive in the ED on 12/16/2021 for COVID -19  She was found to be hypoxic to 88% on room air which improved with 2 L nasal cannula O2 supplementation    She was admitted under observation for further evaluation and treatment of COVID -19 viral pneumonia"  Please see above list of diagnoses and related plan for additional information  Condition at Discharge: terminal    Discharge Day Visit / Exam:   Subjective:    Vitals: Blood Pressure: 91/59 (01/12/22 1306)  Pulse: 92 (01/12/22 1306)  Temperature: 98 6 °F (37 °C) (01/12/22 1306)  Temp Source: Axillary (01/12/22 1306)  Respirations: 16 (01/12/22 1306)  Height: 5' (152 4 cm) (12/28/21 1131)  Weight - Scale: 55 3 kg (121 lb 14 6 oz) (01/12/22 0556)  SpO2: 99 % (01/12/22 1315)  Exam:   Physical Exam  Vitals and nursing note reviewed  Constitutional:       General: She is awake  Appearance: She is ill-appearing  Interventions: Nasal cannula in place  Cardiovascular:      Rate and Rhythm: Normal rate and regular rhythm  Heart sounds: Normal heart sounds  Pulmonary:      Effort: Pulmonary effort is normal       Breath sounds: Normal breath sounds  Abdominal:      Palpations: Abdomen is soft  Tenderness: There is generalized abdominal tenderness  There is no guarding or rebound  Skin:     General: Skin is warm and dry  Neurological:      General: No focal deficit present  Mental Status: She is alert and oriented to person, place, and time  Psychiatric:         Attention and Perception: Attention normal          Mood and Affect: Mood normal          Speech: Speech normal          Behavior: Behavior is cooperative  Discussion with Family: Updated  (daughter) via phone  Son updated at bedside prior to transfer     Discharge instructions/Information to patient and family:   See after visit summary for information provided to patient and family  Provisions for Follow-Up Care:  See after visit summary for information related to follow-up care and any pertinent home health orders         Disposition:   [address] Transfer to [facility]    Planned Readmission: no     Discharge Statement:  I spent 25 minutes discharging the patient  This time was spent on the day of discharge  I had direct contact with the patient on the day of discharge  Greater than 50% of the total time was spent examining patient, answering all patient questions, arranging and discussing plan of care with patient as well as directly providing post-discharge instructions  Additional time then spent on discharge activities  Discharge Medications:  See after visit summary for reconciled discharge medications provided to patient and/or family        **Please Note: This note may have been constructed using a voice recognition system**

## 2022-01-12 NOTE — ASSESSMENT & PLAN NOTE
· Patient was reported to be lightheaded/dizzy this am at [facility] and noted to be hypotensive with  SBP 70's to 80's  BP improved with fluid bolus  · Patient received 1 unit of pRBC's prior to transport and was sent with 2nd  unit of PRBCS and 1 unit of  FFP to be given during transport  · CT chest/abd/pelvis:  1   Large amount of hemoperitoneum as described   This is most concentrated surrounding the liver   Consider contrast enhanced CT abdomen and pelvis to evaluate for parenchymal laceration  2   Bowel wall thickening at the splenic flexure   Given severe anemia, and the location of this finding in a watershed location, consider ischemic colitis  3   Lung findings compatible with COVID-19, not significantly changed  · CTA abd/pelvis:  Right perihepatic hematoma with hemoperitoneum and active extravasation arising adjacent to the 7th rib  Bilateral lower lobe groundglass opacities consistent with the patient's history of COVID pneumonia  Subcutaneous emphysema adjacent to the hematoma of unclear etiology  Numerous soft tissue nodules in the mid and lower abdominal wall similar to the prior study consistent with hematomas  ·   Patient was transferred to [facility] for IR procedure  Plan:  Continue to monitor site for acute bleeding  Transfuse for Hb<7   Consider extubating patient today if she tolerates SBT

## 2022-01-12 NOTE — PLAN OF CARE
Problem: Potential for Falls  Goal: Patient will remain free of falls  Description: INTERVENTIONS:  - Educate patient/family on patient safety including physical limitations  - Instruct patient to call for assistance with activity   - Consult OT/PT to assist with strengthening/mobility   - Keep Call bell within reach  - Keep bed low and locked with side rails adjusted as appropriate  - Keep care items and personal belongings within reach  - Initiate and maintain comfort rounds  - Make Fall Risk Sign visible to staff  - Offer Toileting every 2 Hours, in advance of need  - Initiate/Maintain fall alarm  - Obtain necessary fall risk management equipment: fall alarm  - Apply yellow socks and bracelet for high fall risk patients  - Consider moving patient to room near nurses station  Outcome: Progressing     Problem: PAIN - ADULT  Goal: Verbalizes/displays adequate comfort level or baseline comfort level  Description: Interventions:  - Encourage patient to monitor pain and request assistance  - Assess pain using appropriate pain scale  - Administer analgesics based on type and severity of pain and evaluate response  - Implement non-pharmacological measures as appropriate and evaluate response  - Consider cultural and social influences on pain and pain management  - Notify physician/advanced practitioner if interventions unsuccessful or patient reports new pain  Outcome: Progressing     Problem: INFECTION - ADULT  Goal: Absence or prevention of progression during hospitalization  Description: INTERVENTIONS:  - Assess and monitor for signs and symptoms of infection  - Monitor lab/diagnostic results  - Monitor all insertion sites, i e  indwelling lines, tubes, and drains  - Monitor endotracheal if appropriate and nasal secretions for changes in amount and color  - [place] appropriate cooling/warming therapies per order  - Administer medications as ordered  - Instruct and encourage patient and family to use good hand hygiene technique  - Identify and instruct in appropriate isolation precautions for identified infection/condition  Outcome: Progressing  Goal: Absence of fever/infection during neutropenic period  Description: INTERVENTIONS:  - Monitor WBC    Outcome: Progressing     Problem: SAFETY ADULT  Goal: Patient will remain free of falls  Description: INTERVENTIONS:  - Educate patient/family on patient safety including physical limitations  - Instruct patient to call for assistance with activity   - Consult OT/PT to assist with strengthening/mobility   - Keep Call bell within reach  - Keep bed low and locked with side rails adjusted as appropriate  - Keep care items and personal belongings within reach  - Initiate and maintain comfort rounds  - Make Fall Risk Sign visible to staff  - Offer Toileting every 2 Hours, in advance of need  - Initiate/Maintain fall alarm  - Obtain necessary fall risk management equipment: fall alarm  - Apply yellow socks and bracelet for high fall risk patients  - Consider moving patient to room near nurses station  Outcome: Progressing  Goal: Maintain or return to baseline ADL function  Description: INTERVENTIONS:  - Educate patient/family on patient safety including physical limitations  - Instruct patient to call for assistance with activity   - Consult OT/PT to assist with strengthening/mobility   - Keep Call bell within reach  - Keep bed low and locked with side rails adjusted as appropriate  - Keep care items and personal belongings within reach  - Initiate and maintain comfort rounds  - Make Fall Risk Sign visible to staff  - Offer Toileting every 2 Hours, in advance of need  - Initiate/Maintain fall alarm  - Obtain necessary fall risk management equipment: fall alarm  - Apply yellow socks and bracelet for high fall risk patients  - Consider moving patient to room near nurses station  Outcome: Progressing  Goal: Maintains/Returns to pre admission functional level  Description: INTERVENTIONS:  - Perform BMAT or MOVE assessment daily    - Set and communicate daily mobility goal to care team and patient/family/caregiver  - Collaborate with rehabilitation services on mobility goals if consulted  - Perform Range of Motion 3 times a day  - Reposition patient every 2 hours  - Dangle patient 3 times a day  - Stand patient 3 times a day  - Ambulate patient 3 times a day  - Out of bed to chair 3 times a day   - Out of bed for meals 3 times a day  - Out of bed for toileting  - Record patient progress and toleration of activity level   Outcome: Progressing     Problem: DISCHARGE PLANNING  Goal: Discharge to home or other facility with appropriate resources  Description: INTERVENTIONS:  - Identify barriers to discharge w/patient and caregiver  - Arrange for needed discharge resources and transportation as appropriate  - Identify discharge learning needs (meds, wound care, etc )  - Refer to Case Management Department for coordinating discharge planning if the patient needs post-hospital services based on physician/advanced practitioner order or complex needs related to functional status, cognitive ability, or social support system  Outcome: Progressing     Problem: Knowledge Deficit  Goal: Patient/family/caregiver demonstrates understanding of disease process, treatment plan, medications, and discharge instructions  Description: Complete learning assessment and assess knowledge base    Interventions:  - Provide teaching at level of understanding  - Provide teaching via preferred learning methods  Outcome: Progressing     Problem: MOBILITY - ADULT  Goal: Maintain or return to baseline ADL function  Description: INTERVENTIONS:  - Educate patient/family on patient safety including physical limitations  - Instruct patient to call for assistance with activity   - Consult OT/PT to assist with strengthening/mobility   - Keep Call bell within reach  - Keep bed low and locked with side rails adjusted as appropriate  - Keep care items and personal belongings within reach  - Initiate and maintain comfort rounds  - Make Fall Risk Sign visible to staff  - Offer Toileting every 2 Hours, in advance of need  - Initiate/Maintain fall alarm  - Obtain necessary fall risk management equipment: fall alarm  - Apply yellow socks and bracelet for high fall risk patients  - Consider moving patient to room near nurses station  Outcome: Progressing  Goal: Maintains/Returns to pre admission functional level  Description: INTERVENTIONS:  - Perform BMAT or MOVE assessment daily    - Set and communicate daily mobility goal to care team and patient/family/caregiver  - Collaborate with rehabilitation services on mobility goals if consulted  - Perform Range of Motion 3 times a day  - Reposition patient every 2 hours    - Dangle patient 3 times a day  - Stand patient 3 times a day  - Ambulate patient 3 times a day  - Out of bed to chair 3 times a day   - Out of bed for meals 3 times a day  - Out of bed for toileting  - Record patient progress and toleration of activity level   Outcome: Progressing     Problem: Prexisting or High Potential for Compromised Skin Integrity  Goal: Skin integrity is maintained or improved  Description: INTERVENTIONS:  - Identify patients at risk for skin breakdown  - Assess and monitor skin integrity  - Assess and monitor nutrition and hydration status  - Monitor labs   - Assess for incontinence   - Turn and reposition patient  - Assist with mobility/ambulation  - Relieve pressure over bony prominences  - Avoid friction and shearing  - Provide appropriate hygiene as needed including keeping skin clean and dry  - Evaluate need for skin moisturizer/barrier cream  - Collaborate with interdisciplinary team   - Patient/family teaching  - Consider wound care consult   Outcome: Progressing     Problem: RESPIRATORY - ADULT  Goal: Achieves optimal ventilation and oxygenation  Description: INTERVENTIONS:  - Assess for changes in respiratory status  - Assess for changes in mentation and behavior  - Position to facilitate oxygenation and minimize respiratory effort  - Oxygen administered by appropriate delivery if ordered  - Initiate smoking cessation education as indicated  - Encourage broncho-pulmonary hygiene including cough, deep breathe, Incentive Spirometry  - Assess the need for suctioning and aspirate as needed  - Assess and instruct to report SOB or any respiratory difficulty  - Respiratory Therapy support as indicated  Outcome: Progressing     Problem: Nutrition/Hydration-ADULT  Goal: Nutrient/Hydration intake appropriate for improving, restoring or maintaining nutritional needs  Description: Monitor and assess patient's nutrition/hydration status for malnutrition  Collaborate with interdisciplinary team and initiate plan and interventions as ordered  Monitor patient's weight and dietary intake as ordered or per policy  Utilize nutrition screening tool and intervene as necessary  Determine patient's food preferences and provide high-protein, high-caloric foods as appropriate       INTERVENTIONS:  - Monitor oral intake, urinary output, labs, and treatment plans  - Assess nutrition and hydration status and recommend course of action  - Evaluate amount of meals eaten  - Assist patient with eating if necessary   - Allow adequate time for meals  - Recommend/ encourage appropriate diets, oral nutritional supplements, and vitamin/mineral supplements  - Order, calculate, and assess calorie counts as needed  - Assess need for intravenous fluids  - Provide specific nutrition/hydration education as appropriate  - Include patient/family/caregiver in decisions related to nutrition  Outcome: Progressing

## 2022-01-12 NOTE — DISCHARGE INSTRUCTIONS
Embolization   AMBULATORY CARE:   What you need to know about embolization: Embolization is a procedure to create a clot, or block, in a blood vessel  This stops blood from flowing to the area  The procedure may be used to treat many conditions  It can help stop heavy bleeding (hemorrhage), or prevent an aneurysm from rupturing  An abnormal connection between arteries can be removed  Embolization can stop blood flow to a tumor, such as a uterine fibroid or a cancer tumor  Chemotherapy medicine may be given during an embolization to treat a cancer tumor  This is called chemoembolization  How to prepare for the procedure: Embolization is sometimes done as an emergency procedure  This means you will not have time to prepare  For an embolization that is not an emergency, the following are general guidelines for how to prepare:  · Tell your provider about all your allergies  This includes if you have ever had an allergic reaction to contrast liquid, anesthesia, or antibiotics  You may be told not to eat or drink anything after midnight the night before your procedure  Arrange to have someone drive you home  The person should stay with you to help you and watch for problems that may develop  · Give your provider a list of your medicines  Include all medicines and supplements you take  You may need to stop taking blood thinners or aspirin several days before your procedure  This will help decrease your risk for bleeding  Do not stop taking medicines unless your healthcare provider tells you to stop  Your provider will tell you which medicines to take or not take on the day of your procedure  · You may need blood tests to check how well your blood clots and to check your kidney function  Depending on the reason for this procedure, you may an MRI, ultrasound, x-ray, or CT scan   These pictures will help your healthcare provider examine the area to be worked on      · If you are a woman, tell your provider if you know or think you might be pregnant  You may not be able to have certain tests because they may harm an unborn baby  Your provider may need to take extra precautions for other tests  What will happen during the procedure:   · You may be given general anesthesia to keep you asleep and pain-free  You may instead be given moderate sedation  This means you will be awake during the procedure, but you should not feel any pain  Your provider will put numbing medicine on your skin where the procedure will be done  A small incision will be made over an artery  A catheter (thin tube) will be guided into the artery  Contrast liquid will be used to help your healthcare provider see your arteries more easily  · Your provider will use a type of x-ray that gives a moving picture of the arteries  This will help him or her move the catheter into the right place  The catheter is moved up until it reaches the correct artery  Your provider will put medicine or a material into the artery to slow or stop blood from flowing  This may be a coil, foam, beads, a plug, or liquid  The liquid may also contain material that is larger than blood cells  · Your provider will remove the catheter  Pressure will be used to stop any bleeding that happens  The incision area does not need to be closed with stitches  It will be small and close on its own  It will be covered with a bandage to keep it from becoming infected  What to expect after the procedure:   · You may have pain for a few days  Depending on the reason you had this procedure, you may also have a headache or cramps  You may have pain, bleeding, or bruising where the catheter went into your leg  All of these symptoms are normal and should get better soon  You may be given pain medicine through your IV or a pump  A pump allows you to control when the pain medicine is given  · You should expect to stay in the hospital at least overnight   If you had this procedure to treat heavy bleeding, it may take 24 hours to know if the bleeding stopped  · Healthcare providers will help you walk around after your procedure  This will help prevent blood clots  Do not get up until healthcare providers say it is okay  They may want you to lie in one position for a certain amount of time  When they say it is okay to walk, they will help you stand and walk safely  Risks of embolization: You may bleed more than expected or develop an infection  The area being treated may be damaged during the procedure  Your artery may be damaged from the catheter, or you may develop a blood clot  Your kidneys may be damaged from the contrast liquid  The material being put into the artery may go to the wrong place  This can stop blood flow to healthy tissue  The procedure may not work, or it may not relieve your symptoms  Call your doctor or specialist if:   · You have a fever higher than 100 4°F (38°C)  · You have a fever, pain, and nausea that last longer than 3 days  · You suddenly have severe abdominal pain  · You cannot urinate, or you urinate very little  · You have signs of an infection at the catheter site, such as red streaks, pain, or swelling  · You have new or worsening pain  · You have questions or concerns about your condition or care  Medicines: You may need any of the following:  · NSAIDs help decrease swelling and pain or fever  This medicine is available with or without a doctor's order  NSAIDs can cause stomach bleeding or kidney problems in certain people  If you take blood thinner medicine, always ask your healthcare provider if NSAIDs are safe for you  Always read the medicine label and follow directions  · Prescription pain medicine may be given  Ask your healthcare provider how to take this medicine safely  Some prescription pain medicines contain acetaminophen   Do not take other medicines that contain acetaminophen without talking to your healthcare provider  Too much acetaminophen may cause liver damage  Prescription pain medicine may cause constipation  Ask your healthcare provider how to prevent or treat constipation  · Take your medicine as directed  Contact your healthcare provider if you think your medicine is not helping or if you have side effects  Tell him or her if you are allergic to any medicine  Keep a list of the medicines, vitamins, and herbs you take  Include the amounts, and when and why you take them  Bring the list or the pill bottles to follow-up visits  Carry your medicine list with you in case of an emergency  Self-care:   · Rest as needed  Rest and sleep will help your body heal      · Follow your healthcare provider's instructions for activity  He or she will tell you when it is okay to return to your normal activities and to start driving  He or she may want you to wait 1 to 2 weeks to return to work  · Care for the catheter site as directed  It is okay to shower after the procedure  You will only have a small cut in your skin from where the catheter went into your leg  Check the catheter site for signs of infection, including red streaks, pain, and swelling  · Treat symptoms of postembolization syndrome  This syndrome is common after an embolization procedure  It usually starts within 72 hours of the procedure and may last a few days  The main symptoms are fever, pain, and nausea  You will probably be able to manage your symptoms at home  Acetaminophen or an NSAID, such as ibuprofen, can reduce a fever and pain  You may need to eat lightly to manage nausea  Drink more liquids for the first week after the procedure to prevent dehydration  Follow up with your doctor or specialist as directed: You may need to have more tests to check if the procedure worked  Write down your questions so you remember to ask them during your visits    © Copyright [address] 2020 Information is for End User's use only and may not be sold, redistributed or otherwise used for commercial purposes  All illustrations and images included in CareNotes® are the copyrighted property of [employer]  or Leonardo Brian  The above information is an  only  It is not intended as medical advice for individual conditions or treatments  Talk to your doctor, nurse or pharmacist before following any medical regimen to see if it is safe and effective for you  Embolization   AMBULATORY CARE:   What you need to know about embolization: Embolization is a procedure to create a clot, or block, in a blood vessel  This stops blood from flowing to the area  The procedure may be used to treat many conditions  It can help stop heavy bleeding (hemorrhage), or prevent an aneurysm from rupturing  An abnormal connection between arteries can be removed  Embolization can stop blood flow to a tumor, such as a uterine fibroid or a cancer tumor  Chemotherapy medicine may be given during an embolization to treat a cancer tumor  This is called chemoembolization  How to prepare for the procedure: Embolization is sometimes done as an emergency procedure  This means you will not have time to prepare  For an embolization that is not an emergency, the following are general guidelines for how to prepare:  · Tell your provider about all your allergies  This includes if you have ever had an allergic reaction to contrast liquid, anesthesia, or antibiotics  You may be told not to eat or drink anything after midnight the night before your procedure  Arrange to have someone drive you home  The person should stay with you to help you and watch for problems that may develop  · Give your provider a list of your medicines  Include all medicines and supplements you take  You may need to stop taking blood thinners or aspirin several days before your procedure  This will help decrease your risk for bleeding   Do not stop taking medicines unless your healthcare provider tells you to stop  Your provider will tell you which medicines to take or not take on the day of your procedure  · You may need blood tests to check how well your blood clots and to check your kidney function  Depending on the reason for this procedure, you may an MRI, ultrasound, x-ray, or CT scan  These pictures will help your healthcare provider examine the area to be worked on      · If you are a woman, tell your provider if you know or think you might be pregnant  You may not be able to have certain tests because they may harm an unborn baby  Your provider may need to take extra precautions for other tests  What will happen during the procedure:   · You may be given general anesthesia to keep you asleep and pain-free  You may instead be given moderate sedation  This means you will be awake during the procedure, but you should not feel any pain  Your provider will put numbing medicine on your skin where the procedure will be done  A small incision will be made over an artery  A catheter (thin tube) will be guided into the artery  Contrast liquid will be used to help your healthcare provider see your arteries more easily  · Your provider will use a type of x-ray that gives a moving picture of the arteries  This will help him or her move the catheter into the right place  The catheter is moved up until it reaches the correct artery  Your provider will put medicine or a material into the artery to slow or stop blood from flowing  This may be a coil, foam, beads, a plug, or liquid  The liquid may also contain material that is larger than blood cells  · Your provider will remove the catheter  Pressure will be used to stop any bleeding that happens  The incision area does not need to be closed with stitches  It will be small and close on its own  It will be covered with a bandage to keep it from becoming infected  What to expect after the procedure:   · You may have pain for a few days   Depending on the reason you had this procedure, you may also have a headache or cramps  You may have pain, bleeding, or bruising where the catheter went into your leg  All of these symptoms are normal and should get better soon  You may be given pain medicine through your IV or a pump  A pump allows you to control when the pain medicine is given  · You should expect to stay in the hospital at least overnight  If you had this procedure to treat heavy bleeding, it may take 24 hours to know if the bleeding stopped  · Healthcare providers will help you walk around after your procedure  This will help prevent blood clots  Do not get up until healthcare providers say it is okay  They may want you to lie in one position for a certain amount of time  When they say it is okay to walk, they will help you stand and walk safely  Risks of embolization: You may bleed more than expected or develop an infection  The area being treated may be damaged during the procedure  Your artery may be damaged from the catheter, or you may develop a blood clot  Your kidneys may be damaged from the contrast liquid  The material being put into the artery may go to the wrong place  This can stop blood flow to healthy tissue  The procedure may not work, or it may not relieve your symptoms  Call your doctor or specialist if:   · You have a fever higher than 100 4°F (38°C)  · You have a fever, pain, and nausea that last longer than 3 days  · You suddenly have severe abdominal pain  · You cannot urinate, or you urinate very little  · You have signs of an infection at the catheter site, such as red streaks, pain, or swelling  · You have new or worsening pain  · You have questions or concerns about your condition or care  Medicines: You may need any of the following:  · NSAIDs help decrease swelling and pain or fever  This medicine is available with or without a doctor's order   NSAIDs can cause stomach bleeding or kidney problems in certain people  If you take blood thinner medicine, always ask your healthcare provider if NSAIDs are safe for you  Always read the medicine label and follow directions  · Prescription pain medicine may be given  Ask your healthcare provider how to take this medicine safely  Some prescription pain medicines contain acetaminophen  Do not take other medicines that contain acetaminophen without talking to your healthcare provider  Too much acetaminophen may cause liver damage  Prescription pain medicine may cause constipation  Ask your healthcare provider how to prevent or treat constipation  · Take your medicine as directed  Contact your healthcare provider if you think your medicine is not helping or if you have side effects  Tell him or her if you are allergic to any medicine  Keep a list of the medicines, vitamins, and herbs you take  Include the amounts, and when and why you take them  Bring the list or the pill bottles to follow-up visits  Carry your medicine list with you in case of an emergency  Self-care:   · Rest as needed  Rest and sleep will help your body heal      · Follow your healthcare provider's instructions for activity  He or she will tell you when it is okay to return to your normal activities and to start driving  He or she may want you to wait 1 to 2 weeks to return to work  · Care for the catheter site as directed  It is okay to shower after the procedure  You will only have a small cut in your skin from where the catheter went into your leg  Check the catheter site for signs of infection, including red streaks, pain, and swelling  · Treat symptoms of postembolization syndrome  This syndrome is common after an embolization procedure  It usually starts within 72 hours of the procedure and may last a few days  The main symptoms are fever, pain, and nausea  You will probably be able to manage your symptoms at home   Acetaminophen or an NSAID, such as ibuprofen, can reduce a fever and pain  You may need to eat lightly to manage nausea  Drink more liquids for the first week after the procedure to prevent dehydration  Follow up with your doctor or specialist as directed: You may need to have more tests to check if the procedure worked  Write down your questions so you remember to ask them during your visits  © Copyright [address] Information is for End User's use only and may not be sold, redistributed or otherwise used for commercial purposes  All illustrations and images included in CareNotes® are the copyrighted property of [employer]  or [facility] Dot Stevenson   The above information is an  only  It is not intended as medical advice for individual conditions or treatments  Talk to your doctor, nurse or pharmacist before following any medical regimen to see if it is safe and effective for you

## 2022-01-12 NOTE — ASSESSMENT & PLAN NOTE
Lab Results   Component Value Date    HGBA1C 6 2 (H) 11/22/2021       Recent Labs     01/11/22 2014 01/12/22  0554 01/12/22  1038 01/12/22  1127   POCGLU 198* 151* 299* 349*       Blood Sugar Average: Last 72 hrs:  (P) 573 [id number]     · Continue Lantus 15 units q h s  And Lispro 5 units t i d   With meals  · Continue Accu-Cheks, corrective sliding-scale insulin, and hypoglycemic protocol

## 2022-01-13 LAB
ABO GROUP BLD BPU: NORMAL
ANION GAP SERPL CALCULATED.3IONS-SCNC: 12 MMOL/L (ref 4–13)
BASOPHILS # BLD AUTO: 0.01 THOUSANDS/ΜL (ref 0–0.1)
BASOPHILS NFR BLD AUTO: 0 % (ref 0–1)
BPU ID: NORMAL
BUN SERPL-MCNC: 49 MG/DL (ref 5–25)
CA-I BLD-SCNC: 1.1 MMOL/L (ref 1.12–1.32)
CALCIUM SERPL-MCNC: 8 MG/DL (ref 8.3–10.1)
CHLORIDE SERPL-SCNC: 109 MMOL/L (ref 100–108)
CO2 SERPL-SCNC: 20 MMOL/L (ref 21–32)
CREAT SERPL-MCNC: 1.5 MG/DL (ref 0.6–1.3)
CROSSMATCH: NORMAL
EOSINOPHIL # BLD AUTO: 0.01 THOUSAND/ΜL (ref 0–0.61)
EOSINOPHIL NFR BLD AUTO: 0 % (ref 0–6)
ERYTHROCYTE [DISTWIDTH] IN BLOOD BY AUTOMATED COUNT: 16 % (ref 11.6–15.1)
GFR SERPL CREATININE-BSD FRML MDRD: 32 ML/MIN/1.73SQ M
GLUCOSE SERPL-MCNC: 104 MG/DL (ref 65–140)
GLUCOSE SERPL-MCNC: 136 MG/DL (ref 65–140)
GLUCOSE SERPL-MCNC: 63 MG/DL (ref 65–140)
GLUCOSE SERPL-MCNC: 76 MG/DL (ref 65–140)
GLUCOSE SERPL-MCNC: 98 MG/DL (ref 65–140)
HCT VFR BLD AUTO: 26.4 % (ref 34.8–46.1)
HGB BLD-MCNC: 8.9 G/DL (ref 11.5–15.4)
HGB BLD-MCNC: 9.1 G/DL (ref 11.5–15.4)
HGB BLD-MCNC: 9.5 G/DL (ref 11.5–15.4)
HGB BLD-MCNC: 9.6 G/DL (ref 11.5–15.4)
IMM GRANULOCYTES # BLD AUTO: 0.09 THOUSAND/UL (ref 0–0.2)
IMM GRANULOCYTES NFR BLD AUTO: 2 % (ref 0–2)
LYMPHOCYTES # BLD AUTO: 0.73 THOUSANDS/ΜL (ref 0.6–4.47)
LYMPHOCYTES NFR BLD AUTO: 13 % (ref 14–44)
MAGNESIUM SERPL-MCNC: 1.9 MG/DL (ref 1.6–2.6)
MCH RBC QN AUTO: 28.5 PG (ref 26.8–34.3)
MCHC RBC AUTO-ENTMCNC: 33.7 G/DL (ref 31.4–37.4)
MCV RBC AUTO: 85 FL (ref 82–98)
MONOCYTES # BLD AUTO: 0.21 THOUSAND/ΜL (ref 0.17–1.22)
MONOCYTES NFR BLD AUTO: 4 % (ref 4–12)
NEUTROPHILS # BLD AUTO: 4.58 THOUSANDS/ΜL (ref 1.85–7.62)
NEUTS SEG NFR BLD AUTO: 81 % (ref 43–75)
NRBC BLD AUTO-RTO: 2 /100 WBCS
PLATELET # BLD AUTO: 78 THOUSANDS/UL (ref 149–390)
PMV BLD AUTO: 10 FL (ref 8.9–12.7)
POTASSIUM SERPL-SCNC: 3.9 MMOL/L (ref 3.5–5.3)
RBC # BLD AUTO: 3.12 MILLION/UL (ref 3.81–5.12)
SODIUM SERPL-SCNC: 141 MMOL/L (ref 136–145)
UNIT DISPENSE STATUS: NORMAL
UNIT PRODUCT CODE: NORMAL
UNIT PRODUCT VOLUME: 179 ML
UNIT PRODUCT VOLUME: 243 ML
UNIT PRODUCT VOLUME: 250 ML
UNIT PRODUCT VOLUME: 280 ML
UNIT PRODUCT VOLUME: 299 ML
UNIT PRODUCT VOLUME: 350 ML
UNIT RH: NORMAL
WBC # BLD AUTO: 5.63 THOUSAND/UL (ref 4.31–10.16)

## 2022-01-13 PROCEDURE — 82948 REAGENT STRIP/BLOOD GLUCOSE: CPT

## 2022-01-13 PROCEDURE — 85018 HEMOGLOBIN: CPT | Performed by: NURSE PRACTITIONER

## 2022-01-13 PROCEDURE — 99291 CRITICAL CARE FIRST HOUR: CPT | Performed by: INTERNAL MEDICINE

## 2022-01-13 PROCEDURE — 85027 COMPLETE CBC AUTOMATED: CPT

## 2022-01-13 PROCEDURE — 82330 ASSAY OF CALCIUM: CPT

## 2022-01-13 PROCEDURE — 80048 BASIC METABOLIC PNL TOTAL CA: CPT

## 2022-01-13 PROCEDURE — 94003 VENT MGMT INPAT SUBQ DAY: CPT

## 2022-01-13 PROCEDURE — 83735 ASSAY OF MAGNESIUM: CPT

## 2022-01-13 RX ORDER — HYDRALAZINE HYDROCHLORIDE 20 MG/ML
10 INJECTION INTRAMUSCULAR; INTRAVENOUS EVERY 6 HOURS PRN
Status: DISCONTINUED | OUTPATIENT
Start: 2022-01-13 | End: 2022-01-21 | Stop reason: HOSPADM

## 2022-01-13 RX ORDER — PRAVASTATIN SODIUM 40 MG
40 TABLET ORAL
Status: DISCONTINUED | OUTPATIENT
Start: 2022-01-13 | End: 2022-01-21 | Stop reason: HOSPADM

## 2022-01-13 RX ORDER — DEXTROSE MONOHYDRATE 25 G/50ML
25 INJECTION, SOLUTION INTRAVENOUS ONCE
Status: COMPLETED | OUTPATIENT
Start: 2022-01-13 | End: 2022-01-13

## 2022-01-13 RX ORDER — AMOXICILLIN 250 MG
2 CAPSULE ORAL 2 TIMES DAILY
Status: DISCONTINUED | OUTPATIENT
Start: 2022-01-13 | End: 2022-01-21 | Stop reason: HOSPADM

## 2022-01-13 RX ADMIN — PRAVASTATIN SODIUM 40 MG: 40 TABLET ORAL at 16:29

## 2022-01-13 RX ADMIN — POLYETHYLENE GLYCOL 3350 17 G: 17 POWDER, FOR SOLUTION ORAL at 08:47

## 2022-01-13 RX ADMIN — METHYLPREDNISOLONE SODIUM SUCCINATE 10 MG: 40 INJECTION, POWDER, FOR SOLUTION INTRAMUSCULAR; INTRAVENOUS at 08:47

## 2022-01-13 RX ADMIN — SODIUM CHLORIDE, SODIUM GLUCONATE, SODIUM ACETATE, POTASSIUM CHLORIDE, MAGNESIUM CHLORIDE, SODIUM PHOSPHATE, DIBASIC, AND POTASSIUM PHOSPHATE 75 ML/HR: .53; .5; .37; .037; .03; .012; .00082 INJECTION, SOLUTION INTRAVENOUS at 20:06

## 2022-01-13 RX ADMIN — DEXTROSE MONOHYDRATE 25 ML: 25 INJECTION, SOLUTION INTRAVENOUS at 05:26

## 2022-01-13 RX ADMIN — FERROUS SULFATE TAB 325 MG (65 MG ELEMENTAL FE) 325 MG: 325 (65 FE) TAB at 08:42

## 2022-01-13 RX ADMIN — DOCUSATE SODIUM 50 MG AND SENNOSIDES 8.6 MG 2 TABLET: 8.6; 5 TABLET, FILM COATED ORAL at 08:47

## 2022-01-13 RX ADMIN — DOCUSATE SODIUM 50 MG AND SENNOSIDES 8.6 MG 2 TABLET: 8.6; 5 TABLET, FILM COATED ORAL at 18:29

## 2022-01-13 RX ADMIN — ACETAMINOPHEN 975 MG: 325 TABLET, FILM COATED ORAL at 00:30

## 2022-01-13 RX ADMIN — CHLORHEXIDINE GLUCONATE 0.12% ORAL RINSE 15 ML: 1.2 LIQUID ORAL at 08:44

## 2022-01-13 RX ADMIN — ACETAMINOPHEN 975 MG: 325 TABLET, FILM COATED ORAL at 18:29

## 2022-01-13 RX ADMIN — ACETAMINOPHEN 975 MG: 325 TABLET, FILM COATED ORAL at 05:21

## 2022-01-13 RX ADMIN — PROPOFOL 50 MCG/KG/MIN: 10 INJECTION, EMULSION INTRAVENOUS at 08:43

## 2022-01-13 RX ADMIN — PROPOFOL 50 MCG/KG/MIN: 10 INJECTION, EMULSION INTRAVENOUS at 02:53

## 2022-01-13 RX ADMIN — ACETAMINOPHEN 975 MG: 325 TABLET, FILM COATED ORAL at 14:47

## 2022-01-13 RX ADMIN — LIDOCAINE 1 PATCH: 50 PATCH CUTANEOUS at 08:45

## 2022-01-13 RX ADMIN — OXYCODONE HYDROCHLORIDE 2.5 MG: 5 TABLET ORAL at 10:13

## 2022-01-13 NOTE — BRIEF OP NOTE (RAD/CATH)
INTERVENTIONAL RADIOLOGY PROCEDURE NOTE    Date: 1/12/2022    Procedure: IR EMBOLIZATION (SPECIFY VESSEL OR SITE)    Preoperative diagnosis: No diagnosis found  Postoperative diagnosis: Same  Surgeon: Candelario Forde MD     Assistant: None  No qualified resident was available  Blood loss: Minimal    Specimens: None     Findings:     Active extravasation arising from a branch off the right internal mammary artery  Successful coil embolization  Right radial artery access, pressure band applied  Right common femoral access, successful ProGlide closure + manual compression  A large amount of contrast(200+cc) was utilized  Complications: None immediate      Anesthesia: General per anesthesia team

## 2022-01-13 NOTE — UTILIZATION REVIEW
Initial Clinical Review    Admission: Date/Time/Statement:   Admission Orders (From admission, onward)     Ordered        01/12/22 1437  Inpatient Admission  Once                      Orders Placed This Encounter   Procedures    Inpatient Admission     Standing Status:   Standing     Number of Occurrences:   1     Order Specific Question:   Level of Care     Answer:   Critical Care [15]     Order Specific Question:   Estimated length of stay     Answer:   More than 2 Midnights     Order Specific Question:   Certification     Answer:   I certify that inpatient services are medically necessary for this patient for a duration of greater than two midnights  See H&P and MD Progress Notes for additional information about the patient's course of treatment  Initial Presentation: [de-identified]  Y O female initially  Admitted to [facility]  On  12/16 with COVID  Pneumonia  Unvaccinated  Required transfer to ICU on  12/21, found with a right  PNTX  On 12/25 and chest tube placed  Chest tube  Removed on  12/28 and re inserted on   12/30  Transferred to [facility] on  1/4, chest tube removed  1/5  Hemoglobin trended down  Complained of abdominal pain the am of transfer, hemoglobin dropped to 5 and CT scan showed moderate hemoperitoneum  Had an IR procedure to localize and treat bleed  PMH  Is  DM2,  CKD stage 3 and diverticulitis  Transferred to [facility] for higher  LOC  Admit  Ip ICU LOC  With  Hemoperitoneum, Acute blood loss anemia and acute respiratory failure with hypoxia and plan is  Monitor   BP, improved  With IVF, 2 U PRBC and 1  U FFP during transport, transfuse for hemoglobin < 7,  IV  S/medrol, isolation precautions,  NC and close monitoring  INTERVENTIONAL RADIOLOGY PROCEDURE NOTE     Date: 1/12/2022     Procedure: IR EMBOLIZATION (SPECIFY VESSEL OR SITE)  Findings:      Active extravasation arising from a branch off the right internal mammary artery     Successful coil embolization       Right radial artery access, pressure band applied  Right common femoral access, successful ProGlide closure + manual compression       A large amount of contrast(200+cc) was utilized    Comments: pt remains intubated after 5 hr IR procedure with significant blood loss    full report given to ICU at bedside     Date:    1/13      Day 2:   Continue  IV  S/medrol  Currently  On  O2  2L  NC  Needs  PT/OT  Strict  I & O  Hemoglobin  Stable 8  Continue to monitor  For signs of bleeding  Remains intubated/sedated  Responds to verbal commands/painful stimuli  Continue close monitoring  Wt Readings from Last 1 Encounters:   01/13/22 60 2 kg (132 lb 11 5 oz)     Additional Vital Signs:    56 16 158/62 96 mmHg 100 % -- --    01/13/22 0800 -- 54 Abnormal  16 150/60 92 mmHg 100 % -- --   01/13/22 0700 96 8 °F (36 °C) Abnormal  54 Abnormal  16 128/54 80 mmHg 100 % -- --   01/13/22 0600 -- 60 16 142/58 88 mmHg 100 % -- --   01/13/22 0400 -- 64 16 166/68 104 mmHg 100 % -- --   01/13/22 0300 -- 64 16 168/70 108 mmHg 100 % -- --   01/13/22 0258 97 °F (36 1 °C) Abnormal  -- -- -- -- -- -- --   01/13/22 0230 -- -- -- -- -- 100 % -- --   01/13/22 0200 -- 62 16 152/66 98 mmHg 100 % -- --   01/13/22 0100 -- 68 16 142/64 94 mmHg 100 % -- --   01/13/22 0000 -- 68 16 166/70 106 mmHg 100 % -- --   01/12/22 2335 97 4 °F (36 3 °C) Abnormal  -- -- -- -- -- -- --   01/12/22 2100 -- 72 19 176/82 118 mmHg 100 % -- --   01/12/22 2000 -- 80 16 186/90 130 mmHg 100 % -- --   01/12/22 1954 -- 82 15 186/92 132 mmHg 100 % -- --   01/12/22 1944 97 6 °F (36 4 °C) -- -- -- -- -- -- --   01/12/22 1934 -- -- -- -- -- 100 % 100 Ventilator       Pertinent Labs/Diagnostic Test Results:   CXR  ( 1/13)     The tip of the endotracheal tube projects at the right mainstem bronchus ostium  Other support devices as described  Small lung volumes  Worsened right upper lung zone opacity, likely atelectasis  Unchanged bilateral lower lung zone probable atelectasis       CT Chest/abd/pelvis   ( 1/12)    Large amount of hemoperitoneum as described   This is most concentrated surrounding the liver   Consider contrast enhanced CT abdomen and pelvis to evaluate for parenchymal laceration  2   Bowel wall thickening at the splenic flexure   Given severe anemia, and the location of this finding in a watershed location, consider ischemic colitis   3   Lung findings compatible with COVID-19, not significantly changed   Ct  Chest  ( 1/10)    Right chest wall subcutaneous emphysema after prior intervention   No evidence of pneumothorax  2   Peripheral and basilar groundglass and airspace opacities consistent with Covid 19 infiltrates  3   Mild perihepatic ascites of uncertain etiology   Correlate with volume status  Results from last 7 days   Lab Units 01/13/22  0848 01/13/22  0512 01/13/22  0007 01/12/22  1951 01/12/22  1620 01/12/22  1417 01/12/22  1417 01/12/22  0825 01/12/22  0825   WBC Thousand/uL  --  5 63  --  7 30 10 08   < > 15 08*   < > 14 57*   HEMOGLOBIN g/dL 9 1* 8 9* 9 5* 9 6* 6 1*   < > 8 6*   < > 5 3*   HEMATOCRIT %  --  26 4*  --  29 2* 19 2*  --  27 8*  --  17 8*   PLATELETS Thousands/uL  --  78*  --  101* 68*   < > 120*   < > 135*   NEUTROS ABS Thousands/µL  --  4 58  --  6 29 8 83*  --   --    < > 11 97*    < > = values in this interval not displayed           Results from last 7 days   Lab Units 01/13/22  0512 01/12/22  2213 01/12/22  1950 01/12/22  1416 01/12/22  0736 01/08/22  0606   SODIUM mmol/L 141  --  139 138 133* 140   POTASSIUM mmol/L 3 9  --  5 2 5 5* 5 0 3 8   CHLORIDE mmol/L 109*  --  108 106 103 108   CO2 mmol/L 20*  --  20* 16* 20* 24   ANION GAP mmol/L 12  --  11 16* 10 8   BUN mg/dL 49*  --  53* 62* 68* 46*   CREATININE mg/dL 1 50*  --  1 62* 2 09* 2 02* 1 23   EGFR ml/min/1 73sq m 32  --  29 21 22 41   CALCIUM mg/dL 8 0*  --  8 2* 7 6* 8 3* 8 7   CALCIUM, IONIZED mmol/L 1 10* 1 16  --   --   --   --    MAGNESIUM mg/dL 1 9  --  1 9  --  2 0  -- Results from last 7 days   Lab Units 01/12/22  1950 01/12/22  1416 01/12/22  0736   AST U/L 23 19 14   ALT U/L 30 29 22   ALK PHOS U/L 69 66 54   TOTAL PROTEIN g/dL 5 6* 5 3* 5 1*   ALBUMIN g/dL 2 7* 2 5* 2 8*   TOTAL BILIRUBIN mg/dL 0 78 0 47 0 45     Results from last 7 days   Lab Units 01/13/22  0712 01/13/22  0516 01/13/22  0026 01/12/22  2039 01/12/22  1127 01/12/22  1038 01/12/22  0554 01/11/22  2014 01/11/22  1533 01/11/22  1046 01/11/22  0601 01/10/22  2145   POC GLUCOSE mg/dl 104 63* 136 170* 349* 299* 151* 198* 256* 146* 77 247*     Results from last 7 days   Lab Units 01/13/22  0512 01/12/22  1950 01/12/22  1416 01/12/22  0736 01/08/22  0606 01/07/22  0436   GLUCOSE RANDOM mg/dL 76 182* 308* 281* 55* 63*          Results from last 7 days   Lab Units 01/12/22  1952   PH ART  7 330*   PCO2 ART mm Hg 33 5*   PO2 ART mm Hg 249 2*   HCO3 ART mmol/L 17 3*   BASE EXC ART mmol/L -7 8   O2 CONTENT ART mL/dL 14 8*   O2 HGB, ARTERIAL % 98 6*   ABG SOURCE  Line, Arterial                 Results from last 7 days   Lab Units 01/12/22  1128 01/12/22  0932 01/12/22  0736   HS TNI 0HR ng/L  --   --  12   HS TNI 2HR ng/L  --  10  --    HSTNI D2 ng/L  --  -2  --    HS TNI 4HR ng/L 8  --   --    HSTNI D4 ng/L -4  --   --          Results from last 7 days   Lab Units 01/12/22  1416   PROTIME seconds 15 2*   INR  1 24*             Results from last 7 days   Lab Units 01/12/22  1950 01/12/22  0756   LACTIC ACID mmol/L 1 9 1 9                 Results from last 7 days   Lab Units 01/12/22  0736   FERRITIN ng/mL 684*               Present on Admission:   Acute blood loss anemia   Acute respiratory failure with hypoxia (HCC)   Hemoperitoneum   Pneumonia due to COVID-19 virus   Type 2 diabetes mellitus without complication, without long-term current use of insulin (HCC)   Chronic kidney disease, stage 3b ([facility] [address] )   Ambulatory dysfunction      Ad[facility]: [facility] [J96 00]  Age/Sex: [de-identified] y o  female  Admission Orders:  Scheduled Medications:  acetaminophen, 975 mg, Oral, Q6H [address]  atorvastatin, 40 mg, Oral, HS  chlorhexidine, 15 mL, Mouth/Throat, Q12H JUAN ANTONIO  ferrous sulfate, 325 mg, Oral, Daily With Breakfast  insulin glargine, 15 Units, Subcutaneous, HS  insulin lispro, 4-20 Units, Subcutaneous, Q6H JUAN ANTONIO  lidocaine, 1 patch, Topical, Daily  methylPREDNISolone sodium succinate, 10 mg, Intravenous, Daily  polyethylene glycol, 17 g, Oral, Daily  senna-docusate sodium, 2 tablet, Oral, BID      Continuous IV Infusions:  multi-electrolyte, 75 mL/hr, Intravenous, Continuous  propofol, 5-50 mcg/kg/min, Intravenous, Titrated      PRN Meds:  bisacodyl, 10 mg, Rectal, Daily PRN  fentanyl citrate (PF), 50 mcg, Intravenous, Q2H PRN  ondansetron, 4 mg, Intravenous, Q6H PRN  oxyCODONE, 2 5 mg, Oral, Q6H PRN        INPATIENT CONSULT TO IR  IP CONSULT TO CASE MANAGEMENT    Network Utilization Review Department  ATTENTION: Please call with any questions or concerns to [phone] and carefully listen to the prompts so that you are directed to the right person  All voicemails are confidential   [facility] all requests for admission clinical reviews, approved or denied determinations and any other requests to dedicated fax number below belonging to the campus where the patient is receiving treatment   List of dedicated fax numbers for the Facilities:  [address] DENIALS (Administrative/Medical Necessity) [phone]   [address] (Maternity/NICU/Pediatrics) [phone] [address] [phone]   [address]  [address] [facility] [place] [id number] [address] [facility] [address] [phone]

## 2022-01-13 NOTE — ASSESSMENT & PLAN NOTE
Due to bleeding right IC artery related to recent Chest tube placement  Successfully treated with embolization of the bleeding right IC artery   She was transferred out of the ICU 2 days ago    Blood counts remained stable with no active bleeding on exam

## 2022-01-13 NOTE — PROGRESS NOTES
[address]  Progress Note - Authur Meo 1940, [de-identified] y o  female MRN: [id number]  Unit/Bed#: ICU 03 Encounter: [id number]  Primary Care Provider: Rick Ferrara DO   Date and time admitted to hospital: 1/12/2022  1:52 PM    Hemoperitoneum  Assessment & Plan  · Patient was reported to be lightheaded/dizzy this am at  [address] and noted to be hypotensive with  SBP 70's to 80's  BP improved with fluid bolus  · Patient received 1 unit of pRBC's prior to transport and was sent with 2nd  unit of PRBCS and 1 unit of  FFP to be given during transport  · CT chest/abd/pelvis:  1   Large amount of hemoperitoneum as described   This is most concentrated surrounding the liver   Consider contrast enhanced CT abdomen and pelvis to evaluate for parenchymal laceration  2   Bowel wall thickening at the splenic flexure   Given severe anemia, and the location of this finding in a watershed location, consider ischemic colitis  3   Lung findings compatible with COVID-19, not significantly changed  · CTA abd/pelvis:  Right perihepatic hematoma with hemoperitoneum and active extravasation arising adjacent to the 7th rib  Bilateral lower lobe groundglass opacities consistent with the patient's history of COVID pneumonia  Subcutaneous emphysema adjacent to the hematoma of unclear etiology  Numerous soft tissue nodules in the mid and lower abdominal wall similar to the prior study consistent with hematomas  ·   Patient was transferred to [facility] for IR procedure  Plan:  Continue to monitor site for acute bleeding  Transfuse for Hb<7   Consider extubating patient today if she tolerates SBT      * Acute blood loss anemia  Assessment & Plan  Recent Labs     01/12/22  1951 01/13/22  0007 01/13/22  0512   HGB 9 6* 9 5* 8 9*     Lab Results   Component Value Date    IRON 55 01/12/2022    FERRITIN 684 (H) 01/12/2022    TIBC 205 (L) 01/12/2022    CONCFE 27 01/12/2022     Patient had acute blood loss secondary to right 8th intercostal bleeding at the site of prior chest tube placement  Hb dropped from 9 ( 1/8/22) to 5 (1/12/22)  Received 2 units of pRBCs and 1 unit of FFP prior to arrival to [facility]  She received 2 units of pRBCs during IR procedure and 1 unit of platelets and 1 FFP  Henry Howell Patient is s/p IR procedure performed soon after arrival [facility] to control hemorrhage  Plan:  Continue to monitor H/H q8h  Repeat CBC in the afternoon  Transfuse for Hb >7  Monitor for persistent bleeding  Consider abd ultrasound if hematoma on right flank persists    Acute respiratory failure with hypoxia (HCC)  Assessment & Plan  · Likely due  to COVID -19 viral pneumonia and pneumothorax  · Repeat chest x-ray performed on 01/06/2022 as supplemental oxygen requirements were increasing, this demonstrated  findings consistent with known COVID pneumonia but no other acute abnormality  · Currently on 2 L     Plan:  Continue O2 supplementation to maintain SpO2 >90%  Wean appropriately  Pneumonia due to COVID-19 virus  Assessment & Plan  · COVID-19 positive on 12/16/2021; Unvaccinated  · Decadron 0 1 mg/kg completed 12/30  · Completed 14 day course of Baricitinib on 1/2/22  · Completed 5 day course of Remdesivir on 12/21/2022  · On Solu-Medrol taper  · Isolation precautions no longer needed at this time    Plan:  Continue Solu Medrol taper  Continue supplemental O2  Type 2 diabetes mellitus without complication, without long-term current use of insulin [facility])  Assessment & Plan  Lab Results   Component Value Date    HGBA1C 6 2 (H) 11/22/2021       Recent Labs     01/12/22 2039 01/13/22  0026 01/13/22  0516 01/13/22  0712   POCGLU 170* 136 63* 104       Blood Sugar Average: Last 72 hrs:       Patient is on Glipizide 10 mg qd, Metformin 1000 mg qd, and Januvia 100 mg qd  at home  Was on 15 units Lantus when inpatient at [place]      Plan:  · SSI  · Hypoglycemia protocol  · POCT glucose checks q6h   · NPO    Chronic kidney disease, stage 3b [facility])  Assessment & Plan  Lab Results   Component Value Date    EGFR 32 2022    EGFR 29 2022    EGFR 21 2022    CREATININE 1 50 (H) 2022    CREATININE 1 62 (H) 2022    CREATININE 2 09 (H) 2022     · Baseline creatinine appears to be around 1 2 - 1 6     Plan:  · Avoid nephrotoxic agents and hypotension  · Lisinopril on hold  · Trend kidney function  · Strict I&O  · Daily weights  Ambulatory dysfunction  Assessment & Plan  · Evaluation by PT and  OT from [place] recommending post acute rehab placement  Plan:   · PT/OT eval and treatment       ----------------------------------------------------------------------------------------  HPI/24hr events: No overnight events  Patient appropriate for transfer out of the ICU today?: No  Disposition: Continue Critical Care   Code Status: Level 1 - Full Code  ---------------------------------------------------------------------------------------  SUBJECTIVE  No overnight events  Patient is sedated on propofol  Patient responds to verbal commands and painful stimuli  Per nursing patient had adequate urine output and no bowel movements  Hemoglobin remained stable overnight at 8  Review of Systems   Unable to perform ROS: Intubated     Review of systems was unable to be performed secondary to Intubated and sedated    --------------------commands and-------------------------------------------------------------------  OBJECTIVE    Vitals   Vitals:    22 0400 22 0531 22 0600 22 0700   Pulse: 64  60 (!) 54   Resp: 16  16 16   Temp:       TempSrc:       SpO2: 100%  100% 100%   Weight:  60 2 kg (132 lb 11 5 oz)     Height:         Temp (24hrs), Av 9 °F (36 6 °C), Min:97 °F (36 1 °C), Max:98 6 °F (37 °C)  Current: Temperature: (!) 97 °F (36 1 °C)  Arterial Line BP: 128/54  Arterial Line MAP (mmHg): 80 mmHg    Respiratory:  SpO2 Device: O2 Device: Ventilator Invasive/non-invasive ventilation settings   Respiratory  Report   Lab Data (Last 4 hours)    None         O2/Vent Data (Last 4 hours)    None                Physical Exam  Vitals and nursing note reviewed  Constitutional:       General: She is not in acute distress  Appearance: She is not ill-appearing, toxic-appearing or diaphoretic  HENT:      Head: Normocephalic and atraumatic  Nose: Nose normal    Eyes:      General: No scleral icterus  Right eye: No discharge  Left eye: No discharge  Extraocular Movements: Extraocular movements intact  Pupils: Pupils are equal, round, and reactive to light  Neck:      Vascular: No carotid bruit  Cardiovascular:      Rate and Rhythm: Normal rate and regular rhythm  Pulses: Normal pulses  Heart sounds: Normal heart sounds  No murmur heard  No friction rub  No gallop  Pulmonary:      Effort: Pulmonary effort is normal       Breath sounds: Normal breath sounds  No wheezing, rhonchi or rales  Abdominal:      General: Bowel sounds are normal       Palpations: Abdomen is soft  Tenderness: There is no abdominal tenderness  There is no guarding or rebound  Comments: Hematomas on abdomen diffusely  Right flank area has dressing that is clean dry and intact  Musculoskeletal:         General: No swelling or tenderness  Cervical back: Normal range of motion and neck supple  No rigidity  No muscular tenderness  Right lower leg: No edema  Left lower leg: No edema  Skin:     Capillary Refill: Capillary refill takes less than 2 seconds  Coloration: Skin is not jaundiced  Findings: No bruising, erythema, lesion or rash  Neurological:      Motor: No weakness  Gait: Gait normal       Comments: Intubated and sedated  Psychiatric:      Comments: Intubated and sedated           Laboratory and Diagnostics:  Results from last 7 days   Lab Units 01/13/22  0512 01/13/22  0007 01/12/22  1951 01/12/22  1620 01/12/22  1417 01/12/22  0825 01/12/22  0736 01/08/22  0606 01/08/22  0606   WBC Thousand/uL 5 63  --  7 30 10 08 15 08* 14 57* 15 41*  --  7 82   HEMOGLOBIN g/dL 8 9* 9 5* 9 6* 6 1* 8 6* 5 3* 5 8*   < > 9 0*   HEMATOCRIT % 26 4*  --  29 2* 19 2* 27 8* 17 8* 19 2*  --  28 7*   PLATELETS Thousands/uL 78*  --  101* 68* 120* 135* 170  --  226   NEUTROS PCT % 81*  --  86* 87*  --  82*  --   --   --    MONOS PCT % 4  --  2* 5  --  5  --   --   --    MONO PCT %  --   --   --   --  2*  --   --   --   --     < > = values in this interval not displayed       Results from last 7 days   Lab Units 01/13/22  0512 01/12/22  1950 01/12/22  1416 01/12/22  0736 01/08/22  0606 01/07/22  0436 01/06/22  1052   SODIUM mmol/L 141 139 138 133* 140 140  --    POTASSIUM mmol/L 3 9 5 2 5 5* 5 0 3 8 4 2 5 1   CHLORIDE mmol/L 109* 108 106 103 108 108  --    CO2 mmol/L 20* 20* 16* 20* 24 25  --    ANION GAP mmol/L 12 11 16* 10 8 7  --    BUN mg/dL 49* 53* 62* 68* 46* 55*  --    CREATININE mg/dL 1 50* 1 62* 2 09* 2 02* 1 23 1 34*  --    CALCIUM mg/dL 8 0* 8 2* 7 6* 8 3* 8 7 8 8  --    GLUCOSE RANDOM mg/dL 76 182* 308* 281* 55* 63*  --    ALT U/L  --  30 29 22  --   --   --    AST U/L  --  23 19 14  --   --   --    ALK PHOS U/L  --  69 66 54  --   --   --    ALBUMIN g/dL  --  2 7* 2 5* 2 8*  --   --   --    TOTAL BILIRUBIN mg/dL  --  0 78 0 47 0 45  --   --   --      Results from last 7 days   Lab Units 01/13/22  0512 01/12/22  1950 01/12/22  0736   MAGNESIUM mg/dL 1 9 1 9 2 0      Results from last 7 days   Lab Units 01/12/22  1416   INR  1 24*          Results from last 7 days   Lab Units 01/12/22  1950 01/12/22  0756   LACTIC ACID mmol/L 1 9 1 9     ABG:  Results from last 7 days   Lab Units 01/12/22 1952   PH ART  7 330*   PCO2 ART mm Hg 33 5*   PO2 ART mm Hg 249 2*   HCO3 ART mmol/L 17 3*   BASE EXC ART mmol/L -7 8   ABG SOURCE  Line, Arterial     VBG:  Results from last 7 days   Lab Units 01/12/22 1952   ABG SOURCE  Line, Arterial           Micro        EKG: Sinus tachycardia  LAD  Imaging: I have personally reviewed pertinent films in PACS    Intake and Output  I/O       01/11 0701 01/12 0700 01/12 0701 01/13 0700 01/13 0701 01/14 0700    I V  (mL/kg)  1925 7 (32)     Blood  1242     Total Intake(mL/kg)  3167 7 (52 6)     Urine (mL/kg/hr)  1320     Total Output  1320     Net  +1847 7                  Height and Weights   Height: 5' (152 4 cm)     Body mass index is 25 92 kg/m²  Weight (last 2 days)     Date/Time Weight    01/13/22 0531 60 2 (132 72)    01/12/22 1954 60 2 (132 72)            Nutrition       Diet Orders   (From admission, onward)             Start     Ordered    01/12/22 1436  Diet NPO  Diet effective now        References:    Nutrtion Support Algorithm Enteral vs  Parenteral   Question Answer Comment   Diet Type NPO    RD to adjust diet per protocol?  Yes        01/12/22 1437    01/12/22 1434  Dietary nutrition supplements  Once        Question Answer Comment   Select Supplement: Glucerna-Vanilla    Frequency Breakfast, Lunch, Dinner        01/12/22 1433                  Active Medications  Scheduled Meds:  Current Facility-Administered Medications   Medication Dose Route Frequency Provider Last Rate    acetaminophen  975 mg Oral Q6H [address] Yana K BENTON Camacho      atorvastatin  40 mg Oral HS Yana K ROSA ISELA CamachoNP      bisacodyl  10 mg Rectal Daily PRN Pennelope Mulling, CRNP      chlorhexidine  15 mL Mouth/Throat Q12H [address] BENTON Monterroso      docusate sodium  100 mg Oral Daily Clent Cuff BENTON Camacho      fentanyl citrate (PF)  50 mcg Intravenous Q2H PRN BENTON Monterroso      ferrous sulfate  325 mg Oral Daily With Breakfast Clent Cuff BENTON Camacho      insulin glargine  15 Units Subcutaneous HS Yana K BENTON Camacho      insulin lispro  4-20 Units Subcutaneous Q6H [address] BENTON Monterroso      lidocaine  1 patch Topical Daily Clent Cuff BENTON Camacho      lidocaine 1% buffered   Infiltration Code/Trauma/Sedation Cesar Carrero MD      methylPREDNISolone sodium succinate  10 mg Intravenous Daily BENTON Mack      multi-electrolyte  75 mL/hr Intravenous Continuous Mortensen Lobstein, ROSA ISELANP 75 mL/hr (01/12/22 2207)    nitroGLYcerin   Intra-arterial Code/Trauma/Sedation Cesar Carrero MD      ondansetron  4 mg Intravenous Q6H PRN BENTON Mack      oxyCODONE  2 5 mg Oral Q6H PRN BENTON Mack      polyethylene glycol  17 g Oral Daily BENTON Ramos      propofol  5-50 mcg/kg/min Intravenous Titrated Amber NeerROSA ISELANP 50 mcg/kg/min (01/13/22 0253)    senna  1 tablet Oral HS Valerianoda BENTON Valderrama      verapamil   Intra-arterial Code/Trauma/Sedation Med Katerine Carrero MD       Continuous Infusions:  multi-electrolyte, 75 mL/hr, Last Rate: 75 mL/hr (01/12/22 2207)  propofol, 5-50 mcg/kg/min, Last Rate: 50 mcg/kg/min (01/13/22 0253)      PRN Meds:   bisacodyl, 10 mg, Daily PRN  fentanyl citrate (PF), 50 mcg, Q2H PRN  lidocaine 1% buffered, , Code/Trauma/Sedation Med  nitroGLYcerin, , Code/Trauma/Sedation Med  ondansetron, 4 mg, Q6H PRN  oxyCODONE, 2 5 mg, Q6H PRN  verapamil, , Code/Trauma/Sedation Med        Invasive Devices Review  Invasive Devices  Report    Central Venous Catheter Line            CVC Central Lines 01/12/22 Triple 20cm <1 day          Peripheral Intravenous Line            Peripheral IV 01/10/22 Left Forearm 2 days    Peripheral IV 01/12/22 Left Foot <1 day          Arterial Line            Arterial Line 01/12/22 Left Radial <1 day          Drain            NG/OG/Enteral Tube Orogastric Center mouth <1 day    Urethral Catheter <1 day          Airway            ETT  Hi-Lo 7 mm <1 day                Rationale for remaining devices: Hemperitoneum    ---------------------------------------------------------------------------------------  Advance Directive and Living Will:      Power of :    POLST: ---------------------------------------------------------------------------------------  Care Time Delivered:   No Critical Care time spent       Luh Hedrick MD      Portions of the record may have been created with voice recognition software  Occasional wrong word or "sound a like" substitutions may have occurred due to the inherent limitations of voice recognition software    Read the chart carefully and recognize, using context, where substitutions have occurred

## 2022-01-13 NOTE — QUICK NOTE
Spoke to Jacki Taylor the daughter who had just visited her  Update was provided and all questions were answered  She stated that she will be going out of town for 3 days and would like her brother Sarah Cintron called for update if she is unavailable

## 2022-01-13 NOTE — OCCUPATIONAL THERAPY NOTE
Occupational Therapy Note:    Patient Name: Mireille Cumminsg  [id number] Date: 1/13/2022    OT consult received  Chart reviewed  Pt intubated and sedated  Will cx and complete OT eval at later time as medically appropriate      NIURKA Loo/JOHNNY

## 2022-01-13 NOTE — ASSESSMENT & PLAN NOTE
· Stable  Baseline creatinine appears to be around 1 2 - 1 6   · DC Roberts catheter  Perform urinary retention protocol   · Stable  · Avoid hypotension  · Avoid nephrotoxic agents

## 2022-01-13 NOTE — ASSESSMENT & PLAN NOTE
Lab Results   Component Value Date    HGBA1C 6 2 (H) 11/22/2021     A1c is excellent  Blood sugars are fairly controlled  Continue sliding scale insulin  Avoid hypoglycemia

## 2022-01-13 NOTE — PHYSICAL THERAPY NOTE
PHYSICAL THERAPY NOTE          Patient Name: Ban Cheung  [id number] Date: 1/13/2022     PT consult received  Chart reviewed  Pt currently sedated & intubated hence not appropriate for PT eval  Will continue to follow as medically cleared   Sera Moore, PT

## 2022-01-13 NOTE — PLAN OF CARE
Problem: Potential for Falls  Goal: Patient will remain free of falls  Description: INTERVENTIONS:  - Educate patient/family on patient safety including physical limitations  - Instruct patient to call for assistance with activity   - Consult OT/PT to assist with strengthening/mobility   - Keep Call bell within reach  - Keep bed low and locked with side rails adjusted as appropriate  - Keep care items and personal belongings within reach  - Initiate and maintain comfort rounds  - Make Fall Risk Sign visible to staff  - Apply yellow socks and bracelet for high fall risk patients  - Consider moving patient to room near nurses station  Outcome: Progressing     Problem: MOBILITY - ADULT  Goal: Maintain or return to baseline ADL function  Description: INTERVENTIONS:  -  Assess patient's ability to carry out ADLs; assess patient's baseline for ADL function and identify physical deficits which impact ability to perform ADLs (bathing, care of mouth/teeth, toileting, grooming, dressing, etc )  - Assess/evaluate cause of self-care deficits   - Assess range of motion  - Assess patient's mobility; develop plan if impaired  - Assess patient's need for assistive devices and provide as appropriate  - Encourage maximum independence but intervene and supervise when necessary  - Involve family in performance of ADLs  - Assess for home care needs following discharge   - Consider OT consult to assist with ADL evaluation and planning for discharge  - Provide patient education as appropriate  Outcome: Progressing  Goal: Maintains/Returns to pre admission functional level  Description: INTERVENTIONS:  - Perform BMAT or MOVE assessment daily    - Set and communicate daily mobility goal to care team and patient/family/caregiver     - Collaborate with rehabilitation services on mobility goals if consulted  - Out of bed for toileting  - Record patient progress and toleration of activity level   Outcome: Progressing     Problem: RESPIRATORY - ADULT  Goal: Achieves optimal ventilation and oxygenation  Description: INTERVENTIONS:  - Assess for changes in respiratory status  - Assess for changes in mentation and behavior  - Position to facilitate oxygenation and minimize respiratory effort  - Oxygen administered by appropriate delivery if ordered  - Initiate smoking cessation education as indicated  - Encourage broncho-pulmonary hygiene including cough, deep breathe, Incentive Spirometry  - Assess the need for suctioning and aspirate as needed  - Assess and instruct to report SOB or any respiratory difficulty  - Respiratory Therapy support as indicated  Outcome: Progressing     Problem: METABOLIC, FLUID AND ELECTROLYTES - ADULT  Goal: Electrolytes maintained within normal limits  Description: INTERVENTIONS:  - Monitor labs and assess patient for signs and symptoms of electrolyte imbalances  - Administer electrolyte replacement as ordered  - Monitor response to electrolyte replacements, including repeat lab results as appropriate  - Instruct patient on fluid and nutrition as appropriate  Outcome: Progressing  Goal: Fluid balance maintained  Description: INTERVENTIONS:  - Monitor labs   - Monitor I/O and WT  - Instruct patient on fluid and nutrition as appropriate  - Assess for signs & symptoms of volume excess or deficit  Outcome: Progressing  Goal: Glucose maintained within target range  Description: INTERVENTIONS:  - Monitor Blood Glucose as ordered  - Assess for signs and symptoms of hyperglycemia and hypoglycemia  - Administer ordered medications to maintain glucose within target range  - Assess nutritional intake and initiate nutrition service referral as needed  Outcome: Progressing

## 2022-01-13 NOTE — ASSESSMENT & PLAN NOTE
· Secondary to bleeding right intercoastal artery  · This was treated with embolization by IR  · Continue serial exams    · Repeat imaging as needed if she developed hypotension, decreasing blood counts, worsening ecchymoses

## 2022-01-13 NOTE — PROGRESS NOTES
PT currently intubated on propofol at 18mL/hr provides 475cal  Recommend initiating TF Randell Alba@[employer], add 2 packs of prosource, water flushes 200mL every 6hrs provides total of 1358cal, 62g pro, 1401mL

## 2022-01-13 NOTE — ASSESSMENT & PLAN NOTE
· Decadron 0 1 mg/kg completed 12/30  · Completed 14 day course of Baricitinib on 1/2/22  · Completed 5 day course of Remdesivir on 12/21/2022  · Steroid taper completed

## 2022-01-13 NOTE — ANESTHESIA POSTPROCEDURE EVALUATION
Post-Op Assessment Note    CV Status:  Stable  Pain Score: 0    Pain management: adequate     Mental Status:  Lethargic   Hydration Status:  Euvolemic and stable   PONV Controlled:  Controlled   Airway Patency:  Patent  Airway: intubated      Post Op Vitals Reviewed: Yes      Staff: Anesthesiologist   Comments: pt remains intubated after 5 hr IR procedure with significant blood loss    full report given to ICU at bedside         No complications documented      BP      Temp      Pulse     Resp      SpO2

## 2022-01-13 NOTE — ASSESSMENT & PLAN NOTE
· COVID-19 positive on 12/16/2021;  Unvaccinated  · Decadron 0 1 mg/kg completed 12/30  · Completed 14 day course of Baricitinib on 1/2/22  · Completed 5 day course of Remdesivir on 12/21/2022  · Steroid taper completed  · Isolation precautions no longer needed at this time  · Wean off oxygen as tolerated

## 2022-01-14 LAB
ANION GAP SERPL CALCULATED.3IONS-SCNC: 9 MMOL/L (ref 4–13)
BUN SERPL-MCNC: 32 MG/DL (ref 5–25)
CALCIUM SERPL-MCNC: 7.8 MG/DL (ref 8.3–10.1)
CHLORIDE SERPL-SCNC: 111 MMOL/L (ref 100–108)
CO2 SERPL-SCNC: 24 MMOL/L (ref 21–32)
CREAT SERPL-MCNC: 1.17 MG/DL (ref 0.6–1.3)
ERYTHROCYTE [DISTWIDTH] IN BLOOD BY AUTOMATED COUNT: 16.9 % (ref 11.6–15.1)
GFR SERPL CREATININE-BSD FRML MDRD: 44 ML/MIN/1.73SQ M
GLUCOSE SERPL-MCNC: 116 MG/DL (ref 65–140)
GLUCOSE SERPL-MCNC: 156 MG/DL (ref 65–140)
GLUCOSE SERPL-MCNC: 185 MG/DL (ref 65–140)
GLUCOSE SERPL-MCNC: 247 MG/DL (ref 65–140)
GLUCOSE SERPL-MCNC: 69 MG/DL (ref 65–140)
HCT VFR BLD AUTO: 27.7 % (ref 34.8–46.1)
HGB BLD-MCNC: 9.2 G/DL (ref 11.5–15.4)
HGB BLD-MCNC: 9.3 G/DL (ref 11.5–15.4)
HGB BLD-MCNC: 9.6 G/DL (ref 11.5–15.4)
MCH RBC QN AUTO: 28 PG (ref 26.8–34.3)
MCHC RBC AUTO-ENTMCNC: 33.2 G/DL (ref 31.4–37.4)
MCV RBC AUTO: 84 FL (ref 82–98)
PLATELET # BLD AUTO: 70 THOUSANDS/UL (ref 149–390)
PMV BLD AUTO: 10.6 FL (ref 8.9–12.7)
POTASSIUM SERPL-SCNC: 3.9 MMOL/L (ref 3.5–5.3)
RBC # BLD AUTO: 3.29 MILLION/UL (ref 3.81–5.12)
SODIUM SERPL-SCNC: 144 MMOL/L (ref 136–145)
WBC # BLD AUTO: 5.17 THOUSAND/UL (ref 4.31–10.16)

## 2022-01-14 PROCEDURE — 97163 PT EVAL HIGH COMPLEX 45 MIN: CPT

## 2022-01-14 PROCEDURE — 80048 BASIC METABOLIC PNL TOTAL CA: CPT | Performed by: NURSE PRACTITIONER

## 2022-01-14 PROCEDURE — 85027 COMPLETE CBC AUTOMATED: CPT | Performed by: NURSE PRACTITIONER

## 2022-01-14 PROCEDURE — 82948 REAGENT STRIP/BLOOD GLUCOSE: CPT

## 2022-01-14 PROCEDURE — 99232 SBSQ HOSP IP/OBS MODERATE 35: CPT | Performed by: INTERNAL MEDICINE

## 2022-01-14 PROCEDURE — 85018 HEMOGLOBIN: CPT | Performed by: NURSE PRACTITIONER

## 2022-01-14 PROCEDURE — 97167 OT EVAL HIGH COMPLEX 60 MIN: CPT

## 2022-01-14 RX ORDER — ACETAMINOPHEN 325 MG/1
650 TABLET ORAL EVERY 6 HOURS PRN
Status: DISCONTINUED | OUTPATIENT
Start: 2022-01-14 | End: 2022-01-21 | Stop reason: HOSPADM

## 2022-01-14 RX ADMIN — METHYLPREDNISOLONE SODIUM SUCCINATE 10 MG: 40 INJECTION, POWDER, FOR SOLUTION INTRAMUSCULAR; INTRAVENOUS at 09:21

## 2022-01-14 RX ADMIN — PRAVASTATIN SODIUM 40 MG: 40 TABLET ORAL at 17:22

## 2022-01-14 RX ADMIN — POLYETHYLENE GLYCOL 3350 17 G: 17 POWDER, FOR SOLUTION ORAL at 09:21

## 2022-01-14 RX ADMIN — INSULIN LISPRO 4 UNITS: 100 INJECTION, SOLUTION INTRAVENOUS; SUBCUTANEOUS at 22:00

## 2022-01-14 RX ADMIN — INSULIN LISPRO 8 UNITS: 100 INJECTION, SOLUTION INTRAVENOUS; SUBCUTANEOUS at 17:22

## 2022-01-14 RX ADMIN — FERROUS SULFATE TAB 325 MG (65 MG ELEMENTAL FE) 325 MG: 325 (65 FE) TAB at 09:21

## 2022-01-14 RX ADMIN — LIDOCAINE 1 PATCH: 50 PATCH CUTANEOUS at 09:25

## 2022-01-14 RX ADMIN — INSULIN LISPRO 4 UNITS: 100 INJECTION, SOLUTION INTRAVENOUS; SUBCUTANEOUS at 09:28

## 2022-01-14 RX ADMIN — SODIUM CHLORIDE, SODIUM GLUCONATE, SODIUM ACETATE, POTASSIUM CHLORIDE, MAGNESIUM CHLORIDE, SODIUM PHOSPHATE, DIBASIC, AND POTASSIUM PHOSPHATE 75 ML/HR: .53; .5; .37; .037; .03; .012; .00082 INJECTION, SOLUTION INTRAVENOUS at 09:19

## 2022-01-14 NOTE — PLAN OF CARE
Problem: Potential for Falls  Goal: Patient will remain free of falls  Description: INTERVENTIONS:  - Educate patient/family on patient safety including physical limitations  - Instruct patient to call for assistance with activity   - Consult OT/PT to assist with strengthening/mobility   - Keep Call bell within reach  - Keep bed low and locked with side rails adjusted as appropriate  - Keep care items and personal belongings within reach  - Initiate and maintain comfort rounds  - Make Fall Risk Sign visible to staff  - Offer Toileting every 2 Hours, in advance of need  - Initiate/Maintain bed alarm  - Apply yellow socks and bracelet for high fall risk patients  - Consider moving patient to room near nurses station  Outcome: Progressing     Problem: MOBILITY - ADULT  Goal: Maintain or return to baseline ADL function  Description: INTERVENTIONS:  -  Assess patient's ability to carry out ADLs; assess patient's baseline for ADL function and identify physical deficits which impact ability to perform ADLs (bathing, care of mouth/teeth, toileting, grooming, dressing, etc )  - Assess/evaluate cause of self-care deficits   - Assess range of motion  - Assess patient's mobility; develop plan if impaired  - Assess patient's need for assistive devices and provide as appropriate  - Encourage maximum independence but intervene and supervise when necessary  - Involve family in performance of ADLs  - Assess for home care needs following discharge   - Consider OT consult to assist with ADL evaluation and planning for discharge  - Provide patient education as appropriate  Outcome: Progressing  Goal: Maintains/Returns to pre admission functional level  Description: INTERVENTIONS:  - Perform BMAT or MOVE assessment daily    - Set and communicate daily mobility goal to care team and patient/family/caregiver  - Collaborate with rehabilitation services on mobility goals if consulted  - Reposition patient every 2 hours    - Out of bed for toileting  - Record patient progress and toleration of activity level   Outcome: Progressing     Problem: RESPIRATORY - ADULT  Goal: Achieves optimal ventilation and oxygenation  Description: INTERVENTIONS:  - Assess for changes in respiratory status  - Assess for changes in mentation and behavior  - Position to facilitate oxygenation and minimize respiratory effort  - Oxygen administered by appropriate delivery if ordered  - Initiate smoking cessation education as indicated  - Encourage broncho-pulmonary hygiene including cough, deep breathe, Incentive Spirometry  - Assess the need for suctioning and aspirate as needed  - Assess and instruct to report SOB or any respiratory difficulty  - Respiratory Therapy support as indicated  Outcome: Progressing     Problem: METABOLIC, FLUID AND ELECTROLYTES - ADULT  Goal: Electrolytes maintained within normal limits  Description: INTERVENTIONS:  - Monitor labs and assess patient for signs and symptoms of electrolyte imbalances  - Administer electrolyte replacement as ordered  - Monitor response to electrolyte replacements, including repeat lab results as appropriate  - Instruct patient on fluid and nutrition as appropriate  Outcome: Progressing  Goal: Fluid balance maintained  Description: INTERVENTIONS:  - Monitor labs   - Monitor I/O and WT  - Instruct patient on fluid and nutrition as appropriate  - Assess for signs & symptoms of volume excess or deficit  Outcome: Progressing  Goal: Glucose maintained within target range  Description: INTERVENTIONS:  - Monitor Blood Glucose as ordered  - Assess for signs and symptoms of hyperglycemia and hypoglycemia  - Administer ordered medications to maintain glucose within target range  - Assess nutritional intake and initiate nutrition service referral as needed  Outcome: Progressing     Problem: Prexisting or High Potential for Compromised Skin Integrity  Goal: Skin integrity is maintained or improved  Description: INTERVENTIONS:  - Identify patients at risk for skin breakdown  - Assess and monitor skin integrity  - Assess and monitor nutrition and hydration status  - Monitor labs   - Assess for incontinence   - Turn and reposition patient  - Assist with mobility/ambulation  - Relieve pressure over bony prominences  - Avoid friction and shearing  - Provide appropriate hygiene as needed including keeping skin clean and dry  - Evaluate need for skin moisturizer/barrier cream  - Collaborate with interdisciplinary team   - Patient/family teaching  - Consider wound care consult   Outcome: Progressing     Problem: Nutrition/Hydration-ADULT  Goal: Nutrient/Hydration intake appropriate for improving, restoring or maintaining nutritional needs  Description: Monitor and assess patient's nutrition/hydration status for malnutrition  Collaborate with interdisciplinary team and initiate plan and interventions as ordered  Monitor patient's weight and dietary intake as ordered or per policy  Utilize nutrition screening tool and intervene as necessary  Determine patient's food preferences and provide high-protein, high-caloric foods as appropriate       INTERVENTIONS:  - Monitor oral intake, urinary output, labs, and treatment plans  - Assess nutrition and hydration status and recommend course of action  - Evaluate amount of meals eaten  - Assist patient with eating if necessary   - Allow adequate time for meals  - Recommend/ encourage appropriate diets, oral nutritional supplements, and vitamin/mineral supplements  - Order, calculate, and assess calorie counts as needed  - Recommend, monitor, and adjust tube feedings and TPN/PPN based on assessed needs  - Assess need for intravenous fluids  - Provide specific nutrition/hydration education as appropriate  - Include patient/family/caregiver in decisions related to nutrition  Outcome: Progressing

## 2022-01-14 NOTE — PLAN OF CARE
Problem: OCCUPATIONAL THERAPY ADULT  Goal: Performs self-care activities at highest level of function for planned discharge setting  See evaluation for individualized goals  Description: Treatment Interventions: ADL retraining,Functional transfer training,Endurance training,Patient/family training          See flowsheet documentation for full assessment, interventions and recommendations  Outcome: Progressing  Note: Limitation: Decreased ADL status,Decreased UE strength,Decreased endurance,Decreased high-level ADLs     Assessment: Pt is a [de-identified] y o  female seen for OT evaluation s/p admit to [facility] on 1/12/2022 w/ Acute blood loss anemia  Pt initially admitted to [facility] with COVID 19, and was preparing for d/c to rehab when pt experienced acute blood loss secondary to right 8th intercostal bleeding at the site of prior chest tube placement  Now s/p IR procedure  Comorbidities affecting pt's functional performance at time of assessment include: DM, obesity and CKD  Personal factors affecting pt at time of IE include:steps to enter environment, limited home support and pt is primary caregiver for w/c bound daughter  Prior to admission, pt was independent with all ADLs and functional mobility  Upon evaluation: Pt requires Minimal Assistance with RW and increased time for mobility and ADLs 2* the following deficits impacting occupational performance: decreased strength, decreased balance and decreased tolerance  Pt to benefit from continued skilled OT tx while in the hospital to address deficits as defined above and maximize level of functional independence w ADL's and functional mobility  Occupational Performance areas to address include: grooming, bathing/shower, toilet hygiene, dressing and functional mobility  The patient's raw score on the AM-PAC Daily Activity inpatient short form is 14, standardized score is 33 39, less than 39 4   Patients at this level are likely to benefit from discharge to post-acute rehabilitation services  From OT standpoint, recommendation at time of d/c would be inpatient rehab       OT Discharge Recommendation: Post acute rehabilitation services  OT - OK to Discharge:  (once medically cleared)     Monica Gallegos OTR/L

## 2022-01-14 NOTE — PLAN OF CARE
Problem: PHYSICAL THERAPY ADULT  Goal: Performs mobility at highest level of function for planned discharge setting  See evaluation for individualized goals  Description: Treatment/Interventions: Functional transfer training,LE strengthening/ROM,Elevations,Therapeutic exercise,Endurance training,Patient/family training,Equipment eval/education,Bed mobility,Gait training,Compensatory technique education,Continued evaluation,Spoke to nursing,OT          See flowsheet documentation for full assessment, interventions and recommendations  1/14/2022 1345 by Rosalio Quiles, PT  Note: Prognosis: Good  Problem List: Decreased strength,Decreased endurance,Impaired balance,Decreased mobility,Decreased skin integrity  Assessment: Layman Krabbe is a [de-identified] y o  female admitted to [facility] on 1/12/2022 for Acute blood loss anemia  PT was consulted and pt was seen on 1/14/2022 for mobility assessment and d/c planning  Pt presents w high fall risk, multiple lines  At baseline is indep without an AD  Was only able to walk a few steps/feet during admission at [facility]  Admits to being apprehensive to move during session however had good follow through of verbal mobility training and able to complete mobility tasks w increased time, rest prn  Pt is currently functioning at a min Ax1-2 for OOB mobility  Increased reliance on UE to stand and ambulate; would benefit from trial of RW next session to improve endurance and stability  Did desat w activity on 0 5L however recovered w rest and cues for proper breathing technique  Does not demonstrate ability to negotiate home environment or safely dc home at current LOF given limited social support  Pt will benefit from continued skilled IP PT to address the above mentioned impairments  in order to maximize recovery and increase functional independence when completing mobility and ADLs  Currently PT recommendations for DME include RW  At this time PT recommendations for d/c are STR    Barriers to Discharge: Inaccessible home environment,Decreased caregiver support  Barriers to Discharge Comments: steps, spouse caring for patients dtr     PT Discharge Recommendation: Post acute rehabilitation services          See flowsheet documentation for full assessment  1/14/2022 1345 by Delfina Villa PT  Note: Prognosis: Good  Problem List: Decreased strength,Decreased endurance,Impaired balance,Decreased mobility,Decreased skin integrity  Assessment: Margaret Dave is a [de-identified] y o  female admitted to [facility] on 1/12/2022 for Acute blood loss anemia  PT was consulted and pt was seen on 1/14/2022 for mobility assessment and d/c planning  Pt presents w high fall risk, multiple lines  At baseline is indep without an AD  Was only able to walk a few steps/feet during admission at [facility]  Admits to being apprehensive to move during session however had good follow through of verbal mobility training and able to complete mobility tasks w increased time, rest prn  Pt is currently functioning at a min Ax1-2 for OOB mobility  Increased reliance on UE to stand and ambulate; would benefit from trial of RW next session to improve endurance and stability  Did desat w activity on 0 5L however recovered w rest and cues for proper breathing technique  Does not demonstrate ability to negotiate home environment or safely dc home at current LOF given limited social support  Pt will benefit from continued skilled IP PT to address the above mentioned impairments  in order to maximize recovery and increase functional independence when completing mobility and ADLs  Currently PT recommendations for DME include RW  At this time PT recommendations for d/c are STR  Barriers to Discharge: Inaccessible home environment,Decreased caregiver support  Barriers to Discharge Comments: steps, spouse caring for patients dtr     PT Discharge Recommendation: Post acute rehabilitation services          See flowsheet documentation for full assessment

## 2022-01-14 NOTE — CASE MANAGEMENT
Case Management Assessment & Discharge Planning Note    Patient name Anabel Lewis  Location East 4 /E4 [facility]-* MRN [id number]  : 1940 Date 2022       Current Admission Date: 2022  Current Admission Diagnosis:Acute blood loss anemia   Patient Active Problem List    Diagnosis Date Noted    Hemoperitoneum 2022    Thrombocytosis 2021    Bacteremia due to methicillin susceptible Staphylococcus aureus (MSSA) 2021    Pneumothorax- Resolved 2021    Chronic kidney disease, stage 3b ([facility] ) 2021    Acute blood loss anemia 2021    Sepsis due to methicillin susceptible Staphylococcus aureus ([facility] ) 2021    Ambulatory dysfunction 2021    Pneumonia due to COVID-19 virus 2021    [facility] failure with [facility] ) 2021    Negative depression screening 2021    Overweight (BMI 25 0-29 9) 2021    Medicare annual wellness visit, subsequent 2020    Localized, primary osteoarthritis of hand 2020    Refused influenza vaccine 2020    Sciatica 2020    Hypertensive kidney disease with chronic kidney disease stage III ([facility] ) 2019    Type 2 diabetes mellitus without complication, without long-term current use of insulin ([facility] ) 2019    Bursitis of shoulder 2018    Groin pain, chronic, left 2018    Paresthesia of arm 2017    Back pain 2017    Muscle pain 2017    Anxiety 2017    Abnormal ECG 2016    Diverticulitis large intestine w/o perforation or abscess w/o bleeding 2016    Essential hypertension 2016    Lower abdominal pain 2016    Atrophic vaginitis 2016    Hypercholesterolemia 2016    Irritable bowel syndrome 2016    Simple chronic anemia 2016    Disorder of shoulder 2008    Articular cartilage disorder of shoulder region 2008    Loose body in joint of shoulder region 06/09/2008      LOS (days): 2  Geometric Mean LOS (GMLOS) (days): 6 80  Days to GMLOS:4 7     OBJECTIVE:  PATIENT READMITTED TO HOSPITAL  Risk of Unplanned Readmission Score: 23         Current admission status: Inpatient       Preferred Pharmacy:   [address]  Klarissa Hooper [place]  [place] [address]  Phone: [phone] Fax: [phone]    [address] [place] [address]  Phone: [phone] Fax: [phone]    Primary Care Provider: Saida Lozada DO    Primary Insurance: [address] [place] [address]  Secondary Insurance:     ASSESSMENT:  Active Health Care Agents    There are no active Health Care Agents on file  Patient Information  Admitted from[de-identified] Home  Support Systems: Spouse/significant other,Daughter  [place] of Residence: [facility] [address] do you live in?: [address] entry access options   Select all that apply : Elevator,Ramp (there is an outside elevator to a ramp for her handicap child)  Number of steps to enter home : 10  Do the steps have railings?: Yes  Type of Current Residence: 2 story home  Upon entering residence, is there a bathroom on the main floor (no further steps)?: Yes  Indicate which floors of current residence have a bathroom (select all the apply):: 2nd Floor  Number of steps to 2nd floor from main floor: One Flight  In the last 12 months, was there a time when you were not able to pay the mortgage or rent on time?: No  In the last 12 months, how many places have you lived?: 1  In the last 12 months, was there a time when you did not have a steady place to sleep or slept in a shelter (including now)?: No  Homeless/housing insecurity resource given?: N/A  Living Arrangements: Lives w/ Spouse/significant other  Is patient a ?: No    Activities of Daily Living Prior to Admission  Functional Status: Independent  Completes ADLs independently?: Yes  Ambulates independently?: Yes  Does patient use assisted devices?: No  Does patient currently own DME?: Yes  What DME does the patient currently own?: Other (Comment) (BP cuff, pulse ox)  Does patient have a history of Outpatient Therapy (PT/OT)?: Yes  Does the patient have a history of Short-Term Rehab?: No  Does patient have a history of HHC?: No  Does patient currently have Ivana Leonardo?: No         Patient Information Continued  Income Source: Pension/USP  Does patient have prescription coverage?: Yes  Within the past 12 months, you worried that your food would run out before you got the money to buy more : Never true  Within the past 12 months, the food you bought just didnt last and you didnt have money to get more : Never true  Food insecurity resource given?: N/A  Does patient receive dialysis treatments?: No  Does patient have a history of substance abuse?: No  Does patient have a history of Mental Health Diagnosis?: No         Means of Transportation  Means of Transport to Appts[de-identified] Drives Self  In the past 12 months, has lack of transportation kept you from medical appointments or from getting medications?: No  In the past 12 months, has lack of transportation kept you from meetings, work, or from getting things needed for daily living?: No  Was application for public transport provided?: N/A        DISCHARGE DETAILS:    Discharge planning discussed with[de-identified] patiet and daughter  Freedom of Choice: Yes  Comments - Freedom of Choice: anemia/ intercostal bleed from prior chest tube s/p embolization  PT rec's IP Rehab  Pt agreeable  Referrals sent   Pt is unvaccinated and has recently had COVID  CM contacted family/caregiver?: Yes  Were Treatment Team discharge recommendations reviewed with patient/caregiver?: Yes  Did patient/caregiver verbalize understanding of patient care needs?: Yes  Were patient/caregiver advised of the risks associated with not following Treatment Team discharge recommendations?: Yes         [address]         Is the patient interested in [facility] at discharge?: No    DME Referral Provided  Referral made for DME?: No    Other Referral/Resources/Interventions Provided:  Interventions: Short Term Rehab    Would you like to participate in our [facility] service program?  : No - Declined    Treatment Team Recommendation: Short Term Rehab  Discharge Destination Plan[de-identified] Short Term Rehab

## 2022-01-14 NOTE — OCCUPATIONAL THERAPY NOTE
Occupational Therapy Evaluation     Patient Name: Karyle Hermanns IXEMN'L Date: 1/14/2022     Problem List  Principal Problem:    Acute blood loss anemia  Active Problems:    Type 2 diabetes mellitus without complication, without long-term current use of insulin (HCC)    Pneumonia due to COVID-19 virus    Acute respiratory failure with hypoxia (HCC)    Ambulatory dysfunction    Chronic kidney disease, stage 3b ([facility] )    Hemoperitoneum    Past Medical History  Past Medical History:   Diagnosis Date    Diabetes mellitus ([facility] )     Diverticulitis     Postherpetic neuralgia      Past Surgical History  Past Surgical History:   Procedure Laterality Date    CHOLECYSTECTOMY      COLON SURGERY      HERNIA REPAIR      IR EMBOLIZATION (SPECIFY VESSEL OR SITE)  1/12/2022    ROTATOR CUFF REPAIR Right     VARICOSE VEIN SURGERY      Impression:  2/12/16 Brittany Edmond         01/14/22 0925   OT Last Visit   OT Visit Date 01/14/22   Note Type   Note type Evaluation   Restrictions/Precautions   Weight Bearing Precautions Per Order No   Other Precautions Multiple lines;O2;Fall Risk   Pain Assessment   Pain Assessment Tool 0-10   Pain Score No Pain   Home Living   Type of [address] [place] [address] Two level;Bed/bath upstairs;Stairs to enter with rails; Laundry in basement   [employer] Grab bars in shower; Shower chair;Grab bars around toilet   Bathroom Accessibility Accessible   Additional Comments +    Prior Function   Level of [place] Independent with ADLs and functional mobility   Lives With Spouse;Daughter  (dtr is w/c bound and requires assistance)   Receives Help From Family   ADL Assistance Independent   IADLs Independent   Falls in the last 6 months 0   Vocational Retired   Psychosocial   Psychosocial (WDL) WDL   Subjective   Subjective "I'm scared " pt reporting anxiety prior to mobility, but agreeable to therapy evaluation   ADL Grooming Assistance 5  Supervision/Setup   Grooming Deficit Setup; Increased time to complete; Teeth care   LB Dressing Assistance 3  Moderate Assistance   LB Dressing Deficit Don/doff R sock; Don/doff L sock   Toileting Assistance  1  Total Assistance   Toileting Deficit   (pt with lebron catheter )   Bed Mobility   Rolling L 5  Supervision   Additional items HOB elevated; Increased time required;Verbal cues   Supine to Sit 3  Moderate assistance   Additional items Assist x 1;HOB elevated; Increased time required;Verbal cues   Transfers   Sit to Stand 4  Minimal assistance   Additional items Assist x 1; Increased time required;Verbal cues  (with RW)   Stand to Sit 4  Minimal assistance   Additional items Assist x 1; Armrests; Verbal cues   Stand pivot 4  Minimal assistance   Additional items Assist x 1; Increased time required;Verbal cues  (with RW)   Functional Mobility   Functional Mobility 4  Minimal assistance   Additional Comments x1 assist with RW   Activity Tolerance   Activity Tolerance Patient limited by fatigue   Medical Staff Made Aware PT Amelie   Nurse Made Aware KYLER Francis   RUE Assessment   RUE Assessment WFL   LUE Assessment   LUE Assessment WFL   Hand Function   Gross Motor Coordination Functional   Fine Motor Coordination Functional   Sensation   Light Touch No apparent deficits   Vision-Basic Assessment   Current Vision Wears glasses only for reading   Vision - Complex Assessment   Acuity Able to read clock/calendar on wall without difficulty   Cognition   Overall Cognitive Status [facility]   Arousal/Participation Alert; Cooperative   Attention Within functional limits   Orientation Level Oriented X4   Memory Within functional limits   Following Commands Follows one step commands without difficulty   Assessment   Limitation Decreased ADL status; Decreased UE strength;Decreased endurance;Decreased high-level ADLs   Assessment Pt is a [de-identified] y o  female seen for OT evaluation s/p admit to [facility] on 1/12/2022 w/ Acute blood loss anemia  Pt initially admitted to [address] [place] [address] with COVID 19, and was preparing for d/c to rehab when pt experienced acute blood loss secondary to right 8th intercostal bleeding at the site of prior chest tube placement  Now s/p IR procedure  Comorbidities affecting pt's functional performance at time of assessment include: DM, obesity and CKD  Personal factors affecting pt at time of IE include:steps to enter environment, limited home support and pt is primary caregiver for w/c bound daughter  Prior to admission, pt was independent with all ADLs and functional mobility  Upon evaluation: Pt requires Minimal Assistance with RW and increased time for mobility and ADLs 2* the following deficits impacting occupational performance: decreased strength, decreased balance and decreased tolerance  Pt to benefit from continued skilled OT tx while in the hospital to address deficits as defined above and maximize level of functional independence w ADL's and functional mobility  Occupational Performance areas to address include: grooming, bathing/shower, toilet hygiene, dressing and functional mobility  The patient's raw score on the AM-PAC Daily Activity inpatient short form is 14, standardized score is 33 39, less than 39 4  Patients at this level are likely to benefit from discharge to post-acute rehabilitation services  From OT standpoint, recommendation at time of d/c would be inpatient rehab  Goals   Patient Goals to go to rehab   Plan   Treatment Interventions ADL retraining;Functional transfer training; Endurance training;Patient/family training   Goal Expiration Date 01/28/22   OT Treatment Day 0   OT Frequency 3-5x/wk   Recommendation   OT Discharge Recommendation Post acute rehabilitation services   OT - OK to Discharge   (once medically cleared)   AM-PAC Daily Activity Inpatient   Lower Body Dressing 2   Bathing 2   Toileting 1   Upper Body Dressing 2   Grooming 3   Eating 4   Daily Activity Raw Score 14 Daily Activity Standardized Score (Calc for Raw Score >=11) 33 39   AM-PAC Applied Cognition Inpatient   Following a Speech/Presentation 4   Understanding Ordinary Conversation 4   Taking Medications 3   Remembering Where Things Are Placed or Put Away 3   Remembering List of 4-5 Errands 3   Taking Care of Complicated Tasks 3   Applied Cognition Raw Score 20   Applied Cognition Standardized Score 41 76       Goals: to be met by 1/28/22    Patient will perform functional bed mobility with Standby Assistance, with HOB flat, no rails  Patient will perform functional transfers with [address] in preparation for ADL tasks, with good safety awareness  Patient will perform UB dressing task with [address] while seated  Patient will perform LB dressing task with Minimal Assistance  Patient will perform toilet transfer with Minimal Assistance  Patient will perform toileting with Minimal Assistance, including hygiene and clothing management     Roger Waddell OTR/JOHNNY

## 2022-01-14 NOTE — PHYSICAL THERAPY NOTE
PHYSICAL THERAPY EVALUATION          Patient Name: Ronnell Gomes  ZNYHQ'A Date: 1/14/2022   PT EVALUATION    [de-identified] y o     [id number]    Acute respiratory failure ([facility] ) [J96 [id number]]    Past Medical History:   Diagnosis Date    Diabetes mellitus ([facility] )     Diverticulitis     Postherpetic neuralgia      Past Surgical History:   Procedure Laterality Date    CHOLECYSTECTOMY      COLON SURGERY      HERNIA REPAIR      IR EMBOLIZATION (SPECIFY VESSEL OR SITE)  1/12/2022    ROTATOR CUFF REPAIR Right     VARICOSE VEIN SURGERY      Impression:  2/12/16 Hi Commander        01/14/22 0951   PT Last Visit   PT Visit Date 01/14/22   Note Type   Note type Evaluation   Pain Assessment   Pain Assessment Tool 0-10   Pain Score No Pain   Restrictions/Precautions   Other Precautions Multiple lines;Telemetry;O2;Fall Risk  (catheter, 0 5L)   Home Living   Type of Home House   Home Layout Two level;Bed/bath upstairs; Laundry in basement;Stairs to enter with rails   Bathroom Shower/Tub Walk-in shower   Bathroom Toilet Standard   Bathroom Equipment Shower chair;Grab bars in shower;Grab bars around toilet   [address]   (denies)   Additional Comments 6 TRAM  10 steps to second floor w unilateral HR  Prior Function   Level of [place] Independent with ADLs and functional mobility   Lives With Spouse;Daughter   Receives Help From Family   ADL Assistance Independent   IADLs Independent   Falls in the last 6 months 0   Vocational Retired   Comments indep pta without an AD  +  dtr is disabled and pt provides care to her include physical assistance   General   Additional Pertinent History pt admitted 1/12/22 for [facility]  activity as tolerated orders  Originally present to  [facility] 12/16/21 for respiratory failure d/t hypoxia, transf to [facility] for IR procedure  Had embolization 1/12, extubated post procedure 1/13   pmhx significant for DM, neuralgia, covid + 12/26   Cognition   Overall Cognitive Status WFL   Arousal/Participation Cooperative   Attention Within functional limits   Orientation Level Oriented X4   Memory Within functional limits   Following Commands Follows one step commands without difficulty   Subjective   Subjective agreeable to PT  "im nervous to get up"   RLE Assessment   RLE Assessment X  (grossly 2+ to 3-; pt seems apprehensive)   LLE Assessment   LLE Assessment X  (grossly 2+ to 3-; pt seems apprehensive)   Bed Mobility   Rolling L 5  Supervision   Additional items HOB elevated; Bedrails; Increased time required;Verbal cues   Supine to Sit 3  Moderate assistance   Additional items Assist x 1;HOB elevated; Bedrails; Increased time required;Verbal cues; Other  (trunk support)   Transfers   Sit to Stand 4  Minimal assistance   Additional items Assist x 1;Bedrails; Increased time required   Stand to Sit 4  Minimal assistance   Additional items Assist x 1; Armrests; Increased time required   Ambulation/Elevation   Gait pattern Decreased foot clearance;Narrow ERICA; Short stride; Excessively slow   Gait Assistance 4  Minimal assist   Additional items Assist x 2   Assistive Device Other (Comment)  ( HHA)   Distance 2'   Balance   Static Sitting Fair   Dynamic Sitting Fair -   Static Standing Fair -   Dynamic Standing Poor +   Ambulatory Poor +   Endurance Deficit   Endurance Deficit Yes   Endurance Deficit Description generally deconditioned  vitals during session 128/59, 68, 92% on 0 5L at rest  123/61, 88-90% at EOB  85% post amb   Activity Tolerance   Activity Tolerance Patient limited by fatigue;Treatment limited secondary to medical complications (Comment)   Medical Staff Made Aware Antionette Lara OT   Nurse Made Amber Gardner RN   Assessment   Prognosis Good   Problem List Decreased strength;Decreased endurance; Impaired balance;Decreased mobility; Decreased skin integrity   Assessment Debbie Mcpherson is a [de-identified] y o  female admitted to [facility] on 1/12/2022 for Acute blood loss anemia  PT was consulted and pt was seen on 1/14/2022 for mobility assessment and d/c planning  Pt presents w high fall risk, multiple lines  At baseline is indep without an AD  Was only able to walk a few steps/feet during admission at [address] [facility]  Admits to being apprehensive to move during session however had good follow through of verbal mobility training and able to complete mobility tasks w increased time, rest prn  Pt is currently functioning at a min Ax1-2 for OOB mobility  Increased reliance on UE to stand and ambulate; would benefit from trial of RW next session to improve endurance and stability  Did desat w activity on 0 5L however recovered w rest and cues for proper breathing technique  Does not demonstrate ability to negotiate home environment or safely dc home at current LOF given limited social support  Pt will benefit from continued skilled IP PT to address the above mentioned impairments  in order to maximize recovery and increase functional independence when completing mobility and ADLs  Currently PT recommendations for DME include RW  At this time PT recommendations for d/c are STR  Barriers to Discharge Inaccessible home environment;Decreased caregiver support   Barriers to Discharge Comments steps, spouse caring for patients dtr   Goals   Patient Goals go to rehab to get better   STG Expiration Date 01/28/22   Short Term Goal #1 1)  Pt will perform bed mobility with S demonstrating appropriate technique 100% of the time in order to improve function  2)  Perform all transfers with S demonstrating safe and appropriate technique 100% of the time in order to improve ability to negotiate safely in home environment  3) Amb with least restrictive AD > 50'x1 with S in order to demonstrate ability to negotiate in home environment  4)  Improve overall strength and balance 1/2 grade in order to optimize ability to perform functional tasks and reduce fall risk  5) Increase activity tolerance to 45 minutes in order to improve endurance to functional tasks  6)  Negotiate stairs using most appropriate technique and min A in order to be able to negotiate safely in home environment  7) PT for ongoing patient and family/caregiver education, DME needs and d/c planning in order to promote highest level of function in least restrictive environment  PT Treatment Day 0   Plan   Treatment/Interventions Functional transfer training;LE strengthening/ROM; Elevations; Therapeutic exercise; Endurance training;Patient/family training;Equipment eval/education; Bed mobility;Gait training; Compensatory technique education;Continued evaluation;Spoke to nursing;OT   PT Frequency Other (Comment)  (4-5x)   Recommendation   PT Discharge Recommendation Post acute rehabilitation services   Additional Comments The patient's AM-PAC Basic Mobility Inpatient Short Form Raw Score is 15  A Raw score of less than or equal to 16 suggests the patient may benefit from discharge to post-acute rehabilitation services  Please also refer to the recommendation of the Physical Therapist for safe discharge planning  AM-PAC Basic Mobility Inpatient   Turning in Bed Without Bedrails 3   Lying on Back to Sitting on Edge of Flat Bed 2   Moving Bed to Chair 3   Standing Up From Chair 3   Walk in Room 3  (w RW)   Climb 3-5 Stairs 1   Basic Mobility Inpatient Raw Score 15   Basic Mobility Standardized Score 36 97   Highest Level Of Mobility   JH-HLM Goal 4: Move to chair/commode   JH-HLM Highest Level of Mobility 4: Move to chair/commode   JH-HLM Goal Achieved Yes   End of Consult   Patient Position at End of Consult Bedside chair; All needs within reach   History: prolonged hospitalization, social background, fall risk, multiple lines  Exam: impairments in systems including musculoskeletal (strength), neuromuscular (balance, gait, transfers, motor function), am-pac, cardiopulmonary, cognition  Clinical: unstable/unpredictable  Complexity:high      Nicky Grumbling, PT

## 2022-01-15 LAB
ANION GAP SERPL CALCULATED.3IONS-SCNC: 6 MMOL/L (ref 4–13)
BASOPHILS # BLD AUTO: 0.01 THOUSANDS/ΜL (ref 0–0.1)
BASOPHILS NFR BLD AUTO: 0 % (ref 0–1)
BUN SERPL-MCNC: 26 MG/DL (ref 5–25)
CALCIUM SERPL-MCNC: 7.8 MG/DL (ref 8.3–10.1)
CHLORIDE SERPL-SCNC: 111 MMOL/L (ref 100–108)
CO2 SERPL-SCNC: 26 MMOL/L (ref 21–32)
CREAT SERPL-MCNC: 0.98 MG/DL (ref 0.6–1.3)
EOSINOPHIL # BLD AUTO: 0.19 THOUSAND/ΜL (ref 0–0.61)
EOSINOPHIL NFR BLD AUTO: 4 % (ref 0–6)
ERYTHROCYTE [DISTWIDTH] IN BLOOD BY AUTOMATED COUNT: 16.7 % (ref 11.6–15.1)
GFR SERPL CREATININE-BSD FRML MDRD: 54 ML/MIN/1.73SQ M
GLUCOSE SERPL-MCNC: 106 MG/DL (ref 65–140)
GLUCOSE SERPL-MCNC: 139 MG/DL (ref 65–140)
GLUCOSE SERPL-MCNC: 154 MG/DL (ref 65–140)
GLUCOSE SERPL-MCNC: 231 MG/DL (ref 65–140)
GLUCOSE SERPL-MCNC: 98 MG/DL (ref 65–140)
HCT VFR BLD AUTO: 28.5 % (ref 34.8–46.1)
HGB BLD-MCNC: 9.2 G/DL (ref 11.5–15.4)
HGB BLD-MCNC: 9.3 G/DL (ref 11.5–15.4)
HGB BLD-MCNC: 9.9 G/DL (ref 11.5–15.4)
IMM GRANULOCYTES # BLD AUTO: 0.06 THOUSAND/UL (ref 0–0.2)
IMM GRANULOCYTES NFR BLD AUTO: 1 % (ref 0–2)
LYMPHOCYTES # BLD AUTO: 1.01 THOUSANDS/ΜL (ref 0.6–4.47)
LYMPHOCYTES NFR BLD AUTO: 22 % (ref 14–44)
MCH RBC QN AUTO: 27.7 PG (ref 26.8–34.3)
MCHC RBC AUTO-ENTMCNC: 32.3 G/DL (ref 31.4–37.4)
MCV RBC AUTO: 86 FL (ref 82–98)
MONOCYTES # BLD AUTO: 0.33 THOUSAND/ΜL (ref 0.17–1.22)
MONOCYTES NFR BLD AUTO: 7 % (ref 4–12)
NEUTROPHILS # BLD AUTO: 2.96 THOUSANDS/ΜL (ref 1.85–7.62)
NEUTS SEG NFR BLD AUTO: 66 % (ref 43–75)
NRBC BLD AUTO-RTO: 0 /100 WBCS
PLATELET # BLD AUTO: 65 THOUSANDS/UL (ref 149–390)
PMV BLD AUTO: 8.8 FL (ref 8.9–12.7)
POTASSIUM SERPL-SCNC: 3.8 MMOL/L (ref 3.5–5.3)
RBC # BLD AUTO: 3.32 MILLION/UL (ref 3.81–5.12)
SODIUM SERPL-SCNC: 143 MMOL/L (ref 136–145)
WBC # BLD AUTO: 4.56 THOUSAND/UL (ref 4.31–10.16)

## 2022-01-15 PROCEDURE — 99232 SBSQ HOSP IP/OBS MODERATE 35: CPT | Performed by: INTERNAL MEDICINE

## 2022-01-15 PROCEDURE — 82948 REAGENT STRIP/BLOOD GLUCOSE: CPT

## 2022-01-15 PROCEDURE — 80048 BASIC METABOLIC PNL TOTAL CA: CPT | Performed by: INTERNAL MEDICINE

## 2022-01-15 PROCEDURE — 85025 COMPLETE CBC W/AUTO DIFF WBC: CPT | Performed by: INTERNAL MEDICINE

## 2022-01-15 PROCEDURE — 85018 HEMOGLOBIN: CPT | Performed by: NURSE PRACTITIONER

## 2022-01-15 RX ADMIN — FERROUS SULFATE TAB 325 MG (65 MG ELEMENTAL FE) 325 MG: 325 (65 FE) TAB at 08:26

## 2022-01-15 RX ADMIN — LIDOCAINE 1 PATCH: 50 PATCH CUTANEOUS at 08:26

## 2022-01-15 RX ADMIN — INSULIN LISPRO 8 UNITS: 100 INJECTION, SOLUTION INTRAVENOUS; SUBCUTANEOUS at 11:37

## 2022-01-15 RX ADMIN — PRAVASTATIN SODIUM 40 MG: 40 TABLET ORAL at 17:30

## 2022-01-15 RX ADMIN — INSULIN LISPRO 4 UNITS: 100 INJECTION, SOLUTION INTRAVENOUS; SUBCUTANEOUS at 17:30

## 2022-01-15 NOTE — ASSESSMENT & PLAN NOTE
Due to bleeding right IC artery related to recent Chest tube placement  Successfully treated with embolization of the bleeding right IC artery on 01/12/2022  She was transferred out of the ICU 2 days ago and has remained stable while on the floors    Blood counts remained stable with no active bleeding on exam

## 2022-01-15 NOTE — ASSESSMENT & PLAN NOTE
· Secondary to bleeding right intercostal artery  · This was treated with embolization by IR  · Continue serial exams    · Repeat imaging as needed if she developed hypotension, decreasing blood counts, worsening ecchymoses

## 2022-01-15 NOTE — PLAN OF CARE
Problem: Potential for Falls  Goal: Patient will remain free of falls  Description: INTERVENTIONS:  - Educate patient/family on patient safety including physical limitations  - Instruct patient to call for assistance with activity   - Consult OT/PT to assist with strengthening/mobility   - Keep Call bell within reach  - Keep bed low and locked with side rails adjusted as appropriate  - Keep care items and personal belongings within reach  - Initiate and maintain comfort rounds  - Make Fall Risk Sign visible to staff  - Offer Toileting every 2 Hours, in advance of need  - Initiate/Maintain bed alarm  - Apply yellow socks and bracelet for high fall risk patients  - Consider moving patient to room near nurses station  Outcome: Progressing     Problem: MOBILITY - ADULT  Goal: Maintain or return to baseline ADL function  Description: INTERVENTIONS:  -  Assess patient's ability to carry out ADLs; assess patient's baseline for ADL function and identify physical deficits which impact ability to perform ADLs (bathing, care of mouth/teeth, toileting, grooming, dressing, etc )  - Assess/evaluate cause of self-care deficits   - Assess range of motion  - Assess patient's mobility; develop plan if impaired  - Assess patient's need for assistive devices and provide as appropriate  - Encourage maximum independence but intervene and supervise when necessary  - Involve family in performance of ADLs  - Assess for home care needs following discharge   - Consider OT consult to assist with ADL evaluation and planning for discharge  - Provide patient education as appropriate  Outcome: Progressing  Goal: Maintains/Returns to pre admission functional level  Description: INTERVENTIONS:  - Perform BMAT or MOVE assessment daily    - Set and communicate daily mobility goal to care team and patient/family/caregiver  - Collaborate with rehabilitation services on mobility goals if consulted  - Reposition patient every 2 hours    - Out of bed for toileting  - Record patient progress and toleration of activity level   Outcome: Progressing     Problem: RESPIRATORY - ADULT  Goal: Achieves optimal ventilation and oxygenation  Description: INTERVENTIONS:  - Assess for changes in respiratory status  - Assess for changes in mentation and behavior  - Position to facilitate oxygenation and minimize respiratory effort  - Oxygen administered by appropriate delivery if ordered  - Initiate smoking cessation education as indicated  - Encourage broncho-pulmonary hygiene including cough, deep breathe, Incentive Spirometry  - Assess the need for suctioning and aspirate as needed  - Assess and instruct to report SOB or any respiratory difficulty  - Respiratory Therapy support as indicated  Outcome: Progressing     Problem: METABOLIC, FLUID AND ELECTROLYTES - ADULT  Goal: Electrolytes maintained within normal limits  Description: INTERVENTIONS:  - Monitor labs and assess patient for signs and symptoms of electrolyte imbalances  - Administer electrolyte replacement as ordered  - Monitor response to electrolyte replacements, including repeat lab results as appropriate  - Instruct patient on fluid and nutrition as appropriate  Outcome: Progressing  Goal: Fluid balance maintained  Description: INTERVENTIONS:  - Monitor labs   - Monitor I/O and WT  - Instruct patient on fluid and nutrition as appropriate  - Assess for signs & symptoms of volume excess or deficit  Outcome: Progressing  Goal: Glucose maintained within target range  Description: INTERVENTIONS:  - Monitor Blood Glucose as ordered  - Assess for signs and symptoms of hyperglycemia and hypoglycemia  - Administer ordered medications to maintain glucose within target range  - Assess nutritional intake and initiate nutrition service referral as needed  Outcome: Progressing     Problem: Prexisting or High Potential for Compromised Skin Integrity  Goal: Skin integrity is maintained or improved  Description: INTERVENTIONS:  - Identify patients at risk for skin breakdown  - Assess and monitor skin integrity  - Assess and monitor nutrition and hydration status  - Monitor labs   - Assess for incontinence   - Turn and reposition patient  - Assist with mobility/ambulation  - Relieve pressure over bony prominences  - Avoid friction and shearing  - Provide appropriate hygiene as needed including keeping skin clean and dry  - Evaluate need for skin moisturizer/barrier cream  - Collaborate with interdisciplinary team   - Patient/family teaching  - Consider wound care consult   Outcome: Progressing     Problem: Nutrition/Hydration-ADULT  Goal: Nutrient/Hydration intake appropriate for improving, restoring or maintaining nutritional needs  Description: Monitor and assess patient's nutrition/hydration status for malnutrition  Collaborate with interdisciplinary team and initiate plan and interventions as ordered  Monitor patient's weight and dietary intake as ordered or per policy  Utilize nutrition screening tool and intervene as necessary  Determine patient's food preferences and provide high-protein, high-caloric foods as appropriate       INTERVENTIONS  - Monitor oral intake, urinary output, labs, and treatment plans  - Assess nutrition and hydration status and recommend course of action  - Evaluate amount of meals eaten  - Assist patient with eating if necessary   - Allow adequate time for meals  - Recommend/ encourage appropriate diets, oral nutritional supplements, and vitamin/mineral supplements  - Order, calculate, and assess calorie counts as needed  - Recommend, monitor, and adjust tube feedings and TPN/PPN based on assessed needs  - Assess need for intravenous fluids  - Provide specific nutrition/hydration education as appropriate  - Include patient/family/caregiver in decisions related to nutrition  Outcome: Progressing

## 2022-01-15 NOTE — PROGRESS NOTES
[facility]  Progress Note - Constance Pedersenro 1940, [de-identified] y o  female MRN: [id number]  Unit/Bed#: E4 -01 Encounter: [id number]  Primary Care Provider: Zach Bergman DO   Date and time admitted to hospital: 1/12/2022  1:52 PM    * Acute blood loss anemia  Assessment & Plan  Due to bleeding right IC artery related to recent Chest tube placement  Successfully treated with embolization of the bleeding right IC artery on 01/12/2022  She was transferred out of the ICU 2 days ago and has remained stable while on the floors  Blood counts remained stable with no active bleeding on exam     Hemoperitoneum  Assessment & Plan  · Secondary to bleeding right intercostal artery  · This was treated with embolization by IR  · Continue serial exams  · Repeat imaging as needed if she developed hypotension, decreasing blood counts, worsening ecchymoses    Acute respiratory failure with hypoxia ([facility] [address] )  Assessment & Plan  · COVID-19 positive on 12/16/2021; Unvaccinated  · Decadron 0 1 mg/kg completed 12/30  · Completed 14 day course of Baricitinib on 1/2/22  · Completed 5 day course of Remdesivir on 12/21/2022  · Steroid taper completed  · Isolation precautions no longer needed at this time  · Wean off oxygen as tolerated    Chronic kidney disease, stage 3b ([address] )  Assessment & Plan  · Stable  Baseline creatinine appears to be around 1 2 - 1 6   · Roberts catheter successfully discontinued  · No urinary retention  · Avoid hypotension  · Avoid nephrotoxic agents  Ambulatory dysfunction  Assessment & Plan  · Short-term rehab recommended  · Discussed with the patient and she is agreeable      Pneumonia due to COVID-19 virus  Assessment & Plan  · Decadron 0 1 mg/kg completed 12/30  · Completed 14 day course of Baricitinib on 1/2/22  · Completed 5 day course of Remdesivir on 12/21/2022  · Steroid taper completed    Type 2 diabetes mellitus without complication, without long-term current use of insulin [facility])  Assessment & Plan  Lab Results   Component Value Date    HGBA1C 6 2 (H) 2021     A1c is excellent  Blood sugars are fairly controlled  Continue sliding scale insulin  Avoid hypoglycemia  VTE Pharmacologic Prophylaxis:   Pharmacologic: None due to bleeding  Mechanical VTE Prophylaxis in Place: Yes    Patient Centered Rounds: I have performed bedside rounds with nursing staff today  Discussions with Specialists or Other Care Team Provider:  None     Education and Discussions with Family / Patient:  Spoke with daughter Colonel Pickens  Discussed the patient's condition and plan of care  Time Spent for Care: 20 minutes  More than 50% of total time spent on counseling and coordination of care as described above  Current Length of Stay: 3 day(s)    Current Patient Status: Inpatient   Certification Statement: The patient will continue to require additional inpatient hospital stay due to Asthenia  Discharge Plan:  Rehab when available  Code Status: Level 1 - Full Code      Subjective:   Seen and examined earlier during rounds  Roberts catheter was removed yesterday  She had no urinary retention  She denies shortness of breath  Still on low-dose oxygen via nasal cannula  Not on oxygen at baseline  No abdominal pain or distension  Objective:     Vitals:   Temp (24hrs), Av 8 °F (37 1 °C), Min:98 5 °F (36 9 °C), Max:99 °F (37 2 °C)    Temp:  [98 5 °F (36 9 °C)-99 °F (37 2 °C)] 98 5 °F (36 9 °C)  HR:  [71-73] 71  Resp:  [18] 18  BP: (153-164)/(66-70) 164/67  SpO2:  [90 %-94 %] 90 %  Body mass index is 26 26 kg/m²  Input and Output Summary (last 24 hours): Intake/Output Summary (Last 24 hours) at 1/15/2022 0954  Last data filed at 1/15/2022 0731  Gross per 24 hour   Intake 605 ml   Output 1500 ml   Net -895 ml       Physical Exam:     Physical Exam  Constitutional:       Appearance: She is not ill-appearing or diaphoretic  HENT:      Head: Normocephalic and atraumatic  Nose: No rhinorrhea  Eyes:      General: No scleral icterus  Cardiovascular:      Rate and Rhythm: Regular rhythm  Heart sounds: No murmur heard  Pulmonary:      Comments: Bibasal crackles  Abdominal:      General: Abdomen is flat  Palpations: Abdomen is soft  Musculoskeletal:      Cervical back: Neck supple  Right lower leg: No edema  Left lower leg: No edema  Skin:     General: Skin is warm and dry  Neurological:      Mental Status: She is alert and oriented to person, place, and time  Psychiatric:         Mood and Affect: Mood normal          Behavior: Behavior normal      Additional Data:     Labs:    Results from last 7 days   Lab Units 01/15/22  0449   WBC Thousand/uL 4 56   HEMOGLOBIN g/dL 9 2*   HEMATOCRIT % 28 5*   PLATELETS Thousands/uL 65*   NEUTROS PCT % 66   LYMPHS PCT % 22   MONOS PCT % 7   EOS PCT % 4     Results from last 7 days   Lab Units 01/15/22  0449 01/13/22  0512 01/12/22  1950   SODIUM mmol/L 143   < > 139   POTASSIUM mmol/L 3 8   < > 5 2   CHLORIDE mmol/L 111*   < > 108   CO2 mmol/L 26   < > 20*   BUN mg/dL 26*   < > 53*   CREATININE mg/dL 0 98   < > 1 62*   ANION GAP mmol/L 6   < > 11   CALCIUM mg/dL 7 8*   < > 8 2*   ALBUMIN g/dL  --   --  2 7*   TOTAL BILIRUBIN mg/dL  --   --  0 78   ALK PHOS U/L  --   --  69   ALT U/L  --   --  30   AST U/L  --   --  23   GLUCOSE RANDOM mg/dL 98   < > 182*    < > = values in this interval not displayed  Results from last 7 days   Lab Units 01/12/22  1416   INR  1 24*     Results from last 7 days   Lab Units 01/15/22  0708 01/14/22  2109 01/14/22  1545 01/14/22  1110 01/14/22  0914 01/13/22  1453 01/13/22  0712 01/13/22  0516 01/13/22  0026 01/12/22  2039 01/12/22  1127 01/12/22  1038   POC GLUCOSE mg/dl 106 185* 247* 116 156* 98 104 63* 136 170* 349* 299*         Results from last 7 days   Lab Units 01/12/22  1950 01/12/22  0756   LACTIC ACID mmol/L 1 9 1 9           * I Have Reviewed All Lab Data Listed Above    * Additional Pertinent Lab Tests Reviewed: All Labs Within Last 24 Hours Reviewed    Imaging:    Imaging Reports Reviewed Today Include:  None    Recent Cultures (last 7 days):           Last 24 Hours Medication List:   Current Facility-Administered Medications   Medication Dose Route Frequency Provider Last Rate    acetaminophen  650 mg Oral Q6H PRN Abelardo Bryson MD      bisacodyl  10 mg Rectal Daily PRN Demetriamadina Olson, [place]      ferrous sulfate  325 mg Oral Daily With [facility], CRNP      hydrALAZINE  10 mg Intravenous Q6H PRN Demetria Hoop, CRNP      insulin lispro  4-20 Units Subcutaneous 4x Daily (AC & HS) Trinh Flores PA-C      lidocaine  1 patch Topical Daily Charles Sonday, CREUSEBIO      ondansetron  4 mg Intravenous Q6H PRN Demetria Kaelop, CRNP      oxyCODONE  2 5 mg Oral Q6H PRN Fayne Paul Sonday, CRNP      polyethylene glycol  17 g Oral Daily Charles Sonday, CRNP      pravastatin  40 mg Oral Daily With [employer] Sonday, CRNP      senna-docusate sodium  2 tablet Oral BID BENTON Ochoa          Today, Patient Was Seen By: Abelardo Bryson MD    ** Please Note: Dictation voice to text software may have been used in the creation of this document   **

## 2022-01-15 NOTE — PROGRESS NOTES
[facility]  Progress Note - Gene Laming 1940, [de-identified] y o  female MRN: [id number]  Unit/Bed#: E4 -01 Encounter: [id number]  Primary Care Provider: Funmi De La Rosa DO   Date and time admitted to hospital: 1/12/2022  1:52 PM    Hemoperitoneum  Assessment & Plan  · Secondary to bleeding right intercoastal artery  · This was treated with embolization by IR  · Continue serial exams  · Repeat imaging as needed if she developed hypotension, decreasing blood counts, worsening ecchymoses    Chronic kidney disease, stage 3b (H[facility])  Assessment & Plan  · Stable  Baseline creatinine appears to be around 1 2 - 1 6   · DC Roberts catheter  Perform urinary retention protocol   · Stable  · Avoid hypotension  · Avoid nephrotoxic agents  Ambulatory dysfunction  Assessment & Plan  · Short-term rehab recommended  Acute respiratory failure with hypoxia ([facility] [address] )  Assessment & Plan  · COVID-19 positive on 12/16/2021; Unvaccinated  · Decadron 0 1 mg/kg completed 12/30  · Completed 14 day course of Baricitinib on 1/2/22  · Completed 5 day course of Remdesivir on 12/21/2022  · Steroid taper completed  · Isolation precautions no longer needed at this time  · Wean off oxygen as tolerated    Pneumonia due to COVID-19 virus  Assessment & Plan  · Decadron 0 1 mg/kg completed 12/30  · Completed 14 day course of Baricitinib on 1/2/22  · Completed 5 day course of Remdesivir on 12/21/2022  · Steroid taper completed    Type 2 diabetes mellitus without complication, without long-term current use of insulin ([facility])  Assessment & Plan  Lab Results   Component Value Date    HGBA1C 6 2 (H) 11/22/2021     A1c is excellent  Blood sugars are fairly controlled  Continue sliding scale insulin  Avoid hypoglycemia      * Acute blood loss anemia  Assessment & Plan  Due to bleeding right IC artery related to recent Chest tube placement  Successfully treated with embolization of the bleeding right IC artery   She was transferred out of the ICU last night  Blood counts remained stable with no active bleeding on exam     VTE Pharmacologic Prophylaxis:   Pharmacologic: None due to bleeding  Mechanical VTE Prophylaxis in Place: Yes    Patient Centered Rounds: I have performed bedside rounds with nursing staff today  Discussions with Specialists or Other Care Team Provider:  Hospital course reviewed in detail    Education and Discussions with Family / Patient:  Spoke with son and gave update    Time Spent for Care: 25 minutes  More than 50% of total time spent on counseling and coordination of care as described above  Current Length of Stay: 3 day(s)    Current Patient Status: Inpatient   Certification Statement: The patient will continue to require additional inpatient hospital stay due to Rehab placement    Discharge Plan:  Short-term rehab    Code Status: Level 1 - Full Code      Subjective:   Seen and examined earlier during rounds  She was transferred out of the ICU last night  Denies shortness of breath  Denies abdominal pain    Objective:     Vitals:     Temp:  99 °F   HR:  73  Resp:   18  BP: 153/70  SpO2:  [90 %-94 %] 90 %  Body mass index is 26 26 kg/m²  Physical Exam:     Physical Exam  Constitutional:       Appearance: She is obese  She is not ill-appearing  HENT:      Head: Normocephalic and atraumatic  Nose: No rhinorrhea  Eyes:      General: No scleral icterus  Cardiovascular:      Rate and Rhythm: Regular rhythm  Heart sounds: No murmur heard  No gallop  Pulmonary:      Comments: Bibasal crackles  Abdominal:      General: There is no distension  Tenderness: There is no abdominal tenderness  Comments: Protuberant, soft   Musculoskeletal:      Cervical back: Neck supple  Right lower leg: No edema  Left lower leg: No edema  Skin:     General: Skin is warm and dry  Findings: Bruising present     Neurological:      Mental Status: She is alert and oriented to person, place, and time  Psychiatric:         Mood and Affect: Mood normal          Behavior: Behavior normal      Additional Data:     Labs:    Results from last 7 days   Lab Units 01/14/22  0449   WBC Thousand/uL 5 17   HEMOGLOBIN g/dL 9 2*   HEMATOCRIT % 27 7*   PLATELETS Thousands/uL 70*     Results from last 7 days   Lab Units 01/13/22  0512 01/12/22  1950   SODIUM mmol/L   < > 139   POTASSIUM mmol/L   < > 5 2   CHLORIDE mmol/L   < > 108   CO2 mmol/L   < > 20*   BUN mg/dL   < > 53*   CREATININE mg/dL   < > 1 62*   ANION GAP mmol/L   < > 11   CALCIUM mg/dL   < > 8 2*   ALBUMIN g/dL  --  2 7*   TOTAL BILIRUBIN mg/dL  --  0 78   ALK PHOS U/L  --  69   ALT U/L  --  30   AST U/L  --  23   GLUCOSE RANDOM mg/dL   < > 182*    < > = values in this interval not displayed  Results from last 7 days   Lab Units 01/12/22  1416   INR  1 24*     Results from last 7 days         Results from last 7 days   Lab Units 01/12/22  1950 01/12/22  0756   LACTIC ACID mmol/L 1 9 1 9           * I Have Reviewed All Lab Data Listed Above  * Additional Pertinent Lab Tests Reviewed:  All Labs Within Last 24 Hours Reviewed    Imaging:    Imaging Reports Reviewed Today Include:  IR    Recent Cultures (last 7 days):           Last 24 Hours Medication List:   Current Facility-Administered Medications   Medication Dose Route Frequency Provider Last Rate    acetaminophen  650 mg Oral Q6H PRN Champ Barrios MD      bisacodyl  10 mg Rectal Daily PRN Magdalena Klinefelter, CRNP      ferrous sulfate  325 mg Oral Daily With [facility], [place]      hydrALAZINE  10 mg Intravenous Q6H PRN Magdalena Klinefelter, CRNP      insulin lispro  4-20 Units Subcutaneous 4x Daily (AC & HS) Xin Sales PA-C      lidocaine  1 patch Topical Daily Charles Sonday, [place]      ondansetron  4 mg Intravenous Q6H PRN Magdalena Klinefelter, CRNP      oxyCODONE  2 5 mg Oral Q6H PRN Magdalena Klinefelter, CRNP      polyethylene glycol  17 g Oral Daily Magdalena Klinefelter, CRNP  pravastatin  40 mg Oral Daily With [employer], [place]      senna-docusate sodium  2 tablet Oral BID BENTON Contreras          Today, Patient Was Seen By: Aron Lou MD    ** Please Note: Dictation voice to text software may have been used in the creation of this document   **

## 2022-01-15 NOTE — ASSESSMENT & PLAN NOTE
· Stable  Baseline creatinine appears to be around 1 2 - 1 6   · Roberts catheter successfully discontinued  · No urinary retention  · Avoid hypotension  · Avoid nephrotoxic agents

## 2022-01-16 PROBLEM — D69.6 THROMBOCYTOPENIA (HCC): Status: ACTIVE | Noted: 2022-01-16

## 2022-01-16 LAB
GLUCOSE SERPL-MCNC: 114 MG/DL (ref 65–140)
GLUCOSE SERPL-MCNC: 139 MG/DL (ref 65–140)
GLUCOSE SERPL-MCNC: 266 MG/DL (ref 65–140)
GLUCOSE SERPL-MCNC: 310 MG/DL (ref 65–140)
HGB BLD-MCNC: 9.8 G/DL (ref 11.5–15.4)

## 2022-01-16 PROCEDURE — 97110 THERAPEUTIC EXERCISES: CPT

## 2022-01-16 PROCEDURE — 82948 REAGENT STRIP/BLOOD GLUCOSE: CPT

## 2022-01-16 PROCEDURE — 85018 HEMOGLOBIN: CPT | Performed by: NURSE PRACTITIONER

## 2022-01-16 PROCEDURE — 97116 GAIT TRAINING THERAPY: CPT

## 2022-01-16 PROCEDURE — 99232 SBSQ HOSP IP/OBS MODERATE 35: CPT | Performed by: INTERNAL MEDICINE

## 2022-01-16 RX ADMIN — INSULIN LISPRO 12 UNITS: 100 INJECTION, SOLUTION INTRAVENOUS; SUBCUTANEOUS at 12:34

## 2022-01-16 RX ADMIN — LIDOCAINE 1 PATCH: 50 PATCH CUTANEOUS at 08:38

## 2022-01-16 RX ADMIN — FERROUS SULFATE TAB 325 MG (65 MG ELEMENTAL FE) 325 MG: 325 (65 FE) TAB at 08:38

## 2022-01-16 RX ADMIN — PRAVASTATIN SODIUM 40 MG: 40 TABLET ORAL at 15:53

## 2022-01-16 RX ADMIN — INSULIN LISPRO 4 UNITS: 100 INJECTION, SOLUTION INTRAVENOUS; SUBCUTANEOUS at 20:46

## 2022-01-16 NOTE — PLAN OF CARE
Problem: PHYSICAL THERAPY ADULT  Goal: Performs mobility at highest level of function for planned discharge setting  See evaluation for individualized goals  Description: Treatment/Interventions: Functional transfer training,LE strengthening/ROM,Elevations,Therapeutic exercise,Endurance training,Patient/family training,Equipment eval/education,Bed mobility,Gait training,Compensatory technique education,Continued evaluation,Spoke to nursing,OT          See flowsheet documentation for full assessment, interventions and recommendations  Outcome: Progressing  Note: Prognosis: Good  Problem List: Decreased strength,Decreased endurance,Impaired balance,Decreased mobility  Assessment: Pt seen for PT treatment interventions as per PT POC  Pt showing improved tolerance to activity and improved ability to perform bed mobility, transfers and ambulation on levels with use of Rw  Pt  Is requiring less assistance and progressed with ambulation distances to 30' x2 with Rw demonstrating slow gait, decreased strides and foot clearance and NBOS  No gross lob noted  Mild brand noted requiring rest breaks  Pt reports no dizziness during mobility  Pt  Performs supine b/lle arom exercises x 10 rep  Pt performs toileting with set- up for personal hygiene and transfers on and off BSC with min assist x1  PT sowing progress toward Pt goals however continues to be functioning below base line level of mobility as pt is requiring use of rw for improved endurance, balance and safety during ambulation  The patient's AM-PAC Basic Mobility Inpatient Short Form Raw Score is 16  A Raw score of less than or equal to 16 suggests the patient may benefit from discharge to post-acute rehabilitation services  Please also refer to the recommendation of the Physical Therapist for safe discharge planning  Recommend str at d/c in order to maximize functional mobility and independence in order to return to [facility]     Barriers to Discharge: Inaccessible home environment,Decreased caregiver support  Barriers to Discharge Comments: steps, spouse caring for patients dtr     PT Discharge Recommendation: Post acute rehabilitation services          See flowsheet documentation for full assessment

## 2022-01-16 NOTE — ASSESSMENT & PLAN NOTE
· Stable    Baseline creatinine appears to be around 1 2 - 1 6   · Orberts catheter was removed successfully  · No urinary retention

## 2022-01-16 NOTE — ASSESSMENT & PLAN NOTE
Due to bleeding right intercoastal artery related to recent Chest tube placement  Successfully treated with embolization of the bleeding right IC artery on 01/12/2022  She was transferred out of the ICU 3 days ago and has remained stable while on the floors    Blood counts remained stable with no active bleeding on exam

## 2022-01-16 NOTE — PHYSICAL THERAPY NOTE
PHYSICAL THERAPY NOTE          Patient Name: Anabel Lewis  [id number] Date: 1/16/2022 01/16/22 1515   Note Type   Note Type Treatment   Pain Assessment   Pain Assessment Tool 0-10   Pain Score No Pain   Restrictions/Precautions   Other Precautions Fall Risk   General   Chart Reviewed Yes   Family/Caregiver Present No   Cognition   Overall Cognitive Status WFL   Arousal/Participation Alert; Responsive; Cooperative   Attention Within functional limits   Orientation Level Oriented X4   Memory Within functional limits   Subjective   Subjective " I was up and out of bed and to the bathroom today  Bed Mobility   Rolling R 6  Modified independent   Additional items Bedrails   Supine to Sit 4  Minimal assistance   Additional items Assist x 1;Bedrails; Increased time required;Verbal cues   Sit to Supine 5  Supervision   Additional items Assist x 1;Leg    Transfers   Sit to Stand 4  Minimal assistance   Additional items Assist x 1   Stand to Sit 4  Minimal assistance   Additional items Assist x 1   Stand pivot 4  Minimal assistance   Additional items Assist x 1   Toilet transfer 4  Minimal assistance   Additional items Assist x 1; Armrests; Increased time required;Verbal cues; Commode   Ambulation/Elevation   Gait pattern Short stride; Excessively slow;Decreased foot clearance;Narrow ERICA   Gait Assistance 4  Minimal assist   Additional items Assist x 1;Verbal cues   Assistive Device Rolling walker   Distance 30' x2   Balance   Static Sitting Good   Dynamic Sitting Fair +   Static Standing Fair   Dynamic Standing Fair -   Ambulatory Fair -   Exercises   Quad Sets Supine;10 reps;Bilateral   Hip Flexion Supine;10 reps;AROM; Bilateral   Hip Abduction Supine;10 reps;AROM; Bilateral   Hip Adduction Supine;10 reps;AROM; Bilateral   Ankle Pumps Supine;15 reps;AROM; Bilateral   Assessment   Prognosis Good   Problem List Decreased strength;Decreased endurance; Impaired balance;Decreased mobility   Assessment Pt seen for PT treatment interventions as per PT POC  Pt showing improved tolerance to activity and improved ability to perform bed mobility, transfers and ambulation on levels with use of Rw  Pt  Is requiring less assistance and progressed with ambulation distances to 30' x2 with Rw demonstrating slow gait, decreased strides and foot clearance and NBOS  No gross lob noted  Mild brand noted requiring rest breaks  Pt reports no dizziness during mobility  Pt  Performs supine b/lle arom exercises x 10 rep  Pt performs toileting with set- up for personal hygiene and transfers on and off BSC with min assist x1  PT sowing progress toward Pt goals however continues to be functioning below base line level of mobility as pt is requiring use of rw for improved endurance, balance and safety during ambulation  The patient's AM-PAC Basic Mobility Inpatient Short Form Raw Score is 16  A Raw score of less than or equal to 16 suggests the patient may benefit from discharge to post-acute rehabilitation services  Please also refer to the recommendation of the Physical Therapist for safe discharge planning  Recommend str at d/c in order to maximize functional mobility and independence in order to return to PLOF      Goals   Patient Goals get back to doing what I did before   STG Expiration Date 01/28/22   PT Treatment Day 1   Plan   PT Frequency Other (Comment)  (4-5x week)   Recommendation   PT Discharge Recommendation Post acute rehabilitation services   AM-PAC Basic Mobility Inpatient   Turning in Bed Without Bedrails 3   Lying on Back to Sitting on Edge of Flat Bed 3   Moving Bed to Chair 3   Standing Up From Chair 3   Walk in Room 3   Climb 3-5 Stairs 1   Basic Mobility Inpatient Raw Score 16   Basic Mobility Standardized Score 38 32   Highest Level Of Mobility   -[facility] Goal 5: Stand one or more mins   JH-HL Highest Level of Mobility 7: Walk 25 feet or more   JH-HLM Goal Achieved Yes   End of Consult   Patient Position at End of Consult Supine;Bed/Chair alarm activated; All needs within reach   [place], [place]

## 2022-01-16 NOTE — PROGRESS NOTES
[address] [facility]  Progress Note - Laura Mckinney 1940, [de-identified] y o  female MRN: [id number]  Unit/Bed#: E4 -01 Encounter: [id number]  Primary Care Provider: Shaneka Wallace DO   Date and time admitted to hospital: 1/12/2022  1:52 PM    * Acute blood loss anemia  Assessment & Plan  Due to bleeding right intercoastal artery related to recent Chest tube placement  Successfully treated with embolization of the bleeding right IC artery on 01/12/2022  She was transferred out of the ICU 3 days ago and has remained stable while on the floors  Blood counts remained stable with no active bleeding on exam     Hemoperitoneum  Assessment & Plan  · Secondary to bleeding right intercostal artery  · This was treated with embolization by IR  · Continue serial exams  · Repeat imaging as needed if she developed hypotension, decreasing blood counts, worsening ecchymoses    Acute respiratory failure with hypoxia ([facility] [address] )  Assessment & Plan  · COVID-19 positive on 12/16/2021; Unvaccinated  · Decadron 0 1 mg/kg completed 12/30  · Completed 14 day course of Baricitinib on 1/2/22  · Completed 5 day course of Remdesivir on 12/21/2022  · Steroid taper completed  · Isolation precautions no longer needed at this time  · Wean off oxygen as tolerated    Pneumonia due to COVID-19 virus  Assessment & Plan  · Decadron 0 1 mg/kg completed 12/30  · Completed 14 day course of Baricitinib on 1/2/22  · Completed 5 day course of Remdesivir on 12/21/2022  · Steroid taper completed    Thrombocytopenia (HCC)  Assessment & Plan  · Due to acute systemic viral illness versus consumption from bleeding/hemoperitoneum  · Repeat CBC in a m   · Monitor and transfuse as needed    Type 2 diabetes mellitus without complication, without long-term current use of insulin (HCC)  Assessment & Plan  Lab Results   Component Value Date    HGBA1C 6 2 (H) 11/22/2021     A1c is excellent  Blood sugars are fairly controlled    Continue sliding scale insulin  Avoid hypoglycemia  Chronic kidney disease, stage 3b ([facility] [address] )  Assessment & Plan  · Stable  Baseline creatinine appears to be around 1 2 - 1 6   · Roberts catheter was removed successfully  · No urinary retention    Ambulatory dysfunction  Assessment & Plan  · Short-term rehab recommended  · DC to rehab when available      VTE Pharmacologic Prophylaxis:   Pharmacologic: None due to hemoperitoneum  Mechanical VTE Prophylaxis in Place: Yes    Patient Centered Rounds: I have performed bedside rounds with nursing staff today  Discussions with Specialists or Other Care Team Provider:  None    Education and Discussions with Family / Patient:  Discussed with daughter yesterday  [place] is [place] ()  She said to call her this weekend again only if her status changes  Time Spent for Care: 20 minutes  More than 50% of total time spent on counseling and coordination of care as described above  Current Length of Stay: 4 day(s)    Current Patient Status: Inpatient   Certification Statement: The patient will continue to require additional inpatient hospital stay due to Rehab placement    Discharge Plan:  Rehab    Code Status: Level 1 - Full Code      Subjective:   Seen and examined earlier during rounds  She was still on low-dose oxygen but denies shortness of breath  Acknowledges feeling weak  She needed help setting up for the exam       Objective:     Vitals:   Temp (24hrs), Av 5 °F (36 9 °C), Min:98 °F (36 7 °C), Max:98 8 °F (37 1 °C)    Temp:  [98 °F (36 7 °C)-98 8 °F (37 1 °C)] 98 °F (36 7 °C)  HR:  [77-87] 80  Resp:  [18] 18  BP: (146-156)/(64-74) 156/74  SpO2:  [91 %-94 %] 94 %  Body mass index is 26 26 kg/m²  Input and Output Summary (last 24 hours):        Intake/Output Summary (Last 24 hours) at 2022 1643  Last data filed at 1/15/2022 1801  Gross per 24 hour   Intake --   Output 200 ml   Net -200 ml       Physical Exam:     Physical Exam  Vitals reviewed  Constitutional:       Appearance: She is obese  She is not ill-appearing or diaphoretic  HENT:      Head: Normocephalic and atraumatic  Eyes:      General: No scleral icterus  Cardiovascular:      Rate and Rhythm: Regular rhythm  Heart sounds: No murmur heard  No gallop  Pulmonary:      Comments: Bibasal crackles  Abdominal:      General: Abdomen is flat  Palpations: Abdomen is soft  Musculoskeletal:      Cervical back: Neck supple  Right lower leg: No edema  Left lower leg: No edema  Skin:     Findings: Bruising present  Comments: Faint ecchymoses of the lower abdomen  Psychiatric:         Behavior: Behavior normal        Additional Data:     Labs:    Results from last 7 days   Lab Units 01/16/22  0111 01/15/22  1628 01/15/22  0449   WBC Thousand/uL  --   --  4 56   HEMOGLOBIN g/dL 9 8*   < > 9 2*   HEMATOCRIT %  --   --  28 5*   PLATELETS Thousands/uL  --   --  65*   NEUTROS PCT %  --   --  66   LYMPHS PCT %  --   --  22   MONOS PCT %  --   --  7   EOS PCT %  --   --  4    < > = values in this interval not displayed  Results from last 7 days   Lab Units 01/15/22  0449 01/13/22  0512 01/12/22  1950   SODIUM mmol/L 143   < > 139   POTASSIUM mmol/L 3 8   < > 5 2   CHLORIDE mmol/L 111*   < > 108   CO2 mmol/L 26   < > 20*   BUN mg/dL 26*   < > 53*   CREATININE mg/dL 0 98   < > 1 62*   ANION GAP mmol/L 6   < > 11   CALCIUM mg/dL 7 8*   < > 8 2*   ALBUMIN g/dL  --   --  2 7*   TOTAL BILIRUBIN mg/dL  --   --  0 78   ALK PHOS U/L  --   --  69   ALT U/L  --   --  30   AST U/L  --   --  23   GLUCOSE RANDOM mg/dL 98   < > 182*    < > = values in this interval not displayed       Results from last 7 days   Lab Units 01/12/22  1416   INR  1 24*     Results from last 7 days   Lab Units 01/16/22  1551 01/16/22  1045 01/16/22  0657 01/15/22  2051 01/15/22  1640 01/15/22  1106 01/15/22  0708 01/14/22  2109 01/14/22  1545 01/14/22  1110 01/14/22  0914 01/13/22  1453   POC GLUCOSE mg/dl 114 310* 139 139 154* 231* 106 185* 247* 116 156* 98         Results from last 7 days   Lab Units 01/12/22  1950 01/12/22  0756   LACTIC ACID mmol/L 1 9 1 9           * I Have Reviewed All Lab Data Listed Above  * Additional Pertinent Lab Tests Reviewed: All Labs Within Last 24 Hours Reviewed    Imaging:    Imaging Reports Reviewed Today Include:  None    Recent Cultures (last 7 days):           Last 24 Hours Medication List:   Current Facility-Administered Medications   Medication Dose Route Frequency Provider Last Rate    acetaminophen  650 mg Oral Q6H PRN Misha Durham MD      bisacodyl  10 mg Rectal Daily PRN Margaret Blocker, CRNP      ferrous sulfate  325 mg Oral Daily With [facility], CRNP      hydrALAZINE  10 mg Intravenous Q6H PRN Margaret Blocker, CRNP      insulin lispro  4-20 Units Subcutaneous 4x Daily (AC & HS) Tate Scanlon PA-C      lidocaine  1 patch Topical Daily Charles Sonday, ROSA ISELANP      ondansetron  4 mg Intravenous Q6H PRN Margaret Blocker, CRNP      oxyCODONE  2 5 mg Oral Q6H PRN Gwenevere Freeman Sonday, CRNP      polyethylene glycol  17 g Oral Daily Charles Sonday, CRNP      pravastatin  40 mg Oral Daily With [facility] Sonday, CRNP      senna-docusate sodium  2 tablet Oral BID Margaret Blocker, CRNP          Today, Patient Was Seen By: Misha Durham MD    ** Please Note: Dictation voice to text software may have been used in the creation of this document   **

## 2022-01-16 NOTE — ASSESSMENT & PLAN NOTE
· Due to acute systemic viral illness versus consumption from bleeding/hemoperitoneum  · Repeat CBC in a m   · Monitor and transfuse as needed

## 2022-01-17 ENCOUNTER — APPOINTMENT (INPATIENT)
Dept: RADIOLOGY | Facility: HOSPITAL | Age: 82
DRG: 907 | End: 2022-01-17
Payer: COMMERCIAL

## 2022-01-17 LAB
ANION GAP SERPL CALCULATED.3IONS-SCNC: 10 MMOL/L (ref 4–13)
BASOPHILS # BLD AUTO: 0.01 THOUSANDS/ΜL (ref 0–0.1)
BASOPHILS NFR BLD AUTO: 0 % (ref 0–1)
BUN SERPL-MCNC: 18 MG/DL (ref 5–25)
CALCIUM SERPL-MCNC: 7.7 MG/DL (ref 8.3–10.1)
CHLORIDE SERPL-SCNC: 108 MMOL/L (ref 100–108)
CO2 SERPL-SCNC: 25 MMOL/L (ref 21–32)
CREAT SERPL-MCNC: 0.9 MG/DL (ref 0.6–1.3)
EOSINOPHIL # BLD AUTO: 0.19 THOUSAND/ΜL (ref 0–0.61)
EOSINOPHIL NFR BLD AUTO: 5 % (ref 0–6)
ERYTHROCYTE [DISTWIDTH] IN BLOOD BY AUTOMATED COUNT: 16.1 % (ref 11.6–15.1)
FLUAV RNA RESP QL NAA+PROBE: NEGATIVE
FLUBV RNA RESP QL NAA+PROBE: NEGATIVE
GFR SERPL CREATININE-BSD FRML MDRD: 60 ML/MIN/1.73SQ M
GLUCOSE SERPL-MCNC: 113 MG/DL (ref 65–140)
GLUCOSE SERPL-MCNC: 181 MG/DL (ref 65–140)
GLUCOSE SERPL-MCNC: 196 MG/DL (ref 65–140)
GLUCOSE SERPL-MCNC: 230 MG/DL (ref 65–140)
GLUCOSE SERPL-MCNC: 273 MG/DL (ref 65–140)
HCT VFR BLD AUTO: 31.4 % (ref 34.8–46.1)
HGB BLD-MCNC: 10.1 G/DL (ref 11.5–15.4)
IMM GRANULOCYTES # BLD AUTO: 0.05 THOUSAND/UL (ref 0–0.2)
IMM GRANULOCYTES NFR BLD AUTO: 1 % (ref 0–2)
LYMPHOCYTES # BLD AUTO: 0.86 THOUSANDS/ΜL (ref 0.6–4.47)
LYMPHOCYTES NFR BLD AUTO: 22 % (ref 14–44)
MCH RBC QN AUTO: 28 PG (ref 26.8–34.3)
MCHC RBC AUTO-ENTMCNC: 32.2 G/DL (ref 31.4–37.4)
MCV RBC AUTO: 87 FL (ref 82–98)
MONOCYTES # BLD AUTO: 0.42 THOUSAND/ΜL (ref 0.17–1.22)
MONOCYTES NFR BLD AUTO: 11 % (ref 4–12)
NEUTROPHILS # BLD AUTO: 2.39 THOUSANDS/ΜL (ref 1.85–7.62)
NEUTS SEG NFR BLD AUTO: 61 % (ref 43–75)
NRBC BLD AUTO-RTO: 0 /100 WBCS
NT-PROBNP SERPL-MCNC: 3034 PG/ML
PLATELET # BLD AUTO: 107 THOUSANDS/UL (ref 149–390)
PMV BLD AUTO: 9.6 FL (ref 8.9–12.7)
POTASSIUM SERPL-SCNC: 3.5 MMOL/L (ref 3.5–5.3)
RBC # BLD AUTO: 3.61 MILLION/UL (ref 3.81–5.12)
RSV RNA RESP QL NAA+PROBE: NEGATIVE
SARS-COV-2 RNA RESP QL NAA+PROBE: POSITIVE
SODIUM SERPL-SCNC: 143 MMOL/L (ref 136–145)
WBC # BLD AUTO: 3.92 THOUSAND/UL (ref 4.31–10.16)

## 2022-01-17 PROCEDURE — 71045 X-RAY EXAM CHEST 1 VIEW: CPT

## 2022-01-17 PROCEDURE — 85025 COMPLETE CBC W/AUTO DIFF WBC: CPT | Performed by: INTERNAL MEDICINE

## 2022-01-17 PROCEDURE — 82948 REAGENT STRIP/BLOOD GLUCOSE: CPT

## 2022-01-17 PROCEDURE — 83880 ASSAY OF NATRIURETIC PEPTIDE: CPT | Performed by: INTERNAL MEDICINE

## 2022-01-17 PROCEDURE — 80048 BASIC METABOLIC PNL TOTAL CA: CPT | Performed by: INTERNAL MEDICINE

## 2022-01-17 PROCEDURE — 0241U HB NFCT DS VIR RESP RNA 4 TRGT: CPT | Performed by: INTERNAL MEDICINE

## 2022-01-17 PROCEDURE — 99232 SBSQ HOSP IP/OBS MODERATE 35: CPT | Performed by: INTERNAL MEDICINE

## 2022-01-17 RX ORDER — FUROSEMIDE 10 MG/ML
20 INJECTION INTRAMUSCULAR; INTRAVENOUS ONCE
Status: COMPLETED | OUTPATIENT
Start: 2022-01-17 | End: 2022-01-17

## 2022-01-17 RX ADMIN — FUROSEMIDE 20 MG: 10 INJECTION, SOLUTION INTRAMUSCULAR; INTRAVENOUS at 12:03

## 2022-01-17 RX ADMIN — FERROUS SULFATE TAB 325 MG (65 MG ELEMENTAL FE) 325 MG: 325 (65 FE) TAB at 08:40

## 2022-01-17 RX ADMIN — INSULIN LISPRO 8 UNITS: 100 INJECTION, SOLUTION INTRAVENOUS; SUBCUTANEOUS at 21:20

## 2022-01-17 RX ADMIN — PRAVASTATIN SODIUM 40 MG: 40 TABLET ORAL at 17:20

## 2022-01-17 RX ADMIN — INSULIN LISPRO 4 UNITS: 100 INJECTION, SOLUTION INTRAVENOUS; SUBCUTANEOUS at 08:39

## 2022-01-17 RX ADMIN — LIDOCAINE 1 PATCH: 50 PATCH CUTANEOUS at 08:40

## 2022-01-17 RX ADMIN — INSULIN LISPRO 12 UNITS: 100 INJECTION, SOLUTION INTRAVENOUS; SUBCUTANEOUS at 12:03

## 2022-01-17 NOTE — ASSESSMENT & PLAN NOTE
· Due to acute systemic viral illness versus consumption from bleeding/hemoperitoneum  · Platelets improved to 107  · Will continue to monitor

## 2022-01-17 NOTE — PROGRESS NOTES
[facility]  Progress Note - Ed Murphy 1940, [de-identified] y o  female MRN: [id number]  Unit/Bed#: E4 -01 Encounter: [id number]  Primary Care Provider: Dennis Hernandez,    Date and time admitted to hospital: 1/12/2022  1:52 PM    Thrombocytopenia ([address] )  Assessment & Plan  · Due to acute systemic viral illness versus consumption from bleeding/hemoperitoneum  · Platelets improved to 107  · Will continue to monitor    Hemoperitoneum  Assessment & Plan  · Secondary to bleeding right intercostal artery  · This was treated with embolization by IR  · Continue serial exams  · Repeat imaging as needed if she developed hypotension, decreasing blood counts, worsening ecchymoses    Chronic kidney disease, stage 3b (HCC)  Assessment & Plan  · Stable  Baseline creatinine appears to be around 1 2 - 1 6   · Roberts catheter was removed successfully  · No urinary retention    Ambulatory dysfunction  Assessment & Plan  · Short-term rehab recommended  · DC to rehab when available    Acute respiratory failure with hypoxia [facility])  Assessment & Plan  · COVID-19 positive on 12/16/2021; Unvaccinated  · Decadron 0 1 mg/kg completed 12/30  · Completed 14 day course of Baricitinib on 1/2/22  · Completed 5 day course of Remdesivir on 12/21/2022  · Steroid taper completed  · Isolation precautions no longer needed at this time  · Pt is currently requiring 2 liters of oxygen as she was 87%  · Will repeat cxr and crp  · Encourage incentive spirometry  · Wean oxygen as tolerated  Pneumonia due to COVID-19 virus  Assessment & Plan  · Decadron 0 1 mg/kg completed 12/30  · Completed 14 day course of Baricitinib on 1/2/22  · Completed 5 day course of Remdesivir on 12/21/2022  · Steroid taper completed    Type 2 diabetes mellitus without complication, without long-term current use of insulin (HCC)  Assessment & Plan  Lab Results   Component Value Date    HGBA1C 6 2 (H) 11/22/2021     A1c is excellent    Blood sugars are fairly controlled  Continue sliding scale insulin  Avoid hypoglycemia  * Acute blood loss anemia  Assessment & Plan  Due to bleeding right intercoastal artery related to recent Chest tube placement  Successfully treated with embolization of the bleeding right IC artery on 2022  She was transferred out of the ICU and has remained stable  Blood counts remained stable at 10 1 with no active bleeding on exam         VTE Pharmacologic Prophylaxis: contraindicated due to recent bleed  Mechanical VTE Prophylaxis in Place: Yes    Time Spent for Care: 20 minutes  More than 50% of total time spent on counseling and coordination of care as described above  Current Length of Stay: 5 day(s)  Current Patient Status: Inpatient     Discharge Plan / Estimated Discharge Date: awaiting str  Code Status: Level 1 - Full Code      Subjective:   Pt seen and examined  Discussed with her nurse  Pt had decrease o2 sat after ambulating from bathroom to bed  2 liters of oxygen was placed  She is using incentive spirometry  No other problems were reported per nursing or pt    Objective:     Vitals:   Temp (24hrs), Av 3 °F (36 8 °C), Min:98 °F (36 7 °C), Max:98 5 °F (36 9 °C)    Temp:  [98 °F (36 7 °C)-98 5 °F (36 9 °C)] 98 5 °F (36 9 °C)  HR:  [80-87] 81  Resp:  [18] 18  BP: (150-169)/(74-77) 169/77  SpO2:  [88 %-94 %] 88 %  Body mass index is 26 26 kg/m²  Input and Output Summary (last 24 hours):     No intake or output data in the 24 hours ending 22 0835    Physical Exam:   Physical Exam  Constitutional:       Appearance: Normal appearance  HENT:      Head: Normocephalic and atraumatic  Eyes:      Extraocular Movements: Extraocular movements intact  Pupils: Pupils are equal, round, and reactive to light  Cardiovascular:      Rate and Rhythm: Normal rate and regular rhythm  Heart sounds: No murmur heard  No friction rub  No gallop      Pulmonary:      Effort: Pulmonary effort is normal  No respiratory distress  Breath sounds: Normal breath sounds  No wheezing or rales  Abdominal:      General: Bowel sounds are normal  There is no distension  Palpations: Abdomen is soft  Tenderness: There is no abdominal tenderness  There is no guarding  Musculoskeletal:      Right lower leg: No edema  Left lower leg: No edema  Neurological:      Mental Status: She is alert and oriented to person, place, and time  Additional Data:     Labs:  Results from last 7 days   Lab Units 01/17/22  0508   WBC Thousand/uL 3 92*   HEMOGLOBIN g/dL 10 1*   HEMATOCRIT % 31 4*   PLATELETS Thousands/uL 107*   NEUTROS PCT % 61   LYMPHS PCT % 22   MONOS PCT % 11   EOS PCT % 5     Results from last 7 days   Lab Units 01/17/22  0508 01/13/22  0512 01/12/22  1950   SODIUM mmol/L 143   < > 139   POTASSIUM mmol/L 3 5   < > 5 2   CHLORIDE mmol/L 108   < > 108   CO2 mmol/L 25   < > 20*   BUN mg/dL 18   < > 53*   CREATININE mg/dL 0 90   < > 1 62*   ANION GAP mmol/L 10   < > 11   CALCIUM mg/dL 7 7*   < > 8 2*   ALBUMIN g/dL  --   --  2 7*   TOTAL BILIRUBIN mg/dL  --   --  0 78   ALK PHOS U/L  --   --  69   ALT U/L  --   --  30   AST U/L  --   --  23   GLUCOSE RANDOM mg/dL 181*   < > 182*    < > = values in this interval not displayed       Results from last 7 days   Lab Units 01/12/22  1416   INR  1 24*     Results from last 7 days   Lab Units 01/17/22  0715 01/16/22  2042 01/16/22  1551 01/16/22  1045 01/16/22  0657 01/15/22  2051 01/15/22  1640 01/15/22  1106 01/15/22  0708 01/14/22  2109 01/14/22  1545 01/14/22  1110   POC GLUCOSE mg/dl 196* 266* 114 310* 139 139 154* 231* 106 185* 247* 116         Results from last 7 days   Lab Units 01/12/22  1950 01/12/22  0756   LACTIC ACID mmol/L 1 9 1 9     Recent Cultures (last 7 days):           Lines/Drains:  Invasive Devices  Report    Peripheral Intravenous Line            Peripheral IV 01/15/22 Right;Ventral (anterior) Wrist 1 day              Last 24 Hours Medication List:   Current Facility-Administered Medications   Medication Dose Route Frequency Provider Last Rate    acetaminophen  650 mg Oral Q6H PRN Gui Gutierrez MD      bisacodyl  10 mg Rectal Daily PRN Nitza Market, CRNP      ferrous sulfate  325 mg Oral Daily With [facility], CRNP      hydrALAZINE  10 mg Intravenous Q6H PRN OpenZine [employer], CRNP      insulin lispro  4-20 Units Subcutaneous 4x Daily (AC & HS) Yun Edwards PA-C      lidocaine  1 patch Topical Daily Charles Sonday, [place]      ondansetron  4 mg Intravenous Q6H PRN Nitza Market, CRNP      oxyCODONE  2 5 mg Oral Q6H PRN Nitza Market, CRNP      polyethylene glycol  17 g Oral Daily Nitza Market, CRNP      pravastatin  40 mg Oral Daily With Luis A Tipton Sonday, CRNP      senna-docusate sodium  2 tablet Oral BID Nitza Market, CRNP          Today, Patient Was Seen By: Haydee Gowers, DO

## 2022-01-17 NOTE — ASSESSMENT & PLAN NOTE
· COVID-19 positive on 12/16/2021; Unvaccinated  · Decadron 0 1 mg/kg completed 12/30  · Completed 14 day course of Baricitinib on 1/2/22  · Completed 5 day course of Remdesivir on 12/21/2022  · Steroid taper completed  · Isolation precautions no longer needed at this time  · Pt is currently requiring 2 liters of oxygen as she was 87%  · Will repeat cxr and crp  · Encourage incentive spirometry  · Wean oxygen as tolerated

## 2022-01-17 NOTE — ASSESSMENT & PLAN NOTE
· Stable    Baseline creatinine appears to be around 1 2 - 1 6   · Roberts catheter was removed successfully  · No urinary retention

## 2022-01-17 NOTE — CASE MANAGEMENT
Case Management Discharge Planning Note    Patient name Yane Patterson  Location [facility] 4 /E4 [facility] [facility]-* MRN [id number]  : 1940 Date 2022       Current Admission Date: 2022  Current Admission Diagnosis:Acute blood loss anemia   Patient Active Problem List    Diagnosis Date Noted    Thrombocytopenia ([facility] [address] ) 2022    Hemoperitoneum 2022    Thrombocytosis 2021    Bacteremia due to methicillin susceptible Staphylococcus aureus (MSSA) 2021    Pneumothorax- Resolved 2021    Chronic kidney disease, stage 3b ([facility] [address] ) 2021    Acute blood loss anemia 2021    Sepsis due to methicillin susceptible Staphylococcus aureus ([facility] [address] ) 2021    Ambulatory dysfunction 2021    Pneumonia due to COVID-19 virus 2021    [facility] 2021    Negative depression screening 2021    Overweight (BMI 25 0-29 9) 2021    Medicare annual wellness visit, subsequent 2020    Localized, primary osteoarthritis of hand 2020    Refused influenza vaccine 2020    Sciatica 2020    Hypertensive kidney disease with chronic kidney disease stage III ([facility] [address] ) 2019    Type 2 diabetes mellitus without complication, without long-term current use of insulin ([facility] [address] ) 2019    Bursitis of shoulder 2018    Groin pain, chronic, left 2018    Paresthesia of arm 2017    Back pain 2017    Muscle pain 2017    Anxiety 2017    Abnormal ECG 2016    Diverticulitis large intestine w/o perforation or abscess w/o bleeding 2016    Essential hypertension 2016    Lower abdominal pain 2016    Atrophic vaginitis 2016    Hypercholesterolemia 2016    Irritable bowel syndrome 2016    Simple chronic anemia 2016    Disorder of shoulder 2008    Articular cartilage disorder of shoulder region 2008    Loose body in joint of shoulder region 06/09/2008      LOS (days): 5  Geometric Mean LOS (GMLOS) (days): 6 80  Days to GMLOS:1 9     OBJECTIVE:  Risk of Unplanned Readmission Score: 19         Current admission status: Inpatient   Preferred Pharmacy:   [address]  [place] [address], [address]  [place] [address]  Phone: [phone] Fax: [phone] [address] , [place] [address]  Phone: [phone] Fax: [phone]    Primary Care Provider: Aundrea Rock DO    Primary Insurance: [facility] REP  Secondary Insurance:     DISCHARGE DETAILS:    Discharge planning discussed with[de-identified] pt     Comments - Freedom of Choice: Met with pt to discuss IP Rehab  Pt stated she wants to go to IP Rehab in [facility]  Her first choice is [facility] and then [facility]  Referrals were made today       IMM Given (Date):: 01/17/22  IMM Given to[de-identified] Patient (Read IMM, copy given and copy put in bin to be scan )

## 2022-01-17 NOTE — UTILIZATION REVIEW
Continued Stay Review    Date:  1/17/22                    Current Patient Class: IP  Current Level of Care: MS as of 1/13  HPI:81 y o  female initially admitted on 1/12 with acute respiratory failure d/t covid  Assessment/Plan:   Imaging showing extensive GG opacity from Covid  Will be treating with IV Lasix 20 mg x 1 today  BNP elevated to 3034  Platelets are improving, lebron was removed and no urinary retention  All covid directed meds are completed  She is on 2L NC oxygen - had desat post ambulation to BR  Is doing well post IR embolization bleeding R IC artery on 1/12        Vital Signs:   01/17/22 1500 98 5 °F (36 9 °C) 93 18 155/76 100 96 % 28 2 L/min Nasal cannula Lying   01/17/22 0700 98 5 °F (36 9 °C) 81 18 169/77 111 88 % Abnormal  -- -- None (Room air) Lying   01/16/22 2300 -- 87 18 150/77 -- -- -- -- -- Lying   01/16/22 1515 98 °F (36 7 °C) 80 18 156/74 -- 94 % -- -- None (Room air) Lying   01/16/22 0700 98 8 °F (37 1 °C) 77 18 146/66 95 93 % -- -- Nasal cannula Lying   01/15/22 2337 98 7 °F (37 1 °C) 87 18 153/64 101 91 % -- -- None (Room air) Lying   01/15/22 1528 98 9 °F (37 2 °C) 87 18 165/73 105 90 % -- -- None (Room air) Lying   01/15/22 0752 -- -- -- -- -- -- 24 1 L/min Nasal cannula --   01/15/22 0700 98 5 °F (36 9 °C) 71 18 164/67 96 90 % 24 1 L/min Nasal cannula Lying     Pertinent Labs/Diagnostic Results:   Results from last 7 days   Lab Units 01/17/22  1033   SARS-COV-2  Positive*     Results from last 7 days   Lab Units 01/17/22  0508 01/16/22  0111 01/15/22  1628 01/15/22  0449 01/14/22  2357 01/14/22  1603 01/14/22  0533 01/13/22  0848 01/13/22  0512 01/13/22  0007 01/12/22  1951   WBC Thousand/uL 3 92*  --   --  4 56  --   --  5 17   < > 5 63   < > 7 30   HEMOGLOBIN g/dL 10 1* 9 8* 9 9* 9 2* 9 3*   < > 9 2*   < > 8 9*   < > 9 6*   HEMATOCRIT % 31 4*  --   --  28 5*  --   --  27 7*  --  26 4*  --  29 2*   PLATELETS Thousands/uL 107*  --   --  65*  --   --  70*   < > 78*   < > 101*   NEUTROS ABS Thousands/µL 2 39  --   --  2 96  --   --   --   --  4 58   < > 6 29    < > = values in this interval not displayed  Results from last 7 days   Lab Units 01/17/22  0508 01/15/22  0449 01/14/22  0533 01/13/22  0512 01/12/22  2213 01/12/22  1950 01/12/22  1416 01/12/22  0736   SODIUM mmol/L 143 143 144 141  --  139   < > 133*   POTASSIUM mmol/L 3 5 3 8 3 9 3 9  --  5 2   < > 5 0   CHLORIDE mmol/L 108 111* 111* 109*  --  108   < > 103   CO2 mmol/L 25 26 24 20*  --  20*   < > 20*   ANION GAP mmol/L 10 6 9 12  --  11   < > 10   BUN mg/dL 18 26* 32* 49*  --  53*   < > 68*   CREATININE mg/dL 0 90 0 98 1 17 1 50*  --  1 62*   < > 2 02*   EGFR ml/min/1 73sq m 60 54 44 32  --  29   < > 22   CALCIUM mg/dL 7 7* 7 8* 7 8* 8 0*  --  8 2*   < > 8 3*   CALCIUM, IONIZED mmol/L  --   --   --  1 10* 1 16  --   --   --    MAGNESIUM mg/dL  --   --   --  1 9  --  1 9  --  2 0    < > = values in this interval not displayed       Results from last 7 days   Lab Units 01/12/22  1950 01/12/22  1416 01/12/22  0736   AST U/L 23 19 14   ALT U/L 30 29 22   ALK PHOS U/L 69 66 54   TOTAL PROTEIN g/dL 5 6* 5 3* 5 1*   ALBUMIN g/dL 2 7* 2 5* 2 8*   TOTAL BILIRUBIN mg/dL 0 78 0 47 0 45     Results from last 7 days   Lab Units 01/17/22  1053 01/17/22  0715 01/16/22  2042 01/16/22  1551 01/16/22  1045 01/16/22  0657 01/15/22  2051 01/15/22  1640 01/15/22  1106 01/15/22  0708 01/14/22  2109 01/14/22  1545   POC GLUCOSE mg/dl 273* 196* 266* 114 310* 139 139 154* 231* 106 185* 247*     Results from last 7 days   Lab Units 01/17/22  0508 01/15/22  0449 01/14/22  0533 01/13/22  0512 01/12/22  1950 01/12/22  1416 01/12/22  0736   GLUCOSE RANDOM mg/dL 181* 98 69 76 182* 308* 281*     Results from last 7 days   Lab Units 01/12/22 1952   PH ART  7 330*   PCO2 ART mm Hg 33 5*   PO2 ART mm Hg 249 2*   HCO3 ART mmol/L 17 3*   BASE EXC ART mmol/L -7 8   O2 CONTENT ART mL/dL 14 8*   O2 HGB, ARTERIAL % 98 6*   ABG SOURCE  Line, Arterial Results from last 7 days   Lab Units 01/12/22  1128 01/12/22  0932 01/12/22  0736   HS TNI 0HR ng/L  --   --  12   HS TNI 2HR ng/L  --  10  --    HSTNI D2 ng/L  --  -2  --    HS TNI 4HR ng/L 8  --   --    HSTNI D4 ng/L -4  --   --          Results from last 7 days   Lab Units 01/12/22  1416   PROTIME seconds 15 2*   INR  1 24*             Results from last 7 days   Lab Units 01/12/22  1950 01/12/22  0756   LACTIC ACID mmol/L 1 9 1 9             Results from last 7 days   Lab Units 01/17/22  0914   NT-PRO BNP pg/mL 3,034*     Results from last 7 days   Lab Units 01/12/22  0736   FERRITIN ng/mL 684*     Results from last 7 days   Lab Units 01/17/22  1033   INFLUENZA A PCR  Negative   INFLUENZA B PCR  Negative   RSV PCR  Negative       Medications:   Scheduled Medications:  ferrous sulfate, 325 mg, Oral, Daily With Breakfast  insulin lispro, 4-20 Units, Subcutaneous, 4x Daily (AC & HS)  lidocaine, 1 patch, Topical, Daily  polyethylene glycol, 17 g, Oral, Daily  pravastatin, 40 mg, Oral, Daily With Dinner  senna-docusate sodium, 2 tablet, Oral, BID    Continuous IV Infusions:     PRN Meds:  acetaminophen, 650 mg, Oral, Q6H PRN  bisacodyl, 10 mg, Rectal, Daily PRN  hydrALAZINE, 10 mg, Intravenous, Q6H PRN  ondansetron, 4 mg, Intravenous, Q6H PRN  oxyCODONE, 2 5 mg, Oral, Q6H PRN    Discharge Plan: rehab    Network Utilization Review Department  ATTENTION: Please call with any questions or concerns to [phone] and carefully listen to the prompts so that you are directed to the right person  All voicemails are confidential   Farrel Bodily all requests for admission clinical reviews, approved or denied determinations and any other requests to dedicated fax number below belonging to the campus where the patient is receiving treatment   List of dedicated fax numbers for the Facilities:  [address] [place] [address] DENIALS (Administrative/Medical Necessity) [phone]   [address] [place] [address] (Maternity/NICU/Pediatrics) 261 [facility],7Th Floor [facility] 40 125 [facility]  [phone]   Marcel Dotson 50 [address] [facility] [place] [address] [id number] [place] [address] Samantha Tereza [address] [facility] [address] [facility] [phone]

## 2022-01-17 NOTE — QUICK NOTE
Reviewed cxr showing extensive ground glass opacity from covid  bnp is elevated at 3,034  Will dose lasix 20mg IV x1

## 2022-01-17 NOTE — ASSESSMENT & PLAN NOTE
Due to bleeding right intercoastal artery related to recent Chest tube placement  Successfully treated with embolization of the bleeding right IC artery on 01/12/2022  She was transferred out of the ICU and has remained stable     Blood counts remained stable at 10 1 with no active bleeding on exam

## 2022-01-18 LAB
ANION GAP SERPL CALCULATED.3IONS-SCNC: 8 MMOL/L (ref 4–13)
BUN SERPL-MCNC: 21 MG/DL (ref 5–25)
CALCIUM SERPL-MCNC: 8.2 MG/DL (ref 8.3–10.1)
CHLORIDE SERPL-SCNC: 107 MMOL/L (ref 100–108)
CO2 SERPL-SCNC: 27 MMOL/L (ref 21–32)
CREAT SERPL-MCNC: 0.97 MG/DL (ref 0.6–1.3)
CRP SERPL QL: 22.5 MG/L
ERYTHROCYTE [DISTWIDTH] IN BLOOD BY AUTOMATED COUNT: 16.3 % (ref 11.6–15.1)
GFR SERPL CREATININE-BSD FRML MDRD: 55 ML/MIN/1.73SQ M
GLUCOSE SERPL-MCNC: 156 MG/DL (ref 65–140)
GLUCOSE SERPL-MCNC: 162 MG/DL (ref 65–140)
GLUCOSE SERPL-MCNC: 183 MG/DL (ref 65–140)
GLUCOSE SERPL-MCNC: 236 MG/DL (ref 65–140)
GLUCOSE SERPL-MCNC: 244 MG/DL (ref 65–140)
HCT VFR BLD AUTO: 33.1 % (ref 34.8–46.1)
HGB BLD-MCNC: 10.6 G/DL (ref 11.5–15.4)
MCH RBC QN AUTO: 27.8 PG (ref 26.8–34.3)
MCHC RBC AUTO-ENTMCNC: 32 G/DL (ref 31.4–37.4)
MCV RBC AUTO: 87 FL (ref 82–98)
PLATELET # BLD AUTO: 120 THOUSANDS/UL (ref 149–390)
PMV BLD AUTO: 8.8 FL (ref 8.9–12.7)
POTASSIUM SERPL-SCNC: 3.3 MMOL/L (ref 3.5–5.3)
RBC # BLD AUTO: 3.81 MILLION/UL (ref 3.81–5.12)
SODIUM SERPL-SCNC: 142 MMOL/L (ref 136–145)
WBC # BLD AUTO: 4.14 THOUSAND/UL (ref 4.31–10.16)

## 2022-01-18 PROCEDURE — 99232 SBSQ HOSP IP/OBS MODERATE 35: CPT | Performed by: INTERNAL MEDICINE

## 2022-01-18 PROCEDURE — 97530 THERAPEUTIC ACTIVITIES: CPT

## 2022-01-18 PROCEDURE — 82948 REAGENT STRIP/BLOOD GLUCOSE: CPT

## 2022-01-18 PROCEDURE — 85027 COMPLETE CBC AUTOMATED: CPT | Performed by: INTERNAL MEDICINE

## 2022-01-18 PROCEDURE — 86140 C-REACTIVE PROTEIN: CPT | Performed by: INTERNAL MEDICINE

## 2022-01-18 PROCEDURE — 80048 BASIC METABOLIC PNL TOTAL CA: CPT | Performed by: INTERNAL MEDICINE

## 2022-01-18 PROCEDURE — 97110 THERAPEUTIC EXERCISES: CPT

## 2022-01-18 RX ORDER — POTASSIUM CHLORIDE 20 MEQ/1
40 TABLET, EXTENDED RELEASE ORAL ONCE
Status: COMPLETED | OUTPATIENT
Start: 2022-01-18 | End: 2022-01-18

## 2022-01-18 RX ORDER — FUROSEMIDE 10 MG/ML
20 INJECTION INTRAMUSCULAR; INTRAVENOUS ONCE
Status: COMPLETED | OUTPATIENT
Start: 2022-01-18 | End: 2022-01-18

## 2022-01-18 RX ADMIN — INSULIN LISPRO 8 UNITS: 100 INJECTION, SOLUTION INTRAVENOUS; SUBCUTANEOUS at 11:53

## 2022-01-18 RX ADMIN — FUROSEMIDE 20 MG: 10 INJECTION, SOLUTION INTRAMUSCULAR; INTRAVENOUS at 08:27

## 2022-01-18 RX ADMIN — PRAVASTATIN SODIUM 40 MG: 40 TABLET ORAL at 16:58

## 2022-01-18 RX ADMIN — POTASSIUM CHLORIDE 40 MEQ: 1500 TABLET, EXTENDED RELEASE ORAL at 12:27

## 2022-01-18 RX ADMIN — LIDOCAINE 1 PATCH: 50 PATCH CUTANEOUS at 08:27

## 2022-01-18 RX ADMIN — FERROUS SULFATE TAB 325 MG (65 MG ELEMENTAL FE) 325 MG: 325 (65 FE) TAB at 08:27

## 2022-01-18 RX ADMIN — INSULIN LISPRO 8 UNITS: 100 INJECTION, SOLUTION INTRAVENOUS; SUBCUTANEOUS at 16:57

## 2022-01-18 RX ADMIN — INSULIN LISPRO 4 UNITS: 100 INJECTION, SOLUTION INTRAVENOUS; SUBCUTANEOUS at 21:42

## 2022-01-18 RX ADMIN — INSULIN LISPRO 4 UNITS: 100 INJECTION, SOLUTION INTRAVENOUS; SUBCUTANEOUS at 08:27

## 2022-01-18 NOTE — ASSESSMENT & PLAN NOTE
· COVID-19 positive on 12/16/2021; Unvaccinated  · Decadron 0 1 mg/kg completed 12/30  · Completed 14 day course of Baricitinib on 1/2/22  · Completed 5 day course of Remdesivir on 12/21/2022  · Steroid taper completed  · Isolation precautions no longer needed at this time  · Pt has been weaned to 1 5liters  will continue to wean   · Reviewed repeat cxr and crp  · Encourage incentive spirometry  · Wean oxygen as tolerated  · bnp was 3,000   S/p lasix yesterday and today

## 2022-01-18 NOTE — PLAN OF CARE
Problem: PHYSICAL THERAPY ADULT  Goal: Performs mobility at highest level of function for planned discharge setting  See evaluation for individualized goals  Description: Treatment/Interventions: Functional transfer training,LE strengthening/ROM,Elevations,Therapeutic exercise,Endurance training,Patient/family training,Equipment eval/education,Bed mobility,Gait training,Compensatory technique education,Continued evaluation,Spoke to nursing,OT          See flowsheet documentation for full assessment, interventions and recommendations  Outcome: Progressing  Note: Prognosis: Good  Problem List: Decreased endurance,Decreased mobility  Assessment: Debbie was seen for a f/u session per POC  Continues to demonstrate progress in therapy with decreased assist levels and further ambulatory endurance  Does desaturate with exercise however recovers quickly w rest and cues for proper breathing technique  Reviewed HEP via teach back  Would continue to benefit from therapy services to address deficits of endurance impacting mobility and to facilitate recovery for improved quality of life  Barriers to Discharge: Inaccessible home environment  Barriers to Discharge Comments: steps     PT Discharge Recommendation: Post acute rehabilitation services (vs HHPT pending progress)          See flowsheet documentation for full assessment

## 2022-01-18 NOTE — ASSESSMENT & PLAN NOTE
· Due to acute systemic viral illness versus consumption from bleeding/hemoperitoneum  · Platelets improved to 120  · Will continue to monitor

## 2022-01-18 NOTE — PROGRESS NOTES
[address] 49  Progress Note - Mariajose Rolle 1940, [de-identified] y o  female MRN: [id number]  Unit/Bed#: E4 -01 Encounter: [id number]  Primary Care Provider: Jose Dos Santos DO   Date and time admitted to hospital: 1/12/2022  1:52 PM    Thrombocytopenia ([address] )  Assessment & Plan  · Due to acute systemic viral illness versus consumption from bleeding/hemoperitoneum  · Platelets improved to 120  · Will continue to monitor    Hemoperitoneum  Assessment & Plan  · Secondary to bleeding right intercostal artery  · This was treated with embolization by IR  · Continue serial exams  · Repeat imaging as needed if she developed hypotension, decreasing blood counts, worsening ecchymoses    Chronic kidney disease, stage 3b (HCC)  Assessment & Plan  · Stable  Baseline creatinine appears to be around 1 2 - 1 6   · Roberts catheter was removed successfully  · No urinary retention    Ambulatory dysfunction  Assessment & Plan  · Short-term rehab recommended  · DC to rehab when available    Acute respiratory failure with hypoxia [facility])  Assessment & Plan  · COVID-19 positive on 12/16/2021; Unvaccinated  · Decadron 0 1 mg/kg completed 12/30  · Completed 14 day course of Baricitinib on 1/2/22  · Completed 5 day course of Remdesivir on 12/21/2022  · Steroid taper completed  · Isolation precautions no longer needed at this time  · Pt has been weaned to 1 5liters  will continue to wean   · Reviewed repeat cxr and crp  · Encourage incentive spirometry  · Wean oxygen as tolerated  · bnp was 3,000   S/p lasix yesterday and today     Pneumonia due to COVID-19 virus  Assessment & Plan  · Decadron 0 1 mg/kg completed 12/30  · Completed 14 day course of Baricitinib on 1/2/22  · Completed 5 day course of Remdesivir on 12/21/2022  · Steroid taper completed    Type 2 diabetes mellitus without complication, without long-term current use of insulin [facility])  Assessment & Plan  Lab Results   Component Value Date    HGBA1C 6 2 (H) 2021     A1c is excellent  Blood sugars are fairly controlled  Continue sliding scale insulin  Avoid hypoglycemia  * Acute blood loss anemia  Assessment & Plan  Due to bleeding right intercoastal artery related to recent Chest tube placement  Successfully treated with embolization of the bleeding right IC artery on 2022  She was transferred out of the ICU and has remained stable  Hemoglobin remained stable at 10 6 with no active bleeding on exam         VTE Pharmacologic Prophylaxis: contraindicated due to recent acute blood loss    Mechanical VTE Prophylaxis in Place: Yes    Education and Discussions with Family / Patient: called her daughter and updated her    Time Spent for Care: 20 minutes  More than 50% of total time spent on counseling and coordination of care as described above  Current Length of Stay: 6 day(s)  Current Patient Status: Inpatient     Discharge Plan / Estimated Discharge Date: awaiting str    Code Status: Level 1 - Full Code    Subjective:   Pt seen and examined  Pt improved  She was able to ambulate in the halls today  Her breathing is better today  No f/c no cp no n/v/d no abd pain  Objective:     Vitals:   Temp (24hrs), Av 5 °F (36 9 °C), Min:98 5 °F (36 9 °C), Max:98 6 °F (37 °C)    Temp:  [98 5 °F (36 9 °C)-98 6 °F (37 °C)] 98 5 °F (36 9 °C)  HR:  [89-93] 89  Resp:  [18] 18  BP: (135-155)/(63-76) 149/66  SpO2:  [91 %-96 %] 92 %  Body mass index is 26 26 kg/m²  Input and Output Summary (last 24 hours):     No intake or output data in the 24 hours ending 22 1434    Physical Exam:   Physical Exam  Constitutional:       Appearance: Normal appearance  HENT:      Head: Normocephalic and atraumatic  Eyes:      Extraocular Movements: Extraocular movements intact  Pupils: Pupils are equal, round, and reactive to light  Cardiovascular:      Rate and Rhythm: Normal rate and regular rhythm  Heart sounds: No murmur heard  No friction rub  No gallop  Pulmonary:      Effort: Pulmonary effort is normal  No respiratory distress  Breath sounds: Normal breath sounds  No wheezing or rales  Abdominal:      General: Bowel sounds are normal  There is no distension  Palpations: Abdomen is soft  Tenderness: There is no abdominal tenderness  There is no guarding  Musculoskeletal:      Right lower leg: No edema  Left lower leg: No edema  Neurological:      Mental Status: She is alert and oriented to person, place, and time  Additional Data:     Labs:  Results from last 7 days   Lab Units 01/18/22  0513 01/17/22  0508 01/17/22  0508   WBC Thousand/uL 4 14*   < > 3 92*   HEMOGLOBIN g/dL 10 6*   < > 10 1*   HEMATOCRIT % 33 1*   < > 31 4*   PLATELETS Thousands/uL 120*   < > 107*   NEUTROS PCT %  --   --  61   LYMPHS PCT %  --   --  22   MONOS PCT %  --   --  11   EOS PCT %  --   --  5    < > = values in this interval not displayed  Results from last 7 days   Lab Units 01/18/22  0513 01/13/22  0512 01/12/22  1950   SODIUM mmol/L 142   < > 139   POTASSIUM mmol/L 3 3*   < > 5 2   CHLORIDE mmol/L 107   < > 108   CO2 mmol/L 27   < > 20*   BUN mg/dL 21   < > 53*   CREATININE mg/dL 0 97   < > 1 62*   ANION GAP mmol/L 8   < > 11   CALCIUM mg/dL 8 2*   < > 8 2*   ALBUMIN g/dL  --   --  2 7*   TOTAL BILIRUBIN mg/dL  --   --  0 78   ALK PHOS U/L  --   --  69   ALT U/L  --   --  30   AST U/L  --   --  23   GLUCOSE RANDOM mg/dL 156*   < > 182*    < > = values in this interval not displayed       Results from last 7 days   Lab Units 01/12/22  1416   INR  1 24*     Results from last 7 days   Lab Units 01/18/22  1126 01/18/22  0802 01/17/22 2056 01/17/22  1548 01/17/22  1053 01/17/22  0715 01/16/22  2042 01/16/22  1551 01/16/22  1045 01/16/22  0657 01/15/22  2051 01/15/22  1640   POC GLUCOSE mg/dl 244* 183* 230* 113 273* 196* 266* 114 310* 139 139 154*         Results from last 7 days   Lab Units 01/12/22  1950 01/12/22  0756   LACTIC ACID mmol/L 1 9 1 9     Recent Cultures (last 7 days):           Lines/Drains:  Invasive Devices  Report    Peripheral Intravenous Line            Peripheral IV 01/15/22 Right;Ventral (anterior) Wrist 3 days              Last 24 Hours Medication List:   Current Facility-Administered Medications   Medication Dose Route Frequency Provider Last Rate    acetaminophen  650 mg Oral Q6H PRN Montserrat Ruffin MD      bisacodyl  10 mg Rectal Daily PRN BNETON Gimenez      ferrous sulfate  325 mg Oral Daily With [facility] [place]      hydrALAZINE  10 mg Intravenous Q6H PRN BENTON Gimenez      insulin lispro  4-20 Units Subcutaneous 4x Daily (AC & HS) Wil Winchester PA-C      lidocaine  1 patch Topical Daily BENTON Erickson      ondansetron  4 mg Intravenous Q6H PRN BENTON Gimenez      oxyCODONE  2 5 mg Oral Q6H PRN BENTON Gimenez      polyethylene glycol  17 g Oral Daily BENTON Gimenez      pravastatin  40 mg Oral Daily With [place] Phan      senna-docusate sodium  2 tablet Oral BID [place]ENTON Gimenez          Today, Patient Was Seen By: Kate Mena DO

## 2022-01-18 NOTE — ASSESSMENT & PLAN NOTE
Due to bleeding right intercoastal artery related to recent Chest tube placement  Successfully treated with embolization of the bleeding right IC artery on 01/12/2022  She was transferred out of the ICU and has remained stable     Hemoglobin remained stable at 10 6 with no active bleeding on exam

## 2022-01-18 NOTE — PHYSICAL THERAPY NOTE
PHYSICAL THERAPY NOTE          Patient Name: Lilibeth Delarosa  VYDQT'C Date: 1/18/2022   Time: 5550-8070       01/18/22 0917   PT Last Visit   PT Visit Date 01/18/22   Note Type   Note Type Treatment   Pain Assessment   Pain Assessment Tool 0-10   Pain Score No Pain   Restrictions/Precautions   Other Precautions O2  (1L)   General   Chart Reviewed Yes   Response to Previous Treatment Patient reporting fatigue but able to participate  Cognition   Overall Cognitive Status WFL   Arousal/Participation Cooperative   Subjective   Subjective "I felt good walking"   Transfers   Sit to Stand 6  Modified independent   Additional items Armrests   Stand to Sit 6  Modified independent   Additional items Armrests; Increased time required   Toilet transfer 6  Modified independent   Additional items Standard toilet   Ambulation/Elevation   Gait pattern Excessively slow   Gait Assistance 5  Supervision   Additional items Assist x 1   Assistive Device Rolling walker   Distance 60', 140'   Stair Management Assistance Not tested   Balance   Static Standing Fair   Dynamic Standing Fair -   Ambulatory Fair -   Endurance Deficit   Endurance Deficit Yes   Endurance Deficit Description vitals on 1L at rest: 90 bpm, 88-92%  post amb on 1L: 100 bpm, 84%  recovered to 94 bpm, 88% within 30"  with further ambulation on 2L, desat to 77 (?accuracy)- 83% w quick recovery to 85%  however on alternate hand O2 reading 89%   pt w some fatigue during ambulation   Activity Tolerance   Activity Tolerance Patient tolerated treatment well;Treatment limited secondary to medical complications (Comment)  (O2 desat)   Nurse Tarah Grove RN   Exercises   Hip Abduction Sitting;10 reps;Bilateral   Knee AROM Long Arc Quad Sitting;10 reps;Bilateral   Marching Sitting;10 reps;Bilateral   Assessment   Prognosis Good   Problem List Decreased endurance;Decreased mobility   Assessment Debbie was seen for a f/u session per POC  Continues to demonstrate progress in therapy with decreased assist levels and further ambulatory endurance  Does desaturate with exercise however recovers quickly w rest and cues for proper breathing technique  Reviewed HEP via teach back  Would continue to benefit from therapy services to address deficits of endurance impacting mobility and to facilitate recovery for improved quality of life  Barriers to Discharge Inaccessible home environment   Barriers to Discharge Comments steps   Goals   Patient Goals continue to get better, go to rehab   STG Expiration Date 01/28/22   Short Term Goal #1 1)  Pt will perform bed mobility with S demonstrating appropriate technique 100% of the time in order to improve function  2)  Perform all transfers with S demonstrating safe and appropriate technique 100% of the time in order to improve ability to negotiate safely in home environment  3) Amb with least restrictive AD > 50'x1 with S in order to demonstrate ability to negotiate in home environment  4)  Improve overall strength and balance 1/2 grade in order to optimize ability to perform functional tasks and reduce fall risk  5) Increase activity tolerance to 45 minutes in order to improve endurance to functional tasks  6)  Negotiate stairs using most appropriate technique and min A in order to be able to negotiate safely in home environment  7) PT for ongoing patient and family/caregiver education, DME needs and d/c planning in order to promote highest level of function in least restrictive environment  PT Treatment Day 2   Plan   Treatment/Interventions Functional transfer training;LE strengthening/ROM; Elevations; Therapeutic exercise; Endurance training;Patient/family training;Equipment eval/education; Bed mobility;Gait training;Continued evaluation; Compensatory technique education;Spoke to nursing   Progress Progressing toward goals   PT Frequency Other (Comment)  (4-5x) Recommendation   PT Discharge Recommendation Post acute rehabilitation services  (vs HHPT pending progress)   Equipment Recommended [facility]] Recommended Wheeled walker   Change/add to [employer]? No   Additional Comments The patient's AM-PAC Basic Mobility Inpatient Short Form Raw Score is 22  A Raw score of greater than 16 suggests the patient may benefit from discharge to home  Please also refer to the recommendation of the Physical Therapist for safe discharge planning  AM-PAC Basic Mobility Inpatient   Turning in Bed Without Bedrails 4   Lying on Back to Sitting on Edge of Flat Bed 4   Moving Bed to Chair 4   Standing Up From Chair 4   Walk in Room 3   Climb 3-5 Stairs 3   Basic Mobility Inpatient Raw Score 22   Basic Mobility Standardized Score 47 4   Highest Level Of Mobility   JH-HLM Goal 7: Walk 25 feet or more   JH-HLM Highest Level of Mobility 7: Walk 25 feet or more   JH-HLM Goal Achieved Yes   Education   Education Provided Mobility training;Home exercise program;Other  (breathing technique, energy conservation)   Patient Demonstrates acceptance/verbal understanding   End of Consult   Patient Position at End of Consult Bedside chair; All needs within reach     Moyer Bound, PT

## 2022-01-19 PROBLEM — D69.6 THROMBOCYTOPENIA (HCC): Status: RESOLVED | Noted: 2022-01-16 | Resolved: 2022-01-19

## 2022-01-19 LAB
ANION GAP SERPL CALCULATED.3IONS-SCNC: 9 MMOL/L (ref 4–13)
BUN SERPL-MCNC: 24 MG/DL (ref 5–25)
CALCIUM SERPL-MCNC: 8.2 MG/DL (ref 8.3–10.1)
CHLORIDE SERPL-SCNC: 105 MMOL/L (ref 100–108)
CO2 SERPL-SCNC: 26 MMOL/L (ref 21–32)
CREAT SERPL-MCNC: 0.97 MG/DL (ref 0.6–1.3)
ERYTHROCYTE [DISTWIDTH] IN BLOOD BY AUTOMATED COUNT: 16.5 % (ref 11.6–15.1)
GFR SERPL CREATININE-BSD FRML MDRD: 55 ML/MIN/1.73SQ M
GLUCOSE SERPL-MCNC: 167 MG/DL (ref 65–140)
GLUCOSE SERPL-MCNC: 172 MG/DL (ref 65–140)
GLUCOSE SERPL-MCNC: 175 MG/DL (ref 65–140)
GLUCOSE SERPL-MCNC: 210 MG/DL (ref 65–140)
GLUCOSE SERPL-MCNC: 365 MG/DL (ref 65–140)
HCT VFR BLD AUTO: 33.6 % (ref 34.8–46.1)
HGB BLD-MCNC: 10.7 G/DL (ref 11.5–15.4)
MCH RBC QN AUTO: 27.5 PG (ref 26.8–34.3)
MCHC RBC AUTO-ENTMCNC: 31.8 G/DL (ref 31.4–37.4)
MCV RBC AUTO: 86 FL (ref 82–98)
PLATELET # BLD AUTO: 152 THOUSANDS/UL (ref 149–390)
PMV BLD AUTO: 9.1 FL (ref 8.9–12.7)
POTASSIUM SERPL-SCNC: 3.9 MMOL/L (ref 3.5–5.3)
RBC # BLD AUTO: 3.89 MILLION/UL (ref 3.81–5.12)
SODIUM SERPL-SCNC: 140 MMOL/L (ref 136–145)
WBC # BLD AUTO: 4.36 THOUSAND/UL (ref 4.31–10.16)

## 2022-01-19 PROCEDURE — 80048 BASIC METABOLIC PNL TOTAL CA: CPT | Performed by: INTERNAL MEDICINE

## 2022-01-19 PROCEDURE — 82948 REAGENT STRIP/BLOOD GLUCOSE: CPT

## 2022-01-19 PROCEDURE — 85027 COMPLETE CBC AUTOMATED: CPT | Performed by: INTERNAL MEDICINE

## 2022-01-19 PROCEDURE — 99232 SBSQ HOSP IP/OBS MODERATE 35: CPT | Performed by: INTERNAL MEDICINE

## 2022-01-19 RX ORDER — FUROSEMIDE 10 MG/ML
20 INJECTION INTRAMUSCULAR; INTRAVENOUS ONCE
Status: COMPLETED | OUTPATIENT
Start: 2022-01-19 | End: 2022-01-19

## 2022-01-19 RX ADMIN — FUROSEMIDE 20 MG: 10 INJECTION, SOLUTION INTRAVENOUS at 08:19

## 2022-01-19 RX ADMIN — INSULIN LISPRO 4 UNITS: 100 INJECTION, SOLUTION INTRAVENOUS; SUBCUTANEOUS at 08:18

## 2022-01-19 RX ADMIN — INSULIN LISPRO 16 UNITS: 100 INJECTION, SOLUTION INTRAVENOUS; SUBCUTANEOUS at 11:39

## 2022-01-19 RX ADMIN — LIDOCAINE 1 PATCH: 50 PATCH CUTANEOUS at 08:19

## 2022-01-19 RX ADMIN — INSULIN LISPRO 4 UNITS: 100 INJECTION, SOLUTION INTRAVENOUS; SUBCUTANEOUS at 16:30

## 2022-01-19 RX ADMIN — PRAVASTATIN SODIUM 40 MG: 40 TABLET ORAL at 16:30

## 2022-01-19 RX ADMIN — FERROUS SULFATE TAB 325 MG (65 MG ELEMENTAL FE) 325 MG: 325 (65 FE) TAB at 08:19

## 2022-01-19 RX ADMIN — INSULIN LISPRO 8 UNITS: 100 INJECTION, SOLUTION INTRAVENOUS; SUBCUTANEOUS at 21:30

## 2022-01-19 NOTE — ASSESSMENT & PLAN NOTE
· Short-term rehab was recommended  · Pt is progressing with physical therapy   Possibly hhpt if pt continues to progress

## 2022-01-19 NOTE — CASE MANAGEMENT
Case Management Discharge Planning Note    Patient name Myke Gaytan  Location East  /E4 [address] [facility]-* MRN [id number]  : 1940 Date 2022       Current Admission Date: 2022  Current Admission Diagnosis:Acute blood loss anemia   Patient Active Problem List    Diagnosis Date Noted    Hemoperitoneum 2022    Thrombocytosis 2021    Bacteremia due to methicillin susceptible Staphylococcus aureus (MSSA) 2021    Pneumothorax- Resolved 2021    Chronic kidney disease, stage 3b ([facility] ) 2021    Acute blood loss anemia 2021    Sepsis due to methicillin susceptible Staphylococcus aureus ([facility] ) 2021    Ambulatory dysfunction 2021    Pneumonia due to COVID-19 virus 2021    Acute respiratory failure with hypoxia ([facility] ) 2021    Negative depression screening 2021    Overweight (BMI 25 0-29 9) 2021    Medicare annual wellness visit, subsequent 2020    Localized, primary osteoarthritis of hand 2020    Refused influenza vaccine 2020    Sciatica 2020    Hypertensive kidney disease with chronic kidney disease stage III ([facility] ) 2019    Type 2 diabetes mellitus without complication, without long-term current use of insulin ([facility] ) 2019    Bursitis of shoulder 2018    Groin pain, chronic, left 2018    Paresthesia of arm 2017    Back pain 2017    Muscle pain 2017    Anxiety 2017    Abnormal ECG 2016    Diverticulitis large intestine w/o perforation or abscess w/o bleeding 2016    Essential hypertension 2016    Lower abdominal pain 2016    Atrophic vaginitis 2016    Hypercholesterolemia 2016    Irritable bowel syndrome 2016    Simple chronic anemia 2016    Disorder of shoulder 2008    Articular cartilage disorder of shoulder region 2008    Loose body in joint of shoulder region 2008 LOS (days): 7  Geometric Mean LOS (GMLOS) (days): 6 80  Days to GMLOS:-0 2     OBJECTIVE:  Risk of Unplanned Readmission Score: 19         Current admission status: Inpatient   Preferred Pharmacy:   [address]  [place], [address]  [facility] [place] [address]  Phone: [phone] Fax: [phone]    [address] [address]  Phone: [phone] Fax: [phone]    Primary Care Provider: Shaneka Wallace DO    Primary Insurance: [facility]  Secondary Insurance:     DISCHARGE DETAILS:    Discharge planning discussed with[de-identified] pt and dtr  Freedom of Choice: Yes  Comments - Freedom of Choice: Met with pt and informed her [facility] able to [facility] pt  Gave pt the choices of SNF willing to accept in (one in [facility], one in [place] and 3 in [place])  Pt was not sure about going to SNF and wanted her dtr called  TC to dtr and gave dtr choices  Dtr coming to hospital in one hour to discuss with pt and will let this LSW know  Pt also thinking about possible home with VNA as option and referrals made today                       Requested [address] [facility] [address]         Is the patient interested in [facility] at discharge?: Yes  [address] requested[de-identified] [address] Name[de-identified] Other  [address] Provider[de-identified] PCP  Home Health Services Needed[de-identified] Evaluate Functional Status and Safety,Gait/ADL Training,Strengthening/Theraputic Exercises to Improve Function,Other (comment) (Medical Management)  Homebound Criteria Met[de-identified] Requires the Assistance of Another Person for Safe Ambulation or to Leave the Home,Uses an Assist Device (i e  cane, walker, etc)  Supporting Clincal Findings[de-identified] Limited Endurance,Fatigues Easliy in Short Distances    Transport at Discharge : Auto with designated         Transported by (Company and Unit #):  Family will transport

## 2022-01-19 NOTE — ASSESSMENT & PLAN NOTE
Due to bleeding right intercoastal artery related to recent Chest tube placement  Successfully treated with embolization of the bleeding right IC artery on 01/12/2022  She was transferred out of the ICU and has remained stable     Hemoglobin remained stable at 10 7 with no active bleeding on exam

## 2022-01-19 NOTE — CASE MANAGEMENT
Case Management Discharge Planning Note    Patient name Andrew Gibson  Location East 4 /E4 [facility] [facility]-* MRN [id number]  : 1940 Date 2022       Current Admission Date: 2022  Current Admission Diagnosis:Acute blood loss anemia   Patient Active Problem List    Diagnosis Date Noted    Hemoperitoneum 2022    Thrombocytosis 2021    Bacteremia due to methicillin susceptible Staphylococcus aureus (MSSA) 2021    Pneumothorax- Resolved 2021    Chronic kidney disease, stage 3b ([facility] ) 2021    Acute blood loss anemia 2021    Sepsis due to methicillin susceptible Staphylococcus aureus ([facility] ) 2021    Ambulatory dysfunction 2021    Pneumonia due to COVID-19 virus 2021    [facility] failure with [facility] 2021    Negative depression screening 2021    Overweight (BMI 25 0-29 9) 2021    Medicare annual wellness visit, subsequent 2020    Localized, primary osteoarthritis of hand 2020    Refused influenza vaccine 2020    Sciatica 2020    Hypertensive kidney disease with chronic kidney disease stage III ([facility] ) 2019    Type 2 diabetes mellitus without complication, without long-term current use of insulin ([facility] ) 2019    Bursitis of shoulder 2018    Groin pain, chronic, left 2018    Paresthesia of arm 2017    Back pain 2017    Muscle pain 2017    Anxiety 2017    Abnormal ECG 2016    Diverticulitis large intestine w/o perforation or abscess w/o bleeding 2016    Essential hypertension 2016    Lower abdominal pain 2016    Atrophic vaginitis 2016    Hypercholesterolemia 2016    Irritable bowel syndrome 2016    Simple chronic anemia 2016    Disorder of shoulder 2008    Articular cartilage disorder of shoulder region 2008    Loose body in joint of shoulder region 2008 LOS (days): 7  Geometric Mean LOS (GMLOS) (days): 6 80  Days to GMLOS:-0 3     OBJECTIVE:  Risk of Unplanned Readmission Score: 19         Current admission status: Inpatient   Preferred Pharmacy:   [address] [facility] [address] [place] [address] [address]  [place] [address]  Phone: [phone] Fax: [phone] [address] [address]  Phone: [phone] Fax: [phone]    Primary Care Provider: Rick Ferrara DO    Primary Insurance: [address] [facility] RE[facility]  Secondary Insurance:     DISCHARGE DETAILS:          Comments - Freedom of Choice: Pt and family have decided on [address] and a message was sent asking if they still can accept and if they still have visiting hours  Also set message to PT/OT for update notes for auth

## 2022-01-19 NOTE — ASSESSMENT & PLAN NOTE
· COVID-19 positive on 12/16/2021; Unvaccinated  · Decadron 0 1 mg/kg completed 12/30  · Completed 14 day course of Baricitinib on 1/2/22  · Completed 5 day course of Remdesivir on 12/21/2022  · Steroid taper completed  · Isolation precautions no longer needed at this time  · Pt has been weaned to 1 5liters  will continue to wean   · Reviewed repeat cxr and crp  · Encourage incentive spirometry  · Wean oxygen as tolerated  · bnp was 3,000   Received lasix 20mg for last 3 days

## 2022-01-19 NOTE — PROGRESS NOTES
[address] [facility]  Progress Note - Lilibeth Singletontoby 1940, [de-identified] y o  female MRN: [id number]  Unit/Bed#: E4 -01 Encounter: [id number]  Primary Care Provider: Abner Watson DO   Date and time admitted to hospital: 1/12/2022  1:52 PM    Hemoperitoneum  Assessment & Plan  · Secondary to bleeding right intercostal artery  · This was treated with embolization by IR  · Continue serial exams  · Repeat imaging as needed if she developed hypotension, decreasing blood counts, worsening ecchymoses    Chronic kidney disease, stage 3b (HCC)  Assessment & Plan  · Stable  Baseline creatinine appears to be around 1 2 - 1 6   · Roberts catheter was removed successfully  · No urinary retention    Ambulatory dysfunction  Assessment & Plan  · Short-term rehab was recommended  · Pt is progressing with physical therapy  Possibly hhpt if pt continues to progress    Acute respiratory failure with hypoxia [facility])  Assessment & Plan  · COVID-19 positive on 12/16/2021; Unvaccinated  · Decadron 0 1 mg/kg completed 12/30  · Completed 14 day course of Baricitinib on 1/2/22  · Completed 5 day course of Remdesivir on 12/21/2022  · Steroid taper completed  · Isolation precautions no longer needed at this time  · Pt has been weaned to 1 5liters  will continue to wean   · Reviewed repeat cxr and crp  · Encourage incentive spirometry  · Wean oxygen as tolerated  · bnp was 3,000  Received lasix 20mg for last 3 days     Type 2 diabetes mellitus without complication, without long-term current use of insulin [facility])  Assessment & Plan  Lab Results   Component Value Date    HGBA1C 6 2 (H) 11/22/2021     A1c is excellent  Blood sugars are fairly controlled  Continue sliding scale insulin  Avoid hypoglycemia      * Acute blood loss anemia  Assessment & Plan  Due to bleeding right intercoastal artery related to recent Chest tube placement  Successfully treated with embolization of the bleeding right IC artery on 01/12/2022  She was transferred out of the ICU and has remained stable  Hemoglobin remained stable at 10 7 with no active bleeding on exam     Thrombocytopenia (HCC)-resolved as of 2022  Assessment & Plan  · Due to acute systemic viral illness versus consumption from bleeding/hemoperitoneum  · Platelets improved to 120  · Will continue to monitor      VTE Pharmacologic Prophylaxis: contraindicated due to acute blood loss    Mechanical VTE Prophylaxis in Place: Yes    Time Spent for Care: 20 minutes  More than 50% of total time spent on counseling and coordination of care as described above  Current Length of Stay: 7 day(s)  Current Patient Status: Inpatient     Discharge Plan / Estimated Discharge Date: awaiting str vrs hhpt    Code Status: Level 1 - Full Code      Subjective:   Pt seen and examined  Pt is progressing with physical therapy  She also states she had minimal diarrhea  4 times yesterday and once today  No blood in stools  No f/c no cp no sob no n/v no abd pain     Objective:     Vitals:   Temp (24hrs), Av °F (36 7 °C), Min:97 8 °F (36 6 °C), Max:98 3 °F (36 8 °C)    Temp:  [97 8 °F (36 6 °C)-98 3 °F (36 8 °C)] 97 8 °F (36 6 °C)  HR:  [84-88] 84  Resp:  [18] 18  BP: (134-146)/(65-71) 134/69  SpO2:  [93 %-95 %] 93 %  Body mass index is 26 26 kg/m²  Input and Output Summary (last 24 hours):     No intake or output data in the 24 hours ending 22 1447    Physical Exam:   Physical Exam  Constitutional:       Appearance: Normal appearance  HENT:      Head: Normocephalic and atraumatic  Eyes:      Extraocular Movements: Extraocular movements intact  Pupils: Pupils are equal, round, and reactive to light  Cardiovascular:      Rate and Rhythm: Normal rate and regular rhythm  Heart sounds: No murmur heard  No friction rub  No gallop  Pulmonary:      Effort: Pulmonary effort is normal  No respiratory distress  Breath sounds: Normal breath sounds  No wheezing or rales     Abdominal: General: Bowel sounds are normal  There is no distension  Palpations: Abdomen is soft  Tenderness: There is no abdominal tenderness  There is no guarding  Musculoskeletal:      Right lower leg: No edema  Left lower leg: No edema  Neurological:      Mental Status: She is alert and oriented to person, place, and time  Additional Data:     Labs:  Results from last 7 days   Lab Units 01/19/22  0536 01/18/22  0513 01/17/22  0508   WBC Thousand/uL 4 36   < > 3 92*   HEMOGLOBIN g/dL 10 7*   < > 10 1*   HEMATOCRIT % 33 6*   < > 31 4*   PLATELETS Thousands/uL 152   < > 107*   NEUTROS PCT %  --   --  61   LYMPHS PCT %  --   --  22   MONOS PCT %  --   --  11   EOS PCT %  --   --  5    < > = values in this interval not displayed  Results from last 7 days   Lab Units 01/19/22  0536 01/13/22  0512 01/12/22  1950   SODIUM mmol/L 140   < > 139   POTASSIUM mmol/L 3 9   < > 5 2   CHLORIDE mmol/L 105   < > 108   CO2 mmol/L 26   < > 20*   BUN mg/dL 24   < > 53*   CREATININE mg/dL 0 97   < > 1 62*   ANION GAP mmol/L 9   < > 11   CALCIUM mg/dL 8 2*   < > 8 2*   ALBUMIN g/dL  --   --  2 7*   TOTAL BILIRUBIN mg/dL  --   --  0 78   ALK PHOS U/L  --   --  69   ALT U/L  --   --  30   AST U/L  --   --  23   GLUCOSE RANDOM mg/dL 167*   < > 182*    < > = values in this interval not displayed           Results from last 7 days   Lab Units 01/19/22  1118 01/19/22  0744 01/18/22  2139 01/18/22  1600 01/18/22  1126 01/18/22  0802 01/17/22  2056 01/17/22  1548 01/17/22  1053 01/17/22  0715 01/16/22  2042 01/16/22  1551   POC GLUCOSE mg/dl 365* 172* 162* 236* 244* 183* 230* 113 273* 196* 266* 114         Results from last 7 days   Lab Units 01/12/22  1950   LACTIC ACID mmol/L 1 9     Recent Cultures (last 7 days):           Lines/Drains:  Invasive Devices  Report    Peripheral Intravenous Line            Peripheral IV 01/15/22 Right;Ventral (anterior) Wrist 4 days              Last 24 Hours Medication List: Current Facility-Administered Medications   Medication Dose Route Frequency Provider Last Rate    acetaminophen  650 mg Oral Q6H PRN Wily Zamora MD      bisacodyl  10 mg Rectal Daily PRN [place]      ferrous sulfate  325 mg Oral Daily With [facility], [place]      hydrALAZINE  10 mg Intravenous Q6H PRN [place]      insulin lispro  4-20 Units Subcutaneous 4x Daily (AC & HS) Yesi Hall PA-C      lidocaine  1 patch Topical Daily [place] Erickson      ondansetron  4 mg Intravenous Q6H PRN [place]      oxyCODONE  2 5 mg Oral Q6H PRN [place]      polyethylene glycol  17 g Oral Daily [place]      pravastatin  40 mg Oral Daily With [facility], [place]      senna-docusate sodium  2 tablet Oral BID [place]          Today, Patient Was Seen By: Kaylan Sams DO

## 2022-01-20 ENCOUNTER — APPOINTMENT (INPATIENT)
Dept: RADIOLOGY | Facility: HOSPITAL | Age: 82
DRG: 907 | End: 2022-01-20
Payer: COMMERCIAL

## 2022-01-20 ENCOUNTER — APPOINTMENT (INPATIENT)
Dept: CT IMAGING | Facility: HOSPITAL | Age: 82
DRG: 907 | End: 2022-01-20
Payer: COMMERCIAL

## 2022-01-20 LAB
ANION GAP SERPL CALCULATED.3IONS-SCNC: 8 MMOL/L (ref 4–13)
BASE EXCESS BLDA CALC-SCNC: -8 MMOL/L (ref -2–3)
BASE EXCESS BLDA CALC-SCNC: -9 MMOL/L (ref -2–3)
BUN SERPL-MCNC: 30 MG/DL (ref 5–25)
CALCIUM SERPL-MCNC: 8.2 MG/DL (ref 8.3–10.1)
CHLORIDE SERPL-SCNC: 103 MMOL/L (ref 100–108)
CO2 SERPL-SCNC: 29 MMOL/L (ref 21–32)
CREAT SERPL-MCNC: 1.14 MG/DL (ref 0.6–1.3)
CRP SERPL QL: 26.3 MG/L
D DIMER PPP FEU-MCNC: 13.6 UG/ML FEU
GFR SERPL CREATININE-BSD FRML MDRD: 45 ML/MIN/1.73SQ M
GLUCOSE SERPL-MCNC: 147 MG/DL (ref 65–140)
GLUCOSE SERPL-MCNC: 154 MG/DL (ref 65–140)
GLUCOSE SERPL-MCNC: 169 MG/DL (ref 65–140)
GLUCOSE SERPL-MCNC: 183 MG/DL (ref 65–140)
GLUCOSE SERPL-MCNC: 191 MG/DL (ref 65–140)
GLUCOSE SERPL-MCNC: 191 MG/DL (ref 65–140)
GLUCOSE SERPL-MCNC: 234 MG/DL (ref 65–140)
GLUCOSE SERPL-MCNC: 242 MG/DL (ref 65–140)
GLUCOSE SERPL-MCNC: 251 MG/DL (ref 65–140)
GLUCOSE SERPL-MCNC: 282 MG/DL (ref 65–140)
GLUCOSE SERPL-MCNC: 75 MG/DL (ref 65–140)
GLUCOSE SERPL-MCNC: 85 MG/DL (ref 65–140)
GLUCOSE SERPL-MCNC: 95 MG/DL (ref 65–140)
GLUCOSE SERPL-MCNC: 99 MG/DL (ref 65–140)
HCO3 BLDA-SCNC: 17.9 MMOL/L (ref 22–28)
HCO3 BLDA-SCNC: 18.6 MMOL/L (ref 24–30)
HCT VFR BLD CALC: 15 % (ref 34.8–46.1)
HCT VFR BLD CALC: <15 % (ref 34.8–46.1)
HGB BLDA-MCNC: 5.1 G/DL (ref 11.5–15.4)
PCO2 BLD: 19 MMOL/L (ref 21–32)
PCO2 BLD: 20 MMOL/L (ref 21–32)
PCO2 BLD: 41.7 MM HG (ref 36–44)
PCO2 BLD: 46.3 MM HG (ref 42–50)
PH BLD: 7.21 [PH] (ref 7.3–7.4)
PH BLD: 7.24 [PH] (ref 7.35–7.45)
PO2 BLD: 253 MM HG (ref 75–129)
PO2 BLD: 272 MM HG (ref 35–45)
POTASSIUM BLD-SCNC: 4.8 MMOL/L (ref 3.5–5.3)
POTASSIUM BLD-SCNC: 4.9 MMOL/L (ref 3.5–5.3)
POTASSIUM SERPL-SCNC: 3.5 MMOL/L (ref 3.5–5.3)
SAO2 % BLD FROM PO2: 100 % (ref 60–85)
SAO2 % BLD FROM PO2: 100 % (ref 60–85)
SODIUM BLD-SCNC: 137 MMOL/L (ref 136–145)
SODIUM BLD-SCNC: 138 MMOL/L (ref 136–145)
SODIUM SERPL-SCNC: 140 MMOL/L (ref 136–145)
SPECIMEN SOURCE: ABNORMAL
SPECIMEN SOURCE: ABNORMAL

## 2022-01-20 PROCEDURE — 82948 REAGENT STRIP/BLOOD GLUCOSE: CPT

## 2022-01-20 PROCEDURE — 97110 THERAPEUTIC EXERCISES: CPT

## 2022-01-20 PROCEDURE — 86140 C-REACTIVE PROTEIN: CPT | Performed by: INTERNAL MEDICINE

## 2022-01-20 PROCEDURE — G1004 CDSM NDSC: HCPCS

## 2022-01-20 PROCEDURE — 97530 THERAPEUTIC ACTIVITIES: CPT

## 2022-01-20 PROCEDURE — 97535 SELF CARE MNGMENT TRAINING: CPT

## 2022-01-20 PROCEDURE — 97116 GAIT TRAINING THERAPY: CPT

## 2022-01-20 PROCEDURE — 71275 CT ANGIOGRAPHY CHEST: CPT

## 2022-01-20 PROCEDURE — 71046 X-RAY EXAM CHEST 2 VIEWS: CPT

## 2022-01-20 PROCEDURE — 80048 BASIC METABOLIC PNL TOTAL CA: CPT | Performed by: INTERNAL MEDICINE

## 2022-01-20 PROCEDURE — 99232 SBSQ HOSP IP/OBS MODERATE 35: CPT | Performed by: INTERNAL MEDICINE

## 2022-01-20 PROCEDURE — 85379 FIBRIN DEGRADATION QUANT: CPT | Performed by: INTERNAL MEDICINE

## 2022-01-20 RX ORDER — PREDNISONE 20 MG/1
40 TABLET ORAL DAILY
Status: DISCONTINUED | OUTPATIENT
Start: 2022-01-20 | End: 2022-01-21 | Stop reason: HOSPADM

## 2022-01-20 RX ORDER — SODIUM CHLORIDE 9 MG/ML
50 INJECTION, SOLUTION INTRAVENOUS CONTINUOUS
Status: DISCONTINUED | OUTPATIENT
Start: 2022-01-20 | End: 2022-01-21

## 2022-01-20 RX ADMIN — INSULIN LISPRO 4 UNITS: 100 INJECTION, SOLUTION INTRAVENOUS; SUBCUTANEOUS at 16:38

## 2022-01-20 RX ADMIN — FERROUS SULFATE TAB 325 MG (65 MG ELEMENTAL FE) 325 MG: 325 (65 FE) TAB at 08:14

## 2022-01-20 RX ADMIN — INSULIN LISPRO 8 UNITS: 100 INJECTION, SOLUTION INTRAVENOUS; SUBCUTANEOUS at 11:50

## 2022-01-20 RX ADMIN — PREDNISONE 40 MG: 20 TABLET ORAL at 16:38

## 2022-01-20 RX ADMIN — INSULIN LISPRO 12 UNITS: 100 INJECTION, SOLUTION INTRAVENOUS; SUBCUTANEOUS at 21:15

## 2022-01-20 RX ADMIN — INSULIN LISPRO 4 UNITS: 100 INJECTION, SOLUTION INTRAVENOUS; SUBCUTANEOUS at 08:13

## 2022-01-20 RX ADMIN — PRAVASTATIN SODIUM 40 MG: 40 TABLET ORAL at 16:38

## 2022-01-20 RX ADMIN — IOHEXOL 85 ML: 350 INJECTION, SOLUTION INTRAVENOUS at 14:32

## 2022-01-20 RX ADMIN — LIDOCAINE 1 PATCH: 50 PATCH CUTANEOUS at 08:14

## 2022-01-20 RX ADMIN — SODIUM CHLORIDE 50 ML/HR: 0.9 INJECTION, SOLUTION INTRAVENOUS at 14:51

## 2022-01-20 NOTE — QUICK NOTE
cta reviewed: no pulmonary embolism   Continued bilateral ground glass consolidations consistent with covid pna  Slightly improved  crp slightly increased from previous of 22 to 26  Will start an extended steroid taper consisting of prednisone of 40mg decreasing by 10mg q3 days    Called her daughter and updated her

## 2022-01-20 NOTE — CASE MANAGEMENT
Case Management Discharge Planning Note    Patient name Scott Cheung  Location [facility] /E4 [facility] [facility]-* MRN [id number]  : 1940 Date 2022       Current Admission Date: 2022  Current Admission Diagnosis:Acute blood loss anemia   Patient Active Problem List    Diagnosis Date Noted    Hemoperitoneum 2022    Thrombocytosis 2021    Bacteremia due to methicillin susceptible Staphylococcus aureus (MSSA) 2021    Pneumothorax- Resolved 2021    Chronic kidney disease, stage 3b ([facility] ) 2021    Acute blood loss anemia 2021    Sepsis due to methicillin susceptible Staphylococcus aureus ([facility] ) 2021    Ambulatory dysfunction 2021    Pneumonia due to COVID-19 virus 2021    [facility] failure with [facility] 2021    Negative depression screening 2021    Overweight (BMI 25 0-29 9) 2021    Medicare annual wellness visit, subsequent 2020    Localized, primary osteoarthritis of hand 2020    Refused influenza vaccine 2020    Sciatica 2020    Hypertensive kidney disease with chronic kidney disease stage III ([facility] ) 2019    Type 2 diabetes mellitus without complication, without long-term current use of insulin ([facility] ) 2019    Bursitis of shoulder 2018    Groin pain, chronic, left 2018    Paresthesia of arm 2017    Back pain 2017    Muscle pain 2017    Anxiety 2017    Abnormal ECG 2016    Diverticulitis large intestine w/o perforation or abscess w/o bleeding 2016    Essential hypertension 2016    Lower abdominal pain 2016    Atrophic vaginitis 2016    Hypercholesterolemia 2016    Irritable bowel syndrome 2016    Simple chronic anemia 2016    Disorder of shoulder 2008    Articular cartilage disorder of shoulder region 2008    Loose body in joint of shoulder region 2008 LOS (days): 8  Geometric Mean LOS (GMLOS) (days): 6 80  Days to GMLOS:-1 1     OBJECTIVE:  Risk of Unplanned Readmission Score: 22         Current admission status: Inpatient   Preferred Pharmacy:   [address] [facility] [address]  [address]  [place] [address]  Phone: [phone] Fax: [phone] [address] [address]  Phone: [phone] Fax: [phone]    Primary Care Provider: Bhavin Portillo DO    Primary Insurance: [address] [facility] REP  Secondary Insurance:     DISCHARGE DETAILS:    Comments - Freedom of Choice: [employer] with not have a bed now, but [employer] will in 2029 building  Informed pt of this and now "[employer] is not in net work with [facility]  MD aware       IMM Given (Date):: 01/20/22  IMM Given to[de-identified] Patient (Read IMM, copy given to her and copy put in bin to be scan )

## 2022-01-20 NOTE — PLAN OF CARE
Problem: OCCUPATIONAL THERAPY ADULT  Goal: Performs self-care activities at highest level of function for planned discharge setting  See evaluation for individualized goals  Description: Occupational Therapy    Outcome: Progressing  Note: Limitation: Decreased ADL status,Decreased UE strength,Decreased endurance,Decreased high-level ADLs     Assessment: Pt was seen for skilled OT with focus on completion of self care routine, review of RW safety, fall prevention, review of energy conservation/compensatory breathing techs and review of current plan of care  Pt in bedside recliner on RA with SpO2 at 84% at rest  Reported findings to to [facility]  O2 reapplied and increased to 2 liters  Unable to achieve SpO2 above 87% at rest on 2 liters  O2 increased to 4 liters due to drop in SpO2 with functional tasks instance  SpO2 between 87-91% on 4 liters with activity and at rest  The Pt was able to tolerate all functional tasks without further c/o of increased fatigue but fluctuating SpO2 from 84-91% on 4 liters with functional reach for LE bathing/dressing activities  See above levels of A required for all functional tasks  Written information was provided to Pt providing education on energy conservation and compensatory breathing techs  Pt able to return demonstration of comp breathing techs with F quality  Reported all findings to KYLER Persaud regarding Pt's current need for O2 at rest as well as with activity  Spoke OTR Lawanda regarding Pt's improvements with self care tasks and functional mobility a Supervision levels  Recommending pulmonary rehab at this time if available vs home therapies due to recent improvements with functional tasks but continued need for pulmonary endorance  Pt reports having good understanding of all reviewed information  The patient's raw score on the AM-PAC Daily Activity inpatient short form is 21, standardized score is 44 27, greater than 39 4   Patients at this level are likely to benefit from discharge to home with home therapies and or pulmonary rehab       OT Discharge Recommendation:  (pulmonary rehab )  OT - OK to Discharge:  (once medically cleared)

## 2022-01-20 NOTE — PHYSICAL THERAPY NOTE
PHYSICAL THERAPY NOTE          Patient Name: Ed Murphy  KJPXS'N Date: 1/20/2022 01/20/22 0825   Note Type   Note Type Treatment   Pain Assessment   Pain Assessment Tool 0-10   Pain Score No Pain   Restrictions/Precautions   Other Precautions O2;Fall Risk  (pt on Ra upon entering spo2 85% on RA at rest, )   General   Chart Reviewed Yes   Cognition   Overall Cognitive Status [facility]   Arousal/Participation Alert; Responsive; Cooperative   Attention Within functional limits   Orientation Level Oriented X4   Memory Within functional limits   Subjective   Subjective They took my o2 off thie AM     Bed Mobility   Additional Comments pt out of bed upon arrival      Transfers   Sit to Stand 6  Modified independent   Additional items Armrests   Stand to Sit 6  Modified independent   Additional items Armrests   Toilet transfer 6  Modified independent   Additional items Verbal cues;Standard toilet   Additional Comments static standing with support of rw x 3 minutes spo2 83% on 3L  Ambulation/Elevation   Gait pattern   (slow reciprocal gait   )   Gait Assistance 5  Supervision   Additional items Assist x 1   Assistive Device Rolling walker   Distance 15' x2, 100' x1   Stair Management Assistance Not tested  (due to fatigue, and pt desating to 80%-82% with activity  )   Endurance Deficit   Endurance Deficit Description vitals on RA 84%,placed pt on 1 5 L spo2 increases to 88%-89% hr 93-94bpm, static standing 83% on 3lL hr 117, increased o2 to 4l spo2 increases to 89% hr 92bpm at rest, spo2 drops to 80%  BPm with ambualtion and activity pt rebounds to 91% with seated rest breaks within 1 minute HR 88bpm   Activity Tolerance   Activity Tolerance Patient limited by fatigue   Nurse Made Aware RNTed, Dr Trevor Cline aware of drops in spo2 and need for o2  Exercises   Quad Sets Sitting;10 reps;AROM; Bilateral  (longsitting)   Heelslides Sitting;10 reps;AROM; Bilateral  (longsitting)   Glute Sets Sitting;10 reps;Bilateral   Hip Flexion Sitting;10 reps;AROM; Bilateral  (longsitting)   Hip Abduction Sitting;10 reps;AROM; Bilateral  (longsitting)   Hip Adduction Sitting;10 reps;AROM; Bilateral  (longsitting)   Knee AROM Short Arc Quad Sitting;10 reps;AROM; Bilateral  (longsitting)   Ankle Pumps Sitting;10 reps;Bilateral;AROM   Equipment Use   Comments pt ed on energy conservation, use of incentive spirometer, deep breathing techniques, pt issued written b/l le HEP and reveiwed and performed with pt pt ed on use of finger pulse oximeter to measure o2 sats upon d/c to home  Assessment   Prognosis Good   Problem List Decreased strength;Decreased endurance;Decreased mobility   Assessment Pt  Seen for Pt treatment session  Pt on Ra upon entering with spo2 at 84% on RA, pt is requiring supplemental o2 at this time at all times to maintain spo2 between 88%-91% on $l  Pt desats to 80% with activity and mobility  and increased time to rebound to 91% requiring at leas 1 minute to rebound  Pt  Is functioning at mod I for transfers, supervision for ambulation with use of Rw and  Mod I for toilet transfers  Pt demonstrates steady static and dynamic balance  Pt is limited by fatigue, decreased functional endurance and activity tolerance  Pt is able to ambulate greater than house hold distances  Pt  Requires frequent rest breaks due to fatigue and decrease o2 sats, increased hr  Pt requires verbal cues for proper breathing and pacing techniques and cues for energy conservation  Pt issued and performed  HEP  Pt tolerated well difficulty with SLR on L, however pt able perform active SLR on L  Pt  Will benefit from continued PT upon d/c to address endurance and pulmonary compromise which is impacting mobiltiy in order to facilitate improved quality of life  Recommend  Home w/either HHPT with pulmonary rehab or oupt PT with pulmonary rehab       Goals   Patient Goals to get better   STG Expiration Date 01/28/22   PT Treatment Day 3   Plan   Treatment/Interventions Functional transfer training;LE strengthening/ROM; Therapeutic exercise; Endurance training;Patient/family training;Equipment eval/education;Gait training;Spoke to nursing;Spoke to MD;OT   Progress Progressing toward goals   PT Frequency Other (Comment)  (4-5x/ week)   Recommendation   PT Discharge Recommendation   (home with home PT and or pulmonary rehab outpt)   AM-PAC Basic Mobility Inpatient   Turning in Bed Without Bedrails 4   Lying on Back to Sitting on Edge of Flat Bed 4   Moving Bed to Chair 4   Standing Up From Chair 4   Walk in Room 4   Climb 3-5 Stairs 3   Basic Mobility Inpatient Raw Score 23   Basic Mobility Standardized Score 50 88   Highest Level Of Mobility   JH-HLM Goal 7: Walk 25 feet or more   JH-HLM Highest Level of Mobility 8: Walk 250 feet ot more   JH-HLM Goal Achieved Yes   Education   Education Provided Mobility training;Home exercise program;Other  (energy conservation, pacing, breathing techniques/spirometer)   End of Consult   Patient Position at End of Consult Bedside chair; All needs within reach   [place], [place]

## 2022-01-20 NOTE — PROGRESS NOTES
[facility]  Progress Note - Mariajose Rolle 1940, [de-identified] y o  female MRN: [id number]  Unit/Bed#: E4 -01 Encounter: [id number]  Primary Care Provider: Cuba Muro DO   Date and time admitted to hospital: 1/12/2022  1:52 PM    Hemoperitoneum  Assessment & Plan  · Secondary to bleeding right intercostal artery  · This was treated with embolization by IR  · Continue serial exams  · Repeat imaging as needed if she developed hypotension, decreasing blood counts, worsening ecchymoses    Chronic kidney disease, stage 3b (HCC)  Assessment & Plan  · Stable  Baseline creatinine appears to be around 1 2 - 1 6   · Roberts catheter was removed successfully  · No urinary retention    Ambulatory dysfunction  Assessment & Plan  · Short-term rehab was recommended  · Pt is progressing with physical therapy  Possibly hhpt if pt continues to progress    Acute respiratory failure with hypoxia [facility])  Assessment & Plan  · COVID-19 positive on 12/16/2021; Unvaccinated  · Decadron 0 1 mg/kg completed 12/30  · Completed 14 day course of Baricitinib on 1/2/22  · Completed 5 day course of Remdesivir on 12/21/2022  · Steroid taper completed  · Isolation precautions no longer needed at this time  · bnp was elevated  She was treated with iv lasix  · Improved to room air and this am she is now requiring 4 liters  · D dimer is 13  · Obtaining cta chest  If she does indeed have a pe will need to discuss with ir if heparin gtt can be started due to recent bleeding vs ivc filter  · Wean oxygen as tolerated       Pneumonia due to COVID-19 virus  Assessment & Plan  · Decadron 0 1 mg/kg completed 12/30  · Completed 14 day course of Baricitinib on 1/2/22  · Completed 5 day course of Remdesivir on 12/21/2022  · Steroid taper completed    Type 2 diabetes mellitus without complication, without long-term current use of insulin [facility])  Assessment & Plan  Lab Results   Component Value Date    HGBA1C 6 2 (H) 11/22/2021 A1c is excellent  Blood sugars are fairly controlled  Continue sliding scale insulin  Avoid hypoglycemia  * Acute blood loss anemia  Assessment & Plan  Due to bleeding right intercoastal artery related to recent Chest tube placement  Successfully treated with embolization of the bleeding right IC artery on 2022  She was transferred out of the ICU and has remained stable  Hemoglobin remained stable at 10 7 with no active bleeding on exam       VTE Pharmacologic Prophylaxis: contraindicated due to recent bleed   Mechanical VTE Prophylaxis in Place: Yes    Education and Discussions with Family / Patient: called her daughter and updated her    Time Spent for Care: 20 minutes  More than 50% of total time spent on counseling and coordination of care as described above  Current Length of Stay: 8 day(s)  Current Patient Status: Inpatient     Discharge Plan / Estimated Discharge Date: 24 to 48 hours     Code Status: Level 1 - Full Code      Subjective:   Pt seen and examined  Pt states this am when she woke up she was more short of breath  She is now requiring 4 liters of oxygen  She denies lower ext swelling  No f/c no cp no n/v/d no abd pain  Objective:     Vitals:   Temp (24hrs), Av 3 °F (36 8 °C), Min:98 1 °F (36 7 °C), Max:98 7 °F (37 1 °C)    Temp:  [98 1 °F (36 7 °C)-98 7 °F (37 1 °C)] 98 1 °F (36 7 °C)  HR:  [] 83  Resp:  [18-20] 18  BP: (117-136)/(64-75) 117/75  SpO2:  [88 %-93 %] 90 %  Body mass index is 26 26 kg/m²  Input and Output Summary (last 24 hours): Intake/Output Summary (Last 24 hours) at 2022 1320  Last data filed at 2022  Gross per 24 hour   Intake 360 ml   Output --   Net 360 ml       Physical Exam:   Physical Exam  Constitutional:       Appearance: Normal appearance  HENT:      Head: Normocephalic and atraumatic  Eyes:      Extraocular Movements: Extraocular movements intact  Pupils: Pupils are equal, round, and reactive to light  Cardiovascular:      Rate and Rhythm: Normal rate and regular rhythm  Heart sounds: No murmur heard  No friction rub  No gallop  Pulmonary:      Effort: Pulmonary effort is normal  No respiratory distress  Breath sounds: Normal breath sounds  No wheezing or rales  Abdominal:      General: Bowel sounds are normal  There is no distension  Palpations: Abdomen is soft  Tenderness: There is no abdominal tenderness  There is no guarding  Musculoskeletal:      Right lower leg: No edema  Left lower leg: No edema  Neurological:      Mental Status: She is alert and oriented to person, place, and time  Additional Data:     Labs:  Results from last 7 days   Lab Units 01/19/22  0536 01/18/22  0513 01/17/22  0508   WBC Thousand/uL 4 36   < > 3 92*   HEMOGLOBIN g/dL 10 7*   < > 10 1*   HEMATOCRIT % 33 6*   < > 31 4*   PLATELETS Thousands/uL 152   < > 107*   NEUTROS PCT %  --   --  61   LYMPHS PCT %  --   --  22   MONOS PCT %  --   --  11   EOS PCT %  --   --  5    < > = values in this interval not displayed       Results from last 7 days   Lab Units 01/20/22  0504   SODIUM mmol/L 140   POTASSIUM mmol/L 3 5   CHLORIDE mmol/L 103   CO2 mmol/L 29   BUN mg/dL 30*   CREATININE mg/dL 1 14   ANION GAP mmol/L 8   CALCIUM mg/dL 8 2*   GLUCOSE RANDOM mg/dL 169*         Results from last 7 days   Lab Units 01/20/22  1135 01/20/22  0740 01/19/22  2033 01/19/22  1603 01/19/22  1118 01/19/22  0744 01/18/22  2139 01/18/22  1600 01/18/22  1126 01/18/22  0802 01/17/22  2056 01/17/22  1548   POC GLUCOSE mg/dl 251* 183* 210* 175* 365* 172* 162* 236* 244* 183* 230* 113               Recent Cultures (last 7 days):           Lines/Drains:  Invasive Devices  Report    Peripheral Intravenous Line            Peripheral IV 01/20/22 Left;Ventral (anterior) Wrist <1 day                Last 24 Hours Medication List:   Current Facility-Administered Medications   Medication Dose Route Frequency Provider Last Rate    acetaminophen  650 mg Oral Q6H PRN Rios Ramirez MD      bisacodyl  10 mg Rectal Daily PRN Guyann [place], CRNP      ferrous sulfate  325 mg Oral Daily With [facility], CRNP      hydrALAZINE  10 mg Intravenous Q6H PRN Guyann [place], CRNP      insulin lispro  4-20 Units Subcutaneous 4x Daily (AC & HS) Chung Vela PA-C      lidocaine  1 patch Topical Daily Naveen Rogers, ROSA ISELANP      ondansetron  4 mg Intravenous Q6H PRN Guyann [place], CRNP      oxyCODONE  2 5 mg Oral Q6H PRN Guyann [place], CRNP      polyethylene glycol  17 g Oral Daily Guyann [place], [address]      pravastatin  40 mg Oral Daily With Luis A Tipton Sonerendira, CRNP      senna-docusate sodium  2 tablet Oral BID Bibi Rogers, CRNP      sodium chloride  50 mL/hr Intravenous Continuous Lynn Cough, DO          Today, Patient Was Seen By: Lynn Gama, DO

## 2022-01-20 NOTE — ASSESSMENT & PLAN NOTE
· COVID-19 positive on 12/16/2021; Unvaccinated  · Decadron 0 1 mg/kg completed 12/30  · Completed 14 day course of Baricitinib on 1/2/22  · Completed 5 day course of Remdesivir on 12/21/2022  · Steroid taper completed  · Isolation precautions no longer needed at this time  · bnp was elevated  She was treated with iv lasix  · Improved to room air and this am she is now requiring 4 liters  · D dimer is 13  · Obtaining cta chest  If she does indeed have a pe will need to discuss with ir if heparin gtt can be started due to recent bleeding vs ivc filter  · Wean oxygen as tolerated

## 2022-01-20 NOTE — OCCUPATIONAL THERAPY NOTE
Occupational Therapy Treatment Note:         01/20/22 0932   OT Last Visit   OT Visit Date 01/20/22   Note Type   Note Type Treatment   Restrictions/Precautions   Weight Bearing Precautions Per Order No   Other Precautions Pain; Fall Risk;O2  (Pt at 84% on RA at rest upon greeting/RN notified  )   Pain Assessment   Pain Assessment Tool 0-10   Pain Score No Pain   ADL   Where Assessed Chair   Grooming Assistance 6  Modified Independent   Grooming Deficit Setup   UB Bathing Assistance 6  Modified Independent   UB Bathing Deficit Setup   LB Bathing Assistance 5  Supervision/Setup   LB Bathing Deficit Setup;Supervision/safety;Verbal cueing; Increased time to complete   LB Bathing Comments no LOB with activities   UB Dressing Assistance 6  Modified independent   UB Dressing Deficit Setup   LB Dressing Assistance 5  Supervision/Setup   LB Dressing Deficit Setup;Supervision/safety;Verbal cueing; Don/doff L sock; Don/doff R sock; Thread LLE into underwear;Pull up over hips; Thread RLE into underwear   LB Dressing Comments No LOB with activities  Toileting Assistance  5  Supervision/Setup   Toileting Deficit Setup;Verbal cueing;Grab bar use   Toileting Comments Pt with no LOB with activity  Functional Standing Tolerance   Time 3-4 mins   Activity dynamic stand balance activities  Comments SpO2 dipped between 87-91% on 4 liters with activity  Bed Mobility   Supine to Sit Unable to assess   Additional Comments Pt OOB in bedside chair upon greeting this tx session  Transfers   Sit to Stand 6  Modified independent   Additional items Armrests; Increased time required;Verbal cues   Stand to Sit 6  Modified independent   Additional items Armrests; Increased time required;Verbal cues   Stand pivot 6  Modified independent   Additional items Increased time required;Verbal cues   Toilet transfer 6  Modified independent   Additional items Verbal cues;Standard toilet   Additional Comments Pt able to tolerate activities without SOB  Dip in SpO2 noted See vital signs below  Functional Mobility   Functional Mobility 6  Modified independent   Additional items Rolling walker   Toilet Transfers   Toilet Transfer From Rolling walker   Toilet Transfer Type To and from   Toilet Transfer to Standard toilet   Toilet Transfer Technique Stand pivot; Ambulating   Toilet Transfers Verbal cues; Modified independence   Toilet Transfers Comments Pt was able to tolerate activities without LOB  Cognition   Overall Cognitive Status WFL   Arousal/Participation Responsive; Alert; Cooperative   Attention Within functional limits   Orientation Level Oriented X4   Memory Within functional limits   Following Commands Follows multistep commands with increased time or repetition   Comments Pt will benefit from further review of  energy conservation/comp breathing techs to enocurage good carry over  Additional Activities   Additional Activities Other (Comment)  (reviewed current plan of care  )   Additional Activities Comments Pt reports having F understanding of reviewd information  Activity Tolerance   Activity Tolerance Patient limited by fatigue   Medical Staff Made Aware reported all findings to nursing staff  Pt with low SpO2 at rest on RA  The Pt required 4 liters O2 NC this tx session to maintain SpO2 87-91% with activity and at rest     Assessment   Assessment Pt was seen for skilled OT with focus on completion of self care routine, review of RW safety, fall prevention, review of energy conservation/compensatory breathing techs and review of current plan of care  Pt in bedside recliner on RA with SpO2 at 84% at rest  Reported findings to to [employer]  O2 reapplied and increased to 2 liters  Unable to achieve SpO2 above 87% at rest on 2 liters  O2 increased to 4 liters due to drop in SpO2 with functional tasks instance   SpO2 between 87-91% on 4 liters with activity and at rest  The Pt was able to tolerate all functional tasks without further c/o of increased fatigue but fluctuating SpO2 from 84-91% on 4 liters with functional reach for LE bathing/dressing activities  See above levels of A required for all functional tasks  Written information was provided to Pt providing education on energy conservation and compensatory breathing techs  Pt able to return demonstration of comp breathing techs with F quality  Reported all findings to RN Vidal Roberts regarding Pt's current need for O2 at rest as well as with activity  Spoke OTR Lawanda regarding Pt's improvements with self care tasks and functional mobility a Supervision levels  Recommending pulmonary rehab at this time if available vs home therapies due to recent improvements with functional tasks but continued need for pulmonary endorance  Pt reports having good understanding of all reviewed information  The patient's raw score on the AM-PAC Daily Activity inpatient short form is 21, standardized score is 44 27, greater than 39 4  Patients at this level are likely to benefit from discharge to home with home therapies and or pulmonary rehab  Plan   Treatment Interventions ADL retraining;UE strengthening/ROM; Functional transfer training; Endurance training;Cognitive reorientation;Patient/family training   Goal Expiration Date 01/28/22   OT Treatment Day 1   OT Frequency 3-5x/wk   Recommendation   OT Discharge Recommendation   (pulmonary rehab )   AM-PAC Daily Activity Inpatient   Lower Body Dressing 3   Bathing 3   Toileting 3   Upper Body Dressing 4   Grooming 4   Eating 4   Daily Activity Raw Score 21   Daily Activity Standardized Score (Calc for Raw Score >=11) 44 27   AM-PAC Applied Cognition Inpatient   Following a Speech/Presentation 4   Understanding Ordinary Conversation 4   Taking Medications 3   Remembering Where Things Are Placed or Put Away 3   Remembering List of 4-5 Errands 3   Taking Care of Complicated Tasks 3   Applied Cognition Raw Score 20   Applied Cognition Standardized Score 41 76   Alfreda Douglas Anna Vera

## 2022-01-20 NOTE — PLAN OF CARE
Problem: PHYSICAL THERAPY ADULT  Goal: Performs mobility at highest level of function for planned discharge setting  See evaluation for individualized goals  Description: Treatment/Interventions: Functional transfer training,LE strengthening/ROM,Elevations,Therapeutic exercise,Endurance training,Patient/family training,Equipment eval/education,Bed mobility,Gait training,Compensatory technique education,Continued evaluation,Spoke to nursing,OT          See flowsheet documentation for full assessment, interventions and recommendations  Outcome: Progressing  Note: Prognosis: Good  Problem List: Decreased strength,Decreased endurance,Decreased mobility  Assessment: Pt  Seen for Pt treatment session  Pt on Ra upon entering with spo2 at 84% on RA, pt is requiring supplemental o2 at this time at all times to maintain spo2 between 88%-91% on $l  Pt desats to 80% with activity and mobility  and increased time to rebound to 91% requiring at leas 1 minute to rebound  Pt  Is functioning at mod I for transfers, supervision for ambulation with use of Rw and  Mod I for toilet transfers  Pt demonstrates steady static and dynamic balance  Pt is limited by fatigue, decreased functional endurance and activity tolerance  Pt is able to ambulate greater than house hold distances  Pt  Requires frequent rest breaks due to fatigue and decrease o2 sats, increased hr  Pt requires verbal cues for proper breathing and pacing techniques and cues for energy conservation  Pt issued and performed  HEP  Pt tolerated well difficulty with SLR on L, however pt able perform active SLR on L  Pt  Will benefit from continued PT upon d/c to address endurance and pulmonary compromise which is impacting mobiltiy in order to facilitate improved quality of life  Recommend  Home w/either HHPT with pulmonary rehab or oupt PT with pulmonary rehab      Barriers to Discharge: Inaccessible home environment  Barriers to Discharge Comments: steps     PT Discharge Recommendation:  (home with home PT and or pulmonary rehab outpt)          See flowsheet documentation for full assessment

## 2022-01-20 NOTE — CASE MANAGEMENT
Case Management Discharge Planning Note    Patient name Vicente Cassidy  [place] [address] /E4 [address] [facility] MRN [id number]  : 1940 Date 2022       Current Admission Date: 2022  Current Admission Diagnosis:Acute blood loss anemia   Patient Active Problem List    Diagnosis Date Noted    Hemoperitoneum 2022    Thrombocytosis 2021    Bacteremia due to methicillin susceptible Staphylococcus aureus (MSSA) 2021    Pneumothorax- Resolved 2021    Chronic kidney disease, stage 3b ([facility] ) 2021    Acute blood loss anemia 2021    Sepsis due to methicillin susceptible Staphylococcus aureus ([facility] ) 2021    Ambulatory dysfunction 2021    Pneumonia due to COVID-19 virus 2021    [facility] 2021    Negative depression screening 2021    Overweight (BMI 25 0-29 9) 2021    Medicare annual wellness visit, subsequent 2020    Localized, primary osteoarthritis of hand 2020    Refused influenza vaccine 2020    Sciatica 2020    Hypertensive kidney disease with chronic kidney disease stage III ([facility] ) 2019    Type 2 diabetes mellitus without complication, without long-term current use of insulin ([facility] ) 2019    Bursitis of shoulder 2018    Groin pain, chronic, left 2018    Paresthesia of arm 2017    Back pain 2017    Muscle pain 2017    Anxiety 2017    Abnormal ECG 2016    Diverticulitis large intestine w/o perforation or abscess w/o bleeding 2016    Essential hypertension 2016    Lower abdominal pain 2016    Atrophic vaginitis 2016    Hypercholesterolemia 2016    Irritable bowel syndrome 2016    Simple chronic anemia 2016    Disorder of shoulder 2008    Articular cartilage disorder of shoulder region 2008    Loose body in joint of shoulder region 2008 LOS (days): 8  Geometric Mean LOS (GMLOS) (days): 6 80  Days to GMLOS:-1 2     OBJECTIVE:  Risk of Unplanned Readmission Score: 22         Current admission status: Inpatient   Preferred Pharmacy:   [address]  [place]issa Mcdonald [place]  [place] [address]  Phone: [phone] Fax: [phone] [address] [address]  Phone: [phone] Fax: [phone]    Primary Care Provider: Shannon Costa DO    Primary Insurance: [address] REP  Secondary Insurance:     DISCHARGE DETAILS:    Comments - Freedom of Choice: [facility] a bed and they will work on Elizabeth Enriquez MD is aware of this      [facility]  Receiving Facility/Agency Phone Number: [phone]  Facility/Agency Fax Number: [phone]

## 2022-01-20 NOTE — PLAN OF CARE
Problem: Potential for Falls  Goal: Patient will remain free of falls  Description: INTERVENTIONS:  - Educate patient/family on patient safety including physical limitations  - Instruct patient to call for assistance with activity   - Consult OT/PT to assist with strengthening/mobility   - Keep Call bell within reach  - Keep bed low and locked with side rails adjusted as appropriate  - Keep care items and personal belongings within reach  - Initiate and maintain comfort rounds  - Make Fall Risk Sign visible to staff  - Offer Toileting every 2 Hours, in advance of need  - Initiate/Maintain bed alarm  - Apply yellow socks and bracelet for high fall risk patients  - Consider moving patient to room near nurses station  1/19/2022 2248 by Brianna Loernzana RN  Outcome: Progressing  1/19/2022 2248 by Brianna Lorenzana RN  Outcome: Progressing     Problem: MOBILITY - ADULT  Goal: Maintain or return to baseline ADL function  Description: INTERVENTIONS:  -  Assess patient's ability to carry out ADLs; assess patient's baseline for ADL function and identify physical deficits which impact ability to perform ADLs (bathing, care of mouth/teeth, toileting, grooming, dressing, etc )  - Assess/evaluate cause of self-care deficits   - Assess range of motion  - Assess patient's mobility; develop plan if impaired  - Assess patient's need for assistive devices and provide as appropriate  - Encourage maximum independence but intervene and supervise when necessary  - Involve family in performance of ADLs  - Assess for home care needs following discharge   - Consider OT consult to assist with ADL evaluation and planning for discharge  - Provide patient education as appropriate  1/19/2022 2248 by Brianna Lorenzana RN  Outcome: Not Progressing  1/19/2022 2248 by Brianna Lorenzana RN  Outcome: Not Progressing  Goal: Maintains/Returns to pre admission functional level  Description: INTERVENTIONS:  - Perform BMAT or MOVE assessment daily    - Set and communicate daily mobility goal to care team and patient/family/caregiver  - Collaborate with rehabilitation services on mobility goals if consulted  - Reposition patient every 2 hours    - Out of bed for toileting  - Record patient progress and toleration of activity level   1/19/2022 2248 by Krzysztof Sommers RN  Outcome: Not Progressing  1/19/2022 2248 by Krzysztof Sommers RN  Outcome: Not Progressing     Problem: RESPIRATORY - ADULT  Goal: Achieves optimal ventilation and oxygenation  Description: INTERVENTIONS:  - Assess for changes in respiratory status  - Assess for changes in mentation and behavior  - Position to facilitate oxygenation and minimize respiratory effort  - Oxygen administered by appropriate delivery if ordered  - Initiate smoking cessation education as indicated  - Encourage broncho-pulmonary hygiene including cough, deep breathe, Incentive Spirometry  - Assess the need for suctioning and aspirate as needed  - Assess and instruct to report SOB or any respiratory difficulty  - Respiratory Therapy support as indicated  Outcome: Not Progressing     Problem: Prexisting or High Potential for Compromised Skin Integrity  Goal: Skin integrity is maintained or improved  Description: INTERVENTIONS:  - Identify patients at risk for skin breakdown  - Assess and monitor skin integrity  - Assess and monitor nutrition and hydration status  - Monitor labs   - Assess for incontinence   - Turn and reposition patient  - Assist with mobility/ambulation  - Relieve pressure over bony prominences  - Avoid friction and shearing  - Provide appropriate hygiene as needed including keeping skin clean and dry  - Evaluate need for skin moisturizer/barrier cream  - Collaborate with interdisciplinary team   - Patient/family teaching  - Consider wound care consult   1/19/2022 2248 by Krzysztof Sommers RN  Outcome: Not Progressing  1/19/2022 2248 by Krzysztof Sommers RN  Outcome: Not Progressing

## 2022-01-20 NOTE — CASE MANAGEMENT
Case Management Discharge Planning Note    Patient name Haydee Dior  Location [facility] /E4 [address] [facility]-* MRN [id number]  : 1940 Date 2022       Current Admission Date: 2022  Current Admission Diagnosis:Acute blood loss anemia   Patient Active Problem List    Diagnosis Date Noted    Hemoperitoneum 2022    Thrombocytosis 2021    Bacteremia due to methicillin susceptible Staphylococcus aureus (MSSA) 2021    Pneumothorax- Resolved 2021    Chronic kidney disease, stage 3b ([facility] ) 2021    Acute blood loss anemia 2021    Sepsis due to methicillin susceptible Staphylococcus aureus ([facility] ) 2021    Ambulatory dysfunction 2021    Pneumonia due to COVID-19 virus 2021    [facility] 2021    Negative depression screening 2021    Overweight (BMI 25 0-29 9) 2021    Medicare annual wellness visit, subsequent 2020    Localized, primary osteoarthritis of hand 2020    Refused influenza vaccine 2020    Sciatica 2020    Hypertensive kidney disease with chronic kidney disease stage III ([facility] ) 2019    Type 2 diabetes mellitus without complication, without long-term current use of insulin ([facility] ) 2019    Bursitis of shoulder 2018    Groin pain, chronic, left 2018    Paresthesia of arm 2017    Back pain 2017    Muscle pain 2017    Anxiety 2017    Abnormal ECG 2016    Diverticulitis large intestine w/o perforation or abscess w/o bleeding 2016    Essential hypertension 2016    Lower abdominal pain 2016    Atrophic vaginitis 2016    Hypercholesterolemia 2016    Irritable bowel syndrome 2016    Simple chronic anemia 2016    Disorder of shoulder 2008    Articular cartilage disorder of shoulder region 2008    Loose body in joint of shoulder region 2008 LOS (days): 8  Geometric Mean LOS (GMLOS) (days): 6 80  Days to GMLOS:-1 1     OBJECTIVE:  Risk of Unplanned Readmission Score: 22         Current admission status: Inpatient   Preferred Pharmacy:   [address] [facility] [address]  [address]  [place] [address]  Phone: [phone] Fax: [phone] [address] [facility] [address]  Phone: [phone] Fax: [phone]    Primary Care Provider: Bhavin Portillo DO    Primary Insurance: [employer]  Secondary Insurance:     DISCHARGE DETAILS:  Comments - Freedom of Choice: Left a message with dtr that there are no beds at [facility]  and will continue to search  MD aware of this      IMM Given (Date):: 01/20/22  IMM Given to[de-identified] Patient (Read IMM, copy given to her and copy put in bin to be scan )

## 2022-01-20 NOTE — ASSESSMENT & PLAN NOTE
· Stable    Baseline creatinine appears to be around 1 2 - 1 6   · Roberts catheter was removed successfully  · No urinary retention no

## 2022-01-21 VITALS
DIASTOLIC BLOOD PRESSURE: 78 MMHG | SYSTOLIC BLOOD PRESSURE: 142 MMHG | BODY MASS INDEX: 26.4 KG/M2 | HEART RATE: 82 BPM | TEMPERATURE: 97 F | RESPIRATION RATE: 18 BRPM | OXYGEN SATURATION: 94 % | WEIGHT: 134.48 LBS | HEIGHT: 60 IN

## 2022-01-21 PROBLEM — K66.1 HEMOPERITONEUM: Status: RESOLVED | Noted: 2022-01-12 | Resolved: 2022-01-21

## 2022-01-21 PROBLEM — D62 ACUTE BLOOD LOSS ANEMIA: Status: RESOLVED | Noted: 2021-12-21 | Resolved: 2022-01-21

## 2022-01-21 LAB
ANION GAP SERPL CALCULATED.3IONS-SCNC: 12 MMOL/L (ref 4–13)
BUN SERPL-MCNC: 33 MG/DL (ref 5–25)
CALCIUM SERPL-MCNC: 8.2 MG/DL (ref 8.3–10.1)
CHLORIDE SERPL-SCNC: 106 MMOL/L (ref 100–108)
CO2 SERPL-SCNC: 23 MMOL/L (ref 21–32)
CREAT SERPL-MCNC: 1.04 MG/DL (ref 0.6–1.3)
DME PARACHUTE DELIVERY DATE ACTUAL: NORMAL
DME PARACHUTE DELIVERY DATE EXPECTED: NORMAL
DME PARACHUTE DELIVERY DATE REQUESTED: NORMAL
DME PARACHUTE ITEM DESCRIPTION: NORMAL
DME PARACHUTE ORDER STATUS: NORMAL
DME PARACHUTE SUPPLIER NAME: NORMAL
DME PARACHUTE SUPPLIER PHONE: NORMAL
ERYTHROCYTE [DISTWIDTH] IN BLOOD BY AUTOMATED COUNT: 16.2 % (ref 11.6–15.1)
GFR SERPL CREATININE-BSD FRML MDRD: 50 ML/MIN/1.73SQ M
GLUCOSE SERPL-MCNC: 228 MG/DL (ref 65–140)
GLUCOSE SERPL-MCNC: 239 MG/DL (ref 65–140)
GLUCOSE SERPL-MCNC: 310 MG/DL (ref 65–140)
GLUCOSE SERPL-MCNC: 368 MG/DL (ref 65–140)
HCT VFR BLD AUTO: 33.4 % (ref 34.8–46.1)
HGB BLD-MCNC: 10.8 G/DL (ref 11.5–15.4)
MCH RBC QN AUTO: 27.8 PG (ref 26.8–34.3)
MCHC RBC AUTO-ENTMCNC: 32.3 G/DL (ref 31.4–37.4)
MCV RBC AUTO: 86 FL (ref 82–98)
PLATELET # BLD AUTO: 206 THOUSANDS/UL (ref 149–390)
PMV BLD AUTO: 9.1 FL (ref 8.9–12.7)
POTASSIUM SERPL-SCNC: 4 MMOL/L (ref 3.5–5.3)
RBC # BLD AUTO: 3.89 MILLION/UL (ref 3.81–5.12)
SODIUM SERPL-SCNC: 141 MMOL/L (ref 136–145)
WBC # BLD AUTO: 4.31 THOUSAND/UL (ref 4.31–10.16)

## 2022-01-21 PROCEDURE — 94761 N-INVAS EAR/PLS OXIMETRY MLT: CPT

## 2022-01-21 PROCEDURE — 85027 COMPLETE CBC AUTOMATED: CPT | Performed by: INTERNAL MEDICINE

## 2022-01-21 PROCEDURE — 82948 REAGENT STRIP/BLOOD GLUCOSE: CPT

## 2022-01-21 PROCEDURE — 80048 BASIC METABOLIC PNL TOTAL CA: CPT | Performed by: INTERNAL MEDICINE

## 2022-01-21 PROCEDURE — 99239 HOSP IP/OBS DSCHRG MGMT >30: CPT | Performed by: INTERNAL MEDICINE

## 2022-01-21 RX ORDER — PREDNISONE 20 MG/1
40 TABLET ORAL DAILY
Qty: 6 TABLET | Refills: 0 | Status: SHIPPED | OUTPATIENT
Start: 2022-01-22 | End: 2022-03-21

## 2022-01-21 RX ORDER — LIDOCAINE 50 MG/G
1 PATCH TOPICAL DAILY
Qty: 10 PATCH | Refills: 0 | Status: SHIPPED | OUTPATIENT
Start: 2022-01-22

## 2022-01-21 RX ORDER — AMOXICILLIN 250 MG
2 CAPSULE ORAL 2 TIMES DAILY
Qty: 120 TABLET | Refills: 0 | Status: SHIPPED | OUTPATIENT
Start: 2022-01-21 | End: 2022-05-05

## 2022-01-21 RX ORDER — POLYETHYLENE GLYCOL 3350 17 G/17G
17 POWDER, FOR SOLUTION ORAL DAILY
Refills: 0
Start: 2022-01-22

## 2022-01-21 RX ADMIN — LIDOCAINE 1 PATCH: 50 PATCH CUTANEOUS at 08:23

## 2022-01-21 RX ADMIN — INSULIN LISPRO 16 UNITS: 100 INJECTION, SOLUTION INTRAVENOUS; SUBCUTANEOUS at 16:44

## 2022-01-21 RX ADMIN — INSULIN LISPRO 8 UNITS: 100 INJECTION, SOLUTION INTRAVENOUS; SUBCUTANEOUS at 08:24

## 2022-01-21 RX ADMIN — PREDNISONE 40 MG: 20 TABLET ORAL at 08:22

## 2022-01-21 RX ADMIN — PRAVASTATIN SODIUM 40 MG: 40 TABLET ORAL at 16:44

## 2022-01-21 RX ADMIN — INSULIN LISPRO 12 UNITS: 100 INJECTION, SOLUTION INTRAVENOUS; SUBCUTANEOUS at 12:13

## 2022-01-21 RX ADMIN — FERROUS SULFATE TAB 325 MG (65 MG ELEMENTAL FE) 325 MG: 325 (65 FE) TAB at 08:22

## 2022-01-21 NOTE — DISCHARGE SUMMARY
Discharge Summary - [place] [address] Internal Medicine    Patient Information: Carol Burden [de-identified] y o  female MRN: [id number]  Unit/Bed#: E4 -01 Encounter: [id number]    Discharging Physician / Practitioner: Bear Kay DO  PCP: Daphne Waller DO  Admission Date: 1/12/2022  Discharge Date: 01/21/22    Disposition:     Home with [facility] Services (Reminder: Complete face to face encounter)     Reason for Admission: acute blood loss anemia    Discharge Diagnoses:     Principal Problem (Resolved):    Acute blood loss anemia  Active Problems:    Type 2 diabetes mellitus without complication, without long-term current use of insulin (HCC)    Pneumonia due to COVID-19 virus    Acute respiratory failure with hypoxia ([facility] )    Ambulatory dysfunction    Chronic kidney disease, stage 3b ([facility] )  Resolved Problems:    Hemoperitoneum    Thrombocytopenia ([facility] )      Consultations During Hospital Stay:  · IR    Procedures Performed:     Embolization of bleeding artery     Significant Findings / Test Results:   CT chest abdomen pelvis wo contrast  Result Date: 1/12/2022  Impression: 1  Large amount of hemoperitoneum as described  This is most concentrated surrounding the liver  Consider contrast enhanced CT abdomen and pelvis to evaluate for parenchymal laceration  2   Bowel wall thickening at the splenic flexure  Given severe anemia, and the location of this finding in a watershed location, consider ischemic colitis 3  Lung findings compatible with COVID-19, not significantly changed    XR chest portable  Result Date: 1/17/2022  Impression: No change in extensive bilateral groundglass opacity, the sequela of Covid-19, with no new disease  XR chest portable  Result Date: 1/10/2022  Impression: Stable opacities compatible with Covid-19 pneumonia  XR chest portable  Result Date: 1/6/2022  Impression: Moderate bilateral peripheral groundglass opacity, the sequela of Covid-19       XR chest portable  Result Date: 1/5/2022  Impression: No pneumothorax status post chest tube removal   Stable airspace opacities  XR chest portable  Result Date: 1/5/2022  Impression: Chest tube over right base with no pneumothorax  No change in left greater than right ground glass opacity due to Covid-19  XR chest portable  Result Date: 12/27/2021  Impression: Right chest tube with no pneumothorax  Persistent moderate bibasilar groundglass opacity due to Covid-19  XR chest portable  Result Date: 12/25/2021  Impression: Moderate right pneumothorax  A chest tube was subsequently inserted  Persistent moderate groundglass opacity due to Covid-19  XR chest pa & lateral  Result Date: 1/20/2022  Impression: No significant interval change in bilateral pulmonary groundglass opacity  CT chest wo contrast  Result Date: 1/10/2022  Impression: 1  Right chest wall subcutaneous emphysema after prior intervention  No evidence of pneumothorax  2   Peripheral and basilar groundglass and airspace opacities consistent with Covid 19 infiltrates  3   Mild perihepatic ascites of uncertain etiology  Correlate with volume status  CTA abdomen pelvis w wo contrast  Result Date: 1/12/2022  Impression: Right perihepatic hematoma with hemoperitoneum and active extravasation arising adjacent to the 7th rib  Bilateral lower lobe groundglass opacities consistent with the patient's history of COVID pneumonia  Subcutaneous emphysema adjacent to the hematoma of unclear etiology  Numerous soft tissue nodules in the mid and lower abdominal wall similar to the prior study consistent with hematomas  XR chest portable ICU  Result Date: 1/13/2022  Impression: The tip of the endotracheal tube projects at the right mainstem bronchus ostium  Other support devices as described  Small lung volumes  Worsened right upper lung zone opacity, likely atelectasis  Unchanged bilateral lower lung zone probable atelectasis      XR chest portable ICU  Result Date: 1/4/2022  Impression: No pneumothorax  Unchanged bilateral groundglass opacities  XR chest portable ICU  Result Date: 1/4/2022  Impression: Small bore right chest tube without convincing pneumothorax  Bilateral groundglass opacities, unchanged  XR chest portable ICU  Result Date: 1/3/2022  Impression: Bilateral subsegmental atelectasis  Tiny right apical pneumothorax, decreased in size since 1/2/2022  XR chest portable ICU  Result Date: 1/2/2022  Impression: Persistent small right apical pneumothorax slightly increased in size since prior study    XR chest portable ICU  Result Date: 12/31/2021  Impression: Trace right pneumothorax with persistent subcutaneous emphysema  Redemonstration of ground glass opacity due to Covid 19  XR chest portable ICU  Result Date: 12/30/2021  Impression: Large right pneumothorax markedly increased  Dr Sami Hampton was made aware the findings at 9:06 AM today at time of interpretation of the subsequent x-ray  XR chest portable ICU  Result Date: 12/30/2021  Impression: 1  Interval reinflation of the right lung post placement of right-sided chest tube, with no evidence of residual right-sided pneumothorax  2   Airspace opacities consistent with known COVID-19 pneumonia  XR chest portable ICU  Result Date: 12/30/2021  Impression: Persistent large right tension pneumothorax with subcutaneous emphysema  Moderate bilateral groundglass opacity due to Covid 19  XR chest portable ICU  Result Date: 12/29/2021  Impression: Right chest tube removal with recurrent small pneumothorax  Persistent moderate bibasilar groundglass opacity due to Covid 19  XR chest portable ICU  Result date: 12/29/2021  Impression: No pneumothorax  Unchanged left greater than right basilar airspace opacities, compatible with known Covid 19 pneumonia  XR chest portable ICU  Result Date: 12/27/2021  Impression: Persistent small right apical pneumothorax with chest tube over right base  Persistent moderate bibasilar groundglass opacity due to Covid-19  XR chest portable ICU  Result Date: 12/25/2021  Impression: Right chest tube insertion with decrease in pneumothorax  Bilateral groundglass opacity due to Covid 19  CTA chest pe study  Result Date: 1/20/2022  Impression: No pulmonary embolus identified Bilateral groundglass consolidations consistent with Covid pneumonia, similar to slightly improved since most recent CT  Incidental Findings:   · none    Test Results Pending at Discharge (will require follow up):   · none     Outpatient Tests Requested:  none    Hospital Course:     Myke Gaytan is a [de-identified] y o  female patient who originally presented to the hospital on 1/12/2022 due s/p IR procedure for acute blood loss secondary to 8th rib intercostal bleed from prior chest tube placement for PTX  She was admitted to the icu as she had hemorrhagic shock  She was hypotensive with a SBP of 70-80s  Her Hb dropped to 5 to 9 on the day of admission CT chest/abd/prelvis showed a large hemoperitoneum mostly surrounding the liver  Patient was given a fluid bolus and BP improved  She also received a unit of pRBCs and a product of FFP  It was determined that she required IR intervention for Hemoperitoneum and was transferred to [facility] for procedure  She was airlifted to [facility] and received her second unit of pRBC's enroute  Pt had done well since this procedure and did not have recurrent bleeding  She also had acute respiratory failure with hypoxia due to covid 19 pna and pneumonthorax  She completed course of treatment for covid  Pt had been weaned to room air but then required 4 liters again  Repeat cta chest was negative for pe but showed ongoing covid pna  She was restarted on a quick steroid taper  She will have home oxygen eval prior to discharge  Pt did have ambulatory dysfunction and had at first required str   She did progress with physical therapy and will be sent home with [facility] and vna  She had hyperglycemia due to steroids and will be restarted on her diabetic medications  Her blood glucose should improve with steroid taper   Pt was discharged to home  Discharge Day Visit / Exam:     * Please refer to separate progress note for these details *    Discharge instructions/Information to patient and family:   See after visit summary for information provided to patient and family  Provisions for Follow-Up Care:  See after visit summary for information related to follow-up care and any pertinent home health orders  Discharge Statement:  I spent 34 minutes discharging the patient  This time was spent on the day of discharge  I had direct contact with the patient on the day of discharge  Greater than 50% of the total time was spent examining patient, answering all patient questions, arranging and discussing plan of care with patient as well as directly providing post-discharge instructions  Additional time then spent on discharge activities  Discharge Medications:  See after visit summary for reconciled discharge medications provided to patient and family

## 2022-01-21 NOTE — ASSESSMENT & PLAN NOTE
· COVID-19 positive on 12/16/2021; Unvaccinated  · Decadron 0 1 mg/kg completed 12/30  · Completed 14 day course of Baricitinib on 1/2/22  · Completed 5 day course of Remdesivir on 12/21/2022  · Steroid taper completed  · Isolation precautions no longer needed at this time  · bnp was elevated  She was treated with iv lasix  · Improved to room air and this am she is now requiring 4 liters  · D dimer is 13  · Ct chest negative for acute pulmonary embolism  Still shows residual covid fibrosis  Will treat with short steroid taper as pt had increase in oxygen need again  · Wean oxygen as tolerated   Will obtain home oxygen eval prior to discharge home as she is now cleared to be discharged to home by physical therapy with pt

## 2022-01-21 NOTE — RESPIRATORY THERAPY NOTE
Home Oxygen Qualifying Test       Patient name: Octavio Sutton        : 1940   Date of Test:  2022  Diagnosis:      Home Oxygen Test:    **Medicare Guidelines require item(s) 1-5 on all ambulatory patients or 1 and 2 on non-ambulatory patients  1   Baseline SPO2 on Room Air at rest 95 %  2   SPO2 during exercise on Room Air 85 %  During exercise monitor SpO2  If SPO2 increases >=89% with ambulation do not add supplemental             oxygen  If <= 88% on room air add O2 via NC and titrate patient  Patient must be ambulated with O2 and titrated to > 88% with exertion  3   SPO2 on Oxygen at rest  %  lpm     4   SPO2 during exercise on Oxygen  91% a liter flow of 2 lpm     5   Exercise performed: pt walked with walker for 6 min 's in [place] at good pace  No s/s's of sob or leg fatigue noted  Pursed-lipped breathing instructed  [x]  Supplemental Home Oxygen is indicated  []  Client does not qualify for home oxygen        Respiratory Additional Notes-   Nirav Patterson, RT

## 2022-01-21 NOTE — ASSESSMENT & PLAN NOTE
Due to bleeding right intercoastal artery related to recent Chest tube placement  Successfully treated with embolization of the bleeding right IC artery on 01/12/2022  She was transferred out of the ICU and has remained stable     Hemoglobin remained stable at 10 8 with no active bleeding on exam

## 2022-01-21 NOTE — ASSESSMENT & PLAN NOTE
· Short-term rehab was recommended  · Pt is progressing with physical therapy  Pt no longer qualifies for str  She is strong enough to go home with hhpt  · Called her daughter Marleny Miller and she agrees with this plan

## 2022-01-21 NOTE — PROGRESS NOTES
[address] [facility]  Progress Note - Michele Marie 1940, [de-identified] y o  female MRN: [id number]  Unit/Bed#: E4 -01 Encounter: [id number]  Primary Care Provider: Yolande Villarreal DO   Date and time admitted to hospital: 1/12/2022  1:52 PM    Hemoperitoneum  Assessment & Plan  · Secondary to bleeding right intercostal artery  · This was treated with embolization by IR  · Continue serial exams  · Repeat imaging as needed if she developed hypotension, decreasing blood counts, worsening ecchymoses    Chronic kidney disease, stage 3b (HCC)  Assessment & Plan  · Stable  Baseline creatinine appears to be around 1 2 - 1 6   · Roberts catheter was removed successfully  · No urinary retention    Ambulatory dysfunction  Assessment & Plan  · Short-term rehab was recommended  · Pt is progressing with physical therapy  Pt no longer qualifies for str  She is strong enough to go home with hhpt  · Called her daughter Nayana Collins and she agrees with this plan  Acute respiratory failure with hypoxia ([facility] [place] [address] )  Assessment & Plan  · COVID-19 positive on 12/16/2021; Unvaccinated  · Decadron 0 1 mg/kg completed 12/30  · Completed 14 day course of Baricitinib on 1/2/22  · Completed 5 day course of Remdesivir on 12/21/2022  · Steroid taper completed  · Isolation precautions no longer needed at this time  · bnp was elevated  She was treated with iv lasix  · Improved to room air and this am she is now requiring 4 liters  · D dimer is 13  · Ct chest negative for acute pulmonary embolism  Still shows residual covid fibrosis  Will treat with short steroid taper as pt had increase in oxygen need again  · Possibly due to atelectasis   · Wean oxygen as tolerated   Will obtain home oxygen eval prior to discharge home as she is now cleared to be discharged to home by physical therapy with hhpt    Pneumonia due to COVID-19 virus  Assessment & Plan  · Decadron 0 1 mg/kg completed 12/30  · Completed 14 day course of Baricitinib on 22  · Completed 5 day course of Remdesivir on 2022  · Steroid taper completed  · Restarted taper as pt had slight worsening of respiratory status  Type 2 diabetes mellitus without complication, without long-term current use of insulin [facility])  Assessment & Plan  Lab Results   Component Value Date    HGBA1C 6 2 (H) 2021     A1c is excellent  Hyperglycemia due to steroids  Will restart home medications and anticipate improvement in blood glucose as steroids are tapered  * Acute blood loss anemia  Assessment & Plan  Due to bleeding right intercoastal artery related to recent Chest tube placement  Successfully treated with embolization of the bleeding right IC artery on 2022  She was transferred out of the ICU and has remained stable  Hemoglobin remained stable at 10 8 with no active bleeding on exam       Discharge Plan / Estimated Discharge Date: discharge home with hhpt/vna  Code Status: Level 1 - Full Code      Subjective:   Pt seen and examined  Pt doing well  She is sating well on 2 liters  She did not have worsening of shortness of breath or increased oxygen requirement this am  She is using incentive spirometry  Her ability with incentive spirometry improves throughout the day  Yesterday she started at 750 and increased to 1000  This am she is at 750 again  No f/c no cp no n/v/d no abd pain  Objective:     Vitals:   Temp (24hrs), Av 9 °F (36 6 °C), Min:97 8 °F (36 6 °C), Max:98 2 °F (36 8 °C)    Temp:  [97 8 °F (36 6 °C)-98 2 °F (36 8 °C)] 97 8 °F (36 6 °C)  HR:  [70-83] 70  Resp:  [18] 18  BP: (125-138)/(60-79) 130/60  SpO2:  [91 %-92 %] 92 %  Body mass index is 26 26 kg/m²  Input and Output Summary (last 24 hours): Intake/Output Summary (Last 24 hours) at 2022 1139  Last data filed at 2022 0732  Gross per 24 hour   Intake 834 17 ml   Output --   Net 834 17 ml       Physical Exam:   Physical Exam  Constitutional:       Appearance: Normal appearance  HENT:      Head: Normocephalic and atraumatic  Eyes:      Extraocular Movements: Extraocular movements intact  Pupils: Pupils are equal, round, and reactive to light  Cardiovascular:      Rate and Rhythm: Normal rate and regular rhythm  Heart sounds: No murmur heard  No friction rub  No gallop  Pulmonary:      Effort: Pulmonary effort is normal  No respiratory distress  Breath sounds: Normal breath sounds  No wheezing or rales  Abdominal:      General: Bowel sounds are normal  There is no distension  Palpations: Abdomen is soft  Tenderness: There is no abdominal tenderness  There is no guarding  Musculoskeletal:      Right lower leg: No edema  Left lower leg: No edema  Neurological:      Mental Status: She is alert and oriented to person, place, and time  Additional Data:     Labs:  Results from last 7 days   Lab Units 01/21/22  0548 01/18/22  0513 01/17/22  0508   WBC Thousand/uL 4 31   < > 3 92*   HEMOGLOBIN g/dL 10 8*   < > 10 1*   HEMATOCRIT % 33 4*   < > 31 4*   PLATELETS Thousands/uL 206   < > 107*   NEUTROS PCT %  --   --  61   LYMPHS PCT %  --   --  22   MONOS PCT %  --   --  11   EOS PCT %  --   --  5    < > = values in this interval not displayed       Results from last 7 days   Lab Units 01/21/22  0548   SODIUM mmol/L 141   POTASSIUM mmol/L 4 0   CHLORIDE mmol/L 106   CO2 mmol/L 23   BUN mg/dL 33*   CREATININE mg/dL 1 04   ANION GAP mmol/L 12   CALCIUM mg/dL 8 2*   GLUCOSE RANDOM mg/dL 239*         Results from last 7 days   Lab Units 01/21/22  1109 01/21/22  0738 01/20/22  2102 01/20/22  1550 01/20/22  1135 01/20/22  0740 01/19/22  2033 01/19/22  1603 01/19/22  1118 01/19/22  0744 01/18/22  2139 01/18/22  1600   POC GLUCOSE mg/dl 310* 228* 282* 191* 251* 183* 210* 175* 365* 172* 162* 236*                 Recent Cultures (last 7 days):           Lines/Drains:  Invasive Devices  Report    Peripheral Intravenous Line            Peripheral IV 01/20/22 Left;Proximal;Ventral (anterior) Forearm <1 day    Peripheral IV 01/20/22 Left;Ventral (anterior) Wrist <1 day              Last 24 Hours Medication List:   Current Facility-Administered Medications   Medication Dose Route Frequency Provider Last Rate    acetaminophen  650 mg Oral Q6H PRN iWly Zamora MD      bisacodyl  10 mg Rectal Daily PRN BENTON Carol[place]      ferrous sulfate  325 mg Oral Daily With [facility], [place]      hydrALAZINE  10 mg Intravenous Q6H PRN Kiko Alvarado, [place]      insulin lispro  4-20 Units Subcutaneous 4x Daily (AC & HS) Yesi Hall PA-C      lidocaine  1 patch Topical Daily Charles Sonday, [place]      ondansetron  4 mg Intravenous Q6H PRN Kiko Alvarado, [place]      oxyCODONE  2 5 mg Oral Q6H PRN Kiko Alvarado, [place]      polyethylene glycol  17 g Oral Daily Charles Sonday, [place]      pravastatin  40 mg Oral Daily With [facility] Sonday, CRNP      predniSONE  40 mg Oral Daily Elvie Fernando DO      senna-docusate sodium  2 tablet Oral BID [place]          Today, Patient Was Seen By: Kaylan Sams DO

## 2022-01-21 NOTE — CASE MANAGEMENT
Case Management Discharge Planning Note    Patient name Rene Duran  Location East 4 /E4 [address] [facility]-* MRN [id number]  : 1940 Date 2022       Current Admission Date: 2022  Current Admission Diagnosis:Acute respiratory failure with hypoxia [facility])   Patient Active Problem List    Diagnosis Date Noted    Thrombocytosis 2021    Bacteremia due to methicillin susceptible Staphylococcus aureus (MSSA) 2021    Pneumothorax- Resolved 2021    Chronic kidney disease, stage 3b ([facility] [address] ) 2021    Sepsis due to methicillin susceptible Staphylococcus aureus [facility] 2021    Ambulatory dysfunction 2021    Pneumonia due to COVID-19 virus 2021    Acute respiratory failure with hypoxia [facility] ) 2021    Negative depression screening 2021    Overweight (BMI 25 0-29 9) 2021    Medicare annual wellness visit, subsequent 2020    Localized, primary osteoarthritis of hand 2020    Refused influenza vaccine 2020    Sciatica 2020    Hypertensive kidney disease with chronic kidney disease stage III ([facility] [address] ) 2019    Type 2 diabetes mellitus without complication, without long-term current use of insulin ([facility] ) 2019    Bursitis of shoulder 2018    Groin pain, chronic, left 2018    Paresthesia of arm 2017    Back pain 2017    Muscle pain 2017    Anxiety 2017    Abnormal ECG 2016    Diverticulitis large intestine w/o perforation or abscess w/o bleeding 2016    Essential hypertension 2016    Lower abdominal pain 2016    Atrophic vaginitis 2016    Hypercholesterolemia 2016    Irritable bowel syndrome 2016    Simple chronic anemia 2016    Disorder of shoulder 2008    Articular cartilage disorder of shoulder region 2008    Loose body in joint of shoulder region 2008      LOS (days): 9  Geometric Mean LOS (GMLOS) (days): 6 80  Days to GMLOS:-2 3     OBJECTIVE:  Risk of Unplanned Readmission Score: 24         Current admission status: Inpatient   Preferred Pharmacy:   [address]  [place] [facility]  [place] [address]  Phone: [phone] Fax: [phone]    [address], [place] [address]  Phone: [phone] Fax: [phone]    Primary Care Provider: Bhavin Portillo DO    Primary Insurance: [facility] REP  Secondary Insurance:     DISCHARGE DETAILS:    Comments - Freedom of Choice: Sent referral for home 02 and called Liaison Salomon for portable tank  Oxygen is all set up  TC to dtr and she will transport pt home between 1730 and 1800  RN is aware of this        Requested [address]         Is the patient interested in [facility] [place] at discharge?: Yes  [address] requested[de-identified] [address] Name[de-identified] [address] [place] [address] Provider[de-identified] PCP  Home Health Services Needed[de-identified] Evaluate Functional Status and Safety,Gait/ADL Training,Oxygen Via Nasal Cannula,Strengthening/Theraputic Exercises to Improve Function,Other (comment) (Medical Management)  Oxygen LPM Ordered (if applicable based on home health services needed):: 2 LPM  Homebound Criteria Met[de-identified] Requires the Assistance of Another Person for Safe Ambulation or to Leave the Home,Uses an Assist Device (i e  cane, walker, etc)  Supporting Clincal Findings[de-identified] Fatigues Easliy in Short Distances,Limited Endurance,Requires Oxygen

## 2022-01-21 NOTE — ASSESSMENT & PLAN NOTE
· Decadron 0 1 mg/kg completed 12/30  · Completed 14 day course of Baricitinib on 1/2/22  · Completed 5 day course of Remdesivir on 12/21/2022  · Steroid taper completed  · Restarted taper as pt had slight worsening of respiratory status

## 2022-01-21 NOTE — ASSESSMENT & PLAN NOTE
Lab Results   Component Value Date    HGBA1C 6 2 (H) 11/22/2021     A1c is excellent  Hyperglycemia due to steroids  Will restart home medications and anticipate improvement in blood glucose as steroids are tapered

## 2022-01-21 NOTE — CASE MANAGEMENT
Case Management Discharge Planning Note    Patient name Constance Duran  Location East  /E4 [facility] [facility]-* MRN [id number]  : 1940 Date 2022       Current Admission Date: 2022  Current Admission Diagnosis:Acute respiratory failure with hypoxia [facility])   Patient Active Problem List    Diagnosis Date Noted    Thrombocytosis 2021    Bacteremia due to methicillin susceptible Staphylococcus aureus (MSSA) 2021    Pneumothorax- Resolved 2021    Chronic kidney disease, stage 3b ([facility] [address] ) 2021    Sepsis due to methicillin susceptible Staphylococcus aureus [facility] 2021    Ambulatory dysfunction 2021    Pneumonia due to COVID-19 virus 2021    Acute respiratory failure with hypoxia [facility] ) 2021    Negative depression screening 2021    Overweight (BMI 25 0-29 9) 2021    Medicare annual wellness visit, subsequent 2020    Localized, primary osteoarthritis of hand 2020    Refused influenza vaccine 2020    Sciatica 2020    Hypertensive kidney disease with chronic kidney disease stage III ([facility] [address] ) 2019    Type 2 diabetes mellitus without complication, without long-term current use of insulin ([facility] ) 2019    Bursitis of shoulder 2018    Groin pain, chronic, left 2018    Paresthesia of arm 2017    Back pain 2017    Muscle pain 2017    Anxiety 2017    Abnormal ECG 2016    Diverticulitis large intestine w/o perforation or abscess w/o bleeding 2016    Essential hypertension 2016    Lower abdominal pain 2016    Atrophic vaginitis 2016    Hypercholesterolemia 2016    Irritable bowel syndrome 2016    Simple chronic anemia 2016    Disorder of shoulder 2008    Articular cartilage disorder of shoulder region 2008    Loose body in joint of shoulder region 2008      LOS (days): 9  Geometric Mean LOS (GMLOS) (days): 6 80  Days to LOS:-2 1     OBJECTIVE:  Risk of Unplanned Readmission Score: 23         Current admission status: Inpatient   Preferred Pharmacy:   [address]  [place], [place]  [place] [address]  Phone: [phone] Fax: [phone] [address] [facility] [place] [address]  Phone: [phone] Fax: [phone]    Primary Care Provider: Aundrea Rock DO    Primary Insurance: [facility] REP  Secondary Insurance:     DISCHARGE DETAILS:    Comments - Freedom of Choice: Insurance did not approve Joie Conley MD can do a P2P by 1500 today, [phone] option 5  PT/OT is now recommending home therapy vs OP with pulmonary rehab  MD called dtr about the change  [facility] was set up at home and will work on getting home 02

## 2022-01-21 NOTE — PLAN OF CARE
Problem: RESPIRATORY - ADULT  Goal: Achieves optimal ventilation and oxygenation  Description: INTERVENTIONS:  - Assess for changes in respiratory status  - Assess for changes in mentation and behavior  - Position to facilitate oxygenation and minimize respiratory effort  - Oxygen administered by appropriate delivery if ordered  - Initiate smoking cessation education as indicated  - Encourage broncho-pulmonary hygiene including cough, deep breathe, Incentive Spirometry  - Assess the need for suctioning and aspirate as needed  - Assess and instruct to report SOB or any respiratory difficulty  - Respiratory Therapy support as indicated  Outcome: Not Progressing     Problem: Potential for Falls  Goal: Patient will remain free of falls  Description: INTERVENTIONS:  - Educate patient/family on patient safety including physical limitations  - Instruct patient to call for assistance with activity   - Consult OT/PT to assist with strengthening/mobility   - Keep Call bell within reach  - Keep bed low and locked with side rails adjusted as appropriate  - Keep care items and personal belongings within reach  - Initiate and maintain comfort rounds  - Make Fall Risk Sign visible to staff  - Offer Toileting every 2 Hours, in advance of need  - Initiate/Maintain bed alarm  - Apply yellow socks and bracelet for high fall risk patients  - Consider moving patient to room near nurses station  Outcome: Progressing     Problem: MOBILITY - ADULT  Goal: Maintain or return to baseline ADL function  Description: INTERVENTIONS:  -  Assess patient's ability to carry out ADLs; assess patient's baseline for ADL function and identify physical deficits which impact ability to perform ADLs (bathing, care of mouth/teeth, toileting, grooming, dressing, etc )  - Assess/evaluate cause of self-care deficits   - Assess range of motion  - Assess patient's mobility; develop plan if impaired  - Assess patient's need for assistive devices and provide as appropriate  - Encourage maximum independence but intervene and supervise when necessary  - Involve family in performance of ADLs  - Assess for home care needs following discharge   - Consider OT consult to assist with ADL evaluation and planning for discharge  - Provide patient education as appropriate  Outcome: Progressing  Goal: Maintains/Returns to pre admission functional level  Description: INTERVENTIONS:  - Perform BMAT or MOVE assessment daily    - Set and communicate daily mobility goal to care team and patient/family/caregiver  - Collaborate with rehabilitation services on mobility goals if consulted  - Reposition patient every 2 hours    - Out of bed for toileting  - Record patient progress and toleration of activity level   Outcome: Progressing

## 2022-01-24 ENCOUNTER — TRANSITIONAL CARE MANAGEMENT (OUTPATIENT)
Dept: FAMILY MEDICINE CLINIC | Facility: CLINIC | Age: 82
End: 2022-01-24

## 2022-01-27 ENCOUNTER — OFFICE VISIT (OUTPATIENT)
Dept: FAMILY MEDICINE CLINIC | Facility: CLINIC | Age: 82
End: 2022-01-27
Payer: COMMERCIAL

## 2022-01-27 VITALS
OXYGEN SATURATION: 96 % | TEMPERATURE: 97.6 F | BODY MASS INDEX: 22.54 KG/M2 | SYSTOLIC BLOOD PRESSURE: 146 MMHG | DIASTOLIC BLOOD PRESSURE: 76 MMHG | WEIGHT: 114.8 LBS | HEIGHT: 60 IN | HEART RATE: 84 BPM

## 2022-01-27 DIAGNOSIS — E11.9 TYPE 2 DIABETES MELLITUS WITHOUT COMPLICATION, WITHOUT LONG-TERM CURRENT USE OF INSULIN (HCC): ICD-10-CM

## 2022-01-27 DIAGNOSIS — K66.1 HEMOPERITONEUM: Primary | ICD-10-CM

## 2022-01-27 DIAGNOSIS — D62 ACUTE BLOOD LOSS ANEMIA: ICD-10-CM

## 2022-01-27 DIAGNOSIS — Z13.5 SCREENING FOR DIABETIC RETINOPATHY: ICD-10-CM

## 2022-01-27 DIAGNOSIS — Z78.0 ASYMPTOMATIC POSTMENOPAUSAL STATE: ICD-10-CM

## 2022-01-27 PROCEDURE — 99496 TRANSJ CARE MGMT HIGH F2F 7D: CPT | Performed by: FAMILY MEDICINE

## 2022-02-01 ENCOUNTER — TELEPHONE (OUTPATIENT)
Dept: FAMILY MEDICINE CLINIC | Facility: CLINIC | Age: 82
End: 2022-02-01

## 2022-02-01 DIAGNOSIS — M54.30 SCIATICA, UNSPECIFIED LATERALITY: ICD-10-CM

## 2022-02-01 DIAGNOSIS — E11.9 TYPE 2 DIABETES MELLITUS WITHOUT COMPLICATION, WITHOUT LONG-TERM CURRENT USE OF INSULIN (HCC): ICD-10-CM

## 2022-02-01 DIAGNOSIS — J12.82 PNEUMONIA DUE TO COVID-19 VIRUS: Primary | ICD-10-CM

## 2022-02-01 DIAGNOSIS — U07.1 PNEUMONIA DUE TO COVID-19 VIRUS: Primary | ICD-10-CM

## 2022-02-01 NOTE — TELEPHONE ENCOUNTER
Home health nurse called patient is requesting a small portable concentrater  so it is easier to travel with and a mattress overlay nurse worried about skin break down        [facility] [employer]       Orders faxed

## 2022-02-04 DIAGNOSIS — I10 ESSENTIAL HYPERTENSION: ICD-10-CM

## 2022-02-07 RX ORDER — ENALAPRIL MALEATE 10 MG/1
TABLET ORAL
Qty: 90 TABLET | Refills: 3 | Status: SHIPPED | OUTPATIENT
Start: 2022-02-07 | End: 2022-05-06 | Stop reason: SDUPTHER

## 2022-03-02 ENCOUNTER — TELEPHONE (OUTPATIENT)
Dept: FAMILY MEDICINE CLINIC | Facility: CLINIC | Age: 82
End: 2022-03-02

## 2022-03-02 NOTE — TELEPHONE ENCOUNTER
PT called and stated she is having significant pain in her left hip down to her knee, it is a constant pain that is causing her to wake up in the middle of the night  Some relief with Tylenol but not much  Wanted to know if she can have and Xray done to see if there is anything wrong with it  Had one done back in December but wanted to know if she can have another one

## 2022-03-21 ENCOUNTER — OFFICE VISIT (OUTPATIENT)
Dept: PULMONOLOGY | Facility: CLINIC | Age: 82
End: 2022-03-21
Payer: COMMERCIAL

## 2022-03-21 VITALS
WEIGHT: 116.6 LBS | BODY MASS INDEX: 22.77 KG/M2 | DIASTOLIC BLOOD PRESSURE: 70 MMHG | HEART RATE: 84 BPM | OXYGEN SATURATION: 98 % | RESPIRATION RATE: 16 BRPM | SYSTOLIC BLOOD PRESSURE: 138 MMHG

## 2022-03-21 DIAGNOSIS — J12.82 PNEUMONIA DUE TO COVID-19 VIRUS: ICD-10-CM

## 2022-03-21 DIAGNOSIS — J96.01 ACUTE RESPIRATORY FAILURE WITH HYPOXIA (HCC): Primary | ICD-10-CM

## 2022-03-21 DIAGNOSIS — U07.1 PNEUMONIA DUE TO COVID-19 VIRUS: ICD-10-CM

## 2022-03-21 PROCEDURE — 3078F DIAST BP <80 MM HG: CPT | Performed by: INTERNAL MEDICINE

## 2022-03-21 PROCEDURE — 99215 OFFICE O/P EST HI 40 MIN: CPT | Performed by: INTERNAL MEDICINE

## 2022-03-21 PROCEDURE — 1036F TOBACCO NON-USER: CPT | Performed by: INTERNAL MEDICINE

## 2022-03-21 PROCEDURE — 94618 PULMONARY STRESS TESTING: CPT | Performed by: INTERNAL MEDICINE

## 2022-03-21 PROCEDURE — 1160F RVW MEDS BY RX/DR IN RCRD: CPT | Performed by: INTERNAL MEDICINE

## 2022-03-21 PROCEDURE — 3075F SYST BP GE 130 - 139MM HG: CPT | Performed by: INTERNAL MEDICINE

## 2022-03-21 NOTE — PROGRESS NOTES
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   · Non vaccinated, positive PCR on 12/21  · Treated as an inpatient per the protocol at that time  · Discharged on supplemental oxygen 2 LPM continuously  · Appears to be recovering, no residual respiratory symptoms    Plan:   · 6 minute walk test today showing mild exertional hypoxemia to 88% lowest  · May continue supplemental oxygen at 1 LPM with exertion, no need for oxygen at rest  · Given the persistent crackles, will check chest x-ray in 8 weeks, consider HRCT if showed residual fibrotic changes  · Not interested in COVID-19 vaccination    Right pneumothorax   · Stable, was no signs of recurrence   · Likely COVID-19 related   · Chest x-ray as above    Return in about 3 months (around 6/21/2022)  History of Present Illness     Follow up for:  HFU/COVID 19 pneumonia and pneumothorax    Background:  [de-identified] y o  female with a h/o DM2, diverticulosis, COVID 19 positive in 12/2021/non vaccinated  Patient had extended hospitalization in 12/2021, hypoxemic respiratory failure COVID-19 pneumonia and noted to have a right-sided pneumothorax, this was believed to be from COVID-19 pneumonia  Initial chest tube 16 [place] placed on 12/25, removed on 12/28  Noted a recurrence of the moderate to large pneumothorax on the right, a 2nd chest tube 12 [place] placed on 12/30, removed on 01/05  She was also treated for MSSA bacteremia with 7 days course of cefazolin, negative TTE  Hospital course complicated by hemoperitoneum/hemorrhagic shock, required transferred to [facility]  Noted the bleeding source likely from 8th rib intercostal bleed, underwent IR embolization  Interval History  Since discharge from the hospital on 01/21, feeling progressively better  She was on supplemental oxygen continuously initially, now using it only with exertion    Does not check SpO2  Continues to use incentive spirometer  Reports no residual respiratory symptoms, still with mild fatigue  Review of Systems  As per hpi, all other systems reviewed and were negative    Studies:    Imaging and other studies: I have personally reviewed pertinent films in PACS      Pulmonary function testin minute walk test 2022-baseline SpO2 94% on room air, heart rate 108  Able to walk 112 m in 6 minutes, lowest SpO2 at 88%, able to complete the test on 1 LPM/24% FiO2 for SpO2 at 90% at the end of exercise, maximal heart rate 121    EKG, Pathology, and Other Studies: I have personally reviewed pertinent reports  Past medical, surgical, social and family histories reviewed  Medications/Allergies: Reviewed      Vitals: Blood pressure 138/70, pulse 84, resp  rate 16, weight 52 9 kg (116 lb 9 6 oz), SpO2 98 %, not currently breastfeeding  Body mass index is 22 77 kg/m²  Oxygen Therapy  SpO2: 98 %      Physical Exam  Body mass index is 22 77 kg/m²     Gen: not in acute distress,   Neck/Eyes: supple, no JVD appreciated, PERRL  Ear: normal appearance, no significant hearing impairment  Nose:  normal nasal mucosa, no drainage  Mouth:  unremarkable/normal appearance of lips, teeth and gums  Oropharynx: mucosa is moist, no focal lesions or erythema  Salivary glands: soft nontender  Chest: normal respiratory efforts, fine bibasilar/mid lung fields crackles, otherwise clear   CV: RRR, no murmurs appreciated, no edema  Abdomen: soft, non tender  Extremities:  No observed deformity   Skin: unremarkable  Neuro: AAO X3, no focal motor deficit        Labs:  Lab Results   Component Value Date    WBC 4 31 2022    HGB 10 8 (L) 2022    HCT 33 4 (L) 2022    MCV 86 2022     2022     Lab Results   Component Value Date    GLUCOSE 234 (H) 2022    CALCIUM 8 2 (L) 2022    K 4 0 2022    CO2 23 2022     2022    BUN 33 (H) 01/21/2022    CREATININE 1 04 01/21/2022     No results found for: IGE  Lab Results   Component Value Date    ALT 30 01/12/2022    AST 23 01/12/2022    ALKPHOS 69 01/12/2022           Portions of the record may have been created with voice recognition software  Occasional wrong word or "sound a like" substitutions may have occurred due to the inherent limitations of voice recognition software  Read the chart carefully and recognize, using context, where substitutions have occurred    TIFFANY [facility]

## 2022-04-05 ENCOUNTER — APPOINTMENT (OUTPATIENT)
Dept: LAB | Facility: CLINIC | Age: 82
End: 2022-04-05
Payer: COMMERCIAL

## 2022-04-05 DIAGNOSIS — E11.9 TYPE 2 DIABETES MELLITUS WITHOUT COMPLICATION, WITHOUT LONG-TERM CURRENT USE OF INSULIN (HCC): ICD-10-CM

## 2022-04-05 DIAGNOSIS — N18.32 CHRONIC KIDNEY DISEASE (CKD) STAGE G3B/A1, MODERATELY DECREASED GLOMERULAR FILTRATION RATE (GFR) BETWEEN 30-44 ML/MIN/1.73 SQUARE METER AND ALBUMINURIA CREATININE RATIO LESS THAN 30 MG/G (HCC): ICD-10-CM

## 2022-04-05 DIAGNOSIS — D50.9 IRON DEFICIENCY ANEMIA, UNSPECIFIED IRON DEFICIENCY ANEMIA TYPE: ICD-10-CM

## 2022-04-05 LAB
25(OH)D3 SERPL-MCNC: 35.7 NG/ML (ref 30–100)
ALBUMIN SERPL BCP-MCNC: 4.1 G/DL (ref 3.5–5)
ANION GAP SERPL CALCULATED.3IONS-SCNC: 4 MMOL/L (ref 4–13)
BUN SERPL-MCNC: 35 MG/DL (ref 5–25)
CALCIUM SERPL-MCNC: 10.1 MG/DL (ref 8.3–10.1)
CHLORIDE SERPL-SCNC: 112 MMOL/L (ref 100–108)
CO2 SERPL-SCNC: 24 MMOL/L (ref 21–32)
CREAT SERPL-MCNC: 1.62 MG/DL (ref 0.6–1.3)
ERYTHROCYTE [DISTWIDTH] IN BLOOD BY AUTOMATED COUNT: 14.4 % (ref 11.6–15.1)
EST. AVERAGE GLUCOSE BLD GHB EST-MCNC: 143 MG/DL
FERRITIN SERPL-MCNC: 511 NG/ML (ref 8–388)
GFR SERPL CREATININE-BSD FRML MDRD: 29 ML/MIN/1.73SQ M
GLUCOSE P FAST SERPL-MCNC: 116 MG/DL (ref 65–99)
GLUCOSE P FAST SERPL-MCNC: 123 MG/DL (ref 65–99)
HBA1C MFR BLD: 6.6 %
HCT VFR BLD AUTO: 37.9 % (ref 34.8–46.1)
HGB BLD-MCNC: 11.6 G/DL (ref 11.5–15.4)
IRON SATN MFR SERPL: 19 % (ref 15–50)
IRON SERPL-MCNC: 64 UG/DL (ref 50–170)
MCH RBC QN AUTO: 27.8 PG (ref 26.8–34.3)
MCHC RBC AUTO-ENTMCNC: 30.6 G/DL (ref 31.4–37.4)
MCV RBC AUTO: 91 FL (ref 82–98)
PHOSPHATE SERPL-MCNC: 2.9 MG/DL (ref 2.3–4.1)
PLATELET # BLD AUTO: 301 THOUSANDS/UL (ref 149–390)
PMV BLD AUTO: 9.7 FL (ref 8.9–12.7)
POTASSIUM SERPL-SCNC: 4.7 MMOL/L (ref 3.5–5.3)
PTH-INTACT SERPL-MCNC: 68.1 PG/ML (ref 18.4–80.1)
RBC # BLD AUTO: 4.18 MILLION/UL (ref 3.81–5.12)
SODIUM SERPL-SCNC: 140 MMOL/L (ref 136–145)
TIBC SERPL-MCNC: 334 UG/DL (ref 250–450)
WBC # BLD AUTO: 7.48 THOUSAND/UL (ref 4.31–10.16)

## 2022-04-05 PROCEDURE — 83970 ASSAY OF PARATHORMONE: CPT

## 2022-04-05 PROCEDURE — 36415 COLL VENOUS BLD VENIPUNCTURE: CPT

## 2022-04-05 PROCEDURE — 83036 HEMOGLOBIN GLYCOSYLATED A1C: CPT

## 2022-04-05 PROCEDURE — 83550 IRON BINDING TEST: CPT

## 2022-04-05 PROCEDURE — 82728 ASSAY OF FERRITIN: CPT

## 2022-04-05 PROCEDURE — 80069 RENAL FUNCTION PANEL: CPT

## 2022-04-05 PROCEDURE — 82306 VITAMIN D 25 HYDROXY: CPT

## 2022-04-05 PROCEDURE — 82947 ASSAY GLUCOSE BLOOD QUANT: CPT

## 2022-04-05 PROCEDURE — 85027 COMPLETE CBC AUTOMATED: CPT

## 2022-04-05 PROCEDURE — 83540 ASSAY OF IRON: CPT

## 2022-04-18 ENCOUNTER — RA CDI HCC (OUTPATIENT)
Dept: OTHER | Facility: HOSPITAL | Age: 82
End: 2022-04-18

## 2022-04-18 NOTE — PROGRESS NOTES
E11 22  [facility]  coding opportunities          Chart Reviewed number of suggestions sent to Provider: 1     Patients Insurance     Medicare Insurance: [employer]

## 2022-04-19 ENCOUNTER — TELEPHONE (OUTPATIENT)
Dept: FAMILY MEDICINE CLINIC | Facility: CLINIC | Age: 82
End: 2022-04-19

## 2022-04-19 NOTE — TELEPHONE ENCOUNTER
Patient called stating that her hair is significantly falling out since having covid and would like to know if this is normal, should she be concerned is there anything she can do to prevent or help this?

## 2022-05-05 ENCOUNTER — OFFICE VISIT (OUTPATIENT)
Dept: FAMILY MEDICINE CLINIC | Facility: CLINIC | Age: 82
End: 2022-05-05
Payer: COMMERCIAL

## 2022-05-05 VITALS
WEIGHT: 119.4 LBS | TEMPERATURE: 97.3 F | BODY MASS INDEX: 23.44 KG/M2 | HEIGHT: 60 IN | SYSTOLIC BLOOD PRESSURE: 126 MMHG | DIASTOLIC BLOOD PRESSURE: 70 MMHG | OXYGEN SATURATION: 95 % | HEART RATE: 90 BPM

## 2022-05-05 DIAGNOSIS — E78.00 HYPERCHOLESTEROLEMIA: ICD-10-CM

## 2022-05-05 DIAGNOSIS — I10 ESSENTIAL HYPERTENSION: ICD-10-CM

## 2022-05-05 DIAGNOSIS — L65.9 HAIR LOSS: ICD-10-CM

## 2022-05-05 DIAGNOSIS — Z13.5 SCREENING FOR DIABETIC RETINOPATHY: ICD-10-CM

## 2022-05-05 DIAGNOSIS — E11.9 TYPE 2 DIABETES MELLITUS WITHOUT COMPLICATION, WITHOUT LONG-TERM CURRENT USE OF INSULIN (HCC): Primary | ICD-10-CM

## 2022-05-05 DIAGNOSIS — E78.1 HYPERTRIGLYCERIDEMIA: ICD-10-CM

## 2022-05-05 PROCEDURE — 3078F DIAST BP <80 MM HG: CPT | Performed by: FAMILY MEDICINE

## 2022-05-05 PROCEDURE — 3074F SYST BP LT 130 MM HG: CPT | Performed by: FAMILY MEDICINE

## 2022-05-05 PROCEDURE — 99214 OFFICE O/P EST MOD 30 MIN: CPT | Performed by: FAMILY MEDICINE

## 2022-05-05 PROCEDURE — 1036F TOBACCO NON-USER: CPT | Performed by: FAMILY MEDICINE

## 2022-05-05 PROCEDURE — 1160F RVW MEDS BY RX/DR IN RCRD: CPT | Performed by: FAMILY MEDICINE

## 2022-05-05 PROCEDURE — 3725F SCREEN DEPRESSION PERFORMED: CPT | Performed by: FAMILY MEDICINE

## 2022-05-05 NOTE — PROGRESS NOTES
Assessment/Plan:    No problem-specific Assessment & Plan notes found for this encounter  Diagnoses and all orders for this visit:    Type 2 diabetes mellitus without complication, without long-term current use of insulin (HCC)  Comments:  no change in the medication  Orders:  -     Diabetic foot exam; Future  -     Hemoglobin A1C; Future    Essential hypertension  Comments:  no change in the medication  Orders:  -     CBC and differential; Future  -     TSH, 3rd generation with Free T4 reflex; Future    Hypercholesterolemia  Comments:  pt counseled on diet and exercise  Orders:  -     Comprehensive metabolic panel; Future  -     Lipid panel; Future    Hypertriglyceridemia  Comments:  pt counseled on diet and exercise  Orders:  -     Comprehensive metabolic panel; Future  -     Lipid panel; Future    Screening for diabetic retinopathy    Hair loss  -     Ambulatory Referral to Dermatology; Future          PHQ-2/9 Depression Screening    Little interest or pleasure in doing things: 0 - not at all  Feeling down, depressed, or hopeless: 0 - not at all  PHQ-2 Score: 0  PHQ-2 Interpretation: Negative depression screen        Patient's shoes and socks removed  Right Foot/Ankle   Right Foot Inspection  Skin Exam: skin normal and skin intact  No dry skin, no warmth, no callus, no erythema, no maceration, no abnormal color, no pre-ulcer, no ulcer and no callus  Toe Exam: ROM and strength within normal limits  no right toe deformity    Sensory   Monofilament testing: intact    Vascular  Capillary refills: < 3 seconds  The right DP pulse is 2+  Left Foot/Ankle  Left Foot Inspection  Skin Exam: skin normal and skin intact  No dry skin, no warmth, no erythema, no maceration, normal color, no pre-ulcer, no ulcer and no callus  Toe Exam: ROM and strength within normal limits  No left toe deformity  Sensory   Monofilament testing: intact    Vascular  Capillary refills: < 3 seconds  The left DP pulse is 2+  Assign Risk Category  No deformity present  No loss of protective sensation  No weak pulses  Risk: 0      Subjective:      Patient ID: Maggie Mcmullen is a [de-identified] y o  female  Diabetes  She presents for her follow-up diabetic visit  She has type 2 diabetes mellitus  Pertinent negatives for diabetes include no chest pain  The following portions of the patient's history were reviewed and updated as appropriate: allergies, current medications, past family history, past medical history, past social history, past surgical history and problem list     Review of Systems   Eyes: Negative for visual disturbance  Cardiovascular: Negative for chest pain and palpitations  Gastrointestinal: Negative for abdominal pain, nausea and vomiting  Genitourinary: Negative for frequency  Skin: Negative for wound  Neurological: Negative for numbness  Objective:    /70 (BP Location: Left arm, Patient Position: Sitting)   Pulse 90   Temp (!) 97 3 °F (36 3 °C) (Tympanic)   Ht 5' (1 524 m)   Wt 54 2 kg (119 lb 6 4 oz)   LMP  (LMP Unknown)   SpO2 95%   BMI 23 32 kg/m²      Physical Exam  Cardiovascular:      Pulses: no weak pulses          Dorsalis pedis pulses are 2+ on the right side and 2+ on the left side  Feet:      Right foot:      Skin integrity: No ulcer, skin breakdown, erythema, warmth, callus or dry skin  Left foot:      Skin integrity: No ulcer, skin breakdown, erythema, warmth, callus or dry skin

## 2022-05-06 DIAGNOSIS — E11.9 TYPE 2 DIABETES MELLITUS WITHOUT COMPLICATION, WITHOUT LONG-TERM CURRENT USE OF INSULIN (HCC): ICD-10-CM

## 2022-05-06 DIAGNOSIS — I10 ESSENTIAL HYPERTENSION: ICD-10-CM

## 2022-05-07 RX ORDER — GLIPIZIDE 10 MG/1
10 TABLET, FILM COATED, EXTENDED RELEASE ORAL DAILY
Qty: 90 TABLET | Refills: 2 | Status: SHIPPED | OUTPATIENT
Start: 2022-05-07

## 2022-05-07 RX ORDER — LOVASTATIN 40 MG/1
40 TABLET ORAL
Qty: 90 TABLET | Refills: 2 | Status: SHIPPED | OUTPATIENT
Start: 2022-05-07

## 2022-05-10 RX ORDER — ENALAPRIL MALEATE 10 MG/1
10 TABLET ORAL DAILY
Qty: 90 TABLET | Refills: 3 | Status: SHIPPED | OUTPATIENT
Start: 2022-05-10

## 2022-05-25 ENCOUNTER — APPOINTMENT (OUTPATIENT)
Dept: RADIOLOGY | Facility: CLINIC | Age: 82
End: 2022-05-25
Payer: COMMERCIAL

## 2022-05-25 DIAGNOSIS — U07.1 PNEUMONIA DUE TO COVID-19 VIRUS: ICD-10-CM

## 2022-05-25 DIAGNOSIS — J12.82 PNEUMONIA DUE TO COVID-19 VIRUS: ICD-10-CM

## 2022-05-25 DIAGNOSIS — J96.01 ACUTE RESPIRATORY FAILURE WITH HYPOXIA (HCC): ICD-10-CM

## 2022-05-25 PROCEDURE — 71046 X-RAY EXAM CHEST 2 VIEWS: CPT

## 2022-06-15 LAB
LEFT EYE DIABETIC RETINOPATHY: NORMAL
RIGHT EYE DIABETIC RETINOPATHY: NORMAL

## 2022-06-19 NOTE — PROGRESS NOTES
Pulmonary Follow Up Note   Safia Porter [de-identified] y o  female MRN: [id number]  6/20/2022    Assessment:  COVID 19 pneumonia   · Improved significantly, in symptoms and objectives   · Positive PCR on 12/21/2021, non vaccinated  · Treated per the protocol at that time, hospitalized for hypoxic respiratory failure    Plan:   · Repeat 6 minute walk test today showed no exertional hypoxemia   · Discontinue supplemental oxygen   · Mild fine crackles at the bases, likely atelectatic changes   · Repeat chest x-ray showed improved course reticulations/opacities at the bases    Right pneumothorax   · Stable, was no signs of recurrence on chest x-ray  · Likely COVID-19 related       Return if symptoms worsen or fail to improve  History of Present Illness     Follow up for: COVID 19 pneumonia and pneumothorax    Background:  [de-identified] y o  female with a h/o DM2, diverticulosis, COVID 19 positive in 12/2021/non vaccinated      Patient had extended hospitalization in 12/2021, hypoxemic respiratory failure COVID-19 pneumonia and noted to have a right-sided pneumothorax, this was believed to be from COVID-19 pneumonia  Initial chest tube 16 [place] placed on 12/25, removed on 12/28  Noted a recurrence of the moderate to large pneumothorax on the right, a 2nd chest tube 12 [place] placed on 12/30, removed on 01/05  She was also treated for MSSA bacteremia with 7 days course of cefazolin, negative TTE      Hospital course complicated by hemoperitoneum/hemorrhagic shock, required transferred to [facility]  Noted the bleeding source likely from 8th rib intercostal bleed, underwent IR embolization  03/21/2022 visit-mild exertional hypoxemia on 6 minute walk  Repeat chest x-ray showed improved course reticulation at the bases  Interval History  Since last seen, continues to feel better more exercise capacity  Still has mild dyspnea on exertion such as going uphill/up stairs but significantly better from before    Continues to check supplemental oxygen frequently, all readings above 90%  She called back the oxygen supplies companies in return the machine  Review of Systems  As per hpi, all other systems reviewed and were negative    Studies:  Imaging and other studies: I have personally reviewed pertinent films in PACS     Chest x-ray 2022-improved opacities at the lung peripheries     Pulmonary function testin minute walk test 2022-baseline SpO2 94% on room air, heart rate 108  Able to walk 112 m in 6 minutes, lowest SpO2 at 88%, able to complete the test on 1 LPM/24% FiO2 for SpO2 at 90% at the end of exercise, maximal heart rate 121    6 minute walk test 2022-baseline SpO2 96% on room air, heart rate 90  Able to walk 259 m in 6 minutes, lowest SpO2 92, maximal heart rate 101      EKG, Pathology, and Other Studies: I have personally reviewed pertinent reports           Past medical, surgical, social and family histories reviewed  Medications/Allergies: Reviewed      Vitals: Blood pressure 116/74, pulse 72, temperature 97 6 °F (36 4 °C), resp  rate 16, height 5' (1 524 m), weight 55 8 kg (123 lb), SpO2 99 %, not currently breastfeeding  Body mass index is 24 02 kg/m²  Oxygen Therapy  SpO2: 99 %  Oxygen Therapy: None (Room air)      Physical Exam  Body mass index is 24 02 kg/m²     Gen: not in acute distress,   Neck/Eyes: supple, no adenopathy, PERRL  Ear: normal appearance, no significant hearing impairment  Nose:  normal nasal mucosa, no drainage  Mouth:  unremarkable/normal appearance of lips, teeth and gums  Oropharynx: mucosa is moist, no focal lesions or erythema  Salivary glands: soft nontender  Chest: normal respiratory efforts, clear breath sounds bilaterally, mild fine crackles at the far bases  CV: RRR, no murmurs appreciated, no edema  Abdomen: soft, non tender  Extremities:  No observed deformity   Skin: unremarkable  Neuro: AAO X3, no focal motor deficit        Labs:  Lab Results   Component Value Date    WBC 7 48 04/05/2022    HGB 11 6 04/05/2022    HCT 37 9 04/05/2022    MCV 91 04/05/2022     04/05/2022     Lab Results   Component Value Date    GLUCOSE 234 (H) 01/12/2022    CALCIUM 10 1 04/05/2022    K 4 7 04/05/2022    CO2 24 04/05/2022     (H) 04/05/2022    BUN 35 (H) 04/05/2022    CREATININE 1 62 (H) 04/05/2022     No results found for: IGE  Lab Results   Component Value Date    ALT 30 01/12/2022    AST 23 01/12/2022    ALKPHOS 69 01/12/2022           Portions of the record may have been created with voice recognition software  Occasional wrong word or "sound a like" substitutions may have occurred due to the inherent limitations of voice recognition software  Read the chart carefully and recognize, using context, where substitutions have occurred    [facility]

## 2022-06-20 ENCOUNTER — OFFICE VISIT (OUTPATIENT)
Dept: PULMONOLOGY | Facility: CLINIC | Age: 82
End: 2022-06-20
Payer: COMMERCIAL

## 2022-06-20 VITALS
HEART RATE: 72 BPM | TEMPERATURE: 97.6 F | SYSTOLIC BLOOD PRESSURE: 116 MMHG | WEIGHT: 123 LBS | OXYGEN SATURATION: 99 % | BODY MASS INDEX: 24.15 KG/M2 | HEIGHT: 60 IN | RESPIRATION RATE: 16 BRPM | DIASTOLIC BLOOD PRESSURE: 74 MMHG

## 2022-06-20 DIAGNOSIS — J12.82 PNEUMONIA DUE TO COVID-19 VIRUS: ICD-10-CM

## 2022-06-20 DIAGNOSIS — J96.01 ACUTE RESPIRATORY FAILURE WITH HYPOXIA (HCC): Primary | ICD-10-CM

## 2022-06-20 DIAGNOSIS — U07.1 PNEUMONIA DUE TO COVID-19 VIRUS: ICD-10-CM

## 2022-06-20 PROCEDURE — 1036F TOBACCO NON-USER: CPT | Performed by: INTERNAL MEDICINE

## 2022-06-20 PROCEDURE — 99214 OFFICE O/P EST MOD 30 MIN: CPT | Performed by: INTERNAL MEDICINE

## 2022-06-20 PROCEDURE — 3078F DIAST BP <80 MM HG: CPT | Performed by: INTERNAL MEDICINE

## 2022-06-20 PROCEDURE — 3074F SYST BP LT 130 MM HG: CPT | Performed by: INTERNAL MEDICINE

## 2022-06-20 PROCEDURE — 1160F RVW MEDS BY RX/DR IN RCRD: CPT | Performed by: INTERNAL MEDICINE

## 2022-06-20 PROCEDURE — 94618 PULMONARY STRESS TESTING: CPT | Performed by: INTERNAL MEDICINE

## 2022-06-20 RX ORDER — BIOTIN 10000 MCG
CAPSULE ORAL
COMMUNITY

## 2022-06-20 RX ORDER — KETOCONAZOLE 20 MG/ML
SHAMPOO TOPICAL
COMMUNITY
Start: 2022-05-12

## 2022-06-20 RX ORDER — MULTIVIT WITH MINERALS/LUTEIN
1000 TABLET ORAL DAILY
COMMUNITY

## 2022-07-19 ENCOUNTER — RA CDI HCC (OUTPATIENT)
Dept: OTHER | Facility: HOSPITAL | Age: 82
End: 2022-07-19

## 2022-07-19 NOTE — PROGRESS NOTES
[id number] 22  [facility]  coding opportunities          Chart Reviewed number of suggestions sent to Provider: 1     Patients Insurance     Medicare Insurance: Chito Chauhan

## 2022-07-25 ENCOUNTER — CONSULT (OUTPATIENT)
Dept: FAMILY MEDICINE CLINIC | Facility: CLINIC | Age: 82
End: 2022-07-25
Payer: COMMERCIAL

## 2022-07-25 VITALS
OXYGEN SATURATION: 95 % | HEIGHT: 60 IN | HEART RATE: 66 BPM | WEIGHT: 125.25 LBS | TEMPERATURE: 99.2 F | BODY MASS INDEX: 24.59 KG/M2 | DIASTOLIC BLOOD PRESSURE: 68 MMHG | SYSTOLIC BLOOD PRESSURE: 139 MMHG

## 2022-07-25 DIAGNOSIS — Z13.5 SCREENING FOR DIABETIC RETINOPATHY: ICD-10-CM

## 2022-07-25 DIAGNOSIS — I10 ESSENTIAL HYPERTENSION: Primary | ICD-10-CM

## 2022-07-25 PROCEDURE — 99213 OFFICE O/P EST LOW 20 MIN: CPT | Performed by: FAMILY MEDICINE

## 2022-07-25 PROCEDURE — 3075F SYST BP GE 130 - 139MM HG: CPT | Performed by: FAMILY MEDICINE

## 2022-07-25 PROCEDURE — 3078F DIAST BP <80 MM HG: CPT | Performed by: FAMILY MEDICINE

## 2022-07-25 PROCEDURE — 3725F SCREEN DEPRESSION PERFORMED: CPT | Performed by: FAMILY MEDICINE

## 2022-07-25 RX ORDER — KETOROLAC TROMETHAMINE 4 MG/ML
SOLUTION/ DROPS OPHTHALMIC
COMMUNITY
Start: 2022-07-19

## 2022-07-25 RX ORDER — PREDNISOLONE ACETATE 10 MG/ML
SUSPENSION/ DROPS OPHTHALMIC
COMMUNITY
Start: 2022-07-19

## 2022-07-25 RX ORDER — CIPROFLOXACIN HYDROCHLORIDE 3.5 MG/ML
SOLUTION/ DROPS TOPICAL
COMMUNITY
Start: 2022-07-19

## 2022-07-25 NOTE — PROGRESS NOTES
Assessment/Plan:    No problem-specific Assessment & Plan notes found for this encounter  Diagnoses and all orders for this visit:    Essential hypertension  Comments:  pt is a low cardiovascular risk for proposed procedure    Screening for diabetic retinopathy    Other orders  -     prednisoLONE acetate (PRED FORTE) 1 % ophthalmic suspension; instill 1 drop into right eye four times a day as directed  -     ketorolac (ACULAR) 0 4 % SOLN; instill 1 drop into right eye once daily  -     ciprofloxacin (CILOXAN) 0 3 % ophthalmic solution; instill 1 drop into right eye four times a day          PHQ-2/9 Depression Screening    Little interest or pleasure in doing things: 0 - not at all  Feeling down, depressed, or hopeless: 0 - not at all  PHQ-2 Score: 0  PHQ-2 Interpretation: Negative depression screen            Subjective:      Patient ID: Ronnell Gomes is a [de-identified] y o  female  Pt here for cardiovascular evaluation for right eye cataract revision surgery      The following portions of the patient's history were reviewed and updated as appropriate: allergies, current medications, past family history, past medical history, past social history, past surgical history and problem list     Review of Systems   Constitutional: Negative for chills, fatigue and fever  HENT: Negative  Negative for hearing loss  Eyes: Negative  Negative for visual disturbance  Respiratory: Negative for shortness of breath and wheezing  Cardiovascular: Negative for chest pain and palpitations  Gastrointestinal: Negative for abdominal pain, blood in stool, constipation, diarrhea, nausea and vomiting  Endocrine: Negative  Genitourinary: Negative for difficulty urinating and dysuria  Musculoskeletal: Negative for arthralgias and myalgias  Skin: Negative  Allergic/Immunologic: Negative  Neurological: Negative for seizures and syncope  Hematological: Negative for adenopathy  Psychiatric/Behavioral: Negative  Objective:    /68   Pulse 66   Temp 99 2 °F (37 3 °C) (Tympanic)   Ht 5' (1 524 m)   Wt 56 8 kg (125 lb 4 oz)   LMP  (LMP Unknown)   SpO2 95%   BMI 24 46 kg/m²      Physical Exam  Vitals and nursing note reviewed  Constitutional:       General: She is not in acute distress  Appearance: Normal appearance  She is well-developed  She is not ill-appearing, toxic-appearing or diaphoretic  HENT:      Head: Normocephalic and atraumatic  Right Ear: Tympanic membrane, ear canal and external ear normal  There is no impacted cerumen  Left Ear: Tympanic membrane, ear canal and external ear normal  There is no impacted cerumen  Nose: Nose normal  No congestion or rhinorrhea  Mouth/Throat:      Mouth: Mucous membranes are moist       Pharynx: Oropharynx is clear  No oropharyngeal exudate or posterior oropharyngeal erythema  Eyes:      General: No scleral icterus  Right eye: No discharge  Left eye: No discharge  Conjunctiva/sclera: Conjunctivae normal       Pupils: Pupils are equal, round, and reactive to light  Cardiovascular:      Rate and Rhythm: Normal rate and regular rhythm  Pulses: Normal pulses  Heart sounds: Normal heart sounds  No murmur heard  Pulmonary:      Effort: Pulmonary effort is normal  No respiratory distress  Breath sounds: Normal breath sounds  No wheezing, rhonchi or rales  Abdominal:      General: Bowel sounds are normal  There is no distension  Palpations: Abdomen is soft  There is no mass  Tenderness: There is no abdominal tenderness  There is no guarding or rebound  Musculoskeletal:         General: Normal range of motion  Cervical back: Normal range of motion and neck supple  No rigidity  Right lower leg: No edema  Left lower leg: No edema  Lymphadenopathy:      Cervical: No cervical adenopathy  Skin:     General: Skin is warm and dry  Findings: No rash     Neurological: General: No focal deficit present  Mental Status: She is alert and oriented to person, place, and time  Sensory: No sensory deficit  Motor: No weakness or abnormal muscle tone  Coordination: Coordination normal       Gait: Gait normal       Deep Tendon Reflexes: Reflexes are normal and symmetric  Psychiatric:         Mood and Affect: Mood normal          Behavior: Behavior normal          Thought Content:  Thought content normal          Judgment: Judgment normal

## 2022-07-26 ENCOUNTER — TELEPHONE (OUTPATIENT)
Dept: ADMINISTRATIVE | Facility: OTHER | Age: 82
End: 2022-07-26

## 2022-07-26 NOTE — TELEPHONE ENCOUNTER
Upon review of the In Basket request we were able to locate, review, and update the patient chart as requested for Diabetic Eye Exam     Any additional questions or concerns should be emailed to the Practice Liaisons via [email or web address]  org email, please do not reply via In Basket      Thank you  Suzy Espinal MA

## 2022-07-26 NOTE — TELEPHONE ENCOUNTER
----- Message from Ernst Chi sent at 7/25/2022  3:24 PM EDT -----  Regarding: daibetic eye exam  07/25/22 3:24 PM    Hello, our patient Haydee Dior has had Diabetic Eye Exam completed/performed  Please assist in updating the patient chart by making an External outreach to [facility] facility located in [address], [address] [place] [address] Drive, [address]  The date of service is about 6/2022      Thank you,  Ernst Chi  [facility]

## 2022-07-26 NOTE — LETTER
Diabetic Eye Exam Form    Date Requested: 22  Patient: Bryn Alvarez  Patient : 1940   Referring Provider: Emilee Miller DO    DIABETIC Eye Exam Date _______________________________    Type of Exam MUST be documented for Diabetic Eye Exams  Please CHECK ONE  Retinal Exam       Dilated Retinal Exam       OCT       Optomap-Iris Exam      Fundus Photography     Left Eye - Please check Retinopathy AND Type or No Retinopathy      Exam did show retinopathy    Exam did not show retinopathy         Mild     Proliferative           Moderate    Severe            None         Right Eye - Please check Retinopathy AND Type or No Retinopathy     Exam did show retinopathy    Exam did not show retinopathy         Mild     Proliferative        Moderate    Severe        None       Comments __________________________________________________________    Practice Providing Exam ______________________________________________    Exam Performed By (print name) _______________________________________      Provider Signature ___________________________________________________    These reports are needed for  compliance  Please fax this completed form and a copy of the Diabetic Eye Exam report to our office located at [place] [address] as soon as possible via [phone] cara [place] [employer]: Phone [phone]  We thank you for your assistance in treating our mutual patient

## 2022-07-26 NOTE — TELEPHONE ENCOUNTER
Upon review of the In Basket request and the patient's chart, initial outreach has been made via fax, please see Contacts section for details       Thank you  Dank Barakat MA

## 2022-08-26 ENCOUNTER — APPOINTMENT (OUTPATIENT)
Dept: LAB | Facility: CLINIC | Age: 82
End: 2022-08-26
Payer: COMMERCIAL

## 2022-08-26 DIAGNOSIS — R80.1 PERSISTENT PROTEINURIA: ICD-10-CM

## 2022-08-26 DIAGNOSIS — I10 ESSENTIAL HYPERTENSION: ICD-10-CM

## 2022-08-26 DIAGNOSIS — E78.1 HYPERTRIGLYCERIDEMIA: ICD-10-CM

## 2022-08-26 DIAGNOSIS — N18.32 CHRONIC KIDNEY DISEASE (CKD) STAGE G3B/A1, MODERATELY DECREASED GLOMERULAR FILTRATION RATE (GFR) BETWEEN 30-44 ML/MIN/1.73 SQUARE METER AND ALBUMINURIA CREATININE RATIO LESS THAN 30 MG/G (HCC): ICD-10-CM

## 2022-08-26 DIAGNOSIS — E78.00 HYPERCHOLESTEROLEMIA: ICD-10-CM

## 2022-08-26 DIAGNOSIS — E11.9 TYPE 2 DIABETES MELLITUS WITHOUT COMPLICATION, WITHOUT LONG-TERM CURRENT USE OF INSULIN (HCC): ICD-10-CM

## 2022-08-26 LAB
ALBUMIN SERPL BCP-MCNC: 4 G/DL (ref 3.5–5)
ALP SERPL-CCNC: 93 U/L (ref 46–116)
ALT SERPL W P-5'-P-CCNC: 12 U/L (ref 12–78)
ANION GAP SERPL CALCULATED.3IONS-SCNC: 7 MMOL/L (ref 4–13)
AST SERPL W P-5'-P-CCNC: 8 U/L (ref 5–45)
BASOPHILS # BLD AUTO: 0.04 THOUSANDS/ΜL (ref 0–0.1)
BASOPHILS NFR BLD AUTO: 1 % (ref 0–1)
BILIRUB SERPL-MCNC: 0.34 MG/DL (ref 0.2–1)
BUN SERPL-MCNC: 36 MG/DL (ref 5–25)
CALCIUM SERPL-MCNC: 9.9 MG/DL (ref 8.3–10.1)
CHLORIDE SERPL-SCNC: 114 MMOL/L (ref 96–108)
CHOLEST SERPL-MCNC: 191 MG/DL
CO2 SERPL-SCNC: 21 MMOL/L (ref 21–32)
CREAT SERPL-MCNC: 1.86 MG/DL (ref 0.6–1.3)
CREAT UR-MCNC: 76.6 MG/DL
EOSINOPHIL # BLD AUTO: 0.3 THOUSAND/ΜL (ref 0–0.61)
EOSINOPHIL NFR BLD AUTO: 6 % (ref 0–6)
ERYTHROCYTE [DISTWIDTH] IN BLOOD BY AUTOMATED COUNT: 13.6 % (ref 11.6–15.1)
EST. AVERAGE GLUCOSE BLD GHB EST-MCNC: 154 MG/DL
GFR SERPL CREATININE-BSD FRML MDRD: 25 ML/MIN/1.73SQ M
GLUCOSE P FAST SERPL-MCNC: 108 MG/DL (ref 65–99)
HBA1C MFR BLD: 7 %
HCT VFR BLD AUTO: 36.3 % (ref 34.8–46.1)
HDLC SERPL-MCNC: 46 MG/DL
HGB BLD-MCNC: 11.7 G/DL (ref 11.5–15.4)
IMM GRANULOCYTES # BLD AUTO: 0.03 THOUSAND/UL (ref 0–0.2)
IMM GRANULOCYTES NFR BLD AUTO: 1 % (ref 0–2)
LDLC SERPL CALC-MCNC: 110 MG/DL (ref 0–100)
LYMPHOCYTES # BLD AUTO: 2.13 THOUSANDS/ΜL (ref 0.6–4.47)
LYMPHOCYTES NFR BLD AUTO: 40 % (ref 14–44)
MAGNESIUM SERPL-MCNC: 2.2 MG/DL (ref 1.6–2.6)
MCH RBC QN AUTO: 27.7 PG (ref 26.8–34.3)
MCHC RBC AUTO-ENTMCNC: 32.2 G/DL (ref 31.4–37.4)
MCV RBC AUTO: 86 FL (ref 82–98)
MONOCYTES # BLD AUTO: 0.52 THOUSAND/ΜL (ref 0.17–1.22)
MONOCYTES NFR BLD AUTO: 10 % (ref 4–12)
NEUTROPHILS # BLD AUTO: 2.3 THOUSANDS/ΜL (ref 1.85–7.62)
NEUTS SEG NFR BLD AUTO: 42 % (ref 43–75)
NONHDLC SERPL-MCNC: 145 MG/DL
NRBC BLD AUTO-RTO: 0 /100 WBCS
PHOSPHATE SERPL-MCNC: 3.1 MG/DL (ref 2.3–4.1)
PLATELET # BLD AUTO: 273 THOUSANDS/UL (ref 149–390)
PMV BLD AUTO: 9.6 FL (ref 8.9–12.7)
POTASSIUM SERPL-SCNC: 4.8 MMOL/L (ref 3.5–5.3)
PROT SERPL-MCNC: 8.5 G/DL (ref 6.4–8.4)
PROT UR-MCNC: 61 MG/DL
PROT/CREAT UR: 0.8 MG/G{CREAT} (ref 0–0.1)
RBC # BLD AUTO: 4.23 MILLION/UL (ref 3.81–5.12)
SODIUM SERPL-SCNC: 142 MMOL/L (ref 135–147)
TRIGL SERPL-MCNC: 177 MG/DL
TSH SERPL DL<=0.05 MIU/L-ACNC: 3.28 UIU/ML (ref 0.45–4.5)
WBC # BLD AUTO: 5.32 THOUSAND/UL (ref 4.31–10.16)

## 2022-08-26 PROCEDURE — 36415 COLL VENOUS BLD VENIPUNCTURE: CPT

## 2022-08-26 PROCEDURE — 85025 COMPLETE CBC W/AUTO DIFF WBC: CPT

## 2022-08-26 PROCEDURE — 83036 HEMOGLOBIN GLYCOSYLATED A1C: CPT

## 2022-08-26 PROCEDURE — 80053 COMPREHEN METABOLIC PANEL: CPT

## 2022-08-26 PROCEDURE — 83735 ASSAY OF MAGNESIUM: CPT

## 2022-08-26 PROCEDURE — 84443 ASSAY THYROID STIM HORMONE: CPT

## 2022-08-26 PROCEDURE — 84100 ASSAY OF PHOSPHORUS: CPT

## 2022-08-26 PROCEDURE — 84156 ASSAY OF PROTEIN URINE: CPT

## 2022-08-26 PROCEDURE — 80061 LIPID PANEL: CPT

## 2022-08-26 PROCEDURE — 82570 ASSAY OF URINE CREATININE: CPT

## 2022-09-01 ENCOUNTER — OFFICE VISIT (OUTPATIENT)
Dept: FAMILY MEDICINE CLINIC | Facility: CLINIC | Age: 82
End: 2022-09-01
Payer: COMMERCIAL

## 2022-09-01 VITALS
HEIGHT: 60 IN | TEMPERATURE: 97.9 F | WEIGHT: 126.38 LBS | HEART RATE: 83 BPM | BODY MASS INDEX: 24.81 KG/M2 | SYSTOLIC BLOOD PRESSURE: 138 MMHG | OXYGEN SATURATION: 93 % | DIASTOLIC BLOOD PRESSURE: 78 MMHG

## 2022-09-01 DIAGNOSIS — E11.9 TYPE 2 DIABETES MELLITUS WITHOUT COMPLICATION, WITHOUT LONG-TERM CURRENT USE OF INSULIN (HCC): ICD-10-CM

## 2022-09-01 DIAGNOSIS — N18.32 CHRONIC KIDNEY DISEASE, STAGE 3B (HCC): ICD-10-CM

## 2022-09-01 DIAGNOSIS — I10 ESSENTIAL HYPERTENSION: ICD-10-CM

## 2022-09-01 DIAGNOSIS — Z00.00 MEDICARE ANNUAL WELLNESS VISIT, SUBSEQUENT: Primary | ICD-10-CM

## 2022-09-01 DIAGNOSIS — E78.00 HYPERCHOLESTEROLEMIA: ICD-10-CM

## 2022-09-01 DIAGNOSIS — E78.1 HYPERTRIGLYCERIDEMIA: ICD-10-CM

## 2022-09-01 DIAGNOSIS — M54.42 CHRONIC LEFT-SIDED LOW BACK PAIN WITH LEFT-SIDED SCIATICA: ICD-10-CM

## 2022-09-01 DIAGNOSIS — G89.29 CHRONIC LEFT-SIDED LOW BACK PAIN WITH LEFT-SIDED SCIATICA: ICD-10-CM

## 2022-09-01 PROCEDURE — 3288F FALL RISK ASSESSMENT DOCD: CPT | Performed by: FAMILY MEDICINE

## 2022-09-01 PROCEDURE — 1125F AMNT PAIN NOTED PAIN PRSNT: CPT | Performed by: FAMILY MEDICINE

## 2022-09-01 PROCEDURE — 3075F SYST BP GE 130 - 139MM HG: CPT | Performed by: FAMILY MEDICINE

## 2022-09-01 PROCEDURE — 99214 OFFICE O/P EST MOD 30 MIN: CPT | Performed by: FAMILY MEDICINE

## 2022-09-01 PROCEDURE — 3078F DIAST BP <80 MM HG: CPT | Performed by: FAMILY MEDICINE

## 2022-09-01 PROCEDURE — G0439 PPPS, SUBSEQ VISIT: HCPCS | Performed by: FAMILY MEDICINE

## 2022-09-01 PROCEDURE — 3725F SCREEN DEPRESSION PERFORMED: CPT | Performed by: FAMILY MEDICINE

## 2022-09-01 PROCEDURE — 1160F RVW MEDS BY RX/DR IN RCRD: CPT | Performed by: FAMILY MEDICINE

## 2022-09-01 PROCEDURE — 1170F FXNL STATUS ASSESSED: CPT | Performed by: FAMILY MEDICINE

## 2022-09-01 NOTE — PATIENT INSTRUCTIONS
Medicare Preventive Visit Patient Instructions  Thank you for completing your Welcome to Medicare Visit or Medicare Annual Wellness Visit today  Your next wellness visit will be due in one year (9/2/2023)  The screening/preventive services that you may require over the next 5-10 years are detailed below  Some tests may not apply to you based off risk factors and/or age  Screening tests ordered at today's visit but not completed yet may show as past due  Also, please note that scanned in results may not display below  Preventive Screenings:  Service Recommendations Previous Testing/Comments   Colorectal Cancer Screening  * Colonoscopy    * Fecal Occult Blood Test (FOBT)/Fecal Immunochemical Test (FIT)  * Fecal DNA/Cologuard Test  * Flexible Sigmoidoscopy Age: 39-70 years old   Colonoscopy: every 10 years (may be performed more frequently if at higher risk)  OR  FOBT/FIT: every 1 year  OR  Cologuard: every 3 years  OR  Sigmoidoscopy: every 5 years  Screening may be recommended earlier than age 39 if at higher risk for colorectal cancer  Also, an individualized decision between you and your healthcare provider will decide whether screening between the ages of 74-80 would be appropriate  Colonoscopy: 05/26/2011  FOBT/FIT: Not on file  Cologuard: Not on file  Sigmoidoscopy: Not on file          Breast Cancer Screening Age: 36 years old  Frequency: every 1-2 years  Not required if history of left and right mastectomy Mammogram: 03/23/2021    Screening Current   Cervical Cancer Screening Between the ages of 21-29, pap smear recommended once every 3 years  Between the ages of 33-67, can perform pap smear with HPV co-testing every 5 years     Recommendations may differ for women with a history of total hysterectomy, cervical cancer, or abnormal pap smears in past  Pap Smear: 06/02/2020    Screening Not Indicated   Hepatitis C Screening Once for adults born between 1945 and 1965  More frequently in patients at high risk for Hepatitis C Hep C Antibody: Not on file        Diabetes Screening 1-2 times per year if you're at risk for diabetes or have pre-diabetes Fasting glucose: 108 mg/dL (8/26/2022)  A1C: 7 0 % (8/26/2022)  Screening Not Indicated  History Diabetes   Cholesterol Screening Once every 5 years if you don't have a lipid disorder  May order more often based on risk factors  Lipid panel: 08/26/2022    Screening Not Indicated  History Lipid Disorder     Other Preventive Screenings Covered by Medicare:  1  Abdominal Aortic Aneurysm (AAA) Screening: covered once if your at risk  You're considered to be at risk if you have a family history of AAA  2  Lung Cancer Screening: covers low dose CT scan once per year if you meet all of the following conditions: (1) Age 50-69; (2) No signs or symptoms of lung cancer; (3) Current smoker or have quit smoking within the last 15 years; (4) You have a tobacco smoking history of at least 20 pack years (packs per day multiplied by number of years you smoked); (5) You get a written order from a healthcare provider  3  Glaucoma Screening: covered annually if you're considered high risk: (1) You have diabetes OR (2) Family history of glaucoma OR (3)  aged 48 and older OR (3)  American aged 72 and older  3  Osteoporosis Screening: covered every 2 years if you meet one of the following conditions: (1) You're estrogen deficient and at risk for osteoporosis based off medical history and other findings; (2) Have a vertebral abnormality; (3) On glucocorticoid therapy for more than 3 months; (4) Have primary hyperparathyroidism; (5) On osteoporosis medications and need to assess response to drug therapy  · Last bone density test (DXA Scan): Not on file  5  HIV Screening: covered annually if you're between the age of 12-76  Also covered annually if you are younger than 13 and older than 72 with risk factors for HIV infection   For pregnant patients, it is covered up to 3 times per pregnancy  Immunizations:  Immunization Recommendations   Influenza Vaccine Annual influenza vaccination during flu season is recommended for all persons aged >= 6 months who do not have contraindications   Pneumococcal Vaccine   * Pneumococcal conjugate vaccine = PCV13 (Prevnar 13), PCV15 (Vaxneuvance), PCV20 (Prevnar 20)  * Pneumococcal polysaccharide vaccine = PPSV23 (Pneumovax) Adults 25-60 years old: 1-3 doses may be recommended based on certain risk factors  Adults 72 years old: 1-2 doses may be recommended based off what pneumonia vaccine you previously received   Hepatitis B Vaccine 3 dose series if at intermediate or high risk (ex: diabetes, end stage renal disease, liver disease)   Tetanus (Td) Vaccine - COST NOT COVERED BY MEDICARE PART B Following completion of primary series, a booster dose should be given every 10 years to maintain immunity against tetanus  Td may also be given as tetanus wound prophylaxis  Tdap Vaccine - COST NOT COVERED BY MEDICARE PART B Recommended at least once for all adults  For pregnant patients, recommended with each pregnancy  Shingles Vaccine (Shingrix) - COST NOT COVERED BY MEDICARE PART B  2 shot series recommended in those aged 48 and above     Health Maintenance Due:  There are no preventive care reminders to display for this patient  Immunizations Due:      Topic Date Due    COVID-19 Vaccine (1) Never done    Influenza Vaccine (1) 09/01/2022     Advance Directives   What are advance directives? Advance directives are legal documents that state your wishes and plans for medical care  These plans are made ahead of time in case you lose your ability to make decisions for yourself  Advance directives can apply to any medical decision, such as the treatments you want, and if you want to donate organs  What are the types of advance directives? There are many types of advance directives, and each state has rules about how to use them   You may choose a combination of any of the following:  · Living will: This is a written record of the treatment you want  You can also choose which treatments you do not want, which to limit, and which to stop at a certain time  This includes surgery, medicine, IV fluid, and tube feedings  · Durable power of  for healthcare [facility] [facility]): This is a written record that states who you want to make healthcare choices for you when you are unable to make them for yourself  This person, called a proxy, is usually a family member or a friend  You may choose more than 1 proxy  · Do not resuscitate (DNR) order:  A DNR order is used in case your heart stops beating or you stop breathing  It is a request not to have certain forms of treatment, such as CPR  A DNR order may be included in other types of advance directives  · Medical directive: This covers the care that you want if you are in a coma, near death, or unable to make decisions for yourself  You can list the treatments you want for each condition  Treatment may include pain medicine, surgery, blood transfusions, dialysis, IV or tube feedings, and a ventilator (breathing machine)  · Values history: This document has questions about your views, beliefs, and how you feel and think about life  This information can help others choose the care that you would choose  Why are advance directives important? An advance directive helps you control your care  Although spoken wishes may be used, it is better to have your wishes written down  Spoken wishes can be misunderstood, or not followed  Treatments may be given even if you do not want them  An advance directive may make it easier for your family to make difficult choices about your care  © Copyright [employer] 2018 Information is for End User's use only and may not be sold, redistributed or otherwise used for commercial purposes   All illustrations and images included in CareNotes® are the copyrighted property of [employer]  or [facility]

## 2022-09-01 NOTE — PROGRESS NOTES
Assessment and Plan:     Problem List Items Addressed This Visit        Endocrine    Type 2 diabetes mellitus without complication, without long-term current use of insulin ([facility])    Relevant Orders    Glucose, fasting    Hemoglobin A1C       Cardiovascular and Mediastinum    Essential hypertension       Genitourinary    Chronic kidney disease, stage 3b (HCC)       Other    Back pain    Relevant Orders    XR spine lumbar minimum 4 views non injury    Hypercholesterolemia    Medicare annual wellness visit, subsequent - Primary    Hypertriglyceridemia           Preventive health issues were discussed with patient, and age appropriate screening tests were ordered as noted in patient's After Visit Summary  Personalized health advice and appropriate referrals for health education or preventive services given if needed, as noted in patient's After Visit Summary  History of Present Illness:     Patient presents for a Medicare Wellness Visit    Diabetes  She presents for her follow-up diabetic visit  She has type 2 diabetes mellitus  Pertinent negatives for hypoglycemia include no seizures  Pertinent negatives for diabetes include no chest pain and no fatigue  Patient Care Team:  Juan Murcia DO as PCP - General     Review of Systems:     Review of Systems   Constitutional: Negative for chills, fatigue and fever  HENT: Negative  Negative for hearing loss  Eyes: Negative  Negative for visual disturbance  Respiratory: Negative for shortness of breath and wheezing  Cardiovascular: Negative for chest pain and palpitations  Gastrointestinal: Negative for abdominal pain, blood in stool, constipation, diarrhea, nausea and vomiting  Endocrine: Negative  Genitourinary: Negative for difficulty urinating and dysuria  Musculoskeletal: Positive for arthralgias  Negative for myalgias  Skin: Negative  Allergic/Immunologic: Negative  Neurological: Negative for seizures and syncope  Hematological: Negative for adenopathy  Psychiatric/Behavioral: Negative  Problem List:     Patient Active Problem List   Diagnosis    Groin pain, chronic, left    Type 2 diabetes mellitus without complication, without long-term current use of insulin ([facility])    Hypertensive kidney disease with chronic kidney disease stage III ([facility])    Abnormal ECG    Articular cartilage disorder of shoulder region    Back pain    Bursitis of shoulder    Atrophic vaginitis    Anxiety    Disorder of shoulder    Diverticulitis large intestine w/o perforation or abscess w/o bleeding    Essential hypertension    Hypercholesterolemia    Irritable bowel syndrome    Localized, primary osteoarthritis of hand    Loose body in joint of shoulder region    Muscle pain    Paresthesia of arm    Refused influenza vaccine    Sciatica    Simple chronic anemia    Lower abdominal pain    Medicare annual wellness visit, subsequent    Negative depression screening    Overweight (BMI 25 0-29  9)    Pneumonia due to COVID-19 virus    Acute respiratory failure with hypoxia ([facility])    Ambulatory dysfunction    Chronic kidney disease, stage 3b ([facility] [address] )    Sepsis due to methicillin susceptible Staphylococcus aureus (HCC)    Pneumothorax- Resolved    Bacteremia due to methicillin susceptible Staphylococcus aureus ([facility])    Thrombocytosis    Hair loss    Hypertriglyceridemia      Past Medical and Surgical History:     Past Medical History:   Diagnosis Date    Diabetes mellitus ([address] [facility] )     Diverticulitis     Postherpetic neuralgia      Past Surgical History:   Procedure Laterality Date    CHOLECYSTECTOMY      COLON SURGERY      HERNIA REPAIR      IR EMBOLIZATION (SPECIFY VESSEL OR SITE)  1/12/2022    ROTATOR CUFF REPAIR Right     VARICOSE VEIN SURGERY      Impression:  2/12/16 Lashaun Lockwood      Family History:     Family History   Problem Relation Age of Onset    Diabetes Mother     No Known Problems Father Social History:     Social History     Socioeconomic History    Marital status: /Civil Union     Spouse name: None    Number of children: None    Years of education: None    Highest education level: None   Occupational History    None   Tobacco Use    Smoking status: Never Smoker    Smokeless tobacco: Never Used   Vaping Use    Vaping Use: Never used   Substance and Sexual Activity    Alcohol use: Never     Comment: Rarely    Drug use: Never    Sexual activity: None   Other Topics Concern    None   Social History Narrative    Always uses seatbelt    No caffeine use     Social Determinants of Health     Financial Resource Strain: Low Risk     Difficulty of Paying Living Expenses: Not hard at all   Food Insecurity: No Food Insecurity    Worried About Running Out of Food in the Last Year: Never true    Daniel of Food in the Last Year: Never true   Transportation Needs: No Transportation Needs    Lack of Transportation (Medical): No    Lack of Transportation (Non-Medical):  No   Physical Activity: Not on file   Stress: Not on file   Social Connections: Not on file   Intimate Partner Violence: Not on file   Housing Stability: Low Risk     Unable to Pay for Housing in the Last Year: No    Number of Places Lived in the Last Year: 1    Unstable Housing in the Last Year: No      Medications and Allergies:     Current Outpatient Medications   Medication Sig Dispense Refill    ALPRAZolam (XANAX) 0 25 mg tablet Take 1 tablet (0 25 mg total) by mouth daily at bedtime as needed for anxiety 30 tablet 2    Biotin 10 MG CAPS Take by mouth      calcium carbonate (TUMS) 500 mg chewable tablet Chew 1 tablet      cholecalciferol (VITAMIN D3) 25 mcg (1,000 units) tablet daily      ciprofloxacin (CILOXAN) 0 3 % ophthalmic solution instill 1 drop into right eye four times a day      dicyclomine (BENTYL) 10 mg capsule Take 1 capsule (10 mg total) by mouth 3 (three) times a day before meals 90 capsule 3    enalapril (VASOTEC) 10 mg tablet Take 1 tablet (10 mg total) by mouth daily 90 tablet 3    ferrous sulfate 325 (65 Fe) mg tablet Take 325 mg by mouth      glipiZIDE (GLUCOTROL XL) 10 mg 24 hr tablet Take 1 tablet (10 mg total) by mouth daily 90 tablet 2    ketoconazole (NIZORAL) 2 % shampoo APPLY TO THE SCALP, LATHER, LET SIT FOR 5 MINUTES THEN RINSE OUT, USE 2-3 TIMES A WEEK      ketorolac (ACULAR) 0 4 % SOLN instill 1 drop into right eye once daily      lovastatin (MEVACOR) 40 MG tablet Take 1 tablet (40 mg total) by mouth daily at bedtime 90 tablet 2    metFORMIN (GLUCOPHAGE) 1000 MG tablet Take 1 tablet (1,000 mg total) by mouth 2 (two) times a day 180 tablet 2    Multiple Vitamin (multivitamin) tablet Take 1 tablet by mouth      polyethylene glycol (MIRALAX) 17 g packet Take 17 g by mouth daily  0    prednisoLONE acetate (PRED FORTE) 1 % ophthalmic suspension instill 1 drop into right eye four times a day as directed      sitaGLIPtin (Januvia) 100 mg tablet Take 1 tablet (100 mg total) by mouth daily 90 tablet 2    vitamin E, tocopherol, 1,000 units capsule Take 1,000 Units by mouth daily      lidocaine (LIDODERM) 5 % Apply 1 patch topically daily Remove & Discard patch within 12 hours or as directed by MD (Patient not taking: No sig reported) 10 patch 0    senna-docusate sodium (SENOKOT S) 8 6-50 mg per tablet Take 2 tablets by mouth 2 (two) times a day (Patient not taking: No sig reported) 120 tablet 0     No current facility-administered medications for this visit       Allergies   Allergen Reactions    Ciprofloxacin GI Intolerance and Headache     Confusion, arm weakness, dizziness, headache    Meperidine GI Intolerance, Hallucinations and Other (See Comments)     Demarol  Reaction Date:Unknown    Propoxyphene GI Intolerance      Immunizations:     Immunization History   Administered Date(s) Administered    Hep B, adult 10/26/2007, 11/29/2007, 04/30/2008    Pneumococcal Conjugate 13-Valent 02/28/2018    Pneumococcal Polysaccharide PPV23 05/18/2005, 07/25/2019    Tdap 01/09/2021    Zoster 06/03/2014      Health Maintenance: There are no preventive care reminders to display for this patient  Topic Date Due    COVID-19 Vaccine (1) Never done    Influenza Vaccine (1) 09/01/2022      Medicare Screening Tests and Risk Assessments:     Debbie is here for her Subsequent Wellness visit  Last Medicare Wellness visit information reviewed, patient interviewed and updates made to the record today  Health Risk Assessment:   Patient rates overall health as good  Patient feels that their physical health rating is same  Patient is satisfied with their life  Eyesight was rated as slightly worse  Hearing was rated as same  Patient feels that their emotional and mental health rating is same  Patients states they are sometimes angry  Patient states they are sometimes unusually tired/fatigued  Pain experienced in the last 7 days has been some  Patient's pain rating has been 4/10  Patient states that she has experienced no weight loss or gain in last 6 months  Depression Screening:   PHQ-2 Score: 0      Fall Risk Screening: In the past year, patient has experienced: no history of falling in past year      Urinary Incontinence Screening:   Patient has not leaked urine accidently in the last six months  Home Safety:  Patient has trouble with stairs inside or outside of their home  Patient has working smoke alarms and has working carbon monoxide detector  Home safety hazards include: none  Nutrition:   Current diet is Regular  Medications:   Patient is currently taking over-the-counter supplements  OTC medications include: see medication list  Patient is able to manage medications       Activities of Daily Living (ADLs)/Instrumental Activities of Daily Living (IADLs):   Walk and transfer into and out of bed and chair?: Yes  Dress and groom yourself?: Yes    Bathe or shower yourself?: Yes Feed yourself? Yes  Do your laundry/housekeeping?: Yes  Manage your money, pay your bills and track your expenses?: Yes  Make your own meals?: Yes    Do your own shopping?: Yes    Previous Hospitalizations:   Any hospitalizations or ED visits within the last 12 months?: Yes    How many hospitalizations have you had in the last year?: 1-2    Advance Care Planning:   Living will: Yes    Durable POA for healthcare: Yes    Advanced directive: Yes    Advanced directive counseling given: No    Five wishes given: No    Patient declined ACP directive: No    End of Life Decisions reviewed with patient: Yes    Provider agrees with end of life decisions: Yes      Cognitive Screening:   Provider or family/friend/caregiver concerned regarding cognition?: No    PREVENTIVE SCREENINGS      Cardiovascular Screening:    General: Screening Not Indicated and History Lipid Disorder      Diabetes Screening:     General: Screening Not Indicated and History Diabetes      Colorectal Cancer Screening:     General: Screening Not Indicated      Breast Cancer Screening:     General: Screening Current      Cervical Cancer Screening:    General: Screening Not Indicated      Osteoporosis Screening:      Due for: DXA Axial and DXA Appendicular      Abdominal Aortic Aneurysm (AAA) Screening:        General: Screening Not Indicated      Lung Cancer Screening:     General: Screening Not Indicated      Hepatitis C Screening:    General: Screening Not Indicated    Screening, Brief Intervention, and Referral to Treatment (SBIRT)    Screening  Typical number of drinks in a day: 0  Typical number of drinks in a week: 1  Interpretation: Low risk drinking behavior      Single Item Drug Screening:  How often have you used an illegal drug (including marijuana) or a prescription medication for non-medical reasons in the past year? never    Single Item Drug Screen Score: 0  Interpretation: Negative screen for possible drug use disorder    No exam data present     Physical Exam:     /78 (BP Location: Left arm, Patient Position: Sitting, Cuff Size: Standard)   Pulse 83   Temp 97 9 °F (36 6 °C) (Tympanic)   Ht 5' (1 524 m)   Wt 57 3 kg (126 lb 6 oz)   LMP  (LMP Unknown)   SpO2 93%   BMI 24 68 kg/m²     Physical Exam  Vitals and nursing note reviewed  Constitutional:       General: She is not in acute distress  Appearance: Normal appearance  She is well-developed  She is not ill-appearing, toxic-appearing or diaphoretic  HENT:      Head: Normocephalic and atraumatic  Right Ear: Tympanic membrane, ear canal and external ear normal  There is no impacted cerumen  Left Ear: Tympanic membrane, ear canal and external ear normal  There is no impacted cerumen  Nose: Nose normal  No congestion or rhinorrhea  Mouth/Throat:      Mouth: Mucous membranes are moist       Pharynx: Oropharynx is clear  No oropharyngeal exudate or posterior oropharyngeal erythema  Eyes:      General:         Right eye: No discharge  Left eye: No discharge  Conjunctiva/sclera: Conjunctivae normal       Pupils: Pupils are equal, round, and reactive to light  Cardiovascular:      Rate and Rhythm: Normal rate and regular rhythm  Pulses: Normal pulses  Heart sounds: Normal heart sounds  No murmur heard  Pulmonary:      Effort: Pulmonary effort is normal  No respiratory distress  Breath sounds: Normal breath sounds  No wheezing, rhonchi or rales  Abdominal:      General: Abdomen is flat  There is no distension  Palpations: Abdomen is soft  There is no mass  Tenderness: There is no abdominal tenderness  There is no guarding  Hernia: No hernia is present  Musculoskeletal:         General: No deformity  Normal range of motion  Cervical back: Normal range of motion and neck supple  No rigidity  Right lower leg: No edema  Left lower leg: No edema  Lymphadenopathy:      Cervical: No cervical adenopathy  Skin:     General: Skin is warm and dry  Findings: No rash  Neurological:      General: No focal deficit present  Mental Status: She is alert and oriented to person, place, and time  Sensory: No sensory deficit  Motor: No weakness  Coordination: Coordination normal       Gait: Gait normal    Psychiatric:         Mood and Affect: Mood normal          Behavior: Behavior normal          Thought Content:  Thought content normal          Judgment: Judgment normal           Dixon Bur, DO

## 2022-09-02 ENCOUNTER — APPOINTMENT (OUTPATIENT)
Dept: RADIOLOGY | Facility: CLINIC | Age: 82
End: 2022-09-02
Payer: COMMERCIAL

## 2022-09-02 DIAGNOSIS — G89.29 CHRONIC LEFT-SIDED LOW BACK PAIN WITH LEFT-SIDED SCIATICA: ICD-10-CM

## 2022-09-02 DIAGNOSIS — M54.42 CHRONIC LEFT-SIDED LOW BACK PAIN WITH LEFT-SIDED SCIATICA: ICD-10-CM

## 2022-09-02 PROCEDURE — 72110 X-RAY EXAM L-2 SPINE 4/>VWS: CPT

## 2022-10-12 PROBLEM — A41.01 SEPSIS DUE TO METHICILLIN SUSCEPTIBLE STAPHYLOCOCCUS AUREUS (HCC): Status: RESOLVED | Noted: 2021-12-21 | Resolved: 2022-10-12

## 2022-10-12 PROBLEM — U07.1 PNEUMONIA DUE TO COVID-19 VIRUS: Status: RESOLVED | Noted: 2021-12-16 | Resolved: 2022-10-12

## 2022-10-12 PROBLEM — J12.82 PNEUMONIA DUE TO COVID-19 VIRUS: Status: RESOLVED | Noted: 2021-12-16 | Resolved: 2022-10-12

## 2022-10-12 PROBLEM — Z00.00 MEDICARE ANNUAL WELLNESS VISIT, SUBSEQUENT: Status: RESOLVED | Noted: 2020-08-27 | Resolved: 2022-10-12

## 2022-10-21 ENCOUNTER — APPOINTMENT (OUTPATIENT)
Dept: LAB | Facility: CLINIC | Age: 82
End: 2022-10-21
Payer: COMMERCIAL

## 2022-10-21 DIAGNOSIS — E11.9 TYPE 2 DIABETES MELLITUS WITHOUT COMPLICATION, WITHOUT LONG-TERM CURRENT USE OF INSULIN (HCC): ICD-10-CM

## 2022-10-21 DIAGNOSIS — N18.32 STAGE 3B CHRONIC KIDNEY DISEASE (CKD) (HCC): ICD-10-CM

## 2022-10-21 LAB
ALBUMIN SERPL BCP-MCNC: 3.8 G/DL (ref 3.5–5)
ALP SERPL-CCNC: 102 U/L (ref 46–116)
ALT SERPL W P-5'-P-CCNC: 22 U/L (ref 12–78)
ANION GAP SERPL CALCULATED.3IONS-SCNC: 6 MMOL/L (ref 4–13)
AST SERPL W P-5'-P-CCNC: 19 U/L (ref 5–45)
BILIRUB SERPL-MCNC: 0.35 MG/DL (ref 0.2–1)
BUN SERPL-MCNC: 37 MG/DL (ref 5–25)
CALCIUM SERPL-MCNC: 9.5 MG/DL (ref 8.3–10.1)
CHLORIDE SERPL-SCNC: 115 MMOL/L (ref 96–108)
CO2 SERPL-SCNC: 20 MMOL/L (ref 21–32)
CREAT SERPL-MCNC: 1.71 MG/DL (ref 0.6–1.3)
ERYTHROCYTE [DISTWIDTH] IN BLOOD BY AUTOMATED COUNT: 13.8 % (ref 11.6–15.1)
EST. AVERAGE GLUCOSE BLD GHB EST-MCNC: 151 MG/DL
GFR SERPL CREATININE-BSD FRML MDRD: 27 ML/MIN/1.73SQ M
GLUCOSE P FAST SERPL-MCNC: 69 MG/DL (ref 65–99)
HBA1C MFR BLD: 6.9 %
HCT VFR BLD AUTO: 36.5 % (ref 34.8–46.1)
HGB BLD-MCNC: 11.6 G/DL (ref 11.5–15.4)
MAGNESIUM SERPL-MCNC: 2.2 MG/DL (ref 1.6–2.6)
MCH RBC QN AUTO: 27.9 PG (ref 26.8–34.3)
MCHC RBC AUTO-ENTMCNC: 31.8 G/DL (ref 31.4–37.4)
MCV RBC AUTO: 88 FL (ref 82–98)
PHOSPHATE SERPL-MCNC: 3.7 MG/DL (ref 2.3–4.1)
PLATELET # BLD AUTO: 238 THOUSANDS/UL (ref 149–390)
PMV BLD AUTO: 9.9 FL (ref 8.9–12.7)
POTASSIUM SERPL-SCNC: 3.9 MMOL/L (ref 3.5–5.3)
PROT SERPL-MCNC: 8.3 G/DL (ref 6.4–8.4)
RBC # BLD AUTO: 4.16 MILLION/UL (ref 3.81–5.12)
SODIUM SERPL-SCNC: 141 MMOL/L (ref 135–147)
WBC # BLD AUTO: 4.66 THOUSAND/UL (ref 4.31–10.16)

## 2022-10-21 PROCEDURE — 83036 HEMOGLOBIN GLYCOSYLATED A1C: CPT

## 2022-10-21 PROCEDURE — 80053 COMPREHEN METABOLIC PANEL: CPT

## 2022-10-21 PROCEDURE — 83735 ASSAY OF MAGNESIUM: CPT

## 2022-10-21 PROCEDURE — 85027 COMPLETE CBC AUTOMATED: CPT

## 2022-10-21 PROCEDURE — 84100 ASSAY OF PHOSPHORUS: CPT

## 2022-10-21 PROCEDURE — 36415 COLL VENOUS BLD VENIPUNCTURE: CPT

## 2022-11-22 ENCOUNTER — APPOINTMENT (EMERGENCY)
Dept: CT IMAGING | Facility: HOSPITAL | Age: 82
End: 2022-11-22

## 2022-11-22 ENCOUNTER — APPOINTMENT (EMERGENCY)
Dept: RADIOLOGY | Facility: HOSPITAL | Age: 82
End: 2022-11-22

## 2022-11-22 ENCOUNTER — HOSPITAL ENCOUNTER (EMERGENCY)
Facility: HOSPITAL | Age: 82
Discharge: HOME | End: 2022-11-23
Attending: EMERGENCY MEDICINE

## 2022-11-22 VITALS
DIASTOLIC BLOOD PRESSURE: 91 MMHG | SYSTOLIC BLOOD PRESSURE: 183 MMHG | OXYGEN SATURATION: 98 % | HEART RATE: 83 BPM | RESPIRATION RATE: 18 BRPM
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
## 2022-11-22 DIAGNOSIS — S42.291A HUMERAL HEAD FRACTURE, RIGHT, CLOSED, INITIAL ENCOUNTER: ICD-10-CM

## 2022-11-22 RX ORDER — OXYCODONE HYDROCHLORIDE 5 MG/1
5 TABLET ORAL ONCE
Status: COMPLETED | OUTPATIENT
Start: 2022-11-22 | End: 2022-11-22

## 2022-11-22 RX ORDER — KETOROLAC TROMETHAMINE 30 MG/ML
15 INJECTION, SOLUTION INTRAMUSCULAR; INTRAVENOUS ONCE
Status: COMPLETED | OUTPATIENT
Start: 2022-11-22 | End: 2022-11-22

## 2022-11-22 RX ORDER — OXYCODONE HYDROCHLORIDE 5 MG/1
5 TABLET ORAL EVERY 6 HOURS PRN
Qty: 12 TABLET | Refills: 0 | Status: SHIPPED | OUTPATIENT
Start: 2022-11-22 | End: 2022-12-02

## 2022-11-22 RX ADMIN — KETOROLAC TROMETHAMINE 15 MG: 30 INJECTION, SOLUTION INTRAMUSCULAR at 21:10

## 2022-11-22 RX ADMIN — OXYCODONE HYDROCHLORIDE 5 MG: 5 TABLET ORAL at 19:57

## 2022-11-22 NOTE — ED PROVIDER NOTES
History  Chief Complaint   Patient presents with   • Fall     Pt missed last 2 steps, fell and hurt right shoulder and complains of pelvic pain  Denies head strike     HPI      This is a very pleasant, nontoxic, 50-year-old female presents the emergency with mechanical fall where she fell down 2 non carpeted heart steps which were outside no head strike  Patient fell on her right shoulder  No numbness or tingling over the right shoulder  Patient denies any chest pain, upper back pain, headache, vomiting  Patient also denies any prodrome prior to falling, no nausea, no diaphoresis, no chest pain, no presyncopal symptoms, no shortness of breath  Prior to Admission Medications   Prescriptions Last Dose Informant Patient Reported? Taking?    ALPRAZolam (XANAX) 0 25 mg tablet   No No   Sig: Take 1 tablet (0 25 mg total) by mouth daily at bedtime as needed for anxiety   Biotin 10 MG CAPS   Yes No   Sig: Take by mouth   Multiple Vitamin (multivitamin) tablet   Yes No   Sig: Take 1 tablet by mouth   calcium carbonate (TUMS) 500 mg chewable tablet   Yes No   Sig: Chew 1 tablet   cholecalciferol (VITAMIN D3) 25 mcg (1,000 units) tablet   Yes No   Sig: daily   ciprofloxacin (CILOXAN) 0 3 % ophthalmic solution   Yes No   Sig: instill 1 drop into right eye four times a day   dicyclomine (BENTYL) 10 mg capsule   No No   Sig: Take 1 capsule (10 mg total) by mouth 3 (three) times a day before meals   enalapril (VASOTEC) 10 mg tablet   No No   Sig: Take 1 tablet (10 mg total) by mouth daily   ferrous sulfate 325 (65 Fe) mg tablet   Yes No   Sig: Take 325 mg by mouth   glipiZIDE (GLUCOTROL XL) 10 mg 24 hr tablet   No No   Sig: Take 1 tablet (10 mg total) by mouth daily   ketoconazole (NIZORAL) 2 % shampoo   Yes No   Sig: APPLY TO THE SCALP, LATHER, LET SIT FOR 5 MINUTES THEN RINSE OUT, USE 2-3 TIMES A WEEK   ketorolac (ACULAR) 0 4 % SOLN   Yes No   Sig: instill 1 drop into right eye once daily   lovastatin (MEVACOR) 40 MG tablet   No No   Sig: Take 1 tablet (40 mg total) by mouth daily at bedtime   metFORMIN (GLUCOPHAGE) 1000 MG tablet   No No   Sig: Take 1 tablet (1,000 mg total) by mouth 2 (two) times a day   polyethylene glycol (MIRALAX) 17 g packet   No No   Sig: Take 17 g by mouth daily   prednisoLONE acetate (PRED FORTE) 1 % ophthalmic suspension   Yes No   Sig: instill 1 drop into right eye four times a day as directed   sitaGLIPtin (Januvia) 100 mg tablet   No No   Sig: Take 1 tablet (100 mg total) by mouth daily   vitamin E, tocopherol, 1,000 units capsule   Yes No   Sig: Take 1,000 Units by mouth daily      Facility-Administered Medications: None       Past Medical History:   Diagnosis Date   • Diabetes mellitus ([facility] [address] )    • Diverticulitis    • Postherpetic neuralgia        Past Surgical History:   Procedure Laterality Date   • CHOLECYSTECTOMY     • COLON SURGERY     • HERNIA REPAIR     • IR EMBOLIZATION (SPECIFY VESSEL OR SITE)  1/12/2022   • ROTATOR CUFF REPAIR Right    • VARICOSE VEIN SURGERY      Impression:  2/12/16 Consuelo Shin       Family History   Problem Relation Age of Onset   • Diabetes Mother    • No Known Problems Father      I have reviewed and agree with the history as documented  E-Cigarette/Vaping   • E-Cigarette Use Never User      E-Cigarette/Vaping Substances     Social History     Tobacco Use   • Smoking status: Never   • Smokeless tobacco: Never   Vaping Use   • Vaping Use: Never used   Substance Use Topics   • Alcohol use: Never     Comment: Rarely   • Drug use: Never       Review of Systems   Constitutional: Negative  Negative for chills, fatigue and fever  HENT: Negative  Negative for drooling  Eyes: Negative  Respiratory: Negative  Negative for chest tightness and shortness of breath  Cardiovascular: Negative  Negative for chest pain  Gastrointestinal: Negative  Negative for abdominal pain  Endocrine: Negative  Genitourinary: Negative  Musculoskeletal: Negative  Negative for back pain  Skin: Negative for pallor  Allergic/Immunologic: Negative  Neurological: Negative  Negative for dizziness  Hematological: Negative  Negative for adenopathy  Psychiatric/Behavioral: Negative  Negative for agitation  Physical Exam  Physical Exam  Vitals and nursing note reviewed  Constitutional:       Appearance: Normal appearance  She is normal weight  HENT:      Head: Normocephalic and atraumatic  Right Ear: Tympanic membrane, ear canal and external ear normal       Left Ear: Tympanic membrane, ear canal and external ear normal       Nose: Nose normal       Mouth/Throat:      Mouth: Mucous membranes are moist       Pharynx: Oropharynx is clear  Eyes:      Extraocular Movements: Extraocular movements intact  Conjunctiva/sclera: Conjunctivae normal       Pupils: Pupils are equal, round, and reactive to light  Cardiovascular:      Rate and Rhythm: Normal rate and regular rhythm  Pulses: Normal pulses  Heart sounds: Normal heart sounds  Pulmonary:      Effort: Pulmonary effort is normal       Breath sounds: Normal breath sounds  Abdominal:      General: Abdomen is flat  Bowel sounds are normal    Musculoskeletal:         General: No swelling, tenderness, deformity or signs of injury  Normal range of motion  Cervical back: Normal range of motion  Right lower leg: No edema  Left lower leg: No edema  Comments: Pelvis stable  Skin:     General: Skin is warm  Neurological:      General: No focal deficit present  Mental Status: She is alert and oriented to person, place, and time  Mental status is at baseline  Psychiatric:         Mood and Affect: Mood normal          Behavior: Behavior normal          Thought Content:  Thought content normal          Judgment: Judgment normal          Vital Signs  ED Triage Vitals [11/22/22 1855]   Temp Pulse Respirations Blood Pressure SpO2   -- 90 18 (!) 222/107 94 %      Temp src Heart Rate Source Patient Position - Orthostatic VS BP Location FiO2 (%)   -- Monitor Lying Left arm --      Pain Score       6           Vitals:    11/22/22 1855 11/22/22 2030   BP: (!) 222/107 (!) 183/91   Pulse: 90 83   Patient Position - Orthostatic VS: Lying          Visual Acuity  Visual Acuity    Flowsheet Row Most Recent Value   L Pupil Size (mm) 3   R Pupil Size (mm) 3          ED Medications  Medications   oxyCODONE (ROXICODONE) IR tablet 5 mg (5 mg Oral Given 11/22/22 1957)   ketorolac (TORADOL) injection 15 mg (15 mg Intramuscular Given 11/22/22 2110)       Diagnostic Studies  Results Reviewed     None                 XR pelvis ap only 1 or 2 vw   ED Interpretation by Corry Foster III, DO (11/22 2208)   One-view x-ray of the pelvis shows no acute obvious fractures      Final Result by Melissa Taylor MD (11/22 2240)      No acute osseous abnormality  Workstation performed: [id number]         XR elbow 3+ views RIGHT   ED Interpretation by Corry Foster III, DO (11/22 2021)   THREE-VIEW X-RAY OF THE RIGHT ELBOW SHOWS NO ACUTE FRACTURES OR DISLOCATIONS      XR humerus RIGHT   ED Interpretation by Corry Foster III, DO (11/22 1954)   Right humeral head fracture  XR shoulder 2+ views RIGHT   ED Interpretation by Corry Foster III, DO (11/22 2021)   RIGHT HUMERAL HEAD FRACTURE      XR forearm 2 views RIGHT   ED Interpretation by Corry Foster III, DO (11/22 2023)   Two-view x-ray of the right foot no acute fractures or dislocations  XR chest 1 view   ED Interpretation by Corry Foster III, DO (11/22 2040)   INTERSTITIAL LUNG DISEASE, NO ACUTE FRACTURES NOTED, NO PNEUMOTHORAX  CT head without contrast   Final Result by Colton Kelly MD (11/22 2008)      No acute intracranial abnormality                    Workstation performed: [id number]                    Procedures  Procedures         ED Course  ED Course as of 11/22/22 7901 Tue Nov 22, 2022 2100 Patient's demographics were queried in the department Health Drug monitoring program no active issues a red flags  2128 Upon discharge, patient reports that she was having some trouble walking, patient pelvis was stable on exam all the patient was laying down, will proceed with a x-ray of the pelvis  2147 No acute obvious fractures noted on pelvic x-ray, waiting formal read  Requested formal read from George  2242 X rays negative, d/c to home  Patient was able to ambulate with assistance  SBIRT 22yo+    Flowsheet Row Most Recent Value   SBIRT (23 yo +)    In order to provide better care to our patients, we are screening all of our patients for alcohol and drug use  Would it be okay to ask you these screening questions? Yes Filed at: 11/22/2022 1856   Initial Alcohol Screen: US AUDIT-C     1  How often do you have a drink containing alcohol? 0 Filed at: 11/22/2022 1856   2  How many drinks containing alcohol do you have on a typical day you are drinking? 0 Filed at: 11/22/2022 1856   3a  Male UNDER 65: How often do you have five or more drinks on one occasion? 0 Filed at: 11/22/2022 1856   3b  FEMALE Any Age, or MALE 65+: How often do you have 4 or more drinks on one occassion? 0 Filed at: 11/22/2022 1856   Audit-C Score 0 Filed at: 11/22/2022 1856   TK: How many times in the past year have you    Used an illegal drug or used a prescription medication for non-medical reasons? Never Filed at: 11/22/2022 1856                    MDM  Number of Diagnoses or Management Options  Fall, initial encounter  Humeral head fracture, right, closed, initial encounter  Diagnosis management comments: This is a very pleasant, 26-year-old female presents emergency department with her family, mechanical fall, no prodrome prior to falling, patient chief complaint of pelvic pain and right shoulder pain  Cervical spine cleared via nexus criteria      - Patient is able to range neck to greater than 45 degrees to LEFT and RIGHT  Patient is able to touch chin to chest and hyper-extend without eliciting pain  Patient has no midline tenderness   5/5  No numbness/tingling in the arms  2+ radials  No carotid bruit  Palpable carotid pulses that are equal      CT the head negative, x-ray showed right shoulder humeral fracture, patient has a shoulder immobilizer in place, x-rays of the pelvis showed no acute fracture, patient stable for discharge  Patient's information was created in the department of Health Drug monitoring program no red flags, patient prescribed oxycodone  Patient was given not take oxycodone prior to discharge for pain control  This patient was examined during the Covid-19 pandemic, and appropriate PPE was employed as defined by OSHA to minimize exposure to the patient and to avoid spread in the event that I am an asymptomatic carrier  All efforts were made to avoid direct contact with the patient per CDC guidelines ("social distancing") unless otherwise necessary to rule out a medical emergency and/or to provide life-saving interventions  Donning and doffing of PPE was performed per recommended guidelines, and personal PPE was employed if /when institutional PPE was not readily available or was deemed to be less than the recommended as defined by OSHA  Portions of the record may have been created with voice recognition software  Occasional wrong word or "sound a like" substitutions may have occurred due to the inherent limitations of voice recognition software  Read the chart carefully and recognize, using context, where substitutions have occurred      Counseling: I had a detailed discussion with the patient and/or guardian regarding: the historical points, exam findings, and any diagnostic results supporting the discharge diagnosis, lab results, radiology results, discharge instructions reviewed with patient and/or family/caregiver and understanding was verbalized  Instructions given to return to the emergency department if symptoms worsen or persist, or if there are any questions or concerns that arise at home         Amount and/or Complexity of Data Reviewed  Tests in the radiology section of CPT®: ordered and reviewed  Tests in the medicine section of CPT®: ordered and reviewed  Decide to obtain previous medical records or to obtain history from someone other than the patient: yes  Review and summarize past medical records: yes  Independent visualization of images, tracings, or specimens: yes        Disposition  Final diagnoses:   Fall, initial encounter   Humeral head fracture, right, closed, initial encounter     Time reflects when diagnosis was documented in both MDM as applicable and the Disposition within this note     Time User Action Codes Description Comment    11/22/2022  8:53 PM McFarlan Fozia Chanel Yvroseach  TBIF] Fall, initial encounter     11/22/2022  8:55 PM McFarlan Fozia Harp [[id number]] Humeral head fracture, right, closed, initial encounter       ED Disposition     ED Disposition   Discharge    Condition   Stable    Date/Time   Tue Nov 22, 2022  8:53 PM    Comment   Creig Cushing discharge to home/self care                 Follow-up Information     Follow up With Specialties Details Why Contact Info    Meryle German, DO Family Medicine   [address] Orthopedic Surgery   [address] [address]  [phone]            Patient's Medications   Discharge Prescriptions    OXYCODONE (ROXICODONE) 5 IMMEDIATE RELEASE TABLET    Take 1 tablet (5 mg total) by mouth every 6 (six) hours as needed for moderate pain or severe pain for up to 10 days Max Daily Amount: 20 mg       Start Date: 11/22/2022End Date: 12/2/2022       Order Dose: 5 mg       Quantity: 12 tablet    Refills: 0           PDMP Review       Value Time User    PDMP Reviewed  Yes 11/22/2022  8:57 PM Prince Santiago,           ED Provider  Electronically Signed by           Houston Hu III, DO  11/22/22 3226

## 2022-11-23 ENCOUNTER — OFFICE VISIT (OUTPATIENT)
Dept: OBGYN CLINIC | Facility: CLINIC | Age: 82
End: 2022-11-23

## 2022-11-23 VITALS
HEART RATE: 71 BPM | WEIGHT: 126 LBS | DIASTOLIC BLOOD PRESSURE: 98 MMHG | HEIGHT: 60 IN | BODY MASS INDEX: 24.74 KG/M2 | SYSTOLIC BLOOD PRESSURE: 194 MMHG

## 2022-11-23 DIAGNOSIS — S42.201A CLOSED FRACTURE OF PROXIMAL END OF RIGHT HUMERUS, UNSPECIFIED FRACTURE MORPHOLOGY, INITIAL ENCOUNTER: ICD-10-CM

## 2022-11-23 NOTE — PROGRESS NOTES
Orthopedic Sports Medicine New Patient Visit     Assesment:   81 y o  female with nondisplaced right greater tuberosity and humeral neck fractures, also likely with nondisplaced right radial neck fracture s/p fall on 11/22/2022    Plan:    Given current fracture patterns she is indicated for nonoperative management  She was advised that there is a change her right shoulder fractures could potentially displace which may require surgical management at that time  Will monitor closely  She will continue the sling full time and remove only for hygiene purposes  Due to history of kidney disease, she is not recommended to utilize NSAIDs  She may take Tylenol as needed for pain  Can also apply ice to the area if needed  No concern for underlying pelvic fracture today, x-rays normal and she is able to bear full weight  Follow up:    Return in about 1 week (around 11/30/2022) for FRACTURE CARE  Chief Complaint   Patient presents with   • Right Arm - Pain, Fracture       History of Present Illness: The patient is a 81 y o  female with right shoulder and elbow pain  1 day ago patient had a fall down 2 stairs at home injuring her right upper extremity  She had immediate onset of pain and swelling at the right shoulder and also pain in her right elbow  She was evaluated at the ED where x-rays were taken, she was placed into a sling and advised to follow-up with orthopedics  Patient states that she has had continued pain in the right shoulder which has been more moderate to severe  She has maintained the sling since her ED evaluation  She also has pain in the proximal aspect of the radius  She states although she is able to bear weight she also does have some pain in her pelvis  She states that she was given a prescription for ibuprofen but is not recommended to utilize NSAIDs due to history of kidney disease        Shoulder Surgical History:  Prior right rotator cuff surgery    Past Medical, Social and Family History:  Past Medical History:   Diagnosis Date   • Diabetes mellitus ([facility] [address] )    • Diverticulitis    • Postherpetic neuralgia      Past Surgical History:   Procedure Laterality Date   • CHOLECYSTECTOMY     • COLON SURGERY     • HERNIA REPAIR     • IR EMBOLIZATION (SPECIFY VESSEL OR SITE)  1/12/2022   • ROTATOR CUFF REPAIR Right    • VARICOSE VEIN SURGERY      Impression:  2/12/16 [facility]     Allergies   Allergen Reactions   • Ciprofloxacin GI Intolerance and Headache     Confusion, arm weakness, dizziness, headache   • Meperidine GI Intolerance, Hallucinations and Other (See Comments)     Demarol  Reaction Date:Unknown   • Propoxyphene GI Intolerance     Current Outpatient Medications on File Prior to Visit   Medication Sig Dispense Refill   • ALPRAZolam (XANAX) 0 25 mg tablet Take 1 tablet (0 25 mg total) by mouth daily at bedtime as needed for anxiety 30 tablet 2   • Biotin 10 MG CAPS Take by mouth     • calcium carbonate (TUMS) 500 mg chewable tablet Chew 1 tablet     • cholecalciferol (VITAMIN D3) 25 mcg (1,000 units) tablet daily     • ciprofloxacin (CILOXAN) 0 3 % ophthalmic solution instill 1 drop into right eye four times a day     • dicyclomine (BENTYL) 10 mg capsule Take 1 capsule (10 mg total) by mouth 3 (three) times a day before meals 90 capsule 3   • enalapril (VASOTEC) 10 mg tablet Take 1 tablet (10 mg total) by mouth daily 90 tablet 3   • ferrous sulfate 325 (65 Fe) mg tablet Take 325 mg by mouth     • glipiZIDE (GLUCOTROL XL) 10 mg 24 hr tablet Take 1 tablet (10 mg total) by mouth daily 90 tablet 2   • ketoconazole (NIZORAL) 2 % shampoo APPLY TO THE SCALP, LATHER, LET SIT FOR 5 MINUTES THEN RINSE OUT, USE 2-3 TIMES A WEEK     • ketorolac (ACULAR) 0 4 % SOLN instill 1 drop into right eye once daily     • lovastatin (MEVACOR) 40 MG tablet Take 1 tablet (40 mg total) by mouth daily at bedtime 90 tablet 2   • metFORMIN (GLUCOPHAGE) 1000 MG tablet Take 1 tablet (1,000 mg total) by mouth 2 (two) times a day 180 tablet 2   • Multiple Vitamin (multivitamin) tablet Take 1 tablet by mouth     • polyethylene glycol (MIRALAX) 17 g packet Take 17 g by mouth daily  0   • prednisoLONE acetate (PRED FORTE) 1 % ophthalmic suspension instill 1 drop into right eye four times a day as directed     • sitaGLIPtin (Januvia) 100 mg tablet Take 1 tablet (100 mg total) by mouth daily 90 tablet 2   • vitamin E, tocopherol, 1,000 units capsule Take 1,000 Units by mouth daily     • oxyCODONE (Roxicodone) 5 immediate release tablet Take 1 tablet (5 mg total) by mouth every 6 (six) hours as needed for moderate pain or severe pain for up to 10 days Max Daily Amount: 20 mg (Patient not taking: Reported on 11/23/2022) 12 tablet 0     Current Facility-Administered Medications on File Prior to Visit   Medication Dose Route Frequency Provider Last Rate Last Admin   • [COMPLETED] ketorolac (TORADOL) injection 15 mg  15 mg Intramuscular Once Eliazar Matte III, DO   15 mg at 11/22/22 2110   • [COMPLETED] oxyCODONE (ROXICODONE) IR tablet 5 mg  5 mg Oral Once Eliazar Matte III, DO   5 mg at 11/22/22 1957     Social History     Socioeconomic History   • Marital status: /Civil Union     Spouse name: Not on file   • Number of children: Not on file   • Years of education: Not on file   • Highest education level: Not on file   Occupational History   • Not on file   Tobacco Use   • Smoking status: Never   • Smokeless tobacco: Never   Vaping Use   • Vaping Use: Never used   Substance and Sexual Activity   • Alcohol use: Never     Comment: Rarely   • Drug use: Never   • Sexual activity: Not on file   Other Topics Concern   • Not on file   Social History Narrative    Always uses seatbelt    No caffeine use     Social Determinants of Health     Financial Resource Strain: Low Risk    • Difficulty of Paying Living Expenses: Not hard at all   Food Insecurity: No Food Insecurity   • Worried About Running Out of Food in the Last Year: Never true   • Ran Out of Food in the Last Year: Never true   Transportation Needs: No Transportation Needs   • Lack of Transportation (Medical): No   • Lack of Transportation (Non-Medical): No   Physical Activity: Not on file   Stress: Not on file   Social Connections: Not on file   Intimate Partner Violence: Not on file   Housing Stability: Low Risk    • Unable to Pay for Housing in the Last Year: No   • Number of Places Lived in the Last Year: 1   • Unstable Housing in the Last Year: No         I have reviewed the past medical, surgical, social and family history, medications and allergies as documented in the EMR  Review of systems: ROS is negative other than that noted in the HPI  Constitutional: Negative for fatigue and fever  HENT: Negative for sore throat  Respiratory: Negative for shortness of breath  Cardiovascular: Negative for chest pain  Gastrointestinal: Negative for abdominal pain  Endocrine: Negative for cold intolerance and heat intolerance  Genitourinary: Negative for flank pain  Musculoskeletal: Negative for back pain  Skin: Negative for rash  Allergic/Immunologic: Negative for immunocompromised state  Neurological: Negative for dizziness  Psychiatric/Behavioral: Negative for agitation  Physical Exam:    Blood pressure (!) 194/98, pulse 71, height 5' (1 524 m), weight 57 2 kg (126 lb), not currently breastfeeding  General/Constitutional: NAD, well developed, well nourished  HENT: Normocephalic, atraumatic  CV: Intact distal pulses, regular rate  Resp: No respiratory distress or labored breathing  Lymphatic: No lymphadenopathy palpated  Neuro: Alert and Oriented x 3, no focal deficits  Psych: Normal mood, normal affect  Skin: Warm, dry, no rashes, no erythema      Shoulder Exam:      Inspection: No ecchymosis, edema, or deformity   No visualized wounds or skin lesions   Palpation: proximal humerus, proximal radius  Pain with all passive range of motion  Strength: limited secondary to pain  Sensory - SILT in the Radial / Ulnar / Median / Axillary nerve distributions  Motor - AIN / PIN / Radial / Ulnar / Median / Axillary motor nerves in tact  Palpable Radial pulse  Cap refill <2secs in all digits    Special testing deferred due to fracture    Imaging    I reviewed and interpreted X-rays of the right shoulder/humerus which show nondisplaced greater tuberosity and humeral neck fractures  On one view of the right elbow x-ray shows possible fracture line at the radial neck suggestive of nondisplaced fracture  AP pelvis x-ray shows no acute fractures  Fracture / Dislocation Treatment    Date/Time: 11/23/2022 9:40 AM  Performed by: Radha Chandler MD  Authorized by: Radha Chandler MD     Patient Location:  [facility] Protocol:  Consent: Verbal consent obtained  Risks and benefits: risks, benefits and alternatives were discussed  Consent given by: patient  Time out: Immediately prior to procedure a "time out" was called to verify the correct patient, procedure, equipment, support staff and site/side marked as required    Patient understanding: patient states understanding of the procedure being performed  Patient identity confirmed: verbally with patient      Injury location:  Shoulder  Location details:  Right shoulder  Injury type:  Fracture  Fracture type: surgical neck    Neurovascular status: Neurovascularly intact    Local anesthesia used?: No    Manipulation performed?: No    Immobilization:  Sling  Neurovascular status: Neurovascularly intact    Patient tolerance:  Patient tolerated the procedure well with no immediate complications      Scribe Attestation    I,:  Gucci Hussein am acting as a scribe while in the presence of the attending physician :       I,:  Radha Chandler MD personally performed the services described in this documentation    as scribed in my presence :

## 2022-12-01 ENCOUNTER — APPOINTMENT (OUTPATIENT)
Dept: RADIOLOGY | Facility: CLINIC | Age: 82
End: 2022-12-01

## 2022-12-01 ENCOUNTER — OFFICE VISIT (OUTPATIENT)
Dept: OBGYN CLINIC | Facility: CLINIC | Age: 82
End: 2022-12-01

## 2022-12-01 VITALS
SYSTOLIC BLOOD PRESSURE: 170 MMHG | HEIGHT: 60 IN | DIASTOLIC BLOOD PRESSURE: 82 MMHG | WEIGHT: 126 LBS | BODY MASS INDEX: 24.74 KG/M2 | HEART RATE: 82 BPM

## 2022-12-01 DIAGNOSIS — S42.201A CLOSED FRACTURE OF PROXIMAL END OF RIGHT HUMERUS, UNSPECIFIED FRACTURE MORPHOLOGY, INITIAL ENCOUNTER: ICD-10-CM

## 2022-12-01 DIAGNOSIS — R10.2 PAIN IN PELVIS: ICD-10-CM

## 2022-12-01 DIAGNOSIS — S42.201A CLOSED FRACTURE OF PROXIMAL END OF RIGHT HUMERUS, UNSPECIFIED FRACTURE MORPHOLOGY, INITIAL ENCOUNTER: Primary | ICD-10-CM

## 2022-12-02 NOTE — PROGRESS NOTES
Orthopedic Sports Medicine New Patient Visit     Assesment:   81 y o  female with nondisplaced right greater tuberosity and humeral neck fractures, also likely with nondisplaced right radial neck fracture and pelvic contusion s/p fall on 11/22/2022     Plan:    No interval displacement in her right proximal humerus fracture or radial neck fracture since last visit  X-rays repeated of her pelvis today to confirm there are no evidence of acute fracture  She has been ambulating with some pain  However she states that the pain is worse when she 1st starts walking and gets better as she walks further  I explained that this improvement of pain with further ambulation is not consistent with any significant fracture that we should be concerned about  We will continue non operative treatment of her right proximal humerus fracture in her right proximal radius fracture I will see her back in a few more weeks to repeat x-rays were humerus  She does have a previous history of right rotator cuff repair so we will have to keep an eye on her rotator cuff after her fracture heals  Repeat x-rays of the right elbow and right shoulder at that time  Chief Complaint   Patient presents with   • Right Arm - Follow-up       History of Present Illness: The patient is a 81 y o  female returns for evaluation of her right proximal humerus fracture and right radial neck fracture  Is having some issues with sling fit which will be re adjusted today  Overall her pain has been improving  Still having some issues with sleep due to the pain  Denies any new numbness or tingling  She has been able to ambulate and the pain in her pelvis actually gets better when she ambulates farther        Shoulder Surgical History:  Prior right rotator cuff surgery    Past Medical, Social and Family History:  Past Medical History:   Diagnosis Date   • Diabetes mellitus ([facility] [address] )    • Diverticulitis    • Postherpetic neuralgia      Past Surgical History: Procedure Laterality Date   • CHOLECYSTECTOMY     • COLON SURGERY     • HERNIA REPAIR     • IR EMBOLIZATION (SPECIFY VESSEL OR SITE)  1/12/2022   • ROTATOR CUFF REPAIR Right    • VARICOSE VEIN SURGERY      Impression:  2/12/16 Isaiah Sarabia     Allergies   Allergen Reactions   • Ciprofloxacin GI Intolerance and Headache     Confusion, arm weakness, dizziness, headache   • Meperidine GI Intolerance, Hallucinations and Other (See Comments)     Demarol  Reaction Date:Unknown   • Propoxyphene GI Intolerance     Current Outpatient Medications on File Prior to Visit   Medication Sig Dispense Refill   • ALPRAZolam (XANAX) 0 25 mg tablet Take 1 tablet (0 25 mg total) by mouth daily at bedtime as needed for anxiety 30 tablet 2   • Biotin 10 MG CAPS Take by mouth     • calcium carbonate (TUMS) 500 mg chewable tablet Chew 1 tablet     • cholecalciferol (VITAMIN D3) 25 mcg (1,000 units) tablet daily     • ciprofloxacin (CILOXAN) 0 3 % ophthalmic solution instill 1 drop into right eye four times a day     • dicyclomine (BENTYL) 10 mg capsule Take 1 capsule (10 mg total) by mouth 3 (three) times a day before meals 90 capsule 3   • enalapril (VASOTEC) 10 mg tablet Take 1 tablet (10 mg total) by mouth daily 90 tablet 3   • ferrous sulfate 325 (65 Fe) mg tablet Take 325 mg by mouth     • glipiZIDE (GLUCOTROL XL) 10 mg 24 hr tablet Take 1 tablet (10 mg total) by mouth daily 90 tablet 2   • ketoconazole (NIZORAL) 2 % shampoo APPLY TO THE SCALP, LATHER, LET SIT FOR 5 MINUTES THEN RINSE OUT, USE 2-3 TIMES A WEEK     • ketorolac (ACULAR) 0 4 % SOLN instill 1 drop into right eye once daily     • lovastatin (MEVACOR) 40 MG tablet Take 1 tablet (40 mg total) by mouth daily at bedtime 90 tablet 2   • metFORMIN (GLUCOPHAGE) 1000 MG tablet Take 1 tablet (1,000 mg total) by mouth 2 (two) times a day 180 tablet 2   • Multiple Vitamin (multivitamin) tablet Take 1 tablet by mouth     • polyethylene glycol (MIRALAX) 17 g packet Take 17 g by mouth daily  0   • prednisoLONE acetate (PRED FORTE) 1 % ophthalmic suspension instill 1 drop into right eye four times a day as directed     • sitaGLIPtin (Januvia) 100 mg tablet Take 1 tablet (100 mg total) by mouth daily 90 tablet 2   • vitamin E, tocopherol, 1,000 units capsule Take 1,000 Units by mouth daily     • oxyCODONE (Roxicodone) 5 immediate release tablet Take 1 tablet (5 mg total) by mouth every 6 (six) hours as needed for moderate pain or severe pain for up to 10 days Max Daily Amount: 20 mg (Patient not taking: Reported on 11/23/2022) 12 tablet 0     No current facility-administered medications on file prior to visit  Social History     Socioeconomic History   • Marital status: /Civil Union     Spouse name: Not on file   • Number of children: Not on file   • Years of education: Not on file   • Highest education level: Not on file   Occupational History   • Not on file   Tobacco Use   • Smoking status: Never   • Smokeless tobacco: Never   Vaping Use   • Vaping Use: Never used   Substance and Sexual Activity   • Alcohol use: Never     Comment: Rarely   • Drug use: Never   • Sexual activity: Not on file   Other Topics Concern   • Not on file   Social History Narrative    Always uses seatbelt    No caffeine use     Social Determinants of Health     Financial Resource Strain: Low Risk    • Difficulty of Paying Living Expenses: Not hard at all   Food Insecurity: No Food Insecurity   • Worried About [address] [employer] in the Last Year: Never true   • Ran Out of Food in the Last Year: Never true   Transportation Needs: No Transportation Needs   • Lack of Transportation (Medical): No   • Lack of Transportation (Non-Medical):  No   Physical Activity: Not on file   Stress: Not on file   Social Connections: Not on file   Intimate Partner Violence: Not on file   Housing Stability: Low Risk    • Unable to Pay for Housing in the Last Year: No   • Number of Places Lived in the Last Year: 1   • Unstable Housing in the Last Year: No         I have reviewed the past medical, surgical, social and family history, medications and allergies as documented in the EMR  Review of systems: ROS is negative other than that noted in the HPI  Constitutional: Negative for fatigue and fever  HENT: Negative for sore throat  Respiratory: Negative for shortness of breath  Cardiovascular: Negative for chest pain  Gastrointestinal: Negative for abdominal pain  Endocrine: Negative for cold intolerance and heat intolerance  Genitourinary: Negative for flank pain  Musculoskeletal: Negative for back pain  Skin: Negative for rash  Allergic/Immunologic: Negative for immunocompromised state  Neurological: Negative for dizziness  Psychiatric/Behavioral: Negative for agitation  Physical Exam:    Blood pressure 170/82, pulse 82, height 5' (1 524 m), weight 57 2 kg (126 lb), not currently breastfeeding  General/Constitutional: NAD, well developed, well nourished  HENT: Normocephalic, atraumatic  CV: Intact distal pulses, regular rate  Resp: No respiratory distress or labored breathing  Lymphatic: No lymphadenopathy palpated  Neuro: Alert and Oriented x 3, no focal deficits  Psych: Normal mood, normal affect  Skin: Warm, dry, no rashes, no erythema      Shoulder Exam:      Inspection:  Resolving ecchymosis running down her upper arm No visualized wounds or skin lesions   Palpation: proximal humerus, proximal radius tenderness of both places    Strength: limited secondary to pain  Sensory - SILT in the Radial / Ulnar / Median / Axillary nerve distributions  Motor - AIN / PIN / Radial / Ulnar / Median / Axillary motor nerves in tact  Palpable Radial pulse  Cap refill <2secs in all digits    Special testing deferred due to fracture    Imaging    I reviewed and interpreted X-rays of the right shoulder/humerus which show nondisplaced greater tuberosity and humeral neck fractures      On one view of the right elbow x-ray shows possible fracture line at the radial neck suggestive of nondisplaced fracture  AP pelvis x-ray shows no acute fractures

## 2022-12-22 ENCOUNTER — OFFICE VISIT (OUTPATIENT)
Dept: OBGYN CLINIC | Facility: CLINIC | Age: 82
End: 2022-12-22

## 2022-12-22 ENCOUNTER — APPOINTMENT (OUTPATIENT)
Dept: RADIOLOGY | Facility: CLINIC | Age: 82
End: 2022-12-22

## 2022-12-22 VITALS
DIASTOLIC BLOOD PRESSURE: 82 MMHG | HEART RATE: 90 BPM | HEIGHT: 60 IN | SYSTOLIC BLOOD PRESSURE: 137 MMHG | WEIGHT: 126 LBS | BODY MASS INDEX: 24.74 KG/M2

## 2022-12-22 DIAGNOSIS — S42.201A CLOSED FRACTURE OF PROXIMAL END OF RIGHT HUMERUS, UNSPECIFIED FRACTURE MORPHOLOGY, INITIAL ENCOUNTER: ICD-10-CM

## 2022-12-22 DIAGNOSIS — S42.201A CLOSED FRACTURE OF PROXIMAL END OF RIGHT HUMERUS, UNSPECIFIED FRACTURE MORPHOLOGY, INITIAL ENCOUNTER: Primary | ICD-10-CM

## 2022-12-22 NOTE — PROGRESS NOTES
Orthopedic Sports Medicine New Patient Visit     Assesment:   81 y o  female with minimally displaced right greater tuberosity and humeral neck fractures as well as a nondisplaced right radial neck fracture s/p fall on 11/22/2022    Plan: We will continue with non-operative management  There was slight angulation of the fracture site since the last visit, but the [facility] joint remains well reduced and there is still bony contact  After discussing x-ray results with the patient, we discussed continuing to wear the sling to allow further healing  She may remove it for hygiene and gentle motion only  Avoid anything that causes pain  Follow up:  2 weeks for repeat right elbow and right shoulder x-rays to assess healing of fractures      Chief Complaint   Patient presents with   • Right Upper Arm - Follow-up   • Right Shoulder - Follow-up       History of Present Illness: The patient is a 81 y o  female following up for evaluation of her right proximal humerus fracture and right radial neck fracture  She continues to wear her sling for most of the day  She reports improvement of elbow pain         Past Medical, Social and Family History:  Past Medical History:   Diagnosis Date   • Diabetes mellitus ([facility] [address] )    • Diverticulitis    • Postherpetic neuralgia      Past Surgical History:   Procedure Laterality Date   • CHOLECYSTECTOMY     • COLON SURGERY     • HERNIA REPAIR     • IR EMBOLIZATION (SPECIFY VESSEL OR SITE)  1/12/2022   • ROTATOR CUFF REPAIR Right    • VARICOSE VEIN SURGERY      Impression:  2/12/16 Dee Sarabia Keto     Allergies   Allergen Reactions   • Ciprofloxacin GI Intolerance and Headache     Confusion, arm weakness, dizziness, headache   • Meperidine GI Intolerance, Hallucinations and Other (See Comments)     Demarol  Reaction Date:Unknown   • Propoxyphene GI Intolerance     Current Outpatient Medications on File Prior to Visit   Medication Sig Dispense Refill   • ALPRAZolam (XANAX) 0 25 mg tablet Take 1 tablet (0 25 mg total) by mouth daily at bedtime as needed for anxiety 30 tablet 2   • Biotin 10 MG CAPS Take by mouth     • calcium carbonate (TUMS) 500 mg chewable tablet Chew 1 tablet     • cholecalciferol (VITAMIN D3) 25 mcg (1,000 units) tablet daily     • ciprofloxacin (CILOXAN) 0 3 % ophthalmic solution instill 1 drop into right eye four times a day     • dicyclomine (BENTYL) 10 mg capsule Take 1 capsule (10 mg total) by mouth 3 (three) times a day before meals 90 capsule 3   • enalapril (VASOTEC) 10 mg tablet Take 1 tablet (10 mg total) by mouth daily 90 tablet 3   • ferrous sulfate 325 (65 Fe) mg tablet Take 325 mg by mouth     • glipiZIDE (GLUCOTROL XL) 10 mg 24 hr tablet Take 1 tablet (10 mg total) by mouth daily 90 tablet 2   • ketoconazole (NIZORAL) 2 % shampoo APPLY TO THE SCALP, LATHER, LET SIT FOR 5 MINUTES THEN RINSE OUT, USE 2-3 TIMES A WEEK     • ketorolac (ACULAR) 0 4 % SOLN instill 1 drop into right eye once daily     • lovastatin (MEVACOR) 40 MG tablet Take 1 tablet (40 mg total) by mouth daily at bedtime 90 tablet 2   • metFORMIN (GLUCOPHAGE) 1000 MG tablet Take 1 tablet (1,000 mg total) by mouth 2 (two) times a day 180 tablet 2   • Multiple Vitamin (multivitamin) tablet Take 1 tablet by mouth     • polyethylene glycol (MIRALAX) 17 g packet Take 17 g by mouth daily  0   • prednisoLONE acetate (PRED FORTE) 1 % ophthalmic suspension instill 1 drop into right eye four times a day as directed     • sitaGLIPtin (Januvia) 100 mg tablet Take 1 tablet (100 mg total) by mouth daily 90 tablet 2   • vitamin E, tocopherol, 1,000 units capsule Take 1,000 Units by mouth daily       No current facility-administered medications on file prior to visit       Social History     Socioeconomic History   • Marital status: /Civil Union     Spouse name: Not on file   • Number of children: Not on file   • Years of education: Not on file   • Highest education level: Not on file   Occupational History   • Not on file   Tobacco Use   • Smoking status: Never   • Smokeless tobacco: Never   Vaping Use   • Vaping Use: Never used   Substance and Sexual Activity   • Alcohol use: Never     Comment: Rarely   • Drug use: Never   • Sexual activity: Not on file   Other Topics Concern   • Not on file   Social History Narrative    Always uses seatbelt    No caffeine use     Social Determinants of Health     Financial Resource Strain: Low Risk    • Difficulty of Paying Living Expenses: Not hard at all   Food Insecurity: No Food Insecurity   • Worried About Running Out of Food in the Last Year: Never true   • Ran Out of Food in the Last Year: Never true   Transportation Needs: No Transportation Needs   • Lack of Transportation (Medical): No   • Lack of Transportation (Non-Medical): No   Physical Activity: Not on file   Stress: Not on file   Social Connections: Not on file   Intimate Partner Violence: Not on file   Housing Stability: Low Risk    • Unable to Pay for Housing in the Last Year: No   • Number of Places Lived in the Last Year: 1   • Unstable Housing in the Last Year: No         I have reviewed the past medical, surgical, social and family history, medications and allergies as documented in the EMR  Review of systems: ROS is negative other than that noted in the HPI  Constitutional: Negative for fatigue and fever  HENT: Negative for sore throat  Respiratory: Negative for shortness of breath  Cardiovascular: Negative for chest pain  Gastrointestinal: Negative for abdominal pain  Endocrine: Negative for cold intolerance and heat intolerance  Genitourinary: Negative for flank pain  Musculoskeletal: Negative for back pain  Skin: Negative for rash  Allergic/Immunologic: Negative for immunocompromised state  Neurological: Negative for dizziness  Psychiatric/Behavioral: Negative for agitation        Physical Exam:    Blood pressure 137/82, pulse 90, height 5' (1 524 m), weight 57 2 kg (126 lb), not currently breastfeeding  General/Constitutional: NAD, well developed, well nourished  HENT: Normocephalic, atraumatic  CV: Intact distal pulses, regular rate  Resp: No respiratory distress or labored breathing  Abdomen: soft, nondistended, non tender   Lymphatic: No lymphadenopathy palpated  Neuro: Alert and Oriented x 3, no focal deficits  Psych: Normal mood, normal affect  Skin: Warm, dry, no rashes, no erythema      Shoulder Exam:      Inspection: No ecchymosis, edema, or deformity  No visualized wounds or skin lesions     Elbow Exam:  Full range of motion with pronation and supination  Tender to palpation along radial head with pronation and supination    Imaging    I reviewed and interpreted X-rays of the right elbow and right shoulder which show slightly increased angulation of the fracture at the humerus  Unchanged alignment of the radial neck fracture

## 2022-12-29 ENCOUNTER — RA CDI HCC (OUTPATIENT)
Dept: OTHER | Facility: HOSPITAL | Age: 82
End: 2022-12-29

## 2022-12-29 NOTE — PROGRESS NOTES
E11 22 for 2023  [facility]  coding opportunities          Chart Reviewed number of suggestions sent to Provider: 1     Patients Insurance     Medicare Insurance: Chito Chauhan

## 2022-12-30 ENCOUNTER — APPOINTMENT (OUTPATIENT)
Dept: LAB | Facility: CLINIC | Age: 82
End: 2022-12-30

## 2022-12-30 DIAGNOSIS — E13.69 OTHER SPECIFIED DIABETES MELLITUS WITH OTHER SPECIFIED COMPLICATION, UNSPECIFIED WHETHER LONG TERM INSULIN USE (HCC): ICD-10-CM

## 2022-12-30 LAB — GLUCOSE P FAST SERPL-MCNC: 114 MG/DL (ref 65–99)

## 2022-12-31 LAB
EST. AVERAGE GLUCOSE BLD GHB EST-MCNC: 166 MG/DL
HBA1C MFR BLD: 7.4 %

## 2023-01-01 ENCOUNTER — HOSPITAL ENCOUNTER (INPATIENT)
Facility: HOSPITAL | Age: 83
LOS: 23 days | DRG: 023 | End: 2024-01-23
Attending: EMERGENCY MEDICINE | Admitting: INTERNAL MEDICINE
Payer: COMMERCIAL

## 2023-01-01 ENCOUNTER — APPOINTMENT (INPATIENT)
Dept: RADIOLOGY | Facility: HOSPITAL | Age: 83
DRG: 023 | End: 2023-01-01
Payer: COMMERCIAL

## 2023-01-01 DIAGNOSIS — I63.9 ACUTE CVA (CEREBROVASCULAR ACCIDENT) (HCC): Primary | ICD-10-CM

## 2023-01-01 DIAGNOSIS — Z43.1 ENCOUNTER FOR PEG (PERCUTANEOUS ENDOSCOPIC GASTROSTOMY) (HCC): ICD-10-CM

## 2023-01-01 DIAGNOSIS — G93.40 ENCEPHALOPATHY: ICD-10-CM

## 2023-01-01 DIAGNOSIS — I63.9 CEREBELLAR STROKE (HCC): ICD-10-CM

## 2023-01-01 LAB
ANION GAP SERPL CALCULATED.3IONS-SCNC: 10 MMOL/L
BUN SERPL-MCNC: 30 MG/DL (ref 5–25)
CALCIUM SERPL-MCNC: 9 MG/DL (ref 8.4–10.2)
CHLORIDE SERPL-SCNC: 113 MMOL/L (ref 96–108)
CO2 SERPL-SCNC: 18 MMOL/L (ref 21–32)
CREAT SERPL-MCNC: 1.43 MG/DL (ref 0.6–1.3)
GFR SERPL CREATININE-BSD FRML MDRD: 34 ML/MIN/1.73SQ M
GLUCOSE SERPL-MCNC: 141 MG/DL (ref 65–140)
GLUCOSE SERPL-MCNC: 145 MG/DL (ref 65–140)
GLUCOSE SERPL-MCNC: 166 MG/DL (ref 65–140)
GLUCOSE SERPL-MCNC: 87 MG/DL (ref 65–140)
OSMOLALITY UR/SERPL-RTO: 319 MMOL/KG (ref 282–298)
POTASSIUM SERPL-SCNC: 4.3 MMOL/L (ref 3.5–5.3)
SODIUM SERPL-SCNC: 141 MMOL/L (ref 135–147)

## 2023-01-01 PROCEDURE — 83930 ASSAY OF BLOOD OSMOLALITY: CPT | Performed by: NURSE PRACTITIONER

## 2023-01-01 PROCEDURE — 99291 CRITICAL CARE FIRST HOUR: CPT | Performed by: EMERGENCY MEDICINE

## 2023-01-01 PROCEDURE — 36556 INSERT NON-TUNNEL CV CATH: CPT | Performed by: PHYSICIAN ASSISTANT

## 2023-01-01 PROCEDURE — NC001 PR NO CHARGE: Performed by: PHYSICIAN ASSISTANT

## 2023-01-01 PROCEDURE — 71045 X-RAY EXAM CHEST 1 VIEW: CPT

## 2023-01-01 PROCEDURE — 82948 REAGENT STRIP/BLOOD GLUCOSE: CPT

## 2023-01-01 PROCEDURE — 02HV33Z INSERTION OF INFUSION DEVICE INTO SUPERIOR VENA CAVA, PERCUTANEOUS APPROACH: ICD-10-PCS | Performed by: EMERGENCY MEDICINE

## 2023-01-01 PROCEDURE — 80048 BASIC METABOLIC PNL TOTAL CA: CPT | Performed by: NURSE PRACTITIONER

## 2023-01-01 RX ORDER — ATORVASTATIN CALCIUM 40 MG/1
40 TABLET, FILM COATED ORAL EVERY EVENING
Status: DISCONTINUED | OUTPATIENT
Start: 2023-01-01 | End: 2024-01-01

## 2023-01-01 RX ORDER — HYDRALAZINE HYDROCHLORIDE 20 MG/ML
10 INJECTION INTRAMUSCULAR; INTRAVENOUS EVERY 6 HOURS PRN
Status: DISCONTINUED | OUTPATIENT
Start: 2023-01-01 | End: 2024-01-01

## 2023-01-01 RX ORDER — 3% SODIUM CHLORIDE 3 G/100ML
30 INJECTION, SOLUTION INTRAVENOUS CONTINUOUS
Status: DISCONTINUED | OUTPATIENT
Start: 2023-01-01 | End: 2024-01-01

## 2023-01-01 RX ORDER — SODIUM CHLORIDE 9 MG/ML
75 INJECTION, SOLUTION INTRAVENOUS CONTINUOUS
Status: DISCONTINUED | OUTPATIENT
Start: 2023-01-01 | End: 2023-01-01

## 2023-01-01 RX ORDER — MAGNESIUM SULFATE 1 G/100ML
1 INJECTION INTRAVENOUS EVERY 12 HOURS
Status: DISCONTINUED | OUTPATIENT
Start: 2024-01-01 | End: 2023-01-01

## 2023-01-01 RX ORDER — FERROUS SULFATE 325(65) MG
325 TABLET ORAL
Status: DISCONTINUED | OUTPATIENT
Start: 2024-01-01 | End: 2024-01-01

## 2023-01-01 RX ORDER — CHLORHEXIDINE GLUCONATE ORAL RINSE 1.2 MG/ML
15 SOLUTION DENTAL EVERY 12 HOURS SCHEDULED
Status: DISCONTINUED | OUTPATIENT
Start: 2023-01-01 | End: 2024-01-01

## 2023-01-01 RX ORDER — LEVALBUTEROL INHALATION SOLUTION 1.25 MG/3ML
1.25 SOLUTION RESPIRATORY (INHALATION) EVERY 6 HOURS PRN
Status: DISCONTINUED | OUTPATIENT
Start: 2023-01-01 | End: 2024-01-01

## 2023-01-01 RX ORDER — ONDANSETRON 2 MG/ML
4 INJECTION INTRAMUSCULAR; INTRAVENOUS EVERY 6 HOURS PRN
Status: DISCONTINUED | OUTPATIENT
Start: 2023-01-01 | End: 2024-01-01

## 2023-01-01 RX ADMIN — SODIUM CHLORIDE 3 UNITS/HR: 9 INJECTION, SOLUTION INTRAVENOUS at 18:27

## 2023-01-01 RX ADMIN — SODIUM CHLORIDE 30 ML/HR: 3 INJECTION, SOLUTION INTRAVENOUS at 20:56

## 2023-01-01 RX ADMIN — ATORVASTATIN CALCIUM 40 MG: 40 TABLET, FILM COATED ORAL at 18:25

## 2023-01-01 RX ADMIN — CHLORHEXIDINE GLUCONATE 15 ML: 1.2 SOLUTION ORAL at 21:06

## 2023-01-01 RX ADMIN — NICARDIPINE HYDROCHLORIDE 7.5 MG/HR: 2.5 INJECTION, SOLUTION INTRAVENOUS at 20:56

## 2023-01-01 RX ADMIN — NICARDIPINE HYDROCHLORIDE 5 MG/HR: 2.5 INJECTION, SOLUTION INTRAVENOUS at 18:33

## 2023-01-05 ENCOUNTER — OFFICE VISIT (OUTPATIENT)
Dept: OBGYN CLINIC | Facility: CLINIC | Age: 83
End: 2023-01-05

## 2023-01-05 ENCOUNTER — APPOINTMENT (OUTPATIENT)
Dept: RADIOLOGY | Facility: CLINIC | Age: 83
End: 2023-01-05

## 2023-01-05 VITALS
SYSTOLIC BLOOD PRESSURE: 127 MMHG | HEIGHT: 60 IN | BODY MASS INDEX: 24.94 KG/M2 | WEIGHT: 127 LBS | HEART RATE: 94 BPM | DIASTOLIC BLOOD PRESSURE: 72 MMHG

## 2023-01-05 DIAGNOSIS — S52.124D CLOSED NONDISPLACED FRACTURE OF HEAD OF RIGHT RADIUS WITH ROUTINE HEALING, SUBSEQUENT ENCOUNTER: ICD-10-CM

## 2023-01-05 DIAGNOSIS — S42.201A CLOSED FRACTURE OF PROXIMAL END OF RIGHT HUMERUS, UNSPECIFIED FRACTURE MORPHOLOGY, INITIAL ENCOUNTER: ICD-10-CM

## 2023-01-05 DIAGNOSIS — S42.201A CLOSED FRACTURE OF PROXIMAL END OF RIGHT HUMERUS, UNSPECIFIED FRACTURE MORPHOLOGY, INITIAL ENCOUNTER: Primary | ICD-10-CM

## 2023-01-05 NOTE — PROGRESS NOTES
Orthopedic Sports Medicine New Patient Visit     Assesment:   81 y o  female with minimally displaced right greater tuberosity and humeral neck fractures as well as a nondisplaced right radial neck fracture s/p fall on 11/22/2022    Plan: We will continue with non-operative management  The patient can start weaning out of the sling and begin range of motion  She should still avoid any movements that cause pain  The patient is not cleared to drive yet as I recommend we continue giving her fractures more time to heal and recovering further upper extremity function as she still has limited motion, function, and likely delayed reaction time  Follow up:  4 weeks for repeat right elbow and right shoulder x-rays to assess healing of fractures      Chief Complaint   Patient presents with   • Right Shoulder - Follow-up   • Right Elbow - Follow-up       History of Present Illness: The patient is a 81 y o  female following up for evaluation of her right proximal humerus fracture and right radial neck fracture that occurred on 11/22/22  She continues to wear her sling during the day and taking off the sling to sleep  She reports overall improvement of elbow pain with intermittent bouts of pain  She has no numbness or tingling        Past Medical, Social and Family History:  Past Medical History:   Diagnosis Date   • Diabetes mellitus ([facility] [address] )    • Diverticulitis    • Postherpetic neuralgia      Past Surgical History:   Procedure Laterality Date   • CHOLECYSTECTOMY     • COLON SURGERY     • HERNIA REPAIR     • IR EMBOLIZATION (SPECIFY VESSEL OR SITE)  1/12/2022   • ROTATOR CUFF REPAIR Right    • VARICOSE VEIN SURGERY      Impression:  2/12/16 Jennie Sarabia     Allergies   Allergen Reactions   • Ciprofloxacin GI Intolerance and Headache     Confusion, arm weakness, dizziness, headache   • Meperidine GI Intolerance, Hallucinations and Other (See Comments)     Demarol  Reaction Date:Unknown   • Propoxyphene GI Intolerance Current Outpatient Medications on File Prior to Visit   Medication Sig Dispense Refill   • ALPRAZolam (XANAX) 0 25 mg tablet Take 1 tablet (0 25 mg total) by mouth daily at bedtime as needed for anxiety 30 tablet 2   • Biotin 10 MG CAPS Take by mouth     • calcium carbonate (TUMS) 500 mg chewable tablet Chew 1 tablet     • cholecalciferol (VITAMIN D3) 25 mcg (1,000 units) tablet daily     • ciprofloxacin (CILOXAN) 0 3 % ophthalmic solution instill 1 drop into right eye four times a day     • dicyclomine (BENTYL) 10 mg capsule Take 1 capsule (10 mg total) by mouth 3 (three) times a day before meals 90 capsule 3   • enalapril (VASOTEC) 10 mg tablet Take 1 tablet (10 mg total) by mouth daily 90 tablet 3   • ferrous sulfate 325 (65 Fe) mg tablet Take 325 mg by mouth     • glipiZIDE (GLUCOTROL XL) 10 mg 24 hr tablet Take 1 tablet (10 mg total) by mouth daily 90 tablet 2   • ketoconazole (NIZORAL) 2 % shampoo APPLY TO THE SCALP, LATHER, LET SIT FOR 5 MINUTES THEN RINSE OUT, USE 2-3 TIMES A WEEK     • ketorolac (ACULAR) 0 4 % SOLN instill 1 drop into right eye once daily     • lovastatin (MEVACOR) 40 MG tablet Take 1 tablet (40 mg total) by mouth daily at bedtime 90 tablet 2   • metFORMIN (GLUCOPHAGE) 1000 MG tablet Take 1 tablet (1,000 mg total) by mouth 2 (two) times a day 180 tablet 2   • Multiple Vitamin (multivitamin) tablet Take 1 tablet by mouth     • polyethylene glycol (MIRALAX) 17 g packet Take 17 g by mouth daily  0   • prednisoLONE acetate (PRED FORTE) 1 % ophthalmic suspension instill 1 drop into right eye four times a day as directed     • sitaGLIPtin (Januvia) 100 mg tablet Take 1 tablet (100 mg total) by mouth daily 90 tablet 2   • vitamin E, tocopherol, 1,000 units capsule Take 1,000 Units by mouth daily       No current facility-administered medications on file prior to visit       Social History     Socioeconomic History   • Marital status: /Civil Union     Spouse name: Not on file   • Number of children: Not on file   • Years of education: Not on file   • Highest education level: Not on file   Occupational History   • Not on file   Tobacco Use   • Smoking status: Never   • Smokeless tobacco: Never   Vaping Use   • Vaping Use: Never used   Substance and Sexual Activity   • Alcohol use: Never     Comment: Rarely   • Drug use: Never   • Sexual activity: Not on file   Other Topics Concern   • Not on file   Social History Narrative    Always uses seatbelt    No caffeine use     Social Determinants of Health     Financial Resource Strain: Low Risk    • Difficulty of Paying Living Expenses: Not hard at all   Food Insecurity: No Food Insecurity   • Worried About [employer] [employer] in the Last Year: Never true   • Ran Out of Food in the Last Year: Never true   Transportation Needs: No Transportation Needs   • Lack of Transportation (Medical): No   • Lack of Transportation (Non-Medical): No   Physical Activity: Not on file   Stress: Not on file   Social Connections: Not on file   Intimate Partner Violence: Not on file   Housing Stability: Low Risk    • Unable to Pay for Housing in the Last Year: No   • Number of Places Lived in the Last Year: 1   • Unstable Housing in the Last Year: No         I have reviewed the past medical, surgical, social and family history, medications and allergies as documented in the EMR  Review of systems: ROS is negative other than that noted in the HPI  Constitutional: Negative for fatigue and fever  HENT: Negative for sore throat  Respiratory: Negative for shortness of breath  Cardiovascular: Negative for chest pain  Gastrointestinal: Negative for abdominal pain  Endocrine: Negative for cold intolerance and heat intolerance  Genitourinary: Negative for flank pain  Musculoskeletal: Negative for back pain  Skin: Negative for rash  Allergic/Immunologic: Negative for immunocompromised state  Neurological: Negative for dizziness     Psychiatric/Behavioral: Negative for agitation  Physical Exam:    Blood pressure 127/72, pulse 94, height 5' (1 524 m), weight 57 6 kg (127 lb), not currently breastfeeding  General/Constitutional: NAD, well developed, well nourished  HENT: Normocephalic, atraumatic  CV: Intact distal pulses, regular rate  Resp: No respiratory distress or labored breathing  Abdomen: soft, nondistended, non tender   Lymphatic: No lymphadenopathy palpated  Neuro: Alert and Oriented x 3, no focal deficits  Psych: Normal mood, normal affect  Skin: Warm, dry, no rashes, no erythema      Shoulder Exam:      Inspection: No ecchymosis, edema, or deformity  No visualized wounds or skin lesions     Elbow Exam:  Full range of motion with pronation and supination  Decreased tenderness to palpation at the humeral head  No tenderness to palpation at the radial head  Imaging    I reviewed and interpreted X-rays of the right elbow and right shoulder which show no interval changes of humerus and radial neck fractures with evidence of healing

## 2023-01-09 ENCOUNTER — OFFICE VISIT (OUTPATIENT)
Dept: FAMILY MEDICINE CLINIC | Facility: CLINIC | Age: 83
End: 2023-01-09

## 2023-01-09 VITALS
OXYGEN SATURATION: 95 % | DIASTOLIC BLOOD PRESSURE: 82 MMHG | HEIGHT: 60 IN | BODY MASS INDEX: 24.74 KG/M2 | WEIGHT: 126 LBS | TEMPERATURE: 97.2 F | HEART RATE: 85 BPM | SYSTOLIC BLOOD PRESSURE: 138 MMHG

## 2023-01-09 DIAGNOSIS — E11.9 TYPE 2 DIABETES MELLITUS WITHOUT COMPLICATION, WITHOUT LONG-TERM CURRENT USE OF INSULIN (HCC): ICD-10-CM

## 2023-01-09 DIAGNOSIS — I10 ESSENTIAL HYPERTENSION: ICD-10-CM

## 2023-01-09 DIAGNOSIS — I12.9 HYPERTENSIVE KIDNEY DISEASE WITH STAGE 3B CHRONIC KIDNEY DISEASE (HCC): ICD-10-CM

## 2023-01-09 DIAGNOSIS — L65.9 HAIR LOSS: ICD-10-CM

## 2023-01-09 DIAGNOSIS — N18.32 HYPERTENSIVE KIDNEY DISEASE WITH STAGE 3B CHRONIC KIDNEY DISEASE (HCC): ICD-10-CM

## 2023-01-09 DIAGNOSIS — Z00.00 HEALTHCARE MAINTENANCE: ICD-10-CM

## 2023-01-09 DIAGNOSIS — E11.9 TYPE 2 DIABETES MELLITUS WITHOUT COMPLICATION, WITHOUT LONG-TERM CURRENT USE OF INSULIN (HCC): Primary | ICD-10-CM

## 2023-01-09 PROBLEM — J96.01 ACUTE RESPIRATORY FAILURE WITH HYPOXIA (HCC): Status: RESOLVED | Noted: 2021-12-16 | Resolved: 2023-01-09

## 2023-01-09 RX ORDER — MULTIVIT WITH MINERALS/LUTEIN
1000 TABLET ORAL DAILY
Qty: 90 CAPSULE | Refills: 1 | Status: SHIPPED | OUTPATIENT
Start: 2023-01-09

## 2023-01-09 RX ORDER — ENALAPRIL MALEATE 10 MG/1
10 TABLET ORAL DAILY
Qty: 90 TABLET | Refills: 3 | Status: SHIPPED | OUTPATIENT
Start: 2023-01-09

## 2023-01-09 RX ORDER — LOVASTATIN 40 MG/1
40 TABLET ORAL
Qty: 90 TABLET | Refills: 3 | Status: SHIPPED | OUTPATIENT
Start: 2023-01-09

## 2023-01-09 RX ORDER — GLIPIZIDE 10 MG/1
10 TABLET, FILM COATED, EXTENDED RELEASE ORAL DAILY
Qty: 90 TABLET | Refills: 3 | Status: SHIPPED | OUTPATIENT
Start: 2023-01-09

## 2023-01-09 RX ORDER — KETOCONAZOLE 20 MG/ML
SHAMPOO TOPICAL
Qty: 120 ML | Refills: 3 | Status: SHIPPED | OUTPATIENT
Start: 2023-01-09

## 2023-01-09 NOTE — PROGRESS NOTES
Assessment/Plan:    No problem-specific Assessment & Plan notes found for this encounter  Diagnoses and all orders for this visit:    Type 2 diabetes mellitus without complication, without long-term current use of insulin ([place] [address] )  Comments:  pt counseled on diet and exercise, take medication as directed  Orders:  -     glipiZIDE (GLUCOTROL XL) 10 mg 24 hr tablet; Take 1 tablet (10 mg total) by mouth daily  -     lovastatin (MEVACOR) 40 MG tablet; Take 1 tablet (40 mg total) by mouth daily at bedtime  -     sitaGLIPtin (Januvia) 100 mg tablet; Take 1 tablet (100 mg total) by mouth daily  -     Ambulatory referral to Diabetic Education; Future  -     Hemoglobin A1C; Future    Essential hypertension  Comments:  no change in the medication  Orders:  -     enalapril (VASOTEC) 10 mg tablet; Take 1 tablet (10 mg total) by mouth daily    Hypertensive kidney disease with stage 3b chronic kidney disease ([place] [address] )    Type 2 diabetes mellitus without complication, without long-term current use of insulin ([facility])  Comments:  pt to take both dosages of metformin  Orders:  -     glipiZIDE (GLUCOTROL XL) 10 mg 24 hr tablet; Take 1 tablet (10 mg total) by mouth daily  -     lovastatin (MEVACOR) 40 MG tablet; Take 1 tablet (40 mg total) by mouth daily at bedtime  -     sitaGLIPtin (Januvia) 100 mg tablet; Take 1 tablet (100 mg total) by mouth daily  -     Ambulatory referral to Diabetic Education; Future  -     Hemoglobin A1C; Future    Type 2 diabetes mellitus without complication, without long-term current use of insulin ([facility])  Comments:  pt counseled on diet and exercise and weight loss  Orders:  -     glipiZIDE (GLUCOTROL XL) 10 mg 24 hr tablet; Take 1 tablet (10 mg total) by mouth daily  -     lovastatin (MEVACOR) 40 MG tablet; Take 1 tablet (40 mg total) by mouth daily at bedtime  -     sitaGLIPtin (Januvia) 100 mg tablet; Take 1 tablet (100 mg total) by mouth daily  -     Ambulatory referral to Diabetic Education;  Future  - Hemoglobin A1C; Future    Healthcare maintenance  -     vitamin E, tocopherol, 1,000 units capsule; Take 1 capsule (1,000 Units total) by mouth daily    Hair loss  -     ketoconazole (NIZORAL) 2 % shampoo; Use 2-3 times a week          PHQ-2/9 Depression Screening    Little interest or pleasure in doing things: 0 - not at all  Feeling down, depressed, or hopeless: 0 - not at all  PHQ-2 Score: 0  PHQ-2 Interpretation: Negative depression screen        BMI Counseling: Body mass index is 24 61 kg/m²  The BMI is above normal  Nutrition recommendations include reducing portion sizes and 3-5 servings of fruits/vegetables daily  Exercise recommendations include moderate aerobic physical activity for 150 minutes/week and exercising 3-5 times per week  Subjective:      Patient ID: Laura Mckinney is a 81 y o  female  Diabetes  She presents for her follow-up diabetic visit  She has type 2 diabetes mellitus  Her disease course has been worsening  Pertinent negatives for diabetes include no chest pain  The following portions of the patient's history were reviewed and updated as appropriate: allergies, current medications, past family history, past medical history, past social history, past surgical history and problem list     Review of Systems   Eyes: Negative for visual disturbance  Cardiovascular: Negative for chest pain and palpitations  Gastrointestinal: Negative for abdominal pain, nausea and vomiting  Genitourinary: Negative for frequency  Skin: Negative for wound  Neurological: Negative for numbness  Objective:    /82   Pulse 85   Temp (!) 97 2 °F (36 2 °C) (Tympanic)   Ht 5' (1 524 m)   Wt 57 2 kg (126 lb)   LMP  (LMP Unknown)   SpO2 95%   BMI 24 61 kg/m²      Physical Exam  Vitals and nursing note reviewed  Constitutional:       General: She is not in acute distress  Appearance: Normal appearance  She is not ill-appearing, toxic-appearing or diaphoretic     HENT: Head: Normocephalic and atraumatic  Mouth/Throat:      Mouth: Mucous membranes are moist    Eyes:      Conjunctiva/sclera: Conjunctivae normal    Cardiovascular:      Rate and Rhythm: Normal rate and regular rhythm  Pulses: no weak pulses          Dorsalis pedis pulses are 2+ on the right side and 2+ on the left side  Heart sounds: Normal heart sounds  No murmur heard  Pulmonary:      Effort: Pulmonary effort is normal  No respiratory distress  Breath sounds: Normal breath sounds  No wheezing, rhonchi or rales  Abdominal:      General: There is no distension  Palpations: Abdomen is soft  There is no mass  Tenderness: There is no abdominal tenderness  There is no guarding or rebound  Musculoskeletal:      Cervical back: Normal range of motion  No rigidity  Right lower leg: No edema  Left lower leg: No edema  Feet:      Right foot:      Skin integrity: No ulcer, skin breakdown, erythema, warmth, callus or dry skin  Left foot:      Skin integrity: No ulcer, skin breakdown, erythema, warmth, callus or dry skin  Lymphadenopathy:      Cervical: No cervical adenopathy  Neurological:      Mental Status: She is alert and oriented to person, place, and time  Psychiatric:         Mood and Affect: Mood normal          Behavior: Behavior normal          Thought Content: Thought content normal          Judgment: Judgment normal        Patient's shoes and socks removed  Right Foot/Ankle   Right Foot Inspection  Skin Exam: skin normal and skin intact  No dry skin, no warmth, no callus, no erythema, no maceration, no abnormal color, no pre-ulcer, no ulcer and no callus  Toe Exam: ROM and strength within normal limits  no right toe deformity    Sensory   Monofilament testing: intact    Vascular  Capillary refills: < 3 seconds  The right DP pulse is 2+  Left Foot/Ankle  Left Foot Inspection  Skin Exam: skin normal and skin intact   No dry skin, no warmth, no erythema, no maceration, normal color, no pre-ulcer, no ulcer and no callus  Toe Exam: ROM and strength within normal limits  No left toe deformity  Sensory   Monofilament testing: intact    Vascular  Capillary refills: < 3 seconds  The left DP pulse is 2+       Assign Risk Category  No deformity present  No loss of protective sensation  No weak pulses  Risk: 0

## 2023-01-11 ENCOUNTER — TELEPHONE (OUTPATIENT)
Dept: OBGYN CLINIC | Facility: HOSPITAL | Age: 83
End: 2023-01-11

## 2023-01-11 NOTE — TELEPHONE ENCOUNTER
Caller: Patient    Doctor: Wilbert Marmolejo    Reason for call: Patient thinks she aggravated her arm  Pain 6/10 She was lifting something and thinks she overdid it  Please advise      Call back#: [phone]

## 2023-01-12 DIAGNOSIS — Z00.00 HEALTHCARE MAINTENANCE: ICD-10-CM

## 2023-01-12 RX ORDER — MULTIVIT WITH MINERALS/LUTEIN
1000 TABLET ORAL DAILY
Qty: 90 CAPSULE | Refills: 1 | OUTPATIENT
Start: 2023-01-12

## 2023-02-02 ENCOUNTER — OFFICE VISIT (OUTPATIENT)
Dept: OBGYN CLINIC | Facility: CLINIC | Age: 83
End: 2023-02-02

## 2023-02-02 ENCOUNTER — APPOINTMENT (OUTPATIENT)
Dept: RADIOLOGY | Facility: CLINIC | Age: 83
End: 2023-02-02

## 2023-02-02 VITALS — BODY MASS INDEX: 24.74 KG/M2 | WEIGHT: 126 LBS | HEIGHT: 60 IN

## 2023-02-02 DIAGNOSIS — S52.124D CLOSED NONDISPLACED FRACTURE OF HEAD OF RIGHT RADIUS WITH ROUTINE HEALING, SUBSEQUENT ENCOUNTER: ICD-10-CM

## 2023-02-02 DIAGNOSIS — S42.201D CLOSED FRACTURE OF PROXIMAL END OF RIGHT HUMERUS WITH ROUTINE HEALING, UNSPECIFIED FRACTURE MORPHOLOGY, SUBSEQUENT ENCOUNTER: Primary | ICD-10-CM

## 2023-02-02 NOTE — PROGRESS NOTES
Orthopedic Sports Medicine New Patient Visit     Assesment:   81 y o  female with minimally displaced right greater tuberosity and humeral neck fractures as well as a nondisplaced right radial neck fracture s/p fall on 11/22/2022    Plan:    We discussed findings of the updated x-rays of the right shoulder and elbow taken today, which show further healing and no interval changes of her humerus and radial neck fractures  I believe the patient would benefit from working with PT now that her fractures have continued to heal  However, the patient states traveling to PT would not be feasible as she takes care of her disabled daughter  She plans on continuing the exercises we discussed at her last visit and working on continuing her daily activities, using pain as a guide  The patient can use Tylenol as needed for pain  She is cleared to drive now that she has further improvement with upper extremity motion and functionality  Follow up:    6 weeks for new set of x-rays    Chief Complaint   Patient presents with   • Right Elbow - Follow-up   • Left Shoulder - Follow-up   • Right Shoulder - Follow-up       History of Present Illness: The patient is a 81 y o  female following up for evaluation of her right proximal humerus fracture and right radial neck fracture that occurred 10 weeks ago on 11/22/22  The patient reports certain motions elicit pain in the shoulder, but she has no elbow pain at this time  She denies numbness or tingling  The patient states she uses Tylenol occasionally for pain  She reports completing range of motion and strengthening exercises at home, which have helped her improve her overall shoulder functionality       Past Medical, Social and Family History:  Past Medical History:   Diagnosis Date   • Acute respiratory failure with hypoxia ([facility] [address] ) 12/16/2021   • Broken arm     hairline fracture/ 2 places///   right arm   • Diabetes mellitus (HCC)    • Diverticulitis    • Postherpetic neuralgia Past Surgical History:   Procedure Laterality Date   • CHOLECYSTECTOMY     • COLON SURGERY     • HERNIA REPAIR     • IR EMBOLIZATION (SPECIFY VESSEL OR SITE)  1/12/2022   • ROTATOR CUFF REPAIR Right    • VARICOSE VEIN SURGERY      Impression:  2/12/16 Vazquez Sarabia     Allergies   Allergen Reactions   • Ciprofloxacin GI Intolerance and Headache     Confusion, arm weakness, dizziness, headache   • Meperidine GI Intolerance, Hallucinations and Other (See Comments)     Demarol  Reaction Date:Unknown   • Propoxyphene GI Intolerance     Current Outpatient Medications on File Prior to Visit   Medication Sig Dispense Refill   • ALPRAZolam (XANAX) 0 25 mg tablet Take 1 tablet (0 25 mg total) by mouth daily at bedtime as needed for anxiety 30 tablet 2   • Biotin 10 MG CAPS Take 1 capsule by mouth in the morning     • calcium carbonate (TUMS) 500 mg chewable tablet Chew 1 tablet if needed     • cholecalciferol (VITAMIN D3) 25 mcg (1,000 units) tablet daily     • dicyclomine (BENTYL) 10 mg capsule Take 1 capsule (10 mg total) by mouth 3 (three) times a day before meals 90 capsule 3   • enalapril (VASOTEC) 10 mg tablet Take 1 tablet (10 mg total) by mouth daily 90 tablet 3   • ferrous sulfate 325 (65 Fe) mg tablet Take 325 mg by mouth daily with breakfast     • glipiZIDE (GLUCOTROL XL) 10 mg 24 hr tablet Take 1 tablet (10 mg total) by mouth daily 90 tablet 3   • ketoconazole (NIZORAL) 2 % shampoo Use 2-3 times a week 120 mL 3   • lovastatin (MEVACOR) 40 MG tablet Take 1 tablet (40 mg total) by mouth daily at bedtime 90 tablet 3   • Multiple Vitamin (multivitamin) tablet Take 1 tablet by mouth daily     • polyethylene glycol (MIRALAX) 17 g packet Take 17 g by mouth daily (Patient taking differently: Take 17 g by mouth if needed)  0   • sitaGLIPtin (Januvia) 100 mg tablet Take 1 tablet (100 mg total) by mouth daily 90 tablet 3   • vitamin E, tocopherol, 1,000 units capsule Take 1 capsule (1,000 Units total) by mouth daily 90 capsule 1   • ciprofloxacin (CILOXAN) 0 3 % ophthalmic solution instill 1 drop into right eye four times a day (Patient not taking: Reported on 1/9/2023)     • ketorolac (ACULAR) 0 4 % SOLN instill 1 drop into right eye once daily (Patient not taking: Reported on 1/9/2023)     • prednisoLONE acetate (PRED FORTE) 1 % ophthalmic suspension instill 1 drop into right eye four times a day as directed (Patient not taking: Reported on 1/9/2023)       No current facility-administered medications on file prior to visit  Social History     Socioeconomic History   • Marital status: /Civil Union     Spouse name: Not on file   • Number of children: Not on file   • Years of education: Not on file   • Highest education level: Not on file   Occupational History   • Not on file   Tobacco Use   • Smoking status: Never   • Smokeless tobacco: Never   Vaping Use   • Vaping Use: Never used   Substance and Sexual Activity   • Alcohol use: Never     Comment: Rarely   • Drug use: Never   • Sexual activity: Not on file   Other Topics Concern   • Not on file   Social History Narrative    Always uses seatbelt    No caffeine use     Social Determinants of Health     Financial Resource Strain: Low Risk    • Difficulty of Paying Living Expenses: Not hard at all   Food Insecurity: Not on file   Transportation Needs: No Transportation Needs   • Lack of Transportation (Medical): No   • Lack of Transportation (Non-Medical): No   Physical Activity: Not on file   Stress: Not on file   Social Connections: Not on file   Intimate Partner Violence: Not on file   Housing Stability: Not on file         I have reviewed the past medical, surgical, social and family history, medications and allergies as documented in the EMR  Review of systems: ROS is negative other than that noted in the HPI  Constitutional: Negative for fatigue and fever  HENT: Negative for sore throat  Respiratory: Negative for shortness of breath      Cardiovascular: Negative for chest pain  Gastrointestinal: Negative for abdominal pain  Endocrine: Negative for cold intolerance and heat intolerance  Genitourinary: Negative for flank pain  Musculoskeletal: Negative for back pain  Skin: Negative for rash  Allergic/Immunologic: Negative for immunocompromised state  Neurological: Negative for dizziness  Psychiatric/Behavioral: Negative for agitation  Physical Exam:    Height 5' (1 524 m), weight 57 2 kg (126 lb), not currently breastfeeding  General/Constitutional: NAD, well developed, well nourished  HENT: Normocephalic, atraumatic  CV: Intact distal pulses, regular rate  Resp: No respiratory distress or labored breathing  Abdomen: soft, nondistended, non tender   Lymphatic: No lymphadenopathy palpated  Neuro: Alert and Oriented x 3, no focal deficits  Psych: Normal mood, normal affect  Skin: Warm, dry, no rashes, no erythema      Shoulder Exam:      Inspection: No ecchymosis, edema, or deformity  No visualized wounds or skin lesions   Palpation: mild tenderness along the humeral head - continuing to decrease from last visit  Active Motion:  FF: 130  ER: 60, symmetric bilaterally      Elbow Exam:  Active motion: full range of motion with pronation, supination, extension, and flexion  Palpation: no tenderness at the radial head    Imaging    I reviewed and interpreted updated X-rays of the right elbow and right shoulder which show continued evidence of healing without any additional displacement  No interval changes of the proximal humerus and radial neck fractures

## 2023-03-22 ENCOUNTER — APPOINTMENT (OUTPATIENT)
Dept: RADIOLOGY | Facility: CLINIC | Age: 83
End: 2023-03-22

## 2023-03-22 ENCOUNTER — OFFICE VISIT (OUTPATIENT)
Dept: OBGYN CLINIC | Facility: CLINIC | Age: 83
End: 2023-03-22

## 2023-03-22 VITALS
SYSTOLIC BLOOD PRESSURE: 175 MMHG | HEART RATE: 83 BPM | DIASTOLIC BLOOD PRESSURE: 83 MMHG | BODY MASS INDEX: 25.52 KG/M2 | HEIGHT: 60 IN | WEIGHT: 130 LBS

## 2023-03-22 DIAGNOSIS — S42.201D CLOSED FRACTURE OF PROXIMAL END OF RIGHT HUMERUS WITH ROUTINE HEALING, UNSPECIFIED FRACTURE MORPHOLOGY, SUBSEQUENT ENCOUNTER: Primary | ICD-10-CM

## 2023-03-22 DIAGNOSIS — S42.201D CLOSED FRACTURE OF PROXIMAL END OF RIGHT HUMERUS WITH ROUTINE HEALING, UNSPECIFIED FRACTURE MORPHOLOGY, SUBSEQUENT ENCOUNTER: ICD-10-CM

## 2023-03-22 DIAGNOSIS — S52.124D CLOSED NONDISPLACED FRACTURE OF HEAD OF RIGHT RADIUS WITH ROUTINE HEALING, SUBSEQUENT ENCOUNTER: ICD-10-CM

## 2023-03-22 NOTE — PROGRESS NOTES
Orthopedic Sports Medicine Patient Visit     Assesment:   81 y o  female with a proximal humerus fracture of her R shoulder and a radial head fracture of her R shoulder  Plan:    Reviewed physical imaging with patient on today's visit  Both fracture sites are clinically healed, which is consistent with imaging taken today, 3/22/23  Encouraged the patient to begin formal physical therapy to address range of motion restrictions and improve strengthening  Patient expressed interest in physical therapy; however, was hesitant due to caring for her family  The patient will follow-up if symptoms return  The patient understands and is in agreement with today's treatment plan  Follow up:    Return if symptoms worsen or fail to improve  Chief Complaint   Patient presents with   • Right Elbow - Fracture, Follow-up   • Right Shoulder - Fracture, Follow-up       History of Present Illness: The patient is a 81 y o  female that reported for a follow-up evaluation on her R shoulder and R elbow  She experienced a nondisplaced humeral neck fracture of her R shoulder and a nondisplaced radial head fracture in November 2022  The patient was treated conservatively  She reported today, wearing no immobilization bracing or splints  She stated that her pain has improved overall  She was able to return to leisure activities, including the gun range  She stated she is unable to reach end range of forward flexion when reaching overhead  The patient has not completed any formal physical therapy  She denies any numbness or tingling  She is not taking any medications for pain         Shoulder Surgical History:  None    Past Medical, Social and Family History:  Past Medical History:   Diagnosis Date   • Acute respiratory failure with hypoxia ([facility] [address] ) 12/16/2021   • Broken arm     hairline fracture/ 2 places///   right arm   • Diabetes mellitus (HCC)    • Diverticulitis    • Postherpetic neuralgia      Past Surgical History: Procedure Laterality Date   • CHOLECYSTECTOMY     • COLON SURGERY     • HERNIA REPAIR     • IR EMBOLIZATION (SPECIFY VESSEL OR SITE)  1/12/2022   • ROTATOR CUFF REPAIR Right    • VARICOSE VEIN SURGERY      Impression:  2/12/16 Martita Sarabia     Allergies   Allergen Reactions   • Ciprofloxacin GI Intolerance and Headache     Confusion, arm weakness, dizziness, headache   • Meperidine GI Intolerance, Hallucinations and Other (See Comments)     Demarol  Reaction Date:Unknown   • Propoxyphene GI Intolerance     Current Outpatient Medications on File Prior to Visit   Medication Sig Dispense Refill   • ALPRAZolam (XANAX) 0 25 mg tablet Take 1 tablet (0 25 mg total) by mouth daily at bedtime as needed for anxiety 30 tablet 2   • Biotin 10 MG CAPS Take 1 capsule by mouth in the morning     • calcium carbonate (TUMS) 500 mg chewable tablet Chew 1 tablet if needed     • cholecalciferol (VITAMIN D3) 25 mcg (1,000 units) tablet daily     • dicyclomine (BENTYL) 10 mg capsule Take 1 capsule (10 mg total) by mouth 3 (three) times a day before meals 90 capsule 3   • enalapril (VASOTEC) 10 mg tablet Take 1 tablet (10 mg total) by mouth daily 90 tablet 3   • ferrous sulfate 325 (65 Fe) mg tablet Take 325 mg by mouth daily with breakfast     • glipiZIDE (GLUCOTROL XL) 10 mg 24 hr tablet Take 1 tablet (10 mg total) by mouth daily 90 tablet 3   • ketoconazole (NIZORAL) 2 % shampoo Use 2-3 times a week 120 mL 3   • lovastatin (MEVACOR) 40 MG tablet Take 1 tablet (40 mg total) by mouth daily at bedtime 90 tablet 3   • Multiple Vitamin (multivitamin) tablet Take 1 tablet by mouth daily     • polyethylene glycol (MIRALAX) 17 g packet Take 17 g by mouth daily (Patient taking differently: Take 17 g by mouth if needed)  0   • sitaGLIPtin (Januvia) 100 mg tablet Take 1 tablet (100 mg total) by mouth daily 90 tablet 3   • vitamin E, tocopherol, 1,000 units capsule Take 1 capsule (1,000 Units total) by mouth daily 90 capsule 1   • ciprofloxacin (CILOXAN) 0 3 % ophthalmic solution instill 1 drop into right eye four times a day (Patient not taking: Reported on 1/9/2023)     • ketorolac (ACULAR) 0 4 % SOLN instill 1 drop into right eye once daily (Patient not taking: Reported on 1/9/2023)     • prednisoLONE acetate (PRED FORTE) 1 % ophthalmic suspension instill 1 drop into right eye four times a day as directed (Patient not taking: Reported on 1/9/2023)       No current facility-administered medications on file prior to visit  Social History     Socioeconomic History   • Marital status: /Civil Union     Spouse name: Not on file   • Number of children: Not on file   • Years of education: Not on file   • Highest education level: Not on file   Occupational History   • Not on file   Tobacco Use   • Smoking status: Never   • Smokeless tobacco: Never   Vaping Use   • Vaping Use: Never used   Substance and Sexual Activity   • Alcohol use: Never     Comment: Rarely   • Drug use: Never   • Sexual activity: Not on file   Other Topics Concern   • Not on file   Social History Narrative    Always uses seatbelt    No caffeine use     Social Determinants of Health     Financial Resource Strain: Low Risk    • Difficulty of Paying Living Expenses: Not hard at all   Food Insecurity: Not on file   Transportation Needs: No Transportation Needs   • Lack of Transportation (Medical): No   • Lack of Transportation (Non-Medical): No   Physical Activity: Not on file   Stress: Not on file   Social Connections: Not on file   Intimate Partner Violence: Not on file   Housing Stability: Not on file         I have reviewed the past medical, surgical, social and family history, medications and allergies as documented in the EMR  Review of systems: ROS is negative other than that noted in the HPI  Constitutional: Negative for fatigue and fever  HENT: Negative for sore throat  Respiratory: Negative for shortness of breath  Cardiovascular: Negative for chest pain  Gastrointestinal: Negative for abdominal pain  Endocrine: Negative for cold intolerance and heat intolerance  Genitourinary: Negative for flank pain  Musculoskeletal: Negative for back pain  Skin: Negative for rash  Allergic/Immunologic: Negative for immunocompromised state  Neurological: Negative for dizziness  Psychiatric/Behavioral: Negative for agitation  Physical Exam:    Blood pressure (!) 175/83, pulse 83, height 5' (1 524 m), weight 59 kg (130 lb), not currently breastfeeding  General/Constitutional: NAD, well developed, well nourished  HENT: Normocephalic, atraumatic  CV: Intact distal pulses, regular rate  Resp: No respiratory distress or labored breathing  Abdomen: soft, nondistended, non tender   Lymphatic: No lymphadenopathy palpated  Neuro: Alert and Oriented x 3, no focal deficits  Psych: Normal mood, normal affect  Skin: Warm, dry, no rashes, no erythema      Shoulder Exam:   R shoulder-  Inspection: No ecchymosis, edema, or deformity  No visualized wounds or skin lesions   Palpation: Mildly TTP over lateral GH joint  Active Motion  FF: 0 - 120 degrees   ER: 0 - 90 degrees   IR: PSIS  Elbow Extension- 0 degrees   Elbow Flexion- 120 degrees   Strength: 5/5 FF in scapular plane, 5/5 ER,  5/5 IR   Sensory - SILT in the Radial / Ulnar / Median / Axillary nerve distributions  Motor - AIN / PIN / Radial / Ulnar / Median / Axillary motor nerves in tact  Palpable Radial pulse  Cap refill <2secs in all digits      Imaging    I reviewed and interpreted X-rays of the R shoulder which show well healed proximal humeral fracture  I  Also reviewed and interpreted X-rays of the R elbow which show well healed radial head fracture

## 2023-05-24 ENCOUNTER — HOSPITAL ENCOUNTER (OUTPATIENT)
Dept: BONE DENSITY | Facility: HOSPITAL | Age: 83
Discharge: HOME | End: 2023-05-24
Attending: FAMILY MEDICINE

## 2023-05-24 VITALS — WEIGHT: 131.17 LBS | HEIGHT: 57 IN | BODY MASS INDEX: 28.3 KG/M2

## 2023-05-24 DIAGNOSIS — Z78.0 ASYMPTOMATIC POSTMENOPAUSAL STATE: ICD-10-CM

## 2023-06-09 ENCOUNTER — OFFICE VISIT (OUTPATIENT)
Dept: FAMILY MEDICINE CLINIC | Facility: CLINIC | Age: 83
End: 2023-06-09
Payer: COMMERCIAL

## 2023-06-09 VITALS
WEIGHT: 131 LBS | TEMPERATURE: 97.6 F | HEIGHT: 57 IN | DIASTOLIC BLOOD PRESSURE: 90 MMHG | OXYGEN SATURATION: 97 % | BODY MASS INDEX: 28.26 KG/M2 | HEART RATE: 78 BPM | SYSTOLIC BLOOD PRESSURE: 156 MMHG

## 2023-06-09 DIAGNOSIS — R00.2 HEART PALPITATIONS: Primary | ICD-10-CM

## 2023-06-09 DIAGNOSIS — I10 ESSENTIAL HYPERTENSION: ICD-10-CM

## 2023-06-09 PROCEDURE — 99213 OFFICE O/P EST LOW 20 MIN: CPT | Performed by: FAMILY MEDICINE

## 2023-06-09 PROCEDURE — 93000 ELECTROCARDIOGRAM COMPLETE: CPT | Performed by: FAMILY MEDICINE

## 2023-06-09 NOTE — PROGRESS NOTES
"Assessment/Plan:    No problem-specific Assessment & Plan notes found for this encounter  Diagnoses and all orders for this visit:    Heart palpitations  -     Holter monitor; Future    Essential hypertension  Comments:  keep a home log          PHQ-2/9 Depression Screening    Little interest or pleasure in doing things: 0 - not at all  Feeling down, depressed, or hopeless: 0 - not at all  PHQ-2 Score: 0  PHQ-2 Interpretation: Negative depression screen            Subjective:      Patient ID: Jaelyn Cheatham is a 81 y o  female  Palpitations  This is a new problem  The current episode started 1 to 4 weeks ago  The problem occurs intermittently  The problem has been unchanged  Pertinent negatives include no chest pain, chills or fever  Nothing aggravates the symptoms  She has tried nothing for the symptoms  The treatment provided no relief  The following portions of the patient's history were reviewed and updated as appropriate: allergies, current medications, past family history, past medical history, past social history, past surgical history and problem list     Review of Systems   Constitutional: Negative for chills and fever  Respiratory: Positive for shortness of breath  Negative for wheezing  Cardiovascular: Positive for palpitations  Negative for chest pain  Objective:    /90   Pulse 78   Temp 97 6 °F (36 4 °C)   Ht 4' 9\" (1 448 m)   Wt 59 4 kg (131 lb)   LMP  (LMP Unknown)   SpO2 97%   BMI 28 35 kg/m²      Physical Exam  Vitals and nursing note reviewed  Constitutional:       General: She is not in acute distress  Appearance: Normal appearance  She is not ill-appearing, toxic-appearing or diaphoretic  HENT:      Head: Normocephalic and atraumatic  Eyes:      Conjunctiva/sclera: Conjunctivae normal    Cardiovascular:      Rate and Rhythm: Normal rate and regular rhythm  Heart sounds: Normal heart sounds  No murmur heard    Pulmonary:      Effort: Pulmonary " effort is normal  No respiratory distress  Breath sounds: Normal breath sounds  No wheezing, rhonchi or rales  Abdominal:      General: There is no distension  Palpations: Abdomen is soft  There is no mass  Tenderness: There is no abdominal tenderness  There is no guarding or rebound  Musculoskeletal:      Cervical back: Normal range of motion and neck supple  No rigidity  Right lower leg: No edema  Left lower leg: No edema  Lymphadenopathy:      Cervical: No cervical adenopathy  Neurological:      Mental Status: She is alert and oriented to person, place, and time  Psychiatric:         Mood and Affect: Mood normal          Behavior: Behavior normal          Thought Content:  Thought content normal          Judgment: Judgment normal

## 2023-06-29 ENCOUNTER — HOSPITAL ENCOUNTER (OUTPATIENT)
Dept: NON INVASIVE DIAGNOSTICS | Facility: HOSPITAL | Age: 83
Discharge: HOME | End: 2023-06-29
Attending: FAMILY MEDICINE
Payer: COMMERCIAL

## 2023-06-29 DIAGNOSIS — R00.2 HEART PALPITATIONS: ICD-10-CM

## 2023-06-29 PROCEDURE — 93226 XTRNL ECG REC<48 HR SCAN A/R: CPT

## 2023-06-29 PROCEDURE — 93225 XTRNL ECG REC<48 HRS REC: CPT

## 2023-07-07 DIAGNOSIS — E11.9 TYPE 2 DIABETES MELLITUS WITHOUT COMPLICATION, WITHOUT LONG-TERM CURRENT USE OF INSULIN (HCC): ICD-10-CM

## 2023-07-07 DIAGNOSIS — I10 ESSENTIAL HYPERTENSION: ICD-10-CM

## 2023-07-07 RX ORDER — LOVASTATIN 40 MG/1
40 TABLET ORAL
Qty: 90 TABLET | Refills: 0 | Status: SHIPPED | OUTPATIENT
Start: 2023-07-07

## 2023-07-10 RX ORDER — GLIPIZIDE 10 MG/1
10 TABLET, FILM COATED, EXTENDED RELEASE ORAL DAILY
Qty: 90 TABLET | Refills: 0 | Status: SHIPPED | OUTPATIENT
Start: 2023-07-10

## 2023-07-10 RX ORDER — ENALAPRIL MALEATE 10 MG/1
10 TABLET ORAL DAILY
Qty: 90 TABLET | Refills: 0 | Status: SHIPPED | OUTPATIENT
Start: 2023-07-10

## 2023-07-13 ENCOUNTER — RA CDI HCC (OUTPATIENT)
Dept: OTHER | Facility: HOSPITAL | Age: 83
End: 2023-07-13

## 2023-07-13 NOTE — PROGRESS NOTES
e11.22  [facility] coding opportunities          Chart Reviewed number of suggestions sent to Provider: 1     Patients Insurance     Medicare Insurance: [address] [place] [address]

## 2023-07-18 ENCOUNTER — APPOINTMENT (OUTPATIENT)
Dept: LAB | Facility: CLINIC | Age: 83
End: 2023-07-18
Payer: COMMERCIAL

## 2023-07-18 ENCOUNTER — OFFICE VISIT (OUTPATIENT)
Dept: FAMILY MEDICINE CLINIC | Facility: CLINIC | Age: 83
End: 2023-07-18
Payer: COMMERCIAL

## 2023-07-18 VITALS
HEART RATE: 90 BPM | HEIGHT: 57 IN | BODY MASS INDEX: 27.83 KG/M2 | TEMPERATURE: 98 F | WEIGHT: 129 LBS | OXYGEN SATURATION: 97 % | SYSTOLIC BLOOD PRESSURE: 130 MMHG | DIASTOLIC BLOOD PRESSURE: 62 MMHG

## 2023-07-18 DIAGNOSIS — E11.9 TYPE 2 DIABETES MELLITUS WITHOUT COMPLICATION, WITHOUT LONG-TERM CURRENT USE OF INSULIN (HCC): ICD-10-CM

## 2023-07-18 DIAGNOSIS — M81.8 OTHER OSTEOPOROSIS WITHOUT CURRENT PATHOLOGICAL FRACTURE: ICD-10-CM

## 2023-07-18 DIAGNOSIS — N18.32 CHRONIC KIDNEY DISEASE, STAGE 3B (HCC): ICD-10-CM

## 2023-07-18 DIAGNOSIS — R00.2 HEART PALPITATIONS: Primary | ICD-10-CM

## 2023-07-18 DIAGNOSIS — I10 ESSENTIAL HYPERTENSION: ICD-10-CM

## 2023-07-18 LAB
EST. AVERAGE GLUCOSE BLD GHB EST-MCNC: 194 MG/DL
GLUCOSE P FAST SERPL-MCNC: 117 MG/DL (ref 65–99)
HBA1C MFR BLD: 8.4 %

## 2023-07-18 PROCEDURE — 82947 ASSAY GLUCOSE BLOOD QUANT: CPT

## 2023-07-18 PROCEDURE — 99214 OFFICE O/P EST MOD 30 MIN: CPT | Performed by: FAMILY MEDICINE

## 2023-07-18 PROCEDURE — 36415 COLL VENOUS BLD VENIPUNCTURE: CPT

## 2023-07-18 PROCEDURE — 83036 HEMOGLOBIN GLYCOSYLATED A1C: CPT

## 2023-07-18 RX ORDER — ALENDRONATE SODIUM 70 MG/1
70 TABLET ORAL
Qty: 4 TABLET | Refills: 5 | Status: SHIPPED | OUTPATIENT
Start: 2023-07-18 | End: 2023-07-24 | Stop reason: SINTOL

## 2023-07-18 NOTE — PROGRESS NOTES
Assessment/Plan     Diagnoses and all orders for this visit:    Heart palpitations  Comments:  Holter report reviewed-no acute concerns  Patient reports symptom resolution since last visit  Advised low caffeine intake    Orders:  -     TSH, 3rd generation with Free T4 reflex; Future    Essential hypertension  Comments:  -Patient brought a BP log this visit  -BP range predominantly in 135/80 to 145/80, few over 160/80  -Given patient age- BP seems to be at goal  -Advised low-salt   -Continue same Rx for now, consider increasing enalapril to 20 mg next visit if BP remains high  -Advised patient to continue BP log  -Follow-up in 3 months for Medicare wellness    Orders:  -     CBC and differential; Future  -     Lipid panel; Future    Chronic kidney disease, stage 3b (HCC)  Comments:  GFR-32, improved from 32  Slowly progressive CKD due to a combination of 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 and DM  Patient follows with nephrology  Advised patient to follow-up with nephrology regularly based on their recommendations  Avoid nephrotoxic drugs    Type 2 diabetes mellitus without complication, without long-term current use of insulin ([address])  Comments:  HbA1c, fasting blood glucose pending  Last HbA1c- 7  Continue same treatment for now, will review labs once resulted and make changes accordingly  Counseled on healthy diet and exercise    Other osteoporosis without current pathological fracture  Comments:  DEXA scan showing osteoporosis  Patient currently taking vitamin D and calcium supplements  We will add Fosamax 70 mg weekly  Orders:  -     alendronate (Fosamax) 70 mg tablet; Take 1 tablet (70 mg total) by mouth every 7 days        Subjective     Patient Id: Beau Nuñez is a 81 y.o. female    Patient is a 50-year-old female who presents to the office today for follow-up for palpitation. Patient was seen a month ago with concerns for palpitation. Patient states that her symptoms have resolved since.   Patient reports that her symptoms could have been from excessive caffeine intake at that point of time. She denies chest pain, SOB, LOC, recent fall, abdominal pain, nausea/vomiting. Review of Systems   Constitutional: Negative for chills and fever. HENT: Negative for ear pain and sore throat. Eyes: Negative for pain and visual disturbance. Respiratory: Negative for cough and shortness of breath. Cardiovascular: Negative for chest pain and palpitations. Gastrointestinal: Negative for abdominal pain and vomiting. Genitourinary: Negative for dysuria and hematuria. Musculoskeletal: Negative for arthralgias and back pain. Skin: Negative for color change and rash. Neurological: Negative for seizures and syncope. All other systems reviewed and are negative.       Past Medical History:   Diagnosis Date   • Acute respiratory failure with hypoxia ([address]) 12/16/2021   • Broken arm     hairline fracture/ 2 places///   right arm   • Diabetes mellitus (HCC)    • Diverticulitis    • Postherpetic neuralgia        Past Surgical History:   Procedure Laterality Date   • CHOLECYSTECTOMY     • COLON SURGERY     • HERNIA REPAIR     • IR EMBOLIZATION (SPECIFY VESSEL OR SITE)  1/12/2022   • ROTATOR CUFF REPAIR Right    • VARICOSE VEIN SURGERY      [facility]       Family History   Problem Relation Age of Onset   • Diabetes Mother    • No Known Problems Father        Social History     Socioeconomic History   • Marital status: /Civil Union     Spouse name: None   • Number of children: None   • Years of education: None   • Highest education level: None   Occupational History   • None   Tobacco Use   • Smoking status: Never   • Smokeless tobacco: Never   Vaping Use   • Vaping Use: Never used   Substance and Sexual Activity   • Alcohol use: Never     Comment: Rarely   • Drug use: Never   • Sexual activity: None   Other Topics Concern   • None   Social History Narrative    Always uses seatbelt    No caffeine use Social Determinants of Health     Financial Resource Strain: Low Risk  (9/1/2022)    Overall Financial Resource Strain (CARDIA)    • Difficulty of Paying Living Expenses: Not hard at all   Food Insecurity: No Food Insecurity (1/14/2022)    Hunger Vital Sign    • Worried About Running Out of Food in the Last Year: Never true    • Ran Out of Food in the Last Year: Never true   Transportation Needs: No Transportation Needs (9/1/2022)    PRAPARE - Transportation    • Lack of Transportation (Medical): No    • Lack of Transportation (Non-Medical):  No   Physical Activity: Not on file   Stress: Not on file   Social Connections: Not on file   Intimate Partner Violence: Not on file   Housing Stability: Low Risk  (1/14/2022)    Housing Stability Vital Sign    • Unable to Pay for Housing in the Last Year: No    • Number of Places Lived in the Last Year: 1    • Unstable Housing in the Last Year: No       Allergies   Allergen Reactions   • Ciprofloxacin GI Intolerance and Headache     Confusion, arm weakness, dizziness, headache   • Meperidine GI Intolerance, Hallucinations and Other (See Comments)     Demarol  Reaction Date:Unknown   • Propoxyphene GI Intolerance         Current Outpatient Medications:   •  alendronate (Fosamax) 70 mg tablet, Take 1 tablet (70 mg total) by mouth every 7 days, Disp: 4 tablet, Rfl: 5  •  ALPRAZolam (XANAX) 0.25 mg tablet, Take 1 tablet (0.25 mg total) by mouth daily at bedtime as needed for anxiety, Disp: 30 tablet, Rfl: 2  •  Biotin 10 MG CAPS, Take 1 capsule by mouth in the morning, Disp: , Rfl:   •  calcium carbonate (TUMS) 500 mg chewable tablet, Chew 1 tablet if needed, Disp: , Rfl:   •  cholecalciferol (VITAMIN D3) 25 mcg (1,000 units) tablet, daily, Disp: , Rfl:   •  enalapril (VASOTEC) 10 mg tablet, Take 1 tablet (10 mg total) by mouth daily, Disp: 90 tablet, Rfl: 0  •  ferrous sulfate 325 (65 Fe) mg tablet, Take 325 mg by mouth daily with breakfast, Disp: , Rfl:   •  glipiZIDE (GLUCOTROL XL) 10 mg 24 hr tablet, Take 1 tablet (10 mg total) by mouth daily, Disp: 90 tablet, Rfl: 0  •  ketoconazole (NIZORAL) 2 % shampoo, Use 2-3 times a week, Disp: 120 mL, Rfl: 3  •  lovastatin (MEVACOR) 40 MG tablet, Take 1 tablet (40 mg total) by mouth daily at bedtime, Disp: 90 tablet, Rfl: 0  •  Multiple Vitamin (multivitamin) tablet, Take 1 tablet by mouth daily, Disp: , Rfl:   •  polyethylene glycol (MIRALAX) 17 g packet, Take 17 g by mouth daily (Patient taking differently: Take 17 g by mouth if needed), Disp: , Rfl: 0  •  sitaGLIPtin (Januvia) 100 mg tablet, Take 1 tablet (100 mg total) by mouth daily, Disp: 90 tablet, Rfl: 0  •  vitamin E, tocopherol, 1,000 units capsule, Take 1 capsule (1,000 Units total) by mouth daily, Disp: 90 capsule, Rfl: 1  •  ciprofloxacin (CILOXAN) 0.3 % ophthalmic solution, instill 1 drop into right eye four times a day (Patient not taking: Reported on 1/9/2023), Disp: , Rfl:   •  dicyclomine (BENTYL) 10 mg capsule, Take 1 capsule (10 mg total) by mouth 3 (three) times a day before meals (Patient not taking: Reported on 6/9/2023), Disp: 90 capsule, Rfl: 3  •  ketorolac (ACULAR) 0.4 % SOLN, instill 1 drop into right eye once daily (Patient not taking: Reported on 1/9/2023), Disp: , Rfl:   •  prednisoLONE acetate (PRED FORTE) 1 % ophthalmic suspension, instill 1 drop into right eye four times a day as directed (Patient not taking: Reported on 1/9/2023), Disp: , Rfl:     Objective     Vitals:    07/18/23 1317   BP: 130/62   Pulse: 90   Temp: 98 °F (36.7 °C)   SpO2: 97%   Weight: 58.5 kg (129 lb)   Height: 4' 9" (1.448 m)       Physical Exam  Vitals and nursing note reviewed. Constitutional:       General: She is not in acute distress. Appearance: She is well-developed. HENT:      Head: Normocephalic and atraumatic. Eyes:      Conjunctiva/sclera: Conjunctivae normal.   Cardiovascular:      Rate and Rhythm: Normal rate and regular rhythm.       Heart sounds: No murmur heard.  Pulmonary:      Effort: Pulmonary effort is normal. No respiratory distress. Breath sounds: Normal breath sounds. Abdominal:      Palpations: Abdomen is soft. Tenderness: There is no abdominal tenderness. Musculoskeletal:         General: No swelling. Cervical back: Neck supple. Skin:     General: Skin is warm and dry. Capillary Refill: Capillary refill takes less than 2 seconds. Neurological:      Mental Status: She is alert.    Psychiatric:         Mood and Affect: Mood normal.         Nancy Wild MD  Family Medicine

## 2023-07-24 ENCOUNTER — TELEPHONE (OUTPATIENT)
Dept: FAMILY MEDICINE CLINIC | Facility: CLINIC | Age: 83
End: 2023-07-24

## 2023-07-24 NOTE — TELEPHONE ENCOUNTER
Called patient back. Patient informed that she has taken Alendronate once so far. C/o back pain and jaw pain. Advised patient to d/c the medication for now. RTC if no improvement in symptoms. Advised patient to continue calcium and Vit D. Will d/c the medication from med list due to side effects.

## 2023-07-24 NOTE — TELEPHONE ENCOUNTER
Patient called stating that the alendronate was locking her jaw up that she couldn't talk and that it caused her back and side pain so she did stop taking it. Please advise.

## 2023-07-26 ENCOUNTER — OFFICE VISIT (OUTPATIENT)
Dept: FAMILY MEDICINE CLINIC | Facility: CLINIC | Age: 83
End: 2023-07-26

## 2023-07-26 ENCOUNTER — HOSPITAL ENCOUNTER (EMERGENCY)
Facility: HOSPITAL | Age: 83
Discharge: HOME | End: 2023-07-26
Attending: EMERGENCY MEDICINE
Payer: COMMERCIAL

## 2023-07-26 ENCOUNTER — APPOINTMENT (EMERGENCY)
Dept: CT IMAGING | Facility: HOSPITAL | Age: 83
End: 2023-07-26
Payer: COMMERCIAL

## 2023-07-26 VITALS
SYSTOLIC BLOOD PRESSURE: 128 MMHG | HEIGHT: 57 IN | WEIGHT: 128 LBS | DIASTOLIC BLOOD PRESSURE: 72 MMHG | HEART RATE: 93 BPM | OXYGEN SATURATION: 95 % | BODY MASS INDEX: 27.61 KG/M2 | TEMPERATURE: 98.8 F

## 2023-07-26 VITALS
HEIGHT: 57 IN | TEMPERATURE: 97.6 F | WEIGHT: 127 LBS | DIASTOLIC BLOOD PRESSURE: 96 MMHG | RESPIRATION RATE: 18 BRPM | SYSTOLIC BLOOD PRESSURE: 201 MMHG | OXYGEN SATURATION: 95 % | BODY MASS INDEX: 27.4 KG/M2 | HEART RATE: 99 BPM

## 2023-07-26 DIAGNOSIS — N39.0 UTI (URINARY TRACT INFECTION): Primary | ICD-10-CM

## 2023-07-26 DIAGNOSIS — R10.11 RIGHT UPPER QUADRANT PAIN: Primary | ICD-10-CM

## 2023-07-26 LAB
ALBUMIN SERPL BCP-MCNC: 4.1 G/DL (ref 3.5–5)
ALP SERPL-CCNC: 108 U/L (ref 34–104)
ALT SERPL W P-5'-P-CCNC: 13 U/L (ref 7–52)
ANION GAP SERPL CALCULATED.3IONS-SCNC: 9 MMOL/L
APTT PPP: 28 SECONDS (ref 23–37)
AST SERPL W P-5'-P-CCNC: 13 U/L (ref 13–39)
BACTERIA UR QL AUTO: ABNORMAL /HPF
BASOPHILS # BLD AUTO: 0.02 THOUSANDS/ÂΜL (ref 0–0.1)
BASOPHILS NFR BLD AUTO: 0 % (ref 0–1)
BILIRUB SERPL-MCNC: 0.39 MG/DL (ref 0.2–1)
BILIRUB UR QL STRIP: NEGATIVE
BUN SERPL-MCNC: 23 MG/DL (ref 5–25)
CALCIUM SERPL-MCNC: 8.8 MG/DL (ref 8.4–10.2)
CHLORIDE SERPL-SCNC: 108 MMOL/L (ref 96–108)
CLARITY UR: ABNORMAL
CO2 SERPL-SCNC: 20 MMOL/L (ref 21–32)
COLOR UR: YELLOW
CREAT SERPL-MCNC: 1.35 MG/DL (ref 0.6–1.3)
EOSINOPHIL # BLD AUTO: 0.11 THOUSAND/ÂΜL (ref 0–0.61)
EOSINOPHIL NFR BLD AUTO: 2 % (ref 0–6)
ERYTHROCYTE [DISTWIDTH] IN BLOOD BY AUTOMATED COUNT: 13.5 % (ref 11.6–15.1)
GFR SERPL CREATININE-BSD FRML MDRD: 36 ML/MIN/1.73SQ M
GLUCOSE SERPL-MCNC: 173 MG/DL (ref 65–140)
GLUCOSE UR STRIP-MCNC: NEGATIVE MG/DL
HCT VFR BLD AUTO: 37.4 % (ref 34.8–46.1)
HGB BLD-MCNC: 12.2 G/DL (ref 11.5–15.4)
HGB UR QL STRIP.AUTO: ABNORMAL
IMM GRANULOCYTES # BLD AUTO: 0.06 THOUSAND/UL (ref 0–0.2)
IMM GRANULOCYTES NFR BLD AUTO: 1 % (ref 0–2)
INR PPP: 1.07 (ref 0.84–1.19)
KETONES UR STRIP-MCNC: NEGATIVE MG/DL
LEUKOCYTE ESTERASE UR QL STRIP: NEGATIVE
LIPASE SERPL-CCNC: 31 U/L (ref 11–82)
LYMPHOCYTES # BLD AUTO: 1.73 THOUSANDS/ÂΜL (ref 0.6–4.47)
LYMPHOCYTES NFR BLD AUTO: 25 % (ref 14–44)
MCH RBC QN AUTO: 27.9 PG (ref 26.8–34.3)
MCHC RBC AUTO-ENTMCNC: 32.6 G/DL (ref 31.4–37.4)
MCV RBC AUTO: 86 FL (ref 82–98)
MONOCYTES # BLD AUTO: 0.51 THOUSAND/ÂΜL (ref 0.17–1.22)
MONOCYTES NFR BLD AUTO: 8 % (ref 4–12)
NEUTROPHILS # BLD AUTO: 4.37 THOUSANDS/ÂΜL (ref 1.85–7.62)
NEUTS SEG NFR BLD AUTO: 64 % (ref 43–75)
NITRITE UR QL STRIP: POSITIVE
NON-SQ EPI CELLS URNS QL MICRO: ABNORMAL /HPF
NRBC BLD AUTO-RTO: 0 /100 WBCS
PH UR STRIP.AUTO: 5.5 [PH]
PLATELET # BLD AUTO: 372 THOUSANDS/UL (ref 149–390)
PMV BLD AUTO: 8.4 FL (ref 8.9–12.7)
POTASSIUM SERPL-SCNC: 4.7 MMOL/L (ref 3.5–5.3)
PROT SERPL-MCNC: 8 G/DL (ref 6.4–8.4)
PROT UR STRIP-MCNC: ABNORMAL MG/DL
PROTHROMBIN TIME: 13.9 SECONDS (ref 11.6–14.5)
RBC # BLD AUTO: 4.37 MILLION/UL (ref 3.81–5.12)
RBC #/AREA URNS AUTO: ABNORMAL /HPF
SODIUM SERPL-SCNC: 137 MMOL/L (ref 135–147)
SP GR UR STRIP.AUTO: 1.02
UROBILINOGEN UR QL STRIP.AUTO: 0.2 E.U./DL
WBC # BLD AUTO: 6.8 THOUSAND/UL (ref 4.31–10.16)
WBC #/AREA URNS AUTO: ABNORMAL /HPF

## 2023-07-26 PROCEDURE — 87086 URINE CULTURE/COLONY COUNT: CPT

## 2023-07-26 PROCEDURE — 87077 CULTURE AEROBIC IDENTIFY: CPT

## 2023-07-26 PROCEDURE — 99284 EMERGENCY DEPT VISIT MOD MDM: CPT

## 2023-07-26 PROCEDURE — 85730 THROMBOPLASTIN TIME PARTIAL: CPT

## 2023-07-26 PROCEDURE — 36415 COLL VENOUS BLD VENIPUNCTURE: CPT

## 2023-07-26 PROCEDURE — 81001 URINALYSIS AUTO W/SCOPE: CPT

## 2023-07-26 PROCEDURE — 87186 SC STD MICRODIL/AGAR DIL: CPT

## 2023-07-26 PROCEDURE — 74177 CT ABD & PELVIS W/CONTRAST: CPT

## 2023-07-26 PROCEDURE — 85025 COMPLETE CBC W/AUTO DIFF WBC: CPT

## 2023-07-26 PROCEDURE — 93005 ELECTROCARDIOGRAM TRACING: CPT

## 2023-07-26 PROCEDURE — 96360 HYDRATION IV INFUSION INIT: CPT

## 2023-07-26 PROCEDURE — 85610 PROTHROMBIN TIME: CPT

## 2023-07-26 PROCEDURE — 80053 COMPREHEN METABOLIC PANEL: CPT

## 2023-07-26 PROCEDURE — 83690 ASSAY OF LIPASE: CPT

## 2023-07-26 RX ORDER — CEFUROXIME AXETIL 500 MG/1
500 TABLET ORAL EVERY 12 HOURS SCHEDULED
Qty: 14 TABLET | Refills: 0 | Status: SHIPPED | OUTPATIENT
Start: 2023-07-26 | End: 2023-08-02

## 2023-07-26 RX ADMIN — IOHEXOL 100 ML: 350 INJECTION, SOLUTION INTRAVENOUS at 12:38

## 2023-07-26 RX ADMIN — SODIUM CHLORIDE 500 ML: 0.9 INJECTION, SOLUTION INTRAVENOUS at 12:52

## 2023-07-26 NOTE — PROGRESS NOTES
Assessment/Plan     Diagnoses and all orders for this visit:    Right upper quadrant pain  Comments:  -Constant pain, 8/10 radiating to the back since last 2 days  -Patient reports that this pain started after taking 1 dose of Fosamax although unlikely that Fosamax is the primary etiology. -Mass visualized on examination laterally below the right upper quadrant  -Tender to palpation  -Gall bladder removed several year ago  -Differential include pancreatitis, small bowel obstruction, stool impaction in the colon, hematoma given the acute presentation  -Patient appears uncomfortable and has not slept all night and therefore advised patient to go to the ER for further work-up. Patient is agreeable to the plan. Subjective     Patient Id: Steven Serrano is a 81 y.o. female    Patient is a 27-year-old female who presents to the office today for concerns for right upper quadrant abdominal pain. Patient states that her symptoms started after taking 1 dose of Fosamax which was started last visit. Patient states that she initially had jaw pain and body pains which resolved after few days. But the last 2 days patient has been experiencing excruciating pain in her right upper quadrant of the abdomen, radiating to the back. Patient grades the pain 8/10, constant and associated with nausea. Patient denies any new SOB, chest pain, change in bowel habits, blood in stools, vomiting. Patient denies fever/chills. Review of Systems   Constitutional: Negative for chills and fever. HENT: Negative for ear pain and sore throat. Eyes: Negative for pain and visual disturbance. Respiratory: Negative for cough and shortness of breath. Cardiovascular: Negative for chest pain and palpitations. Gastrointestinal: Positive for abdominal pain and nausea. Negative for blood in stool, constipation, diarrhea and vomiting. Genitourinary: Negative for dysuria and hematuria.    Musculoskeletal: Negative for arthralgias and back pain. Skin: Negative for color change and rash. Neurological: Negative for seizures and syncope. All other systems reviewed and are negative. Past Medical History:   Diagnosis Date   • Acute respiratory failure with hypoxia ([address]) 12/16/2021   • Broken arm     hairline fracture/ 2 places///   right arm   • Diabetes mellitus (HCC)    • Diverticulitis    • Postherpetic neuralgia        Past Surgical History:   Procedure Laterality Date   • CHOLECYSTECTOMY     • COLON SURGERY     • HERNIA REPAIR     • IR EMBOLIZATION (SPECIFY VESSEL OR SITE)  1/12/2022   • ROTATOR CUFF REPAIR Right    • VARICOSE VEIN SURGERY      Impression:  2/12/16 [facility]       Family History   Problem Relation Age of Onset   • Diabetes Mother    • No Known Problems Father        Social History     Socioeconomic History   • Marital status: /Civil Union     Spouse name: None   • Number of children: None   • Years of education: None   • Highest education level: None   Occupational History   • None   Tobacco Use   • Smoking status: Never   • Smokeless tobacco: Never   Vaping Use   • Vaping Use: Never used   Substance and Sexual Activity   • Alcohol use: Never     Comment: Rarely   • Drug use: Never   • Sexual activity: None   Other Topics Concern   • None   Social History Narrative    Always uses seatbelt    No caffeine use     Social Determinants of Health     Financial Resource Strain: Low Risk  (9/1/2022)    Overall Financial Resource Strain (CARDIA)    • Difficulty of Paying Living Expenses: Not hard at all   Food Insecurity: No Food Insecurity (1/14/2022)    Hunger Vital Sign    • Worried About Running Out of Food in the Last Year: Never true    • Ran Out of Food in the Last Year: Never true   Transportation Needs: No Transportation Needs (9/1/2022)    PRAPARE - Transportation    • Lack of Transportation (Medical): No    • Lack of Transportation (Non-Medical):  No   Physical Activity: Not on file   Stress: Not on file Social Connections: Not on file   Intimate Partner Violence: Not on file   Housing Stability: Low Risk  (1/14/2022)    Housing Stability Vital Sign    • Unable to Pay for Housing in the Last Year: No    • Number of Places Lived in the Last Year: 1    • Unstable Housing in the Last Year: No       Allergies   Allergen Reactions   • Ciprofloxacin GI Intolerance and Headache     Confusion, arm weakness, dizziness, headache   • Meperidine GI Intolerance, Hallucinations and Other (See Comments)     Demarol  Reaction Date:Unknown   • Propoxyphene GI Intolerance         Current Outpatient Medications:   •  ALPRAZolam (XANAX) 0.25 mg tablet, Take 1 tablet (0.25 mg total) by mouth daily at bedtime as needed for anxiety, Disp: 30 tablet, Rfl: 2  •  Biotin 10 MG CAPS, Take 1 capsule by mouth in the morning, Disp: , Rfl:   •  calcium carbonate (TUMS) 500 mg chewable tablet, Chew 1 tablet if needed, Disp: , Rfl:   •  cholecalciferol (VITAMIN D3) 25 mcg (1,000 units) tablet, daily, Disp: , Rfl:   •  enalapril (VASOTEC) 10 mg tablet, Take 1 tablet (10 mg total) by mouth daily, Disp: 90 tablet, Rfl: 0  •  ferrous sulfate 325 (65 Fe) mg tablet, Take 325 mg by mouth daily with breakfast, Disp: , Rfl:   •  glipiZIDE (GLUCOTROL XL) 10 mg 24 hr tablet, Take 1 tablet (10 mg total) by mouth daily, Disp: 90 tablet, Rfl: 0  •  ketoconazole (NIZORAL) 2 % shampoo, Use 2-3 times a week, Disp: 120 mL, Rfl: 3  •  lovastatin (MEVACOR) 40 MG tablet, Take 1 tablet (40 mg total) by mouth daily at bedtime, Disp: 90 tablet, Rfl: 0  •  Multiple Vitamin (multivitamin) tablet, Take 1 tablet by mouth daily, Disp: , Rfl:   •  polyethylene glycol (MIRALAX) 17 g packet, Take 17 g by mouth daily (Patient taking differently: Take 17 g by mouth if needed), Disp: , Rfl: 0  •  sitaGLIPtin (Januvia) 100 mg tablet, Take 1 tablet (100 mg total) by mouth daily, Disp: 90 tablet, Rfl: 0  •  vitamin E, tocopherol, 1,000 units capsule, Take 1 capsule (1,000 Units total) by mouth daily, Disp: 90 capsule, Rfl: 1  •  ciprofloxacin (CILOXAN) 0.3 % ophthalmic solution, instill 1 drop into right eye four times a day (Patient not taking: Reported on 1/9/2023), Disp: , Rfl:   •  dicyclomine (BENTYL) 10 mg capsule, Take 1 capsule (10 mg total) by mouth 3 (three) times a day before meals (Patient not taking: Reported on 6/9/2023), Disp: 90 capsule, Rfl: 3  •  ketorolac (ACULAR) 0.4 % SOLN, instill 1 drop into right eye once daily (Patient not taking: Reported on 1/9/2023), Disp: , Rfl:   •  prednisoLONE acetate (PRED FORTE) 1 % ophthalmic suspension, instill 1 drop into right eye four times a day as directed (Patient not taking: Reported on 1/9/2023), Disp: , Rfl:     Objective     Vitals:    07/26/23 0948   BP: 128/72   BP Location: Left arm   Patient Position: Sitting   Pulse: 93   Temp: 98.8 °F (37.1 °C)   TempSrc: Tympanic   SpO2: 95%   Weight: 58.1 kg (128 lb)   Height: 4' 9" (1.448 m)       Physical Exam  Vitals and nursing note reviewed. Constitutional:       General: She is not in acute distress. Appearance: She is well-developed. HENT:      Head: Normocephalic and atraumatic. Eyes:      Conjunctiva/sclera: Conjunctivae normal.   Cardiovascular:      Rate and Rhythm: Normal rate and regular rhythm. Heart sounds: No murmur heard. Pulmonary:      Effort: Pulmonary effort is normal. No respiratory distress. Breath sounds: Normal breath sounds. Abdominal:      General: Bowel sounds are normal.      Palpations: Abdomen is soft. There is mass. Tenderness: There is abdominal tenderness. There is no guarding or rebound. Comments: Mass palpated slightly below the RUQ laterally, Tender to palpation, no guarding/ rigidity   Musculoskeletal:         General: No swelling. Cervical back: Neck supple. Skin:     General: Skin is warm and dry. Capillary Refill: Capillary refill takes less than 2 seconds. Neurological:      Mental Status: She is alert. Psychiatric:         Mood and Affect: Mood normal.         Jay Blankenship MD  Family Medicine

## 2023-07-27 ENCOUNTER — VBI (OUTPATIENT)
Dept: ADMINISTRATIVE | Facility: OTHER | Age: 83
End: 2023-07-27

## 2023-07-27 LAB
ATRIAL RATE: 82 BPM
P AXIS: 14 DEGREES
PR INTERVAL: 168 MS
QRS AXIS: -49 DEGREES
QRSD INTERVAL: 88 MS
QT INTERVAL: 374 MS
QTC INTERVAL: 436 MS
T WAVE AXIS: -16 DEGREES
VENTRICULAR RATE: 82 BPM

## 2023-07-27 PROCEDURE — 93010 ELECTROCARDIOGRAM REPORT: CPT | Performed by: INTERNAL MEDICINE

## 2023-07-27 NOTE — TELEPHONE ENCOUNTER
Anali Wells    ED Visit Information     Ed visit date: 7/26/23  Diagnosis Description: UTI  In Network? [facility]  Discharge status: Home  Discharged with meds ? Yes  Number of ED visits to date: 1  ED Severity:4     Outreach Information    Outreach successful: Yes 1  Date letter mailed:none  Date Finalized:7/27/23    Care Coordination    Follow up appointment with pcp: no no  Transportation issues ? No    [facility] [employer] type: 3 Day Outreach  Emergent necessity warranted by diagnosis: Yes  [facility] PCP: Yes  Transportation: Self Transport  If able to choose an ED.  Would you have chosen [facility]: Yes  Called PCP first?: Yes  Told to go to ED by PCP?: Yes  Same-Day or Next Day Appointment Offered?: Yes  Would have used same-day or next-day if offered?: Yes  Feels able to call PCP for urgent problems ?: Yes  Understands what emergencies can be handled by PCP ?: Yes  Ever any problems getting appointment with PCP for minor emergency/urgency problems?: No  Practice Contacted Patient ?: No  Pt had ED follow up with pcp/staff ?: No    Seen for follow-up out of network ?: No  Reason Patient went to ED instead of Urgent Care or PCP?: Perceived Severity of Illness  07/27/2023 09:00 AM EDT by Anthony Page 07/27/2023 09:00 AM EDT by Nicky Duval [id number]  57 (Self) [phone] [id number] (Home)  [phone] (Home) Remove  - Call Complete

## 2023-07-28 LAB
BACTERIA UR CULT: ABNORMAL
BACTERIA UR CULT: ABNORMAL

## 2023-07-29 NOTE — ED PROVIDER NOTES
History  Chief Complaint   Patient presents with   • Medical Problem     Pt reports a mass under her right breast     45-year-old female presenting to the emergency department from family doctor's office for evaluation of swelling underneath her right breast.  She states that she did not notice it until approximately 4 to 5 days ago and at that time she does reports that she felt some tenderness when she pressed on the area. She was told by her family doctor that she had a mass in this area and that she needs to get a CT scan. Patient reports that she describes it as a ache in her the right side of her abdomen. She does not report any changes in her breast tissue or masses that she has felt in her breast.  Does not report any skin changes over her right breast as well. Symptoms seem to be localized strictly to the abdomen. Patient also reporting intermittent nausea without vomiting as well. Does not report any black or bloody stool, diarrhea, constipation, difficulty urinating, fevers, chills, chest pain, shortness of breath, headache, lightheadedness. No history of similar symptoms. She has not tried anything for her symptoms thus far. Does have history of diabetes but is not dependent on insulin. History provided by:  Patient   used: No    Medical Problem  Associated symptoms: abdominal pain    Associated symptoms: no chest pain, no cough, no diarrhea, no ear pain, no fever, no headaches, no rash, no shortness of breath, no sore throat and no vomiting        Prior to Admission Medications   Prescriptions Last Dose Informant Patient Reported? Taking?    ALPRAZolam (XANAX) 0.25 mg tablet   No No   Sig: Take 1 tablet (0.25 mg total) by mouth daily at bedtime as needed for anxiety   Biotin 10 MG CAPS  Self Yes No   Sig: Take 1 capsule by mouth in the morning   Multiple Vitamin (multivitamin) tablet  Self Yes No   Sig: Take 1 tablet by mouth daily   calcium carbonate (TUMS) 500 mg chewable tablet  Self Yes No   Sig: Chew 1 tablet if needed   cholecalciferol (VITAMIN D3) 25 mcg (1,000 units) tablet  Self Yes No   Sig: daily   ciprofloxacin (CILOXAN) 0.3 % ophthalmic solution   Yes No   Sig: instill 1 drop into right eye four times a day   Patient not taking: Reported on 1/9/2023   dicyclomine (BENTYL) 10 mg capsule  Self No No   Sig: Take 1 capsule (10 mg total) by mouth 3 (three) times a day before meals   Patient not taking: Reported on 6/9/2023   enalapril (VASOTEC) 10 mg tablet   No No   Sig: Take 1 tablet (10 mg total) by mouth daily   ferrous sulfate 325 (65 Fe) mg tablet  Self Yes No   Sig: Take 325 mg by mouth daily with breakfast   glipiZIDE (GLUCOTROL XL) 10 mg 24 hr tablet   No No   Sig: Take 1 tablet (10 mg total) by mouth daily   ketoconazole (NIZORAL) 2 % shampoo   No No   Sig: Use 2-3 times a week   ketorolac (ACULAR) 0.4 % SOLN   Yes No   Sig: instill 1 drop into right eye once daily   Patient not taking: Reported on 1/9/2023   lovastatin (MEVACOR) 40 MG tablet   No No   Sig: Take 1 tablet (40 mg total) by mouth daily at bedtime   polyethylene glycol (MIRALAX) 17 g packet  Self No No   Sig: Take 17 g by mouth daily   Patient taking differently: Take 17 g by mouth if needed   prednisoLONE acetate (PRED FORTE) 1 % ophthalmic suspension   Yes No   Sig: instill 1 drop into right eye four times a day as directed   Patient not taking: Reported on 1/9/2023   sitaGLIPtin (Januvia) 100 mg tablet   No No   Sig: Take 1 tablet (100 mg total) by mouth daily   vitamin E, tocopherol, 1,000 units capsule   No No   Sig: Take 1 capsule (1,000 Units total) by mouth daily      Facility-Administered Medications: None       Past Medical History:   Diagnosis Date   • Acute respiratory failure with hypoxia ([address]) 12/16/2021   • Broken arm     hairline fracture/ 2 places///   right arm   • Diabetes mellitus ([address])    • Diverticulitis    • Postherpetic neuralgia        Past Surgical History:   Procedure Laterality Date   • CHOLECYSTECTOMY     • COLON SURGERY     • HERNIA REPAIR     • IR EMBOLIZATION (SPECIFY VESSEL OR SITE)  1/12/2022   • ROTATOR CUFF REPAIR Right    • VARICOSE VEIN SURGERY      Impression:  2/12/16 [place]        Family History   Problem Relation Age of Onset   • Diabetes Mother    • No Known Problems Father      I have reviewed and agree with the history as documented. E-Cigarette/Vaping   • E-Cigarette Use Never User      E-Cigarette/Vaping Substances   • Nicotine No    • THC No    • CBD No    • Flavoring No      Social History     Tobacco Use   • Smoking status: Never   • Smokeless tobacco: Never   Vaping Use   • Vaping Use: Never used   Substance Use Topics   • Alcohol use: Never     Comment: Rarely   • Drug use: Never       Review of Systems   Constitutional: Negative for chills and fever. HENT: Negative for ear pain and sore throat. Eyes: Negative for pain and visual disturbance. Respiratory: Negative for cough and shortness of breath. Cardiovascular: Negative for chest pain and palpitations. Gastrointestinal: Positive for abdominal distention and abdominal pain. Negative for diarrhea and vomiting. Genitourinary: Negative for dysuria and hematuria. Musculoskeletal: Negative for arthralgias and back pain. Skin: Negative for color change and rash. Neurological: Negative for seizures, syncope and headaches. All other systems reviewed and are negative. Physical Exam  Physical Exam  Vitals and nursing note reviewed. Constitutional:       General: She is not in acute distress. Appearance: Normal appearance. She is well-developed. She is obese. She is not ill-appearing. HENT:      Head: Normocephalic and atraumatic. Right Ear: Tympanic membrane and external ear normal.      Left Ear: Tympanic membrane and external ear normal.      Nose: Nose normal.      Mouth/Throat:      Mouth: Mucous membranes are moist.      Pharynx: Oropharynx is clear.  No oropharyngeal exudate or posterior oropharyngeal erythema. Eyes:      General: No scleral icterus. Right eye: No discharge. Left eye: No discharge. Extraocular Movements: Extraocular movements intact. Conjunctiva/sclera: Conjunctivae normal.      Pupils: Pupils are equal, round, and reactive to light. Cardiovascular:      Rate and Rhythm: Normal rate and regular rhythm. Pulses: Normal pulses. Heart sounds: Normal heart sounds. No murmur heard. Pulmonary:      Effort: Pulmonary effort is normal. No respiratory distress. Breath sounds: Normal breath sounds. No wheezing or rales. Chest:      Chest wall: No tenderness. Abdominal:      General: Abdomen is flat. Bowel sounds are normal.      Palpations: Abdomen is soft. Tenderness: There is abdominal tenderness (RUQ; mild). There is right CVA tenderness (mild). There is no left CVA tenderness. Comments: Distention of right side of abdomen without significant tenderness. No palpable mass. Musculoskeletal:         General: No signs of injury. Normal range of motion. Cervical back: Normal range of motion and neck supple. No tenderness. Right lower leg: No edema. Left lower leg: No edema. Skin:     General: Skin is warm and dry. Capillary Refill: Capillary refill takes less than 2 seconds. Findings: No rash. Neurological:      General: No focal deficit present. Mental Status: She is alert and oriented to person, place, and time. Mental status is at baseline. Motor: No weakness. Gait: Gait normal.   Psychiatric:         Mood and Affect: Mood normal.         Behavior: Behavior normal.         Thought Content:  Thought content normal.         Vital Signs  ED Triage Vitals   Temperature Pulse Respirations Blood Pressure SpO2   07/26/23 1051 07/26/23 1051 07/26/23 1051 07/26/23 1056 07/26/23 1051   97.6 °F (36.4 °C) 99 18 (!) 201/96 94 %      Temp Source Heart Rate Source Patient Position - Orthostatic VS BP Location FiO2 (%)   07/26/23 1051 07/26/23 1051 07/26/23 1051 07/26/23 1051 --   Tympanic Monitor Sitting Right arm       Pain Score       07/26/23 1051       8           Vitals:    07/26/23 1051 07/26/23 1056   BP:  (!) 201/96   Pulse: 99    Patient Position - Orthostatic VS: Sitting          Visual Acuity      ED Medications  Medications   sodium chloride 0.9 % bolus 500 mL (0 mL Intravenous Stopped 7/26/23 1352)   iohexol (OMNIPAQUE) 350 MG/ML injection (SINGLE-DOSE) 100 mL (100 mL Intravenous Given 7/26/23 1238)       Diagnostic Studies  Results Reviewed     Procedure Component Value Units Date/Time    Urine culture [[id number]]  (Abnormal)  (Susceptibility) Collected: 07/26/23 1210    Lab Status: Final result Specimen: Urine, Clean Catch Updated: 07/28/23 1649     Urine Culture >100,000 cfu/ml Escherichia coli      10,000-19,000 cfu/ml    Susceptibility     Escherichia coli (1)     Antibiotic Interpretation Microscan   Method Status    ZID Performed  Yes  KAREN Final    Ampicillin ($$) Susceptible <=8.00 ug/ml KAREN Final    Aztreonam ($$$)  Susceptible <=4 ug/ml KAREN Final    Cefazolin ($) Susceptible <=2.00 ug/ml KAREN Final    Ciprofloxacin ($)  Susceptible <=0.25 ug/ml KAREN Final    Gentamicin ($$) Susceptible <=2 ug/ml KAREN Final    Levofloxacin ($) Susceptible <=0.50 ug/ml KAREN Final    Nitrofurantoin Susceptible <=32 ug/ml KAREN Final    Tetracycline Susceptible <=4 ug/ml KAREN Final    Tobramycin ($) Susceptible <=2 ug/ml KAREN Final    Trimethoprim + Sulfamethoxazole ($$$) Susceptible <=0.5/9.5 ug/ml KAREN Final                   Urine Microscopic [[id number]]  (Abnormal) Collected: 07/26/23 1210    Lab Status: Final result Specimen: Urine, Clean Catch Updated: 07/26/23 1225     RBC, UA 0-1 /hpf      WBC, UA 10-20 /hpf      Epithelial Cells Occasional /hpf      Bacteria, UA Moderate /hpf     UA w Reflex to Microscopic w Reflex to Culture [[id number]]  (Abnormal) Collected: 07/26/23 1210 Lab Status: Final result Specimen: Urine, Clean Catch Updated: 07/26/23 1223     Color, UA Yellow     Clarity, UA Hazy     Specific Gravity, UA 1.025     pH, UA 5.5     Leukocytes, UA Negative     Nitrite, UA Positive     Protein, UA 2+ mg/dl      Glucose, UA Negative mg/dl      Ketones, UA Negative mg/dl      Urobilinogen, UA 0.2 E.U./dl      Bilirubin, UA Negative     Occult Blood, UA Trace-Intact    Comprehensive metabolic panel [[id number]]  (Abnormal) Collected: 07/26/23 1157    Lab Status: Final result Specimen: Blood from Arm, Right Updated: 07/26/23 1220     Sodium 137 mmol/L      Potassium 4.7 mmol/L      Chloride 108 mmol/L      CO2 20 mmol/L      ANION GAP 9 mmol/L      BUN 23 mg/dL      Creatinine 1.35 mg/dL      Glucose 173 mg/dL      Calcium 8.8 mg/dL      AST 13 U/L      ALT 13 U/L      Alkaline Phosphatase 108 U/L      Total Protein 8.0 g/dL      Albumin 4.1 g/dL      Total Bilirubin 0.39 mg/dL      eGFR 36 ml/min/1.73sq m     Narrative:      [place] guidelines for Chronic Kidney Disease (CKD):   •  Stage 1 with normal or high GFR (GFR > 90 mL/min/1.73 square meters)  •  Stage 2 Mild CKD (GFR = 60-89 mL/min/1.73 square meters)  •  Stage 3A Moderate CKD (GFR = 45-59 mL/min/1.73 square meters)  •  Stage 3B Moderate CKD (GFR = 30-44 mL/min/1.73 square meters)  •  Stage 4 Severe CKD (GFR = 15-29 mL/min/1.73 square meters)  •  Stage 5 End Stage CKD (GFR <15 mL/min/1.73 square meters)  Note: GFR calculation is accurate only with a steady state creatinine    Lipase [[id number]]  (Normal) Collected: 07/26/23 1157    Lab Status: Final result Specimen: Blood from Arm, Right Updated: 07/26/23 1220     Lipase 31 u/L     Protime-INR [[id number]]  (Normal) Collected: 07/26/23 1157    Lab Status: Final result Specimen: Blood from Arm, Right Updated: 07/26/23 1217     Protime 13.9 seconds      INR 1.07    APTT [[id number]]  (Normal) Collected: 07/26/23 0017    Lab Status: Final result Specimen: Blood from Arm, Right Updated: 07/26/23 1217     PTT 28 seconds     CBC and differential [[id number]]  (Abnormal) Collected: 07/26/23 1157    Lab Status: Final result Specimen: Blood from Arm, Right Updated: 07/26/23 1205     WBC 6.80 Thousand/uL      RBC 4.37 Million/uL      Hemoglobin 12.2 g/dL      Hematocrit 37.4 %      MCV 86 fL      MCH 27.9 pg      MCHC 32.6 g/dL      RDW 13.5 %      MPV 8.4 fL      Platelets 449 Thousands/uL      nRBC 0 /100 WBCs      Neutrophils Relative 64 %      Immat GRANS % 1 %      Lymphocytes Relative 25 %      Monocytes Relative 8 %      Eosinophils Relative 2 %      Basophils Relative 0 %      Neutrophils Absolute 4.37 Thousands/µL      Immature Grans Absolute 0.06 Thousand/uL      Lymphocytes Absolute 1.73 Thousands/µL      Monocytes Absolute 0.51 Thousand/µL      Eosinophils Absolute 0.11 Thousand/µL      Basophils Absolute 0.02 Thousands/µL                  CT abdomen pelvis with contrast   Final Result by Viridiana Randall DO (07/26 1433)      1. No acute intra-abdominal abnormality. 2. Sigmoid diverticulosis without diverticulitis. 3. Fatty liver. Cannot entirely exclude cirrhotic morphology. Nonemergent follow-up with GI may be helpful.             Workstation performed: [id number]                    Procedures  ECG 12 Lead Documentation Only    Date/Time: 7/29/2023 8:07 AM    Performed by: Albert Miller PA-C  Authorized by: Albert Miller PA-C    Indications / Diagnosis:  Nausea  ECG reviewed by me, the ED Provider: yes    Patient location:  ED  Interpretation:     Interpretation: abnormal    Rate:     ECG rate:  82    ECG rate assessment: normal    Rhythm:     Rhythm: sinus rhythm    Ectopy:     Ectopy: none    QRS:     QRS axis:  Normal    QRS intervals:  Normal  Conduction:     Conduction: abnormal      Abnormal conduction: LAFB    ST segments:     ST segments:  Normal  T waves:     T waves: normal               ED Course  ED Course as of 07/29/23 0812   Wed Jul 26, 2023   1224 Nitrite, UA(!): Positive   1435 Ct A/P: 1. No acute intra-abdominal abnormality.     2. Sigmoid diverticulosis without diverticulitis.     3. Fatty liver. Cannot entirely exclude cirrhotic morphology. Nonemergent follow-up with GI may be helpful. 1439 1. No acute intra-abdominal abnormality.     2. Sigmoid diverticulosis without diverticulitis.     3. Fatty liver. Cannot entirely exclude cirrhotic morphology. Nonemergent follow-up with GI may be helpful. SBIRT 20yo+    Flowsheet Row Most Recent Value   Initial Alcohol Screen: US AUDIT-C     1. How often do you have a drink containing alcohol? 0 Filed at: 07/26/2023 1055   2. How many drinks containing alcohol do you have on a typical day you are drinking? 0 Filed at: 07/26/2023 1055   3b. FEMALE Any Age, or MALE 65+: How often do you have 4 or more drinks on one occassion? 0 Filed at: 07/26/2023 1055   Audit-C Score 0 Filed at: 07/26/2023 1055   KT: How many times in the past year have you. .. Used an illegal drug or used a prescription medication for non-medical reasons? Never Filed at: 07/26/2023 1055                    Medical Decision Making  56-year-old female with with history of diabetes presenting to the emergency department from PCPs office for evaluation of right abdominal swelling and pain for the past 4 to 5 days. Differential: Liver failure, hepatitis, cholecystitis, pyelonephritis/UTI, diverticulitis, intussusception, small bowel obstruction, constipation, gastritis, gastroenteritis. Considered triage complaint of swelling of right breast however patient denies this and swelling is more localized to the abdomen. Patient has no concerns about her breast tissue. Work-up initiated with lab work, ECG and CT abdomen pelvis. No leukocytosis but moderate white cells and nitrites in urinalysis consistent with urinary tract infection.   Will treat with Ceftin and have patient follow-up with her family doctor for further evaluation and monitoring. This is likely attributing to patient's CVA tenderness and swelling localized to the right side consistent with pyelonephritis. No kidney dysfunction or stones identified on CT. Fatty liver identified to which I discussed this with patient and recommended GI follow-up. Considered hospitalization however patient is in no distress and stable for outpatient management. Strict return precautions discussed. Patient amenable to the plan and stable at time of discharge. UTI (urinary tract infection): acute illness or injury  Amount and/or Complexity of Data Reviewed  Labs: ordered. Decision-making details documented in ED Course. Radiology: ordered. ECG/medicine tests: ordered. Risk  Prescription drug management. Disposition  Final diagnoses:   UTI (urinary tract infection)     Time reflects when diagnosis was documented in both MDM as applicable and the Disposition within this note     Time User Action Codes Description Comment    7/26/2023 12:26 PM Delfin Daugherty Add [N39.0] UTI (urinary tract infection)       ED Disposition     ED Disposition   Discharge    Condition   Stable    Date/Time   Wed Jul 26, 2023  2:44 PM    [address] discharge to home/self care.                Follow-up Information     Follow up With Specialties Details Why Contact Info Additional [address] [facility] Emergency Medicine Go to  If symptoms worsen [id number] [facility] [place] [id number] [phone] [address] [facility] Emergency Department, [address] [place] [address] 800 [facility]    Elmer Bee DO Family Medicine Schedule an appointment as soon as possible for a visit in 1 week follow up for further evaluation of symptoms [address]  [address] [facility]  [phone]             Discharge Medication List as of 7/26/2023 2:59 PM      START taking these medications    Details   cefuroxime (CEFTIN) 500 mg tablet Take 1 tablet (500 mg total) by mouth every 12 (twelve) hours for 7 days, Starting Wed 7/26/2023, Until Wed 8/2/2023, Normal         CONTINUE these medications which have NOT CHANGED    Details   ALPRAZolam (XANAX) 0.25 mg tablet Take 1 tablet (0.25 mg total) by mouth daily at bedtime as needed for anxiety, Starting Thu 1/7/2021, Normal      Biotin 10 MG CAPS Take 1 capsule by mouth in the morning, Historical Med      calcium carbonate (TUMS) 500 mg chewable tablet Chew 1 tablet if needed, Historical Med      cholecalciferol (VITAMIN D3) 25 mcg (1,000 units) tablet daily, Historical Med      ciprofloxacin (CILOXAN) 0.3 % ophthalmic solution instill 1 drop into right eye four times a day, Historical Med      dicyclomine (BENTYL) 10 mg capsule Take 1 capsule (10 mg total) by mouth 3 (three) times a day before meals, Starting Mon 4/1/2019, Normal      enalapril (VASOTEC) 10 mg tablet Take 1 tablet (10 mg total) by mouth daily, Starting Mon 7/10/2023, Normal      ferrous sulfate 325 (65 Fe) mg tablet Take 325 mg by mouth daily with breakfast, Historical Med      glipiZIDE (GLUCOTROL XL) 10 mg 24 hr tablet Take 1 tablet (10 mg total) by mouth daily, Starting Mon 7/10/2023, Normal      ketoconazole (NIZORAL) 2 % shampoo Use 2-3 times a week, Normal      ketorolac (ACULAR) 0.4 % SOLN instill 1 drop into right eye once daily, Historical Med      lovastatin (MEVACOR) 40 MG tablet Take 1 tablet (40 mg total) by mouth daily at bedtime, Starting Fri 7/7/2023, Normal      Multiple Vitamin (multivitamin) tablet Take 1 tablet by mouth daily, Historical Med      polyethylene glycol (MIRALAX) 17 g packet Take 17 g by mouth daily, Starting Sat 1/22/2022, No Print      prednisoLONE acetate (PRED FORTE) 1 % ophthalmic suspension instill 1 drop into right eye four times a day as directed, Historical Med      sitaGLIPtin (Januvia) 100 mg tablet Take 1 tablet (100 mg total) by mouth daily, Starting Mon 7/10/2023, Normal      vitamin E, tocopherol, 1,000 units capsule Take 1 capsule (1,000 Units total) by mouth daily, Starting Mon 1/9/2023, Normal             No discharge procedures on file.     PDMP Review       Value Time User    PDMP Reviewed  Yes 11/22/2022  8:57 PM Radha Lara DO          ED Provider  Electronically Signed by           Jose Chávez PA-C  07/29/23 9061

## 2023-08-18 ENCOUNTER — HOSPITAL ENCOUNTER (EMERGENCY)
Facility: HOSPITAL | Age: 83
Discharge: HOME | End: 2023-08-18
Attending: EMERGENCY MEDICINE
Payer: COMMERCIAL

## 2023-08-18 ENCOUNTER — APPOINTMENT (EMERGENCY)
Dept: CT IMAGING | Facility: HOSPITAL | Age: 83
End: 2023-08-18
Payer: COMMERCIAL

## 2023-08-18 VITALS
SYSTOLIC BLOOD PRESSURE: 143 MMHG | RESPIRATION RATE: 19 BRPM | DIASTOLIC BLOOD PRESSURE: 64 MMHG | HEART RATE: 86 BPM | TEMPERATURE: 98.7 F | OXYGEN SATURATION: 90 %

## 2023-08-18 DIAGNOSIS — N39.0 URINARY TRACT INFECTION: Primary | ICD-10-CM

## 2023-08-18 DIAGNOSIS — R10.9 RIGHT FLANK PAIN: ICD-10-CM

## 2023-08-18 LAB
ALBUMIN SERPL BCP-MCNC: 4.3 G/DL (ref 3.5–5)
ALP SERPL-CCNC: 90 U/L (ref 34–104)
ALT SERPL W P-5'-P-CCNC: 7 U/L (ref 7–52)
ANION GAP SERPL CALCULATED.3IONS-SCNC: 11 MMOL/L
AST SERPL W P-5'-P-CCNC: 13 U/L (ref 13–39)
BACTERIA UR QL AUTO: ABNORMAL /HPF
BASOPHILS # BLD AUTO: 0.02 THOUSANDS/ÂΜL (ref 0–0.1)
BASOPHILS NFR BLD AUTO: 0 % (ref 0–1)
BILIRUB SERPL-MCNC: 0.53 MG/DL (ref 0.2–1)
BILIRUB UR QL STRIP: NEGATIVE
BUN SERPL-MCNC: 28 MG/DL (ref 5–25)
CALCIUM SERPL-MCNC: 9.5 MG/DL (ref 8.4–10.2)
CHLORIDE SERPL-SCNC: 105 MMOL/L (ref 96–108)
CLARITY UR: ABNORMAL
CO2 SERPL-SCNC: 22 MMOL/L (ref 21–32)
COLOR UR: YELLOW
CREAT SERPL-MCNC: 1.5 MG/DL (ref 0.6–1.3)
EOSINOPHIL # BLD AUTO: 0.13 THOUSAND/ÂΜL (ref 0–0.61)
EOSINOPHIL NFR BLD AUTO: 1 % (ref 0–6)
ERYTHROCYTE [DISTWIDTH] IN BLOOD BY AUTOMATED COUNT: 14 % (ref 11.6–15.1)
GFR SERPL CREATININE-BSD FRML MDRD: 32 ML/MIN/1.73SQ M
GLUCOSE SERPL-MCNC: 167 MG/DL (ref 65–140)
GLUCOSE UR STRIP-MCNC: NEGATIVE MG/DL
HCT VFR BLD AUTO: 35.9 % (ref 34.8–46.1)
HGB BLD-MCNC: 11.5 G/DL (ref 11.5–15.4)
HGB UR QL STRIP.AUTO: ABNORMAL
IMM GRANULOCYTES # BLD AUTO: 0.03 THOUSAND/UL (ref 0–0.2)
IMM GRANULOCYTES NFR BLD AUTO: 0 % (ref 0–2)
KETONES UR STRIP-MCNC: NEGATIVE MG/DL
LEUKOCYTE ESTERASE UR QL STRIP: ABNORMAL
LIPASE SERPL-CCNC: 38 U/L (ref 11–82)
LYMPHOCYTES # BLD AUTO: 1.53 THOUSANDS/ÂΜL (ref 0.6–4.47)
LYMPHOCYTES NFR BLD AUTO: 14 % (ref 14–44)
MCH RBC QN AUTO: 28.3 PG (ref 26.8–34.3)
MCHC RBC AUTO-ENTMCNC: 32 G/DL (ref 31.4–37.4)
MCV RBC AUTO: 88 FL (ref 82–98)
MONOCYTES # BLD AUTO: 0.68 THOUSAND/ÂΜL (ref 0.17–1.22)
MONOCYTES NFR BLD AUTO: 6 % (ref 4–12)
NEUTROPHILS # BLD AUTO: 8.31 THOUSANDS/ÂΜL (ref 1.85–7.62)
NEUTS SEG NFR BLD AUTO: 79 % (ref 43–75)
NITRITE UR QL STRIP: NEGATIVE
NON-SQ EPI CELLS URNS QL MICRO: ABNORMAL /HPF
NRBC BLD AUTO-RTO: 0 /100 WBCS
PH UR STRIP.AUTO: 6 [PH]
PLATELET # BLD AUTO: 187 THOUSANDS/UL (ref 149–390)
PMV BLD AUTO: 9.2 FL (ref 8.9–12.7)
POTASSIUM SERPL-SCNC: 4 MMOL/L (ref 3.5–5.3)
PROT SERPL-MCNC: 8 G/DL (ref 6.4–8.4)
PROT UR STRIP-MCNC: ABNORMAL MG/DL
RBC # BLD AUTO: 4.06 MILLION/UL (ref 3.81–5.12)
RBC #/AREA URNS AUTO: ABNORMAL /HPF
SODIUM SERPL-SCNC: 138 MMOL/L (ref 135–147)
SP GR UR STRIP.AUTO: 1.02
UROBILINOGEN UR QL STRIP.AUTO: 0.2 E.U./DL
WBC # BLD AUTO: 10.7 THOUSAND/UL (ref 4.31–10.16)
WBC #/AREA URNS AUTO: ABNORMAL /HPF

## 2023-08-18 PROCEDURE — G1004 CDSM NDSC: HCPCS

## 2023-08-18 PROCEDURE — 96365 THER/PROPH/DIAG IV INF INIT: CPT

## 2023-08-18 PROCEDURE — 36415 COLL VENOUS BLD VENIPUNCTURE: CPT

## 2023-08-18 PROCEDURE — 96375 TX/PRO/DX INJ NEW DRUG ADDON: CPT

## 2023-08-18 PROCEDURE — 74176 CT ABD & PELVIS W/O CONTRAST: CPT

## 2023-08-18 PROCEDURE — 81001 URINALYSIS AUTO W/SCOPE: CPT

## 2023-08-18 PROCEDURE — 99285 EMERGENCY DEPT VISIT HI MDM: CPT

## 2023-08-18 PROCEDURE — 83690 ASSAY OF LIPASE: CPT

## 2023-08-18 PROCEDURE — 87186 SC STD MICRODIL/AGAR DIL: CPT

## 2023-08-18 PROCEDURE — 87086 URINE CULTURE/COLONY COUNT: CPT

## 2023-08-18 PROCEDURE — 85025 COMPLETE CBC W/AUTO DIFF WBC: CPT

## 2023-08-18 PROCEDURE — 87077 CULTURE AEROBIC IDENTIFY: CPT

## 2023-08-18 PROCEDURE — 99285 EMERGENCY DEPT VISIT HI MDM: CPT | Performed by: EMERGENCY MEDICINE

## 2023-08-18 PROCEDURE — 81003 URINALYSIS AUTO W/O SCOPE: CPT

## 2023-08-18 PROCEDURE — 80053 COMPREHEN METABOLIC PANEL: CPT

## 2023-08-18 RX ORDER — HYDROMORPHONE HCL IN WATER/PF 6 MG/30 ML
0.2 PATIENT CONTROLLED ANALGESIA SYRINGE INTRAVENOUS ONCE
Status: DISCONTINUED | OUTPATIENT
Start: 2023-08-18 | End: 2023-08-18

## 2023-08-18 RX ORDER — CEFTRIAXONE 2 G/50ML
2000 INJECTION, SOLUTION INTRAVENOUS ONCE
Status: COMPLETED | OUTPATIENT
Start: 2023-08-18 | End: 2023-08-18

## 2023-08-18 RX ORDER — ONDANSETRON 2 MG/ML
4 INJECTION INTRAMUSCULAR; INTRAVENOUS ONCE
Status: COMPLETED | OUTPATIENT
Start: 2023-08-18 | End: 2023-08-18

## 2023-08-18 RX ORDER — AMOXICILLIN AND CLAVULANATE POTASSIUM 875; 125 MG/1; MG/1
1 TABLET, FILM COATED ORAL EVERY 12 HOURS SCHEDULED
Qty: 28 TABLET | Refills: 0 | Status: SHIPPED | OUTPATIENT
Start: 2023-08-18 | End: 2023-09-01

## 2023-08-18 RX ORDER — ACETAMINOPHEN 325 MG/1
975 TABLET ORAL ONCE
Status: COMPLETED | OUTPATIENT
Start: 2023-08-18 | End: 2023-08-18

## 2023-08-18 RX ADMIN — CEFTRIAXONE 2000 MG: 2 INJECTION, SOLUTION INTRAVENOUS at 05:31

## 2023-08-18 RX ADMIN — ONDANSETRON 4 MG: 2 INJECTION INTRAMUSCULAR; INTRAVENOUS at 03:49

## 2023-08-18 RX ADMIN — ACETAMINOPHEN 975 MG: 325 TABLET ORAL at 03:48

## 2023-08-18 NOTE — ED PROVIDER NOTES
History  Chief Complaint   Patient presents with   • Flank Pain     Right sided flank pain started yesterday with a cramping pain and gotten worse over night; no pain or difficulty while urinating       HPI     Patient is an 82 y/o F with PMH DM, HTN, CKD presenting with acute abdominal pain. Pt states throughout the day yesterday she had vague abdominal and flank discomfort that wasn't overly bothersome. Went to bed but woke up due to more severe cramping pain that starts in RLQ and radiates up the right flank. Associated nausea without vomiting. Episode of nonbloody diarrhea yesterday. Denies fever, chills, cp, sob, constipation, dysuria, hematuria. Never had this happen before. Hx of cholecystectomy. Prior to Admission Medications   Prescriptions Last Dose Informant Patient Reported? Taking?    ALPRAZolam (XANAX) 0.25 mg tablet   No No   Sig: Take 1 tablet (0.25 mg total) by mouth daily at bedtime as needed for anxiety   Biotin 10 MG CAPS  Self Yes No   Sig: Take 1 capsule by mouth in the morning   Multiple Vitamin (multivitamin) tablet  Self Yes No   Sig: Take 1 tablet by mouth daily   calcium carbonate (TUMS) 500 mg chewable tablet  Self Yes No   Sig: Chew 1 tablet if needed   cholecalciferol (VITAMIN D3) 25 mcg (1,000 units) tablet  Self Yes No   Sig: daily   ciprofloxacin (CILOXAN) 0.3 % ophthalmic solution   Yes No   Sig: instill 1 drop into right eye four times a day   Patient not taking: Reported on 1/9/2023   dicyclomine (BENTYL) 10 mg capsule  Self No No   Sig: Take 1 capsule (10 mg total) by mouth 3 (three) times a day before meals   Patient not taking: Reported on 6/9/2023   enalapril (VASOTEC) 10 mg tablet   No No   Sig: Take 1 tablet (10 mg total) by mouth daily   ferrous sulfate 325 (65 Fe) mg tablet  Self Yes No   Sig: Take 325 mg by mouth daily with breakfast   glipiZIDE (GLUCOTROL XL) 10 mg 24 hr tablet   No No   Sig: Take 1 tablet (10 mg total) by mouth daily   ketoconazole (NIZORAL) 2 % shampoo   No No   Sig: Use 2-3 times a week   ketorolac (ACULAR) 0.4 % SOLN   Yes No   Sig: instill 1 drop into right eye once daily   Patient not taking: Reported on 1/9/2023   lovastatin (MEVACOR) 40 MG tablet   No No   Sig: Take 1 tablet (40 mg total) by mouth daily at bedtime   polyethylene glycol (MIRALAX) 17 g packet  Self No No   Sig: Take 17 g by mouth daily   Patient taking differently: Take 17 g by mouth if needed   prednisoLONE acetate (PRED FORTE) 1 % ophthalmic suspension   Yes No   Sig: instill 1 drop into right eye four times a day as directed   Patient not taking: Reported on 1/9/2023   sitaGLIPtin (Januvia) 100 mg tablet   No No   Sig: Take 1 tablet (100 mg total) by mouth daily   vitamin E, tocopherol, 1,000 units capsule   No No   Sig: Take 1 capsule (1,000 Units total) by mouth daily      Facility-Administered Medications: None       Past Medical History:   Diagnosis Date   • Acute respiratory failure with hypoxia ([address]) 12/16/2021   • Broken arm     hairline fracture/ 2 places///   right arm   • Diabetes mellitus ([address])    • Diverticulitis    • Postherpetic neuralgia        Past Surgical History:   Procedure Laterality Date   • CHOLECYSTECTOMY     • COLON SURGERY     • HERNIA REPAIR     • IR EMBOLIZATION (SPECIFY VESSEL OR SITE)  1/12/2022   • ROTATOR CUFF REPAIR Right    • VARICOSE VEIN SURGERY      Impression:  2/12/16 Stastiana Rowleys       Family History   Problem Relation Age of Onset   • Diabetes Mother    • No Known Problems Father      I have reviewed and agree with the history as documented.     E-Cigarette/Vaping   • E-Cigarette Use Never User      E-Cigarette/Vaping Substances   • Nicotine No    • THC No    • CBD No    • Flavoring No      Social History     Tobacco Use   • Smoking status: Never   • Smokeless tobacco: Never   Vaping Use   • Vaping Use: Never used   Substance Use Topics   • Alcohol use: Never     Comment: Rarely   • Drug use: Never        Review of Systems   Constitutional: Negative for chills and fever. HENT: Negative for ear pain and sore throat. Eyes: Negative for pain and visual disturbance. Respiratory: Negative for cough and shortness of breath. Cardiovascular: Negative for chest pain and palpitations. Gastrointestinal: Positive for abdominal pain and nausea. Negative for vomiting. Genitourinary: Positive for flank pain. Negative for dysuria and hematuria. Musculoskeletal: Negative for arthralgias and back pain. Skin: Negative for color change and rash. Neurological: Negative for seizures and syncope. All other systems reviewed and are negative. Physical Exam  ED Triage Vitals   Temperature Pulse Respirations Blood Pressure SpO2   08/18/23 0325 08/18/23 0325 08/18/23 0325 08/18/23 0325 08/18/23 0325   98.7 °F (37.1 °C) 85 17 (!) 205/91 93 %      Temp Source Heart Rate Source Patient Position - Orthostatic VS BP Location FiO2 (%)   08/18/23 0325 08/18/23 0325 08/18/23 0325 08/18/23 0325 --   Tympanic Monitor Sitting Left arm       Pain Score       08/18/23 0322       9             Orthostatic Vital Signs  Vitals:    08/18/23 0325   BP: (!) 205/91   Pulse: 85   Patient Position - Orthostatic VS: Sitting       Physical Exam  Vitals and nursing note reviewed. Constitutional:       General: She is not in acute distress. HENT:      Head: Normocephalic and atraumatic. Right Ear: External ear normal.      Left Ear: External ear normal.      Nose: Nose normal.      Mouth/Throat:      Pharynx: Oropharynx is clear. Eyes:      Extraocular Movements: Extraocular movements intact. Pupils: Pupils are equal, round, and reactive to light. Cardiovascular:      Rate and Rhythm: Normal rate and regular rhythm. Pulses: Normal pulses. Heart sounds: Normal heart sounds. No murmur heard. No friction rub. No gallop. Pulmonary:      Effort: Pulmonary effort is normal. No respiratory distress. Breath sounds: Normal breath sounds.  No wheezing, rhonchi or rales. Abdominal:      General: Abdomen is flat. A surgical scar is present. There is no distension. Palpations: Abdomen is soft. Tenderness: There is abdominal tenderness in the right lower quadrant. There is no right CVA tenderness, left CVA tenderness, guarding or rebound. Musculoskeletal:         General: No deformity. Normal range of motion. Cervical back: Normal range of motion. Right lower leg: No edema. Left lower leg: No edema. Skin:     General: Skin is warm and dry. Capillary Refill: Capillary refill takes less than 2 seconds. Findings: No rash. Neurological:      General: No focal deficit present. Mental Status: She is alert and oriented to person, place, and time.       Gait: Gait normal.   Psychiatric:         Mood and Affect: Mood normal.         ED Medications  Medications   cefTRIAXone (ROCEPHIN) IVPB (premix in dextrose) 2,000 mg 50 mL (2,000 mg Intravenous New Bag 8/18/23 0593)   ondansetron (ZOFRAN) injection 4 mg (4 mg Intravenous Given 8/18/23 1809)   acetaminophen (TYLENOL) tablet 975 mg (975 mg Oral Given 8/18/23 0348)       Diagnostic Studies  Results Reviewed     Procedure Component Value Units Date/Time    Comprehensive metabolic panel [[id number]]  (Abnormal) Collected: 08/18/23 0352    Lab Status: Final result Specimen: Blood from Arm, Left Updated: 08/18/23 0418     Sodium 138 mmol/L      Potassium 4.0 mmol/L      Chloride 105 mmol/L      CO2 22 mmol/L      ANION GAP 11 mmol/L      BUN 28 mg/dL      Creatinine 1.50 mg/dL      Glucose 167 mg/dL      Calcium 9.5 mg/dL      AST 13 U/L      ALT 7 U/L      Alkaline Phosphatase 90 U/L      Total Protein 8.0 g/dL      Albumin 4.3 g/dL      Total Bilirubin 0.53 mg/dL      eGFR 32 ml/min/1.73sq m     Narrative:      [place] guidelines for Chronic Kidney Disease (CKD):   •  Stage 1 with normal or high GFR (GFR > 90 mL/min/1.73 square meters)  •  Stage 2 Mild CKD (GFR = 60-89 mL/min/1.73 square meters)  •  Stage 3A Moderate CKD (GFR = 45-59 mL/min/1.73 square meters)  •  Stage 3B Moderate CKD (GFR = 30-44 mL/min/1.73 square meters)  •  Stage 4 Severe CKD (GFR = 15-29 mL/min/1.73 square meters)  •  Stage 5 End Stage CKD (GFR <15 mL/min/1.73 square meters)  Note: GFR calculation is accurate only with a steady state creatinine    Lipase [[id number]]  (Normal) Collected: 08/18/23 0352    Lab Status: Final result Specimen: Blood from Arm, Left Updated: 08/18/23 0418     Lipase 38 u/L     Urine Microscopic [[id number]]  (Abnormal) Collected: 08/18/23 0345    Lab Status: Final result Specimen: Urine, Clean Catch Updated: 08/18/23 0406     RBC, UA 1-2 /hpf      WBC, UA 30-50 /hpf      Epithelial Cells Moderate /hpf      Bacteria, UA Moderate /hpf     Urine culture [[id number]] Collected: 08/18/23 0345    Lab Status:  In process Specimen: Urine, Clean Catch Updated: 08/18/23 0406    UA w Reflex to Microscopic w Reflex to Culture [[id number]]  (Abnormal) Collected: 08/18/23 0345    Lab Status: Final result Specimen: Urine, Clean Catch Updated: 08/18/23 0358     Color, UA Yellow     Clarity, UA Slightly Cloudy     Specific Gravity, UA 1.025     pH, UA 6.0     Leukocytes, UA 2+     Nitrite, UA Negative     Protein, UA 2+ mg/dl      Glucose, UA Negative mg/dl      Ketones, UA Negative mg/dl      Urobilinogen, UA 0.2 E.U./dl      Bilirubin, UA Negative     Occult Blood, UA 1+    CBC and differential [[id number]]  (Abnormal) Collected: 08/18/23 0352    Lab Status: Final result Specimen: Blood from Arm, Left Updated: 08/18/23 0358     WBC 10.70 Thousand/uL      RBC 4.06 Million/uL      Hemoglobin 11.5 g/dL      Hematocrit 35.9 %      MCV 88 fL      MCH 28.3 pg      MCHC 32.0 g/dL      RDW 14.0 %      MPV 9.2 fL      Platelets 087 Thousands/uL      nRBC 0 /100 WBCs      Neutrophils Relative 79 %      Immat GRANS % 0 %      Lymphocytes Relative 14 %      Monocytes Relative 6 %      Eosinophils Relative 1 %      Basophils Relative 0 %      Neutrophils Absolute 8.31 Thousands/µL      Immature Grans Absolute 0.03 Thousand/uL      Lymphocytes Absolute 1.53 Thousands/µL      Monocytes Absolute 0.68 Thousand/µL      Eosinophils Absolute 0.13 Thousand/µL      Basophils Absolute 0.02 Thousands/µL                  CT abdomen pelvis wo contrast   Final Result by Preet Winston MD (08/18 9674)      Focal inflammatory stranding surrounding the right mid ureter, new from prior. Favored to represent ascending infection. Recently passed stone would have a similar appearance, however no stone was present on the most recent CT. Workstation performed: [id number]               Procedures  Procedures      ED Course  ED Course as of 08/18/23 0548   Fri Aug 18, 2023   0359 Leukocytes, UA(!): 2+   0359 Blood, UA(!): 1+   0419 Creatinine(!): 1.50  Near baseline                                       Medical Decision Making  82 y/o F presenting with acute abd pain. VSS. Unclear etiology, possible ureteral colic vs diverticulitis vs less likely pyelonephritis. Plan for abd labs, UA, CT imaging. Pt with CKD, will avoid contrast. Radiologist concerned for stranding around R ureter, most consistent with ascending infection as no intrarenal stone on recent scan. Will treat aggressively using pyelo treatment of IV ctx followed by oral abx for 14 days. Recommend PCP f/u, contact info given for urologist if needed. Recommend tylenol 500-1000mg q6h prn. Pt aware to avoid nsaids. Discussed return precautions. All questions answered. Amount and/or Complexity of Data Reviewed  Labs: ordered. Decision-making details documented in ED Course. Radiology: ordered. Risk  OTC drugs. Prescription drug management.             Disposition  Final diagnoses:   Urinary tract infection   Right flank pain     Time reflects when diagnosis was documented in both MDM as applicable and the Disposition within this note     Time User Action Codes Description Comment    8/18/2023  5:29 AM Bhargavi Harp [N39.0] Urinary tract infection     8/18/2023  5:29 AM Bhargavi Bradley Add [R10.9] Right flank pain       ED Disposition     ED Disposition   Discharge    Condition   Stable    Date/Time   Fri Aug 18, 2023  5:28 AM    Comment   Karolina Pepper discharge to home/self care. Follow-up Information     Follow up With Specialties Details [facility] [place], [address]      Ankit Drew MD Urology   [id number] [facility]  [phone]            Patient's Medications   Discharge Prescriptions    AMOXICILLIN-CLAVULANATE (AUGMENTIN) 875-125 MG PER TABLET    Take 1 tablet by mouth every 12 (twelve) hours for 14 days       Start Date: 8/18/2023 End Date: 9/1/2023       Order Dose: 1 tablet       Quantity: 28 tablet    Refills: 0     No discharge procedures on file. PDMP Review       Value Time User    PDMP Reviewed  Yes 11/22/2022  8:57 PM Joseph Lazo DO           ED Provider  Attending physically available and evaluated Karolina Pepper. I managed the patient along with the ED Attending.     Electronically Signed by         Bhargavi Bradley MD  08/18/23 0635

## 2023-08-18 NOTE — ED ATTENDING ATTESTATION
8/18/2023  I, Lary Schwab, DO, saw and evaluated the patient. I have discussed the patient with the resident/non-physician practitioner and agree with the resident's/non-physician practitioner's findings, Plan of Care, and MDM as documented in the resident's/non-physician practitioner's note, except where noted. All available labs and Radiology studies were reviewed. I was present for key portions of any procedure(s) performed by the resident/non-physician practitioner and I was immediately available to provide assistance. At this point I agree with the current assessment done in the Emergency Department. I have conducted an independent evaluation of this patient a history and physical is as follows:    ED Course  ED Course as of 08/18/23 0535   Fri Aug 18, 2023   0407 WBC, UA(!): 30-50     82 yo F acute onset RLQ and R flank pain, vague pain throughout the day, pain severe cramping woke her up from sleep. Never had this before. Also with N but without vomiting, 1 episode of non-bloody diarrhea yesterday. Denies fever, CP, SOB, urinary symptoms. ROS: otherwise negative unless stated above    Physical Exam  Vitals and nursing note reviewed. Constitutional:       General: She is not in acute distress. Appearance: She is well-developed. She is not diaphoretic. HENT:      Head: Normocephalic and atraumatic. Right Ear: External ear normal.      Left Ear: External ear normal.      Nose: Nose normal.   Eyes:      General: No scleral icterus. Right eye: No discharge. Left eye: No discharge. Conjunctiva/sclera: Conjunctivae normal.      Pupils: Pupils are equal, round, and reactive to light. Neck:      Vascular: No JVD. Trachea: No tracheal deviation. Cardiovascular:      Rate and Rhythm: Normal rate and regular rhythm. Heart sounds: Normal heart sounds. No murmur heard. No friction rub. No gallop.    Pulmonary:      Effort: Pulmonary effort is normal. No respiratory distress. Breath sounds: Normal breath sounds. No stridor. No wheezing or rales. Abdominal:      General: Bowel sounds are normal. There is no distension. Palpations: Abdomen is soft. There is no mass. Tenderness: There is abdominal tenderness in the right lower quadrant. There is no right CVA tenderness, left CVA tenderness or guarding. Musculoskeletal:         General: No tenderness or deformity. Normal range of motion. Cervical back: Normal range of motion and neck supple. Skin:     General: Skin is warm and dry. Coloration: Skin is not pale. Findings: No erythema or rash. Neurological:      Mental Status: She is alert and oriented to person, place, and time. Cranial Nerves: No cranial nerve deficit. Sensory: No sensory deficit. Motor: No abnormal muscle tone. Psychiatric:         Behavior: Behavior normal.         Thought Content: Thought content normal.         Judgment: Judgment normal.       Patient with history of diabetes and CKD prior abdominal surgeries including hernia surgeries and cholecystectomy. We will check blood work, urinalysis, CT scan. Discussed pain and nausea control with patient. Patient stated that she would prefer to start with Tylenol and something for nausea at this time. Patient noted to have UTI with possible ascending infection. Given one time dose of Rocephin 2g and transitioned to Augmentin PO BID for 14 days as patient with Cipro allergy. Tolerating PO.        Critical Care Time  Procedures

## 2023-08-18 NOTE — DISCHARGE INSTRUCTIONS
Start taking the prescribed antibiotic. We may call you to change your antibiotic based on the culture results in the next 2-3 days. Follow up with your PCP. Consider scheduling an appointment with urology given your recurrent symptoms. If you develop new or worsening symptoms, please return to the Emergency Department for further evaluation.

## 2023-08-20 LAB — BACTERIA UR CULT: ABNORMAL

## 2023-08-21 ENCOUNTER — VBI (OUTPATIENT)
Dept: ADMINISTRATIVE | Facility: OTHER | Age: 83
End: 2023-08-21

## 2023-08-22 NOTE — TELEPHONE ENCOUNTER
Kevin Cobos    ED Visit Information     Ed visit date: 8/18/23  Diagnosis Description: flank pain  In Network? Discharge status: Home  Discharged with meds ? No  Number of ED visits to date: 2  ED Severity:3     Outreach Information    Outreach successful: Yes 3  Date letter mailed:none  Date Finalized:8/22/23    Care Coordination    PCP Apt on 9-19-23  Transportation issues ? No    Value [employer] type: 3 Day Outreach  Emergent necessity warranted by diagnosis: Yes  [facility] PCP: Yes  Transportation: Self Transport  If able to choose an ED.  Would you have chosen [facility]: Yes  Called PCP first?: No  Feels able to call PCP for urgent problems ?: Yes  Understands what emergencies can be handled by PCP ?: Yes  Ever any problems getting appointment with PCP for minor emergency/urgency problems?: No  Practice Contacted Patient ?: No  Pt had ED follow up with pcp/staff ?: No    Seen for follow-up out of network ?: No  Reason Patient went to ED instead of Urgent Care or PCP?: Perceived Severity of Illness  08/21/2023 03:24 PM EDT by Freddy Ng 08/21/2023 03:24 PM EDT by Freddy Ng [address] [place] [address] [address] (Self) [phone] (KVMQ)[phone] (Home)  [phone] (Home)   - Left MessageCommunicated - 1st attempt  08/22/2023 09:19 AM EDT by Freddy Ng 08/22/2023 09:19 AM EDT by Maik Schmitz, [id number] [address] (Self) [phone] (PJCI)[phone] (Home)  [phone] (Home)   - Left MessageCommunicated - 2nd attempt  08/22/2023 03:10 PM EDT by Freddy Ng 08/22/2023 03:10 PM EDT by Maik Schmitz, [id number] [address] (Self) [phone] (VQBK)[phone] (Home)  [phone] (Home) Remove  - Call CompleteCommunicated - 3rd attempt

## 2023-08-25 ENCOUNTER — APPOINTMENT (OUTPATIENT)
Dept: LAB | Facility: CLINIC | Age: 83
End: 2023-08-25
Payer: COMMERCIAL

## 2023-09-06 ENCOUNTER — HOSPITAL ENCOUNTER (INPATIENT)
Facility: HOSPITAL | Age: 83
LOS: 2 days | Discharge: HOME | DRG: 372 | End: 2023-09-09
Attending: FAMILY MEDICINE | Admitting: HOSPITALIST
Payer: COMMERCIAL

## 2023-09-06 ENCOUNTER — APPOINTMENT (EMERGENCY)
Dept: CT IMAGING | Facility: HOSPITAL | Age: 83
DRG: 372 | End: 2023-09-06
Payer: COMMERCIAL

## 2023-09-06 ENCOUNTER — APPOINTMENT (EMERGENCY)
Dept: RADIOLOGY | Facility: HOSPITAL | Age: 83
DRG: 372 | End: 2023-09-06
Payer: COMMERCIAL

## 2023-09-06 DIAGNOSIS — R50.9 FEVER: ICD-10-CM

## 2023-09-06 DIAGNOSIS — J84.10 PULMONARY FIBROSIS (HCC): ICD-10-CM

## 2023-09-06 DIAGNOSIS — N17.9 AKI (ACUTE KIDNEY INJURY) (HCC): ICD-10-CM

## 2023-09-06 DIAGNOSIS — R19.7 DIARRHEA: Primary | ICD-10-CM

## 2023-09-06 DIAGNOSIS — R19.7 DIARRHEA OF PRESUMED INFECTIOUS ORIGIN: ICD-10-CM

## 2023-09-06 LAB
ALBUMIN SERPL BCP-MCNC: 4.1 G/DL (ref 3.5–5)
ALP SERPL-CCNC: 90 U/L (ref 34–104)
ALT SERPL W P-5'-P-CCNC: 9 U/L (ref 7–52)
ANION GAP SERPL CALCULATED.3IONS-SCNC: 13 MMOL/L
APTT PPP: 30 SECONDS (ref 23–37)
AST SERPL W P-5'-P-CCNC: 13 U/L (ref 13–39)
ATRIAL RATE: 104 BPM
B BURGDOR IGG SERPL QL IA: POSITIVE
B BURGDOR IGG+IGM SER QL IA: POSITIVE
B BURGDOR IGM SERPL QL IA: POSITIVE
BACTERIA UR QL AUTO: ABNORMAL /HPF
BASOPHILS # BLD AUTO: 0.02 THOUSANDS/ÂΜL (ref 0–0.1)
BASOPHILS NFR BLD AUTO: 0 % (ref 0–1)
BILIRUB SERPL-MCNC: 0.49 MG/DL (ref 0.2–1)
BILIRUB UR QL STRIP: NEGATIVE
BNP SERPL-MCNC: 70 PG/ML (ref 0–100)
BUN SERPL-MCNC: 38 MG/DL (ref 5–25)
CALCIUM SERPL-MCNC: 9 MG/DL (ref 8.4–10.2)
CHLORIDE SERPL-SCNC: 104 MMOL/L (ref 96–108)
CLARITY UR: CLEAR
CO2 SERPL-SCNC: 17 MMOL/L (ref 21–32)
COLOR UR: YELLOW
CREAT SERPL-MCNC: 2 MG/DL (ref 0.6–1.3)
EOSINOPHIL # BLD AUTO: 0.04 THOUSAND/ÂΜL (ref 0–0.61)
EOSINOPHIL NFR BLD AUTO: 0 % (ref 0–6)
ERYTHROCYTE [DISTWIDTH] IN BLOOD BY AUTOMATED COUNT: 13.7 % (ref 11.6–15.1)
FLUAV RNA RESP QL NAA+PROBE: NEGATIVE
FLUBV RNA RESP QL NAA+PROBE: NEGATIVE
GFR SERPL CREATININE-BSD FRML MDRD: 22 ML/MIN/1.73SQ M
GLUCOSE SERPL-MCNC: 111 MG/DL (ref 65–140)
GLUCOSE SERPL-MCNC: 125 MG/DL (ref 65–140)
GLUCOSE SERPL-MCNC: 137 MG/DL (ref 65–140)
GLUCOSE SERPL-MCNC: 139 MG/DL (ref 65–140)
GLUCOSE UR STRIP-MCNC: NEGATIVE MG/DL
HCT VFR BLD AUTO: 35.6 % (ref 34.8–46.1)
HGB BLD-MCNC: 11.7 G/DL (ref 11.5–15.4)
HGB UR QL STRIP.AUTO: ABNORMAL
IMM GRANULOCYTES # BLD AUTO: 0.04 THOUSAND/UL (ref 0–0.2)
IMM GRANULOCYTES NFR BLD AUTO: 0 % (ref 0–2)
INR PPP: 1.14 (ref 0.84–1.19)
KETONES UR STRIP-MCNC: NEGATIVE MG/DL
LACTATE SERPL-SCNC: 0.8 MMOL/L (ref 0.5–2)
LEUKOCYTE ESTERASE UR QL STRIP: ABNORMAL
LYMPHOCYTES # BLD AUTO: 1.44 THOUSANDS/ÂΜL (ref 0.6–4.47)
LYMPHOCYTES NFR BLD AUTO: 15 % (ref 14–44)
MCH RBC QN AUTO: 27.9 PG (ref 26.8–34.3)
MCHC RBC AUTO-ENTMCNC: 32.9 G/DL (ref 31.4–37.4)
MCV RBC AUTO: 85 FL (ref 82–98)
MONOCYTES # BLD AUTO: 1.48 THOUSAND/ÂΜL (ref 0.17–1.22)
MONOCYTES NFR BLD AUTO: 15 % (ref 4–12)
NEUTROPHILS # BLD AUTO: 6.69 THOUSANDS/ÂΜL (ref 1.85–7.62)
NEUTS SEG NFR BLD AUTO: 70 % (ref 43–75)
NITRITE UR QL STRIP: NEGATIVE
NON-SQ EPI CELLS URNS QL MICRO: ABNORMAL /HPF
NRBC BLD AUTO-RTO: 0 /100 WBCS
P AXIS: 10 DEGREES
PH UR STRIP.AUTO: 5 [PH]
PLATELET # BLD AUTO: 202 THOUSANDS/UL (ref 149–390)
PMV BLD AUTO: 9.3 FL (ref 8.9–12.7)
POTASSIUM SERPL-SCNC: 3.2 MMOL/L (ref 3.5–5.3)
PR INTERVAL: 140 MS
PROCALCITONIN SERPL-MCNC: 0.28 NG/ML
PROT SERPL-MCNC: 8.1 G/DL (ref 6.4–8.4)
PROT UR STRIP-MCNC: ABNORMAL MG/DL
PROTHROMBIN TIME: 14.8 SECONDS (ref 11.6–14.5)
QRS AXIS: -43 DEGREES
QRSD INTERVAL: 88 MS
QT INTERVAL: 344 MS
QTC INTERVAL: 452 MS
RBC # BLD AUTO: 4.19 MILLION/UL (ref 3.81–5.12)
RBC #/AREA URNS AUTO: ABNORMAL /HPF
RSV RNA RESP QL NAA+PROBE: NEGATIVE
SARS-COV-2 RNA RESP QL NAA+PROBE: NEGATIVE
SODIUM SERPL-SCNC: 134 MMOL/L (ref 135–147)
SP GR UR STRIP.AUTO: 1.01
T WAVE AXIS: -8 DEGREES
UROBILINOGEN UR QL STRIP.AUTO: 0.2 E.U./DL
VENTRICULAR RATE: 104 BPM
WBC # BLD AUTO: 9.71 THOUSAND/UL (ref 4.31–10.16)
WBC #/AREA URNS AUTO: ABNORMAL /HPF

## 2023-09-06 PROCEDURE — 89055 LEUKOCYTE ASSESSMENT FECAL: CPT | Performed by: HOSPITALIST

## 2023-09-06 PROCEDURE — 86308 HETEROPHILE ANTIBODY SCREEN: CPT

## 2023-09-06 PROCEDURE — 99285 EMERGENCY DEPT VISIT HI MDM: CPT

## 2023-09-06 PROCEDURE — 84145 PROCALCITONIN (PCT): CPT

## 2023-09-06 PROCEDURE — 87493 C DIFF AMPLIFIED PROBE: CPT

## 2023-09-06 PROCEDURE — 83993 ASSAY FOR CALPROTECTIN FECAL: CPT | Performed by: STUDENT IN AN ORGANIZED HEALTH CARE EDUCATION/TRAINING PROGRAM

## 2023-09-06 PROCEDURE — 83605 ASSAY OF LACTIC ACID: CPT

## 2023-09-06 PROCEDURE — 87177 OVA AND PARASITES SMEARS: CPT | Performed by: HOSPITALIST

## 2023-09-06 PROCEDURE — 82948 REAGENT STRIP/BLOOD GLUCOSE: CPT

## 2023-09-06 PROCEDURE — 93010 ELECTROCARDIOGRAM REPORT: CPT | Performed by: INTERNAL MEDICINE

## 2023-09-06 PROCEDURE — 81001 URINALYSIS AUTO W/SCOPE: CPT

## 2023-09-06 PROCEDURE — 74176 CT ABD & PELVIS W/O CONTRAST: CPT

## 2023-09-06 PROCEDURE — 85025 COMPLETE CBC W/AUTO DIFF WBC: CPT

## 2023-09-06 PROCEDURE — 87209 SMEAR COMPLEX STAIN: CPT | Performed by: HOSPITALIST

## 2023-09-06 PROCEDURE — 80053 COMPREHEN METABOLIC PANEL: CPT

## 2023-09-06 PROCEDURE — 85730 THROMBOPLASTIN TIME PARTIAL: CPT

## 2023-09-06 PROCEDURE — 99204 OFFICE O/P NEW MOD 45 MIN: CPT | Performed by: STUDENT IN AN ORGANIZED HEALTH CARE EDUCATION/TRAINING PROGRAM

## 2023-09-06 PROCEDURE — 86617 LYME DISEASE ANTIBODY: CPT

## 2023-09-06 PROCEDURE — 86618 LYME DISEASE ANTIBODY: CPT

## 2023-09-06 PROCEDURE — 96361 HYDRATE IV INFUSION ADD-ON: CPT

## 2023-09-06 PROCEDURE — 83880 ASSAY OF NATRIURETIC PEPTIDE: CPT

## 2023-09-06 PROCEDURE — 71045 X-RAY EXAM CHEST 1 VIEW: CPT

## 2023-09-06 PROCEDURE — 96365 THER/PROPH/DIAG IV INF INIT: CPT

## 2023-09-06 PROCEDURE — 0241U HB NFCT DS VIR RESP RNA 4 TRGT: CPT

## 2023-09-06 PROCEDURE — 99223 1ST HOSP IP/OBS HIGH 75: CPT | Performed by: HOSPITALIST

## 2023-09-06 PROCEDURE — 85610 PROTHROMBIN TIME: CPT

## 2023-09-06 PROCEDURE — 87040 BLOOD CULTURE FOR BACTERIA: CPT

## 2023-09-06 PROCEDURE — 93005 ELECTROCARDIOGRAM TRACING: CPT

## 2023-09-06 PROCEDURE — 36415 COLL VENOUS BLD VENIPUNCTURE: CPT

## 2023-09-06 PROCEDURE — 87505 NFCT AGENT DETECTION GI: CPT

## 2023-09-06 RX ORDER — POTASSIUM CHLORIDE 29.8 MG/ML
40 INJECTION INTRAVENOUS ONCE
Status: DISCONTINUED | OUTPATIENT
Start: 2023-09-06 | End: 2023-09-06

## 2023-09-06 RX ORDER — INSULIN LISPRO 100 [IU]/ML
1-5 INJECTION, SOLUTION INTRAVENOUS; SUBCUTANEOUS
Status: DISCONTINUED | OUTPATIENT
Start: 2023-09-06 | End: 2023-09-09 | Stop reason: HOSPADM

## 2023-09-06 RX ORDER — ONDANSETRON 2 MG/ML
4 INJECTION INTRAMUSCULAR; INTRAVENOUS EVERY 6 HOURS PRN
Status: DISCONTINUED | OUTPATIENT
Start: 2023-09-06 | End: 2023-09-09 | Stop reason: HOSPADM

## 2023-09-06 RX ORDER — ALPRAZOLAM 0.25 MG/1
0.25 TABLET ORAL
Status: DISCONTINUED | OUTPATIENT
Start: 2023-09-06 | End: 2023-09-09 | Stop reason: HOSPADM

## 2023-09-06 RX ORDER — FERROUS SULFATE 325(65) MG
325 TABLET ORAL
Status: DISCONTINUED | OUTPATIENT
Start: 2023-09-07 | End: 2023-09-09 | Stop reason: HOSPADM

## 2023-09-06 RX ORDER — HEPARIN SODIUM 5000 [USP'U]/ML
5000 INJECTION, SOLUTION INTRAVENOUS; SUBCUTANEOUS EVERY 8 HOURS SCHEDULED
Status: DISCONTINUED | OUTPATIENT
Start: 2023-09-06 | End: 2023-09-09 | Stop reason: HOSPADM

## 2023-09-06 RX ORDER — HYDRALAZINE HYDROCHLORIDE 20 MG/ML
20 INJECTION INTRAMUSCULAR; INTRAVENOUS EVERY 6 HOURS PRN
Status: DISCONTINUED | OUTPATIENT
Start: 2023-09-06 | End: 2023-09-09 | Stop reason: HOSPADM

## 2023-09-06 RX ORDER — POTASSIUM CHLORIDE 14.9 MG/ML
20 INJECTION INTRAVENOUS ONCE
Status: COMPLETED | OUTPATIENT
Start: 2023-09-06 | End: 2023-09-06

## 2023-09-06 RX ORDER — ACETAMINOPHEN 325 MG/1
650 TABLET ORAL ONCE
Status: COMPLETED | OUTPATIENT
Start: 2023-09-06 | End: 2023-09-06

## 2023-09-06 RX ORDER — VANCOMYCIN HYDROCHLORIDE 125 MG/1
125 CAPSULE ORAL EVERY 6 HOURS SCHEDULED
Status: DISCONTINUED | OUTPATIENT
Start: 2023-09-06 | End: 2023-09-09 | Stop reason: HOSPADM

## 2023-09-06 RX ORDER — POTASSIUM CHLORIDE 20 MEQ/1
40 TABLET, EXTENDED RELEASE ORAL ONCE
Status: COMPLETED | OUTPATIENT
Start: 2023-09-06 | End: 2023-09-06

## 2023-09-06 RX ORDER — SODIUM CHLORIDE, SODIUM GLUCONATE, SODIUM ACETATE, POTASSIUM CHLORIDE, MAGNESIUM CHLORIDE, SODIUM PHOSPHATE, DIBASIC, AND POTASSIUM PHOSPHATE .53; .5; .37; .037; .03; .012; .00082 G/100ML; G/100ML; G/100ML; G/100ML; G/100ML; G/100ML; G/100ML
125 INJECTION, SOLUTION INTRAVENOUS CONTINUOUS
Status: DISCONTINUED | OUTPATIENT
Start: 2023-09-06 | End: 2023-09-07

## 2023-09-06 RX ORDER — PRAVASTATIN SODIUM 40 MG
40 TABLET ORAL
Status: DISCONTINUED | OUTPATIENT
Start: 2023-09-06 | End: 2023-09-09 | Stop reason: HOSPADM

## 2023-09-06 RX ORDER — ACETAMINOPHEN 325 MG/1
650 TABLET ORAL EVERY 6 HOURS PRN
Status: DISCONTINUED | OUTPATIENT
Start: 2023-09-06 | End: 2023-09-09 | Stop reason: HOSPADM

## 2023-09-06 RX ORDER — CEFEPIME HYDROCHLORIDE 2 G/50ML
2000 INJECTION, SOLUTION INTRAVENOUS ONCE
Status: COMPLETED | OUTPATIENT
Start: 2023-09-06 | End: 2023-09-06

## 2023-09-06 RX ADMIN — VANCOMYCIN HYDROCHLORIDE 125 MG: 125 CAPSULE ORAL at 17:12

## 2023-09-06 RX ADMIN — HEPARIN SODIUM 5000 UNITS: 5000 INJECTION INTRAVENOUS; SUBCUTANEOUS at 14:41

## 2023-09-06 RX ADMIN — ACETAMINOPHEN 650 MG: 325 TABLET ORAL at 09:22

## 2023-09-06 RX ADMIN — HEPARIN SODIUM 5000 UNITS: 5000 INJECTION INTRAVENOUS; SUBCUTANEOUS at 21:51

## 2023-09-06 RX ADMIN — POTASSIUM CHLORIDE 20 MEQ: 14.9 INJECTION, SOLUTION INTRAVENOUS at 12:45

## 2023-09-06 RX ADMIN — SODIUM CHLORIDE 500 ML: 0.9 INJECTION, SOLUTION INTRAVENOUS at 10:15

## 2023-09-06 RX ADMIN — SODIUM CHLORIDE, SODIUM GLUCONATE, SODIUM ACETATE, POTASSIUM CHLORIDE, MAGNESIUM CHLORIDE, SODIUM PHOSPHATE, DIBASIC, AND POTASSIUM PHOSPHATE 125 ML/HR: .53; .5; .37; .037; .03; .012; .00082 INJECTION, SOLUTION INTRAVENOUS at 12:31

## 2023-09-06 RX ADMIN — CEFEPIME HYDROCHLORIDE 2000 MG: 2 INJECTION, SOLUTION INTRAVENOUS at 09:15

## 2023-09-06 RX ADMIN — VANCOMYCIN HYDROCHLORIDE 125 MG: 125 CAPSULE ORAL at 23:06

## 2023-09-06 RX ADMIN — SODIUM CHLORIDE, SODIUM GLUCONATE, SODIUM ACETATE, POTASSIUM CHLORIDE, MAGNESIUM CHLORIDE, SODIUM PHOSPHATE, DIBASIC, AND POTASSIUM PHOSPHATE 125 ML/HR: .53; .5; .37; .037; .03; .012; .00082 INJECTION, SOLUTION INTRAVENOUS at 19:27

## 2023-09-06 RX ADMIN — POTASSIUM CHLORIDE 20 MEQ: 14.9 INJECTION, SOLUTION INTRAVENOUS at 14:41

## 2023-09-06 RX ADMIN — POTASSIUM CHLORIDE 40 MEQ: 1500 TABLET, EXTENDED RELEASE ORAL at 10:06

## 2023-09-06 RX ADMIN — PRAVASTATIN SODIUM 40 MG: 40 TABLET ORAL at 17:12

## 2023-09-06 RX ADMIN — VANCOMYCIN HYDROCHLORIDE 125 MG: 125 CAPSULE ORAL at 12:45

## 2023-09-06 NOTE — PLAN OF CARE
Problem: PAIN - ADULT  Goal: Verbalizes/displays adequate comfort level or baseline comfort level  Description: Interventions:  - Encourage patient to monitor pain and request assistance  - Assess pain using appropriate pain scale  - Administer analgesics based on type and severity of pain and evaluate response  - Implement non-pharmacological measures as appropriate and evaluate response  - Consider cultural and social influences on pain and pain management  - Notify physician/advanced practitioner if interventions unsuccessful or patient reports new pain  Outcome: Progressing     Problem: INFECTION - ADULT  Goal: Absence or prevention of progression during hospitalization  Description: INTERVENTIONS:  - Assess and monitor for signs and symptoms of infection  - Monitor lab/diagnostic results  - Monitor all insertion sites, i.e. indwelling lines, tubes, and drains  - Monitor endotracheal if appropriate and nasal secretions for changes in amount and color  - [place] appropriate cooling/warming therapies per order  - Administer medications as ordered  - Instruct and encourage patient and family to use good hand hygiene technique  - Identify and instruct in appropriate isolation precautions for identified infection/condition  Outcome: Progressing     Problem: SAFETY ADULT  Goal: Patient will remain free of falls  Description: INTERVENTIONS:  - Educate patient/family on patient safety including physical limitations  - Instruct patient to call for assistance with activity   - Consult OT/PT to assist with strengthening/mobility   - Keep Call bell within reach  - Keep bed low and locked with side rails adjusted as appropriate  - Keep care items and personal belongings within reach  - Initiate and maintain comfort rounds  - Make Fall Risk Sign visible to staff  - Offer Toileting every 2 Hours, in advance of need  - Apply yellow socks and bracelet for high fall risk patients  - Consider moving patient to room near nurses station  Outcome: Progressing  Goal: Maintain or return to baseline ADL function  Description: INTERVENTIONS:  -  Assess patient's ability to carry out ADLs; assess patient's baseline for ADL function and identify physical deficits which impact ability to perform ADLs (bathing, care of mouth/teeth, toileting, grooming, dressing, etc.)  - Assess/evaluate cause of self-care deficits   - Assess range of motion  - Assess patient's mobility; develop plan if impaired  - Assess patient's need for assistive devices and provide as appropriate  - Encourage maximum independence but intervene and supervise when necessary  - Involve family in performance of ADLs  - Assess for home care needs following discharge   - Consider OT consult to assist with ADL evaluation and planning for discharge  - Provide patient education as appropriate  Outcome: Progressing  Goal: Maintains/Returns to pre admission functional level  Description: INTERVENTIONS:  - Perform BMAT or MOVE assessment daily.   - Set and communicate daily mobility goal to care team and patient/family/caregiver.    - Collaborate with rehabilitation services on mobility goals if consulted  - Out of bed for toileting  - Record patient progress and toleration of activity level   Outcome: Progressing     Problem: DISCHARGE PLANNING  Goal: Discharge to home or other facility with appropriate resources  Description: INTERVENTIONS:  - Identify barriers to discharge w/patient and caregiver  - Arrange for needed discharge resources and transportation as appropriate  - Identify discharge learning needs (meds, wound care, etc.)  - Arrange for interpretive services to assist at discharge as needed  - Refer to Case Management Department for coordinating discharge planning if the patient needs post-hospital services based on physician/advanced practitioner order or complex needs related to functional status, cognitive ability, or social support system  Outcome: Progressing     Problem: Knowledge Deficit  Goal: Patient/family/caregiver demonstrates understanding of disease process, treatment plan, medications, and discharge instructions  Description: Complete learning assessment and assess knowledge base.   Interventions:  - Provide teaching at level of understanding  - Provide teaching via preferred learning methods  Outcome: Progressing     Problem: GASTROINTESTINAL - ADULT  Goal: Minimal or absence of nausea and/or vomiting  Description: INTERVENTIONS:  - Administer IV fluids if ordered to ensure adequate hydration  - Maintain NPO status until nausea and vomiting are resolved  - Nasogastric tube if ordered  - Administer ordered antiemetic medications as needed  - Provide nonpharmacologic comfort measures as appropriate  - Advance diet as tolerated, if ordered  - Consider nutrition services referral to assist patient with adequate nutrition and appropriate food choices  Outcome: Progressing  Goal: Maintains or returns to baseline bowel function  Description: INTERVENTIONS:  - Assess bowel function  - Encourage oral fluids to ensure adequate hydration  - Administer IV fluids if ordered to ensure adequate hydration  - Administer ordered medications as needed  - Encourage mobilization and activity  - Consider nutritional services referral to assist patient with adequate nutrition and appropriate food choices  Outcome: Progressing  Goal: Maintains adequate nutritional intake  Description: INTERVENTIONS:  - Monitor percentage of each meal consumed  - Identify factors contributing to decreased intake, treat as appropriate  - Assist with meals as needed  - Monitor I&O, weight, and lab values if indicated  - Obtain nutrition services referral as needed  Outcome: Progressing  Goal: Oral mucous membranes remain intact  Description: INTERVENTIONS  - Assess oral mucosa and hygiene practices  - Implement preventative oral hygiene regimen  - Implement oral medicated treatments as ordered  - Initiate Nutrition services referral as needed  Outcome: Progressing

## 2023-09-06 NOTE — ASSESSMENT & PLAN NOTE
Lab Results   Component Value Date    HGBA1C 8.4 (H) 07/18/2023       No results for input(s): "POCGLU" in the last 72 hours.     Blood Sugar Average: Last 72 hrs:  · Hold oral hypoglycemics inclusive of glipizide, and Januvia  · Implement Accu-Cheks before meals and at bedtime with sliding scale coverage while in house  · Target blood sugar for the hospital is 140-180  · We will add Lantus if necessary

## 2023-09-06 NOTE — H&P
[id number] [facility]  H&P  Name: Anna Marcum 81 y.o. female I MRN: [id number]  Unit/Bed#: LAI I Date of Admission: 9/6/2023   Date of Service: 9/6/2023 I Hospital Day: 0      Assessment/Plan   Acute kidney injury [facility])  Assessment & Plan  · Admit to observation medicine  · Patient has a baseline CKD stage IIIb-baseline creatinine is between 1.3 and 1.5  · Current acute kidney injury is present on admission-arrival creatinine is 2.0  · Suspect that this is prerenal in the setting of the patient having diarrhea  · Hyponatremia-most likely a hypovolemic hyponatremia  · Hypokalemia- we will replete IV and recheck, also secondary to having diarrhea  · We will treat with IV fluid-if no improvement tomorrow, will consider adding a nephrology consult  · Repeat blood work in the a.m. Diarrhea of presumed infectious origin  Assessment & Plan  · Patient with recent antibiotic use in the outpatient setting for UTI  · We will check stool studies -we will check C. difficile, fecal leukocytes, ova and parasites, along with a stool culture  · We will start empiric oral vancomycin 125 mg p.o. every 6 hours  · Consult GI    Type 2 diabetes mellitus without complication, without long-term current use of insulin ([facility])  Assessment & Plan  Lab Results   Component Value Date    HGBA1C 8.4 (H) 07/18/2023       No results for input(s): "POCGLU" in the last 72 hours.     Blood Sugar Average: Last 72 hrs:  · Hold oral hypoglycemics inclusive of glipizide, and Januvia  · Implement Accu-Cheks before meals and at bedtime with sliding scale coverage while in house  · Target blood sugar for the hospital is 140-180  · We will add Lantus if necessary    Essential hypertension  Assessment & Plan  · We will hold Vasotec in view of acute kidney injury  · We will use as needed IV hydralazine for systolic blood pressure of greater than 160    Anxiety  Assessment & Plan  · Continue as needed Xanax         VTE Prophylaxis: Heparin  Code Status: Full CODE STATUS level 1  POLST: POLST is not applicable to this patient  Discussion with family: No family members were present at the time of my H&P evaluation    Anticipated Length of Stay:  Patient will be admitted on an Observation basis with an anticipated length of stay of  < 2 midnights. Justification for Hospital Stay: Management of acute kidney injury, need for IV fluids    Chief Complaint:   Diarrhea x1 month    History of Present Illness:    Kingston Foster is a 81 y.o. female who presents with diarrhea x1 month. In brief, the patient is an 45-year-old female, who presents to the emergency department with a chief complaint of diarrhea that has been going on for approximately 1 month. Patient states that she was in her usual state of health up until approximately the end of July. At the end of July, she was evaluated in the emergency room and was prescribed an antibiotic for UTI. She returned to the emergency room during the middle of August, and once again she was given an antibiotic for a suspected UTI. Patient reports that she has had up to 10 episodes of nonbloody diarrhea, she has described it as at times looking like it is yellow and gray. She reports having had to wake up at times in the middle of the night because of ongoing diarrheal episodes. Approximately 2 to 3 days ago, the patient reports having had a fever, and today decided to seek medical attention because this has been the worst that she has felt in a long time. The fever was relieved by Tylenol. Additionally, during this time span, her PCP advised her to take a probiotic daily, which she states that she has been taking, but unfortunately it has not helped. She reports that the diarrhea has gotten so bad that she has had 2 apply hemorrhoidal cream to her anal region due to it being sore. Patient patient also reports intermittent nausea but no vomiting, and also reports having had a diminished appetite.   Patient also reports intermittent episodes of abdominal pain and discomfort. Patient denies any chest pain, palpitations, admits to sometimes feeling short of breath with exertion, and additionally denies any numbness, and/or tingling but endorses feeling weak. Upon arrival into the emergency room after work-up, she was found to have a mild acute kidney injury, and at that point the ER felt it was appropriate for the patient to be admitted. Review of Systems:  Review of Systems   Constitutional: Positive for fatigue and fever. Gastrointestinal: Positive for abdominal pain, diarrhea, nausea and rectal pain. Neurological: Positive for weakness. All other systems reviewed and are negative. Past Medical and Surgical History:   Past Medical History:   Diagnosis Date   • Acute respiratory failure with hypoxia ([address]) 12/16/2021   • Broken arm     hairline fracture/ 2 places///   right arm   • Diabetes mellitus (HCC)    • Diverticulitis    • Postherpetic neuralgia        Past Surgical History:   Procedure Laterality Date   • CHOLECYSTECTOMY     • COLON SURGERY     • HERNIA REPAIR     • IR EMBOLIZATION (SPECIFY VESSEL OR SITE)  1/12/2022   • ROTATOR CUFF REPAIR Right    • VARICOSE VEIN SURGERY      Impression:  2/12/16 Dock Romel       Meds/Allergies:  Prior to Admission medications    Medication Sig Start Date End Date Taking?  Authorizing Provider   ALPRAZolam Rivera Marcel) 0.25 mg tablet Take 1 tablet (0.25 mg total) by mouth daily at bedtime as needed for anxiety 1/7/21   Oliver Spring, DO   Biotin 10 MG CAPS Take 1 capsule by mouth in the morning    Historical Provider, MD   calcium carbonate (TUMS) 500 mg chewable tablet Chew 1 tablet if needed    Historical Provider, MD   cholecalciferol (VITAMIN D3) 25 mcg (1,000 units) tablet daily    Historical Provider, MD   ciprofloxacin (CILOXAN) 0.3 % ophthalmic solution instill 1 drop into right eye four times a day  Patient not taking: Reported on 1/9/2023 7/19/22 Historical Provider, MD   dicyclomine (BENTYL) 10 mg capsule Take 1 capsule (10 mg total) by mouth 3 (three) times a day before meals  Patient not taking: Reported on 6/9/2023 4/1/19   Benjamin Hebert DO   enalapril (VASOTEC) 10 mg tablet Take 1 tablet (10 mg total) by mouth daily 7/10/23   Benjamin Hebert DO   ferrous sulfate 325 (65 Fe) mg tablet Take 325 mg by mouth daily with breakfast    Historical Provider, MD   glipiZIDE (GLUCOTROL XL) 10 mg 24 hr tablet Take 1 tablet (10 mg total) by mouth daily 7/10/23   Benjamin Hebert DO   ketoconazole (NIZORAL) 2 % shampoo Use 2-3 times a week 1/9/23   Benjamin Hebert DO   ketorolac (ACULAR) 0.4 % SOLN instill 1 drop into right eye once daily  Patient not taking: Reported on 1/9/2023 7/19/22   Historical Provider, MD   lovastatin (MEVACOR) 40 MG tablet Take 1 tablet (40 mg total) by mouth daily at bedtime 7/7/23   Benjamin Hebert DO   Multiple Vitamin (multivitamin) tablet Take 1 tablet by mouth daily    Historical Provider, MD   polyethylene glycol (MIRALAX) 17 g packet Take 17 g by mouth daily  Patient taking differently: Take 17 g by mouth if needed 1/22/22   Sruthi Bajwa,    prednisoLONE acetate (PRED FORTE) 1 % ophthalmic suspension instill 1 drop into right eye four times a day as directed  Patient not taking: Reported on 1/9/2023 7/19/22   Historical Provider, MD   sitaGLIPtin (Januvia) 100 mg tablet Take 1 tablet (100 mg total) by mouth daily 7/10/23   Benjamin Hebert DO   vitamin E, tocopherol, 1,000 units capsule Take 1 capsule (1,000 Units total) by mouth daily 1/9/23   Benjamin Hebert DO     I have reviewed home medications with patient personally. Allergies:    Allergies   Allergen Reactions   • Ciprofloxacin GI Intolerance and Headache     Confusion, arm weakness, dizziness, headache   • Meperidine GI Intolerance, Hallucinations and Other (See Comments)     Demarol  Reaction Date:Unknown   • Propoxyphene GI Intolerance       Social History:  Marital Status: /Civil Union   Occupation: Retired  Patient Pre-hospital Living Situation: Lives at home with her  and daughter  Patient Pre-hospital Level of Mobility: No issues with prehospital level of mobility  Patient Pre-hospital Diet Restrictions: Diabetic dietary restrictions are in place in the outpatient setting  Substance Use History:   Social History     Substance and Sexual Activity   Alcohol Use Never    Comment: Rarely     Social History     Tobacco Use   Smoking Status Never   Smokeless Tobacco Never     Social History     Substance and Sexual Activity   Drug Use Never       Family History:  I have reviewed the patients family history -noncontributory in view of the patient being 81years old    Physical Exam:   Vitals:   Blood Pressure: 127/60 (09/06/23 1105)  Pulse: 101 (09/06/23 1105)  Temperature: 100.4 °F (38 °C) (09/06/23 1006)  Temp Source: Tympanic (09/06/23 1006)  Respirations: 18 (09/06/23 1105)  Height: 4' 10" (147.3 cm) (09/06/23 0839)  Weight - Scale: 57.2 kg (126 lb) (09/06/23 0839)  SpO2: 95 % (09/06/23 1105)    Physical Exam  Constitutional:       General: She is not in acute distress. Appearance: Normal appearance. She is normal weight. She is not ill-appearing. HENT:      Head: Normocephalic and atraumatic. Nose: Nose normal.      Mouth/Throat:      Mouth: Mucous membranes are moist.   Eyes:      Extraocular Movements: Extraocular movements intact. Pupils: Pupils are equal, round, and reactive to light. Cardiovascular:      Rate and Rhythm: Normal rate and regular rhythm. Pulses: Normal pulses. Heart sounds: Normal heart sounds. No murmur heard. No friction rub. No gallop. Pulmonary:      Effort: Pulmonary effort is normal. No respiratory distress. Breath sounds: Normal breath sounds. No wheezing, rhonchi or rales. Abdominal:      General: There is no distension. Palpations: Abdomen is soft. There is no mass. Tenderness: There is abdominal tenderness. Hernia: No hernia is present. Comments: Soft, mildly tender to palpation in a generalized distribution, otherwise nondistended, hyperactive bowel sounds appreciated in 4 quadrants   Musculoskeletal:         General: No swelling or tenderness. Normal range of motion. Cervical back: Normal range of motion and neck supple. No rigidity. Right lower leg: No edema. Left lower leg: No edema. Skin:     General: Skin is warm. Capillary Refill: Capillary refill takes less than 2 seconds. Findings: No erythema or rash. Neurological:      General: No focal deficit present. Mental Status: She is alert and oriented to person, place, and time. Mental status is at baseline. Cranial Nerves: No cranial nerve deficit. Motor: No weakness. Psychiatric:         Mood and Affect: Mood normal.         Behavior: Behavior normal.         Additional Data:   Lab Results: I have personally reviewed pertinent reports. Results from last 7 days   Lab Units 09/06/23  0905   WBC Thousand/uL 9.71   HEMOGLOBIN g/dL 11.7   HEMATOCRIT % 35.6   PLATELETS Thousands/uL 202   NEUTROS PCT % 70   LYMPHS PCT % 15   MONOS PCT % 15*   EOS PCT % 0     Results from last 7 days   Lab Units 09/06/23  0905   SODIUM mmol/L 134*   POTASSIUM mmol/L 3.2*   CHLORIDE mmol/L 104   CO2 mmol/L 17*   BUN mg/dL 38*   CREATININE mg/dL 2.00*   CALCIUM mg/dL 9.0   ALK PHOS U/L 90   ALT U/L 9   AST U/L 13     Results from last 7 days   Lab Units 09/06/23  0905   INR  1.14               Imaging: I have personally reviewed pertinent reports. CT abdomen pelvis wo contrast   Final Result by Melissa Bianchi MD (09/06 1021)      Chronic pulmonary fibrotic changes in both lung bases. Prior cholecystectomy. Stable dilatation of the extrahepatic biliary tree. Mild hepatomegaly as noted above.       Transverse colon showing findings suggesting mild wall thickening as well as focal circumferential thickening and narrowing. See above for further details and discussion. Cannot better characterized in the absence of intravenous contrast. Oral contrast    is only minimally seen within the small bowel. Workstation performed: [id number]         XR chest portable   ED Interpretation by Maria Teresa Manzanares PA-C (09/06 6294)   No acute cardiopulmonary process. EKG, Pathology, and Other Studies Reviewed on Admission:   · EKG: Reviewed    Epic Records Reviewed: Yes     ** Please Note: This note has been constructed using a voice recognition system.  **

## 2023-09-06 NOTE — ASSESSMENT & PLAN NOTE
· Admit to observation medicine  · Patient has a baseline CKD stage IIIb-baseline creatinine is between 1.3 and 1.5  · Current acute kidney injury is present on admission-arrival creatinine is 2.0  · Suspect that this is prerenal in the setting of the patient having diarrhea  · Hyponatremia-most likely a hypovolemic hyponatremia  · Hypokalemia- we will replete IV and recheck, also secondary to having diarrhea  · We will treat with IV fluid-if no improvement tomorrow, will consider adding a nephrology consult  · Repeat blood work in the a.m.

## 2023-09-06 NOTE — ASSESSMENT & PLAN NOTE
· Patient with recent antibiotic use in the outpatient setting for UTI  · We will check stool studies -we will check C. difficile, fecal leukocytes, ova and parasites, along with a stool culture  · We will start empiric oral vancomycin 125 mg p.o. every 6 hours

## 2023-09-06 NOTE — ASSESSMENT & PLAN NOTE
· We will hold Vasotec in view of acute kidney injury  · We will use as needed IV hydralazine for systolic blood pressure of greater than 160

## 2023-09-06 NOTE — CONSULTS
Consultation - [address]  [facility]  Yessica Homar 81 y.o. female MRN: [id number]  Unit/Bed#: -01 Encounter: [id number]              Inpatient consult to gastroenterology     Performed by  Jonathan Nails DO   Authorized by Larissa Curry MD             Reason for Consult / Principal Problem: Diarrhea    ASSESSMENT AND PLAN:      Ms. Martina Rees is an 81year old female with a PMHx of DMII, HTN, diverticulitis s/p sigmoid resection, prior cholecystectomy, and anxiety who presents with abdominal pain for which GI is consulted. 1. Subacute Diarrhea   2. SIRS Syndrome  3. Acute on Chronic Kidney Injury      # Subacute Diarrhea: Patient with 1 week history of diarrhea accompanied by abdominal pain, fevers, and malaise. 10 episodes/day with nocturnal symptoms along with fecal urgency and incontinence. Febrile on arrival with no significant leukocytosis but CT imaging showing signs of colitis with focal circumferential thickening in the transverse colon. Additionally, patient with FERCHO along with electrolyte abnormalities including low Na, K, and Bicarb likely in the setting of diarrhea. Only mild abdominal pain diffusely on examination. Differential at this time includes infectious vs inflammatory etiology and less likely ischemic. Agree with empiric treatment of CDiff with PO Vanco given recent antibiotics use and severity of symptoms. Will follow up results of outstanding stool study. Will hold on endoscopic evaluation but would consider if results of stool studies are negative.      Plan:  · Continue PO Vancomycin 125 q6 hours  · Follow up stool studies; outstanding studies as follows:  · Clostridium difficile toxin by PCR with EIA  · Ova and Parasites  · Fecal leukocytes  · Fecal Calprotectin  · Stool Enteric Panel by PCR   · If stool studies negative; would plan for colonoscopy with bx to r/o microscopic colitis   · Okay for diet from GI standpoint; continue on clears and advance as patient tolerates  · Continue on aggressive IVF and replete electrolytes as needed  · Maintain contact precautions with strict handwashing    · Pain and symptom management per primary team     # Acute on Chronic Kidney Injury: Creatinine 2.0 on arrival; increased from baseline of 1.3 to 1.5. Likely a results of voluminous diarrhea which has also resulted in electrolyte abnormalities. Management per primary medicine team.   ______________________________________________________________________    HPI:  Ms. Doroteo Rowley is an 80 YO female with a PMHx of DMII, HTN, history of diverticulitis s/p sigmoid resection in 2016, prior cholecystectomy, and anxiety who presented to [facility] on 9/6/2023 with a 1 month history of diarrhea for which GI is consulted. Patient states that for the last 1 month she has been experiencing diarrhea. Admits to up to 10 bowel movements daily. States that her BMs are liquid, yellow/green, and often coat the toilet bowel after being flushed. Nocturnal symptoms are presents along with fecal urgency and incontinence. Admits to associated abdominal pain with nausea and accompanying malaise. Over the weekend, patient did have fevers. She denies any recent travel or sick contacts. No history of C. Diff. However, patient does admit to recent antibiotics use. In July, patient presented to the ED for abdominal pain. UA positive. Discharged on a 7 day course of Ceftin for UTI/pyelonephritis. Later on August 8/18, patient presented to the ED again with flank pain. Discharged home on a 14 day course of Augmentin for Pyelonephritis. At time of ED arrival, patient was hemodynamically stable but febrile with a temperature of 101.1. Labs work revealed no significant leukocytosis - WBC 9.71. However, patient was noted to have FERCHO with a creatinine of 2.00 accompanied by a sodium of 134 and potassium of 3.2.  CT A/P without contrast completed and showed mild wall thickening along with focal circumferential thickening and narrowing in the transverse colon. Prior CT completed 8/18/23 with no signs of colitis. Additionally, Cdiff tested on 8/25 and was negative at that time. Prior colonoscopy completed in 2011 for colon cancer screening. Revealed diverticulosis in the descending and sigmoid colon. REVIEW OF SYSTEMS:    CONSTITUTIONAL: Denies any fever, chills, rigors, and weight loss. HEENT: No earache or tinnitus. Denies hearing loss or visual disturbances. CARDIOVASCULAR: No chest pain or palpitations. RESPIRATORY: Denies any cough, hemoptysis, shortness of breath or dyspnea on exertion. GASTROINTESTINAL: As noted in the History of Present Illness. GENITOURINARY: No problems with urination. Denies any hematuria or dysuria. NEUROLOGIC: No dizziness or vertigo, denies headaches. MUSCULOSKELETAL: Denies any muscle or joint pain. SKIN: Denies skin rashes or itching. ENDOCRINE: Denies excessive thirst. Denies intolerance to heat or cold. PSYCHOSOCIAL: Denies depression or anxiety. Denies any recent memory loss.        Historical Information   Past Medical History:   Diagnosis Date   • Acute respiratory failure with hypoxia ([address]) 12/16/2021   • Broken arm     hairline fracture/ 2 places///   right arm   • Diabetes mellitus (HCC)    • Diverticulitis    • Postherpetic neuralgia      Past Surgical History:   Procedure Laterality Date   • CHOLECYSTECTOMY     • COLON SURGERY     • HERNIA REPAIR     • IR EMBOLIZATION (SPECIFY VESSEL OR SITE)  1/12/2022   • ROTATOR CUFF REPAIR Right    • VARICOSE VEIN SURGERY      Impression:  2/12/16 Manzo, Farrell Sacks     Social History   Social History     Substance and Sexual Activity   Alcohol Use Never    Comment: Rarely     Social History     Substance and Sexual Activity   Drug Use Never     Social History     Tobacco Use   Smoking Status Never   Smokeless Tobacco Never     Family History   Problem Relation Age of Onset   • Diabetes Mother    • No Known Problems Father        Meds/Allergies     Medications Prior to Admission   Medication   • ALPRAZolam (XANAX) 0.25 mg tablet   • Biotin 10 MG CAPS   • calcium carbonate (TUMS) 500 mg chewable tablet   • cholecalciferol (VITAMIN D3) 25 mcg (1,000 units) tablet   • ciprofloxacin (CILOXAN) 0.3 % ophthalmic solution   • dicyclomine (BENTYL) 10 mg capsule   • enalapril (VASOTEC) 10 mg tablet   • ferrous sulfate 325 (65 Fe) mg tablet   • glipiZIDE (GLUCOTROL XL) 10 mg 24 hr tablet   • ketoconazole (NIZORAL) 2 % shampoo   • ketorolac (ACULAR) 0.4 % SOLN   • lovastatin (MEVACOR) 40 MG tablet   • Multiple Vitamin (multivitamin) tablet   • polyethylene glycol (MIRALAX) 17 g packet   • prednisoLONE acetate (PRED FORTE) 1 % ophthalmic suspension   • sitaGLIPtin (Januvia) 100 mg tablet   • vitamin E, tocopherol, 1,000 units capsule     Current Facility-Administered Medications   Medication Dose Route Frequency   • acetaminophen (TYLENOL) tablet 650 mg  650 mg Oral Q6H PRN   • ALPRAZolam (XANAX) tablet 0.25 mg  0.25 mg Oral HS PRN   • [START ON 9/7/2023] ferrous sulfate tablet 325 mg  325 mg Oral Daily With Breakfast   • heparin (porcine) subcutaneous injection 5,000 Units  5,000 Units Subcutaneous Q8H [address]   • hydrALAZINE (APRESOLINE) injection 20 mg  20 mg Intravenous Q6H PRN   • insulin lispro (HumaLOG) 100 units/mL subcutaneous injection 1-5 Units  1-5 Units Subcutaneous TID AC   • insulin lispro (HumaLOG) 100 units/mL subcutaneous injection 1-5 Units  1-5 Units Subcutaneous HS   • multi-electrolyte (PLASMALYTE-A/ISOLYTE-S PH 7.4) IV solution  125 mL/hr Intravenous Continuous   • ondansetron (ZOFRAN) injection 4 mg  4 mg Intravenous Q6H PRN   • potassium chloride 20 mEq IVPB (premix)  20 mEq Intravenous Once    Followed by   • potassium chloride 20 mEq IVPB (premix)  20 mEq Intravenous Once   • pravastatin (PRAVACHOL) tablet 40 mg  40 mg Oral Daily With Dinner   • vancomycin (VANCOCIN) capsule 125 mg  125 mg Oral Q6H [address] Allergies   Allergen Reactions   • Ciprofloxacin GI Intolerance and Headache     Confusion, arm weakness, dizziness, headache   • Meperidine GI Intolerance, Hallucinations and Other (See Comments)     Demarol  Reaction Date:Unknown   • Propoxyphene GI Intolerance           Objective     Blood pressure 108/56, pulse (!) 107, temperature 98.5 °F (36.9 °C), resp. rate 18, height 4' 10" (1.473 m), weight 57.2 kg (126 lb 1.7 oz), SpO2 (!) 80 %, not currently breastfeeding. Body mass index is 26.36 kg/m². Intake/Output Summary (Last 24 hours) at 9/6/2023 1229  Last data filed at 9/6/2023 0945  Gross per 24 hour   Intake 50 ml   Output --   Net 50 ml         PHYSICAL EXAM:      General Appearance:   Alert, cooperative, no distress   HEENT:   Normocephalic, atraumatic, anicteric. Neck:  Supple, symmetrical, trachea midline   Lungs:   Clear to auscultation bilaterally; no rales, rhonchi or wheezing; respirations unlabored    Heart[de-identified]   Regular rate and rhythm; no murmur, rub, or gallop. Abdomen:   Soft, non-distended; normal bowel sounds; no masses, no organomegaly. Mild tenderness diffusely with deep palpation. No rebound or guarding present.    Genitalia:   Deferred    Rectal:   Deferred    Extremities:  No cyanosis, clubbing or edema    Pulses:  2+ and symmetric all extremities    Skin:  No jaundice, rashes, or lesions    Lymph nodes:  No palpable cervical lymphadenopathy        Lab Results:   Admission on 09/06/2023   Component Date Value   • WBC 09/06/2023 9.71    • RBC 09/06/2023 4.19    • Hemoglobin 09/06/2023 11.7    • Hematocrit 09/06/2023 35.6    • MCV 09/06/2023 85    • MCH 09/06/2023 27.9    • MCHC 09/06/2023 32.9    • RDW 09/06/2023 13.7    • MPV 09/06/2023 9.3    • Platelets 67/29/5927 202    • nRBC 09/06/2023 0    • Neutrophils Relative 09/06/2023 70    • Immat GRANS % 09/06/2023 0    • Lymphocytes Relative 09/06/2023 15    • Monocytes Relative 09/06/2023 15 (H)    • Eosinophils Relative 09/06/2023 0    • Basophils Relative 09/06/2023 0    • Neutrophils Absolute 09/06/2023 6.69    • Immature Grans Absolute 09/06/2023 0.04    • Lymphocytes Absolute 09/06/2023 1.44    • Monocytes Absolute 09/06/2023 1.48 (H)    • Eosinophils Absolute 09/06/2023 0.04    • Basophils Absolute 09/06/2023 0.02    • Sodium 09/06/2023 134 (L)    • Potassium 09/06/2023 3.2 (L)    • Chloride 09/06/2023 104    • CO2 09/06/2023 17 (L)    • ANION GAP 09/06/2023 13    • BUN 09/06/2023 38 (H)    • Creatinine 09/06/2023 2.00 (H)    • Glucose 09/06/2023 125    • Calcium 09/06/2023 9.0    • AST 09/06/2023 13    • ALT 09/06/2023 9    • Alkaline Phosphatase 09/06/2023 90    • Total Protein 09/06/2023 8.1    • Albumin 09/06/2023 4.1    • Total Bilirubin 09/06/2023 0.49    • eGFR 09/06/2023 22    • LACTIC ACID 09/06/2023 0.8    • Procalcitonin 09/06/2023 0.28 (H)    • Protime 09/06/2023 14.8 (H)    • INR 09/06/2023 1.14    • PTT 09/06/2023 30    • BNP 09/06/2023 70    • SARS-CoV-2 09/06/2023 Negative    • INFLUENZA A PCR 09/06/2023 Negative    • INFLUENZA B PCR 09/06/2023 Negative    • RSV PCR 09/06/2023 Negative    • Ventricular Rate 09/06/2023 104    • Atrial Rate 09/06/2023 104    • TN Interval 09/06/2023 140    • QRSD Interval 09/06/2023 88    • QT Interval 09/06/2023 344    • QTC Interval 09/06/2023 452    • P Axis 09/06/2023 10    • QRS Axis 09/06/2023 -37    • T Wave [place] 09/06/2023 -8        Imaging Studies: I have personally reviewed pertinent imaging studies.

## 2023-09-07 PROBLEM — J84.10 PULMONARY FIBROSIS (HCC): Status: ACTIVE | Noted: 2023-09-07

## 2023-09-07 LAB
ALBUMIN SERPL BCP-MCNC: 3.3 G/DL (ref 3.5–5)
ALP SERPL-CCNC: 62 U/L (ref 34–104)
ALT SERPL W P-5'-P-CCNC: 6 U/L (ref 7–52)
ANION GAP SERPL CALCULATED.3IONS-SCNC: 9 MMOL/L
AST SERPL W P-5'-P-CCNC: 9 U/L (ref 13–39)
BASOPHILS # BLD AUTO: 0.02 THOUSANDS/ÂΜL (ref 0–0.1)
BASOPHILS NFR BLD AUTO: 0 % (ref 0–1)
BILIRUB SERPL-MCNC: 0.44 MG/DL (ref 0.2–1)
BUN SERPL-MCNC: 27 MG/DL (ref 5–25)
C DIFF TOX A+B STL QL IA: POSITIVE
C DIFF TOX GENS STL QL NAA+PROBE: POSITIVE
CALCIUM ALBUM COR SERPL-MCNC: 8.9 MG/DL (ref 8.3–10.1)
CALCIUM SERPL-MCNC: 8.3 MG/DL (ref 8.4–10.2)
CAMPYLOBACTER DNA SPEC NAA+PROBE: NORMAL
CHLORIDE SERPL-SCNC: 109 MMOL/L (ref 96–108)
CO2 SERPL-SCNC: 18 MMOL/L (ref 21–32)
CREAT SERPL-MCNC: 1.52 MG/DL (ref 0.6–1.3)
EOSINOPHIL # BLD AUTO: 0.18 THOUSAND/ÂΜL (ref 0–0.61)
EOSINOPHIL NFR BLD AUTO: 2 % (ref 0–6)
ERYTHROCYTE [DISTWIDTH] IN BLOOD BY AUTOMATED COUNT: 14.2 % (ref 11.6–15.1)
GFR SERPL CREATININE-BSD FRML MDRD: 31 ML/MIN/1.73SQ M
GLUCOSE SERPL-MCNC: 101 MG/DL (ref 65–140)
GLUCOSE SERPL-MCNC: 133 MG/DL (ref 65–140)
GLUCOSE SERPL-MCNC: 201 MG/DL (ref 65–140)
GLUCOSE SERPL-MCNC: 231 MG/DL (ref 65–140)
GLUCOSE SERPL-MCNC: 86 MG/DL (ref 65–140)
HCT VFR BLD AUTO: 28.9 % (ref 34.8–46.1)
HETEROPH AB SER QL: NEGATIVE
HGB BLD-MCNC: 9.3 G/DL (ref 11.5–15.4)
IMM GRANULOCYTES # BLD AUTO: 0.03 THOUSAND/UL (ref 0–0.2)
IMM GRANULOCYTES NFR BLD AUTO: 0 % (ref 0–2)
LYMPHOCYTES # BLD AUTO: 2.33 THOUSANDS/ÂΜL (ref 0.6–4.47)
LYMPHOCYTES NFR BLD AUTO: 24 % (ref 14–44)
MAGNESIUM SERPL-MCNC: 2 MG/DL (ref 1.9–2.7)
MCH RBC QN AUTO: 28.1 PG (ref 26.8–34.3)
MCHC RBC AUTO-ENTMCNC: 32.2 G/DL (ref 31.4–37.4)
MCV RBC AUTO: 87 FL (ref 82–98)
MONOCYTES # BLD AUTO: 1.02 THOUSAND/ÂΜL (ref 0.17–1.22)
MONOCYTES NFR BLD AUTO: 11 % (ref 4–12)
NEUTROPHILS # BLD AUTO: 6.18 THOUSANDS/ÂΜL (ref 1.85–7.62)
NEUTS SEG NFR BLD AUTO: 63 % (ref 43–75)
NRBC BLD AUTO-RTO: 0 /100 WBCS
PHOSPHATE SERPL-MCNC: 2.5 MG/DL (ref 2.3–4.1)
PLATELET # BLD AUTO: 167 THOUSANDS/UL (ref 149–390)
PMV BLD AUTO: 9 FL (ref 8.9–12.7)
POTASSIUM SERPL-SCNC: 3.7 MMOL/L (ref 3.5–5.3)
PROT SERPL-MCNC: 6.2 G/DL (ref 6.4–8.4)
RBC # BLD AUTO: 3.31 MILLION/UL (ref 3.81–5.12)
SALMONELLA DNA SPEC QL NAA+PROBE: NORMAL
SHIGA TOXIN STX GENE SPEC NAA+PROBE: NORMAL
SHIGELLA DNA SPEC QL NAA+PROBE: NORMAL
SODIUM SERPL-SCNC: 136 MMOL/L (ref 135–147)
WBC # BLD AUTO: 9.76 THOUSAND/UL (ref 4.31–10.16)
WBC SPEC QL GRAM STN: ABNORMAL

## 2023-09-07 PROCEDURE — 84100 ASSAY OF PHOSPHORUS: CPT | Performed by: HOSPITALIST

## 2023-09-07 PROCEDURE — 99232 SBSQ HOSP IP/OBS MODERATE 35: CPT | Performed by: STUDENT IN AN ORGANIZED HEALTH CARE EDUCATION/TRAINING PROGRAM

## 2023-09-07 PROCEDURE — 85025 COMPLETE CBC W/AUTO DIFF WBC: CPT | Performed by: HOSPITALIST

## 2023-09-07 PROCEDURE — 82948 REAGENT STRIP/BLOOD GLUCOSE: CPT

## 2023-09-07 PROCEDURE — 83735 ASSAY OF MAGNESIUM: CPT | Performed by: HOSPITALIST

## 2023-09-07 PROCEDURE — 99232 SBSQ HOSP IP/OBS MODERATE 35: CPT | Performed by: HOSPITALIST

## 2023-09-07 PROCEDURE — 80053 COMPREHEN METABOLIC PANEL: CPT | Performed by: HOSPITALIST

## 2023-09-07 RX ORDER — DICYCLOMINE HCL 20 MG
20 TABLET ORAL 4 TIMES DAILY PRN
Status: DISCONTINUED | OUTPATIENT
Start: 2023-09-07 | End: 2023-09-09 | Stop reason: HOSPADM

## 2023-09-07 RX ADMIN — FERROUS SULFATE TAB 325 MG (65 MG ELEMENTAL FE) 325 MG: 325 (65 FE) TAB at 08:14

## 2023-09-07 RX ADMIN — INSULIN LISPRO 1 UNITS: 100 INJECTION, SOLUTION INTRAVENOUS; SUBCUTANEOUS at 17:14

## 2023-09-07 RX ADMIN — VANCOMYCIN HYDROCHLORIDE 125 MG: 125 CAPSULE ORAL at 05:03

## 2023-09-07 RX ADMIN — HEPARIN SODIUM 5000 UNITS: 5000 INJECTION INTRAVENOUS; SUBCUTANEOUS at 05:03

## 2023-09-07 RX ADMIN — HEPARIN SODIUM 5000 UNITS: 5000 INJECTION INTRAVENOUS; SUBCUTANEOUS at 13:00

## 2023-09-07 RX ADMIN — INSULIN LISPRO 2 UNITS: 100 INJECTION, SOLUTION INTRAVENOUS; SUBCUTANEOUS at 21:37

## 2023-09-07 RX ADMIN — VANCOMYCIN HYDROCHLORIDE 125 MG: 125 CAPSULE ORAL at 17:14

## 2023-09-07 RX ADMIN — HEPARIN SODIUM 5000 UNITS: 5000 INJECTION INTRAVENOUS; SUBCUTANEOUS at 21:36

## 2023-09-07 RX ADMIN — VANCOMYCIN HYDROCHLORIDE 125 MG: 125 CAPSULE ORAL at 23:19

## 2023-09-07 RX ADMIN — SODIUM CHLORIDE, SODIUM GLUCONATE, SODIUM ACETATE, POTASSIUM CHLORIDE, MAGNESIUM CHLORIDE, SODIUM PHOSPHATE, DIBASIC, AND POTASSIUM PHOSPHATE 125 ML/HR: .53; .5; .37; .037; .03; .012; .00082 INJECTION, SOLUTION INTRAVENOUS at 02:57

## 2023-09-07 RX ADMIN — VANCOMYCIN HYDROCHLORIDE 125 MG: 125 CAPSULE ORAL at 12:59

## 2023-09-07 RX ADMIN — PRAVASTATIN SODIUM 40 MG: 40 TABLET ORAL at 17:14

## 2023-09-07 NOTE — NUTRITION
09/07/23 1413   Biochemical Data,Medical Tests, and Procedures   Biochemical Data/Medical Tests/Procedures Lab values reviewed; Meds reviewed   Labs (Comment) 9/7/23 Cl 109, BUN 27, creat 1.52, Ca 8.3, AST 9, ALT 6, H&H 9.3/28.9   Meds (Comment) ferrous sulfate, heparin, insulin   Nutrition-Focused Physical Exam   Nutrition-Focused Physical Exam Findings RN skin assessment reviewed; No edema documented; No skin issues documented   Medical-Related Concerns type 2 diabetes, HTN, diverticulitis, stage 3 CKD   Current PO Intake   Current Diet Order CCD3 diet, thin liquids   Nutrition Supplements Banatrol Plus Banana Flakes; Ensure High Protein Pudding  (TID)   Current Meal Intake Suboptimal, but improving   PES Statement   Energy Balance (1) Predicted suboptimal energy intake NI-1.4   Related to Diarrhea/malabsoprtion   As evidenced by: Per patient/family interview   Recommendations/Interventions   Malnutrition/BMI Present No   Summary Weight loss, poor PO - MST 2. Presents with diarrhea ongoing for 1 month. Found to have FERCHO. Past medical history significant for type 2 diabetes, HTN, diverticulitis, stage 3 CKD. Weight history reviewed. No significant change. No edema. No pressure areas. Prescribed a CCD3 diet, thin liquids. Banatrol in chocolate Ensure Pudding TID. Met with pt at bedside. She reports having a “bad appetite” ongoing for 3 weeks now. She usually has 2 meals daily. Reports decreased PO intake for 2 weeks. No dietary restrictions. NKFA. Denies dysphagia. Pt cooks and grocery shops. Reports usual body weight fluctuates 125#-135#. She reports taking oral medication for type 2 diabetes. Checks her blood sugar daily; 66 - 200 mg/dL (200 mg/dL - “If I am really bad”). Discussed diet as prescribed. Offers no questions on topic of carbohydrates, type 2 diabetes. Discussed ongoing loose stools.  RD provided and discussed “Diarrhea Nutrition Therapy” handout, complete with list of foods recommended and foods not recommended. Pt offers no additional nutrition questions. RD to follow.    Interventions/Recommendations Continue current diet order;Monitor I & O's   Education Assessment   Education Education initiated/ completed   Patient Nutrition Goals   Goal Comprehend education;Meet PO needs

## 2023-09-07 NOTE — ASSESSMENT & PLAN NOTE
· Patient with recent antibiotic use in the outpatient setting for UTI  · Appreciate GI input  · Testing for COVID-19 is negative  · Stool studies for C. difficile, fecal calprotectin, stool culture, fecal leukocytes, and ova parasites are all still pending  · Patient reports an improvement in her diarrhea reports having had 3 episodes over the past day  · Continue empiric vancomycin 125 mg p.o. every 6 hours-day 2

## 2023-09-07 NOTE — PROGRESS NOTES
Progress Note- Debbie Sawyer 81 y.o. female MRN: [id number]    Unit/Bed#: -01 Encounter: [id number]      Assessment and Plan:    Mr. Huyen Egan is an 81-year-old female with a past medical history of DMII, HTN, diverticulitis status post sigmoid resection, prior cholecystectomy, and anxiety who presents with abdominal pain for which GI is consulted. 1. Subacute Diarrhea  2. 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 SIRS Syndrome  4. Acute on Chronic Kidney Injury    # Subacute Diarrhea: Patient with 1 week history of diarrhea accompanied by abdominal pain, fevers, and malaise. 10 episodes per day with nocturnal symptoms along with fecal urgency and incontinence. Febrile on arrival with no significant leukocytosis with CT imaging showing signs of colitis with focal circumferential thickening in the transverse colon. Additionally, patient with FERCHO along with electrolyte abnormalities including low sodium, potassium, and bicarb likely in setting of diarrhea - now improved. Differential still includes infectious versus inflammatory etiology and less likely ischemic. At time of admission, patient started on empiric treatment of C. difficile with PO Vanco  given recent antibiotic use and severity of symptoms. Decreasing frequency of stools at this time. Recommend continued treatment with vancomycin while awaiting results of outstanding stool studies. We will hold on endoscopic evaluation but will consider if results of stool testing are negative and symptoms do not improve.     Plan:  · Continue PO Vancomycin 125 mg q6 hours  · Follow up stools studies; pending studies as follows  · Clostridium difficile toxin by PCR with EIA  · Ova and parasites  · Fecal leukocytes  · Fecal calprotectin  · Stool enteric panel by PCR  · If stool studies negative, would plan for colonoscopy/flex sig with biopsies to rule out microscopic colitis  · Avoid use antidiarrheals at this time, added Banatrol TID with meals  · Okay for diet from GI perspective, advance as patient tolerates  · Encourage oral hydration and replete electrolytes as needed  · Maintain contact precautions with strict handwashing  · Pain and symptom management per primary team    # Acute on Chronic Kidney Injury: Creatinine 2.0 on arrival, increased from baseline of 1.3-1.5. Likely a result of volume is diarrhea which has also resulted in electrolyte abnormalities. Creatinine now improving. Continued management per primary team.  ______________________________________________________________________    Subjective:  Patient seen and examined at bedside. Patient lying in bed comfortably in no acute distress or visible discomfort. Does admit to occasional shocklike abdominal pain. Occurs diffusely but most severe in left lower quadrant. Admits to continued loose stools. States that throughout the night, she had 3 episodes of loose stool. Nonbloody. Denies any fever/chills. Denies nausea/vomiting. Admits to having an appetite and would like to eat. Offers no additional complaints at this time.     Medication Administration - last 24 hours from 09/06/2023 0834 to 09/07/2023 0746       Date/Time Order Dose Route Action Action by     09/06/2023 0945 EDT cefepime (MAXIPIME) IVPB (premix in dextrose) 2,000 mg 50 mL 0 mg Intravenous Stopped Evangelist Butler RN     09/06/2023 0915 EDT cefepime (MAXIPIME) IVPB (premix in dextrose) 2,000 mg 50 mL 2,000 mg Intravenous [address]  Evangelist Butler RN     09/06/2023 0011 EDT acetaminophen (TYLENOL) tablet 650 mg 650 mg Oral Given Evangelist Butler RN     09/06/2023 1006 EDT potassium chloride (K-DUR,KLOR-CON) CR tablet 40 mEq 40 mEq Oral Given Evangelist Butler RN     09/06/2023 1015 EDT sodium chloride 0.9 % bolus 500 mL 500 mL Intravenous [address]  Evangelist Butler RN     09/07/2023 5769 EDT ferrous sulfate tablet 325 mg 325 mg Oral Given Dwaine Regalado RN     09/06/2023 1716 EDT pravastatin (PRAVACHOL) tablet 40 mg 40 mg Oral Given [place], [place]     09/07/2023 5312 EDT multi-electrolyte (PLASMALYTE-A/ISOLYTE-S PH 7.4) IV solution 125 mL/hr Intravenous [address] [facility], [place]     09/06/2023 1927 EDT multi-electrolyte (PLASMALYTE-A/ISOLYTE-S PH 7.4) IV solution 125 mL/hr Intravenous [address] [facility], [place]     09/06/2023 1231 EDT multi-electrolyte (PLASMALYTE-A/ISOLYTE-S PH 7.4) IV solution 125 mL/hr Intravenous [address] [facility], [place]     09/07/2023 0503 EDT heparin (porcine) subcutaneous injection 5,000 Units 5,000 Units Subcutaneous Given Gaelrimonique Ch, RN     09/06/2023 2151 EDT heparin (porcine) subcutaneous injection 5,000 Units 5,000 Units Subcutaneous Given Gaelrimonique Ch, RN     09/06/2023 1441 EDT heparin (porcine) subcutaneous injection 5,000 Units 5,000 Units Subcutaneous Given Ban Has, [place]     09/07/2023 2653 EDT insulin lispro (HumaLOG) 100 units/mL subcutaneous injection 1-5 Units -- Subcutaneous Not Given Ban Has, RN     09/06/2023 1619 EDT insulin lispro (HumaLOG) 100 units/mL subcutaneous injection 1-5 Units -- Subcutaneous Not Given [place] Has, RN     09/06/2023 1245 EDT insulin lispro (HumaLOG) 100 units/mL subcutaneous injection 1-5 Units -- Subcutaneous Not Given [place] Has, RN     09/06/2023 2114 EDT insulin lispro (HumaLOG) 100 units/mL subcutaneous injection 1-5 Units -- Subcutaneous Not Given Gaelrie Jing, RN     09/07/2023 0503 EDT vancomycin (VANCOCIN) capsule 125 mg 125 mg Oral Given Garrie Jing, RN     09/06/2023 2306 EDT vancomycin (VANCOCIN) capsule 125 mg 125 mg Oral Given Garrie Jing, RN     09/06/2023 1712 EDT vancomycin (VANCOCIN) capsule 125 mg 125 mg Oral Given [place] Has, RN     09/06/2023 1245 EDT vancomycin (VANCOCIN) capsule 125 mg 125 mg Oral Given Ban Has, RN     09/06/2023 1435 EDT potassium chloride 20 mEq IVPB (premix) 0 mEq Intravenous [address], [place]     09/06/2023 1245 EDT potassium chloride 20 mEq IVPB (premix) 20 mEq Intravenous [address]  Robby Wilder, [address]     09/06/2023 1441 EDT potassium chloride 20 mEq IVPB (premix) 20 mEq Intravenous New Bag Robyb Wilder RN          Objective:     Vitals: Blood pressure 124/61, pulse 77, temperature 98 °F (36.7 °C), resp. rate 18, height 4' 10" (1.473 m), weight 57.2 kg (126 lb 1.7 oz), SpO2 98 %, not currently breastfeeding. ,Body mass index is 26.36 kg/m². Intake/Output Summary (Last 24 hours) at 9/7/2023 0834  Last data filed at 9/6/2023 0945  Gross per 24 hour   Intake 50 ml   Output --   Net 50 ml       Physical Exam  Constitutional:       General: She is not in acute distress. Appearance: She is normal weight. She is not toxic-appearing. HENT:      Head: Normocephalic and atraumatic. Nose: Nose normal. No congestion. Eyes:      General: No scleral icterus. Cardiovascular:      Rate and Rhythm: Normal rate and regular rhythm. Pulses: Normal pulses. Pulmonary:      Effort: Pulmonary effort is normal.   Abdominal:      General: Abdomen is flat. Bowel sounds are normal. There is no distension. Tenderness: There is abdominal tenderness (Diffusely tender with deep palpation. Mild. ). There is no guarding or rebound. Musculoskeletal:      Cervical back: Normal range of motion. Right lower leg: No edema. Left lower leg: No edema. Skin:     General: Skin is warm. Coloration: Skin is not jaundiced or pale. Neurological:      Mental Status: She is alert and oriented to person, place, and time.    Psychiatric:         Mood and Affect: Mood normal.         Behavior: Behavior normal.           Invasive Devices     Peripheral Intravenous Line  Duration           Peripheral IV 09/06/23 Right;Ventral (anterior) Forearm <1 day                Lab Results:  Admission on 09/06/2023   Component Date Value   • WBC 09/06/2023 9.71    • RBC 09/06/2023 4.19    • Hemoglobin 09/06/2023 11.7    • Hematocrit 09/06/2023 35.6    • MCV 09/06/2023 85    • MCH 09/06/2023 27.9    • MCHC 09/06/2023 32.9    • RDW 09/06/2023 13.7    • MPV 09/06/2023 9.3    • Platelets 71/59/8338 202    • nRBC 09/06/2023 0    • Neutrophils Relative 09/06/2023 70    • Immat GRANS % 09/06/2023 0    • Lymphocytes Relative 09/06/2023 15    • Monocytes Relative 09/06/2023 15 (H)    • Eosinophils Relative 09/06/2023 0    • Basophils Relative 09/06/2023 0    • Neutrophils Absolute 09/06/2023 6.69    • Immature Grans Absolute 09/06/2023 0.04    • Lymphocytes Absolute 09/06/2023 1.44    • Monocytes Absolute 09/06/2023 1.48 (H)    • Eosinophils Absolute 09/06/2023 0.04    • Basophils Absolute 09/06/2023 0.02    • Sodium 09/06/2023 134 (L)    • Potassium 09/06/2023 3.2 (L)    • Chloride 09/06/2023 104    • CO2 09/06/2023 17 (L)    • ANION GAP 09/06/2023 13    • BUN 09/06/2023 38 (H)    • Creatinine 09/06/2023 2.00 (H)    • Glucose 09/06/2023 125    • Calcium 09/06/2023 9.0    • AST 09/06/2023 13    • ALT 09/06/2023 9    • Alkaline Phosphatase 09/06/2023 90    • Total Protein 09/06/2023 8.1    • Albumin 09/06/2023 4.1    • Total Bilirubin 09/06/2023 0.49    • eGFR 09/06/2023 22    • LACTIC ACID 09/06/2023 0.8    • Procalcitonin 09/06/2023 0.28 (H)    • Protime 09/06/2023 14.8 (H)    • INR 09/06/2023 1.14    • PTT 09/06/2023 30    • Blood Culture 09/06/2023 Received in Microbiology Lab. Culture in Progress. • Blood Culture 09/06/2023 Received in Microbiology Lab. Culture in Progress.     • Color, UA 09/06/2023 Yellow    • Clarity, UA 09/06/2023 Clear    • Specific Gravity, UA 09/06/2023 1.015    • pH, UA 09/06/2023 5.0    • Leukocytes, UA 09/06/2023 1+ (A)    • Nitrite, UA 09/06/2023 Negative    • Protein, UA 09/06/2023 1+ (A)    • Glucose, UA 09/06/2023 Negative    • Ketones, UA 09/06/2023 Negative    • Urobilinogen, UA 09/06/2023 0.2    • Bilirubin, UA 09/06/2023 Negative    • Occult Blood, UA 09/06/2023 Trace-Intact (A)    • BNP 09/06/2023 70    • SARS-CoV-2 09/06/2023 Negative • INFLUENZA A PCR 09/06/2023 Negative    • INFLUENZA B PCR 09/06/2023 Negative    • RSV PCR 09/06/2023 Negative    • Ventricular Rate 09/06/2023 104    • Atrial Rate 09/06/2023 104    • GA Interval 09/06/2023 140    • QRSD Interval 09/06/2023 88    • QT Interval 09/06/2023 344    • QTC Interval 09/06/2023 452    • P Axis 09/06/2023 10    • QRS Axis 09/06/2023 -43    • T Wave Axis 09/06/2023 -8    • Lyme Total Antibodies 09/06/2023 Positive (A)    • Monotest 09/06/2023 Negative    • POC Glucose 09/06/2023 137    • POC Glucose 09/06/2023 111    • RBC, UA 09/06/2023 0-1    • WBC, UA 09/06/2023 4-10 (A)    • Epithelial Cells 09/06/2023 Occasional    • Bacteria, UA 09/06/2023 Occasional    • POC Glucose 09/06/2023 139    • Lyme IGM 09/06/2023 Positive (A)    • Lyme IGG 09/06/2023 Positive (A)    • Sodium 09/07/2023 136    • Potassium 09/07/2023 3.7    • Chloride 09/07/2023 109 (H)    • CO2 09/07/2023 18 (L)    • ANION GAP 09/07/2023 9    • BUN 09/07/2023 27 (H)    • Creatinine 09/07/2023 1.52 (H)    • Glucose 09/07/2023 101    • Calcium 09/07/2023 8.3 (L)    • Corrected Calcium 09/07/2023 8.9    • AST 09/07/2023 9 (L)    • ALT 09/07/2023 6 (L)    • Alkaline Phosphatase 09/07/2023 62    • Total Protein 09/07/2023 6.2 (L)    • Albumin 09/07/2023 3.3 (L)    • Total Bilirubin 09/07/2023 0.44    • eGFR 09/07/2023 31    • Magnesium 09/07/2023 2.0    • Phosphorus 09/07/2023 2.5    • WBC 09/07/2023 9.76    • RBC 09/07/2023 3.31 (L)    • Hemoglobin 09/07/2023 9.3 (L)    • Hematocrit 09/07/2023 28.9 (L)    • MCV 09/07/2023 87    • MCH 09/07/2023 28.1    • MCHC 09/07/2023 32.2    • RDW 09/07/2023 14.2    • MPV 09/07/2023 9.0    • Platelets 61/02/5408 167    • nRBC 09/07/2023 0    • Neutrophils Relative 09/07/2023 63    • Immat GRANS % 09/07/2023 0    • Lymphocytes Relative 09/07/2023 24    • Monocytes Relative 09/07/2023 11    • Eosinophils Relative 09/07/2023 2    • Basophils Relative 09/07/2023 0    • Neutrophils Absolute 09/07/2023 6.18    • Immature Grans Absolute 09/07/2023 0.03    • Lymphocytes Absolute 09/07/2023 2.33    • Monocytes Absolute 09/07/2023 1.02    • Eosinophils Absolute 09/07/2023 0.18    • Basophils Absolute 09/07/2023 0.02    • POC Glucose 09/07/2023 86        Imaging Studies: I have personally reviewed pertinent imaging studies.

## 2023-09-07 NOTE — PLAN OF CARE
Problem: PAIN - ADULT  Goal: Verbalizes/displays adequate comfort level or baseline comfort level  Description: Interventions:  - Encourage patient to monitor pain and request assistance  - Assess pain using appropriate pain scale  - Administer analgesics based on type and severity of pain and evaluate response  - Implement non-pharmacological measures as appropriate and evaluate response  - Consider cultural and social influences on pain and pain management  - Notify physician/advanced practitioner if interventions unsuccessful or patient reports new pain  Outcome: Progressing     Problem: INFECTION - ADULT  Goal: Absence or prevention of progression during hospitalization  Description: INTERVENTIONS:  - Assess and monitor for signs and symptoms of infection  - Monitor lab/diagnostic results  - Monitor all insertion sites, i.e. indwelling lines, tubes, and drains  - Monitor endotracheal if appropriate and nasal secretions for changes in amount and color  - [place] appropriate cooling/warming therapies per order  - Administer medications as ordered  - Instruct and encourage patient and family to use good hand hygiene technique  - Identify and instruct in appropriate isolation precautions for identified infection/condition  Outcome: Progressing     Problem: SAFETY ADULT  Goal: Patient will remain free of falls  Description: INTERVENTIONS:  - Educate patient/family on patient safety including physical limitations  - Instruct patient to call for assistance with activity   - Consult OT/PT to assist with strengthening/mobility   - Keep Call bell within reach  - Keep bed low and locked with side rails adjusted as appropriate  - Keep care items and personal belongings within reach  - Initiate and maintain comfort rounds  - Make Fall Risk Sign visible to staff  - Offer Toileting every 2 Hours, in advance of need  - Apply yellow socks and bracelet for high fall risk patients  - Consider moving patient to room near nurses station  Outcome: Progressing  Goal: Maintain or return to baseline ADL function  Description: INTERVENTIONS:  -  Assess patient's ability to carry out ADLs; assess patient's baseline for ADL function and identify physical deficits which impact ability to perform ADLs (bathing, care of mouth/teeth, toileting, grooming, dressing, etc.)  - Assess/evaluate cause of self-care deficits   - Assess range of motion  - Assess patient's mobility; develop plan if impaired  - Assess patient's need for assistive devices and provide as appropriate  - Encourage maximum independence but intervene and supervise when necessary  - Involve family in performance of ADLs  - Assess for home care needs following discharge   - Consider OT consult to assist with ADL evaluation and planning for discharge  - Provide patient education as appropriate  Outcome: Progressing  Goal: Maintains/Returns to pre admission functional level  Description: INTERVENTIONS:  - Perform BMAT or MOVE assessment daily.   - Set and communicate daily mobility goal to care team and patient/family/caregiver.    - Collaborate with rehabilitation services on mobility goals if consulted  - Out of bed for toileting  - Record patient progress and toleration of activity level   Outcome: Progressing     Problem: DISCHARGE PLANNING  Goal: Discharge to home or other facility with appropriate resources  Description: INTERVENTIONS:  - Identify barriers to discharge w/patient and caregiver  - Arrange for needed discharge resources and transportation as appropriate  - Identify discharge learning needs (meds, wound care, etc.)  - Arrange for interpretive services to assist at discharge as needed  - Refer to Case Management Department for coordinating discharge planning if the patient needs post-hospital services based on physician/advanced practitioner order or complex needs related to functional status, cognitive ability, or social support system  Outcome: Progressing     Problem: Knowledge Deficit  Goal: Patient/family/caregiver demonstrates understanding of disease process, treatment plan, medications, and discharge instructions  Description: Complete learning assessment and assess knowledge base.   Interventions:  - Provide teaching at level of understanding  - Provide teaching via preferred learning methods  Outcome: Progressing     Problem: GASTROINTESTINAL - ADULT  Goal: Minimal or absence of nausea and/or vomiting  Description: INTERVENTIONS:  - Administer IV fluids if ordered to ensure adequate hydration  - Maintain NPO status until nausea and vomiting are resolved  - Nasogastric tube if ordered  - Administer ordered antiemetic medications as needed  - Provide nonpharmacologic comfort measures as appropriate  - Advance diet as tolerated, if ordered  - Consider nutrition services referral to assist patient with adequate nutrition and appropriate food choices  Outcome: Progressing  Goal: Maintains or returns to baseline bowel function  Description: INTERVENTIONS:  - Assess bowel function  - Encourage oral fluids to ensure adequate hydration  - Administer IV fluids if ordered to ensure adequate hydration  - Administer ordered medications as needed  - Encourage mobilization and activity  - Consider nutritional services referral to assist patient with adequate nutrition and appropriate food choices  Outcome: Progressing  Goal: Maintains adequate nutritional intake  Description: INTERVENTIONS:  - Monitor percentage of each meal consumed  - Identify factors contributing to decreased intake, treat as appropriate  - Assist with meals as needed  - Monitor I&O, weight, and lab values if indicated  - Obtain nutrition services referral as needed  Outcome: Progressing  Goal: Oral mucous membranes remain intact  Description: INTERVENTIONS  - Assess oral mucosa and hygiene practices  - Implement preventative oral hygiene regimen  - Implement oral medicated treatments as ordered  - Initiate Nutrition services referral as needed  Outcome: Progressing

## 2023-09-07 NOTE — ASSESSMENT & PLAN NOTE
· Noted on CAT scan of the abdomen  · Most likely a sequelae of her history of COVID-19  · Patient requiring 2 to 3 L of supplemental oxygen via nasal cannula  · We will ask for pulmonary evaluation also  · Patient may benefit from an exercise desat study prior to discharge

## 2023-09-07 NOTE — ASSESSMENT & PLAN NOTE
· Patient has a baseline CKD stage IIIb-baseline creatinine is between 1.3 and 1.5  · Acute kidney injury was present on admission with an arrival creatinine of 2.0  · This has since resolved, creatinine is now down to 1.5  · Prerenal in the setting of having diarrhea  · Hyponatremia- resolved with IV fluid, this was a hypovolemic hyponatremia  · Hypokalemia- resolved with IV repletion-this was also secondary to diarrhea  · We will repeat BMP testing in the a.m.   · DC IV fluids today

## 2023-09-07 NOTE — ASSESSMENT & PLAN NOTE
· Vasotec remains on hold in view of FERCHO -okay to resume at discharge  · Blood pressure is controlled  · Continue as needed IV hydralazine resolved systolic blood pressure greater than 160 as needed every 6 hours

## 2023-09-07 NOTE — UTILIZATION REVIEW
Initial Clinical Review  PATIENT WAS OBSERVATION STATUS 9/6/23 CONVERTED TO INPATIENT ADMISSION 9/7/23 AS NEEDING > 2MN STAY NOW REQUIRING OXYGEN 2-3 L   CONTINUED ABX AND PENDING STOOL STUDIES  Admission: Date/Time/Statement:   Admission Orders (From admission, onward)     Ordered        09/07/23 0819  Inpatient Admission  Once            09/06/23 1052  Place in Observation  Once                      Orders Placed This Encounter   Procedures   • Place in Observation     Standing Status:   Standing     Number of Occurrences:   1     Order Specific Question:   Level of Care     Answer:   Med Surg [16]   • Inpatient Admission     Standing Status:   Standing     Number of Occurrences:   1     Order Specific Question:   Level of Care     Answer:   Med Surg [16]     Order Specific Question:   Estimated length of stay     Answer:   More than 2 Midnights     Order Specific Question:   Certification     Answer:   I certify that inpatient services are medically necessary for this patient for a duration of greater than two midnights. See H&P and MD Progress Notes for additional information about the patient's course of treatment. ED Arrival Information     Expected   -    Arrival   9/6/2023 08:30    Acuity   Urgent            Means of arrival   Walk-In    Escorted by   Family Member    Service   Hospitalist    Admission type   Emergency            Arrival complaint   diarrhea           Chief Complaint   Patient presents with   • Diarrhea     Pt reports diarrhea x 1 mo, going approx 10 times/day; reports recent abx for UTI       Initial Presentation: 81 y.o. female PMH acute respirotry failure with hypoxia DM, Diverticulitis, postherpetic neuralgia. To ED had been treated in ED x2 with abx for suspected UTI. Now having diarrhea that is waking her up at night, episode fever intermittent nausea no vomiting. abd pain /discomfort ,some sob on exertion. In ED found abd mild tender to palpation,hyperactive bowel sounds.  Na 134, K 3.2, CO2 17, bun/cr 38/ 2.0 CT abd chronic pulmonary fibrotic changes in b/l lung bases. Prior cholecystectomy . Transverse colon showing findings suggesting mild wall thickening as well as focal circumferential thickening and narrowing. See above for further details and discussion. Cannot better characterized in the absence of intravenous contrast.   Observation for FERCHO- IVF repeat labs am  Diarrhea consult GI stool studies, empiric vancomycin  DM hgba1c  GI CONSULT  Cause for her symptoms remains unknown although suspect C diff infection given recent abx. Potential IBS vs colitis vs IBD. F/u stool studies continue vancomycin . IVF hydration and electrolyte repletion. Consider inpatient flexible sigmoidoscopy for eval other potential causes. No immediate plan at this time. Date: 9/7/23   Day 2: Converted to Inpatient admit as needing > 2mn stay for pulmonary eval and pending stool studies  FERCHO and electrolytes resolved on IVF, diarrhea stool studies still pending will continue vancomycin till studies result. COVID testing negative  HTN BP controlled continue IV hydralazine prn  Pulmonary fibrosis noted on CAT scan of the abdomen. Likely sequelae of hx covid 19. Patient requiring 2-3 L supplement oxygen nc. Will consult Pulmonary evaluation  Per GI agree with continue vancomycin till stool study in.     ED Triage Vitals [09/06/23 0839]   Temperature Pulse Respirations Blood Pressure SpO2   (!) 101.1 °F (38.4 °C) (!) 109 18 153/69 91 %      Temp Source Heart Rate Source Patient Position - Orthostatic VS BP Location FiO2 (%)   Tympanic Monitor Lying Left arm --      Pain Score       No Pain          Wt Readings from Last 1 Encounters:   09/06/23 57.2 kg (126 lb 1.7 oz)     Additional Vital Signs:   09/07/23 0817 -- -- -- -- -- -- 32 3 L/min Nasal cannula --   09/07/23 07:09:57 98 °F (36.7 °C) 77 18 124/61 82 98 % -- -- -- --   09/06/23 23:07:02 99.6 °F (37.6 °C) 91 18 112/59 77 97 % -- -- -- --   09/06/23 2032 -- -- -- -- -- -- 32 3 L/min Nasal cannula --   09/06/23 16:03:32 98.8 °F (37.1 °C) 92 16 103/56 72 94 % -- -- -- --   09/06/23 1245 -- -- -- -- -- 95 % 32 3 L/min Nasal cannula --   09/06/23 12:01:50 98.5 °F (36.9 °C) 107 Abnormal  18 108/56 73 80 % Abnormal  -- -- -- --   09/06/23 1105 -- 101 18 127/60 87 95 % 28 2 L/min         Pertinent Labs/Diagnostic Test Results:   CT abdomen pelvis wo contrast   Final Result by Hiro Stone MD (09/06 1021)      Chronic pulmonary fibrotic changes in both lung bases. Prior cholecystectomy. Stable dilatation of the extrahepatic biliary tree. Mild hepatomegaly as noted above. Transverse colon showing findings suggesting mild wall thickening as well as focal circumferential thickening and narrowing. See above for further details and discussion. Cannot better characterized in the absence of intravenous contrast. Oral contrast    is only minimally seen within the small bowel. Workstation performed: [id number]         XR chest portable   ED Interpretation by Radha Javier PA-C (09/06 3380)   No acute cardiopulmonary process. Final Result by Austin Flood MD (09/06 1152)      No acute cardiopulmonary disease. Resident: Geovany Allen, the attending radiologist, have reviewed the images and agree with the final report above.       Workstation performed: [id number]           Results from last 7 days   Lab Units 09/06/23  0905   SARS-COV-2  Negative     Results from last 7 days   Lab Units 09/07/23 0446 09/06/23  0905   WBC Thousand/uL 9.76 9.71   HEMOGLOBIN g/dL 9.3* 11.7   HEMATOCRIT % 28.9* 35.6   PLATELETS Thousands/uL 167 202   NEUTROS ABS Thousands/µL 6.18 6.69     Results from last 7 days   Lab Units 09/07/23  0446 09/06/23  0905   SODIUM mmol/L 136 134*   POTASSIUM mmol/L 3.7 3.2*   CHLORIDE mmol/L 109* 104   CO2 mmol/L 18* 17*   ANION GAP mmol/L 9 13   BUN mg/dL 27* 38* CREATININE mg/dL 1.52* 2.00*   EGFR ml/min/1.73sq m 31 22   CALCIUM mg/dL 8.3* 9.0   MAGNESIUM mg/dL 2.0  --    PHOSPHORUS mg/dL 2.5  --      Results from last 7 days   Lab Units 09/07/23  0446 09/06/23  0905   AST U/L 9* 13   ALT U/L 6* 9   ALK PHOS U/L 62 90   TOTAL PROTEIN g/dL 6.2* 8.1   ALBUMIN g/dL 3.3* 4.1   TOTAL BILIRUBIN mg/dL 0.44 0.49     Results from last 7 days   Lab Units 09/07/23  1127 09/07/23  0714 09/06/23  2049 09/06/23  1601 09/06/23  1244   POC GLUCOSE mg/dl 133 86 139 111 137     Results from last 7 days   Lab Units 09/07/23  0446 09/06/23  0905   GLUCOSE RANDOM mg/dL 101 125     Results from last 7 days   Lab Units 09/06/23  0905   PROTIME seconds 14.8*   INR  1.14   PTT seconds 30     Results from last 7 days   Lab Units 09/06/23  0905   PROCALCITONIN ng/ml 0.28*     Results from last 7 days   Lab Units 09/06/23  0905   LACTIC ACID mmol/L 0.8     Results from last 7 days   Lab Units 09/06/23  0905   BNP pg/mL 70     Results from last 7 days   Lab Units 09/06/23  1724   CLARITY UA  Clear   COLOR UA  Yellow   SPEC GRAV UA  1.015   PH UA  5.0   GLUCOSE UA mg/dl Negative   KETONES UA mg/dl Negative   BLOOD UA  Trace-Intact*   PROTEIN UA mg/dl 1+*   NITRITE UA  Negative   BILIRUBIN UA  Negative   UROBILINOGEN UA E.U./dl 0.2   LEUKOCYTES UA  1+*   WBC UA /hpf 4-10*   RBC UA /hpf 0-1   BACTERIA UA /hpf Occasional   EPITHELIAL CELLS WET PREP /hpf Occasional     Results from last 7 days   Lab Units 09/06/23  0905   INFLUENZA A PCR  Negative   INFLUENZA B PCR  Negative   RSV PCR  Negative     Results from last 7 days   Lab Units 09/06/23  1013   LYME AB IGG  Positive*   LYME AB IGM  Positive*     Results from last 7 days   Lab Units 09/06/23  0914 09/06/23  0905   BLOOD CULTURE  Received in Microbiology Lab. Culture in Progress. Received in Microbiology Lab. Culture in Progress.      ED Treatment:   Medication Administration from 09/06/2023 0829 to 09/06/2023 1153       Date/Time Order Dose Route Action Action by Comments     09/06/2023 0945 EDT cefepime (MAXIPIME) IVPB (premix in dextrose) 2,000 mg 50 mL 0 mg Intravenous Stopped Charlotte Woods, KYLER --     09/06/2023 0915 EDT cefepime (MAXIPIME) IVPB (premix in dextrose) 2,000 mg 50 mL 2,000 mg Intravenous [address] Charlotte Woods, KYLER --     09/06/2023 0069 EDT acetaminophen (TYLENOL) tablet 650 mg 650 mg Oral Given Charlotte Woods, RN --     09/06/2023 1006 EDT potassium chloride (K-DUR,KLOR-CON) CR tablet 40 mEq 40 mEq Oral Given Charlotte Woods, RN --     09/06/2023 1015 EDT sodium chloride 0.9 % bolus 500 mL 500 mL Intravenous New Bag Charlotte Woods, KYLER --        Past Medical History:   Diagnosis Date   • Acute respiratory failure with hypoxia ([address]) 12/16/2021   • Broken arm     hairline fracture/ 2 places///   right arm   • Diabetes mellitus (HCC)    • Diverticulitis    • Postherpetic neuralgia      Present on Admission:  • Type 2 diabetes mellitus without complication, without long-term current use of insulin (HCC)  • Anxiety  • Essential hypertension      Admitting Diagnosis: Diarrhea [R19.7]  Fever [R50.9]  FERCHO (acute kidney injury) ([address]) [N17.9]  Age/Sex: 81 y.o. female  Admission Orders:  Med surg  Oxygen 2-3L nc   Bmp cbc diff  Calprotectin, fecal  Fecal leukocytes  Ova parasite exam  C diff toxin  Scheduled Medications:  ferrous sulfate, 325 mg, Oral, Daily With Breakfast  heparin (porcine), 5,000 Units, Subcutaneous, Q8H JUAN ANTONIO  insulin lispro, 1-5 Units, Subcutaneous, TID AC  insulin lispro, 1-5 Units, Subcutaneous, HS  pravastatin, 40 mg, Oral, Daily With Dinner  vancomycin oral (capsules or solution), 125 mg, Oral, Q6H [address]      Continuous IV Infusions:    multi-electrolyte (PLASMALYTE-A/ISOLYTE-S PH 7.4) IV solution  Rate: 125 mL/hr Dose: 125 mL/hr  Freq: Continuous Route: IV  PRN Meds:  acetaminophen, 650 mg, Oral, Q6H PRN  ALPRAZolam, 0.25 mg, Oral, HS PRN  dicyclomine, 20 mg, Oral, 4x Daily PRN  hydrALAZINE, 20 mg, Intravenous, Q6H PRN  ondansetron, 4 mg, Intravenous, Q6H PRN        IP CONSULT TO GASTROENTEROLOGY  IP CONSULT TO PULMONOLOGY    Network Utilization Review Department  ATTENTION: Please call with any questions or concerns to [phone] and carefully listen to the prompts so that you are directed to the right person. All voicemails are confidential.  Jessica Hammond all requests for admission clinical reviews, approved or denied determinations and any other requests to dedicated fax number below belonging to the campus where the patient is receiving treatment.  List of dedicated fax numbers for the Facilities:  [place] DENIALS (Administrative/Medical Necessity) [phone] [address] [facility] (Maternity/NICU/Pediatrics) [phone]   [address] [place] [address] [phone]   [facility] [address] [facility] [phone]   150 [facility] [address] [phone]   [address] [address] [phone]

## 2023-09-07 NOTE — ASSESSMENT & PLAN NOTE
Lab Results   Component Value Date    HGBA1C 8.4 (H) 07/18/2023       Recent Labs     09/06/23  1244 09/06/23  1601 09/06/23 2049 09/07/23  0714   POCGLU 137 111 139 86       Blood Sugar Average: Last 72 hrs:  · (P) 118. 25Hold oral hypoglycemics inclusive of glipizide, and Januvia  · Continue Accu-Cheks before meals and at bedtime with sliding scale coverage while in house  · Target blood sugar for the hospital is 140-180

## 2023-09-07 NOTE — PLAN OF CARE
Problem: PAIN - ADULT  Goal: Verbalizes/displays adequate comfort level or baseline comfort level  Description: Interventions:  - Encourage patient to monitor pain and request assistance  - Assess pain using appropriate pain scale  - Administer analgesics based on type and severity of pain and evaluate response  - Implement non-pharmacological measures as appropriate and evaluate response  - Consider cultural and social influences on pain and pain management  - Notify physician/advanced practitioner if interventions unsuccessful or patient reports new pain  Outcome: Progressing     Problem: INFECTION - ADULT  Goal: Absence or prevention of progression during hospitalization  Description: INTERVENTIONS:  - Assess and monitor for signs and symptoms of infection  - Monitor lab/diagnostic results  - Monitor all insertion sites, i.e. indwelling lines, tubes, and drains  - Monitor endotracheal if appropriate and nasal secretions for changes in amount and color  - [place] appropriate cooling/warming therapies per order  - Administer medications as ordered  - Instruct and encourage patient and family to use good hand hygiene technique  - Identify and instruct in appropriate isolation precautions for identified infection/condition  Outcome: Progressing     Problem: SAFETY ADULT  Goal: Patient will remain free of falls  Description: INTERVENTIONS:  - Educate patient/family on patient safety including physical limitations  - Instruct patient to call for assistance with activity   - Consult OT/PT to assist with strengthening/mobility   - Keep Call bell within reach  - Keep bed low and locked with side rails adjusted as appropriate  - Keep care items and personal belongings within reach  - Initiate and maintain comfort rounds  - Make Fall Risk Sign visible to staff  - Offer Toileting every 2 Hours, in advance of need  - Apply yellow socks and bracelet for high fall risk patients  - Consider moving patient to room near nurses station  Outcome: Progressing  Goal: Maintain or return to baseline ADL function  Description: INTERVENTIONS:  -  Assess patient's ability to carry out ADLs; assess patient's baseline for ADL function and identify physical deficits which impact ability to perform ADLs (bathing, care of mouth/teeth, toileting, grooming, dressing, etc.)  - Assess/evaluate cause of self-care deficits   - Assess range of motion  - Assess patient's mobility; develop plan if impaired  - Assess patient's need for assistive devices and provide as appropriate  - Encourage maximum independence but intervene and supervise when necessary  - Involve family in performance of ADLs  - Assess for home care needs following discharge   - Consider OT consult to assist with ADL evaluation and planning for discharge  - Provide patient education as appropriate  Outcome: Progressing  Goal: Maintains/Returns to pre admission functional level  Description: INTERVENTIONS:  - Perform BMAT or MOVE assessment daily.   - Set and communicate daily mobility goal to care team and patient/family/caregiver.    - Collaborate with rehabilitation services on mobility goals if consulted  - Out of bed for toileting  - Record patient progress and toleration of activity level   Outcome: Progressing     Problem: DISCHARGE PLANNING  Goal: Discharge to home or other facility with appropriate resources  Description: INTERVENTIONS:  - Identify barriers to discharge w/patient and caregiver  - Arrange for needed discharge resources and transportation as appropriate  - Identify discharge learning needs (meds, wound care, etc.)  - Arrange for interpretive services to assist at discharge as needed  - Refer to Case Management Department for coordinating discharge planning if the patient needs post-hospital services based on physician/advanced practitioner order or complex needs related to functional status, cognitive ability, or social support system  Outcome: Progressing     Problem: Knowledge Deficit  Goal: Patient/family/caregiver demonstrates understanding of disease process, treatment plan, medications, and discharge instructions  Description: Complete learning assessment and assess knowledge base.   Interventions:  - Provide teaching at level of understanding  - Provide teaching via preferred learning methods  Outcome: Progressing     Problem: GASTROINTESTINAL - ADULT  Goal: Minimal or absence of nausea and/or vomiting  Description: INTERVENTIONS:  - Administer IV fluids if ordered to ensure adequate hydration  - Maintain NPO status until nausea and vomiting are resolved  - Nasogastric tube if ordered  - Administer ordered antiemetic medications as needed  - Provide nonpharmacologic comfort measures as appropriate  - Advance diet as tolerated, if ordered  - Consider nutrition services referral to assist patient with adequate nutrition and appropriate food choices  Outcome: Progressing  Goal: Maintains or returns to baseline bowel function  Description: INTERVENTIONS:  - Assess bowel function  - Encourage oral fluids to ensure adequate hydration  - Administer IV fluids if ordered to ensure adequate hydration  - Administer ordered medications as needed  - Encourage mobilization and activity  - Consider nutritional services referral to assist patient with adequate nutrition and appropriate food choices  Outcome: Progressing  Goal: Maintains adequate nutritional intake  Description: INTERVENTIONS:  - Monitor percentage of each meal consumed  - Identify factors contributing to decreased intake, treat as appropriate  - Assist with meals as needed  - Monitor I&O, weight, and lab values if indicated  - Obtain nutrition services referral as needed  Outcome: Progressing  Goal: Oral mucous membranes remain intact  Description: INTERVENTIONS  - Assess oral mucosa and hygiene practices  - Implement preventative oral hygiene regimen  - Implement oral medicated treatments as ordered  - Initiate Nutrition services referral as needed  Outcome: Progressing

## 2023-09-07 NOTE — CASE MANAGEMENT
Case Management Assessment & Discharge Planning Note    Patient name Franklyn Wells  Location /-83 MRN [id number]  : 1940 Date 2023       Current Admission Date: 2023  Current Admission Diagnosis:Acute kidney injury [facility])   Patient Active Problem List    Diagnosis Date Noted   • Pulmonary fibrosis ([address]) 2023   • Acute kidney injury ([address]) 2023   • Diarrhea of presumed infectious origin 2023   • Hair loss 2022   • Hypertriglyceridemia 2022   • Thrombocytosis 2021   • Bacteremia due to methicillin susceptible Staphylococcus aureus (MSSA) 2021   • Pneumothorax- Resolved 2021   • Chronic kidney disease, stage 3b ([address]) 2021   • Ambulatory dysfunction 2021   • Negative depression screening 2021   • Overweight (BMI 25.0-29.9) 2021   • Localized, primary osteoarthritis of hand 2020   • Refused influenza vaccine 2020   • Sciatica 2020   • Hypertensive kidney disease with chronic kidney disease stage III ([address]) 2019   • Type 2 diabetes mellitus without complication, without long-term current use of insulin ([address]) 2019   • Bursitis of shoulder 2018   • Groin pain, chronic, left 2018   • Paresthesia of arm 2017   • Back pain 2017   • Muscle pain 2017   • Anxiety 2017   • Abnormal ECG 2016   • Diverticulitis large intestine w/o perforation or abscess w/o bleeding 2016   • Essential hypertension 2016   • Lower abdominal pain 2016   • Atrophic vaginitis 2016   • Hypercholesterolemia 2016   • Irritable bowel syndrome 2016   • Simple chronic anemia 2016   • Disorder of shoulder 2008   • Articular cartilage disorder of shoulder region 2008   • Loose body in joint of shoulder region 2008      LOS (days): 0  Geometric Mean LOS (GMLOS) (days): 3.10  Days to GMLOS:2.9     OBJECTIVE:    Risk of Unplanned Readmission Score: 19.9       Current admission status: Inpatient    Preferred Pharmacy:   [facility] [address], [address] [place] [address]  [address] [facility] 7[address] [facility] [address]  Phone: [phone] Fax: [phone]    [address] [facility], [address] [facility] [address]  [address] [facility] [address]  Phone: [phone] Fax: [phone]    Primary Care Provider: Kye Lyn DO    Primary Insurance: [address] [facility]  Secondary Insurance:     ASSESSMENT:  [place] Proxies    There are no active Health Care Proxies on file. Advance Directives  Does patient have a [address]?: Yes  Does patient have Advance Directives?: Yes  Primary Contact: yves leaddock (Daughter)   [phone] (Mobile)    Readmission Root Cause  30 Day Readmission: No    Patient Information  Admitted from[de-identified] Home  Mental Status: Alert  During Assessment patient was accompanied by: Not accompanied during assessment  Assessment information provided by[de-identified] Patient, Daughter  Primary Caregiver: Self  Support Systems: Spouse/significant other, Daughter, Family members  [facility]: [address] [place] [address] do you live in?: [address] [place] [address] entry access options.  Select all that apply.: Stairs  Number of steps to enter home.: 10  Do the steps have railings?: Yes  Type of Current Residence: [address] [place] [address] home  Upon entering residence, is there a bedroom on the main floor (no further steps)?: No  A bedroom is located on the following floor levels of residence (select all that apply):: 2nd Floor  Upon entering residence, is there a bathroom on the main floor (no further steps)?: Yes  Number of steps to 2nd floor from main floor: One Flight  In the last 12 months, was there a time when you were not able to pay the mortgage or rent on time?: No  In the last 12 months, how many places have you lived?: 1  In the last 12 months, was there a time when you did not have a steady place to sleep or slept in a shelter (including now)?: No  Homeless/housing insecurity resource given?: N/A  Living Arrangements: Lives w/ Spouse/significant other, Lives w/ Daughter  Is patient a ?: No    Activities of Daily Living Prior to Admission  Functional Status: Independent  Completes ADLs independently?: Yes  Ambulates independently?: Yes  Does patient use assisted devices?: No  Does patient currently own DME?: No  Does patient have a history of Outpatient Therapy (PT/OT)?: No  Does the patient have a history of Short-Term Rehab?: No  Does patient have a history of HHC?: No  Does patient currently have [facility]?: No    Patient Information Continued  Income Source: Pension/skilled nursing  Does patient have prescription coverage?: Yes  Within the past 12 months, you worried that your food would run out before you got the money to buy more.: Never true  Within the past 12 months, the food you bought just didn't last and you didn't have money to get more.: Never true  Food insecurity resource given?: N/A  Does patient receive dialysis treatments?: No  Does patient have a history of substance abuse?: No  Does patient have a history of Mental Health Diagnosis?: No    Means of Transportation  Means of Transport to Appts[de-identified] Drives Self  In the past 12 months, has lack of transportation kept you from medical appointments or from getting medications?: No  In the past 12 months, has lack of transportation kept you from meetings, work, or from getting things needed for daily living?: No    DISCHARGE DETAILS:    Discharge planning discussed with[de-identified] Patient and dtr Germain Pitt  [place] of Choice: Yes     CM contacted family/caregiver?: Yes  Were Treatment Team discharge recommendations reviewed with patient/caregiver?: Yes  Did patient/caregiver verbalize understanding of patient care needs?: Yes  Were patient/caregiver advised of the risks associated with not following Treatment Team discharge recommendations?: Yes    Contacts  Patient Contacts: Jaydon Samaniego  Relationship to Patient[de-identified] Family  Contact Method: Phone  Phone Number: [phone]  Reason/Outcome: Discharge Planning, Emergency Contact  Additional Comments: Met with patient at bedside. Patient IPTA. EC list updated. Call to riley Hanna and discussed Kristen Zuniga.    department following thru discharge

## 2023-09-07 NOTE — PROGRESS NOTES
[id number] [facility]  Progress Note  Name: Gilmer Myrick  MRN: [id number]  Unit/Bed#: -01 I Date of Admission: 9/6/2023   Date of Service: 9/7/2023 I Hospital Day: 0    Assessment/Plan   * Acute kidney injury [facility])  Assessment & Plan  · Patient has a baseline CKD stage IIIb-baseline creatinine is between 1.3 and 1.5  · Acute kidney injury was present on admission with an arrival creatinine of 2.0  · This has since resolved, creatinine is now down to 1.5  · Prerenal in the setting of having diarrhea  · Hyponatremia- resolved with IV fluid, this was a hypovolemic hyponatremia  · Hypokalemia- resolved with IV repletion-this was also secondary to diarrhea  · We will repeat BMP testing in the a.m. · DC IV fluids today    Diarrhea of presumed infectious origin  Assessment & Plan  · Patient with recent antibiotic use in the outpatient setting for UTI  · Appreciate GI input  · Testing for COVID-19 is negative  · Stool studies for C. difficile, fecal calprotectin, stool culture, fecal leukocytes, and ova parasites are all still pending  · Patient reports an improvement in her diarrhea reports having had 3 episodes over the past day  · Continue empiric vancomycin 125 mg p.o. every 6 hours-day 2    Type 2 diabetes mellitus without complication, without long-term current use of insulin [facility])  Assessment & Plan  Lab Results   Component Value Date    HGBA1C 8.4 (H) 07/18/2023       Recent Labs     09/06/23  1244 09/06/23  1601 09/06/23  2049 09/07/23  0714   POCGLU 137 111 139 86       Blood Sugar Average: Last 72 hrs:  · (P) 118. 25Hold oral hypoglycemics inclusive of glipizide, and Januvia  · Continue Accu-Cheks before meals and at bedtime with sliding scale coverage while in house  · Target blood sugar for the hospital is 140-180      Essential hypertension  Assessment & Plan  · Vasotec remains on hold in view of FERCHO -okay to resume at discharge  · Blood pressure is controlled  · Continue as needed IV hydralazine resolved systolic blood pressure greater than 160 as needed every 6 hours    Anxiety  Assessment & Plan  · Continue as needed Xanax    Pulmonary fibrosis (HCC)  Assessment & Plan  · Noted on CAT scan of the abdomen  · Most likely a sequelae of her history of COVID-19  · Patient requiring 2 to 3 L of supplemental oxygen via nasal cannula  · We will ask for pulmonary evaluation also  · Patient may benefit from an exercise desat study prior to discharge             VTE Prophylaxis:  Heparin    Patient Centered Rounds: I have performed bedside rounds with nursing staff today. Discussions with Specialists or Other Care Team Provider: GI, case management, nursing  Education and Discussions with Family / Patient: Patient was brought up to par today, will update the family members as the day progresses    Current Length of Stay: 0 day(s)    Current Patient Status: Inpatient   Certification Statement: The patient will continue to require additional inpatient hospital stay due to The need for continued antibiotics, and pending stool studies    Discharge Plan: Hopeful discharge planning x24 hours    Code Status: Level 1 - Full Code    Subjective:   Patient seen, resting in bed, reports feeling a lot better, reports only having had 3 episodes of loose stool since she has been here, denies any pain or discomfort at this time    Objective:     Vitals:   Temp (24hrs), Av.4 °F (37.4 °C), Min:98 °F (36.7 °C), Max:101.1 °F (38.4 °C)    Temp:  [98 °F (36.7 °C)-101.1 °F (38.4 °C)] 98 °F (36.7 °C)  HR:  [] 77  Resp:  [16-18] 18  BP: (102-153)/(52-69) 124/61  SpO2:  [80 %-98 %] 98 %  Body mass index is 26.36 kg/m². Input and Output Summary (last 24 hours): Intake/Output Summary (Last 24 hours) at 2023 0830  Last data filed at 2023 0945  Gross per 24 hour   Intake 50 ml   Output --   Net 50 ml       Physical Exam:   Physical Exam  Constitutional:       General: She is not in acute distress. Appearance: Normal appearance. She is normal weight. She is not ill-appearing. HENT:      Head: Normocephalic and atraumatic. Nose: Nose normal.      Mouth/Throat:      Mouth: Mucous membranes are moist.   Eyes:      Extraocular Movements: Extraocular movements intact. Pupils: Pupils are equal, round, and reactive to light. Cardiovascular:      Rate and Rhythm: Normal rate and regular rhythm. Pulses: Normal pulses. Heart sounds: Normal heart sounds. No murmur heard. No friction rub. No gallop. Pulmonary:      Effort: Pulmonary effort is normal. No respiratory distress. Breath sounds: Normal breath sounds. No wheezing, rhonchi or rales. Abdominal:      General: There is no distension. Palpations: Abdomen is soft. There is no mass. Tenderness: There is no abdominal tenderness. Hernia: No hernia is present. Musculoskeletal:         General: No swelling or tenderness. Normal range of motion. Cervical back: Normal range of motion and neck supple. No rigidity. Right lower leg: No edema. Left lower leg: No edema. Skin:     General: Skin is warm. Capillary Refill: Capillary refill takes less than 2 seconds. Findings: No erythema or rash. Neurological:      General: No focal deficit present. Mental Status: She is alert and oriented to person, place, and time. Mental status is at baseline. Cranial Nerves: No cranial nerve deficit. Motor: No weakness.    Psychiatric:         Mood and Affect: Mood normal.         Behavior: Behavior normal.         Additional Data:     Labs:    Results from last 7 days   Lab Units 09/07/23  0446   WBC Thousand/uL 9.76   HEMOGLOBIN g/dL 9.3*   HEMATOCRIT % 28.9*   PLATELETS Thousands/uL 167   NEUTROS PCT % 63   LYMPHS PCT % 24   MONOS PCT % 11   EOS PCT % 2     Results from last 7 days   Lab Units 09/07/23  0446   SODIUM mmol/L 136   POTASSIUM mmol/L 3.7   CHLORIDE mmol/L 109*   CO2 mmol/L 18*   BUN mg/dL 27*   CREATININE mg/dL 1.52*   CALCIUM mg/dL 8.3*   ALK PHOS U/L 62   ALT U/L 6*   AST U/L 9*     Results from last 7 days   Lab Units 09/06/23  0905   INR  1.14     Results from last 7 days   Lab Units 09/07/23  0714 09/06/23  2049 09/06/23  1601 09/06/23  1244   POC GLUCOSE mg/dl 86 139 111 137           * I Have Reviewed All Lab Data Listed Above. * Additional Pertinent Lab Tests Reviewed: [address] Admission  Reviewed    Imaging:  Imaging Reports Reviewed Today Include: None    Recent Cultures (last 7 days):     Results from last 7 days   Lab Units 09/06/23  0914 09/06/23  0905   BLOOD CULTURE  Received in Microbiology Lab. Culture in Progress. Received in Microbiology Lab. Culture in Progress. Last 24 Hours Medication List:   Current Facility-Administered Medications   Medication Dose Route Frequency Provider Last Rate   • acetaminophen  650 mg Oral Q6H PRN Ratna Lara MD     • ALPRAZolam  0.25 mg Oral HS PRN Ratna Lara MD     • ferrous sulfate  325 mg Oral Daily With [address], MD     • heparin (porcine)  5,000 Units Subcutaneous [facility] Ratna Lara MD     • hydrALAZINE  20 mg Intravenous Q6H PRN Ratna Lara MD     • insulin lispro  1-5 Units Subcutaneous TID [facility] Ratna Lara MD     • insulin lispro  1-5 Units Subcutaneous HS Ratna Lara MD     • multi-electrolyte  125 mL/hr Intravenous Continuous Ratna Lara  mL/hr (09/07/23 0257)   • ondansetron  4 mg Intravenous Q6H PRN Ratna Lara MD     • pravastatin  40 mg Oral Daily With MD Jose     • vancomycin oral (capsules or solution)  125 mg Oral Q6H Tricia Anderson MD          Today, Patient Was Seen By: Ratna Lara MD    ** Please Note: Dictation voice to text software may have been used in the creation of this document.  **

## 2023-09-07 NOTE — ED PROVIDER NOTES
History  Chief Complaint   Patient presents with   • Diarrhea     Pt reports diarrhea x 1 mo, going approx 10 times/day; reports recent abx for UTI     81-year-old female with history of diabetes and recent UTIs who presents to the emergency department today for evaluation of ongoing diarrhea for the past month with now fevers since Sunday. Tmax 104 on Monday that was brought down with Tylenol. Patient reports intermittent chills with watery diarrhea. Does report mild nausea without vomiting. No black or bloody stools according to patient. Does report some mild generalized abdominal pain but nothing specific that she can point to. Does report that she has recently been on several medications for recurrent UTI with most recent one being discontinued last Thursday. Does not have any urinary symptoms. Denies chest pain, shortness of breath, palpitations, headache, dizziness, weakness, numbness/tingling. Patient states that she last took Tylenol last night. On arrival she does have a fever above 101 with tachycardia like related to her diarrheal illness. History provided by:  Patient   used: No        Prior to Admission Medications   Prescriptions Last Dose Informant Patient Reported? Taking?    ALPRAZolam (XANAX) 0.25 mg tablet More than a month  No No   Sig: Take 1 tablet (0.25 mg total) by mouth daily at bedtime as needed for anxiety   Biotin 10 MG CAPS 9/5/2023 Self Yes Yes   Sig: Take 1 capsule by mouth in the morning   Multiple Vitamin (multivitamin) tablet 9/5/2023 Self Yes Yes   Sig: Take 1 tablet by mouth daily   calcium carbonate (TUMS) 500 mg chewable tablet More than a month Self Yes No   Sig: Chew 1 tablet if needed   cholecalciferol (VITAMIN D3) 25 mcg (1,000 units) tablet 9/5/2023 Self Yes Yes   Sig: daily   ciprofloxacin (CILOXAN) 0.3 % ophthalmic solution Not Taking  Yes No   Sig: instill 1 drop into right eye four times a day   Patient not taking: Reported on 1/9/2023 dicyclomine (BENTYL) 10 mg capsule Not Taking Self No No   Sig: Take 1 capsule (10 mg total) by mouth 3 (three) times a day before meals   Patient not taking: Reported on 6/9/2023   enalapril (VASOTEC) 10 mg tablet 9/5/2023  No Yes   Sig: Take 1 tablet (10 mg total) by mouth daily   ferrous sulfate 325 (65 Fe) mg tablet 9/5/2023 Self Yes Yes   Sig: Take 325 mg by mouth daily with breakfast   glipiZIDE (GLUCOTROL XL) 10 mg 24 hr tablet 9/5/2023  No Yes   Sig: Take 1 tablet (10 mg total) by mouth daily   ketoconazole (NIZORAL) 2 % shampoo More than a month  No No   Sig: Use 2-3 times a week   ketorolac (ACULAR) 0.4 % SOLN Not Taking  Yes No   Sig: instill 1 drop into right eye once daily   Patient not taking: Reported on 1/9/2023   lovastatin (MEVACOR) 40 MG tablet 9/5/2023  No Yes   Sig: Take 1 tablet (40 mg total) by mouth daily at bedtime   polyethylene glycol (MIRALAX) 17 g packet Not Taking Self No No   Sig: Take 17 g by mouth daily   Patient not taking: Reported on 9/6/2023   prednisoLONE acetate (PRED FORTE) 1 % ophthalmic suspension Not Taking  Yes No   Sig: instill 1 drop into right eye four times a day as directed   Patient not taking: Reported on 1/9/2023   sitaGLIPtin (Januvia) 100 mg tablet 9/5/2023  No Yes   Sig: Take 1 tablet (100 mg total) by mouth daily   vitamin E, tocopherol, 1,000 units capsule 9/5/2023  No Yes   Sig: Take 1 capsule (1,000 Units total) by mouth daily      Facility-Administered Medications: None       Past Medical History:   Diagnosis Date   • Acute respiratory failure with hypoxia ([address]) 12/16/2021   • Broken arm     hairline fracture/ 2 places///   right arm   • Diabetes mellitus ([address])    • Diverticulitis    • Postherpetic neuralgia        Past Surgical History:   Procedure Laterality Date   • CHOLECYSTECTOMY     • COLON SURGERY     • HERNIA REPAIR     • IR EMBOLIZATION (SPECIFY VESSEL OR SITE)  1/12/2022   • ROTATOR CUFF REPAIR Right    • VARICOSE VEIN SURGERY      Impression: 2/12/16 [facility]       Family History   Problem Relation Age of Onset   • Diabetes Mother    • No Known Problems Father      I have reviewed and agree with the history as documented. E-Cigarette/Vaping   • E-Cigarette Use Never User      E-Cigarette/Vaping Substances   • Nicotine No    • THC No    • CBD No    • Flavoring No      Social History     Tobacco Use   • Smoking status: Never   • Smokeless tobacco: Never   Vaping Use   • Vaping Use: Never used   Substance Use Topics   • Alcohol use: Not Currently     Comment: Rarely   • Drug use: Never       Review of Systems   Constitutional: Positive for appetite change and fever. Negative for chills. HENT: Negative for ear pain and sore throat. Eyes: Negative for pain and visual disturbance. Respiratory: Negative for cough and shortness of breath. Cardiovascular: Negative for chest pain and palpitations. Gastrointestinal: Positive for abdominal pain, diarrhea and nausea. Negative for blood in stool and vomiting. Genitourinary: Negative for dysuria and hematuria. Musculoskeletal: Negative for arthralgias and back pain. Skin: Negative for color change and rash. Neurological: Negative for seizures and syncope. All other systems reviewed and are negative. Physical Exam  Physical Exam  Vitals and nursing note reviewed. Constitutional:       General: She is not in acute distress. Appearance: Normal appearance. She is well-developed and normal weight. She is not ill-appearing. HENT:      Head: Normocephalic and atraumatic. Right Ear: External ear normal.      Left Ear: External ear normal.      Nose: Nose normal.      Mouth/Throat:      Mouth: Mucous membranes are moist.      Pharynx: Oropharynx is clear. No oropharyngeal exudate or posterior oropharyngeal erythema. Eyes:      General: No scleral icterus. Right eye: No discharge. Left eye: No discharge. Extraocular Movements: Extraocular movements intact. Conjunctiva/sclera: Conjunctivae normal.      Pupils: Pupils are equal, round, and reactive to light. Cardiovascular:      Rate and Rhythm: Regular rhythm. Tachycardia present. Pulses: Normal pulses. Heart sounds: Normal heart sounds. No murmur heard. Pulmonary:      Effort: Pulmonary effort is normal. No respiratory distress. Breath sounds: Normal breath sounds. No wheezing or rales. Chest:      Chest wall: No tenderness. Abdominal:      General: Abdomen is flat. Bowel sounds are normal.      Palpations: Abdomen is soft. Tenderness: There is abdominal tenderness (mild ttp over right side of abdomen). There is no right CVA tenderness or left CVA tenderness. Genitourinary:     Comments: deferred  Musculoskeletal:         General: No signs of injury. Normal range of motion. Cervical back: Normal range of motion and neck supple. No tenderness. Right lower leg: No edema. Left lower leg: No edema. Skin:     General: Skin is warm and dry. Capillary Refill: Capillary refill takes less than 2 seconds. Findings: No rash. Neurological:      General: No focal deficit present. Mental Status: She is alert and oriented to person, place, and time. Mental status is at baseline. Motor: No weakness. Coordination: Coordination normal.      Gait: Gait normal.   Psychiatric:         Mood and Affect: Mood normal.         Behavior: Behavior normal.         Thought Content:  Thought content normal.         Vital Signs  ED Triage Vitals [09/06/23 0839]   Temperature Pulse Respirations Blood Pressure SpO2   (!) 101.1 °F (38.4 °C) (!) 109 18 153/69 91 %      Temp Source Heart Rate Source Patient Position - Orthostatic VS BP Location FiO2 (%)   Tympanic Monitor Lying Left arm --      Pain Score       No Pain           Vitals:    09/06/23 1006 09/06/23 1105 09/06/23 1201 09/06/23 1603   BP: 102/52 127/60 108/56 103/56   Pulse: 101 101 (!) 107 92   Patient Position - Orthostatic VS:             Visual Acuity      ED Medications  Medications   ALPRAZolam (XANAX) tablet 0.25 mg (has no administration in time range)   ferrous sulfate tablet 325 mg (has no administration in time range)   pravastatin (PRAVACHOL) tablet 40 mg (40 mg Oral Given 9/6/23 1712)   multi-electrolyte (PLASMALYTE-A/ISOLYTE-S PH 7.4) IV solution (125 mL/hr Intravenous New Bag 9/6/23 1927)   acetaminophen (TYLENOL) tablet 650 mg (has no administration in time range)   ondansetron (ZOFRAN) injection 4 mg (has no administration in time range)   heparin (porcine) subcutaneous injection 5,000 Units (5,000 Units Subcutaneous Given 9/6/23 1441)   insulin lispro (HumaLOG) 100 units/mL subcutaneous injection 1-5 Units ( Subcutaneous Not Given 9/6/23 1619)   insulin lispro (HumaLOG) 100 units/mL subcutaneous injection 1-5 Units (has no administration in time range)   vancomycin (VANCOCIN) capsule 125 mg (125 mg Oral Given 9/6/23 1712)   hydrALAZINE (APRESOLINE) injection 20 mg (has no administration in time range)   cefepime (MAXIPIME) IVPB (premix in dextrose) 2,000 mg 50 mL (0 mg Intravenous Stopped 9/6/23 0945)   acetaminophen (TYLENOL) tablet 650 mg (650 mg Oral Given 9/6/23 0922)   potassium chloride (K-DUR,KLOR-CON) CR tablet 40 mEq (40 mEq Oral Given 9/6/23 1006)   sodium chloride 0.9 % bolus 500 mL (500 mL Intravenous New Bag 9/6/23 1015)   potassium chloride 20 mEq IVPB (premix) (0 mEq Intravenous Stopped 9/6/23 1435)     Followed by   potassium chloride 20 mEq IVPB (premix) (20 mEq Intravenous New Bag 9/6/23 1441)       Diagnostic Studies  Results Reviewed     Procedure Component Value Units Date/Time    UA w Reflex to Microscopic w Reflex to Culture [[id number]]  (Abnormal) Collected: 09/06/23 1724    Lab Status: Final result Specimen: Urine, Clean Catch Updated: 09/06/23 1731     Color, UA Yellow     Clarity, UA Clear     Specific Gravity, UA 1.015     pH, UA 5.0     Leukocytes, UA 1+     Nitrite, UA Negative Protein, UA 1+ mg/dl      Glucose, UA Negative mg/dl      Ketones, UA Negative mg/dl      Urobilinogen, UA 0.2 E.U./dl      Bilirubin, UA Negative     Occult Blood, UA Trace-Intact    Clostridium difficile toxin by PCR with EIA [[id number]] Collected: 09/06/23 1102    Lab Status: In process Specimen: Stool from Per Rectum Updated: 09/06/23 1105    Stool Enteric Bacterial Panel by PCR [[id number]] Collected: 09/06/23 1102    Lab Status: In process Specimen: Stool from Rectum Updated: 09/06/23 1105    Lyme Total AB W Reflex to IGM/IGG [[id number]] Collected: 09/06/23 1013    Lab Status: In process Specimen: Blood from Line, Venous Updated: 09/06/23 1015    Mononucleosis screen [[id number]] Collected: 09/06/23 1013    Lab Status: In process Specimen: Blood from Line, Venous Updated: 09/06/23 1015    FLU/RSV/COVID - if FLU/RSV clinically relevant [[id number]]  (Normal) Collected: 09/06/23 0905    Lab Status: Final result Specimen: Nares from Nose Updated: 09/06/23 0953     SARS-CoV-2 Negative     INFLUENZA A PCR Negative     INFLUENZA B PCR Negative     RSV PCR Negative    Narrative:      FOR PEDIATRIC PATIENTS - copy/paste COVID Guidelines URL to browser: [email or web address]    SARS-CoV-2 assay is a Nucleic Acid Amplification assay intended for the  qualitative detection of nucleic acid from SARS-CoV-2 in nasopharyngeal  swabs. Results are for the presumptive identification of SARS-CoV-2 RNA. Positive results are indicative of infection with SARS-CoV-2, the virus  causing COVID-19, but do not rule out bacterial infection or co-infection  with other viruses. Laboratories within the [facility] and its  territories are required to report all positive results to the appropriate  public health authorities. Negative results do not preclude SARS-CoV-2  infection and should not be used as the sole basis for treatment or other  patient management decisions.  Negative results must be combined with  clinical observations, patient history, and epidemiological information. This test has not been FDA cleared or approved. This test has been authorized by FDA under an Emergency Use Authorization  (EUA). This test is only authorized for the duration of time the  declaration that circumstances exist justifying the authorization of the  emergency use of an in vitro diagnostic tests for detection of SARS-CoV-2  virus and/or diagnosis of COVID-19 infection under section 564(b)(1) of  the Act, 21 U. S.C. 181EUJ-3(K)(8), unless the authorization is terminated  or revoked sooner. The test has been validated but independent review by FDA  and CLIA is pending. Test performed using [employer] Gene[employer]: This RT-PCR assay targets N2,  a region unique to SARS-CoV-2. A conserved region in the E-gene was chosen  for pan-Sarbecovirus detection which includes SARS-CoV-2. According to CMS-2020-01-R, this platform meets the definition of high-throughput technology. B-Type Natriuretic Peptide(BNP) [[id number]]  (Normal) Collected: 09/06/23 0905    Lab Status: Final result Specimen: Blood from Arm, Right Updated: 09/06/23 0946     BNP 70 pg/mL     Procalcitonin [[id number]]  (Abnormal) Collected: 09/06/23 0905    Lab Status: Final result Specimen: Blood from Arm, Right Updated: 09/06/23 0942     Procalcitonin 0.28 ng/ml     Lactic acid [[id number]]  (Normal) Collected: 09/06/23 0905    Lab Status: Final result Specimen: Blood from Arm, Right Updated: 09/06/23 0932     LACTIC ACID 0.8 mmol/L     Narrative:      Result may be elevated if tourniquet was used during collection.     Comprehensive metabolic panel [[id number]]  (Abnormal) Collected: 09/06/23 0905    Lab Status: Final result Specimen: Blood from Arm, Right Updated: 09/06/23 0932     Sodium 134 mmol/L      Potassium 3.2 mmol/L      Chloride 104 mmol/L      CO2 17 mmol/L      ANION GAP 13 mmol/L      BUN 38 mg/dL      Creatinine 2.00 mg/dL Glucose 125 mg/dL      Calcium 9.0 mg/dL      AST 13 U/L      ALT 9 U/L      Alkaline Phosphatase 90 U/L      Total Protein 8.1 g/dL      Albumin 4.1 g/dL      Total Bilirubin 0.49 mg/dL      eGFR 22 ml/min/1.73sq m     Narrative:      [facility] (CKD):   •  Stage 1 with normal or high GFR (GFR > 90 mL/min/1.73 square meters)  •  Stage 2 Mild CKD (GFR = 60-89 mL/min/1.73 square meters)  •  Stage 3A Moderate CKD (GFR = 45-59 mL/min/1.73 square meters)  •  Stage 3B Moderate CKD (GFR = 30-44 mL/min/1.73 square meters)  •  Stage 4 Severe CKD (GFR = 15-29 mL/min/1.73 square meters)  •  Stage 5 End Stage CKD (GFR <15 mL/min/1.73 square meters)  Note: GFR calculation is accurate only with a steady state creatinine    Protime-INR [[id number]]  (Abnormal) Collected: 09/06/23 0905    Lab Status: Final result Specimen: Blood from Arm, Right Updated: 09/06/23 0928     Protime 14.8 seconds      INR 1.14    APTT [[id number]]  (Normal) Collected: 09/06/23 0905    Lab Status: Final result Specimen: Blood from Arm, Right Updated: 09/06/23 0928     PTT 30 seconds     Blood culture #1 [[id number]] Collected: 09/06/23 0914    Lab Status:  In process Specimen: Blood from Arm, Left Updated: 09/06/23 0921    CBC and differential [[id number]]  (Abnormal) Collected: 09/06/23 0905    Lab Status: Final result Specimen: Blood from Arm, Right Updated: 09/06/23 0912     WBC 9.71 Thousand/uL      RBC 4.19 Million/uL      Hemoglobin 11.7 g/dL      Hematocrit 35.6 %      MCV 85 fL      MCH 27.9 pg      MCHC 32.9 g/dL      RDW 13.7 %      MPV 9.3 fL      Platelets 276 Thousands/uL      nRBC 0 /100 WBCs      Neutrophils Relative 70 %      Immat GRANS % 0 %      Lymphocytes Relative 15 %      Monocytes Relative 15 %      Eosinophils Relative 0 %      Basophils Relative 0 %      Neutrophils Absolute 6.69 Thousands/µL      Immature Grans Absolute 0.04 Thousand/uL      Lymphocytes Absolute 1.44 Thousands/µL      Monocytes Absolute 1.48 Thousand/µL      Eosinophils Absolute 0.04 Thousand/µL      Basophils Absolute 0.02 Thousands/µL     Blood culture #2 [[id number]] Collected: 09/06/23 0905    Lab Status: In process Specimen: Blood from Arm, Right Updated: 09/06/23 0910                 CT abdomen pelvis wo contrast   Final Result by Mark Lane MD (09/06 1021)      Chronic pulmonary fibrotic changes in both lung bases. Prior cholecystectomy. Stable dilatation of the extrahepatic biliary tree. Mild hepatomegaly as noted above. Transverse colon showing findings suggesting mild wall thickening as well as focal circumferential thickening and narrowing. See above for further details and discussion. Cannot better characterized in the absence of intravenous contrast. Oral contrast    is only minimally seen within the small bowel. Workstation performed: [id number]         XR chest portable   ED Interpretation by Sanya Perez PA-C (09/06 1637)   No acute cardiopulmonary process. Final Result by Teto Fleming MD (09/06 1152)      No acute cardiopulmonary disease. Resident: Len Frankel, the attending radiologist, have reviewed the images and agree with the final report above.       Workstation performed: [id number]                    Procedures  ECG 12 Lead Documentation Only    Date/Time: 9/6/2023 8:20 PM    Performed by: Sanya Perez PA-C  Authorized by: Sanya Perez PA-C    Indications / Diagnosis:  Metabolic derangement  ECG reviewed by me, the ED Provider: yes    Patient location:  ED  Interpretation:     Interpretation: non-specific    Rate:     ECG rate:  104    ECG rate assessment: tachycardic    Rhythm:     Rhythm: sinus tachycardia    Ectopy:     Ectopy: none    QRS:     QRS axis:  Normal  Conduction:     Conduction: normal    ST segments:     ST segments:  Normal  T waves:     T waves: non-specific               ED Course  ED Course as of 09/06/23 2027   Wed Sep 06, 2023   0902 Hold on sepsis fluids due to hypoxia   1001 Patient stable on 2L satting 94%. Appears more comfortable. Still has not been able to give stool sample. 1032 CT: Chronic pulmonary fibrotic changes in both lung bases.     Prior cholecystectomy. Stable dilatation of the extrahepatic biliary tree.     Mild hepatomegaly as noted above.     Transverse colon showing findings suggesting mild wall thickening as well as focal circumferential thickening and narrowing. See above for further details and discussion. Cannot better characterized in the absence of intravenous contrast. Oral contrast   is only minimally seen within the small bowel. SBIRT 20yo+    Flowsheet Row Most Recent Value   Initial Alcohol Screen: US AUDIT-C     1. How often do you have a drink containing alcohol? 2 Filed at: 09/06/2023 0841   2. How many drinks containing alcohol do you have on a typical day you are drinking? 2 Filed at: 09/06/2023 0841   3a. Male UNDER 65: How often do you have five or more drinks on one occasion? 0 Filed at: 09/06/2023 0841   3b. FEMALE Any Age, or MALE 65+: How often do you have 4 or more drinks on one occassion? 0 Filed at: 09/06/2023 0841   Audit-C Score 4 Filed at: 09/06/2023 8458   TK: How many times in the past year have you. .. Used an illegal drug or used a prescription medication for non-medical reasons? Never Filed at: 09/06/2023 6060                    Medical Decision Making  81-year-old female presenting to the emergency department for evaluation of ongoing diarrhea for the past 1 month with now associated fevers and chills. On arrival patient is tachycardic and febrile but otherwise stable appearing. Differential: Fever likely related to diarrheal illness. Given patient's recent antibiotic usage could consider C. difficile. Also considered other infectious diarrheal illness. Also considered dehydration from hypovolemia stemming from diarrhea. Considered appendicitis, colitis, diverticulitis. Also considered viral syndrome such as COVID or flu. Also considered mono or Lyme disease given the vagueness of patient's symptoms. Could consider recurrent UTI with resistant strain. Patient noted to be satting 90% on room air so we will hold from fluid administration unless necessary. Started on broad-spectrum antibiotics and septic work-up initiated. Chest x-ray showing no acute cardiopulmonary process. No leukocytosis. Pro-Rodríguez slightly elevated however lactic acid within normal limits. Kidney function does appear to be mildly affected from the dehydration so we will proceed with fluid administration. Patient placed on 2 L and fluids were ordered to help with FERCHO. Will admit patient for further management giving diarrheal illness and FERCHO. Stool samples unable to be collected in the emergency department. Work-up overall nonspecific in the emergency department as to the etiology of patient's fever. Would like stool samples to evaluate for C. difficile. Case discussed with [facility] provider. Audrey Connelly accepting for obs admission. Patient in agreement with the plan. Patient admitted in stable condition. FERCHO (acute kidney injury) ([address]): acute illness or injury  Diarrhea: acute illness or injury  Fever: acute illness or injury  Amount and/or Complexity of Data Reviewed  Labs: ordered. Radiology: ordered and independent interpretation performed. Details: Chronic pulmonary fibrotic changes in both lung bases.     Prior cholecystectomy. Stable dilatation of the extrahepatic biliary tree.     Mild hepatomegaly as noted above.     Transverse colon showing findings suggesting mild wall thickening as well as focal circumferential thickening and narrowing. See above for further details and discussion.  Cannot better characterized in the absence of intravenous contrast. Oral contrast is only minimally seen within the small bowel. ECG/medicine tests: ordered. Risk  OTC drugs. Prescription drug management. Decision regarding hospitalization. Disposition  Final diagnoses:   Diarrhea   FERCHO (acute kidney injury) ([address])   Fever     Time reflects when diagnosis was documented in both MDM as applicable and the Disposition within this note     Time User Action Codes Description Comment    9/6/2023  9:41 AM Jolayne Margareth Add [R19.7] Diarrhea     9/6/2023  9:41 AM Jolayne Margareth Add [N17.9] FERCHO (acute kidney injury) ([address])     9/6/2023 10:07 AM Jolayne Margareth Add [R50.9] Fever       ED Disposition     ED Disposition   Admit    Condition   Stable    Date/Time   Wed Sep 6, 2023 10:36 AM    Comment   Case was discussed with MERNA and the patient's admission status was agreed to be Admission Status: observation status to the service of Dr. Rosalind Webb .            Follow-up Information    None         Current Discharge Medication List      CONTINUE these medications which have NOT CHANGED    Details   Biotin 10 MG CAPS Take 1 capsule by mouth in the morning      cholecalciferol (VITAMIN D3) 25 mcg (1,000 units) tablet daily      enalapril (VASOTEC) 10 mg tablet Take 1 tablet (10 mg total) by mouth daily  Qty: 90 tablet, Refills: 0    Associated Diagnoses: Essential hypertension      ferrous sulfate 325 (65 Fe) mg tablet Take 325 mg by mouth daily with breakfast      glipiZIDE (GLUCOTROL XL) 10 mg 24 hr tablet Take 1 tablet (10 mg total) by mouth daily  Qty: 90 tablet, Refills: 0    Associated Diagnoses: Type 2 diabetes mellitus without complication, without long-term current use of insulin (HCC)      lovastatin (MEVACOR) 40 MG tablet Take 1 tablet (40 mg total) by mouth daily at bedtime  Qty: 90 tablet, Refills: 0    Associated Diagnoses: Type 2 diabetes mellitus without complication, without long-term current use of insulin (HCC)      Multiple Vitamin (multivitamin) tablet Take 1 tablet by mouth daily      sitaGLIPtin (Januvia) 100 mg tablet Take 1 tablet (100 mg total) by mouth daily  Qty: 90 tablet, Refills: 0    Associated Diagnoses: Type 2 diabetes mellitus without complication, without long-term current use of insulin (HCC)      vitamin E, tocopherol, 1,000 units capsule Take 1 capsule (1,000 Units total) by mouth daily  Qty: 90 capsule, Refills: 1    Associated Diagnoses: Healthcare maintenance      ALPRAZolam (XANAX) 0.25 mg tablet Take 1 tablet (0.25 mg total) by mouth daily at bedtime as needed for anxiety  Qty: 30 tablet, Refills: 2    Associated Diagnoses: Anxiety      calcium carbonate (TUMS) 500 mg chewable tablet Chew 1 tablet if needed      ciprofloxacin (CILOXAN) 0.3 % ophthalmic solution instill 1 drop into right eye four times a day      dicyclomine (BENTYL) 10 mg capsule Take 1 capsule (10 mg total) by mouth 3 (three) times a day before meals  Qty: 90 capsule, Refills: 3    Associated Diagnoses: Irritable bowel syndrome with diarrhea      ketoconazole (NIZORAL) 2 % shampoo Use 2-3 times a week  Qty: 120 mL, Refills: 3    Associated Diagnoses: Hair loss      ketorolac (ACULAR) 0.4 % SOLN instill 1 drop into right eye once daily      polyethylene glycol (MIRALAX) 17 g packet Take 17 g by mouth daily  Refills: 0    Associated Diagnoses: Constipation      prednisoLONE acetate (PRED FORTE) 1 % ophthalmic suspension instill 1 drop into right eye four times a day as directed             No discharge procedures on file.     PDMP Review       Value Time User    PDMP Reviewed  Yes 11/22/2022  8:57 PM Danny Garcia DO          ED Provider  Electronically Signed by           Laurie Cazares PA-C  09/06/23 2027

## 2023-09-08 LAB
ANION GAP SERPL CALCULATED.3IONS-SCNC: 7 MMOL/L
BASOPHILS # BLD AUTO: 0.02 THOUSANDS/ÂΜL (ref 0–0.1)
BASOPHILS NFR BLD AUTO: 0 % (ref 0–1)
BUN SERPL-MCNC: 21 MG/DL (ref 5–25)
CALCIUM SERPL-MCNC: 8.7 MG/DL (ref 8.4–10.2)
CALPROTECTIN STL-MCNT: 5020 UG/G (ref 0–120)
CHLORIDE SERPL-SCNC: 110 MMOL/L (ref 96–108)
CO2 SERPL-SCNC: 20 MMOL/L (ref 21–32)
CREAT SERPL-MCNC: 1.17 MG/DL (ref 0.6–1.3)
EOSINOPHIL # BLD AUTO: 0.23 THOUSAND/ÂΜL (ref 0–0.61)
EOSINOPHIL NFR BLD AUTO: 3 % (ref 0–6)
ERYTHROCYTE [DISTWIDTH] IN BLOOD BY AUTOMATED COUNT: 13.9 % (ref 11.6–15.1)
GFR SERPL CREATININE-BSD FRML MDRD: 43 ML/MIN/1.73SQ M
GLUCOSE SERPL-MCNC: 153 MG/DL (ref 65–140)
GLUCOSE SERPL-MCNC: 179 MG/DL (ref 65–140)
GLUCOSE SERPL-MCNC: 230 MG/DL (ref 65–140)
GLUCOSE SERPL-MCNC: 237 MG/DL (ref 65–140)
GLUCOSE SERPL-MCNC: 249 MG/DL (ref 65–140)
HCT VFR BLD AUTO: 29.7 % (ref 34.8–46.1)
HGB BLD-MCNC: 9.7 G/DL (ref 11.5–15.4)
IMM GRANULOCYTES # BLD AUTO: 0.04 THOUSAND/UL (ref 0–0.2)
IMM GRANULOCYTES NFR BLD AUTO: 1 % (ref 0–2)
LYMPHOCYTES # BLD AUTO: 1.99 THOUSANDS/ÂΜL (ref 0.6–4.47)
LYMPHOCYTES NFR BLD AUTO: 26 % (ref 14–44)
MCH RBC QN AUTO: 28.2 PG (ref 26.8–34.3)
MCHC RBC AUTO-ENTMCNC: 32.7 G/DL (ref 31.4–37.4)
MCV RBC AUTO: 86 FL (ref 82–98)
MONOCYTES # BLD AUTO: 0.53 THOUSAND/ÂΜL (ref 0.17–1.22)
MONOCYTES NFR BLD AUTO: 7 % (ref 4–12)
NEUTROPHILS # BLD AUTO: 4.79 THOUSANDS/ÂΜL (ref 1.85–7.62)
NEUTS SEG NFR BLD AUTO: 63 % (ref 43–75)
NRBC BLD AUTO-RTO: 0 /100 WBCS
PLATELET # BLD AUTO: 197 THOUSANDS/UL (ref 149–390)
PMV BLD AUTO: 8.6 FL (ref 8.9–12.7)
POTASSIUM SERPL-SCNC: 3.8 MMOL/L (ref 3.5–5.3)
RBC # BLD AUTO: 3.44 MILLION/UL (ref 3.81–5.12)
SODIUM SERPL-SCNC: 137 MMOL/L (ref 135–147)
WBC # BLD AUTO: 7.6 THOUSAND/UL (ref 4.31–10.16)

## 2023-09-08 PROCEDURE — 99232 SBSQ HOSP IP/OBS MODERATE 35: CPT | Performed by: STUDENT IN AN ORGANIZED HEALTH CARE EDUCATION/TRAINING PROGRAM

## 2023-09-08 PROCEDURE — 99223 1ST HOSP IP/OBS HIGH 75: CPT | Performed by: INTERNAL MEDICINE

## 2023-09-08 PROCEDURE — 80048 BASIC METABOLIC PNL TOTAL CA: CPT | Performed by: HOSPITALIST

## 2023-09-08 PROCEDURE — 85025 COMPLETE CBC W/AUTO DIFF WBC: CPT | Performed by: HOSPITALIST

## 2023-09-08 PROCEDURE — 82948 REAGENT STRIP/BLOOD GLUCOSE: CPT

## 2023-09-08 PROCEDURE — 99232 SBSQ HOSP IP/OBS MODERATE 35: CPT | Performed by: HOSPITALIST

## 2023-09-08 RX ADMIN — VANCOMYCIN HYDROCHLORIDE 125 MG: 125 CAPSULE ORAL at 06:04

## 2023-09-08 RX ADMIN — DICYCLOMINE HYDROCHLORIDE 20 MG: 20 TABLET ORAL at 15:38

## 2023-09-08 RX ADMIN — VANCOMYCIN HYDROCHLORIDE 125 MG: 125 CAPSULE ORAL at 17:43

## 2023-09-08 RX ADMIN — INSULIN LISPRO 2 UNITS: 100 INJECTION, SOLUTION INTRAVENOUS; SUBCUTANEOUS at 11:44

## 2023-09-08 RX ADMIN — FERROUS SULFATE TAB 325 MG (65 MG ELEMENTAL FE) 325 MG: 325 (65 FE) TAB at 09:01

## 2023-09-08 RX ADMIN — INSULIN LISPRO 1 UNITS: 100 INJECTION, SOLUTION INTRAVENOUS; SUBCUTANEOUS at 09:02

## 2023-09-08 RX ADMIN — HEPARIN SODIUM 5000 UNITS: 5000 INJECTION INTRAVENOUS; SUBCUTANEOUS at 21:47

## 2023-09-08 RX ADMIN — INSULIN LISPRO 2 UNITS: 100 INJECTION, SOLUTION INTRAVENOUS; SUBCUTANEOUS at 21:48

## 2023-09-08 RX ADMIN — HEPARIN SODIUM 5000 UNITS: 5000 INJECTION INTRAVENOUS; SUBCUTANEOUS at 06:03

## 2023-09-08 RX ADMIN — VANCOMYCIN HYDROCHLORIDE 125 MG: 125 CAPSULE ORAL at 11:48

## 2023-09-08 RX ADMIN — INSULIN LISPRO 2 UNITS: 100 INJECTION, SOLUTION INTRAVENOUS; SUBCUTANEOUS at 17:44

## 2023-09-08 RX ADMIN — HEPARIN SODIUM 5000 UNITS: 5000 INJECTION INTRAVENOUS; SUBCUTANEOUS at 14:04

## 2023-09-08 RX ADMIN — PRAVASTATIN SODIUM 40 MG: 40 TABLET ORAL at 17:43

## 2023-09-08 NOTE — PROGRESS NOTES
Progress Note- Debbie Ness 81 y.o. female MRN: [id number]    Unit/Bed#: -01 Encounter: [id number]      Assessment and Plan:  Mr. Pedro Sargent is an 28-year-old female with a past medical history of DMII, HTN, diverticulitis status post sigmoid resection, prior cholecystectomy, and BISHOP who presents with abdominal pain for which GI is consulted. 1. Cdiff diarrhea   2. 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 SIRS Syndrome  4. Acute on Chronic Kidney Injury    # Nonsevere C diff infection: Patient with 1 week history of diarrhea accompanied by abdominal pain, fevers, and malaise. 10 episodes per day with nocturnal symptoms along with fecal urgency and incontinence. Febrile on arrival with no significant leukocytosis with CT imaging showing signs of colitis with focal circumferential thickening in the transverse colon. Additionally, patient with FERCHO along with electrolyte abnormalities including low sodium, potassium, and bicarb likely in setting of diarrhea - now improved. At time of admission, patient started on empiric treatment of C. difficile with PO Vanco  given recent antibiotic use and severity of symptoms. Decreasing frequency of stools at this time. Plan:  · Continue PO Vancomycin 125 mg q6 hours  · Stool studies showing:  · Clostridium difficile toxin by PCR with EIA positive  · Ova and parasites negative  · Stool enteric panel by PCR negative   · Continue Banatrol TID with meals  · Encourage oral hydration and replete electrolytes as needed  · Maintain contact precautions with strict handwashing  · Pain and symptom management per primary team  · Continue current dose of p.o. vancomycin, could consider outpatient treatment if diarrhea frequency and consistency continues to improve    # Acute on Chronic Kidney Injury: Creatinine 2.0 on arrival, increased from baseline of 1.3-1.5. Likely a result of volume is diarrhea which has also resulted in electrolyte abnormalities.   Creatinine now improving. Continued management per primary team.  ______________________________________________________________________    Subjective:  Patient seen and examined at bedside. Patient lying in bed comfortably in no acute distress or visible discomfort. Does admit to occasional shocklike abdominal pain. Occurs diffusely but most severe in left lower quadrant. Admits to continued loose stools about 6-7 yesterday. Nonbloody. Denies any fever/chills. Denies nausea/vomiting.      Medication Administration - last 24 hours from 09/07/2023 1354 to 09/08/2023 1354       Date/Time Order Dose Route Action Action by     09/08/2023 0901 EDT ferrous sulfate tablet 325 mg 325 mg Oral Given [employer], RN     09/07/2023 1714 EDT pravastatin (PRAVACHOL) tablet 40 mg 40 mg Oral Given Ban Has, RN     09/08/2023 1521 EDT heparin (porcine) subcutaneous injection 5,000 Units 5,000 Units Subcutaneous Given Misa Keene, RN     09/07/2023 2136 EDT heparin (porcine) subcutaneous injection 5,000 Units 5,000 Units Subcutaneous Given Misa Keene, RN     09/08/2023 1144 EDT insulin lispro (HumaLOG) 100 units/mL subcutaneous injection 1-5 Units 2 Units Subcutaneous Given [employer], RN     09/08/2023 0902 EDT insulin lispro (HumaLOG) 100 units/mL subcutaneous injection 1-5 Units 1 Units Subcutaneous Given [employer], RN     09/07/2023 1714 EDT insulin lispro (HumaLOG) 100 units/mL subcutaneous injection 1-5 Units 1 Units Subcutaneous Given [place] Has, RN     09/07/2023 2137 EDT insulin lispro (HumaLOG) 100 units/mL subcutaneous injection 1-5 Units 2 Units Subcutaneous Given Misa Keene, RN     09/08/2023 1148 EDT vancomycin (VANCOCIN) capsule 125 mg 125 mg Oral Given [employer], RN     09/08/2023 0604 EDT vancomycin (VANCOCIN) capsule 125 mg 125 mg Oral Given Misamattie Keene, RN     09/07/2023 2319 EDT vancomycin (VANCOCIN) capsule 125 mg 125 mg Oral Given Misa Keene RN     09/07/2023 1714 EDT vancomycin (VANCOCIN) capsule 125 mg 125 mg Oral Given Lori Barahona RN          Objective:     Vitals: Blood pressure 144/74, pulse 76, temperature (!) 97.4 °F (36.3 °C), resp. rate 18, height 4' 10" (1.473 m), weight 57.2 kg (126 lb 1.7 oz), SpO2 97 %, not currently breastfeeding. ,Body mass index is 26.36 kg/m². Intake/Output Summary (Last 24 hours) at 9/8/2023 1354  Last data filed at 9/8/2023 0904  Gross per 24 hour   Intake 980 ml   Output --   Net 980 ml       Physical Exam  Constitutional:       General: She is not in acute distress. Appearance: She is normal weight. She is not toxic-appearing. HENT:      Head: Normocephalic and atraumatic. Nose: Nose normal. No congestion. Eyes:      General: No scleral icterus. Cardiovascular:      Rate and Rhythm: Normal rate and regular rhythm. Pulses: Normal pulses. Pulmonary:      Effort: Pulmonary effort is normal.   Abdominal:      General: Abdomen is flat. Bowel sounds are normal. There is no distension. Tenderness: There is abdominal tenderness (Diffusely tender with deep palpation. Mild. ). There is no guarding or rebound. Musculoskeletal:      Cervical back: Normal range of motion. Right lower leg: No edema. Left lower leg: No edema. Skin:     General: Skin is warm. Coloration: Skin is not jaundiced or pale. Neurological:      Mental Status: She is alert and oriented to person, place, and time.    Psychiatric:         Mood and Affect: Mood normal.         Behavior: Behavior normal.           Invasive Devices     Peripheral Intravenous Line  Duration           Peripheral IV 09/06/23 Right;Ventral (anterior) Forearm 2 days                Lab Results:  Admission on 09/06/2023   Component Date Value   • WBC 09/06/2023 9.71    • RBC 09/06/2023 4.19    • Hemoglobin 09/06/2023 11.7    • Hematocrit 09/06/2023 35.6    • MCV 09/06/2023 85    • MCH 09/06/2023 27.9    • MCHC 09/06/2023 32.9    • RDW 09/06/2023 13.7    • MPV 09/06/2023 9.3    • Platelets 09/18/8081 202    • nRBC 09/06/2023 0    • Neutrophils Relative 09/06/2023 70    • Immat GRANS % 09/06/2023 0    • Lymphocytes Relative 09/06/2023 15    • Monocytes Relative 09/06/2023 15 (H)    • Eosinophils Relative 09/06/2023 0    • Basophils Relative 09/06/2023 0    • Neutrophils Absolute 09/06/2023 6.69    • Immature Grans Absolute 09/06/2023 0.04    • Lymphocytes Absolute 09/06/2023 1.44    • Monocytes Absolute 09/06/2023 1.48 (H)    • Eosinophils Absolute 09/06/2023 0.04    • Basophils Absolute 09/06/2023 0.02    • Sodium 09/06/2023 134 (L)    • Potassium 09/06/2023 3.2 (L)    • Chloride 09/06/2023 104    • CO2 09/06/2023 17 (L)    • ANION GAP 09/06/2023 13    • BUN 09/06/2023 38 (H)    • Creatinine 09/06/2023 2.00 (H)    • Glucose 09/06/2023 125    • Calcium 09/06/2023 9.0    • AST 09/06/2023 13    • ALT 09/06/2023 9    • Alkaline Phosphatase 09/06/2023 90    • Total Protein 09/06/2023 8.1    • Albumin 09/06/2023 4.1    • Total Bilirubin 09/06/2023 0.49    • eGFR 09/06/2023 22    • LACTIC ACID 09/06/2023 0.8    • Procalcitonin 09/06/2023 0.28 (H)    • Protime 09/06/2023 14.8 (H)    • INR 09/06/2023 1.14    • PTT 09/06/2023 30    • Blood Culture 09/06/2023 No Growth at 24 hrs. • Blood Culture 09/06/2023 No Growth at 24 hrs.     • Color, UA 09/06/2023 Yellow    • Clarity, UA 09/06/2023 Clear    • Specific Gravity, UA 09/06/2023 1.015    • pH, UA 09/06/2023 5.0    • Leukocytes, UA 09/06/2023 1+ (A)    • Nitrite, UA 09/06/2023 Negative    • Protein, UA 09/06/2023 1+ (A)    • Glucose, UA 09/06/2023 Negative    • Ketones, UA 09/06/2023 Negative    • Urobilinogen, UA 09/06/2023 0.2    • Bilirubin, UA 09/06/2023 Negative    • Occult Blood, UA 09/06/2023 Trace-Intact (A)    • BNP 09/06/2023 70    • SARS-CoV-2 09/06/2023 Negative    • INFLUENZA A PCR 09/06/2023 Negative    • INFLUENZA B PCR 09/06/2023 Negative    • RSV PCR 09/06/2023 Negative    • Salmonella sp PCR 09/06/2023 None Detected    • Shigella sp/Enteroinvasi* 09/06/2023 None Detected    • Campylobacter sp (jejuni* 09/06/2023 None Detected    • Shiga toxin 1/Shiga toxi* 09/06/2023 None Detected    • C.difficile toxin by PCR 09/06/2023 Positive (A)    • C difficile Toxins A+B, * 09/06/2023 Positive (A)    • Ventricular Rate 09/06/2023 104    • Atrial Rate 09/06/2023 104    • GA Interval 09/06/2023 140    • QRSD Interval 09/06/2023 88    • QT Interval 09/06/2023 344    • QTC Interval 09/06/2023 452    • P Axis 09/06/2023 10    • QRS Axis 09/06/2023 -37    • T Wave Axis 09/06/2023 -8    • Lyme Total Antibodies 09/06/2023 Positive (A)    • Monotest 09/06/2023 Negative    • White Blood Cells, Stool 09/06/2023 Moderate amount of white blood cells.  (A)    • POC Glucose 09/06/2023 137    • POC Glucose 09/06/2023 111    • RBC, UA 09/06/2023 0-1    • WBC, UA 09/06/2023 4-10 (A)    • Epithelial Cells 09/06/2023 Occasional    • Bacteria, UA 09/06/2023 Occasional    • POC Glucose 09/06/2023 139    • Lyme IGM 09/06/2023 Positive (A)    • Lyme IGG 09/06/2023 Positive (A)    • Sodium 09/07/2023 136    • Potassium 09/07/2023 3.7    • Chloride 09/07/2023 109 (H)    • CO2 09/07/2023 18 (L)    • ANION GAP 09/07/2023 9    • BUN 09/07/2023 27 (H)    • Creatinine 09/07/2023 1.52 (H)    • Glucose 09/07/2023 101    • Calcium 09/07/2023 8.3 (L)    • Corrected Calcium 09/07/2023 8.9    • AST 09/07/2023 9 (L)    • ALT 09/07/2023 6 (L)    • Alkaline Phosphatase 09/07/2023 62    • Total Protein 09/07/2023 6.2 (L)    • Albumin 09/07/2023 3.3 (L)    • Total Bilirubin 09/07/2023 0.44    • eGFR 09/07/2023 31    • Magnesium 09/07/2023 2.0    • Phosphorus 09/07/2023 2.5    • WBC 09/07/2023 9.76    • RBC 09/07/2023 3.31 (L)    • Hemoglobin 09/07/2023 9.3 (L)    • Hematocrit 09/07/2023 28.9 (L)    • MCV 09/07/2023 87    • MCH 09/07/2023 28.1    • MCHC 09/07/2023 32.2    • RDW 09/07/2023 14.2    • MPV 09/07/2023 9.0    • Platelets 51/80/3563 167    • nRBC 09/07/2023 0 • Neutrophils Relative 09/07/2023 63    • Immat GRANS % 09/07/2023 0    • Lymphocytes Relative 09/07/2023 24    • Monocytes Relative 09/07/2023 11    • Eosinophils Relative 09/07/2023 2    • Basophils Relative 09/07/2023 0    • Neutrophils Absolute 09/07/2023 6.18    • Immature Grans Absolute 09/07/2023 0.03    • Lymphocytes Absolute 09/07/2023 2.33    • Monocytes Absolute 09/07/2023 1.02    • Eosinophils Absolute 09/07/2023 0.18    • Basophils Absolute 09/07/2023 0.02    • POC Glucose 09/07/2023 86    • POC Glucose 09/07/2023 133    • POC Glucose 09/07/2023 201 (H)    • POC Glucose 09/07/2023 231 (H)    • Sodium 09/08/2023 137    • Potassium 09/08/2023 3.8    • Chloride 09/08/2023 110 (H)    • CO2 09/08/2023 20 (L)    • ANION GAP 09/08/2023 7    • BUN 09/08/2023 21    • Creatinine 09/08/2023 1.17    • Glucose 09/08/2023 179 (H)    • Calcium 09/08/2023 8.7    • eGFR 09/08/2023 43    • WBC 09/08/2023 7.60    • RBC 09/08/2023 3.44 (L)    • Hemoglobin 09/08/2023 9.7 (L)    • Hematocrit 09/08/2023 29.7 (L)    • MCV 09/08/2023 86    • MCH 09/08/2023 28.2    • MCHC 09/08/2023 32.7    • RDW 09/08/2023 13.9    • MPV 09/08/2023 8.6 (L)    • Platelets 87/98/4200 197    • nRBC 09/08/2023 0    • Neutrophils Relative 09/08/2023 63    • Immat GRANS % 09/08/2023 1    • Lymphocytes Relative 09/08/2023 26    • Monocytes Relative 09/08/2023 7    • Eosinophils Relative 09/08/2023 3    • Basophils Relative 09/08/2023 0    • Neutrophils Absolute 09/08/2023 4.79    • Immature Grans Absolute 09/08/2023 0.04    • Lymphocytes Absolute 09/08/2023 1.99    • Monocytes Absolute 09/08/2023 0.53    • Eosinophils Absolute 09/08/2023 0.23    • Basophils Absolute 09/08/2023 0.02    • POC Glucose 09/08/2023 153 (H)    • POC Glucose 09/08/2023 237 (H)        Imaging Studies: I have personally reviewed pertinent imaging studies.

## 2023-09-08 NOTE — UTILIZATION REVIEW
Date: 9/8    Day 3: Has surpassed a 2nd midnight with active treatments and services, which include IV antibiuotics. See initial review completed by Bonita Nolasco on 9/7.

## 2023-09-08 NOTE — ASSESSMENT & PLAN NOTE
· Patient with recent antibiotic use in the outpatient setting for UTI  · Appreciate GI input  · Testing for COVID-19 is negative  · c difficile colitis positive  · Patient reports improvement day by day   · Continue vancomycin 125 mg p.o. every 6 hours-day 3

## 2023-09-08 NOTE — PLAN OF CARE
Problem: PAIN - ADULT  Goal: Verbalizes/displays adequate comfort level or baseline comfort level  Description: Interventions:  - Encourage patient to monitor pain and request assistance  - Assess pain using appropriate pain scale  - Administer analgesics based on type and severity of pain and evaluate response  - Implement non-pharmacological measures as appropriate and evaluate response  - Consider cultural and social influences on pain and pain management  - Notify physician/advanced practitioner if interventions unsuccessful or patient reports new pain  Outcome: Progressing     Problem: INFECTION - ADULT  Goal: Absence or prevention of progression during hospitalization  Description: INTERVENTIONS:  - Assess and monitor for signs and symptoms of infection  - Monitor lab/diagnostic results  - Monitor all insertion sites, i.e. indwelling lines, tubes, and drains  - Monitor endotracheal if appropriate and nasal secretions for changes in amount and color  - [place] appropriate cooling/warming therapies per order  - Administer medications as ordered  - Instruct and encourage patient and family to use good hand hygiene technique  - Identify and instruct in appropriate isolation precautions for identified infection/condition  Outcome: Progressing     Problem: SAFETY ADULT  Goal: Patient will remain free of falls  Description: INTERVENTIONS:  - Educate patient/family on patient safety including physical limitations  - Instruct patient to call for assistance with activity   - Consult OT/PT to assist with strengthening/mobility   - Keep Call bell within reach  - Keep bed low and locked with side rails adjusted as appropriate  - Keep care items and personal belongings within reach  - Initiate and maintain comfort rounds  - Make Fall Risk Sign visible to staff  - Offer Toileting every 2 Hours, in advance of need  - Apply yellow socks and bracelet for high fall risk patients  - Consider moving patient to room near nurses station  Outcome: Progressing  Goal: Maintain or return to baseline ADL function  Description: INTERVENTIONS:  -  Assess patient's ability to carry out ADLs; assess patient's baseline for ADL function and identify physical deficits which impact ability to perform ADLs (bathing, care of mouth/teeth, toileting, grooming, dressing, etc.)  - Assess/evaluate cause of self-care deficits   - Assess range of motion  - Assess patient's mobility; develop plan if impaired  - Assess patient's need for assistive devices and provide as appropriate  - Encourage maximum independence but intervene and supervise when necessary  - Involve family in performance of ADLs  - Assess for home care needs following discharge   - Consider OT consult to assist with ADL evaluation and planning for discharge  - Provide patient education as appropriate  Outcome: Progressing  Goal: Maintains/Returns to pre admission functional level  Description: INTERVENTIONS:  - Perform BMAT or MOVE assessment daily.   - Set and communicate daily mobility goal to care team and patient/family/caregiver.    - Collaborate with rehabilitation services on mobility goals if consulted  - Out of bed for toileting  - Record patient progress and toleration of activity level   Outcome: Progressing     Problem: DISCHARGE PLANNING  Goal: Discharge to home or other facility with appropriate resources  Description: INTERVENTIONS:  - Identify barriers to discharge w/patient and caregiver  - Arrange for needed discharge resources and transportation as appropriate  - Identify discharge learning needs (meds, wound care, etc.)  - Arrange for interpretive services to assist at discharge as needed  - Refer to Case Management Department for coordinating discharge planning if the patient needs post-hospital services based on physician/advanced practitioner order or complex needs related to functional status, cognitive ability, or social support system  Outcome: Progressing     Problem: Knowledge Deficit  Goal: Patient/family/caregiver demonstrates understanding of disease process, treatment plan, medications, and discharge instructions  Description: Complete learning assessment and assess knowledge base.   Interventions:  - Provide teaching at level of understanding  - Provide teaching via preferred learning methods  Outcome: Progressing     Problem: GASTROINTESTINAL - ADULT  Goal: Minimal or absence of nausea and/or vomiting  Description: INTERVENTIONS:  - Administer IV fluids if ordered to ensure adequate hydration  - Maintain NPO status until nausea and vomiting are resolved  - Nasogastric tube if ordered  - Administer ordered antiemetic medications as needed  - Provide nonpharmacologic comfort measures as appropriate  - Advance diet as tolerated, if ordered  - Consider nutrition services referral to assist patient with adequate nutrition and appropriate food choices  Outcome: Progressing  Goal: Maintains or returns to baseline bowel function  Description: INTERVENTIONS:  - Assess bowel function  - Encourage oral fluids to ensure adequate hydration  - Administer IV fluids if ordered to ensure adequate hydration  - Administer ordered medications as needed  - Encourage mobilization and activity  - Consider nutritional services referral to assist patient with adequate nutrition and appropriate food choices  Outcome: Progressing  Goal: Maintains adequate nutritional intake  Description: INTERVENTIONS:  - Monitor percentage of each meal consumed  - Identify factors contributing to decreased intake, treat as appropriate  - Assist with meals as needed  - Monitor I&O, weight, and lab values if indicated  - Obtain nutrition services referral as needed  Outcome: Progressing  Goal: Oral mucous membranes remain intact  Description: INTERVENTIONS  - Assess oral mucosa and hygiene practices  - Implement preventative oral hygiene regimen  - Implement oral medicated treatments as ordered  - Initiate Nutrition services referral as needed  Outcome: Progressing

## 2023-09-08 NOTE — PROGRESS NOTES
[address]  Progress Note  Name: Xu Dougherty  MRN: [id number]  Unit/Bed#: -01 I Date of Admission: 9/6/2023   Date of Service: 9/8/2023 I Hospital Day: 1    Assessment/Plan   Pulmonary fibrosis ([address])  Assessment & Plan  · Noted on CAT scan of the abdomen  · Most likely a sequelae of her history of COVID-19  · Patient requiring 2 to 3 L of supplemental oxygen via nasal cannula  · We will ask for pulmonary evaluation also  · Patient may benefit from an exercise desat study prior to discharge    Diarrhea of presumed infectious origin  Assessment & Plan  · Patient with recent antibiotic use in the outpatient setting for UTI  · Appreciate GI input  · Testing for COVID-19 is negative  · c difficile colitis positive  · Patient reports improvement day by day   · Continue vancomycin 125 mg p.o. every 6 hours-day 3    Essential hypertension  Assessment & Plan  · Vasotec remains on hold in view of FERCHO -okay to resume at discharge  · Blood pressure is controlled  · Continue as needed IV hydralazine resolved systolic blood pressure greater than 160 as needed every 6 hours    Anxiety  Assessment & Plan  · Continue as needed Xanax    Type 2 diabetes mellitus without complication, without long-term current use of insulin [facility])  Assessment & Plan  Lab Results   Component Value Date    HGBA1C 8.4 (H) 07/18/2023       Recent Labs     09/07/23  1638 09/07/23  2107 09/08/23  0755 09/08/23  1105   POCGLU 201* 231* 153* 237*       Blood Sugar Average: Last 72 hrs:  · (P) [id number] oral hypoglycemics inclusive of glipizide, and Januvia  · Continue Accu-Cheks before meals and at bedtime with sliding scale coverage while in house  · Target blood sugar for the hospital is 140-180      * Acute kidney injury ([address])  Assessment & Plan  · Patient has a baseline CKD stage IIIb-baseline creatinine is between 1.3 and 1.5  · Acute kidney injury was present on admission with an arrival creatinine of 2. 0  · This has since resolved, creatinine is now down to 1.5  · Prerenal in the setting of having diarrhea  · Hyponatremia- resolved with IV fluid, this was a hypovolemic hyponatremia  · Hypokalemia- resolved with IV repletion-this was also secondary to diarrhea  · Resolved. Off IV fluids. VTE  Prophylaxis:   Pharmacologic: in place  Mechanical VTE Prophylaxis in Place: Yes    Patient Centered Rounds: I have performed bedside rounds with nursing staff today. Discussions with Specialists or Other Care Team Provider: case management    Education and Discussions with Family / Patient:  Pt    Current Length of Stay: 1 day(s)    Current Patient Status: Inpatient        Code Status: Level 1 - Full Code    Discharge Plan: Pt will require continued inpatient hospitalization. Subjective:     Patient states continued improvement in diarrhea, not yet this is still able to manage at home      Patient is seen and examined at bedside. All other ROS are negative. Objective:     Vitals:   Temp (24hrs), Av.5 °F (36.4 °C), Min:97.4 °F (36.3 °C), Max:97.6 °F (36.4 °C)    Temp:  [97.4 °F (36.3 °C)-97.6 °F (36.4 °C)] 97.4 °F (36.3 °C)  HR:  [72-76] 76  Resp:  [18-19] 18  BP: (124-144)/(62-74) 144/74  SpO2:  [97 %-99 %] 97 %  Body mass index is 26.36 kg/m². Input and Output Summary (last 24 hours):        Intake/Output Summary (Last 24 hours) at 2023 1155  Last data filed at 2023 0904  Gross per 24 hour   Intake 980 ml   Output --   Net 980 ml       Physical Exam:       GEN: No acute distress, comfortable  HEEENT: No JVD, PERRLA, no scleral icterus  RESP: Lungs clear to auscultation bilaterally  CV: RRR, +s1/s2   ABD: SOFT NON TENDER, POSITIVE BOWEL SOUNDS, NO DISTENTION  PSYCH: CALM  NEURO: Ment[place] baseline, NO FOCAL DEFICITS  SKIN: NO RASH  EXTREM: NO EDEMA    Additional Data:     Labs:    Results from last 7 days   Lab Units 23  0544   WBC Thousand/uL 7.60   HEMOGLOBIN g/dL 9.7*   HEMATOCRIT % 29.7*   PLATELETS Thousands/uL 197   NEUTROS PCT % 63   LYMPHS PCT % 26   MONOS PCT % 7   EOS PCT % 3     Results from last 7 days   Lab Units 09/08/23  0544 09/07/23  0446   SODIUM mmol/L 137 136   POTASSIUM mmol/L 3.8 3.7   CHLORIDE mmol/L 110* 109*   CO2 mmol/L 20* 18*   BUN mg/dL 21 27*   CREATININE mg/dL 1.17 1.52*   ANION GAP mmol/L 7 9   CALCIUM mg/dL 8.7 8.3*   ALBUMIN g/dL  --  3.3*   TOTAL BILIRUBIN mg/dL  --  0.44   ALK PHOS U/L  --  62   ALT U/L  --  6*   AST U/L  --  9*   GLUCOSE RANDOM mg/dL 179* 101     Results from last 7 days   Lab Units 09/06/23  0905   INR  1.14     Results from last 7 days   Lab Units 09/08/23  1105 09/08/23  0755 09/07/23  2107 09/07/23  1638 09/07/23  1127 09/07/23  0714 09/06/23  2049 09/06/23  1601 09/06/23  1244   POC GLUCOSE mg/dl 237* 153* 231* 201* 133 86 139 111 137         Results from last 7 days   Lab Units 09/06/23  0905   LACTIC ACID mmol/L 0.8   PROCALCITONIN ng/ml 0.28*       Lines/Drains:  Invasive Devices     Peripheral Intravenous Line  Duration           Peripheral IV 09/06/23 Right;Ventral (anterior) Forearm 2 days                Telemetry:        * I Have Reviewed All Lab Data Listed Above. Imaging:     Results for orders placed during the hospital encounter of 09/06/23    XR chest portable    Narrative  CHEST    INDICATION:   unexplained fever. COMPARISON: Chest radiograph 11/22/2022 and CTA chest 1/20/2022. EXAM PERFORMED/VIEWS:  XR CHEST PORTABLE  AP semierect      FINDINGS:  Monitoring leads and clips project over the chest.    Embolization coils project over the liver. Cardiomediastinal silhouette appears unremarkable. Bilateral pulmonary scarring is similar to prior study. No focal consolidation, pleural effusion or pneumothorax. Osseous structures appear within normal limits for patient age. Impression  No acute cardiopulmonary disease.             Resident: Geovany Allen, the attending radiologist, have reviewed the images and agree with the final report above. Workstation performed: [id number]    Results for orders placed in visit on 05/25/22    XR chest pa & lateral    Narrative  CHEST    INDICATION:   J96.01: Acute respiratory failure with hypoxia  U07.1: COVID-19  J12.82: Pneumonia due to coronavirus disease 2019. Covid positive on 1/17/2022. COMPARISON:  Chest radiograph and CT from 1/20/2022. EXAM PERFORMED/VIEWS:  XR CHEST PA & LATERAL      FINDINGS:    Cardiomediastinal silhouette appears unremarkable. Coarse opacities in the periphery of both lungs, improved from January 2022. No effusion or pneumothorax. Embolization coils along the anterior chest wall. Osseous structures appear within normal limits for patient age. Impression  No acute cardiopulmonary disease. Coarse opacities in the periphery of both lungs, improved from January 2022, the sequela of Covid 19. Workstation performed: [id number]      *I have reviewed all imaging reports listed above      Recent Cultures (last 7 days):     Results from last 7 days   Lab Units 09/06/23  1102 09/06/23  0914 09/06/23  0905   BLOOD CULTURE   --  No Growth at 24 hrs. No Growth at 24 hrs.    C DIFF TOXIN B BY PCR  Positive*  --   --        Last 24 Hours Medication List:   Current Facility-Administered Medications   Medication Dose Route Frequency Provider Last Rate   • acetaminophen  650 mg Oral Q6H PRN Umair Osborne MD     • ALPRAZolam  0.25 mg Oral HS PRN Umair Osborne MD     • dicyclomine  20 mg Oral 4x Daily PRN Tracey Oscar DO     • ferrous sulfate  325 mg Oral Daily With Breakfast Umair Osborne MD     • heparin (porcine)  5,000 Units Subcutaneous [facility] Umair Osborne MD     • hydrALAZINE  20 mg Intravenous Q6H PRN Umair Osborne MD     • insulin lispro  1-5 Units Subcutaneous TID [facility] Umair Osborne MD     • insulin lispro  1-5 Units Subcutaneous HS Umair Osborne MD     • ondansetron  4 mg Intravenous Q6H PRN Giovanny Kyle MD     • pravastatin  40 mg Oral Daily With MD Jose     • vancomycin oral (capsules or solution)  125 mg Oral Q6H Gavi Fair MD          Today, Patient Was Seen By: Jenni Lopez MD    ** Please Note: Dictation voice to text software may have been used in the creation of this document.  **

## 2023-09-08 NOTE — ASSESSMENT & PLAN NOTE
· Patient has a baseline CKD stage IIIb-baseline creatinine is between 1.3 and 1.5  · Acute kidney injury was present on admission with an arrival creatinine of 2.0  · This has since resolved, creatinine is now down to 1.5  · Prerenal in the setting of having diarrhea  · Hyponatremia- resolved with IV fluid, this was a hypovolemic hyponatremia  · Hypokalemia- resolved with IV repletion-this was also secondary to diarrhea  · Resolved. Off IV fluids.

## 2023-09-08 NOTE — ASSESSMENT & PLAN NOTE
Lab Results   Component Value Date    HGBA1C 8.4 (H) 07/18/2023       Recent Labs     09/07/23  1638 09/07/23  2107 09/08/23  0755 09/08/23  1105   POCGLU 201* 231* 153* 237*       Blood Sugar Average: Last 72 hrs:  · (P) [id number] oral hypoglycemics inclusive of glipizide, and Januvia  · Continue Accu-Cheks before meals and at bedtime with sliding scale coverage while in house  · Target blood sugar for the hospital is 140-180

## 2023-09-08 NOTE — CONSULTS
Pulmonary Medicine-Consultation  Debbie Prater 81 y.o. female MRN: [id number]  Unit/Bed#: -01 Encounter: [id number]      Assessment:  1. 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    Recommendations/discussion:  • Lower chest CT/abdomen reviewed: Mild interstitial/fibrotic changes unchanged from before  • Overall picture likely from post COVID-19 pneumonitis/ILD  • 6-minute walk test in the office showed no exertional hypoxemia  • I offered full HRCT PFT/6-minute walk and pulmonary follow-up however pt would like to continue monitoring for symptoms change  • Supplemental oxygen to room air at rest today  • Recommend home oxygen evaluation before discharge    Physician Requesting Consult: Nitza Rodriguez MD    Reason for Consult / Principal Problem: Pulmonary fibrosis    HPI:   81 y.o. female with a h/o DM2, diverticulosis, HTN, depression/anxiety    Presented to the ED 9/6 with subacute diarrhea, noted to have C. difficile colitis, also was in sepsis/FERCHO. CT abdomen showed transverse colon/colitis. Evaluated by GI, started on oral vancomycin/antidiarrheal agents and diet. Chest portion of the CT abdomen showed fibrotic changes/mild traction bronchiectasis similar to prior imaging. CT PE in 1/2022 showed bilateral GGO/consolidation suspected from COVID-19 pneumonia at that time, this was slightly improved from a prior imaging. She was hospitalized in 12/2021 for severe hypoxic respiratory [id number] pneumonia, complicated by right-sided pneumothorax treated with 16 [place] chest tube, removed on 12/28. Had recurrence of the pneumothorax that required second chest tube/12 [place] removed 1/5. Also treated for MSSA bacteremia. Hospital course complicated by hemoperitoneum/hemorrhagic shock required transfer to [address] [place] [address] for IR embolization. Follows with pulmonary as an outpatient: Last seen in 6/2022.   Noted significant improvement of the symptoms, repeat 6-minute walk test showed no exertional hypoxemia. Chest x-ray showed improved coarse reticulation/opacities. On my assessment, patient is resting in bed. Denies any new respiratory symptoms. Since last seen in the office, continued to be active/independent at baseline does not use supplemental oxygen. Only dyspnea on moderate to severe exertion such as going up steps 1 flight of stairs. No new cough, chest congestion, wheezing. Inpatient consult to Pulmonology  Consult performed by: Janie Murillo MD  Consult ordered by:  Jass Conte MD          Review of Systems  As per hpi, all other systems reviewed and were negative      Studies:    Imaging Studies: I have personally reviewed pertinent films in PACS    EKG, Pathology, and Other Studies: I have personally reviewed pertinent films in PACS    Pulmonary Results (PFTs, PSG): None in file    Historical Information   Past Medical History:   Diagnosis Date   • Acute respiratory failure with hypoxia ([address]) 12/16/2021   • Broken arm     hairline fracture/ 2 places///   right arm   • Diabetes mellitus ([address])    • Diverticulitis    • Postherpetic neuralgia      Past Surgical History:   Procedure Laterality Date   • CHOLECYSTECTOMY     • COLON SURGERY     • HERNIA REPAIR     • IR EMBOLIZATION (SPECIFY VESSEL OR SITE)  1/12/2022   • ROTATOR CUFF REPAIR Right    • VARICOSE VEIN SURGERY      Impression:  2/12/16 Manzo, Farrell Sacks     Social History   Social History     Substance and Sexual Activity   Alcohol Use Not Currently    Comment: Rarely     Social History     Substance and Sexual Activity   Drug Use Never     Social History     Tobacco Use   Smoking Status Never   Smokeless Tobacco Never     E-Cigarette/Vaping   • E-Cigarette Use Never User      E-Cigarette/Vaping Substances   • Nicotine No    • THC No    • CBD No    • Flavoring No          Family History:   Family History   Problem Relation Age of Onset   • Diabetes Mother    • No Known Problems Father        Meds/Allergies   all current active meds have been reviewed    Allergies   Allergen Reactions   • Ciprofloxacin GI Intolerance and Headache     Confusion, arm weakness, dizziness, headache   • Meperidine GI Intolerance, Hallucinations and Other (See Comments)     Demarol  Reaction Date:Unknown   • Propoxyphene GI Intolerance       Objective   Vitals: Blood pressure 144/74, pulse 76, temperature (!) 97.4 °F (36.3 °C), resp. rate 18, height 4' 10" (1.473 m), weight 57.2 kg (126 lb 1.7 oz), SpO2 97 %, not currently breastfeeding. ,Body mass index is 26.36 kg/m². Intake/Output Summary (Last 24 hours) at 9/8/2023 1115  Last data filed at 9/8/2023 0904  Gross per 24 hour   Intake 980 ml   Output --   Net 980 ml     Invasive Devices     Peripheral Intravenous Line  Duration           Peripheral IV 09/06/23 Right;Ventral (anterior) Forearm 2 days                Physical Exam  Body mass index is 26.36 kg/m². Gen: not in acute distress,   Neck/Eyes: supple, PERRL  Ear: normal appearance, no significant hearing impairment  Nose:  normal nasal mucosa, no drainage  Salivary glands: soft nontender  Chest: normal respiratory efforts, mild/fine basilar crackles  CV: RRR, no murmurs appreciated, no edema  Abdomen: soft, non tender, +BS  Extremities:  No observed deformity   Skin: unremarkable  Neuro: AAO X3, no focal motor deficit       Lab Results: I have personally reviewed pertinent lab results. None    Portions of the record may have been created with voice recognition software. Occasional wrong word or "sound a like" substitutions may have occurred due to the inherent limitations of voice recognition software. Read the chart carefully and recognize, using context, where substitutions have occurred.

## 2023-09-08 NOTE — NURSING NOTE
AWAKE AND PLEASANT. 02 MAINTAINED ACUTELY 3 LITERS/ NC. DENIES PAIN. 02 SAT 97% HR 73/MIN. CALL LANGLEY NEAR. NO DISTRESS.  BED ALARM ON

## 2023-09-09 VITALS
RESPIRATION RATE: 18 BRPM | HEART RATE: 87 BPM | OXYGEN SATURATION: 96 % | DIASTOLIC BLOOD PRESSURE: 97 MMHG | TEMPERATURE: 97.7 F | BODY MASS INDEX: 26.47 KG/M2 | HEIGHT: 58 IN | WEIGHT: 126.1 LBS | SYSTOLIC BLOOD PRESSURE: 166 MMHG

## 2023-09-09 LAB
ALBUMIN SERPL BCP-MCNC: 3.5 G/DL (ref 3.5–5)
ALP SERPL-CCNC: 63 U/L (ref 34–104)
ALT SERPL W P-5'-P-CCNC: 7 U/L (ref 7–52)
ANION GAP SERPL CALCULATED.3IONS-SCNC: 7 MMOL/L
AST SERPL W P-5'-P-CCNC: 8 U/L (ref 13–39)
BASOPHILS # BLD AUTO: 0.02 THOUSANDS/ÂΜL (ref 0–0.1)
BASOPHILS NFR BLD AUTO: 0 % (ref 0–1)
BILIRUB SERPL-MCNC: 0.36 MG/DL (ref 0.2–1)
BUN SERPL-MCNC: 20 MG/DL (ref 5–25)
CALCIUM SERPL-MCNC: 9.1 MG/DL (ref 8.4–10.2)
CHLORIDE SERPL-SCNC: 109 MMOL/L (ref 96–108)
CO2 SERPL-SCNC: 22 MMOL/L (ref 21–32)
CREAT SERPL-MCNC: 1.19 MG/DL (ref 0.6–1.3)
EOSINOPHIL # BLD AUTO: 0.29 THOUSAND/ÂΜL (ref 0–0.61)
EOSINOPHIL NFR BLD AUTO: 5 % (ref 0–6)
ERYTHROCYTE [DISTWIDTH] IN BLOOD BY AUTOMATED COUNT: 13.4 % (ref 11.6–15.1)
GFR SERPL CREATININE-BSD FRML MDRD: 42 ML/MIN/1.73SQ M
GLUCOSE SERPL-MCNC: 150 MG/DL (ref 65–140)
GLUCOSE SERPL-MCNC: 154 MG/DL (ref 65–140)
GLUCOSE SERPL-MCNC: 166 MG/DL (ref 65–140)
HCT VFR BLD AUTO: 30 % (ref 34.8–46.1)
HGB BLD-MCNC: 10.1 G/DL (ref 11.5–15.4)
IMM GRANULOCYTES # BLD AUTO: 0.09 THOUSAND/UL (ref 0–0.2)
IMM GRANULOCYTES NFR BLD AUTO: 2 % (ref 0–2)
LYMPHOCYTES # BLD AUTO: 2.2 THOUSANDS/ÂΜL (ref 0.6–4.47)
LYMPHOCYTES NFR BLD AUTO: 37 % (ref 14–44)
MCH RBC QN AUTO: 28.1 PG (ref 26.8–34.3)
MCHC RBC AUTO-ENTMCNC: 33.7 G/DL (ref 31.4–37.4)
MCV RBC AUTO: 83 FL (ref 82–98)
MONOCYTES # BLD AUTO: 0.5 THOUSAND/ÂΜL (ref 0.17–1.22)
MONOCYTES NFR BLD AUTO: 8 % (ref 4–12)
NEUTROPHILS # BLD AUTO: 2.83 THOUSANDS/ÂΜL (ref 1.85–7.62)
NEUTS SEG NFR BLD AUTO: 48 % (ref 43–75)
NRBC BLD AUTO-RTO: 0 /100 WBCS
PLATELET # BLD AUTO: 219 THOUSANDS/UL (ref 149–390)
PMV BLD AUTO: 8.9 FL (ref 8.9–12.7)
POTASSIUM SERPL-SCNC: 3.8 MMOL/L (ref 3.5–5.3)
PROT SERPL-MCNC: 6.7 G/DL (ref 6.4–8.4)
RBC # BLD AUTO: 3.6 MILLION/UL (ref 3.81–5.12)
SODIUM SERPL-SCNC: 138 MMOL/L (ref 135–147)
WBC # BLD AUTO: 5.93 THOUSAND/UL (ref 4.31–10.16)

## 2023-09-09 PROCEDURE — 85025 COMPLETE CBC W/AUTO DIFF WBC: CPT | Performed by: HOSPITALIST

## 2023-09-09 PROCEDURE — 82948 REAGENT STRIP/BLOOD GLUCOSE: CPT

## 2023-09-09 PROCEDURE — 99239 HOSP IP/OBS DSCHRG MGMT >30: CPT | Performed by: HOSPITALIST

## 2023-09-09 PROCEDURE — 80053 COMPREHEN METABOLIC PANEL: CPT | Performed by: HOSPITALIST

## 2023-09-09 RX ORDER — VANCOMYCIN HYDROCHLORIDE 125 MG/1
125 CAPSULE ORAL EVERY 6 HOURS SCHEDULED
Qty: 28 CAPSULE | Refills: 0 | Status: SHIPPED | OUTPATIENT
Start: 2023-09-09 | End: 2023-09-16

## 2023-09-09 RX ORDER — DICYCLOMINE HCL 20 MG
20 TABLET ORAL 4 TIMES DAILY PRN
Qty: 40 TABLET | Refills: 0 | Status: SHIPPED | OUTPATIENT
Start: 2023-09-09

## 2023-09-09 RX ADMIN — INSULIN LISPRO 1 UNITS: 100 INJECTION, SOLUTION INTRAVENOUS; SUBCUTANEOUS at 09:30

## 2023-09-09 RX ADMIN — VANCOMYCIN HYDROCHLORIDE 125 MG: 125 CAPSULE ORAL at 05:00

## 2023-09-09 RX ADMIN — HEPARIN SODIUM 5000 UNITS: 5000 INJECTION INTRAVENOUS; SUBCUTANEOUS at 05:00

## 2023-09-09 RX ADMIN — FERROUS SULFATE TAB 325 MG (65 MG ELEMENTAL FE) 325 MG: 325 (65 FE) TAB at 09:29

## 2023-09-09 RX ADMIN — VANCOMYCIN HYDROCHLORIDE 125 MG: 125 CAPSULE ORAL at 00:35

## 2023-09-09 RX ADMIN — VANCOMYCIN HYDROCHLORIDE 125 MG: 125 CAPSULE ORAL at 12:08

## 2023-09-09 RX ADMIN — INSULIN LISPRO 1 UNITS: 100 INJECTION, SOLUTION INTRAVENOUS; SUBCUTANEOUS at 12:09

## 2023-09-09 NOTE — PLAN OF CARE
Problem: PAIN - ADULT  Goal: Verbalizes/displays adequate comfort level or baseline comfort level  Description: Interventions:  - Encourage patient to monitor pain and request assistance  - Assess pain using appropriate pain scale  - Administer analgesics based on type and severity of pain and evaluate response  - Implement non-pharmacological measures as appropriate and evaluate response  - Consider cultural and social influences on pain and pain management  - Notify physician/advanced practitioner if interventions unsuccessful or patient reports new pain  Outcome: Progressing     Problem: INFECTION - ADULT  Goal: Absence or prevention of progression during hospitalization  Description: INTERVENTIONS:  - Assess and monitor for signs and symptoms of infection  - Monitor lab/diagnostic results  - Monitor all insertion sites, i.e. indwelling lines, tubes, and drains  - Monitor endotracheal if appropriate and nasal secretions for changes in amount and color  - [place] appropriate cooling/warming therapies per order  - Administer medications as ordered  - Instruct and encourage patient and family to use good hand hygiene technique  - Identify and instruct in appropriate isolation precautions for identified infection/condition  Outcome: Progressing     Problem: SAFETY ADULT  Goal: Patient will remain free of falls  Description: INTERVENTIONS:  - Educate patient/family on patient safety including physical limitations  - Instruct patient to call for assistance with activity   - Consult OT/PT to assist with strengthening/mobility   - Keep Call bell within reach  - Keep bed low and locked with side rails adjusted as appropriate  - Keep care items and personal belongings within reach  - Initiate and maintain comfort rounds  - Make Fall Risk Sign visible to staff  - Offer Toileting every 2 Hours, in advance of need  - Apply yellow socks and bracelet for high fall risk patients  - Consider moving patient to room near nurses station  Outcome: Progressing  Goal: Maintain or return to baseline ADL function  Description: INTERVENTIONS:  -  Assess patient's ability to carry out ADLs; assess patient's baseline for ADL function and identify physical deficits which impact ability to perform ADLs (bathing, care of mouth/teeth, toileting, grooming, dressing, etc.)  - Assess/evaluate cause of self-care deficits   - Assess range of motion  - Assess patient's mobility; develop plan if impaired  - Assess patient's need for assistive devices and provide as appropriate  - Encourage maximum independence but intervene and supervise when necessary  - Involve family in performance of ADLs  - Assess for home care needs following discharge   - Consider OT consult to assist with ADL evaluation and planning for discharge  - Provide patient education as appropriate  Outcome: Progressing  Goal: Maintains/Returns to pre admission functional level  Description: INTERVENTIONS:  - Perform BMAT or MOVE assessment daily.   - Set and communicate daily mobility goal to care team and patient/family/caregiver.    - Collaborate with rehabilitation services on mobility goals if consulted  - Out of bed for toileting  - Record patient progress and toleration of activity level   Outcome: Progressing     Problem: DISCHARGE PLANNING  Goal: Discharge to home or other facility with appropriate resources  Description: INTERVENTIONS:  - Identify barriers to discharge w/patient and caregiver  - Arrange for needed discharge resources and transportation as appropriate  - Identify discharge learning needs (meds, wound care, etc.)  - Arrange for interpretive services to assist at discharge as needed  - Refer to Case Management Department for coordinating discharge planning if the patient needs post-hospital services based on physician/advanced practitioner order or complex needs related to functional status, cognitive ability, or social support system  Outcome: Progressing     Problem: Knowledge Deficit  Goal: Patient/family/caregiver demonstrates understanding of disease process, treatment plan, medications, and discharge instructions  Description: Complete learning assessment and assess knowledge base.   Interventions:  - Provide teaching at level of understanding  - Provide teaching via preferred learning methods  Outcome: Progressing     Problem: GASTROINTESTINAL - ADULT  Goal: Minimal or absence of nausea and/or vomiting  Description: INTERVENTIONS:  - Administer IV fluids if ordered to ensure adequate hydration  - Maintain NPO status until nausea and vomiting are resolved  - Nasogastric tube if ordered  - Administer ordered antiemetic medications as needed  - Provide nonpharmacologic comfort measures as appropriate  - Advance diet as tolerated, if ordered  - Consider nutrition services referral to assist patient with adequate nutrition and appropriate food choices  Outcome: Progressing  Goal: Maintains or returns to baseline bowel function  Description: INTERVENTIONS:  - Assess bowel function  - Encourage oral fluids to ensure adequate hydration  - Administer IV fluids if ordered to ensure adequate hydration  - Administer ordered medications as needed  - Encourage mobilization and activity  - Consider nutritional services referral to assist patient with adequate nutrition and appropriate food choices  Outcome: Progressing  Goal: Maintains adequate nutritional intake  Description: INTERVENTIONS:  - Monitor percentage of each meal consumed  - Identify factors contributing to decreased intake, treat as appropriate  - Assist with meals as needed  - Monitor I&O, weight, and lab values if indicated  - Obtain nutrition services referral as needed  Outcome: Progressing  Goal: Oral mucous membranes remain intact  Description: INTERVENTIONS  - Assess oral mucosa and hygiene practices  - Implement preventative oral hygiene regimen  - Implement oral medicated treatments as ordered  - Initiate Nutrition services referral as needed  Outcome: Progressing     Problem: MOBILITY - ADULT  Goal: Maintain or return to baseline ADL function  Description: INTERVENTIONS:  -  Assess patient's ability to carry out ADLs; assess patient's baseline for ADL function and identify physical deficits which impact ability to perform ADLs (bathing, care of mouth/teeth, toileting, grooming, dressing, etc.)  - Assess/evaluate cause of self-care deficits   - Assess range of motion  - Assess patient's mobility; develop plan if impaired  - Assess patient's need for assistive devices and provide as appropriate  - Encourage maximum independence but intervene and supervise when necessary  - Involve family in performance of ADLs  - Assess for home care needs following discharge   - Consider OT consult to assist with ADL evaluation and planning for discharge  - Provide patient education as appropriate  Outcome: Progressing  Goal: Maintains/Returns to pre admission functional level  Description: INTERVENTIONS:  - Perform BMAT or MOVE assessment daily.   - Set and communicate daily mobility goal to care team and patient/family/caregiver.    - Collaborate with rehabilitation services on mobility goals if consulted  - Out of bed for toileting  - Record patient progress and toleration of activity level   Outcome: Progressing

## 2023-09-09 NOTE — DISCHARGE SUMMARY
[address]  Discharge- Katie Grier 1940, 81 y.o. female MRN: [id number]  Unit/Bed#: -[facility]: [id number]  Primary Care Provider: Kim Vigil DO   Date and time admitted to hospital: 9/6/2023  8:36 AM     Assessment/Plan   Pulmonary fibrosis ([address])  Assessment & Plan  • Noted on CAT scan of the abdomen  • Most likely a sequelae of her history of COVID-19  • Patient requiring 2 to 3 L of supplemental oxygen via nasal cannula  • apprec  pulmonary evaluation also  • Will check home O2 evaluation prior to discharge  • Overall picture stable, felt ILD versus COVID 19 pneumonitis responsible per pulm     Diarrhea of presumed infectious origin  Assessment & Plan  • Patient with recent antibiotic use in the outpatient setting for UTI  • Appreciate GI input  • Testing for COVID-19 is negative  • c difficile colitis positive  • Patient reports improvement day by day   • Continue vancomycin 125 mg p.o. every 6 hours will complete 10 days of treatment     Essential hypertension  Assessment & Plan  • Vasotec remains on hold in view of FERCHO -okay to resume at discharge  • Blood pressure is controlled  • Continue as needed IV hydralazine resolved systolic blood pressure greater than 160 as needed every 6 hours     Anxiety  Assessment & Plan  • Continue as needed Xanax     Type 2 diabetes mellitus without complication, without long-term current use of insulin [facility])  Assessment & Plan        Lab Results   Component Value Date     HGBA1C 8.4 (H) 07/18/2023                Recent Labs     09/07/23  1638 09/07/23  2107 09/08/23  0755 09/08/23  1105   POCGLU 201* 231* 153* 237*         Blood Sugar Average: Last 72 hrs:  • (P) [id number] oral hypoglycemics inclusive of glipizide, and Januvia  • Continue Accu-Cheks before meals and at bedtime with sliding scale coverage while in house  • Target blood sugar for the hospital is 140-180        * Acute kidney injury ([address])  Assessment & Plan  • Patient has a baseline CKD stage IIIb-baseline creatinine is between 1.3 and 1.5  • Acute kidney injury was present on admission with an arrival creatinine of 2.0  • This has since resolved, creatinine is now down to 1.5  • Prerenal in the setting of having diarrhea  • Hyponatremia- resolved with IV fluid, this was a hypovolemic hyponatremia  • Hypokalemia- resolved with IV repletion-this was also secondary to diarrhea  • Resolved. Off IV fluids.        Hospital Course:     Katie Grier is a 81 y.o. female patient who originally presented to the hospital on   Admission Orders (From admission, onward)     Ordered        09/07/23 0819  Inpatient Admission  Once            09/06/23 1052  Place in Observation  Once                     due to     C. difficile colitis. Patient had slow and steady improvement. She is tolerating diet at the time of discharge. No abdominal pain, no fevers, she has had improvement in stool consistency and frequency. She will complete oral vancomycin at discharge. Please see above list of diagnoses and related plan for additional information. Physical Exam:    GEN: No acute distress, comfortable  HEEENT: No JVD, PERRLA, no scleral icterus  RESP: Lungs clear to auscultation bilaterally  CV: RRR, +s1/s2   ABD: SOFT NON TENDER, POSITIVE BOWEL SOUNDS, NO DISTENTION  PSYCH: CALM  NEURO: Mentation baseline, NO FOCAL DEFICITS  SKIN: NO RASH  EXTREM: NO EDEMA    CONSULTING PROVIDERS   IP CONSULT TO GASTROENTEROLOGY  IP CONSULT TO PULMONOLOGY    PROCEDURES PERFORMED  * No surgery found *    RADIOLOGY RESULTS  XR chest portable    Result Date: 9/6/2023  Narrative: CHEST INDICATION:   unexplained fever. COMPARISON: Chest radiograph 11/22/2022 and CTA chest 1/20/2022. EXAM PERFORMED/VIEWS:  XR CHEST PORTABLE  AP semierect FINDINGS:  Monitoring leads and clips project over the chest. Embolization coils project over the liver. Cardiomediastinal silhouette appears unremarkable. Bilateral pulmonary scarring is similar to prior study. No focal consolidation, pleural effusion or pneumothorax. Osseous structures appear within normal limits for patient age. Impression: No acute cardiopulmonary disease. Resident: Luciana Kaplan, the attending radiologist, have reviewed the images and agree with the final report above. Workstation performed: [id number]     CT abdomen pelvis wo contrast    Result Date: 9/6/2023  Narrative: CT ABDOMEN AND PELVIS WITHOUT IV CONTRAST INDICATION:   diarrhea, suprapubic abdominal pain. COMPARISON: August 18, 2023, July 26, 2023, January 12, 2022 TECHNIQUE:  CT examination of the abdomen and pelvis was performed without intravenous contrast. Multiplanar 2D reformatted images were created from the source data. This examination, like all CT scans performed in the [facility], was performed utilizing techniques to minimize radiation dose exposure, including the use of iterative reconstruction and automated exposure control. Radiation dose length product (DLP) for this visit:  709.3 mGy-cm Enteric contrast was administered., Only minimal volume of oral contrast seen within the small bowel. FINDINGS: ABDOMEN LOWER CHEST: Stable peripheral interstitial fibrotic changes and associated mild traction bronchiectasis, similar to prior. No acute consolidation or mass demonstrated. LIVER/BILIARY TREE: There is prominence of common bile duct and central intrahepatic biliary tree most consistent with the sequela of prior cholecystectomy. Mild asymmetric enlargement of the left lateral segment and caudate relative to the right liver. It is stable nonspecific finding and may indicate underlying liver disease. Liver is otherwise unremarkable. GALLBLADDER: Gallbladder is surgically absent. SPLEEN:  Unremarkable. PANCREAS:  Unremarkable. ADRENAL GLANDS:  Unremarkable. KIDNEYS/URETERS:  Unremarkable. No hydronephrosis.  STOMACH AND BOWEL: Stomach and small bowel appear grossly within normal limits for this unenhanced exam. Evaluation of the colon shows a prior anastomotic staple line in the rectosigmoid region. Through the transverse colon there is some suggestion of mild thickening, as well as a focal short segment of approximately 2.6 cm length where there is more focally circumferential wall thickening. This does not appear to been present on a recent study from August suggesting this may be a transient finding. Correlate with clinical scenario for evidence of acute infectious or inflammatory etiologies or spurious finding related to peristalsis. Cannot further characterize in the absence of intravenous and oral contrast. APPENDIX:  A normal appendix was visualized. ABDOMINOPELVIC CAVITY:  No ascites. No pneumoperitoneum. No lymphadenopathy. VESSELS:  Unremarkable for patient's age. PELVIS REPRODUCTIVE ORGANS:  Unremarkable for patient's age. URINARY BLADDER:  Unremarkable. ABDOMINAL WALL/INGUINAL REGIONS:  Unremarkable. OSSEOUS STRUCTURES:  No acute fracture or destructive osseous lesion. Moderate scoliosis, dominant lumbar curve concave to the left at L1. Impression: Chronic pulmonary fibrotic changes in both lung bases. Prior cholecystectomy. Stable dilatation of the extrahepatic biliary tree. Mild hepatomegaly as noted above. Transverse colon showing findings suggesting mild wall thickening as well as focal circumferential thickening and narrowing. See above for further details and discussion. Cannot better characterized in the absence of intravenous contrast. Oral contrast is only minimally seen within the small bowel. Workstation performed: [id number]     CT abdomen pelvis wo contrast    Result Date: 8/18/2023  Narrative: CT ABDOMEN AND PELVIS WITHOUT IV CONTRAST INDICATION:   RLQ abdominal pain (Age >= 14y) acute abdominal pain rlq and righ flank.  COMPARISON: CT 7/26/2023, 1/12/2022 TECHNIQUE:  CT examination of the abdomen and pelvis was performed without intravenous contrast. Multiplanar 2D reformatted images were created from the source data. This examination, like all CT scans performed in the [facility], was performed utilizing techniques to minimize radiation dose exposure, including the use of iterative reconstruction and automated exposure control. Radiation dose length product (DLP) for this visit:  643 mGy-cm Enteric contrast was administered. FINDINGS: ABDOMEN LOWER CHEST: Areas of groundglass opacity and parenchymal scarring in the lung bases LIVER/BILIARY TREE:  Unremarkable. GALLBLADDER:  No calcified gallstones. No pericholecystic inflammatory change. SPLEEN:  Unremarkable. PANCREAS:  Unremarkable. ADRENAL GLANDS:  Unremarkable. KIDNEYS/URETERS: Inflammatory stranding is noted surrounding the right mid ureter on series 2/120, this is new from prior No hydronephrosis No renal system stone STOMACH AND BOWEL:  Unremarkable. APPENDIX: A normal appendix was visualized. ABDOMINOPELVIC CAVITY:  No ascites. No pneumoperitoneum. No lymphadenopathy. VESSELS: Atherosclerotic changes are present. No evidence of aneurysm. PELVIS REPRODUCTIVE ORGANS:  Unremarkable for patient's age. URINARY BLADDER:  Unremarkable. ABDOMINAL WALL/INGUINAL REGIONS: Embolic material noted in the right upper quadrant abdominal/chest wall OSSEOUS STRUCTURES:  No acute fracture or destructive osseous lesion. Impression: Focal inflammatory stranding surrounding the right mid ureter, new from prior. Favored to represent ascending infection. Recently passed stone would have a similar appearance, however no stone was present on the most recent CT.  Workstation performed: [id number]       LABS  Results from last 7 days   Lab Units 09/09/23  0449 09/08/23  0544 09/07/23  0446 09/06/23  0905   WBC Thousand/uL 5.93 7.60 9.76 9.71   HEMOGLOBIN g/dL 10.1* 9.7* 9.3* 11.7   HEMATOCRIT % 30.0* 29.7* 28.9* 35.6   MCV fL 83 86 87 85   PLATELETS Thousands/uL 219 197 167 202   INR --   --   --  1.14     Results from last 7 days   Lab Units 09/09/23  0449 09/08/23  0544 09/07/23  0446 09/06/23  0905   SODIUM mmol/L 138 137 136 134*   POTASSIUM mmol/L 3.8 3.8 3.7 3.2*   CHLORIDE mmol/L 109* 110* 109* 104   CO2 mmol/L 22 20* 18* 17*   BUN mg/dL 20 21 27* 38*   CREATININE mg/dL 1.19 1.17 1.52* 2.00*   CALCIUM mg/dL 9.1 8.7 8.3* 9.0   ALBUMIN g/dL 3.5  --  3.3* 4.1   TOTAL BILIRUBIN mg/dL 0.36  --  0.44 0.49   ALK PHOS U/L 63  --  62 90   ALT U/L 7  --  6* 9   AST U/L 8*  --  9* 13   EGFR ml/min/1.73sq m 42 43 31 22   GLUCOSE RANDOM mg/dL 154* 179* 101 125                  Results from last 7 days   Lab Units 09/09/23  0816 09/08/23  2052 09/08/23  1613 09/08/23  1105 09/08/23  0755 09/07/23  2107 09/07/23  1638 09/07/23  1127 09/07/23  0714 09/06/23  2049   POC GLUCOSE mg/dl 150* 249* 230* 237* 153* 231* 201* 133 86 139             Results from last 7 days   Lab Units 09/06/23  0905   LACTIC ACID mmol/L 0.8   PROCALCITONIN ng/ml 0.28*           Cultures:   Results from last 7 days   Lab Units 09/06/23  1724   COLOR UA  Yellow   CLARITY UA  Clear   SPEC GRAV UA  1.015   PH UA  5.0   LEUKOCYTES UA  1+*   NITRITE UA  Negative   GLUCOSE UA mg/dl Negative   KETONES UA mg/dl Negative   BILIRUBIN UA  Negative   BLOOD UA  Trace-Intact*      Results from last 7 days   Lab Units 09/06/23  1724   RBC UA /hpf 0-1   WBC UA /hpf 4-10*   EPITHELIAL CELLS WET PREP /hpf Occasional   BACTERIA UA /hpf Occasional      Results from last 7 days   Lab Units 09/06/23  1102 09/06/23  0914 09/06/23  0905   BLOOD CULTURE   --  No Growth at 48 hrs. No Growth at 48 hrs. INFLUENZA A PCR   --   --  Negative   C DIFF TOXIN B BY PCR  Positive*  --   --          Condition at Discharge:  good      Discharge instructions/Information to patient and family:   See after visit summary for information provided to patient and family.       Provisions for Follow-Up Care:  See after visit summary for information related to follow-up care and any pertinent home health orders. Disposition:     Home       Discharge Statement:  I spent 39 minutes discharging the patient. This time was spent on the day of discharge. I had direct contact with the patient on the day of discharge. Greater than 50% of the total time was spent examining patient, answering all patient questions, arranging and discussing plan of care with patient as well as directly providing post-discharge instructions. Additional time then spent on discharge activities. Discharge Medications:  See after visit summary for reconciled discharge medications provided to patient and family.       ** Please Note: This note has been constructed using a voice recognition system **

## 2023-09-09 NOTE — PLAN OF CARE
Problem: PAIN - ADULT  Goal: Verbalizes/displays adequate comfort level or baseline comfort level  Description: Interventions:  - Encourage patient to monitor pain and request assistance  - Assess pain using appropriate pain scale  - Administer analgesics based on type and severity of pain and evaluate response  - Implement non-pharmacological measures as appropriate and evaluate response  - Consider cultural and social influences on pain and pain management  - Notify physician/advanced practitioner if interventions unsuccessful or patient reports new pain  Outcome: Adequate for Discharge     Problem: INFECTION - ADULT  Goal: Absence or prevention of progression during hospitalization  Description: INTERVENTIONS:  - Assess and monitor for signs and symptoms of infection  - Monitor lab/diagnostic results  - Monitor all insertion sites, i.e. indwelling lines, tubes, and drains  - Monitor endotracheal if appropriate and nasal secretions for changes in amount and color  - [place] appropriate cooling/warming therapies per order  - Administer medications as ordered  - Instruct and encourage patient and family to use good hand hygiene technique  - Identify and instruct in appropriate isolation precautions for identified infection/condition  Outcome: Adequate for Discharge     Problem: SAFETY ADULT  Goal: Patient will remain free of falls  Description: INTERVENTIONS:  - Educate patient/family on patient safety including physical limitations  - Instruct patient to call for assistance with activity   - Consult OT/PT to assist with strengthening/mobility   - Keep Call bell within reach  - Keep bed low and locked with side rails adjusted as appropriate  - Keep care items and personal belongings within reach  - Initiate and maintain comfort rounds  - Make Fall Risk Sign visible to staff  - Offer Toileting every 2 Hours, in advance of need  - Apply yellow socks and bracelet for high fall risk patients  - Consider moving patient to room near nurses station  Outcome: Adequate for Discharge  Goal: Maintain or return to baseline ADL function  Description: INTERVENTIONS:  -  Assess patient's ability to carry out ADLs; assess patient's baseline for ADL function and identify physical deficits which impact ability to perform ADLs (bathing, care of mouth/teeth, toileting, grooming, dressing, etc.)  - Assess/evaluate cause of self-care deficits   - Assess range of motion  - Assess patient's mobility; develop plan if impaired  - Assess patient's need for assistive devices and provide as appropriate  - Encourage maximum independence but intervene and supervise when necessary  - Involve family in performance of ADLs  - Assess for home care needs following discharge   - Consider OT consult to assist with ADL evaluation and planning for discharge  - Provide patient education as appropriate  Outcome: Adequate for Discharge  Goal: Maintains/Returns to pre admission functional level  Description: INTERVENTIONS:  - Perform BMAT or MOVE assessment daily.   - Set and communicate daily mobility goal to care team and patient/family/caregiver.    - Collaborate with rehabilitation services on mobility goals if consulted  - Out of bed for toileting  - Record patient progress and toleration of activity level   Outcome: Adequate for Discharge     Problem: DISCHARGE PLANNING  Goal: Discharge to home or other facility with appropriate resources  Description: INTERVENTIONS:  - Identify barriers to discharge w/patient and caregiver  - Arrange for needed discharge resources and transportation as appropriate  - Identify discharge learning needs (meds, wound care, etc.)  - Arrange for interpretive services to assist at discharge as needed  - Refer to Case Management Department for coordinating discharge planning if the patient needs post-hospital services based on physician/advanced practitioner order or complex needs related to functional status, cognitive ability, or social support system  Outcome: Adequate for Discharge     Problem: Knowledge Deficit  Goal: Patient/family/caregiver demonstrates understanding of disease process, treatment plan, medications, and discharge instructions  Description: Complete learning assessment and assess knowledge base.   Interventions:  - Provide teaching at level of understanding  - Provide teaching via preferred learning methods  Outcome: Adequate for Discharge     Problem: GASTROINTESTINAL - ADULT  Goal: Minimal or absence of nausea and/or vomiting  Description: INTERVENTIONS:  - Administer IV fluids if ordered to ensure adequate hydration  - Maintain NPO status until nausea and vomiting are resolved  - Nasogastric tube if ordered  - Administer ordered antiemetic medications as needed  - Provide nonpharmacologic comfort measures as appropriate  - Advance diet as tolerated, if ordered  - Consider nutrition services referral to assist patient with adequate nutrition and appropriate food choices  Outcome: Adequate for Discharge  Goal: Maintains or returns to baseline bowel function  Description: INTERVENTIONS:  - Assess bowel function  - Encourage oral fluids to ensure adequate hydration  - Administer IV fluids if ordered to ensure adequate hydration  - Administer ordered medications as needed  - Encourage mobilization and activity  - Consider nutritional services referral to assist patient with adequate nutrition and appropriate food choices  Outcome: Adequate for Discharge  Goal: Maintains adequate nutritional intake  Description: INTERVENTIONS:  - Monitor percentage of each meal consumed  - Identify factors contributing to decreased intake, treat as appropriate  - Assist with meals as needed  - Monitor I&O, weight, and lab values if indicated  - Obtain nutrition services referral as needed  Outcome: Adequate for Discharge  Goal: Oral mucous membranes remain intact  Description: INTERVENTIONS  - Assess oral mucosa and hygiene practices  - Implement preventative oral hygiene regimen  - Implement oral medicated treatments as ordered  - Initiate Nutrition services referral as needed  Outcome: Adequate for Discharge     Problem: MOBILITY - ADULT  Goal: Maintain or return to baseline ADL function  Description: INTERVENTIONS:  -  Assess patient's ability to carry out ADLs; assess patient's baseline for ADL function and identify physical deficits which impact ability to perform ADLs (bathing, care of mouth/teeth, toileting, grooming, dressing, etc.)  - Assess/evaluate cause of self-care deficits   - Assess range of motion  - Assess patient's mobility; develop plan if impaired  - Assess patient's need for assistive devices and provide as appropriate  - Encourage maximum independence but intervene and supervise when necessary  - Involve family in performance of ADLs  - Assess for home care needs following discharge   - Consider OT consult to assist with ADL evaluation and planning for discharge  - Provide patient education as appropriate  Outcome: Adequate for Discharge  Goal: Maintains/Returns to pre admission functional level  Description: INTERVENTIONS:  - Perform BMAT or MOVE assessment daily.   - Set and communicate daily mobility goal to care team and patient/family/caregiver.    - Collaborate with rehabilitation services on mobility goals if consulted  - Out of bed for toileting  - Record patient progress and toleration of activity level   Outcome: Adequate for Discharge

## 2023-09-11 ENCOUNTER — TRANSITIONAL CARE MANAGEMENT (OUTPATIENT)
Dept: FAMILY MEDICINE CLINIC | Facility: CLINIC | Age: 83
End: 2023-09-11

## 2023-09-11 LAB
BACTERIA BLD CULT: NORMAL
BACTERIA BLD CULT: NORMAL

## 2023-09-11 NOTE — UTILIZATION REVIEW
NOTIFICATION OF ADMISSION DISCHARGE   This is a Notification of Discharge from [facility]. Please be advised that this patient has been discharge from our facility. Below you will find the admission and discharge date and time including the patient’s disposition. UTILIZATION REVIEW CONTACT:  Brenda Siddiqi  Utilization   Network Utilization Review Department  Phone: [phone] carefully listen to the prompts. All voicemails are confidential.  Email: [email or web address]     ADMISSION INFORMATION  PRESENTATION DATE: 9/6/2023  8:36 AM  OBERVATION ADMISSION DATE:   INPATIENT ADMISSION DATE: 9/7/23  8:19 AM   DISCHARGE DATE: 9/9/2023 12:59 PM   DISPOSITION:Home/Self Care    IMPORTANT INFORMATION:  Send all requests for admission clinical reviews, approved or denied determinations and any other requests to dedicated fax number below belonging to the campus where the patient is receiving treatment.  List of dedicated fax numbers:  [place] DENIALS (Administrative/Medical Necessity) [phone] [address] [facility] (Maternity/NICU/Pediatrics) [phone]   [facility] [phone]   [facility] [phone] [address] [facility] [phone]   [address] [place] [address] [phone]   [facility] [phone]   [address] [place] [address] [address] [place] [address] [phone]   [address] [place] [address] [phone]   [address] [place] [address] [phone] 27[address] [facility] ([place], [place]) [address] [phone]

## 2023-09-19 ENCOUNTER — OFFICE VISIT (OUTPATIENT)
Dept: FAMILY MEDICINE CLINIC | Facility: CLINIC | Age: 83
End: 2023-09-19
Payer: COMMERCIAL

## 2023-09-19 VITALS
HEART RATE: 85 BPM | HEIGHT: 58 IN | OXYGEN SATURATION: 93 % | TEMPERATURE: 97.7 F | WEIGHT: 125.2 LBS | BODY MASS INDEX: 26.28 KG/M2 | SYSTOLIC BLOOD PRESSURE: 144 MMHG | DIASTOLIC BLOOD PRESSURE: 74 MMHG

## 2023-09-19 DIAGNOSIS — J84.10 PULMONARY FIBROSIS (HCC): ICD-10-CM

## 2023-09-19 DIAGNOSIS — N18.32 CHRONIC KIDNEY DISEASE, STAGE 3B (HCC): ICD-10-CM

## 2023-09-19 DIAGNOSIS — Z76.89 ENCOUNTER FOR SUPPORT AND COORDINATION OF TRANSITION OF CARE: Primary | ICD-10-CM

## 2023-09-19 DIAGNOSIS — I10 ESSENTIAL HYPERTENSION: ICD-10-CM

## 2023-09-19 DIAGNOSIS — R19.7 DIARRHEA OF PRESUMED INFECTIOUS ORIGIN: ICD-10-CM

## 2023-09-19 DIAGNOSIS — E11.9 TYPE 2 DIABETES MELLITUS WITHOUT COMPLICATION, WITHOUT LONG-TERM CURRENT USE OF INSULIN (HCC): ICD-10-CM

## 2023-09-19 PROCEDURE — 99214 OFFICE O/P EST MOD 30 MIN: CPT | Performed by: FAMILY MEDICINE

## 2023-09-19 PROCEDURE — 99495 TRANSJ CARE MGMT MOD F2F 14D: CPT | Performed by: FAMILY MEDICINE

## 2023-09-19 RX ORDER — LOVASTATIN 40 MG/1
40 TABLET ORAL
Qty: 90 TABLET | Refills: 0 | Status: SHIPPED | OUTPATIENT
Start: 2023-09-19

## 2023-09-19 RX ORDER — ENALAPRIL MALEATE 10 MG/1
10 TABLET ORAL DAILY
Qty: 90 TABLET | Refills: 0 | Status: SHIPPED | OUTPATIENT
Start: 2023-09-19

## 2023-09-19 RX ORDER — GLIPIZIDE 10 MG/1
10 TABLET, FILM COATED, EXTENDED RELEASE ORAL DAILY
Qty: 90 TABLET | Refills: 0 | Status: SHIPPED | OUTPATIENT
Start: 2023-09-19

## 2023-09-19 NOTE — PROGRESS NOTES
Assessment & Plan     1. Encounter for support and coordination of transition of care    2. Type 2 diabetes mellitus without complication, without long-term current use of insulin ([facility])  Comments:  Patient counseled on healthy diet and exercise  Last HbA1c 8.4  We will check HbA1c in 3 months  Continue current Rx  Medications refilled  Follow-up in 3 months  Orders:  -     sitaGLIPtin (Januvia) 100 mg tablet; Take 1 tablet (100 mg total) by mouth daily  -     lovastatin (MEVACOR) 40 MG tablet; Take 1 tablet (40 mg total) by mouth daily at bedtime  -     glipiZIDE (GLUCOTROL XL) 10 mg 24 hr tablet; Take 1 tablet (10 mg total) by mouth daily  -     HEMOGLOBIN A1C W/ EAG ESTIMATION; Future    3. Type 2 diabetes mellitus without complication, without long-term current use of insulin ([facility])  Comments:  Medications refilled  Orders:  -     sitaGLIPtin (Januvia) 100 mg tablet; Take 1 tablet (100 mg total) by mouth daily  -     lovastatin (MEVACOR) 40 MG tablet; Take 1 tablet (40 mg total) by mouth daily at bedtime  -     glipiZIDE (GLUCOTROL XL) 10 mg 24 hr tablet; Take 1 tablet (10 mg total) by mouth daily  -     HEMOGLOBIN A1C W/ EAG ESTIMATION; Future    4. Diarrhea of presumed infectious origin  Comments:  Patient is diagnosed with C. difficile colitis  Completed vancomycin course  Reports no diarrhea  Patient has a follow-up with GI in Nov    5. Essential hypertension  Comments:  Patient brought blood pressure log, BP mostly over 140/70 with many values over 150/70  Given recent diarrheal illness, we will not increase her enalapril for now to avoid complications from low BP  Encourage lifestyle modifications, low-salt diet  Recheck in 3 months  Orders:  -     enalapril (VASOTEC) 10 mg tablet; Take 1 tablet (10 mg total) by mouth daily    6. Chronic kidney disease, stage 3b ([address])  Comments:  GFR 42  Patient following with nephrology  Avoid nephrotoxic drugs    7.  Essential hypertension  Comments:  no change in the medication  Orders:  -     enalapril (VASOTEC) 10 mg tablet; Take 1 tablet (10 mg total) by mouth daily    8. 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 They screened negative with a PHQ-2 score of 0. Subjective     Transitional Care Management Review:   Lane Alcazar is a 81 y.o. female here for TCM follow up. During the TCM phone call patient stated:  TCM Call     Date and time call was made  9/11/2023  6:41 AM    Hospital care reviewed  Records reviewed    Patient was hospitialized at  [address] [facility]    Date of Admission  09/06/23    Date of discharge  09/09/23    Diagnosis  acute kidney injury    Disposition  Home    Were the patients medications reviewed and updated  Yes    Current Symptoms  Fatigue    Fatigue severity  Mild      TCM Call     Post hospital issues  None    Should patient be enrolled in anticoag monitoring? No    Scheduled for follow up? Yes    Did you obtain your prescribed medications  Yes    Do you need help managing your prescriptions or medications  No    Is transportation to your appointment needed  No    I have advised the patient to call PCP with any new or worsening symptoms  bruce machado        Is a 58-year-old female who presents to the office today for TCM follow-up. Patient was admitted recently in the hospital for diarrhea and was diagnosed with C. difficile colitis. Patient reports improved symptoms since discharge. Patient denies current diarrhea. Patient states that she completed vancomycin course.   Patient otherwise denies any current acute symptoms. Review of Systems   Constitutional: Negative for chills and fever. HENT: Negative for ear pain and sore throat. Eyes: Negative for pain and visual disturbance. Respiratory: Negative for cough and shortness of breath. Cardiovascular: Negative for chest pain and palpitations. Gastrointestinal: Negative for abdominal pain and vomiting. Genitourinary: Negative for dysuria and hematuria. Musculoskeletal: Negative for arthralgias and back pain. Skin: Negative for color change and rash. Neurological: Negative for seizures and syncope. All other systems reviewed and are negative. Objective     /74 (BP Location: Left arm, Patient Position: Sitting)   Pulse 85   Temp 97.7 °F (36.5 °C) (Tympanic)   Ht 4' 10" (1.473 m)   Wt 56.8 kg (125 lb 3.2 oz)   LMP  (LMP Unknown)   SpO2 93%   BMI 26.17 kg/m²      Physical Exam  Vitals and nursing note reviewed. Constitutional:       General: She is not in acute distress. Appearance: She is well-developed. HENT:      Head: Normocephalic and atraumatic. Right Ear: Tympanic membrane normal.      Left Ear: Tympanic membrane normal.      Nose: Nose normal.      Mouth/Throat:      Mouth: Mucous membranes are moist.      Pharynx: Oropharynx is clear. Eyes:      Conjunctiva/sclera: Conjunctivae normal.   Cardiovascular:      Rate and Rhythm: Normal rate and regular rhythm. Heart sounds: No murmur heard. Pulmonary:      Effort: Pulmonary effort is normal. No respiratory distress. Breath sounds: Normal breath sounds. Abdominal:      Palpations: Abdomen is soft. Tenderness: There is no abdominal tenderness. Musculoskeletal:         General: No swelling. Cervical back: Neck supple. Skin:     General: Skin is warm and dry. Capillary Refill: Capillary refill takes less than 2 seconds.    Neurological:      Mental Status: She is alert and oriented to person, place, and time.   Psychiatric:         Mood and Affect: Mood normal.       Medications have been reviewed by provider in current encounter    Nahomy Sanchez MD

## 2023-09-19 NOTE — PATIENT INSTRUCTIONS
Dietary sources of calcium and the amount of calcium (in milligrams) provided per serving are listed below. The actual amount of calcium may differ slightly depending upon the brand of the food.      Dairy:   1 cup of skim milk (302 mg)  1 cup of 2 percent milk (297 mg)  1 cup of whole milk (291 mg)  1 cup of buttermilk (285 mg)  1 cup of lactose reduced milk (285-302 mg)  1/3 cup of powdered nonfat milk (283 mg)  1 cup of lowfat plain yogurt (415 mg)  1 cup of lowfat fruit yogurt (245-384 mg)  1/2 cup ice cream (100 mg)  1 cup sour cream, cultured (250 mg)  1 cup of cottage cheese made with one percent milk fat (138 mg)  1.5 ounces of part skim mozzarella cheese (275 mg)  1.0 ounces of hard cheese (cheddar or bud) (200 mg)  1 ounce Brie cheese (50 mg)  1 Tablespoon Parmesan Cheese (70 mg)  1 ounce Swiss or Gruyere cheese (270 mg)    Dairy Alternatives:   1 cup Soy milk, calcium fortified (200 to 400 mg)  1 cup Prospect milk, calcium fortified (450 mg)      Fruits and Vegetables:   1 cup of calcium-fortified orange juice (300 mg)  1 cup of orange juice from concentrate (20 mg)  1 cup acorn squash, cooked (90 mg)  1 cup raw arugula (125 mg)  1 cup raw bok elaine (40 mg)  1 cup chard or okra, cooked (100 mg)  1 cup spinach, cooked (240 mg)  1/2 cup of cooked ryan greens (220 mg)  1/2 cup of cooked turnip greens (99 mg)  1/2 cup of steamed broccoli (47 mg)  1/2 cup of cooked kale (45 mg)  1 cup, dried, uncooked figs (300 mg)  1 cup raw kiwi (50 mg)    Legumes:  1 cup garbanzo beans (80 mg)  1/2 cup Legumes, general, cooked (15-50 mg)  1 cup quintanilla beans (75)  1/2 cup boiled soy beans (100 mg)  1/2 cup tempeh (75 mg)  4 oz Tofu, firm, calcium set (250-750 mg)  4 oz Tofu, soft regular (120-390 mg)  1/2 cup White beans, cooked (70 mg)    Grains:  1/2 to 1 cup Cereals (calcium fortified) (250-1000 mg)  1/2 cup amaranth (467 mg)  1 slice bread, calcium fortified (150-200 mg)  1 cup brown rice, long grain, raw (50 mg)  1 package oatmeal (100-150 mg)  2 corn tortillas 85 mg)    Seafood:   4 canned sardines, with bones (242 mg)  3 ounces of cooked crab (50 mg)  3 ounces of Broad Top, canned with bones (170 to 210 mg)  3 ounces of canned mackerel (250 mg)    Nuts and Seeds:   1 ounce almonds, toasted unblanched (80 mg)  1 ounce sesame seeds, whole roasted (280 mg)  1 ounce (2 Tbsp) Sesame tahini (130 mg)  1 ounce dried sunflower seeds (50 mg)    Desserts:   1/2 cup of frozen yogurt (103 mg)  1/2 cup of vanilla ice cream (85 mg)    Other:  1 tbsp Blackstrap Molasses (135 mg)    What are some ways to add extra calcium to the foods I eat? You can increase your calcium intake by adding foods that contain calcium to the foods you normally eat. For example, non-fat powdered dry milk has about 52 mg of calcium in one tablespoon. You can add powdered milk to several different foods. The following are some ideas for adding extra calcium to your foods:   Add 3 tablespoons of powdered milk to each cup of milk in puddings, cocoa, or custard  Add 4 tablespoons of powdered milk to each cup of hot cereal before cooking  Sift two tablespoons of powdered milk into each cup of flour in cakes, cookies, or breads  Use lowfat or fat free milk instead of water in pancake mix, mashed potatoes, pudding, and hot breakfast cereals  Add lowfat or fat free cheese to salad, soup, or pasta  Add tofu with added calcium to vegetable stir valerio

## 2023-11-05 PROBLEM — R19.7 DIARRHEA OF PRESUMED INFECTIOUS ORIGIN: Status: RESOLVED | Noted: 2023-09-06 | Resolved: 2023-11-05

## 2023-11-08 ENCOUNTER — OFFICE VISIT (OUTPATIENT)
Dept: PODIATRY | Facility: CLINIC | Age: 83
End: 2023-11-08
Payer: COMMERCIAL

## 2023-11-08 ENCOUNTER — APPOINTMENT (OUTPATIENT)
Dept: RADIOLOGY | Facility: CLINIC | Age: 83
End: 2023-11-08
Payer: COMMERCIAL

## 2023-11-08 VITALS
HEIGHT: 58 IN | DIASTOLIC BLOOD PRESSURE: 70 MMHG | HEART RATE: 85 BPM | BODY MASS INDEX: 26.24 KG/M2 | SYSTOLIC BLOOD PRESSURE: 154 MMHG | WEIGHT: 125 LBS

## 2023-11-08 DIAGNOSIS — M76.71 PERONEAL TENDONITIS OF RIGHT LOWER EXTREMITY: ICD-10-CM

## 2023-11-08 DIAGNOSIS — M79.671 RIGHT FOOT PAIN: Primary | ICD-10-CM

## 2023-11-08 DIAGNOSIS — M79.671 RIGHT FOOT PAIN: ICD-10-CM

## 2023-11-08 PROCEDURE — 99203 OFFICE O/P NEW LOW 30 MIN: CPT | Performed by: STUDENT IN AN ORGANIZED HEALTH CARE EDUCATION/TRAINING PROGRAM

## 2023-11-08 PROCEDURE — 73630 X-RAY EXAM OF FOOT: CPT

## 2023-11-08 NOTE — PROGRESS NOTES
Assessment/Plan:    No problem-specific Assessment & Plan notes found for this encounter. Diagnoses and all orders for this visit:    Right foot pain  -     X-ray foot right 3+ views; Future  -     Diclofenac Sodium (VOLTAREN) 1 %; Apply 2 g topically 2 (two) times a day as needed (as needed twice a day)  -     Ambulatory referral to Physical Therapy; Future    Peroneal tendonitis of right lower extremity  -     Diclofenac Sodium (VOLTAREN) 1 %; Apply 2 g topically 2 (two) times a day as needed (as needed twice a day)  -     Ambulatory referral to Physical Therapy; Future      Plan:     - Patient was counseled and educated on the condition and the diagnosis. The diagnosis, treatment options and prognosis were discussed in detail.   -Right foot x-rays reviewed, I present for the x-ray finding with patient which is consistent without any acute osseous abnormalities. Arthrosclerotic calcific vessels noted at the level of ankle/rear foot  -I suspect right lower extremity peroneal tendinitis for which I recommended conservative measures including weight-bear as tolerated supportive sneakers, ankle brace, physical therapy, topical Voltaren gel as needed for pain control rest ice and elevate. -Expresses understanding and will continue with the recommended conservative measures.  -Return in 8 weeks for follow-up    Subjective:      Patient ID: Ron Escobar is a 81 y.o. female. 81-year-old female with past medical history as below presents for an evaluation of right foot pain with duration of months. Patient reports she has discomfort at times sitting down and at times with weightbearing and after activities. Patient reports at times along the lateral aspect of her foot she experiences swelling and pain. She denies any recent trauma. She reports she is diabetic however does not have a neuropathy numbness tingling sharp shooting pain pins-and-needles in her feet. She sees podiatrist for routine foot care. 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 No other complaints. The following portions of the patient's history were reviewed and updated as appropriate: She  has a past medical history of Acute respiratory failure with hypoxia ([address]) (12/16/2021), Broken arm, Diabetes mellitus ([address]), Diverticulitis, and Postherpetic neuralgia.   She   Patient Active Problem List    Diagnosis Date Noted    Pulmonary fibrosis ([address]) 09/07/2023    Acute kidney injury ([address]) 09/06/2023    Hair loss 05/05/2022    Hypertriglyceridemia 05/05/2022    Thrombocytosis 12/31/2021    Bacteremia due to methicillin susceptible Staphylococcus aureus (MSSA) 12/28/2021    Pneumothorax- Resolved 12/26/2021    Chronic kidney disease, stage 3b ([address]) 12/21/2021    Ambulatory dysfunction 12/20/2021    Negative depression screening 01/07/2021    Overweight (BMI 25.0-29.9) 01/07/2021    Localized, primary osteoarthritis of hand 05/07/2020    Refused influenza vaccine 05/07/2020    Sciatica 05/07/2020    Hypertensive kidney disease with chronic kidney disease stage III ([address]) 06/12/2019    Type 2 diabetes mellitus with stage 3b chronic kidney disease, without long-term current use of insulin ([facility])     Bursitis of shoulder 12/20/2018    Groin pain, chronic, left 11/14/2018    Paresthesia of arm 05/11/2017    Back pain 02/06/2017    Muscle pain 01/30/2017    Anxiety 01/26/2017    Abnormal ECG 07/27/2016    Diverticulitis large intestine w/o perforation or abscess w/o bleeding 07/27/2016    Essential hypertension 06/19/2016    Lower abdominal pain 06/16/2016    Atrophic vaginitis 04/25/2016    Hypercholesterolemia 02/12/2016    Irritable bowel syndrome 02/12/2016    Simple chronic anemia 02/12/2016    Disorder of shoulder 09/08/2008    Articular cartilage disorder of shoulder region 06/09/2008    Loose body in joint of shoulder region 06/09/2008     She  has a past surgical history that includes Cholecystectomy; Hernia repair; Varicose vein surgery; Rotator cuff repair (Right); Colon surgery; and IR embolization (specify vessel or site) (1/12/2022). Current Outpatient Medications   Medication Sig Dispense Refill    ALPRAZolam (XANAX) 0.25 mg tablet Take 1 tablet (0.25 mg total) by mouth daily at bedtime as needed for anxiety 30 tablet 2    Biotin 10 MG CAPS Take 1 capsule by mouth in the morning      calcium carbonate (TUMS) 500 mg chewable tablet Chew 1 tablet if needed      cholecalciferol (VITAMIN D3) 25 mcg (1,000 units) tablet daily      Diclofenac Sodium (VOLTAREN) 1 % Apply 2 g topically 2 (two) times a day as needed (as needed twice a day) 100 g 2    dicyclomine (BENTYL) 20 mg tablet Take 1 tablet (20 mg total) by mouth 4 (four) times a day as needed (cramps) 40 tablet 0    enalapril (VASOTEC) 10 mg tablet Take 1 tablet (10 mg total) by mouth daily 90 tablet 0    ferrous sulfate 325 (65 Fe) mg tablet Take 325 mg by mouth daily with breakfast      glipiZIDE (GLUCOTROL XL) 10 mg 24 hr tablet Take 1 tablet (10 mg total) by mouth daily 90 tablet 0    ketoconazole (NIZORAL) 2 % shampoo Use 2-3 times a week 120 mL 3    lovastatin (MEVACOR) 40 MG tablet Take 1 tablet (40 mg total) by mouth daily at bedtime 90 tablet 0    Multiple Vitamin (multivitamin) tablet Take 1 tablet by mouth daily      sitaGLIPtin (Januvia) 100 mg tablet Take 1 tablet (100 mg total) by mouth daily 90 tablet 0    vitamin E, tocopherol, 1,000 units capsule Take 1 capsule (1,000 Units total) by mouth daily 90 capsule 1     No current facility-administered medications for this visit. .    Review of Systems   Constitutional:  Negative for chills. Cardiovascular:  Negative for leg swelling. Gastrointestinal:  Negative for vomiting. Skin:  Negative for color change. Neurological:  Negative for dizziness. Psychiatric/Behavioral:  Negative for agitation.           Objective:      /70   Pulse 85   Ht 4' 10" (1.473 m)   Wt 56.7 kg (125 lb)   LMP  (LMP Unknown)   BMI 26.13 kg/m²          Physical Exam  Vitals reviewed. Cardiovascular:      Pulses:           Dorsalis pedis pulses are 0 on the right side. Posterior tibial pulses are 0 on the right side. Musculoskeletal:      Right foot: Tenderness present. Comments: Tenderness to touch noted on the lateral aspect of the right foot at the level of fifth metatarsal base extending proximally to the level of retromalleolar groove. Mild edema noted along this area. Light touch sensation intact. Nonpalpable pedal pulses.

## 2023-11-08 NOTE — PATIENT INSTRUCTIONS
DISCHARGE INSTRUCTIONS:   Call your doctor or physical therapist if:   Your pain and swelling increase. You develop new knee, hip, or back pain. You have questions or concerns about your condition or care. How to do plantar fasciitis exercises:  Ask your healthcare provider when to start these exercises and how often to do them. Slant board stretch:  Stand on a slanted board with your toes higher than your heel. Press your heel into the board. Keep your knee slightly bent. Hold this position for 1 minute. Repeat 5 times. Heel stretch:  Stand up straight with your hands on a wall. Place your injured leg slightly behind your other leg. Keep your heels flat on the floor, lean forward, and bend both knees. Hold for 30 seconds. Calf stretch:  Stand up straight with your hands on a wall. Step forward so that your uninjured foot is in front of your injured foot. Keep your front leg bent and your back leg straight. Gently lean forward until you feel your calf stretch. Hold for 30 seconds and then relax. Seated plantar fascia stretch:  Sit on a firm surface, such as the floor or a mat. Extend your legs out in front of you. Raise your injured foot a few inches off the ground. Keep your leg straight. Grab the toes of your injured foot and pull them toward you. With your other hand, feel your plantar fascia. You should feel it press outward. Hold for 30 seconds. If you cannot reach your toes, loop a towel or tie around your foot. Gently pull on the towel or tie and flex your toes toward you. Heel raises:  Stand on the injured leg. Raise your other leg off the ground. Hold onto a railing or wall for balance. Slowly rise up on the toes of your injured leg. Hold for 5 seconds. Slowly lower your heel to the ground. Toe curls:  Place a towel on the floor. Put your foot flat on the towel. Grab the towel with your toes by curling them around the towel.  Lift the towel up with your toes. Toe taps:  Sit down and place your foot flat on the floor. Keep your heel on the floor. Point all your toes up toward the ceiling. While the 4 smaller toes are pointed up, bend your big toe down and tap it on the ground. Do 10 to 50 taps. Point all 5 toes up toward the ceiling again. This time keep your big toe pointed up and tap the 4 smaller toes on the ground. Do 10 to 50 taps each time. Follow up with your doctor or physical therapist as directed:  Write down your questions so you remember to ask them during your visits. © Copyright Letha [employer]ipple 2023 Information is for End User's use only and may not be sold, redistributed or otherwise used for commercial purposes. The above information is an  only. It is not intended as medical advice for individual conditions or treatments. Talk to your doctor, nurse or pharmacist before following any medical regimen to see if it is safe and effective for you.

## 2023-12-20 ENCOUNTER — TELEPHONE (OUTPATIENT)
Dept: FAMILY MEDICINE CLINIC | Facility: CLINIC | Age: 83
End: 2023-12-20

## 2023-12-30 ENCOUNTER — APPOINTMENT (EMERGENCY)
Dept: RADIOLOGY | Facility: HOSPITAL | Age: 83
DRG: 066 | End: 2023-12-30
Payer: COMMERCIAL

## 2023-12-30 ENCOUNTER — HOSPITAL ENCOUNTER (INPATIENT)
Facility: HOSPITAL | Age: 83
LOS: 1 days | DRG: 066 | End: 2023-12-31
Attending: EMERGENCY MEDICINE | Admitting: INTERNAL MEDICINE
Payer: COMMERCIAL

## 2023-12-30 ENCOUNTER — APPOINTMENT (EMERGENCY)
Dept: CT IMAGING | Facility: HOSPITAL | Age: 83
DRG: 066 | End: 2023-12-30
Payer: COMMERCIAL

## 2023-12-30 ENCOUNTER — APPOINTMENT (INPATIENT)
Dept: CT IMAGING | Facility: HOSPITAL | Age: 83
DRG: 066 | End: 2023-12-30
Payer: COMMERCIAL

## 2023-12-30 DIAGNOSIS — I63.9 CEREBELLAR STROKE (HCC): ICD-10-CM

## 2023-12-30 DIAGNOSIS — I63.9 STROKE (HCC): Primary | ICD-10-CM

## 2023-12-30 DIAGNOSIS — R73.9 HYPERGLYCEMIA: ICD-10-CM

## 2023-12-30 PROBLEM — J93.9 PNEUMOTHORAX: Status: RESOLVED | Noted: 2021-12-26 | Resolved: 2023-12-30

## 2023-12-30 PROBLEM — E11.9 DIABETES (HCC): Status: ACTIVE | Noted: 2023-12-30

## 2023-12-30 PROBLEM — N17.9 ACUTE KIDNEY INJURY (HCC): Status: RESOLVED | Noted: 2023-09-06 | Resolved: 2023-12-30

## 2023-12-30 LAB
2HR DELTA HS TROPONIN: 3 NG/L
ALBUMIN SERPL BCP-MCNC: 4.9 G/DL (ref 3.5–5)
ALP SERPL-CCNC: 131 U/L (ref 34–104)
ALT SERPL W P-5'-P-CCNC: 12 U/L (ref 7–52)
ANION GAP SERPL CALCULATED.3IONS-SCNC: 12 MMOL/L
ANION GAP SERPL CALCULATED.3IONS-SCNC: 14 MMOL/L
APTT PPP: 28 SECONDS (ref 23–37)
AST SERPL W P-5'-P-CCNC: 16 U/L (ref 13–39)
BACTERIA UR QL AUTO: ABNORMAL /HPF
BASOPHILS # BLD MANUAL: 0 THOUSAND/UL (ref 0–0.1)
BASOPHILS NFR MAR MANUAL: 0 % (ref 0–1)
BILIRUB SERPL-MCNC: 0.53 MG/DL (ref 0.2–1)
BILIRUB UR QL STRIP: NEGATIVE
BUN SERPL-MCNC: 21 MG/DL (ref 5–25)
BUN SERPL-MCNC: 25 MG/DL (ref 5–25)
CALCIUM SERPL-MCNC: 10.3 MG/DL (ref 8.4–10.2)
CALCIUM SERPL-MCNC: 9.6 MG/DL (ref 8.4–10.2)
CARDIAC TROPONIN I PNL SERPL HS: 6 NG/L
CARDIAC TROPONIN I PNL SERPL HS: 9 NG/L
CHLORIDE SERPL-SCNC: 100 MMOL/L (ref 96–108)
CHLORIDE SERPL-SCNC: 104 MMOL/L (ref 96–108)
CLARITY UR: ABNORMAL
CO2 SERPL-SCNC: 18 MMOL/L (ref 21–32)
CO2 SERPL-SCNC: 23 MMOL/L (ref 21–32)
COLOR UR: ABNORMAL
CREAT SERPL-MCNC: 1.19 MG/DL (ref 0.6–1.3)
CREAT SERPL-MCNC: 1.28 MG/DL (ref 0.6–1.3)
EOSINOPHIL # BLD MANUAL: 0 THOUSAND/UL (ref 0–0.4)
EOSINOPHIL NFR BLD MANUAL: 0 % (ref 0–6)
ERYTHROCYTE [DISTWIDTH] IN BLOOD BY AUTOMATED COUNT: 13.3 % (ref 11.6–15.1)
FLUAV RNA RESP QL NAA+PROBE: NEGATIVE
FLUBV RNA RESP QL NAA+PROBE: NEGATIVE
GFR SERPL CREATININE-BSD FRML MDRD: 39 ML/MIN/1.73SQ M
GFR SERPL CREATININE-BSD FRML MDRD: 42 ML/MIN/1.73SQ M
GLUCOSE SERPL-MCNC: 108 MG/DL (ref 65–140)
GLUCOSE SERPL-MCNC: 120 MG/DL (ref 65–140)
GLUCOSE SERPL-MCNC: 215 MG/DL (ref 65–140)
GLUCOSE SERPL-MCNC: 216 MG/DL (ref 65–140)
GLUCOSE SERPL-MCNC: 216 MG/DL (ref 65–140)
GLUCOSE SERPL-MCNC: 269 MG/DL (ref 65–140)
GLUCOSE SERPL-MCNC: 320 MG/DL (ref 65–140)
GLUCOSE SERPL-MCNC: 327 MG/DL (ref 65–140)
GLUCOSE UR STRIP-MCNC: ABNORMAL MG/DL
HCT VFR BLD AUTO: 43.4 % (ref 34.8–46.1)
HGB BLD-MCNC: 14.1 G/DL (ref 11.5–15.4)
HGB UR QL STRIP.AUTO: ABNORMAL
INR PPP: 1.04 (ref 0.84–1.19)
KETONES UR STRIP-MCNC: ABNORMAL MG/DL
LEUKOCYTE ESTERASE UR QL STRIP: NEGATIVE
LIPASE SERPL-CCNC: 20 U/L (ref 11–82)
LYMPHOCYTES # BLD AUTO: 0.53 THOUSAND/UL (ref 0.6–4.47)
LYMPHOCYTES # BLD AUTO: 6 % (ref 14–44)
MCH RBC QN AUTO: 27.4 PG (ref 26.8–34.3)
MCHC RBC AUTO-ENTMCNC: 32.5 G/DL (ref 31.4–37.4)
MCV RBC AUTO: 84 FL (ref 82–98)
MONOCYTES # BLD AUTO: 0 THOUSAND/UL (ref 0–1.22)
MONOCYTES NFR BLD: 0 % (ref 4–12)
NEUTROPHILS # BLD MANUAL: 8.26 THOUSAND/UL (ref 1.85–7.62)
NEUTS BAND NFR BLD MANUAL: 5 % (ref 0–8)
NEUTS SEG NFR BLD AUTO: 89 % (ref 43–75)
NITRITE UR QL STRIP: NEGATIVE
NON-SQ EPI CELLS URNS QL MICRO: ABNORMAL /HPF
PH UR STRIP.AUTO: 6 [PH]
PLATELET # BLD AUTO: 236 THOUSANDS/UL (ref 149–390)
PLATELET # BLD AUTO: 262 THOUSANDS/UL (ref 149–390)
PLATELET BLD QL SMEAR: ADEQUATE
PMV BLD AUTO: 9.2 FL (ref 8.9–12.7)
PMV BLD AUTO: 9.3 FL (ref 8.9–12.7)
POTASSIUM SERPL-SCNC: 3.6 MMOL/L (ref 3.5–5.3)
POTASSIUM SERPL-SCNC: 4.4 MMOL/L (ref 3.5–5.3)
PROT SERPL-MCNC: 9.2 G/DL (ref 6.4–8.4)
PROT UR STRIP-MCNC: ABNORMAL MG/DL
PROTHROMBIN TIME: 13.7 SECONDS (ref 11.6–14.5)
RBC # BLD AUTO: 5.14 MILLION/UL (ref 3.81–5.12)
RBC #/AREA URNS AUTO: ABNORMAL /HPF
RBC MORPH BLD: NORMAL
RSV RNA RESP QL NAA+PROBE: NEGATIVE
SARS-COV-2 RNA RESP QL NAA+PROBE: NEGATIVE
SODIUM SERPL-SCNC: 135 MMOL/L (ref 135–147)
SODIUM SERPL-SCNC: 136 MMOL/L (ref 135–147)
SP GR UR STRIP.AUTO: 1.02
TSH SERPL DL<=0.05 MIU/L-ACNC: 2.65 UIU/ML (ref 0.45–4.5)
UROBILINOGEN UR QL STRIP.AUTO: 0.2 E.U./DL
WBC # BLD AUTO: 8.79 THOUSAND/UL (ref 4.31–10.16)
WBC #/AREA URNS AUTO: ABNORMAL /HPF

## 2023-12-30 PROCEDURE — 93005 ELECTROCARDIOGRAM TRACING: CPT

## 2023-12-30 PROCEDURE — 83930 ASSAY OF BLOOD OSMOLALITY: CPT | Performed by: NURSE PRACTITIONER

## 2023-12-30 PROCEDURE — G1004 CDSM NDSC: HCPCS

## 2023-12-30 PROCEDURE — 81001 URINALYSIS AUTO W/SCOPE: CPT | Performed by: EMERGENCY MEDICINE

## 2023-12-30 PROCEDURE — 83690 ASSAY OF LIPASE: CPT | Performed by: EMERGENCY MEDICINE

## 2023-12-30 PROCEDURE — 85049 AUTOMATED PLATELET COUNT: CPT | Performed by: NURSE PRACTITIONER

## 2023-12-30 PROCEDURE — 81003 URINALYSIS AUTO W/O SCOPE: CPT | Performed by: EMERGENCY MEDICINE

## 2023-12-30 PROCEDURE — 99291 CRITICAL CARE FIRST HOUR: CPT | Performed by: EMERGENCY MEDICINE

## 2023-12-30 PROCEDURE — 70450 CT HEAD/BRAIN W/O DYE: CPT

## 2023-12-30 PROCEDURE — 82948 REAGENT STRIP/BLOOD GLUCOSE: CPT

## 2023-12-30 PROCEDURE — G0426 INPT/ED TELECONSULT50: HCPCS | Performed by: PSYCHIATRY & NEUROLOGY

## 2023-12-30 PROCEDURE — 85007 BL SMEAR W/DIFF WBC COUNT: CPT | Performed by: EMERGENCY MEDICINE

## 2023-12-30 PROCEDURE — 85610 PROTHROMBIN TIME: CPT | Performed by: EMERGENCY MEDICINE

## 2023-12-30 PROCEDURE — 0241U HB NFCT DS VIR RESP RNA 4 TRGT: CPT | Performed by: EMERGENCY MEDICINE

## 2023-12-30 PROCEDURE — 99285 EMERGENCY DEPT VISIT HI MDM: CPT

## 2023-12-30 PROCEDURE — 99291 CRITICAL CARE FIRST HOUR: CPT | Performed by: NURSE PRACTITIONER

## 2023-12-30 PROCEDURE — 70496 CT ANGIOGRAPHY HEAD: CPT

## 2023-12-30 PROCEDURE — 96374 THER/PROPH/DIAG INJ IV PUSH: CPT

## 2023-12-30 PROCEDURE — 96361 HYDRATE IV INFUSION ADD-ON: CPT

## 2023-12-30 PROCEDURE — 36415 COLL VENOUS BLD VENIPUNCTURE: CPT | Performed by: EMERGENCY MEDICINE

## 2023-12-30 PROCEDURE — 84484 ASSAY OF TROPONIN QUANT: CPT | Performed by: EMERGENCY MEDICINE

## 2023-12-30 PROCEDURE — 87040 BLOOD CULTURE FOR BACTERIA: CPT | Performed by: EMERGENCY MEDICINE

## 2023-12-30 PROCEDURE — 85027 COMPLETE CBC AUTOMATED: CPT | Performed by: EMERGENCY MEDICINE

## 2023-12-30 PROCEDURE — 85730 THROMBOPLASTIN TIME PARTIAL: CPT | Performed by: EMERGENCY MEDICINE

## 2023-12-30 PROCEDURE — 84443 ASSAY THYROID STIM HORMONE: CPT | Performed by: EMERGENCY MEDICINE

## 2023-12-30 PROCEDURE — 80048 BASIC METABOLIC PNL TOTAL CA: CPT | Performed by: NURSE PRACTITIONER

## 2023-12-30 PROCEDURE — 80053 COMPREHEN METABOLIC PANEL: CPT | Performed by: EMERGENCY MEDICINE

## 2023-12-30 PROCEDURE — 70498 CT ANGIOGRAPHY NECK: CPT

## 2023-12-30 PROCEDURE — 71045 X-RAY EXAM CHEST 1 VIEW: CPT

## 2023-12-30 RX ORDER — ASPIRIN 325 MG
325 TABLET ORAL ONCE
Status: COMPLETED | OUTPATIENT
Start: 2023-12-30 | End: 2023-12-30

## 2023-12-30 RX ORDER — HYDRALAZINE HYDROCHLORIDE 20 MG/ML
10 INJECTION INTRAMUSCULAR; INTRAVENOUS EVERY 6 HOURS PRN
Status: DISCONTINUED | OUTPATIENT
Start: 2023-12-30 | End: 2023-12-31 | Stop reason: HOSPADM

## 2023-12-30 RX ORDER — SODIUM CHLORIDE 3 G/100ML
150 INJECTION, SOLUTION INTRAVENOUS ONCE
Qty: 150 ML | Refills: 0 | Status: COMPLETED | OUTPATIENT
Start: 2023-12-30 | End: 2023-12-30

## 2023-12-30 RX ORDER — MAGNESIUM SULFATE 1 G/100ML
1 INJECTION INTRAVENOUS EVERY 12 HOURS
Status: DISCONTINUED | OUTPATIENT
Start: 2023-12-30 | End: 2023-12-31 | Stop reason: HOSPADM

## 2023-12-30 RX ORDER — ASPIRIN 81 MG/1
81 TABLET, CHEWABLE ORAL DAILY
Status: DISCONTINUED | OUTPATIENT
Start: 2023-12-30 | End: 2023-12-30

## 2023-12-30 RX ORDER — ASPIRIN 81 MG/1
81 TABLET, CHEWABLE ORAL DAILY
Status: DISCONTINUED | OUTPATIENT
Start: 2023-12-31 | End: 2023-12-31

## 2023-12-30 RX ORDER — LEVALBUTEROL INHALATION SOLUTION 1.25 MG/3ML
1.25 SOLUTION RESPIRATORY (INHALATION) EVERY 6 HOURS PRN
Status: DISCONTINUED | OUTPATIENT
Start: 2023-12-30 | End: 2023-12-31 | Stop reason: HOSPADM

## 2023-12-30 RX ORDER — HEPARIN SODIUM 5000 [USP'U]/ML
5000 INJECTION, SOLUTION INTRAVENOUS; SUBCUTANEOUS EVERY 8 HOURS SCHEDULED
Status: DISCONTINUED | OUTPATIENT
Start: 2023-12-30 | End: 2023-12-31

## 2023-12-30 RX ORDER — CLOPIDOGREL BISULFATE 75 MG/1
300 TABLET ORAL ONCE
Status: COMPLETED | OUTPATIENT
Start: 2023-12-30 | End: 2023-12-30

## 2023-12-30 RX ORDER — ATORVASTATIN CALCIUM 40 MG/1
40 TABLET, FILM COATED ORAL EVERY EVENING
Status: DISCONTINUED | OUTPATIENT
Start: 2023-12-30 | End: 2023-12-31 | Stop reason: HOSPADM

## 2023-12-30 RX ORDER — LABETALOL HYDROCHLORIDE 5 MG/ML
10 INJECTION, SOLUTION INTRAVENOUS ONCE
Status: COMPLETED | OUTPATIENT
Start: 2023-12-30 | End: 2023-12-30

## 2023-12-30 RX ORDER — ONDANSETRON 2 MG/ML
4 INJECTION INTRAMUSCULAR; INTRAVENOUS ONCE
Status: COMPLETED | OUTPATIENT
Start: 2023-12-30 | End: 2023-12-30

## 2023-12-30 RX ORDER — CHLORHEXIDINE GLUCONATE ORAL RINSE 1.2 MG/ML
15 SOLUTION DENTAL EVERY 12 HOURS SCHEDULED
Status: DISCONTINUED | OUTPATIENT
Start: 2023-12-30 | End: 2023-12-31 | Stop reason: HOSPADM

## 2023-12-30 RX ORDER — SODIUM CHLORIDE 3 G/100ML
115 INJECTION, SOLUTION INTRAVENOUS ONCE
Qty: 115 ML | Refills: 0 | Status: COMPLETED | OUTPATIENT
Start: 2023-12-30 | End: 2023-12-30

## 2023-12-30 RX ORDER — ONDANSETRON 2 MG/ML
4 INJECTION INTRAMUSCULAR; INTRAVENOUS EVERY 6 HOURS PRN
Status: DISCONTINUED | OUTPATIENT
Start: 2023-12-30 | End: 2023-12-31 | Stop reason: HOSPADM

## 2023-12-30 RX ORDER — INSULIN LISPRO 100 [IU]/ML
1-5 INJECTION, SOLUTION INTRAVENOUS; SUBCUTANEOUS EVERY 6 HOURS SCHEDULED
Status: DISCONTINUED | OUTPATIENT
Start: 2023-12-30 | End: 2023-12-30

## 2023-12-30 RX ADMIN — HEPARIN SODIUM 5000 UNITS: 5000 INJECTION INTRAVENOUS; SUBCUTANEOUS at 22:38

## 2023-12-30 RX ADMIN — SODIUM CHLORIDE 1000 ML: 0.9 INJECTION, SOLUTION INTRAVENOUS at 13:31

## 2023-12-30 RX ADMIN — SODIUM CHLORIDE 3 UNITS/HR: 9 INJECTION, SOLUTION INTRAVENOUS at 17:38

## 2023-12-30 RX ADMIN — HEPARIN SODIUM 5000 UNITS: 5000 INJECTION INTRAVENOUS; SUBCUTANEOUS at 17:38

## 2023-12-30 RX ADMIN — IOHEXOL 85 ML: 350 INJECTION, SOLUTION INTRAVENOUS at 14:45

## 2023-12-30 RX ADMIN — HYDRALAZINE HYDROCHLORIDE 10 MG: 20 INJECTION INTRAMUSCULAR; INTRAVENOUS at 16:21

## 2023-12-30 RX ADMIN — ASPIRIN 325 MG: 325 TABLET ORAL at 14:20

## 2023-12-30 RX ADMIN — MAGNESIUM SULFATE HEPTAHYDRATE 1 G: 1 INJECTION, SOLUTION INTRAVENOUS at 16:30

## 2023-12-30 RX ADMIN — LABETALOL HYDROCHLORIDE 10 MG: 5 INJECTION, SOLUTION INTRAVENOUS at 19:27

## 2023-12-30 RX ADMIN — SODIUM CHLORIDE 115 ML: 3 INJECTION, SOLUTION INTRAVENOUS at 18:38

## 2023-12-30 RX ADMIN — ONDANSETRON 4 MG: 2 INJECTION INTRAMUSCULAR; INTRAVENOUS at 13:36

## 2023-12-30 RX ADMIN — ONDANSETRON 4 MG: 2 INJECTION INTRAMUSCULAR; INTRAVENOUS at 16:40

## 2023-12-30 RX ADMIN — CHLORHEXIDINE GLUCONATE, 0.12% ORAL RINSE 15 ML: 1.2 SOLUTION DENTAL at 21:57

## 2023-12-30 RX ADMIN — ONDANSETRON 4 MG: 2 INJECTION INTRAMUSCULAR; INTRAVENOUS at 21:28

## 2023-12-30 RX ADMIN — SODIUM CHLORIDE 150 ML: 3 INJECTION, SOLUTION INTRAVENOUS at 21:59

## 2023-12-30 RX ADMIN — CLOPIDOGREL 300 MG: 75 TABLET ORAL at 14:20

## 2023-12-30 NOTE — H&P
[facility]  H&P  Name: Debbie Moody 83 y.o. female I MRN: [id number]  Unit/Bed#: ICU 09-01 I Date of Admission: 12/30/2023   Date of Service: 12/30/2023 I Hospital Day: 0      Assessment/Plan   Chronic kidney disease, stage 3b ([facility])  Assessment & Plan  Lab Results   Component Value Date    EGFR 39 12/30/2023    EGFR 42 09/09/2023    EGFR 43 09/08/2023    CREATININE 1.28 12/30/2023    CREATININE 1.19 09/09/2023    CREATININE 1.17 09/08/2023   Creatinine appears to be at baseline  Trend renal indices  Avoid nephrotoxic agents  Strict I/O    Diabetes ([facility])  Assessment & Plan  Lab Results   Component Value Date    HGBA1C 8.4 (H) 07/18/2023       Recent Labs     12/30/23  1331   POCGLU 327*       Blood Sugar Average: Last 72 hrs:  (P) 327    Goal glucose <180 to help prevent cerebral swelling post stroke  Will start insulin drip    Pulmonary fibrosis ([facility])  Assessment & Plan  PRN nebs    Essential hypertension  Assessment & Plan  Goal -200 per neurology  Will give IV hydralazine to keep SBP<200  Close hemodynamic monitoring    * Acute CVA (cerebrovascular accident) ([facility])  Assessment & Plan  Patient presented after waking up this morning with nausea and vomiting, needed assistance to walk  Later in the day she reported posterior headache, weakness, and continued vomiting and presented to the ED  Stroke alert was called, and neurology was consulted, CTH/CTA showed distal left PICA occlusion  Pt was outside of the window for TNK, not a candidate for intervention per neurology  Admission to CC service, stroke pathway  Neuro checks  BP control to keep SBP between 140-200  Neurology is recommending 3% saline for goal serum sodium of 145-155, currently 135-will give bolus 115ml 3% saline (2ml/kg) and trend labs  Check BMP, osmo q 6 hours  Repeat CTH in 6 hours-1930, and in am as well  Echo  MRI  Stat head CT with any neuro changes  Blood sugar <180-will start insulin drip  Continue ASA 81 mg  daily, statin  Magnesium 1gm IV BID           History of Present Illness     HPI: Debbie Moody is a 83 y.o. who presents with weakness, headache, nausea and vomiting. She has a PMH of Covid 19 pneumonia in 12/2021 complicated by pneumothoraces and now with pulmonary fibrosis, CKD, DM, HTN, C. Diff colitis in 9/2023. She reported that she woke up this morning at 0500 with nausea and vomiting and needed assistance to walk. Earlier this afternoon she had a posterior headache, weakness, and continued vomiting and presented to the ED. Stroke alert was called and CTH/CTA showed distal PICA occlusion. She was outside of the window for TNK on arrival to the ED. She is being admitted to CC service for ongoing care.     History obtained from chart review and the patient.  Review of Systems   Constitutional:  Positive for fatigue.   HENT: Negative.     Respiratory: Negative.     Cardiovascular: Negative.    Gastrointestinal:  Positive for nausea and vomiting.   Genitourinary: Negative.    Musculoskeletal: Negative.    Skin: Negative.    Neurological:  Positive for weakness and headaches. Negative for seizures, facial asymmetry and speech difficulty.   Hematological: Negative.    Psychiatric/Behavioral: Negative.       Disposition: Stepdown Level 1  Historical Information   Past Medical History:  No date: Acute kidney injury (HCC)  12/16/2021: Acute respiratory failure with hypoxia (HCC)  No date: Broken arm      Comment:  hairline fracture/ 2 places///   right arm  No date: Diabetes mellitus (HCC)  No date: Diverticulitis  No date: Pneumothorax- Resolved  No date: Postherpetic neuralgia Past Surgical History:  No date: CHOLECYSTECTOMY  No date: COLON SURGERY  No date: HERNIA REPAIR  1/12/2022: IR EMBOLIZATION (SPECIFY VESSEL OR SITE)  No date: ROTATOR CUFF REPAIR; Right  No date: VARICOSE VEIN SURGERY      Comment:  Impression:  2/12/16 Jake Sarabia   Current Outpatient Medications   Medication Instructions    ALPRAZolam  (XANAX) 0.25 mg, Oral, Daily at bedtime PRN    Biotin 10 MG CAPS 1 capsule, Oral, Daily    calcium carbonate (TUMS) 500 mg chewable tablet 1 tablet, Oral, As needed    cholecalciferol (VITAMIN D3) 25 mcg (1,000 units) tablet Daily    Diclofenac Sodium (VOLTAREN) 2 g, Topical, 2 times daily PRN    dicyclomine (BENTYL) 20 mg, Oral, 4 times daily PRN    enalapril (VASOTEC) 10 mg, Oral, Daily    ferrous sulfate 325 mg, Oral, Daily with breakfast    glipiZIDE (GLUCOTROL XL) 10 mg, Oral, Daily    ketoconazole (NIZORAL) 2 % shampoo Use 2-3 times a week    lovastatin (MEVACOR) 40 mg, Oral, Daily at bedtime    Multiple Vitamin (multivitamin) tablet 1 tablet, Oral, Daily    sitaGLIPtin (JANUVIA) 100 mg, Oral, Daily    vitamin E (tocopherol) 1,000 Units, Oral, Daily    Allergies   Allergen Reactions    Ciprofloxacin GI Intolerance and Headache     Confusion, arm weakness, dizziness, headache    Meperidine GI Intolerance, Hallucinations and Other (See Comments)     Demarol  Reaction Date:Unknown    Propoxyphene GI Intolerance      Social History     Tobacco Use    Smoking status: Never    Smokeless tobacco: Never   Vaping Use    Vaping status: Never Used   Substance Use Topics    Alcohol use: Not Currently     Comment: Rarely    Drug use: Never    Family History   Problem Relation Age of Onset    Diabetes Mother     No Known Problems Father           Objective                            Vitals I/O      Most Recent Min/Max in 24hrs   Temp 98.9 °F (37.2 °C) Temp  Min: 97.4 °F (36.3 °C)  Max: 98.9 °F (37.2 °C)   Pulse 95 Pulse  Min: 77  Max: 95   Resp 20 Resp  Min: 17  Max: 23   BP (!) 202/91 BP  Min: 202/91  Max: 249/123   O2 Sat 96 % SpO2  Min: 92 %  Max: 99 %      Intake/Output Summary (Last 24 hours) at 12/30/2023 1719  Last data filed at 12/30/2023 1513  Gross per 24 hour   Intake 1000 ml   Output --   Net 1000 ml       Diet NPO    Invasive Monitoring           Physical Exam   Physical Exam  Eyes:      General: Neglect      Extraocular Movements: Extraocular movements intact.      Pupils: Pupils are equal, round, and reactive to light.   Skin:     General: Skin is warm and dry.   HENT:      Head: Normocephalic and atraumatic.   Neck:      Vascular: No JVD.   Cardiovascular:      Rate and Rhythm: Normal rate and regular rhythm.      Pulses: Normal pulses.      Heart sounds: Normal heart sounds.   Musculoskeletal:         General: Normal range of motion.   Abdominal: General: Bowel sounds are normal.      Palpations: Abdomen is soft.   Constitutional:       General: She is not in acute distress.  Pulmonary:      Effort: Pulmonary effort is normal.   Psychiatric:         Mood and Affect: Mood and affect normal.         Speech: Speech is not no receptive aphasia or no expressive aphasia.   Neurological:      General: No focal deficit present.      Mental Status: She is alert and oriented to person, place and time. She is CAM ICU negative.      Cranial Nerves: No dysarthria or facial asymmetry.      Sensory: No sensory deficit.      Motor: Strength full and intact in all extremities.        Corneal reflex present, cough reflex and gag reflex intact.            Diagnostic Studies      EKG: NSR  Imaging:   CTA stroke alert (head/neck)   Final Result      Occluded distal aspect of left posterior inferior cerebellar artery (PICA). This corresponds with left PICA territory infarct seen on earlier same day head CT.      Scattered reticular opacities in visualized bilateral upper and lower lobes, likely due to chronic lung disease with postinfectious scarring. Superimposed infection is difficult to exclude. Consider follow-up dedicated CT chest without contrast.      Additional chronic/incidental findings as detailed above.      Findings were directly discussed with Tyron Williamson 12/30/2023 at 2:54 PM.                           Workstation performed: [id number]         CT head without contrast   Final Result      Acute infarct in left cerebellum PICA  territory.         I personally discussed this study with SOFY DE LEON JR on 12/30/2023 2:07 PM.                  Workstation performed: [id number]         XR chest 1 view portable    (Results Pending)   CT head wo contrast    (Results Pending)   CT head wo contrast    (Results Pending)   MRI brain wo contrast    (Results Pending)     I have personally reviewed pertinent reports.   and I have personally reviewed pertinent films in PACS     Medications:  Scheduled PRN   [START ON 12/31/2023] aspirin, 81 mg, Daily  atorvastatin, 40 mg, QPM  chlorhexidine, 15 mL, Q12H JUAN ANTONIO  heparin (porcine), 5,000 Units, Q8H JUAN ANTONIO  magnesium sulfate, 1 g, Q12H  sodium chloride, 115 mL, Once      hydrALAZINE, 10 mg, Q6H PRN  levalbuterol, 1.25 mg, Q6H PRN  ondansetron, 4 mg, Q6H PRN       Continuous    insulin regular (HumuLIN R,NovoLIN R) 1 Units/mL in sodium chloride 0.9 % 100 mL infusion, 0.3-21 Units/hr         Labs:    CBC    Recent Labs     12/30/23  1329   WBC 8.79   HGB 14.1   HCT 43.4      BANDSPCT 5     BMP    Recent Labs     12/30/23  1329   SODIUM 135   K 4.4      CO2 23   AGAP 12   BUN 25   CREATININE 1.28   CALCIUM 10.3*       Coags    Recent Labs     12/30/23  1329   INR 1.04   PTT 28        Additional Electrolytes  No recent results       Blood Gas    No recent results  No recent results LFTs  Recent Labs     12/30/23  1329   ALT 12   AST 16   ALKPHOS 131*   ALB 4.9   TBILI 0.53       Infectious  No recent results  Glucose  Recent Labs     12/30/23  1329   GLUC 320*             Critical Care Time Delivered : Upon my evaluation, this patient had a high probability of imminent or life-threatening deterioration due to acute CVA, which required my direct attention, intervention, and personal management.  I have personally provided 32 minutes of critical care time, exclusive of procedures, teaching, family meetings, and any prior time recorded by providers other than myself.   Anticipated Length of Stay is > 2  midnights  BENTON Worthy

## 2023-12-30 NOTE — ASSESSMENT & PLAN NOTE
Lab Results   Component Value Date    HGBA1C 8.4 (H) 07/18/2023       Recent Labs     12/30/23  1331   POCGLU 327*       Blood Sugar Average: Last 72 hrs:  (P) 327    Goal glucose <180 to help prevent cerebral swelling post stroke  Will start insulin drip

## 2023-12-30 NOTE — ASSESSMENT & PLAN NOTE
Patient presented after waking up this morning with nausea and vomiting, needed assistance to walk  Later in the day she reported posterior headache, weakness, and continued vomiting and presented to the ED  Stroke alert was called, and neurology was consulted, CTH/CTA showed distal left PICA occlusion  Pt was outside of the window for TNK, not a candidate for intervention per neurology  Admission to CC service, stroke pathway  Neuro checks  BP control to keep SBP between 140-200  Neurology is recommending 3% saline for goal serum sodium of 145-155, currently 135-will give bolus 115ml 3% saline (2ml/kg) and trend labs  Check BMP, osmo q 6 hours  Repeat CTH in 6 hours-1930, and in am as well  Echo  MRI  Stat head CT with any neuro changes  Blood sugar <180-will start insulin drip  Continue ASA 81 mg daily, statin  Magnesium 1gm IV BID

## 2023-12-30 NOTE — ASSESSMENT & PLAN NOTE
Lab Results   Component Value Date    EGFR 39 12/30/2023    EGFR 42 09/09/2023    EGFR 43 09/08/2023    CREATININE 1.28 12/30/2023    CREATININE 1.19 09/09/2023    CREATININE 1.17 09/08/2023   Creatinine appears to be at baseline  Trend renal indices  Avoid nephrotoxic agents  Strict I/O

## 2023-12-30 NOTE — TELEMEDICINE
TeleConsultation - Stroke   Debbie Moody 83 y.o. female MRN: [id number]  Unit/Bed#: ED 20 Encounter: [id number]        REQUIRED DOCUMENTATION:     1. This service was provided via Telemedicine.  2. Provider located at [facility].  3. TeleMed provider: Tyron Williamson MD.  4. Identify all parties in room with patient during tele consult:  daughter  5.Patient was then informed that this was a Telemedicine visit and that the exam was being conducted confidentially over secure lines. My office door was closed. No one else was in the room.  Patient acknowledged consent and understanding of privacy and security of the Telemedicine visit, and gave us permission to have the assistant stay in the room in order to assist with the history and to conduct the exam.  I informed the patient that I have reviewed their record in Epic and presented the opportunity for them to ask any questions regarding the visit today.  The patient agreed to participate.           Assessment/Plan   Assessment: Acute left hemispheric cerebellar cva affecting pica territory. Pt is not an iv tnk candidate given that symptoms first noticed at 5 am and already showing up on head ct. There is not evidence of 4th ventricle effacement however stroke is large and can easily swell up resulting in mass effect and effacement. Cta suggests distal left pica occlusion. Given her age, cardioembolic etiology high in the differential. No underlying hx of hypercoagulable conditions including prior cva. No hx of dvt, PE, hematologic, rheumatologic conditions.  Also no cancer hx or suspicion for such. Lower suspicion for a hypercoagulable state. HA is in occipital region and likely in the setting of this acute ischemic stroke.    I would recommend admission to ICU service  Start hypertonic 3% saline, goal 145-155. Currently 135. Check q6 hour serum sodium and osmolarity.  Blood sugar goal less than 180 to reduce swelling potential  Migraine cocktail however would not  recommend sedating meds. Mag sulfate 1 gm iv bid in cocktail may help as she's also having leg gramps.  Stat head ct with any neuro change.  Although loaded with aspirin and plavix, given size of the stroke I would recommend continuing baby aspirin only to reduce hemorrhagic conversion risk  Tele  2-d echo  Mri brain w/o contrast  Sbp 140-200.  Would not recommend higher bp ceiling as this can worsen swelling.  Repeat head ct in 6 hours  Repeat head ct in am      TPA Decision: iv tnk not administered as out of iv tnk window          History of Present Illness     Reason for Consult / Principal Problem: stroke alert  Patient last known well: las night. Pt woke up at 5 am to use restroom and symptomatic.  Stroke alert called: 2:22 pm  Neurology time of arrival: 2:22 pm  HPI: Debbie Moody is a 83 y.o.  female who presents with     Consult to Neurology  Consult performed by: Tyron Williamson MD  Consult ordered by: Bill Archibald Jr., DO          Review of Systems  Per 12 point review, gait dysfunction, nausea/vomiting, headache  Historical Information   Past Medical History:   Diagnosis Date    Acute respiratory failure with hypoxia (HCC) 12/16/2021    Broken arm     hairline fracture/ 2 places///   right arm    Diabetes mellitus (HCC)     Diverticulitis     Postherpetic neuralgia      Past Surgical History:   Procedure Laterality Date    CHOLECYSTECTOMY      COLON SURGERY      HERNIA REPAIR      IR EMBOLIZATION (SPECIFY VESSEL OR SITE)  1/12/2022    ROTATOR CUFF REPAIR Right     VARICOSE VEIN SURGERY      Impression:  2/12/16 Jake Sarabia     Social History   Social History     Substance and Sexual Activity   Alcohol Use Not Currently    Comment: Rarely     Social History     Substance and Sexual Activity   Drug Use Never     E-Cigarette/Vaping    E-Cigarette Use Never User      E-Cigarette/Vaping Substances    Nicotine No     THC No     CBD No     Flavoring No      Social History     Tobacco Use   Smoking  "Status Never   Smokeless Tobacco Never     Family History: non-contributory        Meds/Allergies   all current active meds have been reviewed    Allergies   Allergen Reactions    Ciprofloxacin GI Intolerance and Headache     Confusion, arm weakness, dizziness, headache    Meperidine GI Intolerance, Hallucinations and Other (See Comments)     Demarol  Reaction Date:Unknown    Propoxyphene GI Intolerance       Objective   Vitals:Blood pressure (!) 235/105, pulse 79, temperature 97.6 °F (36.4 °C), temperature source Temporal, resp. rate 18, height 4' 10\" (1.473 m), weight 56.7 kg (125 lb), SpO2 99%, not currently breastfeeding.,Body mass index is 26.13 kg/m².  No intake or output data in the 24 hours ending 23 1441    Invasive Devices:   Invasive Devices       Peripheral Intravenous Line  Duration             Peripheral IV 23 Left Antecubital <1 day                    Physical Exam  General: appears fatigued  HEENT:atraumatic, normocephalic    Neurologic Exam  MS: Alert and orientedX3. Insight intact. Attention, concentration diminished. No expressive/receptive aphasia.  CN 2-12: intact  Motor: all 4 extremities at least 3 power, no drift. No pronation. Additional practitioner not present to assess individual muscles.  Sensory: light touch intact ue/le bilat  Coordination: finger to nose, heel to shin no dysmetria or ataxia ue/le bilat    NIHSS:  1a.Level of Consciousness: 0 = Alert   1b. LOC Questions: 0 = Answers both correctly   1c. LOC Commands: 0 = Obeys both correctly   2. Best Gaze: 0 = Normal   3. Visual: 0 = No visual field loss   4. Facial Palsy: 0=Normal symmetric movement   5a. Motor Right Arm: 0=No drift, limb holds 90 (or 45) degrees for full 10 seconds   5b. Motor Left Arm: 0=No drift, limb holds 90 (or 45) degrees for full 10 seconds   6a. Motor Right Le=No drift, limb holds 90 (or 45) degrees for full 10 seconds   6b. Motor Left Le=No drift, limb holds 90 (or 45) degrees for full " 10 seconds   7. Limb Ataxia:  0=Absent   8. Sensory: 0=Normal; no sensory loss   9. Best Language:  0=No aphasia, normal   10. Dysarthria: 0=Normal articulation   11. Extinction and Inattention (formerly Neglect): 0=No abnormality   Total Score: 0       Modified Orma Score:  0 (No baseline symptoms/disability)    Lab Results: I have personally reviewed pertinent reports.    Imaging Studies: I have personally reviewed pertinent films in PACS  EKG, Pathology, and Other Studies: I have personally reviewed pertinent films in PACS      Counseling / Coordination of Care  Total 40 min critical care time spent during the stroke alert

## 2023-12-30 NOTE — ED PROVIDER NOTES
History  Chief Complaint   Patient presents with    Weakness - Generalized     Pt has new onset of weakness with Nausea and vomiting      83-year-old female presents emergency room complaining of posterior headache, body aches abdominal pain vomiting nausea and generalized weakness.  She recently arrived noting she had vertigo but the patient denies any sensation of movement.  The patient states she feels very weak and tired.  Patient states that symptoms started early this morning (5AM) when she got up and her  has been helping her around the house.  Patient feels that she is getting worse and has vomited multiple times.        Prior to Admission Medications   Prescriptions Last Dose Informant Patient Reported? Taking?   ALPRAZolam (XANAX) 0.25 mg tablet 12/28/2023  No No   Sig: Take 1 tablet (0.25 mg total) by mouth daily at bedtime as needed for anxiety   Biotin 10 MG CAPS 12/28/2023 Self Yes No   Sig: Take 1 capsule by mouth in the morning   Diclofenac Sodium (VOLTAREN) 1 %   No No   Sig: Apply 2 g topically 2 (two) times a day as needed (as needed twice a day)   Multiple Vitamin (multivitamin) tablet 12/28/2023 Self Yes No   Sig: Take 1 tablet by mouth daily   calcium carbonate (TUMS) 500 mg chewable tablet  Self Yes No   Sig: Chew 1 tablet if needed   cholecalciferol (VITAMIN D3) 25 mcg (1,000 units) tablet 12/28/2023 Self Yes No   Sig: daily   dicyclomine (BENTYL) 20 mg tablet 12/28/2023  No No   Sig: Take 1 tablet (20 mg total) by mouth 4 (four) times a day as needed (cramps)   enalapril (VASOTEC) 10 mg tablet 12/28/2023  No No   Sig: Take 1 tablet (10 mg total) by mouth daily   ferrous sulfate 325 (65 Fe) mg tablet 12/28/2023 Self Yes No   Sig: Take 325 mg by mouth daily with breakfast   glipiZIDE (GLUCOTROL XL) 10 mg 24 hr tablet 12/28/2023  No No   Sig: Take 1 tablet (10 mg total) by mouth daily   ketoconazole (NIZORAL) 2 % shampoo   No No   Sig: Use 2-3 times a week   lovastatin (MEVACOR) 40 MG  tablet 12/28/2023  No No   Sig: Take 1 tablet (40 mg total) by mouth daily at bedtime   sitaGLIPtin (Januvia) 100 mg tablet 12/28/2023  No No   Sig: Take 1 tablet (100 mg total) by mouth daily   vitamin E, tocopherol, 1,000 units capsule 12/28/2023  No No   Sig: Take 1 capsule (1,000 Units total) by mouth daily      Facility-Administered Medications: None       Past Medical History:   Diagnosis Date    Acute kidney injury (HCC)     Acute respiratory failure with hypoxia (HCC) 12/16/2021    Broken arm     hairline fracture/ 2 places///   right arm    Diabetes mellitus (HCC)     Diverticulitis     Pneumothorax- Resolved     Postherpetic neuralgia        Past Surgical History:   Procedure Laterality Date    CHOLECYSTECTOMY      COLON SURGERY      HERNIA REPAIR      IR EMBOLIZATION (SPECIFY VESSEL OR SITE)  1/12/2022    ROTATOR CUFF REPAIR Right     VARICOSE VEIN SURGERY      Impression:  2/12/16 Jake Sarabia       Family History   Problem Relation Age of Onset    Diabetes Mother     No Known Problems Father      I have reviewed and agree with the history as documented.    E-Cigarette/Vaping    E-Cigarette Use Never User      E-Cigarette/Vaping Substances    Nicotine No     THC No     CBD No     Flavoring No      Social History     Tobacco Use    Smoking status: Never    Smokeless tobacco: Never   Vaping Use    Vaping status: Never Used   Substance Use Topics    Alcohol use: Not Currently     Comment: Rarely    Drug use: Never       Review of Systems   Constitutional:  Positive for activity change. Negative for chills and fever.   HENT:  Negative for ear pain and sore throat.    Eyes:  Negative for pain and visual disturbance.   Respiratory:  Negative for cough and shortness of breath.    Cardiovascular:  Negative for chest pain and palpitations.   Gastrointestinal:  Positive for abdominal pain, nausea and vomiting.   Genitourinary:  Negative for dysuria and hematuria.   Musculoskeletal:  Positive for myalgias.  Negative for arthralgias and back pain.   Skin:  Negative for color change and rash.   Neurological:  Positive for weakness and headaches. Negative for seizures and syncope.   All other systems reviewed and are negative.      Physical Exam  Physical Exam  Vitals and nursing note reviewed.   Constitutional:       General: She is not in acute distress.     Appearance: Normal appearance. She is well-developed. She is ill-appearing.   HENT:      Head: Normocephalic and atraumatic.      Right Ear: External ear normal.      Left Ear: External ear normal.      Nose: Nose normal.      Mouth/Throat:      Mouth: Mucous membranes are moist.   Eyes:      Conjunctiva/sclera: Conjunctivae normal.   Cardiovascular:      Rate and Rhythm: Normal rate and regular rhythm.      Pulses: Normal pulses.      Heart sounds: Normal heart sounds. No murmur heard.  Pulmonary:      Effort: Pulmonary effort is normal. No respiratory distress.      Breath sounds: Normal breath sounds.   Abdominal:      General: Bowel sounds are normal.      Palpations: Abdomen is soft.      Tenderness: There is no abdominal tenderness.   Musculoskeletal:         General: Tenderness present. No swelling or deformity.      Cervical back: Neck supple. No rigidity.   Skin:     General: Skin is warm and dry.      Capillary Refill: Capillary refill takes less than 2 seconds.      Coloration: Skin is pale.   Neurological:      General: No focal deficit present.      Mental Status: She is alert and oriented to person, place, and time. Mental status is at baseline.      Sensory: No sensory deficit.      Motor: Weakness present.      Gait: Gait abnormal.      Deep Tendon Reflexes: Reflexes normal.   Psychiatric:         Mood and Affect: Mood normal.         Vital Signs  ED Triage Vitals [12/30/23 1251]   Temperature Pulse Respirations Blood Pressure SpO2   97.8 °F (36.6 °C) 87 18 (!) 215/101 92 %      Temp Source Heart Rate Source Patient Position - Orthostatic VS BP  Location FiO2 (%)   Axillary Monitor Sitting Left arm --      Pain Score       8           Vitals:    12/30/23 2145 12/30/23 2200 12/30/23 2215 12/30/23 2230   BP: 159/78 (!) 179/87 (!) 185/89 (!) 187/88   Pulse: 85 86 81 81   Patient Position - Orthostatic VS:             Visual Acuity  Visual Acuity      Flowsheet Row Most Recent Value   L Pupil Size (mm) 4   R Pupil Size (mm) 4   L Pupil Shape Round   R Pupil Shape Round            ED Medications  Medications   chlorhexidine (PERIDEX) 0.12 % oral rinse 15 mL (15 mL Mouth/Throat Given 12/30/23 2157)   heparin (porcine) subcutaneous injection 5,000 Units (5,000 Units Subcutaneous Given 12/30/23 2238)   atorvastatin (LIPITOR) tablet 40 mg (40 mg Oral Not Given 12/30/23 1738)   aspirin chewable tablet 81 mg (has no administration in time range)   levalbuterol (XOPENEX) inhalation solution 1.25 mg (has no administration in time range)   hydrALAZINE (APRESOLINE) injection 10 mg (10 mg Intravenous Given 12/30/23 1621)   magnesium sulfate IVPB (premix) SOLN 1 g (0 g Intravenous Stopped 12/30/23 2000)   ondansetron (ZOFRAN) injection 4 mg (4 mg Intravenous Given 12/30/23 2128)   insulin regular (HumuLIN R,NovoLIN R) 1 Units/mL in sodium chloride 0.9 % 100 mL infusion (1.5 Units/hr Intravenous Rate/Dose Change 12/30/23 2156)   sodium chloride 0.9 % bolus 1,000 mL (0 mL Intravenous Stopped 12/30/23 1513)   ondansetron (ZOFRAN) injection 4 mg (4 mg Intravenous Given 12/30/23 1336)   aspirin tablet 325 mg (325 mg Oral Given 12/30/23 1420)   clopidogrel (PLAVIX) tablet 300 mg (300 mg Oral Given 12/30/23 1420)   iohexol (OMNIPAQUE) 350 MG/ML injection (MULTI-DOSE) 85 mL (85 mL Intravenous Given 12/30/23 1445)   sodium chloride (HYPERTONIC) 3 % bolus 115 mL (115 mL Intravenous New Bag 12/30/23 1838)   labetalol (NORMODYNE) injection 10 mg (10 mg Intravenous Given 12/30/23 1927)   sodium chloride (HYPERTONIC) 3 % bolus 150 mL (0 mL Intravenous Stopped 12/30/23 2826)        Diagnostic Studies  Results Reviewed       Procedure Component Value Units Date/Time    Blood culture #1 [[id number]] Collected: 12/30/23 1329    Lab Status: Preliminary result Specimen: Blood from Arm, Left Updated: 12/30/23 1902     Blood Culture Received in Microbiology Lab. Culture in Progress.    Blood culture #2 [[id number]] Collected: 12/30/23 1329    Lab Status: Preliminary result Specimen: Blood from Arm, Right Updated: 12/30/23 1902     Blood Culture Received in Microbiology Lab. Culture in Progress.    HS Troponin I 2hr [[id number]]  (Normal) Collected: 12/30/23 1548    Lab Status: Final result Specimen: Blood from Arm, Left Updated: 12/30/23 1619     hs TnI 2hr 9 ng/L      Delta 2hr hsTnI 3 ng/L     HS Troponin I 4hr [[id number]]     Lab Status: No result Specimen: Blood     Urine Microscopic [[id number]]  (Abnormal) Collected: 12/30/23 1414    Lab Status: Final result Specimen: Urine, Clean Catch Updated: 12/30/23 1502     RBC, UA 2-4 /hpf      WBC, UA 4-10 /hpf      Epithelial Cells Occasional /hpf      Bacteria, UA Innumerable /hpf     UA w Reflex to Microscopic w Reflex to Culture [[id number]]  (Abnormal) Collected: 12/30/23 1414    Lab Status: Final result Specimen: Urine, Clean Catch Updated: 12/30/23 1420     Color, UA Straw     Clarity, UA Slightly Cloudy     Specific Gravity, UA 1.025     pH, UA 6.0     Leukocytes, UA Negative     Nitrite, UA Negative     Protein, UA 3+ mg/dl      Glucose, UA 3+ mg/dl      Ketones, UA Trace mg/dl      Urobilinogen, UA 0.2 E.U./dl      Bilirubin, UA Negative     Occult Blood, UA 1+    FLU/RSV/COVID - if FLU/RSV clinically relevant [[id number]]  (Normal) Collected: 12/30/23 1329    Lab Status: Final result Specimen: Nares from Nose Updated: 12/30/23 1415     SARS-CoV-2 Negative     INFLUENZA A PCR Negative     INFLUENZA B PCR Negative     RSV PCR Negative    Narrative:      FOR PEDIATRIC PATIENTS - copy/paste COVID Guidelines URL to browser:  [email or web address]    SARS-CoV-2 assay is a Nucleic Acid Amplification assay intended for the  qualitative detection of nucleic acid from SARS-CoV-2 in nasopharyngeal  swabs. Results are for the presumptive identification of SARS-CoV-2 RNA.    Positive results are indicative of infection with SARS-CoV-2, the virus  causing COVID-19, but do not rule out bacterial infection or co-infection  with other viruses. Laboratories within the [place] and its  territories are required to report all positive results to the appropriate  public health authorities. Negative results do not preclude SARS-CoV-2  infection and should not be used as the sole basis for treatment or other  patient management decisions. Negative results must be combined with  clinical observations, patient history, and epidemiological information.  This test has not been FDA cleared or approved.    This test has been authorized by FDA under an Emergency Use Authorization  (EUA). This test is only authorized for the duration of time the  declaration that circumstances exist justifying the authorization of the  emergency use of an in vitro diagnostic tests for detection of SARS-CoV-2  virus and/or diagnosis of COVID-19 infection under section 564(b)(1) of  the Act, 21 U.S.C. 360bbb-3(b)(1), unless the authorization is terminated  or revoked sooner. The test has been validated but independent review by FDA  and CLIA is pending.    Test performed using [employer] GeneXpert: This RT-PCR assay targets N2,  a region unique to SARS-CoV-2. A conserved region in the E-gene was chosen  for pan-Sarbecovirus detection which includes SARS-CoV-2.    According to CMS-2020-01-R, this platform meets the definition of high-throughput technology.    Comprehensive metabolic panel [[id number]]  (Abnormal) Collected: 12/30/23 1329    Lab Status: Final result Specimen: Blood from Arm, Left Updated: 12/30/23 1413     Sodium  135 mmol/L      Potassium 4.4 mmol/L      Chloride 100 mmol/L      CO2 23 mmol/L      ANION GAP 12 mmol/L      BUN 25 mg/dL      Creatinine 1.28 mg/dL      Glucose 320 mg/dL      Calcium 10.3 mg/dL      AST 16 U/L      ALT 12 U/L      Alkaline Phosphatase 131 U/L      Total Protein 9.2 g/dL      Albumin 4.9 g/dL      Total Bilirubin 0.53 mg/dL      eGFR 39 ml/min/1.73sq m     Narrative:      National Kidney Disease Foundation guidelines for Chronic Kidney Disease (CKD):     Stage 1 with normal or high GFR (GFR > 90 mL/min/1.73 square meters)    Stage 2 Mild CKD (GFR = 60-89 mL/min/1.73 square meters)    Stage 3A Moderate CKD (GFR = 45-59 mL/min/1.73 square meters)    Stage 3B Moderate CKD (GFR = 30-44 mL/min/1.73 square meters)    Stage 4 Severe CKD (GFR = 15-29 mL/min/1.73 square meters)    Stage 5 End Stage CKD (GFR <15 mL/min/1.73 square meters)  Note: GFR calculation is accurate only with a steady state creatinine    Lipase [[id number]]  (Normal) Collected: 12/30/23 1329    Lab Status: Final result Specimen: Blood from Arm, Left Updated: 12/30/23 1413     Lipase 20 u/L     TSH, 3rd generation with Free T4 reflex [[id number]]  (Normal) Collected: 12/30/23 1329    Lab Status: Final result Specimen: Blood from Arm, Left Updated: 12/30/23 1413     TSH 3RD GENERATON 2.650 uIU/mL     HS Troponin 0hr (reflex protocol) [[id number]]  (Normal) Collected: 12/30/23 1329    Lab Status: Final result Specimen: Blood from Arm, Left Updated: 12/30/23 1404     hs TnI 0hr 6 ng/L     RBC Morphology Reflex Test [[id number]] Collected: 12/30/23 1329    Lab Status: Final result Specimen: Blood from Arm, Left Updated: 12/30/23 1401    CBC and differential [[id number]]  (Abnormal) Collected: 12/30/23 1329    Lab Status: Final result Specimen: Blood from Arm, Left Updated: 12/30/23 1356     WBC 8.79 Thousand/uL      RBC 5.14 Million/uL      Hemoglobin 14.1 g/dL      Hematocrit 43.4 %      MCV 84 fL      MCH 27.4 pg      MCHC 32.5 g/dL       RDW 13.3 %      MPV 9.3 fL      Platelets 236 Thousands/uL     Narrative:      This is an appended report.  These results have been appended to a previously verified report.    Manual Differential(PHLEBS Do Not Order) [[id number]]  (Abnormal) Collected: 12/30/23 1329    Lab Status: Final result Specimen: Blood from Arm, Left Updated: 12/30/23 1356     Segmented % 89 %      Bands % 5 %      Lymphocytes % 6 %      Monocytes % 0 %      Eosinophils, % 0 %      Basophils % 0 %      Absolute Neutrophils 8.26 Thousand/uL      Lymphocytes Absolute 0.53 Thousand/uL      Monocytes Absolute 0.00 Thousand/uL      Eosinophils Absolute 0.00 Thousand/uL      Basophils Absolute 0.00 Thousand/uL      Total Counted --     RBC Morphology Normal     Platelet Estimate Adequate    Protime-INR [[id number]]  (Normal) Collected: 12/30/23 1329    Lab Status: Final result Specimen: Blood from Arm, Left Updated: 12/30/23 1351     Protime 13.7 seconds      INR 1.04    APTT [[id number]]  (Normal) Collected: 12/30/23 1329    Lab Status: Final result Specimen: Blood from Arm, Left Updated: 12/30/23 1351     PTT 28 seconds     Fingerstick Glucose (POCT) [[id number]]  (Abnormal) Collected: 12/30/23 1331    Lab Status: Final result Updated: 12/30/23 1335     POC Glucose 327 mg/dl                    CTA stroke alert (head/neck)   Final Result by Priyank Martins MD (12/30 1506)      Occluded distal aspect of left posterior inferior cerebellar artery (PICA). This corresponds with left PICA territory infarct seen on earlier same day head CT.      Scattered reticular opacities in visualized bilateral upper and lower lobes, likely due to chronic lung disease with postinfectious scarring. Superimposed infection is difficult to exclude. Consider follow-up dedicated CT chest without contrast.      Additional chronic/incidental findings as detailed above.      Findings were directly discussed with Tyron Williamson 12/30/2023 at 2:54 PM.                            Workstation performed: [id number]         CT head without contrast   Final Result by Priyank Martins MD (12/30 1409)      Acute infarct in left cerebellum PICA territory.         I personally discussed this study with BILL ARCHIBALD JR on 12/30/2023 2:07 PM.                  Workstation performed: [id number]         XR chest 1 view portable    (Results Pending)   CT head wo contrast    (Results Pending)   CT head wo contrast    (Results Pending)   MRI brain wo contrast    (Results Pending)              Procedures  ECG 12 Lead Documentation Only    Date/Time: 12/30/2023 1:38 PM    Performed by: Bill Archibald Jr., DO  Authorized by: Bill Archibald Jr., DO    ECG reviewed by me, the ED Provider: yes    Patient location:  ED  Comments:      EKG is a sinus rhythm at 86 beats a minute there is a left axis with a left anterior fascicular block pattern.  There is LVH by voltage criteria  CriticalCare Time    Date/Time: 12/30/2023 3:16 PM    Performed by: Bill Archibald Jr., DO  Authorized by: Bill Archibald Jr., DO    Critical care provider statement:     Critical care time (minutes):  45    Critical care was necessary to treat or prevent imminent or life-threatening deterioration of the following conditions:  CNS failure or compromise    Critical care was time spent personally by me on the following activities:  Examination of patient, ordering and review of laboratory studies, ordering and review of radiographic studies, discussions with consultants, re-evaluation of patient's condition, review of old charts and obtaining history from patient or surrogate           ED Course  ED Course as of 12/30/23 2359   Sat Dec 30, 2023   1335 POC Glucose(!): 327   1427 Upon further questioning, the patient notes that she likely woke up like this when she originally said 5 AM.  Patient notes she woke up at 5 AM.  Patient is long out of the window for TNK, however we will order the CTA.  Discussed with  , will order CTA.  Plavix and aspirin given.   1514 Case discussed with neurology who states that the patient is not a interventional candidate.  The patient should be admitted here.   2347 NIH  = 1                               SBIRT 20yo+      Flowsheet Row Most Recent Value   Initial Alcohol Screen: US AUDIT-C     1. How often do you have a drink containing alcohol? 0 Filed at: 12/30/2023 1341   2. How many drinks containing alcohol do you have on a typical day you are drinking?  0 Filed at: 12/30/2023 1341   3a. Male UNDER 65: How often do you have five or more drinks on one occasion? 0 Filed at: 12/30/2023 1341   3b. FEMALE Any Age, or MALE 65+: How often do you have 4 or more drinks on one occassion? 0 Filed at: 12/30/2023 1341   Audit-C Score 0 Filed at: 12/30/2023 1341   TK: How many times in the past year have you...    Used an illegal drug or used a prescription medication for non-medical reasons? Never Filed at: 12/30/2023 1341                      Medical Decision Making  Patient is an 83-year-old female who presents to the emergency department complaining of feeling dizzy however the patient notes that it is vertiginous in nature however patient denies any sensation of spinning.  The patient also notes she has bodyaches weakness and nausea.  The patient was symptomatic at 5 AM or possibly earlier as this may of started while she was sleeping.  The patient is unclear.  Patient denies fever or trauma or blood thinners.  Differential diagnosis at the time evaluation is dehydration, viral syndrome, electrolyte abnormalities or gastroenteritis.  Patient was found to be hypertensive and due to the patient having headache, a CT scan was ordered.  CT scan was read as an ischemic stroke to the cerebellum.  This was discussed with the patient and due to the symptoms, a stroke evaluation was called.  Patient had an NIH score of the bedside which was 1.  Patient was not a thrombolytic candidate as symptoms  started at least 7+ hours ago.  CTA scan confirms the diagnosis.  Patient will be admitted to the ICU service with aspirin and Plavix on board.  Patient will be admitted to the ICU.    Amount and/or Complexity of Data Reviewed  Labs: ordered. Decision-making details documented in ED Course.  Radiology: ordered.    Risk  OTC drugs.  Prescription drug management.  Decision regarding hospitalization.             Disposition  Final diagnoses:   Stroke (HCC)   Cerebellar stroke (HCC)   Hyperglycemia     Time reflects when diagnosis was documented in both MDM as applicable and the Disposition within this note       Time User Action Codes Description Comment    12/30/2023  2:15 PM BruBill barron Add [I63.9] Stroke (HCC)     12/30/2023  2:28 PM BruLoco barronony Add [I63.9] Cerebellar stroke (HCC)     12/30/2023  2:29 PM BruBill barron Add [R73.9] Hyperglycemia           ED Disposition       ED Disposition   Admit    Condition   Stable    Date/Time   Sat Dec 30, 2023 1513    Comment   Case was discussed with Dr. Frotune and the patient's admission status was agreed to be Admission Status: inpatient status to the service of Dr. Yin .               Follow-up Information    None         Current Discharge Medication List        CONTINUE these medications which have NOT CHANGED    Details   ALPRAZolam (XANAX) 0.25 mg tablet Take 1 tablet (0.25 mg total) by mouth daily at bedtime as needed for anxiety  Qty: 30 tablet, Refills: 2    Associated Diagnoses: Anxiety      Biotin 10 MG CAPS Take 1 capsule by mouth in the morning      calcium carbonate (TUMS) 500 mg chewable tablet Chew 1 tablet if needed      cholecalciferol (VITAMIN D3) 25 mcg (1,000 units) tablet daily      Diclofenac Sodium (VOLTAREN) 1 % Apply 2 g topically 2 (two) times a day as needed (as needed twice a day)  Qty: 100 g, Refills: 2    Associated Diagnoses: Right foot pain; Peroneal tendonitis of right lower extremity      dicyclomine (BENTYL) 20 mg tablet Take  1 tablet (20 mg total) by mouth 4 (four) times a day as needed (cramps)  Qty: 40 tablet, Refills: 0    Associated Diagnoses: Diarrhea of presumed infectious origin      enalapril (VASOTEC) 10 mg tablet Take 1 tablet (10 mg total) by mouth daily  Qty: 90 tablet, Refills: 0    Associated Diagnoses: Essential hypertension      ferrous sulfate 325 (65 Fe) mg tablet Take 325 mg by mouth daily with breakfast      glipiZIDE (GLUCOTROL XL) 10 mg 24 hr tablet Take 1 tablet (10 mg total) by mouth daily  Qty: 90 tablet, Refills: 0    Associated Diagnoses: Type 2 diabetes mellitus without complication, without long-term current use of insulin ([facility])      ketoconazole (NIZORAL) 2 % shampoo Use 2-3 times a week  Qty: 120 mL, Refills: 3    Associated Diagnoses: Hair loss      lovastatin (MEVACOR) 40 MG tablet Take 1 tablet (40 mg total) by mouth daily at bedtime  Qty: 90 tablet, Refills: 0    Associated Diagnoses: Type 2 diabetes mellitus without complication, without long-term current use of insulin ([facility])      Multiple Vitamin (multivitamin) tablet Take 1 tablet by mouth daily      sitaGLIPtin (Januvia) 100 mg tablet Take 1 tablet (100 mg total) by mouth daily  Qty: 90 tablet, Refills: 0    Associated Diagnoses: Type 2 diabetes mellitus without complication, without long-term current use of insulin (HCC)      vitamin E, tocopherol, 1,000 units capsule Take 1 capsule (1,000 Units total) by mouth daily  Qty: 90 capsule, Refills: 1    Associated Diagnoses: Healthcare maintenance             No discharge procedures on file.    PDMP Review         Value Time User    PDMP Reviewed  Yes 11/22/2022  8:57 PM Cornel Montes III, DO            ED Provider  Electronically Signed by             Bill Archibald Jr., DO  12/30/23 6249

## 2023-12-31 ENCOUNTER — APPOINTMENT (INPATIENT)
Dept: NON INVASIVE DIAGNOSTICS | Facility: HOSPITAL | Age: 83
DRG: 066 | End: 2023-12-31
Payer: COMMERCIAL

## 2023-12-31 ENCOUNTER — APPOINTMENT (INPATIENT)
Dept: CT IMAGING | Facility: HOSPITAL | Age: 83
DRG: 066 | End: 2023-12-31
Payer: COMMERCIAL

## 2023-12-31 ENCOUNTER — APPOINTMENT (INPATIENT)
Dept: MRI IMAGING | Facility: HOSPITAL | Age: 83
DRG: 066 | End: 2023-12-31
Payer: COMMERCIAL

## 2023-12-31 VITALS
HEIGHT: 58 IN | TEMPERATURE: 99.6 F | DIASTOLIC BLOOD PRESSURE: 65 MMHG | WEIGHT: 124 LBS | OXYGEN SATURATION: 96 % | BODY MASS INDEX: 26.03 KG/M2 | RESPIRATION RATE: 18 BRPM | SYSTOLIC BLOOD PRESSURE: 143 MMHG | HEART RATE: 92 BPM

## 2023-12-31 PROBLEM — R20.2 PARESTHESIA OF ARM: Status: RESOLVED | Noted: 2017-05-11 | Resolved: 2023-12-31

## 2023-12-31 PROBLEM — E87.20 METABOLIC ACIDOSIS: Status: ACTIVE | Noted: 2023-12-31

## 2023-12-31 PROBLEM — Z28.21 REFUSED INFLUENZA VACCINE: Status: RESOLVED | Noted: 2020-05-07 | Resolved: 2023-12-31

## 2023-12-31 PROBLEM — E11.9 DIABETES (HCC): Chronic | Status: ACTIVE | Noted: 2023-12-30

## 2023-12-31 PROBLEM — D64.9 ANEMIA: Status: ACTIVE | Noted: 2023-12-31

## 2023-12-31 PROBLEM — L65.9 HAIR LOSS: Status: RESOLVED | Noted: 2022-05-05 | Resolved: 2023-12-31

## 2023-12-31 PROBLEM — J84.10 PULMONARY FIBROSIS (HCC): Chronic | Status: ACTIVE | Noted: 2023-09-07

## 2023-12-31 PROBLEM — M79.10 MUSCLE PAIN: Status: RESOLVED | Noted: 2017-01-30 | Resolved: 2023-12-31

## 2023-12-31 PROBLEM — E78.1 HYPERTRIGLYCERIDEMIA: Chronic | Status: ACTIVE | Noted: 2022-05-05

## 2023-12-31 PROBLEM — M54.9 BACK PAIN: Status: RESOLVED | Noted: 2017-02-06 | Resolved: 2023-12-31

## 2023-12-31 PROBLEM — N18.32 CHRONIC KIDNEY DISEASE, STAGE 3B (HCC): Chronic | Status: ACTIVE | Noted: 2021-12-21

## 2023-12-31 PROBLEM — R78.81 BACTEREMIA DUE TO METHICILLIN SUSCEPTIBLE STAPHYLOCOCCUS AUREUS (MSSA): Status: RESOLVED | Noted: 2021-12-28 | Resolved: 2023-12-31

## 2023-12-31 PROBLEM — B95.61 BACTEREMIA DUE TO METHICILLIN SUSCEPTIBLE STAPHYLOCOCCUS AUREUS (MSSA): Status: RESOLVED | Noted: 2021-12-28 | Resolved: 2023-12-31

## 2023-12-31 PROBLEM — D64.9 ANEMIA: Chronic | Status: ACTIVE | Noted: 2023-12-31

## 2023-12-31 PROBLEM — M75.50 BURSITIS OF SHOULDER: Status: RESOLVED | Noted: 2018-12-20 | Resolved: 2023-12-31

## 2023-12-31 PROBLEM — D75.839 THROMBOCYTOSIS: Status: RESOLVED | Noted: 2021-12-31 | Resolved: 2023-12-31

## 2023-12-31 LAB
ALBUMIN SERPL BCP-MCNC: 4.4 G/DL (ref 3.5–5)
ALP SERPL-CCNC: 113 U/L (ref 34–104)
ALT SERPL W P-5'-P-CCNC: 10 U/L (ref 7–52)
ANION GAP SERPL CALCULATED.3IONS-SCNC: 13 MMOL/L
ANION GAP SERPL CALCULATED.3IONS-SCNC: 14 MMOL/L
ANION GAP SERPL CALCULATED.3IONS-SCNC: 15 MMOL/L
ANION GAP SERPL CALCULATED.3IONS-SCNC: 9 MMOL/L
AORTIC ROOT: 2.9 CM
AORTIC VALVE MEAN VELOCITY: 7.1 M/S
APICAL FOUR CHAMBER EJECTION FRACTION: 63 %
ASCENDING AORTA: 3 CM
AST SERPL W P-5'-P-CCNC: 18 U/L (ref 13–39)
ATRIAL RATE: 84 BPM
ATRIAL RATE: 86 BPM
AV AREA BY CONTINUOUS VTI: 2.9 CM2
AV AREA PEAK VELOCITY: 2.6 CM2
AV LVOT MEAN GRADIENT: 2 MMHG
AV LVOT PEAK GRADIENT: 4 MMHG
AV MEAN GRADIENT: 2 MMHG
AV PEAK GRADIENT: 6 MMHG
AV VALVE AREA: 2.88 CM2
AV VELOCITY RATIO: 0.82
BASE EX.OXY STD BLDV CALC-SCNC: 91.6 % (ref 60–80)
BASE EXCESS BLDV CALC-SCNC: -6.4 MMOL/L
BILIRUB SERPL-MCNC: 0.56 MG/DL (ref 0.2–1)
BUN SERPL-MCNC: 21 MG/DL (ref 5–25)
BUN SERPL-MCNC: 26 MG/DL (ref 5–25)
BUN SERPL-MCNC: 27 MG/DL (ref 5–25)
BUN SERPL-MCNC: 29 MG/DL (ref 5–25)
CALCIUM SERPL-MCNC: 8.7 MG/DL (ref 8.4–10.2)
CALCIUM SERPL-MCNC: 8.8 MG/DL (ref 8.4–10.2)
CALCIUM SERPL-MCNC: 9.7 MG/DL (ref 8.4–10.2)
CALCIUM SERPL-MCNC: 9.9 MG/DL (ref 8.4–10.2)
CHLORIDE SERPL-SCNC: 107 MMOL/L (ref 96–108)
CHLORIDE SERPL-SCNC: 108 MMOL/L (ref 96–108)
CHLORIDE SERPL-SCNC: 110 MMOL/L (ref 96–108)
CHLORIDE SERPL-SCNC: 110 MMOL/L (ref 96–108)
CHOLEST SERPL-MCNC: 229 MG/DL
CK SERPL-CCNC: 63 U/L (ref 26–192)
CO2 SERPL-SCNC: 18 MMOL/L (ref 21–32)
CO2 SERPL-SCNC: 19 MMOL/L (ref 21–32)
CREAT SERPL-MCNC: 1.31 MG/DL (ref 0.6–1.3)
CREAT SERPL-MCNC: 1.48 MG/DL (ref 0.6–1.3)
CREAT SERPL-MCNC: 1.54 MG/DL (ref 0.6–1.3)
CREAT SERPL-MCNC: 1.6 MG/DL (ref 0.6–1.3)
DOP CALC AO PEAK VEL: 1.25 M/S
DOP CALC AO VTI: 17.8 CM
DOP CALC LVOT AREA: 3.14 CM2
DOP CALC LVOT CARDIAC INDEX: 2.54 L/MIN/M2
DOP CALC LVOT CARDIAC OUTPUT: 3.79 L/MIN
DOP CALC LVOT DIAMETER: 2 CM
DOP CALC LVOT PEAK VEL VTI: 16.35 CM
DOP CALC LVOT PEAK VEL: 1.03 M/S
DOP CALC LVOT STROKE INDEX: 32.9 ML/M2
DOP CALC LVOT STROKE VOLUME: 51.34
E WAVE DECELERATION TIME: 169 MS
E/A RATIO: 0.75
ERYTHROCYTE [DISTWIDTH] IN BLOOD BY AUTOMATED COUNT: 13.8 % (ref 11.6–15.1)
EST. AVERAGE GLUCOSE BLD GHB EST-MCNC: 192 MG/DL
FRACTIONAL SHORTENING: 35 (ref 28–44)
GFR SERPL CREATININE-BSD FRML MDRD: 29 ML/MIN/1.73SQ M
GFR SERPL CREATININE-BSD FRML MDRD: 31 ML/MIN/1.73SQ M
GFR SERPL CREATININE-BSD FRML MDRD: 32 ML/MIN/1.73SQ M
GFR SERPL CREATININE-BSD FRML MDRD: 37 ML/MIN/1.73SQ M
GLUCOSE SERPL-MCNC: 118 MG/DL (ref 65–140)
GLUCOSE SERPL-MCNC: 162 MG/DL (ref 65–140)
GLUCOSE SERPL-MCNC: 163 MG/DL (ref 65–140)
GLUCOSE SERPL-MCNC: 169 MG/DL (ref 65–140)
GLUCOSE SERPL-MCNC: 175 MG/DL (ref 65–140)
GLUCOSE SERPL-MCNC: 177 MG/DL (ref 65–140)
GLUCOSE SERPL-MCNC: 177 MG/DL (ref 65–140)
GLUCOSE SERPL-MCNC: 197 MG/DL (ref 65–140)
GLUCOSE SERPL-MCNC: 200 MG/DL (ref 65–140)
GLUCOSE SERPL-MCNC: 204 MG/DL (ref 65–140)
GLUCOSE SERPL-MCNC: 211 MG/DL (ref 65–140)
GLUCOSE SERPL-MCNC: 227 MG/DL (ref 65–140)
HBA1C MFR BLD: 8.3 %
HCO3 BLDV-SCNC: 18.6 MMOL/L (ref 24–30)
HCT VFR BLD AUTO: 42.4 % (ref 34.8–46.1)
HDLC SERPL-MCNC: 54 MG/DL
HGB BLD-MCNC: 13.6 G/DL (ref 11.5–15.4)
INR PPP: 1.08 (ref 0.84–1.19)
INTERVENTRICULAR SEPTUM IN DIASTOLE (PARASTERNAL SHORT AXIS VIEW): 1.4 CM
INTERVENTRICULAR SEPTUM: 1.4 CM (ref 0.6–1.1)
LAAS-AP2: 12.1 CM2
LAAS-AP4: 10 CM2
LACTATE SERPL-SCNC: 0.9 MMOL/L (ref 0.5–2)
LDLC SERPL CALC-MCNC: 141 MG/DL (ref 0–100)
LEFT ATRIUM SIZE: 2 CM
LEFT ATRIUM VOLUME (MOD BIPLANE): 24 ML
LEFT ATRIUM VOLUME INDEX (MOD BIPLANE): 16.1 ML/M2
LEFT INTERNAL DIMENSION IN SYSTOLE: 2.2 CM (ref 2.1–4)
LEFT VENTRICULAR INTERNAL DIMENSION IN DIASTOLE: 3.4 CM (ref 3.5–6)
LEFT VENTRICULAR POSTERIOR WALL IN END DIASTOLE: 1.3 CM
LEFT VENTRICULAR STROKE VOLUME: 33 ML
LVSV (TEICH): 33 ML
MAGNESIUM SERPL-MCNC: 2.4 MG/DL (ref 1.9–2.7)
MCH RBC QN AUTO: 27.6 PG (ref 26.8–34.3)
MCHC RBC AUTO-ENTMCNC: 32.1 G/DL (ref 31.4–37.4)
MCV RBC AUTO: 86 FL (ref 82–98)
MV E'TISSUE VEL-SEP: 5 CM/S
MV PEAK A VEL: 0.91 M/S
MV PEAK E VEL: 68 CM/S
MV STENOSIS PRESSURE HALF TIME: 49 MS
MV VALVE AREA P 1/2 METHOD: 4.49
O2 CT BLDV-SCNC: 18.8 ML/DL
OSMOLALITY UR/SERPL-RTO: 311 MMOL/KG (ref 282–298)
OSMOLALITY UR/SERPL-RTO: 316 MMOL/KG (ref 282–298)
OSMOLALITY UR/SERPL-RTO: 318 MMOL/KG (ref 282–298)
OSMOLALITY UR/SERPL-RTO: 323 MMOL/KG (ref 282–298)
P AXIS: 38 DEGREES
P AXIS: 6 DEGREES
PCO2 BLDV: 35.8 MM HG (ref 42–50)
PH BLDV: 7.33 [PH] (ref 7.3–7.4)
PHOSPHATE SERPL-MCNC: 3.5 MG/DL (ref 2.3–4.1)
PLATELET # BLD AUTO: 265 THOUSANDS/UL (ref 149–390)
PMV BLD AUTO: 9.3 FL (ref 8.9–12.7)
PO2 BLDV: 65 MM HG (ref 35–45)
POTASSIUM SERPL-SCNC: 3.6 MMOL/L (ref 3.5–5.3)
POTASSIUM SERPL-SCNC: 3.8 MMOL/L (ref 3.5–5.3)
POTASSIUM SERPL-SCNC: 4 MMOL/L (ref 3.5–5.3)
POTASSIUM SERPL-SCNC: 4.1 MMOL/L (ref 3.5–5.3)
PR INTERVAL: 164 MS
PR INTERVAL: 168 MS
PROT SERPL-MCNC: 8.5 G/DL (ref 6.4–8.4)
PROTHROMBIN TIME: 14.1 SECONDS (ref 11.6–14.5)
QRS AXIS: -50 DEGREES
QRS AXIS: -52 DEGREES
QRSD INTERVAL: 96 MS
QRSD INTERVAL: 98 MS
QT INTERVAL: 374 MS
QT INTERVAL: 388 MS
QTC INTERVAL: 447 MS
QTC INTERVAL: 458 MS
RBC # BLD AUTO: 4.93 MILLION/UL (ref 3.81–5.12)
RIGHT ATRIUM AREA SYSTOLE A4C: 8.9 CM2
RIGHT VENTRICLE ID DIMENSION: 2.4 CM
SL CV LEFT ATRIUM LENGTH A2C: 3.7 CM
SL CV LV EF: 70
SL CV PED ECHO LEFT VENTRICLE DIASTOLIC VOLUME (MOD BIPLANE) 2D: 49 ML
SL CV PED ECHO LEFT VENTRICLE SYSTOLIC VOLUME (MOD BIPLANE) 2D: 16 ML
SODIUM SERPL-SCNC: 138 MMOL/L (ref 135–147)
SODIUM SERPL-SCNC: 140 MMOL/L (ref 135–147)
SODIUM SERPL-SCNC: 140 MMOL/L (ref 135–147)
SODIUM SERPL-SCNC: 141 MMOL/L (ref 135–147)
T WAVE AXIS: 1 DEGREES
T WAVE AXIS: 23 DEGREES
TRICUSPID ANNULAR PLANE SYSTOLIC EXCURSION: 1.8 CM
TRIGL SERPL-MCNC: 172 MG/DL
VENTRICULAR RATE: 84 BPM
VENTRICULAR RATE: 86 BPM
WBC # BLD AUTO: 11.11 THOUSAND/UL (ref 4.31–10.16)

## 2023-12-31 PROCEDURE — 82948 REAGENT STRIP/BLOOD GLUCOSE: CPT

## 2023-12-31 PROCEDURE — 80048 BASIC METABOLIC PNL TOTAL CA: CPT | Performed by: NURSE PRACTITIONER

## 2023-12-31 PROCEDURE — 85027 COMPLETE CBC AUTOMATED: CPT | Performed by: NURSE PRACTITIONER

## 2023-12-31 PROCEDURE — 70450 CT HEAD/BRAIN W/O DYE: CPT

## 2023-12-31 PROCEDURE — 83930 ASSAY OF BLOOD OSMOLALITY: CPT | Performed by: NURSE PRACTITIONER

## 2023-12-31 PROCEDURE — 93306 TTE W/DOPPLER COMPLETE: CPT | Performed by: INTERNAL MEDICINE

## 2023-12-31 PROCEDURE — 84100 ASSAY OF PHOSPHORUS: CPT | Performed by: NURSE PRACTITIONER

## 2023-12-31 PROCEDURE — 83036 HEMOGLOBIN GLYCOSYLATED A1C: CPT | Performed by: NURSE PRACTITIONER

## 2023-12-31 PROCEDURE — 80061 LIPID PANEL: CPT | Performed by: NURSE PRACTITIONER

## 2023-12-31 PROCEDURE — NC001 PR NO CHARGE: Performed by: INTERNAL MEDICINE

## 2023-12-31 PROCEDURE — 80048 BASIC METABOLIC PNL TOTAL CA: CPT | Performed by: INTERNAL MEDICINE

## 2023-12-31 PROCEDURE — 83605 ASSAY OF LACTIC ACID: CPT | Performed by: NURSE PRACTITIONER

## 2023-12-31 PROCEDURE — 83735 ASSAY OF MAGNESIUM: CPT | Performed by: NURSE PRACTITIONER

## 2023-12-31 PROCEDURE — 82805 BLOOD GASES W/O2 SATURATION: CPT | Performed by: NURSE PRACTITIONER

## 2023-12-31 PROCEDURE — 93306 TTE W/DOPPLER COMPLETE: CPT

## 2023-12-31 PROCEDURE — 80053 COMPREHEN METABOLIC PANEL: CPT | Performed by: NURSE PRACTITIONER

## 2023-12-31 PROCEDURE — 85610 PROTHROMBIN TIME: CPT | Performed by: NURSE PRACTITIONER

## 2023-12-31 PROCEDURE — G1004 CDSM NDSC: HCPCS

## 2023-12-31 PROCEDURE — 82550 ASSAY OF CK (CPK): CPT | Performed by: NURSE PRACTITIONER

## 2023-12-31 PROCEDURE — 70551 MRI BRAIN STEM W/O DYE: CPT

## 2023-12-31 PROCEDURE — 99291 CRITICAL CARE FIRST HOUR: CPT | Performed by: INTERNAL MEDICINE

## 2023-12-31 PROCEDURE — NC001 PR NO CHARGE: Performed by: NURSE PRACTITIONER

## 2023-12-31 RX ORDER — NICARDIPINE HYDROCHLORIDE 0.1 MG/ML
1-15 INJECTION INTRAVENOUS
Status: DISCONTINUED | OUTPATIENT
Start: 2023-12-31 | End: 2023-12-31 | Stop reason: HOSPADM

## 2023-12-31 RX ORDER — FERROUS SULFATE 325(65) MG
325 TABLET ORAL
Status: DISCONTINUED | OUTPATIENT
Start: 2023-12-31 | End: 2023-12-31 | Stop reason: HOSPADM

## 2023-12-31 RX ORDER — CHLORHEXIDINE GLUCONATE ORAL RINSE 1.2 MG/ML
15 SOLUTION DENTAL EVERY 12 HOURS SCHEDULED
Status: CANCELLED | OUTPATIENT
Start: 2023-12-31

## 2023-12-31 RX ORDER — ONDANSETRON 2 MG/ML
4 INJECTION INTRAMUSCULAR; INTRAVENOUS EVERY 6 HOURS PRN
Status: CANCELLED | OUTPATIENT
Start: 2023-12-31

## 2023-12-31 RX ORDER — MAGNESIUM SULFATE 1 G/100ML
1 INJECTION INTRAVENOUS EVERY 12 HOURS
Status: CANCELLED | OUTPATIENT
Start: 2023-12-31

## 2023-12-31 RX ORDER — POTASSIUM CHLORIDE 20 MEQ/1
40 TABLET, EXTENDED RELEASE ORAL ONCE
Status: COMPLETED | OUTPATIENT
Start: 2023-12-31 | End: 2023-12-31

## 2023-12-31 RX ORDER — SODIUM CHLORIDE 9 MG/ML
75 INJECTION, SOLUTION INTRAVENOUS CONTINUOUS
Status: CANCELLED | OUTPATIENT
Start: 2023-12-31

## 2023-12-31 RX ORDER — NICARDIPINE HYDROCHLORIDE 0.1 MG/ML
1-15 INJECTION INTRAVENOUS
Status: CANCELLED | OUTPATIENT
Start: 2023-12-31

## 2023-12-31 RX ORDER — SODIUM CHLORIDE 3 G/100ML
150 INJECTION, SOLUTION INTRAVENOUS ONCE
Qty: 150 ML | Refills: 0 | Status: COMPLETED | OUTPATIENT
Start: 2023-12-31 | End: 2023-12-31

## 2023-12-31 RX ORDER — 3% SODIUM CHLORIDE 3 G/100ML
30 INJECTION, SOLUTION INTRAVENOUS CONTINUOUS
Status: DISCONTINUED | OUTPATIENT
Start: 2023-12-31 | End: 2023-12-31 | Stop reason: HOSPADM

## 2023-12-31 RX ORDER — ATORVASTATIN CALCIUM 40 MG/1
40 TABLET, FILM COATED ORAL EVERY EVENING
Status: CANCELLED | OUTPATIENT
Start: 2023-12-31

## 2023-12-31 RX ORDER — FERROUS SULFATE 325(65) MG
325 TABLET ORAL
Status: CANCELLED | OUTPATIENT
Start: 2024-01-01

## 2023-12-31 RX ORDER — LEVALBUTEROL INHALATION SOLUTION 1.25 MG/3ML
1.25 SOLUTION RESPIRATORY (INHALATION) EVERY 6 HOURS PRN
Status: CANCELLED | OUTPATIENT
Start: 2023-12-31

## 2023-12-31 RX ORDER — HYDRALAZINE HYDROCHLORIDE 20 MG/ML
10 INJECTION INTRAMUSCULAR; INTRAVENOUS EVERY 6 HOURS PRN
Status: CANCELLED | OUTPATIENT
Start: 2023-12-31

## 2023-12-31 RX ORDER — SODIUM CHLORIDE 9 MG/ML
75 INJECTION, SOLUTION INTRAVENOUS CONTINUOUS
Status: DISCONTINUED | OUTPATIENT
Start: 2023-12-31 | End: 2023-12-31

## 2023-12-31 RX ADMIN — HYDRALAZINE HYDROCHLORIDE 10 MG: 20 INJECTION INTRAMUSCULAR; INTRAVENOUS at 04:54

## 2023-12-31 RX ADMIN — SODIUM CHLORIDE 150 ML: 3 INJECTION, SOLUTION INTRAVENOUS at 12:30

## 2023-12-31 RX ADMIN — SODIUM CHLORIDE 30 ML/HR: 3 INJECTION, SOLUTION INTRAVENOUS at 13:34

## 2023-12-31 RX ADMIN — ONDANSETRON 4 MG: 2 INJECTION INTRAMUSCULAR; INTRAVENOUS at 09:44

## 2023-12-31 RX ADMIN — NICARDIPINE HYDROCHLORIDE 5 MG/HR: 0.1 INJECTION INTRAVENOUS at 16:48

## 2023-12-31 RX ADMIN — SODIUM CHLORIDE 75 ML/HR: 0.9 INJECTION, SOLUTION INTRAVENOUS at 09:43

## 2023-12-31 RX ADMIN — NICARDIPINE HYDROCHLORIDE 2.5 MG/HR: 0.1 INJECTION INTRAVENOUS at 12:27

## 2023-12-31 RX ADMIN — POTASSIUM CHLORIDE 40 MEQ: 1500 TABLET, EXTENDED RELEASE ORAL at 13:05

## 2023-12-31 RX ADMIN — MAGNESIUM SULFATE HEPTAHYDRATE 1 G: 1 INJECTION, SOLUTION INTRAVENOUS at 04:54

## 2023-12-31 RX ADMIN — HEPARIN SODIUM 5000 UNITS: 5000 INJECTION INTRAVENOUS; SUBCUTANEOUS at 05:03

## 2023-12-31 RX ADMIN — SODIUM CHLORIDE 150 ML: 3 INJECTION, SOLUTION INTRAVENOUS at 08:09

## 2023-12-31 RX ADMIN — DESMOPRESSIN ACETATE 16.8 MCG: 4 INJECTION, SOLUTION INTRAVENOUS; SUBCUTANEOUS at 10:31

## 2023-12-31 NOTE — PROGRESS NOTES
Update from earlier progress note.  MRI brain reviewed evolving L cerebellar infarct, cortical petechial hemorrhage, small intraparenchymal hematoma. Mass effect on 4th ventricle.  Patient currently with no complaints other than fatigue.  She is not nauseated. Not lightheaded.  Neuro exam is the same as earlier today. AAOx4.  Normal strength all extremities, normal sensation, CN2-12 intact. Normal finger-to-nose bilateral, normal repetitive hand motions, Normal heel-to-shin.   She has mild unilateral nystagmus to R.     Plan is transfer to [facility] ICU.  Updated accepting attending there Dr Yang.  She had 3% hypertonic saline this morning bolus sodium went from 140--->141.  Another bolus ordered and will start 30 cc/hr. Continue to hold further antiplatelets. Cardene gtt ordered with goal SBP <150.  Head of bed elevated.  DDAVP given earlier.    Updated daughters at bedside.  They inquired about transfer to [facility] as her daughter works there.  I reassured them that her neuro exam is stable at the moment so reasonable to wait a brief amount of time for a bed to become available at [facility] to maintain continuity of care.  Reassured that we have all resources needed at [facility] for her mother's care.    Additional Critical Care time excluding family meetings, procedures,teaching-   40 minutes.    Sukhdeep Fortune MD  Attending Physician  Pulmonary and Critical Care Medicine

## 2023-12-31 NOTE — NURSING NOTE
Patient's jewelry sent with daughter Unique, total of 5 rings and one bracelet.  Patient's rosary's sent with daughter Unique.

## 2023-12-31 NOTE — EMTALA/ACUTE CARE TRANSFER
[facility]  [address] [place] [address]  Dept: [phone]      ACUTE CARE TRANSFER CONSENT    NAME Debbie Moody                                         1940                              MRN [id number]    I have been informed of my rights regarding examination, treatment, and transfer   by Dr. Sukhdeep Fortune MD    Benefits:      Risks:        Transfer Request:  I acknowledge that my medical condition has been evaluated and explained to me by the treating physician or other qualified medical person and/or my attending physician who has recommended and offered to me further medical examination and treatment. I understand the Hospital's obligation with respect to the treatment and stabilization of my medical condition. I nevertheless request to be transferred. I release the Hospital, the doctor, and any other persons caring for me from all responsibility or liability for any injury or ill effects that may result from my transfer and agree to accept all responsibility for the consequences of my choice to transfer, rather than receive stabilizing treatment at the Hospital. I understand that because the transfer is my request, my insurance may not provide reimbursement for the services.  The Hospital will assist and direct me and my family in how to make arrangements for transfer, but the hospital is not liable for any fees charged by the transport service.  In spite of this understanding, I refuse to consent to further medical examination and treatment which has been offered to me, and request transfer to  . I authorize the performance of emergency medical procedures and treatments upon me in both transit and upon arrival at the receiving facility.  Additionally, I authorize the release of any and all medical records to the receiving facility and request they be transported with me, if possible.    I authorize the performance of emergency medical  procedures and treatments upon me in both transit and upon arrival at the receiving facility.  Additionally, I authorize the release of any and all medical records to the receiving facility and request they be transported with me, if possible.  I understand that the safest mode of transportation during a medical emergency is an ambulance and that the Hospital advocates the use of this mode of transport. Risks of traveling to the receiving facility by car, including absence of medical control, life sustaining equipment, such as oxygen, and medical personnel has been explained to me and I fully understand them.    (TOBI CORRECT BOX BELOW)  [  ]  I consent to the stated transfer and to be transported by ambulance/helicopter.  [  ]  I consent to the stated transfer, but refuse transportation by ambulance and accept full responsibility for my transportation by car.  I understand the risks of non-ambulance transfers and I exonerate the Hospital and its staff from any deterioration in my condition that results from this refusal.    X___________________________________________    DATE  23  TIME________  Signature of patient or legally responsible individual signing on patient behalf           RELATIONSHIP TO PATIENT_________________________          Provider Certification    NAME Debbie Moody                                         1940                              MRN [id number]    A medical screening exam was performed on the above named patient.  Based on the examination:    Condition Necessitating Transfer Acute CVA    Patient Condition:      Reason for Transfer:      Transfer Requirements: Facility     Space available and qualified personnel available for treatment as acknowledged by    Agreed to accept transfer and to provide appropriate medical treatment as acknowledged by          Appropriate medical records of the examination and treatment of the patient are provided at the time of transfer   STAFF  INITIAL WHEN COMPLETED _______  Transfer will be performed by qualified personnel from    and appropriate transfer equipment as required, including the use of necessary and appropriate life support measures.    Provider Certification: I have examined the patient and explained the following risks and benefits of being transferred/refusing transfer to the patient/family:         Based on these reasonable risks and benefits to the patient and/or the unborn child(rob), and based upon the information available at the time of the patient’s examination, I certify that the medical benefits reasonably to be expected from the provision of appropriate medical treatments at another medical facility outweigh the increasing risks, if any, to the individual’s medical condition, and in the case of labor to the unborn child, from effecting the transfer.    X____________________________________________ DATE 12/31/23        TIME_______      ORIGINAL - SEND TO MEDICAL RECORDS   COPY - SEND WITH PATIENT DURING TRANSFER

## 2023-12-31 NOTE — DISCHARGE SUMMARY
Discharge Summary - Debbie Moody 83 y.o. female MRN: [id number]    Unit/Bed#: ICU 09-01 Encounter: [id number]    Admission Date:   Admission Orders (From admission, onward)       Ordered        12/30/23 1543  INPATIENT ADMISSION  Once                            Admitting Diagnosis: Weakness [R53.1]  Hyperglycemia [R73.9]  Stroke (HCC) [I63.9]  Cerebellar stroke (HCC) [I63.9]    HPI: 84 yo F presented for Nausea, H/A, Ataxia found to have a LPICA infarct. Repeat CTH with mass effect on 4th ventricle and increased attenuation in ant aspect of the cerebellar infarct concerning for petechial hemmorhage vs contrast staining and decision was made for tx to [facility].     Procedures Performed:   Orders Placed This Encounter   Procedures    Critical Care    ED ECG Documentation Only       Summary of Hospital Course: 84 yo F with PMH HTN, HLD, DM2, Iron def anemia who presented on 12/30 with nausea, ataxia, headache started at 0500. CTH revealed L PICA infarct. NIH 0-1. Neuro consulted and 3% Hypertonic saline given in boluses for concern for swelling in setting of lg cerebellar infarct. PRN hydralazine for bp goals. Repeat CTH revealed mass effect on the forth ventricle slightly increased and increased attenuation concerning for contrast staining or petechial hemmorhage.    Case discussed with Neuro Critical Care. ASA/SQ Heparin d/c'd and given dDVAP. Additional 3% 150cc bolus given. Will start cardene gtt for goal sbp <160. MRI scheduled for 11am pending transport.     Significant Findings, Care, Treatment and Services Provided:   12/30 CTH-Acute infarct in left cerebellum PICA territory.   12/30 CTA H/N-Occluded distal aspect of left posterior inferior cerebellar artery (PICA). This corresponds with left PICA territory infarct seen on earlier same day head CT. Scattered reticular opacities in visualized bilateral upper and lower lobes, likely due to chronic lung disease with postinfectious scarring.  Superimposed infection is difficult to exclude. Consider follow-up dedicated CT chest without contrast.   12/31 CTH-Persistent increased attenuation in the anterior aspect of the cerebellar infarct that could represent residual contrast staining but petechial hemorrhage not excluded. Recommend close interval follow-up CT and/or further evaluation with MRI.     Complications: None    Discharge Diagnosis: Acute CVA    Medical Problems       Resolved Problems  Date Reviewed: 12/31/2023   None         Condition at Discharge: critical         Discharge instructions/Information to patient and family:   See after visit summary for information provided to patient and family.      Provisions for Follow-Up Care:  See after visit summary for information related to follow-up care and any pertinent home health orders.      PCP: Wild Stafford DO    Disposition:  [facility]    Planned Readmission: Yes [facility]      Discharge Statement   I spent 30 minutes discharging the patient. This time was spent on the day of discharge. I had direct contact with the patient on the day of discharge. Additional documentation is required if more than 30 minutes were spent on discharge.     Discharge Medications:  See after visit summary for reconciled discharge medications provided to patient and family.

## 2023-12-31 NOTE — ASSESSMENT & PLAN NOTE
Lab Results   Component Value Date    EGFR 42 12/30/2023    EGFR 39 12/30/2023    EGFR 42 09/09/2023    CREATININE 1.19 12/30/2023    CREATININE 1.28 12/30/2023    CREATININE 1.19 09/09/2023   Baseline Cr 1.1-1.3  Trend renal indices  Avoid nephrotoxic agents  Strict I/O  Start IVF with N/V poor oral intake

## 2023-12-31 NOTE — ASSESSMENT & PLAN NOTE
CT Scattered reticular opacities in visualized bilateral upper and lower lobes, likely due to chronic lung disease with postinfectious scarring.   PRN nebs

## 2023-12-31 NOTE — ASSESSMENT & PLAN NOTE
Symptoms: N/V, HA, ataxia  CTH/CTA distal left PICA occlusion  FLP-229/172/54/141  A1C P  Pt was outside of the window for TNK, not a candidate for intervention per neurology  Stroke pathway  BP goal SBP between 140-200  3% saline for goal serum sodium of 145-155 with BMP, osmo q 6 hours  Repeat CTH pendin  Echo/MRI pending   Stat head CT with any neuro changes  Blood sugar <180-continue insulin gtt  Continue ASA 81 mg daily, statin

## 2023-12-31 NOTE — PLAN OF CARE
Problem: Potential for Falls  Goal: Patient will remain free of falls  Description: INTERVENTIONS:  - Educate patient/family on patient safety including physical limitations  - Instruct patient to call for assistance with activity   - Consult OT/PT to assist with strengthening/mobility   - Keep Call bell within reach  - Keep bed low and locked with side rails adjusted as appropriate  - Keep care items and personal belongings within reach  - Initiate and maintain comfort rounds  - Make Fall Risk Sign visible to staff  - Offer Toileting every 2 Hours, in advance of need  - Initiate/Maintain bed alarm  - Obtain necessary fall risk management equipment  - Apply yellow socks and bracelet for high fall risk patients  - Consider moving patient to room near nurses station  12/31/2023 1632 by Ritchie Veliz, RN  Outcome: Progressing  12/31/2023 1632 by Ritchie Veliz RN  Outcome: Progressing     Problem: PAIN - ADULT  Goal: Verbalizes/displays adequate comfort level or baseline comfort level  Description: Interventions:  - Encourage patient to monitor pain and request assistance  - Assess pain using appropriate pain scale  - Administer analgesics based on type and severity of pain and evaluate response  - Implement non-pharmacological measures as appropriate and evaluate response  - Consider cultural and social influences on pain and pain management  - Notify physician/advanced practitioner if interventions unsuccessful or patient reports new pain  12/31/2023 1632 by iRtchie Veliz, RN  Outcome: Progressing  12/31/2023 1632 by Ritchie Veliz, RN  Outcome: Progressing     Problem: INFECTION - ADULT  Goal: Absence or prevention of progression during hospitalization  Description: INTERVENTIONS:  - Assess and monitor for signs and symptoms of infection  - Monitor lab/diagnostic results  - Monitor all insertion sites, i.e. indwelling lines, tubes, and drains  - Monitor endotracheal if appropriate and nasal secretions for changes  in amount and color  - [place] appropriate cooling/warming therapies per order  - Administer medications as ordered  - Instruct and encourage patient and family to use good hand hygiene technique  - Identify and instruct in appropriate isolation precautions for identified infection/condition  12/31/2023 1632 by Ritchie Veliz RN  Outcome: Progressing  12/31/2023 1632 by Ritchie Veliz RN  Outcome: Progressing     Problem: SAFETY ADULT  Goal: Patient will remain free of falls  Description: INTERVENTIONS:  - Educate patient/family on patient safety including physical limitations  - Instruct patient to call for assistance with activity   - Consult OT/PT to assist with strengthening/mobility   - Keep Call bell within reach  - Keep bed low and locked with side rails adjusted as appropriate  - Keep care items and personal belongings within reach  - Initiate and maintain comfort rounds  - Make Fall Risk Sign visible to staff  - Offer Toileting every 2 Hours, in advance of need  - Initiate/Maintain bed alarm  - Obtain necessary fall risk management equipment  - Apply yellow socks and bracelet for high fall risk patients  - Consider moving patient to room near nurses station  12/31/2023 1632 by Ritchie Veliz RN  Outcome: Progressing  12/31/2023 1632 by Ritchie Veliz RN  Outcome: Progressing  Goal: Maintain or return to baseline ADL function  Description: INTERVENTIONS:  -  Assess patient's ability to carry out ADLs; assess patient's baseline for ADL function and identify physical deficits which impact ability to perform ADLs (bathing, care of mouth/teeth, toileting, grooming, dressing, etc.)  - Assess/evaluate cause of self-care deficits   - Assess range of motion  - Assess patient's mobility; develop plan if impaired  - Assess patient's need for assistive devices and provide as appropriate  - Encourage maximum independence but intervene and supervise when necessary  - Involve family in performance of ADLs  - Assess for  home care needs following discharge   - Consider OT consult to assist with ADL evaluation and planning for discharge  - Provide patient education as appropriate  12/31/2023 1632 by Ritchie Veliz RN  Outcome: Progressing  12/31/2023 1632 by Ritchie Veliz RN  Outcome: Progressing  Goal: Maintains/Returns to pre admission functional level  Description: INTERVENTIONS:  - Perform AM-PAC 6 Click Basic Mobility/ Daily Activity assessment daily.  - Set and communicate daily mobility goal to care team and patient/family/caregiver.   - Collaborate with rehabilitation services on mobility goals if consulted  - Perform Range of Motion 3 times a day.  - Reposition patient every 2 hours.  - Dangle patient 3 times a day  - Stand patient 3 times a day  - Ambulate patient 3 times a day  - Out of bed to chair 3 times a day   - Out of bed for meals 3 times a day  - Out of bed for toileting  - Record patient progress and toleration of activity level   12/31/2023 1632 by Ritchie Veliz RN  Outcome: Progressing  12/31/2023 1632 by Ritchie Veliz RN  Outcome: Progressing     Problem: DISCHARGE PLANNING  Goal: Discharge to home or other facility with appropriate resources  Description: INTERVENTIONS:  - Identify barriers to discharge w/patient and caregiver  - Arrange for needed discharge resources and transportation as appropriate  - Identify discharge learning needs (meds, wound care, etc.)  - Arrange for interpretive services to assist at discharge as needed  - Refer to Case Management Department for coordinating discharge planning if the patient needs post-hospital services based on physician/advanced practitioner order or complex needs related to functional status, cognitive ability, or social support system  12/31/2023 1632 by Ritchie eVliz RN  Outcome: Progressing  12/31/2023 1632 by Ritchie Veliz RN  Outcome: Progressing     Problem: Knowledge Deficit  Goal: Patient/family/caregiver demonstrates understanding of disease  process, treatment plan, medications, and discharge instructions  Description: Complete learning assessment and assess knowledge base.  Interventions:  - Provide teaching at level of understanding  - Provide teaching via preferred learning methods  12/31/2023 1632 by Ritchie Veliz RN  Outcome: Progressing  12/31/2023 1632 by Ritchie Veliz RN  Outcome: Progressing     Problem: NEUROSENSORY - ADULT  Goal: Achieves stable or improved neurological status  Description: INTERVENTIONS  - Monitor and report changes in neurological status  - Monitor vital signs such as temperature, blood pressure, glucose, and any other labs ordered   - Initiate measures to prevent increased intracranial pressure  - Monitor for seizure activity and implement precautions if appropriate      12/31/2023 1632 by Ritchie Veliz RN  Outcome: Progressing  12/31/2023 1632 by Ritchie Veliz RN  Outcome: Progressing  Goal: Remains free of injury related to seizures activity  Description: INTERVENTIONS  - Maintain airway, patient safety  and administer oxygen as ordered  - Monitor patient for seizure activity, document and report duration and description of seizure to physician/advanced practitioner  - If seizure occurs,  ensure patient safety during seizure  - Reorient patient post seizure  - Seizure pads on all 4 side rails  - Instruct patient/family to notify RN of any seizure activity including if an aura is experienced  - Instruct patient/family to call for assistance with activity based on nursing assessment  - Administer anti-seizure medications if ordered    12/31/2023 1632 by Ritchie Veliz RN  Outcome: Progressing  12/31/2023 1632 by Ritchie Veliz RN  Outcome: Progressing  Goal: Achieves maximal functionality and self care  Description: INTERVENTIONS  - Monitor swallowing and airway patency with patient fatigue and changes in neurological status  - Encourage and assist patient to increase activity and self care.   - Encourage visually  impaired, hearing impaired and aphasic patients to use assistive/communication devices  12/31/2023 1632 by Ritchie Veliz RN  Outcome: Progressing  12/31/2023 1632 by Ritchie Veliz RN  Outcome: Progressing     Problem: CARDIOVASCULAR - ADULT  Goal: Maintains optimal cardiac output and hemodynamic stability  Description: INTERVENTIONS:  - Monitor I/O, vital signs and rhythm  - Monitor for S/S and trends of decreased cardiac output  - Administer and titrate ordered vasoactive medications to optimize hemodynamic stability  - Assess quality of pulses, skin color and temperature  - Assess for signs of decreased coronary artery perfusion  - Instruct patient to report change in severity of symptoms  12/31/2023 1632 by Ritchie Veliz RN  Outcome: Progressing  12/31/2023 1632 by Ritchie Veliz RN  Outcome: Progressing  Goal: Absence of cardiac dysrhythmias or at baseline rhythm  Description: INTERVENTIONS:  - Continuous cardiac monitoring, vital signs, obtain 12 lead EKG if ordered  - Administer antiarrhythmic and heart rate control medications as ordered  - Monitor electrolytes and administer replacement therapy as ordered  12/31/2023 1632 by Ritchie Veliz RN  Outcome: Progressing  12/31/2023 1632 by Ritchie Veliz RN  Outcome: Progressing     Problem: RESPIRATORY - ADULT  Goal: Achieves optimal ventilation and oxygenation  Description: INTERVENTIONS:  - Assess for changes in respiratory status  - Assess for changes in mentation and behavior  - Position to facilitate oxygenation and minimize respiratory effort  - Oxygen administered by appropriate delivery if ordered  - Initiate smoking cessation education as indicated  - Encourage broncho-pulmonary hygiene including cough, deep breathe, Incentive Spirometry  - Assess the need for suctioning and aspirate as needed  - Assess and instruct to report SOB or any respiratory difficulty  - Respiratory Therapy support as indicated  12/31/2023 1632 by Ritchie Veliz RN  Outcome:  Progressing  12/31/2023 1632 by Ritchie Veliz RN  Outcome: Progressing     Problem: GENITOURINARY - ADULT  Goal: Maintains or returns to baseline urinary function  Description: INTERVENTIONS:  - Assess urinary function  - Encourage oral fluids to ensure adequate hydration if ordered  - Administer IV fluids as ordered to ensure adequate hydration  - Administer ordered medications as needed  - Offer frequent toileting  - Follow urinary retention protocol if ordered  12/31/2023 1632 by Ritchie Veliz RN  Outcome: Progressing  12/31/2023 1632 by Ritchie Veliz RN  Outcome: Progressing  Goal: Absence of urinary retention  Description: INTERVENTIONS:  - Assess patient’s ability to void and empty bladder  - Monitor I/O  - Bladder scan as needed  - Discuss with physician/AP medications to alleviate retention as needed  - Discuss catheterization for long term situations as appropriate  12/31/2023 1632 by Ritchie Veliz RN  Outcome: Progressing  12/31/2023 1632 by Ritchie Veliz RN  Outcome: Progressing     Problem: METABOLIC, FLUID AND ELECTROLYTES - ADULT  Goal: Electrolytes maintained within normal limits  Description: INTERVENTIONS:  - Monitor labs and assess patient for signs and symptoms of electrolyte imbalances  - Administer electrolyte replacement as ordered  - Monitor response to electrolyte replacements, including repeat lab results as appropriate  - Instruct patient on fluid and nutrition as appropriate  12/31/2023 1632 by Ritchie Veliz RN  Outcome: Progressing  12/31/2023 1632 by Ritchie Veliz RN  Outcome: Progressing  Goal: Fluid balance maintained  Description: INTERVENTIONS:  - Monitor labs   - Monitor I/O and WT  - Instruct patient on fluid and nutrition as appropriate  - Assess for signs & symptoms of volume excess or deficit  12/31/2023 1632 by Ritchie Veliz RN  Outcome: Progressing  12/31/2023 1632 by Ritchie Veliz RN  Outcome: Progressing  Goal: Glucose maintained within target range  Description:  INTERVENTIONS:  - Monitor Blood Glucose as ordered  - Assess for signs and symptoms of hyperglycemia and hypoglycemia  - Administer ordered medications to maintain glucose within target range  - Assess nutritional intake and initiate nutrition service referral as needed  12/31/2023 1632 by Ritchie Veliz RN  Outcome: Progressing  12/31/2023 1632 by Ritchie Veliz RN  Outcome: Progressing     Problem: MUSCULOSKELETAL - ADULT  Goal: Maintain or return mobility to safest level of function  Description: INTERVENTIONS:  - Assess patient's ability to carry out ADLs; assess patient's baseline for ADL function and identify physical deficits which impact ability to perform ADLs (bathing, care of mouth/teeth, toileting, grooming, dressing, etc.)  - Assess/evaluate cause of self-care deficits   - Assess range of motion  - Assess patient's mobility  - Assess patient's need for assistive devices and provide as appropriate  - Encourage maximum independence but intervene and supervise when necessary  - Involve family in performance of ADLs  - Assess for home care needs following discharge   - Consider OT consult to assist with ADL evaluation and planning for discharge  - Provide patient education as appropriate  12/31/2023 1632 by Ritchie Veliz RN  Outcome: Progressing  12/31/2023 1632 by Ritchie Veliz RN  Outcome: Progressing  Goal: Maintain proper alignment of affected body part  Description: INTERVENTIONS:  - Support, maintain and protect limb and body alignment  - Provide patient/ family with appropriate education  12/31/2023 1632 by Ritchie Veliz RN  Outcome: Progressing  12/31/2023 1632 by Ritchie Veliz RN  Outcome: Progressing     Problem: Prexisting or High Potential for Compromised Skin Integrity  Goal: Skin integrity is maintained or improved  Description: INTERVENTIONS:  - Identify patients at risk for skin breakdown  - Assess and monitor skin integrity  - Assess and monitor nutrition and hydration status  - Monitor  labs   - Assess for incontinence   - Turn and reposition patient  - Assist with mobility/ambulation  - Relieve pressure over bony prominences  - Avoid friction and shearing  - Provide appropriate hygiene as needed including keeping skin clean and dry  - Evaluate need for skin moisturizer/barrier cream  - Collaborate with interdisciplinary team   - Patient/family teaching  - Consider wound care consult   12/31/2023 1632 by Ritchie Veliz RN  Outcome: Progressing  12/31/2023 1632 by Ritchie Veliz RN  Outcome: Progressing

## 2023-12-31 NOTE — PROGRESS NOTES
[facility]  Progress Note  Name: Debbie Moody I  MRN: [id number]  Unit/Bed#: ICU 09-01 I Date of Admission: 12/30/2023   Date of Service: 12/31/2023 I Hospital Day: 1    Assessment/Plan   * Acute CVA (cerebrovascular accident) ([facility])  Assessment & Plan  Symptoms: N/V, HA, ataxia  CTH/CTA distal left PICA occlusion  FLP-229/172/54/141  A1C P  Pt was outside of the window for TNK, not a candidate for intervention per neurology  Stroke pathway  BP goal SBP between 140-200  3% saline for goal serum sodium of 145-155 with BMP, osmo q 6 hours  Repeat CTH pendin  Echo/MRI pending   Stat head CT with any neuro changes  Blood sugar <180-continue insulin gtt  Continue ASA 81 mg daily, statin      Chronic kidney disease, stage 3b ([facility])  Assessment & Plan  Lab Results   Component Value Date    EGFR 42 12/30/2023    EGFR 39 12/30/2023    EGFR 42 09/09/2023    CREATININE 1.19 12/30/2023    CREATININE 1.28 12/30/2023    CREATININE 1.19 09/09/2023   Baseline Cr 1.1-1.3  Trend renal indices  Avoid nephrotoxic agents  Strict I/O  Start IVF with N/V poor oral intake    Metabolic acidosis  Assessment & Plan  Check LA/VBG  trend    Diabetes (HCC)  Assessment & Plan  Lab Results   Component Value Date    HGBA1C 8.4 (H) 07/18/2023       Recent Labs     12/30/23  1814 12/30/23  2004 12/30/23  2155 12/30/23  2347   POCGLU 269* 215* 120 108         Blood Sugar Average: Last 72 hrs:  (P) 209.4916305375333723    Goal glucose <180  Continue insulin gtt with poor oral intake  Hold home glibizide/januvia  Start Humalog with meals if tolerates diet    Pulmonary fibrosis (HCC)  Assessment & Plan  CT Scattered reticular opacities in visualized bilateral upper and lower lobes, likely due to chronic lung disease with postinfectious scarring.   PRN nebs    Irritable bowel syndrome  Assessment & Plan  PRN bentyl    Essential hypertension  Assessment & Plan  Goal -200 per neurology  PRN IV hydralazine to maintain goal  SBP  Hold home vasotec             Disposition: Critical care    ICU Core Measures     A: Assess, Prevent, and Manage Pain Has pain been assessed? Yes  Need for changes to pain regimen? No   B: Both SAT/SAT  N/A   C: Choice of Sedation RASS Goal: 0 Alert and Calm  Need for changes to sedation or analgesia regimen? No   D: Delirium CAM-ICU: Negative   E: Early Mobility  Plan for early mobility? Yes   F: Family Engagement Plan for family engagement today? Yes         Prophylaxis:  VTE VTE covered by:  heparin (porcine), Subcutaneous, 5,000 Units at 12/31/23 0503       Stress Ulcer  not ordered         Significant 24hr Events     24hr events:  bolus 3% x 2 repeat CTH P     Subjective   Review of Systems   Constitutional:  Positive for fatigue.   Respiratory: Negative.     Cardiovascular: Negative.    Gastrointestinal:  Positive for nausea.   Genitourinary: Negative.    Neurological:  Positive for dizziness and weakness.   All other systems reviewed and are negative.     Objective                            Vitals I/O      Most Recent Min/Max in 24hrs   Temp 98 °F (36.7 °C) Temp  Min: 97.4 °F (36.3 °C)  Max: 99.7 °F (37.6 °C)   Pulse 91 Pulse  Min: 75  Max: 100   Resp 22 Resp  Min: 13  Max: 28   /68 BP  Min: 133/73  Max: 249/123   O2 Sat 93 % SpO2  Min: 92 %  Max: 99 %      Intake/Output Summary (Last 24 hours) at 12/31/2023 0658  Last data filed at 12/31/2023 0600  Gross per 24 hour   Intake 1296.08 ml   Output 1000 ml   Net 296.08 ml       Diet Rodríguez/CHO Controlled; Consistent Carbohydrate Diet Level 2 (5 carb servings/75 grams CHO/meal)    Invasive Monitoring           Physical Exam   Physical Exam  Eyes:      Pupils: Pupils are equal, round, and reactive to light.   Skin:     General: Skin is warm and dry.   HENT:      Head: Normocephalic and atraumatic.      Mouth/Throat:      Mouth: Mucous membranes are moist.   Cardiovascular:      Rate and Rhythm: Normal rate and regular rhythm.      Pulses: Normal pulses.       Heart sounds: Normal heart sounds.   Musculoskeletal:         General: No swelling. Normal range of motion.      Right lower leg: No edema.      Left lower leg: No edema.   Abdominal: General: Bowel sounds are normal. There is no distension.      Palpations: Abdomen is soft.      Tenderness: There is no abdominal tenderness.   Constitutional:       Appearance: She is ill-appearing.   Pulmonary:      Effort: Pulmonary effort is normal. No respiratory distress.      Breath sounds: Normal breath sounds.   Neurological:      General: No focal deficit present.      Mental Status: She is alert and oriented to person, place and time.      Motor: Strength full and intact in all extremities.            Diagnostic Studies      EKG: tele  Imaging:  I have personally reviewed pertinent reports.   and I have personally reviewed pertinent films in PACS     Medications:  Scheduled PRN   aspirin, 81 mg, Daily  atorvastatin, 40 mg, QPM  chlorhexidine, 15 mL, Q12H JUAN ANTONIO  heparin (porcine), 5,000 Units, Q8H JUAN ANTONIO  magnesium sulfate, 1 g, Q12H      hydrALAZINE, 10 mg, Q6H PRN  levalbuterol, 1.25 mg, Q6H PRN  ondansetron, 4 mg, Q6H PRN       Continuous    insulin regular (HumuLIN R,NovoLIN R) 1 Units/mL in sodium chloride 0.9 % 100 mL infusion, 0.3-21 Units/hr, Last Rate: 3 Units/hr (12/31/23 0603)         Labs:    CBC    Recent Labs     12/30/23  1329 12/30/23 2010 12/31/23  0504   WBC 8.79  --  11.11*   HGB 14.1  --  13.6   HCT 43.4  --  42.4    262 265   BANDSPCT 5  --   --      BMP    Recent Labs     12/31/23  0004 12/31/23  0504   SODIUM 140 140   K 3.8 4.0    107   CO2 18* 18*   AGAP 14 15   BUN 21 26*   CREATININE 1.31* 1.48*   CALCIUM 9.9 9.7       Coags    Recent Labs     12/30/23  1329   INR 1.04   PTT 28        Additional Electrolytes  Recent Labs     12/31/23  0504   MG 2.4   PHOS 3.5          Blood Gas    No recent results  No recent results LFTs  Recent Labs     12/30/23  1329 12/31/23  0504   ALT 12 10    AST 16 18   ALKPHOS 131* 113*   ALB 4.9 4.4   TBILI 0.53 0.56       Infectious  No recent results  Glucose  Recent Labs     12/30/23  1329 12/30/23  2010 12/31/23  0004 12/31/23  0504   GLUC 320* 216* 118 177*                   BENTON Ash

## 2023-12-31 NOTE — ASSESSMENT & PLAN NOTE
Lab Results   Component Value Date    HGBA1C 8.4 (H) 07/18/2023       Recent Labs     12/30/23  1814 12/30/23  2004 12/30/23  2155 12/30/23  2347   POCGLU 269* 215* 120 108         Blood Sugar Average: Last 72 hrs:  (P) [id number]    Goal glucose <180  Continue insulin gtt with poor oral intake  Hold home glibizide/januvia  Start Humalog with meals if tolerates diet

## 2023-12-31 NOTE — NURSING NOTE
Report called in to RN who will be overseeing care on patient.  [facility] left with patient at approximately 1700.

## 2023-12-31 NOTE — PLAN OF CARE
Problem: PAIN - ADULT  Goal: Verbalizes/displays adequate comfort level or baseline comfort level  Description: Interventions:  - Encourage patient to monitor pain and request assistance  - Assess pain using appropriate pain scale  - Administer analgesics based on type and severity of pain and evaluate response  - Implement non-pharmacological measures as appropriate and evaluate response  - Consider cultural and social influences on pain and pain management  - Notify physician/advanced practitioner if interventions unsuccessful or patient reports new pain  Outcome: Progressing     Problem: SAFETY ADULT  Goal: Patient will remain free of falls  Description: INTERVENTIONS:  - Educate patient/family on patient safety including physical limitations  - Instruct patient to call for assistance with activity   - Consult OT/PT to assist with strengthening/mobility   - Keep Call bell within reach  - Keep bed low and locked with side rails adjusted as appropriate  - Keep care items and personal belongings within reach  - Initiate and maintain comfort rounds  - Make Fall Risk Sign visible to staff    Problem: SAFETY ADULT  Goal: Patient will remain free of falls  Description: INTERVENTIONS:  - Educate patient/family on patient safety including physical limitations  - Instruct patient to call for assistance with activity   - Consult OT/PT to assist with strengthening/mobility   - Keep Call bell within reach  - Keep bed low and locked with side rails adjusted as appropriate  - Keep care items and personal belongings within reach  - Apply yellow socks and bracelet for high fall risk patients  - Consider moving patient to room near nurses station  Outcome: Progressing     - Apply yellow socks and bracelet for high fall risk patients  - Consider moving patient to room near nurses station  Outcome: Progressing  Goal: Maintain or return to baseline ADL function  Description: INTERVENTIONS:  -  Assess patient's ability to carry out  ADLs; assess patient's baseline for ADL function and identify physical deficits which impact ability to perform ADLs (bathing, care of mouth/teeth, toileting, grooming, dressing, etc.)  - Assess/evaluate cause of self-care deficits   - Assess range of motion  - Assess patient's mobility; develop plan if impaired  - Assess patient's need for assistive devices and provide as appropriate  - Encourage maximum independence but intervene and supervise when necessary  - Involve family in performance of ADLs  - Assess for home care needs following discharge   - Consider OT consult to assist with ADL evaluation and planning for discharge  - Provide patient education as appropriate  Outcome: Progressing

## 2023-12-31 NOTE — NURSING NOTE
1945:  Patient taken to CT for scan of head. VS remained stable. Returned to ICU 9. Will continue to monitor.

## 2023-12-31 NOTE — PLAN OF CARE
Problem: Potential for Falls  Goal: Patient will remain free of falls  Description: INTERVENTIONS:  - Educate patient/family on patient safety including physical limitations  - Instruct patient to call for assistance with activity   - Consult OT/PT to assist with strengthening/mobility   - Keep Call bell within reach  - Keep bed low and locked with side rails adjusted as appropriate  - Keep care items and personal belongings within reach  - Initiate and maintain comfort rounds  - Make Fall Risk Sign visible to staff  - Apply yellow socks and bracelet for high fall risk patients  - Consider moving patient to room near nurses station  Outcome: Progressing     Problem: PAIN - ADULT  Goal: Verbalizes/displays adequate comfort level or baseline comfort level  Description: Interventions:  - Encourage patient to monitor pain and request assistance  - Assess pain using appropriate pain scale  - Administer analgesics based on type and severity of pain and evaluate response  - Implement non-pharmacological measures as appropriate and evaluate response  - Consider cultural and social influences on pain and pain management  - Notify physician/advanced practitioner if interventions unsuccessful or patient reports new pain  Outcome: Progressing     Problem: INFECTION - ADULT  Goal: Absence or prevention of progression during hospitalization  Description: INTERVENTIONS:  - Assess and monitor for signs and symptoms of infection  - Monitor lab/diagnostic results  - Monitor all insertion sites, i.e. indwelling lines, tubes, and drains  - Monitor endotracheal if appropriate and nasal secretions for changes in amount and color  - [place] appropriate cooling/warming therapies per order  - Administer medications as ordered  - Instruct and encourage patient and family to use good hand hygiene technique  - Identify and instruct in appropriate isolation precautions for identified infection/condition  Outcome: Progressing  Goal: Absence  of fever/infection during neutropenic period  Description: INTERVENTIONS:  - Monitor WBC    Outcome: Progressing     Problem: SAFETY ADULT  Goal: Patient will remain free of falls  Description: INTERVENTIONS:  - Educate patient/family on patient safety including physical limitations  - Instruct patient to call for assistance with activity   - Consult OT/PT to assist with strengthening/mobility   - Keep Call bell within reach  - Keep bed low and locked with side rails adjusted as appropriate  - Keep care items and personal belongings within reach  - Initiate and maintain comfort rounds  - Make Fall Risk Sign visible to staff    - Apply yellow socks and bracelet for high fall risk patients  - Consider moving patient to room near nurses station  Outcome: Progressing  Goal: Maintain or return to baseline ADL function  Description: INTERVENTIONS:  -  Assess patient's ability to carry out ADLs; assess patient's baseline for ADL function and identify physical deficits which impact ability to perform ADLs (bathing, care of mouth/teeth, toileting, grooming, dressing, etc.)  - Assess/evaluate cause of self-care deficits   - Assess range of motion  - Assess patient's mobility; develop plan if impaired  - Assess patient's need for assistive devices and provide as appropriate  - Encourage maximum independence but intervene and supervise when necessary  - Involve family in performance of ADLs  - Assess for home care needs following discharge   - Consider OT consult to assist with ADL evaluation and planning for discharge  - Provide patient education as appropriate  Outcome: Progressing  Goal: Maintains/Returns to pre admission functional level  Description: INTERVENTIONS:  - Perform AM-PAC 6 Click Basic Mobility/ Daily Activity assessment daily.  - Set and communicate daily mobility goal to care team and patient/family/caregiver.   - Collaborate with rehabilitation services on mobility goals if consulted    - Out of bed for  toileting  - Record patient progress and toleration of activity level   Outcome: Progressing     Problem: DISCHARGE PLANNING  Goal: Discharge to home or other facility with appropriate resources  Description: INTERVENTIONS:  - Identify barriers to discharge w/patient and caregiver  - Arrange for needed discharge resources and transportation as appropriate  - Identify discharge learning needs (meds, wound care, etc.)  - Arrange for interpretive services to assist at discharge as needed  - Refer to Case Management Department for coordinating discharge planning if the patient needs post-hospital services based on physician/advanced practitioner order or complex needs related to functional status, cognitive ability, or social support system  Outcome: Progressing     Problem: Knowledge Deficit  Goal: Patient/family/caregiver demonstrates understanding of disease process, treatment plan, medications, and discharge instructions  Description: Complete learning assessment and assess knowledge base.  Interventions:  - Provide teaching at level of understanding  - Provide teaching via preferred learning methods  Outcome: Progressing     Problem: NEUROSENSORY - ADULT  Goal: Achieves stable or improved neurological status  Description: INTERVENTIONS  - Monitor and report changes in neurological status  - Monitor vital signs such as temperature, blood pressure, glucose, and any other labs ordered   - Initiate measures to prevent increased intracranial pressure  - Monitor for seizure activity and implement precautions if appropriate      Outcome: Progressing  Goal: Remains free of injury related to seizures activity  Description: INTERVENTIONS  - Maintain airway, patient safety  and administer oxygen as ordered  - Monitor patient for seizure activity, document and report duration and description of seizure to physician/advanced practitioner  - If seizure occurs,  ensure patient safety during seizure  - Reorient patient post  seizure  - Seizure pads on all 4 side rails  - Instruct patient/family to notify RN of any seizure activity including if an aura is experienced  - Instruct patient/family to call for assistance with activity based on nursing assessment  - Administer anti-seizure medications if ordered    Outcome: Progressing  Goal: Achieves maximal functionality and self care  Description: INTERVENTIONS  - Monitor swallowing and airway patency with patient fatigue and changes in neurological status  - Encourage and assist patient to increase activity and self care.   - Encourage visually impaired, hearing impaired and aphasic patients to use assistive/communication devices  Outcome: Progressing     Problem: CARDIOVASCULAR - ADULT  Goal: Maintains optimal cardiac output and hemodynamic stability  Description: INTERVENTIONS:  - Monitor I/O, vital signs and rhythm  - Monitor for S/S and trends of decreased cardiac output  - Administer and titrate ordered vasoactive medications to optimize hemodynamic stability  - Assess quality of pulses, skin color and temperature  - Assess for signs of decreased coronary artery perfusion  - Instruct patient to report change in severity of symptoms  Outcome: Progressing  Goal: Absence of cardiac dysrhythmias or at baseline rhythm  Description: INTERVENTIONS:  - Continuous cardiac monitoring, vital signs, obtain 12 lead EKG if ordered  - Administer antiarrhythmic and heart rate control medications as ordered  - Monitor electrolytes and administer replacement therapy as ordered  Outcome: Progressing     Problem: RESPIRATORY - ADULT  Goal: Achieves optimal ventilation and oxygenation  Description: INTERVENTIONS:  - Assess for changes in respiratory status  - Assess for changes in mentation and behavior  - Position to facilitate oxygenation and minimize respiratory effort  - Oxygen administered by appropriate delivery if ordered  - Initiate smoking cessation education as indicated  - Encourage  broncho-pulmonary hygiene including cough, deep breathe, Incentive Spirometry  - Assess the need for suctioning and aspirate as needed  - Assess and instruct to report SOB or any respiratory difficulty  - Respiratory Therapy support as indicated  Outcome: Progressing     Problem: GENITOURINARY - ADULT  Goal: Maintains or returns to baseline urinary function  Description: INTERVENTIONS:  - Assess urinary function  - Encourage oral fluids to ensure adequate hydration if ordered  - Administer IV fluids as ordered to ensure adequate hydration  - Administer ordered medications as needed  - Offer frequent toileting  - Follow urinary retention protocol if ordered  Outcome: Progressing  Goal: Absence of urinary retention  Description: INTERVENTIONS:  - Assess patient’s ability to void and empty bladder  - Monitor I/O  - Bladder scan as needed  - Discuss with physician/AP medications to alleviate retention as needed  - Discuss catheterization for long term situations as appropriate  Outcome: Progressing  Goal: Urinary catheter remains patent  Description: INTERVENTIONS:  - Assess patency of urinary catheter  - If patient has a chronic lebron, consider changing catheter if non-functioning  - Follow guidelines for intermittent irrigation of non-functioning urinary catheter  Outcome: Progressing     Problem: METABOLIC, FLUID AND ELECTROLYTES - ADULT  Goal: Electrolytes maintained within normal limits  Description: INTERVENTIONS:  - Monitor labs and assess patient for signs and symptoms of electrolyte imbalances  - Administer electrolyte replacement as ordered  - Monitor response to electrolyte replacements, including repeat lab results as appropriate  - Instruct patient on fluid and nutrition as appropriate  Outcome: Progressing  Goal: Fluid balance maintained  Description: INTERVENTIONS:  - Monitor labs   - Monitor I/O and WT  - Instruct patient on fluid and nutrition as appropriate  - Assess for signs & symptoms of volume  excess or deficit  Outcome: Progressing  Goal: Glucose maintained within target range  Description: INTERVENTIONS:  - Monitor Blood Glucose as ordered  - Assess for signs and symptoms of hyperglycemia and hypoglycemia  - Administer ordered medications to maintain glucose within target range  - Assess nutritional intake and initiate nutrition service referral as needed  Outcome: Progressing     Problem: MUSCULOSKELETAL - ADULT  Goal: Maintain or return mobility to safest level of function  Description: INTERVENTIONS:  - Assess patient's ability to carry out ADLs; assess patient's baseline for ADL function and identify physical deficits which impact ability to perform ADLs (bathing, care of mouth/teeth, toileting, grooming, dressing, etc.)  - Assess/evaluate cause of self-care deficits   - Assess range of motion  - Assess patient's mobility  - Assess patient's need for assistive devices and provide as appropriate  - Encourage maximum independence but intervene and supervise when necessary  - Involve family in performance of ADLs  - Assess for home care needs following discharge   - Consider OT consult to assist with ADL evaluation and planning for discharge  - Provide patient education as appropriate  Outcome: Progressing  Goal: Maintain proper alignment of affected body part  Description: INTERVENTIONS:  - Support, maintain and protect limb and body alignment  - Provide patient/ family with appropriate education  Outcome: Progressing

## 2023-12-31 NOTE — NURSING NOTE
Patient being transported via [facility] to [place] for higher level of care. [facility] in [place], ETA 25 minutes

## 2023-12-31 NOTE — PROGRESS NOTES
[facility]  Progress Note  Name: Debbie Moody I  MRN: [id number]  Unit/Bed#: ICU 09-01 I Date of Admission: 12/30/2023   Date of Service: 12/31/2023 I Hospital Day: 1    Assessment/Plan   * Acute CVA (cerebrovascular accident) ([facility])  Assessment & Plan  Patient presented after waking up 12/30 with nausea and vomiting, needed assistance to walk  Later in the day she reported posterior headache, weakness, and continued vomiting and presented to the ED  Stroke alert was called, and neurology was consulted, CTH/CTA showed distal left PICA occlusion  Pt was outside of the window for TNK, not a candidate for intervention per neurology  Admission to CC service, stroke pathway  Neuro checks  BP control to keep SBP between 140-200  Neurology is recommending 3% saline for goal serum sodium of 145-155, currently 135  Bolused x2 with 3% saline while trending labs  Check BMP, osmo q 6 hours  Repeat CTH pending, another repeat this AM   Echo pending   MRI pending   Stat head CT with any neuro changes  Blood sugar <180-continue insulin gtt  Continue ASA 81 mg daily, statin  Magnesium 1gm IV BID    Chronic kidney disease, stage 3b ([facility])  Assessment & Plan  Lab Results   Component Value Date    EGFR 42 12/30/2023    EGFR 39 12/30/2023    EGFR 42 09/09/2023    CREATININE 1.19 12/30/2023    CREATININE 1.28 12/30/2023    CREATININE 1.19 09/09/2023   Creatinine appears to be at baseline  Trend renal indices  Avoid nephrotoxic agents  Strict I/O    Diabetes ([facility])  Assessment & Plan  Lab Results   Component Value Date    HGBA1C 8.4 (H) 07/18/2023       Recent Labs     12/30/23  1814 12/30/23  2004 12/30/23  2155 12/30/23  2347   POCGLU 269* 215* 120 108         Blood Sugar Average: Last 72 hrs:  (P) [id number]    Goal glucose <180 to help prevent cerebral swelling post stroke  Continue insulin gtt    Pulmonary fibrosis ([facility])  Assessment & Plan  PRN nebs    Essential hypertension  Assessment & Plan  Goal  -200 per neurology  Will give IV hydralazine and labetolol PRN to maintain goal SBP  Close hemodynamic monitoring             Disposition: Stepdown Level 1    ICU Core Measures     A: Assess, Prevent, and Manage Pain Has pain been assessed? NA  Need for changes to pain regimen? NA   B: Both SAT/SAT  N/A   C: Choice of Sedation RASS Goal: N/A patient not on sedation  Need for changes to sedation or analgesia regimen? NA   D: Delirium CAM-ICU: Negative   E: Early Mobility  Plan for early mobility? Yes   F: Family Engagement Plan for family engagement today? Yes         Prophylaxis:  VTE VTE covered by:  heparin (porcine), Subcutaneous, 5,000 Units at 12/30/23 2238       Stress Ulcer  not ordered     { I DISAPPEARING TIP I Update Problem List daily:96245}    Significant 24hr Events     24hr events: Repeat CTH completed. Given additional 3% saline bolus. Otherwise no acute events.     Subjective   Review of Systems   Constitutional:  Positive for fatigue.   Respiratory:  Negative for shortness of breath.    Cardiovascular:  Negative for chest pain.   Gastrointestinal:  Positive for nausea and vomiting.   Neurological:  Negative for dizziness and headaches.   All other systems reviewed and are negative.     Objective                            Vitals I/O      Most Recent Min/Max in 24hrs   Temp 98 °F (36.7 °C) Temp  Min: 97.4 °F (36.3 °C)  Max: 99.7 °F (37.6 °C)   Pulse 81 Pulse  Min: 77  Max: 100   Resp 15 Resp  Min: 15  Max: 25   BP (!) 187/88 BP  Min: 159/78  Max: 249/123   O2 Sat 97 % SpO2  Min: 92 %  Max: 99 %      Intake/Output Summary (Last 24 hours) at 12/31/2023 0023  Last data filed at 12/31/2023 0000  Gross per 24 hour   Intake 1290.8 ml   Output 700 ml   Net 590.8 ml       Diet NPO    Invasive Monitoring           Physical Exam   Physical Exam  Vitals and nursing note reviewed.   Eyes:      Pupils: Pupils are equal, round, and reactive to light.   Skin:     General: Skin is warm and dry.   HENT:       Head: Normocephalic.      Mouth/Throat:      Mouth: Mucous membranes are dry.   Cardiovascular:      Rate and Rhythm: Normal rate and regular rhythm.      Pulses: Normal pulses.      Heart sounds: Normal heart sounds.   Musculoskeletal:         General: Normal range of motion.   Abdominal:      Palpations: Abdomen is soft.   Constitutional:       Appearance: She is ill-appearing.   Pulmonary:      Effort: Pulmonary effort is normal.   Neurological:      General: No focal deficit present.      Mental Status: She is alert and oriented to person, place and time.      Motor: Strength full and intact in all extremities.            Diagnostic Studies    {IDisappearing Data Review I RadiologyI Cardiology:47681}  EKG: NSR  Imaging:  I have personally reviewed pertinent reports.   and I have personally reviewed pertinent films in PACS     Medications:  Scheduled PRN   aspirin, 81 mg, Daily  atorvastatin, 40 mg, QPM  chlorhexidine, 15 mL, Q12H JUAN ANTONIO  heparin (porcine), 5,000 Units, Q8H JUAN ANTONIO  magnesium sulfate, 1 g, Q12H      hydrALAZINE, 10 mg, Q6H PRN  levalbuterol, 1.25 mg, Q6H PRN  ondansetron, 4 mg, Q6H PRN       Continuous    insulin regular (HumuLIN R,NovoLIN R) 1 Units/mL in sodium chloride 0.9 % 100 mL infusion, 0.3-21 Units/hr, Last Rate: Stopped (12/31/23 0005)         Labs:  { I Disappearing Data Review I Results Review I Micro :77651}  CBC    Recent Labs     12/30/23  1329 12/30/23 2010   WBC 8.79  --    HGB 14.1  --    HCT 43.4  --     262   BANDSPCT 5  --      BMP    Recent Labs     12/30/23  1329 12/30/23  2010   SODIUM 135 136   K 4.4 3.6    104   CO2 23 18*   AGAP 12 14   BUN 25 21   CREATININE 1.28 1.19   CALCIUM 10.3* 9.6       Coags    Recent Labs     12/30/23  1329   INR 1.04   PTT 28        Additional Electrolytes  No recent results       Blood Gas    No recent results  No recent results LFTs  Recent Labs     12/30/23  1329   ALT 12   AST 16   ALKPHOS 131*   ALB 4.9   TBILI 0.53        Infectious  No recent results  Glucose  Recent Labs     12/30/23  1329 12/30/23  2010   GLUC 320* 216*                   BENTON Tiwari

## 2024-01-01 ENCOUNTER — APPOINTMENT (INPATIENT)
Dept: RADIOLOGY | Facility: HOSPITAL | Age: 84
DRG: 023 | End: 2024-01-01
Payer: COMMERCIAL

## 2024-01-01 ENCOUNTER — APPOINTMENT (OUTPATIENT)
Dept: RADIOLOGY | Facility: HOSPITAL | Age: 84
DRG: 023 | End: 2024-01-01
Payer: COMMERCIAL

## 2024-01-01 VITALS
DIASTOLIC BLOOD PRESSURE: 71 MMHG | BODY MASS INDEX: 29.71 KG/M2 | HEART RATE: 66 BPM | TEMPERATURE: 98.5 F | SYSTOLIC BLOOD PRESSURE: 152 MMHG | WEIGHT: 141.54 LBS | RESPIRATION RATE: 15 BRPM | OXYGEN SATURATION: 80 % | HEIGHT: 58 IN

## 2024-01-01 LAB
2HR DELTA HS TROPONIN: 12 NG/L
4HR DELTA HS TROPONIN: 14 NG/L
ABO GROUP BLD BPU: NORMAL
ABO GROUP BLD: NORMAL
ABO GROUP BLD: NORMAL
ALBUMIN SERPL BCP-MCNC: 2.9 G/DL (ref 3.5–5)
ALBUMIN SERPL BCP-MCNC: 3.5 G/DL (ref 3.5–5)
ALP SERPL-CCNC: 71 U/L (ref 34–104)
ALP SERPL-CCNC: 79 U/L (ref 34–104)
ALT SERPL W P-5'-P-CCNC: 8 U/L (ref 7–52)
ALT SERPL W P-5'-P-CCNC: 9 U/L (ref 7–52)
ANION GAP SERPL CALCULATED.3IONS-SCNC: 0 MMOL/L
ANION GAP SERPL CALCULATED.3IONS-SCNC: 10 MMOL/L
ANION GAP SERPL CALCULATED.3IONS-SCNC: 11 MMOL/L
ANION GAP SERPL CALCULATED.3IONS-SCNC: 12 MMOL/L
ANION GAP SERPL CALCULATED.3IONS-SCNC: 2 MMOL/L
ANION GAP SERPL CALCULATED.3IONS-SCNC: 4 MMOL/L
ANION GAP SERPL CALCULATED.3IONS-SCNC: 5 MMOL/L
ANION GAP SERPL CALCULATED.3IONS-SCNC: 6 MMOL/L
ANION GAP SERPL CALCULATED.3IONS-SCNC: 7 MMOL/L
ANION GAP SERPL CALCULATED.3IONS-SCNC: 8 MMOL/L
ANION GAP SERPL CALCULATED.3IONS-SCNC: 9 MMOL/L
APTT PPP: 30 SECONDS (ref 23–37)
ARTERIAL PATENCY WRIST A: YES
AST SERPL W P-5'-P-CCNC: 12 U/L (ref 13–39)
AST SERPL W P-5'-P-CCNC: 21 U/L (ref 13–39)
ATRIAL RATE: 70 BPM
ATRIAL RATE: 89 BPM
ATRIAL RATE: 89 BPM
BACTERIA BLD CULT: NORMAL
BACTERIA BLD CULT: NORMAL
BACTERIA UR QL AUTO: ABNORMAL /HPF
BASE EX.OXY STD BLDV CALC-SCNC: 69.5 % (ref 60–80)
BASE EXCESS BLDA CALC-SCNC: -10 MMOL/L (ref -2–3)
BASE EXCESS BLDA CALC-SCNC: -5.7 MMOL/L
BASE EXCESS BLDA CALC-SCNC: 1 MMOL/L
BASE EXCESS BLDA CALC-SCNC: 4.4 MMOL/L
BASE EXCESS BLDV CALC-SCNC: 4.9 MMOL/L
BASOPHILS # BLD AUTO: 0.01 THOUSANDS/ÂΜL (ref 0–0.1)
BASOPHILS # BLD AUTO: 0.02 THOUSANDS/ÂΜL (ref 0–0.1)
BASOPHILS # BLD AUTO: 0.02 THOUSANDS/ÂΜL (ref 0–0.1)
BASOPHILS # BLD MANUAL: 0 THOUSAND/UL (ref 0–0.1)
BASOPHILS NFR BLD AUTO: 0 % (ref 0–1)
BASOPHILS NFR MAR MANUAL: 0 % (ref 0–1)
BILIRUB SERPL-MCNC: 0.33 MG/DL (ref 0.2–1)
BILIRUB SERPL-MCNC: 0.45 MG/DL (ref 0.2–1)
BILIRUB UR QL STRIP: NEGATIVE
BLD GP AB SCN SERPL QL: NEGATIVE
BLD GP AB SCN SERPL QL: NEGATIVE
BODY TEMPERATURE: 98.8 DEGREES FEHRENHEIT
BPU ID: NORMAL
BUN SERPL-MCNC: 19 MG/DL (ref 5–25)
BUN SERPL-MCNC: 20 MG/DL (ref 5–25)
BUN SERPL-MCNC: 20 MG/DL (ref 5–25)
BUN SERPL-MCNC: 21 MG/DL (ref 5–25)
BUN SERPL-MCNC: 21 MG/DL (ref 5–25)
BUN SERPL-MCNC: 23 MG/DL (ref 5–25)
BUN SERPL-MCNC: 25 MG/DL (ref 5–25)
BUN SERPL-MCNC: 28 MG/DL (ref 5–25)
BUN SERPL-MCNC: 29 MG/DL (ref 5–25)
BUN SERPL-MCNC: 29 MG/DL (ref 5–25)
BUN SERPL-MCNC: 30 MG/DL (ref 5–25)
BUN SERPL-MCNC: 32 MG/DL (ref 5–25)
BUN SERPL-MCNC: 33 MG/DL (ref 5–25)
BUN SERPL-MCNC: 34 MG/DL (ref 5–25)
BUN SERPL-MCNC: 34 MG/DL (ref 5–25)
BUN SERPL-MCNC: 35 MG/DL (ref 5–25)
BUN SERPL-MCNC: 36 MG/DL (ref 5–25)
BUN SERPL-MCNC: 37 MG/DL (ref 5–25)
BUN SERPL-MCNC: 38 MG/DL (ref 5–25)
BUN SERPL-MCNC: 38 MG/DL (ref 5–25)
BUN SERPL-MCNC: 43 MG/DL (ref 5–25)
BUN SERPL-MCNC: 49 MG/DL (ref 5–25)
BUN SERPL-MCNC: 60 MG/DL (ref 5–25)
CA-I BLD-SCNC: 1.14 MMOL/L (ref 1.12–1.32)
CA-I BLD-SCNC: 1.15 MMOL/L (ref 1.12–1.32)
CA-I BLD-SCNC: 1.24 MMOL/L (ref 1.12–1.32)
CALCIUM ALBUM COR SERPL-MCNC: 9.3 MG/DL (ref 8.3–10.1)
CALCIUM SERPL-MCNC: 7.6 MG/DL (ref 8.4–10.2)
CALCIUM SERPL-MCNC: 7.6 MG/DL (ref 8.4–10.2)
CALCIUM SERPL-MCNC: 7.7 MG/DL (ref 8.4–10.2)
CALCIUM SERPL-MCNC: 7.7 MG/DL (ref 8.4–10.2)
CALCIUM SERPL-MCNC: 8 MG/DL (ref 8.4–10.2)
CALCIUM SERPL-MCNC: 8.2 MG/DL (ref 8.4–10.2)
CALCIUM SERPL-MCNC: 8.2 MG/DL (ref 8.4–10.2)
CALCIUM SERPL-MCNC: 8.3 MG/DL (ref 8.4–10.2)
CALCIUM SERPL-MCNC: 8.3 MG/DL (ref 8.4–10.2)
CALCIUM SERPL-MCNC: 8.4 MG/DL (ref 8.4–10.2)
CALCIUM SERPL-MCNC: 8.5 MG/DL (ref 8.4–10.2)
CALCIUM SERPL-MCNC: 8.6 MG/DL (ref 8.4–10.2)
CALCIUM SERPL-MCNC: 8.7 MG/DL (ref 8.4–10.2)
CALCIUM SERPL-MCNC: 8.8 MG/DL (ref 8.4–10.2)
CALCIUM SERPL-MCNC: 8.9 MG/DL (ref 8.4–10.2)
CALCIUM SERPL-MCNC: 9 MG/DL (ref 8.4–10.2)
CARDIAC TROPONIN I PNL SERPL HS: 39 NG/L
CARDIAC TROPONIN I PNL SERPL HS: 51 NG/L
CARDIAC TROPONIN I PNL SERPL HS: 53 NG/L
CHLORIDE SERPL-SCNC: 104 MMOL/L (ref 96–108)
CHLORIDE SERPL-SCNC: 106 MMOL/L (ref 96–108)
CHLORIDE SERPL-SCNC: 108 MMOL/L (ref 96–108)
CHLORIDE SERPL-SCNC: 109 MMOL/L (ref 96–108)
CHLORIDE SERPL-SCNC: 110 MMOL/L (ref 96–108)
CHLORIDE SERPL-SCNC: 111 MMOL/L (ref 96–108)
CHLORIDE SERPL-SCNC: 112 MMOL/L (ref 96–108)
CHLORIDE SERPL-SCNC: 112 MMOL/L (ref 96–108)
CHLORIDE SERPL-SCNC: 113 MMOL/L (ref 96–108)
CHLORIDE SERPL-SCNC: 113 MMOL/L (ref 96–108)
CHLORIDE SERPL-SCNC: 114 MMOL/L (ref 96–108)
CHLORIDE SERPL-SCNC: 116 MMOL/L (ref 96–108)
CHLORIDE SERPL-SCNC: 117 MMOL/L (ref 96–108)
CHLORIDE SERPL-SCNC: 117 MMOL/L (ref 96–108)
CHLORIDE SERPL-SCNC: 119 MMOL/L (ref 96–108)
CHLORIDE SERPL-SCNC: 120 MMOL/L (ref 96–108)
CHLORIDE SERPL-SCNC: 121 MMOL/L (ref 96–108)
CHLORIDE SERPL-SCNC: 122 MMOL/L (ref 96–108)
CHLORIDE SERPL-SCNC: 123 MMOL/L (ref 96–108)
CHLORIDE SERPL-SCNC: 124 MMOL/L (ref 96–108)
CHLORIDE SERPL-SCNC: 125 MMOL/L (ref 96–108)
CHLORIDE SERPL-SCNC: 126 MMOL/L (ref 96–108)
CHLORIDE SERPL-SCNC: 127 MMOL/L (ref 96–108)
CHLORIDE SERPL-SCNC: 127 MMOL/L (ref 96–108)
CHLORIDE SERPL-SCNC: 128 MMOL/L (ref 96–108)
CHLORIDE SERPL-SCNC: 128 MMOL/L (ref 96–108)
CLARITY UR: CLEAR
CO2 SERPL-SCNC: 15 MMOL/L (ref 21–32)
CO2 SERPL-SCNC: 16 MMOL/L (ref 21–32)
CO2 SERPL-SCNC: 17 MMOL/L (ref 21–32)
CO2 SERPL-SCNC: 17 MMOL/L (ref 21–32)
CO2 SERPL-SCNC: 18 MMOL/L (ref 21–32)
CO2 SERPL-SCNC: 19 MMOL/L (ref 21–32)
CO2 SERPL-SCNC: 20 MMOL/L (ref 21–32)
CO2 SERPL-SCNC: 21 MMOL/L (ref 21–32)
CO2 SERPL-SCNC: 22 MMOL/L (ref 21–32)
CO2 SERPL-SCNC: 23 MMOL/L (ref 21–32)
CO2 SERPL-SCNC: 25 MMOL/L (ref 21–32)
CO2 SERPL-SCNC: 29 MMOL/L (ref 21–32)
CO2 SERPL-SCNC: 30 MMOL/L (ref 21–32)
CO2 SERPL-SCNC: 31 MMOL/L (ref 21–32)
CO2 SERPL-SCNC: 31 MMOL/L (ref 21–32)
CO2 SERPL-SCNC: 32 MMOL/L (ref 21–32)
CO2 SERPL-SCNC: 32 MMOL/L (ref 21–32)
COLOR UR: ABNORMAL
CREAT SERPL-MCNC: 0.9 MG/DL (ref 0.6–1.3)
CREAT SERPL-MCNC: 0.91 MG/DL (ref 0.6–1.3)
CREAT SERPL-MCNC: 0.96 MG/DL (ref 0.6–1.3)
CREAT SERPL-MCNC: 0.96 MG/DL (ref 0.6–1.3)
CREAT SERPL-MCNC: 1.02 MG/DL (ref 0.6–1.3)
CREAT SERPL-MCNC: 1.11 MG/DL (ref 0.6–1.3)
CREAT SERPL-MCNC: 1.13 MG/DL (ref 0.6–1.3)
CREAT SERPL-MCNC: 1.14 MG/DL (ref 0.6–1.3)
CREAT SERPL-MCNC: 1.15 MG/DL (ref 0.6–1.3)
CREAT SERPL-MCNC: 1.17 MG/DL (ref 0.6–1.3)
CREAT SERPL-MCNC: 1.18 MG/DL (ref 0.6–1.3)
CREAT SERPL-MCNC: 1.19 MG/DL (ref 0.6–1.3)
CREAT SERPL-MCNC: 1.2 MG/DL (ref 0.6–1.3)
CREAT SERPL-MCNC: 1.24 MG/DL (ref 0.6–1.3)
CREAT SERPL-MCNC: 1.25 MG/DL (ref 0.6–1.3)
CREAT SERPL-MCNC: 1.25 MG/DL (ref 0.6–1.3)
CREAT SERPL-MCNC: 1.29 MG/DL (ref 0.6–1.3)
CREAT SERPL-MCNC: 1.31 MG/DL (ref 0.6–1.3)
CREAT SERPL-MCNC: 1.31 MG/DL (ref 0.6–1.3)
CREAT SERPL-MCNC: 1.32 MG/DL (ref 0.6–1.3)
CREAT SERPL-MCNC: 1.33 MG/DL (ref 0.6–1.3)
CREAT SERPL-MCNC: 1.34 MG/DL (ref 0.6–1.3)
CREAT SERPL-MCNC: 1.34 MG/DL (ref 0.6–1.3)
CREAT SERPL-MCNC: 1.36 MG/DL (ref 0.6–1.3)
CREAT SERPL-MCNC: 1.37 MG/DL (ref 0.6–1.3)
CREAT SERPL-MCNC: 1.4 MG/DL (ref 0.6–1.3)
CREAT SERPL-MCNC: 1.41 MG/DL (ref 0.6–1.3)
CREAT SERPL-MCNC: 1.41 MG/DL (ref 0.6–1.3)
CREAT SERPL-MCNC: 1.43 MG/DL (ref 0.6–1.3)
CREAT SERPL-MCNC: 1.54 MG/DL (ref 0.6–1.3)
CREAT SERPL-MCNC: 1.57 MG/DL (ref 0.6–1.3)
CREAT SERPL-MCNC: 1.6 MG/DL (ref 0.6–1.3)
CREAT SERPL-MCNC: 1.62 MG/DL (ref 0.6–1.3)
CREAT SERPL-MCNC: 1.64 MG/DL (ref 0.6–1.3)
CREAT SERPL-MCNC: 1.66 MG/DL (ref 0.6–1.3)
CREAT SERPL-MCNC: 1.72 MG/DL (ref 0.6–1.3)
CROSSMATCH: NORMAL
EOSINOPHIL # BLD AUTO: 0 THOUSAND/ÂΜL (ref 0–0.61)
EOSINOPHIL # BLD AUTO: 0 THOUSAND/ÂΜL (ref 0–0.61)
EOSINOPHIL # BLD AUTO: 0.01 THOUSAND/ÂΜL (ref 0–0.61)
EOSINOPHIL # BLD AUTO: 0.02 THOUSAND/ÂΜL (ref 0–0.61)
EOSINOPHIL # BLD AUTO: 0.05 THOUSAND/ÂΜL (ref 0–0.61)
EOSINOPHIL # BLD AUTO: 0.07 THOUSAND/ÂΜL (ref 0–0.61)
EOSINOPHIL # BLD AUTO: 0.08 THOUSAND/ÂΜL (ref 0–0.61)
EOSINOPHIL # BLD AUTO: 0.11 THOUSAND/ÂΜL (ref 0–0.61)
EOSINOPHIL # BLD AUTO: 0.16 THOUSAND/ÂΜL (ref 0–0.61)
EOSINOPHIL # BLD AUTO: 0.17 THOUSAND/ÂΜL (ref 0–0.61)
EOSINOPHIL # BLD AUTO: 0.17 THOUSAND/ÂΜL (ref 0–0.61)
EOSINOPHIL # BLD AUTO: 0.18 THOUSAND/ÂΜL (ref 0–0.61)
EOSINOPHIL # BLD AUTO: 0.2 THOUSAND/ÂΜL (ref 0–0.61)
EOSINOPHIL # BLD MANUAL: 0 THOUSAND/UL (ref 0–0.4)
EOSINOPHIL NFR BLD AUTO: 0 % (ref 0–6)
EOSINOPHIL NFR BLD AUTO: 1 % (ref 0–6)
EOSINOPHIL NFR BLD AUTO: 2 % (ref 0–6)
EOSINOPHIL NFR BLD AUTO: 2 % (ref 0–6)
EOSINOPHIL NFR BLD AUTO: 3 % (ref 0–6)
EOSINOPHIL NFR BLD AUTO: 3 % (ref 0–6)
EOSINOPHIL NFR BLD AUTO: 4 % (ref 0–6)
EOSINOPHIL NFR BLD MANUAL: 0 % (ref 0–6)
ERYTHROCYTE [DISTWIDTH] IN BLOOD BY AUTOMATED COUNT: 13.7 % (ref 11.6–15.1)
ERYTHROCYTE [DISTWIDTH] IN BLOOD BY AUTOMATED COUNT: 14 % (ref 11.6–15.1)
ERYTHROCYTE [DISTWIDTH] IN BLOOD BY AUTOMATED COUNT: 14.1 % (ref 11.6–15.1)
ERYTHROCYTE [DISTWIDTH] IN BLOOD BY AUTOMATED COUNT: 14.1 % (ref 11.6–15.1)
ERYTHROCYTE [DISTWIDTH] IN BLOOD BY AUTOMATED COUNT: 14.3 % (ref 11.6–15.1)
ERYTHROCYTE [DISTWIDTH] IN BLOOD BY AUTOMATED COUNT: 14.3 % (ref 11.6–15.1)
ERYTHROCYTE [DISTWIDTH] IN BLOOD BY AUTOMATED COUNT: 14.4 % (ref 11.6–15.1)
ERYTHROCYTE [DISTWIDTH] IN BLOOD BY AUTOMATED COUNT: 14.4 % (ref 11.6–15.1)
ERYTHROCYTE [DISTWIDTH] IN BLOOD BY AUTOMATED COUNT: 14.5 % (ref 11.6–15.1)
ERYTHROCYTE [DISTWIDTH] IN BLOOD BY AUTOMATED COUNT: 14.5 % (ref 11.6–15.1)
ERYTHROCYTE [DISTWIDTH] IN BLOOD BY AUTOMATED COUNT: 14.7 % (ref 11.6–15.1)
ERYTHROCYTE [DISTWIDTH] IN BLOOD BY AUTOMATED COUNT: 14.8 % (ref 11.6–15.1)
ERYTHROCYTE [DISTWIDTH] IN BLOOD BY AUTOMATED COUNT: 14.8 % (ref 11.6–15.1)
ERYTHROCYTE [DISTWIDTH] IN BLOOD BY AUTOMATED COUNT: 15 % (ref 11.6–15.1)
ERYTHROCYTE [DISTWIDTH] IN BLOOD BY AUTOMATED COUNT: 15.2 % (ref 11.6–15.1)
ERYTHROCYTE [DISTWIDTH] IN BLOOD BY AUTOMATED COUNT: 15.4 % (ref 11.6–15.1)
ERYTHROCYTE [DISTWIDTH] IN BLOOD BY AUTOMATED COUNT: 15.4 % (ref 11.6–15.1)
ERYTHROCYTE [DISTWIDTH] IN BLOOD BY AUTOMATED COUNT: 15.5 % (ref 11.6–15.1)
FERRITIN SERPL-MCNC: 321 NG/ML (ref 11–307)
GFR SERPL CREATININE-BSD FRML MDRD: 27 ML/MIN/1.73SQ M
GFR SERPL CREATININE-BSD FRML MDRD: 28 ML/MIN/1.73SQ M
GFR SERPL CREATININE-BSD FRML MDRD: 28 ML/MIN/1.73SQ M
GFR SERPL CREATININE-BSD FRML MDRD: 29 ML/MIN/1.73SQ M
GFR SERPL CREATININE-BSD FRML MDRD: 29 ML/MIN/1.73SQ M
GFR SERPL CREATININE-BSD FRML MDRD: 30 ML/MIN/1.73SQ M
GFR SERPL CREATININE-BSD FRML MDRD: 31 ML/MIN/1.73SQ M
GFR SERPL CREATININE-BSD FRML MDRD: 34 ML/MIN/1.73SQ M
GFR SERPL CREATININE-BSD FRML MDRD: 35 ML/MIN/1.73SQ M
GFR SERPL CREATININE-BSD FRML MDRD: 35 ML/MIN/1.73SQ M
GFR SERPL CREATININE-BSD FRML MDRD: 36 ML/MIN/1.73SQ M
GFR SERPL CREATININE-BSD FRML MDRD: 37 ML/MIN/1.73SQ M
GFR SERPL CREATININE-BSD FRML MDRD: 38 ML/MIN/1.73SQ M
GFR SERPL CREATININE-BSD FRML MDRD: 40 ML/MIN/1.73SQ M
GFR SERPL CREATININE-BSD FRML MDRD: 42 ML/MIN/1.73SQ M
GFR SERPL CREATININE-BSD FRML MDRD: 42 ML/MIN/1.73SQ M
GFR SERPL CREATININE-BSD FRML MDRD: 43 ML/MIN/1.73SQ M
GFR SERPL CREATININE-BSD FRML MDRD: 43 ML/MIN/1.73SQ M
GFR SERPL CREATININE-BSD FRML MDRD: 44 ML/MIN/1.73SQ M
GFR SERPL CREATININE-BSD FRML MDRD: 44 ML/MIN/1.73SQ M
GFR SERPL CREATININE-BSD FRML MDRD: 45 ML/MIN/1.73SQ M
GFR SERPL CREATININE-BSD FRML MDRD: 46 ML/MIN/1.73SQ M
GFR SERPL CREATININE-BSD FRML MDRD: 51 ML/MIN/1.73SQ M
GFR SERPL CREATININE-BSD FRML MDRD: 55 ML/MIN/1.73SQ M
GFR SERPL CREATININE-BSD FRML MDRD: 55 ML/MIN/1.73SQ M
GFR SERPL CREATININE-BSD FRML MDRD: 58 ML/MIN/1.73SQ M
GFR SERPL CREATININE-BSD FRML MDRD: 59 ML/MIN/1.73SQ M
GLUCOSE SERPL-MCNC: 103 MG/DL (ref 65–140)
GLUCOSE SERPL-MCNC: 103 MG/DL (ref 65–140)
GLUCOSE SERPL-MCNC: 106 MG/DL (ref 65–140)
GLUCOSE SERPL-MCNC: 106 MG/DL (ref 65–140)
GLUCOSE SERPL-MCNC: 107 MG/DL (ref 65–140)
GLUCOSE SERPL-MCNC: 108 MG/DL (ref 65–140)
GLUCOSE SERPL-MCNC: 108 MG/DL (ref 65–140)
GLUCOSE SERPL-MCNC: 109 MG/DL (ref 65–140)
GLUCOSE SERPL-MCNC: 113 MG/DL (ref 65–140)
GLUCOSE SERPL-MCNC: 113 MG/DL (ref 65–140)
GLUCOSE SERPL-MCNC: 114 MG/DL (ref 65–140)
GLUCOSE SERPL-MCNC: 115 MG/DL (ref 65–140)
GLUCOSE SERPL-MCNC: 116 MG/DL (ref 65–140)
GLUCOSE SERPL-MCNC: 116 MG/DL (ref 65–140)
GLUCOSE SERPL-MCNC: 117 MG/DL (ref 65–140)
GLUCOSE SERPL-MCNC: 117 MG/DL (ref 65–140)
GLUCOSE SERPL-MCNC: 118 MG/DL (ref 65–140)
GLUCOSE SERPL-MCNC: 119 MG/DL (ref 65–140)
GLUCOSE SERPL-MCNC: 119 MG/DL (ref 65–140)
GLUCOSE SERPL-MCNC: 120 MG/DL (ref 65–140)
GLUCOSE SERPL-MCNC: 120 MG/DL (ref 65–140)
GLUCOSE SERPL-MCNC: 121 MG/DL (ref 65–140)
GLUCOSE SERPL-MCNC: 122 MG/DL (ref 65–140)
GLUCOSE SERPL-MCNC: 123 MG/DL (ref 65–140)
GLUCOSE SERPL-MCNC: 126 MG/DL (ref 65–140)
GLUCOSE SERPL-MCNC: 128 MG/DL (ref 65–140)
GLUCOSE SERPL-MCNC: 129 MG/DL (ref 65–140)
GLUCOSE SERPL-MCNC: 130 MG/DL (ref 65–140)
GLUCOSE SERPL-MCNC: 130 MG/DL (ref 65–140)
GLUCOSE SERPL-MCNC: 132 MG/DL (ref 65–140)
GLUCOSE SERPL-MCNC: 134 MG/DL (ref 65–140)
GLUCOSE SERPL-MCNC: 134 MG/DL (ref 65–140)
GLUCOSE SERPL-MCNC: 135 MG/DL (ref 65–140)
GLUCOSE SERPL-MCNC: 135 MG/DL (ref 65–140)
GLUCOSE SERPL-MCNC: 137 MG/DL (ref 65–140)
GLUCOSE SERPL-MCNC: 138 MG/DL (ref 65–140)
GLUCOSE SERPL-MCNC: 139 MG/DL (ref 65–140)
GLUCOSE SERPL-MCNC: 141 MG/DL (ref 65–140)
GLUCOSE SERPL-MCNC: 142 MG/DL (ref 65–140)
GLUCOSE SERPL-MCNC: 143 MG/DL (ref 65–140)
GLUCOSE SERPL-MCNC: 144 MG/DL (ref 65–140)
GLUCOSE SERPL-MCNC: 145 MG/DL (ref 65–140)
GLUCOSE SERPL-MCNC: 145 MG/DL (ref 65–140)
GLUCOSE SERPL-MCNC: 146 MG/DL (ref 65–140)
GLUCOSE SERPL-MCNC: 148 MG/DL (ref 65–140)
GLUCOSE SERPL-MCNC: 148 MG/DL (ref 65–140)
GLUCOSE SERPL-MCNC: 150 MG/DL (ref 65–140)
GLUCOSE SERPL-MCNC: 152 MG/DL (ref 65–140)
GLUCOSE SERPL-MCNC: 152 MG/DL (ref 65–140)
GLUCOSE SERPL-MCNC: 154 MG/DL (ref 65–140)
GLUCOSE SERPL-MCNC: 155 MG/DL (ref 65–140)
GLUCOSE SERPL-MCNC: 156 MG/DL (ref 65–140)
GLUCOSE SERPL-MCNC: 157 MG/DL (ref 65–140)
GLUCOSE SERPL-MCNC: 158 MG/DL (ref 65–140)
GLUCOSE SERPL-MCNC: 159 MG/DL (ref 65–140)
GLUCOSE SERPL-MCNC: 160 MG/DL (ref 65–140)
GLUCOSE SERPL-MCNC: 161 MG/DL (ref 65–140)
GLUCOSE SERPL-MCNC: 161 MG/DL (ref 65–140)
GLUCOSE SERPL-MCNC: 162 MG/DL (ref 65–140)
GLUCOSE SERPL-MCNC: 162 MG/DL (ref 65–140)
GLUCOSE SERPL-MCNC: 163 MG/DL (ref 65–140)
GLUCOSE SERPL-MCNC: 164 MG/DL (ref 65–140)
GLUCOSE SERPL-MCNC: 165 MG/DL (ref 65–140)
GLUCOSE SERPL-MCNC: 167 MG/DL (ref 65–140)
GLUCOSE SERPL-MCNC: 167 MG/DL (ref 65–140)
GLUCOSE SERPL-MCNC: 168 MG/DL (ref 65–140)
GLUCOSE SERPL-MCNC: 169 MG/DL (ref 65–140)
GLUCOSE SERPL-MCNC: 169 MG/DL (ref 65–140)
GLUCOSE SERPL-MCNC: 170 MG/DL (ref 65–140)
GLUCOSE SERPL-MCNC: 170 MG/DL (ref 65–140)
GLUCOSE SERPL-MCNC: 171 MG/DL (ref 65–140)
GLUCOSE SERPL-MCNC: 172 MG/DL (ref 65–140)
GLUCOSE SERPL-MCNC: 174 MG/DL (ref 65–140)
GLUCOSE SERPL-MCNC: 175 MG/DL (ref 65–140)
GLUCOSE SERPL-MCNC: 176 MG/DL (ref 65–140)
GLUCOSE SERPL-MCNC: 177 MG/DL (ref 65–140)
GLUCOSE SERPL-MCNC: 178 MG/DL (ref 65–140)
GLUCOSE SERPL-MCNC: 182 MG/DL (ref 65–140)
GLUCOSE SERPL-MCNC: 183 MG/DL (ref 65–140)
GLUCOSE SERPL-MCNC: 184 MG/DL (ref 65–140)
GLUCOSE SERPL-MCNC: 184 MG/DL (ref 65–140)
GLUCOSE SERPL-MCNC: 185 MG/DL (ref 65–140)
GLUCOSE SERPL-MCNC: 187 MG/DL (ref 65–140)
GLUCOSE SERPL-MCNC: 191 MG/DL (ref 65–140)
GLUCOSE SERPL-MCNC: 193 MG/DL (ref 65–140)
GLUCOSE SERPL-MCNC: 195 MG/DL (ref 65–140)
GLUCOSE SERPL-MCNC: 196 MG/DL (ref 65–140)
GLUCOSE SERPL-MCNC: 196 MG/DL (ref 65–140)
GLUCOSE SERPL-MCNC: 198 MG/DL (ref 65–140)
GLUCOSE SERPL-MCNC: 200 MG/DL (ref 65–140)
GLUCOSE SERPL-MCNC: 201 MG/DL (ref 65–140)
GLUCOSE SERPL-MCNC: 202 MG/DL (ref 65–140)
GLUCOSE SERPL-MCNC: 203 MG/DL (ref 65–140)
GLUCOSE SERPL-MCNC: 204 MG/DL (ref 65–140)
GLUCOSE SERPL-MCNC: 204 MG/DL (ref 65–140)
GLUCOSE SERPL-MCNC: 205 MG/DL (ref 65–140)
GLUCOSE SERPL-MCNC: 208 MG/DL (ref 65–140)
GLUCOSE SERPL-MCNC: 209 MG/DL (ref 65–140)
GLUCOSE SERPL-MCNC: 210 MG/DL (ref 65–140)
GLUCOSE SERPL-MCNC: 210 MG/DL (ref 65–140)
GLUCOSE SERPL-MCNC: 213 MG/DL (ref 65–140)
GLUCOSE SERPL-MCNC: 219 MG/DL (ref 65–140)
GLUCOSE SERPL-MCNC: 223 MG/DL (ref 65–140)
GLUCOSE SERPL-MCNC: 225 MG/DL (ref 65–140)
GLUCOSE SERPL-MCNC: 226 MG/DL (ref 65–140)
GLUCOSE SERPL-MCNC: 226 MG/DL (ref 65–140)
GLUCOSE SERPL-MCNC: 227 MG/DL (ref 65–140)
GLUCOSE SERPL-MCNC: 228 MG/DL (ref 65–140)
GLUCOSE SERPL-MCNC: 231 MG/DL (ref 65–140)
GLUCOSE SERPL-MCNC: 232 MG/DL (ref 65–140)
GLUCOSE SERPL-MCNC: 233 MG/DL (ref 65–140)
GLUCOSE SERPL-MCNC: 235 MG/DL (ref 65–140)
GLUCOSE SERPL-MCNC: 235 MG/DL (ref 65–140)
GLUCOSE SERPL-MCNC: 237 MG/DL (ref 65–140)
GLUCOSE SERPL-MCNC: 240 MG/DL (ref 65–140)
GLUCOSE SERPL-MCNC: 246 MG/DL (ref 65–140)
GLUCOSE SERPL-MCNC: 249 MG/DL (ref 65–140)
GLUCOSE SERPL-MCNC: 251 MG/DL (ref 65–140)
GLUCOSE SERPL-MCNC: 255 MG/DL (ref 65–140)
GLUCOSE SERPL-MCNC: 256 MG/DL (ref 65–140)
GLUCOSE SERPL-MCNC: 259 MG/DL (ref 65–140)
GLUCOSE SERPL-MCNC: 262 MG/DL (ref 65–140)
GLUCOSE SERPL-MCNC: 264 MG/DL (ref 65–140)
GLUCOSE SERPL-MCNC: 286 MG/DL (ref 65–140)
GLUCOSE SERPL-MCNC: 290 MG/DL (ref 65–140)
GLUCOSE SERPL-MCNC: 318 MG/DL (ref 65–140)
GLUCOSE SERPL-MCNC: 322 MG/DL (ref 65–140)
GLUCOSE SERPL-MCNC: 370 MG/DL (ref 65–140)
GLUCOSE SERPL-MCNC: 46 MG/DL (ref 65–140)
GLUCOSE SERPL-MCNC: 65 MG/DL (ref 65–140)
GLUCOSE SERPL-MCNC: 70 MG/DL (ref 65–140)
GLUCOSE SERPL-MCNC: 75 MG/DL (ref 65–140)
GLUCOSE SERPL-MCNC: 82 MG/DL (ref 65–140)
GLUCOSE SERPL-MCNC: 83 MG/DL (ref 65–140)
GLUCOSE SERPL-MCNC: 85 MG/DL (ref 65–140)
GLUCOSE SERPL-MCNC: 87 MG/DL (ref 65–140)
GLUCOSE SERPL-MCNC: 89 MG/DL (ref 65–140)
GLUCOSE SERPL-MCNC: 91 MG/DL (ref 65–140)
GLUCOSE SERPL-MCNC: 92 MG/DL (ref 65–140)
GLUCOSE SERPL-MCNC: 94 MG/DL (ref 65–140)
GLUCOSE SERPL-MCNC: 95 MG/DL (ref 65–140)
GLUCOSE SERPL-MCNC: 96 MG/DL (ref 65–140)
GLUCOSE SERPL-MCNC: 97 MG/DL (ref 65–140)
GLUCOSE SERPL-MCNC: 98 MG/DL (ref 65–140)
GLUCOSE SERPL-MCNC: 98 MG/DL (ref 65–140)
GLUCOSE UR STRIP-MCNC: NEGATIVE MG/DL
HCO3 BLDA-SCNC: 15.6 MMOL/L (ref 22–28)
HCO3 BLDA-SCNC: 18.9 MMOL/L (ref 22–28)
HCO3 BLDA-SCNC: 25.9 MMOL/L (ref 22–28)
HCO3 BLDA-SCNC: 29.2 MMOL/L (ref 22–28)
HCO3 BLDV-SCNC: 29.7 MMOL/L (ref 24–30)
HCT VFR BLD AUTO: 20.1 % (ref 34.8–46.1)
HCT VFR BLD AUTO: 20.5 % (ref 34.8–46.1)
HCT VFR BLD AUTO: 24 % (ref 34.8–46.1)
HCT VFR BLD AUTO: 24.2 % (ref 34.8–46.1)
HCT VFR BLD AUTO: 24.2 % (ref 34.8–46.1)
HCT VFR BLD AUTO: 24.3 % (ref 34.8–46.1)
HCT VFR BLD AUTO: 24.3 % (ref 34.8–46.1)
HCT VFR BLD AUTO: 25.2 % (ref 34.8–46.1)
HCT VFR BLD AUTO: 25.9 % (ref 34.8–46.1)
HCT VFR BLD AUTO: 26 % (ref 34.8–46.1)
HCT VFR BLD AUTO: 26.2 % (ref 34.8–46.1)
HCT VFR BLD AUTO: 27.3 % (ref 34.8–46.1)
HCT VFR BLD AUTO: 27.7 % (ref 34.8–46.1)
HCT VFR BLD AUTO: 27.8 % (ref 34.8–46.1)
HCT VFR BLD AUTO: 28.3 % (ref 34.8–46.1)
HCT VFR BLD AUTO: 28.9 % (ref 34.8–46.1)
HCT VFR BLD AUTO: 29.4 % (ref 34.8–46.1)
HCT VFR BLD AUTO: 30.9 % (ref 34.8–46.1)
HCT VFR BLD AUTO: 33.9 % (ref 34.8–46.1)
HCT VFR BLD AUTO: 37.6 % (ref 34.8–46.1)
HCT VFR BLD CALC: 23 % (ref 34.8–46.1)
HGB BLD-MCNC: 10.3 G/DL (ref 11.5–15.4)
HGB BLD-MCNC: 10.7 G/DL (ref 11.5–15.4)
HGB BLD-MCNC: 12.2 G/DL (ref 11.5–15.4)
HGB BLD-MCNC: 6.4 G/DL (ref 11.5–15.4)
HGB BLD-MCNC: 6.6 G/DL (ref 11.5–15.4)
HGB BLD-MCNC: 7.4 G/DL (ref 11.5–15.4)
HGB BLD-MCNC: 7.5 G/DL (ref 11.5–15.4)
HGB BLD-MCNC: 7.6 G/DL (ref 11.5–15.4)
HGB BLD-MCNC: 7.9 G/DL (ref 11.5–15.4)
HGB BLD-MCNC: 8.2 G/DL (ref 11.5–15.4)
HGB BLD-MCNC: 8.2 G/DL (ref 11.5–15.4)
HGB BLD-MCNC: 8.5 G/DL (ref 11.5–15.4)
HGB BLD-MCNC: 8.6 G/DL (ref 11.5–15.4)
HGB BLD-MCNC: 8.7 G/DL (ref 11.5–15.4)
HGB BLD-MCNC: 8.7 G/DL (ref 11.5–15.4)
HGB BLD-MCNC: 9.2 G/DL (ref 11.5–15.4)
HGB BLD-MCNC: 9.3 G/DL (ref 11.5–15.4)
HGB BLD-MCNC: 9.4 G/DL (ref 11.5–15.4)
HGB BLDA-MCNC: 7.8 G/DL (ref 11.5–15.4)
HGB UR QL STRIP.AUTO: ABNORMAL
IMM GRANULOCYTES # BLD AUTO: 0.02 THOUSAND/UL (ref 0–0.2)
IMM GRANULOCYTES # BLD AUTO: 0.03 THOUSAND/UL (ref 0–0.2)
IMM GRANULOCYTES # BLD AUTO: 0.03 THOUSAND/UL (ref 0–0.2)
IMM GRANULOCYTES # BLD AUTO: 0.05 THOUSAND/UL (ref 0–0.2)
IMM GRANULOCYTES # BLD AUTO: 0.07 THOUSAND/UL (ref 0–0.2)
IMM GRANULOCYTES # BLD AUTO: 0.07 THOUSAND/UL (ref 0–0.2)
IMM GRANULOCYTES # BLD AUTO: 0.09 THOUSAND/UL (ref 0–0.2)
IMM GRANULOCYTES # BLD AUTO: 0.1 THOUSAND/UL (ref 0–0.2)
IMM GRANULOCYTES # BLD AUTO: 0.1 THOUSAND/UL (ref 0–0.2)
IMM GRANULOCYTES # BLD AUTO: 0.12 THOUSAND/UL (ref 0–0.2)
IMM GRANULOCYTES # BLD AUTO: 0.13 THOUSAND/UL (ref 0–0.2)
IMM GRANULOCYTES # BLD AUTO: 0.16 THOUSAND/UL (ref 0–0.2)
IMM GRANULOCYTES # BLD AUTO: 0.17 THOUSAND/UL (ref 0–0.2)
IMM GRANULOCYTES NFR BLD AUTO: 0 % (ref 0–2)
IMM GRANULOCYTES NFR BLD AUTO: 0 % (ref 0–2)
IMM GRANULOCYTES NFR BLD AUTO: 1 % (ref 0–2)
IMM GRANULOCYTES NFR BLD AUTO: 2 % (ref 0–2)
INR PPP: 1.14 (ref 0.84–1.19)
IRON SATN MFR SERPL: 28 % (ref 15–50)
IRON SERPL-MCNC: 45 UG/DL (ref 50–212)
KETONES UR STRIP-MCNC: NEGATIVE MG/DL
LACTATE SERPL-SCNC: 1.3 MMOL/L (ref 0.5–2)
LEUKOCYTE ESTERASE UR QL STRIP: NEGATIVE
LYMPHOCYTES # BLD AUTO: 0.43 THOUSAND/UL (ref 0.6–4.47)
LYMPHOCYTES # BLD AUTO: 0.74 THOUSANDS/ÂΜL (ref 0.6–4.47)
LYMPHOCYTES # BLD AUTO: 1.02 THOUSANDS/ÂΜL (ref 0.6–4.47)
LYMPHOCYTES # BLD AUTO: 1.02 THOUSANDS/ÂΜL (ref 0.6–4.47)
LYMPHOCYTES # BLD AUTO: 1.07 THOUSANDS/ÂΜL (ref 0.6–4.47)
LYMPHOCYTES # BLD AUTO: 1.15 THOUSANDS/ÂΜL (ref 0.6–4.47)
LYMPHOCYTES # BLD AUTO: 1.2 THOUSANDS/ÂΜL (ref 0.6–4.47)
LYMPHOCYTES # BLD AUTO: 1.23 THOUSANDS/ÂΜL (ref 0.6–4.47)
LYMPHOCYTES # BLD AUTO: 1.27 THOUSANDS/ÂΜL (ref 0.6–4.47)
LYMPHOCYTES # BLD AUTO: 1.28 THOUSANDS/ÂΜL (ref 0.6–4.47)
LYMPHOCYTES # BLD AUTO: 1.29 THOUSANDS/ÂΜL (ref 0.6–4.47)
LYMPHOCYTES # BLD AUTO: 1.32 THOUSANDS/ÂΜL (ref 0.6–4.47)
LYMPHOCYTES # BLD AUTO: 1.42 THOUSANDS/ÂΜL (ref 0.6–4.47)
LYMPHOCYTES # BLD AUTO: 1.49 THOUSANDS/ÂΜL (ref 0.6–4.47)
LYMPHOCYTES # BLD AUTO: 1.57 THOUSANDS/ÂΜL (ref 0.6–4.47)
LYMPHOCYTES # BLD AUTO: 1.94 THOUSANDS/ÂΜL (ref 0.6–4.47)
LYMPHOCYTES # BLD AUTO: 4 % (ref 14–44)
LYMPHOCYTES NFR BLD AUTO: 11 % (ref 14–44)
LYMPHOCYTES NFR BLD AUTO: 12 % (ref 14–44)
LYMPHOCYTES NFR BLD AUTO: 13 % (ref 14–44)
LYMPHOCYTES NFR BLD AUTO: 14 % (ref 14–44)
LYMPHOCYTES NFR BLD AUTO: 14 % (ref 14–44)
LYMPHOCYTES NFR BLD AUTO: 15 % (ref 14–44)
LYMPHOCYTES NFR BLD AUTO: 15 % (ref 14–44)
LYMPHOCYTES NFR BLD AUTO: 16 % (ref 14–44)
LYMPHOCYTES NFR BLD AUTO: 17 % (ref 14–44)
LYMPHOCYTES NFR BLD AUTO: 19 % (ref 14–44)
LYMPHOCYTES NFR BLD AUTO: 20 % (ref 14–44)
LYMPHOCYTES NFR BLD AUTO: 22 % (ref 14–44)
LYMPHOCYTES NFR BLD AUTO: 9 % (ref 14–44)
MAGNESIUM SERPL-MCNC: 1.9 MG/DL (ref 1.9–2.7)
MAGNESIUM SERPL-MCNC: 2.2 MG/DL (ref 1.9–2.7)
MAGNESIUM SERPL-MCNC: 2.3 MG/DL (ref 1.9–2.7)
MAGNESIUM SERPL-MCNC: 2.3 MG/DL (ref 1.9–2.7)
MAGNESIUM SERPL-MCNC: 2.4 MG/DL (ref 1.9–2.7)
MAGNESIUM SERPL-MCNC: 2.6 MG/DL (ref 1.9–2.7)
MCH RBC QN AUTO: 27.1 PG (ref 26.8–34.3)
MCH RBC QN AUTO: 27.2 PG (ref 26.8–34.3)
MCH RBC QN AUTO: 27.2 PG (ref 26.8–34.3)
MCH RBC QN AUTO: 27.3 PG (ref 26.8–34.3)
MCH RBC QN AUTO: 27.4 PG (ref 26.8–34.3)
MCH RBC QN AUTO: 27.4 PG (ref 26.8–34.3)
MCH RBC QN AUTO: 27.6 PG (ref 26.8–34.3)
MCH RBC QN AUTO: 27.7 PG (ref 26.8–34.3)
MCH RBC QN AUTO: 27.7 PG (ref 26.8–34.3)
MCH RBC QN AUTO: 27.9 PG (ref 26.8–34.3)
MCH RBC QN AUTO: 27.9 PG (ref 26.8–34.3)
MCH RBC QN AUTO: 28 PG (ref 26.8–34.3)
MCH RBC QN AUTO: 28.1 PG (ref 26.8–34.3)
MCH RBC QN AUTO: 28.5 PG (ref 26.8–34.3)
MCH RBC QN AUTO: 28.5 PG (ref 26.8–34.3)
MCH RBC QN AUTO: 28.6 PG (ref 26.8–34.3)
MCH RBC QN AUTO: 28.7 PG (ref 26.8–34.3)
MCH RBC QN AUTO: 28.9 PG (ref 26.8–34.3)
MCHC RBC AUTO-ENTMCNC: 30.6 G/DL (ref 31.4–37.4)
MCHC RBC AUTO-ENTMCNC: 30.9 G/DL (ref 31.4–37.4)
MCHC RBC AUTO-ENTMCNC: 30.9 G/DL (ref 31.4–37.4)
MCHC RBC AUTO-ENTMCNC: 31 G/DL (ref 31.4–37.4)
MCHC RBC AUTO-ENTMCNC: 31.3 G/DL (ref 31.4–37.4)
MCHC RBC AUTO-ENTMCNC: 31.4 G/DL (ref 31.4–37.4)
MCHC RBC AUTO-ENTMCNC: 31.5 G/DL (ref 31.4–37.4)
MCHC RBC AUTO-ENTMCNC: 31.6 G/DL (ref 31.4–37.4)
MCHC RBC AUTO-ENTMCNC: 31.7 G/DL (ref 31.4–37.4)
MCHC RBC AUTO-ENTMCNC: 31.9 G/DL (ref 31.4–37.4)
MCHC RBC AUTO-ENTMCNC: 32.2 G/DL (ref 31.4–37.4)
MCHC RBC AUTO-ENTMCNC: 32.2 G/DL (ref 31.4–37.4)
MCHC RBC AUTO-ENTMCNC: 32.4 G/DL (ref 31.4–37.4)
MCHC RBC AUTO-ENTMCNC: 33.2 G/DL (ref 31.4–37.4)
MCHC RBC AUTO-ENTMCNC: 33.3 G/DL (ref 31.4–37.4)
MCV RBC AUTO: 82 FL (ref 82–98)
MCV RBC AUTO: 82 FL (ref 82–98)
MCV RBC AUTO: 84 FL (ref 82–98)
MCV RBC AUTO: 86 FL (ref 82–98)
MCV RBC AUTO: 87 FL (ref 82–98)
MCV RBC AUTO: 88 FL (ref 82–98)
MCV RBC AUTO: 89 FL (ref 82–98)
MCV RBC AUTO: 90 FL (ref 82–98)
MCV RBC AUTO: 91 FL (ref 82–98)
MCV RBC AUTO: 93 FL (ref 82–98)
MCV RBC AUTO: 93 FL (ref 82–98)
MONOCYTES # BLD AUTO: 0.22 THOUSAND/UL (ref 0–1.22)
MONOCYTES # BLD AUTO: 0.44 THOUSAND/ÂΜL (ref 0.17–1.22)
MONOCYTES # BLD AUTO: 0.45 THOUSAND/ÂΜL (ref 0.17–1.22)
MONOCYTES # BLD AUTO: 0.51 THOUSAND/ÂΜL (ref 0.17–1.22)
MONOCYTES # BLD AUTO: 0.52 THOUSAND/ÂΜL (ref 0.17–1.22)
MONOCYTES # BLD AUTO: 0.53 THOUSAND/ÂΜL (ref 0.17–1.22)
MONOCYTES # BLD AUTO: 0.54 THOUSAND/ÂΜL (ref 0.17–1.22)
MONOCYTES # BLD AUTO: 0.55 THOUSAND/ÂΜL (ref 0.17–1.22)
MONOCYTES # BLD AUTO: 0.57 THOUSAND/ÂΜL (ref 0.17–1.22)
MONOCYTES # BLD AUTO: 0.57 THOUSAND/ÂΜL (ref 0.17–1.22)
MONOCYTES # BLD AUTO: 0.58 THOUSAND/ÂΜL (ref 0.17–1.22)
MONOCYTES # BLD AUTO: 0.59 THOUSAND/ÂΜL (ref 0.17–1.22)
MONOCYTES # BLD AUTO: 0.66 THOUSAND/ÂΜL (ref 0.17–1.22)
MONOCYTES # BLD AUTO: 0.76 THOUSAND/ÂΜL (ref 0.17–1.22)
MONOCYTES # BLD AUTO: 0.79 THOUSAND/ÂΜL (ref 0.17–1.22)
MONOCYTES # BLD AUTO: 0.91 THOUSAND/ÂΜL (ref 0.17–1.22)
MONOCYTES NFR BLD AUTO: 5 % (ref 4–12)
MONOCYTES NFR BLD AUTO: 6 % (ref 4–12)
MONOCYTES NFR BLD AUTO: 7 % (ref 4–12)
MONOCYTES NFR BLD AUTO: 8 % (ref 4–12)
MONOCYTES NFR BLD AUTO: 9 % (ref 4–12)
MONOCYTES NFR BLD AUTO: 9 % (ref 4–12)
MONOCYTES NFR BLD: 2 % (ref 4–12)
MUCOUS THREADS UR QL AUTO: ABNORMAL
NASAL CANNULA: 2
NEUTROPHILS # BLD AUTO: 3.78 THOUSANDS/ÂΜL (ref 1.85–7.62)
NEUTROPHILS # BLD AUTO: 4.45 THOUSANDS/ÂΜL (ref 1.85–7.62)
NEUTROPHILS # BLD AUTO: 4.85 THOUSANDS/ÂΜL (ref 1.85–7.62)
NEUTROPHILS # BLD AUTO: 5.78 THOUSANDS/ÂΜL (ref 1.85–7.62)
NEUTROPHILS # BLD AUTO: 5.87 THOUSANDS/ÂΜL (ref 1.85–7.62)
NEUTROPHILS # BLD AUTO: 6 THOUSANDS/ÂΜL (ref 1.85–7.62)
NEUTROPHILS # BLD AUTO: 6.15 THOUSANDS/ÂΜL (ref 1.85–7.62)
NEUTROPHILS # BLD AUTO: 6.15 THOUSANDS/ÂΜL (ref 1.85–7.62)
NEUTROPHILS # BLD AUTO: 6.28 THOUSANDS/ÂΜL (ref 1.85–7.62)
NEUTROPHILS # BLD AUTO: 6.58 THOUSANDS/ÂΜL (ref 1.85–7.62)
NEUTROPHILS # BLD AUTO: 6.58 THOUSANDS/ÂΜL (ref 1.85–7.62)
NEUTROPHILS # BLD AUTO: 7.91 THOUSANDS/ÂΜL (ref 1.85–7.62)
NEUTROPHILS # BLD AUTO: 8.4 THOUSANDS/ÂΜL (ref 1.85–7.62)
NEUTROPHILS # BLD AUTO: 8.57 THOUSANDS/ÂΜL (ref 1.85–7.62)
NEUTROPHILS # BLD AUTO: 8.92 THOUSANDS/ÂΜL (ref 1.85–7.62)
NEUTROPHILS # BLD MANUAL: 10.11 THOUSAND/UL (ref 1.85–7.62)
NEUTS SEG NFR BLD AUTO: 68 % (ref 43–75)
NEUTS SEG NFR BLD AUTO: 68 % (ref 43–75)
NEUTS SEG NFR BLD AUTO: 69 % (ref 43–75)
NEUTS SEG NFR BLD AUTO: 71 % (ref 43–75)
NEUTS SEG NFR BLD AUTO: 74 % (ref 43–75)
NEUTS SEG NFR BLD AUTO: 75 % (ref 43–75)
NEUTS SEG NFR BLD AUTO: 77 % (ref 43–75)
NEUTS SEG NFR BLD AUTO: 78 % (ref 43–75)
NEUTS SEG NFR BLD AUTO: 78 % (ref 43–75)
NEUTS SEG NFR BLD AUTO: 79 % (ref 43–75)
NEUTS SEG NFR BLD AUTO: 82 % (ref 43–75)
NEUTS SEG NFR BLD AUTO: 94 % (ref 43–75)
NITRITE UR QL STRIP: NEGATIVE
NON-SQ EPI CELLS URNS QL MICRO: ABNORMAL /HPF
NRBC BLD AUTO-RTO: 0 /100 WBCS
O2 CT BLDA-SCNC: 14.3 ML/DL (ref 16–23)
O2 CT BLDA-SCNC: 16.1 ML/DL (ref 16–23)
O2 CT BLDA-SCNC: 9.2 ML/DL (ref 16–23)
O2 CT BLDV-SCNC: 8.1 ML/DL
OSMOLALITY UR/SERPL-RTO: 314 MMOL/KG (ref 282–298)
OSMOLALITY UR/SERPL-RTO: 319 MMOL/KG (ref 282–298)
OXYHGB MFR BLDA: 96.6 % (ref 94–97)
OXYHGB MFR BLDA: 97.1 % (ref 94–97)
OXYHGB MFR BLDA: 98.5 % (ref 94–97)
P AXIS: 23 DEGREES
P AXIS: 46 DEGREES
PCO2 BLD: 17 MMOL/L (ref 21–32)
PCO2 BLD: 31.9 MM HG (ref 36–44)
PCO2 BLD: 45.4 MM HG (ref 42–50)
PCO2 BLDA: 33.9 MM HG (ref 36–44)
PCO2 BLDA: 42.3 MM HG (ref 36–44)
PCO2 BLDA: 45.8 MM HG (ref 36–44)
PCO2 BLDV: 45.2 MM HG (ref 42–50)
PH BLD: 7.3 [PH] (ref 7.35–7.45)
PH BLD: 7.43 [PH] (ref 7.3–7.4)
PH BLDA: 7.36 [PH] (ref 7.35–7.45)
PH BLDA: 7.41 [PH] (ref 7.35–7.45)
PH BLDA: 7.42 [PH] (ref 7.35–7.45)
PH BLDV: 7.43 [PH] (ref 7.3–7.4)
PH UR STRIP.AUTO: 5 [PH]
PHOSPHATE SERPL-MCNC: 1.5 MG/DL (ref 2.3–4.1)
PHOSPHATE SERPL-MCNC: 3.4 MG/DL (ref 2.3–4.1)
PHOSPHATE SERPL-MCNC: 3.4 MG/DL (ref 2.3–4.1)
PHOSPHATE SERPL-MCNC: 3.5 MG/DL (ref 2.3–4.1)
PHOSPHATE SERPL-MCNC: 4 MG/DL (ref 2.3–4.1)
PHOSPHATE SERPL-MCNC: 4.3 MG/DL (ref 2.3–4.1)
PHOSPHATE SERPL-MCNC: 4.6 MG/DL (ref 2.3–4.1)
PLATELET # BLD AUTO: 180 THOUSANDS/UL (ref 149–390)
PLATELET # BLD AUTO: 182 THOUSANDS/UL (ref 149–390)
PLATELET # BLD AUTO: 183 THOUSANDS/UL (ref 149–390)
PLATELET # BLD AUTO: 203 THOUSANDS/UL (ref 149–390)
PLATELET # BLD AUTO: 205 THOUSANDS/UL (ref 149–390)
PLATELET # BLD AUTO: 206 THOUSANDS/UL (ref 149–390)
PLATELET # BLD AUTO: 211 THOUSANDS/UL (ref 149–390)
PLATELET # BLD AUTO: 214 THOUSANDS/UL (ref 149–390)
PLATELET # BLD AUTO: 215 THOUSANDS/UL (ref 149–390)
PLATELET # BLD AUTO: 217 THOUSANDS/UL (ref 149–390)
PLATELET # BLD AUTO: 219 THOUSANDS/UL (ref 149–390)
PLATELET # BLD AUTO: 220 THOUSANDS/UL (ref 149–390)
PLATELET # BLD AUTO: 222 THOUSANDS/UL (ref 149–390)
PLATELET # BLD AUTO: 228 THOUSANDS/UL (ref 149–390)
PLATELET # BLD AUTO: 238 THOUSANDS/UL (ref 149–390)
PLATELET # BLD AUTO: 242 THOUSANDS/UL (ref 149–390)
PLATELET # BLD AUTO: 248 THOUSANDS/UL (ref 149–390)
PLATELET # BLD AUTO: 266 THOUSANDS/UL (ref 149–390)
PLATELET BLD QL SMEAR: ADEQUATE
PLATELET CLUMP BLD QL SMEAR: PRESENT
PMV BLD AUTO: 10 FL (ref 8.9–12.7)
PMV BLD AUTO: 10.2 FL (ref 8.9–12.7)
PMV BLD AUTO: 10.3 FL (ref 8.9–12.7)
PMV BLD AUTO: 10.7 FL (ref 8.9–12.7)
PMV BLD AUTO: 10.8 FL (ref 8.9–12.7)
PMV BLD AUTO: 9 FL (ref 8.9–12.7)
PMV BLD AUTO: 9.1 FL (ref 8.9–12.7)
PMV BLD AUTO: 9.1 FL (ref 8.9–12.7)
PMV BLD AUTO: 9.2 FL (ref 8.9–12.7)
PMV BLD AUTO: 9.4 FL (ref 8.9–12.7)
PMV BLD AUTO: 9.6 FL (ref 8.9–12.7)
PMV BLD AUTO: 9.8 FL (ref 8.9–12.7)
PMV BLD AUTO: 9.8 FL (ref 8.9–12.7)
PMV BLD AUTO: 9.9 FL (ref 8.9–12.7)
PO2 BLD: 91 MM HG (ref 75–129)
PO2 BLDA: 116 MM HG (ref 75–129)
PO2 BLDA: 122.5 MM HG (ref 75–129)
PO2 BLDA: 235.8 MM HG (ref 75–129)
PO2 BLDV: 39 MM HG (ref 35–45)
PO2 VENOUS TEMP CORRECTED: 39.3 MM HG (ref 35–45)
POTASSIUM BLD-SCNC: 3.6 MMOL/L (ref 3.5–5.3)
POTASSIUM SERPL-SCNC: 3.3 MMOL/L (ref 3.5–5.3)
POTASSIUM SERPL-SCNC: 3.4 MMOL/L (ref 3.5–5.3)
POTASSIUM SERPL-SCNC: 3.5 MMOL/L (ref 3.5–5.3)
POTASSIUM SERPL-SCNC: 3.5 MMOL/L (ref 3.5–5.3)
POTASSIUM SERPL-SCNC: 3.6 MMOL/L (ref 3.5–5.3)
POTASSIUM SERPL-SCNC: 3.7 MMOL/L (ref 3.5–5.3)
POTASSIUM SERPL-SCNC: 3.8 MMOL/L (ref 3.5–5.3)
POTASSIUM SERPL-SCNC: 3.9 MMOL/L (ref 3.5–5.3)
POTASSIUM SERPL-SCNC: 4 MMOL/L (ref 3.5–5.3)
POTASSIUM SERPL-SCNC: 4 MMOL/L (ref 3.5–5.3)
POTASSIUM SERPL-SCNC: 4.1 MMOL/L (ref 3.5–5.3)
POTASSIUM SERPL-SCNC: 4.2 MMOL/L (ref 3.5–5.3)
POTASSIUM SERPL-SCNC: 4.3 MMOL/L (ref 3.5–5.3)
POTASSIUM SERPL-SCNC: 4.4 MMOL/L (ref 3.5–5.3)
POTASSIUM SERPL-SCNC: 4.5 MMOL/L (ref 3.5–5.3)
POTASSIUM SERPL-SCNC: 4.6 MMOL/L (ref 3.5–5.3)
PR INTERVAL: 0 MS
PR INTERVAL: 121 MS
PR INTERVAL: 158 MS
PROT SERPL-MCNC: 5.9 G/DL (ref 6.4–8.4)
PROT SERPL-MCNC: 6.4 G/DL (ref 6.4–8.4)
PROT UR STRIP-MCNC: ABNORMAL MG/DL
PROTHROMBIN TIME: 14.5 SECONDS (ref 11.6–14.5)
QRS AXIS: -28 DEGREES
QRS AXIS: -36 DEGREES
QRS AXIS: -39 DEGREES
QRSD INTERVAL: 88 MS
QRSD INTERVAL: 88 MS
QRSD INTERVAL: 96 MS
QT INTERVAL: 363 MS
QT INTERVAL: 371 MS
QT INTERVAL: 375 MS
QTC INTERVAL: 405 MS
QTC INTERVAL: 442 MS
QTC INTERVAL: 452 MS
RBC # BLD AUTO: 2.31 MILLION/UL (ref 3.81–5.12)
RBC # BLD AUTO: 2.68 MILLION/UL (ref 3.81–5.12)
RBC # BLD AUTO: 2.7 MILLION/UL (ref 3.81–5.12)
RBC # BLD AUTO: 2.72 MILLION/UL (ref 3.81–5.12)
RBC # BLD AUTO: 2.72 MILLION/UL (ref 3.81–5.12)
RBC # BLD AUTO: 2.76 MILLION/UL (ref 3.81–5.12)
RBC # BLD AUTO: 2.89 MILLION/UL (ref 3.81–5.12)
RBC # BLD AUTO: 2.92 MILLION/UL (ref 3.81–5.12)
RBC # BLD AUTO: 2.94 MILLION/UL (ref 3.81–5.12)
RBC # BLD AUTO: 3 MILLION/UL (ref 3.81–5.12)
RBC # BLD AUTO: 3.05 MILLION/UL (ref 3.81–5.12)
RBC # BLD AUTO: 3.05 MILLION/UL (ref 3.81–5.12)
RBC # BLD AUTO: 3.18 MILLION/UL (ref 3.81–5.12)
RBC # BLD AUTO: 3.36 MILLION/UL (ref 3.81–5.12)
RBC # BLD AUTO: 3.45 MILLION/UL (ref 3.81–5.12)
RBC # BLD AUTO: 3.76 MILLION/UL (ref 3.81–5.12)
RBC # BLD AUTO: 3.86 MILLION/UL (ref 3.81–5.12)
RBC # BLD AUTO: 4.5 MILLION/UL (ref 3.81–5.12)
RBC #/AREA URNS AUTO: ABNORMAL /HPF
RBC MORPH BLD: NORMAL
RH BLD: POSITIVE
RH BLD: POSITIVE
SAO2 % BLD FROM PO2: 96 % (ref 60–85)
SIMV VENT INSPIRED AIR FIO2: 60
SIMV VENT PEEP: 6
SIMV VENT TIDAL VOLUME: 400
SIMV VENT: ABNORMAL
SODIUM BLD-SCNC: 147 MMOL/L (ref 136–145)
SODIUM SERPL-SCNC: 137 MMOL/L (ref 135–147)
SODIUM SERPL-SCNC: 140 MMOL/L (ref 135–147)
SODIUM SERPL-SCNC: 140 MMOL/L (ref 135–147)
SODIUM SERPL-SCNC: 142 MMOL/L (ref 135–147)
SODIUM SERPL-SCNC: 144 MMOL/L (ref 135–147)
SODIUM SERPL-SCNC: 144 MMOL/L (ref 135–147)
SODIUM SERPL-SCNC: 145 MMOL/L (ref 135–147)
SODIUM SERPL-SCNC: 147 MMOL/L (ref 135–147)
SODIUM SERPL-SCNC: 148 MMOL/L (ref 135–147)
SODIUM SERPL-SCNC: 149 MMOL/L (ref 135–147)
SODIUM SERPL-SCNC: 150 MMOL/L (ref 135–147)
SODIUM SERPL-SCNC: 153 MMOL/L (ref 135–147)
SODIUM SERPL-SCNC: 154 MMOL/L (ref 135–147)
SODIUM SERPL-SCNC: 155 MMOL/L (ref 135–147)
SODIUM SERPL-SCNC: 156 MMOL/L (ref 135–147)
SP GR UR STRIP.AUTO: 1.02 (ref 1–1.03)
SPECIMEN EXPIRATION DATE: NORMAL
SPECIMEN EXPIRATION DATE: NORMAL
SPECIMEN SOURCE: ABNORMAL
T WAVE AXIS: 138 DEGREES
T WAVE AXIS: 17 DEGREES
T WAVE AXIS: 95 DEGREES
TIBC SERPL-MCNC: 158 UG/DL (ref 250–450)
UIBC SERPL-MCNC: 113 UG/DL (ref 155–355)
UNIT DISPENSE STATUS: NORMAL
UNIT PRODUCT CODE: NORMAL
UNIT PRODUCT VOLUME: 214 ML
UNIT PRODUCT VOLUME: 300 ML
UNIT PRODUCT VOLUME: 350 ML
UNIT PRODUCT VOLUME: 350 ML
UNIT RH: NORMAL
UROBILINOGEN UR STRIP-ACNC: <2 MG/DL
VENT SIMV: 12
VENTRICULAR RATE: 70 BPM
VENTRICULAR RATE: 89 BPM
VENTRICULAR RATE: 89 BPM
WBC # BLD AUTO: 10.15 THOUSAND/UL (ref 4.31–10.16)
WBC # BLD AUTO: 10.59 THOUSAND/UL (ref 4.31–10.16)
WBC # BLD AUTO: 10.76 THOUSAND/UL (ref 4.31–10.16)
WBC # BLD AUTO: 11.06 THOUSAND/UL (ref 4.31–10.16)
WBC # BLD AUTO: 11.42 THOUSAND/UL (ref 4.31–10.16)
WBC # BLD AUTO: 3.59 THOUSAND/UL (ref 4.31–10.16)
WBC # BLD AUTO: 5.52 THOUSAND/UL (ref 4.31–10.16)
WBC # BLD AUTO: 6.39 THOUSAND/UL (ref 4.31–10.16)
WBC # BLD AUTO: 6.71 THOUSAND/UL (ref 4.31–10.16)
WBC # BLD AUTO: 7.77 THOUSAND/UL (ref 4.31–10.16)
WBC # BLD AUTO: 7.79 THOUSAND/UL (ref 4.31–10.16)
WBC # BLD AUTO: 7.85 THOUSAND/UL (ref 4.31–10.16)
WBC # BLD AUTO: 7.92 THOUSAND/UL (ref 4.31–10.16)
WBC # BLD AUTO: 8.03 THOUSAND/UL (ref 4.31–10.16)
WBC # BLD AUTO: 8.17 THOUSAND/UL (ref 4.31–10.16)
WBC # BLD AUTO: 8.28 THOUSAND/UL (ref 4.31–10.16)
WBC # BLD AUTO: 8.31 THOUSAND/UL (ref 4.31–10.16)
WBC # BLD AUTO: 8.94 THOUSAND/UL (ref 4.31–10.16)
WBC #/AREA URNS AUTO: ABNORMAL /HPF

## 2024-01-01 PROCEDURE — 82948 REAGENT STRIP/BLOOD GLUCOSE: CPT

## 2024-01-01 PROCEDURE — G1004 CDSM NDSC: HCPCS

## 2024-01-01 PROCEDURE — NC001 PR NO CHARGE: Performed by: SPECIALIST

## 2024-01-01 PROCEDURE — 94668 MNPJ CHEST WALL SBSQ: CPT

## 2024-01-01 PROCEDURE — 94760 N-INVAS EAR/PLS OXIMETRY 1: CPT

## 2024-01-01 PROCEDURE — 94669 MECHANICAL CHEST WALL OSCILL: CPT

## 2024-01-01 PROCEDURE — 85025 COMPLETE CBC W/AUTO DIFF WBC: CPT

## 2024-01-01 PROCEDURE — 82805 BLOOD GASES W/O2 SATURATION: CPT

## 2024-01-01 PROCEDURE — 94664 DEMO&/EVAL PT USE INHALER: CPT

## 2024-01-01 PROCEDURE — 61107 TDH PNXR IMPLT VENTR CATH: CPT | Performed by: STUDENT IN AN ORGANIZED HEALTH CARE EDUCATION/TRAINING PROGRAM

## 2024-01-01 PROCEDURE — 94003 VENT MGMT INPAT SUBQ DAY: CPT

## 2024-01-01 PROCEDURE — 99291 CRITICAL CARE FIRST HOUR: CPT | Performed by: INTERNAL MEDICINE

## 2024-01-01 PROCEDURE — 83735 ASSAY OF MAGNESIUM: CPT

## 2024-01-01 PROCEDURE — 80048 BASIC METABOLIC PNL TOTAL CA: CPT | Performed by: NURSE PRACTITIONER

## 2024-01-01 PROCEDURE — 97167 OT EVAL HIGH COMPLEX 60 MIN: CPT

## 2024-01-01 PROCEDURE — 85007 BL SMEAR W/DIFF WBC COUNT: CPT

## 2024-01-01 PROCEDURE — NC001 PR NO CHARGE: Performed by: INTERNAL MEDICINE

## 2024-01-01 PROCEDURE — 71045 X-RAY EXAM CHEST 1 VIEW: CPT

## 2024-01-01 PROCEDURE — 85014 HEMATOCRIT: CPT | Performed by: PSYCHIATRY & NEUROLOGY

## 2024-01-01 PROCEDURE — 99291 CRITICAL CARE FIRST HOUR: CPT | Performed by: PSYCHIATRY & NEUROLOGY

## 2024-01-01 PROCEDURE — 86901 BLOOD TYPING SEROLOGIC RH(D): CPT | Performed by: NURSE PRACTITIONER

## 2024-01-01 PROCEDURE — 70450 CT HEAD/BRAIN W/O DYE: CPT

## 2024-01-01 PROCEDURE — P9037 PLATE PHERES LEUKOREDU IRRAD: HCPCS

## 2024-01-01 PROCEDURE — 009630Z DRAINAGE OF CEREBRAL VENTRICLE WITH DRAINAGE DEVICE, PERCUTANEOUS APPROACH: ICD-10-PCS | Performed by: SPECIALIST

## 2024-01-01 PROCEDURE — 36556 INSERT NON-TUNNEL CV CATH: CPT | Performed by: SURGERY

## 2024-01-01 PROCEDURE — 99024 POSTOP FOLLOW-UP VISIT: CPT | Performed by: STUDENT IN AN ORGANIZED HEALTH CARE EDUCATION/TRAINING PROGRAM

## 2024-01-01 PROCEDURE — 94640 AIRWAY INHALATION TREATMENT: CPT

## 2024-01-01 PROCEDURE — 80048 BASIC METABOLIC PNL TOTAL CA: CPT

## 2024-01-01 PROCEDURE — 99024 POSTOP FOLLOW-UP VISIT: CPT | Performed by: NEUROLOGICAL SURGERY

## 2024-01-01 PROCEDURE — NC001 PR NO CHARGE: Performed by: FAMILY MEDICINE

## 2024-01-01 PROCEDURE — 99291 CRITICAL CARE FIRST HOUR: CPT | Performed by: SURGERY

## 2024-01-01 PROCEDURE — 85610 PROTHROMBIN TIME: CPT | Performed by: PHYSICIAN ASSISTANT

## 2024-01-01 PROCEDURE — 82805 BLOOD GASES W/O2 SATURATION: CPT | Performed by: PSYCHIATRY & NEUROLOGY

## 2024-01-01 PROCEDURE — 94002 VENT MGMT INPAT INIT DAY: CPT

## 2024-01-01 PROCEDURE — 99223 1ST HOSP IP/OBS HIGH 75: CPT | Performed by: SPECIALIST

## 2024-01-01 PROCEDURE — NC001 PR NO CHARGE: Performed by: PSYCHIATRY & NEUROLOGY

## 2024-01-01 PROCEDURE — 84295 ASSAY OF SERUM SODIUM: CPT

## 2024-01-01 PROCEDURE — 4A133J1 MONITORING OF ARTERIAL PULSE, PERIPHERAL, PERCUTANEOUS APPROACH: ICD-10-PCS | Performed by: SURGERY

## 2024-01-01 PROCEDURE — 85014 HEMATOCRIT: CPT

## 2024-01-01 PROCEDURE — 83735 ASSAY OF MAGNESIUM: CPT | Performed by: PSYCHIATRY & NEUROLOGY

## 2024-01-01 PROCEDURE — 99223 1ST HOSP IP/OBS HIGH 75: CPT | Performed by: NURSE PRACTITIONER

## 2024-01-01 PROCEDURE — 84100 ASSAY OF PHOSPHORUS: CPT

## 2024-01-01 PROCEDURE — 30233N1 TRANSFUSION OF NONAUTOLOGOUS RED BLOOD CELLS INTO PERIPHERAL VEIN, PERCUTANEOUS APPROACH: ICD-10-PCS | Performed by: STUDENT IN AN ORGANIZED HEALTH CARE EDUCATION/TRAINING PROGRAM

## 2024-01-01 PROCEDURE — 85730 THROMBOPLASTIN TIME PARTIAL: CPT | Performed by: PHYSICIAN ASSISTANT

## 2024-01-01 PROCEDURE — 99024 POSTOP FOLLOW-UP VISIT: CPT | Performed by: SPECIALIST

## 2024-01-01 PROCEDURE — 31500 INSERT EMERGENCY AIRWAY: CPT | Performed by: SURGERY

## 2024-01-01 PROCEDURE — 82728 ASSAY OF FERRITIN: CPT | Performed by: PSYCHIATRY & NEUROLOGY

## 2024-01-01 PROCEDURE — 99233 SBSQ HOSP IP/OBS HIGH 50: CPT | Performed by: STUDENT IN AN ORGANIZED HEALTH CARE EDUCATION/TRAINING PROGRAM

## 2024-01-01 PROCEDURE — 99291 CRITICAL CARE FIRST HOUR: CPT | Performed by: EMERGENCY MEDICINE

## 2024-01-01 PROCEDURE — 36569 INSJ PICC 5 YR+ W/O IMAGING: CPT

## 2024-01-01 PROCEDURE — 82330 ASSAY OF CALCIUM: CPT | Performed by: REGISTERED NURSE

## 2024-01-01 PROCEDURE — 83550 IRON BINDING TEST: CPT | Performed by: PSYCHIATRY & NEUROLOGY

## 2024-01-01 PROCEDURE — 85025 COMPLETE CBC W/AUTO DIFF WBC: CPT | Performed by: PSYCHIATRY & NEUROLOGY

## 2024-01-01 PROCEDURE — 99233 SBSQ HOSP IP/OBS HIGH 50: CPT | Performed by: EMERGENCY MEDICINE

## 2024-01-01 PROCEDURE — 97164 PT RE-EVAL EST PLAN CARE: CPT

## 2024-01-01 PROCEDURE — 5A1935Z RESPIRATORY VENTILATION, LESS THAN 24 CONSECUTIVE HOURS: ICD-10-PCS | Performed by: SURGERY

## 2024-01-01 PROCEDURE — 85025 COMPLETE CBC W/AUTO DIFF WBC: CPT | Performed by: NURSE PRACTITIONER

## 2024-01-01 PROCEDURE — 80048 BASIC METABOLIC PNL TOTAL CA: CPT | Performed by: PSYCHIATRY & NEUROLOGY

## 2024-01-01 PROCEDURE — 61343 CRNEC SOPL CRV LAM DCMPRN: CPT | Performed by: STUDENT IN AN ORGANIZED HEALTH CARE EDUCATION/TRAINING PROGRAM

## 2024-01-01 PROCEDURE — 86920 COMPATIBILITY TEST SPIN: CPT

## 2024-01-01 PROCEDURE — 70496 CT ANGIOGRAPHY HEAD: CPT

## 2024-01-01 PROCEDURE — 99239 HOSP IP/OBS DSCHRG MGMT >30: CPT | Performed by: STUDENT IN AN ORGANIZED HEALTH CARE EDUCATION/TRAINING PROGRAM

## 2024-01-01 PROCEDURE — 83735 ASSAY OF MAGNESIUM: CPT | Performed by: NURSE PRACTITIONER

## 2024-01-01 PROCEDURE — 009630Z DRAINAGE OF CEREBRAL VENTRICLE WITH DRAINAGE DEVICE, PERCUTANEOUS APPROACH: ICD-10-PCS | Performed by: NEUROLOGICAL SURGERY

## 2024-01-01 PROCEDURE — 86901 BLOOD TYPING SEROLOGIC RH(D): CPT

## 2024-01-01 PROCEDURE — 0BH17EZ INSERTION OF ENDOTRACHEAL AIRWAY INTO TRACHEA, VIA NATURAL OR ARTIFICIAL OPENING: ICD-10-PCS | Performed by: SURGERY

## 2024-01-01 PROCEDURE — 84484 ASSAY OF TROPONIN QUANT: CPT

## 2024-01-01 PROCEDURE — 86900 BLOOD TYPING SEROLOGIC ABO: CPT | Performed by: NURSE PRACTITIONER

## 2024-01-01 PROCEDURE — 82330 ASSAY OF CALCIUM: CPT

## 2024-01-01 PROCEDURE — 0WP1X0Z REMOVAL OF DRAINAGE DEVICE FROM CRANIAL CAVITY, EXTERNAL APPROACH: ICD-10-PCS | Performed by: STUDENT IN AN ORGANIZED HEALTH CARE EDUCATION/TRAINING PROGRAM

## 2024-01-01 PROCEDURE — 83930 ASSAY OF BLOOD OSMOLALITY: CPT | Performed by: NURSE PRACTITIONER

## 2024-01-01 PROCEDURE — 99233 SBSQ HOSP IP/OBS HIGH 50: CPT | Performed by: SURGERY

## 2024-01-01 PROCEDURE — 80053 COMPREHEN METABOLIC PANEL: CPT | Performed by: NURSE PRACTITIONER

## 2024-01-01 PROCEDURE — 80048 BASIC METABOLIC PNL TOTAL CA: CPT | Performed by: REGISTERED NURSE

## 2024-01-01 PROCEDURE — 31500 INSERT EMERGENCY AIRWAY: CPT

## 2024-01-01 PROCEDURE — NC001 PR NO CHARGE: Performed by: STUDENT IN AN ORGANIZED HEALTH CARE EDUCATION/TRAINING PROGRAM

## 2024-01-01 PROCEDURE — 86850 RBC ANTIBODY SCREEN: CPT | Performed by: NURSE PRACTITIONER

## 2024-01-01 PROCEDURE — 86850 RBC ANTIBODY SCREEN: CPT

## 2024-01-01 PROCEDURE — 83605 ASSAY OF LACTIC ACID: CPT

## 2024-01-01 PROCEDURE — 94660 CPAP INITIATION&MGMT: CPT

## 2024-01-01 PROCEDURE — 99232 SBSQ HOSP IP/OBS MODERATE 35: CPT | Performed by: FAMILY MEDICINE

## 2024-01-01 PROCEDURE — 80048 BASIC METABOLIC PNL TOTAL CA: CPT | Performed by: SURGERY

## 2024-01-01 PROCEDURE — 82947 ASSAY GLUCOSE BLOOD QUANT: CPT

## 2024-01-01 PROCEDURE — 92610 EVALUATE SWALLOWING FUNCTION: CPT

## 2024-01-01 PROCEDURE — 82803 BLOOD GASES ANY COMBINATION: CPT

## 2024-01-01 PROCEDURE — NC001 PR NO CHARGE: Performed by: REGISTERED NURSE

## 2024-01-01 PROCEDURE — 85018 HEMOGLOBIN: CPT | Performed by: PSYCHIATRY & NEUROLOGY

## 2024-01-01 PROCEDURE — 93005 ELECTROCARDIOGRAM TRACING: CPT

## 2024-01-01 PROCEDURE — 61107 TDH PNXR IMPLT VENTR CATH: CPT | Performed by: PHYSICIAN ASSISTANT

## 2024-01-01 PROCEDURE — 00N00ZZ RELEASE BRAIN, OPEN APPROACH: ICD-10-PCS | Performed by: STUDENT IN AN ORGANIZED HEALTH CARE EDUCATION/TRAINING PROGRAM

## 2024-01-01 PROCEDURE — 84100 ASSAY OF PHOSPHORUS: CPT | Performed by: NURSE PRACTITIONER

## 2024-01-01 PROCEDURE — 0BH18EZ INSERTION OF ENDOTRACHEAL AIRWAY INTO TRACHEA, VIA NATURAL OR ARTIFICIAL OPENING ENDOSCOPIC: ICD-10-PCS | Performed by: SURGERY

## 2024-01-01 PROCEDURE — 99232 SBSQ HOSP IP/OBS MODERATE 35: CPT | Performed by: PSYCHIATRY & NEUROLOGY

## 2024-01-01 PROCEDURE — 97168 OT RE-EVAL EST PLAN CARE: CPT

## 2024-01-01 PROCEDURE — 84132 ASSAY OF SERUM POTASSIUM: CPT

## 2024-01-01 PROCEDURE — NC001 PR NO CHARGE: Performed by: SURGERY

## 2024-01-01 PROCEDURE — 85014 HEMATOCRIT: CPT | Performed by: NURSE PRACTITIONER

## 2024-01-01 PROCEDURE — 84100 ASSAY OF PHOSPHORUS: CPT | Performed by: PSYCHIATRY & NEUROLOGY

## 2024-01-01 PROCEDURE — 74018 RADEX ABDOMEN 1 VIEW: CPT

## 2024-01-01 PROCEDURE — 61107 TDH PNXR IMPLT VENTR CATH: CPT | Performed by: NEUROLOGICAL SURGERY

## 2024-01-01 PROCEDURE — 86900 BLOOD TYPING SEROLOGIC ABO: CPT

## 2024-01-01 PROCEDURE — 97535 SELF CARE MNGMENT TRAINING: CPT

## 2024-01-01 PROCEDURE — 85027 COMPLETE CBC AUTOMATED: CPT

## 2024-01-01 PROCEDURE — 85018 HEMOGLOBIN: CPT | Performed by: NURSE PRACTITIONER

## 2024-01-01 PROCEDURE — 87040 BLOOD CULTURE FOR BACTERIA: CPT

## 2024-01-01 PROCEDURE — 61210 BURR HOLE IMPLT VENTR CATH: CPT | Performed by: SPECIALIST

## 2024-01-01 PROCEDURE — 05HF33Z INSERTION OF INFUSION DEVICE INTO LEFT CEPHALIC VEIN, PERCUTANEOUS APPROACH: ICD-10-PCS | Performed by: SURGERY

## 2024-01-01 PROCEDURE — 99232 SBSQ HOSP IP/OBS MODERATE 35: CPT | Performed by: PHYSICAL MEDICINE & REHABILITATION

## 2024-01-01 PROCEDURE — 86923 COMPATIBILITY TEST ELECTRIC: CPT

## 2024-01-01 PROCEDURE — 4A133B1 MONITORING OF ARTERIAL PRESSURE, PERIPHERAL, PERCUTANEOUS APPROACH: ICD-10-PCS | Performed by: SURGERY

## 2024-01-01 PROCEDURE — 85027 COMPLETE CBC AUTOMATED: CPT | Performed by: NURSE PRACTITIONER

## 2024-01-01 PROCEDURE — 99233 SBSQ HOSP IP/OBS HIGH 50: CPT | Performed by: PSYCHIATRY & NEUROLOGY

## 2024-01-01 PROCEDURE — C1751 CATH, INF, PER/CENT/MIDLINE: HCPCS

## 2024-01-01 PROCEDURE — 99223 1ST HOSP IP/OBS HIGH 75: CPT | Performed by: SURGERY

## 2024-01-01 PROCEDURE — 03HY32Z INSERTION OF MONITORING DEVICE INTO UPPER ARTERY, PERCUTANEOUS APPROACH: ICD-10-PCS | Performed by: SURGERY

## 2024-01-01 PROCEDURE — 99232 SBSQ HOSP IP/OBS MODERATE 35: CPT | Performed by: PHYSICIAN ASSISTANT

## 2024-01-01 PROCEDURE — 83735 ASSAY OF MAGNESIUM: CPT | Performed by: REGISTERED NURSE

## 2024-01-01 PROCEDURE — 02HV33Z INSERTION OF INFUSION DEVICE INTO SUPERIOR VENA CAVA, PERCUTANEOUS APPROACH: ICD-10-PCS | Performed by: SURGERY

## 2024-01-01 PROCEDURE — 80048 BASIC METABOLIC PNL TOTAL CA: CPT | Performed by: STUDENT IN AN ORGANIZED HEALTH CARE EDUCATION/TRAINING PROGRAM

## 2024-01-01 PROCEDURE — 92526 ORAL FUNCTION THERAPY: CPT

## 2024-01-01 PROCEDURE — 81001 URINALYSIS AUTO W/SCOPE: CPT

## 2024-01-01 PROCEDURE — 30233R1 TRANSFUSION OF NONAUTOLOGOUS PLATELETS INTO PERIPHERAL VEIN, PERCUTANEOUS APPROACH: ICD-10-PCS | Performed by: STUDENT IN AN ORGANIZED HEALTH CARE EDUCATION/TRAINING PROGRAM

## 2024-01-01 PROCEDURE — 80053 COMPREHEN METABOLIC PANEL: CPT | Performed by: PSYCHIATRY & NEUROLOGY

## 2024-01-01 PROCEDURE — 97163 PT EVAL HIGH COMPLEX 45 MIN: CPT

## 2024-01-01 PROCEDURE — 5A1955Z RESPIRATORY VENTILATION, GREATER THAN 96 CONSECUTIVE HOURS: ICD-10-PCS | Performed by: SURGERY

## 2024-01-01 PROCEDURE — 85025 COMPLETE CBC W/AUTO DIFF WBC: CPT | Performed by: REGISTERED NURSE

## 2024-01-01 PROCEDURE — 36620 INSERTION CATHETER ARTERY: CPT | Performed by: EMERGENCY MEDICINE

## 2024-01-01 PROCEDURE — 70551 MRI BRAIN STEM W/O DYE: CPT

## 2024-01-01 PROCEDURE — 83540 ASSAY OF IRON: CPT | Performed by: PSYCHIATRY & NEUROLOGY

## 2024-01-01 PROCEDURE — P9016 RBC LEUKOCYTES REDUCED: HCPCS

## 2024-01-01 DEVICE — DURAGEN® PLUS DURAL REGENERATION MATRIX , 4 IN X 5 IN
Type: IMPLANTABLE DEVICE | Site: CEREBELLUM | Status: FUNCTIONAL
Brand: DURAGEN® PLUS

## 2024-01-01 RX ORDER — POTASSIUM CHLORIDE 20MEQ/15ML
40 LIQUID (ML) ORAL ONCE
Qty: 30 ML | Refills: 0 | Status: COMPLETED | OUTPATIENT
Start: 2024-01-01 | End: 2024-01-01

## 2024-01-01 RX ORDER — AMOXICILLIN 250 MG
2 CAPSULE ORAL 2 TIMES DAILY PRN
Status: DISCONTINUED | OUTPATIENT
Start: 2024-01-01 | End: 2024-01-01

## 2024-01-01 RX ORDER — INSULIN GLARGINE 100 [IU]/ML
23 INJECTION, SOLUTION SUBCUTANEOUS EVERY MORNING
Status: DISCONTINUED | OUTPATIENT
Start: 2024-01-01 | End: 2024-01-01

## 2024-01-01 RX ORDER — LABETALOL HYDROCHLORIDE 5 MG/ML
10 INJECTION, SOLUTION INTRAVENOUS EVERY 6 HOURS PRN
Status: DISCONTINUED | OUTPATIENT
Start: 2024-01-01 | End: 2024-01-01

## 2024-01-01 RX ORDER — POTASSIUM CHLORIDE 29.8 MG/ML
40 INJECTION INTRAVENOUS ONCE
Status: DISCONTINUED | OUTPATIENT
Start: 2024-01-01 | End: 2024-01-01

## 2024-01-01 RX ORDER — INSULIN LISPRO 100 [IU]/ML
4-20 INJECTION, SOLUTION INTRAVENOUS; SUBCUTANEOUS EVERY 6 HOURS SCHEDULED
Status: DISCONTINUED | OUTPATIENT
Start: 2024-01-01 | End: 2024-01-01

## 2024-01-01 RX ORDER — POTASSIUM CHLORIDE 29.8 MG/ML
40 INJECTION INTRAVENOUS ONCE
Status: COMPLETED | OUTPATIENT
Start: 2024-01-01 | End: 2024-01-01

## 2024-01-01 RX ORDER — MAGNESIUM HYDROXIDE 1200 MG/15ML
LIQUID ORAL AS NEEDED
Status: DISCONTINUED | OUTPATIENT
Start: 2024-01-01 | End: 2024-01-01 | Stop reason: HOSPADM

## 2024-01-01 RX ORDER — LISINOPRIL 20 MG/1
40 TABLET ORAL DAILY
Status: DISCONTINUED | OUTPATIENT
Start: 2024-01-01 | End: 2024-01-01

## 2024-01-01 RX ORDER — NICARDIPINE HYDROCHLORIDE 2.5 MG/ML
INJECTION INTRAVENOUS
Status: DISPENSED
Start: 2024-01-01 | End: 2024-01-01

## 2024-01-01 RX ORDER — INSULIN LISPRO 100 [IU]/ML
1-6 INJECTION, SOLUTION INTRAVENOUS; SUBCUTANEOUS
Status: DISCONTINUED | OUTPATIENT
Start: 2024-01-01 | End: 2024-01-01

## 2024-01-01 RX ORDER — LISINOPRIL 10 MG/1
10 TABLET ORAL DAILY
Status: DISCONTINUED | OUTPATIENT
Start: 2024-01-01 | End: 2024-01-01

## 2024-01-01 RX ORDER — ACETAMINOPHEN 325 MG/1
650 TABLET ORAL EVERY 6 HOURS PRN
Status: DISCONTINUED | OUTPATIENT
Start: 2024-01-01 | End: 2024-01-01

## 2024-01-01 RX ORDER — INSULIN LISPRO 100 [IU]/ML
2-12 INJECTION, SOLUTION INTRAVENOUS; SUBCUTANEOUS EVERY 6 HOURS
Status: DISCONTINUED | OUTPATIENT
Start: 2024-01-01 | End: 2024-01-01

## 2024-01-01 RX ORDER — NYSTATIN 100000 [USP'U]/G
POWDER TOPICAL 2 TIMES DAILY
Status: DISCONTINUED | OUTPATIENT
Start: 2024-01-01 | End: 2024-01-01

## 2024-01-01 RX ORDER — HALOPERIDOL 5 MG/ML
0.5 INJECTION INTRAMUSCULAR EVERY 2 HOUR PRN
Status: DISCONTINUED | OUTPATIENT
Start: 2024-01-01 | End: 2024-01-01

## 2024-01-01 RX ORDER — INSULIN GLARGINE 100 [IU]/ML
23 INJECTION, SOLUTION SUBCUTANEOUS EVERY 12 HOURS SCHEDULED
Status: DISCONTINUED | OUTPATIENT
Start: 2024-01-01 | End: 2024-01-01

## 2024-01-01 RX ORDER — INSULIN LISPRO 100 [IU]/ML
1-6 INJECTION, SOLUTION INTRAVENOUS; SUBCUTANEOUS EVERY 6 HOURS
Status: DISCONTINUED | OUTPATIENT
Start: 2024-01-01 | End: 2024-01-01

## 2024-01-01 RX ORDER — SODIUM CHLORIDE 3 G/100ML
250 INJECTION, SOLUTION INTRAVENOUS ONCE
Status: COMPLETED | OUTPATIENT
Start: 2024-01-01 | End: 2024-01-01

## 2024-01-01 RX ORDER — FENTANYL CITRATE 50 UG/ML
50 INJECTION, SOLUTION INTRAMUSCULAR; INTRAVENOUS ONCE
Status: COMPLETED | OUTPATIENT
Start: 2024-01-01 | End: 2024-01-01

## 2024-01-01 RX ORDER — ONDANSETRON 2 MG/ML
4 INJECTION INTRAMUSCULAR; INTRAVENOUS ONCE
Status: COMPLETED | OUTPATIENT
Start: 2024-01-01 | End: 2024-01-01

## 2024-01-01 RX ORDER — INSULIN LISPRO 100 [IU]/ML
1-5 INJECTION, SOLUTION INTRAVENOUS; SUBCUTANEOUS EVERY 6 HOURS SCHEDULED
Status: DISCONTINUED | OUTPATIENT
Start: 2024-01-01 | End: 2024-01-01

## 2024-01-01 RX ORDER — HYDROMORPHONE HCL/PF 1 MG/ML
0.5 SYRINGE (ML) INJECTION
Status: DISCONTINUED | OUTPATIENT
Start: 2024-01-01 | End: 2024-01-01

## 2024-01-01 RX ORDER — LISINOPRIL 20 MG/1
20 TABLET ORAL DAILY
Status: DISCONTINUED | OUTPATIENT
Start: 2024-01-01 | End: 2024-01-01

## 2024-01-01 RX ORDER — FORMOTEROL FUMARATE DIHYDRATE 20 UG/2ML
20 SOLUTION RESPIRATORY (INHALATION)
Status: DISCONTINUED | OUTPATIENT
Start: 2024-01-01 | End: 2024-01-01

## 2024-01-01 RX ORDER — PROPOFOL 10 MG/ML
20 INJECTION, EMULSION INTRAVENOUS ONCE
Status: DISCONTINUED | OUTPATIENT
Start: 2024-01-01 | End: 2024-01-01

## 2024-01-01 RX ORDER — HYDRALAZINE HYDROCHLORIDE 20 MG/ML
10 INJECTION INTRAMUSCULAR; INTRAVENOUS EVERY 6 HOURS PRN
Status: DISCONTINUED | OUTPATIENT
Start: 2024-01-01 | End: 2024-01-01

## 2024-01-01 RX ORDER — DEXTROSE MONOHYDRATE 25 G/50ML
INJECTION, SOLUTION INTRAVENOUS
Status: COMPLETED
Start: 2024-01-01 | End: 2024-01-01

## 2024-01-01 RX ORDER — INSULIN LISPRO 100 [IU]/ML
1-6 INJECTION, SOLUTION INTRAVENOUS; SUBCUTANEOUS EVERY 6 HOURS SCHEDULED
Status: DISCONTINUED | OUTPATIENT
Start: 2024-01-01 | End: 2024-01-01

## 2024-01-01 RX ORDER — INSULIN LISPRO 100 [IU]/ML
5 INJECTION, SOLUTION INTRAVENOUS; SUBCUTANEOUS
Status: DISCONTINUED | OUTPATIENT
Start: 2024-01-01 | End: 2024-01-01

## 2024-01-01 RX ORDER — 3% SODIUM CHLORIDE 3 G/100ML
25 INJECTION, SOLUTION INTRAVENOUS CONTINUOUS
Status: DISCONTINUED | OUTPATIENT
Start: 2024-01-01 | End: 2024-01-01

## 2024-01-01 RX ORDER — POTASSIUM CHLORIDE 20MEQ/15ML
40 LIQUID (ML) ORAL ONCE
Status: COMPLETED | OUTPATIENT
Start: 2024-01-01 | End: 2024-01-01

## 2024-01-01 RX ORDER — PROPOFOL 10 MG/ML
5-50 INJECTION, EMULSION INTRAVENOUS
Status: DISCONTINUED | OUTPATIENT
Start: 2024-01-01 | End: 2024-01-01

## 2024-01-01 RX ORDER — INSULIN GLARGINE 100 [IU]/ML
10 INJECTION, SOLUTION SUBCUTANEOUS
Status: DISCONTINUED | OUTPATIENT
Start: 2024-01-01 | End: 2024-01-01

## 2024-01-01 RX ORDER — HYDRALAZINE HYDROCHLORIDE 20 MG/ML
10 INJECTION INTRAMUSCULAR; INTRAVENOUS EVERY 4 HOURS PRN
Status: DISCONTINUED | OUTPATIENT
Start: 2024-01-01 | End: 2024-01-01

## 2024-01-01 RX ORDER — SODIUM CHLORIDE FOR INHALATION 3 %
4 VIAL, NEBULIZER (ML) INHALATION
Status: DISCONTINUED | OUTPATIENT
Start: 2024-01-01 | End: 2024-01-01

## 2024-01-01 RX ORDER — POTASSIUM CHLORIDE 14.9 MG/ML
20 INJECTION INTRAVENOUS ONCE
Status: COMPLETED | OUTPATIENT
Start: 2024-01-01 | End: 2024-01-01

## 2024-01-01 RX ORDER — LEVALBUTEROL INHALATION SOLUTION 1.25 MG/3ML
1.25 SOLUTION RESPIRATORY (INHALATION)
Status: DISCONTINUED | OUTPATIENT
Start: 2024-01-01 | End: 2024-01-01

## 2024-01-01 RX ORDER — INSULIN GLARGINE 100 [IU]/ML
18 INJECTION, SOLUTION SUBCUTANEOUS EVERY 12 HOURS SCHEDULED
Status: DISCONTINUED | OUTPATIENT
Start: 2024-01-01 | End: 2024-01-01

## 2024-01-01 RX ORDER — HEPARIN SODIUM 5000 [USP'U]/ML
5000 INJECTION, SOLUTION INTRAVENOUS; SUBCUTANEOUS EVERY 8 HOURS SCHEDULED
Status: DISCONTINUED | OUTPATIENT
Start: 2024-01-01 | End: 2024-01-01

## 2024-01-01 RX ORDER — SUCCINYLCHOLINE/SOD CL,ISO/PF 100 MG/5ML
100 SYRINGE (ML) INTRAVENOUS ONCE
Status: COMPLETED | OUTPATIENT
Start: 2024-01-01 | End: 2024-01-01

## 2024-01-01 RX ORDER — INSULIN LISPRO 100 [IU]/ML
5 INJECTION, SOLUTION INTRAVENOUS; SUBCUTANEOUS EVERY 6 HOURS
Status: DISCONTINUED | OUTPATIENT
Start: 2024-01-01 | End: 2024-01-01

## 2024-01-01 RX ORDER — HYDROMORPHONE HCL/PF 1 MG/ML
1 SYRINGE (ML) INJECTION
Status: DISCONTINUED | OUTPATIENT
Start: 2024-01-01 | End: 2024-01-01 | Stop reason: HOSPADM

## 2024-01-01 RX ORDER — SODIUM CHLORIDE, SODIUM GLUCONATE, SODIUM ACETATE, POTASSIUM CHLORIDE, MAGNESIUM CHLORIDE, SODIUM PHOSPHATE, DIBASIC, AND POTASSIUM PHOSPHATE .53; .5; .37; .037; .03; .012; .00082 G/100ML; G/100ML; G/100ML; G/100ML; G/100ML; G/100ML; G/100ML
500 INJECTION, SOLUTION INTRAVENOUS ONCE
Status: COMPLETED | OUTPATIENT
Start: 2024-01-01 | End: 2024-01-01

## 2024-01-01 RX ORDER — INSULIN GLARGINE 100 [IU]/ML
26 INJECTION, SOLUTION SUBCUTANEOUS EVERY MORNING
Status: DISCONTINUED | OUTPATIENT
Start: 2024-01-01 | End: 2024-01-01

## 2024-01-01 RX ORDER — INSULIN GLARGINE 100 [IU]/ML
15 INJECTION, SOLUTION SUBCUTANEOUS EVERY 12 HOURS SCHEDULED
Status: DISCONTINUED | OUTPATIENT
Start: 2024-01-01 | End: 2024-01-01

## 2024-01-01 RX ORDER — POTASSIUM CHLORIDE 20MEQ/15ML
40 LIQUID (ML) ORAL ONCE
Status: DISCONTINUED | OUTPATIENT
Start: 2024-01-01 | End: 2024-01-01

## 2024-01-01 RX ORDER — FENTANYL CITRATE 50 UG/ML
50 INJECTION, SOLUTION INTRAMUSCULAR; INTRAVENOUS
Status: DISCONTINUED | OUTPATIENT
Start: 2024-01-01 | End: 2024-01-01

## 2024-01-01 RX ORDER — MIDAZOLAM HYDROCHLORIDE 2 MG/2ML
INJECTION, SOLUTION INTRAMUSCULAR; INTRAVENOUS
Status: DISCONTINUED
Start: 2024-01-01 | End: 2024-01-01 | Stop reason: WASHOUT

## 2024-01-01 RX ORDER — CARVEDILOL 6.25 MG/1
6.25 TABLET ORAL 2 TIMES DAILY WITH MEALS
Status: DISCONTINUED | OUTPATIENT
Start: 2024-01-01 | End: 2024-01-01

## 2024-01-01 RX ORDER — LISINOPRIL 10 MG/1
10 TABLET ORAL ONCE
Status: COMPLETED | OUTPATIENT
Start: 2024-01-01 | End: 2024-01-01

## 2024-01-01 RX ORDER — CLOPIDOGREL BISULFATE 75 MG/1
75 TABLET ORAL DAILY
Status: DISCONTINUED | OUTPATIENT
Start: 2024-01-01 | End: 2024-01-01

## 2024-01-01 RX ORDER — CEFAZOLIN SODIUM 1 G/50ML
1000 SOLUTION INTRAVENOUS EVERY 8 HOURS
Status: DISCONTINUED | OUTPATIENT
Start: 2024-01-01 | End: 2024-01-01

## 2024-01-01 RX ORDER — LORAZEPAM 2 MG/ML
2 INJECTION INTRAMUSCULAR
Status: DISCONTINUED | OUTPATIENT
Start: 2024-01-01 | End: 2024-01-01 | Stop reason: HOSPADM

## 2024-01-01 RX ORDER — CEFAZOLIN SODIUM 2 G/50ML
2000 SOLUTION INTRAVENOUS EVERY 8 HOURS
Status: COMPLETED | OUTPATIENT
Start: 2024-01-01 | End: 2024-01-01

## 2024-01-01 RX ORDER — POTASSIUM CHLORIDE 20 MEQ/1
40 TABLET, EXTENDED RELEASE ORAL ONCE
Qty: 2 TABLET | Refills: 0 | Status: DISCONTINUED | OUTPATIENT
Start: 2024-01-01 | End: 2024-01-01

## 2024-01-01 RX ORDER — SODIUM CHLORIDE 3 G/100ML
500 INJECTION, SOLUTION INTRAVENOUS ONCE
Status: COMPLETED | OUTPATIENT
Start: 2024-01-01 | End: 2024-01-01

## 2024-01-01 RX ORDER — LABETALOL HYDROCHLORIDE 5 MG/ML
10 INJECTION, SOLUTION INTRAVENOUS EVERY 4 HOURS PRN
Status: DISCONTINUED | OUTPATIENT
Start: 2024-01-01 | End: 2024-01-01

## 2024-01-01 RX ORDER — DEXTROSE MONOHYDRATE 25 G/50ML
50 INJECTION, SOLUTION INTRAVENOUS ONCE
Status: COMPLETED | OUTPATIENT
Start: 2024-01-01 | End: 2024-01-01

## 2024-01-01 RX ORDER — POLYETHYLENE GLYCOL 3350 17 G/17G
17 POWDER, FOR SOLUTION ORAL 2 TIMES DAILY
Status: DISCONTINUED | OUTPATIENT
Start: 2024-01-01 | End: 2024-01-01

## 2024-01-01 RX ORDER — INSULIN LISPRO 100 [IU]/ML
1-5 INJECTION, SOLUTION INTRAVENOUS; SUBCUTANEOUS
Status: DISCONTINUED | OUTPATIENT
Start: 2024-01-01 | End: 2024-01-01

## 2024-01-01 RX ORDER — BISACODYL 10 MG
10 SUPPOSITORY, RECTAL RECTAL DAILY PRN
Status: DISCONTINUED | OUTPATIENT
Start: 2024-01-01 | End: 2024-01-01

## 2024-01-01 RX ORDER — ALBUMIN, HUMAN INJ 5% 5 %
25 SOLUTION INTRAVENOUS ONCE
Status: COMPLETED | OUTPATIENT
Start: 2024-01-01 | End: 2024-01-01

## 2024-01-01 RX ORDER — INSULIN LISPRO 100 [IU]/ML
3 INJECTION, SOLUTION INTRAVENOUS; SUBCUTANEOUS EVERY 6 HOURS
Status: DISCONTINUED | OUTPATIENT
Start: 2024-01-01 | End: 2024-01-01

## 2024-01-01 RX ORDER — AMLODIPINE BESYLATE 10 MG/1
10 TABLET ORAL DAILY
Status: DISCONTINUED | OUTPATIENT
Start: 2024-01-01 | End: 2024-01-01

## 2024-01-01 RX ORDER — MAGNESIUM SULFATE 1 G/100ML
1 INJECTION INTRAVENOUS ONCE
Status: COMPLETED | OUTPATIENT
Start: 2024-01-01 | End: 2024-01-01

## 2024-01-01 RX ORDER — FENTANYL CITRATE 50 UG/ML
25 INJECTION, SOLUTION INTRAMUSCULAR; INTRAVENOUS ONCE
Status: COMPLETED | OUTPATIENT
Start: 2024-01-01 | End: 2024-01-01

## 2024-01-01 RX ORDER — INSULIN LISPRO 100 [IU]/ML
2-12 INJECTION, SOLUTION INTRAVENOUS; SUBCUTANEOUS EVERY 6 HOURS SCHEDULED
Status: DISCONTINUED | OUTPATIENT
Start: 2024-01-01 | End: 2024-01-01

## 2024-01-01 RX ORDER — HALOPERIDOL 5 MG/ML
5 INJECTION INTRAMUSCULAR EVERY 4 HOURS PRN
Status: DISCONTINUED | OUTPATIENT
Start: 2024-01-01 | End: 2024-01-01 | Stop reason: HOSPADM

## 2024-01-01 RX ORDER — AMOXICILLIN 250 MG
1 CAPSULE ORAL 2 TIMES DAILY
Status: DISCONTINUED | OUTPATIENT
Start: 2024-01-01 | End: 2024-01-01

## 2024-01-01 RX ORDER — MIDAZOLAM HYDROCHLORIDE 2 MG/2ML
INJECTION, SOLUTION INTRAMUSCULAR; INTRAVENOUS
Status: COMPLETED
Start: 2024-01-01 | End: 2024-01-01

## 2024-01-01 RX ORDER — MIDAZOLAM HYDROCHLORIDE 2 MG/2ML
1 INJECTION, SOLUTION INTRAMUSCULAR; INTRAVENOUS ONCE
Status: DISCONTINUED | OUTPATIENT
Start: 2024-01-01 | End: 2024-01-01

## 2024-01-01 RX ORDER — GLYCOPYRROLATE 0.2 MG/ML
0.2 INJECTION INTRAMUSCULAR; INTRAVENOUS
Status: DISCONTINUED | OUTPATIENT
Start: 2024-01-01 | End: 2024-01-01 | Stop reason: HOSPADM

## 2024-01-01 RX ORDER — MIDAZOLAM HYDROCHLORIDE 2 MG/2ML
1 INJECTION, SOLUTION INTRAMUSCULAR; INTRAVENOUS ONCE
Status: COMPLETED | OUTPATIENT
Start: 2024-01-01 | End: 2024-01-01

## 2024-01-01 RX ORDER — MORPHINE SULFATE 100 MG/5ML
10 SOLUTION, CONCENTRATE ORAL EVERY 6 HOURS
Status: DISCONTINUED | OUTPATIENT
Start: 2024-01-01 | End: 2024-01-01 | Stop reason: HOSPADM

## 2024-01-01 RX ORDER — CEFAZOLIN SODIUM 2 G/50ML
2000 SOLUTION INTRAVENOUS
Status: DISCONTINUED | OUTPATIENT
Start: 2024-01-01 | End: 2024-01-01

## 2024-01-01 RX ORDER — POTASSIUM CHLORIDE 20MEQ/15ML
20 LIQUID (ML) ORAL ONCE
Status: COMPLETED | OUTPATIENT
Start: 2024-01-01 | End: 2024-01-01

## 2024-01-01 RX ORDER — MANNITOL 250 MG/ML
25 INJECTION, SOLUTION INTRAVENOUS ONCE
Status: COMPLETED | OUTPATIENT
Start: 2024-01-01 | End: 2024-01-01

## 2024-01-01 RX ORDER — CEFAZOLIN SODIUM 2 G/50ML
2000 SOLUTION INTRAVENOUS EVERY 8 HOURS
Status: DISCONTINUED | OUTPATIENT
Start: 2024-01-01 | End: 2024-01-01

## 2024-01-01 RX ORDER — FENTANYL CITRATE/PF 50 MCG/ML
SYRINGE (ML) INJECTION
Status: COMPLETED
Start: 2024-01-01 | End: 2024-01-01

## 2024-01-01 RX ORDER — PROPOFOL 10 MG/ML
70 INJECTION, EMULSION INTRAVENOUS ONCE
Status: COMPLETED | OUTPATIENT
Start: 2024-01-01 | End: 2024-01-01

## 2024-01-01 RX ORDER — FERROUS SULFATE 325(65) MG
325 TABLET ORAL EVERY OTHER DAY
Status: DISCONTINUED | OUTPATIENT
Start: 2024-01-01 | End: 2024-01-01

## 2024-01-01 RX ORDER — CARVEDILOL 12.5 MG/1
12.5 TABLET ORAL 2 TIMES DAILY WITH MEALS
Status: DISCONTINUED | OUTPATIENT
Start: 2024-01-01 | End: 2024-01-01

## 2024-01-01 RX ORDER — AMOXICILLIN 250 MG
2 CAPSULE ORAL 2 TIMES DAILY
Status: DISCONTINUED | OUTPATIENT
Start: 2024-01-01 | End: 2024-01-01

## 2024-01-01 RX ORDER — METOCLOPRAMIDE HYDROCHLORIDE 5 MG/ML
10 INJECTION INTRAMUSCULAR; INTRAVENOUS ONCE
Status: COMPLETED | OUTPATIENT
Start: 2024-01-01 | End: 2024-01-01

## 2024-01-01 RX ORDER — MORPHINE SULFATE 10 MG/ML
5 INJECTION, SOLUTION INTRAMUSCULAR; INTRAVENOUS ONCE
Status: COMPLETED | OUTPATIENT
Start: 2024-01-01 | End: 2024-01-01

## 2024-01-01 RX ORDER — CEFAZOLIN SODIUM 1 G/50ML
1000 SOLUTION INTRAVENOUS EVERY 12 HOURS
Status: DISCONTINUED | OUTPATIENT
Start: 2024-01-01 | End: 2024-01-01

## 2024-01-01 RX ORDER — INSULIN LISPRO 100 [IU]/ML
4-20 INJECTION, SOLUTION INTRAVENOUS; SUBCUTANEOUS
Status: DISCONTINUED | OUTPATIENT
Start: 2024-01-01 | End: 2024-01-01

## 2024-01-01 RX ORDER — ALBUTEROL SULFATE 2.5 MG/3ML
2.5 SOLUTION RESPIRATORY (INHALATION) EVERY 4 HOURS PRN
Status: DISCONTINUED | OUTPATIENT
Start: 2024-01-01 | End: 2024-01-01

## 2024-01-01 RX ORDER — POLYETHYLENE GLYCOL 3350 17 G/17G
17 POWDER, FOR SOLUTION ORAL DAILY PRN
Status: DISCONTINUED | OUTPATIENT
Start: 2024-01-01 | End: 2024-01-01

## 2024-01-01 RX ORDER — POLYETHYLENE GLYCOL 3350 17 G/17G
17 POWDER, FOR SOLUTION ORAL DAILY
Status: DISCONTINUED | OUTPATIENT
Start: 2024-01-01 | End: 2024-01-01

## 2024-01-01 RX ORDER — ATORVASTATIN CALCIUM 80 MG/1
80 TABLET, FILM COATED ORAL EVERY EVENING
Status: DISCONTINUED | OUTPATIENT
Start: 2024-01-01 | End: 2024-01-01

## 2024-01-01 RX ORDER — DIAZEPAM 5 MG/ML
5 INJECTION, SOLUTION INTRAMUSCULAR; INTRAVENOUS ONCE
Status: COMPLETED | OUTPATIENT
Start: 2024-01-01 | End: 2024-01-01

## 2024-01-01 RX ORDER — PROPOFOL 10 MG/ML
50 INJECTION, EMULSION INTRAVENOUS ONCE
Status: DISCONTINUED | OUTPATIENT
Start: 2024-01-01 | End: 2024-01-01

## 2024-01-01 RX ORDER — SUCCINYLCHOLINE/SOD CL,ISO/PF 100 MG/5ML
100 SYRINGE (ML) INTRAVENOUS ONCE
Status: DISCONTINUED | OUTPATIENT
Start: 2024-01-01 | End: 2024-01-01

## 2024-01-01 RX ORDER — MANNITOL 250 MG/ML
INJECTION, SOLUTION INTRAVENOUS
Status: COMPLETED
Start: 2024-01-01 | End: 2024-01-01

## 2024-01-01 RX ORDER — LISINOPRIL 20 MG/1
20 TABLET ORAL ONCE
Status: COMPLETED | OUTPATIENT
Start: 2024-01-01 | End: 2024-01-01

## 2024-01-01 RX ORDER — AMOXICILLIN 250 MG
1 CAPSULE ORAL
Status: DISCONTINUED | OUTPATIENT
Start: 2024-01-01 | End: 2024-01-01

## 2024-01-01 RX ORDER — MODAFINIL 100 MG/1
200 TABLET ORAL DAILY
Status: DISCONTINUED | OUTPATIENT
Start: 2024-01-01 | End: 2024-01-01

## 2024-01-01 RX ORDER — INSULIN GLARGINE 100 [IU]/ML
20 INJECTION, SOLUTION SUBCUTANEOUS EVERY 12 HOURS SCHEDULED
Status: DISCONTINUED | OUTPATIENT
Start: 2024-01-01 | End: 2024-01-01

## 2024-01-01 RX ADMIN — CHLORHEXIDINE GLUCONATE 15 ML: 1.2 SOLUTION ORAL at 21:44

## 2024-01-01 RX ADMIN — MICONAZOLE NITRATE: 20 CREAM TOPICAL at 08:27

## 2024-01-01 RX ADMIN — FENTANYL CITRATE 25 MCG: 50 INJECTION, SOLUTION INTRAMUSCULAR; INTRAVENOUS at 13:15

## 2024-01-01 RX ADMIN — LEVALBUTEROL HYDROCHLORIDE 1.25 MG: 1.25 SOLUTION RESPIRATORY (INHALATION) at 15:10

## 2024-01-01 RX ADMIN — AMLODIPINE BESYLATE 10 MG: 10 TABLET ORAL at 09:46

## 2024-01-01 RX ADMIN — FENTANYL CITRATE 50 MCG: 50 INJECTION, SOLUTION INTRAMUSCULAR; INTRAVENOUS at 12:17

## 2024-01-01 RX ADMIN — INSULIN LISPRO 3 UNITS: 100 INJECTION, SOLUTION INTRAVENOUS; SUBCUTANEOUS at 06:18

## 2024-01-01 RX ADMIN — FENTANYL CITRATE 50 MCG: 50 INJECTION INTRAMUSCULAR; INTRAVENOUS at 12:17

## 2024-01-01 RX ADMIN — CHLORHEXIDINE GLUCONATE 15 ML: 1.2 SOLUTION ORAL at 23:01

## 2024-01-01 RX ADMIN — HYDRALAZINE HYDROCHLORIDE 10 MG: 20 INJECTION, SOLUTION INTRAMUSCULAR; INTRAVENOUS at 01:08

## 2024-01-01 RX ADMIN — LEVALBUTEROL HYDROCHLORIDE 1.25 MG: 1.25 SOLUTION RESPIRATORY (INHALATION) at 07:29

## 2024-01-01 RX ADMIN — FORMOTEROL FUMARATE DIHYDRATE 20 MCG: 20 SOLUTION RESPIRATORY (INHALATION) at 19:14

## 2024-01-01 RX ADMIN — ATORVASTATIN CALCIUM 80 MG: 80 TABLET, FILM COATED ORAL at 17:03

## 2024-01-01 RX ADMIN — LEVALBUTEROL HYDROCHLORIDE 1.25 MG: 1.25 SOLUTION RESPIRATORY (INHALATION) at 08:12

## 2024-01-01 RX ADMIN — HYDRALAZINE HYDROCHLORIDE 10 MG: 20 INJECTION, SOLUTION INTRAMUSCULAR; INTRAVENOUS at 02:47

## 2024-01-01 RX ADMIN — LEVALBUTEROL HYDROCHLORIDE 1.25 MG: 1.25 SOLUTION RESPIRATORY (INHALATION) at 14:48

## 2024-01-01 RX ADMIN — CARVEDILOL 12.5 MG: 12.5 TABLET, FILM COATED ORAL at 09:26

## 2024-01-01 RX ADMIN — INSULIN LISPRO 3 UNITS: 100 INJECTION, SOLUTION INTRAVENOUS; SUBCUTANEOUS at 15:14

## 2024-01-01 RX ADMIN — POLYETHYLENE GLYCOL 3350 17 G: 17 POWDER, FOR SOLUTION ORAL at 08:10

## 2024-01-01 RX ADMIN — CARVEDILOL 12.5 MG: 12.5 TABLET, FILM COATED ORAL at 09:54

## 2024-01-01 RX ADMIN — CEFAZOLIN SODIUM 2000 MG: 2 SOLUTION INTRAVENOUS at 17:05

## 2024-01-01 RX ADMIN — SODIUM CHLORIDE SOLN NEBU 3% 4 ML: 3 NEBU SOLN at 14:06

## 2024-01-01 RX ADMIN — NICARDIPINE HYDROCHLORIDE 5 MG/HR: 2.5 INJECTION, SOLUTION INTRAVENOUS at 12:05

## 2024-01-01 RX ADMIN — ASPIRIN 81 MG: 81 TABLET, COATED ORAL at 08:21

## 2024-01-01 RX ADMIN — FORMOTEROL FUMARATE DIHYDRATE 20 MCG: 20 SOLUTION RESPIRATORY (INHALATION) at 19:07

## 2024-01-01 RX ADMIN — MICONAZOLE NITRATE: 20 CREAM TOPICAL at 09:11

## 2024-01-01 RX ADMIN — SODIUM CHLORIDE SOLN NEBU 3% 4 ML: 3 NEBU SOLN at 12:54

## 2024-01-01 RX ADMIN — NICARDIPINE HYDROCHLORIDE 7.5 MG/HR: 2.5 INJECTION, SOLUTION INTRAVENOUS at 21:34

## 2024-01-01 RX ADMIN — SODIUM CHLORIDE SOLN NEBU 3% 4 ML: 3 NEBU SOLN at 13:27

## 2024-01-01 RX ADMIN — CHLORHEXIDINE GLUCONATE 15 ML: 1.2 SOLUTION ORAL at 10:49

## 2024-01-01 RX ADMIN — FERROUS SULFATE TAB 325 MG (65 MG ELEMENTAL FE) 325 MG: 325 (65 FE) TAB at 08:10

## 2024-01-01 RX ADMIN — NICARDIPINE HYDROCHLORIDE 7.5 MG/HR: 2.5 INJECTION, SOLUTION INTRAVENOUS at 18:07

## 2024-01-01 RX ADMIN — SODIUM CHLORIDE 1.5 UNITS/HR: 9 INJECTION, SOLUTION INTRAVENOUS at 22:03

## 2024-01-01 RX ADMIN — POLYETHYLENE GLYCOL 3350 17 G: 17 POWDER, FOR SOLUTION ORAL at 08:26

## 2024-01-01 RX ADMIN — PROPOFOL 30 MCG/KG/MIN: 10 INJECTION, EMULSION INTRAVENOUS at 21:19

## 2024-01-01 RX ADMIN — FERROUS SULFATE TAB 325 MG (65 MG ELEMENTAL FE) 325 MG: 325 (65 FE) TAB at 09:01

## 2024-01-01 RX ADMIN — INSULIN GLARGINE 15 UNITS: 100 INJECTION, SOLUTION SUBCUTANEOUS at 09:21

## 2024-01-01 RX ADMIN — CEFAZOLIN SODIUM 2000 MG: 2 SOLUTION INTRAVENOUS at 09:49

## 2024-01-01 RX ADMIN — INSULIN LISPRO 4 UNITS: 100 INJECTION, SOLUTION INTRAVENOUS; SUBCUTANEOUS at 00:12

## 2024-01-01 RX ADMIN — HEPARIN SODIUM 5000 UNITS: 5000 INJECTION INTRAVENOUS; SUBCUTANEOUS at 06:15

## 2024-01-01 RX ADMIN — SODIUM CHLORIDE SOLN NEBU 3% 4 ML: 3 NEBU SOLN at 07:40

## 2024-01-01 RX ADMIN — SENNOSIDES, DOCUSATE SODIUM 2 TABLET: 8.6; 5 TABLET ORAL at 08:15

## 2024-01-01 RX ADMIN — CARVEDILOL 12.5 MG: 12.5 TABLET, FILM COATED ORAL at 08:43

## 2024-01-01 RX ADMIN — INSULIN LISPRO 2 UNITS: 100 INJECTION, SOLUTION INTRAVENOUS; SUBCUTANEOUS at 06:03

## 2024-01-01 RX ADMIN — CHLORHEXIDINE GLUCONATE 15 ML: 1.2 SOLUTION ORAL at 21:24

## 2024-01-01 RX ADMIN — PROPOFOL 10 MCG/KG/MIN: 10 INJECTION, EMULSION INTRAVENOUS at 04:49

## 2024-01-01 RX ADMIN — NICARDIPINE HYDROCHLORIDE 7.5 MG/HR: 2.5 INJECTION, SOLUTION INTRAVENOUS at 17:29

## 2024-01-01 RX ADMIN — FORMOTEROL FUMARATE DIHYDRATE 20 MCG: 20 SOLUTION RESPIRATORY (INHALATION) at 08:09

## 2024-01-01 RX ADMIN — Medication 10 MG: at 21:40

## 2024-01-01 RX ADMIN — ATORVASTATIN CALCIUM 80 MG: 80 TABLET, FILM COATED ORAL at 17:25

## 2024-01-01 RX ADMIN — MICONAZOLE NITRATE: 20 CREAM TOPICAL at 18:23

## 2024-01-01 RX ADMIN — ATORVASTATIN CALCIUM 80 MG: 80 TABLET, FILM COATED ORAL at 17:07

## 2024-01-01 RX ADMIN — INSULIN LISPRO 3 UNITS: 100 INJECTION, SOLUTION INTRAVENOUS; SUBCUTANEOUS at 18:23

## 2024-01-01 RX ADMIN — INSULIN LISPRO 4 UNITS: 100 INJECTION, SOLUTION INTRAVENOUS; SUBCUTANEOUS at 06:15

## 2024-01-01 RX ADMIN — LEVALBUTEROL HYDROCHLORIDE 1.25 MG: 1.25 SOLUTION RESPIRATORY (INHALATION) at 14:32

## 2024-01-01 RX ADMIN — LISINOPRIL 40 MG: 20 TABLET ORAL at 09:55

## 2024-01-01 RX ADMIN — LEVALBUTEROL HYDROCHLORIDE 1.25 MG: 1.25 SOLUTION RESPIRATORY (INHALATION) at 19:07

## 2024-01-01 RX ADMIN — CHLORHEXIDINE GLUCONATE 15 ML: 1.2 SOLUTION ORAL at 21:36

## 2024-01-01 RX ADMIN — INSULIN GLARGINE 20 UNITS: 100 INJECTION, SOLUTION SUBCUTANEOUS at 20:21

## 2024-01-01 RX ADMIN — NICARDIPINE HYDROCHLORIDE 5 MG/HR: 2.5 INJECTION, SOLUTION INTRAVENOUS at 15:07

## 2024-01-01 RX ADMIN — MODAFINIL 200 MG: 100 TABLET ORAL at 09:23

## 2024-01-01 RX ADMIN — FORMOTEROL FUMARATE DIHYDRATE 20 MCG: 20 SOLUTION RESPIRATORY (INHALATION) at 07:29

## 2024-01-01 RX ADMIN — LISINOPRIL 20 MG: 20 TABLET ORAL at 09:24

## 2024-01-01 RX ADMIN — POLYETHYLENE GLYCOL 3350 17 G: 17 POWDER, FOR SOLUTION ORAL at 10:00

## 2024-01-01 RX ADMIN — HEPARIN SODIUM 5000 UNITS: 5000 INJECTION INTRAVENOUS; SUBCUTANEOUS at 05:56

## 2024-01-01 RX ADMIN — CEFAZOLIN SODIUM 2000 MG: 2 SOLUTION INTRAVENOUS at 08:30

## 2024-01-01 RX ADMIN — LISINOPRIL 40 MG: 20 TABLET ORAL at 09:09

## 2024-01-01 RX ADMIN — CEFAZOLIN SODIUM 1000 MG: 1 SOLUTION INTRAVENOUS at 17:14

## 2024-01-01 RX ADMIN — CHLORHEXIDINE GLUCONATE 15 ML: 1.2 SOLUTION ORAL at 21:00

## 2024-01-01 RX ADMIN — SODIUM CHLORIDE SOLN NEBU 3% 4 ML: 3 NEBU SOLN at 08:07

## 2024-01-01 RX ADMIN — NICARDIPINE HYDROCHLORIDE 2.5 MG/HR: 2.5 INJECTION, SOLUTION INTRAVENOUS at 09:51

## 2024-01-01 RX ADMIN — INSULIN LISPRO 8 UNITS: 100 INJECTION, SOLUTION INTRAVENOUS; SUBCUTANEOUS at 17:38

## 2024-01-01 RX ADMIN — MIDAZOLAM 1 MG: 1 INJECTION INTRAMUSCULAR; INTRAVENOUS at 17:32

## 2024-01-01 RX ADMIN — Medication 10 MG: at 23:46

## 2024-01-01 RX ADMIN — GLYCOPYRROLATE 0.2 MG: 0.2 INJECTION, SOLUTION INTRAMUSCULAR; INTRAVENOUS at 05:01

## 2024-01-01 RX ADMIN — SENNOSIDES, DOCUSATE SODIUM 2 TABLET: 8.6; 5 TABLET ORAL at 09:10

## 2024-01-01 RX ADMIN — CHLORHEXIDINE GLUCONATE 15 ML: 1.2 SOLUTION ORAL at 21:37

## 2024-01-01 RX ADMIN — CHLORHEXIDINE GLUCONATE 15 ML: 1.2 SOLUTION ORAL at 21:09

## 2024-01-01 RX ADMIN — SODIUM CHLORIDE SOLN NEBU 3% 4 ML: 3 NEBU SOLN at 01:42

## 2024-01-01 RX ADMIN — SODIUM CHLORIDE 50 ML/HR: 3 INJECTION, SOLUTION INTRAVENOUS at 06:40

## 2024-01-01 RX ADMIN — SODIUM CHLORIDE SOLN NEBU 3% 4 ML: 3 NEBU SOLN at 13:24

## 2024-01-01 RX ADMIN — CEFAZOLIN SODIUM 1000 MG: 1 SOLUTION INTRAVENOUS at 10:16

## 2024-01-01 RX ADMIN — CEFAZOLIN SODIUM 1000 MG: 1 SOLUTION INTRAVENOUS at 16:25

## 2024-01-01 RX ADMIN — POTASSIUM CHLORIDE 40 MEQ: 1.5 SOLUTION ORAL at 20:20

## 2024-01-01 RX ADMIN — SENNOSIDES, DOCUSATE SODIUM 1 TABLET: 8.6; 5 TABLET ORAL at 21:02

## 2024-01-01 RX ADMIN — SODIUM CHLORIDE 250 ML: 3 INJECTION, SOLUTION INTRAVENOUS at 08:19

## 2024-01-01 RX ADMIN — CARVEDILOL 12.5 MG: 12.5 TABLET, FILM COATED ORAL at 10:11

## 2024-01-01 RX ADMIN — LISINOPRIL 20 MG: 20 TABLET ORAL at 08:26

## 2024-01-01 RX ADMIN — NICARDIPINE HYDROCHLORIDE 12.5 MG/HR: 2.5 INJECTION, SOLUTION INTRAVENOUS at 09:05

## 2024-01-01 RX ADMIN — NICARDIPINE HYDROCHLORIDE 12.5 MG/HR: 2.5 INJECTION, SOLUTION INTRAVENOUS at 04:55

## 2024-01-01 RX ADMIN — NICARDIPINE HYDROCHLORIDE 12.5 MG/HR: 2.5 INJECTION, SOLUTION INTRAVENOUS at 06:38

## 2024-01-01 RX ADMIN — NICARDIPINE HYDROCHLORIDE 7.5 MG/HR: 2.5 INJECTION, SOLUTION INTRAVENOUS at 07:59

## 2024-01-01 RX ADMIN — NICARDIPINE HYDROCHLORIDE 12.5 MG/HR: 2.5 INJECTION, SOLUTION INTRAVENOUS at 22:27

## 2024-01-01 RX ADMIN — LISINOPRIL 20 MG: 20 TABLET ORAL at 08:10

## 2024-01-01 RX ADMIN — INSULIN LISPRO 3 UNITS: 100 INJECTION, SOLUTION INTRAVENOUS; SUBCUTANEOUS at 00:18

## 2024-01-01 RX ADMIN — INSULIN GLARGINE 15 UNITS: 100 INJECTION, SOLUTION SUBCUTANEOUS at 09:52

## 2024-01-01 RX ADMIN — CARVEDILOL 12.5 MG: 12.5 TABLET, FILM COATED ORAL at 16:55

## 2024-01-01 RX ADMIN — SENNOSIDES, DOCUSATE SODIUM 1 TABLET: 8.6; 5 TABLET ORAL at 08:10

## 2024-01-01 RX ADMIN — CARVEDILOL 6.25 MG: 6.25 TABLET, FILM COATED ORAL at 09:52

## 2024-01-01 RX ADMIN — POTASSIUM CHLORIDE 40 MEQ: 29.8 INJECTION, SOLUTION INTRAVENOUS at 01:46

## 2024-01-01 RX ADMIN — NICARDIPINE HYDROCHLORIDE 10 MG/HR: 2.5 INJECTION, SOLUTION INTRAVENOUS at 14:34

## 2024-01-01 RX ADMIN — INSULIN GLARGINE 26 UNITS: 100 INJECTION, SOLUTION SUBCUTANEOUS at 08:23

## 2024-01-01 RX ADMIN — AMLODIPINE BESYLATE 10 MG: 10 TABLET ORAL at 10:11

## 2024-01-01 RX ADMIN — CEFAZOLIN SODIUM 1000 MG: 1 SOLUTION INTRAVENOUS at 17:37

## 2024-01-01 RX ADMIN — INSULIN LISPRO 4 UNITS: 100 INJECTION, SOLUTION INTRAVENOUS; SUBCUTANEOUS at 05:48

## 2024-01-01 RX ADMIN — CHLORHEXIDINE GLUCONATE 15 ML: 1.2 SOLUTION ORAL at 21:02

## 2024-01-01 RX ADMIN — INSULIN LISPRO 4 UNITS: 100 INJECTION, SOLUTION INTRAVENOUS; SUBCUTANEOUS at 00:23

## 2024-01-01 RX ADMIN — SODIUM CHLORIDE 250 ML: 0.9 INJECTION, SOLUTION INTRAVENOUS at 08:45

## 2024-01-01 RX ADMIN — NICARDIPINE HYDROCHLORIDE 12.5 MG/HR: 2.5 INJECTION, SOLUTION INTRAVENOUS at 08:28

## 2024-01-01 RX ADMIN — BISACODYL 10 MG: 10 SUPPOSITORY RECTAL at 07:49

## 2024-01-01 RX ADMIN — INSULIN LISPRO 3 UNITS: 100 INJECTION, SOLUTION INTRAVENOUS; SUBCUTANEOUS at 17:27

## 2024-01-01 RX ADMIN — INSULIN GLARGINE 23 UNITS: 100 INJECTION, SOLUTION SUBCUTANEOUS at 08:30

## 2024-01-01 RX ADMIN — LABETALOL HYDROCHLORIDE 10 MG: 5 INJECTION, SOLUTION INTRAVENOUS at 13:17

## 2024-01-01 RX ADMIN — FORMOTEROL FUMARATE DIHYDRATE 20 MCG: 20 SOLUTION RESPIRATORY (INHALATION) at 08:12

## 2024-01-01 RX ADMIN — FENTANYL CITRATE 50 MCG: 50 INJECTION, SOLUTION INTRAMUSCULAR; INTRAVENOUS at 17:31

## 2024-01-01 RX ADMIN — CEFAZOLIN SODIUM 1000 MG: 1 SOLUTION INTRAVENOUS at 17:55

## 2024-01-01 RX ADMIN — POLYETHYLENE GLYCOL 3350 17 G: 17 POWDER, FOR SOLUTION ORAL at 08:17

## 2024-01-01 RX ADMIN — HEPARIN SODIUM 5000 UNITS: 5000 INJECTION INTRAVENOUS; SUBCUTANEOUS at 14:13

## 2024-01-01 RX ADMIN — FERROUS SULFATE TAB 325 MG (65 MG ELEMENTAL FE) 325 MG: 325 (65 FE) TAB at 07:56

## 2024-01-01 RX ADMIN — PROPOFOL 5 MCG/KG/MIN: 10 INJECTION, EMULSION INTRAVENOUS at 18:06

## 2024-01-01 RX ADMIN — LEVALBUTEROL HYDROCHLORIDE 1.25 MG: 1.25 SOLUTION RESPIRATORY (INHALATION) at 19:16

## 2024-01-01 RX ADMIN — CARVEDILOL 6.25 MG: 6.25 TABLET, FILM COATED ORAL at 17:03

## 2024-01-01 RX ADMIN — AMLODIPINE BESYLATE 10 MG: 10 TABLET ORAL at 09:02

## 2024-01-01 RX ADMIN — MICONAZOLE NITRATE: 20 CREAM TOPICAL at 18:22

## 2024-01-01 RX ADMIN — INSULIN LISPRO 5 UNITS: 100 INJECTION, SOLUTION INTRAVENOUS; SUBCUTANEOUS at 17:18

## 2024-01-01 RX ADMIN — CEFAZOLIN SODIUM 1000 MG: 1 SOLUTION INTRAVENOUS at 00:45

## 2024-01-01 RX ADMIN — CEFAZOLIN SODIUM 1000 MG: 1 SOLUTION INTRAVENOUS at 08:49

## 2024-01-01 RX ADMIN — FORMOTEROL FUMARATE DIHYDRATE 20 MCG: 20 SOLUTION RESPIRATORY (INHALATION) at 19:16

## 2024-01-01 RX ADMIN — HEPARIN SODIUM 5000 UNITS: 5000 INJECTION INTRAVENOUS; SUBCUTANEOUS at 15:54

## 2024-01-01 RX ADMIN — NICARDIPINE HYDROCHLORIDE 5 MG/HR: 2.5 INJECTION, SOLUTION INTRAVENOUS at 14:11

## 2024-01-01 RX ADMIN — CHLORHEXIDINE GLUCONATE 15 ML: 1.2 SOLUTION ORAL at 10:19

## 2024-01-01 RX ADMIN — MAGNESIUM SULFATE HEPTAHYDRATE 1 G: 1 INJECTION, SOLUTION INTRAVENOUS at 08:05

## 2024-01-01 RX ADMIN — HEPARIN SODIUM 5000 UNITS: 5000 INJECTION INTRAVENOUS; SUBCUTANEOUS at 13:53

## 2024-01-01 RX ADMIN — Medication 100 MG: at 17:58

## 2024-01-01 RX ADMIN — CEFAZOLIN SODIUM 1000 MG: 1 SOLUTION INTRAVENOUS at 00:49

## 2024-01-01 RX ADMIN — HEPARIN SODIUM 5000 UNITS: 5000 INJECTION INTRAVENOUS; SUBCUTANEOUS at 05:58

## 2024-01-01 RX ADMIN — CARVEDILOL 12.5 MG: 12.5 TABLET, FILM COATED ORAL at 17:40

## 2024-01-01 RX ADMIN — INSULIN GLARGINE 15 UNITS: 100 INJECTION, SOLUTION SUBCUTANEOUS at 09:11

## 2024-01-01 RX ADMIN — INSULIN GLARGINE 15 UNITS: 100 INJECTION, SOLUTION SUBCUTANEOUS at 10:56

## 2024-01-01 RX ADMIN — INSULIN GLARGINE 15 UNITS: 100 INJECTION, SOLUTION SUBCUTANEOUS at 21:21

## 2024-01-01 RX ADMIN — POLYETHYLENE GLYCOL 3350 17 G: 17 POWDER, FOR SOLUTION ORAL at 09:10

## 2024-01-01 RX ADMIN — FORMOTEROL FUMARATE DIHYDRATE 20 MCG: 20 SOLUTION RESPIRATORY (INHALATION) at 19:41

## 2024-01-01 RX ADMIN — HEPARIN SODIUM 5000 UNITS: 5000 INJECTION INTRAVENOUS; SUBCUTANEOUS at 21:36

## 2024-01-01 RX ADMIN — FERROUS SULFATE TAB 325 MG (65 MG ELEMENTAL FE) 325 MG: 325 (65 FE) TAB at 10:12

## 2024-01-01 RX ADMIN — CEFAZOLIN SODIUM 1000 MG: 1 SOLUTION INTRAVENOUS at 08:25

## 2024-01-01 RX ADMIN — LEVALBUTEROL HYDROCHLORIDE 1.25 MG: 1.25 SOLUTION RESPIRATORY (INHALATION) at 08:16

## 2024-01-01 RX ADMIN — CARVEDILOL 12.5 MG: 12.5 TABLET, FILM COATED ORAL at 09:01

## 2024-01-01 RX ADMIN — CARVEDILOL 12.5 MG: 12.5 TABLET, FILM COATED ORAL at 08:18

## 2024-01-01 RX ADMIN — HEPARIN SODIUM 5000 UNITS: 5000 INJECTION INTRAVENOUS; SUBCUTANEOUS at 14:42

## 2024-01-01 RX ADMIN — SODIUM CHLORIDE SOLN NEBU 3% 4 ML: 3 NEBU SOLN at 19:05

## 2024-01-01 RX ADMIN — HEPARIN SODIUM 5000 UNITS: 5000 INJECTION INTRAVENOUS; SUBCUTANEOUS at 05:57

## 2024-01-01 RX ADMIN — CHLORHEXIDINE GLUCONATE 15 ML: 1.2 SOLUTION ORAL at 08:32

## 2024-01-01 RX ADMIN — INSULIN LISPRO 4 UNITS: 100 INJECTION, SOLUTION INTRAVENOUS; SUBCUTANEOUS at 00:09

## 2024-01-01 RX ADMIN — HEPARIN SODIUM 5000 UNITS: 5000 INJECTION INTRAVENOUS; SUBCUTANEOUS at 05:50

## 2024-01-01 RX ADMIN — ACETAMINOPHEN 650 MG: 325 TABLET, FILM COATED ORAL at 14:49

## 2024-01-01 RX ADMIN — LEVALBUTEROL HYDROCHLORIDE 1.25 MG: 1.25 SOLUTION RESPIRATORY (INHALATION) at 15:30

## 2024-01-01 RX ADMIN — HYDRALAZINE HYDROCHLORIDE 10 MG: 20 INJECTION, SOLUTION INTRAMUSCULAR; INTRAVENOUS at 16:26

## 2024-01-01 RX ADMIN — CEFAZOLIN SODIUM 2000 MG: 2 SOLUTION INTRAVENOUS at 00:27

## 2024-01-01 RX ADMIN — MICONAZOLE NITRATE: 20 CREAM TOPICAL at 10:01

## 2024-01-01 RX ADMIN — SODIUM CHLORIDE SOLN NEBU 3% 4 ML: 3 NEBU SOLN at 19:55

## 2024-01-01 RX ADMIN — LISINOPRIL 10 MG: 10 TABLET ORAL at 08:14

## 2024-01-01 RX ADMIN — NICARDIPINE HYDROCHLORIDE 10 MG/HR: 2.5 INJECTION, SOLUTION INTRAVENOUS at 17:13

## 2024-01-01 RX ADMIN — FORMOTEROL FUMARATE DIHYDRATE 20 MCG: 20 SOLUTION RESPIRATORY (INHALATION) at 19:58

## 2024-01-01 RX ADMIN — INSULIN GLARGINE 23 UNITS: 100 INJECTION, SOLUTION SUBCUTANEOUS at 20:53

## 2024-01-01 RX ADMIN — NICARDIPINE HYDROCHLORIDE 7.5 MG/HR: 2.5 INJECTION, SOLUTION INTRAVENOUS at 06:43

## 2024-01-01 RX ADMIN — SODIUM CHLORIDE SOLN NEBU 3% 4 ML: 3 NEBU SOLN at 01:54

## 2024-01-01 RX ADMIN — FERROUS SULFATE TAB 325 MG (65 MG ELEMENTAL FE) 325 MG: 325 (65 FE) TAB at 08:17

## 2024-01-01 RX ADMIN — LISINOPRIL 40 MG: 20 TABLET ORAL at 08:12

## 2024-01-01 RX ADMIN — CEFAZOLIN SODIUM 1000 MG: 1 SOLUTION INTRAVENOUS at 00:20

## 2024-01-01 RX ADMIN — ATORVASTATIN CALCIUM 80 MG: 80 TABLET, FILM COATED ORAL at 17:08

## 2024-01-01 RX ADMIN — HEPARIN SODIUM 5000 UNITS: 5000 INJECTION INTRAVENOUS; SUBCUTANEOUS at 21:24

## 2024-01-01 RX ADMIN — NICARDIPINE HYDROCHLORIDE 12.5 MG/HR: 2.5 INJECTION, SOLUTION INTRAVENOUS at 11:33

## 2024-01-01 RX ADMIN — FORMOTEROL FUMARATE DIHYDRATE 20 MCG: 20 SOLUTION RESPIRATORY (INHALATION) at 19:26

## 2024-01-01 RX ADMIN — PROPOFOL 30 MCG/KG/MIN: 10 INJECTION, EMULSION INTRAVENOUS at 21:37

## 2024-01-01 RX ADMIN — CHLORHEXIDINE GLUCONATE 15 ML: 1.2 SOLUTION ORAL at 09:11

## 2024-01-01 RX ADMIN — SENNOSIDES, DOCUSATE SODIUM 1 TABLET: 8.6; 5 TABLET ORAL at 22:00

## 2024-01-01 RX ADMIN — HEPARIN SODIUM 5000 UNITS: 5000 INJECTION INTRAVENOUS; SUBCUTANEOUS at 21:02

## 2024-01-01 RX ADMIN — INSULIN LISPRO 8 UNITS: 100 INJECTION, SOLUTION INTRAVENOUS; SUBCUTANEOUS at 12:03

## 2024-01-01 RX ADMIN — INSULIN LISPRO 4 UNITS: 100 INJECTION, SOLUTION INTRAVENOUS; SUBCUTANEOUS at 05:12

## 2024-01-01 RX ADMIN — SODIUM CHLORIDE SOLN NEBU 3% 4 ML: 3 NEBU SOLN at 07:39

## 2024-01-01 RX ADMIN — LISINOPRIL 10 MG: 10 TABLET ORAL at 10:59

## 2024-01-01 RX ADMIN — HEPARIN SODIUM 5000 UNITS: 5000 INJECTION INTRAVENOUS; SUBCUTANEOUS at 05:00

## 2024-01-01 RX ADMIN — FERROUS SULFATE TAB 325 MG (65 MG ELEMENTAL FE) 325 MG: 325 (65 FE) TAB at 08:13

## 2024-01-01 RX ADMIN — CHLORHEXIDINE GLUCONATE 15 ML: 1.2 SOLUTION ORAL at 21:17

## 2024-01-01 RX ADMIN — CEFAZOLIN SODIUM 1000 MG: 1 SOLUTION INTRAVENOUS at 00:19

## 2024-01-01 RX ADMIN — FERROUS SULFATE TAB 325 MG (65 MG ELEMENTAL FE) 325 MG: 325 (65 FE) TAB at 09:09

## 2024-01-01 RX ADMIN — NICARDIPINE HYDROCHLORIDE 7.5 MG/HR: 2.5 INJECTION, SOLUTION INTRAVENOUS at 03:17

## 2024-01-01 RX ADMIN — MICONAZOLE NITRATE: 20 CREAM TOPICAL at 17:27

## 2024-01-01 RX ADMIN — CARVEDILOL 6.25 MG: 6.25 TABLET, FILM COATED ORAL at 09:09

## 2024-01-01 RX ADMIN — FORMOTEROL FUMARATE DIHYDRATE 20 MCG: 20 SOLUTION RESPIRATORY (INHALATION) at 08:29

## 2024-01-01 RX ADMIN — ONDANSETRON 4 MG: 2 INJECTION INTRAMUSCULAR; INTRAVENOUS at 05:19

## 2024-01-01 RX ADMIN — DIAZEPAM 5 MG: 10 INJECTION, SOLUTION INTRAMUSCULAR; INTRAVENOUS at 18:39

## 2024-01-01 RX ADMIN — NICARDIPINE HYDROCHLORIDE 10 MG/HR: 2.5 INJECTION, SOLUTION INTRAVENOUS at 00:17

## 2024-01-01 RX ADMIN — HEPARIN SODIUM 5000 UNITS: 5000 INJECTION INTRAVENOUS; SUBCUTANEOUS at 14:20

## 2024-01-01 RX ADMIN — CHLORHEXIDINE GLUCONATE 15 ML: 1.2 SOLUTION ORAL at 08:16

## 2024-01-01 RX ADMIN — INSULIN GLARGINE 15 UNITS: 100 INJECTION, SOLUTION SUBCUTANEOUS at 21:25

## 2024-01-01 RX ADMIN — Medication 10 MG: at 14:48

## 2024-01-01 RX ADMIN — SODIUM CHLORIDE SOLN NEBU 3% 4 ML: 3 NEBU SOLN at 00:39

## 2024-01-01 RX ADMIN — LISINOPRIL 10 MG: 10 TABLET ORAL at 08:21

## 2024-01-01 RX ADMIN — GLYCOPYRROLATE 0.2 MG: 0.2 INJECTION, SOLUTION INTRAMUSCULAR; INTRAVENOUS at 00:22

## 2024-01-01 RX ADMIN — LEVALBUTEROL HYDROCHLORIDE 1.25 MG: 1.25 SOLUTION RESPIRATORY (INHALATION) at 13:27

## 2024-01-01 RX ADMIN — ONDANSETRON 4 MG: 2 INJECTION INTRAMUSCULAR; INTRAVENOUS at 08:52

## 2024-01-01 RX ADMIN — CHLORHEXIDINE GLUCONATE 15 ML: 1.2 SOLUTION ORAL at 21:19

## 2024-01-01 RX ADMIN — HEPARIN SODIUM 5000 UNITS: 5000 INJECTION INTRAVENOUS; SUBCUTANEOUS at 14:48

## 2024-01-01 RX ADMIN — MICONAZOLE NITRATE: 20 CREAM TOPICAL at 10:19

## 2024-01-01 RX ADMIN — Medication 10 MG: at 14:28

## 2024-01-01 RX ADMIN — INSULIN LISPRO 8 UNITS: 100 INJECTION, SOLUTION INTRAVENOUS; SUBCUTANEOUS at 18:23

## 2024-01-01 RX ADMIN — SODIUM CHLORIDE SOLN NEBU 3% 4 ML: 3 NEBU SOLN at 19:23

## 2024-01-01 RX ADMIN — MODAFINIL 200 MG: 100 TABLET ORAL at 09:46

## 2024-01-01 RX ADMIN — HEPARIN SODIUM 5000 UNITS: 5000 INJECTION INTRAVENOUS; SUBCUTANEOUS at 22:00

## 2024-01-01 RX ADMIN — CEFAZOLIN SODIUM 1000 MG: 1 SOLUTION INTRAVENOUS at 08:19

## 2024-01-01 RX ADMIN — LEVALBUTEROL HYDROCHLORIDE 1.25 MG: 1.25 SOLUTION RESPIRATORY (INHALATION) at 19:14

## 2024-01-01 RX ADMIN — MODAFINIL 200 MG: 100 TABLET ORAL at 09:52

## 2024-01-01 RX ADMIN — CEFAZOLIN SODIUM 2000 MG: 2 SOLUTION INTRAVENOUS at 09:19

## 2024-01-01 RX ADMIN — INSULIN GLARGINE 23 UNITS: 100 INJECTION, SOLUTION SUBCUTANEOUS at 09:46

## 2024-01-01 RX ADMIN — HEPARIN SODIUM 5000 UNITS: 5000 INJECTION INTRAVENOUS; SUBCUTANEOUS at 21:44

## 2024-01-01 RX ADMIN — SODIUM CHLORIDE SOLN NEBU 3% 4 ML: 3 NEBU SOLN at 20:33

## 2024-01-01 RX ADMIN — MICONAZOLE NITRATE: 20 CREAM TOPICAL at 10:28

## 2024-01-01 RX ADMIN — CHLORHEXIDINE GLUCONATE 15 ML: 1.2 SOLUTION ORAL at 10:05

## 2024-01-01 RX ADMIN — LEVALBUTEROL HYDROCHLORIDE 1.25 MG: 1.25 SOLUTION RESPIRATORY (INHALATION) at 07:38

## 2024-01-01 RX ADMIN — INSULIN GLARGINE 26 UNITS: 100 INJECTION, SOLUTION SUBCUTANEOUS at 10:15

## 2024-01-01 RX ADMIN — CARVEDILOL 12.5 MG: 12.5 TABLET, FILM COATED ORAL at 16:04

## 2024-01-01 RX ADMIN — CEFAZOLIN SODIUM 2000 MG: 2 SOLUTION INTRAVENOUS at 01:14

## 2024-01-01 RX ADMIN — NICARDIPINE HYDROCHLORIDE 7.5 MG/HR: 2.5 INJECTION, SOLUTION INTRAVENOUS at 10:07

## 2024-01-01 RX ADMIN — HEPARIN SODIUM 5000 UNITS: 5000 INJECTION INTRAVENOUS; SUBCUTANEOUS at 05:14

## 2024-01-01 RX ADMIN — CEFAZOLIN SODIUM 1000 MG: 1 SOLUTION INTRAVENOUS at 23:56

## 2024-01-01 RX ADMIN — CHLORHEXIDINE GLUCONATE 15 ML: 1.2 SOLUTION ORAL at 08:26

## 2024-01-01 RX ADMIN — NICARDIPINE HYDROCHLORIDE 7.5 MG/HR: 2.5 INJECTION, SOLUTION INTRAVENOUS at 22:52

## 2024-01-01 RX ADMIN — POTASSIUM CHLORIDE 20 MEQ: 14.9 INJECTION, SOLUTION INTRAVENOUS at 00:21

## 2024-01-01 RX ADMIN — NICARDIPINE HYDROCHLORIDE 12.5 MG/HR: 2.5 INJECTION, SOLUTION INTRAVENOUS at 19:13

## 2024-01-01 RX ADMIN — SENNOSIDES, DOCUSATE SODIUM 2 TABLET: 8.6; 5 TABLET ORAL at 09:55

## 2024-01-01 RX ADMIN — ATORVASTATIN CALCIUM 80 MG: 80 TABLET, FILM COATED ORAL at 17:14

## 2024-01-01 RX ADMIN — SODIUM CHLORIDE SOLN NEBU 3% 4 ML: 3 NEBU SOLN at 19:41

## 2024-01-01 RX ADMIN — MICONAZOLE NITRATE 1 APPLICATION: 20 CREAM TOPICAL at 17:57

## 2024-01-01 RX ADMIN — FERROUS SULFATE TAB 325 MG (65 MG ELEMENTAL FE) 325 MG: 325 (65 FE) TAB at 10:19

## 2024-01-01 RX ADMIN — NICARDIPINE HYDROCHLORIDE 5 MG/HR: 2.5 INJECTION, SOLUTION INTRAVENOUS at 23:40

## 2024-01-01 RX ADMIN — INSULIN LISPRO 5 UNITS: 100 INJECTION, SOLUTION INTRAVENOUS; SUBCUTANEOUS at 22:00

## 2024-01-01 RX ADMIN — HYDRALAZINE HYDROCHLORIDE 10 MG: 20 INJECTION, SOLUTION INTRAMUSCULAR; INTRAVENOUS at 08:30

## 2024-01-01 RX ADMIN — MIDAZOLAM HYDROCHLORIDE 1 MG: 2 INJECTION, SOLUTION INTRAMUSCULAR; INTRAVENOUS at 17:32

## 2024-01-01 RX ADMIN — MICONAZOLE NITRATE: 20 CREAM TOPICAL at 09:12

## 2024-01-01 RX ADMIN — INSULIN GLARGINE 15 UNITS: 100 INJECTION, SOLUTION SUBCUTANEOUS at 20:18

## 2024-01-01 RX ADMIN — HYDRALAZINE HYDROCHLORIDE 10 MG: 20 INJECTION, SOLUTION INTRAMUSCULAR; INTRAVENOUS at 23:18

## 2024-01-01 RX ADMIN — Medication 10 MG: at 03:23

## 2024-01-01 RX ADMIN — CHLORHEXIDINE GLUCONATE 15 ML: 1.2 SOLUTION ORAL at 08:48

## 2024-01-01 RX ADMIN — SODIUM CHLORIDE 50 ML/HR: 3 INJECTION, SOLUTION INTRAVENOUS at 16:09

## 2024-01-01 RX ADMIN — INSULIN LISPRO 4 UNITS: 100 INJECTION, SOLUTION INTRAVENOUS; SUBCUTANEOUS at 05:00

## 2024-01-01 RX ADMIN — CEFAZOLIN SODIUM 1000 MG: 1 SOLUTION INTRAVENOUS at 08:12

## 2024-01-01 RX ADMIN — HEPARIN SODIUM 5000 UNITS: 5000 INJECTION INTRAVENOUS; SUBCUTANEOUS at 22:03

## 2024-01-01 RX ADMIN — INSULIN LISPRO 3 UNITS: 100 INJECTION, SOLUTION INTRAVENOUS; SUBCUTANEOUS at 05:48

## 2024-01-01 RX ADMIN — NICARDIPINE HYDROCHLORIDE 7.5 MG/HR: 2.5 INJECTION, SOLUTION INTRAVENOUS at 00:28

## 2024-01-01 RX ADMIN — INSULIN GLARGINE 23 UNITS: 100 INJECTION, SOLUTION SUBCUTANEOUS at 09:09

## 2024-01-01 RX ADMIN — INSULIN LISPRO 5 UNITS: 100 INJECTION, SOLUTION INTRAVENOUS; SUBCUTANEOUS at 16:48

## 2024-01-01 RX ADMIN — HYDRALAZINE HYDROCHLORIDE 10 MG: 20 INJECTION, SOLUTION INTRAMUSCULAR; INTRAVENOUS at 09:12

## 2024-01-01 RX ADMIN — AMLODIPINE BESYLATE 10 MG: 10 TABLET ORAL at 09:09

## 2024-01-01 RX ADMIN — HYDRALAZINE HYDROCHLORIDE 10 MG: 20 INJECTION, SOLUTION INTRAMUSCULAR; INTRAVENOUS at 06:44

## 2024-01-01 RX ADMIN — LISINOPRIL 40 MG: 20 TABLET ORAL at 08:16

## 2024-01-01 RX ADMIN — FERROUS SULFATE TAB 325 MG (65 MG ELEMENTAL FE) 325 MG: 325 (65 FE) TAB at 08:32

## 2024-01-01 RX ADMIN — CHLORHEXIDINE GLUCONATE 15 ML: 1.2 SOLUTION ORAL at 08:30

## 2024-01-01 RX ADMIN — HEPARIN SODIUM 5000 UNITS: 5000 INJECTION INTRAVENOUS; SUBCUTANEOUS at 05:41

## 2024-01-01 RX ADMIN — POLYETHYLENE GLYCOL 3350 17 G: 17 POWDER, FOR SOLUTION ORAL at 08:49

## 2024-01-01 RX ADMIN — INSULIN LISPRO 2 UNITS: 100 INJECTION, SOLUTION INTRAVENOUS; SUBCUTANEOUS at 18:54

## 2024-01-01 RX ADMIN — ALBUMIN (HUMAN) 25 G: 12.5 INJECTION, SOLUTION INTRAVENOUS at 12:31

## 2024-01-01 RX ADMIN — BISACODYL 10 MG: 10 SUPPOSITORY RECTAL at 04:41

## 2024-01-01 RX ADMIN — CARVEDILOL 6.25 MG: 6.25 TABLET, FILM COATED ORAL at 17:58

## 2024-01-01 RX ADMIN — SENNOSIDES, DOCUSATE SODIUM 1 TABLET: 8.6; 5 TABLET ORAL at 08:26

## 2024-01-01 RX ADMIN — LEVALBUTEROL HYDROCHLORIDE 1.25 MG: 1.25 SOLUTION RESPIRATORY (INHALATION) at 19:23

## 2024-01-01 RX ADMIN — PROPOFOL 30 MCG/KG/MIN: 10 INJECTION, EMULSION INTRAVENOUS at 05:26

## 2024-01-01 RX ADMIN — FERROUS SULFATE TAB 325 MG (65 MG ELEMENTAL FE) 325 MG: 325 (65 FE) TAB at 09:21

## 2024-01-01 RX ADMIN — LEVALBUTEROL HYDROCHLORIDE 1.25 MG: 1.25 SOLUTION RESPIRATORY (INHALATION) at 19:58

## 2024-01-01 RX ADMIN — Medication 10 MG: at 04:41

## 2024-01-01 RX ADMIN — CARVEDILOL 12.5 MG: 12.5 TABLET, FILM COATED ORAL at 18:01

## 2024-01-01 RX ADMIN — NICARDIPINE HYDROCHLORIDE 12.5 MG/HR: 2.5 INJECTION, SOLUTION INTRAVENOUS at 02:50

## 2024-01-01 RX ADMIN — FORMOTEROL FUMARATE DIHYDRATE 20 MCG: 20 SOLUTION RESPIRATORY (INHALATION) at 19:05

## 2024-01-01 RX ADMIN — MODAFINIL 200 MG: 100 TABLET ORAL at 09:09

## 2024-01-01 RX ADMIN — LEVALBUTEROL HYDROCHLORIDE 1.25 MG: 1.25 SOLUTION RESPIRATORY (INHALATION) at 14:25

## 2024-01-01 RX ADMIN — Medication 10 MG: at 17:07

## 2024-01-01 RX ADMIN — MODAFINIL 200 MG: 100 TABLET ORAL at 10:01

## 2024-01-01 RX ADMIN — Medication 10 MG: at 09:15

## 2024-01-01 RX ADMIN — FERROUS SULFATE TAB 325 MG (65 MG ELEMENTAL FE) 325 MG: 325 (65 FE) TAB at 07:29

## 2024-01-01 RX ADMIN — SENNOSIDES, DOCUSATE SODIUM 1 TABLET: 8.6; 5 TABLET ORAL at 22:07

## 2024-01-01 RX ADMIN — LEVALBUTEROL HYDROCHLORIDE 1.25 MG: 1.25 SOLUTION RESPIRATORY (INHALATION) at 19:19

## 2024-01-01 RX ADMIN — Medication 10 MG: at 22:41

## 2024-01-01 RX ADMIN — LEVALBUTEROL HYDROCHLORIDE 1.25 MG: 1.25 SOLUTION RESPIRATORY (INHALATION) at 14:06

## 2024-01-01 RX ADMIN — CEFAZOLIN SODIUM 1000 MG: 1 SOLUTION INTRAVENOUS at 00:10

## 2024-01-01 RX ADMIN — ATORVASTATIN CALCIUM 80 MG: 80 TABLET, FILM COATED ORAL at 17:47

## 2024-01-01 RX ADMIN — AMLODIPINE BESYLATE 10 MG: 10 TABLET ORAL at 09:52

## 2024-01-01 RX ADMIN — NICARDIPINE HYDROCHLORIDE 2 MG/HR: 2.5 INJECTION, SOLUTION INTRAVENOUS at 20:05

## 2024-01-01 RX ADMIN — HYDROMORPHONE HYDROCHLORIDE 0.5 MG: 1 INJECTION, SOLUTION INTRAMUSCULAR; INTRAVENOUS; SUBCUTANEOUS at 00:22

## 2024-01-01 RX ADMIN — AMLODIPINE BESYLATE 10 MG: 10 TABLET ORAL at 08:30

## 2024-01-01 RX ADMIN — FORMOTEROL FUMARATE DIHYDRATE 20 MCG: 20 SOLUTION RESPIRATORY (INHALATION) at 19:55

## 2024-01-01 RX ADMIN — INSULIN LISPRO 4 UNITS: 100 INJECTION, SOLUTION INTRAVENOUS; SUBCUTANEOUS at 12:39

## 2024-01-01 RX ADMIN — SODIUM CHLORIDE SOLN NEBU 3% 4 ML: 3 NEBU SOLN at 08:29

## 2024-01-01 RX ADMIN — POTASSIUM CHLORIDE 20 MEQ: 1.5 SOLUTION ORAL at 08:05

## 2024-01-01 RX ADMIN — CEFAZOLIN SODIUM 2000 MG: 2 SOLUTION INTRAVENOUS at 08:46

## 2024-01-01 RX ADMIN — HEPARIN SODIUM 5000 UNITS: 5000 INJECTION INTRAVENOUS; SUBCUTANEOUS at 06:32

## 2024-01-01 RX ADMIN — CEFAZOLIN SODIUM 1000 MG: 1 SOLUTION INTRAVENOUS at 01:39

## 2024-01-01 RX ADMIN — CEFAZOLIN SODIUM 1000 MG: 1 SOLUTION INTRAVENOUS at 00:54

## 2024-01-01 RX ADMIN — INSULIN GLARGINE 10 UNITS: 100 INJECTION, SOLUTION SUBCUTANEOUS at 22:11

## 2024-01-01 RX ADMIN — NICARDIPINE HYDROCHLORIDE 7.5 MG/HR: 2.5 INJECTION, SOLUTION INTRAVENOUS at 10:54

## 2024-01-01 RX ADMIN — SENNOSIDES, DOCUSATE SODIUM 2 TABLET: 8.6; 5 TABLET ORAL at 08:13

## 2024-01-01 RX ADMIN — CEFAZOLIN SODIUM 1000 MG: 1 SOLUTION INTRAVENOUS at 10:11

## 2024-01-01 RX ADMIN — CHLORHEXIDINE GLUCONATE 15 ML: 1.2 SOLUTION ORAL at 09:22

## 2024-01-01 RX ADMIN — SODIUM CHLORIDE SOLN NEBU 3% 4 ML: 3 NEBU SOLN at 08:09

## 2024-01-01 RX ADMIN — CEFAZOLIN SODIUM 2000 MG: 2 SOLUTION INTRAVENOUS at 00:11

## 2024-01-01 RX ADMIN — NICARDIPINE HYDROCHLORIDE 12.5 MG/HR: 2.5 INJECTION, SOLUTION INTRAVENOUS at 15:51

## 2024-01-01 RX ADMIN — HEPARIN SODIUM 5000 UNITS: 5000 INJECTION INTRAVENOUS; SUBCUTANEOUS at 06:09

## 2024-01-01 RX ADMIN — SODIUM CHLORIDE SOLN NEBU 3% 4 ML: 3 NEBU SOLN at 07:32

## 2024-01-01 RX ADMIN — HEPARIN SODIUM 5000 UNITS: 5000 INJECTION INTRAVENOUS; SUBCUTANEOUS at 22:11

## 2024-01-01 RX ADMIN — HEPARIN SODIUM 5000 UNITS: 5000 INJECTION INTRAVENOUS; SUBCUTANEOUS at 14:40

## 2024-01-01 RX ADMIN — MICONAZOLE NITRATE: 20 CREAM TOPICAL at 18:02

## 2024-01-01 RX ADMIN — INSULIN LISPRO 3 UNITS: 100 INJECTION, SOLUTION INTRAVENOUS; SUBCUTANEOUS at 17:06

## 2024-01-01 RX ADMIN — FORMOTEROL FUMARATE DIHYDRATE 20 MCG: 20 SOLUTION RESPIRATORY (INHALATION) at 07:32

## 2024-01-01 RX ADMIN — POTASSIUM CHLORIDE 20 MEQ: 14.9 INJECTION, SOLUTION INTRAVENOUS at 01:12

## 2024-01-01 RX ADMIN — FERROUS SULFATE TAB 325 MG (65 MG ELEMENTAL FE) 325 MG: 325 (65 FE) TAB at 08:16

## 2024-01-01 RX ADMIN — SENNOSIDES, DOCUSATE SODIUM 2 TABLET: 8.6; 5 TABLET ORAL at 17:05

## 2024-01-01 RX ADMIN — LEVALBUTEROL HYDROCHLORIDE 1.25 MG: 1.25 SOLUTION RESPIRATORY (INHALATION) at 08:09

## 2024-01-01 RX ADMIN — CEFAZOLIN SODIUM 2000 MG: 2 SOLUTION INTRAVENOUS at 08:26

## 2024-01-01 RX ADMIN — IOHEXOL 85 ML: 350 INJECTION, SOLUTION INTRAVENOUS at 17:20

## 2024-01-01 RX ADMIN — POTASSIUM CHLORIDE 40 MEQ: 1.5 SOLUTION ORAL at 17:58

## 2024-01-01 RX ADMIN — MICONAZOLE NITRATE: 20 CREAM TOPICAL at 17:51

## 2024-01-01 RX ADMIN — INSULIN GLARGINE 15 UNITS: 100 INJECTION, SOLUTION SUBCUTANEOUS at 21:36

## 2024-01-01 RX ADMIN — CARVEDILOL 12.5 MG: 12.5 TABLET, FILM COATED ORAL at 17:25

## 2024-01-01 RX ADMIN — INSULIN LISPRO 3 UNITS: 100 INJECTION, SOLUTION INTRAVENOUS; SUBCUTANEOUS at 13:42

## 2024-01-01 RX ADMIN — NICARDIPINE HYDROCHLORIDE 12.5 MG/HR: 2.5 INJECTION, SOLUTION INTRAVENOUS at 07:08

## 2024-01-01 RX ADMIN — DEXTROSE MONOHYDRATE 50 ML: 25 INJECTION, SOLUTION INTRAVENOUS at 17:51

## 2024-01-01 RX ADMIN — SODIUM CHLORIDE 8 UNITS/HR: 9 INJECTION, SOLUTION INTRAVENOUS at 00:55

## 2024-01-01 RX ADMIN — CEFAZOLIN SODIUM 2000 MG: 2 SOLUTION INTRAVENOUS at 17:09

## 2024-01-01 RX ADMIN — INSULIN LISPRO 2 UNITS: 100 INJECTION, SOLUTION INTRAVENOUS; SUBCUTANEOUS at 05:56

## 2024-01-01 RX ADMIN — SODIUM CHLORIDE SOLN NEBU 3% 4 ML: 3 NEBU SOLN at 14:32

## 2024-01-01 RX ADMIN — SENNOSIDES, DOCUSATE SODIUM 1 TABLET: 8.6; 5 TABLET ORAL at 22:11

## 2024-01-01 RX ADMIN — CHLORHEXIDINE GLUCONATE 15 ML: 1.2 SOLUTION ORAL at 20:53

## 2024-01-01 RX ADMIN — INSULIN GLARGINE 23 UNITS: 100 INJECTION, SOLUTION SUBCUTANEOUS at 08:26

## 2024-01-01 RX ADMIN — NICARDIPINE HYDROCHLORIDE 12.5 MG/HR: 2.5 INJECTION, SOLUTION INTRAVENOUS at 18:16

## 2024-01-01 RX ADMIN — CHLORHEXIDINE GLUCONATE 15 ML: 1.2 SOLUTION ORAL at 09:18

## 2024-01-01 RX ADMIN — NICARDIPINE HYDROCHLORIDE 10 MG/HR: 2.5 INJECTION, SOLUTION INTRAVENOUS at 14:45

## 2024-01-01 RX ADMIN — NICARDIPINE HYDROCHLORIDE 7.5 MG/HR: 2.5 INJECTION, SOLUTION INTRAVENOUS at 21:41

## 2024-01-01 RX ADMIN — AMLODIPINE BESYLATE 10 MG: 10 TABLET ORAL at 09:55

## 2024-01-01 RX ADMIN — AMLODIPINE BESYLATE 10 MG: 10 TABLET ORAL at 09:22

## 2024-01-01 RX ADMIN — AMLODIPINE BESYLATE 10 MG: 10 TABLET ORAL at 08:13

## 2024-01-01 RX ADMIN — SENNOSIDES, DOCUSATE SODIUM 1 TABLET: 8.6; 5 TABLET ORAL at 21:21

## 2024-01-01 RX ADMIN — POLYETHYLENE GLYCOL 3350 17 G: 17 POWDER, FOR SOLUTION ORAL at 09:09

## 2024-01-01 RX ADMIN — CARVEDILOL 12.5 MG: 12.5 TABLET, FILM COATED ORAL at 08:13

## 2024-01-01 RX ADMIN — HYDRALAZINE HYDROCHLORIDE 10 MG: 20 INJECTION, SOLUTION INTRAMUSCULAR; INTRAVENOUS at 02:23

## 2024-01-01 RX ADMIN — CARVEDILOL 12.5 MG: 12.5 TABLET, FILM COATED ORAL at 17:14

## 2024-01-01 RX ADMIN — SENNOSIDES, DOCUSATE SODIUM 1 TABLET: 8.6; 5 TABLET ORAL at 21:37

## 2024-01-01 RX ADMIN — CEFAZOLIN SODIUM 1000 MG: 1 SOLUTION INTRAVENOUS at 16:44

## 2024-01-01 RX ADMIN — HYDRALAZINE HYDROCHLORIDE 10 MG: 20 INJECTION, SOLUTION INTRAMUSCULAR; INTRAVENOUS at 22:19

## 2024-01-01 RX ADMIN — SENNOSIDES, DOCUSATE SODIUM 2 TABLET: 8.6; 5 TABLET ORAL at 17:06

## 2024-01-01 RX ADMIN — HEPARIN SODIUM 5000 UNITS: 5000 INJECTION INTRAVENOUS; SUBCUTANEOUS at 15:10

## 2024-01-01 RX ADMIN — INSULIN LISPRO 3 UNITS: 100 INJECTION, SOLUTION INTRAVENOUS; SUBCUTANEOUS at 12:16

## 2024-01-01 RX ADMIN — NICARDIPINE HYDROCHLORIDE 7.5 MG/HR: 2.5 INJECTION, SOLUTION INTRAVENOUS at 05:28

## 2024-01-01 RX ADMIN — SODIUM CHLORIDE SOLN NEBU 3% 4 ML: 3 NEBU SOLN at 02:35

## 2024-01-01 RX ADMIN — SENNOSIDES, DOCUSATE SODIUM 1 TABLET: 8.6; 5 TABLET ORAL at 17:18

## 2024-01-01 RX ADMIN — NICARDIPINE HYDROCHLORIDE 12.5 MG/HR: 2.5 INJECTION, SOLUTION INTRAVENOUS at 04:27

## 2024-01-01 RX ADMIN — ATORVASTATIN CALCIUM 80 MG: 80 TABLET, FILM COATED ORAL at 17:37

## 2024-01-01 RX ADMIN — AMLODIPINE BESYLATE 10 MG: 10 TABLET ORAL at 08:26

## 2024-01-01 RX ADMIN — HYDRALAZINE HYDROCHLORIDE 10 MG: 20 INJECTION, SOLUTION INTRAMUSCULAR; INTRAVENOUS at 00:50

## 2024-01-01 RX ADMIN — NICARDIPINE HYDROCHLORIDE 7.5 MG/HR: 2.5 INJECTION, SOLUTION INTRAVENOUS at 08:07

## 2024-01-01 RX ADMIN — CHLORHEXIDINE GLUCONATE 15 ML: 1.2 SOLUTION ORAL at 20:14

## 2024-01-01 RX ADMIN — MODAFINIL 200 MG: 100 TABLET ORAL at 08:15

## 2024-01-01 RX ADMIN — SODIUM CHLORIDE 250 ML: 3 INJECTION, SOLUTION INTRAVENOUS at 23:16

## 2024-01-01 RX ADMIN — ATORVASTATIN CALCIUM 80 MG: 80 TABLET, FILM COATED ORAL at 18:21

## 2024-01-01 RX ADMIN — ATORVASTATIN CALCIUM 80 MG: 80 TABLET, FILM COATED ORAL at 17:58

## 2024-01-01 RX ADMIN — SODIUM CHLORIDE SOLN NEBU 3% 4 ML: 3 NEBU SOLN at 08:12

## 2024-01-01 RX ADMIN — DEXTROSE MONOHYDRATE 50 ML: 25 INJECTION, SOLUTION INTRAVENOUS at 00:15

## 2024-01-01 RX ADMIN — ATORVASTATIN CALCIUM 80 MG: 80 TABLET, FILM COATED ORAL at 17:49

## 2024-01-01 RX ADMIN — INSULIN GLARGINE 15 UNITS: 100 INJECTION, SOLUTION SUBCUTANEOUS at 09:09

## 2024-01-01 RX ADMIN — HEPARIN SODIUM 5000 UNITS: 5000 INJECTION INTRAVENOUS; SUBCUTANEOUS at 05:47

## 2024-01-01 RX ADMIN — CHLORHEXIDINE GLUCONATE 15 ML: 1.2 SOLUTION ORAL at 21:16

## 2024-01-01 RX ADMIN — MODAFINIL 200 MG: 100 TABLET ORAL at 10:11

## 2024-01-01 RX ADMIN — LEVALBUTEROL HYDROCHLORIDE 1.25 MG: 1.25 SOLUTION RESPIRATORY (INHALATION) at 14:37

## 2024-01-01 RX ADMIN — LEVALBUTEROL HYDROCHLORIDE 1.25 MG: 1.25 SOLUTION RESPIRATORY (INHALATION) at 19:05

## 2024-01-01 RX ADMIN — HEPARIN SODIUM 5000 UNITS: 5000 INJECTION INTRAVENOUS; SUBCUTANEOUS at 21:49

## 2024-01-01 RX ADMIN — CHLORHEXIDINE GLUCONATE 15 ML: 1.2 SOLUTION ORAL at 20:37

## 2024-01-01 RX ADMIN — FERROUS SULFATE TAB 325 MG (65 MG ELEMENTAL FE) 325 MG: 325 (65 FE) TAB at 08:26

## 2024-01-01 RX ADMIN — CEFAZOLIN SODIUM 2000 MG: 2 SOLUTION INTRAVENOUS at 23:48

## 2024-01-01 RX ADMIN — POTASSIUM & SODIUM PHOSPHATES POWDER PACK 280-160-250 MG 1 PACKET: 280-160-250 PACK at 07:56

## 2024-01-01 RX ADMIN — MODAFINIL 200 MG: 100 TABLET ORAL at 09:55

## 2024-01-01 RX ADMIN — CEFAZOLIN SODIUM 1000 MG: 1 SOLUTION INTRAVENOUS at 17:51

## 2024-01-01 RX ADMIN — INSULIN GLARGINE 18 UNITS: 100 INJECTION, SOLUTION SUBCUTANEOUS at 08:34

## 2024-01-01 RX ADMIN — INSULIN GLARGINE 15 UNITS: 100 INJECTION, SOLUTION SUBCUTANEOUS at 21:00

## 2024-01-01 RX ADMIN — CEFAZOLIN SODIUM 2000 MG: 2 SOLUTION INTRAVENOUS at 16:24

## 2024-01-01 RX ADMIN — LISINOPRIL 40 MG: 20 TABLET ORAL at 10:11

## 2024-01-01 RX ADMIN — HEPARIN SODIUM 5000 UNITS: 5000 INJECTION INTRAVENOUS; SUBCUTANEOUS at 05:45

## 2024-01-01 RX ADMIN — INSULIN LISPRO 3 UNITS: 100 INJECTION, SOLUTION INTRAVENOUS; SUBCUTANEOUS at 00:51

## 2024-01-01 RX ADMIN — INSULIN LISPRO 2 UNITS: 100 INJECTION, SOLUTION INTRAVENOUS; SUBCUTANEOUS at 17:58

## 2024-01-01 RX ADMIN — INSULIN LISPRO 4 UNITS: 100 INJECTION, SOLUTION INTRAVENOUS; SUBCUTANEOUS at 00:18

## 2024-01-01 RX ADMIN — HYDROMORPHONE HYDROCHLORIDE 1 MG: 1 INJECTION, SOLUTION INTRAMUSCULAR; INTRAVENOUS; SUBCUTANEOUS at 11:24

## 2024-01-01 RX ADMIN — FORMOTEROL FUMARATE DIHYDRATE 20 MCG: 20 SOLUTION RESPIRATORY (INHALATION) at 08:16

## 2024-01-01 RX ADMIN — INSULIN LISPRO 8 UNITS: 100 INJECTION, SOLUTION INTRAVENOUS; SUBCUTANEOUS at 13:44

## 2024-01-01 RX ADMIN — SODIUM CHLORIDE 500 ML: 3 INJECTION, SOLUTION INTRAVENOUS at 16:22

## 2024-01-01 RX ADMIN — INSULIN LISPRO 4 UNITS: 100 INJECTION, SOLUTION INTRAVENOUS; SUBCUTANEOUS at 12:17

## 2024-01-01 RX ADMIN — ATORVASTATIN CALCIUM 80 MG: 80 TABLET, FILM COATED ORAL at 17:06

## 2024-01-01 RX ADMIN — LEVALBUTEROL HYDROCHLORIDE 1.25 MG: 1.25 SOLUTION RESPIRATORY (INHALATION) at 07:42

## 2024-01-01 RX ADMIN — HEPARIN SODIUM 5000 UNITS: 5000 INJECTION INTRAVENOUS; SUBCUTANEOUS at 06:38

## 2024-01-01 RX ADMIN — PROPOFOL 70 MG: 10 INJECTION, EMULSION INTRAVENOUS at 17:57

## 2024-01-01 RX ADMIN — ATORVASTATIN CALCIUM 80 MG: 80 TABLET, FILM COATED ORAL at 17:05

## 2024-01-01 RX ADMIN — LISINOPRIL 10 MG: 10 TABLET ORAL at 12:53

## 2024-01-01 RX ADMIN — AMLODIPINE BESYLATE 10 MG: 10 TABLET ORAL at 12:33

## 2024-01-01 RX ADMIN — INSULIN LISPRO 4 UNITS: 100 INJECTION, SOLUTION INTRAVENOUS; SUBCUTANEOUS at 05:45

## 2024-01-01 RX ADMIN — CEFAZOLIN SODIUM 1000 MG: 1 SOLUTION INTRAVENOUS at 16:55

## 2024-01-01 RX ADMIN — ONDANSETRON 4 MG: 2 INJECTION INTRAMUSCULAR; INTRAVENOUS at 08:16

## 2024-01-01 RX ADMIN — HEPARIN SODIUM 5000 UNITS: 5000 INJECTION INTRAVENOUS; SUBCUTANEOUS at 05:27

## 2024-01-01 RX ADMIN — INSULIN GLARGINE 15 UNITS: 100 INJECTION, SOLUTION SUBCUTANEOUS at 11:37

## 2024-01-01 RX ADMIN — LISINOPRIL 20 MG: 20 TABLET ORAL at 10:38

## 2024-01-01 RX ADMIN — SODIUM CHLORIDE 250 ML: 3 INJECTION, SOLUTION INTRAVENOUS at 14:57

## 2024-01-01 RX ADMIN — INSULIN LISPRO 1 UNITS: 100 INJECTION, SOLUTION INTRAVENOUS; SUBCUTANEOUS at 12:16

## 2024-01-01 RX ADMIN — CHLORHEXIDINE GLUCONATE 15 ML: 1.2 SOLUTION ORAL at 10:11

## 2024-01-01 RX ADMIN — SODIUM CHLORIDE, SODIUM GLUCONATE, SODIUM ACETATE, POTASSIUM CHLORIDE, MAGNESIUM CHLORIDE, SODIUM PHOSPHATE, DIBASIC, AND POTASSIUM PHOSPHATE 500 ML: .53; .5; .37; .037; .03; .012; .00082 INJECTION, SOLUTION INTRAVENOUS at 09:53

## 2024-01-01 RX ADMIN — SODIUM CHLORIDE SOLN NEBU 3% 4 ML: 3 NEBU SOLN at 07:42

## 2024-01-01 RX ADMIN — INSULIN LISPRO 4 UNITS: 100 INJECTION, SOLUTION INTRAVENOUS; SUBCUTANEOUS at 12:37

## 2024-01-01 RX ADMIN — INSULIN GLARGINE 18 UNITS: 100 INJECTION, SOLUTION SUBCUTANEOUS at 21:37

## 2024-01-01 RX ADMIN — HEPARIN SODIUM 5000 UNITS: 5000 INJECTION INTRAVENOUS; SUBCUTANEOUS at 22:51

## 2024-01-01 RX ADMIN — FORMOTEROL FUMARATE DIHYDRATE 20 MCG: 20 SOLUTION RESPIRATORY (INHALATION) at 07:39

## 2024-01-01 RX ADMIN — FORMOTEROL FUMARATE DIHYDRATE 20 MCG: 20 SOLUTION RESPIRATORY (INHALATION) at 20:32

## 2024-01-01 RX ADMIN — HEPARIN SODIUM 5000 UNITS: 5000 INJECTION INTRAVENOUS; SUBCUTANEOUS at 13:08

## 2024-01-01 RX ADMIN — CARVEDILOL 12.5 MG: 12.5 TABLET, FILM COATED ORAL at 16:26

## 2024-01-01 RX ADMIN — HEPARIN SODIUM 5000 UNITS: 5000 INJECTION INTRAVENOUS; SUBCUTANEOUS at 06:04

## 2024-01-01 RX ADMIN — INSULIN LISPRO 3 UNITS: 100 INJECTION, SOLUTION INTRAVENOUS; SUBCUTANEOUS at 06:09

## 2024-01-01 RX ADMIN — FORMOTEROL FUMARATE DIHYDRATE 20 MCG: 20 SOLUTION RESPIRATORY (INHALATION) at 08:31

## 2024-01-01 RX ADMIN — HEPARIN SODIUM 5000 UNITS: 5000 INJECTION INTRAVENOUS; SUBCUTANEOUS at 21:17

## 2024-01-01 RX ADMIN — INSULIN LISPRO 4 UNITS: 100 INJECTION, SOLUTION INTRAVENOUS; SUBCUTANEOUS at 00:30

## 2024-01-01 RX ADMIN — ATORVASTATIN CALCIUM 80 MG: 80 TABLET, FILM COATED ORAL at 17:32

## 2024-01-01 RX ADMIN — INSULIN GLARGINE 15 UNITS: 100 INJECTION, SOLUTION SUBCUTANEOUS at 21:17

## 2024-01-01 RX ADMIN — LISINOPRIL 10 MG: 10 TABLET ORAL at 08:03

## 2024-01-01 RX ADMIN — NICARDIPINE HYDROCHLORIDE 5 MG/HR: 2.5 INJECTION, SOLUTION INTRAVENOUS at 16:33

## 2024-01-01 RX ADMIN — INSULIN LISPRO 4 UNITS: 100 INJECTION, SOLUTION INTRAVENOUS; SUBCUTANEOUS at 13:00

## 2024-01-01 RX ADMIN — HEPARIN SODIUM 5000 UNITS: 5000 INJECTION INTRAVENOUS; SUBCUTANEOUS at 21:19

## 2024-01-01 RX ADMIN — SENNOSIDES, DOCUSATE SODIUM 2 TABLET: 8.6; 5 TABLET ORAL at 09:09

## 2024-01-01 RX ADMIN — ATORVASTATIN CALCIUM 80 MG: 80 TABLET, FILM COATED ORAL at 21:37

## 2024-01-01 RX ADMIN — MICONAZOLE NITRATE 1 APPLICATION: 20 CREAM TOPICAL at 17:40

## 2024-01-01 RX ADMIN — ASPIRIN 81 MG: 81 TABLET, COATED ORAL at 11:00

## 2024-01-01 RX ADMIN — CHLORHEXIDINE GLUCONATE 15 ML: 1.2 SOLUTION ORAL at 22:00

## 2024-01-01 RX ADMIN — NICARDIPINE HYDROCHLORIDE 7.5 MG/HR: 2.5 INJECTION, SOLUTION INTRAVENOUS at 03:49

## 2024-01-01 RX ADMIN — AMLODIPINE BESYLATE 10 MG: 10 TABLET ORAL at 08:48

## 2024-01-01 RX ADMIN — LISINOPRIL 10 MG: 10 TABLET ORAL at 15:54

## 2024-01-01 RX ADMIN — MICONAZOLE NITRATE: 20 CREAM TOPICAL at 09:10

## 2024-01-01 RX ADMIN — NICARDIPINE HYDROCHLORIDE 12.5 MG/HR: 2.5 INJECTION, SOLUTION INTRAVENOUS at 00:44

## 2024-01-01 RX ADMIN — LEVALBUTEROL HYDROCHLORIDE 1.25 MG: 1.25 SOLUTION RESPIRATORY (INHALATION) at 07:50

## 2024-01-01 RX ADMIN — SODIUM CHLORIDE SOLN NEBU 3% 4 ML: 3 NEBU SOLN at 19:16

## 2024-01-01 RX ADMIN — SENNOSIDES, DOCUSATE SODIUM 1 TABLET: 8.6; 5 TABLET ORAL at 21:19

## 2024-01-01 RX ADMIN — FORMOTEROL FUMARATE DIHYDRATE 20 MCG: 20 SOLUTION RESPIRATORY (INHALATION) at 07:50

## 2024-01-01 RX ADMIN — LEVALBUTEROL HYDROCHLORIDE 1.25 MG: 1.25 SOLUTION RESPIRATORY (INHALATION) at 13:24

## 2024-01-01 RX ADMIN — POLYETHYLENE GLYCOL 3350 17 G: 17 POWDER, FOR SOLUTION ORAL at 17:06

## 2024-01-01 RX ADMIN — INSULIN LISPRO 2 UNITS: 100 INJECTION, SOLUTION INTRAVENOUS; SUBCUTANEOUS at 11:40

## 2024-01-01 RX ADMIN — NICARDIPINE HYDROCHLORIDE 12.5 MG/HR: 2.5 INJECTION, SOLUTION INTRAVENOUS at 10:44

## 2024-01-01 RX ADMIN — LEVALBUTEROL HYDROCHLORIDE 1.25 MG: 1.25 SOLUTION RESPIRATORY (INHALATION) at 07:32

## 2024-01-01 RX ADMIN — NICARDIPINE HYDROCHLORIDE 7.5 MG/HR: 2.5 INJECTION, SOLUTION INTRAVENOUS at 07:18

## 2024-01-01 RX ADMIN — MICONAZOLE NITRATE: 20 CREAM TOPICAL at 17:03

## 2024-01-01 RX ADMIN — LISINOPRIL 40 MG: 20 TABLET ORAL at 08:30

## 2024-01-01 RX ADMIN — INSULIN LISPRO 3 UNITS: 100 INJECTION, SOLUTION INTRAVENOUS; SUBCUTANEOUS at 09:10

## 2024-01-01 RX ADMIN — INSULIN LISPRO 1 UNITS: 100 INJECTION, SOLUTION INTRAVENOUS; SUBCUTANEOUS at 16:49

## 2024-01-01 RX ADMIN — NICARDIPINE HYDROCHLORIDE 12.5 MG/HR: 2.5 INJECTION, SOLUTION INTRAVENOUS at 02:49

## 2024-01-01 RX ADMIN — MANNITOL 25 G: 250 INJECTION, SOLUTION INTRAVENOUS at 16:45

## 2024-01-01 RX ADMIN — LEVALBUTEROL HYDROCHLORIDE 1.25 MG: 1.25 SOLUTION RESPIRATORY (INHALATION) at 08:05

## 2024-01-01 RX ADMIN — AMLODIPINE BESYLATE 10 MG: 10 TABLET ORAL at 08:16

## 2024-01-01 RX ADMIN — INSULIN LISPRO 2 UNITS: 100 INJECTION, SOLUTION INTRAVENOUS; SUBCUTANEOUS at 00:10

## 2024-01-01 RX ADMIN — CARVEDILOL 12.5 MG: 12.5 TABLET, FILM COATED ORAL at 10:19

## 2024-01-01 RX ADMIN — INSULIN LISPRO 4 UNITS: 100 INJECTION, SOLUTION INTRAVENOUS; SUBCUTANEOUS at 18:23

## 2024-01-01 RX ADMIN — HEPARIN SODIUM 5000 UNITS: 5000 INJECTION INTRAVENOUS; SUBCUTANEOUS at 21:00

## 2024-01-01 RX ADMIN — HEPARIN SODIUM 5000 UNITS: 5000 INJECTION INTRAVENOUS; SUBCUTANEOUS at 21:16

## 2024-01-01 RX ADMIN — MICONAZOLE NITRATE 1 APPLICATION: 20 CREAM TOPICAL at 08:20

## 2024-01-01 RX ADMIN — INSULIN GLARGINE 15 UNITS: 100 INJECTION, SOLUTION SUBCUTANEOUS at 21:01

## 2024-01-01 RX ADMIN — MICONAZOLE NITRATE: 20 CREAM TOPICAL at 12:54

## 2024-01-01 RX ADMIN — CHLORHEXIDINE GLUCONATE 15 ML: 1.2 SOLUTION ORAL at 08:03

## 2024-01-01 RX ADMIN — NICARDIPINE HYDROCHLORIDE 6 MG/HR: 2.5 INJECTION, SOLUTION INTRAVENOUS at 09:52

## 2024-01-01 RX ADMIN — HEPARIN SODIUM 5000 UNITS: 5000 INJECTION INTRAVENOUS; SUBCUTANEOUS at 21:45

## 2024-01-01 RX ADMIN — LISINOPRIL 40 MG: 20 TABLET ORAL at 09:46

## 2024-01-01 RX ADMIN — INSULIN LISPRO 8 UNITS: 100 INJECTION, SOLUTION INTRAVENOUS; SUBCUTANEOUS at 12:55

## 2024-01-01 RX ADMIN — ATORVASTATIN CALCIUM 80 MG: 80 TABLET, FILM COATED ORAL at 18:01

## 2024-01-01 RX ADMIN — SODIUM CHLORIDE 30 ML/HR: 3 INJECTION, SOLUTION INTRAVENOUS at 04:04

## 2024-01-01 RX ADMIN — HEPARIN SODIUM 5000 UNITS: 5000 INJECTION INTRAVENOUS; SUBCUTANEOUS at 13:45

## 2024-01-01 RX ADMIN — INSULIN LISPRO 3 UNITS: 100 INJECTION, SOLUTION INTRAVENOUS; SUBCUTANEOUS at 18:22

## 2024-01-01 RX ADMIN — CARVEDILOL 12.5 MG: 12.5 TABLET, FILM COATED ORAL at 08:48

## 2024-01-01 RX ADMIN — SODIUM CHLORIDE 1.5 UNITS/HR: 9 INJECTION, SOLUTION INTRAVENOUS at 20:08

## 2024-01-01 RX ADMIN — POTASSIUM CHLORIDE 40 MEQ: 1.5 SOLUTION ORAL at 11:36

## 2024-01-01 RX ADMIN — SODIUM CHLORIDE SOLN NEBU 3% 4 ML: 3 NEBU SOLN at 02:15

## 2024-01-01 RX ADMIN — CHLORHEXIDINE GLUCONATE 15 ML: 1.2 SOLUTION ORAL at 20:17

## 2024-01-01 RX ADMIN — CEFAZOLIN SODIUM 2000 MG: 2 SOLUTION INTRAVENOUS at 17:07

## 2024-01-01 RX ADMIN — HEPARIN SODIUM 5000 UNITS: 5000 INJECTION INTRAVENOUS; SUBCUTANEOUS at 15:00

## 2024-01-01 RX ADMIN — LISINOPRIL 40 MG: 20 TABLET ORAL at 09:01

## 2024-01-01 RX ADMIN — MODAFINIL 200 MG: 100 TABLET ORAL at 09:29

## 2024-01-01 RX ADMIN — SODIUM CHLORIDE SOLN NEBU 3% 4 ML: 3 NEBU SOLN at 14:25

## 2024-01-01 RX ADMIN — POLYETHYLENE GLYCOL 3350 17 G: 17 POWDER, FOR SOLUTION ORAL at 17:05

## 2024-01-01 RX ADMIN — INSULIN LISPRO 3 UNITS: 100 INJECTION, SOLUTION INTRAVENOUS; SUBCUTANEOUS at 12:34

## 2024-01-01 RX ADMIN — INSULIN LISPRO 3 UNITS: 100 INJECTION, SOLUTION INTRAVENOUS; SUBCUTANEOUS at 04:00

## 2024-01-01 RX ADMIN — INSULIN LISPRO 3 UNITS: 100 INJECTION, SOLUTION INTRAVENOUS; SUBCUTANEOUS at 06:15

## 2024-01-01 RX ADMIN — INSULIN LISPRO 3 UNITS: 100 INJECTION, SOLUTION INTRAVENOUS; SUBCUTANEOUS at 00:47

## 2024-01-01 RX ADMIN — INSULIN LISPRO 4 UNITS: 100 INJECTION, SOLUTION INTRAVENOUS; SUBCUTANEOUS at 11:47

## 2024-01-01 RX ADMIN — FORMOTEROL FUMARATE DIHYDRATE 20 MCG: 20 SOLUTION RESPIRATORY (INHALATION) at 07:40

## 2024-01-01 RX ADMIN — POLYETHYLENE GLYCOL 3350 17 G: 17 POWDER, FOR SOLUTION ORAL at 10:59

## 2024-01-01 RX ADMIN — CHLORHEXIDINE GLUCONATE 15 ML: 1.2 SOLUTION ORAL at 09:02

## 2024-01-01 RX ADMIN — LEVALBUTEROL HYDROCHLORIDE 1.25 MG: 1.25 SOLUTION RESPIRATORY (INHALATION) at 14:54

## 2024-01-01 RX ADMIN — FERROUS SULFATE TAB 325 MG (65 MG ELEMENTAL FE) 325 MG: 325 (65 FE) TAB at 08:18

## 2024-01-01 RX ADMIN — ACETAMINOPHEN 650 MG: 325 TABLET, FILM COATED ORAL at 07:29

## 2024-01-01 RX ADMIN — CHLORHEXIDINE GLUCONATE 15 ML: 1.2 SOLUTION ORAL at 21:21

## 2024-01-01 RX ADMIN — CEFAZOLIN SODIUM 1000 MG: 1 SOLUTION INTRAVENOUS at 17:20

## 2024-01-01 RX ADMIN — INSULIN LISPRO 4 UNITS: 100 INJECTION, SOLUTION INTRAVENOUS; SUBCUTANEOUS at 12:24

## 2024-01-01 RX ADMIN — INSULIN LISPRO 4 UNITS: 100 INJECTION, SOLUTION INTRAVENOUS; SUBCUTANEOUS at 17:32

## 2024-01-01 RX ADMIN — FORMOTEROL FUMARATE DIHYDRATE 20 MCG: 20 SOLUTION RESPIRATORY (INHALATION) at 08:07

## 2024-01-01 RX ADMIN — CEFAZOLIN SODIUM 1000 MG: 1 SOLUTION INTRAVENOUS at 00:08

## 2024-01-01 RX ADMIN — NICARDIPINE HYDROCHLORIDE 12.5 MG/HR: 2.5 INJECTION, SOLUTION INTRAVENOUS at 13:38

## 2024-01-01 RX ADMIN — FERROUS SULFATE TAB 325 MG (65 MG ELEMENTAL FE) 325 MG: 325 (65 FE) TAB at 08:48

## 2024-01-01 RX ADMIN — INSULIN LISPRO 4 UNITS: 100 INJECTION, SOLUTION INTRAVENOUS; SUBCUTANEOUS at 06:00

## 2024-01-01 RX ADMIN — CEFAZOLIN SODIUM 1000 MG: 1 SOLUTION INTRAVENOUS at 11:57

## 2024-01-01 RX ADMIN — METOCLOPRAMIDE HYDROCHLORIDE 10 MG: 5 INJECTION INTRAMUSCULAR; INTRAVENOUS at 11:00

## 2024-01-01 RX ADMIN — INSULIN GLARGINE 20 UNITS: 100 INJECTION, SOLUTION SUBCUTANEOUS at 08:36

## 2024-01-01 RX ADMIN — FERROUS SULFATE TAB 325 MG (65 MG ELEMENTAL FE) 325 MG: 325 (65 FE) TAB at 08:30

## 2024-01-01 RX ADMIN — INSULIN GLARGINE 15 UNITS: 100 INJECTION, SOLUTION SUBCUTANEOUS at 08:12

## 2024-01-01 RX ADMIN — INSULIN LISPRO 4 UNITS: 100 INJECTION, SOLUTION INTRAVENOUS; SUBCUTANEOUS at 18:42

## 2024-01-01 RX ADMIN — HYDRALAZINE HYDROCHLORIDE 10 MG: 20 INJECTION, SOLUTION INTRAMUSCULAR; INTRAVENOUS at 12:22

## 2024-01-01 RX ADMIN — CHLORHEXIDINE GLUCONATE 15 ML: 1.2 SOLUTION ORAL at 08:11

## 2024-01-01 RX ADMIN — NICARDIPINE HYDROCHLORIDE 7.5 MG/HR: 2.5 INJECTION, SOLUTION INTRAVENOUS at 00:40

## 2024-01-01 RX ADMIN — HEPARIN SODIUM 5000 UNITS: 5000 INJECTION INTRAVENOUS; SUBCUTANEOUS at 13:24

## 2024-01-01 RX ADMIN — NICARDIPINE HYDROCHLORIDE 12.5 MG/HR: 2.5 INJECTION, SOLUTION INTRAVENOUS at 21:24

## 2024-01-01 RX ADMIN — MODAFINIL 200 MG: 100 TABLET ORAL at 08:13

## 2024-01-01 RX ADMIN — LISINOPRIL 40 MG: 20 TABLET ORAL at 08:48

## 2024-01-01 RX ADMIN — CHLORHEXIDINE GLUCONATE 15 ML: 1.2 SOLUTION ORAL at 20:20

## 2024-01-01 RX ADMIN — MORPHINE SULFATE 5 MG: 10 INJECTION, SOLUTION INTRAMUSCULAR; INTRAVENOUS at 18:38

## 2024-01-01 RX ADMIN — NICARDIPINE HYDROCHLORIDE 12.5 MG/HR: 2.5 INJECTION, SOLUTION INTRAVENOUS at 12:56

## 2024-01-01 RX ADMIN — HEPARIN SODIUM 5000 UNITS: 5000 INJECTION INTRAVENOUS; SUBCUTANEOUS at 13:29

## 2024-01-01 RX ADMIN — SODIUM CHLORIDE SOLN NEBU 3% 4 ML: 3 NEBU SOLN at 19:58

## 2024-01-01 RX ADMIN — INSULIN LISPRO 5 UNITS: 100 INJECTION, SOLUTION INTRAVENOUS; SUBCUTANEOUS at 12:16

## 2024-01-01 RX ADMIN — HEPARIN SODIUM 5000 UNITS: 5000 INJECTION INTRAVENOUS; SUBCUTANEOUS at 06:28

## 2024-01-01 RX ADMIN — ONDANSETRON 4 MG: 2 INJECTION INTRAMUSCULAR; INTRAVENOUS at 20:14

## 2024-01-01 RX ADMIN — ONDANSETRON 4 MG: 2 INJECTION INTRAMUSCULAR; INTRAVENOUS at 14:53

## 2024-01-01 RX ADMIN — NICARDIPINE HYDROCHLORIDE 7.5 MG/HR: 2.5 INJECTION, SOLUTION INTRAVENOUS at 20:58

## 2024-01-01 RX ADMIN — FERROUS SULFATE TAB 325 MG (65 MG ELEMENTAL FE) 325 MG: 325 (65 FE) TAB at 08:14

## 2024-01-01 RX ADMIN — ONDANSETRON 4 MG: 2 INJECTION INTRAMUSCULAR; INTRAVENOUS at 21:00

## 2024-01-01 RX ADMIN — INSULIN LISPRO 4 UNITS: 100 INJECTION, SOLUTION INTRAVENOUS; SUBCUTANEOUS at 12:50

## 2024-01-01 RX ADMIN — MICONAZOLE NITRATE: 20 CREAM TOPICAL at 17:06

## 2024-01-01 RX ADMIN — CEFAZOLIN SODIUM 1000 MG: 1 SOLUTION INTRAVENOUS at 09:07

## 2024-01-01 RX ADMIN — SENNOSIDES, DOCUSATE SODIUM 2 TABLET: 8.6; 5 TABLET ORAL at 08:30

## 2024-01-01 RX ADMIN — LEVALBUTEROL HYDROCHLORIDE 1.25 MG: 1.25 SOLUTION RESPIRATORY (INHALATION) at 08:07

## 2024-01-01 RX ADMIN — NICARDIPINE HYDROCHLORIDE 12.5 MG/HR: 2.5 INJECTION, SOLUTION INTRAVENOUS at 23:06

## 2024-01-01 RX ADMIN — POLYETHYLENE GLYCOL 3350 17 G: 17 POWDER, FOR SOLUTION ORAL at 08:18

## 2024-01-01 RX ADMIN — MICONAZOLE NITRATE 1 APPLICATION: 20 CREAM TOPICAL at 17:16

## 2024-01-01 RX ADMIN — ATORVASTATIN CALCIUM 80 MG: 80 TABLET, FILM COATED ORAL at 17:40

## 2024-01-01 RX ADMIN — CEFAZOLIN SODIUM 1000 MG: 1 SOLUTION INTRAVENOUS at 09:24

## 2024-01-01 RX ADMIN — INSULIN GLARGINE 15 UNITS: 100 INJECTION, SOLUTION SUBCUTANEOUS at 09:02

## 2024-01-01 RX ADMIN — CARVEDILOL 12.5 MG: 12.5 TABLET, FILM COATED ORAL at 16:44

## 2024-01-01 RX ADMIN — ONDANSETRON 4 MG: 2 INJECTION INTRAMUSCULAR; INTRAVENOUS at 13:12

## 2024-01-01 RX ADMIN — FENTANYL CITRATE 50 MCG: 50 INJECTION INTRAMUSCULAR; INTRAVENOUS at 17:31

## 2024-01-01 RX ADMIN — SODIUM CHLORIDE SOLN NEBU 3% 4 ML: 3 NEBU SOLN at 19:19

## 2024-01-01 RX ADMIN — INSULIN LISPRO 8 UNITS: 100 INJECTION, SOLUTION INTRAVENOUS; SUBCUTANEOUS at 05:41

## 2024-01-01 RX ADMIN — HYDRALAZINE HYDROCHLORIDE 10 MG: 20 INJECTION, SOLUTION INTRAMUSCULAR; INTRAVENOUS at 16:30

## 2024-01-01 RX ADMIN — SODIUM CHLORIDE SOLN NEBU 3% 4 ML: 3 NEBU SOLN at 19:14

## 2024-01-01 RX ADMIN — MODAFINIL 200 MG: 100 TABLET ORAL at 08:48

## 2024-01-01 RX ADMIN — HEPARIN SODIUM 5000 UNITS: 5000 INJECTION INTRAVENOUS; SUBCUTANEOUS at 14:27

## 2024-01-01 RX ADMIN — SODIUM CHLORIDE SOLN NEBU 3% 4 ML: 3 NEBU SOLN at 19:07

## 2024-01-01 RX ADMIN — SODIUM CHLORIDE SOLN NEBU 3% 4 ML: 3 NEBU SOLN at 08:16

## 2024-01-01 RX ADMIN — FORMOTEROL FUMARATE DIHYDRATE 20 MCG: 20 SOLUTION RESPIRATORY (INHALATION) at 19:19

## 2024-01-01 RX ADMIN — SODIUM CHLORIDE 25 ML/HR: 3 INJECTION, SOLUTION INTRAVENOUS at 16:06

## 2024-01-01 RX ADMIN — NICARDIPINE HYDROCHLORIDE 2.5 MG/HR: 2.5 INJECTION, SOLUTION INTRAVENOUS at 00:00

## 2024-01-01 RX ADMIN — NICARDIPINE HYDROCHLORIDE 4 MG/HR: 2.5 INJECTION, SOLUTION INTRAVENOUS at 05:16

## 2024-01-01 RX ADMIN — POTASSIUM CHLORIDE 40 MEQ: 1.5 SOLUTION ORAL at 09:09

## 2024-01-01 RX ADMIN — ATORVASTATIN CALCIUM 80 MG: 80 TABLET, FILM COATED ORAL at 18:23

## 2024-01-01 RX ADMIN — CHLORHEXIDINE GLUCONATE 15 ML: 1.2 SOLUTION ORAL at 09:52

## 2024-01-01 RX ADMIN — HEPARIN SODIUM 5000 UNITS: 5000 INJECTION INTRAVENOUS; SUBCUTANEOUS at 21:09

## 2024-01-01 RX ADMIN — CEFAZOLIN SODIUM 1000 MG: 1 SOLUTION INTRAVENOUS at 09:00

## 2024-01-01 RX ADMIN — INSULIN LISPRO 3 UNITS: 100 INJECTION, SOLUTION INTRAVENOUS; SUBCUTANEOUS at 00:23

## 2024-01-01 RX ADMIN — SODIUM CHLORIDE SOLN NEBU 3% 4 ML: 3 NEBU SOLN at 07:29

## 2024-01-01 RX ADMIN — HEPARIN SODIUM 5000 UNITS: 5000 INJECTION INTRAVENOUS; SUBCUTANEOUS at 05:42

## 2024-01-01 RX ADMIN — HEPARIN SODIUM 5000 UNITS: 5000 INJECTION INTRAVENOUS; SUBCUTANEOUS at 13:34

## 2024-01-01 RX ADMIN — FENTANYL CITRATE 50 MCG: 50 INJECTION INTRAMUSCULAR; INTRAVENOUS at 14:21

## 2024-01-01 RX ADMIN — SODIUM CHLORIDE 50 ML/HR: 3 INJECTION, SOLUTION INTRAVENOUS at 22:30

## 2024-01-01 RX ADMIN — CHLORHEXIDINE GLUCONATE 15 ML: 1.2 SOLUTION ORAL at 09:09

## 2024-01-01 RX ADMIN — NICARDIPINE HYDROCHLORIDE 7.5 MG/HR: 2.5 INJECTION, SOLUTION INTRAVENOUS at 14:55

## 2024-01-01 RX ADMIN — LEVALBUTEROL HYDROCHLORIDE 1.25 MG: 1.25 SOLUTION RESPIRATORY (INHALATION) at 12:54

## 2024-01-01 RX ADMIN — LEVALBUTEROL HYDROCHLORIDE 1.25 MG: 1.25 SOLUTION RESPIRATORY (INHALATION) at 19:42

## 2024-01-01 RX ADMIN — FORMOTEROL FUMARATE DIHYDRATE 20 MCG: 20 SOLUTION RESPIRATORY (INHALATION) at 08:05

## 2024-01-01 RX ADMIN — POTASSIUM CHLORIDE 40 MEQ: 1.5 SOLUTION ORAL at 00:21

## 2024-01-01 RX ADMIN — LEVALBUTEROL HYDROCHLORIDE 1.25 MG: 1.25 SOLUTION RESPIRATORY (INHALATION) at 20:32

## 2024-01-01 RX ADMIN — LEVALBUTEROL HYDROCHLORIDE 1.25 MG: 1.25 SOLUTION RESPIRATORY (INHALATION) at 08:29

## 2024-01-01 RX ADMIN — CEFAZOLIN SODIUM 1000 MG: 1 SOLUTION INTRAVENOUS at 16:04

## 2024-01-01 RX ADMIN — NICARDIPINE HYDROCHLORIDE 7.5 MG/HR: 2.5 INJECTION, SOLUTION INTRAVENOUS at 04:22

## 2024-01-01 RX ADMIN — MICONAZOLE NITRATE: 20 CREAM TOPICAL at 09:23

## 2024-01-01 RX ADMIN — INSULIN LISPRO 3 UNITS: 100 INJECTION, SOLUTION INTRAVENOUS; SUBCUTANEOUS at 12:38

## 2024-01-01 RX ADMIN — HYDRALAZINE HYDROCHLORIDE 10 MG: 20 INJECTION, SOLUTION INTRAMUSCULAR; INTRAVENOUS at 00:08

## 2024-01-01 RX ADMIN — ACETAMINOPHEN 650 MG: 325 TABLET, FILM COATED ORAL at 11:59

## 2024-01-01 RX ADMIN — ATORVASTATIN CALCIUM 80 MG: 80 TABLET, FILM COATED ORAL at 17:18

## 2024-01-01 RX ADMIN — LEVALBUTEROL HYDROCHLORIDE 1.25 MG: 1.25 SOLUTION RESPIRATORY (INHALATION) at 19:41

## 2024-01-01 RX ADMIN — SODIUM CHLORIDE SOLN NEBU 3% 4 ML: 3 NEBU SOLN at 15:10

## 2024-01-01 RX ADMIN — LISINOPRIL 10 MG: 10 TABLET ORAL at 08:30

## 2024-01-01 RX ADMIN — NICARDIPINE HYDROCHLORIDE 4 MG/HR: 2.5 INJECTION, SOLUTION INTRAVENOUS at 00:08

## 2024-01-01 RX ADMIN — POTASSIUM CHLORIDE 40 MEQ: 1.5 SOLUTION ORAL at 12:53

## 2024-01-01 RX ADMIN — HYDRALAZINE HYDROCHLORIDE 10 MG: 20 INJECTION, SOLUTION INTRAMUSCULAR; INTRAVENOUS at 01:15

## 2024-01-01 RX ADMIN — FENTANYL CITRATE 25 MCG: 50 INJECTION INTRAMUSCULAR; INTRAVENOUS at 13:15

## 2024-01-01 RX ADMIN — MANNITOL 25 G: 12.5 INJECTION, SOLUTION INTRAVENOUS at 16:45

## 2024-01-01 RX ADMIN — CHLORHEXIDINE GLUCONATE 15 ML: 1.2 SOLUTION ORAL at 08:23

## 2024-01-01 RX ADMIN — HYDROMORPHONE HYDROCHLORIDE 0.5 MG: 1 INJECTION, SOLUTION INTRAMUSCULAR; INTRAVENOUS; SUBCUTANEOUS at 05:25

## 2024-01-01 RX ADMIN — HYDRALAZINE HYDROCHLORIDE 10 MG: 20 INJECTION, SOLUTION INTRAMUSCULAR; INTRAVENOUS at 14:15

## 2024-01-01 RX ADMIN — FERROUS SULFATE TAB 325 MG (65 MG ELEMENTAL FE) 325 MG: 325 (65 FE) TAB at 09:55

## 2024-01-01 RX ADMIN — SODIUM CHLORIDE SOLN NEBU 3% 4 ML: 3 NEBU SOLN at 14:37

## 2024-01-01 RX ADMIN — CHLORHEXIDINE GLUCONATE 15 ML: 1.2 SOLUTION ORAL at 08:12

## 2024-01-01 RX ADMIN — NICARDIPINE HYDROCHLORIDE 5 MG/HR: 2.5 INJECTION, SOLUTION INTRAVENOUS at 05:48

## 2024-01-01 RX ADMIN — NICARDIPINE HYDROCHLORIDE 12.5 MG/HR: 2.5 INJECTION, SOLUTION INTRAVENOUS at 20:30

## 2024-01-01 RX ADMIN — SODIUM CHLORIDE SOLN NEBU 3% 4 ML: 3 NEBU SOLN at 15:30

## 2024-01-01 NOTE — NURSING NOTE
Daughter Unique [phone] called asking for update specifically mentioning recent troponin 39. I updated her on troponin levels being ordered and an EKG was done. Patient reports no more chest pain at this time. I offered to have the resident call her and update her as well. Dr Miguel Lawrence made aware of her concerns and I asked him to reach out to her.

## 2024-01-01 NOTE — CONSULTS
Duplicate order, please see original consult note completed by Dr. Williamson on 12/30.  Progress note to follow today.

## 2024-01-01 NOTE — PLAN OF CARE
Problem: OCCUPATIONAL THERAPY ADULT  Goal: Performs self-care activities at highest level of function for planned discharge setting.  See evaluation for individualized goals.  Description: Treatment Interventions: ADL retraining, Visual perceptual retraining, Functional transfer training, UE strengthening/ROM, Endurance training, Patient/family training, Equipment evaluation/education, Neuromuscular reeducation, Compensatory technique education, Continued evaluation, Energy conservation, Activityengagement          See flowsheet documentation for full assessment, interventions and recommendations.   Note: Limitation: Decreased ADL status, Decreased UE strength, Decreased Safe judgement during ADL, Decreased endurance, Decreased self-care trans, Decreased high-level ADLs  Prognosis: Good  Assessment: Pt is a 83 y.o. female seen for OT evaluation s/p admit to [facility] on 12/31/2023 w/ <principal problem not specified>.  Pt presents with nausea and ataxia found to have L PICA infarct. Comorbidities affecting pt's functional performance at time of assessment include:  has a past medical history of Acute kidney injury (HCC), Acute respiratory failure with hypoxia (HCC), Broken arm, Diabetes mellitus (HCC), Diverticulitis, Pneumothorax- Resolved, and Postherpetic neuralgia.. Personal factors affecting pt at time of IE include:steps to enter environment, difficulty performing ADLS, difficulty performing IADLS , limited insight into deficits, flat affect, decreased initiation and engagement , and health management . Prior to admission, pt was I with ADLS and IADLS. Upon evaluation: Pt presents supine and is agreeable to OTIE, all vitals WNLS. Pt requires overall mod A x 2 2* the following deficits impacting occupational performance: weakness, decreased strength, decreased balance, decreased tolerance, impaired arousal, impaired attention, impaired problem solving, decreased safety awareness, and decreased coping skills. Pt  resting in chair at end of session with all needs in reach, alarm on, all lines in place and SCD's on. Pt to benefit from continued skilled OT tx while in the hospital to address deficits as defined above and maximize level of functional independence w ADL's and functional mobility. Occupational Performance areas to address include: eating, grooming, bathing/shower, toilet hygiene, dressing, health maintenance, functional mobility, community mobility, and clothing management. The patient's raw score on the -PAC Daily Activity inpatient short form is  , standardized score is  , less than 39.4. Patients at this level are likely to benefit from discharge to post-acute rehabilitation services. Please refer to the recommendation of the Occupational Therapist for safe discharge planning.     Rehab Resource Intensity Level, OT: I (Maximum Resource Intensity)

## 2024-01-01 NOTE — OCCUPATIONAL THERAPY NOTE
Occupational Therapy Evaluation     Patient Name: Debbie Moody  Today's Date: 1/1/2024  Problem List  Active Problems:    Acute CVA (cerebrovascular accident) (HCC)    Past Medical History  Past Medical History:   Diagnosis Date    Acute kidney injury (HCC)     Acute respiratory failure with hypoxia (HCC) 12/16/2021    Broken arm     hairline fracture/ 2 places///   right arm    Diabetes mellitus (HCC)     Diverticulitis     Pneumothorax- Resolved     Postherpetic neuralgia      Past Surgical History  Past Surgical History:   Procedure Laterality Date    CHOLECYSTECTOMY      COLON SURGERY      HERNIA REPAIR      IR EMBOLIZATION (SPECIFY VESSEL OR SITE)  1/12/2022    ROTATOR CUFF REPAIR Right     VARICOSE VEIN SURGERY      Impression:  2/12/16 Sarabia Jake             01/01/24 1054   OT Last Visit   OT Visit Date 01/01/24   Note Type   Note type Evaluation   Pain Assessment   Pain Score No Pain   Restrictions/Precautions   Weight Bearing Precautions Per Order No   Other Precautions Chair Alarm;Bed Alarm;Multiple lines;Telemetry;Fall Risk   Home Living   Type of Home House   Home Layout Two level;Bed/bath upstairs;Stairs to enter with rails   Bathroom Shower/Tub Walk-in shower   Bathroom Toilet Standard   Bathroom Equipment Grab bars in shower;Shower chair;Toilet raiser   Bathroom Accessibility Accessible   Home Equipment Walker;Cane   Additional Comments Pt lives in a 2 stroy home with 10 TRAM and FFOS to second floor where bed and bath are located, has above DME but was not using PTA   Prior Function   Level of [place] Independent with ADLs;Independent with functional mobility;Needs assistance with ADLs   Lives With Spouse;Daughter   Receives Help From Family   IADLs Independent with driving;Independent with meal prep;Independent with medication management   Falls in the last 6 months 0   Lifestyle   Autonomy I with ADLS and IADLS +    Reciprocal Relationships supportive spouse   Service to Others  "pt reports her DTR is disabled and her and her  provide her physical assist with ADLs and mobiltiy   Intrinsic Gratification will continue to assess   Subjective   Subjective \"I need the basin\"   ADL   Where Assessed Chair   Eating Assistance 5  Supervision/Setup   Grooming Assistance 5  Supervision/Setup   UB Bathing Assistance 4  Minimal Assistance   LB Bathing Assistance 3  Moderate Assistance   UB Dressing Assistance 4  Minimal Assistance   LB Dressing Assistance 3  Moderate Assistance   Toileting Assistance  3  Moderate Assistance   Bed Mobility   Supine to Sit 4  Minimal assistance   Additional items Assist x 1   Additional Comments OOB at end of session   Transfers   Sit to Stand 3  Moderate assistance   Additional items Assist x 2   Stand to Sit 3  Moderate assistance   Additional items Assist x 2   Additional Comments BL HHA   Functional Mobility   Functional Mobility 3  Moderate assistance   Additional Comments Ax2 few steps to chair   Additional items Hand hold assistance   Balance   Static Sitting Fair -   Dynamic Sitting Poor +   Static Standing Poor   Dynamic Standing Poor   Ambulatory Poor   Activity Tolerance   Activity Tolerance Patient limited by fatigue   Medical Staff Made Aware DPT, NSG Aware   Nurse Made Aware yes, cleared for OTIE   RUE Assessment   RUE Assessment WFL   LUE Assessment   LUE Assessment WFL   Vision - Complex Assessment   Additional Comments Pt with eyes closed for most of session due to increased dizziness, unable to formall assess at this time   Psychosocial   Psychosocial (WDL) WDL   Cognition   Overall Cognitive Status WFL   Arousal/Participation Responsive;Alert;Cooperative   Attention Within functional limits   Orientation Level Oriented X4   Memory Within functional limits   Following Commands Follows one step commands with increased time or repetition   Comments Overall pleasant and cooperative, limited by nausea and dizziness therefore minimaly conversive and " requries increased cues at times   Assessment   Limitation Decreased ADL status;Decreased UE strength;Decreased Safe judgement during ADL;Decreased endurance;Decreased self-care trans;Decreased high-level ADLs   Prognosis Good   Assessment Pt is a 83 y.o. female seen for OT evaluation s/p admit to [facility] on 12/31/2023 w/ <principal problem not specified>.  Pt presents with nausea and ataxia found to have L PICA infarct. Comorbidities affecting pt's functional performance at time of assessment include:  has a past medical history of Acute kidney injury (HCC), Acute respiratory failure with hypoxia (HCC), Broken arm, Diabetes mellitus (HCC), Diverticulitis, Pneumothorax- Resolved, and Postherpetic neuralgia.. Personal factors affecting pt at time of IE include:steps to enter environment, difficulty performing ADLS, difficulty performing IADLS , limited insight into deficits, flat affect, decreased initiation and engagement , and health management . Prior to admission, pt was I with ADLS and IADLS. Upon evaluation: Pt presents supine and is agreeable to OTIE, all vitals WNLS. Pt requires overall mod A x 2 2* the following deficits impacting occupational performance: weakness, decreased strength, decreased balance, decreased tolerance, impaired arousal, impaired attention, impaired problem solving, decreased safety awareness, and decreased coping skills. Pt resting in chair at end of session with all needs in reach, alarm on, all lines in place and SCD's on. Pt to benefit from continued skilled OT tx while in the hospital to address deficits as defined above and maximize level of functional independence w ADL's and functional mobility. Occupational Performance areas to address include: eating, grooming, bathing/shower, toilet hygiene, dressing, health maintenance, functional mobility, community mobility, and clothing management. The patient's raw score on the AM-PAC Daily Activity inpatient short form is  , standardized  score is  , less than 39.4. Patients at this level are likely to benefit from discharge to post-acute rehabilitation services. Please refer to the recommendation of the Occupational Therapist for safe discharge planning.   Goals   Patient Goals to feel better   LTG Time Frame 10-14   Long Term Goal #1 see below   Plan   Treatment Interventions ADL retraining;Visual perceptual retraining;Functional transfer training;UE strengthening/ROM;Endurance training;Patient/family training;Equipment evaluation/education;Neuromuscular reeducation;Compensatory technique education;Continued evaluation;Energy conservation;Activityengagement   Goal Expiration Date 01/15/24   OT Frequency 3-5x/wk   Discharge Recommendation   Rehab Resource Intensity Level, OT I (Maximum Resource Intensity)   AM-PAC Daily Activity Inpatient   Lower Body Dressing 2   Bathing 2   Toileting 2   Upper Body Dressing 3   Grooming 4   Eating 4   Daily Activity Raw Score 17   Daily Activity Standardized Score (Calc for Raw Score >=11) 37.26   AM-PAC Applied Cognition Inpatient   Following a Speech/Presentation 3   Understanding Ordinary Conversation 4   Taking Medications 3   Remembering Where Things Are Placed or Put Away 3   Remembering List of 4-5 Errands 3   Taking Care of Complicated Tasks 2   Applied Cognition Raw Score 18   Applied Cognition Standardized Score 38.07     OT goals to be addressed in the next 14 days:    Pt will complete UB TE to increase strength in order to improve overall functional abilities     Pt will increase activity tolerance to G for 30 min txment sessions    Pt will complete UB/LB self care w/ mod I using adaptive device and DME as needed    Pt will complete toileting w/ mod I w/ G hygiene/thoroughness using DME as needed    Pt will improve functional transfers to Mod I on/off all surfaces using DME as needed w/ G balance/safety     Pt will improve functional mobility during ADL/IADL/leisure tasks to Mod I using DME as needed  w/ G balance/safety     Pt will participate in simulated IADL management task with DME as needed to increase independence to  w/ G safety and endurance    Pt will independently identify and utilize 2-3 coping strategies to increase positive affect and promote overall well-being.    Pt will engage in ongoing cognitive assessment w/ G participation to assist w/ safe d/c planning/recommendations

## 2024-01-01 NOTE — PLAN OF CARE
Problem: PHYSICAL THERAPY ADULT  Goal: Performs mobility at highest level of function for planned discharge setting.  See evaluation for individualized goals.  Description: Treatment/Interventions: Functional transfer training, LE strengthening/ROM, Elevations, Therapeutic exercise, Endurance training, Patient/family training, Bed mobility, Equipment eval/education, Gait training, Compensatory technique education, Spoke to nursing, OT          See flowsheet documentation for full assessment, interventions and recommendations.  Note: Prognosis: Good  Problem List: Decreased strength, Decreased endurance, Impaired balance, Decreased mobility, Decreased coordination  Assessment: Pt is 83 y.o. female seen for a high complexity PT evaluation s/p admit to [facility] on 12/31/2023. Pt presenting w/ headache and ataxia; imaging revealed L PICA CVA. Please see above for other active problem list / PMH. PT now consulted to assess functional mobility and needs for safe d/c planning. Prior to admission, pt was I w/ ambulation w/o AD, lives w/ spouse + dtr (pt + her spouse are PCG's for disabled dtr). Currently pt requires Amos for bed skills; ModAx2 for functional transfers; ModAx2 for limited ambulation w/ B HHA. Pt presents functioning below baseline and w/ overall mobility deficits 2* to: decreased LE strength; decreased endurance; impaired balance; impaired coordination; gait deviations; nausea; fatigue; bed/chair alarms; multiple lines. These impairments place pt at risk for falls. Pt will continue to benefit from skilled PT interventions to address stated impairments; to maximize functional potential; for ongoing pt/ family training; and DME needs. PT is currently recommending acute medical rehab.  Barriers to Discharge: Inaccessible home environment     Rehab Resource Intensity Level, PT: I (Maximum Resource Intensity)    See flowsheet documentation for full assessment.

## 2024-01-01 NOTE — H&P
[facility]  H&P: Critical Care  Name: Debbie Moody 83 y.o. 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  Plan to reimage emergently if patient has new focality to exam or decreasing GCS by greater than 2 points.  Neurosurgical consultation.  Neurosurgery was made aware of patient prior to transfer as she is at high risk for decompensation and need for emergent surgical intervention.  Hypertonic saline was initiated.  Consider bolus of 23% saline for cerebellar edema and patient not therapeutic after 2 boluses of 3% hypertonic saline.  Maintain serum osmolarity 310-320 and serum sodium 145-155.  Continue with stroke workup.  Neurology consultation.  Maintain off anticoagulation and antiplatelets due to evidence of hemorrhagic conversion and amount of stroke burden.    Maintain systolic blood pressure 110-150.  Cardene infusion has been initiated.  Titrate Cardene accordingly.    Continue to monitor urine output as well as BUN and creatinine with CKD stage IIIb.    Glycemic control with insulin protocol.  Patient's hemoglobin A1c is 8.4.    Maintain oxygen saturation greater than 92%.  Nebulizers as needed with history of pulmonary fibrosis.    Disposition: Critical care       History of Present Illness     HPI: Debbie Moody is a 83 y.o. 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  Patient was admitted to [facility].  Repeat head CT this morning with increasing cerebellar edema and small  amount of hemorrhage.  Patient was initiated on aspirin and Plavix.  She received DDAVP.  She was initiated on 3% saline and Cardene infusion.  Transferred to neurocritical care for ongoing management and neurosurgical evaluation.    History obtained from chart review and patient's daughter.  Review of Systems   Constitutional:  Negative for chills, fatigue and fever.   HENT:  Negative for sinus pressure and sore throat.    Eyes:  Negative for visual disturbance.   Respiratory:  Negative for cough and shortness of breath.    Cardiovascular:  Negative for chest pain, palpitations and leg swelling.   Gastrointestinal:  Negative for abdominal pain, constipation, diarrhea, nausea and vomiting.   Genitourinary:  Negative for dysuria.   Neurological:  Negative for dizziness, light-headedness and headaches.   Psychiatric/Behavioral:  Negative for agitation and confusion.    All other systems reviewed and are negative.     Historical Information   Past Medical History:  No date: Acute kidney injury (HCC)  12/16/2021: Acute respiratory failure with hypoxia (HCC)  No date: Broken arm      Comment:  hairline fracture/ 2 places///   right arm  No date: Diabetes mellitus (HCC)  No date: Diverticulitis  No date: Pneumothorax- Resolved  No date: Postherpetic neuralgia Past Surgical History:  No date: CHOLECYSTECTOMY  No date: COLON SURGERY  No date: HERNIA REPAIR  1/12/2022: IR EMBOLIZATION (SPECIFY VESSEL OR SITE)  No date: ROTATOR CUFF REPAIR; Right  No date: VARICOSE VEIN SURGERY      Comment:  Impression:  2/12/16 Jake Sarabia   Current Outpatient Medications   Medication Instructions    ALPRAZolam (XANAX) 0.25 mg, Oral, Daily at bedtime PRN    Biotin 10 MG CAPS 1 capsule, Oral, Daily    calcium carbonate (TUMS) 500 mg chewable tablet 1 tablet, Oral, As needed    cholecalciferol (VITAMIN D3) 25 mcg (1,000 units) tablet Daily    Diclofenac Sodium (VOLTAREN) 2 g, Topical, 2 times daily PRN    dicyclomine (BENTYL) 20 mg, Oral, 4  times daily PRN    enalapril (VASOTEC) 10 mg, Oral, Daily    ferrous sulfate 325 mg, Oral, Daily with breakfast    glipiZIDE (GLUCOTROL XL) 10 mg, Oral, Daily    ketoconazole (NIZORAL) 2 % shampoo Use 2-3 times a week    lovastatin (MEVACOR) 40 mg, Oral, Daily at bedtime    Multiple Vitamin (multivitamin) tablet 1 tablet, Oral, Daily    sitaGLIPtin (JANUVIA) 100 mg, Oral, Daily    vitamin E (tocopherol) 1,000 Units, Oral, Daily    Allergies   Allergen Reactions    Ciprofloxacin GI Intolerance and Headache     Confusion, arm weakness, dizziness, headache    Meperidine GI Intolerance, Hallucinations and Other (See Comments)     Demarol  Reaction Date:Unknown    Propoxyphene GI Intolerance      Social History     Tobacco Use    Smoking status: Never    Smokeless tobacco: Never   Vaping Use    Vaping status: Never Used   Substance Use Topics    Alcohol use: Not Currently     Comment: Rarely    Drug use: Never    Family History   Problem Relation Age of Onset    Diabetes Mother     No Known Problems Father           Objective                            Vitals I/O      Most Recent Min/Max in 24hrs   Temp 98.5 °F (36.9 °C) Temp  Min: 98.5 °F (36.9 °C)  Max: 99.6 °F (37.6 °C)   Pulse 88 Pulse  Min: 75  Max: 100   Resp 14 Resp  Min: 13  Max: 28   /54 BP  Min: 127/59  Max: 200/89   O2 Sat 95 % SpO2  Min: 93 %  Max: 98 %      Intake/Output Summary (Last 24 hours) at 12/31/2023 2014  Last data filed at 12/31/2023 1848  Gross per 24 hour   Intake 13.55 ml   Output --   Net 13.55 ml       Diet Rodríguez/CHO Controlled; Consistent Carbohydrate Diet Level 2 (5 carb servings/75 grams CHO/meal)    Invasive Monitoring           Physical Exam   Physical Exam  Vitals and nursing note reviewed.   Eyes:      General: Vision grossly intact. Neglect     Extraocular Movements: Extraocular movements intact.      Conjunctiva/sclera: Conjunctivae normal.      Pupils: Pupils are equal, round, and reactive to light.   Skin:     General: Skin is  warm and dry.      Capillary Refill: Capillary refill takes less than 2 seconds.   HENT:      Head: Normocephalic and atraumatic.      Right Ear: Hearing normal.      Left Ear: Hearing normal.      Mouth/Throat:      Mouth: Mucous membranes are moist.   Cardiovascular:      Rate and Rhythm: Normal rate and regular rhythm.      Pulses: Normal pulses.      Heart sounds: Normal heart sounds.   Musculoskeletal:         General: No deformity or signs of injury. Normal range of motion.   Abdominal: General: There is no distension.      Palpations: Abdomen is soft.   Constitutional:       General: She is not in acute distress.     Appearance: She is well-developed and well-nourished.   Pulmonary:      Effort: Pulmonary effort is normal.      Breath sounds: Normal breath sounds.   Psychiatric:         Speech: Speech is not no receptive aphasia or no expressive aphasia.   Neurological:      Mental Status: She is alert. She is not agitated.      Cranial Nerves: No dysarthria or facial asymmetry.      Sensory: No sensory deficit.      Motor: Strength full and intact in all extremities.      Comments: Patient is awake and alert, no nystagmus with extraocular movements, pupils are equal bilaterally.  Patient is able to perform finger-to-nose has some difficulty with finger-to-nose with the left upper extremity.  Patient has some ataxia more prominent with the left lower extremity.  Strength overall 5/5.            Diagnostic Studies      EKG: Normal sinus rhythm on telemetry  Imaging:  I have personally reviewed pertinent reports.   and I have personally reviewed pertinent films in PACS     Medications:  Scheduled PRN   atorvastatin, 40 mg, QPM  chlorhexidine, 15 mL, Q12H [facility]  [START ON 1/1/2024] ferrous sulfate, 325 mg, Daily With Breakfast      hydrALAZINE, 10 mg, Q6H PRN  levalbuterol, 1.25 mg, Q6H PRN  ondansetron, 4 mg, Q6H PRN       Continuous    insulin regular (HumuLIN R,NovoLIN R) 1 Units/mL in sodium chloride 0.9 % 100  mL infusion, 0.3-21 Units/hr, Last Rate: 3 Units/hr (12/31/23 1827)  niCARdipine, 1-15 mg/hr, Last Rate: 5 mg/hr (12/31/23 1833)  sodium chloride, 30 mL/hr         Labs:    CBC    Recent Labs     12/30/23  1329 12/30/23 2010 12/31/23  0504   WBC 8.79  --  11.11*   HGB 14.1  --  13.6   HCT 43.4  --  42.4    262 265   BANDSPCT 5  --   --      BMP    Recent Labs     12/31/23  1430 12/31/23  1820   SODIUM 138 141   K 4.1 4.3   * 113*   CO2 19* 18*   AGAP 9 10   BUN 29* 30*   CREATININE 1.60* 1.43*   CALCIUM 8.7 9.0       Coags    Recent Labs     12/30/23  1329 12/31/23  1111   INR 1.04 1.08   PTT 28  --         Additional Electrolytes  Recent Labs     12/31/23  0504   MG 2.4   PHOS 3.5          Blood Gas    No recent results  Recent Labs     12/31/23  0805   PHVEN 7.334   LGG9BLV 35.8*   PO2VEN 65.0*   JGB7MCX 18.6*   BEVEN -6.4   T1WMIIT 91.6*    LFTs  Recent Labs     12/30/23  1329 12/31/23  0504   ALT 12 10   AST 16 18   ALKPHOS 131* 113*   ALB 4.9 4.4   TBILI 0.53 0.56       Infectious  No recent results  Glucose  Recent Labs     12/31/23  0504 12/31/23  1111 12/31/23  1430 12/31/23  1820   GLUC 177* 169* 227* 166*             Critical Care Time Delivered : Upon my evaluation, this patient had a high probability of imminent or life-threatening deterioration due to posterior fossa stroke with hemorrhagic conversion, which required my direct attention, intervention, and personal management.  I have personally provided 47 minutes of critical care time, exclusive of procedures, teaching, family meetings, and any prior time recorded by providers other than myself.     Ysabel Galeano, DO

## 2024-01-01 NOTE — PLAN OF CARE
Problem: PAIN - ADULT  Goal: Verbalizes/displays adequate comfort level or baseline comfort level  Description: Interventions:  - Encourage patient to monitor pain and request assistance  - Assess pain using appropriate pain scale  - Administer analgesics based on type and severity of pain and evaluate response  - Implement non-pharmacological measures as appropriate and evaluate response  - Consider cultural and social influences on pain and pain management  - Notify physician/advanced practitioner if interventions unsuccessful or patient reports new pain  Outcome: Progressing     Problem: INFECTION - ADULT  Goal: Absence or prevention of progression during hospitalization  Description: INTERVENTIONS:  - Assess and monitor for signs and symptoms of infection  - Monitor lab/diagnostic results  - Monitor all insertion sites, i.e. indwelling lines, tubes, and drains  - Monitor endotracheal if appropriate and nasal secretions for changes in amount and color  - Labelle appropriate cooling/warming therapies per order  - Administer medications as ordered  - Instruct and encourage patient and family to use good hand hygiene technique  - Identify and instruct in appropriate isolation precautions for identified infection/condition  Outcome: Progressing  Goal: Absence of fever/infection during neutropenic period  Description: INTERVENTIONS:  - Monitor WBC    Outcome: Progressing     Problem: SAFETY ADULT  Goal: Patient will remain free of falls  Description: INTERVENTIONS:  - Educate patient/family on patient safety including physical limitations  - Instruct patient to call for assistance with activity   - Consult OT/PT to assist with strengthening/mobility   - Keep Call bell within reach  - Keep bed low and locked with side rails adjusted as appropriate  - Keep care items and personal belongings within reach  - Initiate and maintain comfort rounds  - Make Fall Risk Sign visible to staff  - Offer Toileting every 2 Hours,  in advance of need  - Initiate/Maintain bed alarm  - Obtain necessary fall risk management equipment  - Apply yellow socks and bracelet for high fall risk patients  - Consider moving patient to room near nurses station  Outcome: Progressing  Goal: Maintain or return to baseline ADL function  Description: INTERVENTIONS:  -  Assess patient's ability to carry out ADLs; assess patient's baseline for ADL function and identify physical deficits which impact ability to perform ADLs (bathing, care of mouth/teeth, toileting, grooming, dressing, etc.)  - Assess/evaluate cause of self-care deficits   - Assess range of motion  - Assess patient's mobility; develop plan if impaired  - Assess patient's need for assistive devices and provide as appropriate  - Encourage maximum independence but intervene and supervise when necessary  - Involve family in performance of ADLs  - Assess for home care needs following discharge   - Consider OT consult to assist with ADL evaluation and planning for discharge  - Provide patient education as appropriate  Outcome: Progressing  Goal: Maintains/Returns to pre admission functional level  Description: INTERVENTIONS:  - Perform AM-PAC 6 Click Basic Mobility/ Daily Activity assessment daily.  - Set and communicate daily mobility goal to care team and patient/family/caregiver.   - Collaborate with rehabilitation services on mobility goals if consulted  - Perform Range of Motion 3 times a day.  - Reposition patient every 2 hours.  - Dangle patient 3 times a day  - Stand patient 3 times a day  - Ambulate patient 3 times a day  - Out of bed to chair 3 times a day   - Out of bed for meals 3 times a day  - Out of bed for toileting  - Record patient progress and toleration of activity level   Outcome: Progressing     Problem: DISCHARGE PLANNING  Goal: Discharge to home or other facility with appropriate resources  Description: INTERVENTIONS:  - Identify barriers to discharge w/patient and caregiver  -  Arrange for needed discharge resources and transportation as appropriate  - Identify discharge learning needs (meds, wound care, etc.)  - Arrange for interpretive services to assist at discharge as needed  - Refer to Case Management Department for coordinating discharge planning if the patient needs post-hospital services based on physician/advanced practitioner order or complex needs related to functional status, cognitive ability, or social support system  Outcome: Progressing     Problem: Knowledge Deficit  Goal: Patient/family/caregiver demonstrates understanding of disease process, treatment plan, medications, and discharge instructions  Description: Complete learning assessment and assess knowledge base.  Interventions:  - Provide teaching at level of understanding  - Provide teaching via preferred learning methods  Outcome: Progressing     Problem: Neurological Deficit  Goal: Neurological status is stable or improving  Description: Interventions:  - Monitor and assess patient's level of consciousness, motor function, sensory function, and level of assistance needed for ADLs.   - Monitor and report changes from baseline. Collaborate with interdisciplinary team to initiate plan and implement interventions as ordered.   - Provide and maintain a safe environment.  - Consider seizure precautions.  - Consider fall precautions.  - Consider aspiration precautions.  - Consider bleeding precautions.  Outcome: Progressing     Problem: Activity Intolerance/Impaired Mobility  Goal: Mobility/activity is maintained at optimum level for patient  Description: Interventions:  - Assess and monitor patient  barriers to mobility and need for assistive/adaptive devices.  - Assess patient's emotional response to limitations.  - Collaborate with interdisciplinary team and initiate plans and interventions as ordered.  - Encourage independent activity per ability.  - Maintain proper body alignment.  - Perform active/passive rom as  tolerated/ordered.  - Plan activities to conserve energy.  - Turn patient as appropriate  Outcome: Progressing     Problem: Communication Impairment  Goal: Ability to express needs and understand communication  Description: Assess patient's communication skills and ability to understand information.  Patient will demonstrate use of effective communication techniques, alternative methods of communication and understanding even if not able to speak.     - Encourage communication and provide alternate methods of communication as needed.  - Collaborate with case management/ for discharge needs.  - Include patient/family/caregiver in decisions related to communication.  Outcome: Progressing     Problem: Potential for Aspiration  Goal: Non-ventilated patient's risk of aspiration is minimized  Description: Assess and monitor vital signs, respiratory status, and labs (WBC).  Monitor for signs of aspiration (tachypnea, cough, rales, wheezing, cyanosis, fever).    - Assess and monitor patient's ability to swallow.  - Place patient up in chair to eat if possible.  - HOB up at 90 degrees to eat if unable to get patient up into chair.  - Supervise patient during oral intake.   - Instruct patient/ family to take small bites.  - Instruct patient/ family to take small single sips when taking liquids.  - Follow patient-specific strategies generated by speech pathologist.  Outcome: Progressing  Goal: Ventilated patient's risk of aspiration is minimized  Description: Assess and monitor vital signs, respiratory status, airway cuff pressure, and labs (WBC).  Monitor for signs of aspiration (tachypnea, cough, rales, wheezing, cyanosis, fever).    - Elevate head of bed 30 degrees if patient has tube feeding.  - Monitor tube feeding.  Outcome: Progressing     Problem: Nutrition  Goal: Nutrition/Hydration status is improving  Description: Monitor and assess patient's nutrition/hydration status for malnutrition (ex- brittle  hair, bruises, dry skin, pale skin and conjunctiva, muscle wasting, smooth red tongue, and disorientation). Collaborate with interdisciplinary team and initiate plan and interventions as ordered.  Monitor patient's weight and dietary intake as ordered or per policy. Utilize nutrition screening tool and intervene per policy. Determine patient's food preferences and provide high-protein, high-caloric foods as appropriate.     - Assist patient with eating.  - Allow adequate time for meals.  - Encourage patient to take dietary supplement as ordered.  - Collaborate with clinical nutritionist.  - Include patient/family/caregiver in decisions related to nutrition.  Outcome: Progressing     Problem: Prexisting or High Potential for Compromised Skin Integrity  Goal: Skin integrity is maintained or improved  Description: INTERVENTIONS:  - Identify patients at risk for skin breakdown  - Assess and monitor skin integrity  - Assess and monitor nutrition and hydration status  - Monitor labs   - Assess for incontinence   - Turn and reposition patient  - Assist with mobility/ambulation  - Relieve pressure over bony prominences  - Avoid friction and shearing  - Provide appropriate hygiene as needed including keeping skin clean and dry  - Evaluate need for skin moisturizer/barrier cream  - Collaborate with interdisciplinary team   - Patient/family teaching  - Consider wound care consult   Outcome: Progressing

## 2024-01-01 NOTE — ASSESSMENT & PLAN NOTE
83 y.o. female with HTN, HLD, CKD, iron deficiency anemia, pulmonary fibrosis secondary to COVID in 2021, and DM initially presented to [facility] on 12/30/2023 with nausea, ataxia, and headache. Imaging noted L PICA infarct with occluded L PICA. She was found to have hemorrhagic conversion on neuroimaging as well as increasing posterior circulation edema/mass effect and 4th ventricle effacement so was transferred to [facility] for critical care/neurosurgery evaluation. Etiology of L PICA CVA/occlusion either in situ thrombosis or atheroembolic from more proximal vertebral disease. Central embolic etiology considered less likely.     On 1/3, patient developed hydrocephalus s/p EVD without improvement so underwent suboccipital craniectomy for posterior fossa decompression. EVD eventually removed on 1/5. Later in day, patient's mental status declined. She was unresponsive with labored breathing. She required bipap and was taken emergently to CT which indicated extensive IVH with mild hemorrhagic conversion to left cerebellum. An EVD was replaced.       Neuroimaging:  CT head (12/30) revealed acute infarct in left cerebellum PICA territory  CTA head/neck (12/30): occluded L PICA  Repeat CT head (12/30)   Re-demonstrated acute left cerebellar PICA infarct with mild mass effect on fourth ventricle. No hydrocephalus.   There was increased density within the anterior aspect of the infarct (contrast staining rather than petechial hemorrhage)  Repeat CT head (12/31)  Slightly increased mass effect on 4th ventricle; no hydrocephalus. Hyper-attenuation in cerebellar CVA (residual contrast staining vs petechial hemorrhage)    MRI brain wo (12/31):  L PICA CVA, with hemorrhagic conversion in L cerebellar vermis/petechial cortical hemorrhage in L cerebellum, similar mass effect on 4th ventricle   Repeat CT head (1/3):  Evolving left PCA infarct with increased left cerebellar hypodensity and compression of the fourth  ventricle resulting in hydrocephalus with increased focal parenchymal hemorrhage adjacent to the fourth ventricle  Repeat CT head (1/3) after EVD and suboccipital craniectomy  S/p decompressive suboccipital craniectomy with postsurgical changes/pneumocephalus.  Similar evolving L PICA infarctand similar L anterior cerebellar vermis hematoma  Repeat CTH (1/5) after worsening as detailed above.   Echo revealed EF 70% with normal size atrium bilaterally.  Lipid panel: Cholesterol 229,   Hemoglobin A1c 8.3    Plan:  - Loaded with DAPT on 12/30; status post DDAVP on 12/31 given hemorrhagic conversion  - AP on hold at this time   - Can continue atorvastatin daily   - Neurosurgery following; see notes for details regarding plan.   - Recommend eventual outpatient cardiac rhythm monitoring  - PT/OT when able   - Serial neurologic exams, continue to wean sedation as able  - Medical management and supportive care per primary team. Correction of any metabolic or infectious disturbances

## 2024-01-01 NOTE — PROGRESS NOTES
[facility]  Progress Note: Critical Care  Name: Debbie Moody 83 y.o. 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 with evidence of cerebellar mass effect on brainstem and effacement of fourth ventricle with brain compression and hemorrhagic conversion   Plan: q1h neuro checks  Hypertonic saline at 30 ml/hr  Titrate to goal serum osm 310-320 and Na 145-155  Consult Neurosurgery   Neurology consulted  Continue stroke pathway - MRI, echo completed  Hold antiplatelets and anticoagulants  Repeat CTH if new focal deficit or decline in GCS > 2 points  Goal -150 - requiring cardene    CV:   Diagnosis: HTN  Plan: Cardene drip to maintain SBP < 150  Home enalapril on hold    Pulm:  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  Plan: Xopenex PRN  Currently on 2L NC  IS, OOB    GI:   Add bowel regimen    :   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  Plan: Trend renal function  Strict I/Os  Avoid nephrotoxins     F/E/N:   Diabetic diet  Speech evaluation  Replete electrolytes as needed    Heme/Onc:   Diagnosis: Anemia  Plan: Continue iron supplementation  Trend hgb/plt count  Chemical DVT PPx on hold given small ICH     Endo:   Diagnosis: DM type 2  Plan: Insulin drip with q2h accuchecks  Goal blood glucose 140-180    ID:   No active issues  Trend WBC/fever curve    MSK/Skin:   No active issues  Frequent repositioning     Disposition: Critical care    ICU Core Measures     A: Assess, Prevent, and Manage Pain Has pain been assessed? Yes  Need for changes to pain regimen? No   B: Both SAT/SAT  N/A   C: Choice of Sedation RASS Goal: N/A patient not on sedation  Need for changes to sedation or analgesia regimen? No   D: Delirium CAM-ICU: Negative   E: Early Mobility  Plan for early mobility? Yes   F: Family Engagement Plan for family  engagement today? Yes       Review of Invasive Devices:      Central access plan: Medications requiring central line      Prophylaxis:  VTE Contraindicated secondary to: ICH   Stress Ulcer  not ordered        Significant 24hr Events     24hr events: Transferred to [facility]. Maintain on hypertonic saline which is at goal. No change in neuro exam overnight.      Subjective     Review of Systems   Respiratory:  Negative for chest tightness and shortness of breath.    Cardiovascular:  Negative for chest pain.   Gastrointestinal:  Negative for abdominal pain and nausea.   Genitourinary:  Negative for difficulty urinating.   Neurological:  Negative for dizziness, weakness and headaches.        Objective                            Vitals I/O      Most Recent Min/Max in 24hrs   Temp 97.9 °F (36.6 °C) Temp  Min: 97.9 °F (36.6 °C)  Max: 99.6 °F (37.6 °C)   Pulse 96 Pulse  Min: 75  Max: 110   Resp 14 Resp  Min: 12  Max: 28   /73 BP  Min: 117/68  Max: 200/89   O2 Sat 99 % SpO2  Min: 93 %  Max: 99 %      Intake/Output Summary (Last 24 hours) at 1/1/2024 0423  Last data filed at 1/1/2024 0200  Gross per 24 hour   Intake 1003.07 ml   Output 250 ml   Net 753.07 ml       Diet Rodríguez/CHO Controlled; Consistent Carbohydrate Diet Level 2 (5 carb servings/75 grams CHO/meal)    Invasive Monitoring           Physical Exam   Physical Exam  Eyes:      Pupils: Pupils are equal, round, and reactive to light.   Skin:     General: Skin is warm and dry.      Capillary Refill: Capillary refill takes less than 2 seconds.   HENT:      Head: Normocephalic.      Mouth/Throat:      Mouth: Mucous membranes are moist.   Neck:      Vascular: Central line present.   Cardiovascular:      Rate and Rhythm: Normal rate and regular rhythm.      Pulses: Normal pulses.   Abdominal: General: There is no distension.      Palpations: Abdomen is soft.      Tenderness: There is no abdominal tenderness.   Constitutional:       General: She is not in acute  distress.  Pulmonary:      Effort: Pulmonary effort is normal.      Breath sounds: Normal breath sounds.   Neurological:      GCS: GCS eye subscore is 4. GCS verbal subscore is 5. GCS motor subscore is 6.      Cranial Nerves: No dysarthria or facial asymmetry.      Coordination: Heel to Shin Test abnormal (mild on left).      Comments: Strength 5/5            Diagnostic Studies        Imaging: CTH, MRI I have personally reviewed pertinent films in PACS     Medications:  Scheduled PRN   atorvastatin, 40 mg, QPM  chlorhexidine, 15 mL, Q12H JUAN ANTONIO  ferrous sulfate, 325 mg, Daily With Breakfast      hydrALAZINE, 10 mg, Q6H PRN  levalbuterol, 1.25 mg, Q6H PRN  ondansetron, 4 mg, Q6H PRN       Continuous    insulin regular (HumuLIN R,NovoLIN R) 1 Units/mL in sodium chloride 0.9 % 100 mL infusion, 0.3-21 Units/hr, Last Rate: 1 Units/hr (01/01/24 0154)  niCARdipine, 1-15 mg/hr, Last Rate: 12.5 mg/hr (01/01/24 0250)  sodium chloride, 30 mL/hr, Last Rate: 30 mL/hr (12/31/23 2056)         Labs:    CBC    Recent Labs     12/30/23  1329 12/30/23 2010 12/31/23  0504   WBC 8.79  --  11.11*   HGB 14.1  --  13.6   HCT 43.4  --  42.4    262 265   BANDSPCT 5  --   --      BMP    Recent Labs     12/31/23  1820 01/01/24  0018   SODIUM 141 142   K 4.3 4.2   * 113*   CO2 18* 19*   AGAP 10 10   BUN 30* 29*   CREATININE 1.43* 1.54*   CALCIUM 9.0 8.8       Coags    Recent Labs     12/30/23  1329 12/31/23  1111   INR 1.04 1.08   PTT 28  --         Additional Electrolytes  Recent Labs     12/31/23  0504   MG 2.4   PHOS 3.5          Blood Gas    No recent results  Recent Labs     12/31/23  0805   PHVEN 7.334   XXP3CKG 35.8*   PO2VEN 65.0*   QZZ3TDB 18.6*   BEVEN -6.4   B3IXQTE 91.6*    LFTs  Recent Labs     12/30/23  1329 12/31/23  0504   ALT 12 10   AST 16 18   ALKPHOS 131* 113*   ALB 4.9 4.4   TBILI 0.53 0.56       Infectious  No recent results  Glucose  Recent Labs     12/31/23  1111 12/31/23  1430 12/31/23  1820 01/01/24  0018    GLUC 169* 227* 166* 137                   Amelie Rogers PA-C

## 2024-01-01 NOTE — CASE MANAGEMENT
Case Management Assessment & Discharge Planning Note    Patient name Debbie Moody  Location ICU 10/ICU 10 MRN [id number]  : 1940 Date 2024       Current Admission Date: 2023  Current Admission Diagnosis:Acute CVA (cerebrovascular accident) (Formerly [facility])  Patient Active Problem List    Diagnosis Date Noted    Metabolic acidosis 2023    Anemia 2023    Diabetes (Formerly [facility]) 2023    Acute CVA (cerebrovascular accident) (Formerly [facility]) 2023    Pulmonary fibrosis (Formerly [facility]) 2023    Hypertriglyceridemia 2022    Chronic kidney disease, stage 3b (Formerly [facility]) 2021    Ambulatory dysfunction 2021    Negative depression screening 2021    Overweight (BMI 25.0-29.9) 2021    Localized, primary osteoarthritis of hand 2020    Sciatica 2020    Hypertensive kidney disease with chronic kidney disease stage III (Formerly [facility]) 2019    Type 2 diabetes mellitus with stage 3b chronic kidney disease, without long-term current use of insulin (Formerly [facility])     Groin pain, chronic, left 2018    Anxiety 2017    Abnormal ECG 2016    Diverticulitis large intestine w/o perforation or abscess w/o bleeding 2016    Essential hypertension 2016    Hypercholesterolemia 2016    Irritable bowel syndrome 2016    Simple chronic anemia 2016    Disorder of shoulder 2008    Articular cartilage disorder of shoulder region 2008      LOS (days): 1  Geometric Mean LOS (GMLOS) (days):   Days to GMLOS:     OBJECTIVE:    Risk of Unplanned Readmission Score: 24.07         Current admission status: Inpatient       Preferred Pharmacy:   [id number] - [place] - [address]  [address] [address]  Phone: [phone] Fax: [phone]    [facility] - [place], [place] - [address] [place] [id number]  Phone: [phone] Fax: [phone]    Primary Care Provider: Wild  DO Rivera    Primary Insurance: [facility]  Secondary Insurance:     ASSESSMENT:  Active Health Care Proxies    There are no active Health Care Proxies on file.       Advance Directives  Does patient have a Health Care POA?: Yes (per old record daughter Unique Ramires is POA, not on file)  Does patient have Advance Directives?: Yes (per pt, yes but not on file)  Advance Directives: Living will, Power of  for health care  Primary Contact: daughter Unique Ramires         Readmission Root Cause  30 Day Readmission: No    Patient Information  Admitted from:: Facility  Mental Status: Other (Comment) (awake, drowsy, mumbling)  During Assessment patient was accompanied by: Not accompanied during assessment  Assessment information provided by:: Patient  Primary Caregiver: Self  Support Systems: Spouse/significant other, Daughter, Family members, Children  County of Residence: [place]  What city do you live in?: [place]  Home entry access options. Select all that apply.: Stairs  Number of steps to enter home.: 10  Do the steps have railings?: Yes  Type of Current Residence: 2 story home  Upon entering residence, is there a bedroom on the main floor (no further steps)?: No  A bedroom is located on the following floor levels of residence (select all that apply):: 2nd Floor  Upon entering residence, is there a bathroom on the main floor (no further steps)?: Yes  Number of steps to 2nd floor from main floor: One Flight  Living Arrangements: Lives w/ Spouse/significant other, Lives w/ Daughter  Is patient a ?: No    Activities of Daily Living Prior to Admission  Functional Status: Independent  Completes ADLs independently?: Yes  Ambulates independently?: Yes  Does patient use assisted devices?: No  Does patient currently own DME?: No  Does patient have a history of Outpatient Therapy (PT/OT)?: No  Does the patient have a history of Short-Term Rehab?: No  Does patient have a history of HHC?:  No  Does patient currently have HHC?: No         Patient Information Continued  Income Source: Pension/FPC  Does patient have prescription coverage?: Yes  Does patient receive dialysis treatments?: No  Does patient have a history of substance abuse?: No  Does patient have a history of Mental Health Diagnosis?: Yes (anxiety)  Is patient receiving treatment for mental health?:  (unknown)  Has patient received inpatient treatment related to mental health in the last 2 years?: No         Means of Transportation  Means of Transport to Appts:: Drives Self      Housing Stability: Low Risk  (1/1/2024)    Housing Stability Vital Sign     Unable to Pay for Housing in the Last Year: No     Number of Places Lived in the Last Year: 1     Unstable Housing in the Last Year: No   Food Insecurity: No Food Insecurity (1/1/2024)    Hunger Vital Sign     Worried About Running Out of Food in the Last Year: Never true     Ran Out of Food in the Last Year: Never true   Transportation Needs: No Transportation Needs (1/1/2024)    PRAPARE - Transportation     Lack of Transportation (Medical): No     Lack of Transportation (Non-Medical): No   Utilities: Not At Risk (1/1/2024)    [facility] Utilities     Threatened with loss of utilities: No       DISCHARGE DETAILS:    Discharge planning discussed with:: patient--explained needs tbd, rehab vs HHC  [place] of Choice: Yes     CM contacted family/caregiver?: No- see comments             Contacts  Patient Contacts: daughter Unique Ramires  Relationship to Patient:: Family  Contact Method: Phone  Reason/Outcome: Continuity of Care, Emergency Contact, Discharge Planning, Referral              Other Referral/Resources/Interventions Provided:  Referral Comments: discharge needs tbd, rehab vs HHC.                                                      Additional Comments: 82yo female in ICU HOD#1 CVA, left PICA infarct. Had nausea/vomiting in early am, needed assistance to walk. Unable to receive TNK.  Hx CKD3, DM, pulmonary fibrosis, HTN. s/p C-diff 9/2023. Oriented, generalized weakness and fatigue. Discharge needs tbd: acute vs subacute rehab vs HHC. CM following. Daughter is spokesperson.

## 2024-01-01 NOTE — PROCEDURES
Central Line Insertion    Date/Time: 12/31/2023 8:10 PM    Performed by: Amelie Rogers PA-C  Authorized by: Amelie Rogers PA-C    Patient location:  ICU  Consent:     Consent obtained:  Verbal    Consent given by:  Patient (and daughter)    Risks discussed:  Arterial puncture, bleeding, incorrect placement, infection, nerve damage and pneumothorax    Alternatives discussed:  Delayed treatment and no treatment  Universal protocol:     Procedure explained and questions answered to patient or proxy's satisfaction: yes      Immediately prior to procedure, a time out was called: yes      Patient identity confirmed:  Verbally with patient  Pre-procedure details:     Hand hygiene: Hand hygiene performed prior to insertion      Sterile barrier technique: All elements of maximal sterile technique followed      Skin preparation:  ChloraPrep    Skin preparation agent: Skin preparation agent completely dried prior to procedure    Indications:     Central line indications: medications requiring central line    Anesthesia (see MAR for exact dosages):     Anesthesia method:  Local infiltration    Local anesthetic:  Lidocaine 1% w/o epi  Procedure details:     Location:  Right internal jugular    Vessel type: vein      Laterality:  Right    Approach: percutaneous technique used      Patient position:  Trendelenburg    Catheter type:  Triple lumen 16cm    Catheter size:  7 Fr    Landmarks identified: yes      Ultrasound guidance: yes      Ultrasound image availability:  Not saved    Sterile ultrasound techniques: Sterile gel and sterile probe covers were used      Manometry confirmation: yes      Number of attempts:  2  Post-procedure details:     Post-procedure:  Dressing applied and line sutured    Assessment:  Blood return through all ports, no pneumothorax on x-ray and placement verified by x-ray    Patient tolerance of procedure:  Tolerated well, no immediate complications

## 2024-01-01 NOTE — [facility] ADMISSION
[facility] admissions team received referral on patient's case for possible [facility] placement. [facility] admissions team will continue to review and follow patient's progress and correspond with PT/OT therapies / [facility] physician and case management until a determination of acceptance to [facility] is made.

## 2024-01-01 NOTE — PROGRESS NOTES
Progress Note - Neurology   Whipholtsheree Moody 83 y.o. female [id number]  Unit/Bed#: ICU 10/ICU 10    Assessment/Plan:  Acute CVA (cerebrovascular accident) (HCC)  Assessment & Plan  83-year-old female HTN, HLD, CKD, iron deficiency anemia, pulmonary fibrosis secondary to COVID in 2021 and DM who originally presented to [facility] on 12/30 with nausea, ataxia and headache that started at approximately 5 AM.    BP on arrival 215/101. NIH 0 per neurology telemetry.  CT head revealed left PICA infarct.  CTA revealed occluded distal aspect of left PICA which corresponds with left PICA infarct.  Patient was outside of the therapeutic window for TNK.  Patient was admitted to the ICU service, loaded with DAPT and started on hypertonic 3% saline.  Patient was found to have hemorrhagic conversion on repeat imaging, antiplatelets were held and she was given DDAVP.  There was also increasing cerebellar mass effect on brainstem and effacement of fourth ventricle with brain compression.  The decision was made to transfer patient to [facility] for neurocritical care and neurosurgical evaluation.    Workup:  - CT head (12/30) revealed acute infarct in left cerebellum PICA territory  - CTA head/neck (12/30)  Occluded distal aspect of left posterior inferior cerebellar artery (PICA). This corresponds with left PICA territory infarct seen on earlier same day head CT.  Scattered reticular opacities in visualized bilateral upper and lower lobes, likely due to chronic lung disease with postinfectious scarring. Superimposed infection is difficult to exclude.   - Repeat CT head (12/30)   Re-demonstrated acute left cerebellar PICA infarct with mild mass effect on fourth ventricle. No hydrocephalus.    There was increased density within the anterior aspect of the infarct (contrast staining rather than petechial hemorrhage)  - Repeat CT head (12/31)   Re demonstrated acute stroke with slightly increased mass effect on the  fourth ventricle.  No hydrocephalus  There is also persistent increased attenuation in the anterior aspect of the cerebellar infarct, residual contrast staining vs petechial hemorrhage  - MRI brain wo (12/31):  Acute infarct in left PICA territory with small acute parenchymal hematoma in anterior aspect of left cerebellar vermis, petechial cortical hemorrhage along left posterior cerebellum, and similar compressive mass effect on fourth ventricle. No obstructive hydrocephalus. Recommend consultation with neurosurgery.  - Echo revealed EF 70% with normal size atrium bilaterally.  - Lipid panel: Cholesterol 229,   - Hemoglobin A1c 8.3    Plan:  - Stroke pathway  - Loaded with Aspirin 325 mg x 1 and Plavix 300 mg x 1. Reasonable to restart aspirin 81 mg daily today, discussed with critical care team  - Continue with Atorvastatin 80 mg daily  - Currently on hypertonic saline per critical care team   - Goal serum osmolarity 310-320, 319 on 1/1  - Goal serum sodium 145-155, 142 on 1/1  - Euglycemic, normothermic goal  - Continue telemetry  - Maintain -150's   - Currently on Cardene gtt  - PT/OT/ST  - Secondary risk factor modification.   - Stroke education  - Frequent neuro checks. Continue to monitor and notify neurology with any changes.  - STAT CT head for any acute change in neuro exam  - Neurosurgery following, no plans for emergent surgical intervention at this time, however will continue to closely monitor patient  - Medical management and supportive care per primary team. Correction of any metabolic or infectious disturbances.     Plan discussed with Attending Neurologist, please see attestation for further input/recommendations.         [place] M Fransisco will need follow up in in 4 weeks with neurovascular attending. She will not require outpatient neurological testing.    Subjective:   Patient seen resting comfortably in bed.  She reported she has some nausea and a headache this morning, however this  is being effectively treated with medication per ICU team.  Patient reported that Tylenol helps her headaches.  There is some mild dysmetria with left finger-to-nose, however aside from this neuroexam is grossly unremarkable.  Patient was made aware of the plan of care moving forward, case discussed with ICU team.  All questions/concerns answered.    Past Medical History:   Diagnosis Date    Acute kidney injury (HCC)     Acute respiratory failure with hypoxia (HCC) 12/16/2021    Broken arm     hairline fracture/ 2 places///   right arm    Diabetes mellitus (HCC)     Diverticulitis     Pneumothorax- Resolved     Postherpetic neuralgia      Past Surgical History:   Procedure Laterality Date    CHOLECYSTECTOMY      COLON SURGERY      HERNIA REPAIR      IR EMBOLIZATION (SPECIFY VESSEL OR SITE)  1/12/2022    ROTATOR CUFF REPAIR Right     VARICOSE VEIN SURGERY      Impression:  2/12/16 Jake Sarabia     Family History   Problem Relation Age of Onset    Diabetes Mother     No Known Problems Father      Social History     Socioeconomic History    Marital status: /Civil Union     Spouse name: Not on file    Number of children: Not on file    Years of education: Not on file    Highest education level: Not on file   Occupational History    Not on file   Tobacco Use    Smoking status: Never    Smokeless tobacco: Never   Vaping Use    Vaping status: Never Used   Substance and Sexual Activity    Alcohol use: Not Currently     Comment: Rarely    Drug use: Never    Sexual activity: Not Currently   Other Topics Concern    Not on file   Social History Narrative    Always uses seatbelt    No caffeine use     Social Determinants of Health     Financial Resource Strain: Low Risk  (9/1/2022)    Overall Financial Resource Strain (CARDIA)     Difficulty of Paying Living Expenses: Not hard at all   Food Insecurity: No Food Insecurity (9/7/2023)    Hunger Vital Sign     Worried About Running Out of Food in the Last Year: Never true     " Ran Out of Food in the Last Year: Never true   Transportation Needs: No Transportation Needs (9/7/2023)    PRAPARE - Transportation     Lack of Transportation (Medical): No     Lack of Transportation (Non-Medical): No   Physical Activity: Not on file   Stress: Not on file   Social Connections: Not on file   Intimate Partner Violence: Not on file   Housing Stability: Low Risk  (9/7/2023)    Housing Stability Vital Sign     Unable to Pay for Housing in the Last Year: No     Number of Places Lived in the Last Year: 1     Unstable Housing in the Last Year: No       Medications: Reviewed in detail by me.    Performed by Attending Neurologist, AP present at bedside acting as scribe  ROS: Review of Systems   Constitutional:  Positive for fatigue. Negative for fever.   Eyes:  Negative for photophobia and visual disturbance.   Respiratory:  Negative for cough and shortness of breath.    Cardiovascular:  Negative for chest pain and palpitations.   Gastrointestinal:  Positive for nausea.   Skin:  Negative for color change and pallor.   Neurological:  Positive for headaches. Negative for dizziness, facial asymmetry, speech difficulty, weakness, light-headedness and numbness.   Psychiatric/Behavioral:  Negative for confusion. The patient is not nervous/anxious.         Vitals: /56   Pulse 92   Temp 98 °F (36.7 °C) (Oral)   Resp 13   Ht 4' 10\" (1.473 m)   Wt 56.2 kg (123 lb 14.4 oz)   LMP  (LMP Unknown)   SpO2 99%   BMI 25.89 kg/m²     Performed by Attending Neurologist, AP present at bedside acting as scribe  Physical Exam:   Physical Exam  Vitals reviewed.   Constitutional:       General: She is not in acute distress.     Appearance: Normal appearance. She is normal weight. She is not ill-appearing.   HENT:      Head: Normocephalic and atraumatic.      Right Ear: External ear normal.      Left Ear: External ear normal.      Nose: Nose normal.      Mouth/Throat:      Mouth: Mucous membranes are moist.   Eyes:      " General:         Right eye: No discharge.         Left eye: No discharge.      Extraocular Movements: Extraocular movements intact.      Conjunctiva/sclera: Conjunctivae normal.      Pupils: Pupils are equal, round, and reactive to light.   Cardiovascular:      Rate and Rhythm: Normal rate.   Pulmonary:      Effort: Pulmonary effort is normal. No respiratory distress.   Musculoskeletal:         General: Normal range of motion.      Right lower leg: No edema.      Left lower leg: No edema.   Skin:     General: Skin is warm and dry.      Coloration: Skin is not jaundiced or pale.   Neurological:      Mental Status: She is alert and oriented to person, place, and time.      Motor: Motor strength is normal.     Coordination: Finger-Nose-Finger Test abnormal (Mild, intermittent dysmetria with left finger-nose.  Normal with right finger-to-nose). Heel to Lemus Test normal.      Comments: See below   Psychiatric:         Speech: Speech normal.       Neurologic Exam     Mental Status   Oriented to person, place, and time.   Follows 2 step commands.   Attention: normal. Concentration: normal.   Speech: speech is normal   Level of consciousness: alert  Knowledge: good.   Able to name object. Able to repeat. Normal comprehension.     Cranial Nerves     CN II   Visual fields full to confrontation.     CN III, IV, VI   Pupils are equal, round, and reactive to light.  Nystagmus: none   Diplopia: none  Conjugate gaze: present    CN V   Facial sensation intact.     CN VII   Facial expression full, symmetric.     CN VIII   CN VIII normal.     CN IX, X   CN IX normal.     CN XI   CN XI normal.     CN XII   CN XII normal.     EOMs intact, however there is interruption of smooth pursuits     Motor Exam   Muscle bulk: normal  Right arm pronator drift: absent  Left arm pronator drift: absent    Strength   Strength 5/5 throughout.     Sensory Exam   Light touch normal.     Gait, Coordination, and Reflexes     Gait  Gait: (Deferred for  safety)    Coordination   Finger to nose coordination: abnormal (Mild, intermittent dysmetria with left finger-nose.  Normal with right finger-to-nose)  Heel to shin coordination: normal    Tremor   Resting tremor: absent  Intention tremor: absent      Labs: Reviewed in detail by me.     Imaging: I have personally reviewed pertinent imaging and PACS reports.     VTE Prophylaxis: Sequential compression device (Venodyne)     Total time spent today 30 minutes.  Greater than 50% of total time was spent with the patient and/or family counseling and/or coordination of care.  A description of the counseling/coordination of care:  Patient was seen and evaluated.  Discussed with attending.  Chart reviewed thoroughly including laboratory and imaging studies.  Discussed medication regimen, imaging reviewed. Plan of care discussed with patient and primary team.

## 2024-01-01 NOTE — CONSULTS
Impression/plan    Large left cerebellar infarct that encompasses the left PICA territory.  The infarct is fairly large and concerning.  Overall, however, her fourth ventricle is remarkably still patent.  She also has no evidence of obstructive hydrocephalus.  Her level of consciousness is fully preserved.  She is following commands briskly and overall holding her own very well.  The ICU team has already started hypertonic saline to address any potential progression of her edema.  She is already 48 hours out from her infarct, which seems to be already fairly mature on the CT study.  Based on the extent of this infarction, and prior to seeing the patient myself, I was prepared to move with emergent surgical decompression and ventriculostomy placement.  However after speaking to the team here and evaluating her diagnostic imaging, as well as examining the patient myself, I do feel it would be appropriate to follow her very closely in the intensive care unit.  She still is not through the period of maximal brain swelling, but we are getting there.  It would be great if we could avoid any invasive or large decompressive suboccipital craniectomy for this patient, if at all possible based on her age.  However I did explain to her that we will have to take it on an hour by hour and day by day basis over the next few days.  The patient appreciated our discussion.  We will continue to follow her very closely with you.  Should there be any change in her neurological examination, we can change course immediately.  Neurosurgery will follow this patient very closely with you. Thank you for this consultation.    MD Zamzam Ferreiraie TIFFANY Moody is a 83 y.o. who presented with nausea, headache and ataxia and found to have a left PICA stroke at [facility].  Repeat head CT showed increasing cerebellar edema and small amount of possible hemorrhage. Transferred to [facility] neuro[facility] for ongoing management and possible  neurosurgical evaluation. She is being maintained on hypertonic saline, and her exam has not changed since admission. We were consulted for this left cerebellar infarct and potential for worsening neurological exam that may ultimately come to require neurosurgical intervention.     Past Medical History:   Diagnosis Date    Acute kidney injury (HCC)     Acute respiratory failure with hypoxia (HCC) 12/16/2021    Broken arm     hairline fracture/ 2 places///   right arm    Diabetes mellitus (HCC)     Diverticulitis     Pneumothorax- Resolved     Postherpetic neuralgia        Past Surgical History:   Procedure Laterality Date    CHOLECYSTECTOMY      COLON SURGERY      HERNIA REPAIR      IR EMBOLIZATION (SPECIFY VESSEL OR SITE)  1/12/2022    ROTATOR CUFF REPAIR Right     VARICOSE VEIN SURGERY      Impression:  2/12/16 Jake Sarabia         Current Facility-Administered Medications:     aspirin (ECOTRIN LOW STRENGTH) EC tablet 81 mg, 81 mg, Oral, Daily, Cherise Berman MD    atorvastatin (LIPITOR) tablet 40 mg, 40 mg, Oral, QPM, BENTON Ash, 40 mg at 12/31/23 1825    chlorhexidine (PERIDEX) 0.12 % oral rinse 15 mL, 15 mL, Mouth/Throat, Q12H JUAN ANTONIO, ROSA ISELA AshNP, 15 mL at 01/01/24 0816    ferrous sulfate tablet 325 mg, 325 mg, Oral, Daily With Breakfast, BENTON Ash, 325 mg at 01/01/24 0816    hydrALAZINE (APRESOLINE) injection 10 mg, 10 mg, Intravenous, Q6H PRN, BENTON Ash    insulin regular (HumuLIN R,NovoLIN R) 1 Units/mL in sodium chloride 0.9 % 100 mL infusion, 0.3-21 Units/hr, Intravenous, Titrated, ROSA ISELA AshNP, Last Rate: 0.5 mL/hr at 01/01/24 0904, 0.5 Units/hr at 01/01/24 0904    levalbuterol (XOPENEX) inhalation solution 1.25 mg, 1.25 mg, Nebulization, Q6H PRN, BENTON Ash    niCARdipine (CARDENE) 25 mg (STANDARD CONCENTRATION) in sodium chloride 0.9% 250 mL, 1-15 mg/hr, Intravenous, Titrated, Erika Zavaleta CRNP, Last Rate: 125 mL/hr at  01/01/24 0905, 12.5 mg/hr at 01/01/24 0905    ondansetron (ZOFRAN) injection 4 mg, 4 mg, Intravenous, Q6H PRN, BENTON Ash, 4 mg at 01/01/24 0816    polyethylene glycol (MIRALAX) packet 17 g, 17 g, Oral, Daily PRN, Miguel Negron DO    senna-docusate sodium (SENOKOT S) 8.6-50 mg per tablet 1 tablet, 1 tablet, Oral, HS, Miguel Negron DO    sodium chloride (HYPERTONIC) 3 % infusion, 30 mL/hr, Intravenous, Continuous, Miguel Negron DO, Last Rate: 30 mL/hr at 12/31/23 2056, 30 mL/hr at 12/31/23 2056     Social History            Tobacco Use    Smoking status: Never    Smokeless tobacco: Never   Vaping Use    Vaping status: Never Used   Substance Use Topics    Alcohol use: Not Currently       Comment: Rarely    Drug use: Never     Family History   Problem Relation Age of Onset    Diabetes Mother      No Known Problems Fath      Review of Systems   Constitutional:  Negative for chills, fatigue and fever.   HENT:  Negative for sinus pressure and sore throat.    Eyes:  Negative for visual disturbance.   Respiratory:  Negative for cough and shortness of breath.    Cardiovascular:  Negative for chest pain, palpitations and leg swelling.   Gastrointestinal:  Negative for abdominal pain, constipation, diarrhea, nausea and vomiting.   Genitourinary:  Negative for dysuria.   Neurological:  Negative for dizziness, light-headedness and headaches.   Psychiatric/Behavioral:  Negative for agitation and confusion.    All other systems reviewed and are negative.  Neurological exam    Neurological:      Mental Status: She is alert and fully awake and cooperative. She is not agitated and appears comfortable.  No aphasia. Tongue midline.      Cranial Nerves: No dysarthria or facial asymmetry.      Sensory: No sensory deficit.      Motor: MAEW with 5/5 strength     MRI brain: 12/31/23    Acute infarct in left PICA territory with small acute parenchymal hematoma in anterior aspect of left cerebellar vermis,  petechial cortical hemorrhage along left posterior cerebellum, and some compressive mass effect on fourth ventricle. Fourth ventricle open all the way down to foramen of Magendie and Luschka. No obstructive hydrocephalus.    1.6 cm intramuscular lesion in left temporalis muscle, unclear etiology. Mild chronic microangiopathy.    CT brain: 12/31/23    Acute PICA territory left cerebellar infarct. Mass effect on the fourth ventricle is slightly increased, but it is still open. No hydrocephalus.  Some small area of left cerebellar hemorrhage that converted.

## 2024-01-01 NOTE — PHYSICAL THERAPY NOTE
Physical Therapy Evaluation    Patient's Name: Debbie Moody    Admitting Diagnosis  Acute CVA (cerebrovascular accident) (HCC)    Problem List  Patient Active Problem List   Diagnosis    Groin pain, chronic, left    Type 2 diabetes mellitus with stage 3b chronic kidney disease, without long-term current use of insulin (HCC)    Hypertensive kidney disease with chronic kidney disease stage III (HCC)    Abnormal ECG    Articular cartilage disorder of shoulder region    Anxiety    Disorder of shoulder    Diverticulitis large intestine w/o perforation or abscess w/o bleeding    Essential hypertension    Hypercholesterolemia    Irritable bowel syndrome    Localized, primary osteoarthritis of hand    Sciatica    Simple chronic anemia    Negative depression screening    Overweight (BMI 25.0-29.9)    Ambulatory dysfunction    Chronic kidney disease, stage 3b (HCC)    Hypertriglyceridemia    Pulmonary fibrosis (HCC)    Diabetes (HCC)    Acute CVA (cerebrovascular accident) (HCC)    Metabolic acidosis    Anemia       Past Medical History  Past Medical History:   Diagnosis Date    Acute kidney injury (HCC)     Acute respiratory failure with hypoxia (HCC) 12/16/2021    Broken arm     hairline fracture/ 2 places///   right arm    Diabetes mellitus (HCC)     Diverticulitis     Pneumothorax- Resolved     Postherpetic neuralgia        Past Surgical History  Past Surgical History:   Procedure Laterality Date    CHOLECYSTECTOMY      COLON SURGERY      HERNIA REPAIR      IR EMBOLIZATION (SPECIFY VESSEL OR SITE)  1/12/2022    ROTATOR CUFF REPAIR Right     VARICOSE VEIN SURGERY      Impression:  2/12/16 Jake Sarabia        01/01/24 1055   PT Last Visit   PT Visit Date 01/01/24   Note Type   Note type Evaluation   Pain Assessment   Pain Assessment Tool 0-10   Pain Score No Pain   Restrictions/Precautions   Weight Bearing Precautions Per Order No   Other Precautions Chair Alarm;Bed Alarm;Multiple lines;Telemetry;Fall Risk   Home  Living   Type of Home House   Home Layout Two level;Stairs to enter with rails  (10 TRAM)   Bathroom Shower/Tub Walk-in shower   Bathroom Toilet Standard   Bathroom Equipment Grab bars in shower;Shower chair;Toilet raiser;Grab bars around toilet   Home Equipment Walker;Cane   Prior Function   Level of [place] Independent with ADLs;Independent with functional mobility;Independent with IADLS  (I community ambulator w/o AD)   Lives With Spouse;Daughter  (dtr is disabled and pt + pt's spouse are her PCGs (they assist her w/ t/f and ambulation))   Receives Help From Family   IADLs Independent with driving;Independent with meal prep;Independent with medication management   Falls in the last 6 months 0  (but does report hx of fall w/ RUE fx)   General   Additional Pertinent History Vitals assessed pre-tx: /59, HR 99.   Family/Caregiver Present No   Cognition   Arousal/Participation Alert   Orientation Level Oriented X4   Comments overall pleasant + cooperative   Subjective   Subjective Agreeable to mobilize.   RLE Assessment   RLE Assessment   (grossly 3+/5)   LLE Assessment   LLE Assessment   (grossly 3+/5)   Coordination   Movements are Fluid and Coordinated 0   Coordination and Movement Description ataxia w/ making [place] w/ BLE   Sensation WFL   Bed Mobility   Supine to Sit 4  Minimal assistance   Additional items Assist x 1;HOB elevated;Increased time required;Verbal cues   Sit to Supine Unable to assess   Additional Comments Pt greeted in supine.   Transfers   Sit to Stand 3  Moderate assistance   Additional items Assist x 2;Increased time required;Verbal cues;Armrests   Stand to Sit 3  Moderate assistance   Additional items Assist x 2;Increased time required;Verbal cues   Additional Comments B HHA   Ambulation/Elevation   Gait pattern Excessively slow;Short stride;Decreased foot clearance;Improper Weight shift;Shuffling  (L lateral lean)   Gait Assistance 3  Moderate assist   Additional items Assist x  2;Verbal cues;Tactile cues   Assistive Device   (B HHA)   Distance 5'   Stair Management Assistance Not tested   Balance   Static Sitting Poor +   Dynamic Sitting Poor   Static Standing Poor -   Dynamic Standing Poor -   Ambulatory Poor -  (B HHA)   Endurance Deficit   Endurance Deficit Yes   Endurance Deficit Description fatigue   Activity Tolerance   Activity Tolerance Patient limited by fatigue;Treatment limited secondary to medical complications (Comment)  (nausea)   Medical Staff Made Aware OT Natalie   Nurse Made Aware yes - cleared for therapy   Assessment   Prognosis Good   Problem List Decreased strength;Decreased endurance;Impaired balance;Decreased mobility;Decreased coordination   Assessment Pt is 83 y.o. female seen for a high complexity PT evaluation s/p admit to [facility] on 12/31/2023. Pt presenting w/ headache and ataxia; imaging revealed L PICA CVA. Please see above for other active problem list / PMH.     PT now consulted to assess functional mobility and needs for safe d/c planning. Prior to admission, pt was I w/ ambulation w/o AD, lives w/ spouse + dtr (pt + her spouse are PCG's for disabled dtr).     Currently pt requires Amos for bed skills; ModAx2 for functional transfers; ModAx2 for limited ambulation w/ B HHA. Pt presents functioning below baseline and w/ overall mobility deficits 2* to: decreased LE strength; decreased endurance; impaired balance; impaired coordination; gait deviations; nausea; fatigue; bed/chair alarms; multiple lines. These impairments place pt at risk for falls.     Pt will continue to benefit from skilled PT interventions to address stated impairments; to maximize functional potential; for ongoing pt/ family training; and DME needs. PT is currently recommending acute medical rehab.   Barriers to Discharge Inaccessible home environment   Goals   Patient Goals feel less nauseous   [facility] Expiration Date 01/15/24   Short Term Goal #1 In 14 days pt will  complete: 1) Bed mobility skills with Dot to facilitate safe return to previous living environment. 2) Functional transfers with Dot to facilitate safe return to previous living environment. 3) Ambulation with least restrictive ' w/ Dot without LOB for safe ambulation in home/community environment. 4) Improve balance scores by 1 grade to decrease fall risk. 5) Improve LE strength grades by 1 to increase independence w/ all functional mobility, transfers and gait. 6) PT for ongoing pt and family education; DME needs and D/C planning to promote highest level of function in least restrictive environment. 7) Stair training up/ down 10 steps with most appropriate technique and Dot for safe access to previous living environment and to increase community access.   PT Treatment Day 0   Plan   Treatment/Interventions Functional transfer training;LE strengthening/ROM;Elevations;Therapeutic exercise;Endurance training;Patient/family training;Bed mobility;Equipment eval/education;Gait training;Compensatory technique education;Spoke to nursing;OT   PT Frequency 3-5x/wk   Discharge Recommendation   Rehab Resource Intensity Level, PT I (Maximum Resource Intensity)   AM-PAC Basic Mobility Inpatient   Turning in Flat Bed Without Bedrails 3   Lying on Back to Sitting on Edge of Flat Bed Without Bedrails 3   Moving Bed to Chair 2   Standing Up From Chair Using Arms 2   Walk in Room 1   Climb 3-5 Stairs With Railing 1   Basic Mobility Inpatient Raw Score 12   Basic Mobility Standardized Score 32.23   Highest Level Of Mobility   JH-HLM Goal 4: Move to chair/commode   JH-HLM Achieved 4: Move to chair/commode   End of Consult   Patient Position at End of Consult Bedside chair;Bed/Chair alarm activated;All needs within reach  (on waffle cushion, all lines in tact, BLE elevated, SCDs activated)     Анна Biswas, PT, DPT

## 2024-01-01 NOTE — CASE MANAGEMENT
Case Management Discharge Planning Note    Patient name Debbie Moody  Location ICU 10/ICU 10 MRN [id number]  : 1940 Date 2024       Current Admission Date: 2023  Current Admission Diagnosis:Acute CVA (cerebrovascular accident) ([facility])  Patient Active Problem List    Diagnosis Date Noted    Metabolic acidosis 2023    Anemia 2023    Diabetes ([facility]) 2023    Acute CVA (cerebrovascular accident) ([facility]) 2023    Pulmonary fibrosis ([facility]) 2023    Hypertriglyceridemia 2022    Chronic kidney disease, stage 3b ([facility]) 2021    Ambulatory dysfunction 2021    Negative depression screening 2021    Overweight (BMI 25.0-29.9) 2021    Localized, primary osteoarthritis of hand 2020    Sciatica 2020    Hypertensive kidney disease with chronic kidney disease stage III ([facility]) 2019    Type 2 diabetes mellitus with stage 3b chronic kidney disease, without long-term current use of insulin ([facility])     Groin pain, chronic, left 2018    Anxiety 2017    Abnormal ECG 2016    Diverticulitis large intestine w/o perforation or abscess w/o bleeding 2016    Essential hypertension 2016    Hypercholesterolemia 2016    Irritable bowel syndrome 2016    Simple chronic anemia 2016    Disorder of shoulder 2008    Articular cartilage disorder of shoulder region 2008      LOS (days): 1  Geometric Mean LOS (GMLOS) (days):   Days to GMLOS:     OBJECTIVE:  Risk of Unplanned Readmission Score: 24.07         Current admission status: Inpatient   Preferred Pharmacy:   [id number] - [place] - [address]  [address] [address]  Phone: [phone] Fax: [phone]    [facility] - [place], [place] - [address] [place] [id number]  Phone: [phone] Fax: [phone]    Primary Care Provider: Wild Stafford DO    Primary  Insurance: [facility] REP  Secondary Insurance:     DISCHARGE DETAILS:                                          Other Referral/Resources/Interventions Provided:  Referral Comments: Call received that pt should be recommended for acute rehab.  Placed call to daughter Unique and discussed difference in acute vs STR. She would like referral to [facility] placed at this time. Questions answered.                                                      Additional Comments: Referral to [facility] at daughter's request. Acute rehab recommended by critical care team.

## 2024-01-02 ENCOUNTER — TRANSITIONAL CARE MANAGEMENT (OUTPATIENT)
Dept: FAMILY MEDICINE CLINIC | Facility: CLINIC | Age: 84
End: 2024-01-02

## 2024-01-02 PROBLEM — E11.9 DIABETES (HCC): Chronic | Status: RESOLVED | Noted: 2023-12-30 | Resolved: 2024-01-02

## 2024-01-02 PROBLEM — N17.9 AKI (ACUTE KIDNEY INJURY) (HCC): Status: ACTIVE | Noted: 2024-01-02

## 2024-01-02 NOTE — CASE MANAGEMENT
Received request from [facility] CM to submit for transport auth.     [facility] received request for transport authorization from Care Manager.   Type of transport: [employer] CPT:   Date of transport: 12/31  Transport company:  [employer]    NPI:[id number]  Start Location: [facility]  End Location: [facility] Necessity: Emergent d/t stoke, needed higher LOC   Authorization initiated by contacting: [employer] Via: fax    Update 1/3 8am- received response email that auth was received and pending ref# [id number] was assigned.

## 2024-01-02 NOTE — PROGRESS NOTES
Progress Note - Neurology   Debbie Moody 83 y.o. female MRN: [id number]  Unit/Bed#: ICU 10 Encounter: [id number]      Assessment/Plan   Acute CVA (cerebrovascular accident) (HCC)  Assessment & Plan  83-year-old female HTN, HLD, CKD, iron deficiency anemia, pulmonary fibrosis secondary to COVID in 2021 and DM    Presented to [facility] on 12/30 with nausea, ataxia and headache. Imaging noted L PICA infarct with occluded L PICA. Was found to have hemorrhagic conversion on neuroimaging as well as increasing posterior circulation edema/mass effect and 4th ventricle effacement. Transferred to [facility] for critical care/neurosurgery evaluation.    Etiology of L PICA CVA/occlusion either in situ thrombosis or atheroembolic from more proximal vertebral disease. Central embolic etiology considered less likely.     Neuroimaging:  - CT head (12/30) revealed acute infarct in left cerebellum PICA territory  - CTA head/neck (12/30): occluded L PICA  - Repeat CT head (12/30)   Re-demonstrated acute left cerebellar PICA infarct with mild mass effect on fourth ventricle. No hydrocephalus.    There was increased density within the anterior aspect of the infarct (contrast staining rather than petechial hemorrhage)  - Repeat CT head (12/31)  -slightly increased mass effect on 4th ventricle; no hydrocephalus. Hyper-attenuation in cerebellar CVA (residual contrast staining vs petechial hemorrhage)    - MRI brain wo (12/31):  -L PICA CVA, with hemorrhagic conversion in L cerebellar vermis/petechial cortical hemorrhage in L cerebellum, similar mass effect on 4th ventricle   - Echo revealed EF 70% with normal size atrium bilaterally.  - Lipid panel: Cholesterol 229,   - Hemoglobin A1c 8.3    Plan:  -Stroke pathway ongoing:    -will discuss with attending if any repeat CT head imaging needed for surveillance (last imaged 12/31)   -loaded with DAPT on 12/30; status post DDAVP on 12/31 given hemorrhagic convesion  - Restarted  "baby aspirin on 1/1; will discuss with attending if restart DAPT (and if so, timing)  - Continue with Atorvastatin 80 mg daily  - Status post hypertonic saline, now off  - Continue telemetry monitoring  - Maintain -150's   - Currently on Cardene gtt, weaning off  - PT/OT/ST  - Secondary risk factor modification.   - Stroke education  - Frequent neuro checks. Continue to monitor and notify neurology with any changes.  - STAT CT head for any acute change in neuro exam  - Neurosurgery following      Will further discuss plan of care with attending neurologist.     Debbie Moody will need follow up in in 6 weeks with neurovascular attending. She will not require outpatient neurological testing.    Subjective:   Patient resting in bed, doing ok this morning. Notes no headache this AM during evaluation; mild nausea; no new/interim symptoms.    Discussed with critical care/nursing: now off hypertonic saline; weaning off Cardene.     Patient notes she did have prior Holter monitor; upon review of chart, had Holter monitoring in June 2023 for 48 hours (indication for \"palpitations\"; was unrevealing for any a-fib).  Denies any antiplatelet/anticoagulant use prior to admission.     Vitals: Blood pressure 122/56, pulse 94, temperature 98 °F (36.7 °C), temperature source Oral, resp. rate 19, height 4' 10\" (1.473 m), weight 56.2 kg (123 lb 14.4 oz), SpO2 93%, not currently breastfeeding.,Body mass index is 25.89 kg/m².    Physical Exam:   Physical Exam  Constitutional:       Appearance: Normal appearance.   HENT:      Head: Normocephalic and atraumatic.   Eyes:      Extraocular Movements: Extraocular movements intact and EOM normal.      Conjunctiva/sclera: Conjunctivae normal.      Pupils: Pupils are equal, round, and reactive to light.   Cardiovascular:      Rate and Rhythm: Normal rate.   Pulmonary:      Effort: Pulmonary effort is normal.   Abdominal:      General: There is no distension.   Musculoskeletal:      " Cervical back: Normal range of motion and neck supple.   Skin:     General: Skin is warm and dry.   Neurological:      Mental Status: She is alert.      Coordination: Finger-Nose-Finger Test and Heel to Shin Test normal.        Neurologic Exam     Mental Status   Just mildly somnolent, but engaged in conversation/conversant throughout. Oriented to place, month, year, no aphasia with naming objects. No significant dysarthria.      Cranial Nerves     CN II   Visual fields full to confrontation.     CN III, IV, VI   Pupils are equal, round, and reactive to light.  Extraocular motions are normal.     CN V   Facial sensation intact.     CN VII   Facial expression full, symmetric.     CN VIII   CN VIII normal.     CN IX, X   CN IX normal.   CN X normal.     CN XI   CN XI normal.     CN XII   CN XII normal.     Motor Exam   Full/age appropriate strength throughout UE and LE; no focal motor deficit on exam.      Sensory Exam   Light touch normal.     No bobbi-neglect/extinction with double simultaneous tactile stimulation.      Gait, Coordination, and Reflexes     Coordination   Finger to nose coordination: normal  Heel to shin coordination: normal    Tremor   Resting tremor: absent  Intention tremor: absent  No obvious dysmetria/ataxia with finger to nose/heel to shin testing this morning. Gait deferred.         Lab, Imaging and other studies: CBC:   Results from last 7 days   Lab Units 01/01/24  0449 12/31/23  0504 12/30/23 2010 12/30/23  1329   WBC Thousand/uL 7.77 11.11*  --  8.79   RBC Million/uL 3.86 4.93  --  5.14*   HEMOGLOBIN g/dL 10.7* 13.6  --  14.1   HEMATOCRIT % 33.9* 42.4  --  43.4   MCV fL 88 86  --  84   PLATELETS Thousands/uL 211 265 262 236   , BMP/CMP:   Results from last 7 days   Lab Units 01/02/24  0603 01/02/24  0001 01/01/24  1743 01/01/24  1202 01/01/24  0449 12/31/23  1111 12/31/23  0504 12/30/23 2010 12/30/23  1329   SODIUM mmol/L 144 142 142   < > 142   < > 140   < > 135   POTASSIUM mmol/L 4.6  3.7 3.8   < > 4.1   < > 4.0   < > 4.4   CHLORIDE mmol/L 124* 120* 121*   < > 116*   < > 107   < > 100   CO2 mmol/L 15* 16* 17*   < > 18*   < > 18*   < > 23   BUN mg/dL 33* 32* 34*   < > 30*   < > 26*   < > 25   CREATININE mg/dL 1.60* 1.64* 1.66*   < > 1.57*   < > 1.48*   < > 1.28   CALCIUM mg/dL 7.6* 7.7* 7.7*   < > 8.3*   < > 9.7   < > 10.3*   AST U/L  --   --   --   --  12*  --  18  --  16   ALT U/L  --   --   --   --  8  --  10  --  12   ALK PHOS U/L  --   --   --   --  79  --  113*  --  131*   EGFR ml/min/1.73sq m 29 28 28   < > 30   < > 32   < > 39    < > = values in this interval not displayed.   , Vitamin B12:   , HgBA1C:   Results from last 7 days   Lab Units 12/31/23  0504   HEMOGLOBIN A1C % 8.3*   , TSH:   Results from last 7 days   Lab Units 12/30/23  1329   TSH 3RD GENERATON uIU/mL 2.650   , Coagulation:   Results from last 7 days   Lab Units 12/31/23  1111   INR  1.08   , Lipid Profile:   Results from last 7 days   Lab Units 12/31/23  0504   HDL mg/dL 54   LDL CALC mg/dL 141*   TRIGLYCERIDES mg/dL 172*     VTE Prophylaxis: Sequential compression device (Venodyne)  and Heparin    Total time spent today 20 minutes. Discussed plan of care with patient and critical care: ongoing exam monitoring for L PICA stroke, aspirin/statin for now, telemetry, neuro checks, therapies.

## 2024-01-02 NOTE — PROGRESS NOTES
[facility]  Progress Note: Critical Care  Name: Debbie Moody 83 y.o. 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 with evidence of cerebellar mass effect on brainstem and effacement of fourth ventricle with brain compression and hemorrhagic conversion   Plan: q2h neuro checks  Neurology and Neurosurgery following, appreciate their recommendations   Hypertonic saline at 40 ml/hr  Continue stroke pathway  Continue aspirin 81 mg and atorvastatin 80 mg   Repeat CTH if new focal deficit or decline in GCS > 2pts  Goal -150 - requiring cardene     CV:   Diagnosis: HTN  Plan: Cardene drip to maintain SBP < 150  Lisinopril 10 mg daily  Add norvasc 10 mg daily    Pulm:  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  Plan: Xopenex PRN  Currently on room air  IS, OOB    GI:   Bowel regimen with senokot qHS    :   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  Plan: Trend renal function  Strict I/Os  Avoid nephrotoxins     F/E/N:   Diabetic diet  Replete electrolytes as needed  Metabolic acidosis   Plan: Likely 2/2 renal dysfunction  Consider bicarb tabs    Heme/Onc:   Diagnosis: Anemia  Plan: Continue iron supplementation  Trend hgb/plt count  Chemical DVT PPx with SQH    Endo:   Diagnosis: DM type 2  Plan: Insulin drip with q2h accuchecks   Maintain drip given poor PO intake  Goal blood glucose 140-180    ID:   Trend WBC/fever curve    MSK/Skin:   PT/OT    Disposition: Critical care    ICU Core Measures     A: Assess, Prevent, and Manage Pain Has pain been assessed? Yes  Need for changes to pain regimen? No   B: Both SAT/SAT  N/A   C: Choice of Sedation RASS Goal: N/A patient not on sedation  Need for changes to sedation or analgesia regimen? No   D: Delirium CAM-ICU: Negative   E: Early Mobility  Plan for early mobility? Yes   F: Family  Engagement Plan for family engagement today? Yes       Review of Invasive Devices:      Central access plan: Medications requiring central line      Prophylaxis:  VTE VTE covered by:  heparin (porcine), Subcutaneous, 5,000 Units at 01/01/24 2102       Stress Ulcer  not ordered        Significant 24hr Events     24hr events: Remains on hypertonic saline. Straight cathed overnight x 2.      Subjective     Review of Systems   Respiratory:  Negative for shortness of breath.    Cardiovascular:  Negative for chest pain.   Gastrointestinal:  Positive for nausea. Negative for abdominal pain and vomiting.   Genitourinary:  Positive for difficulty urinating.   Neurological:  Negative for weakness and headaches.        Objective                            Vitals I/O      Most Recent Min/Max in 24hrs   Temp 98 °F (36.7 °C) Temp  Min: 98 °F (36.7 °C)  Max: 98.1 °F (36.7 °C)   Pulse 96 Pulse  Min: 86  Max: 104   Resp 12 Resp  Min: 12  Max: 27   /54 BP  Min: 115/53  Max: 148/55   O2 Sat 93 % SpO2  Min: 92 %  Max: 99 %      Intake/Output Summary (Last 24 hours) at 1/2/2024 0437  Last data filed at 1/2/2024 0424  Gross per 24 hour   Intake 4971.31 ml   Output 1025 ml   Net 3946.31 ml       Diet Rodríguez/CHO Controlled; Consistent Carbohydrate Diet Level 2 (5 carb servings/75 grams CHO/meal)    Invasive Monitoring           Physical Exam   Physical Exam  Skin:     General: Skin is warm and dry.      Capillary Refill: Capillary refill takes less than 2 seconds.   HENT:      Mouth/Throat:      Mouth: Mucous membranes are moist.   Neck:      Vascular: Central line present.   Cardiovascular:      Rate and Rhythm: Normal rate and regular rhythm.   Abdominal: General: There is no distension.      Palpations: Abdomen is soft.      Tenderness: There is no abdominal tenderness.   Constitutional:       General: She is not in acute distress.  Pulmonary:      Effort: Pulmonary effort is normal.      Breath sounds: No wheezing, rhonchi or rales.    Neurological:      Mental Status: She is alert and oriented to person, place and time.      GCS: GCS eye subscore is 4. GCS verbal subscore is 5. GCS motor subscore is 6.      Sensory: No sensory deficit.      Motor: Strength full and intact in all extremities.      Coordination: Finger-Nose-Finger Test abnormal (mild) and Heel to Shin Test abnormal (mild).            Diagnostic Studies      EKG: NSR     Medications:  Scheduled PRN   aspirin, 81 mg, Daily  atorvastatin, 80 mg, QPM  chlorhexidine, 15 mL, Q12H JUAN ANTONIO  ferrous sulfate, 325 mg, Daily With Breakfast  heparin (porcine), 5,000 Units, Q8H JUAN ANTONIO  lisinopril, 10 mg, Daily  potassium chloride, 40 mEq, Once  senna-docusate sodium, 1 tablet, HS      acetaminophen, 650 mg, Q6H PRN  hydrALAZINE, 10 mg, Q6H PRN  levalbuterol, 1.25 mg, Q6H PRN  ondansetron, 4 mg, Q6H PRN  polyethylene glycol, 17 g, Daily PRN       Continuous    insulin regular (HumuLIN R,NovoLIN R) 1 Units/mL in sodium chloride 0.9 % 100 mL infusion, 0.3-21 Units/hr, Last Rate: 1 Units/hr (01/02/24 0158)  niCARdipine, 1-15 mg/hr, Last Rate: 7.5 mg/hr (01/02/24 0317)  sodium chloride, 30 mL/hr, Last Rate: 30 mL/hr (01/02/24 0404)         Labs:    CBC    Recent Labs     12/31/23  0504 01/01/24  0449   WBC 11.11* 7.77   HGB 13.6 10.7*   HCT 42.4 33.9*    211     BMP    Recent Labs     01/01/24  1743 01/02/24  0001   SODIUM 142 142   K 3.8 3.7   * 120*   CO2 17* 16*   AGAP 4 6   BUN 34* 32*   CREATININE 1.66* 1.64*   CALCIUM 7.7* 7.7*       Coags    Recent Labs     12/31/23  1111   INR 1.08        Additional Electrolytes  Recent Labs     12/31/23  0504   MG 2.4   PHOS 3.5          Blood Gas    No recent results  Recent Labs     12/31/23  0805   PHVEN 7.334   [id number] 35.8*   PO2VEN 65.0*   [id number] 18.6*   BEVEN -6.4   [id number] 91.6*    LFTs  Recent Labs     12/31/23  0504 01/01/24  0449   ALT 10 8   AST 18 12*   ALKPHOS 113* 79   ALB 4.4 3.5   TBILI 0.56 0.45       Infectious  No recent results   Glucose  Recent Labs     01/01/24  0449 01/01/24  1202 01/01/24  1743 01/02/24  0001   GLUC 213* 255* 103 108                   Amelie Rogers PA-C

## 2024-01-02 NOTE — UTILIZATION REVIEW
Initial Clinical Review    The patient was transferred to [facility] on 12/31/23 from [facility], where care began on 12/30/23. 3 midnights have already been surpassed with active ongoing care.     Admission: Date/Time/Statement:   Admission Orders (From admission, onward)       Ordered        12/31/23 1854  Inpatient Admission  Once                          Orders Placed This Encounter   Procedures    Inpatient Admission     Standing Status:   Standing     Number of Occurrences:   1     Order Specific Question:   Level of Care     Answer:   Critical Care [15]     Order Specific Question:   Estimated length of stay     Answer:   More than 2 Midnights     Order Specific Question:   Certification     Answer:   I certify that inpatient services are medically necessary for this patient for a duration of greater than two midnights. See H&P and MD Progress Notes for additional information about the patient's course of treatment.     Initial Presentation: 83 y.o. female transferred to [facility], admitted Inpatient status dt CVA.   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 Patient was admitted to [facility].  Repeat head CT this morning with increasing cerebellar edema and small amount of hemorrhage.  Patient was initiated on aspirin and Plavix.  She received DDAVP.  She was initiated on 3% saline and Cardene infusion.  Transferred to neurocritical care for ongoing management and neurosurgical evaluation. PMHx:  CKD stage 3b, T2DM, Diverticulitis, Pulmonary fibrosis.  Plan:  ICU, Telemetry, Neurology consult, freq neuro checks and VS, continue Cardene infusion, baseline NIH stroke scale, order Echo and MRI brain, NPO, permissive HTN, accuchecks w/ SSI,  order lipid panel, A1c, TSH, monitor IO, UOP and daily wts, PT OT ST rajesh, CM consult, inc spirometry.    1/1 Per Neurosurgery:    The ICU team has already started hypertonic saline to address any potential progression of her edema.  She is already 48 hours out from her infarct, which seems to be already fairly mature on the CT study.  Based on the extent of this infarction, and prior to seeing the patient myself, I was prepared to move with emergent surgical decompression and ventriculostomy placement.  However after speaking to the team here and evaluating her diagnostic imaging, as well as examining the patient myself, I do feel it would be appropriate to follow her very closely in the intensive care unit.  She still is not through the period of maximal brain swelling, but we are getting there.  It would be great if we could avoid any invasive or large decompressive suboccipital craniectomy for this patient, if at all possible based on her age.  However I did explain to her that we will have to take it on an hour by hour and day by day basis over the next few days.  The patient appreciated our discussion.  We will continue to follow her very closely with you.  Should there be any change in her neurological examination, we can change course immediately.  Neurosurgery will follow.    1/1 Per Neurology: She has coarse pursuit movements, and some mild ataxia with left upper extremity, but no weakness or dysarthria.  She admits to ongoing headache which seems to be adequately treated with Tylenol, and some nausea also responsive to as needed medication.  She does appear a little bit lethargic, but able to maintain wakefulness have dissipated throughout the exam.  Suspect her left PICA occlusion is resultant from either in situ thrombosis, or possible atheroemboli from more proximal vertebral disease.  Central embolic etiology considered less likely.  At this point no overt evidence of threatened increased ICP.  She is  maintained on 3% saline. Discussed with critical care, will try to taper that beginning tomorrow.  Also still on Cardene drip for BP control. Echocardiogram completed without cardioembolic source. A1c is 8.3 (known diabetic).  FLP with total cholesterol 229, -continue high-dose statin.    Date: 1/2  Has surpassed a 2nd midnight with active treatments and services, which include continued ICU level of care, neuro checks, NS and Neurology following, monitor VS and labs, PT OT.     Triage Vitals [12/31/23 1730]   Temperature Pulse Respirations Blood Pressure SpO2   98.5 °F (36.9 °C) 86 14 148/68 96 %      Temp Source Heart Rate Source Patient Position - Orthostatic VS BP Location FiO2 (%)   Oral Monitor Lying Right arm --      Pain Score       No Pain          Wt Readings from Last 1 Encounters:   12/31/23 56.2 kg (123 lb 14.4 oz)     Additional Vital Signs:   Date/Time Temp Pulse Resp BP MAP (mmHg) SpO2 Calculated FIO2 (%) - Nasal Cannula Nasal Cannula O2 Flow Rate (L/min) O2 Device Patient Position - Orthostatic VS   01/02/24 0700 98 °F (36.7 °C) 94 19 122/56 83 93 % -- -- None (Room air) Lying   01/02/24 0639 -- 94 16 127/54 84 94 % -- -- -- --   01/02/24 0600 -- 86 17 117/54 74 93 % -- -- -- --   01/02/24 0500 -- 86 12 121/55 78 93 % -- -- -- --   01/02/24 0400 97.9 °F (36.6 °C) 96 16 133/60 88 94 % -- -- None (Room air) --   01/02/24 0338 -- 96 12 123/54 77 93 % -- -- -- --   01/02/24 0300 -- 92 12 119/54 77 93 % -- -- -- --   01/02/24 0200 -- 98 15 121/57 78 94 % -- -- -- --   01/02/24 0130 -- 86 12 121/51 78 94 % -- -- -- --   01/02/24 0100 -- 88 13 121/54 78 93 % -- -- -- --   01/02/24 0000 98 °F (36.7 °C) 90 17 136/55 90 93 % -- -- None (Room air) --   01/01/24 2300 -- 90 13 120/55 76 94 % -- -- -- --   01/01/24 2200 -- 92 13 123/55 81 94 % -- -- -- --   01/01/24 2100 -- 98 16 122/56 81 93 % -- -- -- --   01/01/24 2000 98.1 °F (36.7 °C) 98 15 131/57 78 94 % -- -- None (Room air) --   01/01/24 1900 -- 88  13 126/55 77 93 % -- -- -- --   01/01/24 1800 -- 104 20 140/64 97 93 % -- -- -- --   01/01/24 1700 -- 92 12 123/55 79 94 % -- -- -- --   01/01/24 1500 -- 90 13 123/58 74 94 % -- -- -- --   01/01/24 1400 -- 88 13 115/53 73 94 % -- -- -- --   01/01/24 1200 -- 92 27 Abnormal  148/55 89 93 % -- -- -- --   01/01/24 1100 -- 104 21 131/64 86 95 % -- -- -- --   01/01/24 1000 -- 98 14 135/58 87 92 % -- -- None (Room air) --   01/01/24 0900 -- 86 18 128/55 81 99 % 28 2 L/min Nasal cannula --   01/01/24 0800 -- 98 15 129/57 85 98 % 28 2 L/min Nasal cannula --   01/01/24 0700 -- 96 13 121/55 83 98 % -- -- -- --   01/01/24 0600 -- 92 13 132/56 86 99 % -- -- -- --   01/01/24 0500 -- 92 13 135/60 88 98 % -- -- -- --   01/01/24 0400 98 °F (36.7 °C) 102 16 144/64 86 98 % -- -- -- --   01/01/24 0300 -- 96 14 130/73 91 99 % -- -- -- --   01/01/24 0230 -- 92 12 140/63 86 99 % -- -- -- --   01/01/24 0200 -- 110 Abnormal  20 155/67 99 97 % -- -- -- --   01/01/24 0100 -- 102 14 147/65 91 99 % -- -- -- --   01/01/24 0030 -- 98 14 146/66 91 99 % -- -- -- --   01/01/24 0000 -- 108 Abnormal  18 166/74 114 98 % -- -- -- --   12/31/23 2300 -- 98 15 159/73 108 95 % -- -- -- --   12/31/23 2200 -- 92 14 154/70 87 94 % -- -- -- --   12/31/23 2100 -- 92 14 162/74 102 95 % -- -- -- --   12/31/23 2000 97.9 °F (36.6 °C) 102 21 160/73  107 95 % 28 2 L/min Nasal cannula Lying   BP: cardene increased at 12/31/23 2000 12/31/23 1900 -- 96 16 117/68 87 96 % -- -- -- --   12/31/23 1847 -- 88 14 131/54 78 95 % -- -- -- Lying   12/31/23 1830 -- 96 17 150/67 98 95 % -- -- -- Lying   12/31/23 1800 -- 84 15 138/48 Abnormal  74 94 % -- -- -- Lying   12/31/23 1748 -- 86 17 -- -- 94 % -- -- -- --   12/31/23 1730 98.5 °F (36.9 °C) 86 14 148/68 96 96 % 28 2 L/min Nasal cannula Lying     Pertinent Labs/Diagnostic Test Results:   12/31 ECHO:      Left Ventricle: Left ventricular cavity size is normal. There is mild concentric hypertrophy. The left ventricular ejection  fraction is 70%. Systolic function is vigorous. Wall motion is normal.     12/30 EKG: Normal sinus rhythm  Left anterior fascicular block  Voltage criteria for left ventricular hypertrophy  Abnormal ECG    XR chest portable   Final Result by Linnea Curtis MD (01/01 1238)      Right jugular catheter at cavoatrial junction with no pneumothorax.      Chronic scarring with no acute disease.               Workstation performed: [id number]           Results from last 7 days   Lab Units 12/30/23  1329   SARS-COV-2  Negative     Results from last 7 days   Lab Units 01/01/24  0449 12/31/23  0504 12/30/23 2010 12/30/23  1329   WBC Thousand/uL 7.77 11.11*  --  8.79   HEMOGLOBIN g/dL 10.7* 13.6  --  14.1   HEMATOCRIT % 33.9* 42.4  --  43.4   PLATELETS Thousands/uL 211 265 262 236   NEUTROS ABS Thousands/µL 6.00  --   --   --    BANDS PCT %  --   --   --  5         Results from last 7 days   Lab Units 01/02/24  0603 01/02/24  0001 01/01/24  1743 01/01/24  1202 01/01/24  0449 12/31/23  1111 12/31/23  0504   SODIUM mmol/L 144 142 142 142 142   < > 140   POTASSIUM mmol/L 4.6 3.7 3.8 4.3 4.1   < > 4.0   CHLORIDE mmol/L 124* 120* 121* 117* 116*   < > 107   CO2 mmol/L 15* 16* 17* 16* 18*   < > 18*   ANION GAP mmol/L 5 6 4 9 8   < > 15   BUN mg/dL 33* 32* 34* 32* 30*   < > 26*   CREATININE mg/dL 1.60* 1.64* 1.66* 1.72* 1.57*   < > 1.48*   EGFR ml/min/1.73sq m 29 28 28 27 30   < > 32   CALCIUM mg/dL 7.6* 7.7* 7.7* 8.0* 8.3*   < > 9.7   MAGNESIUM mg/dL  --   --   --   --   --   --  2.4   PHOSPHORUS mg/dL  --   --   --   --   --   --  3.5    < > = values in this interval not displayed.     Results from last 7 days   Lab Units 01/01/24  0449 12/31/23  0504 12/30/23  1329   AST U/L 12* 18 16   ALT U/L 8 10 12   ALK PHOS U/L 79 113* 131*   TOTAL PROTEIN g/dL 6.4 8.5* 9.2*   ALBUMIN g/dL 3.5 4.4 4.9   TOTAL BILIRUBIN mg/dL 0.45 0.56 0.53     Results from last 7 days   Lab Units 01/02/24  0602 01/02/24  0359 01/02/24  0157  01/01/24  2344 01/01/24  2150 01/01/24  1958 01/01/24  1749 01/01/24  1617 01/01/24  1404 01/01/24  1208 01/01/24  0948 01/01/24  0840   POC GLUCOSE mg/dl 168* 177* 145* 108 120 118 107 130 232* 237* 143* 135     Results from last 7 days   Lab Units 01/02/24  0603 01/02/24  0001 01/01/24  1743 01/01/24  1202 01/01/24  0449 01/01/24  0018 12/31/23  1820 12/31/23  1430 12/31/23  1111 12/31/23  0504 12/31/23  0004 12/30/23 2010   GLUCOSE RANDOM mg/dL 164* 108 103 255* 213* 137 166* 227* 169* 177* 118 216*     Results from last 7 days   Lab Units 01/01/24  0449 01/01/24  0018 12/31/23  1820 12/31/23  1112 12/31/23  0507 12/31/23  0004 12/30/23 2010   OSMOLALITY, SERUM mmol/* 314* 319* 316* 323* 318* 311*     Results from last 7 days   Lab Units 12/31/23  0504   HEMOGLOBIN A1C % 8.3*   EAG mg/dl 192     Results from last 7 days   Lab Units 12/31/23  0805   PH CHLOÉ  7.334   PCO2 CHLOÉ mm Hg 35.8*   PO2 CHLOÉ mm Hg 65.0*   HCO3 CHLOÉ mmol/L 18.6*   BASE EXC CHLOÉ mmol/L -6.4   O2 CONTENT CHLOÉ ml/dL 18.8   O2 HGB, VENOUS % 91.6*         Results from last 7 days   Lab Units 12/31/23  0504   CK TOTAL U/L 63     Results from last 7 days   Lab Units 01/01/24  1619 01/01/24  1443 01/01/24  1256 12/30/23  1548 12/30/23  1329   HS TNI 0HR ng/L  --   --  39  --  6   HS TNI 2HR ng/L  --  51*  --  9  --    HSTNI D2 ng/L  --  12  --  3  --    HS TNI 4HR ng/L 53*  --   --   --   --    HSTNI D4 ng/L 14  --   --   --   --          Results from last 7 days   Lab Units 12/31/23  1111 12/30/23  1329   PROTIME seconds 14.1 13.7   INR  1.08 1.04   PTT seconds  --  28     Results from last 7 days   Lab Units 12/30/23  1329   TSH 3RD GENERATON uIU/mL 2.650         Results from last 7 days   Lab Units 12/31/23  0805   LACTIC ACID mmol/L 0.9     Results from last 7 days   Lab Units 12/30/23  1329   LIPASE u/L 20     Results from last 7 days   Lab Units 01/01/24  0449 01/01/24  0018 12/31/23  1820 12/31/23  1112 12/31/23  0507 12/31/23  0004  12/30/23 2010   OSMOLALITY, SERUM mmol/* 314* 319* 316* 323* 318* 311*     Results from last 7 days   Lab Units 12/30/23  1414   CLARITY UA  Slightly Cloudy*   COLOR UA  Straw   SPEC GRAV UA  1.025   PH UA  6.0   GLUCOSE UA mg/dl 3+*   KETONES UA mg/dl Trace*   BLOOD UA  1+*   PROTEIN UA mg/dl 3+*   NITRITE UA  Negative   BILIRUBIN UA  Negative   UROBILINOGEN UA E.U./dl 0.2   LEUKOCYTES UA  Negative   WBC UA /hpf 4-10*   RBC UA /hpf 2-4   BACTERIA UA /hpf Innumerable*   EPITHELIAL CELLS WET PREP /hpf Occasional     Results from last 7 days   Lab Units 12/30/23  1329   INFLUENZA A PCR  Negative   INFLUENZA B PCR  Negative   RSV PCR  Negative     Results from last 7 days   Lab Units 12/30/23  1329   BLOOD CULTURE  No Growth at 48 hrs.  No Growth at 48 hrs.       Past Medical History:   Diagnosis Date    Acute kidney injury ([facility])     Acute respiratory failure with hypoxia ([facility]) 12/16/2021    Broken arm     hairline fracture/ 2 places///   right arm    Diabetes mellitus ([facility])     Diverticulitis     Pneumothorax- Resolved     Postherpetic neuralgia      Present on Admission:   Acute CVA (cerebrovascular accident) ([facility])      Admitting Diagnosis: Acute CVA (cerebrovascular accident) ([facility])  Age/Sex: 83 y.o. female  Admission Orders:  Scheduled Medications:  aspirin, 81 mg, Oral, Daily  atorvastatin, 80 mg, Oral, QPM  chlorhexidine, 15 mL, Mouth/Throat, Q12H JUAN ANTONIO  ferrous sulfate, 325 mg, Oral, Daily With Breakfast  heparin (porcine), 5,000 Units, Subcutaneous, Q8H JUAN ANTONIO  lisinopril, 10 mg, Oral, Daily  senna-docusate sodium, 1 tablet, Oral, HS      Continuous IV Infusions:  insulin regular (HumuLIN R,NovoLIN R) 1 Units/mL in sodium chloride 0.9 % 100 mL infusion, 0.3-21 Units/hr, Intravenous, Titrated  niCARdipine, 1-15 mg/hr, Intravenous, Titrated      PRN Meds:  acetaminophen, 650 mg, Oral, Q6H PRN  hydrALAZINE, 10 mg, Intravenous, Q6H PRN  levalbuterol, 1.25 mg, Nebulization, Q6H PRN  ondansetron, 4 mg, Intravenous,  Q6H PRN  polyethylene glycol, 17 g, Oral, Daily PRN    scd    IP CONSULT TO PHYSICAL MEDICINE REHAB  IP CONSULT TO CASE MANAGEMENT  IP CONSULT TO NUTRITION SERVICES  IP CONSULT TO NEUROSURGERY  IP CONSULT TO NEUROLOGY    Network Utilization Review Department  ATTENTION: Please call with any questions or concerns to [phone] and carefully listen to the prompts so that you are directed to the right person. All voicemails are confidential.   For Discharge needs, contact Care Management DC Support Team at [phone] opt. [phone]  Send all requests for admission clinical reviews, approved or denied determinations and any other requests to dedicated fax number below belonging to the campus where the patient is receiving treatment. List of dedicated fax numbers for the Facilities:  FACILITY NAME UR FAX NUMBER   ADMISSION DENIALS (Administrative/Medical Necessity) [phone]   DISCHARGE SUPPORT TEAM (NETWORK) [phone]   PARENT CHILD HEALTH (Maternity/NICU/Pediatrics) [phone]   [facility] [phone]   [facility] [phone]   [facility] [phone]   [facility] [phone]   [facility] [phone]   [facility] [phone]   [facility] [phone]   [facility] [phone]   [facility] [phone]   Pending sale to [facility] [phone]   [facility] [phone]

## 2024-01-02 NOTE — PLAN OF CARE
Problem: PAIN - ADULT  Goal: Verbalizes/displays adequate comfort level or baseline comfort level  Description: Interventions:  - Encourage patient to monitor pain and request assistance  - Assess pain using appropriate pain scale  - Administer analgesics based on type and severity of pain and evaluate response  - Implement non-pharmacological measures as appropriate and evaluate response  - Consider cultural and social influences on pain and pain management  - Notify physician/advanced practitioner if interventions unsuccessful or patient reports new pain  Outcome: Progressing     Problem: INFECTION - ADULT  Goal: Absence or prevention of progression during hospitalization  Description: INTERVENTIONS:  - Assess and monitor for signs and symptoms of infection  - Monitor lab/diagnostic results  - Monitor all insertion sites, i.e. indwelling lines, tubes, and drains  - Monitor endotracheal if appropriate and nasal secretions for changes in amount and color  - [place] appropriate cooling/warming therapies per order  - Administer medications as ordered  - Instruct and encourage patient and family to use good hand hygiene technique  - Identify and instruct in appropriate isolation precautions for identified infection/condition  Outcome: Progressing  Goal: Absence of fever/infection during neutropenic period  Description: INTERVENTIONS:  - Monitor WBC    Outcome: Progressing     Problem: SAFETY ADULT  Goal: Patient will remain free of falls  Description: INTERVENTIONS:  - Educate patient/family on patient safety including physical limitations  - Instruct patient to call for assistance with activity   - Consult OT/PT to assist with strengthening/mobility   - Keep Call bell within reach  - Keep bed low and locked with side rails adjusted as appropriate  - Keep care items and personal belongings within reach  - Initiate and maintain comfort rounds  - Make Fall Risk Sign visible to staff  - Offer Toileting every 2 Hours,  in advance of need  - Initiate/Maintain bed alarm  - Obtain necessary fall risk management equipment  - Apply yellow socks and bracelet for high fall risk patients  - Consider moving patient to room near nurses station  Outcome: Progressing  Goal: Maintain or return to baseline ADL function  Description: INTERVENTIONS:  -  Assess patient's ability to carry out ADLs; assess patient's baseline for ADL function and identify physical deficits which impact ability to perform ADLs (bathing, care of mouth/teeth, toileting, grooming, dressing, etc.)  - Assess/evaluate cause of self-care deficits   - Assess range of motion  - Assess patient's mobility; develop plan if impaired  - Assess patient's need for assistive devices and provide as appropriate  - Encourage maximum independence but intervene and supervise when necessary  - Involve family in performance of ADLs  - Assess for home care needs following discharge   - Consider OT consult to assist with ADL evaluation and planning for discharge  - Provide patient education as appropriate  Outcome: Progressing  Goal: Maintains/Returns to pre admission functional level  Description: INTERVENTIONS:  - Perform AM-PAC 6 Click Basic Mobility/ Daily Activity assessment daily.  - Set and communicate daily mobility goal to care team and patient/family/caregiver.   - Collaborate with rehabilitation services on mobility goals if consulted  - Perform Range of Motion 3 times a day.  - Reposition patient every 2 hours.  - Dangle patient 3 times a day  - Stand patient 2 times a day  - Ambulate patient 3 times a day  - Out of bed to chair 3 times a day   - Out of bed for meals 3 times a day  - Out of bed for toileting  - Record patient progress and toleration of activity level   Outcome: Progressing     Problem: DISCHARGE PLANNING  Goal: Discharge to home or other facility with appropriate resources  Description: INTERVENTIONS:  - Identify barriers to discharge w/patient and caregiver  -  Arrange for needed discharge resources and transportation as appropriate  - Identify discharge learning needs (meds, wound care, etc.)  - Arrange for interpretive services to assist at discharge as needed  - Refer to Case Management Department for coordinating discharge planning if the patient needs post-hospital services based on physician/advanced practitioner order or complex needs related to functional status, cognitive ability, or social support system  Outcome: Progressing     Problem: Knowledge Deficit  Goal: Patient/family/caregiver demonstrates understanding of disease process, treatment plan, medications, and discharge instructions  Description: Complete learning assessment and assess knowledge base.  Interventions:  - Provide teaching at level of understanding  - Provide teaching via preferred learning methods  Outcome: Progressing     Problem: Neurological Deficit  Goal: Neurological status is stable or improving  Description: Interventions:  - Monitor and assess patient's level of consciousness, motor function, sensory function, and level of assistance needed for ADLs.   - Monitor and report changes from baseline. Collaborate with interdisciplinary team to initiate plan and implement interventions as ordered.   - Provide and maintain a safe environment.  - Consider seizure precautions.  - Consider fall precautions.  - Consider aspiration precautions.  - Consider bleeding precautions.  Outcome: Progressing     Problem: Activity Intolerance/Impaired Mobility  Goal: Mobility/activity is maintained at optimum level for patient  Description: Interventions:  - Assess and monitor patient  barriers to mobility and need for assistive/adaptive devices.  - Assess patient's emotional response to limitations.  - Collaborate with interdisciplinary team and initiate plans and interventions as ordered.  - Encourage independent activity per ability.  - Maintain proper body alignment.  - Perform active/passive rom as  tolerated/ordered.  - Plan activities to conserve energy.  - Turn patient as appropriate  Outcome: Progressing     Problem: Communication Impairment  Goal: Ability to express needs and understand communication  Description: Assess patient's communication skills and ability to understand information.  Patient will demonstrate use of effective communication techniques, alternative methods of communication and understanding even if not able to speak.     - Encourage communication and provide alternate methods of communication as needed.  - Collaborate with case management/ for discharge needs.  - Include patient/family/caregiver in decisions related to communication.  Outcome: Progressing     Problem: Potential for Aspiration  Goal: Non-ventilated patient's risk of aspiration is minimized  Description: Assess and monitor vital signs, respiratory status, and labs (WBC).  Monitor for signs of aspiration (tachypnea, cough, rales, wheezing, cyanosis, fever).    - Assess and monitor patient's ability to swallow.  - Place patient up in chair to eat if possible.  - HOB up at 90 degrees to eat if unable to get patient up into chair.  - Supervise patient during oral intake.   - Instruct patient/ family to take small bites.  - Instruct patient/ family to take small single sips when taking liquids.  - Follow patient-specific strategies generated by speech pathologist.  Outcome: Progressing  Goal: Ventilated patient's risk of aspiration is minimized  Description: Assess and monitor vital signs, respiratory status, airway cuff pressure, and labs (WBC).  Monitor for signs of aspiration (tachypnea, cough, rales, wheezing, cyanosis, fever).    - Elevate head of bed 30 degrees if patient has tube feeding.  - Monitor tube feeding.  Outcome: Progressing     Problem: Nutrition  Goal: Nutrition/Hydration status is improving  Description: Monitor and assess patient's nutrition/hydration status for malnutrition (ex- brittle  hair, bruises, dry skin, pale skin and conjunctiva, muscle wasting, smooth red tongue, and disorientation). Collaborate with interdisciplinary team and initiate plan and interventions as ordered.  Monitor patient's weight and dietary intake as ordered or per policy. Utilize nutrition screening tool and intervene per policy. Determine patient's food preferences and provide high-protein, high-caloric foods as appropriate.     - Assist patient with eating.  - Allow adequate time for meals.  - Encourage patient to take dietary supplement as ordered.  - Collaborate with clinical nutritionist.  - Include patient/family/caregiver in decisions related to nutrition.  Outcome: Progressing     Problem: Prexisting or High Potential for Compromised Skin Integrity  Goal: Skin integrity is maintained or improved  Description: INTERVENTIONS:  - Identify patients at risk for skin breakdown  - Assess and monitor skin integrity  - Assess and monitor nutrition and hydration status  - Monitor labs   - Assess for incontinence   - Turn and reposition patient  - Assist with mobility/ambulation  - Relieve pressure over bony prominences  - Avoid friction and shearing  - Provide appropriate hygiene as needed including keeping skin clean and dry  - Evaluate need for skin moisturizer/barrier cream  - Collaborate with interdisciplinary team   - Patient/family teaching  - Consider wound care consult   Outcome: Progressing

## 2024-01-02 NOTE — PLAN OF CARE
Problem: PAIN - ADULT  Goal: Verbalizes/displays adequate comfort level or baseline comfort level  Description: Interventions:  - Encourage patient to monitor pain and request assistance  - Assess pain using appropriate pain scale  - Administer analgesics based on type and severity of pain and evaluate response  - Implement non-pharmacological measures as appropriate and evaluate response  - Consider cultural and social influences on pain and pain management  - Notify physician/advanced practitioner if interventions unsuccessful or patient reports new pain  Outcome: Progressing     Problem: INFECTION - ADULT  Goal: Absence or prevention of progression during hospitalization  Description: INTERVENTIONS:  - Assess and monitor for signs and symptoms of infection  - Monitor lab/diagnostic results  - Monitor all insertion sites, i.e. indwelling lines, tubes, and drains  - Monitor endotracheal if appropriate and nasal secretions for changes in amount and color  - [place] appropriate cooling/warming therapies per order  - Administer medications as ordered  - Instruct and encourage patient and family to use good hand hygiene technique  - Identify and instruct in appropriate isolation precautions for identified infection/condition  Outcome: Progressing     Problem: SAFETY ADULT  Goal: Patient will remain free of falls  Description: INTERVENTIONS:  - Educate patient/family on patient safety including physical limitations  - Instruct patient to call for assistance with activity   - Consult OT/PT to assist with strengthening/mobility   - Keep Call bell within reach  - Keep bed low and locked with side rails adjusted as appropriate  - Keep care items and personal belongings within reach  - Initiate and maintain comfort rounds  - Make Fall Risk Sign visible to staff  - Offer Toileting every 2 Hours, in advance of need  - Apply yellow socks and bracelet for high fall risk patients  - Consider moving patient to room near nurses  station  Outcome: Progressing  Goal: Maintain or return to baseline ADL function  Description: INTERVENTIONS:  -  Assess patient's ability to carry out ADLs; assess patient's baseline for ADL function and identify physical deficits which impact ability to perform ADLs (bathing, care of mouth/teeth, toileting, grooming, dressing, etc.)  - Assess/evaluate cause of self-care deficits   - Assess range of motion  - Assess patient's mobility; develop plan if impaired  - Assess patient's need for assistive devices and provide as appropriate  - Encourage maximum independence but intervene and supervise when necessary  - Involve family in performance of ADLs  - Assess for home care needs following discharge   - Consider OT consult to assist with ADL evaluation and planning for discharge  - Provide patient education as appropriate  Outcome: Progressing  Goal: Maintains/Returns to pre admission functional level  Description: INTERVENTIONS:  - Perform AM-PAC 6 Click Basic Mobility/ Daily Activity assessment daily.  - Set and communicate daily mobility goal to care team and patient/family/caregiver.   - Collaborate with rehabilitation services on mobility goals if consulted  - Reposition patient every 2 hours.  - Out of bed for toileting  - Record patient progress and toleration of activity level   Outcome: Progressing     Problem: DISCHARGE PLANNING  Goal: Discharge to home or other facility with appropriate resources  Description: INTERVENTIONS:  - Identify barriers to discharge w/patient and caregiver  - Arrange for needed discharge resources and transportation as appropriate  - Identify discharge learning needs (meds, wound care, etc.)  - Arrange for interpretive services to assist at discharge as needed  - Refer to Case Management Department for coordinating discharge planning if the patient needs post-hospital services based on physician/advanced practitioner order or complex needs related to functional status, cognitive  ability, or social support system  Outcome: Progressing     Problem: Knowledge Deficit  Goal: Patient/family/caregiver demonstrates understanding of disease process, treatment plan, medications, and discharge instructions  Description: Complete learning assessment and assess knowledge base.  Interventions:  - Provide teaching at level of understanding  - Provide teaching via preferred learning methods  Outcome: Progressing     Problem: Neurological Deficit  Goal: Neurological status is stable or improving  Description: Interventions:  - Monitor and assess patient's level of consciousness, motor function, sensory function, and level of assistance needed for ADLs.   - Monitor and report changes from baseline. Collaborate with interdisciplinary team to initiate plan and implement interventions as ordered.   - Provide and maintain a safe environment.  - Consider seizure precautions.  - Consider fall precautions.  - Consider aspiration precautions.  - Consider bleeding precautions.  Outcome: Progressing     Problem: Activity Intolerance/Impaired Mobility  Goal: Mobility/activity is maintained at optimum level for patient  Description: Interventions:  - Assess and monitor patient  barriers to mobility and need for assistive/adaptive devices.  - Assess patient's emotional response to limitations.  - Collaborate with interdisciplinary team and initiate plans and interventions as ordered.  - Encourage independent activity per ability.  - Maintain proper body alignment.  - Perform active/passive rom as tolerated/ordered.  - Plan activities to conserve energy.  - Turn patient as appropriate  Outcome: Progressing     Problem: Communication Impairment  Goal: Ability to express needs and understand communication  Description: Assess patient's communication skills and ability to understand information.  Patient will demonstrate use of effective communication techniques, alternative methods of communication and understanding even  if not able to speak.     - Encourage communication and provide alternate methods of communication as needed.  - Collaborate with case management/ for discharge needs.  - Include patient/family/caregiver in decisions related to communication.  Outcome: Progressing     Problem: Potential for Aspiration  Goal: Non-ventilated patient's risk of aspiration is minimized  Description: Assess and monitor vital signs, respiratory status, and labs (WBC).  Monitor for signs of aspiration (tachypnea, cough, rales, wheezing, cyanosis, fever).    - Assess and monitor patient's ability to swallow.  - Place patient up in chair to eat if possible.  - HOB up at 90 degrees to eat if unable to get patient up into chair.  - Supervise patient during oral intake.   - Instruct patient/ family to take small bites.  - Instruct patient/ family to take small single sips when taking liquids.  - Follow patient-specific strategies generated by speech pathologist.  Outcome: Progressing     Problem: Nutrition  Goal: Nutrition/Hydration status is improving  Description: Monitor and assess patient's nutrition/hydration status for malnutrition (ex- brittle hair, bruises, dry skin, pale skin and conjunctiva, muscle wasting, smooth red tongue, and disorientation). Collaborate with interdisciplinary team and initiate plan and interventions as ordered.  Monitor patient's weight and dietary intake as ordered or per policy. Utilize nutrition screening tool and intervene per policy. Determine patient's food preferences and provide high-protein, high-caloric foods as appropriate.     - Assist patient with eating.  - Allow adequate time for meals.  - Encourage patient to take dietary supplement as ordered.  - Collaborate with clinical nutritionist.  - Include patient/family/caregiver in decisions related to nutrition.  Outcome: Progressing     Problem: Prexisting or High Potential for Compromised Skin Integrity  Goal: Skin integrity is  maintained or improved  Description: INTERVENTIONS:  - Identify patients at risk for skin breakdown  - Assess and monitor skin integrity  - Assess and monitor nutrition and hydration status  - Monitor labs   - Assess for incontinence   - Turn and reposition patient  - Assist with mobility/ambulation  - Relieve pressure over bony prominences  - Avoid friction and shearing  - Provide appropriate hygiene as needed including keeping skin clean and dry  - Evaluate need for skin moisturizer/barrier cream  - Collaborate with interdisciplinary team   - Patient/family teaching  - Consider wound care consult   Outcome: Progressing

## 2024-01-02 NOTE — ASSESSMENT & PLAN NOTE
Patient found to have FERCHO on admission. Patient has a history of stage 3 CKD    Renal      Results from last 7 days   Lab Units 01/03/24  0531 01/02/24  1226 01/02/24  0603 01/02/24  0001 01/01/24  1743 01/01/24  1202 01/01/24  0449   BUN mg/dL 23 32* 33* 32* 34* 32* 30*   CREATININE mg/dL 1.31* 1.62* 1.60* 1.64* 1.66* 1.72* 1.57*   EGFR ml/min/1.73sq m 37 29 29 28 28 27 30     Plan:   - Strict I/Os  - Avoid nephrotoxic Medications  - Daily BMPs  - Continue monitoring, encourage oral hydration, continuous fluids if needed    [FreeTextEntry1] : Lower urinary tract symptoms adequately controlled on her current medications. We'll continue

## 2024-01-02 NOTE — ASSESSMENT & PLAN NOTE
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   Ongoing medical management per primary team.   Pain/HA management per primary team  Neurology following for stroke management.   ASA d/c today  Platelets ordered  STAT coags, ARU  Eval and mobilize per PT/OT  DVT PPX: SCDs, SQH d/c    Given change in exam with evidence of worsening edema and hydrocephalus, plan for transfer to ICU with emergent EVD placement. Depending on exam after EVD patient may need SOC. Please call with questions or concerns.

## 2024-01-02 NOTE — CONSULTS
PHYSICAL MEDICINE AND REHABILITATION CONSULT NOTE  Debbie Moody 83 y.o. 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 Currently she continues with headache with nausea, vomiting and photophobia. Will continue to follow at this time.     Medical Co-morbidities Plan:  Hypertension requiring cardene   Headache with nausea and vomiting   Pulmonary fibrosis due to COVID  FERCHO on CKD stage 3  Anemia   Diabetes type 2  Bowel plan: LBM unknown   Bladder plan: Urinary retention protocol   DVT ppx: heparin SQ and SCD    Thank you for this consultation.  Do not hesitate to contact service with further questions.      BENTON Nye  PM&R    I have spent a total time of 30 minutes on 01/02/24 in caring for this patient including Patient and family education, Counseling / Coordination of care, Documenting in the medical record, Reviewing / ordering tests, medicine, procedures  , Obtaining or reviewing history  , and Communicating with other healthcare professionals .      History of Present Illness:  Debbie Moody is a 83 y.o. female with a PMH of COVID 19 pneumonia complicated by pneumothoraces and pulmonary fibrosis, CKD, diabetes, HTN, c. Diff colitis who presented to the [facility] on 12/30/2023 with weakness, headache, nausea and vomiting. CTH/CTA showed distal PICA occlusion. She was placed on ASA/plavix. Repeat CT showed increased cerebellar edema and small amount of hemorrhage. She was given DDAVP to  "reverse DAPT, placed on 3% debbie and cardene infusion. She was transferred to [facility] for higher level of care. MRI showed left PICA CVA with hemorrhagic conversion in left cerebellar vermis/petechial cortical hemorrhage in left cerebellum, similar mass effect on 4th ventricle. ECHO showed EF of 70% with normal size atrium bilaterally. Cardene and hypertonic saline has been weaned off. She was restarted on baby aspirin on 1/1/2023. Therapy is pending. PM&R are consulted for rehabilitation recommendations.    The patient was seen in the ICU and was getting ready to be transferred to the floor and out of ICU. She is reporting significant headache with nausea. She denies any chest pain or SOB, numbness or tingling.     Review of Systems: 10 point ROS negative except for what is noted in HPI    Function:  Prior level of function and living situation: The patient lives with her spouse and daughter in a two story home with 10 TRAM. She was independent.     Current level of function:  Physical Therapy: Minimal assist for bed mobility, moderate assist x 2 for transfers, moderate assist for ambulation.    Occupational Therapy: Supervision for eating, grooming, minimal assist for UB bathing/dressing, moderate assist for LB bathing/dressing    Physical Exam:  /62 (BP Location: Right arm)   Pulse 84   Temp 98.5 °F (36.9 °C) (Oral)   Resp 16   Ht 4' 10\" (1.473 m)   Wt 56.2 kg (123 lb 14.4 oz)   LMP  (LMP Unknown)   SpO2 92%   BMI 25.89 kg/m²        Intake/Output Summary (Last 24 hours) at 1/2/2024 1414  Last data filed at 1/2/2024 1245  Gross per 24 hour   Intake 3677.39 ml   Output 975 ml   Net 2702.39 ml       Body mass index is 25.89 kg/m².      Physical Exam  Constitutional:       Appearance: She is ill-appearing.      Comments: Patient states she has headache with nausea, appears photophobic    HENT:      Head: Atraumatic.      Right Ear: External ear normal.      Left Ear: External ear normal.      Nose: " Nose normal.      Mouth/Throat:      Mouth: Mucous membranes are moist.      Pharynx: Oropharynx is clear.   Pulmonary:      Effort: Pulmonary effort is normal. No respiratory distress.   Musculoskeletal:         General: Normal range of motion.   Skin:     General: Skin is warm and dry.   Neurological:      Mental Status: She is alert and oriented to person, place, and time.   Psychiatric:         Mood and Affect: Mood normal.         Social History:    Social History     Socioeconomic History    Marital status: /Civil Union     Spouse name: Not on file    Number of children: Not on file    Years of education: Not on file    Highest education level: Not on file   Occupational History    Not on file   Tobacco Use    Smoking status: Never    Smokeless tobacco: Never   Vaping Use    Vaping status: Never Used   Substance and Sexual Activity    Alcohol use: Not Currently     Comment: Rarely    Drug use: Never    Sexual activity: Not Currently   Other Topics Concern    Not on file   Social History Narrative    Always uses seatbelt    No caffeine use     Social Determinants of Health     Financial Resource Strain: Low Risk  (9/1/2022)    Overall Financial Resource Strain (CARDIA)     Difficulty of Paying Living Expenses: Not hard at all   Food Insecurity: No Food Insecurity (1/1/2024)    Hunger Vital Sign     Worried About Running Out of Food in the Last Year: Never true     Ran Out of Food in the Last Year: Never true   Transportation Needs: No Transportation Needs (1/1/2024)    PRAPARE - Transportation     Lack of Transportation (Medical): No     Lack of Transportation (Non-Medical): No   Physical Activity: Not on file   Stress: Not on file   Social Connections: Not on file   Intimate Partner Violence: Not on file   Housing Stability: Low Risk  (1/1/2024)    Housing Stability Vital Sign     Unable to Pay for Housing in the Last Year: No     Number of Places Lived in the Last Year: 1     Unstable Housing in the  Last Year: No        Family History:    Family History   Problem Relation Age of Onset    Diabetes Mother     No Known Problems Father          Medications:     Current Facility-Administered Medications:     acetaminophen (TYLENOL) tablet 650 mg, 650 mg, Oral, Q6H PRN, Miguel Negron DO, 650 mg at 01/02/24 0729    aspirin (ECOTRIN LOW STRENGTH) EC tablet 81 mg, 81 mg, Oral, Daily, Cherise Berman MD, 81 mg at 01/02/24 0821    atorvastatin (LIPITOR) tablet 80 mg, 80 mg, Oral, QPM, Cherise Berman MD, 80 mg at 01/01/24 1749    chlorhexidine (PERIDEX) 0.12 % oral rinse 15 mL, 15 mL, Mouth/Throat, Q12H JUAN ANTONIO, BENTON Ash, 15 mL at 01/02/24 0823    ferrous sulfate tablet 325 mg, 325 mg, Oral, Daily With Breakfast, BENTON Ash, 325 mg at 01/02/24 0729    heparin (porcine) subcutaneous injection 5,000 Units, 5,000 Units, Subcutaneous, Q8H JUAN ANTONIO, Miguel Negron DO, 5,000 Units at 01/02/24 1345    hydrALAZINE (APRESOLINE) injection 10 mg, 10 mg, Intravenous, Q6H PRN, BENTON Ash    insulin glargine (LANTUS) subcutaneous injection 10 Units 0.1 mL, 10 Units, Subcutaneous, HS, Linus Edwards MD    insulin lispro (HumaLOG) 100 units/mL subcutaneous injection 1-6 Units, 1-6 Units, Subcutaneous, TID AC, 1 Units at 01/02/24 1216 **AND** Fingerstick Glucose (POCT), , , TID AC, Linus Edwards MD    insulin lispro (HumaLOG) 100 units/mL subcutaneous injection 5 Units, 5 Units, Subcutaneous, TID With Meals, Linus Edwards MD, 5 Units at 01/02/24 1216    levalbuterol (XOPENEX) inhalation solution 1.25 mg, 1.25 mg, Nebulization, Q6H PRN, BENTON Ash    lisinopril (ZESTRIL) tablet 10 mg, 10 mg, Oral, Daily, Miguel Negron DO, 10 mg at 01/02/24 0821    ondansetron (ZOFRAN) injection 4 mg, 4 mg, Intravenous, Q6H PRN, BENTON Ash, 4 mg at 01/01/24 2100    polyethylene glycol (MIRALAX) packet 17 g, 17 g, Oral, Daily PRN, Miguel Negron DO    senna-docusate sodium (SENOKOT S)  8.6-50 mg per tablet 1 tablet, 1 tablet, Oral, HS, Miguel Negron, DO, 1 tablet at 01/01/24 2102    Past Medical History:     Past Medical History:   Diagnosis Date    Acute kidney injury (HCC)     Acute respiratory failure with hypoxia (HCC) 12/16/2021    Broken arm     hairline fracture/ 2 places///   right arm    Diabetes mellitus (HCC)     Diverticulitis     Pneumothorax- Resolved     Postherpetic neuralgia         Past Surgical History:     Past Surgical History:   Procedure Laterality Date    CHOLECYSTECTOMY      COLON SURGERY      HERNIA REPAIR      IR EMBOLIZATION (SPECIFY VESSEL OR SITE)  1/12/2022    ROTATOR CUFF REPAIR Right     VARICOSE VEIN SURGERY      Impression:  2/12/16 Jake Sarabia         Allergies:     Allergies   Allergen Reactions    Ciprofloxacin GI Intolerance and Headache     Confusion, arm weakness, dizziness, headache    Meperidine GI Intolerance, Hallucinations and Other (See Comments)     Demarol  Reaction Date:Unknown    Propoxyphene GI Intolerance           LABORATORY RESULTS:      Lab Results   Component Value Date    HGB 10.7 (L) 01/01/2024    HCT 33.9 (L) 01/01/2024    WBC 7.77 01/01/2024     Lab Results   Component Value Date    BUN 32 (H) 01/02/2024    K 4.2 01/02/2024     (H) 01/02/2024    GLUCOSE 234 (H) 01/12/2022    CREATININE 1.62 (H) 01/02/2024     Lab Results   Component Value Date    PROTIME 14.1 12/31/2023    INR 1.08 12/31/2023        DIAGNOSTIC STUDIES: Reviewed  XR chest portable    Result Date: 1/1/2024  Impression: Right jugular catheter at cavoatrial junction with no pneumothorax. Chronic scarring with no acute disease. Workstation performed: [id number]     MRI brain wo contrast    Result Date: 12/31/2023  Impression: Acute infarct in left PICA territory with small acute parenchymal hematoma in anterior aspect of left cerebellar vermis, petechial cortical hemorrhage along left posterior cerebellum, and similar compressive mass effect on fourth ventricle.  No obstructive hydrocephalus. Recommend consultation with neurosurgery. 1.6 cm intramuscular lesion in left temporalis muscle, indeterminate. Differential includes intramuscular epidermoid cyst, hemangioma, myxoma, among other differentials. Mild chronic microangiopathy. The study was marked in EPIC for immediate notification. Workstation performed: [id number]     CT head wo contrast    Result Date: 12/31/2023  Impression: Redemonstration of evolving acute PICA distribution left cerebellar infarct. Mass effect on the fourth ventricle is slightly increased. No hydrocephalus. Persistent increased attenuation in the anterior aspect of the cerebellar infarct that could represent residual contrast staining but petechial hemorrhage not excluded. Recommend close interval follow-up CT and/or further evaluation with MRI. The study was marked in EPIC for immediate notification. Workstation performed: [id number]

## 2024-01-02 NOTE — PROGRESS NOTES
Critical Care Interval Transfer Note:    Please refer to progress note from earlier today for full details.     Barriers to discharge:   PT/OT  Resolution of FERCHO     HPI   83 y.o. w/ a PMH of DM, HTN, CKD, pulm fibrosis, who presents with nausea, headache and ataxia and found to have a left PICA stroke.  Patient was admitted to [facility].  Repeat head CT this morning with increasing cerebellar edema and small amount of hemorrhage.  Patient was initiated on aspirin and Plavix.  She received DDAVP.  She was initiated on 3% saline and Cardene infusion.  Transferred to neurocritical care for ongoing management of hypertonic saline and Cardene infusion.  Hypertonic saline was discontinued 1/02 AM since patient remained asymptomatic besides mild left upper extremity ataxia.  Patient was also slowly weaned off the Cardene drip and restarted on her home BP meds.  Patient does have a known anion gap metabolic acidosis likely due to patient's noted FERCHO/hypochloremic NAGMA from patient's noted hypertonic saline.    Patient is eating and drinking fluids without much issue. Patient was also started on home aspirin for secondary prevention but per neurology holding off on starting Plavix given outside swelling and will defer to them for starting of plavix.   Central line removed given has peripheral access.         Consults: IP CONSULT TO PHYSICAL MEDICINE REHAB  IP CONSULT TO CASE MANAGEMENT  IP CONSULT TO NUTRITION SERVICES  IP CONSULT TO NEUROSURGERY  IP CONSULT TO NEUROLOGY         Discharge Plan: pending PT/OT progress, likely in 48-72 hrs  Central access plan:  central line d/c order placed, has peripheral access            Patient seen and evaluated by Critical Care today and deemed to be appropriate for transfer to Stepdown Level 2. Spoke to Dr. Tete De La Cruz from [facility] to accept transfer. Critical care can be contacted via [employer] with any questions or concerns.

## 2024-01-02 NOTE — ASSESSMENT & PLAN NOTE
Home regimen: Ferrous Sulfate 325mg tablet daily    Iron Panel        Lab Results   Component Value Date    CONCFE 33 04/10/2023    CONCFE 19 04/05/2022    TIBC 357 04/10/2023    TIBC 334 04/05/2022    IRON 117 04/10/2023    IRON 64 04/05/2022    FERRITIN 281 04/10/2023    FERRITIN 511 (H) 04/05/2022       Plan  - Continue Ferrous Sulfate 325 mg daily

## 2024-01-02 NOTE — ASSESSMENT & PLAN NOTE
Patient has had Pulmonary Fibrosis ever since a diagnosis of Covid in 12/2021 that was complicated by pneumothoraces    Plan:   - Xopenex Nebs q6h PRN  - Patient saturating in the 90s on room air

## 2024-01-02 NOTE — ASSESSMENT & PLAN NOTE
Lab Results   Component Value Date    HGBA1C 8.3 (H) 12/31/2023       Recent Labs     01/02/24  1154 01/02/24  1646 01/02/24  2118 01/03/24  0550   POCGLU 184* 158* 98 240*       Blood Sugar Average: Last 72 hrs:  (P) [id number]    Home regimen: Glipizide 10mg daily, Januvia 100mg daily    Plan:  - hold home regimen in setting of FERCHO   - Lantus 10units qhs  - Humalog 5 TID   - SSI 3

## 2024-01-02 NOTE — PROGRESS NOTES
"[facility]  Progress Note  Name: Debbie Moody I  MRN: [id number]  Unit/Bed#: ICU 10 I Date of Admission: 12/31/2023   Date of Service: 1/2/2024 I Hospital Day: 2    Assessment/Plan   * Acute CVA (cerebrovascular accident) (HCC)  Assessment & Plan  Pt presented with nausea, headache and ataxia and was found to have left cerebellar stroke secondary to PICA occlusion.     Imaging reviewed personally and by attending. Final results as below  Mri Brain wo 12/30/2023: Acute infarct in left PICA territory with small acute parenchymal hematoma in anterior aspect of left cerebellar vermis, petechial cortical hemorrhage along left posterior cerebellum, and similar compressive mass effect on fourth ventricle. No obstructive hydrocephalus. Recommend consultation with neurosurgery. 1.6 cm intramuscular lesion in left temporalis muscle, indeterminate. Differential includes intramuscular epidermoid cyst, hemangioma, myxoma, among other differentials.   CTA head and neck w/wo 12/30/2023: Occluded distal aspect of left posterior inferior cerebellar artery (PICA). This corresponds with left PICA territory infarct seen on earlier same day head CT.     Plan  Continue regular neurologic checks.   Ongoing medical management per primary team.   Pt on HTS 3%.   Pain/HA management per primary team  Neurology following for stroke management.   Pt  continuing on ASA 81 mg daily.   Eval and mobilize per PT/OT  DVT PPX: SCDs, HSQ.   No neurosurgical intervention is anticipated at this time.     Neurosurgery will continue to follow. Please call with any questions or concerns.              Subjective/Objective     Chief Complaint: \"I am nauseas\"    Subjective: Patient reports that she is nauseous.  She denies vomiting today but reports that she vomited prior to admission.  Patient also reports having a headache.  Patient reports some blurry vision with things that are farther away.  She denies any double " "vision or vision loss.  She denies any new numbness, tingling, or weakness in bilateral upper and lower extremities.    Objective: Alert and awake, laying in bed, no acute distress.    I/O         12/31 0701 01/01 0700 01/01 0701 01/02 0700 01/02 0701 01/03 0700    P.O. 250 1560     I.V. (mL/kg) 1367.9 (24.3) 3585.4 (63.8) 196.5 (3.5)    IV Piggyback  100     Total Intake(mL/kg) 1617.9 (28.8) 5245.4 (93.3) 196.5 (3.5)    Urine (mL/kg/hr) 350 975 (0.7)     Stool  0     Total Output 350 975     Net +1267.9 +4270.4 +196.5           Unmeasured Stool Occurrence  0 x             Invasive Devices       Central Venous Catheter Line  Duration             CVC Central Lines 12/31/23 Triple 16cm 1 day              Peripheral Intravenous Line  Duration             Peripheral IV 12/30/23 Left Antecubital 2 days    Peripheral IV 12/30/23 Left;Ventral (anterior) Forearm 2 days    Peripheral IV 12/31/23 Right Antecubital 1 day                    Physical Exam:  Vitals: Blood pressure 128/55, pulse 82, temperature 98.5 °F (36.9 °C), temperature source Oral, resp. rate 14, height 4' 10\" (1.473 m), weight 56.2 kg (123 lb 14.4 oz), SpO2 93%, not currently breastfeeding.,Body mass index is 25.89 kg/m².    General appearance:  Appears stated age  Head: Normocephalic, without obvious abnormality, atraumatic  Eyes: EOMI, PERRL, able to count objects correctly bilaterally.  Neck: supple, symmetrical, trachea midline   Lungs: non labored breathing  Heart: regular heart rate  Neurologic:   Mental status: Alert, oriented x 3, speech is clear and fluent, thought content appropriate  Cranial nerves: grossly intact (Cranial nerves II-XII)  Sensory: normal to LT x 4.  Coordination: finger to nose test normal bilaterally, no drift in bilateral upper extremities      Lab Results:  Results from last 7 days   Lab Units 01/01/24  0449 12/31/23  0504 12/30/23 2010 12/30/23  1329   WBC Thousand/uL 7.77 11.11*  --  8.79   HEMOGLOBIN g/dL 10.7* 13.6  -- " " 14.1   HEMATOCRIT % 33.9* 42.4  --  43.4   PLATELETS Thousands/uL 211 265 262 236   NEUTROS PCT % 78*  --   --   --    MONOS PCT % 7  --   --   --    MONO PCT %  --   --   --  0*   EOS PCT % 0  --   --  0     Results from last 7 days   Lab Units 01/02/24  0603 01/02/24  0001 01/01/24  1743 01/01/24  1202 01/01/24  0449 12/31/23  1111 12/31/23  0504 12/30/23 2010 12/30/23  1329   POTASSIUM mmol/L 4.6 3.7 3.8   < > 4.1   < > 4.0   < > 4.4   CHLORIDE mmol/L 124* 120* 121*   < > 116*   < > 107   < > 100   CO2 mmol/L 15* 16* 17*   < > 18*   < > 18*   < > 23   BUN mg/dL 33* 32* 34*   < > 30*   < > 26*   < > 25   CREATININE mg/dL 1.60* 1.64* 1.66*   < > 1.57*   < > 1.48*   < > 1.28   CALCIUM mg/dL 7.6* 7.7* 7.7*   < > 8.3*   < > 9.7   < > 10.3*   ALK PHOS U/L  --   --   --   --  79  --  113*  --  131*   ALT U/L  --   --   --   --  8  --  10  --  12   AST U/L  --   --   --   --  12*  --  18  --  16    < > = values in this interval not displayed.     Results from last 7 days   Lab Units 12/31/23  0504   MAGNESIUM mg/dL 2.4     Results from last 7 days   Lab Units 12/31/23  0504   PHOSPHORUS mg/dL 3.5     Results from last 7 days   Lab Units 12/31/23  1111 12/30/23  1329   INR  1.08 1.04   PTT seconds  --  28     No results found for: \"TROPONINT\"  ABG:  Lab Results   Component Value Date    PHART 7.330 (L) 01/12/2022    QKU0ATY 33.5 (L) 01/12/2022    PO2ART 249.2 (H) 01/12/2022    HIN8AIY 17.3 (L) 01/12/2022    BEART -7.8 01/12/2022    SOURCE Line, Arterial 01/12/2022       Imaging Studies: I have personally reviewed pertinent reports and I have personally reviewed pertinent films in PACS    EKG, Pathology, and Other Studies: I have personally reviewed pertinent reports.      PLEASE NOTE:  This encounter may have been completed utilizing the [employer] Direct Speech Voice Recognition Software. Grammatical errors, random word insertions, pronoun errors and incomplete sentences are occasional consequences of the system " due to software limitations, ambient noise and hardware issues.These may be missed by proof reading prior to affixing electronic signature. Any questions or concerns about the content, text or information contained within the body of this dictation should be directly addressed to the advanced practitioner or physician for clarification.

## 2024-01-02 NOTE — SPEECH THERAPY NOTE
Consult received and chart reviewed. Per chart review and discussion with RN, pt passed RN dysphagia assessment and is tolerating regular diet with thin liquids with no s/s of aspiration. Speech/Language deficits also denied. Formal speech/swallow evaluation does not appear to be indicated at this time. If new concerns arise, please reconsult. Thank you.

## 2024-01-02 NOTE — ASSESSMENT & PLAN NOTE
Home regimen: Xanax 0.25mg prn for anxiety     Plan  - Give Hydroxyzine 25mg prn if needed for anxiety

## 2024-01-02 NOTE — UTILIZATION REVIEW
Initial Clinical Review    Admission: Date/Time/Statement:   Admission Orders (From admission, onward)       Ordered        12/30/23 1543  INPATIENT ADMISSION  Once                          Orders Placed This Encounter   Procedures    INPATIENT ADMISSION     Standing Status:   Standing     Number of Occurrences:   1     Order Specific Question:   Level of Care     Answer:   Level 1 Stepdown [13]     Order Specific Question:   Estimated length of stay     Answer:   More than 2 Midnights     Order Specific Question:   Certification     Answer:   I certify that inpatient services are medically necessary for this patient for a duration of greater than two midnights. See H&P and MD Progress Notes for additional information about the patient's course of treatment.     ED Arrival Information       Expected   -    Arrival   12/30/2023 12:47    Acuity   Urgent              Means of arrival   Ambulance    Escorted by   [facility]    Service   Critical Care/ICU    Admission type   Emergency              Arrival complaint   nausea vomiting             Chief Complaint   Patient presents with    Weakness - Generalized     Pt has new onset of weakness with Nausea and vomiting        Initial Presentation: 83 y.o. female with PMHx: Covid 19 pneumonia in 12/2021 complicated by pneumothoraces and now with pulmonary fibrosis, CKD, DM, HTN, C. Diff colitis, who presented to [facility] ED via EMS due to nausea and vomiting, needing assistance to walk. Earlier in the day c/o HA, weakness.  On exam, pt hypertensive, continues to vomit in ED. Stroke alert called. CTH/CTA showed distal PICA occlusion. She was outside of the window for TNK on arrival to the ED. She is being admitted to CC service for ongoing care. Plan:  Admit Inpatient status to ICU dt CVA: Telemetry, Neurology consult, freq neuro checks and VS, baseline NIH stroke scale, order Echo and MRI brain, NPO, permissive HTN, accuchecks and start insulin drip, order  lipid panel, A1c, TSH, monitor IO and daily wts, PT OT ST eval, CM consult, inc spirometry.     Date: 12/31   Day 2: Per Critical Care note: found to have a LPICA infarct. Repeat CTH with mass effect on 4th ventricle and increased attenuation in ant aspect of the cerebellar infarct concerning for petechial hemmorhage vs contrast staining and decision was made for tx to [facility] Neuro Critical Care.      ED Triage Vitals [12/30/23 1251]   Temperature Pulse Respirations Blood Pressure SpO2   97.8 °F (36.6 °C) 87 18 (!) 215/101 92 %      Temp Source Heart Rate Source Patient Position - Orthostatic VS BP Location FiO2 (%)   Axillary Monitor Sitting Left arm --      Pain Score       8          Wt Readings from Last 1 Encounters:   12/31/23 56.2 kg (123 lb 14.4 oz)     Additional Vital Signs:   Date/Time Temp Pulse Resp BP MAP (mmHg) SpO2 Calculated FIO2 (%) - Nasal Cannula Nasal Cannula O2 Flow Rate (L/min) O2 Device Patient Position - Orthostatic VS   12/31/23 1600 99.6 °F (37.6 °C) 92 18 143/65 94 96 % 28 2 L/min Nasal cannula Lying   12/31/23 1530 -- 91 18 147/67 96 96 % -- -- -- --   12/31/23 1500 -- 90 15 127/59 85 96 % -- -- Nasal cannula Lying   12/31/23 1430 -- 94 16 144/65 93 96 % -- -- -- --   12/31/23 1414 -- 85 -- 153/69 -- -- -- -- -- --   12/31/23 1400 -- 91 21 153/69 99 96 % 28 2 L/min Nasal cannula Lying   12/31/23 1330 -- 85 13 135/63 91 97 % -- -- -- --   12/31/23 1301 -- 86 -- 188/78 Abnormal  -- -- -- -- -- --   12/31/23 1300 -- 89 18 188/78 Abnormal  112 97 % -- -- Nasal cannula Lying   12/31/23 1230 -- 83 13 183/80 Abnormal  115 97 % -- -- -- --   12/31/23 1227 99.1 °F (37.3 °C) 86 13 183/80 Abnormal  -- 97 % -- -- Nasal cannula Lying   12/31/23 1200 -- 78 16 174/74 Abnormal  -- 95 % 28 2 L/min Nasal cannula Lying   12/31/23 1000 99.1 °F (37.3 °C) 81 15 198/82 Abnormal  118 93 % 28 2 L/min Nasal cannula Lying   12/31/23 0933 -- 86 18 179/81 Abnormal  116 -- -- -- -- --   12/31/23 0800 98.7 °F  (37.1 °C) 82 20 137/67 96 93 % 28 2 L/min Nasal cannula Lying   12/31/23 0735 -- 85 14 139/67 -- 93 % -- -- -- --   12/31/23 0630 -- 91 22 149/68 102 93 % -- -- -- --   12/31/23 0600 -- 100 28 Abnormal  133/73 98 95 % -- -- -- --   12/31/23 0545 -- 84 14 -- -- 96 % -- -- -- --   12/31/23 0530 -- 84 14 149/69 99 96 % -- -- -- --   12/31/23 0515 -- 90 15 -- -- 97 % -- -- -- --   12/31/23 0500 -- 81 14 194/89 Abnormal  128 98 % -- -- -- --   12/31/23 0454 -- -- -- 200/89 Abnormal  -- -- -- -- -- --   12/31/23 0445 -- 75 14 -- -- 97 % -- -- -- --   12/31/23 0430 -- 81 13 200/89 Abnormal  128 97 % -- -- -- --   12/31/23 0415 -- 76 14 -- -- 97 % -- -- -- --   12/31/23 0400 -- 79 14 189/89 Abnormal  128 97 % -- -- -- --   12/31/23 0345 -- 82 14 -- -- 97 % -- -- -- --   12/31/23 0330 -- 82 15 167/78 112 97 % -- -- -- --   12/31/23 0315 -- 81 14 -- -- 97 % -- -- -- --   12/31/23 0300 -- 80 14 174/84 Abnormal  121 97 % -- -- -- --   12/31/23 0245 -- 80 15 -- -- 97 % -- -- -- --   12/31/23 0230 -- 80 14 191/91 Abnormal  130 97 % -- -- -- --   12/31/23 0215 -- 86 15 159/81 112 96 % -- -- -- --   12/31/23 0200 -- 80 13 163/81 114 97 % -- -- -- --   12/31/23 0145 -- 81 14 169/80 117 97 % -- -- -- --   12/31/23 0130 -- 83 14 177/87 Abnormal  125 97 % -- -- -- --   12/31/23 0115 -- 82 15 181/86 Abnormal  124 97 % -- -- -- --   12/31/23 0100 -- 80 14 180/86 Abnormal  124 97 % -- -- -- --   12/31/23 0045 -- 82 14 178/85 Abnormal  122 97 % -- -- -- --   12/31/23 0030 -- 86 15 170/77 117 97 % -- -- -- --   12/31/23 0015 -- 96 16 197/93 Abnormal  133 97 % -- -- -- --   12/31/23 0000 -- 78 13 193/91 Abnormal  130 97 % -- -- -- --   12/30/23 2345 -- 80 15 183/88 Abnormal  127 97 % -- -- -- --   12/30/23 2330 -- 80 14 188/90 Abnormal  129 97 % -- -- -- --   12/30/23 2315 -- 80 15 190/90 Abnormal  129 98 % -- -- -- --   12/30/23 2300 -- 79 14 196/91 Abnormal  130 97 % -- -- -- --   12/30/23 2245 -- 78 14 199/92 Abnormal  132 97 % -- -- --  --   12/30/23 2230 -- 81 15 187/88 Abnormal  127 97 % -- -- -- --   12/30/23 2215 -- 81 15 185/89 Abnormal  127 97 % -- -- -- --   12/30/23 2200 -- 86 20 179/87 Abnormal  125 96 % -- -- -- --   12/30/23 2145 -- 85 15 159/78 110 95 % -- -- -- --   12/30/23 2115 -- 82 17 178/87 Abnormal  125 97 % -- -- -- --   12/30/23 2100 -- 82 16 181/87 Abnormal  125 96 % -- -- -- --   12/30/23 2045 -- 83 16 177/84 Abnormal  121 96 % -- -- -- Lying   12/30/23 2030 -- 82 16 183/83 Abnormal  119 97 % -- -- -- --   12/30/23 2015 -- 92 17 200/90 Abnormal  129 95 % -- -- -- --   12/30/23 2000 98 °F (36.7 °C) 86 19 205/95 Abnormal  137 95 % -- -- -- --   12/30/23 1945 -- 88 16 174/85 Abnormal  122 95 % -- -- -- --   12/30/23 1930 -- 84 17 175/76 Abnormal  114 96 % -- -- -- --   12/30/23 1915 -- 95 18 196/92 Abnormal  137 95 % -- -- -- --   12/30/23 1900 -- 100 18 211/101 Abnormal  145 96 % 28 2 L/min Nasal cannula Lying   12/30/23 1845 99.7 °F (37.6 °C) 98 16 190/93 Abnormal  133 96 % 28 2 L/min Nasal cannula Lying   12/30/23 1745 99.6 °F (37.6 °C) 96 17 203/98 Abnormal  140 97 % 28 2 L/min Nasal cannula Lying   12/30/23 1645 98.9 °F (37.2 °C) 93 19 200/92 Abnormal  132 97 % 28 2 L/min Nasal cannula Lying   12/30/23 1637 98.9 °F (37.2 °C) 95 20 202/91 Abnormal  131 96 % -- -- -- Lying   12/30/23 1630 -- -- 17 -- -- 97 % -- -- -- --   12/30/23 1621 -- 87 17 249/123 Abnormal  176 98 % 28 2 L/min Nasal cannula Lying   12/30/23 1615 -- 90 19 231/110 Abnormal  -- 97 % -- -- Nasal cannula Sitting   12/30/23 1600 -- -- 17 231/110 Abnormal  -- 97 % -- -- Nasal cannula Sitting   12/30/23 1530 -- -- 25 Abnormal  222/109 Abnormal  -- 98 % -- -- -- --   12/30/23 1515 -- 80 20 206/102 Abnormal  -- 98 % -- -- Nasal cannula Sitting   12/30/23 1500 -- -- 20 205/98 Abnormal  -- 98 % 28 2 L/min Nasal cannula Sitting   12/30/23 1451 -- 92 20 205/87 Abnormal  -- 98 % -- -- Nasal cannula Sitting   12/30/23 1445 -- -- 17 -- -- 98 % -- -- -- --   12/30/23 1431  -- 87 23 Abnormal  234/105 Abnormal  -- 99 % -- -- Nasal cannula Sitting   12/30/23 1430 98.1 °F (36.7 °C) 79 20 234/105 Abnormal  -- 99 % -- -- Nasal cannula Sitting   12/30/23 14:22:37 97.5 °F (36.4 °C) 79 18 235/105 Abnormal  -- 99 % 28 2 L/min Nasal cannula Sitting   12/30/23 1415 -- 77 18 235/105 Abnormal  -- 98 % -- -- Nasal cannula Sitting   12/30/23 1400 97.6 °F (36.4 °C) -- 18 216/90 Abnormal  -- 99 % -- -- Nasal cannula Sitting   12/30/23 1345 97.4 °F (36.3 °C) Abnormal  80 18 216/90 Abnormal  -- 96 % 28 2 L/min Nasal cannula Sitting   12/30/23 1340 97.8 °F (36.6 °C) 80 18 215/101 Abnormal  151 96 % 28 2 L/min Nasal cannula Sitting   12/30/23 1330 -- -- 18 215/101 Abnormal  -- 98 % -- -- Nasal cannula Sitting   12/30/23 1315 97.8 °F (36.6 °C) -- 20 215/101 Abnormal  -- 97 % 28 2 L/min Nasal cannula Sitting   12/30/23 1300 97.8 °F (36.6 °C) 87 20 215/101 Abnormal  -- 97 % 28 2 L/min Nasal cannula Sitting   12/30/23 1253 -- -- -- -- -- 93 % -- -- -- --   12/30/23 1251 97.8 °F (36.6 °C) 87 18 215/101 Abnormal  149 92 % -- -- None (Room air) Sitting     Pertinent Labs/Diagnostic Test Results:   12/31 ECHO:    Left Ventricle: Left ventricular cavity size is normal. There is mild concentric hypertrophy. The left ventricular ejection fraction is 70%. Systolic function is vigorous. Wall motion is normal.     12/30 EKG: NSR    MRI brain wo contrast   Final Result by Priyank Martins MD (12/31 1222)      Acute infarct in left PICA territory with small acute parenchymal hematoma in anterior aspect of left cerebellar vermis, petechial cortical hemorrhage along left posterior cerebellum, and similar compressive mass effect on fourth ventricle. No    obstructive hydrocephalus. Recommend consultation with neurosurgery.      1.6 cm intramuscular lesion in left temporalis muscle, indeterminate. Differential includes intramuscular epidermoid cyst, hemangioma, myxoma, among other differentials.      Mild chronic  microangiopathy.      The study was marked in EPIC for immediate notification.      Workstation performed: [id number]         CT head wo contrast   Final Result by E. Alec Schoenberger, MD (12/31 0720)      Redemonstration of evolving acute PICA distribution left cerebellar infarct. Mass effect on the fourth ventricle is slightly increased. No hydrocephalus.   Persistent increased attenuation in the anterior aspect of the cerebellar infarct that could represent residual contrast staining but petechial hemorrhage not excluded. Recommend close interval follow-up CT and/or further evaluation with MRI.      The study was marked in EPIC for immediate notification.            Workstation performed: [id number]         CT head wo contrast   Final Result by E. Alec Schoenberger, MD (12/31 0720)   Redemonstration of acute left cerebellar PICA distribution infarct. Mild mass effect on the fourth ventricle. No hydrocephalus.   Increased density within the anterior aspect of the infarct favored represent contrast staining rather than petechial hemorrhage.            Workstation performed: [id number]         CTA stroke alert (head/neck)   Final Result by Priyank Martins MD (12/30 8225)      Occluded distal aspect of left posterior inferior cerebellar artery (PICA). This corresponds with left PICA territory infarct seen on earlier same day head CT.      Scattered reticular opacities in visualized bilateral upper and lower lobes, likely due to chronic lung disease with postinfectious scarring. Superimposed infection is difficult to exclude. Consider follow-up dedicated CT chest without contrast.      Additional chronic/incidental findings as detailed above.      Findings were directly discussed with Tyron Williamson 12/30/2023 at 2:54 PM.                           Workstation performed: [id number]         CT head without contrast   Final Result by Priyank Martins MD (12/30 7804)      Acute infarct in left cerebellum PICA  [facility].         I personally discussed this study with SOFY DE LEON JR on 12/30/2023 2:07 PM.                  Workstation performed: [id number]         XR chest 1 view portable   Final Result by Mannie Hollingsworth MD (12/31 1033)      No acute cardiopulmonary disease.                  Workstation performed: [id number]           Results from last 7 days   Lab Units 12/30/23  1329   SARS-COV-2  Negative     Results from last 7 days   Lab Units 01/01/24  0449 12/31/23  0504 12/30/23 2010 12/30/23  1329   WBC Thousand/uL 7.77 11.11*  --  8.79   HEMOGLOBIN g/dL 10.7* 13.6  --  14.1   HEMATOCRIT % 33.9* 42.4  --  43.4   PLATELETS Thousands/uL 211 265 262 236   NEUTROS ABS Thousands/µL 6.00  --   --   --    BANDS PCT %  --   --   --  5         Results from last 7 days   Lab Units 01/02/24  1226 01/02/24  0603 01/02/24  0001 01/01/24  1743 01/01/24  1202 12/31/23  1111 12/31/23  0504   SODIUM mmol/L 142 144 142 142 142   < > 140   POTASSIUM mmol/L 4.2 4.6 3.7 3.8 4.3   < > 4.0   CHLORIDE mmol/L 122* 124* 120* 121* 117*   < > 107   CO2 mmol/L 15* 15* 16* 17* 16*   < > 18*   ANION GAP mmol/L 5 5 6 4 9   < > 15   BUN mg/dL 32* 33* 32* 34* 32*   < > 26*   CREATININE mg/dL 1.62* 1.60* 1.64* 1.66* 1.72*   < > 1.48*   EGFR ml/min/1.73sq m 29 29 28 28 27   < > 32   CALCIUM mg/dL 7.6* 7.6* 7.7* 7.7* 8.0*   < > 9.7   MAGNESIUM mg/dL  --   --   --   --   --   --  2.4   PHOSPHORUS mg/dL  --   --   --   --   --   --  3.5    < > = values in this interval not displayed.     Results from last 7 days   Lab Units 01/01/24  0449 12/31/23  0504 12/30/23  1329   AST U/L 12* 18 16   ALT U/L 8 10 12   ALK PHOS U/L 79 113* 131*   TOTAL PROTEIN g/dL 6.4 8.5* 9.2*   ALBUMIN g/dL 3.5 4.4 4.9   TOTAL BILIRUBIN mg/dL 0.45 0.56 0.53     Results from last 7 days   Lab Units 01/02/24  1154 01/02/24  1108 01/02/24  1005 01/02/24  0820 01/02/24  0602 01/02/24  0359 01/02/24  0157 01/01/24  2344 01/01/24  2150 01/01/24  1958 01/01/24  1749 01/01/24  1617    POC GLUCOSE mg/dl 184* 155* 142* 150* 168* 177* 145* 108 120 118 107 130     Results from last 7 days   Lab Units 01/02/24  1226 01/02/24  0603 01/02/24  0001 01/01/24  1743 01/01/24  1202 01/01/24  0449 01/01/24  0018 12/31/23  1820 12/31/23  1430 12/31/23  1111 12/31/23  0504 12/31/23  0004   GLUCOSE RANDOM mg/dL 152* 164* 108 103 255* 213* 137 166* 227* 169* 177* 118     Results from last 7 days   Lab Units 01/01/24  0449 01/01/24  0018 12/31/23  1820 12/31/23  1112 12/31/23  0507 12/31/23  0004 12/30/23 2010   OSMOLALITY, SERUM mmol/* 314* 319* 316* 323* 318* 311*     Results from last 7 days   Lab Units 12/31/23  0504   HEMOGLOBIN A1C % 8.3*   EAG mg/dl 192     Results from last 7 days   Lab Units 12/31/23  0805   PH CHLOÉ  7.334   PCO2 CHLOÉ mm Hg 35.8*   PO2 CHLOÉ mm Hg 65.0*   HCO3 CHLOÉ mmol/L 18.6*   BASE EXC CHLOÉ mmol/L -6.4   O2 CONTENT CHLOÉ ml/dL 18.8   O2 HGB, VENOUS % 91.6*         Results from last 7 days   Lab Units 12/31/23  0504   CK TOTAL U/L 63     Results from last 7 days   Lab Units 01/01/24  1619 01/01/24  1443 01/01/24  1256 12/30/23  1548 12/30/23  1329   HS TNI 0HR ng/L  --   --  39  --  6   HS TNI 2HR ng/L  --  51*  --  9  --    HSTNI D2 ng/L  --  12  --  3  --    HS TNI 4HR ng/L 53*  --   --   --   --    HSTNI D4 ng/L 14  --   --   --   --          Results from last 7 days   Lab Units 12/31/23  1111 12/30/23  1329   PROTIME seconds 14.1 13.7   INR  1.08 1.04   PTT seconds  --  28     Results from last 7 days   Lab Units 12/30/23  1329   TSH 3RD GENERATON uIU/mL 2.650         Results from last 7 days   Lab Units 12/31/23  0805   LACTIC ACID mmol/L 0.9     Results from last 7 days   Lab Units 12/30/23  1329   LIPASE u/L 20     Results from last 7 days   Lab Units 01/01/24  0449 01/01/24  0018 12/31/23  1820 12/31/23  1112 12/31/23  0507 12/31/23  0004 12/30/23 2010   OSMOLALITY, SERUM mmol/* 314* 319* 316* 323* 318* 311*     Results from last 7 days   Lab Units 12/30/23  1414    CLARITY UA  Slightly Cloudy*   COLOR UA  Straw   SPEC GRAV UA  1.025   PH UA  6.0   GLUCOSE UA mg/dl 3+*   KETONES UA mg/dl Trace*   BLOOD UA  1+*   PROTEIN UA mg/dl 3+*   NITRITE UA  Negative   BILIRUBIN UA  Negative   UROBILINOGEN UA E.U./dl 0.2   LEUKOCYTES UA  Negative   WBC UA /hpf 4-10*   RBC UA /hpf 2-4   BACTERIA UA /hpf Innumerable*   EPITHELIAL CELLS WET PREP /hpf Occasional     Results from last 7 days   Lab Units 12/30/23  1329   INFLUENZA A PCR  Negative   INFLUENZA B PCR  Negative   RSV PCR  Negative     Results from last 7 days   Lab Units 12/30/23  1329   BLOOD CULTURE  No Growth at 48 hrs.  No Growth at 48 hrs.     ED Treatment:   Medication Administration from 12/30/2023 1247 to 12/30/2023 1612         Date/Time Order Dose Route Action     12/30/2023 1331 EST sodium chloride 0.9 % bolus 1,000 mL 1,000 mL Intravenous New Bag     12/30/2023 1336 EST ondansetron (ZOFRAN) injection 4 mg 4 mg Intravenous Given     12/30/2023 1420 EST aspirin tablet 325 mg 325 mg Oral Given     12/30/2023 1420 EST clopidogrel (PLAVIX) tablet 300 mg 300 mg Oral Given     12/30/2023 1445 EST iohexol (OMNIPAQUE) 350 MG/ML injection (MULTI-DOSE) 85 mL 85 mL Intravenous Given          Past Medical History:   Diagnosis Date    Acute kidney injury (HCC)     Acute respiratory failure with hypoxia ([facility]) 12/16/2021    Broken arm     hairline fracture/ 2 places///   right arm    Diabetes mellitus (HCC)     Diverticulitis     Pneumothorax- Resolved     Postherpetic neuralgia      Present on Admission:   Pulmonary fibrosis (HCC)   Chronic kidney disease, stage 3b (HCC)   Essential hypertension   Diabetes (HCC)   Acute CVA (cerebrovascular accident) (HCC)   Irritable bowel syndrome   Hypercholesterolemia   Metabolic acidosis   Anemia      Admitting Diagnosis: Weakness [R53.1]  Hyperglycemia [R73.9]  Stroke (HCC) [I63.9]  Cerebellar stroke (HCC) [I63.9]  Age/Sex: 83 y.o. female  Admission Orders:  Scheduled PRN   [START ON  12/31/2023] aspirin, 81 mg, Daily  atorvastatin, 40 mg, QPM  chlorhexidine, 15 mL, Q12H JUAN ANTONIO  heparin (porcine), 5,000 Units, Q8H JUAN ANTONIO  magnesium sulfate, 1 g, Q12H  sodium chloride, 115 mL, Once       hydrALAZINE, 10 mg, Q6H PRN  levalbuterol, 1.25 mg, Q6H PRN  ondansetron, 4 mg, Q6H PRN         Continuous    insulin regular (HumuLIN R,NovoLIN R) 1 Units/mL in sodium chloride 0.9 % 100 mL infusion, 0.3-21 Units/hr     IP CONSULT TO NEUROLOGY    Network Utilization Review Department  ATTENTION: Please call with any questions or concerns to [phone] and carefully listen to the prompts so that you are directed to the right person. All voicemails are confidential.   For Discharge needs, contact Care Management DC Support Team at [phone] opt. 2  Send all requests for admission clinical reviews, approved or denied determinations and any other requests to dedicated fax number below belonging to the campus where the patient is receiving treatment. List of dedicated fax numbers for the Facilities:  FACILITY NAME UR FAX NUMBER   ADMISSION DENIALS (Administrative/Medical Necessity) [phone]   DISCHARGE SUPPORT TEAM (NETWORK) [phone]   PARENT CHILD HEALTH (Maternity/NICU/Pediatrics) [phone]   [facility] [phone]   [facility] [phone]   [facility] [phone]   [facility] [phone]   [facility] [phone]   [facility] [phone]   [facility] [phone]   [facility] [phone]   [facility] [phone]   [facility] [phone]   [facility] [phone]

## 2024-01-02 NOTE — ASSESSMENT & PLAN NOTE
Patient presented to ED on 12/30/23 after waking up with nausea, vomiting and difficulty walking. Later that day, prior to coming to ED, she had a headache, weakness, and intractable vomiting. CTH/CTA showed distal PICA occlusion. Outside window for TPA. Patient was transferred to the ICU where Neurology was consulted. 3% Hypertonic saline was given in boluses. Neuro wanted to maintain SBP <150 so Cardene gtt was started. Cardene drip and Hypertonic Saline infusion was discontinued on 1/2/23. Patient transferred to  service     MRI brain 12/31: Acute infarct in left PICA territory with small acute parenchymal hematoma in anterior aspect of left cerebellar vermis, petechial cortical hemorrhage along left posterior cerebellum, and similar compressive mass effect on fourth ventricle. 1.6 cm intramuscular lesion in left temporalis muscle.    1/3: AMS with A&Ox2. VS, tele, and labs reviewed. STAT CT head and CXR.     Plan:  - Neurology and Neuro Surgery both following, appreciate recommendations - both teams made aware via TT regarding morning events  - Per Neuro continue stroke pathway:   Started Aspirin on 1/2 and started plavix 1/3/23 to continue for 21 days and follow with monotherapy. Will await updated recs from evaluation today.  Continue Atorvastatin 80 mg daily  Continue Telemetry   Maintain -150's  - NPO pending formal speech eval

## 2024-01-02 NOTE — ASSESSMENT & PLAN NOTE
Pt presented with nausea, headache and ataxia and was found to have left PICA stroke.     Imaging reviewed personally and by attending. Final results as below  Mri Brain wo 12/30/2023: Acute infarct in left PICA territory with small acute parenchymal hematoma in anterior aspect of left cerebellar vermis, petechial cortical hemorrhage along left posterior cerebellum, and similar compressive mass effect on fourth ventricle. No obstructive hydrocephalus. Recommend consultation with neurosurgery. 1.6 cm intramuscular lesion in left temporalis muscle, indeterminate. Differential includes intramuscular epidermoid cyst, hemangioma, myxoma, among other differentials.   CTA head and neck w/wo 12/30/2023: Occluded distal aspect of left posterior inferior cerebellar artery (PICA). This corresponds with left PICA territory infarct seen on earlier same day head CT.     Plan  Continue regular neurologic checks.   Ongoing medical management per primary team.   Pt on HTS 3%.   Pain/HA management per primary team  Neurology following for stroke management.   Pt  continuing on ASA 81 mg daily.   Eval and mobilize per PT/OT  DVT PPX: SCDs, HSQ.   No neurosurgical intervention is anticipated at this time.     Neurosurgery will continue to follow. Please call with any questions or concerns.

## 2024-01-03 ENCOUNTER — ANESTHESIA (INPATIENT)
Dept: PERIOP | Facility: HOSPITAL | Age: 84
End: 2024-01-03
Payer: COMMERCIAL

## 2024-01-03 ENCOUNTER — ANESTHESIA EVENT (INPATIENT)
Dept: PERIOP | Facility: HOSPITAL | Age: 84
End: 2024-01-03
Payer: COMMERCIAL

## 2024-01-03 RX ORDER — ALBUTEROL SULFATE 90 UG/1
AEROSOL, METERED RESPIRATORY (INHALATION) AS NEEDED
Status: DISCONTINUED | OUTPATIENT
Start: 2024-01-03 | End: 2024-01-03

## 2024-01-03 RX ORDER — SODIUM CHLORIDE, SODIUM LACTATE, POTASSIUM CHLORIDE, CALCIUM CHLORIDE 600; 310; 30; 20 MG/100ML; MG/100ML; MG/100ML; MG/100ML
INJECTION, SOLUTION INTRAVENOUS CONTINUOUS PRN
Status: DISCONTINUED | OUTPATIENT
Start: 2024-01-03 | End: 2024-01-03

## 2024-01-03 RX ORDER — ROCURONIUM BROMIDE 10 MG/ML
INJECTION, SOLUTION INTRAVENOUS AS NEEDED
Status: DISCONTINUED | OUTPATIENT
Start: 2024-01-03 | End: 2024-01-03

## 2024-01-03 RX ORDER — DEXAMETHASONE SODIUM PHOSPHATE 10 MG/ML
INJECTION, SOLUTION INTRAMUSCULAR; INTRAVENOUS AS NEEDED
Status: DISCONTINUED | OUTPATIENT
Start: 2024-01-03 | End: 2024-01-03

## 2024-01-03 RX ORDER — CEFAZOLIN SODIUM 1 G/3ML
INJECTION, POWDER, FOR SOLUTION INTRAMUSCULAR; INTRAVENOUS AS NEEDED
Status: DISCONTINUED | OUTPATIENT
Start: 2024-01-03 | End: 2024-01-03

## 2024-01-03 RX ORDER — FENTANYL CITRATE 50 UG/ML
INJECTION, SOLUTION INTRAMUSCULAR; INTRAVENOUS AS NEEDED
Status: DISCONTINUED | OUTPATIENT
Start: 2024-01-03 | End: 2024-01-03

## 2024-01-03 RX ORDER — SODIUM CHLORIDE 9 MG/ML
INJECTION, SOLUTION INTRAVENOUS CONTINUOUS PRN
Status: DISCONTINUED | OUTPATIENT
Start: 2024-01-03 | End: 2024-01-03

## 2024-01-03 RX ORDER — FUROSEMIDE 10 MG/ML
INJECTION INTRAMUSCULAR; INTRAVENOUS AS NEEDED
Status: DISCONTINUED | OUTPATIENT
Start: 2024-01-03 | End: 2024-01-03

## 2024-01-03 RX ORDER — ONDANSETRON 2 MG/ML
INJECTION INTRAMUSCULAR; INTRAVENOUS AS NEEDED
Status: DISCONTINUED | OUTPATIENT
Start: 2024-01-03 | End: 2024-01-03

## 2024-01-03 RX ADMIN — SODIUM CHLORIDE, SODIUM LACTATE, POTASSIUM CHLORIDE, AND CALCIUM CHLORIDE: .6; .31; .03; .02 INJECTION, SOLUTION INTRAVENOUS at 18:00

## 2024-01-03 RX ADMIN — ROCURONIUM BROMIDE 30 MG: 10 INJECTION, SOLUTION INTRAVENOUS at 19:03

## 2024-01-03 RX ADMIN — FENTANYL CITRATE 100 MCG: 50 INJECTION INTRAMUSCULAR; INTRAVENOUS at 18:14

## 2024-01-03 RX ADMIN — CEFAZOLIN 1000 MG: 1 INJECTION, POWDER, FOR SOLUTION INTRAMUSCULAR; INTRAVENOUS at 18:49

## 2024-01-03 RX ADMIN — CEFAZOLIN 1000 MG: 1 INJECTION, POWDER, FOR SOLUTION INTRAMUSCULAR; INTRAVENOUS at 18:40

## 2024-01-03 RX ADMIN — SUGAMMADEX 200 MG: 100 INJECTION, SOLUTION INTRAVENOUS at 21:11

## 2024-01-03 RX ADMIN — ONDANSETRON 4 MG: 2 INJECTION INTRAMUSCULAR; INTRAVENOUS at 18:28

## 2024-01-03 RX ADMIN — PHENYLEPHRINE HYDROCHLORIDE 40 MCG/MIN: 10 INJECTION INTRAVENOUS at 18:11

## 2024-01-03 RX ADMIN — SODIUM CHLORIDE: 9 INJECTION, SOLUTION INTRAVENOUS at 18:28

## 2024-01-03 RX ADMIN — ROCURONIUM BROMIDE 50 MG: 10 INJECTION, SOLUTION INTRAVENOUS at 18:02

## 2024-01-03 RX ADMIN — ALBUTEROL SULFATE 4 PUFF: 90 AEROSOL, METERED RESPIRATORY (INHALATION) at 20:50

## 2024-01-03 RX ADMIN — PROPOFOL 20 MCG/KG/MIN: 10 INJECTION, EMULSION INTRAVENOUS at 18:00

## 2024-01-03 RX ADMIN — ROCURONIUM BROMIDE 20 MG: 10 INJECTION, SOLUTION INTRAVENOUS at 19:32

## 2024-01-03 RX ADMIN — DEXAMETHASONE SODIUM PHOSPHATE 10 MG: 10 INJECTION, SOLUTION INTRAMUSCULAR; INTRAVENOUS at 18:16

## 2024-01-03 RX ADMIN — FUROSEMIDE 40 MG: 10 INJECTION, SOLUTION INTRAMUSCULAR; INTRAVENOUS at 18:16

## 2024-01-03 NOTE — PHYSICAL THERAPY NOTE
PHYSICAL THERAPY CANCEL NOTE          Patient Name: Debbie Moody  Today's Date: 1/3/2024       01/03/24 1135   PT Last Visit   PT Visit Date 01/03/24   Note Type   Note Type Cancelled Session   Cancel Reasons Medical status  (per RN, being transfered to ICU)     Cristi Urrutia, PT, DPT

## 2024-01-03 NOTE — PROGRESS NOTES
Family med resident Jessa Ramos made aware of new respiratory issue of coughing.  Pt made NPO and chest x-ray ordered.    Patient is only alert to person.  Ct head ordered.

## 2024-01-03 NOTE — ANESTHESIA PROCEDURE NOTES
Arterial Line Insertion    Performed by: Anju Mazariegos CRNA  Authorized by: Humberto Portillo MD  Consent: The procedure was performed in an emergent situation. Verbal consent not obtained. Written consent not obtained.  Risks and benefits: risks, benefits and alternatives were discussed  Patient understanding: patient states understanding of the procedure being performed  Patient consent: the patient's understanding of the procedure matches consent given  Procedure consent: procedure consent matches procedure scheduled  Relevant documents: relevant documents present and verified  Required items: required blood products, implants, devices, and special equipment available  Patient identity confirmed: verbally with patient and arm band  Preparation: Patient was prepped and draped in the usual sterile fashion.  Indications: multiple ABGs and hemodynamic monitoring  Orientation:  Left  Location: radial artery  Sedation:  Patient sedated: geta.    Procedure Details:  Needle gauge: 20  Seldinger technique: Seldinger technique used  Number of attempts: 1    Post-procedure:  Post-procedure: dressing applied  Waveform: good waveform  Post-procedure CNS: normal  Patient tolerance: Patient tolerated the procedure well with no immediate complications

## 2024-01-03 NOTE — PROCEDURES
Intubation    Date/Time: 1/3/2024 5:47 PM    Performed by: Linus Edwards MD  Authorized by: Linus Edwards MD    Patient location:  Bedside  Other Assisting Provider: Yes (comment) (Dr Trejo)    Consent:     Consent obtained:  Verbal    Consent given by:  Guardian  [place] protocol:     Patient identity confirmed:  Arm band  Pre-procedure details:     Patient status:  Unresponsive    Mallampati score:  2    Pretreatment medications:  Propofol    Paralytics:  Succinylcholine  Indications:     Indications for intubation: airway protection    Procedure details:     Preoxygenation:  Bag valve mask    CPR in progress: no      Intubation method:  Oral    Oral intubation technique: [place].    Laryngoscope blade:  Mac 3    Tube size (mm):  7.0    Tube type:  Cuffed    Number of attempts:  1    Tube visualized through cords: yes    Placement assessment:     ETT to lip:  24    Tube secured with:  ETT ely    Breath sounds:  Equal and absent over the epigastrium    Placement verification: chest rise, condensation, direct visualization, equal breath sounds, ETCO2 detector and tube exhalation      CXR findings:  ETT in proper place  Post-procedure details:     Patient tolerance of procedure:  Tolerated well, no immediate complications

## 2024-01-03 NOTE — PROGRESS NOTES
[facility]  Progress Note: Critical Care  Name: Debbie Moody 83 y.o. 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 Mass effect on the fourth ventricle is slightly increased. No hydrocephalus. Persistent increased attenuation in the anterior aspect of the cerebellar infarct that could represent residual contrast staining but petechial hemorrhage not excluded. Recommend close interval follow-up CT and/or further evaluation with MRI.   12/31 MRI-Acute infarct in left PICA territory with small acute parenchymal hematoma in anterior aspect of left cerebellar vermis, petechial cortical hemorrhage along left posterior cerebellum, and similar compressive mass effect on fourth ventricle. No obstructive hydrocephalus.   12/31 TTE: EF 70% L atrium is nl in size   01/03 CTH: Evolving left PCA infarct with increased left cerebellar hypodensity and compression of the fourth ventricle resulting in hydrocephalus with increased focal parenchymal hemorrhage adjacent to the fourth ventricle     Etiology: suspected atheroembolic vs cardioembolic     Plan:   Q2 neuro checks for now  SBP goal 100-140  Will start cardene gtt  Will give bolus of 250cc HTS  Will likely do HTS infusion once PICC obtained   PICC consent obtained  Will likely start HTS infusion once PICC completed   Neurosurgery following  EVD placed @ 10 and will allow to drain as per neurosurgery   If any acute changes in neuro exam, obtain CTH and contact neurosurg if  "needs suboccipital decompression   Neurology following  Continue stroke pathway  Holding ASA 81mg and DVT ppx for suspected procedure   Repeat CTH if new focal deficit or decline in GCS > 2 points        CV:   Diagnosis: HTN  Plan:   Goal -140  Starting cardene gtt  Continue home enalapril (converted to lisinopril 10mg)        - Diagnosis: HLD              Lipid panel: Cholesterol 79, 7/22,               Plan:              Lipitor 80mg      Pulm:  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  Has crackles at base of lungs posteriorly  Baseline VBG\" 7.33/35.8/65/18.6  Plan:   Xopenex PRN   On room air   IS, OOB        GI:   bowel regimen: senokot and miralax prn  BM: 12/30/23        :   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  Baseline cr: 1.1-1.3  Current Cr 01/03: 1.3  I/O: -850 cc    Plan: Trend renal function  Strict I/Os  Urinary retention protocol  Avoid nephrotoxins   Encourage po hydration      - Diagnosis: Metabolic Acidosis              - Bicarb 16              - suspected from FERCHO/renal dysfunction from past HTS              Plan               - KYLAH (Anion gap: 9)              - will continue to monitor and correct FERCHO as able         F/E/N:   F: none   E: K> 4.0, mag greater than 2 3, maintain magnesium > 2.0, maintain phosphate > 3.5  N: Diabetic diet after EVD procedure        Heme/Onc:   Diagnosis: Anemia  Plan: Continue iron supplementation  Trend hgb/plt count  Chemical DVT PPx hold given just had EVD        Endo:   Diagnosis: DM type 2  A1c: 8.3  Plan: Subq insulin  10 units lantus daily qhs  5 units mealtime humalog  SSI alg 3  Goal blood glucose 140-180        ID:   No active issues  Trend WBC/fever curve     MSK/Skin:   No active issues  Frequent repositioning   PT/OT        Lines/Drains  central line put in 12/31 evening for HTS  Can be removed once off HTS  No Roberts     Disposition: Critical care    ICU Core Measures     A: Assess, Prevent, and Manage Pain Has pain been assessed? Yes  Need for " changes to pain regimen? Yes   B: Both SAT/SAT  N/A   C: Choice of Sedation RASS Goal: -2 Light Sedation  Need for changes to sedation or analgesia regimen? No   D: Delirium CAM-ICU: Negative   E: Early Mobility  Plan for early mobility? No   F: Family Engagement Plan for family engagement today? Yes       Review of Invasive Devices:            Prophylaxis:  VTE Contraindicated secondary to: EVD just placed    Stress Ulcer  not ordered        Significant 24hr Events     24hr events: Patient was noted to become worsening lethargy and dysarthric and stat CTH showed worsened edema and development of obstructive hydrocephalus      Subjective     Review of Systems   Unable to perform ROS: Acuity of condition        Objective                            Vitals I/O      Most Recent Min/Max in 24hrs   Temp 97.7 °F (36.5 °C) Temp  Min: 97.2 °F (36.2 °C)  Max: 98.7 °F (37.1 °C)   Pulse 97 Pulse  Min: 84  Max: 113   Resp 20 Resp  Min: 14  Max: 24   /62 BP  Min: 137/62  Max: 176/97   O2 Sat 99 % SpO2  Min: 92 %  Max: 99 %      Intake/Output Summary (Last 24 hours) at 1/3/2024 1302  Last data filed at 1/3/2024 1201  Gross per 24 hour   Intake 2.08 ml   Output 2900 ml   Net -2897.92 ml       Diet NPO    Invasive Monitoring   Arterial Line  Karmen BP    No data recorded   MAP    No data recorded           Physical Exam   Physical Exam  HENT:      Head: Normocephalic and atraumatic.   Cardiovascular:      Rate and Rhythm: Normal rate.   Abdominal:      Palpations: Abdomen is soft.   Pulmonary:      Effort: Pulmonary effort is normal.   Neurological:      Mental Status: She is lethargic.      Cranial Nerves: Dysarthria present.      Motor: Weakness.      Comments: Unable to verbalize name or place or time     Worsened dysarthria   Able to follow commands such as giving thumbs up and wiggling toes   Able to give a thumbs up and follow commands in bilat UE   Bilat UE: 3/5  Bilat LE distally 3/5, proximally 2/5             Diagnostic Studies      EKG: reviewed  Imaging: reviewed I have personally reviewed pertinent reports.   and I have personally reviewed pertinent films in PACS     Medications:  Scheduled PRN   atorvastatin, 80 mg, QPM  chlorhexidine, 15 mL, Q12H JUAN ANTONIO  ferrous sulfate, 325 mg, Daily With Breakfast  insulin glargine, 10 Units, HS  insulin lispro, 1-6 Units, TID AC  insulin lispro, 5 Units, TID With Meals  lisinopril, 10 mg, Daily  senna-docusate sodium, 1 tablet, HS      acetaminophen, 650 mg, Q6H PRN  hydrALAZINE, 10 mg, Q6H PRN  labetalol, 10 mg, Q6H PRN  levalbuterol, 1.25 mg, Q6H PRN  ondansetron, 4 mg, Q6H PRN  polyethylene glycol, 17 g, Daily PRN       Continuous          Labs:    CBC    Recent Labs     01/03/24  0636   WBC 8.94   HGB 12.2   HCT 37.6        BMP    Recent Labs     01/02/24  1226 01/03/24  0531   SODIUM 142 137   K 4.2 4.0   * 112*   CO2 15* 16*   AGAP 5 9   BUN 32* 23   CREATININE 1.62* 1.31*   CALCIUM 7.6* 8.8       Coags    Recent Labs     01/03/24  1211   INR 1.14   PTT 30        Additional Electrolytes  No recent results       Blood Gas    No recent results  No recent results LFTs  No recent results    Infectious  No recent results  Glucose  Recent Labs     01/02/24  0001 01/02/24  0603 01/02/24  1226 01/03/24  0531   GLUC 108 164* 152* 235*               Critical Care Time Statement: Upon my evaluation, this patient had a high probability of imminent or life-threatening deterioration due to stroke, which required my direct attention, intervention, and personal management.  I spent a total of 35 minutes directly providing critical care services, including interpretation of complex medical databases and evaluating for the presence of life-threatening injuries or illnesses. This time is exclusive of procedures, teaching, family meetings, and any prior time recorded by providers other than myself.      Miguel Negron DO

## 2024-01-03 NOTE — ANESTHESIA PROCEDURE NOTES
Arterial Line Insertion    Performed by: Humberto Portillo MD  Authorized by: Humberto Portillo MD  Consent: Verbal consent obtained. Written consent obtained.  Consent given by: patient  Patient understanding: patient states understanding of the procedure being performed  Patient consent: the patient's understanding of the procedure matches consent given  Procedure consent: procedure consent matches procedure scheduled  Required items: required blood products, implants, devices, and special equipment available  Patient identity confirmed: arm band and provided demographic data  Preparation: Patient was prepped and draped in the usual sterile fashion.  Indications: multiple ABGs and hemodynamic monitoring  Orientation:  Left  Location: radial artery  Procedure Details:  Needle gauge: 20  Seldinger technique: Seldinger technique used  Number of attempts: 1    Post-procedure:  Post-procedure: dressing applied  Waveform: good waveform and waveform confirmed  Patient tolerance: Patient tolerated the procedure well with no immediate complications

## 2024-01-03 NOTE — PROCEDURES
Central Line Insertion    Date/Time: 1/3/2024 5:51 PM    Performed by: Linus Edwards MD  Authorized by: Linus Edwards MD    Consent:     Consent obtained:  Emergent situation  Universal protocol:     Patient identity confirmed:  Arm band  Pre-procedure details:     Hand hygiene: Hand hygiene performed prior to insertion      Sterile barrier technique: All elements of maximal sterile technique followed      Skin preparation:  ChloraPrep  Indications:     Central line indications: medications requiring central line and no peripheral vascular access    Anesthesia (see MAR for exact dosages):     Anesthesia method:  None  Procedure details:     Location:  Right internal jugular    Vessel type: vein      Laterality:  Right    Approach: percutaneous technique used      Patient position:  Flat    Catheter type:  Triple lumen    Catheter size:  7 Fr    Landmarks identified: yes      Ultrasound guidance: yes      Ultrasound image availability:  Not obtained due to urgency    Sterile ultrasound techniques: Sterile gel and sterile probe covers were used      Manometry confirmation: yes      Number of attempts:  1    Successful placement: yes      Vessel of catheter tip end:  SVC  Post-procedure details:     Post-procedure:  Dressing applied and line sutured    Assessment:  Blood return through all ports, no pneumothorax on x-ray, free fluid flow and placement verified by x-ray    Patient tolerance of procedure:  Tolerated well, no immediate complications

## 2024-01-03 NOTE — NURSING NOTE
2600-9425: Evd placed at bedside by nsx. Approx half hour-45min after evd placed pt opening eyes, weakly following commands and attempting to verbalize although only slight incomprehensible noise noted.    0666-9910: pt appears more lethargic from prior-minimal eye opening, decerebrate posturing in right upper extremity, withdrawing in other 3 extremities, no longer following commands. ICPs normal as charted in flowsheets, EVD with no output since placement although drain remains patent. Right upward gaze noted. Charlene Oliver, nsx LORE and Dr. Espinoza aware and at bedside. 3% bolus ordered and given. Plan for stat cth.    1630: pt remain gcs 7, (2-1-4). NCC and Nsx aware and at bedside. Mannitol ordered and given.     1715: NT suctioned multiple times throughout afternoon for moderate-large amounts of thick yellow/white secretions. NCC at bedside, plan to intubate, place central line, nsx to speak with family    1750: pt transported to OR on portable monitor with RT, propofol and cardene drips infusing, report given to anesthesia team

## 2024-01-03 NOTE — PROCEDURES
Insert Complex Venous Access Line    Date/Time: 1/3/2024 3:28 PM    Performed by: Isabel Mccracken RN  Authorized by: Miguel Negron DO    Patient location:  Bedside  Other Assisting Provider: Yes (comment) ([facility]ica Link, [facility])    Consent:     Consent obtained:  Verbal (provider obtained)    Consent given by:  Healthcare agent (daughter)    Risks discussed:  Arterial puncture, bleeding, infection, incorrect placement, nerve damage and pneumothorax    Alternatives discussed:  No treatment, delayed treatment and alternative treatment  Universal protocol:     Procedure explained and questions answered to patient or proxy's satisfaction: yes      Relevant documents present and verified: yes      Test results available and properly labeled: yes      Radiology Images displayed and confirmed.  If images not available, report reviewed: yes      Required blood products, implants, devices, and special equipment available: yes      Site/side marked: yes      Immediately prior to procedure, a time out was called: yes      Patient identity confirmed:  Arm band, provided demographic data, hospital-assigned identification number and anonymous protocol, patient vented/unresponsive  Pre-procedure details:     Hand hygiene: Hand hygiene performed prior to insertion      Sterile barrier technique: All elements of maximal sterile technique followed      Skin preparation:  ChloraPrep    Skin preparation agent: Skin preparation agent completely dried prior to procedure    Indications:     PICC line indications: medications requiring central line    Sedation:     Sedation type: None.  Anesthesia (see MAR for exact dosages):     Anesthesia method:  Local infiltration    Local anesthetic:  Lidocaine 1% w/o epi (3 mL administered)  Procedure details:     Location:  Cephalic    Vessel type: vein      Laterality:  Left    Site selection rationale:  Largest, accessible vein    Approach: percutaneous technique used      Patient  position:  Flat    Procedural supplies:  Triple lumen    Catheter size:  5 Fr    Landmarks identified: yes      Ultrasound guidance: yes      Ultrasound image availability:  Not saved    Sterile ultrasound techniques: Sterile gel and sterile probe covers were used      Number of attempts:  2    Successful placement: no    Comments:      Procedure terminated. Unable to thread catheter past innominate vein.

## 2024-01-03 NOTE — QUICK NOTE
Message from radiology about immediate findings from STAT CT head from earlier this morning that shows evolving L PCA infarct with new hydrocephalus and worsening hemorrhage near the fourth ventricle. Both neurology AP and neurosurgery AP were contacted via TT.     Prior to above message, pt was seen again at bedside, A&Ox2 but appearing more lethargic. Will continue to monitor closely - awaiting recommendations from neuro teams.    Trell De La Cruz D.O. PGY3  [facility]  10:30 AM

## 2024-01-03 NOTE — PROGRESS NOTES
Progress Notes - Family Medicine Residency, Ricerose mary Moody 1940, 83 y.o. female.  MRN: [id number]  [facility] En[facility]: [id number]  Primary Care Provider: Wild Stafford DO      Admission Date: 12/31/2023 1726  Length of Stay: 3 days  Code Status:  Level 1 - Full Code  Disposition: inpatient  Consult:   IP CONSULT TO PHYSICAL MEDICINE REHAB  IP CONSULT TO CASE MANAGEMENT  IP CONSULT TO NUTRITION SERVICES  IP CONSULT TO NEUROSURGERY  IP CONSULT TO NEUROLOGY         Assessment/Plan:      Plans discussed with [facility] team and finalization is pending attending physician attestation. Please, call [phone] for any clarification.     * Acute CVA (cerebrovascular accident) (HCC)  Assessment & Plan  Patient presented to ED on 12/30/23 after waking up with nausea, vomiting and difficulty walking. Later that day, prior to coming to ED, she had a headache, weakness, and intractable vomiting. CTH/CTA showed distal PICA occlusion. Outside window for TPA. Patient was transferred to the ICU where Neurology was consulted. 3% Hypertonic saline was given in boluses. Neuro wanted to maintain SBP <150 so Cardene gtt was started. Cardene drip and Hypertonic Saline infusion was discontinued on 1/2/23. Patient transferred to  service     MRI brain 12/31: Acute infarct in left PICA territory with small acute parenchymal hematoma in anterior aspect of left cerebellar vermis, petechial cortical hemorrhage along left posterior cerebellum, and similar compressive mass effect on fourth ventricle. 1.6 cm intramuscular lesion in left temporalis muscle.    1/3: AMS with A&Ox2. VS, tele, and labs reviewed. STAT CT head and CXR.     Plan:  - Neurology and Neuro Surgery both following, appreciate recommendations - both teams made aware via TT regarding morning events  - Per Neuro continue stroke pathway:   Started Aspirin on 1/2 and started plavix 1/3/23 to continue for 21 days and follow with monotherapy. Will await  updated recs from evaluation today.  Continue Atorvastatin 80 mg daily  Continue Telemetry   Maintain -150's    FERCHO (acute kidney injury) (HCC)  Assessment & Plan  Patient found to have FERCHO on admission. Patient has a history of stage 3 CKD    Renal      Results from last 7 days   Lab Units 24  0531 24  1226 24  0603 24  0001 24  1743 24  1202 24  0449   BUN mg/dL 23 32* 33* 32* 34* 32* 30*   CREATININE mg/dL 1.31* 1.62* 1.60* 1.64* 1.66* 1.72* 1.57*   EGFR ml/min/1.73sq m 37 29 29 28 28 27 30     Plan:   - Strict I/Os  - Avoid nephrotoxic Medications  - Daily BMPs  - Continue monitoring, encourage oral hydration, continuous fluids if needed     Pulmonary fibrosis (HCC)  Assessment & Plan  Patient has had Pulmonary Fibrosis ever since a diagnosis of Covid in 2021 that was complicated by pneumothoraces    Plan:   - Xopenex Nebs q6h PRN  - Patient saturating in the 90s on room air     Simple chronic anemia  Assessment & Plan  Home regimen: Ferrous Sulfate 325mg tablet daily    Iron Panel        Lab Results   Component Value Date    CONCFE 33 04/10/2023    CONCFE 19 2022    TIBC 357 04/10/2023    TIBC 334 2022    IRON 117 04/10/2023    IRON 64 2022    FERRITIN 281 04/10/2023    FERRITIN 511 (H) 2022       Plan  - Continue Ferrous Sulfate 325 mg daily    Essential hypertension  Assessment & Plan  Home regimen: Enalapril 10mg daily   Systolic (24hrs), Av , Min:128 , Max:176   Diastolic (24hrs), Av, Min:55, Max:97    Plan  - Continue Lisinopril 10mg daily   - Hydralazine 10mg q6h prn for SBP >200  - Per Neuro maintain SBP <150    Anxiety  Assessment & Plan  Home regimen: Xanax 0.25mg prn for anxiety     Plan  - Give Hydroxyzine 25mg prn if needed for anxiety    Type 2 diabetes mellitus with stage 3b chronic kidney disease, without long-term current use of insulin (HCC)  Assessment & Plan  Lab Results   Component Value Date    HGBA1C 8.3  (H) 12/31/2023       Recent Labs     01/02/24  1154 01/02/24  1646 01/02/24  2118 01/03/24  0550   POCGLU 184* 158* 98 240*       Blood Sugar Average: Last 72 hrs:  (P) [id number]    Home regimen: Glipizide 10mg daily, Januvia 100mg daily    Plan:  - hold home regimen in setting of FERCHO   - Lantus 10units qhs  - Humalog 5 TID   - SSI 3            Diet: Diet NPO    VTE Pharm PPX: Heparin  VTE Mech PPX: sequential compression device      Subjective:   Today 01/03/24, HD# 3    Per handoff, patient without acute events overnight.   Called to bedside during rounds around 0730 for change in mental status - overnight she was about A&Ox4 while she was A&Ox3 for her nurse this morning. Presented to bedside at which time she was A&Ox2 for myself (oriented to self and location but not to time (said it/ was 1/1, unable to state year)). VS reviewed and stable but BP elevated at 168/88. Has secretions in throats that she is unable to expectorate - somewhat improved with suction. Will discontinue diet and place formal speech consult. Will order STAT CT head and CXR.    Objective:     Vitals:    01/03/24 0521 01/03/24 0600 01/03/24 0722 01/03/24 0911   BP: 147/77  168/88 (!) 176/97   Pulse: 98  99 (!) 113   Resp:       Temp: 98.7 °F (37.1 °C)  97.7 °F (36.5 °C)    TempSrc:       SpO2: 97%  98% 96%   Weight:  64.2 kg (141 lb 8.6 oz)     Height:         Temp:  [97.2 °F (36.2 °C)-98.7 °F (37.1 °C)] 97.7 °F (36.5 °C)  HR:  [] 113  Resp:  [12-24] 20  BP: (128-176)/(55-97) 176/97  Weight (last 2 days)       Date/Time Weight    01/03/24 0600 64.2 (141.54)    01/02/24 2300 64.2 (141.54)    01/02/24 21:04:16 63.1 (139.11)            Intake/Output Summary (Last 24 hours) at 1/3/2024 1033  Last data filed at 1/3/2024 0201  Gross per 24 hour   Intake 403.04 ml   Output 2050 ml   Net -1646.96 ml     Invasive Devices       Peripheral Intravenous Line  Duration             Peripheral IV 12/31/23 Right Antecubital 2 days               Drain  Duration             External Urinary Catheter <1 day                      Physical Exam:     Physical Exam  Vitals reviewed.   Constitutional:       Appearance: Normal appearance.   HENT:      Head: Normocephalic and atraumatic.      Right Ear: External ear normal.      Left Ear: External ear normal.      Nose: Nose normal.      Mouth/Throat:      Pharynx: Oropharynx is clear.   Eyes:      Extraocular Movements: Extraocular movements intact.      Conjunctiva/sclera: Conjunctivae normal.   Cardiovascular:      Rate and Rhythm: Normal rate and regular rhythm.      Pulses: Normal pulses.      Heart sounds: Normal heart sounds.   Pulmonary:      Effort: Pulmonary effort is normal.   Abdominal:      Palpations: Abdomen is soft.   Musculoskeletal:      Cervical back: Neck supple.      Right lower leg: No edema.      Left lower leg: No edema.   Skin:     General: Skin is warm.   Neurological:      Mental Status: She is alert.      Comments: Oriented to self and location this morning; decreased from last night where she was A&Ox4  RUE weakness  Sensation intact  More lethargic as the morning went on   Psychiatric:         Mood and Affect: Mood normal.         Behavior: Behavior normal.         Thought Content: Thought content normal.         Judgment: Judgment normal.           Labs:     CBC:  Results from last 7 days   Lab Units 01/03/24  0636 01/01/24  0449 12/31/23  0504 12/30/23 2010 12/30/23  1329   WBC Thousand/uL 8.94 7.77 11.11*  --  8.79   HEMOGLOBIN g/dL 12.2 10.7* 13.6  --  14.1   HEMATOCRIT % 37.6 33.9* 42.4  --  43.4   PLATELETS Thousands/uL 222 211 265 262 236   NEUTROS ABS Thousands/µL 6.15 6.00  --   --   --        CMP:  Results from last 7 days   Lab Units 01/03/24  0531 01/02/24  1226 01/02/24  0603 01/02/24  0001 01/01/24  1743 01/01/24  1202 01/01/24  0449 12/31/23  1111 12/31/23  0504 12/30/23 2010 12/30/23  1329   POTASSIUM mmol/L 4.0 4.2 4.6 3.7 3.8 4.3 4.1   < > 4.0   < > 4.4    CHLORIDE mmol/L 112* 122* 124* 120* 121* 117* 116*   < > 107   < > 100   CO2 mmol/L 16* 15* 15* 16* 17* 16* 18*   < > 18*   < > 23   BUN mg/dL 23 32* 33* 32* 34* 32* 30*   < > 26*   < > 25   CREATININE mg/dL 1.31* 1.62* 1.60* 1.64* 1.66* 1.72* 1.57*   < > 1.48*   < > 1.28   CALCIUM mg/dL 8.8 7.6* 7.6* 7.7* 7.7* 8.0* 8.3*   < > 9.7   < > 10.3*   AST U/L  --   --   --   --   --   --  12*  --  18  --  16   ALT U/L  --   --   --   --   --   --  8  --  10  --  12   ALK PHOS U/L  --   --   --   --   --   --  79  --  113*  --  131*   EGFR ml/min/1.73sq m 37 29 29 28 28 27 30   < > 32   < > 39   MAGNESIUM mg/dL  --   --   --   --   --   --   --   --  2.4  --   --    PHOSPHORUS mg/dL  --   --   --   --   --   --   --   --  3.5  --   --     < > = values in this interval not displayed.       Sepsis:  Results from last 7 days   Lab Units 12/31/23  0805   LACTIC ACID mmol/L 0.9       Micro:  Lab Results   Component Value Date/Time    Blood Culture No Growth at 72 hrs. 12/30/2023 01:29 PM    Blood Culture No Growth at 72 hrs. 12/30/2023 01:29 PM    Urine Culture >100,000 cfu/ml Escherichia coli (A) 08/18/2023 03:45 AM    C.difficile toxin by PCR Positive (A) 09/06/2023 11:02 AM         Imaging:     XR chest portable    Result Date: 1/1/2024  Impression: Right jugular catheter at cavoatrial junction with no pneumothorax. Chronic scarring with no acute disease. Workstation performed: [id number]     MRI brain wo contrast    Result Date: 12/31/2023  Impression: Acute infarct in left PICA territory with small acute parenchymal hematoma in anterior aspect of left cerebellar vermis, petechial cortical hemorrhage along left posterior cerebellum, and similar compressive mass effect on fourth ventricle. No obstructive hydrocephalus. Recommend consultation with neurosurgery. 1.6 cm intramuscular lesion in left temporalis muscle, indeterminate. Differential includes intramuscular epidermoid cyst, hemangioma, myxoma, among other  differentials. Mild chronic microangiopathy. The study was marked in EPIC for immediate notification. Workstation performed: [id number]     CT head wo contrast    Result Date: 12/31/2023  Impression: Redemonstration of evolving acute PICA distribution left cerebellar infarct. Mass effect on the fourth ventricle is slightly increased. No hydrocephalus. Persistent increased attenuation in the anterior aspect of the cerebellar infarct that could represent residual contrast staining but petechial hemorrhage not excluded. Recommend close interval follow-up CT and/or further evaluation with MRI. The study was marked in EPIC for immediate notification. Workstation performed: [id number]         Medications:     Current Facility-Administered Medications   Medication Dose Route Frequency    acetaminophen (TYLENOL) tablet 650 mg  650 mg Oral Q6H PRN    aspirin (ECOTRIN LOW STRENGTH) EC tablet 81 mg  81 mg Oral Daily    atorvastatin (LIPITOR) tablet 80 mg  80 mg Oral QPM    chlorhexidine (PERIDEX) 0.12 % oral rinse 15 mL  15 mL Mouth/Throat Q12H JUAN ANTONIO    ferrous sulfate tablet 325 mg  325 mg Oral Daily With Breakfast    heparin (porcine) subcutaneous injection 5,000 Units  5,000 Units Subcutaneous Q8H JUAN ANTONIO    hydrALAZINE (APRESOLINE) injection 10 mg  10 mg Intravenous Q6H PRN    insulin glargine (LANTUS) subcutaneous injection 10 Units 0.1 mL  10 Units Subcutaneous HS    insulin lispro (HumaLOG) 100 units/mL subcutaneous injection 1-6 Units  1-6 Units Subcutaneous TID AC    insulin lispro (HumaLOG) 100 units/mL subcutaneous injection 5 Units  5 Units Subcutaneous TID With Meals    labetalol (NORMODYNE) injection 10 mg  10 mg Intravenous Q6H PRN    levalbuterol (XOPENEX) inhalation solution 1.25 mg  1.25 mg Nebulization Q6H PRN    lisinopril (ZESTRIL) tablet 10 mg  10 mg Oral Daily    ondansetron (ZOFRAN) injection 4 mg  4 mg Intravenous Q6H PRN    polyethylene glycol (MIRALAX) packet 17 g  17 g Oral Daily PRN    senna-docusate  sodium (SENOKOT S) 8.6-50 mg per tablet 1 tablet  1 tablet Oral HS                   Trell De La Cruz DO  Family Medicine, PGY-3  10:33 AM 1/3/2024

## 2024-01-03 NOTE — PROGRESS NOTES
Transfer Note - Family Medicine Residency, Nain Moody 1940, 83 y.o. female.  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 Due to concern for increasing cerebellar edema and hemorrhagic conversion, patient was initiated on aspirin and plavix, and DDAVP. Patient was transferred to ICU at hospitals later than day. Patient was started on hypertonic saline and Cardene infusion, which were eventually discontinued by 1/2/23 as patient continued to improve. Patient was transitioned to home medications and was stable to transfer to the general floor under Family Medicine service. Neurology and Neurosurgery were consulted on her care and recommended to restart aspirin and plavix. Consults will continue follow closely on med surg floor.     Assessment/Plan:      Plans discussed with [facility] team and finalization is pending attending physician attestation. Please, call [phone] for any clarification.     * Acute CVA (cerebrovascular accident) (HCC)  Assessment & Plan  Patient presented to ED on 12/30/23 after waking up with nausea, vomiting and difficulty walking. Later that day, prior to coming to ED, she had a headache, weakness, and intractable vomiting. CTH/CTA showed distal PICA occlusion. No TPA as outside window. Patient was transferred to the ICU where Neurology was consulted. 3% Hypertonic saline was given in boluses. Neuro wanted to maintain SBP <150 so Cardene gtt was started. Cardene  drip and Hypertonic Saline solution was discontinued on 1/2/23. Patient transferred to  service     MRI brain 12/31: Acute infarct in left PICA territory with small acute parenchymal hematoma in anterior aspect of left cerebellar vermis, petechial cortical hemorrhage along left posterior cerebellum, and similar compressive mass effect on fourth ventricle. 1.6 cm intramuscular lesion in left temporalis muscle.      Plan  - Neurology and Neuro Surgery both following, appreciate recommendations   - Per Neuro continue stroke pathway:          - Continue Aspirin, start plavix 1/3/23, can continue for 21 days and follow with monotherapy.   - Continue Atorvastatin 80 mg daily   - Continue Telemetry   - Maintain -150's    FERCHO (acute kidney injury) (HCC)  Assessment & Plan  Patient found to have FERCHO on admission. Patient has a history of stage 3 CKD    Renal      Results from last 7 days   Lab Units 01/02/24  1226 01/02/24  0603 01/02/24  0001 01/01/24  1743 01/01/24  1202 01/01/24  0449 01/01/24  0018   BUN mg/dL 32* 33* 32* 34* 32* 30* 29*   CREATININE mg/dL 1.62* 1.60* 1.64* 1.66* 1.72* 1.57* 1.54*   EGFR ml/min/1.73sq m 29 29 28 28 27 30 31         Plan:   -Strict I/Os  - Avoid nephrotoxic Medications  - Daily BMPs  - Continue monitoring, continuous fluids if needed     Pulmonary fibrosis (HCC)  Assessment & Plan  Patient has had Pulmonary Fibrosis ever since a diagnosis of Covid in 12/2021 that was complicated by pneumothoraces    Plan:   - Xopenex Nebs q6h PRN  - Patient saturating in the 90s on room air     Simple chronic anemia  Assessment & Plan  Home regimen: Ferrous Sulfate 325mg tablet daily    Iron Panel        Lab Results   Component Value Date    CONCFE 33 04/10/2023    CONCFE 19 04/05/2022    TIBC 357 04/10/2023    TIBC 334 04/05/2022    IRON 117 04/10/2023    IRON 64 04/05/2022    FERRITIN 281 04/10/2023    FERRITIN 511 (H) 04/05/2022       Plan  - Continue Ferrous Sulfate 325 mg daily    Essential  hypertension  Assessment & Plan  Home regimen: Enalapril 10mg daily   Systolic (24hrs), Av , Min:117 , Max:153   Diastolic (24hrs), Av, Min:54, Max:77    Plan  - Continue Lisinopril 10mg daily   - Hydralazine 10mg q6h prn for SBP >200  - Per Neuro maintain SBP <150    Anxiety  Assessment & Plan  Home regimen: Xanax 0.25mg prn for anxiety     Plan  - Give Hydroxyzine 25mg prn if needed for anxiety    Type 2 diabetes mellitus with stage 3b chronic kidney disease, without long-term current use of insulin ([facility])  Assessment & Plan  Lab Results   Component Value Date    HGBA1C 8.3 (H) 2023       Recent Labs     24  0820 24  1005 24  1108 24  1154   POCGLU 150* 142* 155* 184*       Blood Sugar Average: Last 72 hrs:  (P) [id number]    Home regimen: Glipizide 10mg daily, Januvia 100mg daily    Plan  - Lantus 10units qhs  - Humalog 5 TID   - SSI 3            Diet: Diet Rodríguez/CHO Controlled; Consistent Carbohydrate Diet Level 2 (5 carb servings/75 grams CHO/meal)    VTE Pharm PPX: Heparin  VTE Mech PPX: sequential compression device      Subjective:   83 y.o. female admitted 2023 for left PICA stroke    Today 24, HD# 2    Per handoff, patient without acute events   Per nursing staff, no concern or report.  Patient seen and examined at bedside and without questions or concerns. Patient denies CP, SOB, abdominal pain, N/V, headaches, dizziness, lightheadedness, changes in bowel movements, urinary symptoms at this time.   Medical management and treatment was discussed with patient, patient understands and agrees with plan.     Objective:     Vitals:    24 2104 24 2203 24 2300 24 0337   BP: 153/77 148/77  148/75   BP Location: Right arm Right arm     Pulse: (!) 106 (!) 107  (!) 108   Resp: 16 20     Temp: 98.4 °F (36.9 °C) 98.4 °F (36.9 °C)  (!) 97.2 °F (36.2 °C)   TempSrc: Oral Oral     SpO2: 94% 94%  95%   Weight: 63.1 kg (139 lb 1.8 oz)  64.2 kg  "(141 lb 8.6 oz)    Height:   4' 10\" (1.473 m)      Temp:  [97.2 °F (36.2 °C)-98.5 °F (36.9 °C)] 97.2 °F (36.2 °C)  HR:  [] 108  Resp:  [12-24] 20  BP: (117-153)/(54-77) 148/75  Weight (last 2 days)       Date/Time Weight    01/02/24 2300 64.2 (141.54)    01/02/24 21:04:16 63.1 (139.11)            Intake/Output Summary (Last 24 hours) at 1/3/2024 0351  Last data filed at 1/3/2024 0201  Gross per 24 hour   Intake 2237.62 ml   Output 2450 ml   Net -212.38 ml     Invasive Devices       Peripheral Intravenous Line  Duration             Peripheral IV 12/31/23 Right Antecubital 2 days              Drain  Duration             External Urinary Catheter <1 day                      Physical Exam:     Physical Exam  Vitals and nursing note reviewed.   Constitutional:       General: She is not in acute distress.     Appearance: She is well-developed.   HENT:      Head: Normocephalic and atraumatic.      Right Ear: External ear normal.      Left Ear: External ear normal.      Nose: Nose normal.      Mouth/Throat:      Mouth: Mucous membranes are moist.      Pharynx: Oropharynx is clear.   Eyes:      Conjunctiva/sclera: Conjunctivae normal.   Cardiovascular:      Rate and Rhythm: Normal rate and regular rhythm.      Pulses: Normal pulses.      Heart sounds: Normal heart sounds. No murmur heard.  Pulmonary:      Effort: Pulmonary effort is normal. No respiratory distress.      Breath sounds: Normal breath sounds.   Abdominal:      General: Abdomen is flat. Bowel sounds are normal.      Palpations: Abdomen is soft.      Tenderness: There is no abdominal tenderness. There is no right CVA tenderness or left CVA tenderness.   Musculoskeletal:         General: No swelling.      Cervical back: Neck supple.   Skin:     General: Skin is warm and dry.      Capillary Refill: Capillary refill takes less than 2 seconds.   Neurological:      General: No focal deficit present.      Mental Status: She is alert and oriented to person, place, " and time.      Cranial Nerves: No cranial nerve deficit.      Motor: No weakness.      Coordination: Coordination normal.   Psychiatric:         Mood and Affect: Mood normal.           Labs:     CBC:  Results from last 7 days   Lab Units 01/01/24  0449 12/31/23  0504 12/30/23 2010 12/30/23  1329   WBC Thousand/uL 7.77 11.11*  --  8.79   HEMOGLOBIN g/dL 10.7* 13.6  --  14.1   HEMATOCRIT % 33.9* 42.4  --  43.4   PLATELETS Thousands/uL 211 265 262 236   NEUTROS ABS Thousands/µL 6.00  --   --   --        CMP:  Results from last 7 days   Lab Units 01/02/24  1226 01/02/24  0603 01/02/24  0001 01/01/24  1743 01/01/24  1202 01/01/24  0449 01/01/24  0018 12/31/23  1111 12/31/23  0504 12/30/23 2010 12/30/23  1329   POTASSIUM mmol/L 4.2 4.6 3.7 3.8 4.3 4.1 4.2   < > 4.0   < > 4.4   CHLORIDE mmol/L 122* 124* 120* 121* 117* 116* 113*   < > 107   < > 100   CO2 mmol/L 15* 15* 16* 17* 16* 18* 19*   < > 18*   < > 23   BUN mg/dL 32* 33* 32* 34* 32* 30* 29*   < > 26*   < > 25   CREATININE mg/dL 1.62* 1.60* 1.64* 1.66* 1.72* 1.57* 1.54*   < > 1.48*   < > 1.28   CALCIUM mg/dL 7.6* 7.6* 7.7* 7.7* 8.0* 8.3* 8.8   < > 9.7   < > 10.3*   AST U/L  --   --   --   --   --  12*  --   --  18  --  16   ALT U/L  --   --   --   --   --  8  --   --  10  --  12   ALK PHOS U/L  --   --   --   --   --  79  --   --  113*  --  131*   EGFR ml/min/1.73sq m 29 29 28 28 27 30 31   < > 32   < > 39   MAGNESIUM mg/dL  --   --   --   --   --   --   --   --  2.4  --   --    PHOSPHORUS mg/dL  --   --   --   --   --   --   --   --  3.5  --   --     < > = values in this interval not displayed.       Sepsis:  Results from last 7 days   Lab Units 12/31/23  0805   LACTIC ACID mmol/L 0.9       Micro:  Lab Results   Component Value Date/Time    Blood Culture No Growth at 72 hrs. 12/30/2023 01:29 PM    Blood Culture No Growth at 72 hrs. 12/30/2023 01:29 PM    Urine Culture >100,000 cfu/ml Escherichia coli (A) 08/18/2023 03:45 AM    C.difficile toxin by PCR Positive (A)  09/06/2023 11:02 AM         Imaging:     XR chest portable    Result Date: 1/1/2024  Impression: Right jugular catheter at cavoatrial junction with no pneumothorax. Chronic scarring with no acute disease. Workstation performed: [id number]     MRI brain wo contrast    Result Date: 12/31/2023  Impression: Acute infarct in left PICA territory with small acute parenchymal hematoma in anterior aspect of left cerebellar vermis, petechial cortical hemorrhage along left posterior cerebellum, and similar compressive mass effect on fourth ventricle. No obstructive hydrocephalus. Recommend consultation with neurosurgery. 1.6 cm intramuscular lesion in left temporalis muscle, indeterminate. Differential includes intramuscular epidermoid cyst, hemangioma, myxoma, among other differentials. Mild chronic microangiopathy. The study was marked in EPIC for immediate notification. Workstation performed: [id number]     CT head wo contrast    Result Date: 12/31/2023  Impression: Redemonstration of evolving acute PICA distribution left cerebellar infarct. Mass effect on the fourth ventricle is slightly increased. No hydrocephalus. Persistent increased attenuation in the anterior aspect of the cerebellar infarct that could represent residual contrast staining but petechial hemorrhage not excluded. Recommend close interval follow-up CT and/or further evaluation with MRI. The study was marked in EPIC for immediate notification. Workstation performed: [id number]         Medications:     Current Facility-Administered Medications   Medication Dose Route Frequency    acetaminophen (TYLENOL) tablet 650 mg  650 mg Oral Q6H PRN    aspirin (ECOTRIN LOW STRENGTH) EC tablet 81 mg  81 mg Oral Daily    atorvastatin (LIPITOR) tablet 80 mg  80 mg Oral QPM    chlorhexidine (PERIDEX) 0.12 % oral rinse 15 mL  15 mL Mouth/Throat Q12H JUAN ANTONIO    clopidogrel (PLAVIX) tablet 75 mg  75 mg Oral Daily    ferrous sulfate tablet 325 mg  325 mg Oral Daily With Breakfast     heparin (porcine) subcutaneous injection 5,000 Units  5,000 Units Subcutaneous Q8H JUAN ANTONIO    hydrALAZINE (APRESOLINE) injection 10 mg  10 mg Intravenous Q6H PRN    insulin glargine (LANTUS) subcutaneous injection 10 Units 0.1 mL  10 Units Subcutaneous HS    insulin lispro (HumaLOG) 100 units/mL subcutaneous injection 1-6 Units  1-6 Units Subcutaneous TID AC    insulin lispro (HumaLOG) 100 units/mL subcutaneous injection 5 Units  5 Units Subcutaneous TID With Meals    levalbuterol (XOPENEX) inhalation solution 1.25 mg  1.25 mg Nebulization Q6H PRN    lisinopril (ZESTRIL) tablet 10 mg  10 mg Oral Daily    ondansetron (ZOFRAN) injection 4 mg  4 mg Intravenous Q6H PRN    polyethylene glycol (MIRALAX) packet 17 g  17 g Oral Daily PRN    senna-docusate sodium (SENOKOT S) 8.6-50 mg per tablet 1 tablet  1 tablet Oral HS         Yarely Martinez MD  Family Medicine, PGY-2

## 2024-01-03 NOTE — PROGRESS NOTES
Patient personally seen and examined independently approximately 1300.  Patient is more somnolent compared to previous examination though still obeys commands in 4 limbs.  Somewhat sonorous breathing.  CT head demonstrates stable appearance of left cerebellar stroke hemorrhage though there appears to be new supratentorial hydrocephalus.    I reviewed her history, physical examination and imaging results in detail with her daughter over the telephone.  I explained that her mother may have deteriorated secondary to raised intracranial pressure.  As such we discussed insertion of right frontal external ventricular drain.  The goal of surgery is to help manage ICP as a life saving procedure but not necessarily function sparing.  The risks of surgery reviewed in detail.    1.  Risk of IV anesthetic, infection and bleeding.  2.  Risk of meningitis, seizure, stroke, hemorrhage.    All her questions were answered to her satisfaction and she agreed to proceed on her mother's behalf.  Telephone consent personally obtained.    I also explained that if there is no improvement in her clinical examination or there is further decline, the neck step would be consider decompressive suboccipital craniectomy strokectomy.  The canal for surgery is a life-saving but not function sparing procedure were reviewed.  The risks of surgery reviewed as well in particular considering the patient's age.  The patient's daughter indicated that she will discuss this further with her family, including the patient's  and should the need arise, they will have an answer with regards to ongoing aggressive management.    The patient did Kisha 81 mg of ASA earlier today and will be transfused platelets.  Plan to proceede with bedside right frontal EVD.

## 2024-01-03 NOTE — PROGRESS NOTES
Progress Note - Neurology   Debbie Moody 83 y.o. female MRN: [id number]  [facility] [id number]      Assessment/Plan   * Acute CVA (cerebrovascular accident) (HCC)  Assessment & Plan  83-year-old female HTN, HLD, CKD, iron deficiency anemia, pulmonary fibrosis secondary to COVID in 2021 and DM    Presented to [facility] on 12/30 with nausea, ataxia and headache. Imaging noted L PICA infarct with occluded L PICA. Was found to have hemorrhagic conversion on neuroimaging as well as increasing posterior circulation edema/mass effect and 4th ventricle effacement. Transferred to [facility] for critical care/neurosurgery evaluation.    Etiology of L PICA CVA/occlusion either in situ thrombosis or atheroembolic from more proximal vertebral disease. Central embolic etiology considered less likely.     1/3: worsened exam this AM per primary team (more lethargic, dysarthria); SBP in 150's-170's this AM (off Cardene since yesterday in ICU). STAT CT head this AM with worsening posterior fossa edema/hydrocephalus, increase in hemorrhagic conversion near 4th ventricle.     -No antiplatelet given this AM as patient NPO; hold additional AP for now with worsened hemorrhagic conversion; appreciate neurosurgery evaluation, brought back to neuro ICU for emergent EVD placement. Will continue to follow closely.    Neuroimaging:  - CT head (12/30) revealed acute infarct in left cerebellum PICA territory  - CTA head/neck (12/30): occluded L PICA  - Repeat CT head (12/30)   Re-demonstrated acute left cerebellar PICA infarct with mild mass effect on fourth ventricle. No hydrocephalus.    There was increased density within the anterior aspect of the infarct (contrast staining rather than petechial hemorrhage)  - Repeat CT head (12/31)  -slightly increased mass effect on 4th ventricle; no hydrocephalus. Hyper-attenuation in cerebellar CVA (residual contrast staining vs petechial hemorrhage)    - MRI brain wo  "(12/31):  -L PICA CVA, with hemorrhagic conversion in L cerebellar vermis/petechial cortical hemorrhage in L cerebellum, similar mass effect on 4th ventricle   - CT head 1/3: \"Evolving left PCA infarct with increased left cerebellar hypodensity and compression of the fourth ventricle resulting in hydrocephalus with increased focal parenchymal hemorrhage adjacent to the fourth ventricle.\"   - Echo revealed EF 70% with normal size atrium bilaterally.  - Lipid panel: Cholesterol 229,   - Hemoglobin A1c 8.3    Plan:  -see above for updates in care above given worsened exam/CT head findings  -Stroke pathway ongoing:   - No antiplatelet use given the above  - Continue with Atorvastatin 80 mg daily  - Status post hypertonic saline, now off  - Continue telemetry monitoring  - Maintain -150's   - Currently on Cardene gtt, weaning off  - PT/OT/ST  - Secondary risk factor modification.   - Stroke education  - Frequent neuro checks. Continue to monitor and notify neurology with any changes.  - STAT CT head for any acute change in neuro exam  - Neurosurgery following    Discussed plan of care with attending neurologist.     Recommendations for outpatient neurological follow up have yet to be determined.    Subjective:   Patient seen in ICU following EVD placement this afternoon. Received fentanyl for EVD placement; patient awake/lethargic, unable to produce intelligible verbal ouput. Noted difficulty with secretion control today (per nursing improved following EVD placement compared to earlier today.     Vitals: Blood pressure (!) 176/97, pulse (!) 113, temperature 97.7 °F (36.5 °C), resp. rate 20, height 4' 10\" (1.473 m), weight 64.2 kg (141 lb 8.6 oz), SpO2 96%, not currently breastfeeding.,Body mass index is 29.58 kg/m².    Examined alongside Dr. Long.    Physical Exam:   Physical Exam  Constitutional:       Appearance: Normal appearance.   HENT:      Head: Normocephalic and atraumatic.   Eyes:      Extraocular " Movements: Extraocular movements intact and EOM normal.      Conjunctiva/sclera: Conjunctivae normal.      Pupils: Pupils are equal, round, and reactive to light.   Cardiovascular:      Rate and Rhythm: Normal rate.   Pulmonary:      Effort: Pulmonary effort is normal.   Abdominal:      General: There is no distension.   Musculoskeletal:         General: Normal range of motion.      Cervical back: Normal range of motion.   Skin:     General: Skin is warm and dry.   Neurological:      Mental Status: She is alert.        Neurologic Exam     Mental Status   Somnolent/lethargic, open eyes to loud verbal stim; following simple central and appendicular commands (sticks out tongue, shows thumbs, wiggling toes). Speech quite dysarthria with minimal verbal output; unintelligible.      Cranial Nerves     CN II   Visual fields full to confrontation.     CN III, IV, VI   Pupils are equal, round, and reactive to light.  Extraocular motions are normal.     CN V   Facial sensation intact.     CN VII   Facial expression full, symmetric.     CN VIII   CN VIII normal.     CN IX, X   CN IX normal.   CN X normal.     CN XI   CN XI normal.     CN XII   CN XII normal.     Motor Exam   Symmetric , gives effort with biceps; some maintenance of antigravity with passive lift and release of UE; symmetric toe wiggling; attempts to raise antigravity with bilateral hip flexors.      Sensory Exam   Light touch normal.     Gait, Coordination, and Reflexes   Cannot assess coordination.       Primary Survey:   Airway:                         Breathing:                      Right breath sounds:        Left breath sounds:   Circulation:        Rhythm:        Rate: regular   Right Pulses Left Pulses                        Disability:        GCS:        Right Pupil:       Left Pupil:     R Motor Strength L Motor Strength               Exposure:              Lab, Imaging and other studies:   CT head wo contrast   Final Result by Albin Dillon MD  (01/03 1014)      Evolving left PCA infarct with increased left cerebellar hypodensity and compression of the fourth ventricle resulting in hydrocephalus with increased focal parenchymal hemorrhage adjacent to the fourth ventricle. Neurosurgery consult recommended.      I personally discussed this study with AMBAR KOEHLER on 1/3/2024 10:09 AM.                     Workstation performed: [id number]         XR chest portable   Final Result by Linnea Curtis MD (01/01 1238)      Right jugular catheter at cavoatrial junction with no pneumothorax.      Chronic scarring with no acute disease.               Workstation performed: [id number]         XR chest portable    (Results Pending)   CT head wo contrast    (Results Pending)     CBC:   Results from last 7 days   Lab Units 01/03/24  0636 01/01/24  0449 12/31/23  0504   WBC Thousand/uL 8.94 7.77 11.11*   RBC Million/uL 4.50 3.86 4.93   HEMOGLOBIN g/dL 12.2 10.7* 13.6   HEMATOCRIT % 37.6 33.9* 42.4   MCV fL 84 88 86   PLATELETS Thousands/uL 222 211 265   , BMP/CMP:   Results from last 7 days   Lab Units 01/03/24  0531 01/02/24  1226 01/02/24  0603 01/01/24  1202 01/01/24  0449 12/31/23  1111 12/31/23  0504 12/30/23  2010 12/30/23  1329   SODIUM mmol/L 137 142 144   < > 142   < > 140   < > 135   POTASSIUM mmol/L 4.0 4.2 4.6   < > 4.1   < > 4.0   < > 4.4   CHLORIDE mmol/L 112* 122* 124*   < > 116*   < > 107   < > 100   CO2 mmol/L 16* 15* 15*   < > 18*   < > 18*   < > 23   BUN mg/dL 23 32* 33*   < > 30*   < > 26*   < > 25   CREATININE mg/dL 1.31* 1.62* 1.60*   < > 1.57*   < > 1.48*   < > 1.28   CALCIUM mg/dL 8.8 7.6* 7.6*   < > 8.3*   < > 9.7   < > 10.3*   AST U/L  --   --   --   --  12*  --  18  --  16   ALT U/L  --   --   --   --  8  --  10  --  12   ALK PHOS U/L  --   --   --   --  79  --  113*  --  131*   EGFR ml/min/1.73sq m 37 29 29   < > 30   < > 32   < > 39    < > = values in this interval not displayed.   , Vitamin B12:   , HgBA1C:   Results from last  7 days   Lab Units 12/31/23  0504   HEMOGLOBIN A1C % 8.3*   , TSH:   Results from last 7 days   Lab Units 12/30/23  1329   TSH 3RD GENERATON uIU/mL 2.650   , Coagulation:   Results from last 7 days   Lab Units 01/03/24  1211   INR  1.14   , Lipid Profile:   Results from last 7 days   Lab Units 12/31/23  0504   HDL mg/dL 54   LDL CALC mg/dL 141*   TRIGLYCERIDES mg/dL 172*     VTE Prophylaxis: Sequential compression device (Venodyne)  and Reason for no pharmacologic prophylaxis hemorrhagic conversion of CVA    Total time spent today 25 minutes. Discussed plan of care with critical care: management via neurosurgery with EVD, SBP <140, close neuro exam monitoring.

## 2024-01-03 NOTE — SPEECH THERAPY NOTE
Dysphagia consult received. Patient with change in MS this am, including difficulty with speech and swallowing. Repeat CT revealed the following: Evolving left PCA infarct with increased left cerebellar hypodensity and compression of the fourth ventricle resulting in hydrocephalus with increased focal parenchymal hemorrhage adjacent to the fourth ventricle. Neurosurgery consult recommended. Patient transferred to ICU. Patient too lethargic and planned for possible OR today. Will sign off for ST at this time and request re-consult when appropriate. D/W RN.

## 2024-01-03 NOTE — PROGRESS NOTES
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   Ongoing medical management per primary team.   Pain/HA management per primary team  Neurology following for stroke management.   ASA d/c today  Platelets ordered  STAT coags, ARU  Eval and mobilize per PT/OT  DVT PPX: SCDs, SQH d/c    Given change in exam with evidence of worsening edema and hydrocephalus, plan for transfer to ICU with emergent EVD placement. Depending on exam after EVD patient may need SOC. Please call with questions or concerns.                        Subjective/Objective   Chief Complaint: None    Subjective: Patient with acute exam change this morning. She became increasingly lethargic and dysarthric; stat CTH showed worsening edema and development of obstructive hydrocephalus.     Objective: Patient laying in bed, following commands but dysarthric    Intake/Output                   01/03/24 0701 - 01/04/24 0700     [id number] Total              Intake    Total Intake -- -- --       Output    Urine  --  -- --    Unmeasured  "Urine Occurrence 2 x -- 2 x    Total Output -- -- --       Net I/O     -- -- --            Invasive Devices       Peripheral Intravenous Line  Duration             Peripheral IV 01/03/24 Right;Ventral (anterior) Forearm <1 day              Drain  Duration             External Urinary Catheter <1 day                    Vitals: Blood pressure 141/62, pulse 97, temperature 97.7 °F (36.5 °C), resp. rate 20, height 4' 10\" (1.473 m), weight 64.2 kg (141 lb 8.6 oz), SpO2 99%, not currently breastfeeding.,Body mass index is 29.58 kg/m².    General appearance: drowsy, appears stated age, ill appearing  Head: Normocephalic, without obvious abnormality, atraumatic  Eyes: EOMI, PERRL, conjugate gaze  Neck: supple, symmetrical, trachea midline   Lungs: non labored breathing  Heart: regular heart rate  Neurologic:   Mental status: Drowsy, dysarthric, speech difficult to understand - orientation unclear  Cranial nerves: grossly intact (Cranial nerves II-XII)  Sensory: normal to crude touch x4  Motor: moving all extremities; generalized weakness. Cannot hold arms off bed antigravity for more than a few seconds  Coordination: finger to nose normal bilaterally, no drift bilaterally    Lab Results: I have personally reviewed pertinent results.      Results from last 7 days   Lab Units 01/03/24  0636 01/01/24 0449 12/31/23  0504 12/30/23 2010 12/30/23  1329   WBC Thousand/uL 8.94 7.77 11.11*  --  8.79   HEMOGLOBIN g/dL 12.2 10.7* 13.6  --  14.1   HEMATOCRIT % 37.6 33.9* 42.4  --  43.4   PLATELETS Thousands/uL 222 211 265   < > 236   NEUTROS PCT % 68 78*  --   --   --    MONOS PCT % 9 7  --   --   --    MONO PCT %  --   --   --   --  0*   EOS PCT % 1 0  --   --  0    < > = values in this interval not displayed.     Results from last 7 days   Lab Units 01/03/24  0531 01/02/24  1226 01/02/24  0603 01/01/24  1202 01/01/24  0449 12/31/23  1111 12/31/23  0504 12/30/23 2010 12/30/23  1329   POTASSIUM mmol/L 4.0 4.2 4.6   < > 4.1   < > 4.0  " " < > 4.4   CHLORIDE mmol/L 112* 122* 124*   < > 116*   < > 107   < > 100   CO2 mmol/L 16* 15* 15*   < > 18*   < > 18*   < > 23   BUN mg/dL 23 32* 33*   < > 30*   < > 26*   < > 25   CREATININE mg/dL 1.31* 1.62* 1.60*   < > 1.57*   < > 1.48*   < > 1.28   CALCIUM mg/dL 8.8 7.6* 7.6*   < > 8.3*   < > 9.7   < > 10.3*   ALK PHOS U/L  --   --   --   --  79  --  113*  --  131*   ALT U/L  --   --   --   --  8  --  10  --  12   AST U/L  --   --   --   --  12*  --  18  --  16    < > = values in this interval not displayed.     Results from last 7 days   Lab Units 12/31/23  0504   MAGNESIUM mg/dL 2.4     Results from last 7 days   Lab Units 12/31/23  0504   PHOSPHORUS mg/dL 3.5     Results from last 7 days   Lab Units 12/31/23  1111 12/30/23  1329   INR  1.08 1.04   PTT seconds  --  28     No results found for: \"TROPONINT\"  ABG:  Lab Results   Component Value Date    PHART 7.330 (L) 01/12/2022    RLK1VHQ 33.5 (L) 01/12/2022    PO2ART 249.2 (H) 01/12/2022    ASE7AGF 17.3 (L) 01/12/2022    BEART -7.8 01/12/2022    SOURCE Line, Arterial 01/12/2022     Imaging Studies: I have personally reviewed pertinent reports.   and I have personally reviewed pertinent films in PACS    CT head wo contrast    Result Date: 1/3/2024  Narrative: CT BRAIN - WITHOUT CONTRAST INDICATION:   Mental status change, unknown cause change in mental status; recent stroke. COMPARISON: CT and MRI from 12/31/2023. TECHNIQUE:  CT examination of the brain was performed.  Multiplanar 2D reformatted images were created from the source data. Radiation dose length product (DLP) for this visit:  894.41 mGy-cm .  This examination, like all CT scans performed in the [facility], was performed utilizing techniques to minimize radiation dose exposure, including the use of iterative  reconstruction and automated exposure control. IMAGE QUALITY:  Diagnostic. FINDINGS: PARENCHYMA: There is an increased focal hyperdensity adjacent to the fourth ventricle " measuring 8 x 9 mm consistent with parenchymal hemorrhage as seen on MRI. Underlying vague hyperdensity is stable and likely residual petechial hemorrhage. There is an evolving left PCA infarct with increased left cerebellar hypodensity, and worsening mass effect with occlusion of the fourth ventricle. VENTRICLES AND EXTRA-AXIAL SPACES: There is new hydrocephalus secondary to fourth ventricle compression. The third ventricle measures 1.1 cm in transverse diameter, previously 3 mm. VISUALIZED ORBITS: Normal visualized orbits. PARANASAL SINUSES: Normal visualized paranasal sinuses. CALVARIUM AND EXTRACRANIAL SOFT TISSUES:  Normal.     Impression: Evolving left PCA infarct with increased left cerebellar hypodensity and compression of the fourth ventricle resulting in hydrocephalus with increased focal parenchymal hemorrhage adjacent to the fourth ventricle. Neurosurgery consult recommended. I personally discussed this study with AMBAR KOEHLER on 1/3/2024 10:09 AM. Workstation performed: [id number]     XR chest portable    Result Date: 1/1/2024  Narrative: CHEST INDICATION:   central line placement. COMPARISON: CXR 12/30/2023, chest CT 1/20/2022. EXAM PERFORMED/VIEWS:  XR CHEST PORTABLE. FINDINGS: Right jugular catheter at cavoatrial junction. Cardiomediastinal silhouette normal. Chronic scarring with no acute disease. No effusion or pneumothorax. Chronic elevation of the right diaphragm. Embolization coils projecting over the right upper quadrant. Upper abdomen normal. Bones normal for age.     Impression: Right jugular catheter at cavoatrial junction with no pneumothorax. Chronic scarring with no acute disease. Workstation performed: [id number]     Echo complete w/ contrast if indicated    Result Date: 12/31/2023  Narrative:   Left Ventricle: Left ventricular cavity size is normal. There is mild concentric hypertrophy. The left ventricular ejection fraction is 70%. Systolic function is vigorous. Wall motion  is normal.     MRI brain wo contrast    Result Date: 12/31/2023  Narrative: MRI BRAIN WITHOUT CONTRAST INDICATION: stroke (PICA). COMPARISON: CT head without contrast 12/31/2023, 12/30/2023, 11/22/2022. CTA stroke alert head and neck 12/30/2023. TECHNIQUE:  Multiplanar, multisequence imaging of the brain was performed. IMAGE QUALITY:  Diagnostic. FINDINGS: BRAIN PARENCHYMA: Acute infarct in left PICA territory with small acute parenchymal hematoma in anterior aspect of left cerebellar vermis, petechial cortical hemorrhage along left posterior cerebellum, and similar compressive mass effect on fourth ventricle. There is no discrete mass. No midline shift. Small scattered hyperintensities on T2/FLAIR imaging are noted in the periventricular and subcortical white matter demonstrating an appearance that is statistically most likely to represent mild microangiopathic change. VENTRICLES: Similar compressive mass effect on fourth ventricle. No obstructive hydrocephalus. SELLA AND PITUITARY GLAND:  Normal. ORBITS: Sequela of bilateral cataract surgery. PARANASAL SINUSES:  Normal. VASCULATURE:  Evaluation of the major intracranial vasculature demonstrates appropriate flow voids. CALVARIUM AND SKULL BASE:  Normal. EXTRACRANIAL SOFT TISSUES: 1.6 cm bright T2/FLAIR intramuscular lesion with restricted diffusion in left temporalis muscle (4:5)     Impression: Acute infarct in left PICA territory with small acute parenchymal hematoma in anterior aspect of left cerebellar vermis, petechial cortical hemorrhage along left posterior cerebellum, and similar compressive mass effect on fourth ventricle. No obstructive hydrocephalus. Recommend consultation with neurosurgery. 1.6 cm intramuscular lesion in left temporalis muscle, indeterminate. Differential includes intramuscular epidermoid cyst, hemangioma, myxoma, among other differentials. Mild chronic microangiopathy. The study was marked in EPIC for immediate notification.  Workstation performed: [id number]     XR chest 1 view portable    Result Date: 12/31/2023  Narrative: CHEST INDICATION:   Weak. COMPARISON: 9/6/2023 EXAM PERFORMED/VIEWS:  XR CHEST PORTABLE FINDINGS: Cardiomediastinal silhouette appears unremarkable. The lungs are clear. Elevated right hemidiaphragm as before. No pneumothorax or pleural effusion. Osseous structures appear within normal limits for patient age.     Impression: No acute cardiopulmonary disease. Workstation performed: [id number]     CT head wo contrast    Result Date: 12/31/2023  Narrative: CT BRAIN - WITHOUT CONTRAST INDICATION:   Stroke, follow up distal left PICA occlusion. COMPARISON: CT/CTA from earlier the same date. TECHNIQUE:  CT examination of the brain was performed.  Multiplanar 2D reformatted images were created from the source data. Radiation dose length product (DLP) for this visit:  857.9 mGy-cm .  This examination, like all CT scans performed in the [facility], was performed utilizing techniques to minimize radiation dose exposure, including the use of iterative reconstruction and automated exposure control. IMAGE QUALITY:  Diagnostic. FINDINGS: PARENCHYMA: Limited due to persistent contrast opacification from earlier CTA. Redemonstration of acute left cerebellar PICA distribution infarct. Mass effect on the fourth ventricle. New increased attenuation in the ventral aspect of the left cerebellar infarct favored represent contrast staining but petechial hemorrhage not excluded. No acute space-occupying hematoma. No shift or herniation. Mild chronic microangiopathy. VENTRICLES AND EXTRA-AXIAL SPACES: No hydrocephalus. VISUALIZED ORBITS: Normal visualized orbits. PARANASAL SINUSES: Bilateral lens replacement. CALVARIUM AND EXTRACRANIAL SOFT TISSUES:  Normal.     Impression: Redemonstration of acute left cerebellar PICA distribution infarct. Mild mass effect on the fourth ventricle. No hydrocephalus. Increased density within  the anterior aspect of the infarct favored represent contrast staining rather than petechial hemorrhage. Workstation performed: [id number]     CT head wo contrast    Result Date: 12/31/2023  Narrative: CT BRAIN - WITHOUT CONTRAST INDICATION:   Stroke, follow up distal left PICA occlusion. COMPARISON: CTs and CTA from yesterday. TECHNIQUE:  CT examination of the brain was performed.  Multiplanar 2D reformatted images were created from the source data. Radiation dose length product (DLP) for this visit:  784.69 mGy-cm .  This examination, like all CT scans performed in the [facility], was performed utilizing techniques to minimize radiation dose exposure, including the use of iterative  reconstruction and automated exposure control. IMAGE QUALITY:  Diagnostic. FINDINGS: PARENCHYMA: Residual intravascular contrast.. Redemonstration of acute left cerebellar PICA distribution infarct. There is persistent increased attenuation in the anterior aspect of the left cerebellar infarct that may represent contrast staining but underlying petechial hemorrhage is not excluded. Recommend additional follow-up CTA and/or further evaluation with MRI. Mass effect on the fourth ventricle is slightly increased. No hydrocephalus. Stable diminished attenuation the periventricular and subcortical matter due to mild chronic microangiopathy. VENTRICLES AND EXTRA-AXIAL SPACES:  Normal for the patient's age. VISUALIZED ORBITS: Bilateral lens replacement. PARANASAL SINUSES: Normal visualized paranasal sinuses. CALVARIUM AND EXTRACRANIAL SOFT TISSUES:  Normal.     Impression: Redemonstration of evolving acute PICA distribution left cerebellar infarct. Mass effect on the fourth ventricle is slightly increased. No hydrocephalus. Persistent increased attenuation in the anterior aspect of the cerebellar infarct that could represent residual contrast staining but petechial hemorrhage not excluded. Recommend close interval follow-up CT  and/or further evaluation with MRI. The study was marked in EPIC for immediate notification. Workstation performed: [id number]     CTA stroke alert (head/neck)    Result Date: 12/30/2023  Narrative: CTA NECK AND BRAIN WITH CONTRAST INDICATION: Stroke Alert COMPARISON:   Earlier same day CT head without contrast. CTA chest PE study TECHNIQUE:   Post contrast imaging was performed after administration of iodinated contrast through the neck and brain. Post contrast axial 0.625 mm images timed to opacify the arterial system. 3D rendering was performed on an independent workstation.   MIP reconstructions performed. Coronal reconstructions were performed of the noncontrast portion of the brain. Radiation dose length product (DLP) for this visit:  428 mGy-cm .  This examination, like all CT scans performed in the [facility], was performed utilizing techniques to minimize radiation dose exposure, including the use of iterative reconstruction and automated exposure control. IV Contrast:  85 mL of iohexol (OMNIPAQUE) IMAGE QUALITY:   Diagnostic FINDINGS: CERVICAL VASCULATURE AORTIC ARCH AND GREAT VESSELS:  Mild calcified atherosclerotic disease of the arch, proximal great vessels and visualized subclavian vessels.  No significant stenosis. RIGHT VERTEBRAL ARTERY CERVICAL SEGMENT:  Normal origin. The vessel is normal in caliber throughout the neck. LEFT VERTEBRAL ARTERY CERVICAL SEGMENT:  Normal origin. The vessel is normal in caliber throughout the neck. RIGHT EXTRACRANIAL CAROTID SEGMENT:  Normal caliber common carotid artery with medialization proximally.  Minimal calcified atherosclerotic disease. Normal cervical internal carotid artery.  No stenosis or dissection. LEFT EXTRACRANIAL CAROTID SEGMENT:  Normal caliber common carotid artery with medialization proximally.  Minimal calcified atherosclerotic disease. Normal cervical internal carotid artery. No stenosis or dissection. NASCET criteria was used to  determine the degree of internal carotid artery diameter stenosis. INTRACRANIAL VASCULATURE INTERNAL CAROTID ARTERIES:  Normal enhancement of the intracranial portions of the internal carotid arteries with mild calcified atherosclerotic disease bilaterally.  Normal ophthalmic artery origins.  Normal ICA terminus. ANTERIOR CIRCULATION:  Symmetric A1 segments and anterior cerebral arteries with normal enhancement.  Normal anterior communicating artery. MIDDLE CEREBRAL ARTERY CIRCULATION:  M1 segment and middle cerebral artery branches demonstrate normal enhancement bilaterally. DISTAL VERTEBRAL ARTERIES: Patent distal vertebral arteries. Mild stenosis in bilateral vertebral artery V4 segments due to calcified atherosclerotic disease. Posterior inferior cerebellar artery origins are normal. Occluded distal aspect of left posterior inferior cerebellar artery (PICA).  Normal vertebral basilar junction. BASILAR ARTERY:  Basilar artery is is mildly tortuous and normal in caliber.  Normal superior cerebellar arteries. POSTERIOR CEREBRAL ARTERIES: Both posterior cerebral arteries arises from the basilar tip.  Both arteries demonstrate normal enhancement.   Tiny posterior communicating arteries. VENOUS STRUCTURES: Venous contamination is present with multiple collateral venous vessels throughout the neck and reflux of contrast into bilateral transverse dural venous sinuses and confluence of sinuses. NON VASCULAR ANATOMY BONY STRUCTURES:  No acute osseous abnormality. Diffuse osteopenia. Multilevel spinal degenerative changes, moderate-to-severe at C6-C7. Grade 1 anterolisthesis C7-T1. SOFT TISSUES OF THE NECK:  Normal. THORACIC INLET: Scattered reticular opacities in visualized bilateral upper and lower lobes, likely due to chronic lung disease with postinfectious scarring given similar appearance on CTA chest PE study 1/20/2022.     Impression: Occluded distal aspect of left posterior inferior cerebellar artery (PICA). This  corresponds with left PICA territory infarct seen on earlier same day head CT. Scattered reticular opacities in visualized bilateral upper and lower lobes, likely due to chronic lung disease with postinfectious scarring. Superimposed infection is difficult to exclude. Consider follow-up dedicated CT chest without contrast. Additional chronic/incidental findings as detailed above. Findings were directly discussed with Tyron Williamson 12/30/2023 at 2:54 PM. Workstation performed: [id number]     CT head without contrast    Result Date: 12/30/2023  Narrative: CT BRAIN - WITHOUT CONTRAST INDICATION:   N/V. Per epic chart review: 83-year-old female presents emergency room complaining of posterior headache, body aches abdominal pain vomiting nausea and generalized weakness. COMPARISON: CT head without contrast 11/22/2022. TECHNIQUE:  CT examination of the brain was performed.  Multiplanar 2D reformatted images were created from the source data. Radiation dose length product (DLP) for this visit:  881 mGy-cm .  This examination, like all CT scans performed in the [facility], was performed utilizing techniques to minimize radiation dose exposure, including the use of iterative reconstruction and automated exposure control. IMAGE QUALITY:  Diagnostic. 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 Decreased attenuation is noted in periventricular and subcortical white matter demonstrating an appearance that is statistically most likely to represent mild microangiopathic change.  No CT signs of acute infarction.  No intracranial mass, mass effect or midline shift.  No acute parenchymal hemorrhage. Arterial calcifications of carotid siphons and bilateral intradural vertebral arteries. VENTRICLES AND EXTRA-AXIAL SPACES:  Normal for the patient's age. VISUALIZED ORBITS: Sequela of bilateral cataract surgery. PARANASAL SINUSES: Normal visualized paranasal sinuses. CALVARIUM AND EXTRACRANIAL SOFT  TISSUES:  Normal.     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 I personally discussed this study with SOFY DE LEON JR on 12/30/2023 2:07 PM. Workstation performed: [id number]     EKG, Pathology, and Other Studies: I have personally reviewed pertinent reports.      VTE Pharmacologic Prophylaxis: Reason for no pharmacologic prophylaxis plan for EVD    VTE Mechanical Prophylaxis: sequential compression device

## 2024-01-03 NOTE — QUICK NOTE
"Patient evaluated during bedside rounds.    /77   Pulse (!) 107   Temp 98.4 °F (36.9 °C)   Resp 20   Ht 4' 10\" (1.473 m)   Wt 64.2 kg (141 lb 8.6 oz)   LMP  (LMP Unknown)   SpO2 95%   BMI 29.58 kg/m²     Gen.: Well-appearing, no acute distress  HEENT: Normocephalic, no conjunctival injection, jaundice. Mucous membranes moist, no lesions  Heart: Regular rate and rhythm, no murmurs, rubs, or gallops  Lungs: Clear to auscultation bilaterally, no wheezing, rales, or rhonchi, no accessory muscle use  Abdomen: Soft, nontender, nondistended, bowel sounds positive  Extremities: Warm and well perfused   Skin: No rashes  Neuro: Asleep     A/P: Stable. Continue current management.     "

## 2024-01-03 NOTE — PROCEDURES
Midline Insertion    Date/Time: 1/3/2024 3:30 PM    Performed by: Isabel Mccracken RN  Authorized by: Violet Espinoza MD    Patient location:  Bedside  Other Assisting Provider: Yes (comment) (Monica Link [facility])    Consent:     Consent obtained: none required.    Risks discussed:  Arterial puncture, bleeding, infection, incorrect placement and nerve damage    Alternatives discussed:  No treatment and delayed treatment  Universal protocol:     Procedure explained and questions answered to patient or proxy's satisfaction: yes      Relevant documents present and verified: yes      Site/side marked: yes      Immediately prior to procedure, a time out was called: yes      Patient identity confirmed:  Arm band, provided demographic data, hospital-assigned identification number and anonymous protocol, patient vented/unresponsive  Pre-procedure details:     Hand hygiene: Hand hygiene performed prior to insertion      Sterile barrier technique: All elements of maximal sterile technique followed      Skin preparation:  ChloraPrep    Skin preparation agent: Skin preparation agent completely dried prior to procedure    Indications:     Midline indications: new site    Anesthesia (see MAR for exact dosages):     Anesthesia method:  None  Procedure details:     Location:  Left cephalic    Laterality:  Left    Site selection rationale:  Largest, accessible vein    Catheter size:  18 gauge    Landmarks identified: yes      Ultrasound guidance: yes      Ultrasound image availability:  Not saved    Sterile ultrasound techniques: Sterile gel and sterile probe covers were used      Number of attempts:  1    Successful placement: yes      Catheter length (cm):  10    Exposed catheter length (cm):  0    Arm circumference (cm):  27    Lot number:  [id number] Exp: 6/30/2024  Post-procedure details:     Post-procedure:  Dressing applied and securement device placed    Assessment:  Blood return through all ports and free fluid  flow    Post-procedure complications: none      Patient tolerance of procedure:  Tolerated well, no immediate complications    Observer: Yes

## 2024-01-03 NOTE — CASE MANAGEMENT
Received call from Zuleima at [facility] in regards to pending air transport auth. Requesting run sheet (includes time up/time down, mileage, and other information), also stated CPT provided is for fixed wing transport, whereas probable CPT code would be  - rotary wing. Needs confirmation prior to processing this request.     Callback P# [phone] Direct line to [facility] with confidential vm. Fax# [phone], ref# [id number].     CM notified: Maria Luisa Davis

## 2024-01-03 NOTE — ANESTHESIA PREPROCEDURE EVALUATION
Procedure:  SUBOCCIPITAL CRANIECTOMY FOR POSTERIOR FOSSA DECOMPRESSION (Head)    Relevant Problems   CARDIO   (+) Essential hypertension   (+) Hypercholesterolemia   (+) Hypertriglyceridemia      ENDO   (+) Type 2 diabetes mellitus with stage 3b chronic kidney disease, without long-term current use of insulin (HCC)      /RENAL   (+) FERCHO (acute kidney injury) (HCC)   (+) Chronic kidney disease, stage 3b (HCC)   (+) Hypertensive kidney disease with chronic kidney disease stage III (HCC)      HEMATOLOGY   (+) Anemia   (+) Simple chronic anemia      MUSCULOSKELETAL   (+) Localized, primary osteoarthritis of hand   (+) Sciatica      NEURO/PSYCH   (+) Acute CVA (cerebrovascular accident) ([facility])   (+) Anxiety     Lab Results   Component Value Date    WBC 8.94 01/03/2024    HGB 12.2 01/03/2024    HCT 37.6 01/03/2024    MCV 84 01/03/2024     01/03/2024     Lab Results   Component Value Date    INR 1.14 01/03/2024    INR 1.08 12/31/2023    INR 1.04 12/30/2023    PROTIME 14.5 01/03/2024    PROTIME 14.1 12/31/2023    PROTIME 13.7 12/30/2023          Physical Exam    Airway    Mallampati score: unable to assess         Dental       Cardiovascular  Rhythm: regular, Rate: normal    Pulmonary   Breath sounds clear to auscultation    Other Findings  Intercisor Distance > 3cm    post-pubertal.      Anesthesia Plan  ASA Score- 4 Emergent    Anesthesia Type- general with ASA Monitors.         Additional Monitors: arterial line.    Airway Plan: ETT.    Comment: Discussed benefits/risks of general anesthesia including possibility of mouth/throat pain, injury to lips/teeth, nausea/vomiting, and surgical pain along with more rare complications such as stroke, MI, pneumonia, aspiration, and injury to blood vessels. All questions answered.  .       Plan Factors-Exercise tolerance (METS): >4 METS.    Chart reviewed. EKG reviewed.  Existing labs reviewed.                   Induction-     Postoperative Plan- Plan for postoperative  opioid use.     Informed Consent- Anesthetic plan and risks discussed with healthcare power of  and daughter.  I personally reviewed this patient with the CRNA. Discussed and agreed on the Anesthesia Plan with the CRNA..

## 2024-01-03 NOTE — PROCEDURES
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 female who presented with nausea, HA, and ataxia on 12/29/23. CT was revealing for left cerebellar infarct. She recovered well until this morning when she acutely developed lethargy, confusion, and dysarthria. CT head was significant for increased cerebellar edema with compression of the fourth ventricle resulting in hydrocephalus.  As such, we discussed the risks and benefits of a right frontal ventriculostomy.  The family understood the risks and the benefits including bleeding infection stroke paralysis seizure and death.      Operative details:  The head was clipped in the usual fashion.  Kocher point was marked.  The head was then prepped and draped in the usual sterile fashion.  A surgical time-out was performed at 1320.  Team members present were Erika Pathak.  A 15 blade was then used to make a 2 cm linear incision.  Self-retaining retractor was then placed.  The periosteum was then elevated.  A hand drill was then used to make a single albino hole.  The dura was opened sharply with a ventriculostomy trocar.  Next a ventriculostomy was placed into the right frontal horn on the 1st attempt.  Brisk egress of bloody CSF was and countered.  The opening pressure was 20-30 cm water.  The catheter was then tunneled posterior laterally and secured in place.  The incision was closed using 3 0 nylons.  The drain was secured in place with a tension loop.  The patient tolerated the procedure well.     I was present and scrubbed for the entirety procedure.

## 2024-01-03 NOTE — PLAN OF CARE
Problem: PAIN - ADULT  Goal: Verbalizes/displays adequate comfort level or baseline comfort level  Description: Interventions:  - Encourage patient to monitor pain and request assistance  - Assess pain using appropriate pain scale  - Administer analgesics based on type and severity of pain and evaluate response  - Implement non-pharmacological measures as appropriate and evaluate response  - Consider cultural and social influences on pain and pain management  - Notify physician/advanced practitioner if interventions unsuccessful or patient reports new pain  Outcome: Progressing     Problem: INFECTION - ADULT  Goal: Absence or prevention of progression during hospitalization  Description: INTERVENTIONS:  - Assess and monitor for signs and symptoms of infection  - Monitor lab/diagnostic results  - Monitor all insertion sites, i.e. indwelling lines, tubes, and drains  - Monitor endotracheal if appropriate and nasal secretions for changes in amount and color  - [place] appropriate cooling/warming therapies per order  - Administer medications as ordered  - Instruct and encourage patient and family to use good hand hygiene technique  - Identify and instruct in appropriate isolation precautions for identified infection/condition  Outcome: Progressing  Goal: Absence of fever/infection during neutropenic period  Description: INTERVENTIONS:  - Monitor WBC    Outcome: Progressing     Problem: SAFETY ADULT  Goal: Patient will remain free of falls  Description: INTERVENTIONS:  - Educate patient/family on patient safety including physical limitations  - Instruct patient to call for assistance with activity   - Consult OT/PT to assist with strengthening/mobility   - Keep Call bell within reach  - Keep bed low and locked with side rails adjusted as appropriate  - Keep care items and personal belongings within reach  - Initiate and maintain comfort rounds  - Make Fall Risk Sign visible to staff  - Offer Toileting every 2 Hours,  in advance of need  - Initiate/Maintain bed alarm  - Apply yellow socks and bracelet for high fall risk patients  - Consider moving patient to room near nurses station  Outcome: Progressing  Goal: Maintain or return to baseline ADL function  Description: INTERVENTIONS:  -  Assess patient's ability to carry out ADLs; assess patient's baseline for ADL function and identify physical deficits which impact ability to perform ADLs (bathing, care of mouth/teeth, toileting, grooming, dressing, etc.)  - Assess/evaluate cause of self-care deficits   - Assess range of motion  - Assess patient's mobility; develop plan if impaired  - Assess patient's need for assistive devices and provide as appropriate  - Encourage maximum independence but intervene and supervise when necessary  - Involve family in performance of ADLs  - Assess for home care needs following discharge   - Consider OT consult to assist with ADL evaluation and planning for discharge  - Provide patient education as appropriate  Outcome: Progressing  Goal: Maintains/Returns to pre admission functional level  Description: INTERVENTIONS:  - Perform AM-PAC 6 Click Basic Mobility/ Daily Activity assessment daily.  - Set and communicate daily mobility goal to care team and patient/family/caregiver.   - Collaborate with rehabilitation services on mobility goals if consulted  - Perform Range of Motion 2 times a day.  - Reposition patient every 2 hours.  - Dangle patient 2 times a day  - Stand patient 2 times a day  - Ambulate patient 2 times a day  - Out of bed to chair 2 times a day   - Out of bed for meals 2 times a day  - Out of bed for toileting  - Record patient progress and toleration of activity level   Outcome: Progressing     Problem: DISCHARGE PLANNING  Goal: Discharge to home or other facility with appropriate resources  Description: INTERVENTIONS:  - Identify barriers to discharge w/patient and caregiver  - Arrange for needed discharge resources and  transportation as appropriate  - Identify discharge learning needs (meds, wound care, etc.)  - Arrange for interpretive services to assist at discharge as needed  - Refer to Case Management Department for coordinating discharge planning if the patient needs post-hospital services based on physician/advanced practitioner order or complex needs related to functional status, cognitive ability, or social support system  Outcome: Progressing     Problem: Knowledge Deficit  Goal: Patient/family/caregiver demonstrates understanding of disease process, treatment plan, medications, and discharge instructions  Description: Complete learning assessment and assess knowledge base.  Interventions:  - Provide teaching at level of understanding  - Provide teaching via preferred learning methods  Outcome: Progressing     Problem: Neurological Deficit  Goal: Neurological status is stable or improving  Description: Interventions:  - Monitor and assess patient's level of consciousness, motor function, sensory function, and level of assistance needed for ADLs.   - Monitor and report changes from baseline. Collaborate with interdisciplinary team to initiate plan and implement interventions as ordered.   - Provide and maintain a safe environment.  - Consider seizure precautions.  - Consider fall precautions.  - Consider aspiration precautions.  - Consider bleeding precautions.  Outcome: Progressing     Problem: Activity Intolerance/Impaired Mobility  Goal: Mobility/activity is maintained at optimum level for patient  Description: Interventions:  - Assess and monitor patient  barriers to mobility and need for assistive/adaptive devices.  - Assess patient's emotional response to limitations.  - Collaborate with interdisciplinary team and initiate plans and interventions as ordered.  - Encourage independent activity per ability.  - Maintain proper body alignment.  - Perform active/passive rom as tolerated/ordered.  - Plan activities to  conserve energy.  - Turn patient as appropriate  Outcome: Progressing     Problem: Communication Impairment  Goal: Ability to express needs and understand communication  Description: Assess patient's communication skills and ability to understand information.  Patient will demonstrate use of effective communication techniques, alternative methods of communication and understanding even if not able to speak.     - Encourage communication and provide alternate methods of communication as needed.  - Collaborate with case management/ for discharge needs.  - Include patient/family/caregiver in decisions related to communication.  Outcome: Progressing     Problem: Potential for Aspiration  Goal: Non-ventilated patient's risk of aspiration is minimized  Description: Assess and monitor vital signs, respiratory status, and labs (WBC).  Monitor for signs of aspiration (tachypnea, cough, rales, wheezing, cyanosis, fever).    - Assess and monitor patient's ability to swallow.  - Place patient up in chair to eat if possible.  - HOB up at 90 degrees to eat if unable to get patient up into chair.  - Supervise patient during oral intake.   - Instruct patient/ family to take small bites.  - Instruct patient/ family to take small single sips when taking liquids.  - Follow patient-specific strategies generated by speech pathologist.  Outcome: Progressing  Goal: Ventilated patient's risk of aspiration is minimized  Description: Assess and monitor vital signs, respiratory status, airway cuff pressure, and labs (WBC).  Monitor for signs of aspiration (tachypnea, cough, rales, wheezing, cyanosis, fever).    - Elevate head of bed 30 degrees if patient has tube feeding.  - Monitor tube feeding.  Outcome: Progressing     Problem: Nutrition  Goal: Nutrition/Hydration status is improving  Description: Monitor and assess patient's nutrition/hydration status for malnutrition (ex- brittle hair, bruises, dry skin, pale skin and  conjunctiva, muscle wasting, smooth red tongue, and disorientation). Collaborate with interdisciplinary team and initiate plan and interventions as ordered.  Monitor patient's weight and dietary intake as ordered or per policy. Utilize nutrition screening tool and intervene per policy. Determine patient's food preferences and provide high-protein, high-caloric foods as appropriate.     - Assist patient with eating.  - Allow adequate time for meals.  - Encourage patient to take dietary supplement as ordered.  - Collaborate with clinical nutritionist.  - Include patient/family/caregiver in decisions related to nutrition.  Outcome: Progressing     Problem: Prexisting or High Potential for Compromised Skin Integrity  Goal: Skin integrity is maintained or improved  Description: INTERVENTIONS:  - Identify patients at risk for skin breakdown  - Assess and monitor skin integrity  - Assess and monitor nutrition and hydration status  - Monitor labs   - Assess for incontinence   - Turn and reposition patient  - Assist with mobility/ambulation  - Relieve pressure over bony prominences  - Avoid friction and shearing  - Provide appropriate hygiene as needed including keeping skin clean and dry  - Evaluate need for skin moisturizer/barrier cream  - Collaborate with interdisciplinary team   - Patient/family teaching  - Consider wound care consult   Outcome: Progressing     Problem: Nutrition/Hydration-ADULT  Goal: Nutrient/Hydration intake appropriate for improving, restoring or maintaining nutritional needs  Description: Monitor and assess patient's nutrition/hydration status for malnutrition. Collaborate with interdisciplinary team and initiate plan and interventions as ordered.  Monitor patient's weight and dietary intake as ordered or per policy. Utilize nutrition screening tool and intervene as necessary. Determine patient's food preferences and provide high-protein, high-caloric foods as appropriate.     INTERVENTIONS:  -  Monitor oral intake, urinary output, labs, and treatment plans  - Assess nutrition and hydration status and recommend course of action  - Evaluate amount of meals eaten  - Assist patient with eating if necessary   - Allow adequate time for meals  - Recommend/ encourage appropriate diets, oral nutritional supplements, and vitamin/mineral supplements  - Order, calculate, and assess calorie counts as needed  - Recommend, monitor, and adjust tube feedings and TPN/PPN based on assessed needs  - Assess need for intravenous fluids  - Provide specific nutrition/hydration education as appropriate  - Include patient/family/caregiver in decisions related to nutrition  Outcome: Progressing

## 2024-01-04 LAB
BACTERIA BLD CULT: NORMAL
BACTERIA BLD CULT: NORMAL

## 2024-01-04 NOTE — PROGRESS NOTES
Pastoral Care Progress Note    2024  Patient: Debbie Moody : 1940  Admission Date & Time: 2023 1726  MRN: [id number] CSN: [id number]                     Pt sleeping and intubated.  remains available     24 1300   Clinical Encounter Type   Visited With Patient   Routine Visit Introduction   Yarsanism Encounters   Yarsanism Needs Prayer

## 2024-01-04 NOTE — CASE MANAGEMENT
[facility] received approval for transport authorization from: [employer]   Type of transport: [employer] CPT:   Date of transport: 12/31    End Location: [facility]  Approved auth #: [id number]  CM notified: Maria Luisa Davis

## 2024-01-04 NOTE — ASSESSMENT & PLAN NOTE
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 Similar appearance of evolving left PICA territory infarct and associated edema/mass effect and with probable petechial cortical hemorrhage compared to the most immediate prior study. Similar appearance focal parenchymal hematoma in the left anterior cerebellar vermis adjacent to the fourth ventricle compared to the most immediate prior study. No new hemorrhage or mass effect. Minimally improved hydrocephalus status post placement of right frontal approach ventricular catheter.    Plan  Continue regular neurologic checks.   STAT CTH for decline in exam greater than 2 points in 1 hour  EVD @ 10, will raise to 15 today for hopeful quick EVD wean and removal  ICP < 20, has been <5  45cc output since placement  Ongoing medical management per primary team.   Continue HTS @ 50 for Na goal > 145. Currently 150  Correct endocrine abnormalities. K 3.3  Continue insulin gtt  SBP < 160, on cardene 6mg/hr  Wean to extubate when appropriate  Pain control per primary team  Neurology following for stroke management.   ASA d/c 1/3/23, hold for at least 2 weeks post op  PT/OT/PMR evaluation  DVT PPX: SCDs, SQH this afternoon    Neurosurgery will continue to follow. Please call with questions or concerns.

## 2024-01-04 NOTE — RESPIRATORY THERAPY NOTE
RT Ventilator Management Note  Debbie Moody 83 y.o. female MRN: [id number]  Unit/Bed#: ICU 04 Encounter: [id number]      Daily Screen         1/3/2024  4369             Patient safety screen outcome:: Failed    Not Ready for Weaning due to:: Underline problem not resolved              Physical Exam:   Assessment Type: Assess only  General Appearance: Sedated  Respiratory Pattern: Normal  Chest Assessment: Chest expansion symmetrical  Bilateral Breath Sounds: Coarse  Cough: Productive  Suction: ET Tube  O2 Device: Ventilator      Resp Comments: Pt. remains on CMV/VC mode.  ETT withdrawn 1cm to 20 @ lip per orders at this time.  Will continue to monitor pt per protocol.

## 2024-01-04 NOTE — PROGRESS NOTES
Pt seen post procedure s/p Suboccipital craniectomy for posterior fossa decompression, no issues per surgeon. Per anesthesia, minimal blood loss and brief hypoxia episode that improved after suctioning.    Vitals:    01/03/24 2117 01/03/24 2125 01/03/24 2145 01/03/24 2213   BP:  158/69 142/61    Pulse:  78 98    Resp:  (!) 10 12    Temp:  (!) 96.8 °F (36 °C)     TempSrc:       SpO2: 100% 98% 100% 100%   Weight:       Height:          EXAM: GCS [id number], pt intubated and sedated, pupils 2mm, right more sluggish to light than left, cough intact, unable to evaluate gag as pt biting the ET tube, extensor posturing of bilateral upper left >right, triple flexion of bilateral lower    ASSESSMENT/PLAN  # Left cerebellum stroke  # Cerebellar edema  # 4TH ventricle hemorrhage  # Brain compression  -s/p EVD 1/3  -s/p Suboccipital craniectomy for posterior fossa decompression 1/3  # Hydrocephalus  # Coma  # metabolic acidosis   # Mild leukocytosis   # Hyperglycemia     PLAN  Na goal 150-155  -on hypertonic  -sbp<140  -EVD at 10, continue current level , no need to drop at this time  -f/u post op CT head  -f/u CXR after retraction of ET tube  -ABG  -repeat labs  -check lactic acid for metabolic acidosis that was present 1/1/2024 even before hypertonic saline was started.   Also better glucose control  -ssi    Ирина Guerrero MD  Critical  care time 50 minutes

## 2024-01-04 NOTE — PROGRESS NOTES
[facility]  Progress Note: Critical Care  Name: Debbie Moody 83 y.o. 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 Mass effect on the fourth ventricle is slightly increased. No hydrocephalus. Persistent increased attenuation in the anterior aspect of the cerebellar infarct that could represent residual contrast staining but petechial hemorrhage not excluded. Recommend close interval follow-up CT and/or further evaluation with MRI.   12/31 MRI-Acute infarct in left PICA territory with small acute parenchymal hematoma in anterior aspect of left cerebellar vermis, petechial cortical hemorrhage along left posterior cerebellum, and similar compressive mass effect on fourth ventricle. No obstructive hydrocephalus.   12/31 TTE: EF 70% L atrium is nl in size   01/03 CTH: Evolving left PCA infarct with increased left cerebellar hypodensity and compression of the fourth ventricle resulting in hydrocephalus with increased focal parenchymal hemorrhage adjacent to the fourth ventricle   S/p Suboccipital craniectomy for posterior fossa decompression 01/03/23 by Dr. Higgins   01/03 CTH post-op: Status post interval decompressive suboccipital craniectomy with expected subjacent postsurgical changes/pneumocephalus. Minimally improved hydrocephalus status post placement of right frontal approach ventricular catheter.   Etiology: suspected atheroembolic vs cardioembolic      Plan:   Q2 neuro checks for now  SBP goal 100-140  On  cardene gtt  S/p bolus of 250cc HTS  On HTS @ 50cc/hr  Q6 BMP checks  Na goal 150-155  Neurosurgery following  EVD placed @ 10 and will allow to drain as per neurosurgery   24 cc overnight   If any acute changes in neuro exam, obtain CTH and contact neurosurg if needs suboccipital decompression   Neurology following  Continue stroke pathway  Holding ASA 81mg and DVT ppx for now  Repeat CTH if new focal deficit or decline in GCS > 2 points        CV:   Diagnosis: HTN  Plan:   Goal -140  On cardene gtt @ 4  Wean off cardene as able   Consider adding norvasc to help titrate off cardene   Prn labetalol   Continue home enalapril (converted to lisinopril 10mg)        - Diagnosis: HLD              Lipid panel: Cholesterol 79, 7/22,               Plan:              Lipitor 80mg      Pulm:  Diagnosis: Acute hypoxic respiratory failure  Likely secondary to cerebellar edema affecting brain stem   ETT pulled back to 20   Plan   AC 12/400/60/6  Wean as able, possible Pc if can tolerate   SBT as able/tolerable   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  Has crackles at base of lungs posteriorly  Plan:   Xopenex PRN   On room air   IS, OOB          GI:   bowel regimen: senokot and miralax prn  BM: 01/02/23        :   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  Baseline cr: 1.1-1.3  Current Cr 01/03: 1.36  I/O: -1.355 cc     Plan: Trend renal function  Strict I/Os  Urinary retention protocol  Avoid nephrotoxins   Encourage po hydration      - Diagnosis: Metabolic Acidosis              - Bicarb 16              - suspected from FERCHO/renal dysfunction from past HTS   - noted NAGMA was appreciated the morning after arrived to [place] and was started on HTS                Plan               - NAGMA (Anion gap: 9)              - will continue to monitor and correct FERCHO as able         F/E/N:   F: HTS @ 50 cc/hr   E: K> 4.0, mag greater than 2 3, maintain magnesium > 2.0, maintain phosphate > 3.5  N: If passes SBT and possible extubation, can  restart diabetic diet   Otherwise, would start TF w/ glucerna         Heme/Onc:   Diagnosis: Anemia  Hgb 9.4 <- 10.3  Likely from procedure   Plan: Continue iron supplementation  Trend hgb/plt count  Chemical DVT PPx hold given just had EVD        Endo:   Diagnosis: DM type 2  A1c: 8.3  Plan:   Restarted insuline gtt given high BG   Will keep for 24 hours  Goal blood glucose 140-180        ID:   No active issues  Trend WBC/fever curve     MSK/Skin:   No active issues  Frequent repositioning   PT/OT        Lines/Drains  - continue lebron, central line       Disposition: Critical care    ICU Core Measures     Vented Patient  VAP Bundle  VAP bundle ordered     A: Assess, Prevent, and Manage Pain Has pain been assessed? Yes  Need for changes to pain regimen? No   B: Both Spontaneous Awakening Trials (SATs) and Spontaneous Breathing Trials (SBTs) Plan to perform spontaneous awakening trial today? Yes   Plan to perform spontaneous breathing trial today? Yes   Obvious barriers to extubation? Yes   C: Choice of Sedation RASS Goal: 0 Alert and Calm  Need for changes to sedation or analgesia regimen? No   D: Delirium CAM-ICU: Negative   E: Early Mobility  Plan for early mobility? Yes   F: Family Engagement Plan for family engagement today? Yes       Review of Invasive Devices:    Lebron Plan: Continue for accurate I/O monitoring for 48 hours  Central access plan: Patient has multiple central venous catheters.  Medications requiring central line Hemodynamic monitoring  Karmen Plan: Keep arterial line for hemodynamic monitoring, frequent ABGs, and frequent labs    Prophylaxis:  VTE Contraindicated secondary to: holding in setting of recent suboccipital crani   Stress Ulcer  not ordered        Significant 24hr Events     HPI: Debbie Moody is a 83 y.o. who presents with weakness, headache, nausea and vomiting. She has a PMH of Covid 19 pneumonia in 12/2021 complicated by pneumothoraces and now with pulmonary fibrosis, CKD, DM,  HTN, C. Diff colitis in 9/2023. She reported that she woke up this morning at 0500 with nausea and vomiting and needed assistance to walk. Earlier this afternoon she had a posterior headache, weakness, and continued vomiting and presented to the ED. Stroke alert was called and CTH/CTA showed distal PICA occlusion. She was outside of the window for TNK on arrival to the ED. She is being admitted to CC service for ongoing care.   Was put on HTS for 2.5 days and then transferred out of ICU due to excellent exam. 01/03, patient had worsening lethargy and dysarthria w/ CTH showing acute obstructive hydrocephalus and EVD placed this afternoon s/p mannitol  24hr events: Patient got suboccipital crani and returned and had noted low output from EVD. But CTH had improved hydro. She was initially posturing and triple flexing in LE but improved afterwards and was following commands in all extremities      Subjective     Review of Systems   Unable to perform ROS: Intubated        Objective                            Vitals I/O      Most Recent Min/Max in 24hrs   Temp 99.3 °F (37.4 °C) Temp  Min: 96.8 °F (36 °C)  Max: 99.7 °F (37.6 °C)   Pulse 96 Pulse  Min: 78  Max: 113   Resp 12 Resp  Min: 9  Max: 29   /63 BP  Min: 116/77  Max: 180/80   O2 Sat 100 % SpO2  Min: 96 %  Max: 100 %      Intake/Output Summary (Last 24 hours) at 1/4/2024 0559  Last data filed at 1/4/2024 0400  Gross per 24 hour   Intake 2960.58 ml   Output 4195 ml   Net -1234.42 ml       Diet NPO    Invasive Monitoring   Arterial Line  Karmen /46  Arterial Line BP  Min: 108/38  Max: 188/76   MAP 76 mmHg  Arterial Line MAP (mmHg)  Min: 64 mmHg  Max: 124 mmHg           Physical Exam   Physical Exam  Vitals and nursing note reviewed.   Eyes:      Extraocular Movements: Extraocular movements intact.      Pupils: Pupils are equal, round, and reactive to light.   HENT:      Head: Normocephalic and atraumatic.      Comments: R EVD in place  Cardiovascular:       Rate and Rhythm: Normal rate.   Abdominal:      Palpations: Abdomen is soft.   Constitutional:       Appearance: She is well-developed.   Pulmonary:      Effort: Pulmonary effort is normal.   Neurological:      Comments: Follows commands with tongue and nods head   R beating nystagmus   Sticks out tongue   PERRLA  Follows commands in all 4 extremities (antigravity in all extrem)  LUE/LLE ataxia              Diagnostic Studies      EKG: reviewed  Imaging: reviewed  I have personally reviewed pertinent reports.   and I have personally reviewed pertinent films in PACS     Medications:  Scheduled PRN   atorvastatin, 80 mg, QPM  chlorhexidine, 15 mL, Q12H JUAN ANTONIO  ferrous sulfate, 325 mg, Daily With Breakfast  lisinopril, 10 mg, Daily  propofol, 20 mg, Once  propofol, 50 mg, Once  senna-docusate sodium, 1 tablet, HS  Succinylcholine Chloride, 100 mg, Once      acetaminophen, 650 mg, Q6H PRN  albuterol, 2.5 mg, Q4H PRN  hydrALAZINE, 10 mg, Q6H PRN  labetalol, 10 mg, Q6H PRN  ondansetron, 4 mg, Q6H PRN  polyethylene glycol, 17 g, Daily PRN       Continuous    insulin regular (HumuLIN R,NovoLIN R) 1 Units/mL in sodium chloride 0.9 % 100 mL infusion, 0.3-21 Units/hr, Last Rate: 2 Units/hr (01/04/24 0547)  niCARdipine, 1-15 mg/hr, Last Rate: 5 mg/hr (01/04/24 7656)  propofol, 5-50 mcg/kg/min, Last Rate: 10 mcg/kg/min (01/04/24 8758)  sodium chloride, 50 mL/hr, Last Rate: 50 mL/hr (01/03/24 8370)         Labs:    CBC    Recent Labs     01/03/24 2154 01/04/24  0540   WBC 10.76* 7.92   HGB 10.3* 9.4*   HCT 30.9* 28.3*    180     BMP    Recent Labs     01/03/24  2154 01/04/24  0014   SODIUM 140 144   K 4.1 3.9   * 117*   CO2 17* 15*   AGAP 11 12   BUN 21 23   CREATININE 1.41* 1.37*   CALCIUM 8.7 8.2*       Coags    Recent Labs     01/03/24  1211   INR 1.14   PTT 30        Additional Electrolytes  Recent Labs     01/03/24  2154   CAIONIZED 1.14          Blood Gas    Recent Labs     01/03/24  2155   PHART 7.364   VTE3AGU  33.9*   PO2ART 235.8*   WLR5OIX 18.9*   BEART -5.7   SOURCE Line, Arterial     Recent Labs     01/03/24 2155   SOURCE Line, Arterial    LFTs  No recent results    Infectious  No recent results  Glucose  Recent Labs     01/02/24  1226 01/03/24  0531 01/03/24  2154 01/04/24  0014   GLUC 152* 235* 246* 318*               Critical Care Time Statement: Upon my evaluation, this patient had a high probability of imminent or life-threatening deterioration due to stroke, which required my direct attention, intervention, and personal management.  I spent a total of 40 minutes directly providing critical care services, including interpretation of complex medical databases, evaluating for the presence of life-threatening injuries or illnesses, and management of organ system failure(s) . This time is exclusive of procedures, teaching, family meetings, and any prior time recorded by providers other than myself.      Miguel Negron, DO

## 2024-01-04 NOTE — UTILIZATION REVIEW
NOTIFICATION OF ADMISSION DISCHARGE   This is a Notification of Discharge from [facility]. Please be advised that this patient has been discharge from our facility. Below you will find the admission and discharge date and time including the patient’s disposition.   UTILIZATION REVIEW CONTACT:  Donna Donis  Utilization   Network Utilization Review Department  Phone: [phone] carefully listen to the prompts. All voicemails are confidential.  Email: [email or web address]     ADMISSION INFORMATION  PRESENTATION DATE: 12/30/2023 12:47 PM  OBERVATION ADMISSION DATE:   INPATIENT ADMISSION DATE: 12/30/23  3:14 PM   DISCHARGE DATE: 12/31/2023  5:25 PM   DISPOSITION:[facility] Utilization Review Department  ATTENTION: Please call with any questions or concerns to [phone] and carefully listen to the prompts so that you are directed to the right person. All voicemails are confidential.   For Discharge needs, contact Care Management DC Support Team at [phone] opt. [phone]  Send all requests for admission clinical reviews, approved or denied determinations and any other requests to dedicated fax number below belonging to the campus where the patient is receiving treatment. List of dedicated fax numbers for the Facilities:  FACILITY NAME UR FAX NUMBER   ADMISSION DENIALS (Administrative/Medical Necessity) [phone]   DISCHARGE SUPPORT TEAM ([facility]) [phone]   PARENT CHILD HEALTH (Maternity/NICU/Pediatrics) [phone]   [facility] [phone]   [facility] [phone]   [facility] [phone]   [facility] [phone]   On license of [facility] [phone]   [facility] [phone]   [facility] [phone]   [facility]  [phone]   [facility] [phone]   [facility] [phone]   [facility] [phone]

## 2024-01-04 NOTE — PHYSICAL THERAPY NOTE
Physical Therapy Cancellation Note    PT orders received chart review completed. Pt is currently intubated/sedated and not appropriate to participate in skilled PT at this time. PT will follow and re-eval as medically appropriate.     01/04/24 0900   Note Type   Note type Cancelled Session   Cancel Reasons Intubated/sedated   Cognition   Orientation Level Unable to assess       Monica Link, PT

## 2024-01-04 NOTE — PROGRESS NOTES
Progress Note - Neurology   Debbie Moody 83 y.o. female [id number]  Unit/Bed#: ICU 04/ICU 04    Assessment/Plan:    * Acute CVA (cerebrovascular accident) (HCC)  Assessment & Plan  83 y.o. female with HTN, HLD, CKD, iron deficiency anemia, pulmonary fibrosis secondary to COVID in 2021, and DM who initially presented to [facility] on 12/30/2023 with nausea, ataxia, and headache. Imaging noted L PICA infarct with occluded L PICA. She was found to have hemorrhagic conversion on neuroimaging as well as increasing posterior circulation edema/mass effect and 4th ventricle effacement so was transferred to [facility] for critical care/neurosurgery evaluation.    On 1/3, patient developed hydrocephalus s/p EVD without improvement so underwent suboccipital craniectomy for posterior fossa decompression.    Etiology of L PICA CVA/occlusion either in situ thrombosis or atheroembolic from more proximal vertebral disease. Central embolic etiology considered less likely.     Neuroimaging:  CT head (12/30) revealed acute infarct in left cerebellum PICA territory  CTA head/neck (12/30): occluded L PICA  Repeat CT head (12/30)   Re-demonstrated acute left cerebellar PICA infarct with mild mass effect on fourth ventricle. No hydrocephalus.   There was increased density within the anterior aspect of the infarct (contrast staining rather than petechial hemorrhage)  Repeat CT head (12/31)  Slightly increased mass effect on 4th ventricle; no hydrocephalus. Hyper-attenuation in cerebellar CVA (residual contrast staining vs petechial hemorrhage)    MRI brain wo (12/31):  L PICA CVA, with hemorrhagic conversion in L cerebellar vermis/petechial cortical hemorrhage in L cerebellum, similar mass effect on 4th ventricle   Repeat CT head (1/3):  Evolving left PCA infarct with increased left cerebellar hypodensity and compression of the fourth ventricle resulting in hydrocephalus with increased focal parenchymal hemorrhage adjacent to the  "fourth ventricle  Repeat CT head (1/3) after EVD and suboccipital craniectomy  S/p decompressive suboccipital craniectomy with postsurgical changes/pneumocephalus.  Similar evolving L PICA infarctand similar L anterior cerebellar vermis hematoma  Echo revealed EF 70% with normal size atrium bilaterally.  Lipid panel: Cholesterol 229,   Hemoglobin A1c 8.3    Plan:  - Stroke pathway ongoing  - Loaded with DAPT on 12/30; status post DDAVP on 12/31 given hemorrhagic conversion  - Neurosurgery following; s/p EVD and suboccipital craniectomy on 1/3  Hold AP at this time  Continue with Atorvastatin 80 mg daily  Currently on hypertonic saline; management per critical care team  Maintain sodium >140-145  Continue telemetry monitoring  Maintain -140s  Currently on Cardene gtt  PT/OT/ST  Secondary risk factor modification.   Stroke education  Frequent neuro checks. Continue to monitor and notify neurology with any changes.  STAT CT head for any acute change in neuro exam  - Medical management and supportive care per primary team. Correction of any metabolic or infectious disturbances        Ceredosheree Moody will need follow up in 4-6 weeks with neurovascular attending .  She will not require outpatient neurological testing.       Subjective:   Patient seen and examined at bedside.  Patient continues to be intubated, but she is awake and following commands.  Shakes her head \"no\" when asked if she has any headache or other pain.        Past Medical History:   Diagnosis Date    Acute kidney injury (HCC)     Acute respiratory failure with hypoxia (HCC) 12/16/2021    Broken arm     hairline fracture/ 2 places///   right arm    Diabetes mellitus (HCC)     Diverticulitis     Pneumothorax- Resolved     Postherpetic neuralgia      Past Surgical History:   Procedure Laterality Date    CHOLECYSTECTOMY      COLON SURGERY      HERNIA REPAIR      IR EMBOLIZATION (SPECIFY VESSEL OR SITE)  1/12/2022    ROTATOR CUFF REPAIR Right  "    VARICOSE VEIN SURGERY      Impression:  2/12/16 Jake Sarabia     Family History   Problem Relation Age of Onset    Diabetes Mother     No Known Problems Father      Social History     Socioeconomic History    Marital status: /Civil Union     Spouse name: Not on file    Number of children: Not on file    Years of education: Not on file    Highest education level: Not on file   Occupational History    Not on file   Tobacco Use    Smoking status: Never    Smokeless tobacco: Never   Vaping Use    Vaping status: Never Used   Substance and Sexual Activity    Alcohol use: Not Currently     Comment: Rarely    Drug use: Never    Sexual activity: Not Currently   Other Topics Concern    Not on file   Social History Narrative    Always uses seatbelt    No caffeine use     Social Determinants of Health     Financial Resource Strain: Low Risk  (9/1/2022)    Overall Financial Resource Strain (CARDIA)     Difficulty of Paying Living Expenses: Not hard at all   Food Insecurity: No Food Insecurity (1/1/2024)    Hunger Vital Sign     Worried About Running Out of Food in the Last Year: Never true     Ran Out of Food in the Last Year: Never true   Transportation Needs: No Transportation Needs (1/1/2024)    PRAPARE - Transportation     Lack of Transportation (Medical): No     Lack of Transportation (Non-Medical): No   Physical Activity: Not on file   Stress: Not on file   Social Connections: Not on file   Intimate Partner Violence: Not on file   Housing Stability: Low Risk  (1/1/2024)    Housing Stability Vital Sign     Unable to Pay for Housing in the Last Year: No     Number of Places Lived in the Last Year: 1     Unstable Housing in the Last Year: No     E-Cigarette/Vaping    E-Cigarette Use Never User      E-Cigarette/Vaping Substances    Nicotine No     THC No     CBD No     Flavoring No          Medications:  All current active meds have been reviewed and current meds:  Scheduled Meds:  Current Facility-Administered  Medications   Medication Dose Route Frequency Provider Last Rate    acetaminophen  650 mg Oral Q6H PRN David Higgins MD      albuterol  2.5 mg Nebulization Q4H PRN Violet Espinoza MD      atorvastatin  80 mg Oral QPM David Higgins MD      chlorhexidine  15 mL Mouth/Throat Q12H JUAN ANTONIO David Higgins MD      ferrous sulfate  325 mg Oral Daily With Breakfast David Higgins MD      hydrALAZINE  10 mg Intravenous Q6H PRN David Higgins MD      insulin regular (HumuLIN R,NovoLIN R) 1 Units/mL in sodium chloride 0.9 % 100 mL infusion  0.3-21 Units/hr Intravenous Titrated BENTON Charles 2 Units/hr (01/04/24 0814)    labetalol  10 mg Intravenous Q6H PRN David Higgins MD      lisinopril  10 mg Oral Daily David Higgins MD      niCARdipine  1-15 mg/hr Intravenous Titrated David Higgins MD 6 mg/hr (01/04/24 0641)    ondansetron  4 mg Intravenous Q6H PRN David Higgins MD      polyethylene glycol  17 g Oral Daily PRN David Higgins MD      potassium chloride  40 mEq Oral Once Miguel Negron, DO      Followed by    potassium chloride  40 mEq Oral Once Miguel Negron DO      propofol  5-50 mcg/kg/min Intravenous Titrated David Higgins MD 20 mcg/kg/min (01/04/24 0735)    propofol  20 mg Intravenous Once David Higgins MD      propofol  50 mg Intravenous Once David Higgins MD      senna-docusate sodium  1 tablet Oral HS David Higgins MD      sodium chloride  50 mL/hr Intravenous Continuous BENTON Charles 50 mL/hr (01/04/24 0640)    Succinylcholine Chloride  100 mg Intravenous Once David Higgins MD       Continuous Infusions:insulin regular (HumuLIN R,NovoLIN R) 1 Units/mL in sodium chloride 0.9 % 100 mL infusion, 0.3-21 Units/hr, Last Rate: 2 Units/hr (01/04/24 0814)  niCARdipine, 1-15 mg/hr, Last Rate: 6 mg/hr (01/04/24 0641)  propofol, 5-50 mcg/kg/min, Last Rate: 20 mcg/kg/min (01/04/24 0735)  sodium chloride, 50 mL/hr, Last Rate: 50 mL/hr (01/04/24 2540)      PRN Meds:.  acetaminophen    albuterol    hydrALAZINE    labetalol     "ondansetron    polyethylene glycol       ROS:   Review of Systems   Reason unable to perform ROS: Intubated.             Vitals:   /63   Pulse 88   Temp 99 °F (37.2 °C)   Resp 13   Ht 4' 10\" (1.473 m)   Wt 64.2 kg (141 lb 8.6 oz)   LMP  (LMP Unknown)   SpO2 100%   BMI 29.58 kg/m²     Physical Exam:   Physical Exam  Vitals and nursing note reviewed.   Constitutional:       Comments: Acutely ill-appearing female intubated, but awake   HENT:      Mouth/Throat:      Comments: ETT in place  Eyes:      Pupils: Pupils are equal, round, and reactive to light.      Comments: Decreased rightward gaze with R beating nystagmus   Pulmonary:      Comments: On vent  Musculoskeletal:      Comments: Spontaneous movement in all extremities   Skin:     General: Skin is warm and dry.   Neurological:      Mental Status: She is alert.      Comments: See full neuro exam below       Neurologic Exam     Mental Status   Patient intubated, but awake  Able to correctly nod her head yes and no.  Follows simple midline and appendicular commands.     Cranial Nerves     CN III, IV, VI   Pupils are equal, round, and reactive to light.  Pupils 3 mm, round, reactive to light bilaterally.  Decreased EOMs to the R with R beating nystagmus  No obvious facial asymmetry  Remainder of cranial nerves limited due to ETT     Motor Exam   RUE: Unable to keep arm antigravity when passively raised.  2/5 deltoid, 2-3/5 biceps/triceps, 4/5   LUE: 3/5 deltoid, 4/5 biceps/triceps, 4/5   Able to slightly bend both knees.  Wiggles toes symmetrically     Sensory Exam   Nods head \"yes\" when asked if she can feel light touch in all extremities     Gait, Coordination, and Reflexes     Gait  Gait: (Unable to assess at this time)  No resting tremor  No ankle clonus bilaterally  Upgoing right toe.  Downgoing left toe.  2+ reflexes throughout           Labs: I have personally reviewed pertinent reports.   Recent Results (from the past 24 hour(s)) "   Fingerstick Glucose (POCT)    Collection Time: 01/03/24 11:01 AM   Result Value Ref Range    POC Glucose 231 (H) 65 - 140 mg/dl   Fingerstick Glucose (POCT)    Collection Time: 01/03/24 11:29 AM   Result Value Ref Range    POC Glucose 226 (H) 65 - 140 mg/dl   Type and screen    Collection Time: 01/03/24 11:39 AM   Result Value Ref Range    ABO Grouping A     Rh Factor Positive     Antibody Screen Negative     Specimen Expiration Date 20240106    Protime-INR    Collection Time: 01/03/24 12:11 PM   Result Value Ref Range    Protime 14.5 11.6 - 14.5 seconds    INR 1.14 0.84 - 1.19   APTT    Collection Time: 01/03/24 12:11 PM   Result Value Ref Range    PTT 30 23 - 37 seconds   Fingerstick Glucose (POCT)    Collection Time: 01/03/24  4:45 PM   Result Value Ref Range    POC Glucose 178 (H) 65 - 140 mg/dl   POCT Blood Gas (CG8+)    Collection Time: 01/03/24  7:17 PM   Result Value Ref Range    pH, Art i-STAT 7.297 (L) 7.350 - 7.450    pCO2, Art i-STAT 31.9 (L) 36.0 - 44.0 mm HG    pO2, ART i-STAT 91.0 75.0 - 129.0 mm HG    BE, i-STAT -10 (L) -2 - 3 mmol/L    HCO3, Art i-STAT 15.6 (LL) 22.0 - 28.0 mmol/L    CO2, i-STAT 17 (L) 21 - 32 mmol/L    O2 Sat, i-STAT 96 (H) 60 - 85 %    SODIUM, I-STAT 147 (H) 136 - 145 mmol/l    Potassium, i-STAT 3.6 3.5 - 5.3 mmol/L    Calcium, Ionized i-STAT 1.24 1.12 - 1.32 mmol/L    Hct, i-STAT 23 (L) 34.8 - 46.1 %    Hgb, i-STAT 7.8 (L) 11.5 - 15.4 g/dl    Glucose, i-STAT 155 (H) 65 - 140 mg/dl    Specimen Type ARTERIAL    CBC and differential    Collection Time: 01/03/24  9:54 PM   Result Value Ref Range    WBC 10.76 (H) 4.31 - 10.16 Thousand/uL    RBC 3.76 (L) 3.81 - 5.12 Million/uL    Hemoglobin 10.3 (L) 11.5 - 15.4 g/dL    Hematocrit 30.9 (L) 34.8 - 46.1 %    MCV 82 82 - 98 fL    MCH 27.4 26.8 - 34.3 pg    MCHC 33.3 31.4 - 37.4 g/dL    RDW 14.4 11.6 - 15.1 %    MPV 9.0 8.9 - 12.7 fL    Platelets 183 149 - 390 Thousands/uL   Basic metabolic panel    Collection Time: 01/03/24  9:54 PM    Result Value Ref Range    Sodium 140 135 - 147 mmol/L    Potassium 4.1 3.5 - 5.3 mmol/L    Chloride 112 (H) 96 - 108 mmol/L    CO2 17 (L) 21 - 32 mmol/L    ANION GAP 11 mmol/L    BUN 21 5 - 25 mg/dL    Creatinine 1.41 (H) 0.60 - 1.30 mg/dL    Glucose 246 (H) 65 - 140 mg/dL    Calcium 8.7 8.4 - 10.2 mg/dL    eGFR 34 ml/min/1.73sq m   Calcium, ionized    Collection Time: 01/03/24  9:54 PM   Result Value Ref Range    Calcium, Ionized 1.14 1.12 - 1.32 mmol/L   Manual Differential(PHLEBS Do Not Order)    Collection Time: 01/03/24  9:54 PM   Result Value Ref Range    Segmented % 94 (H) 43 - 75 %    Lymphocytes % 4 (L) 14 - 44 %    Monocytes % 2 (L) 4 - 12 %    Eosinophils, % 0 0 - 6 %    Basophils % 0 0 - 1 %    Absolute Neutrophils 10.11 (H) 1.85 - 7.62 Thousand/uL    Lymphocytes Absolute 0.43 (L) 0.60 - 4.47 Thousand/uL    Monocytes Absolute 0.22 0.00 - 1.22 Thousand/uL    Eosinophils Absolute 0.00 0.00 - 0.40 Thousand/uL    Basophils Absolute 0.00 0.00 - 0.10 Thousand/uL    Total Counted      RBC Morphology Normal     Platelet Estimate Adequate Adequate    Clumped Platelets Present    Blood gas, arterial    Collection Time: 01/03/24  9:55 PM   Result Value Ref Range    pH, Arterial 7.364 7.350 - 7.450    pCO2, Arterial 33.9 (L) 36.0 - 44.0 mm Hg    pO2, Arterial 235.8 (H) 75.0 - 129.0 mm Hg    HCO3, Arterial 18.9 (L) 22.0 - 28.0 mmol/L    Base Excess, Arterial -5.7 mmol/L    O2 Content, Arterial 16.1 16.0 - 23.0 mL/dL    O2 HGB,Arterial  98.5 (H) 94.0 - 97.0 %    SOURCE Line, Arterial     VENT-SIMV SIMV     SIMV Rate 12     SIMV VENT TIDALV 400     SIMV VENT INSP AIR FIO2 60     SIMV VENT PEEP 6    Basic metabolic panel    Collection Time: 01/04/24 12:14 AM   Result Value Ref Range    Sodium 144 135 - 147 mmol/L    Potassium 3.9 3.5 - 5.3 mmol/L    Chloride 117 (H) 96 - 108 mmol/L    CO2 15 (L) 21 - 32 mmol/L    ANION GAP 12 mmol/L    BUN 23 5 - 25 mg/dL    Creatinine 1.37 (H) 0.60 - 1.30 mg/dL    Glucose 318 (H) 65  - 140 mg/dL    Calcium 8.2 (L) 8.4 - 10.2 mg/dL    eGFR 35 ml/min/1.73sq m   Lactic acid, plasma (w/reflex if result > 2.0)    Collection Time: 01/04/24 12:14 AM   Result Value Ref Range    LACTIC ACID 1.3 0.5 - 2.0 mmol/L   Fingerstick Glucose (POCT)    Collection Time: 01/04/24 12:37 AM   Result Value Ref Range    POC Glucose 322 (H) 65 - 140 mg/dl   CBC    Collection Time: 01/04/24  5:40 AM   Result Value Ref Range    WBC 7.92 4.31 - 10.16 Thousand/uL    RBC 3.45 (L) 3.81 - 5.12 Million/uL    Hemoglobin 9.4 (L) 11.5 - 15.4 g/dL    Hematocrit 28.3 (L) 34.8 - 46.1 %    MCV 82 82 - 98 fL    MCH 27.2 26.8 - 34.3 pg    MCHC 33.2 31.4 - 37.4 g/dL    RDW 14.5 11.6 - 15.1 %    Platelets 180 149 - 390 Thousands/uL    MPV 9.1 8.9 - 12.7 fL   Basic metabolic panel    Collection Time: 01/04/24  5:40 AM   Result Value Ref Range    Sodium 150 (H) 135 - 147 mmol/L    Potassium 3.3 (L) 3.5 - 5.3 mmol/L    Chloride 122 (H) 96 - 108 mmol/L    CO2 19 (L) 21 - 32 mmol/L    ANION GAP 9 mmol/L    BUN 23 5 - 25 mg/dL    Creatinine 1.36 (H) 0.60 - 1.30 mg/dL    Glucose 148 (H) 65 - 140 mg/dL    Calcium 8.6 8.4 - 10.2 mg/dL    eGFR 36 ml/min/1.73sq m   Prepare Leukoreduced Platelet Pheresis (1 pheresis product = 6-8 pooled units): 1 Product    Collection Time: 01/04/24  5:55 AM   Result Value Ref Range    Unit Product Code S5438Y52     Unit Number [id number]     Unit ABO A     Unit RH POS     Unit Dispense Status Presumed Trans     Unit Product Volume 214 mL   Prepare Leukoreduced RBC: 2 Units    Collection Time: 01/04/24  7:23 AM   Result Value Ref Range    Unit Product Code J0921A36     Unit Number [id number]     Unit ABO A     Unit RH POS     Crossmatch Compatible     Unit Dispense Status Crossmatched     Unit Product Volume 350 mL    Unit Product Code S6032B53     Unit Number [id number]     Unit ABO A     Unit RH NEG     Crossmatch Compatible     Unit Dispense Status Return to Inv     Unit Product Volume 300 ml    Fingerstick Glucose (POCT)    Collection Time: 01/04/24  8:13 AM   Result Value Ref Range    POC Glucose 139 65 - 140 mg/dl       Imaging: I have personally reviewed pertinent imaging in PACS, including CT head, CTA head/neck, MRI brain, repeat CT head images,  and I have personally reviewed PACS reports.     EKG, Pathology, and Other Studies: I have personally reviewed pertinent reports.       VTE Prophylaxis: Sequential compression device (Venodyne)  and Heparin      Counseling / Coordination of Care  I have spent a total time of 20 minutes on 01/04/24 in caring for this patient including Diagnostic results, Prognosis, Risks and benefits of tx options, Instructions for management, Patient and family education, Importance of tx compliance, Risk factor reductions, Impressions, Counseling / Coordination of care, Documenting in the medical record, Reviewing / ordering tests, medicine, procedures  , Obtaining or reviewing history  , and Communicating with other healthcare professionals .

## 2024-01-04 NOTE — PROGRESS NOTES
Pastoral Care Progress Note    2024  Patient: Debbie Moody : 1940  Admission Date & Time: 2023 1726  MRN: [id number] CSN: [id number]       visited intubated sleeping pt. 2 dtrs at bedside.  offered spiritual/emotional support through [employer] prayers, encouraging self care, and assurance of God's loving presence. Chaplains remain available.               Chaplaincy Interventions Utilized:   Empowerment: Encouraged focus on present, Encouraged self-care, Provided anxiety containment, and Provided chaplaincy education    Exploration: Explored hope    Relationship Building: Cultivated a relationship of care and support and Listened empathically    Ritual: Provided prayer    Chaplaincy Outcomes Achieved:  Expressed gratitude and Identified priorities    Spiritual Coping Strategies Utilized:   Spiritual practices, Spiritual comfort, and Spiritual empowerment       24 1100   Clinical Encounter Type   Visited With Patient and family together   Routine Visit Introduction   Anabaptist Encounters   Anabaptist Needs Prayer

## 2024-01-04 NOTE — OCCUPATIONAL THERAPY NOTE
Occupational Therapy Cancel Note     01/04/24 0749   OT Last Visit   OT Visit Date 01/04/24   Note Type   Note Type Cancelled Session   Cancel Reasons Intubated/sedated           Natalie Rodrigues, OT

## 2024-01-04 NOTE — OP NOTE
OPERATIVE REPORT  PATIENT NAME: Debbie Moody    :  1940  MRN: [id number]  Pt Location: BE OR ROOM 09    SURGERY DATE: 1/3/2024    Surgeons and Role:     * David Higgins MD - Primary    Preop Diagnosis:  Cerebellar stroke (HCC) [I63.9]    Post-Op Diagnosis Codes:     * Cerebellar stroke (HCC) [I63.9]    Procedure(s):  Suboccipital craniectomy for posterior fossa decompression    Specimen(s):  * No specimens in log *    Estimated Blood Loss:   Minimal    Drains:  NG/OG/Enteral Tube Orogastric 18 Fr (Active)   Placement Reverification Auscultation 24 1730   Site Assessment Clean;Dry 24 1730   Enteral feeding tube interventions Flushed 24 173   Status Clamped 24 173   Number of days: 0       Urethral Catheter Temperature probe 16 Fr. (Active)   Site Assessment Clean;Skin intact 24 1715   Roberts Care Done 24 1715   Collection Container Standard drainage bag 24 1715   Securement Method Securing device (Describe) 24 1715   Output (mL) 400 mL 24 1715   Number of days: 0       Ventriculostomy/Subdural Ventricular drainage catheter Right Frontal region (Active)   Drain Status Open/CSF collection chamber 24 1730   Level At external auditory meatus 24 1730   Status To pressure monitoring 24 1730   Site Assessment Clean;Dry 24 1730   Drainage No drainage 24 1730   Output (mL) 5 mL 24 1730   Drain Level (mmHg) 10 mmHg 24 1730   CSF Color Clear 24 1730   Number of days: 0       Anesthesia Type:   General    Operative Indications:  Cerebellar stroke (HCC) [I63.9]    Complications:   None    Procedure and Technique:  81-year-old female who presented to [facility] with a left-sided cerebellar stroke.  She initially did well with conservative management.  However today she had a neuro decline.  She underwent a CT head which showed increased edema and mass effect of cerebellum into the brainstem and obstructive hydrocephalus.   She underwent EVD placement and was taken emergently for surgery for posterior fossa decompression.  I discussed risk and benefits with the family and they agreed to proceed and consent was obtained.    Patient was brought back to main operating room general endotracheal anesthesia was induced.  Appropriate lines were placed.  Preoperative antibiotics, were Ancef.  She was placed prone on the operating table and all pressure points were appropriately padded.  Head was placed in a Velásquez head clamp to 60 pounds of pressure.  A linear incision from the inion to approximately C3 was planned and prepped and draped in usual sterile fashion.  Skin was opened using 10 blade scalpel dissection was carried down to the fascia using monopolar electrocautery.  Monopolar electrocautery was used to continue the midline dissection through the nuchal ligament.  The trapezius was retracted using self-retaining retractors.  The muscle was dissected away from the skull using monopolar electrocautery.  Upgoing curettes were used to dissect out the C1 posterior arch as well as the foramen magnum.  The M8 drill bit was used to complete the suboccipital craniectomy along with Kerrison rongeurs to widen the craniectomy site.  M8 drill bit was used to also complete the C1 laminectomy.  Hemostasis was achieved using bipolar electrocautery.  The dura was noted to be quite tense and 20 cc of spinal fluid was evacuated from the EVD.  The dura was opened in a Y-shaped fashion using 11 blade and [place].  The left side cerebellum was noted to be ischemic and quite edematous.  The left-sided cerebellum herniated out through the dura on the durotomy.  Once the durotomy was completed in a Y-shaped fashion any bleeders along the cerebellum were coagulated using bipolar electrocautery.  The subarachnoid was dissected using blunt dissection and scissors.  The durotomy was carried down to the level of C1 for adequate decompression.  Once I completed  the durotomy and evacuation of CSF the cerebellar hemispheres were noted to be quite flat and decompressed.  I irrigated the wound with large amount of sterile saline.  I placed DuraGen inlay onlay as well as Tisseel over the durotomy.  I closed fascia with 0 Vicryl sutures.  Dermis with inverted 2-0 Vicryl sutures.  And the skin with staples.  After procedure was done a counts performed and all sponge instrument needle counts verified to be correct.  Patient was transported to the ICU in stable condition     I was present for the entire procedure.    Patient Disposition:  Critical Care Unit        SIGNATURE: David Higgins MD  DATE: January 3, 2024  TIME: 10:04 PM

## 2024-01-04 NOTE — ANESTHESIA POSTPROCEDURE EVALUATION
Post-Op Assessment Note    CV Status:  Stable    Pain management: adequate       Mental Status:  Sleepy (pt sedated)   Hydration Status:  Stable   PONV Controlled:  Controlled   Airway Patency:  Patent  Airway: intubated     Post Op Vitals Reviewed: Yes      Staff: Anesthesiologist, CRNA               BP   129/58   Temp      Pulse  81   Resp   14   SpO2 100 % (01/03/24 2115)

## 2024-01-04 NOTE — SPEECH THERAPY NOTE
Speech-Language Pathology Bedside Swallow Evaluation      Patient Name: Debbie Moody    Today's Date: 1/4/2024     Problem List  Principal Problem:    Acute CVA (cerebrovascular accident) (HCC)  Active Problems:    Type 2 diabetes mellitus with stage 3b chronic kidney disease, without long-term current use of insulin (HCC)    Anxiety    Essential hypertension    Simple chronic anemia    Pulmonary fibrosis (HCC)    FERCHO (acute kidney injury) (HCC)      Past Medical History  Past Medical History:   Diagnosis Date    Acute kidney injury (HCC)     Acute respiratory failure with hypoxia (HCC) 12/16/2021    Broken arm     hairline fracture/ 2 places///   right arm    Diabetes mellitus (HCC)     Diverticulitis     Pneumothorax- Resolved     Postherpetic neuralgia        Past Surgical History  Past Surgical History:   Procedure Laterality Date    CHOLECYSTECTOMY      COLON SURGERY      CRANIECTOMY N/A 1/3/2024    Procedure: SUBOCCIPITAL CRANIECTOMY FOR POSTERIOR FOSSA DECOMPRESSION; DISPOSE OF BONE FLAP;  Surgeon: David Higgins MD;  Location: [facility];  Service: Neurosurgery    HERNIA REPAIR      IR EMBOLIZATION (SPECIFY VESSEL OR SITE)  1/12/2022    ROTATOR CUFF REPAIR Right     VARICOSE VEIN SURGERY      Impression:  2/12/16 Jake Sarabia       Summary   Pt presented with adequate oral stage swallowing skills and s/s suggestive of suspected mild pharyngeal dysphagia. Pt is assessed with puree, honey thick liquid, nectar thick liquid and thin liquids. Pureed solids is the only solid consistency given due to lethargy. Retrieval via straw and tsp is adequate. Symptoms or concerns included throat clear x3 with thin liquids.     Risk/s for Aspiration: low     Recommended Diet: puree/level 1 diet and thin liquids   Recommended Form of Meds: crushed with puree   Aspiration precautions and swallowing strategies: upright posture and only feed when fully alert  Other Recommendations: Continue frequent oral care.     Current  Medical Status  Debbie Moody is a 83 y.o. 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  Patient was admitted to [facility].  Repeat head CT this morning with increasing cerebellar edema and small amount of hemorrhage.  Patient was initiated on aspirin and Plavix.  She received DDAVP.  She was initiated on 3% saline and Cardene infusion.  Transferred to neurocritical care for ongoing management and neurosurgical evaluation.     Current Precautions:  Fall  Aspiration      Allergies:  No known food allergies    Past medical history:  Please see H&P for details    Special Studies:  CT head wo contrast 1/3/24:   IMPRESSION:     Status post interval decompressive suboccipital craniectomy with expected subjacent postsurgical changes/pneumocephalus.     Similar appearance of evolving left PICA territory infarct and associated edema/mass effect and with probable petechial cortical hemorrhage compared to the most immediate prior study.     Similar appearance focal parenchymal hematoma in the left anterior cerebellar vermis adjacent to the fourth ventricle compared to the most immediate prior study. No new hemorrhage or mass effect.     Minimally improved hydrocephalus status post placement of right frontal approach ventricular catheter.       Social/Education/Vocational Hx:  Pt lives with family    Swallow Information   Current Risks for Dysphagia & Aspiration: CVA and recent surgery, fluctuating neuro status   Current Symptoms/Concerns: throat clear x3 with thin liquids  Current Diet: NPO   Baseline Diet: regular diet and thin liquids    Baseline Assessment   Behavior/Cognition: lethargic  Speech/Language Status: able to follow commands and limited verbal output  Patient Positioning: upright in bed  Pain Status/Interventions/Response to Interventions: Patient reports head pain. RN aware      Swallow Mechanism Exam  Facial: symmetrical  Labial: WFL  Lingual: WFL  Velum: unable  to visualize  Mandible: adequate ROM  Dentition: adequate  Vocal quality:weak   Respiratory Status: Pt on NC     Consistencies Assessed and Performance   Consistencies Administered: thin liquids, nectar thick, honey thick, and puree  Materials administered included applesauce, nectar thick liquid, honey thick liquid and thin liquids.     Oral Stage: WFL  Bolus formation and transfer were functional with no significant oral residue noted.  No overt s/s reduced oral control.    Pharyngeal Stage: mild  Swallow Mechanics:  Swallowing initiation appeared prompt.  Laryngeal rise was observed and judged to be within functional limits. Throat clearing observed x3 with thin liquids    Esophageal Concerns: none reported      Summary and Recommendations (see above)    Results Reviewed with: RN     Treatment Recommended: dysphagia therapy     Frequency of treatment: 2-3x week     Patient Stated Goal: none stated.     Dysphagia LTG  -Patient will demonstrate safe and effective oral intake (without overt s/s significant oral/pharyngeal dysphagia including s/s penetration or aspiration) for the highest appropriate diet level.     Short Term Goals:  -Pt will tolerate Dysphagia 1/pureed diet and thin liquid with no significant s/s oral or pharyngeal dysphagia across 1-3 diagnostic session/s    -Patient will tolerate trials of upgraded food and/or liquid texture with no significant s/s of oral or pharyngeal dysphagia including aspiration across 1-3 diagnostic sessions     -Patient will comply with a Video/Modified Barium Swallow study for more complete assessment of swallowing anatomy/physiology/aspiration risk and to assess efficacy of treatment techniques so as to best guide treatment plan    Speech Therapy Prognosis   Prognosis: good    Prognosis Considerations: age, medical status, and prior medical history

## 2024-01-04 NOTE — PROGRESS NOTES
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 Similar appearance of evolving left PICA territory infarct and associated edema/mass effect and with probable petechial cortical hemorrhage compared to the most immediate prior study. Similar appearance focal parenchymal hematoma in the left anterior cerebellar vermis adjacent to the fourth ventricle compared to the most immediate prior study. No new hemorrhage or mass effect. Minimally improved hydrocephalus status post placement of right frontal approach ventricular catheter.    Plan  Continue regular neurologic checks.   STAT CTH for decline in exam greater than 2 points in 1 hour  EVD @ 10, will raise to 15 today for hopeful quick EVD wean and removal  ICP < 20, has been <5  45cc output since placement  Ongoing medical management per primary team.   Continue HTS @ 50 for Na goal > 145. Currently 150  Correct endocrine abnormalities. K 3.3  Continue insulin gtt  SBP < 160, on cardene 6mg/hr  Wean to extubate when appropriate  Pain control per primary team  Neurology following for stroke management.   ASA d/c 1/3/23, hold for at least 2 weeks post op  PT/OT/PMR evaluation  DVT PPX: SCDs, SQH this  "afternoon    Neurosurgery will continue to follow. Please call with questions or concerns.                        Subjective/Objective     Subjective: Exam improved post op. Currently remains intubated but not sedated, awake and FC x4. EVD functioning well with good waveform, low output overnight.     Objective: Patient intubated, awake and FC        Invasive Devices       Central Venous Catheter Line  Duration             CVC Central Lines 01/03/24 <1 day              Peripheral Intravenous Line  Duration             Long-Dwell Peripheral IV (Midline) 01/03/24 Left Cephalic Vein <1 day    Peripheral IV 01/03/24 Left Forearm <1 day    Peripheral IV 01/03/24 Right;Ventral (anterior) Forearm <1 day              Arterial Line  Duration             Arterial Line 01/03/24 Left Radial <1 day              Drain  Duration             NG/OG/Enteral Tube Orogastric 18 Fr <1 day    Urethral Catheter Temperature probe 16 Fr. <1 day    Ventriculostomy/Subdural Ventricular drainage catheter Right Frontal region <1 day              Airway  Duration             ETT  Cuffed 7 mm <1 day                    Vitals: Blood pressure 140/63, pulse 88, temperature 99 °F (37.2 °C), resp. rate 13, height 4' 10\" (1.473 m), weight 64.2 kg (141 lb 8.6 oz), SpO2 100%, not currently breastfeeding.,Body mass index is 29.58 kg/m².    Hemodynamic Monitoring: ICP Mean: ICP Mean (mmHg): 4 mmHg    General appearance: alert, appears stated age, cooperative and no distress  Head: R frontal EVD site CDI. Posterior fossa incision CDI.   Eyes: EOMI, PERRL, conjugate gaze  Neck: supple, symmetrical, trachea midline   Lungs: intubated  Heart: regular heart rate  Neurologic:   Mental status: Alert, FC x4 briskly  Cranial nerves: grossly intact (Cranial nerves II-XII)  Sensory: normal to crude touch x4  Motor: moving all extremities without focal weakness     Lab Results: I have personally reviewed pertinent results.      Results from last 7 days   Lab Units " "01/04/24  0540 01/03/24 2154 01/03/24 1917 01/03/24  0636 01/01/24  0449   WBC Thousand/uL 7.92 10.76*  --  8.94 7.77   HEMOGLOBIN g/dL 9.4* 10.3*  --  12.2 10.7*   I STAT HEMOGLOBIN g/dl  --   --  7.8*  --   --    HEMATOCRIT % 28.3* 30.9*  --  37.6 33.9*   HEMATOCRIT, ISTAT %  --   --  23*  --   --    PLATELETS Thousands/uL 180 183  --  222 211   NEUTROS PCT %  --   --   --  68 78*   MONOS PCT %  --   --   --  9 7   MONO PCT %  --  2*  --   --   --    EOS PCT %  --  0  --  1 0     Results from last 7 days   Lab Units 01/04/24 0540 01/04/24  0014 01/03/24 2154 01/03/24 1917 01/01/24  1202 01/01/24  0449 12/31/23  1111 12/31/23  0504 12/30/23 2010 12/30/23  1329   POTASSIUM mmol/L 3.3* 3.9 4.1  --    < > 4.1   < > 4.0   < > 4.4   CHLORIDE mmol/L 122* 117* 112*  --    < > 116*   < > 107   < > 100   CO2 mmol/L 19* 15* 17*  --    < > 18*   < > 18*   < > 23   CO2, I-STAT mmol/L  --   --   --  17*  --   --   --   --   --   --    BUN mg/dL 23 23 21  --    < > 30*   < > 26*   < > 25   CREATININE mg/dL 1.36* 1.37* 1.41*  --    < > 1.57*   < > 1.48*   < > 1.28   CALCIUM mg/dL 8.6 8.2* 8.7  --    < > 8.3*   < > 9.7   < > 10.3*   ALK PHOS U/L  --   --   --   --   --  79  --  113*  --  131*   ALT U/L  --   --   --   --   --  8  --  10  --  12   AST U/L  --   --   --   --   --  12*  --  18  --  16   GLUCOSE, ISTAT mg/dl  --   --   --  155*  --   --   --   --   --   --     < > = values in this interval not displayed.     Results from last 7 days   Lab Units 12/31/23  0504   MAGNESIUM mg/dL 2.4     Results from last 7 days   Lab Units 12/31/23  0504   PHOSPHORUS mg/dL 3.5     Results from last 7 days   Lab Units 01/03/24  1211 12/31/23  1111 12/30/23  1329   INR  1.14 1.08 1.04   PTT seconds 30  --  28     No results found for: \"TROPONINT\"  ABG:  Lab Results   Component Value Date    PHART 7.364 01/03/2024    NJQ2TLS 33.9 (L) 01/03/2024    PO2ART 235.8 (H) 01/03/2024    NBR5IMA 18.9 (L) 01/03/2024    BEART -5.7 01/03/2024    " SOURCE Line, Arterial 01/03/2024       Imaging Studies: I have personally reviewed pertinent reports.   and I have personally reviewed pertinent films in PACS    CT head wo contrast    Result Date: 1/3/2024  Narrative: CT BRAIN - WITHOUT CONTRAST INDICATION:   Intracranial shunt placement, follow up re-evaluate post-op intracranial edema. COMPARISON: CT head examinations dated earlier same day. TECHNIQUE:  CT examination of the brain was performed.  Multiplanar 2D reformatted images were created from the source data. Radiation dose length product (DLP) for this visit:  878.45 mGy-cm .  This examination, like all CT scans performed in the [facility], was performed utilizing techniques to minimize radiation dose exposure, including the use of iterative  reconstruction and automated exposure control. IMAGE QUALITY:  Diagnostic. FINDINGS: PARENCHYMA/VENTRICLES/EXTRA-AXIAL COMPARTMENT: Patient is now status post interval decompressive suboccipital craniectomy with expected subjacent postsurgical changes and pneumocephalus. Reidentified evolving left PICA territory infarct with similar associated edema and mass effect including effacement/compression of the fourth ventricle. Similar appearance of focal 9 mm parenchymal hematoma at the left anterior vermis adjacent to the fourth ventricle. Similar probable associated petechial hemorrhage within the left posterior cerebellum. Reidentified right frontal approach ventricular catheter with distal tip terminating near the right aspect of the foramen of Funes. Persistent hydrocephalus perhaps with minimal improvement, bifrontal diameter measures 2.8 cm (previously 3 cm when measured in a similar manner). Unchanged tiny focus of gas within the right frontal horn. Similar appearance probable transependymal CSF flow. No new parenchymal hemorrhage or mass effect. VISUALIZED ORBITS: Normal visualized orbits. PARANASAL SINUSES: Normal visualized paranasal sinuses.  CALVARIUM AND EXTRACRANIAL SOFT TISSUES: Suboccipital craniectomy changes with overlying surgical staple line.     Impression: Status post interval decompressive suboccipital craniectomy with expected subjacent postsurgical changes/pneumocephalus. Similar appearance of evolving left PICA territory infarct and associated edema/mass effect and with probable petechial cortical hemorrhage compared to the most immediate prior study. Similar appearance focal parenchymal hematoma in the left anterior cerebellar vermis adjacent to the fourth ventricle compared to the most immediate prior study. No new hemorrhage or mass effect. Minimally improved hydrocephalus status post placement of right frontal approach ventricular catheter. Workstation performed: [id number]     XR chest portable    Result Date: 1/3/2024  Narrative: CHEST INDICATION:   difficulty clearing secretions; concern for aspiration. COMPARISON: CXR 12/31/2023 and 12/30/2023, CTA neck 12/30/2023, abdomen CT  12/06/2023, chest CT 1/20/2022. EXAM PERFORMED/VIEWS:  XR CHEST PORTABLE. FINDINGS: Mild cardiomegaly. Mild increased opacity in the left base. Mild fibrosis. No effusion or pneumothorax. Chronic elevation of the right diaphragm. Embolization coils projecting over the right upper quadrant. Bones normal for age.     Impression: Mild left base opacity. Aspiration not excluded. Workstation performed: [id number]     CT head wo contrast    Result Date: 1/3/2024  Narrative: CT BRAIN - WITHOUT CONTRAST INDICATION:   Hydrocephalus s/p EVD placement, hydrocephalus, exam change. COMPARISON: CT head 1/3/2024 at 8:55 a.m. TECHNIQUE:  CT examination of the brain was performed.  Multiplanar 2D reformatted images were created from the source data. Radiation dose length product (DLP) for this visit:  901.18 mGy-cm .  This examination, like all CT scans performed in the [facility], was performed utilizing techniques to minimize radiation dose exposure,  including the use of iterative  reconstruction and automated exposure control. IMAGE QUALITY:  Diagnostic. FINDINGS: PARENCHYMA: Unchanged focal parenchymal hemorrhage adjacent to the fourth ventricle measuring 0.9 cm (2/11). Unchanged left PCA infarct with left cerebellar hypodensity and mass effect with compression of the fourth ventricle. No new intraparenchymal hemorrhage identified. No midline shift. VENTRICLES AND EXTRA-AXIAL SPACES: Interval placement of a right frontal ventriculostomy catheter with the tip of the catheter near the foramen of Monro. Small amount of pneumocephalus in the frontal horn of the right lateral ventricle. Unchanged hydrocephalus. VISUALIZED ORBITS: Normal visualized orbits. PARANASAL SINUSES: Normal visualized paranasal sinuses. CALVARIUM AND EXTRACRANIAL SOFT TISSUES:  Normal.     Impression: Unchanged hydrocephalus status post placement of right frontal ventriculostomy catheter with tip near the foramen of Monro. Small amount of pneumocephalus in the frontal horn of the right lateral ventricle. Unchanged left PCA infarct with compression of the fourth ventricle and unchanged focal parenchymal hemorrhage adjacent to the fourth ventricle. The study was marked in EPIC for immediate notification. Workstation performed: [id number]     CT head wo contrast    Result Date: 1/3/2024  Narrative: CT BRAIN - WITHOUT CONTRAST INDICATION:   Mental status change, unknown cause change in mental status; recent stroke. COMPARISON: CT and MRI from 12/31/2023. TECHNIQUE:  CT examination of the brain was performed.  Multiplanar 2D reformatted images were created from the source data. Radiation dose length product (DLP) for this visit:  894.41 mGy-cm .  This examination, like all CT scans performed in the [facility], was performed utilizing techniques to minimize radiation dose exposure, including the use of iterative  reconstruction and automated exposure control. IMAGE QUALITY:   Diagnostic. FINDINGS: PARENCHYMA: There is an increased focal hyperdensity adjacent to the fourth ventricle measuring 8 x 9 mm consistent with parenchymal hemorrhage as seen on MRI. Underlying vague hyperdensity is stable and likely residual petechial hemorrhage. There is an evolving left PCA infarct with increased left cerebellar hypodensity, and worsening mass effect with occlusion of the fourth ventricle. VENTRICLES AND EXTRA-AXIAL SPACES: There is new hydrocephalus secondary to fourth ventricle compression. The third ventricle measures 1.1 cm in transverse diameter, previously 3 mm. VISUALIZED ORBITS: Normal visualized orbits. PARANASAL SINUSES: Normal visualized paranasal sinuses. CALVARIUM AND EXTRACRANIAL SOFT TISSUES:  Normal.     Impression: Evolving left PCA infarct with increased left cerebellar hypodensity and compression of the fourth ventricle resulting in hydrocephalus with increased focal parenchymal hemorrhage adjacent to the fourth ventricle. Neurosurgery consult recommended. I personally discussed this study with AMBAR KOEHLER on 1/3/2024 10:09 AM. Workstation performed: [id number]     XR chest portable    Result Date: 1/1/2024  Narrative: CHEST INDICATION:   central line placement. COMPARISON: CXR 12/30/2023, chest CT 1/20/2022. EXAM PERFORMED/VIEWS:  XR CHEST PORTABLE. FINDINGS: Right jugular catheter at cavoatrial junction. Cardiomediastinal silhouette normal. Chronic scarring with no acute disease. No effusion or pneumothorax. Chronic elevation of the right diaphragm. Embolization coils projecting over the right upper quadrant. Upper abdomen normal. Bones normal for age.     Impression: Right jugular catheter at cavoatrial junction with no pneumothorax. Chronic scarring with no acute disease. Workstation performed: [id number]     Echo complete w/ contrast if indicated    Result Date: 12/31/2023  Narrative:   Left Ventricle: Left ventricular cavity size is normal. There is mild  concentric hypertrophy. The left ventricular ejection fraction is 70%. Systolic function is vigorous. Wall motion is normal.     MRI brain wo contrast    Result Date: 12/31/2023  Narrative: MRI BRAIN WITHOUT CONTRAST INDICATION: stroke (PICA). COMPARISON: CT head without contrast 12/31/2023, 12/30/2023, 11/22/2022. CTA stroke alert head and neck 12/30/2023. TECHNIQUE:  Multiplanar, multisequence imaging of the brain was performed. IMAGE QUALITY:  Diagnostic. FINDINGS: BRAIN PARENCHYMA: Acute infarct in left PICA territory with small acute parenchymal hematoma in anterior aspect of left cerebellar vermis, petechial cortical hemorrhage along left posterior cerebellum, and similar compressive mass effect on fourth ventricle. There is no discrete mass. No midline shift. Small scattered hyperintensities on T2/FLAIR imaging are noted in the periventricular and subcortical white matter demonstrating an appearance that is statistically most likely to represent mild microangiopathic change. VENTRICLES: Similar compressive mass effect on fourth ventricle. No obstructive hydrocephalus. SELLA AND PITUITARY GLAND:  Normal. ORBITS: Sequela of bilateral cataract surgery. PARANASAL SINUSES:  Normal. VASCULATURE:  Evaluation of the major intracranial vasculature demonstrates appropriate flow voids. CALVARIUM AND SKULL BASE:  Normal. EXTRACRANIAL SOFT TISSUES: 1.6 cm bright T2/FLAIR intramuscular lesion with restricted diffusion in left temporalis muscle (4:5)     Impression: Acute infarct in left PICA territory with small acute parenchymal hematoma in anterior aspect of left cerebellar vermis, petechial cortical hemorrhage along left posterior cerebellum, and similar compressive mass effect on fourth ventricle. No obstructive hydrocephalus. Recommend consultation with neurosurgery. 1.6 cm intramuscular lesion in left temporalis muscle, indeterminate. Differential includes intramuscular epidermoid cyst, hemangioma, myxoma, among  other differentials. Mild chronic microangiopathy. The study was marked in EPIC for immediate notification. Workstation performed: [id number]     XR chest 1 view portable    Result Date: 12/31/2023  Narrative: CHEST INDICATION:   Weak. COMPARISON: 9/6/2023 EXAM PERFORMED/VIEWS:  XR CHEST PORTABLE FINDINGS: Cardiomediastinal silhouette appears unremarkable. The lungs are clear. Elevated right hemidiaphragm as before. No pneumothorax or pleural effusion. Osseous structures appear within normal limits for patient age.     Impression: No acute cardiopulmonary disease. Workstation performed: [id number]     CT head wo contrast    Result Date: 12/31/2023  Narrative: CT BRAIN - WITHOUT CONTRAST INDICATION:   Stroke, follow up distal left PICA occlusion. COMPARISON: CT/CTA from earlier the same date. TECHNIQUE:  CT examination of the brain was performed.  Multiplanar 2D reformatted images were created from the source data. Radiation dose length product (DLP) for this visit:  857.9 mGy-cm .  This examination, like all CT scans performed in the [facility], was performed utilizing techniques to minimize radiation dose exposure, including the use of iterative reconstruction and automated exposure control. IMAGE QUALITY:  Diagnostic. FINDINGS: PARENCHYMA: Limited due to persistent contrast opacification from earlier CTA. Redemonstration of acute left cerebellar PICA distribution infarct. Mass effect on the fourth ventricle. New increased attenuation in the ventral aspect of the left cerebellar infarct favored represent contrast staining but petechial hemorrhage not excluded. No acute space-occupying hematoma. No shift or herniation. Mild chronic microangiopathy. VENTRICLES AND EXTRA-AXIAL SPACES: No hydrocephalus. VISUALIZED ORBITS: Normal visualized orbits. PARANASAL SINUSES: Bilateral lens replacement. CALVARIUM AND EXTRACRANIAL SOFT TISSUES:  Normal.     Impression: Redemonstration of acute left cerebellar PICA  distribution infarct. Mild mass effect on the fourth ventricle. No hydrocephalus. Increased density within the anterior aspect of the infarct favored represent contrast staining rather than petechial hemorrhage. Workstation performed: [id number]     CT head wo contrast    Result Date: 12/31/2023  Narrative: CT BRAIN - WITHOUT CONTRAST INDICATION:   Stroke, follow up distal left PICA occlusion. COMPARISON: CTs and CTA from yesterday. TECHNIQUE:  CT examination of the brain was performed.  Multiplanar 2D reformatted images were created from the source data. Radiation dose length product (DLP) for this visit:  784.69 mGy-cm .  This examination, like all CT scans performed in the [facility], was performed utilizing techniques to minimize radiation dose exposure, including the use of iterative  reconstruction and automated exposure control. IMAGE QUALITY:  Diagnostic. FINDINGS: PARENCHYMA: Residual intravascular contrast.. Redemonstration of acute left cerebellar PICA distribution infarct. There is persistent increased attenuation in the anterior aspect of the left cerebellar infarct that may represent contrast staining but underlying petechial hemorrhage is not excluded. Recommend additional follow-up CTA and/or further evaluation with MRI. Mass effect on the fourth ventricle is slightly increased. No hydrocephalus. Stable diminished attenuation the periventricular and subcortical matter due to mild chronic microangiopathy. VENTRICLES AND EXTRA-AXIAL SPACES:  Normal for the patient's age. VISUALIZED ORBITS: Bilateral lens replacement. PARANASAL SINUSES: Normal visualized paranasal sinuses. CALVARIUM AND EXTRACRANIAL SOFT TISSUES:  Normal.     Impression: Redemonstration of evolving acute PICA distribution left cerebellar infarct. Mass effect on the fourth ventricle is slightly increased. No hydrocephalus. Persistent increased attenuation in the anterior aspect of the cerebellar infarct that could  represent residual contrast staining but petechial hemorrhage not excluded. Recommend close interval follow-up CT and/or further evaluation with MRI. The study was marked in EPIC for immediate notification. Workstation performed: [id number]     CTA stroke alert (head/neck)    Result Date: 12/30/2023  Narrative: CTA NECK AND BRAIN WITH CONTRAST INDICATION: Stroke Alert COMPARISON:   Earlier same day CT head without contrast. CTA chest PE study TECHNIQUE:   Post contrast imaging was performed after administration of iodinated contrast through the neck and brain. Post contrast axial 0.625 mm images timed to opacify the arterial system. 3D rendering was performed on an independent workstation.   MIP reconstructions performed. Coronal reconstructions were performed of the noncontrast portion of the brain. Radiation dose length product (DLP) for this visit:  428 mGy-cm .  This examination, like all CT scans performed in the [facility], was performed utilizing techniques to minimize radiation dose exposure, including the use of iterative reconstruction and automated exposure control. IV Contrast:  85 mL of iohexol (OMNIPAQUE) IMAGE QUALITY:   Diagnostic FINDINGS: CERVICAL VASCULATURE AORTIC ARCH AND GREAT VESSELS:  Mild calcified atherosclerotic disease of the arch, proximal great vessels and visualized subclavian vessels.  No significant stenosis. RIGHT VERTEBRAL ARTERY CERVICAL SEGMENT:  Normal origin. The vessel is normal in caliber throughout the neck. LEFT VERTEBRAL ARTERY CERVICAL SEGMENT:  Normal origin. The vessel is normal in caliber throughout the neck. RIGHT EXTRACRANIAL CAROTID SEGMENT:  Normal caliber common carotid artery with medialization proximally.  Minimal calcified atherosclerotic disease. Normal cervical internal carotid artery.  No stenosis or dissection. LEFT EXTRACRANIAL CAROTID SEGMENT:  Normal caliber common carotid artery with medialization proximally.  Minimal calcified  atherosclerotic disease. Normal cervical internal carotid artery. No stenosis or dissection. NASCET criteria was used to determine the degree of internal carotid artery diameter stenosis. INTRACRANIAL VASCULATURE INTERNAL CAROTID ARTERIES:  Normal enhancement of the intracranial portions of the internal carotid arteries with mild calcified atherosclerotic disease bilaterally.  Normal ophthalmic artery origins.  Normal ICA terminus. ANTERIOR CIRCULATION:  Symmetric A1 segments and anterior cerebral arteries with normal enhancement.  Normal anterior communicating artery. MIDDLE CEREBRAL ARTERY CIRCULATION:  M1 segment and middle cerebral artery branches demonstrate normal enhancement bilaterally. DISTAL VERTEBRAL ARTERIES: Patent distal vertebral arteries. Mild stenosis in bilateral vertebral artery V4 segments due to calcified atherosclerotic disease. Posterior inferior cerebellar artery origins are normal. Occluded distal aspect of left posterior inferior cerebellar artery (PICA).  Normal vertebral basilar junction. BASILAR ARTERY:  Basilar artery is is mildly tortuous and normal in caliber.  Normal superior cerebellar arteries. POSTERIOR CEREBRAL ARTERIES: Both posterior cerebral arteries arises from the basilar tip.  Both arteries demonstrate normal enhancement.   Tiny posterior communicating arteries. VENOUS STRUCTURES: Venous contamination is present with multiple collateral venous vessels throughout the neck and reflux of contrast into bilateral transverse dural venous sinuses and confluence of sinuses. NON VASCULAR ANATOMY BONY STRUCTURES:  No acute osseous abnormality. Diffuse osteopenia. Multilevel spinal degenerative changes, moderate-to-severe at C6-C7. Grade 1 anterolisthesis C7-T1. SOFT TISSUES OF THE NECK:  Normal. THORACIC INLET: Scattered reticular opacities in visualized bilateral upper and lower lobes, likely due to chronic lung disease with postinfectious scarring given similar appearance on CTA  chest PE study 1/20/2022.     Impression: Occluded distal aspect of left posterior inferior cerebellar artery (PICA). This corresponds with left PICA territory infarct seen on earlier same day head CT. Scattered reticular opacities in visualized bilateral upper and lower lobes, likely due to chronic lung disease with postinfectious scarring. Superimposed infection is difficult to exclude. Consider follow-up dedicated CT chest without contrast. Additional chronic/incidental findings as detailed above. Findings were directly discussed with Tyron Williamson 12/30/2023 at 2:54 PM. Workstation performed: [id number]     CT head without contrast    Result Date: 12/30/2023  Narrative: CT BRAIN - WITHOUT CONTRAST INDICATION:   N/V. Per epic chart review: 83-year-old female presents emergency room complaining of posterior headache, body aches abdominal pain vomiting nausea and generalized weakness. COMPARISON: CT head without contrast 11/22/2022. TECHNIQUE:  CT examination of the brain was performed.  Multiplanar 2D reformatted images were created from the source data. Radiation dose length product (DLP) for this visit:  881 mGy-cm .  This examination, like all CT scans performed in the [facility], was performed utilizing techniques to minimize radiation dose exposure, including the use of iterative reconstruction and automated exposure control. IMAGE QUALITY:  Diagnostic. 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 Decreased attenuation is noted in periventricular and subcortical white matter demonstrating an appearance that is statistically most likely to represent mild microangiopathic change.  No CT signs of acute infarction.  No intracranial mass, mass effect or midline shift.  No acute parenchymal hemorrhage. Arterial calcifications of carotid siphons and bilateral intradural vertebral arteries. VENTRICLES AND EXTRA-AXIAL SPACES:  Normal for the patient's age. VISUALIZED ORBITS:  Sequela of bilateral cataract surgery. PARANASAL SINUSES: Normal visualized paranasal sinuses. CALVARIUM AND EXTRACRANIAL SOFT TISSUES:  Normal.     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 I personally discussed this study with SOFY DE LEON JR on 12/30/2023 2:07 PM. Workstation performed: [id number]     EKG, Pathology, and Other Studies: I have personally reviewed pertinent reports.      VTE Pharmacologic Prophylaxis: Heparin    VTE Mechanical Prophylaxis: sequential compression device

## 2024-01-04 NOTE — RESPIRATORY THERAPY NOTE
RT Ventilator Management Note  Debbie Moody 83 y.o. female MRN: [id number]  Unit/Bed#: ICU 04 Encounter: [id number]      Daily Screen         1/4/2024  0733 1/4/2024  1113          Patient safety screen outcome:: Failed Passed      Not Ready for Weaning due to:: Underline problem not resolved --                Physical Exam:   Assessment Type: Assess only  General Appearance: Sedated  Respiratory Pattern: Normal  Chest Assessment: Chest expansion symmetrical  Bilateral Breath Sounds: Coarse  Cough: Productive  Suction: ET Tube  O2 Device: Ventilator      Resp Comments: pt placed on spont vent mode.

## 2024-01-04 NOTE — PLAN OF CARE
Problem: PAIN - ADULT  Goal: Verbalizes/displays adequate comfort level or baseline comfort level  Description: Interventions:  - Encourage patient to monitor pain and request assistance  - Assess pain using appropriate pain scale  - Administer analgesics based on type and severity of pain and evaluate response  - Implement non-pharmacological measures as appropriate and evaluate response  - Consider cultural and social influences on pain and pain management  - Notify physician/advanced practitioner if interventions unsuccessful or patient reports new pain  Outcome: Progressing     Problem: INFECTION - ADULT  Goal: Absence or prevention of progression during hospitalization  Description: INTERVENTIONS:  - Assess and monitor for signs and symptoms of infection  - Monitor lab/diagnostic results  - Monitor all insertion sites, i.e. indwelling lines, tubes, and drains  - Monitor endotracheal if appropriate and nasal secretions for changes in amount and color  - [facility] appropriate cooling/warming therapies per order  - Administer medications as ordered  - Instruct and encourage patient and family to use good hand hygiene technique  - Identify and instruct in appropriate isolation precautions for identified infection/condition  Outcome: Progressing  Goal: Absence of fever/infection during neutropenic period  Description: INTERVENTIONS:  - Monitor WBC    Outcome: Progressing     Problem: SAFETY ADULT  Goal: Patient will remain free of falls  Description: INTERVENTIONS:  - Educate patient/family on patient safety including physical limitations  - Instruct patient to call for assistance with activity   - Consult OT/PT to assist with strengthening/mobility   - Keep Call bell within reach  - Keep bed low and locked with side rails adjusted as appropriate  - Keep care items and personal belongings within reach  - Initiate and maintain comfort rounds  - Make Fall Risk Sign visible to staff  - Offer Toileting every 2 Hours,  in advance of need  - Initiate/Maintain bed alarm  - Obtain necessary fall risk management equipment: bed alarm  - Apply yellow socks and bracelet for high fall risk patients  - Consider moving patient to room near nurses station  Outcome: Progressing  Goal: Maintain or return to baseline ADL function  Description: INTERVENTIONS:  -  Assess patient's ability to carry out ADLs; assess patient's baseline for ADL function and identify physical deficits which impact ability to perform ADLs (bathing, care of mouth/teeth, toileting, grooming, dressing, etc.)  - Assess/evaluate cause of self-care deficits   - Assess range of motion  - Assess patient's mobility; develop plan if impaired  - Assess patient's need for assistive devices and provide as appropriate  - Encourage maximum independence but intervene and supervise when necessary  - Involve family in performance of ADLs  - Assess for home care needs following discharge   - Consider OT consult to assist with ADL evaluation and planning for discharge  - Provide patient education as appropriate  Outcome: Progressing  Goal: Maintains/Returns to pre admission functional level  Description: INTERVENTIONS:  - Perform AM-PAC 6 Click Basic Mobility/ Daily Activity assessment daily.  - Set and communicate daily mobility goal to care team and patient/family/caregiver.   - Collaborate with rehabilitation services on mobility goals if consulted  - Perform Range of Motion 3 times a day.  - Reposition patient every 2 hours.  - Dangle patient 3 times a day  - Stand patient 3 times a day  - Ambulate patient 3 times a day  - Out of bed to chair 3 times a day   - Out of bed for meals 3 times a day  - Out of bed for toileting  - Record patient progress and toleration of activity level   Outcome: Progressing     Problem: DISCHARGE PLANNING  Goal: Discharge to home or other facility with appropriate resources  Description: INTERVENTIONS:  - Identify barriers to discharge w/patient and  caregiver  - Arrange for needed discharge resources and transportation as appropriate  - Identify discharge learning needs (meds, wound care, etc.)  - Arrange for interpretive services to assist at discharge as needed  - Refer to Case Management Department for coordinating discharge planning if the patient needs post-hospital services based on physician/advanced practitioner order or complex needs related to functional status, cognitive ability, or social support system  Outcome: Progressing     Problem: Knowledge Deficit  Goal: Patient/family/caregiver demonstrates understanding of disease process, treatment plan, medications, and discharge instructions  Description: Complete learning assessment and assess knowledge base.  Interventions:  - Provide teaching at level of understanding  - Provide teaching via preferred learning methods  Outcome: Progressing     Problem: Neurological Deficit  Goal: Neurological status is stable or improving  Description: Interventions:  - Monitor and assess patient's level of consciousness, motor function, sensory function, and level of assistance needed for ADLs.   - Monitor and report changes from baseline. Collaborate with interdisciplinary team to initiate plan and implement interventions as ordered.   - Provide and maintain a safe environment.  - Consider seizure precautions.  - Consider fall precautions.  - Consider aspiration precautions.  - Consider bleeding precautions.  Outcome: Progressing     Problem: Activity Intolerance/Impaired Mobility  Goal: Mobility/activity is maintained at optimum level for patient  Description: Interventions:  - Assess and monitor patient  barriers to mobility and need for assistive/adaptive devices.  - Assess patient's emotional response to limitations.  - Collaborate with interdisciplinary team and initiate plans and interventions as ordered.  - Encourage independent activity per ability.  - Maintain proper body alignment.  - Perform  active/passive rom as tolerated/ordered.  - Plan activities to conserve energy.  - Turn patient as appropriate  Outcome: Progressing     Problem: Communication Impairment  Goal: Ability to express needs and understand communication  Description: Assess patient's communication skills and ability to understand information.  Patient will demonstrate use of effective communication techniques, alternative methods of communication and understanding even if not able to speak.     - Encourage communication and provide alternate methods of communication as needed.  - Collaborate with case management/ for discharge needs.  - Include patient/family/caregiver in decisions related to communication.  Outcome: Progressing     Problem: Potential for Aspiration  Goal: Non-ventilated patient's risk of aspiration is minimized  Description: Assess and monitor vital signs, respiratory status, and labs (WBC).  Monitor for signs of aspiration (tachypnea, cough, rales, wheezing, cyanosis, fever).    - Assess and monitor patient's ability to swallow.  - Place patient up in chair to eat if possible.  - HOB up at 90 degrees to eat if unable to get patient up into chair.  - Supervise patient during oral intake.   - Instruct patient/ family to take small bites.  - Instruct patient/ family to take small single sips when taking liquids.  - Follow patient-specific strategies generated by speech pathologist.  Outcome: Progressing  Goal: Ventilated patient's risk of aspiration is minimized  Description: Assess and monitor vital signs, respiratory status, airway cuff pressure, and labs (WBC).  Monitor for signs of aspiration (tachypnea, cough, rales, wheezing, cyanosis, fever).    - Elevate head of bed 30 degrees if patient has tube feeding.  - Monitor tube feeding.  Outcome: Progressing     Problem: Nutrition  Goal: Nutrition/Hydration status is improving  Description: Monitor and assess patient's nutrition/hydration status for  malnutrition (ex- brittle hair, bruises, dry skin, pale skin and conjunctiva, muscle wasting, smooth red tongue, and disorientation). Collaborate with interdisciplinary team and initiate plan and interventions as ordered.  Monitor patient's weight and dietary intake as ordered or per policy. Utilize nutrition screening tool and intervene per policy. Determine patient's food preferences and provide high-protein, high-caloric foods as appropriate.     - Assist patient with eating.  - Allow adequate time for meals.  - Encourage patient to take dietary supplement as ordered.  - Collaborate with clinical nutritionist.  - Include patient/family/caregiver in decisions related to nutrition.  Outcome: Progressing     Problem: Prexisting or High Potential for Compromised Skin Integrity  Goal: Skin integrity is maintained or improved  Description: INTERVENTIONS:  - Identify patients at risk for skin breakdown  - Assess and monitor skin integrity  - Assess and monitor nutrition and hydration status  - Monitor labs   - Assess for incontinence   - Turn and reposition patient  - Assist with mobility/ambulation  - Relieve pressure over bony prominences  - Avoid friction and shearing  - Provide appropriate hygiene as needed including keeping skin clean and dry  - Evaluate need for skin moisturizer/barrier cream  - Collaborate with interdisciplinary team   - Patient/family teaching  - Consider wound care consult   Outcome: Progressing     Problem: Nutrition/Hydration-ADULT  Goal: Nutrient/Hydration intake appropriate for improving, restoring or maintaining nutritional needs  Description: Monitor and assess patient's nutrition/hydration status for malnutrition. Collaborate with interdisciplinary team and initiate plan and interventions as ordered.  Monitor patient's weight and dietary intake as ordered or per policy. Utilize nutrition screening tool and intervene as necessary. Determine patient's food preferences and provide  high-protein, high-caloric foods as appropriate.     INTERVENTIONS:  - Monitor oral intake, urinary output, labs, and treatment plans  - Assess nutrition and hydration status and recommend course of action  - Evaluate amount of meals eaten  - Assist patient with eating if necessary   - Allow adequate time for meals  - Recommend/ encourage appropriate diets, oral nutritional supplements, and vitamin/mineral supplements  - Order, calculate, and assess calorie counts as needed  - Recommend, monitor, and adjust tube feedings and TPN/PPN based on assessed needs  - Assess need for intravenous fluids  - Provide specific nutrition/hydration education as appropriate  - Include patient/family/caregiver in decisions related to nutrition  Outcome: Progressing     Problem: SAFETY,RESTRAINT: NV/NON-SELF DESTRUCTIVE BEHAVIOR  Goal: Remains free of harm/injury (restraint for non violent/non self-detsructive behavior)  Description: INTERVENTIONS:  - Instruct patient/family regarding restraint use   - Assess and monitor physiologic and psychological status   - Provide interventions and comfort measures to meet assessed patient needs   - Identify and implement measures to help patient regain control  - Assess readiness for release of restraint   Outcome: Progressing  Goal: Returns to optimal restraint-free functioning  Description: INTERVENTIONS:  - Assess the patient's behavior and symptoms that indicate continued need for restraint  - Identify and implement measures to help patient regain control  - Assess readiness for release of restraint   Outcome: Progressing

## 2024-01-05 NOTE — ASSESSMENT & PLAN NOTE
2 Days Post-Op Procedure(s):  SUBOCCIPITAL CRANIECTOMY FOR POSTERIOR FOSSA DECOMPRESSION; DISPOSE OF BONE FLAP (LULA, 1/4)  PPD 2 EVD placement  Pt presented with nausea, headache and ataxia and was found to have left cerebellar stroke secondary to PICA occlusion on 12/29  Had been GCS 15, nonfocal until 1/3 when she developed acute exam decline  Currently, GCS 15 and exam nonfocal.    Imaging:  CT head wo, 1/3/2024: Status post interval decompressive suboccipital craniectomy with expected subjacent postsurgical changes/pneumocephalus. Similar appearance of evolving left PICA territory infarct and associated edema/mass effect and with probable petechial cortical hemorrhage compared to the most immediate prior study. Similar appearance focal parenchymal hematoma in the left anterior cerebellar vermis adjacent to the fourth ventricle compared to the most immediate prior study. No new hemorrhage or mass effect. Minimally improved hydrocephalus status post placement of right frontal approach ventricular catheter.    Plan  Continue regular neurologic checks.   STAT CTH for decline in exam greater than 2 points in 1 hour  EVD clamped today - likely will remove this pm.  ICP < 20, has been < 6  15 cc/24 hours  Ongoing medical management per primary team.   HTS discontinued. Na goal > 145. Currently 153  Correct endocrine abnormalities. K 3.3  Continue insulin gtt  SBP < 160, on cardene 2.5 mg/hr  Pain control per primary team  Neurology following for stroke management.   ASA d/c 1/3/23, hold for at least 2 weeks post op  PT/OT/PMR evaluation  DVT PPX: SCDs, SQH.    Neurosurgery will continue to follow. Please call with questions or concerns.

## 2024-01-05 NOTE — DISCHARGE INSTR - AVS FIRST PAGE
Discharge Instructions  Craniectomy for stroke     Surgical incision care:  Keep dressings in place for 2 days.    After 2 days, incisions may be left open to air, but should remain clean.  THREE days after surgery you START showering using a baby shampoo including head incision. Rinse off shampoo and pat dry.   Wash hair AT LEAST every other day  Continue to use clean towel and washcloth for 2 weeks post-op.  Avoid rubbing the incision but gently massage hair.   Do NOT immerse the incisions in water for 6 weeks.  Staples/suture will be removed at your 2 week postoperative visit.   Do not apply any creams or ointments to the incision, unless otherwise instructed by [facility].  Contact office if increasing redness, drainage, pain or swelling occurs around the incisions or if you develop a fever greater than 101F  Do not dye/perm hair or use any hair products until cleared by Neurosurgery.             Activity:  Do not lift, push or pull more than 10 pounds for 2 weeks.  Avoid bending, lifting and twisting for 2 weeks. No running. No athletic activities until cleared.   No driving for at least 2 weeks or until cleared by Neurosurgery.   Do not use a hair dryer, and NO hair products such as mousse, oils, and gels. Do not brush your hair away from the incision since this will put strain on the suture line.  Do NOT dye or perm hair for 6 weeks or until cleared by physician.  Continue to change bed linens and pajamas more frequently. Wear clean clothes daily.   May walk as tolerated. Recommend 4 short walks daily.         Postoperative medication:  [facility] will provide pain medication in the postoperative period. All prescriptions must come from a single practice.   Take medications as prescribed.  Call office with any questions/concerns.  May use over the counter Tylenol. No NSAIDs (ie. Ibuprofen, Aleve, Advil, Naproxen)  Please contact office for questions  regarding dosage and modifications.  No antiplatelet or anticoagulation medication (ie. Coumadin, Aspirin, Plavix) until cleared by [facility], unless otherwise instructed. Please contact [facility] if you have any questions about the effects of any of your medications on blood clotting.  Do not operate heavy machinery or vehicles while taking sedating medications.  Use a bowel regimen while on opioids as they induce constipation. Ie. Senokot-S, Miralax, Colace, etc. Increase fiber and water intake.       Follow-up as scheduled for a 2 week post-operative visit for an incision check and final pathology.     ** Please notify the office if incision becomes red, swollen, tender, or has increased drainage, and temp>101.  Return to the ER if you experience increased headache, drowsiness, weakness, nausea/vomiting, or seizures.**

## 2024-01-05 NOTE — PHYSICAL THERAPY NOTE
Physical Therapy Cancellation Note    PT orders received chart review completed. Pt with persistent nausea & vomiting and not appropriate to participate in skilled PT at this time. PT will follow and eval as medically appropriate.     01/05/24 1101   Note Type   Note type Cancelled Session   Cancel Reasons Medical status       Monica Link, PT

## 2024-01-05 NOTE — QUICK NOTE
"Asked by [facility] attending to evaluate patient at 1640 as she had a significant decline in her mental status and was dyspneic.     Upon evaluation, she was noted with labored breathing. She was unresponsive to sternal rub and pain. This was starkly different to her exam after EVD was pulled at about 1430. At that time, she was drowsy but able to awaken to voice and answer all questions appropriately.    She was placed on BIPAP and taken emergently for CT scan which indicated extensive IVH with mild hemorrhagic conversion to left cerebellum. CTA did not indicate any \"blushing.\"    Discussion was held with family who continue to want all procedures and so plans were made for EVD placement by Dr. Casas.    We will resume following patient in consultation.  "

## 2024-01-05 NOTE — PROGRESS NOTES
[facility]  Progress Note: Critical Care  Name: Debbie Moody 83 y.o. 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 Mass effect on the fourth ventricle is slightly increased. No hydrocephalus. Persistent increased attenuation in the anterior aspect of the cerebellar infarct that could represent residual contrast staining but petechial hemorrhage not excluded. Recommend close interval follow-up CT and/or further evaluation with MRI.   12/31 MRI-Acute infarct in left PICA territory with small acute parenchymal hematoma in anterior aspect of left cerebellar vermis, petechial cortical hemorrhage along left posterior cerebellum, and similar compressive mass effect on fourth ventricle. No obstructive hydrocephalus.   12/31 TTE: EF 70% L atrium is nl in size   01/03 CTH: Evolving left PCA infarct with increased left cerebellar hypodensity and compression of the fourth ventricle resulting in hydrocephalus with increased focal parenchymal hemorrhage adjacent to the fourth ventricle   S/p Suboccipital craniectomy for posterior fossa decompression 01/03/23 by Dr. Higgins   01/03 CTH post-op: Status post interval decompressive suboccipital craniectomy with expected subjacent postsurgical changes/pneumocephalus. Minimally improved hydrocephalus status post placement of right frontal approach ventricular catheter.   Etiology: suspected atheroembolic vs cardioembolic      Plan:   Q2 neuro checks for now  SBP goal 100-140  On  cardene gtt  S/p bolus of 250cc HTS  HTS stopped overnight,   Q6 BMP checks  Na goal 150-155  Neurosurgery following  EVD placed @ 15 and will allow to drain as per neurosurgery   15 ml overnight   If any acute changes in neuro exam, obtain CTH and contact neurosurg if needs suboccipital decompression   Neurology following  Continue stroke pathway  Holding ASA 81mg and DVT ppx for now  Repeat CTH if new focal deficit or decline in GCS > 2 points        CV:   Diagnosis: HTN  Plan:   Goal -140  On cardene gtt @ 2.5  Wean off cardene as able   Consider adding norvasc to help titrate off cardene   Prn labetalol   Continue home enalapril (converted to lisinopril 10mg)        - Diagnosis: HLD              Lipid panel: Cholesterol 79, 7/22,               Plan:              Lipitor 80mg      Pulm:  Diagnosis: Acute hypoxic respiratory failure, now resolved   Likely secondary to cerebellar edema affecting brain stem   Extubated 1/4/24    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  Has crackles at base of lungs posteriorly  Plan:   Xopenex PRN   On room air   IS, OOB           GI:   bowel regimen: senokot and miralax prn  BM: 01/02/23        :   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, resolved   Baseline cr: 1.1-1.3  Back to baseline, Cr 1.25  Plan: Continue to monitor   Strict I/Os  Urinary retention protocol  Avoid nephrotoxins   Encourage po hydration      - Diagnosis: Metabolic Acidosis, resolved              - Bicarb 16              - suspected from FERCHO/renal dysfunction from past HTS   - noted NAGMA was appreciated the morning after arrived to [place] and was started on HTS                 Plan               - will continue to monitor        F/E/N:   F:none   E: K> 4.0, maintain magnesium > 2.0, maintain phosphate > 3  N: Dysphagia DM diet         Heme/Onc:   Diagnosis: Anemia  Hgb stable 9.3 from 9.4  Likely from procedure   Plan: Continue iron supplementation  Trend hgb/plt count  Chemical DVT PPx hold given just had  EVD        Endo:   Diagnosis: DM type 2  A1c: 8.3  Plan:   Restarted insuline gtt given high BG   Will likely DC today, BG in the 85 overnight   Goal blood glucose 140-180        ID:   No active issues  Trend WBC/fever curve     MSK/Skin:   No active issues  Frequent repositioning   PT/OT        Lines/Drains  - continue lebron, central line    Disposition: Critical care    ICU Core Measures     A: Assess, Prevent, and Manage Pain Has pain been assessed? Yes  Need for changes to pain regimen? No   B: Both SAT/SAT  N/A   C: Choice of Sedation RASS Goal: N/A patient not on sedation  Need for changes to sedation or analgesia regimen? NA   D: Delirium CAM-ICU: Negative   E: Early Mobility  Plan for early mobility? Yes   F: Family Engagement Plan for family engagement today? Yes       Review of Invasive Devices:    Lebron Plan: Continue for accurate I/O monitoring for 48 hours  Central access plan: Medications requiring central line  Karmen Plan: Keep arterial line for hemodynamic monitoring    Prophylaxis:  VTE VTE covered by:  heparin (porcine), Subcutaneous, 5,000 Units at 01/05/24 0542       Stress Ulcer  not ordered        Significant 24hr Events     24hr events: NAEO      Subjective  : No new complaints.     Review of Systems   All other systems reviewed and are negative.       Objective                            Vitals I/O      Most Recent Min/Max in 24hrs   Temp 99 °F (37.2 °C) Temp  Min: 98.6 °F (37 °C)  Max: 99.3 °F (37.4 °C)   Pulse 76 Pulse  Min: 72  Max: 96   Resp (!) 10 Resp  Min: 10  Max: 24   /68 BP  Min: 127/61  Max: 145/60   O2 Sat 99 % SpO2  Min: 98 %  Max: 100 %      Intake/Output Summary (Last 24 hours) at 1/5/2024 0635  Last data filed at 1/5/2024 0400  Gross per 24 hour   Intake 2104.9 ml   Output 3568 ml   Net -1463.1 ml       Diet Dysphagia/Modified Consistency; Dysphagia 1-Pureed; Thin Liquid    Invasive Monitoring           Physical Exam   Physical Exam  Vitals reviewed.   Eyes:       Extraocular Movements: Extraocular movements intact.      Pupils: Pupils are equal, round, and reactive to light.      Comments: Nystagmus    Skin:     General: Skin is warm and dry.      Capillary Refill: Capillary refill takes less than 2 seconds.   HENT:      Head:      Comments: EVD drain in place   Neck:      Vascular: Central line present.   Cardiovascular:      Rate and Rhythm: Normal rate and regular rhythm.      Pulses: Normal pulses.   Musculoskeletal:         General: Normal range of motion.   Abdominal:      Palpations: Abdomen is soft.   Constitutional:       General: She is not in acute distress.     Appearance: She is well-developed and well-nourished.   Pulmonary:      Effort: Pulmonary effort is normal.      Breath sounds: Normal breath sounds.   Neurological:      General: No focal deficit present.      Mental Status: She is alert and oriented to person, place and time.      Cranial Nerves: No facial asymmetry.      Motor: No pronator drift or motor deficit.            Diagnostic Studies      EKG: No new EKG  Imaging: No new imaging       Medications:  Scheduled PRN   atorvastatin, 80 mg, QPM  chlorhexidine, 15 mL, Q12H JUAN ANTONIO  ferrous sulfate, 325 mg, Daily With Breakfast  heparin (porcine), 5,000 Units, Q8H JUAN ANTONIO  lisinopril, 10 mg, Daily  propofol, 20 mg, Once  propofol, 50 mg, Once  senna-docusate sodium, 1 tablet, HS  Succinylcholine Chloride, 100 mg, Once      acetaminophen, 650 mg, Q6H PRN  albuterol, 2.5 mg, Q4H PRN  hydrALAZINE, 10 mg, Q6H PRN  labetalol, 10 mg, Q6H PRN  ondansetron, 4 mg, Q6H PRN  polyethylene glycol, 17 g, Daily PRN       Continuous    insulin regular (HumuLIN R,NovoLIN R) 1 Units/mL in sodium chloride 0.9 % 100 mL infusion, 0.3-21 Units/hr, Last Rate: Stopped (01/05/24 0612)  niCARdipine, 1-15 mg/hr, Last Rate: 6 mg/hr (01/05/24 0538)         Labs:    CBC    Recent Labs     01/04/24  0540 01/05/24  0541   WBC 7.92 10.59*   HGB 9.4* 9.3*   HCT 28.3* 28.9*    203      BMP    Recent Labs     01/04/24  2347 01/05/24  0541   SODIUM 154* 153*   K 3.8 3.9   * 122*   CO2 19* 21   AGAP 10 10   BUN 20 19   CREATININE 1.25 1.29   CALCIUM 8.4 8.9       Coags    Recent Labs     01/03/24  1211   INR 1.14   PTT 30        Additional Electrolytes  Recent Labs     01/03/24  1917 01/03/24  2154   CAIONIZED 1.24 1.14          Blood Gas    Recent Labs     01/03/24  2155   PHART 7.364   BXH2FKA 33.9*   PO2ART 235.8*   TAW3UWL 18.9*   BEART -5.7   SOURCE Line, Arterial     Recent Labs     01/03/24  2155   SOURCE Line, Arterial    LFTs  No recent results    Infectious  No recent results  Glucose  Recent Labs     01/04/24  1139 01/04/24  1711 01/04/24  2347 01/05/24  0541   GLUC 123 121 132 92                   Linus Edwards MD

## 2024-01-05 NOTE — ASSESSMENT & PLAN NOTE
Lab Results   Component Value Date    HGBA1C 8.3 (H) 12/31/2023       Recent Labs     01/05/24  0209 01/05/24  0432 01/05/24  0611 01/05/24  0717   POCGLU 126 113 85 138       Blood Sugar Average: Last 72 hrs:  (P) [id number]

## 2024-01-05 NOTE — PROGRESS NOTES
Pastoral Care Progress Note    2024  Patient: Debbie Modoy : 1940  Admission Date & Time: 2023 1726  MRN: [id number] CSN: [id number]        Pt napping on and off during prayer, assurance of God's love, and companionship. Chaplains remain available.           Chaplaincy Interventions Utilized:   Ritual: Provided prayer    Spiritual Coping Strategies Utilized:   Connectedness, Spiritual practices, Spiritual comfort, and Spiritual empowerment       24 1200   Clinical Encounter Type   Visited With Patient   Routine Visit Follow-up   Orthodox Encounters   Orthodox Needs Prayer

## 2024-01-05 NOTE — QUICK NOTE
EVD discontinued under sterile technique. One mL 1% lidocaine injected locally to area around drain. One x figure-of-eight prolene 3-0 suture placed to EVD site. Catheter withdrawn without any complication. Patient tolerated well. Suture tied.     Neurosurgery will now sign off. Will plan for outpatient follow up in 2 weeks with CT head and incision check. Call with questions.

## 2024-01-05 NOTE — PROGRESS NOTES
Progress Note - Neurology   Debbie Moody 83 y.o. female MRN: [id number]  Unit/Bed#: ICU 04 Encounter: [id number]      Assessment/Plan     * Acute CVA (cerebrovascular accident) (HCC)  Assessment & Plan  83 y.o. female with HTN, HLD, CKD, iron deficiency anemia, pulmonary fibrosis secondary to COVID in 2021, and DM who initially presented to [facility] on 12/30/2023 with nausea, ataxia, and headache. Imaging noted L PICA infarct with occluded L PICA. She was found to have hemorrhagic conversion on neuroimaging as well as increasing posterior circulation edema/mass effect and 4th ventricle effacement so was transferred to [facility] for critical care/neurosurgery evaluation.    On 1/3, patient developed hydrocephalus s/p EVD without improvement so underwent suboccipital craniectomy for posterior fossa decompression.    Etiology of L PICA CVA/occlusion either in situ thrombosis or atheroembolic from more proximal vertebral disease. Central embolic etiology considered less likely.     Neuroimaging:  CT head (12/30) revealed acute infarct in left cerebellum PICA territory  CTA head/neck (12/30): occluded L PICA  Repeat CT head (12/30)   Re-demonstrated acute left cerebellar PICA infarct with mild mass effect on fourth ventricle. No hydrocephalus.   There was increased density within the anterior aspect of the infarct (contrast staining rather than petechial hemorrhage)  Repeat CT head (12/31)  Slightly increased mass effect on 4th ventricle; no hydrocephalus. Hyper-attenuation in cerebellar CVA (residual contrast staining vs petechial hemorrhage)    MRI brain wo (12/31):  L PICA CVA, with hemorrhagic conversion in L cerebellar vermis/petechial cortical hemorrhage in L cerebellum, similar mass effect on 4th ventricle   Repeat CT head (1/3):  Evolving left PCA infarct with increased left cerebellar hypodensity and compression of the fourth ventricle resulting in hydrocephalus with increased focal parenchymal  "hemorrhage adjacent to the fourth ventricle  Repeat CT head (1/3) after EVD and suboccipital craniectomy  S/p decompressive suboccipital craniectomy with postsurgical changes/pneumocephalus.  Similar evolving L PICA infarctand similar L anterior cerebellar vermis hematoma  Echo revealed EF 70% with normal size atrium bilaterally.  Lipid panel: Cholesterol 229,   Hemoglobin A1c 8.3    Plan:  - Loaded with DAPT on 12/30; status post DDAVP on 12/31 given hemorrhagic conversion  - Neurosurgery following; s/p EVD and suboccipital craniectomy on 1/3  - Stroke pathway ongoing  Recommend outpatient cardiac monitoring (Zio patch and/or loop)  Hold AP at this time; repeat CT head in 7-10 days and then can consider AP  Continue with Atorvastatin 80 mg daily  Currently on hypertonic saline; management per critical care team  Maintain sodium >140-145  Continue telemetry monitoring  Maintain -140s  Currently on Cardene gtt  PT/OT/ST  Secondary risk factor modification.   Stroke education  Frequent neuro checks. Continue to monitor and notify neurology with any changes.  STAT CT head for any acute change in neuro exam  - Medical management and supportive care per primary team. Correction of any metabolic or infectious disturbances      Will further discuss plan of care with attending neurologist.     Debbie Moody will need follow up in 4-6 weeks with neurovascular attending .  She will not require outpatient neurological testing.      Subjective:   Patient resting in bed, unfortunately nauseous and vomiting as examiner entered the room.  Notified nursing, Zofran given.  Denied any significant headache otherwise.  No other new/interim neurologic changes.  In reviewing notes:  Extubated yesterday.   A-line BP's with SBP around 140 this AM; MAP's in low 80's. Remains on cardene overnight/this AM.     Vitals: Blood pressure 145/70, pulse 80, temperature 99 °F (37.2 °C), resp. rate 18, height 4' 10\" (1.473 m), weight " 64.2 kg (141 lb 8.6 oz), SpO2 97%, not currently breastfeeding.,Body mass index is 29.58 kg/m².    Physical Exam:  Physical Exam  Constitutional:       Appearance: Normal appearance.   HENT:      Head: Normocephalic and atraumatic.   Eyes:      Extraocular Movements: Extraocular movements intact and EOM normal.      Conjunctiva/sclera: Conjunctivae normal.      Pupils: Pupils are equal, round, and reactive to light.   Cardiovascular:      Rate and Rhythm: Normal rate.   Pulmonary:      Effort: Pulmonary effort is normal.   Abdominal:      General: There is no distension.   Musculoskeletal:      Cervical back: Normal range of motion and neck supple.   Neurological:      Mental Status: She is alert.        Neurologic Exam     Mental Status   Awake, nauseous, oriented to self, location; active vomiting limits mental status testing; following commands. Can identify objects correctly.      Cranial Nerves     CN II   Visual fields full to confrontation.     CN III, IV, VI   Pupils are equal, round, and reactive to light.  Extraocular motions are normal.     CN V   Facial sensation intact.     CN VII   Facial expression full, symmetric.     CN VIII   CN VIII normal.     CN IX, X   CN IX normal.   CN X normal.     CN XI   CN XI normal.     Motor Exam Limited by active nausea/vomiting during exam; symmetric bilateral  strength; bilateral toe wiggling; antigravity effort with bilateral hip flexion; no obvious focal deficits.      Sensory Exam   Light touch normal.     Gait, Coordination, and Reflexes     Tremor   Resting tremor: absent  Intention tremor: absent       Lab, Imaging and other studies:   XR chest portable   Final Result by Josey Travis MD (01/04 3749)      Tubes and lines in adequate position.      No acute pulmonary pathology.               Workstation performed: [id number]         XR chest portable   Final Result by Fahad Robles MD (01/04 7529)      Endotracheal tube in the proximal right mainstem  bronchus.                  Workstation performed: [id number]         CT head wo contrast   Final Result by Victor M Alex MD (01/03 2331)      Status post interval decompressive suboccipital craniectomy with expected subjacent postsurgical changes/pneumocephalus.      Similar appearance of evolving left PICA territory infarct and associated edema/mass effect and with probable petechial cortical hemorrhage compared to the most immediate prior study.      Similar appearance focal parenchymal hematoma in the left anterior cerebellar vermis adjacent to the fourth ventricle compared to the most immediate prior study. No new hemorrhage or mass effect.      Minimally improved hydrocephalus status post placement of right frontal approach ventricular catheter.         Workstation performed: [id number]         X-ray chest 1 view   Final Result by Mannie Hollingsworth MD (01/04 1000)   Tubes and lines as described above.   Low lung volumes demonstrated without consolidation or other acute pulmonary findings.                  Workstation performed: [id number]         CT head wo contrast   Final Result by Ashleigh Aguirre MD (01/03 1609)      Unchanged hydrocephalus status post placement of right frontal ventriculostomy catheter with tip near the foramen of Monro. Small amount of pneumocephalus in the frontal horn of the right lateral ventricle.      Unchanged left PCA infarct with compression of the fourth ventricle and unchanged focal parenchymal hemorrhage adjacent to the fourth ventricle.      The study was marked in EPIC for immediate notification.      Workstation performed: [id number]         CT head wo contrast   Final Result by Albin Dillon MD (01/03 1014)      Evolving left PCA infarct with increased left cerebellar hypodensity and compression of the fourth ventricle resulting in hydrocephalus with increased focal parenchymal hemorrhage adjacent to the fourth ventricle. Neurosurgery consult recommended.      I  personally discussed this study with AMBAR KOEHLER on 1/3/2024 10:09 AM.                     Workstation performed: [id number]         XR chest portable   Final Result by Linnea Curtis MD (01/03 1711)      Mild left base opacity. Aspiration not excluded.               Workstation performed: [id number]         XR chest portable   Final Result by Linnea Curtis MD (01/01 1238)      Right jugular catheter at cavoatrial junction with no pneumothorax.      Chronic scarring with no acute disease.               Workstation performed: [id number]             CBC:   Results from last 7 days   Lab Units 01/05/24  0541 01/04/24  0540 01/03/24  2154   WBC Thousand/uL 10.59* 7.92 10.76*   RBC Million/uL 3.36* 3.45* 3.76*   HEMOGLOBIN g/dL 9.3* 9.4* 10.3*   HEMATOCRIT % 28.9* 28.3* 30.9*   MCV fL 86 82 82   PLATELETS Thousands/uL 203 180 183   , BMP/CMP:   Results from last 7 days   Lab Units 01/05/24  0541 01/04/24  2347 01/04/24  1711 01/03/24  2154 01/03/24  1917 01/01/24  1202 01/01/24  0449 12/31/23  1111 12/31/23  0504 12/30/23 2010 12/30/23  1329   SODIUM mmol/L 153* 154* 155*   < >  --    < > 142   < > 140   < > 135   POTASSIUM mmol/L 3.9 3.8 4.5   < >  --    < > 4.1   < > 4.0   < > 4.4   CHLORIDE mmol/L 122* 125* 126*   < >  --    < > 116*   < > 107   < > 100   CO2 mmol/L 21 19* 19*   < >  --    < > 18*   < > 18*   < > 23   CO2, I-STAT mmol/L  --   --   --   --  17*  --   --   --   --   --   --    BUN mg/dL 19 20 19   < >  --    < > 30*   < > 26*   < > 25   CREATININE mg/dL 1.29 1.25 1.24   < >  --    < > 1.57*   < > 1.48*   < > 1.28   GLUCOSE, ISTAT mg/dl  --   --   --   --  155*  --   --   --   --   --   --    CALCIUM mg/dL 8.9 8.4 8.5   < >  --    < > 8.3*   < > 9.7   < > 10.3*   AST U/L  --   --   --   --   --   --  12*  --  18  --  16   ALT U/L  --   --   --   --   --   --  8  --  10  --  12   ALK PHOS U/L  --   --   --   --   --   --  79  --  113*  --  131*   EGFR ml/min/1.73sq m 38 40 40   <  >  --    < > 30   < > 32   < > 39    < > = values in this interval not displayed.   , Vitamin B12:   , HgBA1C:   Results from last 7 days   Lab Units 12/31/23  0504   HEMOGLOBIN A1C % 8.3*   , TSH:   Results from last 7 days   Lab Units 12/30/23  1329   TSH 3RD GENERATON uIU/mL 2.650   , Coagulation:   Results from last 7 days   Lab Units 01/03/24  1211   INR  1.14   , Lipid Profile:   Results from last 7 days   Lab Units 12/31/23  0504   HDL mg/dL 54   LDL CALC mg/dL 141*   TRIGLYCERIDES mg/dL 172*     VTE Prophylaxis: Sequential compression device (Venodyne)  and Heparin    Total time spent today 20 minutes.

## 2024-01-05 NOTE — PLAN OF CARE
Problem: INFECTION - ADULT  Goal: Absence or prevention of progression during hospitalization  Description: INTERVENTIONS:  - Assess and monitor for signs and symptoms of infection  - Monitor lab/diagnostic results  - Monitor all insertion sites, i.e. indwelling lines, tubes, and drains  - Monitor endotracheal if appropriate and nasal secretions for changes in amount and color  - [place] appropriate cooling/warming therapies per order  - Administer medications as ordered  - Instruct and encourage patient and family to use good hand hygiene technique  - Identify and instruct in appropriate isolation precautions for identified infection/condition  Outcome: Progressing  Goal: Absence of fever/infection during neutropenic period  Description: INTERVENTIONS:  - Monitor WBC    Outcome: Progressing     Problem: PAIN - ADULT  Goal: Verbalizes/displays adequate comfort level or baseline comfort level  Description: Interventions:  - Encourage patient to monitor pain and request assistance  - Assess pain using appropriate pain scale  - Administer analgesics based on type and severity of pain and evaluate response  - Implement non-pharmacological measures as appropriate and evaluate response  - Consider cultural and social influences on pain and pain management  - Notify physician/advanced practitioner if interventions unsuccessful or patient reports new pain  Outcome: Progressing

## 2024-01-05 NOTE — PROCEDURES
Intubation    Date/Time: 1/5/2024 5:57 PM    Performed by: Linus Edwards MD  Authorized by: Linus Edwards MD    Patient location:  Bedside  Other Assisting Provider: Yes (comment) (Dr Espinoza)    Consent:     Consent obtained:  Verbal    Consent given by:  Spouse    Alternatives discussed:  No treatment  Universal protocol:     Patient identity confirmed:  Arm band  Pre-procedure details:     Patient status:  Unresponsive    Pretreatment medications:  Propofol    Paralytics:  Succinylcholine  Indications:     Indications for intubation: respiratory failure and airway protection    Procedure details:     Preoxygenation:  BiPAP    CPR in progress: no      Intubation method:  Oral    Oral intubation technique: [place].    Laryngoscope blade:  Mac 3    Tube size (mm):  7.0    Tube type:  Cuffed    Number of attempts:  1    Cricoid pressure: yes      Tube visualized through cords: yes    Placement assessment:     ETT to lip:  19    Tube secured with:  ETT ely    Breath sounds:  Equal and absent over the epigastrium    Placement verification: chest rise, condensation, direct visualization and ETCO2 detector    Post-procedure details:     Patient tolerance of procedure:  Tolerated well, no immediate complications

## 2024-01-05 NOTE — QUICK NOTE
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 Anticipate she will require inpatient rehabilitation. She may be a candidate for acute inpatient rehabilitation if shows increased tolerance and ability to participate.       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 Patient seen face to face in the ICU. She is very nauseous but denies any dizziness. She is very fatigued as well but more alert then previous exam.     Daughter is at bedside and reports large supportive family however her sister that lives with her parents is disabled and they take care of her. They live in an old [facility] home and patient is typically upstairs but FFSU can be arranged if they are able to get a bed on the first floor.       ROS:  A ten point review of systems was completed on 01/05/24 and pertinent positives are listed in subjective section. All other systems reviewed were negative.     OBJECTIVE:   /68 (BP Location: Right arm)   Pulse 92   Temp 99 °F (37.2 °C) (Bladder)   Resp 12   Ht 4' 10\" (1.473 m)   Wt 64.2 kg (141 lb 8.6 oz)   LMP  (LMP Unknown)   SpO2 94%   BMI 29.58 kg/m²     Physical Exam  Constitutional:       " General: She is not in acute distress.     Appearance: She is ill-appearing.   HENT:      Head:      Comments: EVD in place      Right Ear: External ear normal.      Left Ear: External ear normal.      Nose: Nose normal.      Mouth/Throat:      Mouth: Mucous membranes are moist.      Pharynx: Oropharynx is clear.   Eyes:      Extraocular Movements: Extraocular movements intact.   Pulmonary:      Effort: Pulmonary effort is normal. No respiratory distress.   Abdominal:      General: There is no distension.   Musculoskeletal:      Comments: Generally weak throughout   Skin:     General: Skin is warm and dry.   Neurological:      Comments: Oriented to person and place, slow to respond, minimally verbal but very nauseous    Psychiatric:         Mood and Affect: Affect is flat.        Personally reviewed on 01/05/24:   Lab Results   Component Value Date    WBC 10.59 (H) 01/05/2024    HGB 9.3 (L) 01/05/2024    HCT 28.9 (L) 01/05/2024    MCV 86 01/05/2024     01/05/2024     Lab Results   Component Value Date    SODIUM 153 (H) 01/05/2024    K 3.9 01/05/2024     (H) 01/05/2024    CO2 21 01/05/2024    BUN 19 01/05/2024    CREATININE 1.29 01/05/2024    GLUC 92 01/05/2024    CALCIUM 8.9 01/05/2024     Lab Results   Component Value Date    INR 1.14 01/03/2024    INR 1.08 12/31/2023    INR 1.04 12/30/2023    PROTIME 14.5 01/03/2024    PROTIME 14.1 12/31/2023    PROTIME 13.7 12/30/2023           Current Facility-Administered Medications:     acetaminophen (TYLENOL) tablet 650 mg, 650 mg, Oral, Q6H PRN, David Higgins MD, 650 mg at 01/02/24 0729    albuterol inhalation solution 2.5 mg, 2.5 mg, Nebulization, Q4H PRN, Violet Espinoza MD    atorvastatin (LIPITOR) tablet 80 mg, 80 mg, Oral, QPM, David Higgins MD, 80 mg at 01/04/24 1707    bisacodyl (DULCOLAX) rectal suppository 10 mg, 10 mg, Rectal, Daily PRN, Cherise Berman MD    chlorhexidine (PERIDEX) 0.12 % oral rinse 15 mL, 15 mL, Mouth/Throat, Q12H JUAN ANTONIO, David Higgins,  MD, 15 mL at 01/05/24 0830    ferrous sulfate tablet 325 mg, 325 mg, Oral, Daily With Breakfast, David Higgins MD, 325 mg at 01/05/24 0830    heparin (porcine) subcutaneous injection 5,000 Units, 5,000 Units, Subcutaneous, Q8H JUAN ANTONIO, Charlene Oliver PA-C, 5,000 Units at 01/05/24 0542    hydrALAZINE (APRESOLINE) injection 10 mg, 10 mg, Intravenous, Q6H PRN, Cherise Berman MD    insulin regular (HumuLIN R,NovoLIN R) 1 Units/mL in sodium chloride 0.9 % 100 mL infusion, 0.3-21 Units/hr, Intravenous, Titrated, BENTON Charles, Last Rate: 1 mL/hr at 01/05/24 1150, 1 Units/hr at 01/05/24 1150    labetalol (NORMODYNE) injection 10 mg, 10 mg, Intravenous, Q6H PRN, Cherise Berman MD    lisinopril (ZESTRIL) tablet 10 mg, 10 mg, Oral, Daily, David Higgins MD, 10 mg at 01/05/24 0830    niCARdipine (CARDENE) 25 mg (STANDARD CONCENTRATION) in sodium chloride 0.9% 250 mL, 1-15 mg/hr, Intravenous, Titrated, David Higgins MD, Stopped at 01/05/24 1007    ondansetron (ZOFRAN) injection 4 mg, 4 mg, Intravenous, Q6H PRN, David Higgins MD, 4 mg at 01/05/24 0519    polyethylene glycol (MIRALAX) packet 17 g, 17 g, Oral, Daily PRN, David Higgins MD    senna-docusate sodium (SENOKOT S) 8.6-50 mg per tablet 1 tablet, 1 tablet, Oral, HS, David Higgins MD, 1 tablet at 01/04/24 2207    Past Medical History:   Diagnosis Date    Acute kidney injury (HCC)     Acute respiratory failure with hypoxia (HCC) 12/16/2021    Broken arm     hairline fracture/ 2 places///   right arm    Diabetes mellitus (HCC)     Diverticulitis     Pneumothorax- Resolved     Postherpetic neuralgia        Patient Active Problem List    Diagnosis Date Noted    FERCHO (acute kidney injury) (HCC) 01/02/2024    Metabolic acidosis 12/31/2023    Anemia 12/31/2023    Acute CVA (cerebrovascular accident) (HCC) 12/30/2023    Pulmonary fibrosis ([facility]) 09/07/2023    Hypertriglyceridemia 05/05/2022    Chronic kidney disease, stage 3b ([facility]) 12/21/2021    Ambulatory dysfunction 12/20/2021    Negative  depression screening 01/07/2021    Overweight (BMI 25.0-29.9) 01/07/2021    Localized, primary osteoarthritis of hand 05/07/2020    Sciatica 05/07/2020    Hypertensive kidney disease with chronic kidney disease stage III (HCC) 06/12/2019    Type 2 diabetes mellitus with stage 3b chronic kidney disease, without long-term current use of insulin ([facility])     Groin pain, chronic, left 11/14/2018    Anxiety 01/26/2017    Abnormal ECG 07/27/2016    Diverticulitis large intestine w/o perforation or abscess w/o bleeding 07/27/2016    Essential hypertension 06/19/2016    Hypercholesterolemia 02/12/2016    Irritable bowel syndrome 02/12/2016    Simple chronic anemia 02/12/2016    Disorder of shoulder 09/08/2008    Articular cartilage disorder of shoulder region 06/09/2008          Vanessa BHARDWAJ   PM&R      I have spent a total time of 15 minutes on 01/05/24 in caring for this patient including Patient and family education, Counseling / Coordination of care, Documenting in the medical record, Obtaining or reviewing history  , and Communicating with other healthcare professionals .      ** Please Note:  voice to text software may have been used in the creation of this document. Although proof errors in transcription or interpretation are a potential of such software**

## 2024-01-05 NOTE — OCCUPATIONAL THERAPY NOTE
Occupational Therapy         Patient Name: Debbie Moody  Today's Date: 1/5/2024 01/05/24 1100   OT Last Visit   OT Visit Date 01/05/24   Note Type   Note Type Cancelled Session   Cancel Reasons Other   Pain Assessment   Pain Assessment Tool   (RN request to defer at this time)     Lea Steele

## 2024-01-05 NOTE — PROGRESS NOTES
[facility]  Progress Note  Name: Debbie Moody I  MRN: [id number]  Unit/Bed#: ICU 04 I Date of Admission: 12/31/2023   Date of Service: 1/5/2024 I Hospital Day: 5    Assessment/Plan   Type 2 diabetes mellitus with stage 3b chronic kidney disease, without long-term current use of insulin (HCC)  Assessment & Plan  Lab Results   Component Value Date    HGBA1C 8.3 (H) 12/31/2023       Recent Labs     01/05/24  0209 01/05/24  0432 01/05/24  0611 01/05/24  0717   POCGLU 126 113 85 138       Blood Sugar Average: Last 72 hrs:  (P) [id number]      * Acute CVA (cerebrovascular accident) (HCC)  Assessment & Plan  2 Days Post-Op Procedure(s):  SUBOCCIPITAL CRANIECTOMY FOR POSTERIOR FOSSA DECOMPRESSION; DISPOSE OF BONE FLAP (LULA, 1/4)  PPD 2 EVD placement  Pt presented with nausea, headache and ataxia and was found to have left cerebellar stroke secondary to PICA occlusion on 12/29  Had been GCS 15, nonfocal until 1/3 when she developed acute exam decline  Currently, GCS 15 and exam nonfocal.    Imaging:  CT head wo, 1/3/2024: Status post interval decompressive suboccipital craniectomy with expected subjacent postsurgical changes/pneumocephalus. Similar appearance of evolving left PICA territory infarct and associated edema/mass effect and with probable petechial cortical hemorrhage compared to the most immediate prior study. Similar appearance focal parenchymal hematoma in the left anterior cerebellar vermis adjacent to the fourth ventricle compared to the most immediate prior study. No new hemorrhage or mass effect. Minimally improved hydrocephalus status post placement of right frontal approach ventricular catheter.    Plan  Continue regular neurologic checks.   STAT CTH for decline in exam greater than 2 points in 1 hour  EVD clamped today - likely will remove this pm.  ICP < 20, has been < 6  15 cc/24 hours  Ongoing medical management per primary team.   HTS discontinued. Na  "goal > 145. Currently 153  Correct endocrine abnormalities. K 3.3  Continue insulin gtt  SBP < 160, on cardene 2.5 mg/hr  Pain control per primary team  Neurology following for stroke management.   ASA d/c 1/3/23, hold for at least 2 weeks post op  PT/OT/PMR evaluation  DVT PPX: SCDs, SQH.    Neurosurgery will continue to follow. Please call with questions or concerns.              Subjective/Objective   Chief Complaint: \"I'm doing ok.\"    Subjective: Patient reports she is feeling ok. C/o mild headache.    Objective: NAD. Suboccipital dressing clean and dry.    I/O         01/03 0701 01/04 0700 01/04 0701  01/05 0700 01/05 0701 01/06 0700    P.O.       I.V. (mL/kg) 2947.9 (45.9) 1887.1 (29.4)     Blood 217      NG/GT 0 60     IV Piggyback  250     Total Intake(mL/kg) 3164.9 (49.3) 2197.1 (34.2)     Urine (mL/kg/hr) 4355 (2.8) 3880 (2.5)     Emesis/NG output 0 0     Drains 45 15 0    Other 20      Blood 100      Total Output 4520 3895 0    Net -1355.1 -1697.9 0           Unmeasured Urine Occurrence 2 x      Unmeasured Emesis Occurrence  1 x             Invasive Devices       Central Venous Catheter Line  Duration             CVC Central Lines 01/03/24 1 day              Peripheral Intravenous Line  Duration             Long-Dwell Peripheral IV (Midline) 01/03/24 Left Cephalic Vein 1 day    Peripheral IV 01/03/24 Left Forearm 1 day    Peripheral IV 01/03/24 Right;Ventral (anterior) Forearm 1 day              Arterial Line  Duration             Arterial Line 01/03/24 Left Radial 1 day              Drain  Duration             Urethral Catheter Temperature probe 16 Fr. 1 day    Ventriculostomy/Subdural Ventricular drainage catheter Right Frontal region 1 day                    Physical Exam:  Vitals: Blood pressure 139/64, pulse 86, temperature 99 °F (37.2 °C), resp. rate 12, height 4' 10\" (1.473 m), weight 64.2 kg (141 lb 8.6 oz), SpO2 96%, not currently breastfeeding.,Body mass index is 29.58 kg/m².    Hemodynamic " Monitoring: MAP: Arterial Line MAP (mmHg): 74 mmHg    General appearance: alert, appears stated age, cooperative and no distress (flat affect)  Head: Normocephalic, without obvious abnormality, atraumatic  Eyes: EOMI, PERRL  Neck: supple, symmetrical, trachea midline and NT  Back: no kyphosis present, no tenderness to percussion or palpation  Lungs: non labored breathing  Heart: regular heart rate  Neurologic:   Mental status: Alert, oriented x3, thought content appropriate  Cranial nerves: grossly intact (Cranial nerves II-XII)  Sensory: normal to LT  Motor: moving all extremities without focal weakness, slow movements  Coordination: finger to nose normal bilaterally, no drift bilaterally      Lab Results:  Results from last 7 days   Lab Units 01/05/24  0541 01/04/24  0540 01/03/24 2154 01/03/24 1917 01/03/24  0636 01/01/24 0449   WBC Thousand/uL 10.59* 7.92 10.76*  --  8.94 7.77   HEMOGLOBIN g/dL 9.3* 9.4* 10.3*  --  12.2 10.7*   I STAT HEMOGLOBIN   --   --   --    < >  --   --    HEMATOCRIT % 28.9* 28.3* 30.9*  --  37.6 33.9*   HEMATOCRIT, ISTAT   --   --   --    < >  --   --    PLATELETS Thousands/uL 203 180 183  --  222 211   NEUTROS PCT % 79*  --   --   --  68 78*   MONOS PCT % 9  --   --   --  9 7   MONO PCT %  --   --  2*  --   --   --    EOS PCT % 0  --  0  --  1 0    < > = values in this interval not displayed.     Results from last 7 days   Lab Units 01/05/24  0541 01/04/24  2347 01/04/24  1711 01/03/24  2154 01/03/24  1917 01/01/24  1202 01/01/24  0449 12/31/23  1111 12/31/23  0504 12/30/23 2010 12/30/23  1329   SODIUM mmol/L 153* 154* 155*   < >  --    < > 142   < > 140   < > 135   POTASSIUM mmol/L 3.9 3.8 4.5   < >  --    < > 4.1   < > 4.0   < > 4.4   CHLORIDE mmol/L 122* 125* 126*   < >  --    < > 116*   < > 107   < > 100   CO2 mmol/L 21 19* 19*   < >  --    < > 18*   < > 18*   < > 23   CO2, I-STAT mmol/L  --   --   --   --  17*  --   --   --   --   --   --    BUN mg/dL 19 20 19   < >  --    < >  "30*   < > 26*   < > 25   CREATININE mg/dL 1.29 1.25 1.24   < >  --    < > 1.57*   < > 1.48*   < > 1.28   CALCIUM mg/dL 8.9 8.4 8.5   < >  --    < > 8.3*   < > 9.7   < > 10.3*   ALK PHOS U/L  --   --   --   --   --   --  79  --  113*  --  131*   ALT U/L  --   --   --   --   --   --  8  --  10  --  12   AST U/L  --   --   --   --   --   --  12*  --  18  --  16   GLUCOSE, ISTAT mg/dl  --   --   --   --  155*  --   --   --   --   --   --     < > = values in this interval not displayed.     Results from last 7 days   Lab Units 12/31/23  0504   MAGNESIUM mg/dL 2.4     Results from last 7 days   Lab Units 12/31/23  0504   PHOSPHORUS mg/dL 3.5     Results from last 7 days   Lab Units 01/03/24  1211 12/31/23  1111 12/30/23  1329   INR  1.14 1.08 1.04   PTT seconds 30  --  28     No results found for: \"TROPONINT\"  ABG:  Lab Results   Component Value Date    PHART 7.364 01/03/2024    [id number] 33.9 (L) 01/03/2024    PO2ART 235.8 (H) 01/03/2024    [id number] 18.9 (L) 01/03/2024    BEART -5.7 01/03/2024    SOURCE Line, Arterial 01/03/2024       Imaging Studies: I have personally reviewed pertinent reports.   and I have personally reviewed pertinent films in PACS    XR chest portable    Result Date: 1/4/2024  Impression: Tubes and lines in adequate position. No acute pulmonary pathology. Workstation performed: [id number]     XR chest portable    Result Date: 1/4/2024  Impression: Endotracheal tube in the proximal right mainstem bronchus. Workstation performed: [id number]     X-ray chest 1 view    Result Date: 1/4/2024  Impression: Tubes and lines as described above. Low lung volumes demonstrated without consolidation or other acute pulmonary findings. Workstation performed: [id number]     CT head wo contrast    Result Date: 1/3/2024  Impression: Status post interval decompressive suboccipital craniectomy with expected subjacent postsurgical changes/pneumocephalus. Similar appearance of evolving left PICA territory infarct and " associated edema/mass effect and with probable petechial cortical hemorrhage compared to the most immediate prior study. Similar appearance focal parenchymal hematoma in the left anterior cerebellar vermis adjacent to the fourth ventricle compared to the most immediate prior study. No new hemorrhage or mass effect. Minimally improved hydrocephalus status post placement of right frontal approach ventricular catheter. Workstation performed: [id number]     XR chest portable    Result Date: 1/3/2024  Impression: Mild left base opacity. Aspiration not excluded. Workstation performed: [id number]     CT head wo contrast    Result Date: 1/3/2024  Impression: Unchanged hydrocephalus status post placement of right frontal ventriculostomy catheter with tip near the foramen of Monro. Small amount of pneumocephalus in the frontal horn of the right lateral ventricle. Unchanged left PCA infarct with compression of the fourth ventricle and unchanged focal parenchymal hemorrhage adjacent to the fourth ventricle. The study was marked in EPIC for immediate notification. Workstation performed: [id number]     CT head wo contrast    Result Date: 1/3/2024  Impression: Evolving left PCA infarct with increased left cerebellar hypodensity and compression of the fourth ventricle resulting in hydrocephalus with increased focal parenchymal hemorrhage adjacent to the fourth ventricle. Neurosurgery consult recommended. I personally discussed this study with AMBAR KOEHLER on 1/3/2024 10:09 AM. Workstation performed: [id number]     XR chest portable    Result Date: 1/1/2024  Impression: Right jugular catheter at cavoatrial junction with no pneumothorax. Chronic scarring with no acute disease. Workstation performed: [id number]     MRI brain wo contrast    Result Date: 12/31/2023  Impression: Acute infarct in left PICA territory with small acute parenchymal hematoma in anterior aspect of left cerebellar vermis, petechial cortical  hemorrhage along left posterior cerebellum, and similar compressive mass effect on fourth ventricle. No obstructive hydrocephalus. Recommend consultation with neurosurgery. 1.6 cm intramuscular lesion in left temporalis muscle, indeterminate. Differential includes intramuscular epidermoid cyst, hemangioma, myxoma, among other differentials. Mild chronic microangiopathy. The study was marked in EPIC for immediate notification. Workstation performed: [id number]     CT head wo contrast    Result Date: 12/31/2023  Impression: Redemonstration of evolving acute PICA distribution left cerebellar infarct. Mass effect on the fourth ventricle is slightly increased. No hydrocephalus. Persistent increased attenuation in the anterior aspect of the cerebellar infarct that could represent residual contrast staining but petechial hemorrhage not excluded. Recommend close interval follow-up CT and/or further evaluation with MRI. The study was marked in EPIC for immediate notification. Workstation performed: [id number]       EKG, Pathology, and Other Studies: I have personally reviewed pertinent reports.      VTE Pharmacologic Prophylaxis: Sequential compression device (Venodyne)     VTE Mechanical Prophylaxis: sequential compression device

## 2024-01-05 NOTE — PLAN OF CARE
Problem: PAIN - ADULT  Goal: Verbalizes/displays adequate comfort level or baseline comfort level  Description: Interventions:  - Encourage patient to monitor pain and request assistance  - Assess pain using appropriate pain scale  - Administer analgesics based on type and severity of pain and evaluate response  - Implement non-pharmacological measures as appropriate and evaluate response  - Consider cultural and social influences on pain and pain management  - Notify physician/advanced practitioner if interventions unsuccessful or patient reports new pain  Outcome: Progressing     Problem: INFECTION - ADULT  Goal: Absence or prevention of progression during hospitalization  Description: INTERVENTIONS:  - Assess and monitor for signs and symptoms of infection  - Monitor lab/diagnostic results  - Monitor all insertion sites, i.e. indwelling lines, tubes, and drains  - Monitor endotracheal if appropriate and nasal secretions for changes in amount and color  - Jackson appropriate cooling/warming therapies per order  - Administer medications as ordered  - Instruct and encourage patient and family to use good hand hygiene technique  - Identify and instruct in appropriate isolation precautions for identified infection/condition  Outcome: Progressing  Goal: Absence of fever/infection during neutropenic period  Description: INTERVENTIONS:  - Monitor WBC    Outcome: Progressing     Problem: SAFETY ADULT  Goal: Patient will remain free of falls  Description: INTERVENTIONS:  - Educate patient/family on patient safety including physical limitations  - Instruct patient to call for assistance with activity   - Consult OT/PT to assist with strengthening/mobility   - Keep Call bell within reach  - Keep bed low and locked with side rails adjusted as appropriate  - Keep care items and personal belongings within reach  - Initiate and maintain comfort rounds  - Make Fall Risk Sign visible to staff  - Offer Toileting every 2 Hours,  in advance of need  - Initiate/Maintain bed alarm  - Obtain necessary fall risk management equipment: bed alarm  - Apply yellow socks and bracelet for high fall risk patients  - Consider moving patient to room near nurses station  Outcome: Progressing  Goal: Maintain or return to baseline ADL function  Description: INTERVENTIONS:  -  Assess patient's ability to carry out ADLs; assess patient's baseline for ADL function and identify physical deficits which impact ability to perform ADLs (bathing, care of mouth/teeth, toileting, grooming, dressing, etc.)  - Assess/evaluate cause of self-care deficits   - Assess range of motion  - Assess patient's mobility; develop plan if impaired  - Assess patient's need for assistive devices and provide as appropriate  - Encourage maximum independence but intervene and supervise when necessary  - Involve family in performance of ADLs  - Assess for home care needs following discharge   - Consider OT consult to assist with ADL evaluation and planning for discharge  - Provide patient education as appropriate  Outcome: Progressing  Goal: Maintains/Returns to pre admission functional level  Description: INTERVENTIONS:  - Perform AM-PAC 6 Click Basic Mobility/ Daily Activity assessment daily.  - Set and communicate daily mobility goal to care team and patient/family/caregiver.   - Collaborate with rehabilitation services on mobility goals if consulted  - Perform Range of Motion 3 times a day.  - Reposition patient every 2 hours.  - Dangle patient 3 times a day  - Stand patient 3 times a day  - Ambulate patient 3 times a day  - Out of bed to chair 3 times a day   - Out of bed for meals 3 times a day  - Out of bed for toileting  - Record patient progress and toleration of activity level   Outcome: Progressing     Problem: DISCHARGE PLANNING  Goal: Discharge to home or other facility with appropriate resources  Description: INTERVENTIONS:  - Identify barriers to discharge w/patient and  caregiver  - Arrange for needed discharge resources and transportation as appropriate  - Identify discharge learning needs (meds, wound care, etc.)  - Arrange for interpretive services to assist at discharge as needed  - Refer to Case Management Department for coordinating discharge planning if the patient needs post-hospital services based on physician/advanced practitioner order or complex needs related to functional status, cognitive ability, or social support system  Outcome: Progressing     Problem: Knowledge Deficit  Goal: Patient/family/caregiver demonstrates understanding of disease process, treatment plan, medications, and discharge instructions  Description: Complete learning assessment and assess knowledge base.  Interventions:  - Provide teaching at level of understanding  - Provide teaching via preferred learning methods  Outcome: Progressing     Problem: Neurological Deficit  Goal: Neurological status is stable or improving  Description: Interventions:  - Monitor and assess patient's level of consciousness, motor function, sensory function, and level of assistance needed for ADLs.   - Monitor and report changes from baseline. Collaborate with interdisciplinary team to initiate plan and implement interventions as ordered.   - Provide and maintain a safe environment.  - Consider seizure precautions.  - Consider fall precautions.  - Consider aspiration precautions.  - Consider bleeding precautions.  Outcome: Progressing     Problem: Activity Intolerance/Impaired Mobility  Goal: Mobility/activity is maintained at optimum level for patient  Description: Interventions:  - Assess and monitor patient  barriers to mobility and need for assistive/adaptive devices.  - Assess patient's emotional response to limitations.  - Collaborate with interdisciplinary team and initiate plans and interventions as ordered.  - Encourage independent activity per ability.  - Maintain proper body alignment.  - Perform  active/passive rom as tolerated/ordered.  - Plan activities to conserve energy.  - Turn patient as appropriate  Outcome: Progressing     Problem: Communication Impairment  Goal: Ability to express needs and understand communication  Description: Assess patient's communication skills and ability to understand information.  Patient will demonstrate use of effective communication techniques, alternative methods of communication and understanding even if not able to speak.     - Encourage communication and provide alternate methods of communication as needed.  - Collaborate with case management/ for discharge needs.  - Include patient/family/caregiver in decisions related to communication.  Outcome: Progressing     Problem: Potential for Aspiration  Goal: Non-ventilated patient's risk of aspiration is minimized  Description: Assess and monitor vital signs, respiratory status, and labs (WBC).  Monitor for signs of aspiration (tachypnea, cough, rales, wheezing, cyanosis, fever).    - Assess and monitor patient's ability to swallow.  - Place patient up in chair to eat if possible.  - HOB up at 90 degrees to eat if unable to get patient up into chair.  - Supervise patient during oral intake.   - Instruct patient/ family to take small bites.  - Instruct patient/ family to take small single sips when taking liquids.  - Follow patient-specific strategies generated by speech pathologist.  Outcome: Progressing  Goal: Ventilated patient's risk of aspiration is minimized  Description: Assess and monitor vital signs, respiratory status, airway cuff pressure, and labs (WBC).  Monitor for signs of aspiration (tachypnea, cough, rales, wheezing, cyanosis, fever).    - Elevate head of bed 30 degrees if patient has tube feeding.  - Monitor tube feeding.  Outcome: Progressing     Problem: Nutrition  Goal: Nutrition/Hydration status is improving  Description: Monitor and assess patient's nutrition/hydration status for  malnutrition (ex- brittle hair, bruises, dry skin, pale skin and conjunctiva, muscle wasting, smooth red tongue, and disorientation). Collaborate with interdisciplinary team and initiate plan and interventions as ordered.  Monitor patient's weight and dietary intake as ordered or per policy. Utilize nutrition screening tool and intervene per policy. Determine patient's food preferences and provide high-protein, high-caloric foods as appropriate.     - Assist patient with eating.  - Allow adequate time for meals.  - Encourage patient to take dietary supplement as ordered.  - Collaborate with clinical nutritionist.  - Include patient/family/caregiver in decisions related to nutrition.  Outcome: Progressing     Problem: Prexisting or High Potential for Compromised Skin Integrity  Goal: Skin integrity is maintained or improved  Description: INTERVENTIONS:  - Identify patients at risk for skin breakdown  - Assess and monitor skin integrity  - Assess and monitor nutrition and hydration status  - Monitor labs   - Assess for incontinence   - Turn and reposition patient  - Assist with mobility/ambulation  - Relieve pressure over bony prominences  - Avoid friction and shearing  - Provide appropriate hygiene as needed including keeping skin clean and dry  - Evaluate need for skin moisturizer/barrier cream  - Collaborate with interdisciplinary team   - Patient/family teaching  - Consider wound care consult   Outcome: Progressing     Problem: Nutrition/Hydration-ADULT  Goal: Nutrient/Hydration intake appropriate for improving, restoring or maintaining nutritional needs  Description: Monitor and assess patient's nutrition/hydration status for malnutrition. Collaborate with interdisciplinary team and initiate plan and interventions as ordered.  Monitor patient's weight and dietary intake as ordered or per policy. Utilize nutrition screening tool and intervene as necessary. Determine patient's food preferences and provide  high-protein, high-caloric foods as appropriate.     INTERVENTIONS:  - Monitor oral intake, urinary output, labs, and treatment plans  - Assess nutrition and hydration status and recommend course of action  - Evaluate amount of meals eaten  - Assist patient with eating if necessary   - Allow adequate time for meals  - Recommend/ encourage appropriate diets, oral nutritional supplements, and vitamin/mineral supplements  - Order, calculate, and assess calorie counts as needed  - Recommend, monitor, and adjust tube feedings and TPN/PPN based on assessed needs  - Assess need for intravenous fluids  - Provide specific nutrition/hydration education as appropriate  - Include patient/family/caregiver in decisions related to nutrition  Outcome: Progressing

## 2024-01-06 NOTE — PROGRESS NOTES
"Progress Note - Neurosurgery   Debbie Moody 83 y.o. 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    Plan  Continue regular neurologic checks.   STAT CTH for decline in exam greater than 2 points in 1 hour  Continue EVD at 5  ICP < 20, has been controlled overnight  78cc output since placement  Sluggish output this morning with dampened waveform. Flushed with some improvement, suspect catheter adjacent to clot, ICPs remain controlled.  Ongoing medical management per primary team.   HTS discontinued. Na goal > 145. Currently 155  Continue insulin gtt  SBP < 160  Pain control per primary team  Neurology following for stroke management.   ASA d/c 1/3/23, hold for at least 2 weeks post op  PT/OT/PMR evaluation    Subjective/Objective   Chief Complaint: Remains intubated and sedated. Sluggish EVD output with blood and clot noted in tubing. ICP controlled.    Vitals: Blood pressure 128/58, pulse 86, temperature 99 °F (37.2 °C), resp. rate 14, height 4' 10\" (1.473 m), weight 64.2 kg (141 lb 8.6 oz), SpO2 100%, not currently breastfeeding.,Body mass index is 29.58 kg/m².    Physical Exam:   Physical Exam  Neurologic Exam  Intubated, sedated  Eyes open to nox stim when sedation held  LUE withdraws  RUE flicker  BLE withdraws      Invasive Devices       Central Venous Catheter Line  Duration             CVC Central Lines 01/03/24 2 days              Peripheral Intravenous Line  Duration             Long-Dwell Peripheral IV (Midline) 01/03/24 Left Cephalic Vein 2 days    Peripheral IV 01/03/24 Left Forearm 2 days    Peripheral IV 01/03/24 Right;Ventral (anterior) Forearm 2 days              Arterial Line  Duration             Arterial Line 01/03/24 Left Radial 2 days              Drain  Duration          "    Urethral Catheter <1 day    Ventriculostomy/Subdural Ventricular drainage catheter Left Frontal region <1 day              Airway  Duration             ETT  Cuffed 7 mm <1 day                          Lab Results: I have personally reviewed pertinent results.      Imaging Studies: I have personally reviewed pertinent reports.

## 2024-01-06 NOTE — QUICK NOTE
I met with Debbie's family and spouse. I discussed that her CT scan revealed extensive intraventricular hemorrhage. My suspicion is that this hemorrhage is related to friable brain tissue from her prior stroke, but given it's proximity in time to the prior EVD removal could not rule out tract hemorrhage - although I do not see any hemorrhage within the old EVD tract.    I discussed that this is likely to result in significant long term deficits with the high likelihood of needing trach/PEG placement should aggressive management be pursued and that she may never reach her prior level of independence. We discussed placement of a contralateral EVD with risks including worsening hemorrhage, EVD clogging and infection, as well as then need for future EVD replacements and shunting. We also discussed the option of proceeding with care focused only on her comfort. They voiced that they would like to try every option available at this juncture. As such we we plan for emergent placement of a left sided EVD.

## 2024-01-06 NOTE — PROGRESS NOTES
Decreased EVD output and dampened wave form- GCS 7t- opens eyes to pain and withdrawals to pain. Pulled back 1cc under sterile condition from EVD with scientifically improved wave form. ICP 8.     BENTON Vanegas  01/06/24  5:50 AM

## 2024-01-06 NOTE — PROCEDURES
Procedural Note:  Placement of left sided external ventricular drain    The goals and alternatives to insertion of an external ventricular drain were discussed with family and spouse. The risks were discussed in detail.     1. Risk of neurological injury with new pain, weakness or numbness in the arms and legs, difficulties with speech, language and vision. The risk of seizures was described. Risk of hemorrhage requiring additional surgery was described.  2. Risk of ventriculostomy malfunction requiring replacement.  3. Risk of infection and meningitis.    Once all questions were answered to their satisfaction, they asked to proceed with surgery.    Procedure Note    A full surgical timeout was undertaken identifying the site, side and type of surgery. Care was taken to insure that the neck was in a neutral position. The left side of the head was shaved. An incision was planned in the mid pupillary line at the coronal suture. The skin was then prepped and draped in usual sterile fashion.The incision was infiltrated with 1% lidocaine with 100,000 of epinephrine.    The incision over the coronal suture was incised with a 15 blade. A twist drill was used to drill a albino hole at the coronal suture. The underlying dura was perforated.  A ventriculostomy catheter was placed perpendicular to the outer table of the skull and advanced in the mid pupillary line to 6 cm from the inner table of the skull. There was egress of CSF, which was collected for Gram stain. The ventriculostomy catheter was then tunneled approximately 5 cm using a trocar to an exit site in the temporal parietal area.    The incision was irrigated copiously. Nylon sutures were used to approximate the skin edges and as a drain stitch at the exit site. The hub was attached to the distal end of the ventriculostomy catheter and secured with a tie. This was connected to a Buretrol and left open at 5 mmHg above the external auditory meatus. Dressings were  applied by the nurse.    All sharps were discarded at the end of the case and no complications. The patient will remain in ICU for ongoing observation.

## 2024-01-06 NOTE — PROGRESS NOTES
Progress Note - Neurology   Debbie Moody 83 y.o. female [id number]  Unit/Bed#: ICU 04/ICU 04    Assessment/Plan:    * Acute CVA (cerebrovascular accident) (HCC)  Assessment & Plan  83 y.o. female with HTN, HLD, CKD, iron deficiency anemia, pulmonary fibrosis secondary to COVID in 2021, and DM initially presented to [facility] on 12/30/2023 with nausea, ataxia, and headache. Imaging noted L PICA infarct with occluded L PICA. She was found to have hemorrhagic conversion on neuroimaging as well as increasing posterior circulation edema/mass effect and 4th ventricle effacement so was transferred to [facility] for critical care/neurosurgery evaluation. Etiology of L PICA CVA/occlusion either in situ thrombosis or atheroembolic from more proximal vertebral disease. Central embolic etiology considered less likely.     On 1/3, patient developed hydrocephalus s/p EVD without improvement so underwent suboccipital craniectomy for posterior fossa decompression. EVD eventually removed on 1/5. Later in day, patient's mental status declined. She was unresponsive with labored breathing. She required bipap and was taken emergently to CT which indicated extensive IVH with mild hemorrhagic conversion to left cerebellum. An EVD was replaced.       Neuroimaging:  CT head (12/30) revealed acute infarct in left cerebellum PICA territory  CTA head/neck (12/30): occluded L PICA  Repeat CT head (12/30)   Re-demonstrated acute left cerebellar PICA infarct with mild mass effect on fourth ventricle. No hydrocephalus.   There was increased density within the anterior aspect of the infarct (contrast staining rather than petechial hemorrhage)  Repeat CT head (12/31)  Slightly increased mass effect on 4th ventricle; no hydrocephalus. Hyper-attenuation in cerebellar CVA (residual contrast staining vs petechial hemorrhage)    MRI brain wo (12/31):  L PICA CVA, with hemorrhagic conversion in L cerebellar vermis/petechial cortical hemorrhage  in L cerebellum, similar mass effect on 4th ventricle   Repeat CT head (1/3):  Evolving left PCA infarct with increased left cerebellar hypodensity and compression of the fourth ventricle resulting in hydrocephalus with increased focal parenchymal hemorrhage adjacent to the fourth ventricle  Repeat CT head (1/3) after EVD and suboccipital craniectomy  S/p decompressive suboccipital craniectomy with postsurgical changes/pneumocephalus.  Similar evolving L PICA infarctand similar L anterior cerebellar vermis hematoma  Repeat CTH (1/5) after worsening as detailed above.   Echo revealed EF 70% with normal size atrium bilaterally.  Lipid panel: Cholesterol 229,   Hemoglobin A1c 8.3    Plan:  - Loaded with DAPT on 12/30; status post DDAVP on 12/31 given hemorrhagic conversion  - AP on hold at this time   - Can continue atorvastatin daily   - Neurosurgery following; see notes for details regarding plan.   - Recommend eventual outpatient cardiac rhythm monitoring  - PT/OT when able   - Serial neurologic exams, continue to wean sedation as able  - Medical management and supportive care per primary team. Correction of any metabolic or infectious disturbances        Recommendations for outpatient neurologic follow-up are yet to be determined.  Pending clinical progress.         Subjective:   Patient seen and examined with attending neurologist.  Family present at bedside.  Patient is currently on sedation with propofol, so exam is limited at this time.  Family was updated regarding imaging results.        Past Medical History:   Diagnosis Date    Acute kidney injury (HCC)     Acute respiratory failure with hypoxia (HCC) 12/16/2021    Broken arm     hairline fracture/ 2 places///   right arm    Diabetes mellitus (HCC)     Diverticulitis     Pneumothorax- Resolved     Postherpetic neuralgia      Past Surgical History:   Procedure Laterality Date    CHOLECYSTECTOMY      COLON SURGERY      CRANIECTOMY N/A 1/3/2024     Procedure: SUBOCCIPITAL CRANIECTOMY FOR POSTERIOR FOSSA DECOMPRESSION; DISPOSE OF BONE FLAP;  Surgeon: David Higgins MD;  Location: [facility];  Service: Neurosurgery    HERNIA REPAIR      IR EMBOLIZATION (SPECIFY VESSEL OR SITE)  1/12/2022    ROTATOR CUFF REPAIR Right     VARICOSE VEIN SURGERY      Impression:  2/12/16 Jake Sarabia     Family History   Problem Relation Age of Onset    Diabetes Mother     No Known Problems Father      Social History     Socioeconomic History    Marital status: /Civil Union     Spouse name: Not on file    Number of children: Not on file    Years of education: Not on file    Highest education level: Not on file   Occupational History    Not on file   Tobacco Use    Smoking status: Never    Smokeless tobacco: Never   Vaping Use    Vaping status: Never Used   Substance and Sexual Activity    Alcohol use: Not Currently     Comment: Rarely    Drug use: Never    Sexual activity: Not Currently   Other Topics Concern    Not on file   Social History Narrative    Always uses seatbelt    No caffeine use     Social Determinants of Health     Financial Resource Strain: Low Risk  (9/1/2022)    Overall Financial Resource Strain (CARDIA)     Difficulty of Paying Living Expenses: Not hard at all   Food Insecurity: No Food Insecurity (1/1/2024)    Hunger Vital Sign     Worried About Running Out of Food in the Last Year: Never true     Ran Out of Food in the Last Year: Never true   Transportation Needs: No Transportation Needs (1/1/2024)    PRAPARE - Transportation     Lack of Transportation (Medical): No     Lack of Transportation (Non-Medical): No   Physical Activity: Not on file   Stress: Not on file   Social Connections: Not on file   Intimate Partner Violence: Not on file   Housing Stability: Low Risk  (1/1/2024)    Housing Stability Vital Sign     Unable to Pay for Housing in the Last Year: No     Number of Places Lived in the Last Year: 1     Unstable Housing in the Last Year: No          Medications:  All current active meds have been reviewed and current meds:  Scheduled Meds:  Current Facility-Administered Medications   Medication Dose Route Frequency Provider Last Rate    acetaminophen  650 mg Oral Q6H PRN David Higgins MD      albuterol  2.5 mg Nebulization Q4H PRN Violet Espinoza MD      atorvastatin  80 mg Oral QPM David Higgins MD      bisacodyl  10 mg Rectal Daily PRN Cherise Berman MD      chlorhexidine  15 mL Mouth/Throat Q12H [facility] David Higgins MD      ferrous sulfate  325 mg Oral Daily With Breakfast David Higgins MD      heparin (porcine)  5,000 Units Subcutaneous Q8H [facility] Charlene Oliver PA-C      hydrALAZINE  10 mg Intravenous Q6H PRN Cherise Berman MD      insulin regular (HumuLIN R,NovoLIN R) 1 Units/mL in sodium chloride 0.9 % 100 mL infusion  0.3-21 Units/hr Intravenous Titrated BENTON Charles 1.5 Units/hr (01/06/24 1010)    labetalol  10 mg Intravenous Q6H PRN Cherise Berman MD      lisinopril  10 mg Oral Daily David Higgins MD      niCARdipine  1-15 mg/hr Intravenous Titrated David Higgins MD 5 mg/hr (01/06/24 1110)    ondansetron  4 mg Intravenous Q6H PRN David Higgins MD      polyethylene glycol  17 g Oral Daily PRN David Higgins MD      propofol  5-50 mcg/kg/min Intravenous Titrated Linus Edwards MD Stopped (01/06/24 1019)    senna-docusate sodium  1 tablet Oral HS David Higgins MD       Continuous Infusions:insulin regular (HumuLIN R,NovoLIN R) 1 Units/mL in sodium chloride 0.9 % 100 mL infusion, 0.3-21 Units/hr, Last Rate: 1.5 Units/hr (01/06/24 1010)  niCARdipine, 1-15 mg/hr, Last Rate: 5 mg/hr (01/06/24 1110)  propofol, 5-50 mcg/kg/min, Last Rate: Stopped (01/06/24 1019)      PRN Meds:.  acetaminophen    albuterol    bisacodyl    hydrALAZINE    labetalol    ondansetron    polyethylene glycol       ROS:   Review of Systems   Unable to perform ROS: Intubated             Vitals:   /59 (BP Location: Right arm)   Pulse 102   Temp 100 °F (37.8 °C) (Bladder)   Resp 14   Ht  "4' 10\" (1.473 m)   Wt 64.2 kg (141 lb 8.6 oz)   LMP  (LMP Unknown)   SpO2 100%   BMI 29.58 kg/m²       Physical Exam:   Vital signs and nursing notes reviewed.   Sedated with propofol 20 at time of exam.     Constitutional: Ill appearing, but in no acute distress. Unresponsive to verbal stimuli.   HENT: Normocephalic, atraumatic.  Eyes: Gaze is conjugate when eyes passively opened, some roving eye movements. Pupils reactive. No blink to threat. No scleral icterus or injection. No discharge.   Cardiovascular: Regular rate per review of telemetry.    Pulmonary: No respiratory distress. Effort normal. Intubated.  Musculoskeletal: No spontaneous movements of extremities. Motor exam is detailed below. Cannot assess ROM at this time.   Neurological: Detailed below.   Skin: Warm and dry.  Psychiatric: Cannot assess at this time due to mental status.     Neurologic Exam:  Mental Status: Patient is unresponsive to verbal and tactile stimuli. Cannot participate in orientation questions and does not follow commands.     Cranial Nerves:   Does not blink to threat  Conjugate gaze present when eyes passively opened. Roving eye movements.   PERRL  Corneal reflexes intact, weak b/l.   No obvious facial asymmetry   Remainder of CN testing limited by mental status.     Motor: No spontaneous movements of any extremity. Triple flexion in B/L LE.    Sensation: No grimace to noxious stimuli     Coordination: Cannot assess due to mental status     No tremors noted during exam.      Gait: Cannot assess due to mental status         Labs: I have personally reviewed pertinent reports.   Recent Results (from the past 24 hour(s))   Fingerstick Glucose (POCT)    Collection Time: 01/05/24 11:47 AM   Result Value Ref Range    POC Glucose 160 (H) 65 - 140 mg/dl   Fingerstick Glucose (POCT)    Collection Time: 01/05/24  1:51 PM   Result Value Ref Range    POC Glucose 169 (H) 65 - 140 mg/dl   Basic metabolic panel    Collection Time: 01/05/24  " 3:09 PM   Result Value Ref Range    Sodium 149 (H) 135 - 147 mmol/L    Potassium 3.6 3.5 - 5.3 mmol/L    Chloride 119 (H) 96 - 108 mmol/L    CO2 23 21 - 32 mmol/L    ANION GAP 7 mmol/L    BUN 20 5 - 25 mg/dL    Creatinine 1.25 0.60 - 1.30 mg/dL    Glucose 169 (H) 65 - 140 mg/dL    Calcium 8.9 8.4 - 10.2 mg/dL    eGFR 40 ml/min/1.73sq m   Fingerstick Glucose (POCT)    Collection Time: 01/05/24  4:12 PM   Result Value Ref Range    POC Glucose 228 (H) 65 - 140 mg/dl   ECG 12 lead    Collection Time: 01/05/24  4:23 PM   Result Value Ref Range    Ventricular Rate 70 BPM    Atrial Rate 70 BPM    AL Interval 0 ms    QRSD Interval 96 ms    QT Interval 375 ms    QTC Interval 405 ms    P Axis  degrees    QRS Axis -28 degrees    T Wave Axis 138 degrees   Fingerstick Glucose (POCT)    Collection Time: 01/05/24  5:35 PM   Result Value Ref Range    POC Glucose 158 (H) 65 - 140 mg/dl   Fingerstick Glucose (POCT)    Collection Time: 01/05/24  6:11 PM   Result Value Ref Range    POC Glucose 113 65 - 140 mg/dl   Fingerstick Glucose (POCT)    Collection Time: 01/05/24  8:02 PM   Result Value Ref Range    POC Glucose 154 (H) 65 - 140 mg/dl   Fingerstick Glucose (POCT)    Collection Time: 01/05/24  9:54 PM   Result Value Ref Range    POC Glucose 150 (H) 65 - 140 mg/dl   Basic metabolic panel    Collection Time: 01/05/24 11:01 PM   Result Value Ref Range    Sodium 155 (H) 135 - 147 mmol/L    Potassium 3.4 (L) 3.5 - 5.3 mmol/L    Chloride 126 (H) 96 - 108 mmol/L    CO2 21 21 - 32 mmol/L    ANION GAP 8 mmol/L    BUN 19 5 - 25 mg/dL    Creatinine 1.17 0.60 - 1.30 mg/dL    Glucose 161 (H) 65 - 140 mg/dL    Calcium 8.4 8.4 - 10.2 mg/dL    eGFR 43 ml/min/1.73sq m   Fingerstick Glucose (POCT)    Collection Time: 01/05/24 11:58 PM   Result Value Ref Range    POC Glucose 167 (H) 65 - 140 mg/dl   Fingerstick Glucose (POCT)    Collection Time: 01/06/24  2:17 AM   Result Value Ref Range    POC Glucose 183 (H) 65 - 140 mg/dl   Fingerstick Glucose  (POCT)    Collection Time: 01/06/24  5:05 AM   Result Value Ref Range    POC Glucose 223 (H) 65 - 140 mg/dl   CBC and differential    Collection Time: 01/06/24  5:07 AM   Result Value Ref Range    WBC 7.79 4.31 - 10.16 Thousand/uL    RBC 2.92 (L) 3.81 - 5.12 Million/uL    Hemoglobin 8.2 (L) 11.5 - 15.4 g/dL    Hematocrit 26.0 (L) 34.8 - 46.1 %    MCV 89 82 - 98 fL    MCH 28.1 26.8 - 34.3 pg    MCHC 31.5 31.4 - 37.4 g/dL    RDW 15.4 (H) 11.6 - 15.1 %    MPV 9.8 8.9 - 12.7 fL    Platelets 205 149 - 390 Thousands/uL    nRBC 0 /100 WBCs    Neutrophils Relative 74 43 - 75 %    Immat GRANS % 1 0 - 2 %    Lymphocytes Relative 17 14 - 44 %    Monocytes Relative 8 4 - 12 %    Eosinophils Relative 0 0 - 6 %    Basophils Relative 0 0 - 1 %    Neutrophils Absolute 5.78 1.85 - 7.62 Thousands/µL    Immature Grans Absolute 0.09 0.00 - 0.20 Thousand/uL    Lymphocytes Absolute 1.32 0.60 - 4.47 Thousands/µL    Monocytes Absolute 0.59 0.17 - 1.22 Thousand/µL    Eosinophils Absolute 0.00 0.00 - 0.61 Thousand/µL    Basophils Absolute 0.01 0.00 - 0.10 Thousands/µL   Magnesium    Collection Time: 01/06/24  5:07 AM   Result Value Ref Range    Magnesium 1.9 1.9 - 2.7 mg/dL   Phosphorus    Collection Time: 01/06/24  5:07 AM   Result Value Ref Range    Phosphorus 1.5 (L) 2.3 - 4.1 mg/dL   Basic metabolic panel    Collection Time: 01/06/24  5:07 AM   Result Value Ref Range    Sodium 154 (H) 135 - 147 mmol/L    Potassium 4.2 3.5 - 5.3 mmol/L    Chloride 128 (H) 96 - 108 mmol/L    CO2 20 (L) 21 - 32 mmol/L    ANION GAP 6 mmol/L    BUN 23 5 - 25 mg/dL    Creatinine 1.34 (H) 0.60 - 1.30 mg/dL    Glucose 196 (H) 65 - 140 mg/dL    Calcium 8.3 (L) 8.4 - 10.2 mg/dL    eGFR 36 ml/min/1.73sq m   Fingerstick Glucose (POCT)    Collection Time: 01/06/24  6:14 AM   Result Value Ref Range    POC Glucose 172 (H) 65 - 140 mg/dl   Fingerstick Glucose (POCT)    Collection Time: 01/06/24  8:02 AM   Result Value Ref Range    POC Glucose 138 65 - 140 mg/dl    Fingerstick Glucose (POCT)    Collection Time: 01/06/24 10:10 AM   Result Value Ref Range    POC Glucose 122 65 - 140 mg/dl       Imaging: I have personally reviewed pertinent imaging in PACS, including repeat CTH x 2, repeat CTA,  and I have personally reviewed PACS reports.     EKG, Pathology, and Other Studies: I have personally reviewed pertinent reports.       VTE Prophylaxis: Sequential compression device (Venodyne)       Counseling / Coordination of Care  Total time spent today 30 minutes.  Greater than 50% of total time was spent with the patient and/or family counseling and/or coordination of care.  A description of the counseling/coordination of care:  Patient was seen and evaluated.  Chart reviewed thoroughly including laboratory and imaging studies.  Plan of care discussed with attending neurologist and primary team.    History and physical examination obtained by attending neurologist. AP in room as witness to history and physical examination and acted solely as a scribe for attending neurologist for this encounter. All medical decision making per attending.

## 2024-01-06 NOTE — PLAN OF CARE
Problem: SAFETY ADULT  Goal: Patient will remain free of falls  Description: INTERVENTIONS:  - Educate patient/family on patient safety including physical limitations  - Instruct patient to call for assistance with activity   - Consult OT/PT to assist with strengthening/mobility   - Keep Call bell within reach  - Keep bed low and locked with side rails adjusted as appropriate  - Keep care items and personal belongings within reach  - Initiate and maintain comfort rounds  - Make Fall Risk Sign visible to staff  - Apply yellow socks and bracelet for high fall risk patients  - Consider moving patient to room near nurses station  Outcome: Progressing  Goal: Maintain or return to baseline ADL function  Description: INTERVENTIONS:  -  Assess patient's ability to carry out ADLs; assess patient's baseline for ADL function and identify physical deficits which impact ability to perform ADLs (bathing, care of mouth/teeth, toileting, grooming, dressing, etc.)  - Assess/evaluate cause of self-care deficits   - Assess range of motion  - Assess patient's mobility; develop plan if impaired  - Assess patient's need for assistive devices and provide as appropriate  - Encourage maximum independence but intervene and supervise when necessary  - Involve family in performance of ADLs  - Assess for home care needs following discharge   - Consider OT consult to assist with ADL evaluation and planning for discharge  - Provide patient education as appropriate  Outcome: Progressing  Goal: Maintains/Returns to pre admission functional level  Description: INTERVENTIONS:  - Perform AM-PAC 6 Click Basic Mobility/ Daily Activity assessment daily.  - Set and communicate daily mobility goal to care team and patient/family/caregiver.   - Collaborate with rehabilitation services on mobility goals if consulted  - Out of bed for toileting  - Record patient progress and toleration of activity level   Outcome: Progressing

## 2024-01-06 NOTE — PROGRESS NOTES
[facility]  Progress Note: Critical Care  Name: Debbie Moody 83 y.o. 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 Mass effect on the fourth ventricle is slightly increased. No hydrocephalus. Persistent increased attenuation in the anterior aspect of the cerebellar infarct that could represent residual contrast staining but petechial hemorrhage not excluded. Recommend close interval follow-up CT and/or further evaluation with MRI.   12/31 MRI-Acute infarct in left PICA territory with small acute parenchymal hematoma in anterior aspect of left cerebellar vermis, petechial cortical hemorrhage along left posterior cerebellum, and similar compressive mass effect on fourth ventricle. No obstructive hydrocephalus.   12/31 TTE: EF 70% L atrium is nl in size   01/03 CTH: Evolving left PCA infarct with increased left cerebellar hypodensity and compression of the fourth ventricle resulting in hydrocephalus with increased focal parenchymal hemorrhage adjacent to the fourth ventricle   S/p Suboccipital craniectomy for posterior fossa decompression 01/03/23 by Dr. Higgins   01/03 CTH post-op: Status post interval decompressive suboccipital craniectomy with expected subjacent postsurgical changes/pneumocephalus. Minimally improved hydrocephalus status post placement of right frontal approach ventricular catheter.   01/05 CTH: Extensive ventricular hemorrhage   Etiology: suspected atheroembolic vs cardioembolic   EVH from suspected       Plan:   Q2 neuro checks for now  SBP goal 100-160  On cardene gtt  S/p bolus of 250cc HTS  HTS stopped 01/05  - Prop @ 30 for sedation  Neurosurgery following  R EVD removed 01/05 am and L EVD @5 placed in the evening   ICP: 5-7, CPP: 60-70  Decreased drainage overnight likely due to clot and was pulled back 1 cc that showed improved waveform  If any acute changes in neuro exam, obtain CTH and contact neurosurg if needs suboccipital decompression   Neurology following  Continue stroke pathway  Hold AC/AP  Repeat CTH if new focal deficit or decline in GCS > 2 points        CV:   Diagnosis: HTN  Plan:   Goal -160  On cardene gtt @ 7.5  Wean off cardene as able   Prn labetalol   Continue home enalapril (converted to lisinopril 10mg)        - Diagnosis: HLD              Lipid panel: Cholesterol 79, 7/22,               Plan:              Lipitor 80mg      Pulm:  Diagnosis: Acute hypoxic respiratory failure, now resolved   Likely secondary to cerebellar edema affecting brain stem   Extubated 1/4/24  Reintubated 01/05 due to unable to protect airway   Plan   SAT/SBT as able   Wean MV as able      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  Has crackles at base of lungs posteriorly  Plan:   Xopenex PRN   On MV as per above           GI:   bowel regimen: senokot and miralax prn  BM: 01/02/23        :   Diagnosis:CKD3b   Baseline cr: 1.1-1.3  Back to baseline, Cr 1.17  Plan: Continue to monitor   Strict I/Os  Urinary retention protocol  Avoid nephrotoxins   Encourage po hydration        F/E/N:   F:none   E: K> 4.0, maintain magnesium > 2.0, maintain phosphate > 3  N: TF w/ glucerna and prosource packets w/ nutrition consult         Heme/Onc:   Diagnosis: Anemia  Hgb 8.2 from 9.3  Likely due to noted IVH  Plan: Continue iron supplementation  Trend hgb/plt count  Hold chemical DVT ppx for at least 24 hours until stable CTH        Endo:   Diagnosis: DM type 2  A1c: 8.3  Plan:   On insulin gtt   46 units over  yesterday   Continue for now since just started TF  Goal blood glucose 140-180        ID:   No active issues  Trend WBC/fever curve     MSK/Skin:   No active issues  Frequent repositioning   PT/OT        Lines/Drains  - continue lebron, central line       Disposition: Critical care    ICU Core Measures     Vented Patient  VAP Bundle  VAP bundle ordered     A: Assess, Prevent, and Manage Pain Has pain been assessed? Yes  Need for changes to pain regimen? No   B: Both Spontaneous Awakening Trials (SATs) and Spontaneous Breathing Trials (SBTs) Plan to perform spontaneous awakening trial today? Yes   Plan to perform spontaneous breathing trial today? Yes   Obvious barriers to extubation? Yes   C: Choice of Sedation RASS Goal: -3 Moderate Sedation or 0 Alert and Calm  Need for changes to sedation or analgesia regimen? No   D: Delirium CAM-ICU: Negative   E: Early Mobility  Plan for early mobility? Yes   F: Family Engagement Plan for family engagement today? Yes       Review of Invasive Devices:    Lebron Plan: Continue for accurate I/O monitoring for 48 hours  Central access plan: Patient has multiple central venous catheters.  Medications requiring central line Hemodynamic monitoring  Karmen Plan: Keep arterial line for hemodynamic monitoring, frequent ABGs, and frequent labs    Prophylaxis:  VTE VTE covered by:  heparin (porcine), Subcutaneous, 5,000 Units at 01/06/24 0527       Stress Ulcer  not ordered        Significant 24hr Events     HPI: Debbie Moody is a 83 y.o. who presents with weakness, headache, nausea and vomiting. She has a PMH of Covid 19 pneumonia in 12/2021 complicated by pneumothoraces and now with pulmonary fibrosis, CKD, DM, HTN, C. Diff colitis in 9/2023. She reported that she woke up this morning at 0500 with nausea and vomiting and needed assistance to walk. Earlier this afternoon she had a posterior headache, weakness, and continued vomiting and presented to the ED. Stroke alert was called and  CTH/CTA showed distal PICA occlusion. She was outside of the window for TNK on arrival to the ED. She is being admitted to CC service for ongoing care.   Was put on HTS for 2.5 days and then transferred out of ICU due to excellent exam. 01/03, patient had worsening lethargy and dysarthria w/ CTH showing acute obstructive hydrocephalus and EVD placed this afternoon s/p mannitol  01/05 improved and remained well extubated. EVD removed and had decreasing mentals status and was unresponsive in afternoon. Stat CTH showed severe bilateral IVH and L EVD placed. Family wanted full care to be done     24hr events: Decreased drainage overnight likely due to clot and was pulled back 1 cc that showed improved waveform     Subjective     Review of Systems     Objective                            Vitals I/O      Most Recent Min/Max in 24hrs   Temp 100 °F (37.8 °C) Temp  Min: 97.6 °F (36.4 °C)  Max: 100.4 °F (38 °C)   Pulse 90 Pulse  Min: 64  Max: 100   Resp 14 Resp  Min: 9  Max: 21   /60 BP  Min: 115/62  Max: 198/75   O2 Sat 100 % SpO2  Min: 94 %  Max: 100 %      Intake/Output Summary (Last 24 hours) at 1/6/2024 0557  Last data filed at 1/6/2024 0536  Gross per 24 hour   Intake 1509.56 ml   Output 2390 ml   Net -880.44 ml       Diet Enteral/Parenteral; Tube Feeding No Oral Diet; Glucerna 1.2; Continuous; 35; Prosource Protein Liquid - Two Packets    Invasive Monitoring   Arterial Line  [place] /40  Arterial Line BP  Min: 128/40  Max: 168/54   MAP 72 mmHg  Arterial Line MAP (mmHg)  Min: 66 mmHg  Max: 94 mmHg           Physical Exam   Physical Exam  Cardiovascular:      Rate and Rhythm: Normal rate.   Pulmonary:      Comments: intubated  Neurological:      Comments: GCS 7  Was able to open eyes to nox stim   Withdraws bilat LE and LUE to nox stim            Diagnostic Studies      EKG: reviewed  Imaging: reviewed I have personally reviewed pertinent reports.   and I have personally reviewed pertinent films in PACS      Medications:  Scheduled PRN   atorvastatin, 80 mg, QPM  chlorhexidine, 15 mL, Q12H JUAN ANTONIO  ferrous sulfate, 325 mg, Daily With Breakfast  heparin (porcine), 5,000 Units, Q8H JUAN ANTONIO  lisinopril, 10 mg, Daily  senna-docusate sodium, 1 tablet, HS      acetaminophen, 650 mg, Q6H PRN  albuterol, 2.5 mg, Q4H PRN  bisacodyl, 10 mg, Daily PRN  hydrALAZINE, 10 mg, Q6H PRN  labetalol, 10 mg, Q6H PRN  ondansetron, 4 mg, Q6H PRN  polyethylene glycol, 17 g, Daily PRN       Continuous    insulin regular (HumuLIN R,NovoLIN R) 1 Units/mL in sodium chloride 0.9 % 100 mL infusion, 0.3-21 Units/hr, Last Rate: 5 Units/hr (01/06/24 0507)  niCARdipine, 1-15 mg/hr, Last Rate: 7.5 mg/hr (01/06/24 0831)  propofol, 5-50 mcg/kg/min, Last Rate: 30 mcg/kg/min (01/06/24 0526)         Labs:    CBC    Recent Labs     01/05/24  0541   WBC 10.59*   HGB 9.3*   HCT 28.9*        BMP    Recent Labs     01/05/24  1509 01/05/24  2301   SODIUM 149* 155*   K 3.6 3.4*   * 126*   CO2 23 21   AGAP 7 8   BUN 20 19   CREATININE 1.25 1.17   CALCIUM 8.9 8.4       Coags    No recent results     Additional Electrolytes  No recent results       Blood Gas    No recent results  No recent results LFTs  No recent results    Infectious  No recent results  Glucose  Recent Labs     01/04/24  2347 01/05/24  0541 01/05/24  1509 01/05/24  2301   GLUC 132 92 169* 161*               Critical Care Time Statement: Upon my evaluation, this patient had a high probability of imminent or life-threatening deterioration due to IVH, which required my direct attention, intervention, and personal management.  I spent a total of 40 minutes directly providing critical care services, including interpretation of complex medical databases and evaluating for the presence of life-threatening injuries or illnesses. This time is exclusive of procedures, teaching, family meetings, and any prior time recorded by providers other than myself.      Miguel Negron, DO

## 2024-01-06 NOTE — CONSULTS
Nutrition Recommendations:    Current EN+propofol rate meeting pt's estimated nutrition needs. Continue with current EN order: Glucerna 1.2 @ 35ml/hr + 2 packets prosource liquid providing 840mL total volume, 1434 kcals (EN+prosource+propofol), 80g protein, 676mL free water from EN.   Additional free water as per primary team.   RD will continue to follow pt and provide recommendations as needed

## 2024-01-07 NOTE — PLAN OF CARE
Problem: INFECTION - ADULT  Goal: Absence or prevention of progression during hospitalization  Description: INTERVENTIONS:  - Assess and monitor for signs and symptoms of infection  - Monitor lab/diagnostic results  - Monitor all insertion sites, i.e. indwelling lines, tubes, and drains  - Monitor endotracheal if appropriate and nasal secretions for changes in amount and color  - [place] appropriate cooling/warming therapies per order  - Administer medications as ordered  - Instruct and encourage patient and family to use good hand hygiene technique  - Identify and instruct in appropriate isolation precautions for identified infection/condition  Outcome: Progressing  Goal: Absence of fever/infection during neutropenic period  Description: INTERVENTIONS:  - Monitor WBC    Outcome: Progressing     Problem: PAIN - ADULT  Goal: Verbalizes/displays adequate comfort level or baseline comfort level  Description: Interventions:  - Encourage patient to monitor pain and request assistance  - Assess pain using appropriate pain scale  - Administer analgesics based on type and severity of pain and evaluate response  - Implement non-pharmacological measures as appropriate and evaluate response  - Consider cultural and social influences on pain and pain management  - Notify physician/advanced practitioner if interventions unsuccessful or patient reports new pain  Outcome: Progressing

## 2024-01-07 NOTE — RESPIRATORY THERAPY NOTE
Resp Therapy   01/07/24 1626   Respiratory Assessment   Resp Comments Pt ETT had blown ballon, ETT exchanged, per attending ETT placed at 21 @ teeth   Vent Information   Vent type Marsh C3   Marsh C3/G5 Vent Mode (S)CMV   (S)CMV Settings   Resp Rate (BPM) 14 BPM   VT (mL) 420 mL   FIO2 (%) 40 %   PEEP (cmH2O) 6 cmH2O   (S)CMV Actuals   Resp Rate (BPM) 14 BPM   VT (mL) 428   MV 5.4   MAP (cmH2O) 9.9 cmH2O   Peak Pressure (cmH2O) 25 cmH2O   I:E Ratio (Obs) 1:4.4   Insp Resistance 23   Heater Temperature (Obs) 98.6 °F (37 °C)   (S)CMV Alarms   High Peak Pressure (cmH2O) 40   Low Pressure (cmH2O) 5 cm H2O   High Resp Rate (BPM) 40 BPM   Low Resp Rate (BPM) 8 BPM   High MV (L/min) 20 L/min   Low MV (L/min) 4 L/min   High VT (mL) 1000 mL   Low VT (mL) 250 mL   Apnea Time (s) 20 S   ETT  Cuffed 7 mm   Placement Date/Time: 01/05/24 (c) 6081   Type: Cuffed  Tube Size: 7 mm  Location: Oral  Insertion attempts: 1  Placement Verification: End tidal CO2  Secured at (cm): 19   Secured at (cm) 21   Measured from Teeth   Cuff Pressure (color) Green

## 2024-01-07 NOTE — RESPIRATORY THERAPY NOTE
RT Ventilator Management Note  Debbie Moody 83 y.o. female MRN: [id number]  Unit/Bed#: ICU 04 Encounter: [id number]      Daily Screen         1/4/2024  1113 1/6/2024  0805          Patient safety screen outcome:: Passed Failed      Not Ready for Weaning due to:: -- Underline problem not resolved                Physical Exam:   Assessment Type: Assess only  General Appearance: Sedated  Respiratory Pattern: (P) Normal  Chest Assessment: (P) Chest expansion symmetrical  Bilateral Breath Sounds: (P) Coarse  Suction: ET Tube      Resp Comments: (P) Pt is doing well on scmv settings. No changes made to vent through the night. No distress noted.

## 2024-01-07 NOTE — PROGRESS NOTES
[facility]  Progress Note: Critical Care  Name: Debbie Moody 83 y.o. 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 Mass effect on the fourth ventricle is slightly increased. No hydrocephalus. Persistent increased attenuation in the anterior aspect of the cerebellar infarct that could represent residual contrast staining but petechial hemorrhage not excluded. Recommend close interval follow-up CT and/or further evaluation with MRI.   12/31 MRI-Acute infarct in left PICA territory with small acute parenchymal hematoma in anterior aspect of left cerebellar vermis, petechial cortical hemorrhage along left posterior cerebellum, and similar compressive mass effect on fourth ventricle. No obstructive hydrocephalus.   12/31 TTE: EF 70% L atrium is nl in size   01/03 CTH: Evolving left PCA infarct with increased left cerebellar hypodensity and compression of the fourth ventricle resulting in hydrocephalus with increased focal parenchymal hemorrhage adjacent to the fourth ventricle   S/p Suboccipital craniectomy for posterior fossa decompression 01/03/23 by Dr. Higgins   01/03 CTH post-op: Status post interval decompressive suboccipital craniectomy with expected subjacent postsurgical changes/pneumocephalus. Minimally improved hydrocephalus status post placement of right frontal approach ventricular catheter.   01/05 CTH: Extensive ventricular hemorrhage   01/06 CTH: worsened accumulated bilateral ventricular hemorrhage  01/07  CTH: IVH is similar to previous CTH  Etiology: suspected atheroembolic vs cardioembolic   EVH from suspected        Plan:   Q2 neuro checks  SBP goal 100-140  On cardene gtt  S/p bolus of 250cc HTS  HTS stopped 01/05  Monitoring Na goal 145-155  BMP q8 hours  - propofol off for sedation  Neurosurgery following  R EVD removed 01/05 am and L EVD @5 placed in the evening   ICP: 5-7, CPP: 60-70  Drainage over past 24 hours 15cc as of this AM   84cc 01/06-01/07  Minimal drainage from L EVD  Neurosurgery planning to replace EVD today but canceled since started draining overnight   If any acute changes in neuro exam, obtain CTH and contact neurosurg if needs suboccipital decompression   Neurology following  Continue stroke pathway  Further GOC conversations for possible DNR  Hold AC/AP  Repeat CTH if new focal deficit or decline in GCS > 2 points        CV:   Diagnosis: HTN  Plan:   Goal -140  On cardene gtt @ 7.5 this AM  Wean off cardene as able   Prn labetalol   Continue home enalapril (converted to lisinopril 10mg)        - Diagnosis: HLD              Lipid panel: Cholesterol 79, 7/22,               Plan:              Lipitor 80mg      Pulm:  Diagnosis: Acute hypoxic respiratory failure, now resolved   Likely secondary to cerebellar edema affecting brain stem   Extubated 1/4/24  Reintubated 01/05 due to unable to protect airway   /6/40  Plan   SAT/SBT as able   Wean MV as able      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  Has crackles at base of lungs posteriorly  Plan:   Hold xopenex while on MV  On MV as per above        GI:   bowel regimen: senokot and miralax prn  BM: 01/06/23        :   Diagnosis:FERCHO on CKD3b   Baseline cr: 1.1-1.3  Back to baseline, Cr 1.40  - likely in setting of recent HTS   Plan: Continue to monitor   Strict I/Os  Urinary retention protocol  Avoid nephrotoxins   Encourage po hydration         F/E/N:   F:none   E: K> 4.0, maintain magnesium > 2.0, maintain phosphate >  3  N: TF w/ glucerna and prosource packets w/ nutrition consult         Heme/Onc:   Diagnosis: Anemia  Hgb 7.9 from 8.2  Likely due to noted IVH  Plan:   Continue iron supplementation  Trend hgb/plt count  Hold chemical DVT ppx for at least 24 hours until stable CTH        Endo:   Diagnosis: DM type 2  A1c: 8.3  Plan:   On insulin gtt   61 units over 24 hours  Can start SSI   9 units Lantus BID   6 units humalog TID  SSI alg 3  Goal blood glucose 140-180        ID:   Dx: Episode of febrile 100.6  Plan   - got UA that was bland   - follow blood cx  Trend WBC/fever curve     MSK/Skin:   No active issues  Frequent repositioning   PT/OT        Lines/Drains  - continue lebron, central line       Disposition: Critical care    ICU Core Measures     Vented Patient  VAP Bundle  VAP bundle ordered     A: Assess, Prevent, and Manage Pain Has pain been assessed? Yes  Need for changes to pain regimen? No   B: Both Spontaneous Awakening Trials (SATs) and Spontaneous Breathing Trials (SBTs) Plan to perform spontaneous awakening trial today? Yes   Plan to perform spontaneous breathing trial today? Yes   Obvious barriers to extubation? Yes   C: Choice of Sedation RASS Goal: 0 Alert and Calm  Need for changes to sedation or analgesia regimen? No   D: Delirium CAM-ICU: Positive   E: Early Mobility  Plan for early mobility? Yes   F: Family Engagement Plan for family engagement today? Yes       Review of Invasive Devices:    Le[place] Plan: Continue for accurate I/O monitoring for 48 hours  Central access plan: Patient has multiple central venous catheters.  Medications requiring central line Hemodynamic monitoring  Mora Plan: Keep arterial line for hemodynamic monitoring, frequent ABGs, and frequent labs    Prophylaxis:  VTE Contraindicated secondary to: ivh    Stress Ulcer  not ordered        Significant 24hr Events     24hr events: ICPs ranging from 10-12, overnight ICU attending discussed w/ family member GOC of care and family wanted  to discuss with rest of family transitioning to DNR/DNI      Subjective     Review of Systems   Unable to perform ROS: Intubated        Objective                            Vitals I/O      Most Recent Min/Max in 24hrs   Temp 99.7 °F (37.6 °C) Temp  Min: 99.3 °F (37.4 °C)  Max: 100 °F (37.8 °C)   Pulse 90 Pulse  Min: 70  Max: 106   Resp 13 Resp  Min: 13  Max: 20   /59 BP  Min: 112/50  Max: 144/64   O2 Sat 99 % SpO2  Min: 99 %  Max: 100 %      Intake/Output Summary (Last 24 hours) at 1/7/2024 0601  Last data filed at 1/7/2024 0528  Gross per 24 hour   Intake 1847.66 ml   Output 1774 ml   Net 73.66 ml       Diet Enteral/Parenteral; Tube Feeding No Oral Diet; Glucerna 1.2; Continuous; 35; Prosource Protein Liquid - Two Packets    Invasive Monitoring   Arterial Line  [place] /42  Arterial Line BP  Min: 116/38  Max: 166/42   MAP 74 mmHg  Arterial Line MAP (mmHg)  Min: 62 mmHg  Max: 84 mmHg           Physical Exam   Physical Exam  HENT:      Head:      Comments: L EVD in place  Cardiovascular:      Rate and Rhythm: Normal rate.   Abdominal:      Palpations: Abdomen is soft.   Pulmonary:      Effort: Pulmonary effort is normal.      Comments: On MV  Neurological:      Comments: intermittent R beating nystagmus/roving eye movements, would avoid light when shined , LUE >RUE withdrawal and would go towards midline to nox stim   bilat triple flexion, mildly opens eyes to nox stim, gag/cough/corneals/VORs intact            Diagnostic Studies      EKG: reviewed  Imaging: reviewed I have personally reviewed pertinent reports.   and I have personally reviewed pertinent films in PACS     Medications:  Scheduled PRN   atorvastatin, 80 mg, QPM  chlorhexidine, 15 mL, Q12H JUAN ANTONIO  ferrous sulfate, 325 mg, Daily With Breakfast  lisinopril, 10 mg, Daily  senna-docusate sodium, 1 tablet, HS      acetaminophen, 650 mg, Q6H PRN  albuterol, 2.5 mg, Q4H PRN  bisacodyl, 10 mg, Daily PRN  hydrALAZINE, 10 mg, Q6H PRN  labetalol, 10 mg, Q6H  PRN  ondansetron, 4 mg, Q6H PRN  polyethylene glycol, 17 g, Daily PRN       Continuous    insulin regular (HumuLIN R,NovoLIN R) 1 Units/mL in sodium chloride 0.9 % 100 mL infusion, 0.3-21 Units/hr, Last Rate: 1.5 Units/hr (01/06/24 2008)  niCARdipine, 1-15 mg/hr, Last Rate: 10 mg/hr (01/07/24 0551)  propofol, 5-50 mcg/kg/min, Last Rate: Stopped (01/06/24 1019)         Labs:    CBC    Recent Labs     01/06/24  0507   WBC 7.79   HGB 8.2*   HCT 26.0*        BMP    Recent Labs     01/06/24  1439 01/07/24  0013   SODIUM 153* 155*   K 3.5 4.2   * 127*   CO2 20* 21   AGAP 6 7   BUN 25 25   CREATININE 1.40* 1.43*   CALCIUM 8.8 8.6       Coags    No recent results     Additional Electrolytes  Recent Labs     01/06/24  0507   MG 1.9   PHOS 1.5*          Blood Gas    No recent results  No recent results LFTs  No recent results    Infectious  No recent results  Glucose  Recent Labs     01/05/24  2301 01/06/24  0507 01/06/24  1439 01/07/24  0013   GLUC 161* 196* 162* 145*               Critical Care Time Statement: Upon my evaluation, this patient had a high probability of imminent or life-threatening deterioration due to stroke and IVH, which required my direct attention, intervention, and personal management.  I spent a total of 40 minutes directly providing critical care services, including interpretation of complex medical databases, evaluating for the presence of life-threatening injuries or illnesses, and management of organ system failure(s) . This time is exclusive of procedures, teaching, family meetings, and any prior time recorded by providers other than myself.      Miguel Negron,

## 2024-01-07 NOTE — PROGRESS NOTES
"Progress Note - Neurosurgery   Debbie Moody 83 y.o. female MRN: [id number]  Unit/Bed#: ICU 04 Encounter: [id number]    Assessment/Plan:    Debbie Moody is an 83 year old female s/p suboccipital decompression for posterior fossa stroke with EVD placement, EVD removal and subsequent diffuse IVH requiring EVD replacement.    Plan  Continue regular neurologic checks.   STAT CTH for decline in exam greater than 2 points in 1 hour  Continue EVD at 5  ICP < 20, has been controlled overnight  84cc output last 24 hours. Dampened waveform, but does siphon and rain this morning.  Ongoing medical management per primary team.   HTS discontinued. Na goal > 145. Currently 155  Continue insulin gtt  SBP < 160  Pain control per primary team  Neurology following for stroke management.   ASA d/c 1/3/23, hold for at least 2 weeks post op  PT/OT/PMR evaluation    Subjective/Objective   Chief Complaint: Repeat head CT with increased ventricular blood burden yesterday - grossly stable on repeat imaging this morning. ICP controlled, sluggish drainage yesterday improved overnight.    Vitals: Blood pressure 119/55, pulse 80, temperature 99.7 °F (37.6 °C), temperature source Bladder, resp. rate 14, height 4' 10\" (1.473 m), weight 64.2 kg (141 lb 8.6 oz), SpO2 99%, not currently breastfeeding.,Body mass index is 29.58 kg/m².    Physical Exam:   Physical Exam  Neurologic Exam  Intubated, sedated  No eye opening  Flickers x 4      Invasive Devices       Central Venous Catheter Line  Duration             CVC Central Lines 01/03/24 3 days              Peripheral Intravenous Line  Duration             Long-Dwell Peripheral IV (Midline) 01/03/24 Left Cephalic Vein 3 days              Arterial Line  Duration             Arterial Line 01/03/24 Left Radial 3 days              Drain  Duration             Urethral Catheter 1 day    Ventriculostomy/Subdural Ventricular drainage catheter Left Frontal region 1 day              Airway  Duration          "    ETT  Cuffed 7 mm 1 day                          Lab Results: I have personally reviewed pertinent results.      Imaging Studies: I have personally reviewed pertinent reports.

## 2024-01-07 NOTE — RESPIRATORY THERAPY NOTE
RT Ventilator Management Note  Debbie Moody 83 y.o. female MRN: [id number]  Unit/Bed#: ICU 04 Encounter: [id number]      Daily Screen         1/6/2024  0805 1/7/2024  0733          Patient safety screen outcome:: Failed Failed (P)       Not Ready for Weaning due to:: Underline problem not resolved Underline problem not resolved (P)                 Physical Exam:   Assessment Type: Assess only  General Appearance: Sedated  Respiratory Pattern: Normal  Chest Assessment: Chest expansion symmetrical  Bilateral Breath Sounds: Diminished  Suction: ET Tube      Resp Comments: (P) Pt found on S(cmv) RR 14  PEEP 6 FIO2 40%. No vent changes made at this time. Will continue to monitor pt per resp protocol.

## 2024-01-07 NOTE — RESTORATIVE TECHNICIAN NOTE
"       Restorative Technician Note      Patient Name: Debbie Moody     Restorative Tech Visit Date: 01/07/24  Note Type: Bracing, Initial consult  Patient Position Upon Consult: Supine  Brace Applied: Multipodous Boot  Additional Brace Ordered: No  Patient Position When Brace Applied: Supine  Education Provided: Family or social support of family present for education by provider; Yes  Patient Position at End of Consult: Supine  Nurse Communication: Nurse aware of consult, application of brace    Donned onto pt's right foot. Nurse aware to rotate Q4*.    Please contact Mobility Coordinator on Tiger text \"[facility]-PT-Restorative Tech\" role in regards to bracing instruction and/or adjustment.     CFo Francisco Javier          "

## 2024-01-07 NOTE — PROCEDURES
Intubation    Date/Time: 1/7/2024 5:01 PM    Performed by: Violet Espinoza MD  Authorized by: Violet Espinoza MD    Patient location:  Bedside  Consent:     Consent obtained:  Emergent situation  Universal protocol:     Patient identity confirmed:  Hospital-assigned identification number  Pre-procedure details:     Patient status:  Unresponsive    Pretreatment medications:  None    Paralytics:  None  Indications:     Indications for intubation: other (comment)      Indications for intubation comment:  Balloon on ETT not working  Procedure details:     Intubation method:  Oral    Oral intubation technique: Hadley.    Laryngoscope blade:  Mac 4    Tube size (mm):  7.0    Tube type:  Cuffed    Number of attempts:  1    Tube visualized through cords: yes    Placement assessment:     ETT to teeth:  21    Tube secured with:  ETT ely    Breath sounds:  Equal    Placement verification: chest rise, CXR verification and ETCO2 detector      CXR findings:  ETT in proper place  Post-procedure details:     Patient tolerance of procedure:  Tolerated well, no immediate complications  Comments:      After intubation, the balloon from the ETT was noted to be no longer connected.  Replaced uneventfully.

## 2024-01-07 NOTE — PROGRESS NOTES
[facility]  Progress Note: Critical Care  Name: Debbie Moody 83 y.o. 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 Mass effect on the fourth ventricle is slightly increased. No hydrocephalus. Persistent increased attenuation in the anterior aspect of the cerebellar infarct that could represent residual contrast staining but petechial hemorrhage not excluded. Recommend close interval follow-up CT and/or further evaluation with MRI.   12/31 MRI-Acute infarct in left PICA territory with small acute parenchymal hematoma in anterior aspect of left cerebellar vermis, petechial cortical hemorrhage along left posterior cerebellum, and similar compressive mass effect on fourth ventricle. No obstructive hydrocephalus.   12/31 TTE: EF 70% L atrium is nl in size   01/03 CTH: Evolving left PCA infarct with increased left cerebellar hypodensity and compression of the fourth ventricle resulting in hydrocephalus with increased focal parenchymal hemorrhage adjacent to the fourth ventricle   S/p Suboccipital craniectomy for posterior fossa decompression 01/03/23 by Dr. Higgins   01/03 CTH post-op: Status post interval decompressive suboccipital craniectomy with expected subjacent postsurgical changes/pneumocephalus. Minimally improved hydrocephalus status post placement of right frontal approach ventricular catheter.   01/05 CTH: Extensive ventricular hemorrhage   01/06 CTH: worsened accumulated bilateral ventricular hemorrhage  01/07  CTH: IVH is similar to previous CTH  Etiology: suspected atheroembolic vs cardioembolic   EVH from suspected hemorrhagic transformation from original stroke bed         Plan:   Q2 neuro checks  SBP goal 100-140  On cardene gtt  S/p bolus of 250cc HTS  HTS stopped 01/05  Monitoring Na goal 145-155  BMP q12 hours  Neurosurgery following  R EVD removed 01/05 am and L EVD brought to 0   placed 01/05  ICP: 2-4, CPP: 80-90  Drainage  61cc 01/07-01/08  If any acute changes in neuro exam, obtain CTH and contact neurosurg if needs suboccipital decompression   Neurology following  Continue stroke pathway  Further GOC conversations for possible DNR and discuss trach and peg  Hold AC/AP  Repeat CTH if new focal deficit or decline in GCS > 2 points        CV:   Diagnosis: HTN  Plan:   Goal -140  On cardene gtt @ 7.5 this AM  Wean off cardene as able   Prn labetalol   Lisinopril 20mg         - Diagnosis: HLD              Lipid panel: Cholesterol 79, 7/22,               Plan:              Lipitor 80mg      Pulm:  Diagnosis: Acute hypoxic respiratory failure, now resolved   Likely secondary to cerebellar edema affecting brain stem   Extubated 1/4/24  Reintubated 01/05 due to unable to protect airway   /6/40  Plan   SAT/SBT as able   Wean MV as able      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  Plan:   Hold xopenex while on MV  On MV as per above        GI:   bowel regimen: senokot and miralax prn  BM: 01/06/23        :   Diagnosis:CKD3b   Baseline cr: 1.1-1.3  Back to baseline, Cr 1.2  - likely in setting of recent HTS   Plan: Continue to monitor   Strict I/Os  Urinary retention protocol  Avoid nephrotoxins   Encourage po hydration         F/E/N:   F:none   E: K> 4.0, maintain magnesium > 2.0, maintain phosphate > 3  N: TF @ 35cc/hr w/ glucerna and prosource packets w/ nutrition consult         Heme/Onc:   Diagnosis: Anemia  Hgb 7.5 from 8.2  Likely due to noted IVH and partially from blood draws  Plan:    Continue iron supplementation  Trend hgb/plt count  Hold chemical DVT ppx        Endo:   Diagnosis: DM type 2  A1c: 8.3  Plan:   Subq insulin (when was on insulin gtt 01/07, required 62.5 units)   Increase Lantus 18 units BID  Increase SSI alg 4  Goal blood glucose 140-180        ID:   Dx: Episode of febrile 100.6   Occurred 01/06 and has been afebrile since  Plan   - got UA that was bland   - follow blood cx  Trend WBC/fever curve     MSK/Skin:   No active issues  Frequent repositioning   PT/OT        Lines/Drains  - continue central line       Disposition: Critical care    ICU Core Measures     Vented Patient  VAP Bundle  VAP bundle ordered     A: Assess, Prevent, and Manage Pain Has pain been assessed? Yes  Need for changes to pain regimen? No   B: Both Spontaneous Awakening Trials (SATs) and Spontaneous Breathing Trials (SBTs) Plan to perform spontaneous awakening trial today? Yes   Plan to perform spontaneous breathing trial today? Yes   Obvious barriers to extubation? Yes   C: Choice of Sedation RASS Goal: 0 Alert and Calm  Need for changes to sedation or analgesia regimen? Yes   D: Delirium CAM-ICU: Positive   E: Early Mobility  Plan for early mobility? Yes   F: Family Engagement Plan for family engagement today? Yes       Review of Invasive Devices:      Central access plan: Patient has multiple central venous catheters.  Medications requiring central line Hemodynamic monitoring      Prophylaxis:  VTE Contraindicated secondary to: hold given IVH   Stress Ulcer  not ordered        Significant 24hr Events     24hr events: CATERINA     Subjective     Review of Systems   Unable to perform ROS: Intubated        Objective                            Vitals I/O      Most Recent Min/Max in 24hrs   Temp 99.3 °F (37.4 °C) Temp  Min: 99.3 °F (37.4 °C)  Max: 100 °F (37.8 °C)   Pulse 92 Pulse  Min: 70  Max: 106   Resp 12 Resp  Min: 12  Max: 23   /64 BP  Min: 112/50  Max: 144/64   O2 Sat 100 % SpO2  Min: 99 %  Max: 100  %      Intake/Output Summary (Last 24 hours) at 1/7/2024 1315  Last data filed at 1/7/2024 1201  Gross per 24 hour   Intake 1684.29 ml   Output 2011 ml   Net -326.71 ml       Diet Enteral/Parenteral; Tube Feeding No Oral Diet; Glucerna 1.2; Continuous; 35; Prosource Protein Liquid - Two Packets    Invasive Monitoring   Arterial Line  Karmen /36  Arterial Line BP  Min: 116/38  Max: 166/42   MAP 66 mmHg  Arterial Line MAP (mmHg)  Min: 62 mmHg  Max: 84 mmHg           Physical Exam   Physical Exam  Vitals and nursing note reviewed.   HENT:      Head:      Comments: L EVD in place  Cardiovascular:      Rate and Rhythm: Normal rate.   Abdominal:      Palpations: Abdomen is soft.   Pulmonary:      Breath sounds: Normal breath sounds.      Comments: On MV  Neurological:      Comments: Dysconjugate gaze and has roving eye movements   PERRL about 4mm  Was able to open eyes to voice and nox stim   +corneals/cough/gag  Flex in BUE (RUE>LUE), was able to squeeze w/ LUE on command  Mild TF in bilat LE            Diagnostic Studies      EKG: reviewed  Imaging: reviewed I have personally reviewed pertinent reports.   and I have personally reviewed pertinent films in PACS     Medications:  Scheduled PRN   atorvastatin, 80 mg, QPM  chlorhexidine, 15 mL, Q12H JUAN ANTONIO  ferrous sulfate, 325 mg, Daily With Breakfast  insulin glargine, 15 Units, Q12H JUAN ANTONIO  insulin lispro, 1-6 Units, Q6H JUAN ANTONIO  lisinopril, 10 mg, Daily  senna-docusate sodium, 1 tablet, HS      acetaminophen, 650 mg, Q6H PRN  bisacodyl, 10 mg, Daily PRN  hydrALAZINE, 10 mg, Q6H PRN  labetalol, 10 mg, Q6H PRN  ondansetron, 4 mg, Q6H PRN  polyethylene glycol, 17 g, Daily PRN       Continuous    niCARdipine, 1-15 mg/hr, Last Rate: 7.5 mg/hr (01/07/24 0807)  propofol, 5-50 mcg/kg/min, Last Rate: Stopped (01/06/24 1019)         Labs:    CBC    Recent Labs     01/06/24  0507 01/07/24  0521   WBC 7.79 7.85   HGB 8.2* 7.9*   HCT 26.0* 25.2*    215     BMP    Recent Labs      01/07/24  0521 01/07/24  0758   SODIUM 156* 155*   K 4.0 3.8   * 128*   CO2 22 22   AGAP 8 5   BUN 25 28*   CREATININE 1.33* 1.32*   CALCIUM 8.9 8.5       Coags    No recent results     Additional Electrolytes  Recent Labs     01/06/24  0507   MG 1.9   PHOS 1.5*          Blood Gas    No recent results  No recent results LFTs  No recent results    Infectious  No recent results  Glucose  Recent Labs     01/06/24  1439 01/07/24  0013 01/07/24  0521 01/07/24  0758   GLUC 162* 145* 161* 174*               Critical Care Time Statement: Upon my evaluation, this patient had a high probability of imminent or life-threatening deterioration due to stroke, which required my direct attention, intervention, and personal management.  I spent a total of 30 minutes directly providing critical care services, including interpretation of complex medical databases and evaluating for the presence of life-threatening injuries or illnesses. This time is exclusive of procedures, teaching, family meetings, and any prior time recorded by providers other than myself.      Miguel Negron, DO

## 2024-01-08 ENCOUNTER — TELEPHONE (OUTPATIENT)
Dept: NEUROSURGERY | Facility: CLINIC | Age: 84
End: 2024-01-08

## 2024-01-08 NOTE — RESPIRATORY THERAPY NOTE
RT Ventilator Management Note  Debbie Moody 83 y.o. female MRN: [id number]  Unit/Bed#: ICU 04 Encounter: [id number]       01/08/24 0738   Respiratory Assessment   Assessment Type Assess only   General Appearance Eyes open/responds to stimulus   Respiratory Pattern Normal;Assisted   Chest Assessment Chest expansion symmetrical   Bilateral Breath Sounds Diminished;Coarse   L Breath Sounds Coarse   Cough Strong   Suction ET Tube   Resp Comments Pt suctioned x 1 when starting to spontaneously cough while vent rounds being done.  Full morning routine vent check.  Pt now appears to be back to sleep, resting comfortably on same mode and parameter settings as present during last vent check. No changes to suggest.  Will continue to monitorl   O2 Device [place]ang C3 ventilator   Vent Information   Vent ID [id number]   Vent type Marsh C3   Marsh C3/G5 Vent Mode (S)CMV   $ Pulse Oximetry Spot Check Charge Completed   (S)CMV Settings   Resp Rate (BPM) 14 BPM   VT (mL) 420 mL   FIO2 (%) 40 %   PEEP (cmH2O) 6 cmH2O   I:E Ratio 1:4   Insp Time (%) 0.8 %   Flow Trigger (LPM) 6   Humidification Heater   Heater Temperature (Set) 95 °F (35 °C)   (S)CMV Actuals   Resp Rate (BPM) 14 BPM   VT (mL) 407   MV 6.4   MAP (cmH2O) 10 cmH2O   Peak Pressure (cmH2O) 25 cmH2O   I:E Ratio (Obs) 1:4.4   Insp Resistance 29   Heater Temperature (Obs) 95 °F (35 °C)   Static Compliance (mL/cmH20) 39.4 mL/cmH2O   Plateau Pressure (cm H2O) 20.4 cm H2O   (S)CMV Alarms   High Peak Pressure (cmH2O) 40   Low Pressure (cmH2O) 5 cm H2O   High Resp Rate (BPM) 40 BPM   Low Resp Rate (BPM) 8 BPM   High MV (L/min) 20 L/min   Low MV (L/min) (S)  3 L/min  (found at)   High VT (mL) 1000 mL   Low VT (mL) (S)  200 mL  (found at)   Apnea Time (s) 20 S   Maintenance   Alarm (pink) cable attached No   Resuscitation bag with peep valve at bedside Yes   Water bag changed No   Circuit changed No   Daily Screen   Patient safety screen outcome: Failed   Not Ready for Weaning  due to: Underline problem not resolved   Spont breathing trial % for 30 min No   IHI Ventilator Associated Pneumonia Bundle   Head of Bed Elevated HOB 30   [REMOVED] ETT  Cuffed 7 mm   Removal Date/Time: 01/04/24 1351  Placement Date/Time: 01/03/24 (c) 0170   Type: Cuffed  Tube Size: 7 mm  Location: Oral  Insertion attempts: 1  Placement Verification: End tidal CO2;Symmetrical chest wall movement  Secured at (cm): 21  Cuff Leak: Yes...   Cuff Pressure (color) Green   ETT  Cuffed 7 mm   Placement Date/Time: 01/07/24 (c) 1706   Type: Cuffed  Tube Size: 7 mm  Location: Oral  Insertion attempts: 1  Placement Verification: Chest x-ray;End tidal CO2   Secured at (cm) 21   Measured from Lips   Secured Location Center   Repositioned Right to Center   Secured by Commercial tube ely   Site Condition Dry   Cuff Pressure (color) Green   HI-LO Suction  Intermittent suction   HI-LO Secretions Scant   HI-LO Intervention Flushed       Daily Screen         1/7/2024  0733 1/8/2024  0738          Patient safety screen outcome:: Failed Failed      Not Ready for Weaning due to:: Underline problem not resolved Underline problem not resolved      Spont breathing trial % for 30 min: -- No                Physical Exam:   Assessment Type: Assess only  General Appearance: Eyes open/responds to stimulus  Respiratory Pattern: Normal, Assisted  Chest Assessment: Chest expansion symmetrical  Bilateral Breath Sounds: Diminished, Coarse  L Breath Sounds: Coarse  Cough: Strong  Suction: ET Tube  O2 Device: Canonical C3 ventilator      Resp Comments: Pt suctioned x 1 when starting to spontaneously cough while vent rounds being done.  Full morning routine vent check.  Pt now appears to be back to sleep, resting comfortably on same mode and parameter settings as present during last vent check. No changes to suggest.  Will continue to monitorl

## 2024-01-08 NOTE — CASE MANAGEMENT
Case Management Discharge Planning Note    Patient name Debbie Moody  Location ICU 04/ICU 04 MRN [id number]  : 1940 Date 2024       Current Admission Date: 2023  Current Admission Diagnosis:Acute CVA (cerebrovascular accident) ([facility])   Patient Active Problem List    Diagnosis Date Noted    FERCHO (acute kidney injury) ([facility]) 2024    Metabolic acidosis 2023    Anemia 2023    Acute CVA (cerebrovascular accident) ([facility]) 2023    Pulmonary fibrosis ([facility]) 2023    Hypertriglyceridemia 2022    Chronic kidney disease, stage 3b ([facility]) 2021    Ambulatory dysfunction 2021    Negative depression screening 2021    Overweight (BMI 25.0-29.9) 2021    Localized, primary osteoarthritis of hand 2020    Sciatica 2020    Hypertensive kidney disease with chronic kidney disease stage III ([facility]) 2019    Type 2 diabetes mellitus with stage 3b chronic kidney disease, without long-term current use of insulin ([facility])     Groin pain, chronic, left 2018    Anxiety 2017    Abnormal ECG 2016    Diverticulitis large intestine w/o perforation or abscess w/o bleeding 2016    Essential hypertension 2016    Hypercholesterolemia 2016    Irritable bowel syndrome 2016    Simple chronic anemia 2016    Disorder of shoulder 2008    Articular cartilage disorder of shoulder region 2008      LOS (days): 8  Geometric Mean LOS (GMLOS) (days):   Days to GMLOS:     OBJECTIVE:  Risk of Unplanned Readmission Score: 24.34         Current admission status: Inpatient   Preferred Pharmacy:   [id number] - [place] - [address]  [address]  [place] [address]  Phone: [phone] Fax: [phone]    [facility] - [place], [place] - [address] [facility] [address] [address]  Phone: [phone] Fax: [phone]    Primary Care Provider: Wild  DO Rivera    Primary Insurance: [facility] REP  Secondary Insurance:     DISCHARGE DETAILS:    Other Referral/Resources/Interventions Provided:  Referral Comments: CM team following for eventual discharge coordination needs as appropriate. Referral placed by prior CM to [facility] at request of family and as recommended by therapy on 1/1/24. referral extended in aidin and remains under clinical review at this time. Pt will require updated PT/OT evaulations when appropriate. CM team will continue to follow and remain available.

## 2024-01-08 NOTE — PROGRESS NOTES
[facility]  Progress Note  Name: Debbie Moody I  MRN: [id number]  Unit/Bed#: ICU 04 I Date of Admission: 12/31/2023   Date of Service: 1/8/2024 I Hospital Day: 8    Assessment/Plan   * Acute CVA (cerebrovascular accident) (HCC)  Assessment & Plan  5 Days Post-Op Procedure(s):  SUBOCCIPITAL CRANIECTOMY FOR POSTERIOR FOSSA DECOMPRESSION; DISPOSE OF BONE FLAP (LULA, 1/4)  PPD 2 EVD placement.   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     Imaging:  CT head wo, 1/3/2024: Status post interval decompressive suboccipital craniectomy with expected subjacent postsurgical changes/pneumocephalus. Similar appearance of evolving left PICA territory infarct and associated edema/mass effect and with probable petechial cortical hemorrhage compared to the most immediate prior study. Similar appearance focal parenchymal hematoma in the left anterior cerebellar vermis adjacent to the fourth ventricle compared to the most immediate prior study. No new hemorrhage or mass effect. Minimally improved hydrocephalus status post placement of right frontal approach ventricular catheter.    Plan  Continue regular neurologic checks.   STAT CTH for decline in exam greater than 2 points in 1 hour  EVD was re-inserted on 1/5/24.  EVD dropped from 5 to zero today.   ICP < 20, in the room ICP at 6.   82 cc/24 hours,  Ongoing medical management per primary team.   HTS discontinued. Na goal > 145. Currently 154, continue to monitor.   Continue insulin   SBP < 160  Pain control per primary team.   Neurology following for stroke management.   ASA d/c 1/3/23, hold for at least 2 weeks post op  PT/OT/PMR evaluation  DVT PPX: SCD    Neurosurgery will continue to follow. Please call  "with questions or concerns.            Subjective/Objective     Chief Complaint: Pt intubated/non- verbal. Follow up s/p SOC for cerebellar stroke and IVH s/p EVD placement.     Subjective: Per nursing report, no acute events overnight. Pt off sedation this am. Pt with some responsiveness on the right to verbal stimuli and to pain.      Objective: Laying in bed, intubated, NAD.     I/O         01/06 0701 01/07 0700 01/07 0701 01/08 0700 01/08 0701 01/09 0700    P.O.  0     I.V. (mL/kg) 1472.2 (22.9) 1869.3 (29.1)     NG/GT   75    IV Piggyback       Feedings 420 350 70    Total Intake(mL/kg) 1892.2 (29.5) 2219.3 (34.6) 145 (2.3)    Urine (mL/kg/hr) 1690 (1.1) 1500 (1)     Drains 84 82 17    Total Output 1774 1582 17    Net +118.2 +637.3 +128           Unmeasured Urine Occurrence  1 x             Invasive Devices       Central Venous Catheter Line  Duration             CVC Central Lines 01/03/24 4 days              Peripheral Intravenous Line  Duration             Long-Dwell Peripheral IV (Midline) 01/03/24 Left Cephalic Vein 4 days              Drain  Duration             Ventriculostomy/Subdural Ventricular drainage catheter Left Frontal region 2 days    External Urinary Catheter <1 day    NG/OG/Enteral Tube Orogastric Left mouth <1 day              Airway  Duration             ETT  Cuffed 7 mm <1 day                    Physical Exam:  Vitals: Blood pressure 118/57, pulse 76, temperature 98.6 °F (37 °C), temperature source Oral, resp. rate 14, height 4' 10\" (1.473 m), weight 64.2 kg (141 lb 8.6 oz), SpO2 100%, not currently breastfeeding.,Body mass index is 29.58 kg/m².    General appearance:  Appears stated age  Head: Normocephalic, Left frontal EVD in place, CSF drainage with slight blood tinge.   Eyes: Pupils 4-5 mm bilaterally, reactive to light.   Neck: supple, symmetrical, intubated.   Lungs: non labored breathing  Heart: regular heart rate  Neurologic:   Mental status: GCS 8T E1 V1T M6  Cranial " "nerves:Limited.   Sensory: Withdraws to pain.   Motor: Follows few commands on the right,  and wiggles toes on the right, withdraws to pain LLE, no withdrawal in LUE on this assessment.   Reflexes: 2+ and symmetric, no clonus or collins's.     Lab Results:  Results from last 7 days   Lab Units 01/08/24  0443 01/07/24  0521 01/06/24  0507   WBC Thousand/uL 8.17 7.85 7.79   HEMOGLOBIN g/dL 7.5* 7.9* 8.2*   HEMATOCRIT % 24.3* 25.2* 26.0*   PLATELETS Thousands/uL 228 215 205   NEUTROS PCT % 75 75 74   MONOS PCT % 7 7 8   EOS PCT % 1 0 0     Results from last 7 days   Lab Units 01/08/24  0443 01/07/24  1717 01/07/24  0758 01/03/24  2154 01/03/24  1917   POTASSIUM mmol/L 3.9 4.1 3.8   < >  --    CHLORIDE mmol/L 124* 125* 128*   < >  --    CO2 mmol/L 25 22 22   < >  --    CO2, I-STAT mmol/L  --   --   --   --  17*   BUN mg/dL 30* 28* 28*   < >  --    CREATININE mg/dL 1.20 1.31* 1.32*   < >  --    CALCIUM mg/dL 8.7 8.5 8.5   < >  --    GLUCOSE, ISTAT mg/dl  --   --   --   --  155*    < > = values in this interval not displayed.     Results from last 7 days   Lab Units 01/06/24  0507   MAGNESIUM mg/dL 1.9     Results from last 7 days   Lab Units 01/06/24  0507   PHOSPHORUS mg/dL 1.5*     Results from last 7 days   Lab Units 01/03/24  1211   INR  1.14   PTT seconds 30     No results found for: \"TROPONINT\"  ABG:  Lab Results   Component Value Date    PHART 7.364 01/03/2024    BSA2UYI 33.9 (L) 01/03/2024    PO2ART 235.8 (H) 01/03/2024    WCP0OLN 18.9 (L) 01/03/2024    BEART -5.7 01/03/2024    SOURCE Line, Arterial 01/03/2024       Imaging Studies: I have personally reviewed pertinent reports and I have personally reviewed pertinent films in PACS    EKG, Pathology, and Other Studies: I have personally reviewed pertinent reports.      PLEASE NOTE:  This encounter may have been completed utilizing the [employer] Direct Speech Voice Recognition Software. Grammatical errors, random word insertions, pronoun errors and " incomplete sentences are occasional consequences of the system due to software limitations, ambient noise and hardware issues.These may be missed by proof reading prior to affixing electronic signature. Any questions or concerns about the content, text or information contained within the body of this dictation should be directly addressed to the advanced practitioner or physician for clarification.

## 2024-01-08 NOTE — ASSESSMENT & PLAN NOTE
5 Days Post-Op Procedure(s):  SUBOCCIPITAL CRANIECTOMY FOR POSTERIOR FOSSA DECOMPRESSION; DISPOSE OF BONE FLAP (LULA, 1/4)  PPD 2 EVD placement.   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     Imaging:  CT head wo, 1/3/2024: Status post interval decompressive suboccipital craniectomy with expected subjacent postsurgical changes/pneumocephalus. Similar appearance of evolving left PICA territory infarct and associated edema/mass effect and with probable petechial cortical hemorrhage compared to the most immediate prior study. Similar appearance focal parenchymal hematoma in the left anterior cerebellar vermis adjacent to the fourth ventricle compared to the most immediate prior study. No new hemorrhage or mass effect. Minimally improved hydrocephalus status post placement of right frontal approach ventricular catheter.  CT head wo     Plan  Continue regular neurologic checks.   STAT CTH for decline in exam greater than 2 points in 1 hour  EVD was re-inserted on 1/5/24.  EVD dropped from 5 to zero today.   ICP < 20, in the room ICP at 6.   82 cc/24 hours,  Ongoing medical management per primary team.   HTS discontinued. Na goal > 145. Currently 154, continue to monitor.   Continue insulin   SBP < 160  Pain control per primary team.   Neurology following for stroke management.   ASA d/c 1/3/23, hold for at least 2 weeks post op  PT/OT/PMR evaluation  DVT PPX: SCD, okay to resume SQH given stable CTH.     Neurosurgery will continue to follow. Please call with questions or concerns.

## 2024-01-08 NOTE — PLAN OF CARE
Problem: PAIN - ADULT  Goal: Verbalizes/displays adequate comfort level or baseline comfort level  Description: Interventions:  - Encourage patient to monitor pain and request assistance  - Assess pain using appropriate pain scale  - Administer analgesics based on type and severity of pain and evaluate response  - Implement non-pharmacological measures as appropriate and evaluate response  - Consider cultural and social influences on pain and pain management  - Notify physician/advanced practitioner if interventions unsuccessful or patient reports new pain  1/8/2024 1340 by Ladan Garland RN  Outcome: Progressing  1/8/2024 1340 by Ladan Garland RN  Outcome: Progressing     Problem: INFECTION - ADULT  Goal: Absence or prevention of progression during hospitalization  Description: INTERVENTIONS:  - Assess and monitor for signs and symptoms of infection  - Monitor lab/diagnostic results  - Monitor all insertion sites, i.e. indwelling lines, tubes, and drains  - Monitor endotracheal if appropriate and nasal secretions for changes in amount and color  - [place] appropriate cooling/warming therapies per order  - Administer medications as ordered  - Instruct and encourage patient and family to use good hand hygiene technique  - Identify and instruct in appropriate isolation precautions for identified infection/condition  1/8/2024 1340 by Ladan Garland RN  Outcome: Progressing  1/8/2024 1340 by Ladan Garland RN  Outcome: Progressing  Goal: Absence of fever/infection during neutropenic period  Description: INTERVENTIONS:  - Monitor WBC    1/8/2024 1340 by Ladan Garland RN  Outcome: Progressing  1/8/2024 1340 by Ladan Garland RN  Outcome: Progressing     Problem: SAFETY ADULT  Goal: Patient will remain free of falls  Description: INTERVENTIONS:  - Educate patient/family on patient safety including physical limitations  - Instruct patient to call for assistance with activity   - Consult OT/PT to assist with  strengthening/mobility   - Keep Call bell within reach  - Keep bed low and locked with side rails adjusted as appropriate  - Keep care items and personal belongings within reach  - Initiate and maintain comfort rounds  - Make Fall Risk Sign visible to staff  - Offer Toileting every 2 Hours, in advance of need  - Initiate/Maintain Bed/Chair alarm  - Obtain necessary fall risk management equipment  - Apply yellow socks and bracelet for high fall risk patients  - Consider moving patient to room near nurses station  1/8/2024 1340 by Ladan Garland RN  Outcome: Progressing  1/8/2024 1340 by Ladan Garland RN  Outcome: Progressing  Goal: Maintain or return to baseline ADL function  Description: INTERVENTIONS:  -  Assess patient's ability to carry out ADLs; assess patient's baseline for ADL function and identify physical deficits which impact ability to perform ADLs (bathing, care of mouth/teeth, toileting, grooming, dressing, etc.)  - Assess/evaluate cause of self-care deficits   - Assess range of motion  - Assess patient's mobility; develop plan if impaired  - Assess patient's need for assistive devices and provide as appropriate  - Encourage maximum independence but intervene and supervise when necessary  - Involve family in performance of ADLs  - Assess for home care needs following discharge   - Consider OT consult to assist with ADL evaluation and planning for discharge  - Provide patient education as appropriate  1/8/2024 1340 by Ladan Garland RN  Outcome: Progressing  1/8/2024 1340 by Ladan Garland RN  Outcome: Progressing  Goal: Maintains/Returns to pre admission functional level  Description: INTERVENTIONS:  - Perform AM-PAC 6 Click Basic Mobility/ Daily Activity assessment daily.  - Set and communicate daily mobility goal to care team and patient/family/caregiver.   - Collaborate with rehabilitation services on mobility goals if consulted  - Perform Range of Motion 12 times a day.  - Reposition patient every 2  hours.  - Dangle patient 2 times a day  - Record patient progress and toleration of activity level   1/8/2024 1340 by Ladan Garland RN  Outcome: Progressing  1/8/2024 1340 by Ladan Garland RN  Outcome: Progressing     Problem: DISCHARGE PLANNING  Goal: Discharge to home or other facility with appropriate resources  Description: INTERVENTIONS:  - Identify barriers to discharge w/patient and caregiver  - Arrange for needed discharge resources and transportation as appropriate  - Identify discharge learning needs (meds, wound care, etc.)  - Arrange for interpretive services to assist at discharge as needed  - Refer to Case Management Department for coordinating discharge planning if the patient needs post-hospital services based on physician/advanced practitioner order or complex needs related to functional status, cognitive ability, or social support system  1/8/2024 1340 by Ladan Garland RN  Outcome: Progressing  1/8/2024 1340 by Ladan Garland RN  Outcome: Progressing     Problem: Knowledge Deficit  Goal: Patient/family/caregiver demonstrates understanding of disease process, treatment plan, medications, and discharge instructions  Description: Complete learning assessment and assess knowledge base.  Interventions:  - Provide teaching at level of understanding  - Provide teaching via preferred learning methods  1/8/2024 1340 by Ladan Garland RN  Outcome: Progressing  1/8/2024 1340 by Ladan Garland RN  Outcome: Progressing     Problem: Neurological Deficit  Goal: Neurological status is stable or improving  Description: Interventions:  - Monitor and assess patient's level of consciousness, motor function, sensory function, and level of assistance needed for ADLs.   - Monitor and report changes from baseline. Collaborate with interdisciplinary team to initiate plan and implement interventions as ordered.   - Provide and maintain a safe environment.  - Consider seizure precautions.  - Consider fall precautions.  -  Consider aspiration precautions.  - Consider bleeding precautions.  1/8/2024 1340 by Ladan Garland RN  Outcome: Progressing  1/8/2024 1340 by Ladan Garland RN  Outcome: Progressing     Problem: Activity Intolerance/Impaired Mobility  Goal: Mobility/activity is maintained at optimum level for patient  Description: Interventions:  - Assess and monitor patient  barriers to mobility and need for assistive/adaptive devices.  - Assess patient's emotional response to limitations.  - Collaborate with interdisciplinary team and initiate plans and interventions as ordered.  - Encourage independent activity per ability.  - Maintain proper body alignment.  - Perform active/passive rom as tolerated/ordered.  - Plan activities to conserve energy.  - Turn patient as appropriate  1/8/2024 1340 by Ladan Garland RN  Outcome: Progressing  1/8/2024 1340 by Ladan Garland RN  Outcome: Progressing     Problem: Communication Impairment  Goal: Ability to express needs and understand communication  Description: Assess patient's communication skills and ability to understand information.  Patient will demonstrate use of effective communication techniques, alternative methods of communication and understanding even if not able to speak.     - Encourage communication and provide alternate methods of communication as needed.  - Collaborate with case management/ for discharge needs.  - Include patient/family/caregiver in decisions related to communication.  1/8/2024 1340 by Ladan Garland RN  Outcome: Progressing  1/8/2024 1340 by Ladan Garland RN  Outcome: Progressing     Problem: Potential for Aspiration  Goal: Ventilated patient's risk of aspiration is minimized  Description: Assess and monitor vital signs, respiratory status, airway cuff pressure, and labs (WBC).  Monitor for signs of aspiration (tachypnea, cough, rales, wheezing, cyanosis, fever).    - Elevate head of bed 30 degrees if patient has tube feeding.  - Monitor  tube feeding.  1/8/2024 1340 by Ladan Garland RN  Outcome: Progressing  1/8/2024 1340 by Ladan Garland RN  Outcome: Progressing     Problem: Nutrition  Goal: Nutrition/Hydration status is improving  Description: Monitor and assess patient's nutrition/hydration status for malnutrition (ex- brittle hair, bruises, dry skin, pale skin and conjunctiva, muscle wasting, smooth red tongue, and disorientation). Collaborate with interdisciplinary team and initiate plan and interventions as ordered.  Monitor patient's weight and dietary intake as ordered or per policy. Utilize nutrition screening tool and intervene per policy. Determine patient's food preferences and provide high-protein, high-caloric foods as appropriate.     - Assist patient with eating.  - Allow adequate time for meals.  - Encourage patient to take dietary supplement as ordered.  - Collaborate with clinical nutritionist.  - Include patient/family/caregiver in decisions related to nutrition.  1/8/2024 1340 by Ladan Garland RN  Outcome: Progressing  1/8/2024 1340 by Ladan Garland RN  Outcome: Progressing     Problem: Prexisting or High Potential for Compromised Skin Integrity  Goal: Skin integrity is maintained or improved  Description: INTERVENTIONS:  - Identify patients at risk for skin breakdown  - Assess and monitor skin integrity  - Assess and monitor nutrition and hydration status  - Monitor labs   - Assess for incontinence   - Turn and reposition patient  - Assist with mobility/ambulation  - Relieve pressure over bony prominences  - Avoid friction and shearing  - Provide appropriate hygiene as needed including keeping skin clean and dry  - Evaluate need for skin moisturizer/barrier cream  - Collaborate with interdisciplinary team   - Patient/family teaching  - Consider wound care consult   1/8/2024 1340 by Ladan Garland RN  Outcome: Progressing  1/8/2024 1340 by Ladan Garland RN  Outcome: Progressing     Problem: Nutrition/Hydration-ADULT  Goal:  Nutrient/Hydration intake appropriate for improving, restoring or maintaining nutritional needs  Description: Monitor and assess patient's nutrition/hydration status for malnutrition. Collaborate with interdisciplinary team and initiate plan and interventions as ordered.  Monitor patient's weight and dietary intake as ordered or per policy. Utilize nutrition screening tool and intervene as necessary. Determine patient's food preferences and provide high-protein, high-caloric foods as appropriate.     INTERVENTIONS:  - Monitor oral intake, urinary output, labs, and treatment plans  - Assess nutrition and hydration status and recommend course of action  - Evaluate amount of meals eaten  - Assist patient with eating if necessary   - Allow adequate time for meals  - Recommend/ encourage appropriate diets, oral nutritional supplements, and vitamin/mineral supplements  - Order, calculate, and assess calorie counts as needed  - Recommend, monitor, and adjust tube feedings and TPN/PPN based on assessed needs  - Assess need for intravenous fluids  - Provide specific nutrition/hydration education as appropriate  - Include patient/family/caregiver in decisions related to nutrition  1/8/2024 1340 by Ladan Garland RN  Outcome: Progressing  1/8/2024 1340 by Ladan Garland RN  Outcome: Progressing     Problem: COPING  Goal: Pt/Family able to verbalize concerns and demonstrate effective coping strategies  Description: INTERVENTIONS:  - Assist patient/family to identify coping skills, available support systems and cultural and spiritual values  - Provide emotional support, including active listening and acknowledgement of concerns of patient and caregivers  - Reduce environmental stimuli, as able  - Provide patient education  - Assess for spiritual pain/suffering and initiate spiritual care, including notification of Pastoral Care or nancie based community as needed  - Assess effectiveness of coping strategies  1/8/2024 1340 by  Ladan Garland RN  Outcome: Progressing  1/8/2024 1340 by Ladan Garland RN  Outcome: Progressing  Goal: Will report anxiety at manageable levels  Description: INTERVENTIONS:  - Administer medication as ordered  - Teach and encourage coping skills  - Provide emotional support  - Assess patient/family for anxiety and ability to cope  1/8/2024 1340 by Ladan Garland RN  Outcome: Progressing  1/8/2024 1340 by Ladan Garland RN  Outcome: Progressing     Problem: NEUROSENSORY - ADULT  Goal: Achieves stable or improved neurological status  Description: INTERVENTIONS  - Monitor and report changes in neurological status  - Monitor vital signs such as temperature, blood pressure, glucose, and any other labs ordered   - Initiate measures to prevent increased intracranial pressure  - Monitor for seizure activity and implement precautions if appropriate      1/8/2024 1340 by Ladan Garland RN  Outcome: Progressing  1/8/2024 1340 by Ladan Garland RN  Outcome: Progressing  Goal: Remains free of injury related to seizures activity  Description: INTERVENTIONS  - Maintain airway, patient safety  and administer oxygen as ordered  - Monitor patient for seizure activity, document and report duration and description of seizure to physician/advanced practitioner  - If seizure occurs,  ensure patient safety during seizure  - Reorient patient post seizure  - Seizure pads on all 4 side rails  - Instruct patient/family to notify RN of any seizure activity including if an aura is experienced  - Instruct patient/family to call for assistance with activity based on nursing assessment  - Administer anti-seizure medications if ordered    1/8/2024 1340 by Ladan Garland RN  Outcome: Progressing  1/8/2024 1340 by Ladan Garland RN  Outcome: Progressing  Goal: Achieves maximal functionality and self care  Description: INTERVENTIONS  - Monitor swallowing and airway patency with patient fatigue and changes in neurological status  - Encourage and  assist patient to increase activity and self care.   - Encourage visually impaired, hearing impaired and aphasic patients to use assistive/communication devices  1/8/2024 1340 by Ladan Garland RN  Outcome: Progressing  1/8/2024 1340 by Ladan Garland RN  Outcome: Progressing     Problem: CARDIOVASCULAR - ADULT  Goal: Maintains optimal cardiac output and hemodynamic stability  Description: INTERVENTIONS:  - Monitor I/O, vital signs and rhythm  - Monitor for S/S and trends of decreased cardiac output  - Administer and titrate ordered vasoactive medications to optimize hemodynamic stability  - Assess quality of pulses, skin color and temperature  - Assess for signs of decreased coronary artery perfusion  - Instruct patient to report change in severity of symptoms  1/8/2024 1340 by Ladan Garland RN  Outcome: Progressing  1/8/2024 1340 by Ladan Garland RN  Outcome: Progressing  Goal: Absence of cardiac dysrhythmias or at baseline rhythm  Description: INTERVENTIONS:  - Continuous cardiac monitoring, vital signs, obtain 12 lead EKG if ordered  - Administer antiarrhythmic and heart rate control medications as ordered  - Monitor electrolytes and administer replacement therapy as ordered  1/8/2024 1340 by Ladan Garland RN  Outcome: Progressing  1/8/2024 1340 by Ladan Garland RN  Outcome: Progressing     Problem: RESPIRATORY - ADULT  Goal: Achieves optimal ventilation and oxygenation  Description: INTERVENTIONS:  - Assess for changes in respiratory status  - Assess for changes in mentation and behavior  - Position to facilitate oxygenation and minimize respiratory effort  - Oxygen administered by appropriate delivery if ordered  - Initiate smoking cessation education as indicated  - Encourage broncho-pulmonary hygiene including cough, deep breathe, Incentive Spirometry  - Assess the need for suctioning and aspirate as needed  - Assess and instruct to report SOB or any respiratory difficulty  - Respiratory Therapy support  as indicated  1/8/2024 1340 by Ladan Garland RN  Outcome: Progressing  1/8/2024 1340 by Ladan Garland RN  Outcome: Progressing     Problem: GASTROINTESTINAL - ADULT  Goal: Maintains or returns to baseline bowel function  Description: INTERVENTIONS:  - Assess bowel function  - Encourage oral fluids to ensure adequate hydration  - Administer IV fluids if ordered to ensure adequate hydration  - Administer ordered medications as needed  - Encourage mobilization and activity  - Consider nutritional services referral to assist patient with adequate nutrition and appropriate food choices  1/8/2024 1340 by Ladan Garland RN  Outcome: Progressing  1/8/2024 1340 by Ladan Garland RN  Outcome: Progressing  Goal: Maintains adequate nutritional intake  Description: INTERVENTIONS:  - Monitor percentage of each meal consumed  - Identify factors contributing to decreased intake, treat as appropriate  - Assist with meals as needed  - Monitor I&O, weight, and lab values if indicated  - Obtain nutrition services referral as needed  1/8/2024 1340 by Ladan Garladn RN  Outcome: Progressing  1/8/2024 1340 by Ladan Garland RN  Outcome: Progressing     Problem: GENITOURINARY - ADULT  Goal: Maintains or returns to baseline urinary function  Description: INTERVENTIONS:  - Assess urinary function  - Encourage oral fluids to ensure adequate hydration if ordered  - Administer IV fluids as ordered to ensure adequate hydration  - Administer ordered medications as needed  - Offer frequent toileting  - Follow urinary retention protocol if ordered  1/8/2024 1340 by Ladan Garland RN  Outcome: Progressing  1/8/2024 1340 by Ladan Garland RN  Outcome: Progressing  Goal: Absence of urinary retention  Description: INTERVENTIONS:  - Assess patient’s ability to void and empty bladder  - Monitor I/O  - Bladder scan as needed  - Discuss with physician/AP medications to alleviate retention as needed  - Discuss catheterization for long term situations as  appropriate  1/8/2024 1340 by Ladan Garland RN  Outcome: Progressing  1/8/2024 1340 by Ladan Garland RN  Outcome: Progressing     Problem: METABOLIC, FLUID AND ELECTROLYTES - ADULT  Goal: Electrolytes maintained within normal limits  Description: INTERVENTIONS:  - Monitor labs and assess patient for signs and symptoms of electrolyte imbalances  - Administer electrolyte replacement as ordered  - Monitor response to electrolyte replacements, including repeat lab results as appropriate  - Instruct patient on fluid and nutrition as appropriate  1/8/2024 1340 by Ladan Garland RN  Outcome: Progressing  1/8/2024 1340 by Ladan Garland RN  Outcome: Progressing  Goal: Fluid balance maintained  Description: INTERVENTIONS:  - Monitor labs   - Monitor I/O and WT  - Instruct patient on fluid and nutrition as appropriate  - Assess for signs & symptoms of volume excess or deficit  1/8/2024 1340 by Ladan Garland RN  Outcome: Progressing  1/8/2024 1340 by Ladan Garland RN  Outcome: Progressing  Goal: Glucose maintained within target range  Description: INTERVENTIONS:  - Monitor Blood Glucose as ordered  - Assess for signs and symptoms of hyperglycemia and hypoglycemia  - Administer ordered medications to maintain glucose within target range  - Assess nutritional intake and initiate nutrition service referral as needed  1/8/2024 1340 by Ladan Garland RN  Outcome: Progressing  1/8/2024 1340 by Ladan Garland RN  Outcome: Progressing     Problem: SKIN/TISSUE INTEGRITY - ADULT  Goal: Skin Integrity remains intact(Skin Breakdown Prevention)  Description: Assess:  -Perform [place] assessment every shift  -Clean and moisturize skin every shift  -Inspect skin when repositioning, toileting, and assisting with ADLS  -Assess under medical devices every shift  -Assess extremities for adequate circulation and sensation     Bed Management:  -Have minimal linens on bed & keep smooth, unwrinkled  -Change linens as needed when moist or  perspiring  -Avoid sitting or lying in one position for more than 2 hours while in bed  -Keep HOB at 30degrees     Toileting:  -Offer bedside commode  -Assess for incontinence every 2 hours  -Use incontinent care products after each incontinent episode    Activity:  -Mobilize patient 2 times a day  -Encourage activity and walks on unit  -Encourage or provide ROM exercises   -Turn and reposition patient every 2 Hours  -Use appropriate equipment to lift or move patient in bed  -Instruct/ Assist with weight shifting every 30 minutes when out of bed in chair  -Consider limitation of chair time 3 hour intervals    Skin Care:  -Avoid use of baby powder, tape, friction and shearing, hot water or constrictive clothing  -Relieve pressure over bony prominences   -Do not massage red bony areas    Next Steps:  -Teach patient strategies to minimize risks    -Consider consults to  interdisciplinary teams   1/8/2024 1340 by Ladan Garland RN  Outcome: Progressing  1/8/2024 1340 by Ladan Garland RN  Outcome: Progressing  Goal: Incision(s), wounds(s) or drain site(s) healing without S/S of infection  Description: INTERVENTIONS  - Assess and document dressing, incision, wound bed, drain sites and surrounding tissue  - Provide patient and family education  - Perform skin care/dressing changes every shift  1/8/2024 1340 by Ladan Garland RN  Outcome: Progressing  1/8/2024 1340 by Ladan Garland RN  Outcome: Progressing     Problem: HEMATOLOGIC - ADULT  Goal: Maintains hematologic stability  Description: INTERVENTIONS  - Assess for signs and symptoms of bleeding or hemorrhage  - Monitor labs  - Administer supportive blood products/factors as ordered and appropriate  1/8/2024 1340 by Ladan Garland RN  Outcome: Progressing  1/8/2024 1340 by aLdan Garland RN  Outcome: Progressing     Problem: MUSCULOSKELETAL - ADULT  Goal: Maintain or return mobility to safest level of function  Description: INTERVENTIONS:  - Assess patient's ability to  carry out ADLs; assess patient's baseline for ADL function and identify physical deficits which impact ability to perform ADLs (bathing, care of mouth/teeth, toileting, grooming, dressing, etc.)  - Assess/evaluate cause of self-care deficits   - Assess range of motion  - Assess patient's mobility  - Assess patient's need for assistive devices and provide as appropriate  - Encourage maximum independence but intervene and supervise when necessary  - Involve family in performance of ADLs  - Assess for home care needs following discharge   - Consider OT consult to assist with ADL evaluation and planning for discharge  - Provide patient education as appropriate  1/8/2024 1340 by Ladan Garland RN  Outcome: Progressing  1/8/2024 1340 by Ladan Garland RN  Outcome: Progressing  Goal: Maintain proper alignment of affected body part  Description: INTERVENTIONS:  - Support, maintain and protect limb and body alignment  - Provide patient/ family with appropriate education  1/8/2024 1340 by Ladan Garland RN  Outcome: Progressing  1/8/2024 1340 by Ladan Garland RN  Outcome: Progressing

## 2024-01-08 NOTE — RESPIRATORY THERAPY NOTE
RT Ventilator Management Note  Debbie Moody 83 y.o. female MRN: [id number]  Unit/Bed#: ICU 04 Encounter: [id number]      Daily Screen         1/6/2024  0805 1/7/2024  0733          Patient safety screen outcome:: Failed Failed      Not Ready for Weaning due to:: Underline problem not resolved Underline problem not resolved                Physical Exam:   Assessment Type: Assess only  General Appearance: Unresponsive  Respiratory Pattern: (P) Assisted  Chest Assessment: (P) Chest expansion symmetrical  Bilateral Breath Sounds: (P) Coarse  Suction: ET Tube      Resp Comments: (P) Pt is doing well on [facility] settings. No distress noted at this time. Will continue to moniter pt per protocol

## 2024-01-08 NOTE — PHYSICAL THERAPY NOTE
Physical Therapy Cancellation Note    PT orders received chart review completed. Pt is currently intubated/sedated and not appropriate to participate in skilled PT at this time. PT will follow and re-eval as medically appropriate.     01/08/24 1300   Note Type   Note type Cancelled Session   Cancel Reasons Intubated/sedated       Monica Link, PT

## 2024-01-08 NOTE — PROGRESS NOTES
PT remains intubated, off propofol recommend Glucerna 1.2@45mL/hr, add 1 pack of prosource, provides total volume 1080mL, 1356cal, 80g pro, 869mL, water flushes per MD or consider 90mL every 4hrs provides total of 1409mL.

## 2024-01-08 NOTE — TELEPHONE ENCOUNTER
01/09/2024-PT STILL IN HOSPITAL    01/08/2024-PT STILL IN HOSPITAL  01/17/2024-2 WK POV W/MARYCRUZ  02/14/2024-6 WK POV W/MARYCRUZ      01/05/2024-[facility]  Hi,    This lady needs 2 week POV and 6 week POV with Dr. Higgins. Surgery was 1/3.

## 2024-01-08 NOTE — PROGRESS NOTES
Spiritual Care Progress Note    2024  Patient: Debbie Moody : 1940  Admission Date & Time: 2023 1726  MRN: [id number] CSN: [id number]        Fr Centeno met with the pt and pt's  and conducted a pastoral visit. No further needs were expressed at this time.    Chaplains still remain available.       24 1500   Clinical Encounter Type   Visited With Patient

## 2024-01-08 NOTE — PLAN OF CARE
PT remains intubated, off propofol recommend Glucerna 1.2@45mL/hr, add 1 pack of prosource, provides total volume 1080mL, 1356cal, 80g pro, 869mL, water flushes per MD or consider 90mL every 4hrs provides total of 1409mL.   Problem: Nutrition/Hydration-ADULT  Goal: Nutrient/Hydration intake appropriate for improving, restoring or maintaining nutritional needs  Description: Monitor and assess patient's nutrition/hydration status for malnutrition. Collaborate with interdisciplinary team and initiate plan and interventions as ordered.  Monitor patient's weight and dietary intake as ordered or per policy. Utilize nutrition screening tool and intervene as necessary. Determine patient's food preferences and provide high-protein, high-caloric foods as appropriate.     INTERVENTIONS:  - Monitor oral intake, urinary output, labs, and treatment plans  - Assess nutrition and hydration status and recommend course of action  - Evaluate amount of meals eaten  - Assist patient with eating if necessary   - Allow adequate time for meals  - Recommend/ encourage appropriate diets, oral nutritional supplements, and vitamin/mineral supplements  - Order, calculate, and assess calorie counts as needed  - Recommend, monitor, and adjust tube feedings and TPN/PPN based on assessed needs  - Assess need for intravenous fluids  - Provide specific nutrition/hydration education as appropriate  - Include patient/family/caregiver in decisions related to nutrition  Outcome: Progressing

## 2024-01-08 NOTE — ASSESSMENT & PLAN NOTE
6 Days Post-Op Procedure(s):  SUBOCCIPITAL CRANIECTOMY FOR POSTERIOR FOSSA DECOMPRESSION; DISPOSE OF BONE FLAP (LULA, 1/4)  PPD 2 EVD placement.   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     Imaging:  CT head wo, 1/3/2024: Status post interval decompressive suboccipital craniectomy with expected subjacent postsurgical changes/pneumocephalus. Similar appearance of evolving left PICA territory infarct and associated edema/mass effect and with probable petechial cortical hemorrhage compared to the most immediate prior study. Similar appearance focal parenchymal hematoma in the left anterior cerebellar vermis adjacent to the fourth ventricle compared to the most immediate prior study. No new hemorrhage or mass effect. Minimally improved hydrocephalus status post placement of right frontal approach ventricular catheter.  CT head wo 1/7/24: Extensive intracranial hemorrhage without significant interval change compared to the prior study     Plan  Continue regular neurologic checks.   STAT CTH for decline in exam greater than 2 points in 1 hour  EVD was re-inserted on 1/5/24.  EVD at 0. Maintain at this level at this time.   EVD continues to be flushed as needed. Ancef 2 g q 8 hrs while drain is in place.   ICP < 20, in the room ICP at 4.   140 cc/24 hours.   Ongoing medical management per primary team.   HTS discontinued. Na goal > 145. Currently 154, continue to monitor.   SBP < 160  Pain control per primary team.   Neurology was consulted for stroke management.   ASA d/c 1/3/23, hold for at least 2 weeks post op  PT/OT/PMR evaluation  DVT PPX: SCD, okay to resume pharm dvt ppx  given stable CTH.   MRI brain wo ordered to evaluate left sided weakness.       Neurosurgery will  continue to follow. Please call with questions or concerns.

## 2024-01-08 NOTE — OCCUPATIONAL THERAPY NOTE
Occupational Therapy         Patient Name: Debbie Moody  Today's Date: 1/8/2024 01/08/24 0800   OT Last Visit   OT Visit Date 01/08/24   Note Type   Note Type Cancelled Session   Cancel Reasons Intubated/sedated     Lea Steele

## 2024-01-09 NOTE — RESPIRATORY THERAPY NOTE
RT Ventilator Management Note  Debbie Moody 83 y.o. female MRN: [id number]  Unit/Bed#: ICU 04 Encounter: [id number]      Daily Screen         1/8/2024  0738 1/9/2024  0730          Patient safety screen outcome:: Failed Failed (P)       Not Ready for Weaning due to:: Underline problem not resolved Underline problem not resolved (P)       Spont breathing trial % for 30 min: No --                Physical Exam:   Assessment Type: (P) Assess only  General Appearance: Eyes open/responds to stimulus  Respiratory Pattern: (P) Assisted  Chest Assessment: (P) Chest expansion symmetrical  Bilateral Breath Sounds: (P) Diminished, Coarse  Cough: (P) Productive  Suction: (P) ET Tube  O2 Device: (P) C3      Resp Comments: (P) pt is awake and remains on scmv mode through C3, pt tolerating current settings, sx via ett for sm whiteish secretions, no changes at this time, will continue to monitor

## 2024-01-09 NOTE — OCCUPATIONAL THERAPY NOTE
Occupational Therapy Cancel Note        Patient Name: Debbie Moody  Today's Date: 1/9/2024 01/09/24 1047   OT Last Visit   OT Visit Date 01/09/24   Note Type   Note Type Cancelled Session   Cancel Reasons Intubated/sedated     OT orders were received and chart reviewed. OT will hold and continue to follow and see as medically appropriate and able.     Chanell Peña, OTR/L

## 2024-01-09 NOTE — ASSESSMENT & PLAN NOTE
Lab Results   Component Value Date    HGBA1C 8.3 (H) 12/31/2023       Recent Labs     01/08/24  0618 01/08/24  1144 01/08/24  1751 01/09/24  0012   POCGLU 249* 204* 154* 205*         Blood Sugar Average: Last 72 hrs:  (P) [id number]  Ongoing management per primary team.   Pt on insulin.

## 2024-01-09 NOTE — QUICK NOTE
HPI:   Debbie Moody is a 83 y.o. who presents with weakness, headache, nausea and vomiting. She has a PMH of Covid 19 pneumonia in 12/2021 complicated by pneumothoraces and now with pulmonary fibrosis, CKD, DM, HTN, C. Diff colitis in 9/2023. She reported that she woke up this morning at 0500 with nausea and vomiting and needed assistance to walk. Earlier this afternoon she had a posterior headache, weakness, and continued vomiting and presented to the ED. Stroke alert was called and CTH/CTA showed distal PICA occlusion. She was outside of the window for TNK on arrival to the ED. She is being admitted to CC service for ongoing care.   Was put on HTS for 2.5 days and then transferred out of ICU due to excellent exam. 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 Mass effect on the fourth ventricle is slightly increased. No hydrocephalus. Persistent increased attenuation in the anterior aspect of the cerebellar infarct that could represent residual contrast staining but petechial hemorrhage not excluded. Recommend close interval follow-up CT and/or further  evaluation with MRI.   12/31 MRI-Acute infarct in left PICA territory with small acute parenchymal hematoma in anterior aspect of left cerebellar vermis, petechial cortical hemorrhage along left posterior cerebellum, and similar compressive mass effect on fourth ventricle. No obstructive hydrocephalus.   12/31 TTE: EF 70% L atrium is nl in size   01/03 CTH: Evolving left PCA infarct with increased left cerebellar hypodensity and compression of the fourth ventricle resulting in hydrocephalus with increased focal parenchymal hemorrhage adjacent to the fourth ventricle   S/p Suboccipital craniectomy for posterior fossa decompression 01/03/23 by Dr. Higgins   01/03 CTH post-op: Status post interval decompressive suboccipital craniectomy with expected subjacent postsurgical changes/pneumocephalus. Minimally improved hydrocephalus status post placement of right frontal approach ventricular catheter.   01/05 CTH: Extensive ventricular hemorrhage   01/06 CTH: worsened accumulated bilateral ventricular hemorrhage  01/07 CTH: IVH is similar to previous CTH  Etiology: suspected atheroembolic vs cardioembolic   EVH from suspected hemorrhagic transformation from original stroke bed         Plan:   Q2 neuro checks  SBP goal 100-160  On cardene gtt  S/p bolus of 250cc HTS  HTS stopped 01/05  Monitoring Na goal 145-155  BMP q12 hours  Neurosurgery following  Recommending DVT ppx  Recommending MRI brain and ordered   Put in for ancef for ppx w/ the constant flushing   R EVD removed 01/05 am and L EVD brought to 0   placed 01/05  ICP: 9-14 and then 0 overnight, CPP: 80-90  Drainage  140cc 01/08-01/09  If any acute changes in neuro exam, obtain CTH and contact neurosurg if needs suboccipital decompression   Neurology following  Continue stroke pathway  Further GOC conversations for possible DNR and discuss trach and peg  Hold AC/AP  Repeat CTH if new focal deficit or decline in GCS > 2 points        CV:   Diagnosis: HTN  Plan:   Goal SBP  100-140  On cardene gtt @ 12.5 this AM  Norvasc 10mg   Wean off cardene as able   Prn labetalol encourage more usage of prn labetelol  Lisinopril 20mg           - Diagnosis: HLD              Lipid panel: Cholesterol 79, 7/22,               Plan:              Lipitor 80mg      Pulm:  Diagnosis: Acute hypoxic respiratory failure, now resolved   Likely secondary to cerebellar edema affecting brain stem   Extubated 1/4/24  Reintubated 01/05 due to unable to protect airway   /6/40  Plan   SAT/SBT as able   Wean MV as able   Pressure support trial   Likely will get trach      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  Plan:   Hold xopenex while on MV  On MV as per above        GI:   bowel regimen: senokot and miralax prn  BM: 01/06/23 give suppository         :   Diagnosis:CKD3b   Baseline cr: 1.1-1.3  Back to baseline, Cr 1.14  - likely in setting of recent HTS   Plan: Continue to monitor   Strict I/Os  Urinary retention protocol  Avoid nephrotoxins   Encourage po hydration         F/E/N:   F:none   E: K> 4.0, maintain magnesium > 2.0, maintain phosphate > 3  N: TF @ 35cc/hr w/ glucerna and prosource packets w/ nutrition consult         Heme/Onc:   Diagnosis: Anemia  Hgb 7.5 from 8.2  Likely due to noted IVH and partially from blood draws  Plan:   Continue iron supplementation  Trend hgb/plt count  DVT ppx w/ heparin q8hrs        Endo:   Diagnosis: DM type 2  A1c: 8.3  Plan:   Subq insulin (when was on insulin gtt 01/07, required 62.5 units)   Increased Lantus 20 units BID  SSI alg 4 (gave 12 units SSI over 24 hours)   Goal blood glucose 140-180        ID:   Dx: Episode of febrile 100.6    Occurred 01/06 and has been afebrile since  Plan   - got UA that was bland   - follow blood cx  Trend WBC/fever curve     MSK/Skin:   No active issues  Frequent repositioning   PT/OT        Lines/Drains  - continue central line for now and see how does pressure support         Disposition: Critical care

## 2024-01-09 NOTE — PHYSICAL THERAPY NOTE
Physical Therapy Cancellation Note    PT orders received chart review completed. Pt is currently intubated/sedated and not appropriate to participate in skilled PT at this time. PT will follow and re-eval as medically appropriate.     01/09/24 1100   Note Type   Note type Cancelled Session   Cancel Reasons Intubated/sedated       Monica Link, PT

## 2024-01-09 NOTE — PLAN OF CARE
Problem: PAIN - ADULT  Goal: Verbalizes/displays adequate comfort level or baseline comfort level  Description: Interventions:  - Encourage patient to monitor pain and request assistance  - Assess pain using appropriate pain scale  - Administer analgesics based on type and severity of pain and evaluate response  - Implement non-pharmacological measures as appropriate and evaluate response  - Consider cultural and social influences on pain and pain management  - Notify physician/advanced practitioner if interventions unsuccessful or patient reports new pain  Outcome: Progressing     Problem: INFECTION - ADULT  Goal: Absence or prevention of progression during hospitalization  Description: INTERVENTIONS:  - Assess and monitor for signs and symptoms of infection  - Monitor lab/diagnostic results  - Monitor all insertion sites, i.e. indwelling lines, tubes, and drains  - Monitor endotracheal if appropriate and nasal secretions for changes in amount and color  - [place] appropriate cooling/warming therapies per order  - Administer medications as ordered  - Instruct and encourage patient and family to use good hand hygiene technique  - Identify and instruct in appropriate isolation precautions for identified infection/condition  Outcome: Progressing  Goal: Absence of fever/infection during neutropenic period  Description: INTERVENTIONS:  - Monitor WBC    Outcome: Progressing     Problem: SAFETY ADULT  Goal: Patient will remain free of falls  Description: INTERVENTIONS:  - Educate patient/family on patient safety including physical limitations  - Instruct patient to call for assistance with activity   - Consult OT/PT to assist with strengthening/mobility   - Keep Call bell within reach  - Keep bed low and locked with side rails adjusted as appropriate  - Keep care items and personal belongings within reach  - Initiate and maintain comfort rounds  - Make Fall Risk Sign visible to staff  - Offer Toileting every 2 Hours,  in advance of need  - Initiate/Maintain Bed/Chair alarm  - Obtain necessary fall risk management equipment  - Apply yellow socks and bracelet for high fall risk patients  - Consider moving patient to room near nurses station  Outcome: Progressing  Goal: Maintain or return to baseline ADL function  Description: INTERVENTIONS:  -  Assess patient's ability to carry out ADLs; assess patient's baseline for ADL function and identify physical deficits which impact ability to perform ADLs (bathing, care of mouth/teeth, toileting, grooming, dressing, etc.)  - Assess/evaluate cause of self-care deficits   - Assess range of motion  - Assess patient's mobility; develop plan if impaired  - Assess patient's need for assistive devices and provide as appropriate  - Encourage maximum independence but intervene and supervise when necessary  - Involve family in performance of ADLs  - Assess for home care needs following discharge   - Consider OT consult to assist with ADL evaluation and planning for discharge  - Provide patient education as appropriate  Outcome: Progressing  Goal: Maintains/Returns to pre admission functional level  Description: INTERVENTIONS:  - Perform AM-PAC 6 Click Basic Mobility/ Daily Activity assessment daily.  - Set and communicate daily mobility goal to care team and patient/family/caregiver.   - Collaborate with rehabilitation services on mobility goals if consulted  - Perform Range of Motion 10 times a day.  - Reposition patient every 2 hours.  - Dangle patient 2 times a day  Outcome: Progressing     Problem: DISCHARGE PLANNING  Goal: Discharge to home or other facility with appropriate resources  Description: INTERVENTIONS:  - Identify barriers to discharge w/patient and caregiver  - Arrange for needed discharge resources and transportation as appropriate  - Identify discharge learning needs (meds, wound care, etc.)  - Arrange for interpretive services to assist at discharge as needed  - Refer to Case  Management Department for coordinating discharge planning if the patient needs post-hospital services based on physician/advanced practitioner order or complex needs related to functional status, cognitive ability, or social support system  Outcome: Progressing     Problem: Knowledge Deficit  Goal: Patient/family/caregiver demonstrates understanding of disease process, treatment plan, medications, and discharge instructions  Description: Complete learning assessment and assess knowledge base.  Interventions:  - Provide teaching at level of understanding  - Provide teaching via preferred learning methods  Outcome: Progressing     Problem: Neurological Deficit  Goal: Neurological status is stable or improving  Description: Interventions:  - Monitor and assess patient's level of consciousness, motor function, sensory function, and level of assistance needed for ADLs.   - Monitor and report changes from baseline. Collaborate with interdisciplinary team to initiate plan and implement interventions as ordered.   - Provide and maintain a safe environment.  - Consider seizure precautions.  - Consider fall precautions.  - Consider aspiration precautions.  - Consider bleeding precautions.  Outcome: Progressing     Problem: Activity Intolerance/Impaired Mobility  Goal: Mobility/activity is maintained at optimum level for patient  Description: Interventions:  - Assess and monitor patient  barriers to mobility and need for assistive/adaptive devices.  - Assess patient's emotional response to limitations.  - Collaborate with interdisciplinary team and initiate plans and interventions as ordered.  - Encourage independent activity per ability.  - Maintain proper body alignment.  - Perform active/passive rom as tolerated/ordered.  - Plan activities to conserve energy.  - Turn patient as appropriate  Outcome: Progressing     Problem: Communication Impairment  Goal: Ability to express needs and understand communication  Description:  Assess patient's communication skills and ability to understand information.  Patient will demonstrate use of effective communication techniques, alternative methods of communication and understanding even if not able to speak.     - Encourage communication and provide alternate methods of communication as needed.  - Collaborate with case management/ for discharge needs.  - Include patient/family/caregiver in decisions related to communication.  Outcome: Progressing     Problem: Potential for Aspiration  Goal: Ventilated patient's risk of aspiration is minimized  Description: Assess and monitor vital signs, respiratory status, airway cuff pressure, and labs (WBC).  Monitor for signs of aspiration (tachypnea, cough, rales, wheezing, cyanosis, fever).    - Elevate head of bed 30 degrees if patient has tube feeding.  - Monitor tube feeding.  Outcome: Progressing     Problem: Nutrition  Goal: Nutrition/Hydration status is improving  Description: Monitor and assess patient's nutrition/hydration status for malnutrition (ex- brittle hair, bruises, dry skin, pale skin and conjunctiva, muscle wasting, smooth red tongue, and disorientation). Collaborate with interdisciplinary team and initiate plan and interventions as ordered.  Monitor patient's weight and dietary intake as ordered or per policy. Utilize nutrition screening tool and intervene per policy. Determine patient's food preferences and provide high-protein, high-caloric foods as appropriate.     - Assist patient with eating.  - Allow adequate time for meals.  - Encourage patient to take dietary supplement as ordered.  - Collaborate with clinical nutritionist.  - Include patient/family/caregiver in decisions related to nutrition.  Outcome: Progressing     Problem: Prexisting or High Potential for Compromised Skin Integrity  Goal: Skin integrity is maintained or improved  Description: INTERVENTIONS:  - Identify patients at risk for skin breakdown  -  Assess and monitor skin integrity  - Assess and monitor nutrition and hydration status  - Monitor labs   - Assess for incontinence   - Turn and reposition patient  - Assist with mobility/ambulation  - Relieve pressure over bony prominences  - Avoid friction and shearing  - Provide appropriate hygiene as needed including keeping skin clean and dry  - Evaluate need for skin moisturizer/barrier cream  - Collaborate with interdisciplinary team   - Patient/family teaching  - Consider wound care consult   Outcome: Progressing     Problem: Nutrition/Hydration-ADULT  Goal: Nutrient/Hydration intake appropriate for improving, restoring or maintaining nutritional needs  Description: Monitor and assess patient's nutrition/hydration status for malnutrition. Collaborate with interdisciplinary team and initiate plan and interventions as ordered.  Monitor patient's weight and dietary intake as ordered or per policy. Utilize nutrition screening tool and intervene as necessary. Determine patient's food preferences and provide high-protein, high-caloric foods as appropriate.     INTERVENTIONS:  - Monitor oral intake, urinary output, labs, and treatment plans  - Assess nutrition and hydration status and recommend course of action  - Evaluate amount of meals eaten  - Assist patient with eating if necessary   - Allow adequate time for meals  - Recommend/ encourage appropriate diets, oral nutritional supplements, and vitamin/mineral supplements  - Order, calculate, and assess calorie counts as needed  - Recommend, monitor, and adjust tube feedings and TPN/PPN based on assessed needs  - Assess need for intravenous fluids  - Provide specific nutrition/hydration education as appropriate  - Include patient/family/caregiver in decisions related to nutrition  Outcome: Progressing     Problem: COPING  Goal: Pt/Family able to verbalize concerns and demonstrate effective coping strategies  Description: INTERVENTIONS:  - Assist patient/family to  identify coping skills, available support systems and cultural and spiritual values  - Provide emotional support, including active listening and acknowledgement of concerns of patient and caregivers  - Reduce environmental stimuli, as able  - Provide patient education  - Assess for spiritual pain/suffering and initiate spiritual care, including notification of Pastoral Care or nancie based community as needed  - Assess effectiveness of coping strategies  Outcome: Progressing  Goal: Will report anxiety at manageable levels  Description: INTERVENTIONS:  - Administer medication as ordered  - Teach and encourage coping skills  - Provide emotional support  - Assess patient/family for anxiety and ability to cope  Outcome: Progressing     Problem: NEUROSENSORY - ADULT  Goal: Achieves stable or improved neurological status  Description: INTERVENTIONS  - Monitor and report changes in neurological status  - Monitor vital signs such as temperature, blood pressure, glucose, and any other labs ordered   - Initiate measures to prevent increased intracranial pressure  - Monitor for seizure activity and implement precautions if appropriate      Outcome: Progressing  Goal: Remains free of injury related to seizures activity  Description: INTERVENTIONS  - Maintain airway, patient safety  and administer oxygen as ordered  - Monitor patient for seizure activity, document and report duration and description of seizure to physician/advanced practitioner  - If seizure occurs,  ensure patient safety during seizure  - Reorient patient post seizure  - Seizure pads on all 4 side rails  - Instruct patient/family to notify RN of any seizure activity including if an aura is experienced  - Instruct patient/family to call for assistance with activity based on nursing assessment  - Administer anti-seizure medications if ordered    Outcome: Progressing  Goal: Achieves maximal functionality and self care  Description: INTERVENTIONS  - Monitor  swallowing and airway patency with patient fatigue and changes in neurological status  - Encourage and assist patient to increase activity and self care.   - Encourage visually impaired, hearing impaired and aphasic patients to use assistive/communication devices  Outcome: Progressing     Problem: CARDIOVASCULAR - ADULT  Goal: Maintains optimal cardiac output and hemodynamic stability  Description: INTERVENTIONS:  - Monitor I/O, vital signs and rhythm  - Monitor for S/S and trends of decreased cardiac output  - Administer and titrate ordered vasoactive medications to optimize hemodynamic stability  - Assess quality of pulses, skin color and temperature  - Assess for signs of decreased coronary artery perfusion  - Instruct patient to report change in severity of symptoms  Outcome: Progressing  Goal: Absence of cardiac dysrhythmias or at baseline rhythm  Description: INTERVENTIONS:  - Continuous cardiac monitoring, vital signs, obtain 12 lead EKG if ordered  - Administer antiarrhythmic and heart rate control medications as ordered  - Monitor electrolytes and administer replacement therapy as ordered  Outcome: Progressing     Problem: RESPIRATORY - ADULT  Goal: Achieves optimal ventilation and oxygenation  Description: INTERVENTIONS:  - Assess for changes in respiratory status  - Assess for changes in mentation and behavior  - Position to facilitate oxygenation and minimize respiratory effort  - Oxygen administered by appropriate delivery if ordered  - Initiate smoking cessation education as indicated  - Encourage broncho-pulmonary hygiene including cough, deep breathe, Incentive Spirometry  - Assess the need for suctioning and aspirate as needed  - Assess and instruct to report SOB or any respiratory difficulty  - Respiratory Therapy support as indicated  Outcome: Progressing     Problem: GASTROINTESTINAL - ADULT  Goal: Maintains or returns to baseline bowel function  Description: INTERVENTIONS:  - Assess bowel  function  - Encourage oral fluids to ensure adequate hydration  - Administer IV fluids if ordered to ensure adequate hydration  - Administer ordered medications as needed  - Encourage mobilization and activity  - Consider nutritional services referral to assist patient with adequate nutrition and appropriate food choices  Outcome: Progressing  Goal: Maintains adequate nutritional intake  Description: INTERVENTIONS:  - Monitor percentage of each meal consumed  - Identify factors contributing to decreased intake, treat as appropriate  - Assist with meals as needed  - Monitor I&O, weight, and lab values if indicated  - Obtain nutrition services referral as needed  Outcome: Progressing     Problem: GENITOURINARY - ADULT  Goal: Maintains or returns to baseline urinary function  Description: INTERVENTIONS:  - Assess urinary function  - Encourage oral fluids to ensure adequate hydration if ordered  - Administer IV fluids as ordered to ensure adequate hydration  - Administer ordered medications as needed  - Offer frequent toileting  - Follow urinary retention protocol if ordered  Outcome: Progressing  Goal: Absence of urinary retention  Description: INTERVENTIONS:  - Assess patient’s ability to void and empty bladder  - Monitor I/O  - Bladder scan as needed  - Discuss with physician/AP medications to alleviate retention as needed  - Discuss catheterization for long term situations as appropriate  Outcome: Progressing     Problem: METABOLIC, FLUID AND ELECTROLYTES - ADULT  Goal: Electrolytes maintained within normal limits  Description: INTERVENTIONS:  - Monitor labs and assess patient for signs and symptoms of electrolyte imbalances  - Administer electrolyte replacement as ordered  - Monitor response to electrolyte replacements, including repeat lab results as appropriate  - Instruct patient on fluid and nutrition as appropriate  Outcome: Progressing  Goal: Fluid balance maintained  Description: INTERVENTIONS:  - Monitor  labs   - Monitor I/O and WT  - Instruct patient on fluid and nutrition as appropriate  - Assess for signs & symptoms of volume excess or deficit  Outcome: Progressing  Goal: Glucose maintained within target range  Description: INTERVENTIONS:  - Monitor Blood Glucose as ordered  - Assess for signs and symptoms of hyperglycemia and hypoglycemia  - Administer ordered medications to maintain glucose within target range  - Assess nutritional intake and initiate nutrition service referral as needed  Outcome: Progressing     Problem: SKIN/TISSUE INTEGRITY - ADULT  Goal: Skin Integrity remains intact(Skin Breakdown Prevention)  Description: Assess:  -Perform [place] assessment every shift  -Clean and moisturize skin every shift and PRN  -Inspect skin when repositioning, toileting, and assisting with ADLS  -Assess under medical devices every shift  -Assess extremities for adequate circulation and sensation     Bed Management:  -Have minimal linens on bed & keep smooth, unwrinkled  -Change linens as needed when moist or perspiring  -Avoid sitting or lying in one position for more than 2 hours while in bed  -Keep HOB at 30 degrees     Toileting:  -Offer bedside commode  -Assess for incontinence every 2 hours  -Use incontinent care products after each incontinent episode     Activity:  -Mobilize patient 2 times a day  -Encourage activity and walks on unit  -Encourage or provide ROM exercises   -Turn and reposition patient every 2 Hours  -Use appropriate equipment to lift or move patient in bed  -Instruct/ Assist with weight shifting every 30 minutes when out of bed in chair  -Consider limitation of chair time 3 hour intervals    Skin Care:  -Avoid use of baby powder, tape, friction and shearing, hot water or constrictive clothing  -Relieve pressure over bony prominences   -Do not massage red bony areas    Next Steps:  -Teach patient strategies to minimize risks    -Consider consults to  interdisciplinary teams   Outcome:  Progressing  Goal: Incision(s), wounds(s) or drain site(s) healing without S/S of infection  Description: INTERVENTIONS  - Assess and document dressing, incision, wound bed, drain sites and surrounding tissue  - Provide patient and family education  - Perform skin care/dressing changes every shift and PRN  Outcome: Progressing     Problem: HEMATOLOGIC - ADULT  Goal: Maintains hematologic stability  Description: INTERVENTIONS  - Assess for signs and symptoms of bleeding or hemorrhage  - Monitor labs  - Administer supportive blood products/factors as ordered and appropriate  Outcome: Progressing     Problem: MUSCULOSKELETAL - ADULT  Goal: Maintain or return mobility to safest level of function  Description: INTERVENTIONS:  - Assess patient's ability to carry out ADLs; assess patient's baseline for ADL function and identify physical deficits which impact ability to perform ADLs (bathing, care of mouth/teeth, toileting, grooming, dressing, etc.)  - Assess/evaluate cause of self-care deficits   - Assess range of motion  - Assess patient's mobility  - Assess patient's need for assistive devices and provide as appropriate  - Encourage maximum independence but intervene and supervise when necessary  - Involve family in performance of ADLs  - Assess for home care needs following discharge   - Consider OT consult to assist with ADL evaluation and planning for discharge  - Provide patient education as appropriate  Outcome: Progressing  Goal: Maintain proper alignment of affected body part  Description: INTERVENTIONS:  - Support, maintain and protect limb and body alignment  - Provide patient/ family with appropriate education  Outcome: Progressing     Problem: SAFETY,RESTRAINT: NV/NON-SELF DESTRUCTIVE BEHAVIOR  Goal: Remains free of harm/injury (restraint for non violent/non self-detsructive behavior)  Description: INTERVENTIONS:  - Instruct patient/family regarding restraint use   - Assess and monitor physiologic and  psychological status   - Provide interventions and comfort measures to meet assessed patient needs   - Identify and implement measures to help patient regain control  - Assess readiness for release of restraint   Outcome: Progressing  Goal: Returns to optimal restraint-free functioning  Description: INTERVENTIONS:  - Assess the patient's behavior and symptoms that indicate continued need for restraint  - Identify and implement measures to help patient regain control  - Assess readiness for release of restraint   Outcome: Progressing

## 2024-01-09 NOTE — RESPIRATORY THERAPY NOTE
RT Ventilator Management Note  Debbie Moody 83 y.o. female MRN: [id number]  Unit/Bed#: ICU 04 Encounter: [id number]      Daily Screen         1/8/2024  0738 1/9/2024  0730          Patient safety screen outcome:: Failed Failed      Not Ready for Weaning due to:: Underline problem not resolved Underline problem not resolved      Spont breathing trial % for 30 min: No --                Physical Exam:   Assessment Type: Assess only  Respiratory Pattern: Assisted  Chest Assessment: Chest expansion symmetrical  Bilateral Breath Sounds: Diminished, Coarse  Cough: Productive  Suction: ET Tube  O2 Device: (P) C3      Resp Comments: (P) pt mode changed to spont

## 2024-01-09 NOTE — PROGRESS NOTES
[facility]  Progress Note: Critical Care  Name: Debbie Moody 83 y.o. 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 POD #6 from suboccipital craniectomy  Plan: Frequent neurologic monitoring, continue EVD per neurosurgery, patient has PRN Tylenol and fentanyl available  CV:   Diagnosis: Hypertension, hyperlipidemia  Plan: Continue Cardene infusion, continue lisinopril- consider up-titration, PRN labetalol/hydralazine available, continue statin  Pulm:  Diagnosis: Respiratory failure, pulmoanry fibrosis  Plan: Continue mechanical ventilation, discuss tracheostomy with aden, continue   GI:   Plan: Continue bowel regimen  :   Diagnosis: CKD 3  Plan: Close intake and output, trend renal indices as needed  F/E/N:   Plan: Continue enteral nutrition, replete electrolytes as needed  Heme/Onc:   Diagnosis: Anemia  Plan: Continue iron supplementation, discuss with neurosurgery timing of chemical DVT prophylaxis  Endo:   Diagnosis: Type 2 diabetes  Plan: Continue Lantus with sliding scale insulin  ID:   Plan: Follow temperature and white count  MSK/Skin:   Plan: Frequent turning and repositioning    Disposition: Critical care    ICU Core Measures     Vented Patient  VAP Bundle  VAP bundle ordered     A: Assess, Prevent, and Manage Pain Has pain been assessed? Yes  Need for changes to pain regimen? No   B: Both Spontaneous Awakening Trials (SATs) and Spontaneous Breathing Trials (SBTs) Plan to perform spontaneous awakening trial today? Yes   Plan to perform spontaneous breathing trial today? Yes   Obvious barriers to extubation? Yes   C: Choice of Sedation RASS Goal: 0 Alert and Calm  Need for changes to sedation or analgesia regimen? NA   D: Delirium  CAM-ICU: Unable to perform secondary to Acute cognitive dysfunction   E: Early Mobility  Plan for early mobility? Yes   F: Family Engagement Plan for family engagement today? Yes       Review of Invasive Devices:      Central access plan: Medications requiring central line      Prophylaxis:  VTE Contraindicated secondary to: intracranial hemorrhage   Stress Ulcer  not ordered        Significant 24hr Events     24hr events: Intermittently responding to request     Subjective     Review of Systems   Unable to perform ROS: Intubated        Objective                            Vitals I/O      Most Recent Min/Max in 24hrs   Temp 98.7 °F (37.1 °C) Temp  Min: 98.2 °F (36.8 °C)  Max: 99.3 °F (37.4 °C)   Pulse 88 Pulse  Min: 72  Max: 92   Resp 14 Resp  Min: 13  Max: 17   /55 BP  Min: 114/52  Max: 145/65   O2 Sat 100 % SpO2  Min: 99 %  Max: 100 %      Intake/Output Summary (Last 24 hours) at 1/9/2024 0317  Last data filed at 1/9/2024 0258  Gross per 24 hour   Intake 3252.08 ml   Output 1911 ml   Net 1341.08 ml       Diet Enteral/Parenteral; Tube Feeding No Oral Diet; Glucerna 1.2; Continuous; 35; Prosource Protein Liquid - Two Packets    Invasive Monitoring   Neuro Invasive Monitoring   Most Recent  Min/Max in 24hrs    GCS 7  Harjit Coma Scale Score  Min: 7  Max: 7    ICP 0 mmHg   ICP Mean (mmHg)  Min: 0 mmHg  Max: 14 mmHg    CPP (cuff) 80  CPP Cuff-Calculated (mmHg)  Min: 70  Max: 96    CPP(debbie) 64  No data recorded   CVP   No data recorded           Physical Exam   Physical Exam  Eyes:      Pupils: Pupils are equal, round, and reactive to light.   Skin:     General: Skin is warm and dry.   HENT:      Head: Normocephalic.      Comments: EVD in place at 0 with small amount of clear fluid     Mouth/Throat:      Mouth: Mucous membranes are dry.   Cardiovascular:      Rate and Rhythm: Normal rate and regular rhythm.      Pulses: Normal pulses.   Musculoskeletal:         General: No tenderness or signs of injury.  "  Abdominal: General: Bowel sounds are normal.      Palpations: Abdomen is soft.   Constitutional:       Appearance: She is ill-appearing.      Interventions: She is intubated.   Pulmonary:      Effort: Pulmonary effort is normal. She is intubated.      Breath sounds: Normal breath sounds.      Comments: Small amount of white secretions inline suction  Neurological:      GCS: GCS eye subscore is 2. GCS verbal subscore is 1. GCS motor subscore is 6.      Comments: Gives \"thumbs up\" with RUE   Flexes right lower extremity  Could not elicit movement in left lower  Slight flexion in left upper   Genitourinary/Anorectal:     Comments: Yellow urine in place  external catheter present.          Diagnostic Studies      EKG: Sinus rhythm  Imaging:  I have personally reviewed pertinent reports.   and I have personally reviewed pertinent films in PACS     Medications:  Scheduled PRN   atorvastatin, 80 mg, QPM  chlorhexidine, 15 mL, Q12H JUAN ANTONIO  ferrous sulfate, 325 mg, Daily With Breakfast  insulin glargine, 18 Units, Q12H JUAN ANTONIO  insulin lispro, 2-12 Units, Q6H JUAN ANTONIO  lisinopril, 20 mg, Daily  polyethylene glycol, 17 g, Daily  senna-docusate sodium, 1 tablet, BID      acetaminophen, 650 mg, Q6H PRN  bisacodyl, 10 mg, Daily PRN  fentanyl citrate (PF), 50 mcg, Q1H PRN  hydrALAZINE, 10 mg, Q6H PRN  labetalol, 10 mg, Q6H PRN  ondansetron, 4 mg, Q6H PRN       Continuous    niCARdipine, 1-15 mg/hr, Last Rate: 12.5 mg/hr (01/09/24 0249)         Labs:    CBC    Recent Labs     01/07/24  0521 01/08/24  0443   WBC 7.85 8.17   HGB 7.9* 7.5*   HCT 25.2* 24.3*    228     BMP    Recent Labs     01/08/24  0443 01/08/24  1755   SODIUM 154* 155*   K 3.9 4.1   * 125*   CO2 25 23   AGAP 5 7   BUN 30* 28*   CREATININE 1.20 1.19   CALCIUM 8.7 8.8       Coags    No recent results     Additional Electrolytes  No recent results       Blood Gas    No recent results  No recent results LFTs  No recent results    Infectious  No recent results  " Glucose  Recent Labs     01/07/24  0758 01/07/24  1717 01/08/24  0443 01/08/24  1755   GLUC 174* 259* 251* 156*           BENTON Rosenberg

## 2024-01-09 NOTE — PROGRESS NOTES
[facility]  Progress Note  Name: Debbie Moody I  MRN: [id number]  Unit/Bed#: ICU 04 I Date of Admission: 12/31/2023   Date of Service: 1/9/2024 I Hospital Day: 9    Assessment/Plan   * Acute CVA (cerebrovascular accident) (HCC)  Assessment & Plan  6 Days Post-Op Procedure(s):  SUBOCCIPITAL CRANIECTOMY FOR POSTERIOR FOSSA DECOMPRESSION; DISPOSE OF BONE FLAP (LULA, 1/4)  PPD 2 EVD placement.   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     Imaging:  CT head wo, 1/3/2024: Status post interval decompressive suboccipital craniectomy with expected subjacent postsurgical changes/pneumocephalus. Similar appearance of evolving left PICA territory infarct and associated edema/mass effect and with probable petechial cortical hemorrhage compared to the most immediate prior study. Similar appearance focal parenchymal hematoma in the left anterior cerebellar vermis adjacent to the fourth ventricle compared to the most immediate prior study. No new hemorrhage or mass effect. Minimally improved hydrocephalus status post placement of right frontal approach ventricular catheter.  CT head wo 1/7/24: Extensive intracranial hemorrhage without significant interval change compared to the prior study     Plan  Continue regular neurologic checks.   STAT CTH for decline in exam greater than 2 points in 1 hour  EVD was re-inserted on 1/5/24.  EVD at 0. Maintain at this level at this time.   EVD continues to be flushed as needed. Ancef 2 g q 8 hrs while drain is in place.   ICP < 20, in the room ICP at 4.   140 cc/24 hours.   Ongoing medical management per primary team.   HTS discontinued. Na goal > 145. Currently 154, continue to monitor.   SBP < 160  Pain control  per primary team.   Neurology was consulted for stroke management.   ASA d/c 1/3/23, hold for at least 2 weeks post op  PT/OT/PMR evaluation  DVT PPX: SCD, okay to resume pharm dvt ppx  given stable CTH.   MRI brain wo ordered to evaluate left sided weakness.       Neurosurgery will continue to follow. Please call with questions or concerns.       Type 2 diabetes mellitus with stage 3b chronic kidney disease, without long-term current use of insulin ([facility])  Assessment & Plan  Lab Results   Component Value Date    HGBA1C 8.3 (H) 12/31/2023       Recent Labs     01/08/24  0618 01/08/24  1144 01/08/24  1751 01/09/24  0012   POCGLU 249* 204* 154* 205*         Blood Sugar Average: Last 72 hrs:  (P) 171.9629071769377959  Ongoing management per primary team.   Pt on insulin.          Subjective/Objective     Chief Complaint: Pt intubated/non-verbal. Follow up for left cerebellar stroke s/p SOC decompression, IVH.     Subjective: Pt remains intubated. Per nursing report, no acute events overnight. Pt continues to intermittently follow some commands on her right side and intermittently opens her eyes.     Objective: Laying in bed, NAD.       I/O         01/07 0701  01/08 0700 01/08 0701  01/09 0700 01/09 0701  01/10 0700    P.O. 0      I.V. (mL/kg) 1869.3 (29.1) 2469.6 (38.5) 247.9 (3.9)    NG/GT  240 170    Feedings 350 840 70    Total Intake(mL/kg) 2219.3 (34.6) 3549.6 (55.3) 487.9 (7.6)    Urine (mL/kg/hr) 1500 (1) 1770 (1.1)     Emesis/NG output  0     Drains 82 140 22    Total Output 1582 1910 22    Net +637.3 +1639.6 +465.9           Unmeasured Urine Occurrence 1 x              Invasive Devices       Central Venous Catheter Line  Duration             CVC Central Lines 01/03/24 5 days              Peripheral Intravenous Line  Duration             Long-Dwell Peripheral IV (Midline) 01/03/24 Left Cephalic Vein 5 days              Drain  Duration             Ventriculostomy/Subdural Ventricular drainage catheter Left  "Frontal region 3 days    External Urinary Catheter 1 day    NG/OG/Enteral Tube Orogastric Left mouth 1 day              Airway  Duration             ETT  Cuffed 7 mm 1 day                    Physical Exam:  Vitals: Blood pressure 126/50, pulse 78, temperature 99.3 °F (37.4 °C), temperature source Oral, resp. rate 14, height 4' 10\" (1.473 m), weight 64.2 kg (141 lb 8.6 oz), SpO2 99%, not currently breastfeeding.,Body mass index is 29.58 kg/m².      General appearance:  Appears stated age  Head: Normocephalic, EVD in place with blood tinged CSF drainage.   Eyes: Left pupil approx 4 mm, right pupil 3 mm, both reactive to light.   Neck: supple, symmetrical, trachea midline, intubated.   Lungs: non labored breathing on ventilator.   Heart: regular heart rate  Neurologic:   Mental status: GCS 10 T E2-3 V1T M 6  Cranial nerves: Limited  Sensory: Withdraws to painful stimuli right > left. Slow to respond LUE.   Motor: Follows some commands on the right,  fingers, shows a thumbs up and wiggles toes on the right. Withdraws to pain x 4.   Reflexes: No collins's or clonus.     Lab Results:  Results from last 7 days   Lab Units 01/09/24  0431 01/08/24  0443 01/07/24  0521   WBC Thousand/uL 11.42* 8.17 7.85   HEMOGLOBIN g/dL 7.5* 7.5* 7.9*   HEMATOCRIT % 24.0* 24.3* 25.2*   PLATELETS Thousands/uL 238 228 215   NEUTROS PCT % 79* 75 75   MONOS PCT % 7 7 7   EOS PCT % 0 1 0     Results from last 7 days   Lab Units 01/09/24  0431 01/08/24  1755 01/08/24  0443 01/03/24  2154 01/03/24  1917   POTASSIUM mmol/L 4.1 4.1 3.9   < >  --    CHLORIDE mmol/L 125* 125* 124*   < >  --    CO2 mmol/L 23 23 25   < >  --    CO2, I-STAT mmol/L  --   --   --   --  17*   BUN mg/dL 30* 28* 30*   < >  --    CREATININE mg/dL 1.14 1.19 1.20   < >  --    CALCIUM mg/dL 8.4 8.8 8.7   < >  --    GLUCOSE, ISTAT mg/dl  --   --   --   --  155*    < > = values in this interval not displayed.     Results from last 7 days   Lab Units 01/06/24  0507   MAGNESIUM " "mg/dL 1.9     Results from last 7 days   Lab Units 01/06/24  0507   PHOSPHORUS mg/dL 1.5*     Results from last 7 days   Lab Units 01/03/24  1211   INR  1.14   PTT seconds 30     No results found for: \"TROPONINT\"  ABG:  Lab Results   Component Value Date    PHART 7.364 01/03/2024    STY2UGY 33.9 (L) 01/03/2024    PO2ART 235.8 (H) 01/03/2024    PMY2ACX 18.9 (L) 01/03/2024    BEART -5.7 01/03/2024    SOURCE Line, Arterial 01/03/2024       Imaging Studies: I have personally reviewed pertinent reports and I have personally reviewed pertinent films in PACS    EKG, Pathology, and Other Studies: I have personally reviewed pertinent reports.      PLEASE NOTE:  This encounter may have been completed utilizing the [employer] Direct Speech Voice Recognition Software. Grammatical errors, random word insertions, pronoun errors and incomplete sentences are occasional consequences of the system due to software limitations, ambient noise and hardware issues.These may be missed by proof reading prior to affixing electronic signature. Any questions or concerns about the content, text or information contained within the body of this dictation should be directly addressed to the advanced practitioner or physician for clarification.     "

## 2024-01-10 NOTE — RESPIRATORY THERAPY NOTE
RT Ventilator Management Note  Debbie Moody 83 y.o. female MRN: [id number]  Unit/Bed#: ICU 04 Encounter: [id number]      Daily Screen         1/9/2024  0730 1/10/2024  0733          Patient safety screen outcome:: Failed Passed      Not Ready for Weaning due to:: Underline problem not resolved --      Spont breathing trial % for 30 min: -- --                Physical Exam:   Assessment Type: Assess only  General Appearance: Awake  Respiratory Pattern: Assisted  Chest Assessment: Chest expansion symmetrical  Bilateral Breath Sounds: Diminished, Coarse  Cough: Productive  Suction: ET Tube  O2 Device: vent      Resp Comments: Pt cont on PS vent at this time, no changes. Will cont to monitor

## 2024-01-10 NOTE — PHYSICAL THERAPY NOTE
Physical Therapy Cancellation Note       01/10/24 0815   PT Last Visit   PT Visit Date 01/10/24   Note Type   Note type Cancelled Session   Cancel Reasons Intubated/sedated     ORDERS FOR PT EVALUATION RECEIVED AND CHART REVIEW COMPLETED. PT NOT APPROPRIATE FOR PARTICIPATION IN PT AT THIS TIME 2* MEDICAL STATUS. WILL CONTINUE TO FOLLOW AND COMPLETE PT EVALUATION AS MEDICAL STATUS DICTATES.   Conchis Sylvseter, PT

## 2024-01-10 NOTE — PROGRESS NOTES
"Patient's pupils are unequal in size and react briskly to light. The Critical Care Medicine ICU Resident, Dr. Negron and the Neurosurgery Team, Kimberlee Munroe PA-C and Dr. Higgins are aware of the patient's pupil size being unequal. Patient's exam reveals she is able to follow commands, giving a \"thumbs up\" on her right hand, wiggling toes on her right foot, and occasionally wiggling toes on her left foot. Patient does not follow commands with her left arm or left hand, but she does withdraw to pain with the left hand/arm.  "

## 2024-01-10 NOTE — OCCUPATIONAL THERAPY NOTE
OT CANCEL NOTE    Pt chart reviewed. Pt is currently intubated/sedated and not appropriate to engage in skilled OT services at this time.Will continue to follow pt on caseload and see pt when medically stable and as clinically appropriate.     01/10/24 1327   OT Last Visit   OT Visit Date 01/10/24   Note Type   Note Type Cancelled Session   Cancel Reasons Intubated/sedated       Anna Rivera MS, OTR/L

## 2024-01-10 NOTE — ASSESSMENT & PLAN NOTE
7 Days Post-Op Procedure(s):  SUBOCCIPITAL CRANIECTOMY FOR POSTERIOR FOSSA DECOMPRESSION; DISPOSE OF BONE FLAP (LULA, 1/3)  S/p EVD replacement on 1/5/24.   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    Imaging:  MRI brain wo 1/9/24:  Stable exam demonstrating extensive intraventricular hemorrhage with surrounding vasogenic edema and/or transependymal flow of CSF. Stable hydrocephalus. Evolving subacute infarct in the left cerebellar hemisphere. Stable petechial hemorrhage, vasogenic edema and mass effect.  CT head wo 1/7/24: Extensive intracranial hemorrhage without significant interval change compared to the prior study     Plan  Continue regular neurologic checks.   Pt with some improvement on exam, now also with slight movement LUE/LLE. Pt with left sided > right sided weakness. Right gaze preference. Continues to follow some commands on the right.   STAT CTH for decline in exam greater than 2 points in 1 hour  EVD was re-inserted on 1/5/24.  EVD open at 0. Maintain at this level at this time.   EVD continues to be flushed as needed. Ancef 2 g q 8 hrs while drain is in place.   ICP < 20, in the room ICP at 4.   181 cc/24 hours.   Ongoing medical management per primary team.   Na goal > 145-155. Currently 155, continue to monitor.   SBP < 160  Pain control per primary team.   Neurology was consulted for stroke management.   ASA d/c 1/3/23, hold for at least 2 weeks post op  PT/OT/PMR evaluation  DVT PPX: SCD, SQH    Neurosurgery will continue to follow. Please call with questions or concerns.

## 2024-01-10 NOTE — TELEPHONE ENCOUNTER
1/10/24 - PT STILL IN HOSPITAL    01/09/2024-PT STILL IN HOSPITAL    01/08/2024-PT STILL IN HOSPITAL      01/17/2024-2 WK POV W/MARYCRUZ  02/14/2024-6 WK POV W/MARYCRUZ      01/05/2024-[facility] [place] [facility]  Hi,    This lady needs 2 week POV and 6 week POV with Dr. Higgins. Surgery was 1/3.

## 2024-01-10 NOTE — PROGRESS NOTES
[facility]  Progress Note  Name: Debbie Moody I  MRN: [id number]  Unit/Bed#: ICU 04 I Date of Admission: 12/31/2023   Date of Service: 1/10/2024 I Hospital Day: 10    Assessment/Plan   * Acute CVA (cerebrovascular accident) (HCC)  Assessment & Plan  7 Days Post-Op Procedure(s):  SUBOCCIPITAL CRANIECTOMY FOR POSTERIOR FOSSA DECOMPRESSION; DISPOSE OF BONE FLAP (LULA, 1/3)  S/p EVD replacement on 1/5/24.   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    Imaging:  MRI brain wo 1/9/24:  Stable exam demonstrating extensive intraventricular hemorrhage with surrounding vasogenic edema and/or transependymal flow of CSF. Stable hydrocephalus. Evolving subacute infarct in the left cerebellar hemisphere. Stable petechial hemorrhage, vasogenic edema and mass effect.  CT head wo 1/7/24: Extensive intracranial hemorrhage without significant interval change compared to the prior study     Plan  Continue regular neurologic checks.   Pt with some improvement on exam, now also with slight movement LUE/LLE. Pt with left sided > right sided weakness. Right gaze preference. Continues to follow some commands on the right.   STAT CTH for decline in exam greater than 2 points in 1 hour  EVD was re-inserted on 1/5/24.  EVD open at 0. Maintain at this level at this time.   EVD continues to be flushed as needed. Ancef 2 g q 8 hrs while drain is in place.   ICP < 20, in the room ICP at 4.   181 cc/24 hours.   Ongoing medical management per primary team.   Na goal > 145-155. Currently 155, continue to monitor.   SBP < 160  Pain control per primary team.   Neurology was consulted for stroke management.   ASA d/c 1/3/23, hold for at least 2 weeks post op  PT/OT/PMR  "evaluation  DVT PPX: SCD, SQH    Neurosurgery will continue to follow. Please call with questions or concerns.            Subjective/Objective     Chief Complaint: Pt remains intubated/non-verbal.  Follow-up for IVH/cerebellar stroke status post suboccipital craniectomy decompression.    Subjective: Patient remains intubated but off sedation this morning.  Per nursing report, no acute events overnight.  Per repors, patient continues to intermittently open her eyes.  She continues to follow some commands on the right side and is beginning to follow some commands on the left lower extremity and in the left upper extremity.     Objective: Lying in bed, no acute distress.    I/O         01/08 0701 01/09 0700 01/09 0701  01/10 0700 01/10 0701 01/11 0700    P.O.       I.V. (mL/kg) 2469.6 (38.5) 1818.8 (28.3)     NG/ 425     IV Piggyback  180     Feedings 840 770     Total Intake(mL/kg) 3549.6 (55.3) 3193.8 (49.7)     Urine (mL/kg/hr) 1770 (1.1) 2006 (1.3)     Emesis/NG output 0 0     Drains 140 181 15    Total Output 1910 2187 15    Net +1639.6 +1006.8 -15           Unmeasured Emesis Occurrence  1 x             Invasive Devices       Central Venous Catheter Line  Duration             CVC Central Lines 01/03/24 6 days              Peripheral Intravenous Line  Duration             Long-Dwell Peripheral IV (Midline) 01/03/24 Left Cephalic Vein 6 days              Drain  Duration             Ventriculostomy/Subdural Ventricular drainage catheter Left Frontal region 4 days    External Urinary Catheter 2 days    NG/OG/Enteral Tube Orogastric Left mouth 2 days              Airway  Duration             ETT  Cuffed 7 mm 2 days                    Physical Exam:  Vitals: Blood pressure (!) 173/75, pulse 86, temperature 99.4 °F (37.4 °C), temperature source Oral, resp. rate 22, height 4' 10\" (1.473 m), weight 64.2 kg (141 lb 8.6 oz), SpO2 100%, not currently breastfeeding.,Body mass index is 29.58 kg/m².    General " appearance:  Appears stated age  Head: Normocephalic, EVD in place with blood-tinged CSF drainage.  Eyes: Left pupil approximately 4 mm, right pupil approximately 3 mm with lights dimmed, right gaze preference.  Patient tries to track in the room on the right side.  Neck: supple, symmetrical, trachea midline   Lungs: non labored breathing while intubated on ventilator.  Heart: regular heart rate  Neurologic:   Mental status: GCS 11T E4, V1T, M6  Cranial nerves: Limited. Right gaze preference.   Sensory: Withdraws to pain x 4.   Motor: Follows some  commands on the right side and now also few commands on the left side.   RUE:, shows a thumbs up and tries to shows 2 fingers to command, attempts to lift RUE antigravity,   RLE: spontaneously flexes right knee, wiggles toes,   LUE:  and wiggles 3rd to 4th fingers to command, LLE: wiggles toes.       Lab Results:  Results from last 7 days   Lab Units 01/10/24  0428 01/09/24  0431 01/08/24  0443   WBC Thousand/uL 11.06* 11.42* 8.17   HEMOGLOBIN g/dL 7.6* 7.5* 7.5*   HEMATOCRIT % 24.3* 24.0* 24.3*   PLATELETS Thousands/uL 217 238 228   NEUTROS PCT % 77* 79* 75   MONOS PCT % 6 7 7   EOS PCT % 1 0 1     Results from last 7 days   Lab Units 01/10/24  0428 01/09/24  1853 01/09/24  0431 01/03/24  2154 01/03/24  1917   POTASSIUM mmol/L 4.1 3.7 4.1   < >  --    CHLORIDE mmol/L 124* 123* 125*   < >  --    CO2 mmol/L 23 22 23   < >  --    CO2, I-STAT mmol/L  --   --   --   --  17*   BUN mg/dL 36* 34* 30*   < >  --    CREATININE mg/dL 1.18 1.15 1.14   < >  --    CALCIUM mg/dL 8.4 8.2* 8.4   < >  --    ALK PHOS U/L 71  --   --   --   --    ALT U/L 9  --   --   --   --    AST U/L 21  --   --   --   --    GLUCOSE, ISTAT mg/dl  --   --   --   --  155*    < > = values in this interval not displayed.     Results from last 7 days   Lab Units 01/06/24  0507   MAGNESIUM mg/dL 1.9     Results from last 7 days   Lab Units 01/06/24  0507   PHOSPHORUS mg/dL 1.5*     Results from last 7  "days   Lab Units 01/03/24  1211   INR  1.14   PTT seconds 30     No results found for: \"TROPONINT\"  ABG:  Lab Results   Component Value Date    PHART 7.364 01/03/2024    HWM9HER 33.9 (L) 01/03/2024    PO2ART 235.8 (H) 01/03/2024    CFV6IGG 18.9 (L) 01/03/2024    BEART -5.7 01/03/2024    SOURCE Line, Arterial 01/03/2024       Imaging Studies: I have personally reviewed pertinent reports and I have personally reviewed pertinent films in PACS    EKG, Pathology, and Other Studies: I have personally reviewed pertinent reports.      PLEASE NOTE:  This encounter may have been completed utilizing the [employer] Speech Voice Recognition Software. Grammatical errors, random word insertions, pronoun errors and incomplete sentences are occasional consequences of the system due to software limitations, ambient noise and hardware issues.These may be missed by proof reading prior to affixing electronic signature. Any questions or concerns about the content, text or information contained within the body of this dictation should be directly addressed to the advanced practitioner or physician for clarification.       "

## 2024-01-10 NOTE — PROGRESS NOTES
Pastoral Care Progress Note    1/10/2024  Patient: Debbie Moody : 1940  Admission Date & Time: 2023 1726  MRN: [id number] CSN: [id number]                     Pt reclined in bed, eyes open and moving eith no response to questions.  visiting. Fr Taryn visits.  offered  pastoral care visits. Chaplains remain available.       01/10/24 1500   Clinical Encounter Type   Visited With Patient   Routine Visit Follow-up   Religion Encounters   Religion Needs Prayer

## 2024-01-10 NOTE — PROGRESS NOTES
[facility]  Progress Note: Critical Care  Name: Debbie Moody 83 y.o. female I MRN: [id number]  Unit/Bed#: ICU 04 I Date of Admission: 12/31/2023   Date of Service: 1/10/2024 I Hospital Day: 10    Assessment/Plan   Neuro:   Diagnosis: Acute encephalopathy, cerebellar stroke with edema, intrventricular hemorrhage s/p suboccipital craniectomy, hydrocephalus s/p EVD  Plan: Frequent neurologic monitoring, EVD per neurosurgery, awaiting interpreation of MRI, continue Tylenol, would discontinue PRN fentanyl, apprciate neurosurgery recommendations  CV:   Diagnosis: Hypertension, hyperlipidemia  Plan: Continue amlodipine/lisinopril, presently off Cardene, continue statin  Pulm:  Diagnosis: Respiratory failure  Plan: Continue to attempt SBT, ongoing discussions regarding trach/PEG, respiratory protocol  GI:   Plan: Continue bowel regimen  :   Diagnosis: CKD 3  Plan: Close intake and output, trend renal indices as needed  F/E/N:   Plan: Continue enteral nutrition, replete electrolytes as necessary  Heme/Onc:   Diagnosis: Anemia  Plan: Continue iron supplementation, continue heparin for DVT prophylaxis  Endo:   Diagnosis: Type 2 diabetes with hyperglycmiea  Plan: Continue Lantus- dose adjusted yesterday, continue sliding scale  ID:   Plan: Continue Ancef while EVD in place, follow temperature and white count  MSK/Skin:   Plan: Frequent turning and repositioning    Disposition: Critical care    ICU Core Measures     Vented Patient  VAP Bundle  VAP bundle ordered     A: Assess, Prevent, and Manage Pain Has pain been assessed? Yes  Need for changes to pain regimen? Yes   B: Both Spontaneous Awakening Trials (SATs) and Spontaneous Breathing Trials (SBTs) Plan to perform spontaneous awakening trial today? Yes   Plan to perform spontaneous breathing trial today? Yes   Obvious barriers to extubation? Yes   C: Choice of Sedation RASS Goal: N/A patient not on sedation  Need for changes to  sedation or analgesia regimen? NA   D: Delirium CAM-ICU: Unable to perform secondary to Acute cognitive dysfunction   E: Early Mobility  Plan for early mobility? Yes   F: Family Engagement Plan for family engagement today? Yes       Antibiotic Review: Post op requirements     Review of Invasive Devices:      Central access plan:  consider transition to PICC/peripheral access      Prophylaxis:  VTE VTE covered by:  heparin (porcine), Subcutaneous, 5,000 Units at 01/09/24 2251       Stress Ulcer  not ordered        Significant 24hr Events     24hr events: Started on Ancef for EVD prophylaxis, insulin regimen adjusted, Cardene stopped, MRI obtained, ongoing discussion regarding trach/PEG     Subjective     Review of Systems   Unable to perform ROS: Intubated        Objective                            Vitals I/O      Most Recent Min/Max in 24hrs   Temp 98.9 °F (37.2 °C) Temp  Min: 98.5 °F (36.9 °C)  Max: 99.4 °F (37.4 °C)   Pulse 88 Pulse  Min: 78  Max: 122   Resp 17 Resp  Min: 13  Max: 27   /59 BP  Min: 118/58  Max: 139/60   O2 Sat 97 % SpO2  Min: 94 %  Max: 100 %      Intake/Output Summary (Last 24 hours) at 1/10/2024 0035  Last data filed at 1/10/2024 0011  Gross per 24 hour   Intake 4123.75 ml   Output 1730 ml   Net 2393.75 ml       Diet Enteral/Parenteral; Tube Feeding No Oral Diet; Glucerna 1.2; Continuous; 35; Prosource Protein Liquid - Two Packets    Invasive Monitoring   Neuro Invasive Monitoring   Most Recent  Min/Max in 24hrs    GCS 10  Beaver Coma Scale Score  Min: 7  Max: 10    ICP 0 mmHg   ICP Mean (mmHg)  Min: 0 mmHg  Max: 10 mmHg    CPP (cuff) 89  CPP Cuff-Calculated (mmHg)  Min: 68  Max: 92    CPP(debbie) 64  No data recorded   CVP   No data recorded           Physical Exam   Physical Exam  Eyes:      Pupils: Pupils are equal, round, and reactive to light.   Skin:     General: Skin is warm and dry.   HENT:      Head: Normocephalic.      Comments: EVD in place to 0, moderate amount of  serosanguinous drainage     Mouth/Throat:      Mouth: Mucous membranes are dry.   Cardiovascular:      Rate and Rhythm: Normal rate and regular rhythm.      Pulses: Normal pulses.   Musculoskeletal:         General: No signs of injury or amputation.      Right lower leg: Trace Edema present.      Left lower leg: Trace Edema present.   Abdominal: General: Bowel sounds are normal. There is no distension.      Palpations: Abdomen is soft.   Constitutional:       General: She is not in acute distress.     Appearance: She is ill-appearing.      Interventions: She is intubated and restrained.   Pulmonary:      Effort: Pulmonary effort is normal. She is intubated.      Breath sounds: Normal breath sounds.      Comments: Moderate amount of white secretions inline suction  Neurological:      GCS: GCS eye subscore is 3. GCS verbal subscore is 1. GCS motor subscore is 6.      Comments: Patient responds briskly to request in right upper and lower extremity  Flexion to noxious stimulus in left upper  Could not elicit motor in LLE   Genitourinary/Anorectal:     Comments: Clear yellow urine in suction canister  external catheter present.          Diagnostic Studies      EKG: Sinus rhythm  Imaging:  I have personally reviewed pertinent reports.   and I have personally reviewed pertinent films in PACS     Medications:  Scheduled PRN   amLODIPine, 10 mg, Daily  atorvastatin, 80 mg, QPM  cefazolin, 2,000 mg, Q8H  chlorhexidine, 15 mL, Q12H JUAN ANTONIO  ferrous sulfate, 325 mg, Daily With Breakfast  heparin (porcine), 5,000 Units, Q8H JUAN ANTONIO  insulin glargine, 20 Units, Q12H JUAN ANTONIO  insulin lispro, 2-12 Units, Q6H JUAN ANTONIO  lisinopril, 20 mg, Daily  polyethylene glycol, 17 g, Daily  senna-docusate sodium, 1 tablet, BID      acetaminophen, 650 mg, Q6H PRN  bisacodyl, 10 mg, Daily PRN  fentanyl citrate (PF), 50 mcg, Q1H PRN  hydrALAZINE, 10 mg, Q6H PRN  labetalol, 10 mg, Q6H PRN  ondansetron, 4 mg, Q6H PRN       Continuous    niCARdipine, 1-15 mg/hr, Last  Rate: Stopped (01/09/24 2336)         Labs:    CBC    Recent Labs     01/08/24  0443 01/09/24  0431   WBC 8.17 11.42*   HGB 7.5* 7.5*   HCT 24.3* 24.0*    238     BMP    Recent Labs     01/09/24  0431 01/09/24  1853   SODIUM 154* 155*   K 4.1 3.7   * 123*   CO2 23 22   AGAP 6 10   BUN 30* 34*   CREATININE 1.14 1.15   CALCIUM 8.4 8.2*       Coags    No recent results     Additional Electrolytes  No recent results       Blood Gas    No recent results  No recent results LFTs  No recent results    Infectious  No recent results  Glucose  Recent Labs     01/08/24  0443 01/08/24  1755 01/09/24  0431 01/09/24  1853   GLUC 251* 156* 191* 210*           BENTON Rosenberg

## 2024-01-10 NOTE — RESPIRATORY THERAPY NOTE
01/09/24 2036   Respiratory Assessment   Resp Comments Patient received on spontaneous mode. Will continue overnight as tolerated.   Vent Information   Vent ID [id number]   Vent type Marsh C3   Marsh C3/G5 Vent Mode SPONT   $ Pulse Oximetry Spot Check Charge Completed   SPONT Settings   FIO2 (%) 30 %   PEEP (cmH2O) 6 cmH2O   Pressure Support (cmH2O) 12 cmH20   Flow Trigger (LPM) 3 LPM   P-ramp (ms) 100 ms   ETS  (%) 25 %   Humidification Heater   Heater Temp 95 °F (35 °C)   SPONT Actuals   Resp Rate (BPM) 17 BPM   VT (mL) 341 mL   MV (Obs) 6.3   MAP (cmH2O) 9.1 cmH2O   Peak Pressure (cmH2O) 18 cmH2O   I:E Ratio (Obs) 1:2.9   RSBI (f/vt) 49 f/Vt   Heater Temperature (Obs) 95 °F (35 °C)   SPONT Alarms   High Peak Pressure (cmH20) 35 cmH2O   High Resp Rate (BPM) 35 BPM   High MV (L/min) 20 L/min   Low MV (L/min) 3 L/min   High Spont VTE (mL) 800 mL   Low Spont VTE (mL) 200 mL   Apnea Time (S) 20 S   SPONT Apnea Settings   Resp Rate (BPM) 14 BPM   FIO2 (%) 30 %   %TI (%)   (1 sec)   Apnea Time (S) 20 S   Maintenance   Alarm (pink) cable attached No   Resuscitation bag with peep valve at bedside Yes   Water bag changed No   Circuit changed No   IHI Ventilator Associated Pneumonia Bundle   Daily Awakening Trials Performed Yes   Daily Assessment of Readiness to Extubate Yes   Head of Bed Elevated HOB 30   ETT  Cuffed 7 mm   Placement Date/Time: 01/07/24 c) 3470   Type: Cuffed  Tube Size: 7 mm  Location: Oral  Insertion attempts: 1  Placement Verification: Chest x-ray;End tidal CO2   Secured at (cm) 21   Measured from Lips   Secured Location Left   Repositioned Center to Left   Secured by Commercial tube ely   Cuff Pressure (cm H2O)   (MLT)   HI-LO Suction  Intermittent suction   HI-LO Secretions Scant   HI-LO Intervention Patent

## 2024-01-10 NOTE — CASE MANAGEMENT
Case Management Discharge Planning Note    Patient name Debbie Moody  Location ICU 04/ICU 04 MRN [id number]  : 1940 Date 1/10/2024       Current Admission Date: 2023  Current Admission Diagnosis:Acute CVA (cerebrovascular accident) ([facility])   Patient Active Problem List    Diagnosis Date Noted    FERCHO (acute kidney injury) ([facility]) 2024    Metabolic acidosis 2023    Anemia 2023    Acute CVA (cerebrovascular accident) ([facility]) 2023    Pulmonary fibrosis ([facility]) 2023    Hypertriglyceridemia 2022    Chronic kidney disease, stage 3b ([facility]) 2021    Ambulatory dysfunction 2021    Negative depression screening 2021    Overweight (BMI 25.0-29.9) 2021    Localized, primary osteoarthritis of hand 2020    Sciatica 2020    Hypertensive kidney disease with chronic kidney disease stage III ([facility]) 2019    Type 2 diabetes mellitus with stage 3b chronic kidney disease, without long-term current use of insulin ([facility])     Groin pain, chronic, left 2018    Anxiety 2017    Abnormal ECG 2016    Diverticulitis large intestine w/o perforation or abscess w/o bleeding 2016    Essential hypertension 2016    Hypercholesterolemia 2016    Irritable bowel syndrome 2016    Simple chronic anemia 2016    Disorder of shoulder 2008    Articular cartilage disorder of shoulder region 2008      LOS (days): 10  Geometric Mean LOS (GMLOS) (days):   Days to GMLOS:     OBJECTIVE:  Risk of Unplanned Readmission Score: 25.09         Current admission status: Inpatient   Preferred Pharmacy:   [id number] - [place] - [address] [place]  [address]  [place] [address]  Phone: [phone] Fax: [phone]    [facility] - [place], [place] - [address] [facility] [address]  Phone: [phone] Fax: [phone]    Primary Care Provider: Wild  DO Rivera    Primary Insurance: [facility]  Secondary Insurance:     DISCHARGE DETAILS:    Discharge planning discussed with:: TC to daughterUnique        CM contacted family/caregiver?: Yes        Additional Comments: Received message to call daughterUnique from ICU nurse. TC to daughter. Dtr requesting name of CM following mother's care. CM provided number for CM office as permanent CM not scheduled. CM also provided dtr w/ this CM number for the next two days. Dtr reports that pt does not have a written POA but her father is POA by marriage but that she would be HCR/proxy and primary . She will then confer with both her father and sister Ysabel on any medical decision-making [phone]. She also requested a medical records release form for future reference. This CM faxed to her at [phone] and emailed her at [email or web address]. .

## 2024-01-10 NOTE — PLAN OF CARE
Problem: PAIN - ADULT  Goal: Verbalizes/displays adequate comfort level or baseline comfort level  Description: Interventions:  - Encourage patient to monitor pain and request assistance  - Assess pain using appropriate pain scale  - Administer analgesics based on type and severity of pain and evaluate response  - Implement non-pharmacological measures as appropriate and evaluate response  - Consider cultural and social influences on pain and pain management  - Notify physician/advanced practitioner if interventions unsuccessful or patient reports new pain  Outcome: Progressing     Problem: INFECTION - ADULT  Goal: Absence or prevention of progression during hospitalization  Description: INTERVENTIONS:  - Assess and monitor for signs and symptoms of infection  - Monitor lab/diagnostic results  - Monitor all insertion sites, i.e. indwelling lines, tubes, and drains  - Monitor endotracheal if appropriate and nasal secretions for changes in amount and color  - [place] appropriate cooling/warming therapies per order  - Administer medications as ordered  - Instruct and encourage patient and family to use good hand hygiene technique  - Identify and instruct in appropriate isolation precautions for identified infection/condition  Outcome: Progressing  Goal: Absence of fever/infection during neutropenic period  Description: INTERVENTIONS:  - Monitor WBC    Outcome: Progressing     Problem: SAFETY ADULT  Goal: Patient will remain free of falls  Description: INTERVENTIONS:  - Educate patient/family on patient safety including physical limitations  - Instruct patient to call for assistance with activity   - Consult OT/PT to assist with strengthening/mobility   - Keep Call bell within reach  - Keep bed low and locked with side rails adjusted as appropriate  - Keep care items and personal belongings within reach  - Initiate and maintain comfort rounds  - Make Fall Risk Sign visible to staff  - Apply yellow socks and bracelet  for high fall risk patients  - Consider moving patient to room near nurses station  Outcome: Progressing  Goal: Maintain or return to baseline ADL function  Description: INTERVENTIONS:  -  Assess patient's ability to carry out ADLs; assess patient's baseline for ADL function and identify physical deficits which impact ability to perform ADLs (bathing, care of mouth/teeth, toileting, grooming, dressing, etc.)  - Assess/evaluate cause of self-care deficits   - Assess range of motion  - Assess patient's mobility; develop plan if impaired  - Assess patient's need for assistive devices and provide as appropriate  - Encourage maximum independence but intervene and supervise when necessary  - Involve family in performance of ADLs  - Assess for home care needs following discharge   - Consider OT consult to assist with ADL evaluation and planning for discharge  - Provide patient education as appropriate  Outcome: Progressing  Goal: Maintains/Returns to pre admission functional level  Description: INTERVENTIONS:  - Perform AM-PAC 6 Click Basic Mobility/ Daily Activity assessment daily.  - Set and communicate daily mobility goal to care team and patient/family/caregiver.   - Collaborate with rehabilitation services on mobility goals if consulted  - Out of bed for toileting  - Record patient progress and toleration of activity level   Outcome: Progressing     Problem: DISCHARGE PLANNING  Goal: Discharge to home or other facility with appropriate resources  Description: INTERVENTIONS:  - Identify barriers to discharge w/patient and caregiver  - Arrange for needed discharge resources and transportation as appropriate  - Identify discharge learning needs (meds, wound care, etc.)  - Arrange for interpretive services to assist at discharge as needed  - Refer to Case Management Department for coordinating discharge planning if the patient needs post-hospital services based on physician/advanced practitioner order or complex needs  related to functional status, cognitive ability, or social support system  Outcome: Progressing     Problem: Knowledge Deficit  Goal: Patient/family/caregiver demonstrates understanding of disease process, treatment plan, medications, and discharge instructions  Description: Complete learning assessment and assess knowledge base.  Interventions:  - Provide teaching at level of understanding  - Provide teaching via preferred learning methods  Outcome: Progressing     Problem: Neurological Deficit  Goal: Neurological status is stable or improving  Description: Interventions:  - Monitor and assess patient's level of consciousness, motor function, sensory function, and level of assistance needed for ADLs.   - Monitor and report changes from baseline. Collaborate with interdisciplinary team to initiate plan and implement interventions as ordered.   - Provide and maintain a safe environment.  - Consider seizure precautions.  - Consider fall precautions.  - Consider aspiration precautions.  - Consider bleeding precautions.  Outcome: Progressing     Problem: Activity Intolerance/Impaired Mobility  Goal: Mobility/activity is maintained at optimum level for patient  Description: Interventions:  - Assess and monitor patient  barriers to mobility and need for assistive/adaptive devices.  - Assess patient's emotional response to limitations.  - Collaborate with interdisciplinary team and initiate plans and interventions as ordered.  - Encourage independent activity per ability.  - Maintain proper body alignment.  - Perform active/passive rom as tolerated/ordered.  - Plan activities to conserve energy.  - Turn patient as appropriate  Outcome: Progressing     Problem: Communication Impairment  Goal: Ability to express needs and understand communication  Description: Assess patient's communication skills and ability to understand information.  Patient will demonstrate use of effective communication techniques, alternative methods  of communication and understanding even if not able to speak.     - Encourage communication and provide alternate methods of communication as needed.  - Collaborate with case management/ for discharge needs.  - Include patient/family/caregiver in decisions related to communication.  Outcome: Progressing

## 2024-01-11 NOTE — ACP (ADVANCE CARE PLANNING)
"1/9: Met with pt's daughter Ysabel at bedside. We discussed pt's event, imaging findings and exam. We discussed comfort care vs continuing all treatment which likely include tracheostomy and PEG if pt is not extubated soon. She mentioned that her sister Unique will be the one deciding.    : Met with Unique and one of her brothers. We discussed that we will be obtaining MRI to evaluate any new findings especially since pt is not moving her left side. We also discussed comfort care vs continuing all treatment which likely include tracheostomy and PEG if pt is not extubated soon. She verbalized understanding. Unique stated that her father will be the one to decide.    1/10: Met with Viraj, pt's spouse and one of his son at bedside. We discussed MRI findings of known cerebellar stroke, edema , stable hemorrhage, brain compression and hydrocephalus. We discussed pt current exam from this morning: eyes open, does not track examiner, follows command briskly with the right upper and lower extremity, able to stick tongue out, slight head lift on command, cough present and slight gag. This exam is better than yesterday but we will not [place] to extubate since today it our first good exam day. We discussed that daily spontaneous breathing trial will continue and will see if pt is extubate-able. If extubated and pt fails, then will re-intubate and schedule for tracheostomy. If she is not extubated soon then will schedule for tracheostomy.  All these vs comfort care. He stated that he wants  everything to be done, that he is use to feeding tube, he has a daughter on feeding tube for many years.    Pt's son also voiced: \"do everything, proceed with whatever that is needed\".    Also called Unique to give her MRI result findings. known cerebellar stroke, edema , stable hemorrhage, brain compression and hydrocephalus. She kept on saying \"so this is good\". Explained to her many times that this is not good, any injury to the brain is " not good, the good news is no new injury per the formal read of this MRI, but that does that mean that something else can not happen later especially in the setting of brain compression as see on the MRI. Also explained that with pt's large IVH, other abnormalities listed on MRI, plus her age, she will not return to her 100%. The question is if she will get to 80%, 60% or less. This we do not know at this time, but we know she will not be the person she used to be. Unique verbalized understanding. Also told her my conversation with her father and she agrees with his plan.

## 2024-01-11 NOTE — RESPIRATORY THERAPY NOTE
RT Ventilator Management Note  Debbie Moody 83 y.o. female MRN: [id number]  Unit/Bed#: ICU 04 Encounter: [id number]      Daily Screen         1/10/2024  1104 1/11/2024  0742          Patient safety screen outcome:: Passed Passed      Spont breathing trial % for 30 min: -- Yes      Spont breathing trial outcome:: -- Passed                Physical Exam:          Resp Comments: (P) Pt awake, alert. Pt continues on Spont mode 10/6. Will continue to monitor pt.

## 2024-01-11 NOTE — PLAN OF CARE
Problem: Nutrition/Hydration-ADULT  Goal: Nutrient/Hydration intake appropriate for improving, restoring or maintaining nutritional needs  Description: Monitor and assess patient's nutrition/hydration status for malnutrition. Collaborate with interdisciplinary team and initiate plan and interventions as ordered.  Monitor patient's weight and dietary intake as ordered or per policy. Utilize nutrition screening tool and intervene as necessary. Determine patient's food preferences and provide high-protein, high-caloric foods as appropriate.     INTERVENTIONS:  - Monitor oral intake, urinary output, labs, and treatment plans  - Assess nutrition and hydration status and recommend course of action  - Evaluate amount of meals eaten  - Assist patient with eating if necessary   - Allow adequate time for meals  - Recommend/ encourage appropriate diets, oral nutritional supplements, and vitamin/mineral supplements  - Order, calculate, and assess calorie counts as needed  - Recommend, monitor, and adjust tube feedings and TPN/PPN based on assessed needs  - Assess need for intravenous fluids  - Provide specific nutrition/hydration education as appropriate  - Include patient/family/caregiver in decisions related to nutrition  Outcome: Progressing   Goal TF Glucerna 1.2@45mL/hr, add 1 pack of prosource, water flushes 90mL every 4hrs provides total of 1080mL, 1356cal, 80g pro, 1409mL.

## 2024-01-11 NOTE — ASSESSMENT & PLAN NOTE
8 Days Post-Op Procedure(s):  SUBOCCIPITAL CRANIECTOMY FOR POSTERIOR FOSSA DECOMPRESSION; DISPOSE OF BONE FLAP (LULA, 1/3)  S/p EVD replacement on 1/5/24.   Pt presented with nausea, headache and ataxia. 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  Currently GCS 10-11 T E3-4 V1T M6.     Imaging:  MRI brain wo 1/9/24:  Stable exam demonstrating extensive intraventricular hemorrhage with surrounding vasogenic edema and/or transependymal flow of CSF. Stable hydrocephalus. Evolving subacute infarct in the left cerebellar hemisphere. Stable petechial hemorrhage, vasogenic edema and mass effect.  CT head wo 1/7/24: Extensive intracranial hemorrhage without significant interval change compared to the prior study     Plan  Continue regular neurologic checks.   Pt with some improvement on exam, now also with slight movement LUE/LLE. Pt with left sided > right sided weakness. Right gaze preference. Continues to follow some commands on the right.   STAT CTH for decline in exam greater than 2 points in 1 hour  EVD was re-inserted on 1/5/24.  EVD open at 0. Maintain at this level at this time.   EVD continues to be flushed as needed. Ancef 2 g q 8 hrs while drain is in place.   ICP < 20, in the room ICP at 1.   149 cc/24 hours.   Ongoing medical management per primary team.   Na goal > 145-155. Continue to monitor.   SBP < 160  Pain control per primary team.   Neurology was consulted for stroke management.   ASA d/c 1/3/23, hold for at least 2 weeks post op  PT/OT/PMR evaluation  DVT PPX: SCD, SQH    Neurosurgery will continue to follow. Please call with questions or concerns.

## 2024-01-11 NOTE — PROGRESS NOTES
[facility]  Progress Note  Name: Debbie Moody I  MRN: [id number]  Unit/Bed#: ICU 04 I Date of Admission: 12/31/2023   Date of Service: 1/11/2024 I Hospital Day: 11    Assessment/Plan   * Acute CVA (cerebrovascular accident) (HCC)  Assessment & Plan  8 Days Post-Op Procedure(s):  SUBOCCIPITAL CRANIECTOMY FOR POSTERIOR FOSSA DECOMPRESSION; DISPOSE OF BONE FLAP (LULA, 1/3)  S/p EVD replacement on 1/5/24.   Pt presented with nausea, headache and ataxia. 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  Currently GCS 10-11 T E3-4 V1T M6.     Imaging:  MRI brain wo 1/9/24:  Stable exam demonstrating extensive intraventricular hemorrhage with surrounding vasogenic edema and/or transependymal flow of CSF. Stable hydrocephalus. Evolving subacute infarct in the left cerebellar hemisphere. Stable petechial hemorrhage, vasogenic edema and mass effect.  CT head wo 1/7/24: Extensive intracranial hemorrhage without significant interval change compared to the prior study     Plan  Continue regular neurologic checks.   Pt with some improvement on exam, now also with slight movement LUE/LLE. Pt with left sided > right sided weakness. Right gaze preference. Continues to follow some commands on the right.   STAT CTH for decline in exam greater than 2 points in 1 hour  EVD was re-inserted on 1/5/24.  EVD open at 0. Maintain at this level at this time.   Ancef 2 g q 8 hrs while drain is in place, as EVD continues to be flushed regularly proximally and distally.     ICP < 20, in the room ICP at 1.   149 cc/24 hours.   Ongoing medical management per primary team.   Na goal > 145-155. Continue to monitor.   SBP < 160  Pain control per primary team.   Neurology was consulted for stroke management.   ASA d/c 1/3/23, hold  for at least 2 weeks post op  PT/OT/PMR evaluation  DVT PPX: SCD, SQH    Neurosurgery will continue to follow. Please call with questions or concerns.       Type 2 diabetes mellitus with stage 3b chronic kidney disease, without long-term current use of insulin ([facility])  Assessment & Plan  Lab Results   Component Value Date    HGBA1C 8.3 (H) 12/31/2023       Recent Labs     01/10/24  1714 01/10/24  2005 01/11/24  0030 01/11/24  0547   POCGLU 144* 170* 198* 170*         Blood Sugar Average: Last 72 hrs:  (P) [id number]  Ongoing management per primary team.   Pt on insulin.            Subjective/Objective     Chief Complaint: Pt intubated/ non- verbal.     Subjective: Patient remains intubated.  Per nursing report, no acute events overnight.  Patient continues to intermittently open eyes and follow commands right greater than left.    Objective: Laying in bed, no acute distress.    I/O         01/09 0701  01/10 0700 01/10 0701 01/11 0700    I.V. (mL/kg) 1818.8 (28.3)     NG/ 580    IV Piggyback 180 100    Feedings 770 700    Total Intake(mL/kg) 3193.8 (49.7) 1380 (21.5)    Urine (mL/kg/hr) 2006 (1.3) 2950 (1.9)    Emesis/NG output 0 0    Drains 181 149    Stool  0    Total Output 2187 3099    Net +1006.8 -1719          Unmeasured Stool Occurrence  2 x    Unmeasured Emesis Occurrence 1 x             Invasive Devices       Central Venous Catheter Line  Duration             CVC Central Lines 01/03/24 7 days              Peripheral Intravenous Line  Duration             Long-Dwell Peripheral IV (Midline) 01/03/24 Left Cephalic Vein 7 days              Drain  Duration             Ventriculostomy/Subdural Ventricular drainage catheter Left Frontal region 5 days    NG/OG/Enteral Tube Orogastric Left mouth 3 days    External Urinary Catheter <1 day              Airway  Duration             ETT  Cuffed 7 mm 3 days                    Physical Exam:  Vitals: Blood pressure 170/75, pulse 70, temperature 98.6 °F (37  "°C), temperature source Oral, resp. rate 15, height 4' 10\" (1.473 m), weight 64.2 kg (141 lb 8.6 oz), SpO2 100%, not currently breastfeeding.,Body mass index is 29.58 kg/m².    General appearance:  Appears stated age  Head: Normocephalic, EVD with blood-tinged CSF drainage.  Eyes: Left pupil approximately 4 mm, right pupil approximately 3 mm, both reactive to light, slight right gaze preference.  Neck: supple, symmetrical, trachea midline, intubated  Lungs: non labored breathing on ventilator.  Heart: regular heart rate  Neurologic:   Mental status: GCS 10-11 T E3-4 V1T M6  Cranial nerves: Limited, right gaze preference.   Sensory: Withdraws to painful stimuli x 4.  Motor: Follows commands right greater than left,  right > left, wiggles toes bilaterally, shows 2 fingers and thumbs up on the right.     Lab Results:  Results from last 7 days   Lab Units 01/10/24  0428 01/09/24  0431 01/08/24  0443   WBC Thousand/uL 11.06* 11.42* 8.17   HEMOGLOBIN g/dL 7.6* 7.5* 7.5*   HEMATOCRIT % 24.3* 24.0* 24.3*   PLATELETS Thousands/uL 217 238 228   NEUTROS PCT % 77* 79* 75   MONOS PCT % 6 7 7   EOS PCT % 1 0 1     Results from last 7 days   Lab Units 01/10/24  0428 01/09/24  1853 01/09/24  0431   POTASSIUM mmol/L 4.1 3.7 4.1   CHLORIDE mmol/L 124* 123* 125*   CO2 mmol/L 23 22 23   BUN mg/dL 36* 34* 30*   CREATININE mg/dL 1.18 1.15 1.14   CALCIUM mg/dL 8.4 8.2* 8.4   ALK PHOS U/L 71  --   --    ALT U/L 9  --   --    AST U/L 21  --   --      Results from last 7 days   Lab Units 01/06/24  0507   MAGNESIUM mg/dL 1.9     Results from last 7 days   Lab Units 01/06/24  0507   PHOSPHORUS mg/dL 1.5*         No results found for: \"TROPONINT\"  ABG:  Lab Results   Component Value Date    PHART 7.364 01/03/2024    [id number] 33.9 (L) 01/03/2024    PO2ART 235.8 (H) 01/03/2024    [id number] 18.9 (L) 01/03/2024    BEART -5.7 01/03/2024    SOURCE Line, Arterial 01/03/2024       Imaging Studies: I have personally reviewed pertinent reports and I " have personally reviewed pertinent films in PACS    EKG, Pathology, and Other Studies: I have personally reviewed pertinent reports.        PLEASE NOTE:  This encounter may have been completed utilizing the [employer] Direct Speech Voice Recognition Software. Grammatical errors, random word insertions, pronoun errors and incomplete sentences are occasional consequences of the system due to software limitations, ambient noise and hardware issues.These may be missed by proof reading prior to affixing electronic signature. Any questions or concerns about the content, text or information contained within the body of this dictation should be directly addressed to the advanced practitioner or physician for clarification.

## 2024-01-11 NOTE — PROGRESS NOTES
[facility]  Progress Note: Critical Care  Name: Debbie Moody 83 y.o. female I MRN: [id number]  Unit/Bed#: ICU 04 I Date of Admission: 12/31/2023   Date of Service: 1/11/2024 I Hospital Day: 11    Assessment/Plan   Neuro:   Diagnosis: Acute encephalopathy, cerebellar stroke with edema, intraventricular hemorrhage s/p suboccipital craniectomy, hydrocephalus s/p EVD  Plan: Frequent neurologic monitoring, EVD per neurosurgery, continue Tylenol PRN, would discontinue fentanyl, appreciate neurosurgery recommendations  CV:   Diagnosis: Hypertension, hyperlipidemia  Plan: Continue lisinopril- dose adjusted yesterday, continue amlodipine, PRN hydralazine/labetalol  Pulm:  Diagnosis: Respiratory failure  Plan: Continue to attempt SBT, ongoing discussion of trach/PEG vs attempt at liberation, respiratory protocol  GI:   Plan: Continue bowel regimen  :   Diagnosis: CKD 3  Plan: Close intake and output, trend renal indices as needed  F/E/N:   Plan: Continue enteral nutrition, replete electrolytes as necessary, if possibility of liberation from mechanical ventilation patient may benefit from nasogastric tube placement for continued enteral access  Heme/Onc:   Diagnosis: Anemia  Plan: Continue iron supplementation, heparin for DVT prophylaxis  Endo:   Diagnosis: Type 2 diabetes with hyperglycemia  Plan: Lantus dose adjusted on 1/10, with increased sliding scale algorithi, caution continuing long-acting insulin if enteral nutrition disrupted  ID:   Plan: Follow temperature and white count, continue Ancef while EVD in place  MSK/Skin:   Plan: Frequent turning and repositioning    Disposition: Critical care    ICU Core Measures     Vented Patient  VAP Bundle  VAP bundle ordered     A: Assess, Prevent, and Manage Pain Has pain been assessed? Yes  Need for changes to pain regimen? No   B: Both Spontaneous Awakening Trials (SATs) and Spontaneous Breathing Trials (SBTs) Plan to perform  spontaneous awakening trial today? Yes   Plan to perform spontaneous breathing trial today? Yes   Obvious barriers to extubation? Yes   C: Choice of Sedation RASS Goal: N/A patient not on sedation  Need for changes to sedation or analgesia regimen? NA   D: Delirium CAM-ICU: Unable to perform secondary to Acute cognitive dysfunction   E: Early Mobility  Plan for early mobility? Yes   F: Family Engagement Plan for family engagement today? Yes       Antibiotic Review: Post op requirements     Review of Invasive Devices:      Central access plan:  consider transition to PICC/peripheral IVs      Prophylaxis:  VTE VTE covered by:  heparin (porcine), Subcutaneous, 5,000 Units at 01/10/24 2145       Stress Ulcer  not ordered        Significant 24hr Events     24hr events: Patient maintained on pressure support overnight, intermittently low tidal volumes/minute ventilation, intermittently responding to request on her left-side, cuff-leak present     Subjective     Review of Systems   Unable to perform ROS: Intubated        Objective                            Vitals I/O      Most Recent Min/Max in 24hrs   Temp 98.6 °F (37 °C) Temp  Min: 98.6 °F (37 °C)  Max: 99.5 °F (37.5 °C)   Pulse 72 Pulse  Min: 70  Max: 90   Resp 15 Resp  Min: 11  Max: 22   /72 BP  Min: 142/65  Max: 186/84   O2 Sat 100 % SpO2  Min: 99 %  Max: 100 %      Intake/Output Summary (Last 24 hours) at 1/11/2024 0047  Last data filed at 1/11/2024 0000  Gross per 24 hour   Intake 1310 ml   Output 3357 ml   Net -2047 ml       Diet Enteral/Parenteral; Tube Feeding No Oral Diet; Glucerna 1.2; Continuous; 35; Prosource Protein Liquid - Two Packets    Invasive Monitoring           Physical Exam   Physical Exam  Eyes:      Pupils: Pupils are equal, round, and reactive to light.   Skin:     General: Skin is warm and dry.   HENT:      Head: Normocephalic.      Comments: L EVD in place to 0 with moderate amount of serosanguinous drainage     Mouth/Throat:      Mouth:  Mucous membranes are moist.   Neck:      Vascular: Central line present.   Cardiovascular:      Rate and Rhythm: Normal rate and regular rhythm.      Pulses: Normal pulses.   Musculoskeletal:         General: Swelling (mild peripheral edema) present. No signs of injury.      Right lower leg: Trace Edema present.      Left lower leg: Trace Edema present.   Abdominal: General: Bowel sounds are normal. There is no distension.      Palpations: Abdomen is soft.   Constitutional:       General: She is not in acute distress.     Appearance: She is ill-appearing.      Interventions: She is intubated and restrained.   Pulmonary:      Effort: Pulmonary effort is normal. She is intubated.      Breath sounds: Rhonchi present.      Comments: Moderate amount of thick, white secretions inline suction  Neurological:      GCS: GCS eye subscore is 3. GCS verbal subscore is 1. GCS motor subscore is 6.      Comments: Responds to request in all 4 extremities, more briskly on right than left   Genitourinary/Anorectal:     Comments: Clear yellow urine in suction cannister  external catheter present.          Diagnostic Studies      EKG: Sinus rhythm  Imaging:  I have personally reviewed pertinent reports.   and I have personally reviewed pertinent films in PACS     Medications:  Scheduled PRN   amLODIPine, 10 mg, Daily  atorvastatin, 80 mg, QPM  cefazolin, 2,000 mg, Q8H  chlorhexidine, 15 mL, Q12H JUAN ANTONIO  ferrous sulfate, 325 mg, Daily With Breakfast  formoterol, 20 mcg, Q12H  heparin (porcine), 5,000 Units, Q8H JUAN ANTONIO  insulin glargine, 23 Units, Q12H JUAN ANTONIO  insulin lispro, 4-20 Units, Q6H JUAN ANTONIO  lisinopril, 40 mg, Daily  polyethylene glycol, 17 g, BID  senna-docusate sodium, 2 tablet, BID  sodium chloride, 4 mL, Q6H      acetaminophen, 650 mg, Q6H PRN  bisacodyl, 10 mg, Daily PRN  fentanyl citrate (PF), 50 mcg, Q1H PRN  hydrALAZINE, 10 mg, Q4H PRN  labetalol, 10 mg, Q4H PRN  ondansetron, 4 mg, Q6H PRN       Continuous           Labs:    CBC    Recent Labs     01/09/24  0431 01/10/24  0428   WBC 11.42* 11.06*   HGB 7.5* 7.6*   HCT 24.0* 24.3*    217     BMP    Recent Labs     01/09/24  1853 01/10/24  0428   SODIUM 155* 155*   K 3.7 4.1   * 124*   CO2 22 23   AGAP 10 8   BUN 34* 36*   CREATININE 1.15 1.18   CALCIUM 8.2* 8.4       Coags    No recent results     Additional Electrolytes  No recent results       Blood Gas    No recent results  No recent results LFTs  Recent Labs     01/10/24  0428   ALT 9   AST 21   ALKPHOS 71   ALB 2.9*   TBILI 0.33       Infectious  No recent results  Glucose  Recent Labs     01/09/24  0431 01/09/24  1853 01/10/24  0428   GLUC 191* 210* 198*           BENTON Rosenberg

## 2024-01-11 NOTE — ASSESSMENT & PLAN NOTE
Lab Results   Component Value Date    HGBA1C 8.3 (H) 12/31/2023       Recent Labs     01/10/24  1714 01/10/24  2005 01/11/24  0030 01/11/24  0547   POCGLU 144* 170* 198* 170*         Blood Sugar Average: Last 72 hrs:  (P) [id number]  Ongoing management per primary team.   Pt on insulin.

## 2024-01-11 NOTE — PHYSICAL THERAPY NOTE
Physical Therapy Cancellation Note       01/11/24 1150   PT Last Visit   PT Visit Date 01/11/24   Note Type   Note type Cancelled Session   Cancel Reasons Intubated/sedated

## 2024-01-11 NOTE — ASSESSMENT & PLAN NOTE
8 Days Post-Op Procedure(s):  SUBOCCIPITAL CRANIECTOMY FOR POSTERIOR FOSSA DECOMPRESSION; DISPOSE OF BONE FLAP (LULA, 1/3)  S/p EVD replacement on 1/5/24 2/2 extensive IVH  Pt presented with nausea, headache and ataxia. 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    Imaging:  MRI brain wo 1/9/24:  Stable exam demonstrating extensive intraventricular hemorrhage with surrounding vasogenic edema and/or transependymal flow of CSF. Stable hydrocephalus. Evolving subacute infarct in the left cerebellar hemisphere. Stable petechial hemorrhage, vasogenic edema and mass effect.  CT head wo 1/7/24: Extensive intracranial hemorrhage without significant interval change compared to the prior study     Plan  Continue regular neurologic checks.   Exam grossly stable this am - slight movement LUE/LLE. Pt with left sided > right sided weakness. Right gaze preference. Continues to follow some commands on the right.   STAT CTH for decline in exam greater than 2 points in 1 hour  EVD was re-inserted on 1/5/24.  EVD open at 0. Maintain at this level at this time.   Ancef 2 g q 8 hrs while drain is in place, as EVD continues to be flushed regularly proximally and distally.     ICP < 20, ICP -3 to 7/24H  153cc/24 hours.   Ongoing medical management per primary team.   Na goal > 145-155. Continue to monitor. 148 today.   SBP < 160  Pain control per primary team.   Neurology was consulted for stroke management.   ASA d/c 1/3/23, hold for at least 2 weeks post op  PT/OT/PMR evaluation as able  DVT PPX: SCD, SQH    Neurosurgery will continue to follow. Please call with questions or concerns.

## 2024-01-11 NOTE — PLAN OF CARE
Problem: PAIN - ADULT  Goal: Verbalizes/displays adequate comfort level or baseline comfort level  Description: Interventions:  - Encourage patient to monitor pain and request assistance  - Assess pain using appropriate pain scale  - Administer analgesics based on type and severity of pain and evaluate response  - Implement non-pharmacological measures as appropriate and evaluate response  - Consider cultural and social influences on pain and pain management  - Notify physician/advanced practitioner if interventions unsuccessful or patient reports new pain  Outcome: Progressing     Problem: INFECTION - ADULT  Goal: Absence or prevention of progression during hospitalization  Description: INTERVENTIONS:  - Assess and monitor for signs and symptoms of infection  - Monitor lab/diagnostic results  - Monitor all insertion sites, i.e. indwelling lines, tubes, and drains  - Monitor endotracheal if appropriate and nasal secretions for changes in amount and color  - [place] appropriate cooling/warming therapies per order  - Administer medications as ordered  - Instruct and encourage patient and family to use good hand hygiene technique  - Identify and instruct in appropriate isolation precautions for identified infection/condition  Outcome: Progressing  Goal: Absence of fever/infection during neutropenic period  Description: INTERVENTIONS:  - Monitor WBC    Outcome: Progressing     Problem: SAFETY ADULT  Goal: Patient will remain free of falls  Description: INTERVENTIONS:  - Educate patient/family on patient safety including physical limitations  - Instruct patient to call for assistance with activity   - Consult OT/PT to assist with strengthening/mobility   - Keep Call bell within reach  - Keep bed low and locked with side rails adjusted as appropriate  - Keep care items and personal belongings within reach  - Initiate and maintain comfort rounds  - Make Fall Risk Sign visible to staff  - Offer Toileting every 2 Hours,  in advance of need  - Initiate/Maintain bed alarm  - Obtain necessary fall risk management equipment: bed alarm  - Apply yellow socks and bracelet for high fall risk patients  - Consider moving patient to room near nurses station  Outcome: Progressing  Goal: Maintain or return to baseline ADL function  Description: INTERVENTIONS:  -  Assess patient's ability to carry out ADLs; assess patient's baseline for ADL function and identify physical deficits which impact ability to perform ADLs (bathing, care of mouth/teeth, toileting, grooming, dressing, etc.)  - Assess/evaluate cause of self-care deficits   - Assess range of motion  - Assess patient's mobility; develop plan if impaired  - Assess patient's need for assistive devices and provide as appropriate  - Encourage maximum independence but intervene and supervise when necessary  - Involve family in performance of ADLs  - Assess for home care needs following discharge   - Consider OT consult to assist with ADL evaluation and planning for discharge  - Provide patient education as appropriate  Outcome: Progressing  Goal: Maintains/Returns to pre admission functional level  Description: INTERVENTIONS:  - Perform AM-PAC 6 Click Basic Mobility/ Daily Activity assessment daily.  - Set and communicate daily mobility goal to care team and patient/family/caregiver.   - Collaborate with rehabilitation services on mobility goals if consulted  - Perform Range of Motion 3 times a day.  - Reposition patient every 2 hours.  - Dangle patient 3 times a day  - Stand patient 3 times a day  - Ambulate patient 3 times a day  - Out of bed to chair 3 times a day   - Out of bed for meals 3 times a day  - Out of bed for toileting  - Record patient progress and toleration of activity level   Outcome: Progressing     Problem: DISCHARGE PLANNING  Goal: Discharge to home or other facility with appropriate resources  Description: INTERVENTIONS:  - Identify barriers to discharge w/patient and  caregiver  - Arrange for needed discharge resources and transportation as appropriate  - Identify discharge learning needs (meds, wound care, etc.)  - Arrange for interpretive services to assist at discharge as needed  - Refer to Case Management Department for coordinating discharge planning if the patient needs post-hospital services based on physician/advanced practitioner order or complex needs related to functional status, cognitive ability, or social support system  Outcome: Progressing     Problem: Knowledge Deficit  Goal: Patient/family/caregiver demonstrates understanding of disease process, treatment plan, medications, and discharge instructions  Description: Complete learning assessment and assess knowledge base.  Interventions:  - Provide teaching at level of understanding  - Provide teaching via preferred learning methods  Outcome: Progressing     Problem: Neurological Deficit  Goal: Neurological status is stable or improving  Description: Interventions:  - Monitor and assess patient's level of consciousness, motor function, sensory function, and level of assistance needed for ADLs.   - Monitor and report changes from baseline. Collaborate with interdisciplinary team to initiate plan and implement interventions as ordered.   - Provide and maintain a safe environment.  - Consider seizure precautions.  - Consider fall precautions.  - Consider aspiration precautions.  - Consider bleeding precautions.  Outcome: Progressing     Problem: Activity Intolerance/Impaired Mobility  Goal: Mobility/activity is maintained at optimum level for patient  Description: Interventions:  - Assess and monitor patient  barriers to mobility and need for assistive/adaptive devices.  - Assess patient's emotional response to limitations.  - Collaborate with interdisciplinary team and initiate plans and interventions as ordered.  - Encourage independent activity per ability.  - Maintain proper body alignment.  - Perform  active/passive rom as tolerated/ordered.  - Plan activities to conserve energy.  - Turn patient as appropriate  Outcome: Progressing     Problem: Communication Impairment  Goal: Ability to express needs and understand communication  Description: Assess patient's communication skills and ability to understand information.  Patient will demonstrate use of effective communication techniques, alternative methods of communication and understanding even if not able to speak.     - Encourage communication and provide alternate methods of communication as needed.  - Collaborate with case management/ for discharge needs.  - Include patient/family/caregiver in decisions related to communication.  Outcome: Progressing     Problem: Potential for Aspiration  Goal: Ventilated patient's risk of aspiration is minimized  Description: Assess and monitor vital signs, respiratory status, airway cuff pressure, and labs (WBC).  Monitor for signs of aspiration (tachypnea, cough, rales, wheezing, cyanosis, fever).    - Elevate head of bed 30 degrees if patient has tube feeding.  - Monitor tube feeding.  Outcome: Progressing     Problem: Nutrition  Goal: Nutrition/Hydration status is improving  Description: Monitor and assess patient's nutrition/hydration status for malnutrition (ex- brittle hair, bruises, dry skin, pale skin and conjunctiva, muscle wasting, smooth red tongue, and disorientation). Collaborate with interdisciplinary team and initiate plan and interventions as ordered.  Monitor patient's weight and dietary intake as ordered or per policy. Utilize nutrition screening tool and intervene per policy. Determine patient's food preferences and provide high-protein, high-caloric foods as appropriate.     - Assist patient with eating.  - Allow adequate time for meals.  - Encourage patient to take dietary supplement as ordered.  - Collaborate with clinical nutritionist.  - Include patient/family/caregiver in decisions  related to nutrition.  Outcome: Progressing     Problem: Prexisting or High Potential for Compromised Skin Integrity  Goal: Skin integrity is maintained or improved  Description: INTERVENTIONS:  - Identify patients at risk for skin breakdown  - Assess and monitor skin integrity  - Assess and monitor nutrition and hydration status  - Monitor labs   - Assess for incontinence   - Turn and reposition patient  - Assist with mobility/ambulation  - Relieve pressure over bony prominences  - Avoid friction and shearing  - Provide appropriate hygiene as needed including keeping skin clean and dry  - Evaluate need for skin moisturizer/barrier cream  - Collaborate with interdisciplinary team   - Patient/family teaching  - Consider wound care consult   Outcome: Progressing     Problem: Nutrition/Hydration-ADULT  Goal: Nutrient/Hydration intake appropriate for improving, restoring or maintaining nutritional needs  Description: Monitor and assess patient's nutrition/hydration status for malnutrition. Collaborate with interdisciplinary team and initiate plan and interventions as ordered.  Monitor patient's weight and dietary intake as ordered or per policy. Utilize nutrition screening tool and intervene as necessary. Determine patient's food preferences and provide high-protein, high-caloric foods as appropriate.     INTERVENTIONS:  - Monitor oral intake, urinary output, labs, and treatment plans  - Assess nutrition and hydration status and recommend course of action  - Evaluate amount of meals eaten  - Assist patient with eating if necessary   - Allow adequate time for meals  - Recommend/ encourage appropriate diets, oral nutritional supplements, and vitamin/mineral supplements  - Order, calculate, and assess calorie counts as needed  - Recommend, monitor, and adjust tube feedings and TPN/PPN based on assessed needs  - Assess need for intravenous fluids  - Provide specific nutrition/hydration education as appropriate  - Include  patient/family/caregiver in decisions related to nutrition  Outcome: Progressing     Problem: COPING  Goal: Pt/Family able to verbalize concerns and demonstrate effective coping strategies  Description: INTERVENTIONS:  - Assist patient/family to identify coping skills, available support systems and cultural and spiritual values  - Provide emotional support, including active listening and acknowledgement of concerns of patient and caregivers  - Reduce environmental stimuli, as able  - Provide patient education  - Assess for spiritual pain/suffering and initiate spiritual care, including notification of Pastoral Care or nancie based community as needed  - Assess effectiveness of coping strategies  Outcome: Progressing  Goal: Will report anxiety at manageable levels  Description: INTERVENTIONS:  - Administer medication as ordered  - Teach and encourage coping skills  - Provide emotional support  - Assess patient/family for anxiety and ability to cope  Outcome: Progressing     Problem: NEUROSENSORY - ADULT  Goal: Achieves stable or improved neurological status  Description: INTERVENTIONS  - Monitor and report changes in neurological status  - Monitor vital signs such as temperature, blood pressure, glucose, and any other labs ordered   - Initiate measures to prevent increased intracranial pressure  - Monitor for seizure activity and implement precautions if appropriate      Outcome: Progressing  Goal: Remains free of injury related to seizures activity  Description: INTERVENTIONS  - Maintain airway, patient safety  and administer oxygen as ordered  - Monitor patient for seizure activity, document and report duration and description of seizure to physician/advanced practitioner  - If seizure occurs,  ensure patient safety during seizure  - Reorient patient post seizure  - Seizure pads on all 4 side rails  - Instruct patient/family to notify RN of any seizure activity including if an aura is experienced  - Instruct  patient/family to call for assistance with activity based on nursing assessment  - Administer anti-seizure medications if ordered    Outcome: Progressing  Goal: Achieves maximal functionality and self care  Description: INTERVENTIONS  - Monitor swallowing and airway patency with patient fatigue and changes in neurological status  - Encourage and assist patient to increase activity and self care.   - Encourage visually impaired, hearing impaired and aphasic patients to use assistive/communication devices  Outcome: Progressing     Problem: CARDIOVASCULAR - ADULT  Goal: Maintains optimal cardiac output and hemodynamic stability  Description: INTERVENTIONS:  - Monitor I/O, vital signs and rhythm  - Monitor for S/S and trends of decreased cardiac output  - Administer and titrate ordered vasoactive medications to optimize hemodynamic stability  - Assess quality of pulses, skin color and temperature  - Assess for signs of decreased coronary artery perfusion  - Instruct patient to report change in severity of symptoms  Outcome: Progressing  Goal: Absence of cardiac dysrhythmias or at baseline rhythm  Description: INTERVENTIONS:  - Continuous cardiac monitoring, vital signs, obtain 12 lead EKG if ordered  - Administer antiarrhythmic and heart rate control medications as ordered  - Monitor electrolytes and administer replacement therapy as ordered  Outcome: Progressing     Problem: RESPIRATORY - ADULT  Goal: Achieves optimal ventilation and oxygenation  Description: INTERVENTIONS:  - Assess for changes in respiratory status  - Assess for changes in mentation and behavior  - Position to facilitate oxygenation and minimize respiratory effort  - Oxygen administered by appropriate delivery if ordered  - Initiate smoking cessation education as indicated  - Encourage broncho-pulmonary hygiene including cough, deep breathe, Incentive Spirometry  - Assess the need for suctioning and aspirate as needed  - Assess and instruct to report  SOB or any respiratory difficulty  - Respiratory Therapy support as indicated  Outcome: Progressing     Problem: GASTROINTESTINAL - ADULT  Goal: Maintains or returns to baseline bowel function  Description: INTERVENTIONS:  - Assess bowel function  - Encourage oral fluids to ensure adequate hydration  - Administer IV fluids if ordered to ensure adequate hydration  - Administer ordered medications as needed  - Encourage mobilization and activity  - Consider nutritional services referral to assist patient with adequate nutrition and appropriate food choices  Outcome: Progressing  Goal: Maintains adequate nutritional intake  Description: INTERVENTIONS:  - Monitor percentage of each meal consumed  - Identify factors contributing to decreased intake, treat as appropriate  - Assist with meals as needed  - Monitor I&O, weight, and lab values if indicated  - Obtain nutrition services referral as needed  Outcome: Progressing     Problem: GENITOURINARY - ADULT  Goal: Maintains or returns to baseline urinary function  Description: INTERVENTIONS:  - Assess urinary function  - Encourage oral fluids to ensure adequate hydration if ordered  - Administer IV fluids as ordered to ensure adequate hydration  - Administer ordered medications as needed  - Offer frequent toileting  - Follow urinary retention protocol if ordered  Outcome: Progressing  Goal: Absence of urinary retention  Description: INTERVENTIONS:  - Assess patient’s ability to void and empty bladder  - Monitor I/O  - Bladder scan as needed  - Discuss with physician/AP medications to alleviate retention as needed  - Discuss catheterization for long term situations as appropriate  Outcome: Progressing     Problem: METABOLIC, FLUID AND ELECTROLYTES - ADULT  Goal: Electrolytes maintained within normal limits  Description: INTERVENTIONS:  - Monitor labs and assess patient for signs and symptoms of electrolyte imbalances  - Administer electrolyte replacement as ordered  -  Monitor response to electrolyte replacements, including repeat lab results as appropriate  - Instruct patient on fluid and nutrition as appropriate  Outcome: Progressing  Goal: Fluid balance maintained  Description: INTERVENTIONS:  - Monitor labs   - Monitor I/O and WT  - Instruct patient on fluid and nutrition as appropriate  - Assess for signs & symptoms of volume excess or deficit  Outcome: Progressing  Goal: Glucose maintained within target range  Description: INTERVENTIONS:  - Monitor Blood Glucose as ordered  - Assess for signs and symptoms of hyperglycemia and hypoglycemia  - Administer ordered medications to maintain glucose within target range  - Assess nutritional intake and initiate nutrition service referral as needed  Outcome: Progressing       Problem: HEMATOLOGIC - ADULT  Goal: Maintains hematologic stability  Description: INTERVENTIONS  - Assess for signs and symptoms of bleeding or hemorrhage  - Monitor labs  - Administer supportive blood products/factors as ordered and appropriate  Outcome: Progressing     Problem: MUSCULOSKELETAL - ADULT  Goal: Maintain or return mobility to safest level of function  Description: INTERVENTIONS:  - Assess patient's ability to carry out ADLs; assess patient's baseline for ADL function and identify physical deficits which impact ability to perform ADLs (bathing, care of mouth/teeth, toileting, grooming, dressing, etc.)  - Assess/evaluate cause of self-care deficits   - Assess range of motion  - Assess patient's mobility  - Assess patient's need for assistive devices and provide as appropriate  - Encourage maximum independence but intervene and supervise when necessary  - Involve family in performance of ADLs  - Assess for home care needs following discharge   - Consider OT consult to assist with ADL evaluation and planning for discharge  - Provide patient education as appropriate  Outcome: Progressing  Goal: Maintain proper alignment of affected body  part  Description: INTERVENTIONS:  - Support, maintain and protect limb and body alignment  - Provide patient/ family with appropriate education  Outcome: Progressing     Problem: SAFETY,RESTRAINT: NV/NON-SELF DESTRUCTIVE BEHAVIOR  Goal: Remains free of harm/injury (restraint for non violent/non self-detsructive behavior)  Description: INTERVENTIONS:  - Instruct patient/family regarding restraint use   - Assess and monitor physiologic and psychological status   - Provide interventions and comfort measures to meet assessed patient needs   - Identify and implement measures to help patient regain control  - Assess readiness for release of restraint   Outcome: Progressing  Goal: Returns to optimal restraint-free functioning  Description: INTERVENTIONS:  - Assess the patient's behavior and symptoms that indicate continued need for restraint  - Identify and implement measures to help patient regain control  - Assess readiness for release of restraint   Outcome: Progressing

## 2024-01-11 NOTE — OCCUPATIONAL THERAPY NOTE
Occupational Therapy Cancel Note        Patient Name: Debbie Moody  Today's Date: 1/11/2024 01/11/24 1208   OT Last Visit   OT Visit Date 01/11/24   Note Type   Note type Cancelled Session   Cancel Reasons Intubated/sedated     OT orders were received and chart reviewed. Pt is intubated/sedated. OT will hold and continue to follow and see as medically appropriate and able.     Chanell Peña, OTR/L

## 2024-01-11 NOTE — ASSESSMENT & PLAN NOTE
Lab Results   Component Value Date    HGBA1C 8.3 (H) 12/31/2023       Recent Labs     01/10/24  1714 01/10/24  2005 01/11/24  0030 01/11/24  0547   POCGLU 144* 170* 198* 170*         Blood Sugar Average: Last 72 hrs:  (P) [id number]  Ongoing management per primary team.   Pt on insulin.

## 2024-01-12 NOTE — PROGRESS NOTES
PHYSICAL MEDICINE AND REHABILITATION CONSULT NOTE  Debbie Moody 83 y.o. female MRN: [id number]  Unit/Bed#: ICU 04 Encounter: [id number]    Requested by (Physician/Service): Bill Felipe MD  Reason for Consultation:  Assessment of rehabilitation needs  Reason for Hospitalization:  Acute CVA (cerebrovascular accident) (HCC)  Rehabilitation Diagnosis:     History of Present Illness:  Debbie Moody is a 83 y.o. female who  has a past medical history of Acute kidney injury (HCC), Acute respiratory failure with hypoxia (HCC) (12/16/2021), Broken arm, Diabetes mellitus (HCC), Diverticulitis, Pneumothorax- Resolved, and Postherpetic neuralgia. who presented to the [facility]  on 12/30/2023 with weakness, headache, nausea and vomiting. CTH/CTA showed distal PICA occlusion. She was placed on ASA/plavix. Repeat CT showed increased cerebellar edema and small amount of hemorrhage. She was given DDAVP to reverse DAPT, placed on 3% debbie and cardene infusion. She was transferred to [facility] for higher level of care. MRI showed left PICA CVA with hemorrhagic conversion in left cerebellar vermis/petechial cortical hemorrhage in left cerebellum, similar mass effect on 4th ventricle. ECHO showed EF of 70% with normal size atrium bilaterally. Cardene and hypertonic saline has been weaned off. She was restarted on baby aspirin on 1/1/2023.     PM&R consulted for rehabilitation recommendations.    Interval History:  Unfortunately, on 1/3/24 she experienced worsening mental status. Repeat CT head imaging revealed increasing edema and mass effect of the cerebellum into the brainstem with obstructive hydrocephalus. She underwent EVD placement and was taken by neurosurgery emergently for posterior fossa decompression. EVD was initially removed, however course was again complicated by large IVH requiring intubation, sedation and new EVD placement. She was successfully extubated on 1/12/24. EVD is  currently at 0. She has been started on modafinil for neuro-stimulation. She will need repeat PT and OT evaluations.     Restrictions include:  none    Hospital Complications/Comorbidities:   Complications: As above  Comorbidities: As above    Morbid Obesity (BMI > 40) No     Last Weight Last BMI   Wt Readings from Last 1 Encounters:   01/03/24 64.2 kg (141 lb 8.6 oz)    Body mass index is 29.58 kg/m².     Functional History:     Prior to Admission:     Functional Status: Patient was independent with mobility/ambulation, transfers, ADL's, IADL's.     Present:  Physical Therapy Occupational Therapy Speech Therapy   Pending Pending Pending, NG in place     Past Medical History:   Past Surgical History:   Family History:     Past Medical History:   Diagnosis Date    Acute kidney injury (HCC)     Acute respiratory failure with hypoxia (HCC) 12/16/2021    Broken arm     hairline fracture/ 2 places///   right arm    Diabetes mellitus (HCC)     Diverticulitis     Pneumothorax- Resolved     Postherpetic neuralgia     Past Surgical History:   Procedure Laterality Date    CHOLECYSTECTOMY      COLON SURGERY      CRANIECTOMY N/A 1/3/2024    Procedure: SUBOCCIPITAL CRANIECTOMY FOR POSTERIOR FOSSA DECOMPRESSION; DISPOSE OF BONE FLAP;  Surgeon: David Higgins MD;  Location: [facility];  Service: Neurosurgery    HERNIA REPAIR      IR EMBOLIZATION (SPECIFY VESSEL OR SITE)  1/12/2022    ROTATOR CUFF REPAIR Right     VARICOSE VEIN SURGERY      Impression:  2/12/16 Jake Sarabia     Family History   Problem Relation Age of Onset    Diabetes Mother     No Known Problems Father      -     Medications:    Current Facility-Administered Medications:     acetaminophen (TYLENOL) tablet 650 mg, 650 mg, Oral, Q6H PRN, Miguel Negron DO    amLODIPine (NORVASC) tablet 10 mg, 10 mg, Oral, Daily, Cherise Berman MD, 10 mg at 01/12/24 0909    atorvastatin (LIPITOR) tablet 80 mg, 80 mg, Oral, QPM, David Higgins MD, 80 mg at 01/11/24 1703    bisacodyl  (DULCOLAX) rectal suppository 10 mg, 10 mg, Rectal, Daily PRN, Cherise Berman MD, 10 mg at 01/10/24 0441    carvedilol (COREG) tablet 12.5 mg, 12.5 mg, Oral, BID With Meals, Miguel Negron DO    ceFAZolin (ANCEF) IVPB (premix in dextrose) 1,000 mg 50 mL, 1,000 mg, Intravenous, Q8H, Miguel Negron DO    chlorhexidine (PERIDEX) 0.12 % oral rinse 15 mL, 15 mL, Mouth/Throat, Q12H JUAN ANTONIO, David Higgins MD, 15 mL at 01/12/24 0911    fentanyl citrate (PF) 100 MCG/2ML 50 mcg, 50 mcg, Intravenous, Q1H PRN, Miguel Negron DO, 50 mcg at 01/07/24 1421    ferrous sulfate tablet 325 mg, 325 mg, Oral, Daily With Breakfast, David Higgins MD, 325 mg at 01/12/24 0909    formoterol (PERFOROMIST) nebulizer solution 20 mcg, 20 mcg, Nebulization, Q12H, Miguel Negron DO, 20 mcg at 01/12/24 0739    heparin (porcine) subcutaneous injection 5,000 Units, 5,000 Units, Subcutaneous, Q8H JUAN ANTONIO, Miguel Negron DO, 5,000 Units at 01/12/24 0514    hydrALAZINE (APRESOLINE) injection 10 mg, 10 mg, Intravenous, Q4H PRN, Miguel Negron DO    insulin glargine (LANTUS) subcutaneous injection 23 Units 0.23 mL, 23 Units, Subcutaneous, STUMHenry CRNP, 23 Units at 01/12/24 0909    insulin lispro (HumaLOG) 100 units/mL subcutaneous injection 4-20 Units, 4-20 Units, Subcutaneous, Q6H JUAN ANTONIO, 8 Units at 01/12/24 1255 **AND** Fingerstick Glucose (POCT), , , Q6H, Miguel Negron DO    labetalol (NORMODYNE) injection 10 mg, 10 mg, Intravenous, Q4H PRN, Miguel Negron DO    levalbuterol (XOPENEX) inhalation solution 1.25 mg, 1.25 mg, Nebulization, TID, Bill Felipe MD, 1.25 mg at 01/12/24 1510    lisinopril (ZESTRIL) tablet 40 mg, 40 mg, Oral, Daily, Miguel Negron DO, 40 mg at 01/12/24 0909    miconazole 2 % cream, , Topical, BID, Miguel Negron DO, Given at 01/12/24 0910    modafinil (PROVIGIL) tablet 200 mg, 200 mg, Oral, Daily, Cherise Berman MD, 200 mg at 01/12/24 0909    ondansetron (ZOFRAN) injection 4 mg,  4 mg, Intravenous, Q6H PRN, David Higgins MD, 4 mg at 01/05/24 1453    polyethylene glycol (MIRALAX) packet 17 g, 17 g, Per NG Tube, BID, Miguel Negron DO, 17 g at 01/12/24 0910    senna-docusate sodium (SENOKOT S) 8.6-50 mg per tablet 2 tablet, 2 tablet, Per NG Tube, BID, Miguel Negron DO, 2 tablet at 01/12/24 0910    sodium chloride 3 % inhalation solution 4 mL, 4 mL, Nebulization, Q6H, Cherise Berman MD, 4 mL at 01/12/24 1510    Allergies:   Allergies   Allergen Reactions    Ciprofloxacin GI Intolerance and Headache     Confusion, arm weakness, dizziness, headache    Meperidine GI Intolerance, Hallucinations and Other (See Comments)     Demarol  Reaction Date:Unknown    Propoxyphene GI Intolerance      Social History:    Social History     Socioeconomic History    Marital status: /Civil Union     Spouse name: Not on file    Number of children: Not on file    Years of education: Not on file    Highest education level: Not on file   Occupational History    Not on file   Tobacco Use    Smoking status: Never    Smokeless tobacco: Never   Vaping Use    Vaping status: Never Used   Substance and Sexual Activity    Alcohol use: Not Currently     Comment: Rarely    Drug use: Never    Sexual activity: Not Currently   Other Topics Concern    Not on file   Social History Narrative    Always uses seatbelt    No caffeine use     Social Determinants of Health     Financial Resource Strain: Low Risk  (9/1/2022)    Overall Financial Resource Strain (CARDIA)     Difficulty of Paying Living Expenses: Not hard at all   Food Insecurity: No Food Insecurity (1/1/2024)    Hunger Vital Sign     Worried About Running Out of Food in the Last Year: Never true     Ran Out of Food in the Last Year: Never true   Transportation Needs: No Transportation Needs (1/1/2024)    PRAPARE - Transportation     Lack of Transportation (Medical): No     Lack of Transportation (Non-Medical): No   Physical Activity: Not on file   Stress: Not on  file   Social Connections: Not on file   Intimate Partner Violence: Not on file   Housing Stability: Low Risk  (1/1/2024)    Housing Stability Vital Sign     Unable to Pay for Housing in the Last Year: No     Number of Places Lived in the Last Year: 1     Unstable Housing in the Last Year: No      Debbie Moody Lives with: lives with their spouse and daughter  She lives in a(n) single family home  The living area: can live on one level  There 6 - 10 steps to enter the home.    Patient/family's goals: Return to previous home/apartment.  The patient will have 24 hour ARC Supervision/physical assistance: supervision/physical assistance available upon discharge.    Review of Systems: Unable to complete due to current mental status    Physical Exam:  Vital Signs:      Temp:  [98.2 °F (36.8 °C)-98.5 °F (36.9 °C)] 98.2 °F (36.8 °C)  HR:  [70-94] 78  Resp:  [12-21] 21  BP: (139-175)/(64-97) 143/66  FiO2 (%):  [30] 30   Intake/Output Summary (Last 24 hours) at 1/12/2024 1528  Last data filed at 1/12/2024 1300  Gross per 24 hour   Intake 1305 ml   Output 1512 ml   Net -207 ml        Laboratory:      Lab Results   Component Value Date    HGB 8.2 (L) 01/12/2024    HCT 25.9 (L) 01/12/2024    WBC 10.15 01/12/2024     Lab Results   Component Value Date    BUN 29 (H) 01/12/2024    K 3.7 01/12/2024     (H) 01/12/2024    GLUCOSE 155 (H) 01/03/2024    CREATININE 1.02 01/12/2024     Lab Results   Component Value Date    PROTIME 14.5 01/03/2024    INR 1.14 01/03/2024        GEN: Patient seen in hospital bed, appears critically ill   HEENT: NG tube in place; mucous membranes moist  CV: No extremity edema  PULM: Breathing comfortably on NC oxygen; lungs CTAB  ABD: Non-distended  SKIN: No obvious rashes or lesions on exposed skin  NEURO:  Asleep, lethargic, opens eyes to verbal stimulus  No spontaneous speech production  Right gaze deviation  No spontaneous movement of upper or lower extremities  MAS (R/L): B 0/0, T 0/0, WF 0/1, FF  0/1; PF 0/0            Imaging: Reviewed  XR chest portable ICU    Result Date: 1/6/2024  Impression: Endotracheal tube tip 3.4 cm above the ceasar. Workstation performed: [id number]     CTA head w wo contrast    Result Date: 1/5/2024  Impression: Stable vascularity when compared with prior CT angiogram from 12/30/2023. No large vessel occlusion. Mild atherosclerotic change present. There is no left posterior inferior cerebellar artery visualized within the posterior fossa in this patient with a known PICA infarct. No venous abnormality identified. Workstation performed: [id number]     CT head wo contrast    Result Date: 1/5/2024  Impression: Similar parenchymal changes from left PICA infarct. Petechial hemorrhage with some mild hemorrhagic conversion in the left cerebellar hemisphere. Previous right side  shunt catheter has been withdrawn. Extensive intraventricular blood as detailed above. Developing hydrocephalus. Narrowing of sulci bilaterally. No convincing uncal herniation. No tonsillar herniation (suboccipital craniectomy). I personally discussed this study with Violet Trejo on 1/5/2024 5:24 PM. Workstation performed: [id number]     CT head wo contrast    Result Date: 1/5/2024  Impression: Status post decompressive suboccipital craniectomy for a PICA distribution infarct with slightly improved mass effect on the fourth ventricle and improving hydrocephalus with ventriculostomy in place. Overall degree of hemorrhage within the left cerebellum and vermis is similar to the prior examination. No new areas of acute intracranial hemorrhage identified. Workstation performed: [id number]     XR chest portable    Result Date: 1/4/2024  Impression: Tubes and lines in adequate position. No acute pulmonary pathology. Workstation performed: [id number]     XR chest portable    Result Date: 1/4/2024  Impression: Endotracheal tube in the proximal right mainstem bronchus. Workstation performed: [id number]     X-ray chest 1  view    Result Date: 1/4/2024  Impression: Tubes and lines as described above. Low lung volumes demonstrated without consolidation or other acute pulmonary findings. Workstation performed: [id number]     CT head wo contrast    Result Date: 1/3/2024  Impression: Status post interval decompressive suboccipital craniectomy with expected subjacent postsurgical changes/pneumocephalus. Similar appearance of evolving left PICA territory infarct and associated edema/mass effect and with probable petechial cortical hemorrhage compared to the most immediate prior study. Similar appearance focal parenchymal hematoma in the left anterior cerebellar vermis adjacent to the fourth ventricle compared to the most immediate prior study. No new hemorrhage or mass effect. Minimally improved hydrocephalus status post placement of right frontal approach ventricular catheter. Workstation performed: [id number]     XR chest portable    Result Date: 1/3/2024  Impression: Mild left base opacity. Aspiration not excluded. Workstation performed: [id number]     CT head wo contrast    Result Date: 1/3/2024  Impression: Unchanged hydrocephalus status post placement of right frontal ventriculostomy catheter with tip near the foramen of Monro. Small amount of pneumocephalus in the frontal horn of the right lateral ventricle. Unchanged left PCA infarct with compression of the fourth ventricle and unchanged focal parenchymal hemorrhage adjacent to the fourth ventricle. The study was marked in EPIC for immediate notification. Workstation performed: [id number]     CT head wo contrast    Result Date: 1/3/2024  Impression: Evolving left PCA infarct with increased left cerebellar hypodensity and compression of the fourth ventricle resulting in hydrocephalus with increased focal parenchymal hemorrhage adjacent to the fourth ventricle. Neurosurgery consult recommended. I personally discussed this study with AMBAR KOEHLER on 1/3/2024 10:09 AM.  Workstation performed: [id number]     XR chest portable    Result Date: 1/1/2024  Impression: Right jugular catheter at cavoatrial junction with no pneumothorax. Chronic scarring with no acute disease. Workstation performed: [id number]     MRI brain wo contrast    Result Date: 12/31/2023  Impression: Acute infarct in left PICA territory with small acute parenchymal hematoma in anterior aspect of left cerebellar vermis, petechial cortical hemorrhage along left posterior cerebellum, and similar compressive mass effect on fourth ventricle. No obstructive hydrocephalus. Recommend consultation with neurosurgery. 1.6 cm intramuscular lesion in left temporalis muscle, indeterminate. Differential includes intramuscular epidermoid cyst, hemangioma, myxoma, among other differentials. Mild chronic microangiopathy. The study was marked in EPIC for immediate notification. Workstation performed: [id number]     CT head wo contrast    Result Date: 12/31/2023  Impression: Redemonstration of evolving acute PICA distribution left cerebellar infarct. Mass effect on the fourth ventricle is slightly increased. No hydrocephalus. Persistent increased attenuation in the anterior aspect of the cerebellar infarct that could represent residual contrast staining but petechial hemorrhage not excluded. Recommend close interval follow-up CT and/or further evaluation with MRI. The study was marked in EPIC for immediate notification. Workstation performed: [id number]     Assessment and Recommendations:  Ms. Debbie Moody is an 83 year old female with impaired mobility and ADLs below her baseline level of function in the setting of acute left cerebellar infarct complicated by daniela-stroke cerebral edema and obstructive hydrocephalus requiring EVD placement and posterior fossa decompression with neurosurgery on 1/3/24. PM&R consulted for rehabilitation recommendations.     Impairments:  Impaired functional mobility and ability to perform ADL's  Impaired  cognition and speech    Recommendations:  - Ongoing stroke work-up and management per neurosurgery and primary ICU service  - Agree with modafinil for neurostimulation; could also consider amantadine if response appears poor or incomplete  - Monitor closely for neurogenic bowel and bladder  - Delirium precautions:  - Maintain adequate nutrition  - Avoid sedating and anticholinergic medications as possible  - Promote regular sleep/wake cycles and proper sleep hygiene  - Monitor closely for hemiparetic shoulder pain and post-stroke pain  - Spasticity:  Neutral resting wrist splint for the left wrist and bilateral multipodus boots to be worn at night and while in bed during the day to prevent developing spasticity and contracture  - Skin: Monitor for breakdown, frequent turns  - She remains critically ill in the ICU, extubated 1/12/24; we will continue to follow and provide updated rehabilitation recommendations as indicated    Thank you for allowing the PM&R service to participate in the care of this patient. We will continue to follow Debbie Moody's progress with you. Please do not hesitate to call with questions or concerns    Jhonatan Veliz MD  Attending Physician  Physical Medicine and Rehabilitation  [facility]    ** Please Note: Fluency Direct voice to text software may have been used in the creation of this document. **

## 2024-01-12 NOTE — PROGRESS NOTES
[facility]  Progress Note  Name: Debbie Moody I  MRN: [id number]  Unit/Bed#: ICU 04 I Date of Admission: 12/31/2023   Date of Service: 1/12/2024 I Hospital Day: 12    Assessment/Plan   * Acute CVA (cerebrovascular accident) (HCC)  Assessment & Plan  8 Days Post-Op Procedure(s):  SUBOCCIPITAL CRANIECTOMY FOR POSTERIOR FOSSA DECOMPRESSION; DISPOSE OF BONE FLAP (LULA, 1/3)  S/p EVD replacement on 1/5/24 2/2 extensive IVH  Pt presented with nausea, headache and ataxia. 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    Imaging:  MRI brain wo 1/9/24:  Stable exam demonstrating extensive intraventricular hemorrhage with surrounding vasogenic edema and/or transependymal flow of CSF. Stable hydrocephalus. Evolving subacute infarct in the left cerebellar hemisphere. Stable petechial hemorrhage, vasogenic edema and mass effect.  CT head wo 1/7/24: Extensive intracranial hemorrhage without significant interval change compared to the prior study     Plan  Continue regular neurologic checks.   Exam grossly stable this am - slight movement LUE/LLE. Pt with left sided > right sided weakness. Right gaze preference. Continues to follow some commands on the right.   STAT CTH for decline in exam greater than 2 points in 1 hour  EVD was re-inserted on 1/5/24.  EVD open at 0. Maintain at this level at this time.   Ancef 2 g q 8 hrs while drain is in place, as EVD continues to be flushed regularly proximally and distally.     ICP < 20, ICP -3 to 7/24H  153cc/24 hours.   Ongoing medical management per primary team.   Na goal > 145-155. Continue to monitor. 148 today.   SBP < 160  Pain control per primary team.   Neurology was consulted for stroke management.   ASA d/c 1/3/23, hold for at least 2 weeks post  "op  PT/OT/PMR evaluation as able  DVT PPX: SCD, SQH    Neurosurgery will continue to follow. Please call with questions or concerns.       Type 2 diabetes mellitus with stage 3b chronic kidney disease, without long-term current use of insulin ([facility])  Assessment & Plan  Lab Results   Component Value Date    HGBA1C 8.3 (H) 12/31/2023       Recent Labs     01/10/24  1714 01/10/24  2005 01/11/24  0030 01/11/24  0547   POCGLU 144* 170* 198* 170*         Blood Sugar Average: Last 72 hrs:  (P) [id number]  Ongoing management per primary team.   Pt on insulin.          Subjective/Objective   Chief Complaint: Postop follow-up    Subjective: No reported events overnight.  Exam reported to be stable.  Remains afebrile.  Weaning to extubate.    Objective: Lying in bed.  NAD.    I/O         01/10 0701 01/11 0700 01/11 0701  01/12 0700 01/12 0701 01/13 0700    I.V. (mL/kg)       NG/ 785     IV Piggyback 100 150     Feedings 770 900     Total Intake(mL/kg) 1450 (22.6) 1835 (28.6)     Urine (mL/kg/hr) 2950 (1.9) 1233 (0.8)     Emesis/NG output 0 0     Drains 149 153     Stool 0 0     Total Output 3099 1386     Net -1649 +449            Unmeasured Urine Occurrence  2 x     Unmeasured Stool Occurrence 2 x 2 x             Invasive Devices       Central Venous Catheter Line  Duration             CVC Central Lines 01/03/24 8 days              Peripheral Intravenous Line  Duration             Long-Dwell Peripheral IV (Midline) 01/03/24 Left Cephalic Vein 8 days              Drain  Duration             Ventriculostomy/Subdural Ventricular drainage catheter Left Frontal region 6 days    NG/OG/Enteral Tube Orogastric Left mouth 4 days              Airway  Duration             ETT  Cuffed 7 mm 4 days                    Physical Exam:  Vitals: Blood pressure 162/73, pulse 80, temperature 98.2 °F (36.8 °C), temperature source Oral, resp. rate 15, height 4' 10\" (1.473 m), weight 64.2 kg (141 lb 8.6 oz), SpO2 100%, not currently " "breastfeeding.,Body mass index is 29.58 kg/m².    Hemodynamic Monitoring: ICP Mean: ICP Mean (mmHg): 0 mmHg    General appearance: Arousable to voice, appears stated age, cooperative and no distress  Head: Normocephalic, without obvious abnormality, EVD site CDI  Eyes: Right gaze preference, PERRL  Lungs: Remains intubated  Heart: regular heart rate  Neurologic:   Mental status: GCS10T, E3, V1T, M6  Cranial nerves: grossly intact except as documented  Motor: moving all extremities with L>R weakness  RUE: 4/5 , shows 2 fingers  LUE: Intermittent spontaneous movement of hand.  Did  x 1 but unable to repeat.  Unable to show fingers  BLE: Wiggles toes but will not flex at the knee to command.    Lab Results:  Results from last 7 days   Lab Units 01/12/24  0532 01/10/24  0428 01/09/24  0431   WBC Thousand/uL 10.15 11.06* 11.42*   HEMOGLOBIN g/dL 8.2* 7.6* 7.5*   HEMATOCRIT % 25.9* 24.3* 24.0*   PLATELETS Thousands/uL 242 217 238   NEUTROS PCT % 78* 77* 79*   MONOS PCT % 5 6 7   EOS PCT % 2 1 0     Results from last 7 days   Lab Units 01/12/24  0532 01/10/24  0428 01/09/24  1853   SODIUM mmol/L 148* 155* 155*   POTASSIUM mmol/L 3.7 4.1 3.7   CHLORIDE mmol/L 113* 124* 123*   CO2 mmol/L 30 23 22   BUN mg/dL 29* 36* 34*   CREATININE mg/dL 1.02 1.18 1.15   CALCIUM mg/dL 8.8 8.4 8.2*   ALK PHOS U/L  --  71  --    ALT U/L  --  9  --    AST U/L  --  21  --      Results from last 7 days   Lab Units 01/12/24  0532 01/06/24  0507   MAGNESIUM mg/dL 2.2 1.9     Results from last 7 days   Lab Units 01/12/24  0532 01/06/24  0507   PHOSPHORUS mg/dL 3.4 1.5*         No results found for: \"TROPONINT\"  ABG:  Lab Results   Component Value Date    PHART 7.364 01/03/2024    TWI6BCT 33.9 (L) 01/03/2024    PO2ART 235.8 (H) 01/03/2024    UJU2DER 18.9 (L) 01/03/2024    BEART -5.7 01/03/2024    SOURCE Line, Arterial 01/03/2024       Imaging Studies: I have personally reviewed pertinent reports.   and I have personally reviewed pertinent " films in PACS    XR chest portable ICU    Result Date: 1/6/2024  Impression: Endotracheal tube tip 3.4 cm above the ceasar. Workstation performed: [id number]     CTA head w wo contrast    Result Date: 1/5/2024  Impression: Stable vascularity when compared with prior CT angiogram from 12/30/2023. No large vessel occlusion. Mild atherosclerotic change present. There is no left posterior inferior cerebellar artery visualized within the posterior fossa in this patient with a known PICA infarct. No venous abnormality identified. Workstation performed: [id number]     CT head wo contrast    Result Date: 1/5/2024  Impression: Similar parenchymal changes from left PICA infarct. Petechial hemorrhage with some mild hemorrhagic conversion in the left cerebellar hemisphere. Previous right side  shunt catheter has been withdrawn. Extensive intraventricular blood as detailed above. Developing hydrocephalus. Narrowing of sulci bilaterally. No convincing uncal herniation. No tonsillar herniation (suboccipital craniectomy). I personally discussed this study with Violet Trejo on 1/5/2024 5:24 PM. Workstation performed: [id number]     CT head wo contrast    Result Date: 1/5/2024  Impression: Status post decompressive suboccipital craniectomy for a PICA distribution infarct with slightly improved mass effect on the fourth ventricle and improving hydrocephalus with ventriculostomy in place. Overall degree of hemorrhage within the left cerebellum and vermis is similar to the prior examination. No new areas of acute intracranial hemorrhage identified. Workstation performed: [id number]     XR chest portable    Result Date: 1/4/2024  Impression: Tubes and lines in adequate position. No acute pulmonary pathology. Workstation performed: [id number]     XR chest portable    Result Date: 1/4/2024  Impression: Endotracheal tube in the proximal right mainstem bronchus. Workstation performed: [id number]     X-ray chest 1 view    Result Date:  1/4/2024  Impression: Tubes and lines as described above. Low lung volumes demonstrated without consolidation or other acute pulmonary findings. Workstation performed: [id number]     CT head wo contrast    Result Date: 1/3/2024  Impression: Status post interval decompressive suboccipital craniectomy with expected subjacent postsurgical changes/pneumocephalus. Similar appearance of evolving left PICA territory infarct and associated edema/mass effect and with probable petechial cortical hemorrhage compared to the most immediate prior study. Similar appearance focal parenchymal hematoma in the left anterior cerebellar vermis adjacent to the fourth ventricle compared to the most immediate prior study. No new hemorrhage or mass effect. Minimally improved hydrocephalus status post placement of right frontal approach ventricular catheter. Workstation performed: [id number]     XR chest portable    Result Date: 1/3/2024  Impression: Mild left base opacity. Aspiration not excluded. Workstation performed: [id number]     CT head wo contrast    Result Date: 1/3/2024  Impression: Unchanged hydrocephalus status post placement of right frontal ventriculostomy catheter with tip near the foramen of [place]ro. Small amount of pneumocephalus in the frontal horn of the right lateral ventricle. Unchanged left PCA infarct with compression of the fourth ventricle and unchanged focal parenchymal hemorrhage adjacent to the fourth ventricle. The study was marked in EPIC for immediate notification. Workstation performed: [id number]     CT head wo contrast    Result Date: 1/3/2024  Impression: Evolving left PCA infarct with increased left cerebellar hypodensity and compression of the fourth ventricle resulting in hydrocephalus with increased focal parenchymal hemorrhage adjacent to the fourth ventricle. Neurosurgery consult recommended. I personally discussed this study with AMBAR KOEHLER on 1/3/2024 10:09 AM. Workstation performed:  [id number]     XR chest portable    Result Date: 1/1/2024  Impression: Right jugular catheter at cavoatrial junction with no pneumothorax. Chronic scarring with no acute disease. Workstation performed: [id number]     MRI brain wo contrast    Result Date: 12/31/2023  Impression: Acute infarct in left PICA territory with small acute parenchymal hematoma in anterior aspect of left cerebellar vermis, petechial cortical hemorrhage along left posterior cerebellum, and similar compressive mass effect on fourth ventricle. No obstructive hydrocephalus. Recommend consultation with neurosurgery. 1.6 cm intramuscular lesion in left temporalis muscle, indeterminate. Differential includes intramuscular epidermoid cyst, hemangioma, myxoma, among other differentials. Mild chronic microangiopathy. The study was marked in EPIC for immediate notification. Workstation performed: [id number]     CT head wo contrast    Result Date: 12/31/2023  Impression: Redemonstration of evolving acute PICA distribution left cerebellar infarct. Mass effect on the fourth ventricle is slightly increased. No hydrocephalus. Persistent increased attenuation in the anterior aspect of the cerebellar infarct that could represent residual contrast staining but petechial hemorrhage not excluded. Recommend close interval follow-up CT and/or further evaluation with MRI. The study was marked in EPIC for immediate notification. Workstation performed: [id number]     VTE Pharmacologic Prophylaxis: Heparin    VTE Mechanical Prophylaxis: sequential compression device

## 2024-01-12 NOTE — RESPIRATORY THERAPY NOTE
01/12/24 1125   Respiratory Assessment   Resp Comments Pt extubated to 6lpm NC, no resp distress noted at this time. Will cont to monitor.   Oxygen Therapy/Pulse Ox   O2 Device Nasal cannula   O2 Therapy Oxygen humidified   Nasal Cannula O2 Flow Rate (L/min) 6 L/min   Calculated FIO2 (%) - Nasal Cannula 44   SpO2 Activity At Rest   $ Pulse Oximetry Spot Check Charge Completed

## 2024-01-12 NOTE — PLAN OF CARE
Problem: PAIN - ADULT  Goal: Verbalizes/displays adequate comfort level or baseline comfort level  Description: Interventions:  - Encourage patient to monitor pain and request assistance  - Assess pain using appropriate pain scale  - Administer analgesics based on type and severity of pain and evaluate response  - Implement non-pharmacological measures as appropriate and evaluate response  - Consider cultural and social influences on pain and pain management  - Notify physician/advanced practitioner if interventions unsuccessful or patient reports new pain  Outcome: Progressing     Problem: INFECTION - ADULT  Goal: Absence or prevention of progression during hospitalization  Description: INTERVENTIONS:  - Assess and monitor for signs and symptoms of infection  - Monitor lab/diagnostic results  - Monitor all insertion sites, i.e. indwelling lines, tubes, and drains  - Monitor endotracheal if appropriate and nasal secretions for changes in amount and color  - [place] appropriate cooling/warming therapies per order  - Administer medications as ordered  - Instruct and encourage patient and family to use good hand hygiene technique  - Identify and instruct in appropriate isolation precautions for identified infection/condition  Outcome: Progressing  Goal: Absence of fever/infection during neutropenic period  Description: INTERVENTIONS:  - Monitor WBC    Outcome: Progressing     Problem: SAFETY ADULT  Goal: Patient will remain free of falls  Description: INTERVENTIONS:  - Educate patient/family on patient safety including physical limitations  - Instruct patient to call for assistance with activity   - Consult OT/PT to assist with strengthening/mobility   - Keep Call bell within reach  - Keep bed low and locked with side rails adjusted as appropriate  - Keep care items and personal belongings within reach  - Initiate and maintain comfort rounds  - Make Fall Risk Sign visible to staff  - Offer Toileting every  Hours,  in advance of need  - Initiate/Maintain alarm  - Obtain necessary fall risk management equipment:   - Apply yellow socks and bracelet for high fall risk patients  - Consider moving patient to room near nurses station  Outcome: Progressing  Goal: Maintain or return to baseline ADL function  Description: INTERVENTIONS:  -  Assess patient's ability to carry out ADLs; assess patient's baseline for ADL function and identify physical deficits which impact ability to perform ADLs (bathing, care of mouth/teeth, toileting, grooming, dressing, etc.)  - Assess/evaluate cause of self-care deficits   - Assess range of motion  - Assess patient's mobility; develop plan if impaired  - Assess patient's need for assistive devices and provide as appropriate  - Encourage maximum independence but intervene and supervise when necessary  - Involve family in performance of ADLs  - Assess for home care needs following discharge   - Consider OT consult to assist with ADL evaluation and planning for discharge  - Provide patient education as appropriate  Outcome: Progressing  Goal: Maintains/Returns to pre admission functional level  Description: INTERVENTIONS:  - Perform AM-PAC 6 Click Basic Mobility/ Daily Activity assessment daily.  - Set and communicate daily mobility goal to care team and patient/family/caregiver.   - Collaborate with rehabilitation services on mobility goals if consulted  - Perform Range of Motion  times a day.  - Reposition patient every  hours.  - Dangle patient  times a day  - Stand patient  times a day  - Ambulate patient  times a day  - Out of bed to chair  times a day   - Out of bed for meals  times a day  - Out of bed for toileting  - Record patient progress and toleration of activity level   Outcome: Progressing     Problem: DISCHARGE PLANNING  Goal: Discharge to home or other facility with appropriate resources  Description: INTERVENTIONS:  - Identify barriers to discharge w/patient and caregiver  - Arrange for  needed discharge resources and transportation as appropriate  - Identify discharge learning needs (meds, wound care, etc.)  - Arrange for interpretive services to assist at discharge as needed  - Refer to Case Management Department for coordinating discharge planning if the patient needs post-hospital services based on physician/advanced practitioner order or complex needs related to functional status, cognitive ability, or social support system  Outcome: Progressing     Problem: Knowledge Deficit  Goal: Patient/family/caregiver demonstrates understanding of disease process, treatment plan, medications, and discharge instructions  Description: Complete learning assessment and assess knowledge base.  Interventions:  - Provide teaching at level of understanding  - Provide teaching via preferred learning methods  Outcome: Progressing     Problem: Neurological Deficit  Goal: Neurological status is stable or improving  Description: Interventions:  - Monitor and assess patient's level of consciousness, motor function, sensory function, and level of assistance needed for ADLs.   - Monitor and report changes from baseline. Collaborate with interdisciplinary team to initiate plan and implement interventions as ordered.   - Provide and maintain a safe environment.  - Consider seizure precautions.  - Consider fall precautions.  - Consider aspiration precautions.  - Consider bleeding precautions.  Outcome: Progressing     Problem: Activity Intolerance/Impaired Mobility  Goal: Mobility/activity is maintained at optimum level for patient  Description: Interventions:  - Assess and monitor patient  barriers to mobility and need for assistive/adaptive devices.  - Assess patient's emotional response to limitations.  - Collaborate with interdisciplinary team and initiate plans and interventions as ordered.  - Encourage independent activity per ability.  - Maintain proper body alignment.  - Perform active/passive rom as  tolerated/ordered.  - Plan activities to conserve energy.  - Turn patient as appropriate  Outcome: Progressing     Problem: Communication Impairment  Goal: Ability to express needs and understand communication  Description: Assess patient's communication skills and ability to understand information.  Patient will demonstrate use of effective communication techniques, alternative methods of communication and understanding even if not able to speak.     - Encourage communication and provide alternate methods of communication as needed.  - Collaborate with case management/ for discharge needs.  - Include patient/family/caregiver in decisions related to communication.  Outcome: Progressing     Problem: Potential for Aspiration  Goal: Ventilated patient's risk of aspiration is minimized  Description: Assess and monitor vital signs, respiratory status, airway cuff pressure, and labs (WBC).  Monitor for signs of aspiration (tachypnea, cough, rales, wheezing, cyanosis, fever).    - Elevate head of bed 30 degrees if patient has tube feeding.  - Monitor tube feeding.  Outcome: Progressing     Problem: Nutrition  Goal: Nutrition/Hydration status is improving  Description: Monitor and assess patient's nutrition/hydration status for malnutrition (ex- brittle hair, bruises, dry skin, pale skin and conjunctiva, muscle wasting, smooth red tongue, and disorientation). Collaborate with interdisciplinary team and initiate plan and interventions as ordered.  Monitor patient's weight and dietary intake as ordered or per policy. Utilize nutrition screening tool and intervene per policy. Determine patient's food preferences and provide high-protein, high-caloric foods as appropriate.     - Assist patient with eating.  - Allow adequate time for meals.  - Encourage patient to take dietary supplement as ordered.  - Collaborate with clinical nutritionist.  - Include patient/family/caregiver in decisions related to  nutrition.  Outcome: Progressing     Problem: Prexisting or High Potential for Compromised Skin Integrity  Goal: Skin integrity is maintained or improved  Description: INTERVENTIONS:  - Identify patients at risk for skin breakdown  - Assess and monitor skin integrity  - Assess and monitor nutrition and hydration status  - Monitor labs   - Assess for incontinence   - Turn and reposition patient  - Assist with mobility/ambulation  - Relieve pressure over bony prominences  - Avoid friction and shearing  - Provide appropriate hygiene as needed including keeping skin clean and dry  - Evaluate need for skin moisturizer/barrier cream  - Collaborate with interdisciplinary team   - Patient/family teaching  - Consider wound care consult   Outcome: Progressing     Problem: Prexisting or High Potential for Compromised Skin Integrity  Goal: Skin integrity is maintained or improved  Description: INTERVENTIONS:  - Identify patients at risk for skin breakdown  - Assess and monitor skin integrity  - Assess and monitor nutrition and hydration status  - Monitor labs   - Assess for incontinence   - Turn and reposition patient  - Assist with mobility/ambulation  - Relieve pressure over bony prominences  - Avoid friction and shearing  - Provide appropriate hygiene as needed including keeping skin clean and dry  - Evaluate need for skin moisturizer/barrier cream  - Collaborate with interdisciplinary team   - Patient/family teaching  - Consider wound care consult   Outcome: Progressing     Problem: Nutrition/Hydration-ADULT  Goal: Nutrient/Hydration intake appropriate for improving, restoring or maintaining nutritional needs  Description: Monitor and assess patient's nutrition/hydration status for malnutrition. Collaborate with interdisciplinary team and initiate plan and interventions as ordered.  Monitor patient's weight and dietary intake as ordered or per policy. Utilize nutrition screening tool and intervene as necessary. Determine  patient's food preferences and provide high-protein, high-caloric foods as appropriate.     INTERVENTIONS:  - Monitor oral intake, urinary output, labs, and treatment plans  - Assess nutrition and hydration status and recommend course of action  - Evaluate amount of meals eaten  - Assist patient with eating if necessary   - Allow adequate time for meals  - Recommend/ encourage appropriate diets, oral nutritional supplements, and vitamin/mineral supplements  - Order, calculate, and assess calorie counts as needed  - Recommend, monitor, and adjust tube feedings and TPN/PPN based on assessed needs  - Assess need for intravenous fluids  - Provide specific nutrition/hydration education as appropriate  - Include patient/family/caregiver in decisions related to nutrition  Outcome: Progressing     Problem: SAFETY,RESTRAINT: NV/NON-SELF DESTRUCTIVE BEHAVIOR  Goal: Remains free of harm/injury (restraint for non violent/non self-detsructive behavior)  Description: INTERVENTIONS:  - Instruct patient/family regarding restraint use   - Assess and monitor physiologic and psychological status   - Provide interventions and comfort measures to meet assessed patient needs   - Identify and implement measures to help patient regain control  - Assess readiness for release of restraint   Outcome: Progressing  Goal: Returns to optimal restraint-free functioning  Description: INTERVENTIONS:  - Assess the patient's behavior and symptoms that indicate continued need for restraint  - Identify and implement measures to help patient regain control  - Assess readiness for release of restraint   Outcome: Progressing     Problem: COPING  Goal: Pt/Family able to verbalize concerns and demonstrate effective coping strategies  Description: INTERVENTIONS:  - Assist patient/family to identify coping skills, available support systems and cultural and spiritual values  - Provide emotional support, including active listening and acknowledgement of concerns  of patient and caregivers  - Reduce environmental stimuli, as able  - Provide patient education  - Assess for spiritual pain/suffering and initiate spiritual care, including notification of Pastoral Care or nancie based community as needed  - Assess effectiveness of coping strategies  Outcome: Progressing  Goal: Will report anxiety at manageable levels  Description: INTERVENTIONS:  - Administer medication as ordered  - Teach and encourage coping skills  - Provide emotional support  - Assess patient/family for anxiety and ability to cope  Outcome: Progressing     Problem: NEUROSENSORY - ADULT  Goal: Achieves stable or improved neurological status  Description: INTERVENTIONS  - Monitor and report changes in neurological status  - Monitor vital signs such as temperature, blood pressure, glucose, and any other labs ordered   - Initiate measures to prevent increased intracranial pressure  - Monitor for seizure activity and implement precautions if appropriate      Outcome: Progressing  Goal: Remains free of injury related to seizures activity  Description: INTERVENTIONS  - Maintain airway, patient safety  and administer oxygen as ordered  - Monitor patient for seizure activity, document and report duration and description of seizure to physician/advanced practitioner  - If seizure occurs,  ensure patient safety during seizure  - Reorient patient post seizure  - Seizure pads on all 4 side rails  - Instruct patient/family to notify RN of any seizure activity including if an aura is experienced  - Instruct patient/family to call for assistance with activity based on nursing assessment  - Administer anti-seizure medications if ordered    Outcome: Progressing  Goal: Achieves maximal functionality and self care  Description: INTERVENTIONS  - Monitor swallowing and airway patency with patient fatigue and changes in neurological status  - Encourage and assist patient to increase activity and self care.   - Encourage visually  impaired, hearing impaired and aphasic patients to use assistive/communication devices  Outcome: Progressing     Problem: CARDIOVASCULAR - ADULT  Goal: Maintains optimal cardiac output and hemodynamic stability  Description: INTERVENTIONS:  - Monitor I/O, vital signs and rhythm  - Monitor for S/S and trends of decreased cardiac output  - Administer and titrate ordered vasoactive medications to optimize hemodynamic stability  - Assess quality of pulses, skin color and temperature  - Assess for signs of decreased coronary artery perfusion  - Instruct patient to report change in severity of symptoms  Outcome: Progressing  Goal: Absence of cardiac dysrhythmias or at baseline rhythm  Description: INTERVENTIONS:  - Continuous cardiac monitoring, vital signs, obtain 12 lead EKG if ordered  - Administer antiarrhythmic and heart rate control medications as ordered  - Monitor electrolytes and administer replacement therapy as ordered  Outcome: Progressing     Problem: RESPIRATORY - ADULT  Goal: Achieves optimal ventilation and oxygenation  Description: INTERVENTIONS:  - Assess for changes in respiratory status  - Assess for changes in mentation and behavior  - Position to facilitate oxygenation and minimize respiratory effort  - Oxygen administered by appropriate delivery if ordered  - Initiate smoking cessation education as indicated  - Encourage broncho-pulmonary hygiene including cough, deep breathe, Incentive Spirometry  - Assess the need for suctioning and aspirate as needed  - Assess and instruct to report SOB or any respiratory difficulty  - Respiratory Therapy support as indicated  Outcome: Progressing     Problem: GASTROINTESTINAL - ADULT  Goal: Maintains or returns to baseline bowel function  Description: INTERVENTIONS:  - Assess bowel function  - Encourage oral fluids to ensure adequate hydration  - Administer IV fluids if ordered to ensure adequate hydration  - Administer ordered medications as needed  - Encourage  mobilization and activity  - Consider nutritional services referral to assist patient with adequate nutrition and appropriate food choices  Outcome: Progressing  Goal: Maintains adequate nutritional intake  Description: INTERVENTIONS:  - Monitor percentage of each meal consumed  - Identify factors contributing to decreased intake, treat as appropriate  - Assist with meals as needed  - Monitor I&O, weight, and lab values if indicated  - Obtain nutrition services referral as needed  Outcome: Progressing     Problem: GENITOURINARY - ADULT  Goal: Maintains or returns to baseline urinary function  Description: INTERVENTIONS:  - Assess urinary function  - Encourage oral fluids to ensure adequate hydration if ordered  - Administer IV fluids as ordered to ensure adequate hydration  - Administer ordered medications as needed  - Offer frequent toileting  - Follow urinary retention protocol if ordered  Outcome: Progressing  Goal: Absence of urinary retention  Description: INTERVENTIONS:  - Assess patient’s ability to void and empty bladder  - Monitor I/O  - Bladder scan as needed  - Discuss with physician/AP medications to alleviate retention as needed  - Discuss catheterization for long term situations as appropriate  Outcome: Progressing     Problem: METABOLIC, FLUID AND ELECTROLYTES - ADULT  Goal: Electrolytes maintained within normal limits  Description: INTERVENTIONS:  - Monitor labs and assess patient for signs and symptoms of electrolyte imbalances  - Administer electrolyte replacement as ordered  - Monitor response to electrolyte replacements, including repeat lab results as appropriate  - Instruct patient on fluid and nutrition as appropriate  Outcome: Progressing  Goal: Fluid balance maintained  Description: INTERVENTIONS:  - Monitor labs   - Monitor I/O and WT  - Instruct patient on fluid and nutrition as appropriate  - Assess for signs & symptoms of volume excess or deficit  Outcome: Progressing  Goal: Glucose  maintained within target range  Description: INTERVENTIONS:  - Monitor Blood Glucose as ordered  - Assess for signs and symptoms of hyperglycemia and hypoglycemia  - Administer ordered medications to maintain glucose within target range  - Assess nutritional intake and initiate nutrition service referral as needed  Outcome: Progressing     Problem: SKIN/TISSUE INTEGRITY - ADULT  Goal: Skin Integrity remains intact(Skin Breakdown Prevention)  Description: Assess:  -Perform [place] assessment every   -Clean and moisturize skin every   -Inspect skin when repositioning, toileting, and assisting with ADLS  -Assess under medical devices such as  every   -Assess extremities for adequate circulation and sensation     Bed Management:  -Have minimal linens on bed & keep smooth, unwrinkled  -Change linens as needed when moist or perspiring  -Avoid sitting or lying in one position for more than  hours while in bed  -Keep HOB at degrees     Toileting:  -Offer bedside commode  -Assess for incontinence every   -Use incontinent care products after each incontinent episode such as     Activity:  -Mobilize patient  times a day  -Encourage activity and walks on unit  -Encourage or provide ROM exercises   -Turn and reposition patient every  Hours  -Use appropriate equipment to lift or move patient in bed  -Instruct/ Assist with weight shifting every  when out of bed in chair  -Consider limitation of chair time  hour intervals    Skin Care:  -Avoid use of baby powder, tape, friction and shearing, hot water or constrictive clothing  -Relieve pressure over bony prominences using   -Do not massage red bony areas    Next Steps:  -Teach patient strategies to minimize risks such as   -Consider consults to  interdisciplinary teams such as   Outcome: Progressing  Goal: Incision(s), wounds(s) or drain site(s) healing without S/S of infection  Description: INTERVENTIONS  - Assess and document dressing, incision, wound bed, drain sites and  surrounding tissue  - Provide patient and family education  - Perform skin care/dressing changes every  Outcome: Progressing     Problem: HEMATOLOGIC - ADULT  Goal: Maintains hematologic stability  Description: INTERVENTIONS  - Assess for signs and symptoms of bleeding or hemorrhage  - Monitor labs  - Administer supportive blood products/factors as ordered and appropriate  Outcome: Progressing     Problem: MUSCULOSKELETAL - ADULT  Goal: Maintain or return mobility to safest level of function  Description: INTERVENTIONS:  - Assess patient's ability to carry out ADLs; assess patient's baseline for ADL function and identify physical deficits which impact ability to perform ADLs (bathing, care of mouth/teeth, toileting, grooming, dressing, etc.)  - Assess/evaluate cause of self-care deficits   - Assess range of motion  - Assess patient's mobility  - Assess patient's need for assistive devices and provide as appropriate  - Encourage maximum independence but intervene and supervise when necessary  - Involve family in performance of ADLs  - Assess for home care needs following discharge   - Consider OT consult to assist with ADL evaluation and planning for discharge  - Provide patient education as appropriate  Outcome: Progressing  Goal: Maintain proper alignment of affected body part  Description: INTERVENTIONS:  - Support, maintain and protect limb and body alignment  - Provide patient/ family with appropriate education  Outcome: Progressing

## 2024-01-12 NOTE — PLAN OF CARE
Problem: PAIN - ADULT  Goal: Verbalizes/displays adequate comfort level or baseline comfort level  Description: Interventions:  - Encourage patient to monitor pain and request assistance  - Assess pain using appropriate pain scale  - Administer analgesics based on type and severity of pain and evaluate response  - Implement non-pharmacological measures as appropriate and evaluate response  - Consider cultural and social influences on pain and pain management  - Notify physician/advanced practitioner if interventions unsuccessful or patient reports new pain  Outcome: Progressing     Problem: INFECTION - ADULT  Goal: Absence or prevention of progression during hospitalization  Description: INTERVENTIONS:  - Assess and monitor for signs and symptoms of infection  - Monitor lab/diagnostic results  - Monitor all insertion sites, i.e. indwelling lines, tubes, and drains  - Monitor endotracheal if appropriate and nasal secretions for changes in amount and color  - [place] appropriate cooling/warming therapies per order  - Administer medications as ordered  - Instruct and encourage patient and family to use good hand hygiene technique  - Identify and instruct in appropriate isolation precautions for identified infection/condition  Outcome: Progressing  Goal: Absence of fever/infection during neutropenic period  Description: INTERVENTIONS:  - Monitor WBC    Outcome: Progressing     Problem: SAFETY ADULT  Goal: Patient will remain free of falls  Description: INTERVENTIONS:  - Educate patient/family on patient safety including physical limitations  - Instruct patient to call for assistance with activity   - Consult OT/PT to assist with strengthening/mobility   - Keep Call bell within reach  - Keep bed low and locked with side rails adjusted as appropriate  - Keep care items and personal belongings within reach  - Initiate and maintain comfort rounds  - Make Fall Risk Sign visible to staff  - Offer Toileting every 2 Hours,  in advance of need  - Initiate/Maintain bed alarm  - Obtain necessary fall risk management equipment: bed alarm  - Apply yellow socks and bracelet for high fall risk patients  - Consider moving patient to room near nurses station  Outcome: Progressing  Goal: Maintain or return to baseline ADL function  Description: INTERVENTIONS:  -  Assess patient's ability to carry out ADLs; assess patient's baseline for ADL function and identify physical deficits which impact ability to perform ADLs (bathing, care of mouth/teeth, toileting, grooming, dressing, etc.)  - Assess/evaluate cause of self-care deficits   - Assess range of motion  - Assess patient's mobility; develop plan if impaired  - Assess patient's need for assistive devices and provide as appropriate  - Encourage maximum independence but intervene and supervise when necessary  - Involve family in performance of ADLs  - Assess for home care needs following discharge   - Consider OT consult to assist with ADL evaluation and planning for discharge  - Provide patient education as appropriate  Outcome: Progressing  Goal: Maintains/Returns to pre admission functional level  Description: INTERVENTIONS:  - Perform AM-PAC 6 Click Basic Mobility/ Daily Activity assessment daily.  - Set and communicate daily mobility goal to care team and patient/family/caregiver.   - Collaborate with rehabilitation services on mobility goals if consulted  - Perform Range of Motion 3 times a day.  - Reposition patient every 2 hours.  - Dangle patient 3 times a day  - Stand patient 3 times a day  - Ambulate patient 3 times a day  - Out of bed to chair 3 times a day   - Out of bed for meals 3 times a day  - Out of bed for toileting  - Record patient progress and toleration of activity level   Outcome: Progressing     Problem: DISCHARGE PLANNING  Goal: Discharge to home or other facility with appropriate resources  Description: INTERVENTIONS:  - Identify barriers to discharge w/patient and  caregiver  - Arrange for needed discharge resources and transportation as appropriate  - Identify discharge learning needs (meds, wound care, etc.)  - Arrange for interpretive services to assist at discharge as needed  - Refer to Case Management Department for coordinating discharge planning if the patient needs post-hospital services based on physician/advanced practitioner order or complex needs related to functional status, cognitive ability, or social support system  Outcome: Progressing     Problem: Knowledge Deficit  Goal: Patient/family/caregiver demonstrates understanding of disease process, treatment plan, medications, and discharge instructions  Description: Complete learning assessment and assess knowledge base.  Interventions:  - Provide teaching at level of understanding  - Provide teaching via preferred learning methods  Outcome: Progressing     Problem: Neurological Deficit  Goal: Neurological status is stable or improving  Description: Interventions:  - Monitor and assess patient's level of consciousness, motor function, sensory function, and level of assistance needed for ADLs.   - Monitor and report changes from baseline. Collaborate with interdisciplinary team to initiate plan and implement interventions as ordered.   - Provide and maintain a safe environment.  - Consider seizure precautions.  - Consider fall precautions.  - Consider aspiration precautions.  - Consider bleeding precautions.  Outcome: Progressing     Problem: Activity Intolerance/Impaired Mobility  Goal: Mobility/activity is maintained at optimum level for patient  Description: Interventions:  - Assess and monitor patient  barriers to mobility and need for assistive/adaptive devices.  - Assess patient's emotional response to limitations.  - Collaborate with interdisciplinary team and initiate plans and interventions as ordered.  - Encourage independent activity per ability.  - Maintain proper body alignment.  - Perform  active/passive rom as tolerated/ordered.  - Plan activities to conserve energy.  - Turn patient as appropriate  Outcome: Progressing     Problem: Communication Impairment  Goal: Ability to express needs and understand communication  Description: Assess patient's communication skills and ability to understand information.  Patient will demonstrate use of effective communication techniques, alternative methods of communication and understanding even if not able to speak.     - Encourage communication and provide alternate methods of communication as needed.  - Collaborate with case management/ for discharge needs.  - Include patient/family/caregiver in decisions related to communication.  Outcome: Progressing     Problem: Potential for Aspiration  Goal: Ventilated patient's risk of aspiration is minimized  Description: Assess and monitor vital signs, respiratory status, airway cuff pressure, and labs (WBC).  Monitor for signs of aspiration (tachypnea, cough, rales, wheezing, cyanosis, fever).    - Elevate head of bed 30 degrees if patient has tube feeding.  - Monitor tube feeding.  Outcome: Progressing     Problem: Nutrition  Goal: Nutrition/Hydration status is improving  Description: Monitor and assess patient's nutrition/hydration status for malnutrition (ex- brittle hair, bruises, dry skin, pale skin and conjunctiva, muscle wasting, smooth red tongue, and disorientation). Collaborate with interdisciplinary team and initiate plan and interventions as ordered.  Monitor patient's weight and dietary intake as ordered or per policy. Utilize nutrition screening tool and intervene per policy. Determine patient's food preferences and provide high-protein, high-caloric foods as appropriate.     - Assist patient with eating.  - Allow adequate time for meals.  - Encourage patient to take dietary supplement as ordered.  - Collaborate with clinical nutritionist.  - Include patient/family/caregiver in decisions  related to nutrition.  Outcome: Progressing     Problem: Prexisting or High Potential for Compromised Skin Integrity  Goal: Skin integrity is maintained or improved  Description: INTERVENTIONS:  - Identify patients at risk for skin breakdown  - Assess and monitor skin integrity  - Assess and monitor nutrition and hydration status  - Monitor labs   - Assess for incontinence   - Turn and reposition patient  - Assist with mobility/ambulation  - Relieve pressure over bony prominences  - Avoid friction and shearing  - Provide appropriate hygiene as needed including keeping skin clean and dry  - Evaluate need for skin moisturizer/barrier cream  - Collaborate with interdisciplinary team   - Patient/family teaching  - Consider wound care consult   Outcome: Progressing     Problem: Nutrition/Hydration-ADULT  Goal: Nutrient/Hydration intake appropriate for improving, restoring or maintaining nutritional needs  Description: Monitor and assess patient's nutrition/hydration status for malnutrition. Collaborate with interdisciplinary team and initiate plan and interventions as ordered.  Monitor patient's weight and dietary intake as ordered or per policy. Utilize nutrition screening tool and intervene as necessary. Determine patient's food preferences and provide high-protein, high-caloric foods as appropriate.     INTERVENTIONS:  - Monitor oral intake, urinary output, labs, and treatment plans  - Assess nutrition and hydration status and recommend course of action  - Evaluate amount of meals eaten  - Assist patient with eating if necessary   - Allow adequate time for meals  - Recommend/ encourage appropriate diets, oral nutritional supplements, and vitamin/mineral supplements  - Order, calculate, and assess calorie counts as needed  - Recommend, monitor, and adjust tube feedings and TPN/PPN based on assessed needs  - Assess need for intravenous fluids  - Provide specific nutrition/hydration education as appropriate  - Include  patient/family/caregiver in decisions related to nutrition  Outcome: Progressing     Problem: COPING  Goal: Pt/Family able to verbalize concerns and demonstrate effective coping strategies  Description: INTERVENTIONS:  - Assist patient/family to identify coping skills, available support systems and cultural and spiritual values  - Provide emotional support, including active listening and acknowledgement of concerns of patient and caregivers  - Reduce environmental stimuli, as able  - Provide patient education  - Assess for spiritual pain/suffering and initiate spiritual care, including notification of Pastoral Care or nanc[facility] based community as needed  - Assess effectiveness of coping strategies  Outcome: Progressing  Goal: Will report anxiety at manageable levels  Description: INTERVENTIONS:  - Administer medication as ordered  - Teach and encourage coping skills  - Provide emotional support  - Assess patient/family for anxiety and ability to cope  Outcome: Progressing     Problem: NEUROSENSORY - ADULT  Goal: Achieves stable or improved neurological status  Description: INTERVENTIONS  - Monitor and report changes in neurological status  - Monitor vital signs such as temperature, blood pressure, glucose, and any other labs ordered   - Initiate measures to prevent increased intracranial pressure  - Monitor for seizure activity and implement precautions if appropriate      Outcome: Progressing  Goal: Remains free of injury related to seizures activity  Description: INTERVENTIONS  - Maintain airway, patient safety  and administer oxygen as ordered  - Monitor patient for seizure activity, document and report duration and description of seizure to physician/advanced practitioner  - If seizure occurs,  ensure patient safety during seizure  - Reorient patient post seizure  - Seizure pads on all 4 side rails  - Instruct patient/family to notify RN of any seizure activity including if an aura is experienced  - Instruct  patient/family to call for assistance with activity based on nursing assessment  - Administer anti-seizure medications if ordered    Outcome: Progressing  Goal: Achieves maximal functionality and self care  Description: INTERVENTIONS  - Monitor swallowing and airway patency with patient fatigue and changes in neurological status  - Encourage and assist patient to increase activity and self care.   - Encourage visually impaired, hearing impaired and aphasic patients to use assistive/communication devices  Outcome: Progressing     Problem: CARDIOVASCULAR - ADULT  Goal: Maintains optimal cardiac output and hemodynamic stability  Description: INTERVENTIONS:  - Monitor I/O, vital signs and rhythm  - Monitor for S/S and trends of decreased cardiac output  - Administer and titrate ordered vasoactive medications to optimize hemodynamic stability  - Assess quality of pulses, skin color and temperature  - Assess for signs of decreased coronary artery perfusion  - Instruct patient to report change in severity of symptoms  Outcome: Progressing  Goal: Absence of cardiac dysrhythmias or at baseline rhythm  Description: INTERVENTIONS:  - Continuous cardiac monitoring, vital signs, obtain 12 lead EKG if ordered  - Administer antiarrhythmic and heart rate control medications as ordered  - Monitor electrolytes and administer replacement therapy as ordered  Outcome: Progressing     Problem: RESPIRATORY - ADULT  Goal: Achieves optimal ventilation and oxygenation  Description: INTERVENTIONS:  - Assess for changes in respiratory status  - Assess for changes in mentation and behavior  - Position to facilitate oxygenation and minimize respiratory effort  - Oxygen administered by appropriate delivery if ordered  - Initiate smoking cessation education as indicated  - Encourage broncho-pulmonary hygiene including cough, deep breathe, Incentive Spirometry  - Assess the need for suctioning and aspirate as needed  - Assess and instruct to report  SOB or any respiratory difficulty  - Respiratory Therapy support as indicated  Outcome: Progressing     Problem: GASTROINTESTINAL - ADULT  Goal: Maintains or returns to baseline bowel function  Description: INTERVENTIONS:  - Assess bowel function  - Encourage oral fluids to ensure adequate hydration  - Administer IV fluids if ordered to ensure adequate hydration  - Administer ordered medications as needed  - Encourage mobilization and activity  - Consider nutritional services referral to assist patient with adequate nutrition and appropriate food choices  Outcome: Progressing  Goal: Maintains adequate nutritional intake  Description: INTERVENTIONS:  - Monitor percentage of each meal consumed  - Identify factors contributing to decreased intake, treat as appropriate  - Assist with meals as needed  - Monitor I&O, weight, and lab values if indicated  - Obtain nutrition services referral as needed  Outcome: Progressing     Problem: GENITOURINARY - ADULT  Goal: Maintains or returns to baseline urinary function  Description: INTERVENTIONS:  - Assess urinary function  - Encourage oral fluids to ensure adequate hydration if ordered  - Administer IV fluids as ordered to ensure adequate hydration  - Administer ordered medications as needed  - Offer frequent toileting  - Follow urinary retention protocol if ordered  Outcome: Progressing  Goal: Absence of urinary retention  Description: INTERVENTIONS:  - Assess patient’s ability to void and empty bladder  - Monitor I/O  - Bladder scan as needed  - Discuss with physician/AP medications to alleviate retention as needed  - Discuss catheterization for long term situations as appropriate  Outcome: Progressing     Problem: METABOLIC, FLUID AND ELECTROLYTES - ADULT  Goal: Electrolytes maintained within normal limits  Description: INTERVENTIONS:  - Monitor labs and assess patient for signs and symptoms of electrolyte imbalances  - Administer electrolyte replacement as ordered  -  Monitor response to electrolyte replacements, including repeat lab results as appropriate  - Instruct patient on fluid and nutrition as appropriate  Outcome: Progressing  Goal: Fluid balance maintained  Description: INTERVENTIONS:  - Monitor labs   - Monitor I/O and WT  - Instruct patient on fluid and nutrition as appropriate  - Assess for signs & symptoms of volume excess or deficit  Outcome: Progressing  Goal: Glucose maintained within target range  Description: INTERVENTIONS:  - Monitor Blood Glucose as ordered  - Assess for signs and symptoms of hyperglycemia and hypoglycemia  - Administer ordered medications to maintain glucose within target range  - Assess nutritional intake and initiate nutrition service referral as needed  Outcome: Progressing     Problem: HEMATOLOGIC - ADULT  Goal: Maintains hematologic stability  Description: INTERVENTIONS  - Assess for signs and symptoms of bleeding or hemorrhage  - Monitor labs  - Administer supportive blood products/factors as ordered and appropriate  Outcome: Progressing     Problem: MUSCULOSKELETAL - ADULT  Goal: Maintain or return mobility to safest level of function  Description: INTERVENTIONS:  - Assess patient's ability to carry out ADLs; assess patient's baseline for ADL function and identify physical deficits which impact ability to perform ADLs (bathing, care of mouth/teeth, toileting, grooming, dressing, etc.)  - Assess/evaluate cause of self-care deficits   - Assess range of motion  - Assess patient's mobility  - Assess patient's need for assistive devices and provide as appropriate  - Encourage maximum independence but intervene and supervise when necessary  - Involve family in performance of ADLs  - Assess for home care needs following discharge   - Consider OT consult to assist with ADL evaluation and planning for discharge  - Provide patient education as appropriate  Outcome: Progressing  Goal: Maintain proper alignment of affected body part  Description:  INTERVENTIONS:  - Support, maintain and protect limb and body alignment  - Provide patient/ family with appropriate education  Outcome: Progressing     Problem: SAFETY,RESTRAINT: NV/NON-SELF DESTRUCTIVE BEHAVIOR  Goal: Remains free of harm/injury (restraint for non violent/non self-detsructive behavior)  Description: INTERVENTIONS:  - Instruct patient/family regarding restraint use   - Assess and monitor physiologic and psychological status   - Provide interventions and comfort measures to meet assessed patient needs   - Identify and implement measures to help patient regain control  - Assess readiness for release of restraint   Outcome: Progressing  Goal: Returns to optimal restraint-free functioning  Description: INTERVENTIONS:  - Assess the patient's behavior and symptoms that indicate continued need for restraint  - Identify and implement measures to help patient regain control  - Assess readiness for release of restraint   Outcome: Progressing

## 2024-01-12 NOTE — PROGRESS NOTES
[facility]  Progress Note: Critical Care  Name: Debbie Moody 83 y.o. female I MRN: [id number]  Unit/Bed#: ICU 04 I Date of Admission: 12/31/2023   Date of Service: 1/12/2024 I Hospital Day: 12    Assessment/Plan   Neuro:   Diagnosis: Acute encephalopathy, cerebellar stroke with edema, intraventricular hemorrhage s/p suboccipital craniectomy, hydrocephalus s/p EVD  Plan: Frequent neurologic monitoring, EVD per neurosurgery, appreciate neurosurgery recommendations  CV:   Diagnosis: Hypertension, hyperlipidemia  Plan: Continue lisinopril/amlodipine, carvedilol added yesterday, PRN hydralazine/labetalol available  Pulm:  Diagnosis: Respiratory failure  Plan: Continue SBT, consider liberation today, ongoing discussion regarding trach/PEG, respiratory protocol, patient with low minute ventilation events overnight, may benefit from non-invasiv e positive pressure if able to liberate today  GI:   Plan: Continue bowel regimen  :   Diagnosis: CKD 3  Plan: Close intake and output, trend renal indices as needed  F/E/N:   Plan: Continue enteral nutrition, replete electroyltes as necessary, consider NGT prior to liberation  Heme/Onc:   Diagnosis: Anemia  Plan: Continue iron supplementation, heparin for DVT prophylaxis  Endo:   Diagnosis: Type 2 diabetes with hyperglycemia  Plan: Lantus re-adjusted to daily due to concern for loss of enteral access, continue sliding scale insulin  ID:   Plan: Follow temperature and white count, continue Ancef while EVD in place  MSK/Skin:   Plan: Frequent turning and repositioning    Disposition: Critical care    ICU Core Measures     Vented Patient  VAP Bundle  VAP bundle ordered     A: Assess, Prevent, and Manage Pain Has pain been assessed? Yes  Need for changes to pain regimen? No   B: Both Spontaneous Awakening Trials (SATs) and Spontaneous Breathing Trials (SBTs) Plan to perform spontaneous awakening trial today? Yes   Plan to perform spontaneous  breathing trial today? Yes   Obvious barriers to extubation? Yes   C: Choice of Sedation RASS Goal: N/A patient not on sedation  Need for changes to sedation or analgesia regimen? NA   D: Delirium CAM-ICU: Unable to perform secondary to Acute cognitive dysfunction   E: Early Mobility  Plan for early mobility? Yes   F: Family Engagement Plan for family engagement today? Yes       Antibiotic Review: Post op requirements     Review of Invasive Devices:      Central access plan:  consider transition to PICC/peripheral access      Prophylaxis:  VTE VTE covered by:  heparin (porcine), Subcutaneous, 5,000 Units at 01/11/24 2145       Stress Ulcer  not ordered        Significant 24hr Events     24hr events: Patient started on carvedilol yesterday, remained on pressure support ventilation     Subjective     Review of Systems   Unable to perform ROS: Intubated        Objective                            Vitals I/O      Most Recent Min/Max in 24hrs   Temp 98.5 °F (36.9 °C) Temp  Min: 98.3 °F (36.8 °C)  Max: 99.1 °F (37.3 °C)   Pulse 90 Pulse  Min: 68  Max: 94   Resp 20 Resp  Min: 12  Max: 20   /84 BP  Min: 139/71  Max: 177/76   O2 Sat 98 % SpO2  Min: 98 %  Max: 100 %      Intake/Output Summary (Last 24 hours) at 1/12/2024 0007  Last data filed at 1/12/2024 0000  Gross per 24 hour   Intake 1435 ml   Output 1401 ml   Net 34 ml       Diet Enteral/Parenteral; Tube Feeding No Oral Diet; Glucerna 1.2; Continuous; 45; Prosource Protein Liquid - One Packet; 200; Every 6 hours    Invasive Monitoring   Neuro Invasive Monitoring   Most Recent  Min/Max in 24hrs    GCS 11  Harjit Coma Scale Score  Min: 10  Max: 11    ICP 0 mmHg   ICP Mean (mmHg)  Min: -3 mmHg  Max: 9 mmHg    CPP (cuff) 132  CPP Cuff-Calculated (mmHg)  Min: 89  Max: 132    CPP(debbie) 64  No data recorded   CVP   No data recorded           Physical Exam   Physical Exam  Eyes:      Pupils: Pupils are equal, round, and reactive to light.   Skin:     General: Skin is  warm and dry.   HENT:      Head: Normocephalic.      Comments: Left-sided EVD at 0 with small amount of serosanguinous drainage     Mouth/Throat:      Mouth: Mucous membranes are moist.   Cardiovascular:      Rate and Rhythm: Normal rate and regular rhythm.      Pulses: Normal pulses.   Musculoskeletal:         General: Deformity (trace peripheral edema) present. No tenderness or signs of injury.      Right lower leg: Trace Edema present.      Left lower leg: Trace Edema present.   Abdominal: General: Bowel sounds are normal.      Palpations: Abdomen is soft.   Constitutional:       General: She is not in acute distress.     Appearance: She is ill-appearing.      Interventions: She is intubated and restrained.   Pulmonary:      Effort: Pulmonary effort is normal. She is intubated.      Breath sounds: Normal breath sounds.      Comments: Small amount of clear secretions inline suction  Neurological:      GCS: GCS eye subscore is 4. GCS verbal subscore is 1. GCS motor subscore is 6.      Comments: Patient responds to request in all 4 extremities, faster on right side   Genitourinary/Anorectal:     Comments: Yellow urine present in suction cannister  external catheter present.          Diagnostic Studies      EKG: Sinus rhythm  Imaging:  I have personally reviewed pertinent reports.   and I have personally reviewed pertinent films in PACS     Medications:  Scheduled PRN   amLODIPine, 10 mg, Daily  atorvastatin, 80 mg, QPM  carvedilol, 6.25 mg, BID With Meals  cefazolin, 2,000 mg, Q8H  chlorhexidine, 15 mL, Q12H JUAN ANTONIO  ferrous sulfate, 325 mg, Daily With Breakfast  formoterol, 20 mcg, Q12H  heparin (porcine), 5,000 Units, Q8H JUAN ANTONIO  insulin glargine, 23 Units, QAM  insulin lispro, 4-20 Units, Q6H JUAN ANTONIO  levalbuterol, 1.25 mg, TID  lisinopril, 40 mg, Daily  miconazole, , BID  modafinil, 200 mg, Daily  polyethylene glycol, 17 g, BID  senna-docusate sodium, 2 tablet, BID  sodium chloride, 4 mL, Q6H      acetaminophen, 650 mg,  Q6H PRN  bisacodyl, 10 mg, Daily PRN  fentanyl citrate (PF), 50 mcg, Q1H PRN  hydrALAZINE, 10 mg, Q4H PRN  labetalol, 10 mg, Q4H PRN  ondansetron, 4 mg, Q6H PRN       Continuous          Labs:    CBC    Recent Labs     01/10/24  0428   WBC 11.06*   HGB 7.6*   HCT 24.3*        BMP    Recent Labs     01/10/24  0428   SODIUM 155*   K 4.1   *   CO2 23   AGAP 8   BUN 36*   CREATININE 1.18   CALCIUM 8.4       Coags    No recent results     Additional Electrolytes  No recent results       Blood Gas    No recent results  No recent results LFTs  Recent Labs     01/10/24  0428   ALT 9   AST 21   ALKPHOS 71   ALB 2.9*   TBILI 0.33       Infectious  No recent results  Glucose  Recent Labs     01/10/24  0428   GLUC 198*           BENTON Rosenberg

## 2024-01-13 PROBLEM — G93.40 ENCEPHALOPATHY: Status: ACTIVE | Noted: 2024-01-13

## 2024-01-13 PROBLEM — E87.20 METABOLIC ACIDOSIS: Status: RESOLVED | Noted: 2023-12-31 | Resolved: 2024-01-13

## 2024-01-13 PROBLEM — Z13.31 NEGATIVE DEPRESSION SCREENING: Status: RESOLVED | Noted: 2021-01-07 | Resolved: 2024-01-13

## 2024-01-13 PROBLEM — N17.9 AKI (ACUTE KIDNEY INJURY) (HCC): Status: RESOLVED | Noted: 2024-01-01 | Resolved: 2024-01-01

## 2024-01-13 PROBLEM — E66.3 OVERWEIGHT (BMI 25.0-29.9): Chronic | Status: ACTIVE | Noted: 2021-01-07

## 2024-01-13 PROBLEM — D64.9 ANEMIA: Chronic | Status: RESOLVED | Noted: 2023-01-01 | Resolved: 2024-01-01

## 2024-01-13 PROBLEM — Z98.890 STATUS POST CRANIECTOMY: Status: ACTIVE | Noted: 2024-01-01

## 2024-01-13 NOTE — PROGRESS NOTES
[facility]  Progress Note: Critical Care  Name: Debbie Moody 83 y.o. female I MRN: [id number]  Unit/Bed#: ICU 04 I Date of Admission: 12/31/2023   Date of Service: 1/13/2024 I Hospital Day: 13    Assessment/Plan   Neuro:   Diagnosis: Acute L cerebellar CVA w/ edema s/p suboccipital craniectomy on 1/3 with IVH  1/5 EVD placed 2/2 extensive IVH  MRI 1/10-Stable exam w/ extensive IVH with surrounding vasogenic edema and/or transependymal flow of CSF. Stable hydrocephalus. Evolving subacute infarct in the L cerebellar hemisphere. Stable petechial hemorrhage, vasogenic edema and mass effect.   Echo EF 70% mild hypertrophy  A1C 8.3  /172/54/141  Plan:   Neuro/Neurosx consulted  Stroke pathway  Stat CTH for any change in GCS >2pts  F/U CTH in am  EVD 0 w/ 97cc out  Na 145-155  SBP <160  Hold asa for at least 2 wks post op  Ancef ppx    Diagnosis: Encephalopathy  Plan:   Cont provigil  Neuro checks  CV:   Diagnosis: HTN/HLD  Plan:   Home vasotec held  Cont lisinopril/Norvasc/Coreg/statin    Pulm:  Diagnosis: Acute hypoxic resp failure  Intubated 1/3 Extubated 1/4  Reintubated 1/5 Extubated 1/12  Plan:   Wean oxygen for sats >92%  Aggressive pulm hygiene with nebs/vest/PRN NT sx  Unable to do IS    GI:   Diagnosis: Dysphagia  Plan:   NGT cont TF  Speech consulted    :   Diagnosis: CKD 3 baseline cr 1-1.3  Plan:   At baseline    F/E/N:   No active issues    Heme/Onc:   Diagnosis: Anemia baseline hgb 9-10  Plan:   No s/s bleeding  cont home iron    Endo:   Diagnosis: DM2  A1C  Plan:   Holding home glipizide/Januvia  Lantus 23U changed to AM  SSI    ID:   No active issues    MSK/Skin:   T/p q 2  PT/OT    Disposition: Critical care    ICU Core Measures     Vented Patient  VAP Bundle  VAP bundle ordered     A: Assess, Prevent, and Manage Pain Has pain been assessed? Yes  Need for changes to pain regimen? No   B: Both Spontaneous Awakening Trials (SATs) and Spontaneous Breathing  Trials (SBTs) Plan to perform spontaneous awakening trial today? N/A   Plan to perform spontaneous breathing trial today? N/A   Obvious barriers to extubation? NA   C: Choice of Sedation RASS Goal: 0 Alert and Calm  Need for changes to sedation or analgesia regimen? No   D: Delirium CAM-ICU: Unable to perform secondary to Acute cognitive dysfunction   E: Early Mobility  Plan for early mobility? Yes   F: Family Engagement Plan for family engagement today? Yes       Antibiotic Review: Post op requirements     Review of Invasive Devices:      Central access plan: Will obtain peripheral access and discontinue prior to transfer      Prophylaxis:  VTE VTE covered by:  heparin (porcine), Subcutaneous, 5,000 Units at 01/12/24 2109       Stress Ulcer  not ordered        Significant 24hr Events     24hr events: Extubated. NT sx multiple times overnight     Subjective     Review of Systems   Unable to perform ROS: Mental status change        Objective                            Vitals I/O      Most Recent Min/Max in 24hrs   Temp 98.5 °F (36.9 °C) Temp  Min: 97.8 °F (36.6 °C)  Max: 98.5 °F (36.9 °C)   Pulse 68 Pulse  Min: 68  Max: 82   Resp 18 Resp  Min: 13  Max: 21   /59 BP  Min: 129/58  Max: 175/81   O2 Sat 100 % SpO2  Min: 98 %  Max: 100 %      Intake/Output Summary (Last 24 hours) at 1/13/2024 0104  Last data filed at 1/13/2024 0001  Gross per 24 hour   Intake 880 ml   Output 1269 ml   Net -389 ml       Diet Enteral/Parenteral; Tube Feeding No Oral Diet; Glucerna 1.2; Continuous; 45; Prosource Protein Liquid - One Packet; 200; Every 6 hours    Invasive Monitoring           Physical Exam   Physical Exam  Eyes:      Pupils: Pupils are equal, round, and reactive to light.   Skin:     General: Skin is warm and dry.   HENT:      Head: Normocephalic and atraumatic.      Mouth/Throat:      Mouth: Mucous membranes are moist.   Neck:      Vascular: Central line present.   Cardiovascular:      Rate and Rhythm: Normal rate and  regular rhythm.      Pulses: Normal pulses.      Heart sounds: Normal heart sounds.   Musculoskeletal:         General: No swelling.      Right lower leg: No edema.      Left lower leg: No edema.   Abdominal: General: Bowel sounds are normal. There is no distension.      Palpations: Abdomen is soft.   Constitutional:       General: She is not in acute distress.     Appearance: She is ill-appearing.   Pulmonary:      Effort: Pulmonary effort is normal.      Breath sounds: Rhonchi present.   Neurological:      Mental Status: She is lethargic.      GCS: GCS eye subscore is 4. GCS verbal subscore is 1. GCS motor subscore is 6.      Motor: Motor deficit.      Comments: Unable to track finger. R gaze preference. RUE 3/5 LUE 2/5. B/L LE wiggles toes 2/5            Diagnostic Studies      EKG: tele  Imaging:  I have personally reviewed pertinent reports.   and I have personally reviewed pertinent films in PACS     Medications:  Scheduled PRN   amLODIPine, 10 mg, Daily  atorvastatin, 80 mg, QPM  carvedilol, 12.5 mg, BID With Meals  cefazolin, 1,000 mg, Q8H  chlorhexidine, 15 mL, Q12H JUAN ANTONIO  ferrous sulfate, 325 mg, Daily With Breakfast  formoterol, 20 mcg, Q12H  heparin (porcine), 5,000 Units, Q8H JUAN ANTONIO  insulin glargine, 23 Units, QAM  insulin lispro, 4-20 Units, Q6H JUAN ANTONIO  levalbuterol, 1.25 mg, TID  lisinopril, 40 mg, Daily  miconazole, , BID  modafinil, 200 mg, Daily  polyethylene glycol, 17 g, BID  senna-docusate sodium, 2 tablet, BID  sodium chloride, 4 mL, Q6H      acetaminophen, 650 mg, Q6H PRN  bisacodyl, 10 mg, Daily PRN  hydrALAZINE, 10 mg, Q4H PRN  ondansetron, 4 mg, Q6H PRN       Continuous          Labs:    CBC    Recent Labs     01/12/24  0532   WBC 10.15   HGB 8.2*   HCT 25.9*        BMP    Recent Labs     01/12/24  0532   SODIUM 148*   K 3.7   *   CO2 30   AGAP 5   BUN 29*   CREATININE 1.02   CALCIUM 8.8       Coags    No recent results     Additional Electrolytes  Recent Labs     01/12/24  0532   MG  2.2   PHOS 3.4          Blood Gas    No recent results  No recent results LFTs  No recent results    Infectious  No recent results  Glucose  Recent Labs     01/12/24  0532   GLUC 201*               Non-Critical Care Time Statement: I have spent a total time of 25 minutes in caring for this patient including Diagnostic results, Documenting in the medical record, and Reviewing / ordering tests, medicine, procedures  .     BENTON Ash

## 2024-01-13 NOTE — PLAN OF CARE
Problem: PAIN - ADULT  Goal: Verbalizes/displays adequate comfort level or baseline comfort level  Description: Interventions:  - Encourage patient to monitor pain and request assistance  - Assess pain using appropriate pain scale  - Administer analgesics based on type and severity of pain and evaluate response  - Implement non-pharmacological measures as appropriate and evaluate response  - Consider cultural and social influences on pain and pain management  - Notify physician/advanced practitioner if interventions unsuccessful or patient reports new pain  Outcome: Progressing     Problem: INFECTION - ADULT  Goal: Absence or prevention of progression during hospitalization  Description: INTERVENTIONS:  - Assess and monitor for signs and symptoms of infection  - Monitor lab/diagnostic results  - Monitor all insertion sites, i.e. indwelling lines, tubes, and drains  - Monitor endotracheal if appropriate and nasal secretions for changes in amount and color  - [place] appropriate cooling/warming therapies per order  - Administer medications as ordered  - Instruct and encourage patient and family to use good hand hygiene technique  - Identify and instruct in appropriate isolation precautions for identified infection/condition  Outcome: Progressing  Goal: Absence of fever/infection during neutropenic period  Description: INTERVENTIONS:  - Monitor WBC    Outcome: Progressing     Problem: SAFETY ADULT  Goal: Patient will remain free of falls  Description: INTERVENTIONS:  - Educate patient/family on patient safety including physical limitations  - Instruct patient to call for assistance with activity   - Consult OT/PT to assist with strengthening/mobility   - Keep Call bell within reach  - Keep bed low and locked with side rails adjusted as appropriate  - Keep care items and personal belongings within reach  - Initiate and maintain comfort rounds  - Make Fall Risk Sign visible to staff  - Offer Toileting every  Hours,  in advance of need  - Initiate/Maintain alarm  - Obtain necessary fall risk management equipment:   - Apply yellow socks and bracelet for high fall risk patients  - Consider moving patient to room near nurses station  Outcome: Progressing  Goal: Maintain or return to baseline ADL function  Description: INTERVENTIONS:  -  Assess patient's ability to carry out ADLs; assess patient's baseline for ADL function and identify physical deficits which impact ability to perform ADLs (bathing, care of mouth/teeth, toileting, grooming, dressing, etc.)  - Assess/evaluate cause of self-care deficits   - Assess range of motion  - Assess patient's mobility; develop plan if impaired  - Assess patient's need for assistive devices and provide as appropriate  - Encourage maximum independence but intervene and supervise when necessary  - Involve family in performance of ADLs  - Assess for home care needs following discharge   - Consider OT consult to assist with ADL evaluation and planning for discharge  - Provide patient education as appropriate  Outcome: Progressing  Goal: Maintains/Returns to pre admission functional level  Description: INTERVENTIONS:  - Perform AM-PAC 6 Click Basic Mobility/ Daily Activity assessment daily.  - Set and communicate daily mobility goal to care team and patient/family/caregiver.   - Collaborate with rehabilitation services on mobility goals if consulted  - Perform Range of Motion 3 times a day.  - Reposition patient every 2 hours.  - Dangle patient 3 times a day  - Stand patient 3 times a day  - Ambulate patient 3 times a day  - Out of bed to chair 3 times a day   - Out of bed for meals 3 times a day  - Out of bed for toileting  - Record patient progress and toleration of activity level   Outcome: Progressing     Problem: DISCHARGE PLANNING  Goal: Discharge to home or other facility with appropriate resources  Description: INTERVENTIONS:  - Identify barriers to discharge w/patient and caregiver  -  Arrange for needed discharge resources and transportation as appropriate  - Identify discharge learning needs (meds, wound care, etc.)  - Arrange for interpretive services to assist at discharge as needed  - Refer to Case Management Department for coordinating discharge planning if the patient needs post-hospital services based on physician/advanced practitioner order or complex needs related to functional status, cognitive ability, or social support system  Outcome: Progressing     Problem: Knowledge Deficit  Goal: Patient/family/caregiver demonstrates understanding of disease process, treatment plan, medications, and discharge instructions  Description: Complete learning assessment and assess knowledge base.  Interventions:  - Provide teaching at level of understanding  - Provide teaching via preferred learning methods  Outcome: Progressing     Problem: Neurological Deficit  Goal: Neurological status is stable or improving  Description: Interventions:  - Monitor and assess patient's level of consciousness, motor function, sensory function, and level of assistance needed for ADLs.   - Monitor and report changes from baseline. Collaborate with interdisciplinary team to initiate plan and implement interventions as ordered.   - Provide and maintain a safe environment.  - Consider seizure precautions.  - Consider fall precautions.  - Consider aspiration precautions.  - Consider bleeding precautions.  Outcome: Progressing     Problem: Activity Intolerance/Impaired Mobility  Goal: Mobility/activity is maintained at optimum level for patient  Description: Interventions:  - Assess and monitor patient  barriers to mobility and need for assistive/adaptive devices.  - Assess patient's emotional response to limitations.  - Collaborate with interdisciplinary team and initiate plans and interventions as ordered.  - Encourage independent activity per ability.  - Maintain proper body alignment.  - Perform active/passive rom as  tolerated/ordered.  - Plan activities to conserve energy.  - Turn patient as appropriate  Outcome: Progressing     Problem: Communication Impairment  Goal: Ability to express needs and understand communication  Description: Assess patient's communication skills and ability to understand information.  Patient will demonstrate use of effective communication techniques, alternative methods of communication and understanding even if not able to speak.     - Encourage communication and provide alternate methods of communication as needed.  - Collaborate with case management/ for discharge needs.  - Include patient/family/caregiver in decisions related to communication.  Outcome: Progressing     Problem: Potential for Aspiration  Goal: Ventilated patient's risk of aspiration is minimized  Description: Assess and monitor vital signs, respiratory status, airway cuff pressure, and labs (WBC).  Monitor for signs of aspiration (tachypnea, cough, rales, wheezing, cyanosis, fever).    - Elevate head of bed 30 degrees if patient has tube feeding.  - Monitor tube feeding.  Outcome: Progressing     Problem: Nutrition  Goal: Nutrition/Hydration status is improving  Description: Monitor and assess patient's nutrition/hydration status for malnutrition (ex- brittle hair, bruises, dry skin, pale skin and conjunctiva, muscle wasting, smooth red tongue, and disorientation). Collaborate with interdisciplinary team and initiate plan and interventions as ordered.  Monitor patient's weight and dietary intake as ordered or per policy. Utilize nutrition screening tool and intervene per policy. Determine patient's food preferences and provide high-protein, high-caloric foods as appropriate.     - Assist patient with eating.  - Allow adequate time for meals.  - Encourage patient to take dietary supplement as ordered.  - Collaborate with clinical nutritionist.  - Include patient/family/caregiver in decisions related to  nutrition.  Outcome: Progressing     Problem: Prexisting or High Potential for Compromised Skin Integrity  Goal: Skin integrity is maintained or improved  Description: INTERVENTIONS:  - Identify patients at risk for skin breakdown  - Assess and monitor skin integrity  - Assess and monitor nutrition and hydration status  - Monitor labs   - Assess for incontinence   - Turn and reposition patient  - Assist with mobility/ambulation  - Relieve pressure over bony prominences  - Avoid friction and shearing  - Provide appropriate hygiene as needed including keeping skin clean and dry  - Evaluate need for skin moisturizer/barrier cream  - Collaborate with interdisciplinary team   - Patient/family teaching  - Consider wound care consult   Outcome: Progressing     Problem: Nutrition/Hydration-ADULT  Goal: Nutrient/Hydration intake appropriate for improving, restoring or maintaining nutritional needs  Description: Monitor and assess patient's nutrition/hydration status for malnutrition. Collaborate with interdisciplinary team and initiate plan and interventions as ordered.  Monitor patient's weight and dietary intake as ordered or per policy. Utilize nutrition screening tool and intervene as necessary. Determine patient's food preferences and provide high-protein, high-caloric foods as appropriate.     INTERVENTIONS:  - Monitor oral intake, urinary output, labs, and treatment plans  - Assess nutrition and hydration status and recommend course of action  - Evaluate amount of meals eaten  - Assist patient with eating if necessary   - Allow adequate time for meals  - Recommend/ encourage appropriate diets, oral nutritional supplements, and vitamin/mineral supplements  - Order, calculate, and assess calorie counts as needed  - Recommend, monitor, and adjust tube feedings and TPN/PPN based on assessed needs  - Assess need for intravenous fluids  - Provide specific nutrition/hydration education as appropriate  - Include  patient/family/caregiver in decisions related to nutrition  Outcome: Progressing     Problem: COPING  Goal: Pt/Family able to verbalize concerns and demonstrate effective coping strategies  Description: INTERVENTIONS:  - Assist patient/family to identify coping skills, available support systems and cultural and spiritual values  - Provide emotional support, including active listening and acknowledgement of concerns of patient and caregivers  - Reduce environmental stimuli, as able  - Provide patient education  - Assess for spiritual pain/suffering and initiate spiritual care, including notification of Pastoral Care or nanc[place] based community as needed  - Assess effectiveness of coping strategies  Outcome: Progressing  Goal: Will report anxiety at manageable levels  Description: INTERVENTIONS:  - Administer medication as ordered  - Teach and encourage coping skills  - Provide emotional support  - Assess patient/family for anxiety and ability to cope  Outcome: Progressing     Problem: NEUROSENSORY - ADULT  Goal: Achieves stable or improved neurological status  Description: INTERVENTIONS  - Monitor and report changes in neurological status  - Monitor vital signs such as temperature, blood pressure, glucose, and any other labs ordered   - Initiate measures to prevent increased intracranial pressure  - Monitor for seizure activity and implement precautions if appropriate      Outcome: Progressing  Goal: Remains free of injury related to seizures activity  Description: INTERVENTIONS  - Maintain airway, patient safety  and administer oxygen as ordered  - Monitor patient for seizure activity, document and report duration and description of seizure to physician/advanced practitioner  - If seizure occurs,  ensure patient safety during seizure  - Reorient patient post seizure  - Seizure pads on all 4 side rails  - Instruct patient/family to notify RN of any seizure activity including if an aura is experienced  - Instruct  patient/family to call for assistance with activity based on nursing assessment  - Administer anti-seizure medications if ordered    Outcome: Progressing  Goal: Achieves maximal functionality and self care  Description: INTERVENTIONS  - Monitor swallowing and airway patency with patient fatigue and changes in neurological status  - Encourage and assist patient to increase activity and self care.   - Encourage visually impaired, hearing impaired and aphasic patients to use assistive/communication devices  Outcome: Progressing     Problem: CARDIOVASCULAR - ADULT  Goal: Maintains optimal cardiac output and hemodynamic stability  Description: INTERVENTIONS:  - Monitor I/O, vital signs and rhythm  - Monitor for S/S and trends of decreased cardiac output  - Administer and titrate ordered vasoactive medications to optimize hemodynamic stability  - Assess quality of pulses, skin color and temperature  - Assess for signs of decreased coronary artery perfusion  - Instruct patient to report change in severity of symptoms  Outcome: Progressing  Goal: Absence of cardiac dysrhythmias or at baseline rhythm  Description: INTERVENTIONS:  - Continuous cardiac monitoring, vital signs, obtain 12 lead EKG if ordered  - Administer antiarrhythmic and heart rate control medications as ordered  - Monitor electrolytes and administer replacement therapy as ordered  Outcome: Progressing     Problem: RESPIRATORY - ADULT  Goal: Achieves optimal ventilation and oxygenation  Description: INTERVENTIONS:  - Assess for changes in respiratory status  - Assess for changes in mentation and behavior  - Position to facilitate oxygenation and minimize respiratory effort  - Oxygen administered by appropriate delivery if ordered  - Initiate smoking cessation education as indicated  - Encourage broncho-pulmonary hygiene including cough, deep breathe, Incentive Spirometry  - Assess the need for suctioning and aspirate as needed  - Assess and instruct to report  SOB or any respiratory difficulty  - Respiratory Therapy support as indicated  Outcome: Progressing     Problem: GASTROINTESTINAL - ADULT  Goal: Maintains or returns to baseline bowel function  Description: INTERVENTIONS:  - Assess bowel function  - Encourage oral fluids to ensure adequate hydration  - Administer IV fluids if ordered to ensure adequate hydration  - Administer ordered medications as needed  - Encourage mobilization and activity  - Consider nutritional services referral to assist patient with adequate nutrition and appropriate food choices  Outcome: Progressing  Goal: Maintains adequate nutritional intake  Description: INTERVENTIONS:  - Monitor percentage of each meal consumed  - Identify factors contributing to decreased intake, treat as appropriate  - Assist with meals as needed  - Monitor I&O, weight, and lab values if indicated  - Obtain nutrition services referral as needed  Outcome: Progressing     Problem: GENITOURINARY - ADULT  Goal: Maintains or returns to baseline urinary function  Description: INTERVENTIONS:  - Assess urinary function  - Encourage oral fluids to ensure adequate hydration if ordered  - Administer IV fluids as ordered to ensure adequate hydration  - Administer ordered medications as needed  - Offer frequent toileting  - Follow urinary retention protocol if ordered  Outcome: Progressing  Goal: Absence of urinary retention  Description: INTERVENTIONS:  - Assess patient’s ability to void and empty bladder  - Monitor I/O  - Bladder scan as needed  - Discuss with physician/AP medications to alleviate retention as needed  - Discuss catheterization for long term situations as appropriate  Outcome: Progressing     Problem: METABOLIC, FLUID AND ELECTROLYTES - ADULT  Goal: Electrolytes maintained within normal limits  Description: INTERVENTIONS:  - Monitor labs and assess patient for signs and symptoms of electrolyte imbalances  - Administer electrolyte replacement as ordered  -  Monitor response to electrolyte replacements, including repeat lab results as appropriate  - Instruct patient on fluid and nutrition as appropriate  Outcome: Progressing  Goal: Fluid balance maintained  Description: INTERVENTIONS:  - Monitor labs   - Monitor I/O and WT  - Instruct patient on fluid and nutrition as appropriate  - Assess for signs & symptoms of volume excess or deficit  Outcome: Progressing  Goal: Glucose maintained within target range  Description: INTERVENTIONS:  - Monitor Blood Glucose as ordered  - Assess for signs and symptoms of hyperglycemia and hypoglycemia  - Administer ordered medications to maintain glucose within target range  - Assess nutritional intake and initiate nutrition service referral as needed  Outcome: Progressing     Problem: SKIN/TISSUE INTEGRITY - ADULT  Goal: Skin Integrity remains intact(Skin Breakdown Prevention)  Description: Assess:  -Perform [place] assessment every   -Clean and moisturize skin every   -Inspect skin when repositioning, toileting, and assisting with ADLS  -Assess under medical devices such as  every   -Assess extremities for adequate circulation and sensation     Bed Management:  -Have minimal linens on bed & keep smooth, unwrinkled  -Change linens as needed when moist or perspiring  -Avoid sitting or lying in one position for more than  hours while in bed  -Keep HOB at degrees     Toileting:  -Offer bedside commode  -Assess for incontinence every   -Use incontinent care products after each incontinent episode such as     Activity:  -Mobilize patient  times a day  -Encourage activity and walks on unit  -Encourage or provide ROM exercises   -Turn and reposition patient every  Hours  -Use appropriate equipment to lift or move patient in bed  -Instruct/ Assist with weight shifting every  when out of bed in chair  -Consider limitation of chair time  hour intervals    Skin Care:  -Avoid use of baby powder, tape, friction and shearing, hot water or constrictive  clothing  -Relieve pressure over bony prominences using   -Do not massage red bony areas    Next Steps:  -Teach patient strategies to minimize risks such as    -Consider consults to  interdisciplinary teams such as   Outcome: Progressing  Goal: Incision(s), wounds(s) or drain site(s) healing without S/S of infection  Description: INTERVENTIONS  - Assess and document dressing, incision, wound bed, drain sites and surrounding tissue  - Provide patient and family education  - Perform skin care/dressing changes every   Outcome: Progressing     Problem: HEMATOLOGIC - ADULT  Goal: Maintains hematologic stability  Description: INTERVENTIONS  - Assess for signs and symptoms of bleeding or hemorrhage  - Monitor labs  - Administer supportive blood products/factors as ordered and appropriate  Outcome: Progressing     Problem: MUSCULOSKELETAL - ADULT  Goal: Maintain or return mobility to safest level of function  Description: INTERVENTIONS:  - Assess patient's ability to carry out ADLs; assess patient's baseline for ADL function and identify physical deficits which impact ability to perform ADLs (bathing, care of mouth/teeth, toileting, grooming, dressing, etc.)  - Assess/evaluate cause of self-care deficits   - Assess range of motion  - Assess patient's mobility  - Assess patient's need for assistive devices and provide as appropriate  - Encourage maximum independence but intervene and supervise when necessary  - Involve family in performance of ADLs  - Assess for home care needs following discharge   - Consider OT consult to assist with ADL evaluation and planning for discharge  - Provide patient education as appropriate  Outcome: Progressing  Goal: Maintain proper alignment of affected body part  Description: INTERVENTIONS:  - Support, maintain and protect limb and body alignment  - Provide patient/ family with appropriate education  Outcome: Progressing     Problem: SAFETY,RESTRAINT: NV/NON-SELF DESTRUCTIVE BEHAVIOR  Goal:  Remains free of harm/injury (restraint for non violent/non self-detsructive behavior)  Description: INTERVENTIONS:  - Instruct patient/family regarding restraint use   - Assess and monitor physiologic and psychological status   - Provide interventions and comfort measures to meet assessed patient needs   - Identify and implement measures to help patient regain control  - Assess readiness for release of restraint   Outcome: Progressing  Goal: Returns to optimal restraint-free functioning  Description: INTERVENTIONS:  - Assess the patient's behavior and symptoms that indicate continued need for restraint  - Identify and implement measures to help patient regain control  - Assess readiness for release of restraint   Outcome: Progressing

## 2024-01-13 NOTE — PLAN OF CARE
Problem: PAIN - ADULT  Goal: Verbalizes/displays adequate comfort level or baseline comfort level  Description: Interventions:  - Encourage patient to monitor pain and request assistance  - Assess pain using appropriate pain scale  - Administer analgesics based on type and severity of pain and evaluate response  - Implement non-pharmacological measures as appropriate and evaluate response  - Consider cultural and social influences on pain and pain management  - Notify physician/advanced practitioner if interventions unsuccessful or patient reports new pain  Outcome: Progressing     Problem: INFECTION - ADULT  Goal: Absence or prevention of progression during hospitalization  Description: INTERVENTIONS:  - Assess and monitor for signs and symptoms of infection  - Monitor lab/diagnostic results  - Monitor all insertion sites, i.e. indwelling lines, tubes, and drains  - Monitor endotracheal if appropriate and nasal secretions for changes in amount and color  - [place] appropriate cooling/warming therapies per order  - Administer medications as ordered  - Instruct and encourage patient and family to use good hand hygiene technique  - Identify and instruct in appropriate isolation precautions for identified infection/condition  Outcome: Progressing  Goal: Absence of fever/infection during neutropenic period  Description: INTERVENTIONS:  - Monitor WBC    Outcome: Progressing     Problem: SAFETY ADULT  Goal: Patient will remain free of falls  Description: INTERVENTIONS:  - Educate patient/family on patient safety including physical limitations  - Instruct patient to call for assistance with activity   - Consult OT/PT to assist with strengthening/mobility   - Keep Call bell within reach  - Keep bed low and locked with side rails adjusted as appropriate  - Keep care items and personal belongings within reach  - Initiate and maintain comfort rounds  - Make Fall Risk Sign visible to staff  - Offer Toileting every  Hours,  in advance of need  - Initiate/Maintain alarm  - Obtain necessary fall risk management equipment:   - Apply yellow socks and bracelet for high fall risk patients  - Consider moving patient to room near nurses station  Outcome: Progressing  Goal: Maintain or return to baseline ADL function  Description: INTERVENTIONS:  -  Assess patient's ability to carry out ADLs; assess patient's baseline for ADL function and identify physical deficits which impact ability to perform ADLs (bathing, care of mouth/teeth, toileting, grooming, dressing, etc.)  - Assess/evaluate cause of self-care deficits   - Assess range of motion  - Assess patient's mobility; develop plan if impaired  - Assess patient's need for assistive devices and provide as appropriate  - Encourage maximum independence but intervene and supervise when necessary  - Involve family in performance of ADLs  - Assess for home care needs following discharge   - Consider OT consult to assist with ADL evaluation and planning for discharge  - Provide patient education as appropriate  Outcome: Progressing  Goal: Maintains/Returns to pre admission functional level  Description: INTERVENTIONS:  - Perform AM-PAC 6 Click Basic Mobility/ Daily Activity assessment daily.  - Set and communicate daily mobility goal to care team and patient/family/caregiver.   - Collaborate with rehabilitation services on mobility goals if consulted  - Perform Range of Motion  times a day.  - Reposition patient every  hours.  - Dangle patient  times a day  - Stand patient  times a day  - Ambulate patient  times a day  - Out of bed to chair times a day   - Out of bed for meals  times a day  - Out of bed for toileting  - Record patient progress and toleration of activity level   Outcome: Progressing     Problem: DISCHARGE PLANNING  Goal: Discharge to home or other facility with appropriate resources  Description: INTERVENTIONS:  - Identify barriers to discharge w/patient and caregiver  - Arrange for  needed discharge resources and transportation as appropriate  - Identify discharge learning needs (meds, wound care, etc.)  - Arrange for interpretive services to assist at discharge as needed  - Refer to Case Management Department for coordinating discharge planning if the patient needs post-hospital services based on physician/advanced practitioner order or complex needs related to functional status, cognitive ability, or social support system  Outcome: Progressing     Problem: Knowledge Deficit  Goal: Patient/family/caregiver demonstrates understanding of disease process, treatment plan, medications, and discharge instructions  Description: Complete learning assessment and assess knowledge base.  Interventions:  - Provide teaching at level of understanding  - Provide teaching via preferred learning methods  Outcome: Progressing     Problem: Neurological Deficit  Goal: Neurological status is stable or improving  Description: Interventions:  - Monitor and assess patient's level of consciousness, motor function, sensory function, and level of assistance needed for ADLs.   - Monitor and report changes from baseline. Collaborate with interdisciplinary team to initiate plan and implement interventions as ordered.   - Provide and maintain a safe environment.  - Consider seizure precautions.  - Consider fall precautions.  - Consider aspiration precautions.  - Consider bleeding precautions.  Outcome: Progressing     Problem: Activity Intolerance/Impaired Mobility  Goal: Mobility/activity is maintained at optimum level for patient  Description: Interventions:  - Assess and monitor patient  barriers to mobility and need for assistive/adaptive devices.  - Assess patient's emotional response to limitations.  - Collaborate with interdisciplinary team and initiate plans and interventions as ordered.  - Encourage independent activity per ability.  - Maintain proper body alignment.  - Perform active/passive rom as  tolerated/ordered.  - Plan activities to conserve energy.  - Turn patient as appropriate  Outcome: Progressing     Problem: Communication Impairment  Goal: Ability to express needs and understand communication  Description: Assess patient's communication skills and ability to understand information.  Patient will demonstrate use of effective communication techniques, alternative methods of communication and understanding even if not able to speak.     - Encourage communication and provide alternate methods of communication as needed.  - Collaborate with case management/ for discharge needs.  - Include patient/family/caregiver in decisions related to communication.  Outcome: Progressing     Problem: Potential for Aspiration  Goal: Ventilated patient's risk of aspiration is minimized  Description: Assess and monitor vital signs, respiratory status, airway cuff pressure, and labs (WBC).  Monitor for signs of aspiration (tachypnea, cough, rales, wheezing, cyanosis, fever).    - Elevate head of bed 30 degrees if patient has tube feeding.  - Monitor tube feeding.  Outcome: Progressing     Problem: Nutrition  Goal: Nutrition/Hydration status is improving  Description: Monitor and assess patient's nutrition/hydration status for malnutrition (ex- brittle hair, bruises, dry skin, pale skin and conjunctiva, muscle wasting, smooth red tongue, and disorientation). Collaborate with interdisciplinary team and initiate plan and interventions as ordered.  Monitor patient's weight and dietary intake as ordered or per policy. Utilize nutrition screening tool and intervene per policy. Determine patient's food preferences and provide high-protein, high-caloric foods as appropriate.     - Assist patient with eating.  - Allow adequate time for meals.  - Encourage patient to take dietary supplement as ordered.  - Collaborate with clinical nutritionist.  - Include patient/family/caregiver in decisions related to  nutrition.  Outcome: Progressing     Problem: Prexisting or High Potential for Compromised Skin Integrity  Goal: Skin integrity is maintained or improved  Description: INTERVENTIONS:  - Identify patients at risk for skin breakdown  - Assess and monitor skin integrity  - Assess and monitor nutrition and hydration status  - Monitor labs   - Assess for incontinence   - Turn and reposition patient  - Assist with mobility/ambulation  - Relieve pressure over bony prominences  - Avoid friction and shearing  - Provide appropriate hygiene as needed including keeping skin clean and dry  - Evaluate need for skin moisturizer/barrier cream  - Collaborate with interdisciplinary team   - Patient/family teaching  - Consider wound care consult   Outcome: Progressing     Problem: Nutrition/Hydration-ADULT  Goal: Nutrient/Hydration intake appropriate for improving, restoring or maintaining nutritional needs  Description: Monitor and assess patient's nutrition/hydration status for malnutrition. Collaborate with interdisciplinary team and initiate plan and interventions as ordered.  Monitor patient's weight and dietary intake as ordered or per policy. Utilize nutrition screening tool and intervene as necessary. Determine patient's food preferences and provide high-protein, high-caloric foods as appropriate.     INTERVENTIONS:  - Monitor oral intake, urinary output, labs, and treatment plans  - Assess nutrition and hydration status and recommend course of action  - Evaluate amount of meals eaten  - Assist patient with eating if necessary   - Allow adequate time for meals  - Recommend/ encourage appropriate diets, oral nutritional supplements, and vitamin/mineral supplements  - Order, calculate, and assess calorie counts as needed  - Recommend, monitor, and adjust tube feedings and TPN/PPN based on assessed needs  - Assess need for intravenous fluids  - Provide specific nutrition/hydration education as appropriate  - Include  patient/family/caregiver in decisions related to nutrition  Outcome: Progressing     Problem: SAFETY,RESTRAINT: NV/NON-SELF DESTRUCTIVE BEHAVIOR  Goal: Remains free of harm/injury (restraint for non violent/non self-detsructive behavior)  Description: INTERVENTIONS:  - Instruct patient/family regarding restraint use   - Assess and monitor physiologic and psychological status   - Provide interventions and comfort measures to meet assessed patient needs   - Identify and implement measures to help patient regain control  - Assess readiness for release of restraint   Outcome: Progressing  Goal: Returns to optimal restraint-free functioning  Description: INTERVENTIONS:  - Assess the patient's behavior and symptoms that indicate continued need for restraint  - Identify and implement measures to help patient regain control  - Assess readiness for release of restraint   Outcome: Progressing     Problem: COPING  Goal: Pt/Family able to verbalize concerns and demonstrate effective coping strategies  Description: INTERVENTIONS:  - Assist patient/family to identify coping skills, available support systems and cultural and spiritual values  - Provide emotional support, including active listening and acknowledgement of concerns of patient and caregivers  - Reduce environmental stimuli, as able  - Provide patient education  - Assess for spiritual pain/suffering and initiate spiritual care, including notification of Pastoral Care or nancie based community as needed  - Assess effectiveness of coping strategies  Outcome: Progressing  Goal: Will report anxiety at manageable levels  Description: INTERVENTIONS:  - Administer medication as ordered  - Teach and encourage coping skills  - Provide emotional support  - Assess patient/family for anxiety and ability to cope  Outcome: Progressing     Problem: NEUROSENSORY - ADULT  Goal: Achieves stable or improved neurological status  Description: INTERVENTIONS  - Monitor and report changes in  neurological status  - Monitor vital signs such as temperature, blood pressure, glucose, and any other labs ordered   - Initiate measures to prevent increased intracranial pressure  - Monitor for seizure activity and implement precautions if appropriate      Outcome: Progressing  Goal: Remains free of injury related to seizures activity  Description: INTERVENTIONS  - Maintain airway, patient safety  and administer oxygen as ordered  - Monitor patient for seizure activity, document and report duration and description of seizure to physician/advanced practitioner  - If seizure occurs,  ensure patient safety during seizure  - Reorient patient post seizure  - Seizure pads on all 4 side rails  - Instruct patient/family to notify RN of any seizure activity including if an aura is experienced  - Instruct patient/family to call for assistance with activity based on nursing assessment  - Administer anti-seizure medications if ordered    Outcome: Progressing  Goal: Achieves maximal functionality and self care  Description: INTERVENTIONS  - Monitor swallowing and airway patency with patient fatigue and changes in neurological status  - Encourage and assist patient to increase activity and self care.   - Encourage visually impaired, hearing impaired and aphasic patients to use assistive/communication devices  Outcome: Progressing     Problem: CARDIOVASCULAR - ADULT  Goal: Maintains optimal cardiac output and hemodynamic stability  Description: INTERVENTIONS:  - Monitor I/O, vital signs and rhythm  - Monitor for S/S and trends of decreased cardiac output  - Administer and titrate ordered vasoactive medications to optimize hemodynamic stability  - Assess quality of pulses, skin color and temperature  - Assess for signs of decreased coronary artery perfusion  - Instruct patient to report change in severity of symptoms  Outcome: Progressing  Goal: Absence of cardiac dysrhythmias or at baseline rhythm  Description: INTERVENTIONS:  -  Continuous cardiac monitoring, vital signs, obtain 12 lead EKG if ordered  - Administer antiarrhythmic and heart rate control medications as ordered  - Monitor electrolytes and administer replacement therapy as ordered  Outcome: Progressing     Problem: RESPIRATORY - ADULT  Goal: Achieves optimal ventilation and oxygenation  Description: INTERVENTIONS:  - Assess for changes in respiratory status  - Assess for changes in mentation and behavior  - Position to facilitate oxygenation and minimize respiratory effort  - Oxygen administered by appropriate delivery if ordered  - Initiate smoking cessation education as indicated  - Encourage broncho-pulmonary hygiene including cough, deep breathe, Incentive Spirometry  - Assess the need for suctioning and aspirate as needed  - Assess and instruct to report SOB or any respiratory difficulty  - Respiratory Therapy support as indicated  Outcome: Progressing     Problem: GASTROINTESTINAL - ADULT  Goal: Maintains or returns to baseline bowel function  Description: INTERVENTIONS:  - Assess bowel function  - Encourage oral fluids to ensure adequate hydration  - Administer IV fluids if ordered to ensure adequate hydration  - Administer ordered medications as needed  - Encourage mobilization and activity  - Consider nutritional services referral to assist patient with adequate nutrition and appropriate food choices  Outcome: Progressing  Goal: Maintains adequate nutritional intake  Description: INTERVENTIONS:  - Monitor percentage of each meal consumed  - Identify factors contributing to decreased intake, treat as appropriate  - Assist with meals as needed  - Monitor I&O, weight, and lab values if indicated  - Obtain nutrition services referral as needed  Outcome: Progressing     Problem: GENITOURINARY - ADULT  Goal: Maintains or returns to baseline urinary function  Description: INTERVENTIONS:  - Assess urinary function  - Encourage oral fluids to ensure adequate hydration if  ordered  - Administer IV fluids as ordered to ensure adequate hydration  - Administer ordered medications as needed  - Offer frequent toileting  - Follow urinary retention protocol if ordered  Outcome: Progressing  Goal: Absence of urinary retention  Description: INTERVENTIONS:  - Assess patient’s ability to void and empty bladder  - Monitor I/O  - Bladder scan as needed  - Discuss with physician/AP medications to alleviate retention as needed  - Discuss catheterization for long term situations as appropriate  Outcome: Progressing     Problem: METABOLIC, FLUID AND ELECTROLYTES - ADULT  Goal: Electrolytes maintained within normal limits  Description: INTERVENTIONS:  - Monitor labs and assess patient for signs and symptoms of electrolyte imbalances  - Administer electrolyte replacement as ordered  - Monitor response to electrolyte replacements, including repeat lab results as appropriate  - Instruct patient on fluid and nutrition as appropriate  Outcome: Progressing  Goal: Fluid balance maintained  Description: INTERVENTIONS:  - Monitor labs   - Monitor I/O and WT  - Instruct patient on fluid and nutrition as appropriate  - Assess for signs & symptoms of volume excess or deficit  Outcome: Progressing  Goal: Glucose maintained within target range  Description: INTERVENTIONS:  - Monitor Blood Glucose as ordered  - Assess for signs and symptoms of hyperglycemia and hypoglycemia  - Administer ordered medications to maintain glucose within target range  - Assess nutritional intake and initiate nutrition service referral as needed  Outcome: Progressing     Problem: SKIN/TISSUE INTEGRITY - ADULT  Goal: Skin Integrity remains intact(Skin Breakdown Prevention)  Description: Assess:  -Perform [place] assessment every  -Clean and moisturize skin every   -Inspect skin when repositioning, toileting, and assisting with ADLS  -Assess under medical devices such as  every   -Assess extremities for adequate circulation and sensation      Bed Management:  -Have minimal linens on bed & keep smooth, unwrinkled  -Change linens as needed when moist or perspiring  -Avoid sitting or lying in one position for more than  hours while in bed  -Keep HOB at degrees     Toileting:  -Offer bedside commode  -Assess for incontinence every   -Use incontinent care products after each incontinent episode such as     Activity:  -Mobilize patient  times a day  -Encourage activity and walks on unit  -Encourage or provide ROM exercises   -Turn and reposition patient every  Hours  -Use appropriate equipment to lift or move patient in bed  -Instruct/ Assist with weight shifting every  when out of bed in chair  -Consider limitation of chair time  hour intervals    Skin Care:  -Avoid use of baby powder, tape, friction and shearing, hot water or constrictive clothing  -Relieve pressure over bony prominences using   -Do not massage red bony areas    Next Steps:  -Teach patient strategies to minimize risks such as    -Consider consults to  interdisciplinary teams such as   Outcome: Progressing  Goal: Incision(s), wounds(s) or drain site(s) healing without S/S of infection  Description: INTERVENTIONS  - Assess and document dressing, incision, wound bed, drain sites and surrounding tissue  - Provide patient and family education  - Perform skin care/dressing changes every   Outcome: Progressing     Problem: HEMATOLOGIC - ADULT  Goal: Maintains hematologic stability  Description: INTERVENTIONS  - Assess for signs and symptoms of bleeding or hemorrhage  - Monitor labs  - Administer supportive blood products/factors as ordered and appropriate  Outcome: Progressing     Problem: MUSCULOSKELETAL - ADULT  Goal: Maintain or return mobility to safest level of function  Description: INTERVENTIONS:  - Assess patient's ability to carry out ADLs; assess patient's baseline for ADL function and identify physical deficits which impact ability to perform ADLs (bathing, care of mouth/teeth,  toileting, grooming, dressing, etc.)  - Assess/evaluate cause of self-care deficits   - Assess range of motion  - Assess patient's mobility  - Assess patient's need for assistive devices and provide as appropriate  - Encourage maximum independence but intervene and supervise when necessary  - Involve family in performance of ADLs  - Assess for home care needs following discharge   - Consider OT consult to assist with ADL evaluation and planning for discharge  - Provide patient education as appropriate  Outcome: Progressing  Goal: Maintain proper alignment of affected body part  Description: INTERVENTIONS:  - Support, maintain and protect limb and body alignment  - Provide patient/ family with appropriate education  Outcome: Progressing

## 2024-01-13 NOTE — PROGRESS NOTES
Progress Note - Neurosurgery   Debbie Moody 83 y.o. female MRN: [id number]  Unit/Bed#: ICU 04 Encounter: [id number]    Assessment:  9 Days Post-Op Procedure(s):  SUBOCCIPITAL CRANIECTOMY FOR POSTERIOR FOSSA DECOMPRESSION; DISPOSE OF BONE FLAP (LULA, 1/3)  S/p EVD replacement on 1/5/24 2/2 extensive IVH  Pt presented with nausea, headache and ataxia. 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     Imaging:  MRI brain wo 1/9/24:  Stable exam demonstrating extensive intraventricular hemorrhage with surrounding vasogenic edema and/or transependymal flow of CSF. Stable hydrocephalus. Evolving subacute infarct in the left cerebellar hemisphere. Stable petechial hemorrhage, vasogenic edema and mass effect.  CT head wo 1/7/24: Extensive intracranial hemorrhage without significant interval change compared to the prior study      Plan  Continue regular neurologic checks.   Exam grossly stable this am - slight movement LUE/LLE. Pt with left sided > right sided weakness. Right gaze preference. Continues to follow some commands on the right.   STAT CTH for decline in exam greater than 2 points in 1 hour  EVD was re-inserted on 1/5/24.  EVD open at 0. Maintain at this level at this time.   Ancef 2 g q 8 hrs while drain is in place, as EVD continues to be flushed regularly proximally and distally.     ICP < 20, ICP -3 to 7/24H  116cc/24 hours.   Ongoing medical management per primary team.   Na goal > 145-155. Continue to monitor. 148 today.   SBP < 160  Pain control per primary team.   Neurology was consulted for stroke management.   ASA d/c 1/3/23, hold for at least 2 weeks post op  PT/OT/PMR evaluation as able  DVT PPX: SCD, SQH    VTE Prophylaxis: Sequential compression device (Venodyne)     Subjective/Objective   Chief Complaint: POD 9  "SUBOCCIPITAL CRANIECTOMY FOR POSTERIOR FOSSA DECOMPRESSION;     Vitals: Blood pressure 145/67, pulse 80, temperature 99.6 °F (37.6 °C), temperature source Rectal, resp. rate 18, height 4' 10\" (1.473 m), weight 64.2 kg (141 lb 8.6 oz), SpO2 99%, not currently breastfeeding.,Body mass index is 29.58 kg/m².    Physical Exam:   Neurologic Exam:  Awake and alert  PERRL, EOMI  Nonverbal  Follows commands in right upper and lower extremity    Intake/Output                   01/13/24 0701 - 01/14/24 0700     [id number] Total              Intake    IV Piggyback  50  -- 50    Feedings  450  -- 450    Total Intake 500 -- 500       Output    Urine  400  -- 400    Unmeasured Urine Occurrence 2 x -- 2 x    Output (mL) (External Urinary Catheter) 400 -- 400    Drains  85  -- 85    Output (mL) (Ventriculostomy/Subdural Ventricular drainage catheter Left Frontal region) 85 -- 85    Stool  --  -- --    Unmeasured Stool Occurrence 2 x -- 2 x    Total Output 485 -- 485       Net I/O     15 -- 15          Invasive Devices       Central Venous Catheter Line  Duration             CVC Central Lines 01/03/24 9 days              Peripheral Intravenous Line  Duration             Long-Dwell Peripheral IV (Midline) 01/03/24 Left Cephalic Vein 10 days              Drain  Duration             Ventriculostomy/Subdural Ventricular drainage catheter Left Frontal region 8 days    External Urinary Catheter 1 day    NG/OG/Enteral Tube Nasogastric Right nare 1 day              Airway  Duration             ETT  Cuffed 7 mm 6 days                          Lab Results: I have personally reviewed pertinent results.    Lab Results   Component Value Date    WBC 8.31 01/13/2024    HGB 7.4 (L) 01/13/2024    HCT 24.2 (L) 01/13/2024    MCV 90 01/13/2024     01/13/2024    RBC 2.70 (L) 01/13/2024    MCH 27.4 01/13/2024    MCHC 30.6 (L) 01/13/2024    RDW 14.1 01/13/2024    MPV 10.3 01/13/2024    NRBC 0 01/13/2024    SODIUM 147 01/13/2024     " (H) 01/13/2024    CO2 31 01/13/2024    BUN 38 (H) 01/13/2024    CREATININE 1.11 01/13/2024    CALCIUM 8.5 01/13/2024    EGFR 46 01/13/2024       Imaging Studies: I have personally reviewed pertinent reports.   and I have personally reviewed pertinent films in PACS

## 2024-01-14 NOTE — PLAN OF CARE
Problem: PAIN - ADULT  Goal: Verbalizes/displays adequate comfort level or baseline comfort level  Description: Interventions:  - Encourage patient to monitor pain and request assistance  - Assess pain using appropriate pain scale  - Administer analgesics based on type and severity of pain and evaluate response  - Implement non-pharmacological measures as appropriate and evaluate response  - Consider cultural and social influences on pain and pain management  - Notify physician/advanced practitioner if interventions unsuccessful or patient reports new pain  Outcome: Progressing     Problem: INFECTION - ADULT  Goal: Absence or prevention of progression during hospitalization  Description: INTERVENTIONS:  - Assess and monitor for signs and symptoms of infection  - Monitor lab/diagnostic results  - Monitor all insertion sites, i.e. indwelling lines, tubes, and drains  - Monitor endotracheal if appropriate and nasal secretions for changes in amount and color  - [place] appropriate cooling/warming therapies per order  - Administer medications as ordered  - Instruct and encourage patient and family to use good hand hygiene technique  - Identify and instruct in appropriate isolation precautions for identified infection/condition  Outcome: Progressing  Goal: Absence of fever/infection during neutropenic period  Description: INTERVENTIONS:  - Monitor WBC    Outcome: Progressing     Problem: SAFETY ADULT  Goal: Patient will remain free of falls  Description: INTERVENTIONS:  - Educate patient/family on patient safety including physical limitations  - Instruct patient to call for assistance with activity   - Consult OT/PT to assist with strengthening/mobility   - Keep Call bell within reach  - Keep bed low and locked with side rails adjusted as appropriate  - Keep care items and personal belongings within reach  - Initiate and maintain comfort rounds  - Make Fall Risk Sign visible to staff  - Offer Toileting every 2 Hours,  in advance of need  - Initiate/Maintain bed alarm  - Obtain necessary fall risk management equipment: bed alarm  - Apply yellow socks and bracelet for high fall risk patients  - Consider moving patient to room near nurses station  Outcome: Progressing  Goal: Maintain or return to baseline ADL function  Description: INTERVENTIONS:  -  Assess patient's ability to carry out ADLs; assess patient's baseline for ADL function and identify physical deficits which impact ability to perform ADLs (bathing, care of mouth/teeth, toileting, grooming, dressing, etc.)  - Assess/evaluate cause of self-care deficits   - Assess range of motion  - Assess patient's mobility; develop plan if impaired  - Assess patient's need for assistive devices and provide as appropriate  - Encourage maximum independence but intervene and supervise when necessary  - Involve family in performance of ADLs  - Assess for home care needs following discharge   - Consider OT consult to assist with ADL evaluation and planning for discharge  - Provide patient education as appropriate  Outcome: Progressing  Goal: Maintains/Returns to pre admission functional level  Description: INTERVENTIONS:  - Perform AM-PAC 6 Click Basic Mobility/ Daily Activity assessment daily.  - Set and communicate daily mobility goal to care team and patient/family/caregiver.   - Collaborate with rehabilitation services on mobility goals if consulted  - Perform Range of Motion 3 times a day.  - Reposition patient every 2 hours.  - Dangle patient 3 times a day  - Stand patient 3 times a day  - Ambulate patient 3 times a day  - Out of bed to chair 3 times a day   - Out of bed for meals 3 times a day  - Out of bed for toileting  - Record patient progress and toleration of activity level   Outcome: Progressing     Problem: DISCHARGE PLANNING  Goal: Discharge to home or other facility with appropriate resources  Description: INTERVENTIONS:  - Identify barriers to discharge w/patient and  caregiver  - Arrange for needed discharge resources and transportation as appropriate  - Identify discharge learning needs (meds, wound care, etc.)  - Arrange for interpretive services to assist at discharge as needed  - Refer to Case Management Department for coordinating discharge planning if the patient needs post-hospital services based on physician/advanced practitioner order or complex needs related to functional status, cognitive ability, or social support system  Outcome: Progressing     Problem: Knowledge Deficit  Goal: Patient/family/caregiver demonstrates understanding of disease process, treatment plan, medications, and discharge instructions  Description: Complete learning assessment and assess knowledge base.  Interventions:  - Provide teaching at level of understanding  - Provide teaching via preferred learning methods  Outcome: Progressing     Problem: Neurological Deficit  Goal: Neurological status is stable or improving  Description: Interventions:  - Monitor and assess patient's level of consciousness, motor function, sensory function, and level of assistance needed for ADLs.   - Monitor and report changes from baseline. Collaborate with interdisciplinary team to initiate plan and implement interventions as ordered.   - Provide and maintain a safe environment.  - Consider seizure precautions.  - Consider fall precautions.  - Consider aspiration precautions.  - Consider bleeding precautions.  Outcome: Progressing     Problem: Activity Intolerance/Impaired Mobility  Goal: Mobility/activity is maintained at optimum level for patient  Description: Interventions:  - Assess and monitor patient  barriers to mobility and need for assistive/adaptive devices.  - Assess patient's emotional response to limitations.  - Collaborate with interdisciplinary team and initiate plans and interventions as ordered.  - Encourage independent activity per ability.  - Maintain proper body alignment.  - Perform  active/passive rom as tolerated/ordered.  - Plan activities to conserve energy.  - Turn patient as appropriate  Outcome: Progressing     Problem: Communication Impairment  Goal: Ability to express needs and understand communication  Description: Assess patient's communication skills and ability to understand information.  Patient will demonstrate use of effective communication techniques, alternative methods of communication and understanding even if not able to speak.     - Encourage communication and provide alternate methods of communication as needed.  - Collaborate with case management/ for discharge needs.  - Include patient/family/caregiver in decisions related to communication.  Outcome: Progressing     Problem: Potential for Aspiration  Goal: Ventilated patient's risk of aspiration is minimized  Description: Assess and monitor vital signs, respiratory status, airway cuff pressure, and labs (WBC).  Monitor for signs of aspiration (tachypnea, cough, rales, wheezing, cyanosis, fever).    - Elevate head of bed 30 degrees if patient has tube feeding.  - Monitor tube feeding.  Outcome: Progressing     Problem: Nutrition  Goal: Nutrition/Hydration status is improving  Description: Monitor and assess patient's nutrition/hydration status for malnutrition (ex- brittle hair, bruises, dry skin, pale skin and conjunctiva, muscle wasting, smooth red tongue, and disorientation). Collaborate with interdisciplinary team and initiate plan and interventions as ordered.  Monitor patient's weight and dietary intake as ordered or per policy. Utilize nutrition screening tool and intervene per policy. Determine patient's food preferences and provide high-protein, high-caloric foods as appropriate.     - Assist patient with eating.  - Allow adequate time for meals.  - Encourage patient to take dietary supplement as ordered.  - Collaborate with clinical nutritionist.  - Include patient/family/caregiver in decisions  related to nutrition.  Outcome: Progressing     Problem: Prexisting or High Potential for Compromised Skin Integrity  Goal: Skin integrity is maintained or improved  Description: INTERVENTIONS:  - Identify patients at risk for skin breakdown  - Assess and monitor skin integrity  - Assess and monitor nutrition and hydration status  - Monitor labs   - Assess for incontinence   - Turn and reposition patient  - Assist with mobility/ambulation  - Relieve pressure over bony prominences  - Avoid friction and shearing  - Provide appropriate hygiene as needed including keeping skin clean and dry  - Evaluate need for skin moisturizer/barrier cream  - Collaborate with interdisciplinary team   - Patient/family teaching  - Consider wound care consult   Outcome: Progressing     Problem: Nutrition/Hydration-ADULT  Goal: Nutrient/Hydration intake appropriate for improving, restoring or maintaining nutritional needs  Description: Monitor and assess patient's nutrition/hydration status for malnutrition. Collaborate with interdisciplinary team and initiate plan and interventions as ordered.  Monitor patient's weight and dietary intake as ordered or per policy. Utilize nutrition screening tool and intervene as necessary. Determine patient's food preferences and provide high-protein, high-caloric foods as appropriate.     INTERVENTIONS:  - Monitor oral intake, urinary output, labs, and treatment plans  - Assess nutrition and hydration status and recommend course of action  - Evaluate amount of meals eaten  - Assist patient with eating if necessary   - Allow adequate time for meals  - Recommend/ encourage appropriate diets, oral nutritional supplements, and vitamin/mineral supplements  - Order, calculate, and assess calorie counts as needed  - Recommend, monitor, and adjust tube feedings and TPN/PPN based on assessed needs  - Assess need for intravenous fluids  - Provide specific nutrition/hydration education as appropriate  - Include  patient/family/caregiver in decisions related to nutrition  Outcome: Progressing     Problem: COPING  Goal: Pt/Family able to verbalize concerns and demonstrate effective coping strategies  Description: INTERVENTIONS:  - Assist patient/family to identify coping skills, available support systems and cultural and spiritual values  - Provide emotional support, including active listening and acknowledgement of concerns of patient and caregivers  - Reduce environmental stimuli, as able  - Provide patient education  - Assess for spiritual pain/suffering and initiate spiritual care, including notification of Pastoral Care or nancie based community as needed  - Assess effectiveness of coping strategies  Outcome: Progressing  Goal: Will report anxiety at manageable levels  Description: INTERVENTIONS:  - Administer medication as ordered  - Teach and encourage coping skills  - Provide emotional support  - Assess patient/family for anxiety and ability to cope  Outcome: Progressing     Problem: NEUROSENSORY - ADULT  Goal: Achieves stable or improved neurological status  Description: INTERVENTIONS  - Monitor and report changes in neurological status  - Monitor vital signs such as temperature, blood pressure, glucose, and any other labs ordered   - Initiate measures to prevent increased intracranial pressure  - Monitor for seizure activity and implement precautions if appropriate      Outcome: Progressing  Goal: Remains free of injury related to seizures activity  Description: INTERVENTIONS  - Maintain airway, patient safety  and administer oxygen as ordered  - Monitor patient for seizure activity, document and report duration and description of seizure to physician/advanced practitioner  - If seizure occurs,  ensure patient safety during seizure  - Reorient patient post seizure  - Seizure pads on all 4 side rails  - Instruct patient/family to notify RN of any seizure activity including if an aura is experienced  - Instruct  patient/family to call for assistance with activity based on nursing assessment  - Administer anti-seizure medications if ordered    Outcome: Progressing  Goal: Achieves maximal functionality and self care  Description: INTERVENTIONS  - Monitor swallowing and airway patency with patient fatigue and changes in neurological status  - Encourage and assist patient to increase activity and self care.   - Encourage visually impaired, hearing impaired and aphasic patients to use assistive/communication devices  Outcome: Progressing     Problem: CARDIOVASCULAR - ADULT  Goal: Maintains optimal cardiac output and hemodynamic stability  Description: INTERVENTIONS:  - Monitor I/O, vital signs and rhythm  - Monitor for S/S and trends of decreased cardiac output  - Administer and titrate ordered vasoactive medications to optimize hemodynamic stability  - Assess quality of pulses, skin color and temperature  - Assess for signs of decreased coronary artery perfusion  - Instruct patient to report change in severity of symptoms  Outcome: Progressing  Goal: Absence of cardiac dysrhythmias or at baseline rhythm  Description: INTERVENTIONS:  - Continuous cardiac monitoring, vital signs, obtain 12 lead EKG if ordered  - Administer antiarrhythmic and heart rate control medications as ordered  - Monitor electrolytes and administer replacement therapy as ordered  Outcome: Progressing     Problem: RESPIRATORY - ADULT  Goal: Achieves optimal ventilation and oxygenation  Description: INTERVENTIONS:  - Assess for changes in respiratory status  - Assess for changes in mentation and behavior  - Position to facilitate oxygenation and minimize respiratory effort  - Oxygen administered by appropriate delivery if ordered  - Initiate smoking cessation education as indicated  - Encourage broncho-pulmonary hygiene including cough, deep breathe, Incentive Spirometry  - Assess the need for suctioning and aspirate as needed  - Assess and instruct to report  SOB or any respiratory difficulty  - Respiratory Therapy support as indicated  Outcome: Progressing     Problem: GASTROINTESTINAL - ADULT  Goal: Maintains or returns to baseline bowel function  Description: INTERVENTIONS:  - Assess bowel function  - Encourage oral fluids to ensure adequate hydration  - Administer IV fluids if ordered to ensure adequate hydration  - Administer ordered medications as needed  - Encourage mobilization and activity  - Consider nutritional services referral to assist patient with adequate nutrition and appropriate food choices  Outcome: Progressing  Goal: Maintains adequate nutritional intake  Description: INTERVENTIONS:  - Monitor percentage of each meal consumed  - Identify factors contributing to decreased intake, treat as appropriate  - Assist with meals as needed  - Monitor I&O, weight, and lab values if indicated  - Obtain nutrition services referral as needed  Outcome: Progressing     Problem: GENITOURINARY - ADULT  Goal: Maintains or returns to baseline urinary function  Description: INTERVENTIONS:  - Assess urinary function  - Encourage oral fluids to ensure adequate hydration if ordered  - Administer IV fluids as ordered to ensure adequate hydration  - Administer ordered medications as needed  - Offer frequent toileting  - Follow urinary retention protocol if ordered  Outcome: Progressing  Goal: Absence of urinary retention  Description: INTERVENTIONS:  - Assess patient’s ability to void and empty bladder  - Monitor I/O  - Bladder scan as needed  - Discuss with physician/AP medications to alleviate retention as needed  - Discuss catheterization for long term situations as appropriate  Outcome: Progressing     Problem: METABOLIC, FLUID AND ELECTROLYTES - ADULT  Goal: Electrolytes maintained within normal limits  Description: INTERVENTIONS:  - Monitor labs and assess patient for signs and symptoms of electrolyte imbalances  - Administer electrolyte replacement as ordered  -  Monitor response to electrolyte replacements, including repeat lab results as appropriate  - Instruct patient on fluid and nutrition as appropriate  Outcome: Progressing  Goal: Fluid balance maintained  Description: INTERVENTIONS:  - Monitor labs   - Monitor I/O and WT  - Instruct patient on fluid and nutrition as appropriate  - Assess for signs & symptoms of volume excess or deficit  Outcome: Progressing  Goal: Glucose maintained within target range  Description: INTERVENTIONS:  - Monitor Blood Glucose as ordered  - Assess for signs and symptoms of hyperglycemia and hypoglycemia  - Administer ordered medications to maintain glucose within target range  - Assess nutritional intake and initiate nutrition service referral as needed  Outcome: Progressing       Problem: HEMATOLOGIC - ADULT  Goal: Maintains hematologic stability  Description: INTERVENTIONS  - Assess for signs and symptoms of bleeding or hemorrhage  - Monitor labs  - Administer supportive blood products/factors as ordered and appropriate  Outcome: Progressing     Problem: MUSCULOSKELETAL - ADULT  Goal: Maintain or return mobility to safest level of function  Description: INTERVENTIONS:  - Assess patient's ability to carry out ADLs; assess patient's baseline for ADL function and identify physical deficits which impact ability to perform ADLs (bathing, care of mouth/teeth, toileting, grooming, dressing, etc.)  - Assess/evaluate cause of self-care deficits   - Assess range of motion  - Assess patient's mobility  - Assess patient's need for assistive devices and provide as appropriate  - Encourage maximum independence but intervene and supervise when necessary  - Involve family in performance of ADLs  - Assess for home care needs following discharge   - Consider OT consult to assist with ADL evaluation and planning for discharge  - Provide patient education as appropriate  Outcome: Progressing  Goal: Maintain proper alignment of affected body  part  Description: INTERVENTIONS:  - Support, maintain and protect limb and body alignment  - Provide patient/ family with appropriate education  Outcome: Progressing

## 2024-01-14 NOTE — PLAN OF CARE
Problem: PAIN - ADULT  Goal: Verbalizes/displays adequate comfort level or baseline comfort level  Description: Interventions:  - Encourage patient to monitor pain and request assistance  - Assess pain using appropriate pain scale  - Administer analgesics based on type and severity of pain and evaluate response  - Implement non-pharmacological measures as appropriate and evaluate response  - Consider cultural and social influences on pain and pain management  - Notify physician/advanced practitioner if interventions unsuccessful or patient reports new pain  Outcome: Progressing     Problem: INFECTION - ADULT  Goal: Absence or prevention of progression during hospitalization  Description: INTERVENTIONS:  - Assess and monitor for signs and symptoms of infection  - Monitor lab/diagnostic results  - Monitor all insertion sites, i.e. indwelling lines, tubes, and drains  - Monitor endotracheal if appropriate and nasal secretions for changes in amount and color  - Stanton appropriate cooling/warming therapies per order  - Administer medications as ordered  - Instruct and encourage patient and family to use good hand hygiene technique  - Identify and instruct in appropriate isolation precautions for identified infection/condition  Outcome: Progressing  Goal: Absence of fever/infection during neutropenic period  Description: INTERVENTIONS:  - Monitor WBC    Outcome: Progressing     Problem: SAFETY ADULT  Goal: Patient will remain free of falls  Description: INTERVENTIONS:  - Educate patient/family on patient safety including physical limitations  - Instruct patient to call for assistance with activity   - Consult OT/PT to assist with strengthening/mobility   - Keep Call bell within reach  - Keep bed low and locked with side rails adjusted as appropriate  - Keep care items and personal belongings within reach  - Initiate and maintain comfort rounds  - Make Fall Risk Sign visible to staff  - Offer Toileting every  Hours,  in advance of need  - Initiate/Maintain alarm  - Obtain necessary fall risk management equipment:   - Apply yellow socks and bracelet for high fall risk patients  - Consider moving patient to room near nurses station  Outcome: Progressing  Goal: Maintain or return to baseline ADL function  Description: INTERVENTIONS:  -  Assess patient's ability to carry out ADLs; assess patient's baseline for ADL function and identify physical deficits which impact ability to perform ADLs (bathing, care of mouth/teeth, toileting, grooming, dressing, etc.)  - Assess/evaluate cause of self-care deficits   - Assess range of motion  - Assess patient's mobility; develop plan if impaired  - Assess patient's need for assistive devices and provide as appropriate  - Encourage maximum independence but intervene and supervise when necessary  - Involve family in performance of ADLs  - Assess for home care needs following discharge   - Consider OT consult to assist with ADL evaluation and planning for discharge  - Provide patient education as appropriate  Outcome: Progressing  Goal: Maintains/Returns to pre admission functional level  Description: INTERVENTIONS:  - Perform AM-PAC 6 Click Basic Mobility/ Daily Activity assessment daily.  - Set and communicate daily mobility goal to care team and patient/family/caregiver.   - Collaborate with rehabilitation services on mobility goals if consulted  - Perform Range of Motion 3 times a day.  - Reposition patient every 2 hours.  - Dangle patient 3 times a day  - Stand patient 3 times a day  - Ambulate patient 3 times a day  - Out of bed to chair 3 times a day   - Out of bed for meals 3 times a day  - Out of bed for toileting  - Record patient progress and toleration of activity level   Outcome: Progressing     Problem: DISCHARGE PLANNING  Goal: Discharge to home or other facility with appropriate resources  Description: INTERVENTIONS:  - Identify barriers to discharge w/patient and caregiver  -  Arrange for needed discharge resources and transportation as appropriate  - Identify discharge learning needs (meds, wound care, etc.)  - Arrange for interpretive services to assist at discharge as needed  - Refer to Case Management Department for coordinating discharge planning if the patient needs post-hospital services based on physician/advanced practitioner order or complex needs related to functional status, cognitive ability, or social support system  Outcome: Progressing     Problem: Knowledge Deficit  Goal: Patient/family/caregiver demonstrates understanding of disease process, treatment plan, medications, and discharge instructions  Description: Complete learning assessment and assess knowledge base.  Interventions:  - Provide teaching at level of understanding  - Provide teaching via preferred learning methods  Outcome: Progressing     Problem: Neurological Deficit  Goal: Neurological status is stable or improving  Description: Interventions:  - Monitor and assess patient's level of consciousness, motor function, sensory function, and level of assistance needed for ADLs.   - Monitor and report changes from baseline. Collaborate with interdisciplinary team to initiate plan and implement interventions as ordered.   - Provide and maintain a safe environment.  - Consider seizure precautions.  - Consider fall precautions.  - Consider aspiration precautions.  - Consider bleeding precautions.  Outcome: Progressing     Problem: Activity Intolerance/Impaired Mobility  Goal: Mobility/activity is maintained at optimum level for patient  Description: Interventions:  - Assess and monitor patient  barriers to mobility and need for assistive/adaptive devices.  - Assess patient's emotional response to limitations.  - Collaborate with interdisciplinary team and initiate plans and interventions as ordered.  - Encourage independent activity per ability.  - Maintain proper body alignment.  - Perform active/passive rom as  tolerated/ordered.  - Plan activities to conserve energy.  - Turn patient as appropriate  Outcome: Progressing     Problem: Communication Impairment  Goal: Ability to express needs and understand communication  Description: Assess patient's communication skills and ability to understand information.  Patient will demonstrate use of effective communication techniques, alternative methods of communication and understanding even if not able to speak.     - Encourage communication and provide alternate methods of communication as needed.  - Collaborate with case management/ for discharge needs.  - Include patient/family/caregiver in decisions related to communication.  Outcome: Progressing     Problem: Potential for Aspiration  Goal: Ventilated patient's risk of aspiration is minimized  Description: Assess and monitor vital signs, respiratory status, airway cuff pressure, and labs (WBC).  Monitor for signs of aspiration (tachypnea, cough, rales, wheezing, cyanosis, fever).    - Elevate head of bed 30 degrees if patient has tube feeding.  - Monitor tube feeding.  Outcome: Progressing     Problem: Nutrition  Goal: Nutrition/Hydration status is improving  Description: Monitor and assess patient's nutrition/hydration status for malnutrition (ex- brittle hair, bruises, dry skin, pale skin and conjunctiva, muscle wasting, smooth red tongue, and disorientation). Collaborate with interdisciplinary team and initiate plan and interventions as ordered.  Monitor patient's weight and dietary intake as ordered or per policy. Utilize nutrition screening tool and intervene per policy. Determine patient's food preferences and provide high-protein, high-caloric foods as appropriate.     - Assist patient with eating.  - Allow adequate time for meals.  - Encourage patient to take dietary supplement as ordered.  - Collaborate with clinical nutritionist.  - Include patient/family/caregiver in decisions related to  nutrition.  Outcome: Progressing     Problem: Prexisting or High Potential for Compromised Skin Integrity  Goal: Skin integrity is maintained or improved  Description: INTERVENTIONS:  - Identify patients at risk for skin breakdown  - Assess and monitor skin integrity  - Assess and monitor nutrition and hydration status  - Monitor labs   - Assess for incontinence   - Turn and reposition patient  - Assist with mobility/ambulation  - Relieve pressure over bony prominences  - Avoid friction and shearing  - Provide appropriate hygiene as needed including keeping skin clean and dry  - Evaluate need for skin moisturizer/barrier cream  - Collaborate with interdisciplinary team   - Patient/family teaching  - Consider wound care consult   Outcome: Progressing     Problem: Nutrition/Hydration-ADULT  Goal: Nutrient/Hydration intake appropriate for improving, restoring or maintaining nutritional needs  Description: Monitor and assess patient's nutrition/hydration status for malnutrition. Collaborate with interdisciplinary team and initiate plan and interventions as ordered.  Monitor patient's weight and dietary intake as ordered or per policy. Utilize nutrition screening tool and intervene as necessary. Determine patient's food preferences and provide high-protein, high-caloric foods as appropriate.     INTERVENTIONS:  - Monitor oral intake, urinary output, labs, and treatment plans  - Assess nutrition and hydration status and recommend course of action  - Evaluate amount of meals eaten  - Assist patient with eating if necessary   - Allow adequate time for meals  - Recommend/ encourage appropriate diets, oral nutritional supplements, and vitamin/mineral supplements  - Order, calculate, and assess calorie counts as needed  - Recommend, monitor, and adjust tube feedings and TPN/PPN based on assessed needs  - Assess need for intravenous fluids  - Provide specific nutrition/hydration education as appropriate  - Include  patient/family/caregiver in decisions related to nutrition  Outcome: Progressing     Problem: COPING  Goal: Pt/Family able to verbalize concerns and demonstrate effective coping strategies  Description: INTERVENTIONS:  - Assist patient/family to identify coping skills, available support systems and cultural and spiritual values  - Provide emotional support, including active listening and acknowledgement of concerns of patient and caregivers  - Reduce environmental stimuli, as able  - Provide patient education  - Assess for spiritual pain/suffering and initiate spiritual care, including notification of Pastoral Care or nancie based community as needed  - Assess effectiveness of coping strategies  Outcome: Progressing  Goal: Will report anxiety at manageable levels  Description: INTERVENTIONS:  - Administer medication as ordered  - Teach and encourage coping skills  - Provide emotional support  - Assess patient/family for anxiety and ability to cope  Outcome: Progressing     Problem: NEUROSENSORY - ADULT  Goal: Achieves stable or improved neurological status  Description: INTERVENTIONS  - Monitor and report changes in neurological status  - Monitor vital signs such as temperature, blood pressure, glucose, and any other labs ordered   - Initiate measures to prevent increased intracranial pressure  - Monitor for seizure activity and implement precautions if appropriate      Outcome: Progressing  Goal: Remains free of injury related to seizures activity  Description: INTERVENTIONS  - Maintain airway, patient safety  and administer oxygen as ordered  - Monitor patient for seizure activity, document and report duration and description of seizure to physician/advanced practitioner  - If seizure occurs,  ensure patient safety during seizure  - Reorient patient post seizure  - Seizure pads on all 4 side rails  - Instruct patient/family to notify RN of any seizure activity including if an aura is experienced  - Instruct  patient/family to call for assistance with activity based on nursing assessment  - Administer anti-seizure medications if ordered    Outcome: Progressing  Goal: Achieves maximal functionality and self care  Description: INTERVENTIONS  - Monitor swallowing and airway patency with patient fatigue and changes in neurological status  - Encourage and assist patient to increase activity and self care.   - Encourage visually impaired, hearing impaired and aphasic patients to use assistive/communication devices  Outcome: Progressing     Problem: CARDIOVASCULAR - ADULT  Goal: Maintains optimal cardiac output and hemodynamic stability  Description: INTERVENTIONS:  - Monitor I/O, vital signs and rhythm  - Monitor for S/S and trends of decreased cardiac output  - Administer and titrate ordered vasoactive medications to optimize hemodynamic stability  - Assess quality of pulses, skin color and temperature  - Assess for signs of decreased coronary artery perfusion  - Instruct patient to report change in severity of symptoms  Outcome: Progressing  Goal: Absence of cardiac dysrhythmias or at baseline rhythm  Description: INTERVENTIONS:  - Continuous cardiac monitoring, vital signs, obtain 12 lead EKG if ordered  - Administer antiarrhythmic and heart rate control medications as ordered  - Monitor electrolytes and administer replacement therapy as ordered  Outcome: Progressing     Problem: RESPIRATORY - ADULT  Goal: Achieves optimal ventilation and oxygenation  Description: INTERVENTIONS:  - Assess for changes in respiratory status  - Assess for changes in mentation and behavior  - Position to facilitate oxygenation and minimize respiratory effort  - Oxygen administered by appropriate delivery if ordered  - Initiate smoking cessation education as indicated  - Encourage broncho-pulmonary hygiene including cough, deep breathe, Incentive Spirometry  - Assess the need for suctioning and aspirate as needed  - Assess and instruct to report  SOB or any respiratory difficulty  - Respiratory Therapy support as indicated  Outcome: Progressing     Problem: GASTROINTESTINAL - ADULT  Goal: Maintains or returns to baseline bowel function  Description: INTERVENTIONS:  - Assess bowel function  - Encourage oral fluids to ensure adequate hydration  - Administer IV fluids if ordered to ensure adequate hydration  - Administer ordered medications as needed  - Encourage mobilization and activity  - Consider nutritional services referral to assist patient with adequate nutrition and appropriate food choices  Outcome: Progressing  Goal: Maintains adequate nutritional intake  Description: INTERVENTIONS:  - Monitor percentage of each meal consumed  - Identify factors contributing to decreased intake, treat as appropriate  - Assist with meals as needed  - Monitor I&O, weight, and lab values if indicated  - Obtain nutrition services referral as needed  Outcome: Progressing     Problem: GENITOURINARY - ADULT  Goal: Maintains or returns to baseline urinary function  Description: INTERVENTIONS:  - Assess urinary function  - Encourage oral fluids to ensure adequate hydration if ordered  - Administer IV fluids as ordered to ensure adequate hydration  - Administer ordered medications as needed  - Offer frequent toileting  - Follow urinary retention protocol if ordered  Outcome: Progressing  Goal: Absence of urinary retention  Description: INTERVENTIONS:  - Assess patient’s ability to void and empty bladder  - Monitor I/O  - Bladder scan as needed  - Discuss with physician/AP medications to alleviate retention as needed  - Discuss catheterization for long term situations as appropriate  Outcome: Progressing     Problem: METABOLIC, FLUID AND ELECTROLYTES - ADULT  Goal: Electrolytes maintained within normal limits  Description: INTERVENTIONS:  - Monitor labs and assess patient for signs and symptoms of electrolyte imbalances  - Administer electrolyte replacement as ordered  -  Monitor response to electrolyte replacements, including repeat lab results as appropriate  - Instruct patient on fluid and nutrition as appropriate  Outcome: Progressing  Goal: Fluid balance maintained  Description: INTERVENTIONS:  - Monitor labs   - Monitor I/O and WT  - Instruct patient on fluid and nutrition as appropriate  - Assess for signs & symptoms of volume excess or deficit  Outcome: Progressing  Goal: Glucose maintained within target range  Description: INTERVENTIONS:  - Monitor Blood Glucose as ordered  - Assess for signs and symptoms of hyperglycemia and hypoglycemia  - Administer ordered medications to maintain glucose within target range  - Assess nutritional intake and initiate nutrition service referral as needed  Outcome: Progressing     Problem: SKIN/TISSUE INTEGRITY - ADULT  Goal: Skin Integrity remains intact(Skin Breakdown Prevention)  Description: Assess:  -Perform Chepe assessment every   -Clean and moisturize skin every   -Inspect skin when repositioning, toileting, and assisting with ADLS  -Assess under medical devices such as  every   -Assess extremities for adequate circulation and sensation     Bed Management:  -Have minimal linens on bed & keep smooth, unwrinkled  -Change linens as needed when moist or perspiring  -Avoid sitting or lying in one position for more than  hours while in bed  -Keep HOB at degrees     Toileting:  -Offer bedside commode  -Assess for incontinence every   -Use incontinent care products after each incontinent episode such as     Activity:  -Mobilize patient  times a day  -Encourage activity and walks on unit  -Encourage or provide ROM exercises   -Turn and reposition patient every  Hours  -Use appropriate equipment to lift or move patient in bed  -Instruct/ Assist with weight shifting every  when out of bed in chair  -Consider limitation of chair time  hour intervals    Skin Care:  -Avoid use of baby powder, tape, friction and shearing, hot water or constrictive  clothing  -Relieve pressure over bony prominences using   -Do not massage red bony areas    Next Steps:  -Teach patient strategies to minimize risks such as    -Consider consults to  interdisciplinary teams such as   Outcome: Progressing  Goal: Incision(s), wounds(s) or drain site(s) healing without S/S of infection  Description: INTERVENTIONS  - Assess and document dressing, incision, wound bed, drain sites and surrounding tissue  - Provide patient and family education  - Perform skin care/dressing changes every   Outcome: Progressing     Problem: HEMATOLOGIC - ADULT  Goal: Maintains hematologic stability  Description: INTERVENTIONS  - Assess for signs and symptoms of bleeding or hemorrhage  - Monitor labs  - Administer supportive blood products/factors as ordered and appropriate  Outcome: Progressing     Problem: MUSCULOSKELETAL - ADULT  Goal: Maintain or return mobility to safest level of function  Description: INTERVENTIONS:  - Assess patient's ability to carry out ADLs; assess patient's baseline for ADL function and identify physical deficits which impact ability to perform ADLs (bathing, care of mouth/teeth, toileting, grooming, dressing, etc.)  - Assess/evaluate cause of self-care deficits   - Assess range of motion  - Assess patient's mobility  - Assess patient's need for assistive devices and provide as appropriate  - Encourage maximum independence but intervene and supervise when necessary  - Involve family in performance of ADLs  - Assess for home care needs following discharge   - Consider OT consult to assist with ADL evaluation and planning for discharge  - Provide patient education as appropriate  Outcome: Progressing  Goal: Maintain proper alignment of affected body part  Description: INTERVENTIONS:  - Support, maintain and protect limb and body alignment  - Provide patient/ family with appropriate education  Outcome: Progressing     Problem: SAFETY,RESTRAINT: NV/NON-SELF DESTRUCTIVE BEHAVIOR  Goal:  Remains free of harm/injury (restraint for non violent/non self-detsructive behavior)  Description: INTERVENTIONS:  - Instruct patient/family regarding restraint use   - Assess and monitor physiologic and psychological status   - Provide interventions and comfort measures to meet assessed patient needs   - Identify and implement measures to help patient regain control  - Assess readiness for release of restraint   Outcome: Progressing  Goal: Returns to optimal restraint-free functioning  Description: INTERVENTIONS:  - Assess the patient's behavior and symptoms that indicate continued need for restraint  - Identify and implement measures to help patient regain control  - Assess readiness for release of restraint   Outcome: Progressing

## 2024-01-14 NOTE — PROGRESS NOTES
[facility]  Progress Note: Critical Care  Name: Debbie Moody 83 y.o. 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 Stable hydrocephalus. Evolving subacute infarct in the L cerebellar hemisphere. Stable petechial hemorrhage, vasogenic edema and mass effect.   Echo EF 70% mild hypertrophy  A1C 8.3  /172/54/141  Plan:   Neuro/Neurosx consulted  Stroke pathway  Stat CTH for any change in GCS >2pts  EVD 0 w/ 113cc out  Na 145-155---decrease FWF to nutrition recs of 90cc q 4 from 200cc q 6 to maintain with downtrend  SBP <160  Hold asa for at least 2 wks post op  Ancef ppx while EVD in place  F/u am CTH     Diagnosis: Encephalopathy  Plan:   Cont provigil  Neuro checks  CV:   Diagnosis: HTN/HLD  Plan:   Home vasotec held  Cont lisinopril/Norvasc/Coreg/statin     Pulm:  Diagnosis: Acute hypoxic resp failure  Intubated 1/3 Extubated 1/4  Reintubated 1/5 Extubated 1/12  Plan:   Wean oxygen for sats >92%  Aggressive pulm hygiene with nebs/vest/PRN NT sx  Unable to do IS     GI:   Diagnosis: Dysphagia  Plan:   NGT cont TF  Speech consulted     :   Diagnosis: CKD 3 baseline cr 1-1.3  Plan:   At baseline     F/E/N:   No active issues     Heme/Onc:   Diagnosis: Anemia baseline hgb 9-10  Plan:   No s/s bleeding  cont home iron     Endo:   Diagnosis: DM2  A1C  Plan:   Holding home glipizide/Januvia  Lantus 23U AM t/c increasing and change to 15U BID  SSI     ID:   No active issues     MSK/Skin:   T/p q 2  PT/OT    Disposition: Critical care    ICU Core Measures     Vented Patient  VAP Bundle  VAP bundle ordered     A: Assess, Prevent, and Manage Pain  Has pain been assessed? Yes  Need for changes to pain regimen? No   B: Both Spontaneous Awakening Trials (SATs) and Spontaneous Breathing Trials (SBTs) Plan to perform spontaneous awakening trial today? N/A   Plan to perform spontaneous breathing trial today? N/A   Obvious barriers to extubation? NA   C: Choice of Sedation RASS Goal: 0 Alert and Calm  Need for changes to sedation or analgesia regimen? No   D: Delirium CAM-ICU: Unable to perform secondary to Acute cognitive dysfunction   E: Early Mobility  Plan for early mobility? Yes   F: Family Engagement Plan for family engagement today? Yes       Antibiotic Review: Post op requirements     Review of Invasive Devices:      Central access plan: Will obtain peripheral access and discontinue prior to transfer      Prophylaxis:  VTE VTE covered by:  heparin (porcine), Subcutaneous, 5,000 Units at 01/13/24 2116       Stress Ulcer  not ordered        Significant 24hr Events     24hr events: No events. Cont to need sx     Subjective     Review of Systems   Unable to perform ROS: Other (minimal verbal)        Objective                            Vitals I/O      Most Recent Min/Max in 24hrs   Temp 99.6 °F (37.6 °C) Temp  Min: 99.5 °F (37.5 °C)  Max: 99.6 °F (37.6 °C)   Pulse 66 Pulse  Min: 66  Max: 86   Resp 12 Resp  Min: 11  Max: 21   /58 BP  Min: 106/54  Max: 156/65   O2 Sat 98 % SpO2  Min: 97 %  Max: 100 %      Intake/Output Summary (Last 24 hours) at 1/14/2024 0140  Last data filed at 1/13/2024 2200  Gross per 24 hour   Intake 950 ml   Output 1032 ml   Net -82 ml       Diet Enteral/Parenteral; Tube Feeding No Oral Diet; Glucerna 1.2; Continuous; 45; Prosource Protein Liquid - One Packet; 200; Every 6 hours    Invasive Monitoring           Physical Exam   Physical Exam  Eyes:      Pupils: Pupils are equal, round, and reactive to light.   Skin:     General: Skin is warm and dry.   HENT:      Head: Normocephalic and atraumatic.      Comments: EVD 0     Nose:  Nasogastric tube present.      Mouth/Throat:      Mouth: Mucous membranes are moist.   Neck:      Vascular: Central line present.   Cardiovascular:      Rate and Rhythm: Normal rate and regular rhythm.      Pulses: Normal pulses.      Heart sounds: Normal heart sounds.   Musculoskeletal:         General: No swelling.      Right lower leg: No edema.      Left lower leg: No edema.   Abdominal: General: Bowel sounds are normal. There is no distension.      Palpations: Abdomen is soft.      Tenderness: There is no abdominal tenderness.   Constitutional:       General: She is not in acute distress.  Pulmonary:      Effort: Pulmonary effort is normal.      Breath sounds: Rhonchi present.   Neurological:      GCS: GCS eye subscore is 4. GCS verbal subscore is 1. GCS motor subscore is 6.      Comments: Voice very hoarse hard to assertain. Unable to lift UE off bed however grasp hand b/l R>L strength in grasp. LE wiggles toes            Diagnostic Studies      EKG: tele  Imaging:  I have personally reviewed pertinent reports.   and I have personally reviewed pertinent films in PACS     Medications:  Scheduled PRN   amLODIPine, 10 mg, Daily  atorvastatin, 80 mg, QPM  carvedilol, 12.5 mg, BID With Meals  cefazolin, 1,000 mg, Q8H  chlorhexidine, 15 mL, Q12H JUAN ANTONIO  ferrous sulfate, 325 mg, Daily With Breakfast  formoterol, 20 mcg, Q12H  heparin (porcine), 5,000 Units, Q8H JUAN ANTONIO  insulin glargine, 23 Units, QAM  insulin lispro, 4-20 Units, Q6H JUAN ANTONIO  levalbuterol, 1.25 mg, TID  lisinopril, 40 mg, Daily  miconazole, , BID  modafinil, 200 mg, Daily  polyethylene glycol, 17 g, BID  senna-docusate sodium, 2 tablet, BID  sodium chloride, 4 mL, Q6H      acetaminophen, 650 mg, Q6H PRN  bisacodyl, 10 mg, Daily PRN  hydrALAZINE, 10 mg, Q4H PRN  ondansetron, 4 mg, Q6H PRN       Continuous          Labs:    CBC    Recent Labs     01/12/24  0532 01/13/24  0507   WBC 10.15 8.31   HGB 8.2* 7.4*   HCT 25.9* 24.2*    248     BMP    Recent Labs      01/12/24  0532 01/13/24  0507   SODIUM 148* 147   K 3.7 4.2   * 111*   CO2 30 31   AGAP 5 5   BUN 29* 38*   CREATININE 1.02 1.11   CALCIUM 8.8 8.5       Coags    No recent results     Additional Electrolytes  Recent Labs     01/12/24  0532 01/13/24  0507   MG 2.2 2.3   PHOS 3.4 4.0          Blood Gas    No recent results  No recent results LFTs  No recent results    Infectious  No recent results  Glucose  Recent Labs     01/12/24  0532 01/13/24  0507   GLUC 201* 156*               Non-Critical Care Time Statement: I have spent a total time of 25 minutes in caring for this patient including Diagnostic results, Documenting in the medical record, and Reviewing / ordering tests, medicine, procedures  .     BENTON Ash

## 2024-01-14 NOTE — PROGRESS NOTES
Progress Note - Neurosurgery   Debbie Moody 83 y.o. female MRN: [id number]  Unit/Bed#: ICU 04 Encounter: [id number]    Assessment:  11 Days Post-Op Procedure(s):  SUBOCCIPITAL CRANIECTOMY FOR POSTERIOR FOSSA DECOMPRESSION; DISPOSE OF BONE FLAP (LULA, 1/3)  S/p EVD replacement on 1/5/24 2/2 extensive IVH  Pt presented with nausea, headache and ataxia. 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     Imaging:  MRI brain wo 1/9/24:  Stable exam demonstrating extensive intraventricular hemorrhage with surrounding vasogenic edema and/or transependymal flow of CSF. Stable hydrocephalus. Evolving subacute infarct in the left cerebellar hemisphere. Stable petechial hemorrhage, vasogenic edema and mass effect.  CT head wo 1/7/24: Extensive intracranial hemorrhage without significant interval change compared to the prior study   CT head wo 1/14/24: Persistent intraventricular hemorrhage, no significant change from prior images     Plan  Continue regular neurologic checks.   Exam grossly stable this am - slight movement LUE/LLE. Pt with left sided > right sided weakness. Right gaze preference. Continues to follow some commands on the right.   STAT CTH for decline in exam greater than 2 points in 1 hour  EVD was re-inserted on 1/5/24.  EVD open at 0. Maintain at this level at this time.   Ancef 2 g q 8 hrs while drain is in place, as EVD continues to be flushed regularly proximally and distally.     ICP < 20, ICP -3 to 7/24H  157cc/24 hours.   Ongoing medical management per primary team.   Na goal > 145-155. Continue to monitor. 148 today.   SBP < 160  Pain control per primary team.   Neurology was consulted for stroke management.   ASA d/c 1/3/23, hold for at least 2 weeks post op  PT/OT/PMR evaluation as able  DVT PPX: SCD, SQH       VTE  "Prophylaxis: Sequential compression device (Venodyne)     Subjective/Objective   Chief Complaint: POD 11 SUBOCCIPITAL CRANIECTOMY FOR POSTERIOR FOSSA DECOMPRESSION    Vitals: Blood pressure 161/74, pulse 77, temperature 99.2 °F (37.3 °C), temperature source Oral, resp. rate 19, height 4' 10\" (1.473 m), weight 64.2 kg (141 lb 8.6 oz), SpO2 99%, not currently breastfeeding.,Body mass index is 29.58 kg/m².    Physical Exam:   Neurologic Exam:  Opens eyes to voice and light touch  PERRL, EOMI  Nonverbal  Moving right upper and lower extremities spontaneously  Not following commands      Invasive Devices       Central Venous Catheter Line  Duration             CVC Central Lines 01/03/24 10 days              Peripheral Intravenous Line  Duration             Long-Dwell Peripheral IV (Midline) 01/03/24 Left Cephalic Vein 10 days              Drain  Duration             Ventriculostomy/Subdural Ventricular drainage catheter Left Frontal region 8 days    External Urinary Catheter 1 day    NG/OG/Enteral Tube Nasogastric Right nare 1 day              Airway  Duration             ETT  Cuffed 7 mm 6 days                          Lab Results: I have personally reviewed pertinent results.    No results found for: \"WBC\", \"HGB\", \"HCT\", \"MCV\", \"PLT\", \"ADJUSTEDWBC\", \"RBC\", \"MCH\", \"MCHC\", \"RDW\", \"MPV\", \"NRBC\", \"SODIUM\", \"CL\", \"CO2\", \"ANIONGAP\", \"BUN\", \"CREATININE\", \"GLUCOSE\", \"CALCIUM\", \"AST\", \"ALT\", \"ALKPHOS\", \"PROT\", \"BILITOT\", \"EGFR\", \"COLORU\", \"CLARITYU\", \"SPECGRAV\", \"PHUR\", \"LEUKOCYTESUR\", \"NITRITE\", \"PROTEINUA\", \"GLUCOSEU\", \"KETONESU\", \"BILIRUBINUR\", \"BLOODU\", \"URINECX\", \"PREGTESTUR\", \"ABO\", \"PT\", \"INR\"    Imaging Studies: I have personally reviewed pertinent reports.   and I have personally reviewed pertinent films in PACS          "

## 2024-01-15 NOTE — PROGRESS NOTES
[facility]  Progress Note  Name: Debbie Moody I  MRN: [id number]  Unit/Bed#: ICU 04 I Date of Admission: 12/31/2023   Date of Service: 1/15/2024 I Hospital Day: 15    Assessment/Plan   * Acute CVA (cerebrovascular accident) (HCC)  Assessment & Plan  12 Days Post-Op Procedure(s):  SUBOCCIPITAL CRANIECTOMY FOR POSTERIOR FOSSA DECOMPRESSION; DISPOSE OF BONE FLAP (LULA, 1/3)  S/p EVD replacement on 1/5/24 2/2 extensive IVH, replaced 1/14 due to not draining/clogged  Presented with nausea, headache and ataxia. 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  On exam, no eye opening. Responding to noxious stimuli with some purposeful movement, not in LUE. Exam worse than baseline am of 1/15 so taken for stat CT head.    Imaging:  MRI brain wo 1/9/24:  Stable exam demonstrating extensive intraventricular hemorrhage with surrounding vasogenic edema and/or transependymal flow of CSF. Stable hydrocephalus. Evolving subacute infarct in the left cerebellar hemisphere. Stable petechial hemorrhage, vasogenic edema and mass effect.  CT head wo 1/7/24: Extensive intracranial hemorrhage without significant interval change compared to the prior study     Plan  Continue regular neurologic checks.   STAT CTH for decline in exam greater than 2 points in 1 hour  EVD was re-inserted on 1/5/24, replaced 1/14.  EVD open at 0. Maintain at this level at this time.   Ancef 2 g q 8 hrs while drain is in place, as EVD continues to be flushed regularly proximally and distally.     ICP < 20, ICP -1-2/24H  74 mL/24 hours.   Ongoing medical management per primary team.   Na goal > 145-155. Continue to monitor. 142 today.   SBP < 160  Pain control per primary team.   Neurology was consulted for stroke management.   ASA  "d/c 1/3/23, hold for at least 2 weeks post op  PT/OT/PMR evaluation as able  DVT PPX: SCD, SQH    Neurosurgery will continue to follow. Please call with questions or concerns.              Subjective/Objective   Chief Complaint: N/A    Subjective: N/A    Objective: Asleep. Arouses to noxious stimulus, does not open eyes. Withdraws to pain in all extremities except LUE. Not following commands. Some spontaneous movement of RUE/RLE. Left frontal EVD in place.    I/O         01/13 0701 01/14 0700 01/14 0701  01/15 0700 01/15 0701 01/16 0700    I.V. (mL/kg) 30 (0.5) 30 (0.5)     NG/GT 90 540     IV Piggyback 100 100     Feedings 990 765     Total Intake(mL/kg) 1210 (18.8) 1435 (22.4)     Urine (mL/kg/hr) 1100 (0.7) 900 (0.6)     Drains 157 74     Stool 0 0     Total Output 1257 974     Net -47 +461            Unmeasured Urine Occurrence 2 x 0 x     Unmeasured Stool Occurrence 2 x 1 x             Invasive Devices       Central Venous Catheter Line  Duration             CVC Central Lines 01/03/24 11 days              Peripheral Intravenous Line  Duration             Long-Dwell Peripheral IV (Midline) 01/03/24 Left Cephalic Vein 11 days              Arterial Line  Duration             Arterial Line 01/15/24 Radial <1 day              Drain  Duration             Ventriculostomy/Subdural Ventricular drainage catheter Left Frontal region 9 days    External Urinary Catheter 2 days    NG/OG/Enteral Tube Nasogastric Right nare 2 days              Airway  Duration             ETT  Cuffed 7 mm 7 days                    Physical Exam:  Vitals: Blood pressure 104/56, pulse 72, temperature 98.2 °F (36.8 °C), temperature source Oral, resp. rate 17, height 4' 10\" (1.473 m), weight 64.2 kg (141 lb 8.6 oz), SpO2 99%, not currently breastfeeding.,Body mass index is 29.58 kg/m².    Hemodynamic Monitoring: MAP: Arterial Line MAP (mmHg): 66 mmHg, CPP: CPP Cuff-Calculated (mmHg): 74, CVP:  , ICP Mean: ICP Mean (mmHg): 1 mmHg    General " "appearance: drowsy, critically ill  Head: left frontal EVD  Eyes: PERRL  Neck: supple, symmetrical, trachea midline  Back: no kyphosis present, no tenderness to percussion or palpation  Lungs: NRB  Heart: regular heart rate  Neurologic:   Mental status: GCS 8 (E1, V2, M5)  Cranial nerves: grossly intact (Cranial nerves II-XII)  Sensory: normal to crude touch  Motor: LUE plegic, withdraws to pain in all other extremities        Lab Results:  Results from last 7 days   Lab Units 01/15/24  0455 01/13/24  0507 01/12/24  0532   WBC Thousand/uL 8.28 8.31 10.15   HEMOGLOBIN g/dL 7.5* 7.4* 8.2*   HEMATOCRIT % 24.2* 24.2* 25.9*   PLATELETS Thousands/uL 266 248 242   NEUTROS PCT % 79* 75 78*   MONOS PCT % 7 7 5   EOS PCT % 1 2 2     Results from last 7 days   Lab Units 01/15/24  0455 01/13/24  0507 01/12/24  0532 01/10/24  0428   SODIUM mmol/L 142 147 148* 155*   POTASSIUM mmol/L 4.3 4.2 3.7 4.1   CHLORIDE mmol/L 104 111* 113* 124*   CO2 mmol/L 32 31 30 23   BUN mg/dL 37* 38* 29* 36*   CREATININE mg/dL 0.96 1.11 1.02 1.18   CALCIUM mg/dL 8.6 8.5 8.8 8.4   ALK PHOS U/L  --   --   --  71   ALT U/L  --   --   --  9   AST U/L  --   --   --  21     Results from last 7 days   Lab Units 01/15/24  0455 01/13/24  0507 01/12/24  0532   MAGNESIUM mg/dL 2.3 2.3 2.2     Results from last 7 days   Lab Units 01/15/24  0455 01/13/24  0507 01/12/24  0532   PHOSPHORUS mg/dL 3.5 4.0 3.4         No results found for: \"TROPONINT\"  ABG:  Lab Results   Component Value Date    PHART 7.364 01/03/2024    UQA2IJW 33.9 (L) 01/03/2024    PO2ART 235.8 (H) 01/03/2024    JWF5ZMK 18.9 (L) 01/03/2024    BEART -5.7 01/03/2024    SOURCE Line, Arterial 01/03/2024       Imaging Studies: I have personally reviewed pertinent reports.   and I have personally reviewed pertinent films in PACS    XR chest portable ICU    Result Date: 1/6/2024  Impression: Endotracheal tube tip 3.4 cm above the ceasar. Workstation performed: [id number]     CTA head w wo " contrast    Result Date: 1/5/2024  Impression: Stable vascularity when compared with prior CT angiogram from 12/30/2023. No large vessel occlusion. Mild atherosclerotic change present. There is no left posterior inferior cerebellar artery visualized within the posterior fossa in this patient with a known PICA infarct. No venous abnormality identified. Workstation performed: [id number]     CT head wo contrast    Result Date: 1/5/2024  Impression: Similar parenchymal changes from left PICA infarct. Petechial hemorrhage with some mild hemorrhagic conversion in the left cerebellar hemisphere. Previous right side  shunt catheter has been withdrawn. Extensive intraventricular blood as detailed above. Developing hydrocephalus. Narrowing of sulci bilaterally. No convincing uncal herniation. No tonsillar herniation (suboccipital craniectomy). I personally discussed this study with Violet Trejo on 1/5/2024 5:24 PM. Workstation performed: [id number]     CT head wo contrast    Result Date: 1/5/2024  Impression: Status post decompressive suboccipital craniectomy for a PICA distribution infarct with slightly improved mass effect on the fourth ventricle and improving hydrocephalus with ventriculostomy in place. Overall degree of hemorrhage within the left cerebellum and vermis is similar to the prior examination. No new areas of acute intracranial hemorrhage identified. Workstation performed: [id number]     XR chest portable    Result Date: 1/4/2024  Impression: Tubes and lines in adequate position. No acute pulmonary pathology. Workstation performed: [id number]     XR chest portable    Result Date: 1/4/2024  Impression: Endotracheal tube in the proximal right mainstem bronchus. Workstation performed: [id number]     X-ray chest 1 view    Result Date: 1/4/2024  Impression: Tubes and lines as described above. Low lung volumes demonstrated without consolidation or other acute pulmonary findings. Workstation performed: [id number]      CT head wo contrast    Result Date: 1/3/2024  Impression: Status post interval decompressive suboccipital craniectomy with expected subjacent postsurgical changes/pneumocephalus. Similar appearance of evolving left PICA territory infarct and associated edema/mass effect and with probable petechial cortical hemorrhage compared to the most immediate prior study. Similar appearance focal parenchymal hematoma in the left anterior cerebellar vermis adjacent to the fourth ventricle compared to the most immediate prior study. No new hemorrhage or mass effect. Minimally improved hydrocephalus status post placement of right frontal approach ventricular catheter. Workstation performed: [id number]     XR chest portable    Result Date: 1/3/2024  Impression: Mild left base opacity. Aspiration not excluded. Workstation performed: [id number]     CT head wo contrast    Result Date: 1/3/2024  Impression: Unchanged hydrocephalus status post placement of right frontal ventriculostomy catheter with tip near the foramen of Monro. Small amount of pneumocephalus in the frontal horn of the right lateral ventricle. Unchanged left PCA infarct with compression of the fourth ventricle and unchanged focal parenchymal hemorrhage adjacent to the fourth ventricle. The study was marked in EPIC for immediate notification. Workstation performed: [id number]     CT head wo contrast    Result Date: 1/3/2024  Impression: Evolving left PCA infarct with increased left cerebellar hypodensity and compression of the fourth ventricle resulting in hydrocephalus with increased focal parenchymal hemorrhage adjacent to the fourth ventricle. Neurosurgery consult recommended. I personally discussed this study with AMBAR KOEHLER on 1/3/2024 10:09 AM. Workstation performed: [id number]     XR chest portable    Result Date: 1/1/2024  Impression: Right jugular catheter at cavoatrial junction with no pneumothorax. Chronic scarring with no acute disease.  Workstation performed: [id number]     MRI brain wo contrast    Result Date: 12/31/2023  Impression: Acute infarct in left PICA territory with small acute parenchymal hematoma in anterior aspect of left cerebellar vermis, petechial cortical hemorrhage along left posterior cerebellum, and similar compressive mass effect on fourth ventricle. No obstructive hydrocephalus. Recommend consultation with neurosurgery. 1.6 cm intramuscular lesion in left temporalis muscle, indeterminate. Differential includes intramuscular epidermoid cyst, hemangioma, myxoma, among other differentials. Mild chronic microangiopathy. The study was marked in EPIC for immediate notification. Workstation performed: [id number]     CT head wo contrast    Result Date: 12/31/2023  Impression: Redemonstration of evolving acute PICA distribution left cerebellar infarct. Mass effect on the fourth ventricle is slightly increased. No hydrocephalus. Persistent increased attenuation in the anterior aspect of the cerebellar infarct that could represent residual contrast staining but petechial hemorrhage not excluded. Recommend close interval follow-up CT and/or further evaluation with MRI. The study was marked in EPIC for immediate notification. Workstation performed: [id number]       EKG, Pathology, and Other Studies: I have personally reviewed pertinent reports.      VTE Pharmacologic Prophylaxis: Sequential compression device (Venodyne)  and Heparin    VTE Mechanical Prophylaxis: sequential compression device

## 2024-01-15 NOTE — RESPIRATORY THERAPY NOTE
01/15/24 0732   Respiratory Assessment   Assessment Type Assess only   General Appearance Sleeping   Respiratory Pattern Normal   Chest Assessment Chest expansion symmetrical   Bilateral Breath Sounds Coarse   O2 Device nc   Additional Assessments   Pulse 72   Position Right side

## 2024-01-15 NOTE — PHYSICAL THERAPY NOTE
Physical Therapy Cancellation Note    PT orders received chart review completed. Pt is currently off the floor, also pending helmet per nsx and not appropriate to participate in skilled PT at this time. PT will follow and re-eval as medically appropriate.     01/15/24 1000   Note Type   Note type Cancelled Session   Cancel Reasons Patient off floor/test   Pain Assessment   Pain Assessment Tool FLACC   Pain Rating: FLACC (Rest) - Face 0   Pain Rating: FLACC (Rest) - Legs 0   Pain Rating: FLACC (Rest) - Activity 0   Pain Rating: FLACC (Rest) - Cry 0   Pain Rating: FLACC (Rest) - Consolability 0   Score: FLACC (Rest) 0   Cognition   Orientation Level Oriented to person       Monica iLnk, PT

## 2024-01-15 NOTE — PLAN OF CARE
Problem: PAIN - ADULT  Goal: Verbalizes/displays adequate comfort level or baseline comfort level  Description: Interventions:  - Encourage patient to monitor pain and request assistance  - Assess pain using appropriate pain scale  - Administer analgesics based on type and severity of pain and evaluate response  - Implement non-pharmacological measures as appropriate and evaluate response  - Consider cultural and social influences on pain and pain management  - Notify physician/advanced practitioner if interventions unsuccessful or patient reports new pain  Outcome: Progressing     Problem: INFECTION - ADULT  Goal: Absence or prevention of progression during hospitalization  Description: INTERVENTIONS:  - Assess and monitor for signs and symptoms of infection  - Monitor lab/diagnostic results  - Monitor all insertion sites, i.e. indwelling lines, tubes, and drains  - Monitor endotracheal if appropriate and nasal secretions for changes in amount and color  - Hayden appropriate cooling/warming therapies per order  - Administer medications as ordered  - Instruct and encourage patient and family to use good hand hygiene technique  - Identify and instruct in appropriate isolation precautions for identified infection/condition  Outcome: Progressing  Goal: Absence of fever/infection during neutropenic period  Description: INTERVENTIONS:  - Monitor WBC    Outcome: Progressing     Problem: SAFETY ADULT  Goal: Patient will remain free of falls  Description: INTERVENTIONS:  - Educate patient/family on patient safety including physical limitations  - Instruct patient to call for assistance with activity   - Consult OT/PT to assist with strengthening/mobility   - Keep Call bell within reach  - Keep bed low and locked with side rails adjusted as appropriate  - Keep care items and personal belongings within reach  - Initiate and maintain comfort rounds  - Make Fall Risk Sign visible to staff  - Offer Toileting every 2 Hours,  in advance of need  - Initiate/Maintain bed alarm  - Obtain necessary fall risk management equipment  - Apply yellow socks and bracelet for high fall risk patients  - Consider moving patient to room near nurses station  Outcome: Progressing  Goal: Maintain or return to baseline ADL function  Description: INTERVENTIONS:  -  Assess patient's ability to carry out ADLs; assess patient's baseline for ADL function and identify physical deficits which impact ability to perform ADLs (bathing, care of mouth/teeth, toileting, grooming, dressing, etc.)  - Assess/evaluate cause of self-care deficits   - Assess range of motion  - Assess patient's mobility; develop plan if impaired  - Assess patient's need for assistive devices and provide as appropriate  - Encourage maximum independence but intervene and supervise when necessary  - Involve family in performance of ADLs  - Assess for home care needs following discharge   - Consider OT consult to assist with ADL evaluation and planning for discharge  - Provide patient education as appropriate  Outcome: Progressing  Goal: Maintains/Returns to pre admission functional level  Description: INTERVENTIONS:  - Perform AM-PAC 6 Click Basic Mobility/ Daily Activity assessment daily.  - Set and communicate daily mobility goal to care team and patient/family/caregiver.   - Collaborate with rehabilitation services on mobility goals if consulted  - Perform Range of Motion 3 times a day.  - Reposition patient every 2 hours.  - Dangle patient 3 times a day  - Stand patient 3 times a day  - Ambulate patient 3 times a day  - Out of bed to chair 3 times a day   - Out of bed for meals 3 times a day  - Out of bed for toileting  - Record patient progress and toleration of activity level   Outcome: Progressing     Problem: DISCHARGE PLANNING  Goal: Discharge to home or other facility with appropriate resources  Description: INTERVENTIONS:  - Identify barriers to discharge w/patient and caregiver  -  Arrange for needed discharge resources and transportation as appropriate  - Identify discharge learning needs (meds, wound care, etc.)  - Arrange for interpretive services to assist at discharge as needed  - Refer to Case Management Department for coordinating discharge planning if the patient needs post-hospital services based on physician/advanced practitioner order or complex needs related to functional status, cognitive ability, or social support system  Outcome: Progressing     Problem: Knowledge Deficit  Goal: Patient/family/caregiver demonstrates understanding of disease process, treatment plan, medications, and discharge instructions  Description: Complete learning assessment and assess knowledge base.  Interventions:  - Provide teaching at level of understanding  - Provide teaching via preferred learning methods  Outcome: Progressing     Problem: Neurological Deficit  Goal: Neurological status is stable or improving  Description: Interventions:  - Monitor and assess patient's level of consciousness, motor function, sensory function, and level of assistance needed for ADLs.   - Monitor and report changes from baseline. Collaborate with interdisciplinary team to initiate plan and implement interventions as ordered.   - Provide and maintain a safe environment.  - Consider seizure precautions.  - Consider fall precautions.  - Consider aspiration precautions.  - Consider bleeding precautions.  Outcome: Progressing     Problem: Activity Intolerance/Impaired Mobility  Goal: Mobility/activity is maintained at optimum level for patient  Description: Interventions:  - Assess and monitor patient  barriers to mobility and need for assistive/adaptive devices.  - Assess patient's emotional response to limitations.  - Collaborate with interdisciplinary team and initiate plans and interventions as ordered.  - Encourage independent activity per ability.  - Maintain proper body alignment.  - Perform active/passive rom as  tolerated/ordered.  - Plan activities to conserve energy.  - Turn patient as appropriate  Outcome: Progressing     Problem: Communication Impairment  Goal: Ability to express needs and understand communication  Description: Assess patient's communication skills and ability to understand information.  Patient will demonstrate use of effective communication techniques, alternative methods of communication and understanding even if not able to speak.     - Encourage communication and provide alternate methods of communication as needed.  - Collaborate with case management/ for discharge needs.  - Include patient/family/caregiver in decisions related to communication.  Outcome: Progressing     Problem: Potential for Aspiration  Goal: Ventilated patient's risk of aspiration is minimized  Description: Assess and monitor vital signs, respiratory status, airway cuff pressure, and labs (WBC).  Monitor for signs of aspiration (tachypnea, cough, rales, wheezing, cyanosis, fever).    - Elevate head of bed 30 degrees if patient has tube feeding.  - Monitor tube feeding.  Outcome: Progressing     Problem: Nutrition  Goal: Nutrition/Hydration status is improving  Description: Monitor and assess patient's nutrition/hydration status for malnutrition (ex- brittle hair, bruises, dry skin, pale skin and conjunctiva, muscle wasting, smooth red tongue, and disorientation). Collaborate with interdisciplinary team and initiate plan and interventions as ordered.  Monitor patient's weight and dietary intake as ordered or per policy. Utilize nutrition screening tool and intervene per policy. Determine patient's food preferences and provide high-protein, high-caloric foods as appropriate.     - Assist patient with eating.  - Allow adequate time for meals.  - Encourage patient to take dietary supplement as ordered.  - Collaborate with clinical nutritionist.  - Include patient/family/caregiver in decisions related to  nutrition.  Outcome: Progressing     Problem: Nutrition/Hydration-ADULT  Goal: Nutrient/Hydration intake appropriate for improving, restoring or maintaining nutritional needs  Description: Monitor and assess patient's nutrition/hydration status for malnutrition. Collaborate with interdisciplinary team and initiate plan and interventions as ordered.  Monitor patient's weight and dietary intake as ordered or per policy. Utilize nutrition screening tool and intervene as necessary. Determine patient's food preferences and provide high-protein, high-caloric foods as appropriate.     INTERVENTIONS:  - Monitor oral intake, urinary output, labs, and treatment plans  - Assess nutrition and hydration status and recommend course of action  - Evaluate amount of meals eaten  - Assist patient with eating if necessary   - Allow adequate time for meals  - Recommend/ encourage appropriate diets, oral nutritional supplements, and vitamin/mineral supplements  - Order, calculate, and assess calorie counts as needed  - Recommend, monitor, and adjust tube feedings and TPN/PPN based on assessed needs  - Assess need for intravenous fluids  - Provide specific nutrition/hydration education as appropriate  - Include patient/family/caregiver in decisions related to nutrition  Outcome: Progressing

## 2024-01-15 NOTE — ASSESSMENT & PLAN NOTE
12 Days Post-Op Procedure(s):  SUBOCCIPITAL CRANIECTOMY FOR POSTERIOR FOSSA DECOMPRESSION; DISPOSE OF BONE FLAP (LULA, 1/3)  S/p EVD replacement on 1/5/24 2/2 extensive IVH, replaced 1/14 due to not draining/clogged  Presented with nausea, headache and ataxia. 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  On exam, no eye opening. Responding to noxious stimuli with some purposeful movement, not in LUE. Exam worse than baseline am of 1/15 so taken for stat CT head.    Imaging:  MRI brain wo 1/9/24:  Stable exam demonstrating extensive intraventricular hemorrhage with surrounding vasogenic edema and/or transependymal flow of CSF. Stable hydrocephalus. Evolving subacute infarct in the left cerebellar hemisphere. Stable petechial hemorrhage, vasogenic edema and mass effect.  CT head wo 1/7/24: Extensive intracranial hemorrhage without significant interval change compared to the prior study     Plan  Continue regular neurologic checks.   STAT CTH for decline in exam greater than 2 points in 1 hour  EVD was re-inserted on 1/5/24, replaced 1/14.  EVD open at 0. Maintain at this level at this time.   Ancef 2 g q 8 hrs while drain is in place, as EVD continues to be flushed regularly proximally and distally.     ICP < 20, ICP -1-2/24H  74 mL/24 hours.   Ongoing medical management per primary team.   Na goal > 145-155. Continue to monitor. 142 today.   SBP < 160  Pain control per primary team.   Neurology was consulted for stroke management.   ASA d/c 1/3/23, hold for at least 2 weeks post op  PT/OT/PMR evaluation as able  DVT PPX: SCD, SQH    Neurosurgery will continue to follow. Please call with questions or concerns.

## 2024-01-15 NOTE — OCCUPATIONAL THERAPY NOTE
Occupational Therapy Cancel Note       01/15/24 0670   OT Last Visit   OT Visit Date 01/15/24   Note Type   Note Type Cancelled Session   Cancel Reasons Other  (Pt now s/p craniectomy per neuro sx needs helmet however not yet medically ready to mobilize, will continue to follow as able)         Natalie Rodrigues, OT

## 2024-01-15 NOTE — PLAN OF CARE
Problem: PAIN - ADULT  Goal: Verbalizes/displays adequate comfort level or baseline comfort level  Description: Interventions:  - Encourage patient to monitor pain and request assistance  - Assess pain using appropriate pain scale  - Administer analgesics based on type and severity of pain and evaluate response  - Implement non-pharmacological measures as appropriate and evaluate response  - Consider cultural and social influences on pain and pain management  - Notify physician/advanced practitioner if interventions unsuccessful or patient reports new pain  Outcome: Progressing     Problem: INFECTION - ADULT  Goal: Absence or prevention of progression during hospitalization  Description: INTERVENTIONS:  - Assess and monitor for signs and symptoms of infection  - Monitor lab/diagnostic results  - Monitor all insertion sites, i.e. indwelling lines, tubes, and drains  - Monitor endotracheal if appropriate and nasal secretions for changes in amount and color  - [place] appropriate cooling/warming therapies per order  - Administer medications as ordered  - Instruct and encourage patient and family to use good hand hygiene technique  - Identify and instruct in appropriate isolation precautions for identified infection/condition  Outcome: Progressing  Goal: Absence of fever/infection during neutropenic period  Description: INTERVENTIONS:  - Monitor WBC    Outcome: Progressing     Problem: SAFETY ADULT  Goal: Patient will remain free of falls  Description: INTERVENTIONS:  - Educate patient/family on patient safety including physical limitations  - Instruct patient to call for assistance with activity   - Consult OT/PT to assist with strengthening/mobility   - Keep Call bell within reach  - Keep bed low and locked with side rails adjusted as appropriate  - Keep care items and personal belongings within reach  - Initiate and maintain comfort rounds  - Make Fall Risk Sign visible to staff  - Offer Toileting every 2 Hours,  in advance of need  - Initiate/Maintain bed alarm  - Obtain necessary fall risk management equipment:   - Apply yellow socks and bracelet for high fall risk patients  - Consider moving patient to room near nurses station  Outcome: Progressing  Goal: Maintain or return to baseline ADL function  Description: INTERVENTIONS:  -  Assess patient's ability to carry out ADLs; assess patient's baseline for ADL function and identify physical deficits which impact ability to perform ADLs (bathing, care of mouth/teeth, toileting, grooming, dressing, etc.)  - Assess/evaluate cause of self-care deficits   - Assess range of motion  - Assess patient's mobility; develop plan if impaired  - Assess patient's need for assistive devices and provide as appropriate  - Encourage maximum independence but intervene and supervise when necessary  - Involve family in performance of ADLs  - Assess for home care needs following discharge   - Consider OT consult to assist with ADL evaluation and planning for discharge  - Provide patient education as appropriate  Outcome: Progressing  Goal: Maintains/Returns to pre admission functional level  Description: INTERVENTIONS:  - Perform AM-PAC 6 Click Basic Mobility/ Daily Activity assessment daily.  - Set and communicate daily mobility goal to care team and patient/family/caregiver.   - Collaborate with rehabilitation services on mobility goals if consulted  - Perform Range of Motion 3 times a day.  - Reposition patient every 2 hours.  - Dangle patient 3 times a day  - Stand patient 3 times a day  - Ambulate patient 3 times a day  - Out of bed to chair 3 times a day   - Out of bed for meals 3 times a day  - Out of bed for toileting  - Record patient progress and toleration of activity level   Outcome: Progressing     Problem: DISCHARGE PLANNING  Goal: Discharge to home or other facility with appropriate resources  Description: INTERVENTIONS:  - Identify barriers to discharge w/patient and caregiver  -  Arrange for needed discharge resources and transportation as appropriate  - Identify discharge learning needs (meds, wound care, etc.)  - Arrange for interpretive services to assist at discharge as needed  - Refer to Case Management Department for coordinating discharge planning if the patient needs post-hospital services based on physician/advanced practitioner order or complex needs related to functional status, cognitive ability, or social support system  Outcome: Progressing     Problem: Knowledge Deficit  Goal: Patient/family/caregiver demonstrates understanding of disease process, treatment plan, medications, and discharge instructions  Description: Complete learning assessment and assess knowledge base.  Interventions:  - Provide teaching at level of understanding  - Provide teaching via preferred learning methods  Outcome: Progressing     Problem: Neurological Deficit  Goal: Neurological status is stable or improving  Description: Interventions:  - Monitor and assess patient's level of consciousness, motor function, sensory function, and level of assistance needed for ADLs.   - Monitor and report changes from baseline. Collaborate with interdisciplinary team to initiate plan and implement interventions as ordered.   - Provide and maintain a safe environment.  - Consider seizure precautions.  - Consider fall precautions.  - Consider aspiration precautions.  - Consider bleeding precautions.  Outcome: Progressing     Problem: Activity Intolerance/Impaired Mobility  Goal: Mobility/activity is maintained at optimum level for patient  Description: Interventions:  - Assess and monitor patient  barriers to mobility and need for assistive/adaptive devices.  - Assess patient's emotional response to limitations.  - Collaborate with interdisciplinary team and initiate plans and interventions as ordered.  - Encourage independent activity per ability.  - Maintain proper body alignment.  - Perform active/passive rom as  tolerated/ordered.  - Plan activities to conserve energy.  - Turn patient as appropriate  Outcome: Progressing     Problem: Communication Impairment  Goal: Ability to express needs and understand communication  Description: Assess patient's communication skills and ability to understand information.  Patient will demonstrate use of effective communication techniques, alternative methods of communication and understanding even if not able to speak.     - Encourage communication and provide alternate methods of communication as needed.  - Collaborate with case management/ for discharge needs.  - Include patient/family/caregiver in decisions related to communication.  Outcome: Progressing     Problem: Potential for Aspiration  Goal: Ventilated patient's risk of aspiration is minimized  Description: Assess and monitor vital signs, respiratory status, airway cuff pressure, and labs (WBC).  Monitor for signs of aspiration (tachypnea, cough, rales, wheezing, cyanosis, fever).    - Elevate head of bed 30 degrees if patient has tube feeding.  - Monitor tube feeding.  Outcome: Progressing     Problem: Nutrition  Goal: Nutrition/Hydration status is improving  Description: Monitor and assess patient's nutrition/hydration status for malnutrition (ex- brittle hair, bruises, dry skin, pale skin and conjunctiva, muscle wasting, smooth red tongue, and disorientation). Collaborate with interdisciplinary team and initiate plan and interventions as ordered.  Monitor patient's weight and dietary intake as ordered or per policy. Utilize nutrition screening tool and intervene per policy. Determine patient's food preferences and provide high-protein, high-caloric foods as appropriate.     - Assist patient with eating.  - Allow adequate time for meals.  - Encourage patient to take dietary supplement as ordered.  - Collaborate with clinical nutritionist.  - Include patient/family/caregiver in decisions related to  nutrition.  Outcome: Progressing     Problem: Prexisting or High Potential for Compromised Skin Integrity  Goal: Skin integrity is maintained or improved  Description: INTERVENTIONS:  - Identify patients at risk for skin breakdown  - Assess and monitor skin integrity  - Assess and monitor nutrition and hydration status  - Monitor labs   - Assess for incontinence   - Turn and reposition patient  - Assist with mobility/ambulation  - Relieve pressure over bony prominences  - Avoid friction and shearing  - Provide appropriate hygiene as needed including keeping skin clean and dry  - Evaluate need for skin moisturizer/barrier cream  - Collaborate with interdisciplinary team   - Patient/family teaching  - Consider wound care consult   Outcome: Progressing     Problem: Nutrition/Hydration-ADULT  Goal: Nutrient/Hydration intake appropriate for improving, restoring or maintaining nutritional needs  Description: Monitor and assess patient's nutrition/hydration status for malnutrition. Collaborate with interdisciplinary team and initiate plan and interventions as ordered.  Monitor patient's weight and dietary intake as ordered or per policy. Utilize nutrition screening tool and intervene as necessary. Determine patient's food preferences and provide high-protein, high-caloric foods as appropriate.     INTERVENTIONS:  - Monitor oral intake, urinary output, labs, and treatment plans  - Assess nutrition and hydration status and recommend course of action  - Evaluate amount of meals eaten  - Assist patient with eating if necessary   - Allow adequate time for meals  - Recommend/ encourage appropriate diets, oral nutritional supplements, and vitamin/mineral supplements  - Order, calculate, and assess calorie counts as needed  - Recommend, monitor, and adjust tube feedings and TPN/PPN based on assessed needs  - Assess need for intravenous fluids  - Provide specific nutrition/hydration education as appropriate  - Include  patient/family/caregiver in decisions related to nutrition  Outcome: Progressing     Problem: SAFETY,RESTRAINT: NV/NON-SELF DESTRUCTIVE BEHAVIOR  Goal: Remains free of harm/injury (restraint for non violent/non self-detsructive behavior)  Description: INTERVENTIONS:  - Instruct patient/family regarding restraint use   - Assess and monitor physiologic and psychological status   - Provide interventions and comfort measures to meet assessed patient needs   - Identify and implement measures to help patient regain control  - Assess readiness for release of restraint   Outcome: Progressing  Goal: Returns to optimal restraint-free functioning  Description: INTERVENTIONS:  - Assess the patient's behavior and symptoms that indicate continued need for restraint  - Identify and implement measures to help patient regain control  - Assess readiness for release of restraint   Outcome: Progressing     Problem: COPING  Goal: Pt/Family able to verbalize concerns and demonstrate effective coping strategies  Description: INTERVENTIONS:  - Assist patient/family to identify coping skills, available support systems and cultural and spiritual values  - Provide emotional support, including active listening and acknowledgement of concerns of patient and caregivers  - Reduce environmental stimuli, as able  - Provide patient education  - Assess for spiritual pain/suffering and initiate spiritual care, including notification of Pastoral Care or nancie based community as needed  - Assess effectiveness of coping strategies  Outcome: Progressing  Goal: Will report anxiety at manageable levels  Description: INTERVENTIONS:  - Administer medication as ordered  - Teach and encourage coping skills  - Provide emotional support  - Assess patient/family for anxiety and ability to cope  Outcome: Progressing     Problem: NEUROSENSORY - ADULT  Goal: Achieves stable or improved neurological status  Description: INTERVENTIONS  - Monitor and report changes in  neurological status  - Monitor vital signs such as temperature, blood pressure, glucose, and any other labs ordered   - Initiate measures to prevent increased intracranial pressure  - Monitor for seizure activity and implement precautions if appropriate      Outcome: Progressing  Goal: Remains free of injury related to seizures activity  Description: INTERVENTIONS  - Maintain airway, patient safety  and administer oxygen as ordered  - Monitor patient for seizure activity, document and report duration and description of seizure to physician/advanced practitioner  - If seizure occurs,  ensure patient safety during seizure  - Reorient patient post seizure  - Seizure pads on all 4 side rails  - Instruct patient/family to notify RN of any seizure activity including if an aura is experienced  - Instruct patient/family to call for assistance with activity based on nursing assessment  - Administer anti-seizure medications if ordered    Outcome: Progressing  Goal: Achieves maximal functionality and self care  Description: INTERVENTIONS  - Monitor swallowing and airway patency with patient fatigue and changes in neurological status  - Encourage and assist patient to increase activity and self care.   - Encourage visually impaired, hearing impaired and aphasic patients to use assistive/communication devices  Outcome: Progressing     Problem: CARDIOVASCULAR - ADULT  Goal: Maintains optimal cardiac output and hemodynamic stability  Description: INTERVENTIONS:  - Monitor I/O, vital signs and rhythm  - Monitor for S/S and trends of decreased cardiac output  - Administer and titrate ordered vasoactive medications to optimize hemodynamic stability  - Assess quality of pulses, skin color and temperature  - Assess for signs of decreased coronary artery perfusion  - Instruct patient to report change in severity of symptoms  Outcome: Progressing  Goal: Absence of cardiac dysrhythmias or at baseline rhythm  Description: INTERVENTIONS:  -  Continuous cardiac monitoring, vital signs, obtain 12 lead EKG if ordered  - Administer antiarrhythmic and heart rate control medications as ordered  - Monitor electrolytes and administer replacement therapy as ordered  Outcome: Progressing     Problem: RESPIRATORY - ADULT  Goal: Achieves optimal ventilation and oxygenation  Description: INTERVENTIONS:  - Assess for changes in respiratory status  - Assess for changes in mentation and behavior  - Position to facilitate oxygenation and minimize respiratory effort  - Oxygen administered by appropriate delivery if ordered  - Initiate smoking cessation education as indicated  - Encourage broncho-pulmonary hygiene including cough, deep breathe, Incentive Spirometry  - Assess the need for suctioning and aspirate as needed  - Assess and instruct to report SOB or any respiratory difficulty  - Respiratory Therapy support as indicated  Outcome: Progressing     Problem: GASTROINTESTINAL - ADULT  Goal: Maintains or returns to baseline bowel function  Description: INTERVENTIONS:  - Assess bowel function  - Encourage oral fluids to ensure adequate hydration  - Administer IV fluids if ordered to ensure adequate hydration  - Administer ordered medications as needed  - Encourage mobilization and activity  - Consider nutritional services referral to assist patient with adequate nutrition and appropriate food choices  Outcome: Progressing  Goal: Maintains adequate nutritional intake  Description: INTERVENTIONS:  - Monitor percentage of each meal consumed  - Identify factors contributing to decreased intake, treat as appropriate  - Assist with meals as needed  - Monitor I&O, weight, and lab values if indicated  - Obtain nutrition services referral as needed  Outcome: Progressing     Problem: GENITOURINARY - ADULT  Goal: Maintains or returns to baseline urinary function  Description: INTERVENTIONS:  - Assess urinary function  - Encourage oral fluids to ensure adequate hydration if  ordered  - Administer IV fluids as ordered to ensure adequate hydration  - Administer ordered medications as needed  - Offer frequent toileting  - Follow urinary retention protocol if ordered  Outcome: Progressing  Goal: Absence of urinary retention  Description: INTERVENTIONS:  - Assess patient’s ability to void and empty bladder  - Monitor I/O  - Bladder scan as needed  - Discuss with physician/AP medications to alleviate retention as needed  - Discuss catheterization for long term situations as appropriate  Outcome: Progressing     Problem: METABOLIC, FLUID AND ELECTROLYTES - ADULT  Goal: Electrolytes maintained within normal limits  Description: INTERVENTIONS:  - Monitor labs and assess patient for signs and symptoms of electrolyte imbalances  - Administer electrolyte replacement as ordered  - Monitor response to electrolyte replacements, including repeat lab results as appropriate  - Instruct patient on fluid and nutrition as appropriate  Outcome: Progressing  Goal: Fluid balance maintained  Description: INTERVENTIONS:  - Monitor labs   - Monitor I/O and WT  - Instruct patient on fluid and nutrition as appropriate  - Assess for signs & symptoms of volume excess or deficit  Outcome: Progressing  Goal: Glucose maintained within target range  Description: INTERVENTIONS:  - Monitor Blood Glucose as ordered  - Assess for signs and symptoms of hyperglycemia and hypoglycemia  - Administer ordered medications to maintain glucose within target range  - Assess nutritional intake and initiate nutrition service referral as needed  Outcome: Progressing     Problem: SKIN/TISSUE INTEGRITY - ADULT  Goal: Skin Integrity remains intact(Skin Breakdown Prevention)  Description: Assess:  -Perform [place] assessment every shift  -Clean and moisturize skin every shift  -Inspect skin when repositioning, toileting, and assisting with ADLS  -Assess under medical devices such as nasal cannulas and NG tube every shift  -Assess extremities  for adequate circulation and sensation     Bed Management:  -Have minimal linens on bed & keep smooth, unwrinkled  -Change linens as needed when moist or perspiring  -Avoid sitting or lying in one position for more than 2 hours while in bed  -Keep HOB at 45 degrees     Toileting:  -Offer bedside commode  -Assess for incontinence every 2 hrs  -Use incontinent care products after each incontinent episode such as     Activity:  -Mobilize patient 3 times a day  -Encourage activity and walks on unit  -Encourage or provide ROM exercises   -Turn and reposition patient every 2 Hours  -Use appropriate equipment to lift or move patient in bed  -Instruct/ Assist with weight shifting every shift when out of bed in chair  -Consider limitation of chair time 2 hour intervals    Skin Care:  -Avoid use of baby powder, tape, friction and shearing, hot water or constrictive clothing  -Relieve pressure over bony prominences using mepilex  -Do not massage red bony areas    Next Steps:  -Teach patient strategies to minimize risks such as    -Consider consults to  interdisciplinary teams such as   Outcome: Progressing  Goal: Incision(s), wounds(s) or drain site(s) healing without S/S of infection  Description: INTERVENTIONS  - Assess and document dressing, incision, wound bed, drain sites and surrounding tissue  - Provide patient and family education  - Perform skin care/dressing changes every   Outcome: Progressing     Problem: MUSCULOSKELETAL - ADULT  Goal: Maintain or return mobility to safest level of function  Description: INTERVENTIONS:  - Assess patient's ability to carry out ADLs; assess patient's baseline for ADL function and identify physical deficits which impact ability to perform ADLs (bathing, care of mouth/teeth, toileting, grooming, dressing, etc.)  - Assess/evaluate cause of self-care deficits   - Assess range of motion  - Assess patient's mobility  - Assess patient's need for assistive devices and provide as  appropriate  - Encourage maximum independence but intervene and supervise when necessary  - Involve family in performance of ADLs  - Assess for home care needs following discharge   - Consider OT consult to assist with ADL evaluation and planning for discharge  - Provide patient education as appropriate  Outcome: Progressing  Goal: Maintain proper alignment of affected body part  Description: INTERVENTIONS:  - Support, maintain and protect limb and body alignment  - Provide patient/ family with appropriate education  Outcome: Progressing

## 2024-01-15 NOTE — PROCEDURES
Arterial Line Insertion    Date/Time: 1/15/2024 8:45 AM    Performed by: Alanna Barry MD  Authorized by: Alanna Barry MD    Patient location:  Bedside and ICU  Other Assisting Provider: No    Consent:     Consent obtained:  Emergent situation  Universal protocol:     Patient identity confirmed:  Anonymous protocol, patient vented/unresponsive  Indications:     Indications: hemodynamic monitoring and continuous blood pressure monitoring    Pre-procedure details:     Skin preparation:  Chlorhexidine    Preparation: Patient was prepped and draped in sterile fashion    Anesthesia (see MAR for exact dosages):     Anesthesia method:  None  Procedure details:     Location / Tip of Catheter:  Radial    Laterality:  Right    Aurelio's test performed: yes      Aurelio's test abnormal: no      Needle gauge:  20 G    Placement technique:  Percutaneous    Number of attempts:  1    Successful placement: yes      Transducer: waveform confirmed    Post-procedure details:     Post-procedure:  Sterile dressing applied, sutured and secured with tape    CMS:  Normal    Patient tolerance of procedure:  Tolerated well, no immediate complications

## 2024-01-15 NOTE — PROGRESS NOTES
[facility]  Progress Note: Critical Care  Name: Debbie Moody 83 y.o. 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 Stable hydrocephalus. Evolving subacute infarct in the L cerebellar hemisphere. Stable petechial hemorrhage, vasogenic edema and mass effect.   CTH 1/15-Evolving left PICA infarct with petechial hemorrhage noted on the MRI and posterior fossa decompression. New small hemorrhages noted within the left cerebellum. Extensive diffuse IVHs with stable ventriculomegaly and transependymal CSF resorption. Stable left transfrontal ventriculostomy catheter. Unchanged 0.5 cm right to left midline shift. Suboccipital pseudomeningocele is noted.   Post EVD change CTH pending  Echo EF 70% mild hypertrophy  A1C 8.3  /172/54/141  Plan:   Neuro/Neurosx consulted  Stroke pathway  Stat CTH for any change in GCS >2pts  EVD 0 w/ 38cc out (less due to blockage and need to change)  Na 145-155  SBP <160  Hold asa for at least 2 wks post op  Ancef ppx while EVD in place  F/u CTH post EVD change     Diagnosis: Encephalopathy  Plan:   Cont provigil  Neuro checks  CV:   Diagnosis: HTN/HLD  Plan:   Home vasotec held  Cont lisinopril/Norvasc/Coreg/statin     Pulm:  Diagnosis: Acute hypoxic resp failure  Intubated 1/3 Extubated 1/4  Reintubated 1/5 Extubated 1/12  Plan:   Wean oxygen for sats >92%  Aggressive pulm hygiene with nebs/vest/PRN NT sx  Unable to do IS     GI:   Diagnosis: Dysphagia  Plan:   NGT cont TF  Speech consulted     :   Diagnosis: CKD 3 baseline cr 1-1.3  Plan:   At baseline     F/E/N:   No active  issues     Heme/Onc:   Diagnosis: Anemia baseline hgb 9-10  Plan:   No s/s bleeding  cont home iron     Endo:   Diagnosis: DM2  A1C  Plan:   Holding home glipizide/Januvia  Lantus 26U AM t/c increasing and change to 15U BID  SSI     ID:   No active issues     MSK/Skin:   T/p q 2  PT/OT    Disposition: Critical care    ICU Core Measures     Vented Patient  VAP Bundle  VAP bundle ordered     A: Assess, Prevent, and Manage Pain Has pain been assessed? Yes  Need for changes to pain regimen? No   B: Both Spontaneous Awakening Trials (SATs) and Spontaneous Breathing Trials (SBTs) Plan to perform spontaneous awakening trial today? N/A   Plan to perform spontaneous breathing trial today? N/A   Obvious barriers to extubation? NA   C: Choice of Sedation RASS Goal: 0 Alert and Calm  Need for changes to sedation or analgesia regimen? No   D: Delirium CAM-ICU: Unable to perform secondary to Acute cognitive dysfunction   E: Early Mobility  Plan for early mobility? Yes   F: Family Engagement Plan for family engagement today? Yes       Antibiotic Review: Post op requirements     Review of Invasive Devices:      Central access plan: Will obtain peripheral access and discontinue prior to transfer      Prophylaxis:  VTE VTE covered by:  heparin (porcine), Subcutaneous, 5,000 Units at 01/14/24 2144       Stress Ulcer  not ordered        Significant 24hr Events     24hr events: EVD blocked and changed. Lethargic post versed and fentanyl IV for EVD change. CTH pending read but appears unchanged     Subjective     Review of Systems   Unable to perform ROS: Mental status change        Objective                            Vitals I/O      Most Recent Min/Max in 24hrs   Temp 98 °F (36.7 °C) Temp  Min: 98 °F (36.7 °C)  Max: 98.8 °F (37.1 °C)   Pulse 72 Pulse  Min: 54  Max: 88   Resp 21 Resp  Min: 10  Max: 21   /50 BP  Min: 108/55  Max: 169/67   O2 Sat 99 % SpO2  Min: 97 %  Max: 100 %      Intake/Output Summary (Last 24 hours) at  1/15/2024 0133  Last data filed at 1/15/2024 0000  Gross per 24 hour   Intake 1255 ml   Output 1076 ml   Net 179 ml       Diet Enteral/Parenteral; Tube Feeding No Oral Diet; Glucerna 1.2; Continuous; 45; Prosource Protein Liquid - One Packet; 90; Every 4 hours    Invasive Monitoring           Physical Exam   Physical Exam  Eyes:      Pupils: Pupils are equal, round, and reactive to light.   Skin:     General: Skin is warm and dry.   HENT:      Head: Normocephalic and atraumatic.      Comments: EVD 0       Mouth/Throat:      Mouth: Mucous membranes are moist.   Neck:      Vascular: Central line present.   Cardiovascular:      Rate and Rhythm: Normal rate and regular rhythm.      Pulses: Normal pulses.      Heart sounds: Normal heart sounds.   Musculoskeletal:         General: No swelling.      Right lower leg: No edema.      Left lower leg: No edema.   Abdominal: General: Bowel sounds are normal. There is no distension.      Palpations: Abdomen is soft.      Tenderness: There is no abdominal tenderness.   Constitutional:       General: She is not in acute distress.     Appearance: She is ill-appearing.   Pulmonary:      Effort: Pulmonary effort is normal. No respiratory distress.      Breath sounds: Rhonchi present.   Neurological:      Mental Status: She is lethargic.      GCS: GCS eye subscore is 2. GCS verbal subscore is 1. GCS motor subscore is 6.      Comments: 4/5 RUE grasp. Does not lift arm off bed. LUE w/d to pain. B/l LE wiggles toes. Does not verbalize.             Diagnostic Studies      EKG: tele  Imaging:  I have personally reviewed pertinent reports.   and I have personally reviewed pertinent films in PACS     Medications:  Scheduled PRN   amLODIPine, 10 mg, Daily  atorvastatin, 80 mg, QPM  carvedilol, 12.5 mg, BID With Meals  cefazolin, 1,000 mg, Q8H  chlorhexidine, 15 mL, Q12H JUAN ANTONIO  ferrous sulfate, 325 mg, Daily With Breakfast  formoterol, 20 mcg, Q12H  heparin (porcine), 5,000 Units, Q8H  JUAN ANTONIO  insulin glargine, 26 Units, QAM  insulin lispro, 4-20 Units, Q6H JUAN ANTONIO  levalbuterol, 1.25 mg, TID  lisinopril, 40 mg, Daily  miconazole, , BID  modafinil, 200 mg, Daily  polyethylene glycol, 17 g, BID  senna-docusate sodium, 2 tablet, BID  sodium chloride, 4 mL, Q6H      acetaminophen, 650 mg, Q6H PRN  bisacodyl, 10 mg, Daily PRN  hydrALAZINE, 10 mg, Q4H PRN  ondansetron, 4 mg, Q6H PRN       Continuous          Labs:    CBC    Recent Labs     01/13/24  0507   WBC 8.31   HGB 7.4*   HCT 24.2*        BMP    Recent Labs     01/13/24  0507   SODIUM 147   K 4.2   *   CO2 31   AGAP 5   BUN 38*   CREATININE 1.11   CALCIUM 8.5       Coags    No recent results     Additional Electrolytes  Recent Labs     01/13/24  0507   MG 2.3   PHOS 4.0          Blood Gas    No recent results  Recent Labs     01/14/24  1406   PHVEN 7.435*   QXW7PXC 45.2   PO2VEN 39.0   FZJ8VNE 29.7   BEVEN 4.9   R4PSRKU 69.5    LFTs  No recent results    Infectious  No recent results  Glucose  Recent Labs     01/13/24  0507   GLUC 156*               Non-Critical Care Time Statement: I have spent a total time of 25 minutes in caring for this patient including Diagnostic results, Documenting in the medical record, and Reviewing / ordering tests, medicine, procedures  .     BENTON Ash

## 2024-01-15 NOTE — NURSING NOTE
Received midline request from over the weekend. Discussed with primary providers, Dr Guerrero, and primary RN, Perri, who agree that pt does not require a second midline placed at this time. Consult will be completed at this time. Please reach out to [facility] if further needs arise.

## 2024-01-15 NOTE — TELEPHONE ENCOUNTER
1/19/24 - PT STILL IN HOSPITAL    1/17/24 - PT STILL IN HOSPITAL    1/16/24 - PT STILL IN HOSPITAL. TT AP'S TO KEEP OR CX APT. ELAINA GARZA, CX APT    1/15/24 - PT STILL IN HOSPITAL

## 2024-01-16 NOTE — PROGRESS NOTES
[facility]  Progress Note: Critical Care  Name: Debbie Moody 83 y.o. 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 Stable hydrocephalus. Evolving subacute infarct in the L cerebellar hemisphere. Stable petechial hemorrhage, vasogenic edema and mass effect.   CTH 1/15-Evolving left PICA infarct with petechial hemorrhage noted on the MRI and posterior fossa decompression. New small hemorrhages noted within the left cerebellum. Extensive diffuse IVHs with stable ventriculomegaly and transependymal CSF resorption. Stable left transfrontal ventriculostomy catheter. Unchanged 0.5 cm right to left midline shift. Suboccipital pseudomeningocele is noted.   Post EVD change CTH stable  Echo EF 70% mild hypertrophy  A1C 8.3  /172/54/141  Plan:   Neuro/Neurosx consulted  Stroke pathway  Stat CTH for any change in GCS >2pts  EVD 0 w/ 38cc out (less due to blockage and need to change)  Na 145-155  SBP <160  Hold asa for at least 2 wks post op  Ancef t/c discontinuation as was for freq EVD flush and now new EVD in place     Diagnosis: Encephalopathy  Plan:   Cont provigil  Neuro checks  CV:   Diagnosis: HTN/HLD/Hypotension likely false reading RESOLVED  Plan:   Home vasotec held  Cont lisinopril/Norvasc/Coreg/statin     Pulm:  Diagnosis: Acute hypoxic resp failure  Intubated 1/3 Extubated 1/4  Reintubated 1/5 Extubated 1/12  Plan:   Wean oxygen for sats >92%  Aggressive pulm hygiene with nebs/vest/PRN NT sx  Unable to do IS     GI:   Diagnosis: Dysphagia  Plan:   NGT cont TF  Speech consulted     :   Diagnosis: CKD 3  baseline cr 1-1.3  Plan:   At baseline     F/E/N:   No active issues     Heme/Onc:   Diagnosis: Anemia baseline hgb 9-10  Plan:   No s/s bleeding  cont home iron     Endo:   Diagnosis: DM2 w hypoglycemia episode  A1C  Plan:   Holding home glipizide/Januvia  Lantus 26U AM changed to 15U BID  Humalog q6 changed to 3U HOLD IF TF HELD  SSI     ID:   No active issues     MSK/Skin:   T/p q 2  PT/OT    Disposition: Critical care    ICU Core Measures     A: Assess, Prevent, and Manage Pain Has pain been assessed? Yes  Need for changes to pain regimen? No   B: Both SAT/SAT  N/A   C: Choice of Sedation RASS Goal: 0 Alert and Calm  Need for changes to sedation or analgesia regimen? No   D: Delirium CAM-ICU: Unable to perform secondary to Acute cognitive dysfunction   E: Early Mobility  Plan for early mobility? Yes   F: Family Engagement Plan for family engagement today? Yes       Antibiotic Review: per neurosx    Review of Invasive Devices:      Central access plan:  attending requesting 2nd midline prior to d/c  Karmen Plan: Discontinue arterial line    Prophylaxis:  VTE VTE covered by:  heparin (porcine), Subcutaneous, 5,000 Units at 01/15/24 2200       Stress Ulcer  not ordered        Significant 24hr Events     24hr events: Hypotensive in am ??? Accuracy. Art line placed. BP remained stable and pt got her 3 antihtn meds. Hypoglycemic 25cc dextrose. Insulin adjusted.     Subjective     Review of Systems   Unable to perform ROS: Mental status change        Objective                            Vitals I/O      Most Recent Min/Max in 24hrs   Temp 98.4 °F (36.9 °C) Temp  Min: 97 °F (36.1 °C)  Max: 99.1 °F (37.3 °C)   Pulse 74 Pulse  Min: 68  Max: 78   Resp 13 Resp  Min: 12  Max: 38   BP (!) 145/49 BP  Min: 74/64  Max: 184/65   O2 Sat 100 % SpO2  Min: 97 %  Max: 100 %      Intake/Output Summary (Last 24 hours) at 1/16/2024 0100  Last data filed at 1/15/2024 2200  Gross per 24 hour   Intake 2070 ml   Output 534 ml   Net 1536 ml        Diet Enteral/Parenteral; Tube Feeding No Oral Diet; Glucerna 1.2; Continuous; 45; Prosource Protein Liquid - One Packet; 40; Saline; Every 4 hours    Invasive Monitoring           Physical Exam   Physical Exam  Eyes:      Pupils: Pupils are equal, round, and reactive to light.   Skin:     General: Skin is warm and dry.   HENT:      Head: Normocephalic and atraumatic. Head contusion: EVD 0.      Mouth/Throat:      Mouth: Mucous membranes are moist.   Neck:      Vascular: Central line present.   Cardiovascular:      Rate and Rhythm: Normal rate and regular rhythm.      Pulses: Normal pulses.      Heart sounds: Normal heart sounds.   Musculoskeletal:         General: No swelling.      Right lower leg: No edema.      Left lower leg: No edema.   Abdominal: General: Bowel sounds are normal. There is no distension.      Palpations: Abdomen is soft.      Tenderness: There is no abdominal tenderness.   Constitutional:       General: She is not in acute distress.  Pulmonary:      Effort: Pulmonary effort is normal. No respiratory distress.      Breath sounds: Rhonchi present.   Neurological:      GCS: GCS eye subscore is 4. GCS verbal subscore is 4. GCS motor subscore is 6.      Comments: Says ouch otherwise minimal verbal. LE wiggles toes. Sternal rub localizes with UE            Diagnostic Studies      EKG: tele  Imaging:  I have personally reviewed pertinent reports.   and I have personally reviewed pertinent films in PACS     Medications:  Scheduled PRN   amLODIPine, 10 mg, Daily  atorvastatin, 80 mg, QPM  carvedilol, 12.5 mg, BID With Meals  cefazolin, 1,000 mg, Q8H  chlorhexidine, 15 mL, Q12H JUAN ANTONIO  dextrose, ,   ferrous sulfate, 325 mg, Daily With Breakfast  formoterol, 20 mcg, Q12H  heparin (porcine), 5,000 Units, Q8H JUAN ANTONIO  insulin glargine, 15 Units, Q12H JUAN ANTONIO  insulin lispro, 3 Units, Q6H  insulin lispro, 4-20 Units, Q6H JUAN ANTONIO  levalbuterol, 1.25 mg, TID  lisinopril, 40 mg, Daily  miconazole, , BID  modafinil, 200  mg, Daily  polyethylene glycol, 17 g, BID  senna-docusate sodium, 2 tablet, BID  sodium chloride, 4 mL, TID      acetaminophen, 650 mg, Q6H PRN  bisacodyl, 10 mg, Daily PRN  dextrose, ,   hydrALAZINE, 10 mg, Q4H PRN  ondansetron, 4 mg, Q6H PRN       Continuous          Labs:    CBC    Recent Labs     01/15/24  0455   WBC 8.28   HGB 7.5*   HCT 24.2*        BMP    Recent Labs     01/15/24  0455   SODIUM 142   K 4.3      CO2 32   AGAP 6   BUN 37*   CREATININE 0.96   CALCIUM 8.6       Coags    No recent results     Additional Electrolytes  Recent Labs     01/15/24  0455   MG 2.3   PHOS 3.5          Blood Gas    Recent Labs     01/15/24  1338   PHART 7.423   FDD3VYP 45.8*   PO2ART 116.0   CTW4RXN 29.2*   BEART 4.4   SOURCE Line, Arterial     Recent Labs     01/14/24  1406 01/15/24  1338   PHVEN 7.435*  --    XMP6RSO 45.2  --    PO2VEN 39.0  --    ECE5ZOG 29.7  --    BEVEN 4.9  --    T5QFBDV 69.5  --    SOURCE  --  Line, Arterial    LFTs  No recent results    Infectious  No recent results  Glucose  Recent Labs     01/15/24  0455   GLUC 182*               Critical Care Time Statement: Upon my evaluation, this patient had a high probability of imminent or life-threatening deterioration due to cva, which required my direct attention, intervention, and personal management.  I spent a total of 20 minutes directly providing critical care services, including evaluating for the presence of life-threatening injuries or illnesses, interpretation of hemodynamic data, and managing enteral or parenteral nutritional support. This time is exclusive of procedures, teaching, family meetings, and any prior time recorded by providers other than myself.      BENTON Ash

## 2024-01-16 NOTE — CASE MANAGEMENT
Case Management Discharge Planning Note    Patient name Debbie Moody  Location ICU 04/ICU 04 MRN [id number]  : 1940 Date 2024       Current Admission Date: 2023  Current Admission Diagnosis:Acute CVA (cerebrovascular accident) (HCC)   Patient Active Problem List    Diagnosis Date Noted    Encephalopathy 2024    Status post craniectomy 2024    Acute CVA (cerebrovascular accident) (HCC) 2023    Pulmonary fibrosis (HCC) 2023    Hypertriglyceridemia 2022    Chronic kidney disease, stage 3b ([facility]) 2021    Ambulatory dysfunction 2021    Overweight (BMI 25.0-29.9) 2021    Localized, primary osteoarthritis of hand 2020    Sciatica 2020    Hypertensive kidney disease with chronic kidney disease stage III ([facility]) 2019    Type 2 diabetes mellitus with stage 3b chronic kidney disease, without long-term current use of insulin ([facility])     Groin pain, chronic, left 2018    Anxiety 2017    Abnormal ECG 2016    Essential hypertension 2016    Hypercholesterolemia 2016    Irritable bowel syndrome 2016    Simple chronic anemia 2016    Disorder of shoulder 2008    Articular cartilage disorder of shoulder region 2008      LOS (days): 16  Geometric Mean LOS (GMLOS) (days):   Days to GMLOS:     OBJECTIVE:  Risk of Unplanned Readmission Score: 30.69         Current admission status: Inpatient   Preferred Pharmacy:   [id number] [place] - [address] [facility]  [address] [place] [address]  [place] [address]  Phone: [phone] Fax: [phone]    [facility] - [place], [place] - [address] [facility]  [address] [facility] [address]  Phone: [phone] Fax: [phone]    Primary Care Provider: Wild Stafford DO    Primary Insurance: [employer] [facility]  Secondary Insurance:     DISCHARGE DETAILS:      Other Referral/Resources/Interventions  Provided:  Referral Comments: CM team following for eventual discharge coordination needs as appropriate. Referral placed by prior CM to [facility] at request of family and as recommended by therapy on 1/1/24. referral extended in aidin and remains under clinical review at this time. Pt will require updated PT/OT evaulations when appropriate. CM team will continue to follow and remain available

## 2024-01-16 NOTE — PROGRESS NOTES
[facility]  Progress Note  Name: Debbie Moody I  MRN: [id number]  Unit/Bed#: ICU 04 I Date of Admission: 12/31/2023   Date of Service: 1/16/2024 I Hospital Day: 16    Assessment/Plan   * Acute CVA (cerebrovascular accident) (HCC)  Assessment & Plan  13 Days Post-Op Procedure(s):  SUBOCCIPITAL CRANIECTOMY FOR POSTERIOR FOSSA DECOMPRESSION; DISPOSE OF BONE FLAP (LULA, 1/3)  S/p EVD replacement on 1/5/24 2/2 extensive IVH, replaced 1/14 due to not draining/clogged  Presented with nausea, headache and ataxia. 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  On exam, no eye opening. Responding to noxious stimuli with some purposeful movement, not in LUE. Exam worse than baseline am of 1/15 so taken for stat CT head.    Imaging:  CT head wo, 1/15/2023: Evolving left cerebellar PICC infarct with slightly increased size of the left cerebellum parenchymal hematoma. Stable mass effect. Stable extensive diffuse intraventricular hemorrhage with ventriculomegaly and transependymal CSF resorption. Unchanged 5 mm right to left midline shift.  MRI brain wo 1/9/24:  Stable exam demonstrating extensive intraventricular hemorrhage with surrounding vasogenic edema and/or transependymal flow of CSF. Stable hydrocephalus. Evolving subacute infarct in the left cerebellar hemisphere. Stable petechial hemorrhage, vasogenic edema and mass effect.    Plan  Continue regular neurologic checks.   STAT CTH for decline in exam greater than 2 points in 1 hour  EVD was re-inserted on 1/5/24, replaced 1/14.  EVD open at 0. Maintain at this level at this time.   Ancef 2 g q 8 hrs while drain is in place, as EVD continues to be flushed regularly proximally and distally.     ICP < 20, ICP -1-3/24H  135 mL/24 hours.   Ongoing  "medical management per primary team.   Na goal > 145-155. Continue to monitor. 140 today.   SBP < 160  Pain control per primary team.   Neurology was consulted for stroke management.   ASA d/c 1/3/23, hold for at least 2 weeks post op  PT/OT/PMR evaluation as able  DVT PPX: SCD, S[facility]H    Neurosurgery will continue to follow. Please call with questions or concerns.              Subjective/Objective   Chief Complaint: N/A    Subjective: N/A    Objective: Critically ill, remains on BiPAP.  Left frontal EVD in place.  Following commands in all extremities except left upper extremity.  Nodding head yes or no in response to questions.    I/O         01/14 0701  01/15 0700 01/15 0701 01/16 0700 01/16 0701 01/17 0700    P.O.  0     I.V. (mL/kg) 30 (0.5) 30 (0.5)     Blood   413.3    NG/ 560 280    IV Piggyback 100 800     Feedings 765 1080 90    Total Intake(mL/kg) 1435 (22.4) 2470 (38.5) 783.3 (12.2)    Urine (mL/kg/hr) 900 (0.6) 750 (0.5) 400 (1.2)    Drains 74 135     Stool 0 0     Total Output 974 885 400    Net +461 +1585 +383.3           Unmeasured Urine Occurrence 0 x 2 x     Unmeasured Stool Occurrence 1 x 2 x             Invasive Devices       Central Venous Catheter Line  Duration             CVC Central Lines 01/03/24 12 days              Peripheral Intravenous Line  Duration             Long-Dwell Peripheral IV (Midline) 01/03/24 Left Cephalic Vein 12 days    Peripheral IV 01/16/24 Left Wrist <1 day              Arterial Line  Duration             Arterial Line 01/15/24 Radial 1 day              Drain  Duration             Ventriculostomy/Subdural Ventricular drainage catheter Left Frontal region 10 days    NG/OG/Enteral Tube Nasogastric Right nare 4 days    External Urinary Catheter 3 days                    Physical Exam:  Vitals: Blood pressure (!) 147/39, pulse 78, temperature 99 °F (37.2 °C), resp. rate 16, height 4' 10\" (1.473 m), weight 64.2 kg (141 lb 8.6 oz), SpO2 100%, not currently " "breastfeeding.,Body mass index is 29.58 kg/m².    Hemodynamic Monitoring: MAP: Arterial Line MAP (mmHg): 74 mmHg, CPP: CPP Cuff-Calculated (mmHg): 71, CVP:  , ICP Mean: ICP Mean (mmHg): 3 mmHg    General appearance: Drowsy, critically ill  Head: Left frontal EVD in place  Eyes: EOMI, PERRL  Neck: supple, symmetrical, trachea midline  Back: no kyphosis present  Lungs: On BiPAP  Heart: regular heart rate  Neurologic:   Mental status: GCS 10  Cranial nerves: grossly intact (Cranial nerves II-XII)  Sensory: normal to crude touch  Motor: Left upper extremity plegia        Lab Results:  Results from last 7 days   Lab Units 01/16/24  0636 01/16/24  0424 01/15/24  0455 01/13/24  0507   WBC Thousand/uL  --  6.39 8.28 8.31   HEMOGLOBIN g/dL 6.4* 6.6* 7.5* 7.4*   HEMATOCRIT % 20.1* 20.5* 24.2* 24.2*   PLATELETS Thousands/uL  --  220 266 248   NEUTROS PCT %  --  69 79* 75   MONOS PCT %  --  7 7 7   EOS PCT %  --  3 1 2     Results from last 7 days   Lab Units 01/16/24  0424 01/15/24  0455 01/13/24  0507 01/12/24  0532 01/10/24  0428   SODIUM mmol/L 140 142 147   < > 155*   POTASSIUM mmol/L 4.1 4.3 4.2   < > 4.1   CHLORIDE mmol/L 110* 104 111*   < > 124*   CO2 mmol/L 30 32 31   < > 23   BUN mg/dL 38* 37* 38*   < > 36*   CREATININE mg/dL 0.96 0.96 1.11   < > 1.18   CALCIUM mg/dL 8.6 8.6 8.5   < > 8.4   ALK PHOS U/L  --   --   --   --  71   ALT U/L  --   --   --   --  9   AST U/L  --   --   --   --  21    < > = values in this interval not displayed.     Results from last 7 days   Lab Units 01/15/24  0455 01/13/24  0507 01/12/24  0532   MAGNESIUM mg/dL 2.3 2.3 2.2     Results from last 7 days   Lab Units 01/15/24  0455 01/13/24  0507 01/12/24  0532   PHOSPHORUS mg/dL 3.5 4.0 3.4         No results found for: \"TROPONINT\"  ABG:  Lab Results   Component Value Date    PHART 7.423 01/15/2024    IIA9QXZ 45.8 (H) 01/15/2024    PO2ART 116.0 01/15/2024    [id number] 29.2 (H) 01/15/2024    BEART 4.4 01/15/2024    SOURCE Line, Arterial " 01/15/2024       Imaging Studies: I have personally reviewed pertinent reports.   and I have personally reviewed pertinent films in PACS    XR chest portable ICU    Result Date: 1/6/2024  Impression: Endotracheal tube tip 3.4 cm above the ceasar. Workstation performed: [id number]     CTA head w wo contrast    Result Date: 1/5/2024  Impression: Stable vascularity when compared with prior CT angiogram from 12/30/2023. No large vessel occlusion. Mild atherosclerotic change present. There is no left posterior inferior cerebellar artery visualized within the posterior fossa in this patient with a known PICA infarct. No venous abnormality identified. Workstation performed: [id number]     CT head wo contrast    Result Date: 1/5/2024  Impression: Similar parenchymal changes from left PICA infarct. Petechial hemorrhage with some mild hemorrhagic conversion in the left cerebellar hemisphere. Previous right side  shunt catheter has been withdrawn. Extensive intraventricular blood as detailed above. Developing hydrocephalus. Narrowing of sulci bilaterally. No convincing uncal herniation. No tonsillar herniation (suboccipital craniectomy). I personally discussed this study with Violet Trejo on 1/5/2024 5:24 PM. Workstation performed: [id number]     CT head wo contrast    Result Date: 1/5/2024  Impression: Status post decompressive suboccipital craniectomy for a PICA distribution infarct with slightly improved mass effect on the fourth ventricle and improving hydrocephalus with ventriculostomy in place. Overall degree of hemorrhage within the left cerebellum and vermis is similar to the prior examination. No new areas of acute intracranial hemorrhage identified. Workstation performed: [id number]     XR chest portable    Result Date: 1/4/2024  Impression: Tubes and lines in adequate position. No acute pulmonary pathology. Workstation performed: [id number]     XR chest portable    Result Date: 1/4/2024  Impression: Endotracheal  tube in the proximal right mainstem bronchus. Workstation performed: [id number]     X-ray chest 1 view    Result Date: 1/4/2024  Impression: Tubes and lines as described above. Low lung volumes demonstrated without consolidation or other acute pulmonary findings. Workstation performed: [id number]     CT head wo contrast    Result Date: 1/3/2024  Impression: Status post interval decompressive suboccipital craniectomy with expected subjacent postsurgical changes/pneumocephalus. Similar appearance of evolving left PICA territory infarct and associated edema/mass effect and with probable petechial cortical hemorrhage compared to the most immediate prior study. Similar appearance focal parenchymal hematoma in the left anterior cerebellar vermis adjacent to the fourth ventricle compared to the most immediate prior study. No new hemorrhage or mass effect. Minimally improved hydrocephalus status post placement of right frontal approach ventricular catheter. Workstation performed: [id number]     XR chest portable    Result Date: 1/3/2024  Impression: Mild left base opacity. Aspiration not excluded. Workstation performed: [id number]     CT head wo contrast    Result Date: 1/3/2024  Impression: Unchanged hydrocephalus status post placement of right frontal ventriculostomy catheter with tip near the foramen of Monro. Small amount of pneumocephalus in the frontal horn of the right lateral ventricle. Unchanged left PCA infarct with compression of the fourth ventricle and unchanged focal parenchymal hemorrhage adjacent to the fourth ventricle. The study was marked in EPIC for immediate notification. Workstation performed: [id number]     CT head wo contrast    Result Date: 1/3/2024  Impression: Evolving left PCA infarct with increased left cerebellar hypodensity and compression of the fourth ventricle resulting in hydrocephalus with increased focal parenchymal hemorrhage adjacent to the fourth ventricle. Neurosurgery consult  recommended. I personally discussed this study with AMBAR KOEHLER on 1/3/2024 10:09 AM. Workstation performed: [id number]     XR chest portable    Result Date: 1/1/2024  Impression: Right jugular catheter at cavoatrial junction with no pneumothorax. Chronic scarring with no acute disease. Workstation performed: [id number]     MRI brain wo contrast    Result Date: 12/31/2023  Impression: Acute infarct in left PICA territory with small acute parenchymal hematoma in anterior aspect of left cerebellar vermis, petechial cortical hemorrhage along left posterior cerebellum, and similar compressive mass effect on fourth ventricle. No obstructive hydrocephalus. Recommend consultation with neurosurgery. 1.6 cm intramuscular lesion in left temporalis muscle, indeterminate. Differential includes intramuscular epidermoid cyst, hemangioma, myxoma, among other differentials. Mild chronic microangiopathy. The study was marked in EPIC for immediate notification. Workstation performed: [id number]     CT head wo contrast    Result Date: 12/31/2023  Impression: Redemonstration of evolving acute PICA distribution left cerebellar infarct. Mass effect on the fourth ventricle is slightly increased. No hydrocephalus. Persistent increased attenuation in the anterior aspect of the cerebellar infarct that could represent residual contrast staining but petechial hemorrhage not excluded. Recommend close interval follow-up CT and/or further evaluation with MRI. The study was marked in EPIC for immediate notification. Workstation performed: [id number]       EKG, Pathology, and Other Studies: I have personally reviewed pertinent reports.      VTE Pharmacologic Prophylaxis: Sequential compression device (Venodyne)  and Heparin    VTE Mechanical Prophylaxis: sequential compression device

## 2024-01-16 NOTE — PLAN OF CARE
Problem: PAIN - ADULT  Goal: Verbalizes/displays adequate comfort level or baseline comfort level  Description: Interventions:  - Encourage patient to monitor pain and request assistance  - Assess pain using appropriate pain scale  - Administer analgesics based on type and severity of pain and evaluate response  - Implement non-pharmacological measures as appropriate and evaluate response  - Consider cultural and social influences on pain and pain management  - Notify physician/advanced practitioner if interventions unsuccessful or patient reports new pain  Outcome: Progressing     Problem: INFECTION - ADULT  Goal: Absence or prevention of progression during hospitalization  Description: INTERVENTIONS:  - Assess and monitor for signs and symptoms of infection  - Monitor lab/diagnostic results  - Monitor all insertion sites, i.e. indwelling lines, tubes, and drains  - Monitor endotracheal if appropriate and nasal secretions for changes in amount and color  - [place] appropriate cooling/warming therapies per order  - Administer medications as ordered  - Instruct and encourage patient and family to use good hand hygiene technique  - Identify and instruct in appropriate isolation precautions for identified infection/condition  Outcome: Progressing  Goal: Absence of fever/infection during neutropenic period  Description: INTERVENTIONS:  - Monitor WBC    Outcome: Progressing     Problem: SAFETY ADULT  Goal: Patient will remain free of falls  Description: INTERVENTIONS:  - Educate patient/family on patient safety including physical limitations  - Instruct patient to call for assistance with activity   - Consult OT/PT to assist with strengthening/mobility   - Keep Call bell within reach  - Keep bed low and locked with side rails adjusted as appropriate  - Keep care items and personal belongings within reach  - Initiate and maintain comfort rounds  - Make Fall Risk Sign visible to staff  - Offer Toileting every 2 Hours,  in advance of need  - Initiate/Maintain bed alarm  - Obtain necessary fall risk management equipment  - Apply yellow socks and bracelet for high fall risk patients  - Consider moving patient to room near nurses station  Outcome: Progressing  Goal: Maintain or return to baseline ADL function  Description: INTERVENTIONS:  -  Assess patient's ability to carry out ADLs; assess patient's baseline for ADL function and identify physical deficits which impact ability to perform ADLs (bathing, care of mouth/teeth, toileting, grooming, dressing, etc.)  - Assess/evaluate cause of self-care deficits   - Assess range of motion  - Assess patient's mobility; develop plan if impaired  - Assess patient's need for assistive devices and provide as appropriate  - Encourage maximum independence but intervene and supervise when necessary  - Involve family in performance of ADLs  - Assess for home care needs following discharge   - Consider OT consult to assist with ADL evaluation and planning for discharge  - Provide patient education as appropriate  Outcome: Progressing  Goal: Maintains/Returns to pre admission functional level  Description: INTERVENTIONS:  - Perform AM-PAC 6 Click Basic Mobility/ Daily Activity assessment daily.  - Set and communicate daily mobility goal to care team and patient/family/caregiver.   - Collaborate with rehabilitation services on mobility goals if consulted  - Perform Range of Motion 3 times a day.  - Reposition patient every 2 hours.  - Dangle patient 3 times a day  - Stand patient 3 times a day  - Ambulate patient 3 times a day  - Out of bed to chair 3 times a day   - Out of bed for meals 3 times a day  - Out of bed for toileting  - Record patient progress and toleration of activity level   Outcome: Progressing     Problem: DISCHARGE PLANNING  Goal: Discharge to home or other facility with appropriate resources  Description: INTERVENTIONS:  - Identify barriers to discharge w/patient and caregiver  -  Arrange for needed discharge resources and transportation as appropriate  - Identify discharge learning needs (meds, wound care, etc.)  - Arrange for interpretive services to assist at discharge as needed  - Refer to Case Management Department for coordinating discharge planning if the patient needs post-hospital services based on physician/advanced practitioner order or complex needs related to functional status, cognitive ability, or social support system  Outcome: Progressing     Problem: Knowledge Deficit  Goal: Patient/family/caregiver demonstrates understanding of disease process, treatment plan, medications, and discharge instructions  Description: Complete learning assessment and assess knowledge base.  Interventions:  - Provide teaching at level of understanding  - Provide teaching via preferred learning methods  Outcome: Progressing     Problem: Neurological Deficit  Goal: Neurological status is stable or improving  Description: Interventions:  - Monitor and assess patient's level of consciousness, motor function, sensory function, and level of assistance needed for ADLs.   - Monitor and report changes from baseline. Collaborate with interdisciplinary team to initiate plan and implement interventions as ordered.   - Provide and maintain a safe environment.  - Consider seizure precautions.  - Consider fall precautions.  - Consider aspiration precautions.  - Consider bleeding precautions.  Outcome: Progressing     Problem: Activity Intolerance/Impaired Mobility  Goal: Mobility/activity is maintained at optimum level for patient  Description: Interventions:  - Assess and monitor patient  barriers to mobility and need for assistive/adaptive devices.  - Assess patient's emotional response to limitations.  - Collaborate with interdisciplinary team and initiate plans and interventions as ordered.  - Encourage independent activity per ability.  - Maintain proper body alignment.  - Perform active/passive rom as  tolerated/ordered.  - Plan activities to conserve energy.  - Turn patient as appropriate  Outcome: Progressing     Problem: Communication Impairment  Goal: Ability to express needs and understand communication  Description: Assess patient's communication skills and ability to understand information.  Patient will demonstrate use of effective communication techniques, alternative methods of communication and understanding even if not able to speak.     - Encourage communication and provide alternate methods of communication as needed.  - Collaborate with case management/ for discharge needs.  - Include patient/family/caregiver in decisions related to communication.  Outcome: Progressing     Problem: Potential for Aspiration  Goal: Ventilated patient's risk of aspiration is minimized  Description: Assess and monitor vital signs, respiratory status, airway cuff pressure, and labs (WBC).  Monitor for signs of aspiration (tachypnea, cough, rales, wheezing, cyanosis, fever).    - Elevate head of bed 30 degrees if patient has tube feeding.  - Monitor tube feeding.  Outcome: Progressing     Problem: Nutrition  Goal: Nutrition/Hydration status is improving  Description: Monitor and assess patient's nutrition/hydration status for malnutrition (ex- brittle hair, bruises, dry skin, pale skin and conjunctiva, muscle wasting, smooth red tongue, and disorientation). Collaborate with interdisciplinary team and initiate plan and interventions as ordered.  Monitor patient's weight and dietary intake as ordered or per policy. Utilize nutrition screening tool and intervene per policy. Determine patient's food preferences and provide high-protein, high-caloric foods as appropriate.     - Assist patient with eating.  - Allow adequate time for meals.  - Encourage patient to take dietary supplement as ordered.  - Collaborate with clinical nutritionist.  - Include patient/family/caregiver in decisions related to  nutrition.  Outcome: Progressing     Problem: Prexisting or High Potential for Compromised Skin Integrity  Goal: Skin integrity is maintained or improved  Description: INTERVENTIONS:  - Identify patients at risk for skin breakdown  - Assess and monitor skin integrity  - Assess and monitor nutrition and hydration status  - Monitor labs   - Assess for incontinence   - Turn and reposition patient  - Assist with mobility/ambulation  - Relieve pressure over bony prominences  - Avoid friction and shearing  - Provide appropriate hygiene as needed including keeping skin clean and dry  - Evaluate need for skin moisturizer/barrier cream  - Collaborate with interdisciplinary team   - Patient/family teaching  - Consider wound care consult   Outcome: Progressing     Problem: Nutrition/Hydration-ADULT  Goal: Nutrient/Hydration intake appropriate for improving, restoring or maintaining nutritional needs  Description: Monitor and assess patient's nutrition/hydration status for malnutrition. Collaborate with interdisciplinary team and initiate plan and interventions as ordered.  Monitor patient's weight and dietary intake as ordered or per policy. Utilize nutrition screening tool and intervene as necessary. Determine patient's food preferences and provide high-protein, high-caloric foods as appropriate.     INTERVENTIONS:  - Monitor oral intake, urinary output, labs, and treatment plans  - Assess nutrition and hydration status and recommend course of action  - Evaluate amount of meals eaten  - Assist patient with eating if necessary   - Allow adequate time for meals  - Recommend/ encourage appropriate diets, oral nutritional supplements, and vitamin/mineral supplements  - Order, calculate, and assess calorie counts as needed  - Recommend, monitor, and adjust tube feedings and TPN/PPN based on assessed needs  - Assess need for intravenous fluids  - Provide specific nutrition/hydration education as appropriate  - Include  patient/family/caregiver in decisions related to nutrition  Outcome: Progressing

## 2024-01-16 NOTE — ASSESSMENT & PLAN NOTE
13 Days Post-Op Procedure(s):  SUBOCCIPITAL CRANIECTOMY FOR POSTERIOR FOSSA DECOMPRESSION; DISPOSE OF BONE FLAP (LULA, 1/3)  S/p EVD replacement on 1/5/24 2/2 extensive IVH, replaced 1/14 due to not draining/clogged  Presented with nausea, headache and ataxia. 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  On exam, no eye opening. Responding to noxious stimuli with some purposeful movement, not in LUE. Exam worse than baseline am of 1/15 so taken for stat CT head.    Imaging:  CT head wo, 1/15/2023: Evolving left cerebellar PICC infarct with slightly increased size of the left cerebellum parenchymal hematoma. Stable mass effect. Stable extensive diffuse intraventricular hemorrhage with ventriculomegaly and transependymal CSF resorption. Unchanged 5 mm right to left midline shift.  MRI brain wo 1/9/24:  Stable exam demonstrating extensive intraventricular hemorrhage with surrounding vasogenic edema and/or transependymal flow of CSF. Stable hydrocephalus. Evolving subacute infarct in the left cerebellar hemisphere. Stable petechial hemorrhage, vasogenic edema and mass effect.    Plan  Continue regular neurologic checks.   STAT CTH for decline in exam greater than 2 points in 1 hour  EVD was re-inserted on 1/5/24, replaced 1/14.  EVD open at 0. Maintain at this level at this time.   Ancef 2 g q 8 hrs while drain is in place, as EVD continues to be flushed regularly proximally and distally.     ICP < 20, ICP -1-3/24H  135 mL/24 hours.   Ongoing medical management per primary team.   Na goal > 145-155. Continue to monitor. 140 today.   SBP < 160  Pain control per primary team.   Neurology was consulted for stroke management.   ASA d/c 1/3/23, hold for at least 2 weeks post op  PT/OT/PMR evaluation as able  DVT PPX: SCD,  [facility]    Neurosurgery will continue to follow. Please call with questions or concerns.

## 2024-01-16 NOTE — NURSING NOTE
[facility] consult received for placement of a second midline. Discussed with [facility] primary, Dr Guerrero, via TT who states pt may require intermittent infusions of 3% saline and the team is wanting to d/c IJ CVC. If long term central access is not required, would not recommend removal of IJ CVC unless there are concerns regarding function or onset of infection. There is no max dwell time for IJ CVC per guidelines. However, if there is anticipated long term need for central access, PICC would be appropriate to place. Previous attempts for PICC placement at the bedside unsuccessful. If team decides that long term PICC access is needed, please consult IR for placement. This was communicated via TT as well as offer to coordinate, without response. Midline consult will be completed at this time, as a second midline would not be appropriate as well as not the appropriate access if central access is required. Please reach out to [facility] team via TT with any further questions/needs.

## 2024-01-17 NOTE — ASSESSMENT & PLAN NOTE
14 Days Post-Op Procedure(s):  SUBOCCIPITAL CRANIECTOMY FOR POSTERIOR FOSSA DECOMPRESSION; DISPOSE OF BONE FLAP (LULA, 1/3)  S/p EVD replacement on 1/5/24 2/2 extensive IVH, replaced 1/14 due to not draining/clogged  Presented with nausea, headache and ataxia. 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  On exam, GCS 11. Spontaneous eye opening. Follows commands on right. Nodding yes and no to questions.    Imaging:  CT head wo, 1/15/2023: Evolving left cerebellar PICC infarct with slightly increased size of the left cerebellum parenchymal hematoma. Stable mass effect. Stable extensive diffuse intraventricular hemorrhage with ventriculomegaly and transependymal CSF resorption. Unchanged 5 mm right to left midline shift.  MRI brain wo 1/9/24:  Stable exam demonstrating extensive intraventricular hemorrhage with surrounding vasogenic edema and/or transependymal flow of CSF. Stable hydrocephalus. Evolving subacute infarct in the left cerebellar hemisphere. Stable petechial hemorrhage, vasogenic edema and mass effect.    Plan  Continue regular neurologic checks.   STAT CTH for decline in exam greater than 2 points in 1 hour  EVD was re-inserted on 1/5/24, replaced 1/14.  EVD open at 0. Maintain at this level at this time.   Ancef 2 g q 8 hrs while drain is in place, as EVD continues to be flushed regularly proximally and distally.     ICP < 20, ICP 0-3/24H  175 mL/24 hours.   Ongoing medical management per primary team.   Na goal > 145-155. Continue to monitor. 147 today.   SBP < 160  Pain control per primary team.   Neurology was consulted for stroke management.   ASA d/c 1/3/23, hold for at least 2 weeks post op  PT/OT/PMR evaluation as able  DVT PPX: SCD, SQH    Neurosurgery will continue to follow. Please call with  questions or concerns.

## 2024-01-17 NOTE — NUTRITION
Nutrition Support FU    Pt remains on EN at goal via ng to meet nutritional needs  No wts since 1/3  Bun elevated slightly.   1)For now pending current wt, con't EN of glucerna 1.2 @ 45 ml/hr and 1 pk prosource TF/day.   2) Increase flushes, d/c saline flush and adjust H2O flush to 100 ml H2O q 4hrs   3) Daily wts     For additional detail see full nutrition note in flow sheet

## 2024-01-17 NOTE — OCCUPATIONAL THERAPY NOTE
Occupational Therapy         Patient Name: Debbie Moody  Today's Date: 1/17/2024 01/17/24 1400   OT Last Visit   OT Visit Date 01/17/24   Note Type   Note type Cancelled Session   Cancel Reasons Other   Additional Comments Pending clarification of need for helmet to mobilize       Lea Steele

## 2024-01-17 NOTE — PLAN OF CARE
Problem: PAIN - ADULT  Goal: Verbalizes/displays adequate comfort level or baseline comfort level  Description: Interventions:  - Encourage patient to monitor pain and request assistance  - Assess pain using appropriate pain scale  - Administer analgesics based on type and severity of pain and evaluate response  - Implement non-pharmacological measures as appropriate and evaluate response  - Consider cultural and social influences on pain and pain management  - Notify physician/advanced practitioner if interventions unsuccessful or patient reports new pain  Outcome: Progressing     Problem: INFECTION - ADULT  Goal: Absence or prevention of progression during hospitalization  Description: INTERVENTIONS:  - Assess and monitor for signs and symptoms of infection  - Monitor lab/diagnostic results  - Monitor all insertion sites, i.e. indwelling lines, tubes, and drains  - Monitor endotracheal if appropriate and nasal secretions for changes in amount and color  - [place]on appropriate cooling/warming therapies per order  - Administer medications as ordered  - Instruct and encourage patient and family to use good hand hygiene technique  - Identify and instruct in appropriate isolation precautions for identified infection/condition  Outcome: Progressing  Goal: Absence of fever/infection during neutropenic period  Description: INTERVENTIONS:  - Monitor WBC    Outcome: Progressing     Problem: SAFETY ADULT  Goal: Patient will remain free of falls  Description: INTERVENTIONS:  - Educate patient/family on patient safety including physical limitations  - Instruct patient to call for assistance with activity   - Consult OT/PT to assist with strengthening/mobility   - Keep Call bell within reach  - Keep bed low and locked with side rails adjusted as appropriate  - Keep care items and personal belongings within reach  - Initiate and maintain comfort rounds  - Make Fall Risk Sign visible to staff  - Offer Toileting every 2 Hours,  in advance of need  - Initiate/Maintain bed alarm  - Obtain necessary fall risk management equipment  - Apply yellow socks and bracelet for high fall risk patients  - Consider moving patient to room near nurses station  Outcome: Progressing  Goal: Maintain or return to baseline ADL function  Description: INTERVENTIONS:  -  Assess patient's ability to carry out ADLs; assess patient's baseline for ADL function and identify physical deficits which impact ability to perform ADLs (bathing, care of mouth/teeth, toileting, grooming, dressing, etc.)  - Assess/evaluate cause of self-care deficits   - Assess range of motion  - Assess patient's mobility; develop plan if impaired  - Assess patient's need for assistive devices and provide as appropriate  - Encourage maximum independence but intervene and supervise when necessary  - Involve family in performance of ADLs  - Assess for home care needs following discharge   - Consider OT consult to assist with ADL evaluation and planning for discharge  - Provide patient education as appropriate  Outcome: Progressing  Goal: Maintains/Returns to pre admission functional level  Description: INTERVENTIONS:  - Perform AM-PAC 6 Click Basic Mobility/ Daily Activity assessment daily.  - Set and communicate daily mobility goal to care team and patient/family/caregiver.   - Collaborate with rehabilitation services on mobility goals if consulted  - Perform Range of Motion 3 times a day.  - Reposition patient every 2 hours.  - Dangle patient 3 times a day  - Stand patient 3 times a day  - Ambulate patient 3 times a day  - Out of bed to chair 3 times a day   - Out of bed for meals 3 times a day  - Out of bed for toileting  - Record patient progress and toleration of activity level   Outcome: Progressing     Problem: DISCHARGE PLANNING  Goal: Discharge to home or other facility with appropriate resources  Description: INTERVENTIONS:  - Identify barriers to discharge w/patient and caregiver  -  Arrange for needed discharge resources and transportation as appropriate  - Identify discharge learning needs (meds, wound care, etc.)  - Arrange for interpretive services to assist at discharge as needed  - Refer to Case Management Department for coordinating discharge planning if the patient needs post-hospital services based on physician/advanced practitioner order or complex needs related to functional status, cognitive ability, or social support system  Outcome: Progressing     Problem: Knowledge Deficit  Goal: Patient/family/caregiver demonstrates understanding of disease process, treatment plan, medications, and discharge instructions  Description: Complete learning assessment and assess knowledge base.  Interventions:  - Provide teaching at level of understanding  - Provide teaching via preferred learning methods  Outcome: Progressing     Problem: Neurological Deficit  Goal: Neurological status is stable or improving  Description: Interventions:  - Monitor and assess patient's level of consciousness, motor function, sensory function, and level of assistance needed for ADLs.   - Monitor and report changes from baseline. Collaborate with interdisciplinary team to initiate plan and implement interventions as ordered.   - Provide and maintain a safe environment.  - Consider seizure precautions.  - Consider fall precautions.  - Consider aspiration precautions.  - Consider bleeding precautions.  Outcome: Progressing     Problem: Activity Intolerance/Impaired Mobility  Goal: Mobility/activity is maintained at optimum level for patient  Description: Interventions:  - Assess and monitor patient  barriers to mobility and need for assistive/adaptive devices.  - Assess patient's emotional response to limitations.  - Collaborate with interdisciplinary team and initiate plans and interventions as ordered.  - Encourage independent activity per ability.  - Maintain proper body alignment.  - Perform active/passive rom as  tolerated/ordered.  - Plan activities to conserve energy.  - Turn patient as appropriate  Outcome: Progressing     Problem: Communication Impairment  Goal: Ability to express needs and understand communication  Description: Assess patient's communication skills and ability to understand information.  Patient will demonstrate use of effective communication techniques, alternative methods of communication and understanding even if not able to speak.     - Encourage communication and provide alternate methods of communication as needed.  - Collaborate with case management/ for discharge needs.  - Include patient/family/caregiver in decisions related to communication.  Outcome: Progressing     Problem: Potential for Aspiration  Goal: Ventilated patient's risk of aspiration is minimized  Description: Assess and monitor vital signs, respiratory status, airway cuff pressure, and labs (WBC).  Monitor for signs of aspiration (tachypnea, cough, rales, wheezing, cyanosis, fever).    - Elevate head of bed 30 degrees if patient has tube feeding.  - Monitor tube feeding.  Outcome: Progressing     Problem: Nutrition  Goal: Nutrition/Hydration status is improving  Description: Monitor and assess patient's nutrition/hydration status for malnutrition (ex- brittle hair, bruises, dry skin, pale skin and conjunctiva, muscle wasting, smooth red tongue, and disorientation). Collaborate with interdisciplinary team and initiate plan and interventions as ordered.  Monitor patient's weight and dietary intake as ordered or per policy. Utilize nutrition screening tool and intervene per policy. Determine patient's food preferences and provide high-protein, high-caloric foods as appropriate.     - Assist patient with eating.  - Allow adequate time for meals.  - Encourage patient to take dietary supplement as ordered.  - Collaborate with clinical nutritionist.  - Include patient/family/caregiver in decisions related to  nutrition.  Outcome: Progressing     Problem: Prexisting or High Potential for Compromised Skin Integrity  Goal: Skin integrity is maintained or improved  Description: INTERVENTIONS:  - Identify patients at risk for skin breakdown  - Assess and monitor skin integrity  - Assess and monitor nutrition and hydration status  - Monitor labs   - Assess for incontinence   - Turn and reposition patient  - Assist with mobility/ambulation  - Relieve pressure over bony prominences  - Avoid friction and shearing  - Provide appropriate hygiene as needed including keeping skin clean and dry  - Evaluate need for skin moisturizer/barrier cream  - Collaborate with interdisciplinary team   - Patient/family teaching  - Consider wound care consult   Outcome: Progressing     Problem: Nutrition/Hydration-ADULT  Goal: Nutrient/Hydration intake appropriate for improving, restoring or maintaining nutritional needs  Description: Monitor and assess patient's nutrition/hydration status for malnutrition. Collaborate with interdisciplinary team and initiate plan and interventions as ordered.  Monitor patient's weight and dietary intake as ordered or per policy. Utilize nutrition screening tool and intervene as necessary. Determine patient's food preferences and provide high-protein, high-caloric foods as appropriate.     INTERVENTIONS:  - Monitor oral intake, urinary output, labs, and treatment plans  - Assess nutrition and hydration status and recommend course of action  - Evaluate amount of meals eaten  - Assist patient with eating if necessary   - Allow adequate time for meals  - Recommend/ encourage appropriate diets, oral nutritional supplements, and vitamin/mineral supplements  - Order, calculate, and assess calorie counts as needed  - Recommend, monitor, and adjust tube feedings and TPN/PPN based on assessed needs  - Assess need for intravenous fluids  - Provide specific nutrition/hydration education as appropriate  - Include  patient/family/caregiver in decisions related to nutrition  Outcome: Progressing     Problem: COPING  Goal: Pt/Family able to verbalize concerns and demonstrate effective coping strategies  Description: INTERVENTIONS:  - Assist patient/family to identify coping skills, available support systems and cultural and spiritual values  - Provide emotional support, including active listening and acknowledgement of concerns of patient and caregivers  - Reduce environmental stimuli, as able  - Provide patient education  - Assess for spiritual pain/suffering and initiate spiritual care, including notification of Pastoral Care or nancie based community as needed  - Assess effectiveness of coping strategies  Outcome: Progressing  Goal: Will report anxiety at manageable levels  Description: INTERVENTIONS:  - Administer medication as ordered  - Teach and encourage coping skills  - Provide emotional support  - Assess patient/family for anxiety and ability to cope  Outcome: Progressing     Problem: NEUROSENSORY - ADULT  Goal: Achieves stable or improved neurological status  Description: INTERVENTIONS  - Monitor and report changes in neurological status  - Monitor vital signs such as temperature, blood pressure, glucose, and any other labs ordered   - Initiate measures to prevent increased intracranial pressure  - Monitor for seizure activity and implement precautions if appropriate      Outcome: Progressing  Goal: Remains free of injury related to seizures activity  Description: INTERVENTIONS  - Maintain airway, patient safety  and administer oxygen as ordered  - Monitor patient for seizure activity, document and report duration and description of seizure to physician/advanced practitioner  - If seizure occurs,  ensure patient safety during seizure  - Reorient patient post seizure  - Seizure pads on all 4 side rails  - Instruct patient/family to notify RN of any seizure activity including if an aura is experienced  - Instruct  patient/family to call for assistance with activity based on nursing assessment  - Administer anti-seizure medications if ordered    Outcome: Progressing  Goal: Achieves maximal functionality and self care  Description: INTERVENTIONS  - Monitor swallowing and airway patency with patient fatigue and changes in neurological status  - Encourage and assist patient to increase activity and self care.   - Encourage visually impaired, hearing impaired and aphasic patients to use assistive/communication devices  Outcome: Progressing     Problem: CARDIOVASCULAR - ADULT  Goal: Maintains optimal cardiac output and hemodynamic stability  Description: INTERVENTIONS:  - Monitor I/O, vital signs and rhythm  - Monitor for S/S and trends of decreased cardiac output  - Administer and titrate ordered vasoactive medications to optimize hemodynamic stability  - Assess quality of pulses, skin color and temperature  - Assess for signs of decreased coronary artery perfusion  - Instruct patient to report change in severity of symptoms  Outcome: Progressing  Goal: Absence of cardiac dysrhythmias or at baseline rhythm  Description: INTERVENTIONS:  - Continuous cardiac monitoring, vital signs, obtain 12 lead EKG if ordered  - Administer antiarrhythmic and heart rate control medications as ordered  - Monitor electrolytes and administer replacement therapy as ordered  Outcome: Progressing     Problem: RESPIRATORY - ADULT  Goal: Achieves optimal ventilation and oxygenation  Description: INTERVENTIONS:  - Assess for changes in respiratory status  - Assess for changes in mentation and behavior  - Position to facilitate oxygenation and minimize respiratory effort  - Oxygen administered by appropriate delivery if ordered  - Initiate smoking cessation education as indicated  - Encourage broncho-pulmonary hygiene including cough, deep breathe, Incentive Spirometry  - Assess the need for suctioning and aspirate as needed  - Assess and instruct to report  SOB or any respiratory difficulty  - Respiratory Therapy support as indicated  Outcome: Progressing     Problem: GASTROINTESTINAL - ADULT  Goal: Maintains or returns to baseline bowel function  Description: INTERVENTIONS:  - Assess bowel function  - Encourage oral fluids to ensure adequate hydration  - Administer IV fluids if ordered to ensure adequate hydration  - Administer ordered medications as needed  - Encourage mobilization and activity  - Consider nutritional services referral to assist patient with adequate nutrition and appropriate food choices  Outcome: Progressing  Goal: Maintains adequate nutritional intake  Description: INTERVENTIONS:  - Monitor percentage of each meal consumed  - Identify factors contributing to decreased intake, treat as appropriate  - Assist with meals as needed  - Monitor I&O, weight, and lab values if indicated  - Obtain nutrition services referral as needed  Outcome: Progressing     Problem: GENITOURINARY - ADULT  Goal: Maintains or returns to baseline urinary function  Description: INTERVENTIONS:  - Assess urinary function  - Encourage oral fluids to ensure adequate hydration if ordered  - Administer IV fluids as ordered to ensure adequate hydration  - Administer ordered medications as needed  - Offer frequent toileting  - Follow urinary retention protocol if ordered  Outcome: Progressing  Goal: Absence of urinary retention  Description: INTERVENTIONS:  - Assess patient’s ability to void and empty bladder  - Monitor I/O  - Bladder scan as needed  - Discuss with physician/AP medications to alleviate retention as needed  - Discuss catheterization for long term situations as appropriate  Outcome: Progressing     Problem: METABOLIC, FLUID AND ELECTROLYTES - ADULT  Goal: Electrolytes maintained within normal limits  Description: INTERVENTIONS:  - Monitor labs and assess patient for signs and symptoms of electrolyte imbalances  - Administer electrolyte replacement as ordered  -  Monitor response to electrolyte replacements, including repeat lab results as appropriate  - Instruct patient on fluid and nutrition as appropriate  Outcome: Progressing  Goal: Fluid balance maintained  Description: INTERVENTIONS:  - Monitor labs   - Monitor I/O and WT  - Instruct patient on fluid and nutrition as appropriate  - Assess for signs & symptoms of volume excess or deficit  Outcome: Progressing  Goal: Glucose maintained within target range  Description: INTERVENTIONS:  - Monitor Blood Glucose as ordered  - Assess for signs and symptoms of hyperglycemia and hypoglycemia  - Administer ordered medications to maintain glucose within target range  - Assess nutritional intake and initiate nutrition service referral as needed  Outcome: Progressing

## 2024-01-17 NOTE — SPEECH THERAPY NOTE
Speech-Language Pathology Bedside Swallow Evaluation      Patient Name: Debbie Moody    Today's Date: 1/17/2024     Problem List  Principal Problem:    Acute CVA (cerebrovascular accident) (HCC)  Active Problems:    Type 2 diabetes mellitus with stage 3b chronic kidney disease, without long-term current use of insulin (HCC)    Anxiety    Essential hypertension    Hypercholesterolemia    Simple chronic anemia    Overweight (BMI 25.0-29.9)    Chronic kidney disease, stage 3b (HCC)    Pulmonary fibrosis (HCC)    Encephalopathy    Status post craniectomy      Past Medical History  Past Medical History:   Diagnosis Date    Acute kidney injury (HCC)     Acute respiratory failure with hypoxia (HCC) 12/16/2021    Broken arm     hairline fracture/ 2 places///   right arm    Diabetes mellitus (HCC)     Diverticulitis     Pneumothorax- Resolved     Postherpetic neuralgia        Past Surgical History  Past Surgical History:   Procedure Laterality Date    CHOLECYSTECTOMY      COLON SURGERY      CRANIECTOMY N/A 1/3/2024    Procedure: SUBOCCIPITAL CRANIECTOMY FOR POSTERIOR FOSSA DECOMPRESSION; DISPOSE OF BONE FLAP;  Surgeon: David Higgins MD;  Location: [facility];  Service: Neurosurgery    HERNIA REPAIR      IR EMBOLIZATION (SPECIFY VESSEL OR SITE)  1/12/2022    ROTATOR CUFF REPAIR Right     VARICOSE VEIN SURGERY      Impression:  2/12/16 Jake Sarabia       Summary   Pt presented with s/s suggestive of mild oral and suspected mild pharyngeal dysphagia. Oral care also administered prior to PO intake. Pt assessed with puree solids, honey thick liquids, nectar thick liquids and thin liquids. Materials given via tsp and straw. Reduced oral control with materials via tsp. Bolus transfer is min delayed. Laryngeal rise is adequate. Intermittent cough with nectar thick liquid via straw and tsp. No overt s/s of aspiration with thin liquids, however pt is lethargic during trials and at high risk for aspiration.  Pt is non-verbal, with  instances of hoarse vocalizations.     Pt intubated 1/7/24- 1/15/24.     Risk/s for Aspiration: high    Recommended Diet: puree/level 1 diet and honey thick liquids   Recommended Form of Meds: crushed with puree   Aspiration precautions and swallowing strategies: upright posture, only feed when fully alert, small bites/sips, and liquids by teaspoon only  Other Recommendations: Continue frequent oral care; VBS when able      Current Medical Status  1/3: Pt seen post procedure s/p Suboccipital craniectomy for posterior fossa decompression, no issues per surgeon. Per anesthesia, minimal blood loss and brief hypoxia episode that improved after suctioning   1/4-1/7: Extubated, EVD removed, large IVH, Re-intubated, new EVD placed.   1/8: This morning , off sedation, pt followed commands by squeezing with right upper ext, eyes open to voice.  pt's  updated at bedside along with Neurosurg Dr. Higgins  -pt's daughter requesting to speak to Dr. Cervantes, Dr. Cervantes. Dr. Cervantes notified    1/9: weakly followed commands with right upper ext.Discussed with pt's daughters about Trach. Explained that this is pt's second intubation, large IVH and age; pt will likely need Trach and PEG, if their goal is to continue everything vs comfort care. Also explain that we will continue daily SBT to see if pt is extubate-able. Eldest daughter stated they will like to see the MRI and will discuss with their father tomorrow in person   1/10: pt awake eye open with gaze to the right did not cross midline but followed commands briskly with right side extremity . Pt son and spouse at bedside. See advance care planning note for detailed family discussion. -MRI reviewed. - s/p off hypertonic  -s/p off cardene    1/11-1/14: extubated, exam improving, EVD exchanged    1/15: Per report pt not following commands this morning and hypotensive. S/P Albumin and CT head. CT head stable . During round, pt wakes up slowly and follows commands,  at bed  side. pt's  updated at bedside   1/16: pt opens eyes with stimulation, states name and location and goes back to sleep    Current Precautions:  Aspiration      Allergies:  No known food allergies    Past medical history:  Please see H&P for details    Special Studies:  CT head 1/15/24:   IMPRESSION:     Evolving left cerebellar PICC infarct with slightly increased size of the left cerebellum parenchymal hematoma. Stable mass effect.     Stable extensive diffuse intraventricular hemorrhage with ventriculomegaly and transependymal CSF resorption. Unchanged 5 mm right to left midline shift.      MRI brain 1/9/24-   IMPRESSION:        1. Stable exam demonstrating extensive intraventricular hemorrhage with surrounding vasogenic edema and/or transependymal flow of CSF. Stable hydrocephalus.  2. Evolving subacute infarct in the left cerebellar hemisphere. Stable petechial hemorrhage, vasogenic edema and mass effect.      Social/Education/Vocational Hx:  Pt lives with family    Swallow Information   Current Risks for Dysphagia & Aspiration: CVA, known history of dysphagia, and recent intubation  Current Symptoms/Concerns: coughing with po  Current Diet: NPO with tube feeds   Baseline Diet:  Puree and honey thick liquid from previous assessments, however baseline is regular solids and thin liquids.    Baseline Assessment   Behavior/Cognition: lethargic  Speech/Language Status: limited verbal output  Patient Positioning: upright in bed  Pain Status/Interventions/Response to Interventions: No report of or nonverbal indications of pain.    Swallow Mechanism Exam  Facial:  Left facial weakness  Labial: bilateral decreased ROM and decreased strength  Lingual: decreased ROM  Velum: unable to visualize  Mandible: unable to to open mouth on command  Dentition: adequate  Vocal quality:hoarse   Respiratory Status: on 2L O2      Consistencies Assessed and Performance   Consistencies Administered: thin liquids, nectar thick,  honey thick, and puree  Materials administered included thin liquids, nectar thick, honey thick and applesauce    Oral Stage: mild  Bolus formation and transfer were min delayed with no significant oral residue noted. Reduced oral control with materials presented via tsp.    Pharyngeal Stage: mild  Swallow Mechanics:  Swallowing initiation appeared min delayed.  Laryngeal rise was palpated and judged to be within functional limits. Intermittent throat clear and cough observed with nectar thick liquid.     Esophageal Concerns: none reported    Summary and Recommendations (see above)    Results Reviewed with: patient, RN, and family and MD    Treatment Recommended: Dysphagia therapy     Frequency of treatment: 2-3x week    Patient Stated Goal: none stated    Dysphagia LTG  -Patient will demonstrate safe and effective oral intake (without overt s/s significant oral/pharyngeal dysphagia including s/s penetration or aspiration) for the highest appropriate diet level.     Short Term Goals:  -Pt will tolerate Dysphagia 1/pureed diet and honey thick liquid with no significant s/s oral or pharyngeal dysphagia across 1-3 diagnostic session/s    -Patient will tolerate trials of upgraded food and/or liquid texture with no significant s/s of oral or pharyngeal dysphagia including aspiration across 1-3 diagnostic sessions     -Patient will comply with a Video/Modified Barium Swallow study for more complete assessment of swallowing anatomy/physiology/aspiration risk and to assess efficacy of treatment techniques so as to best guide treatment plan    Speech Therapy Prognosis   Prognosis: fair    Prognosis Considerations: age, medical status, and prior medical history

## 2024-01-17 NOTE — PROGRESS NOTES
[facility]  Progress Note  Name: Debbie Moody I  MRN: [id number]  Unit/Bed#: ICU 04 I Date of Admission: 12/31/2023   Date of Service: 1/17/2024 I Hospital Day: 17    Assessment/Plan   * Acute CVA (cerebrovascular accident) (HCC)  Assessment & Plan  14 Days Post-Op Procedure(s):  SUBOCCIPITAL CRANIECTOMY FOR POSTERIOR FOSSA DECOMPRESSION; DISPOSE OF BONE FLAP (LULA, 1/3)  S/p EVD replacement on 1/5/24 2/2 extensive IVH, replaced 1/14 due to not draining/clogged  Presented with nausea, headache and ataxia. 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  On exam, GCS 11. Spontaneous eye opening. Follows commands on right. Nodding yes and no to questions.    Imaging:  CT head wo, 1/15/2023: Evolving left cerebellar PICC infarct with slightly increased size of the left cerebellum parenchymal hematoma. Stable mass effect. Stable extensive diffuse intraventricular hemorrhage with ventriculomegaly and transependymal CSF resorption. Unchanged 5 mm right to left midline shift.  MRI brain wo 1/9/24:  Stable exam demonstrating extensive intraventricular hemorrhage with surrounding vasogenic edema and/or transependymal flow of CSF. Stable hydrocephalus. Evolving subacute infarct in the left cerebellar hemisphere. Stable petechial hemorrhage, vasogenic edema and mass effect.    Plan  Continue regular neurologic checks.   STAT CTH for decline in exam greater than 2 points in 1 hour  EVD was re-inserted on 1/5/24, replaced 1/14.  EVD open at 0. Maintain at this level at this time.   Ancef 2 g q 8 hrs while drain is in place, as EVD continues to be flushed regularly proximally and distally.     ICP < 20, ICP 0-3/24H  175 mL/24 hours.   Ongoing medical management per primary team.   Na goal > 145-155.  "Continue to monitor. 147 today.   SBP < 160  Pain control per primary team.   Neurology was consulted for stroke management.   ASA d/c 1/3/23, hold for at least 2 weeks post op  PT/OT/PMR evaluation as able  DVT PPX: SCD, SQH    Neurosurgery will continue to follow. Please call with questions or concerns.              Subjective/Objective   Chief Complaint: N/A    Subjective: N/A    Objective: Critically ill. On NC. Tracking and following commands on right. No movement LUE. Left frontal EVD in place.    I/O         01/15 0701 01/16 0700 01/16 0701 01/17 0700 01/17 0701 01/18 0700    P.O. 0      I.V. (mL/kg) 30 (0.5)      Blood  413.3     NG/ 480     IV Piggyback 800      Feedings 1080 900     Total Intake(mL/kg) 2470 (38.5) 1793.3 (27.9)     Urine (mL/kg/hr) 750 (0.5) 1330 (0.9)     Emesis/NG output  0     Drains 135 174     Stool 0 0     Total Output 885 1504     Net +1585 +289.3            Unmeasured Urine Occurrence 2 x      Unmeasured Stool Occurrence 2 x 2 x             Invasive Devices       Peripheral Intravenous Line  Duration             Long-Dwell Peripheral IV (Midline) 01/03/24 Left Cephalic Vein 13 days    Peripheral IV 01/16/24 Left Wrist <1 day              Arterial Line  Duration             Arterial Line 01/15/24 Radial 1 day              Drain  Duration             Ventriculostomy/Subdural Ventricular drainage catheter Left Frontal region 11 days    External Urinary Catheter 4 days    NG/OG/Enteral Tube Nasogastric Right nare 4 days                    Physical Exam:  Vitals: Blood pressure (!) 147/39, pulse 74, temperature 98.5 °F (36.9 °C), temperature source Axillary, resp. rate 14, height 4' 10\" (1.473 m), weight 64.2 kg (141 lb 8.6 oz), SpO2 100%, not currently breastfeeding.,Body mass index is 29.58 kg/m².    Hemodynamic Monitoring: MAP: Arterial Line MAP (mmHg): 90 mmHg, CPP: CPP Cuff-Calculated (mmHg): 71, CVP:  , ICP Mean: ICP Mean (mmHg): 0 mmHg    General appearance: alert, " "critically ill  Head: left frontal EVD in place  Eyes: PERRL  Neck: supple, symmetrical, trachea midline  Back: no kyphosis present  Lungs: non labored breathing, on NC  Heart: regular heart rate  Neurologic:   Mental status: GCS 11 (E4, V1, M6)  Cranial nerves: grossly intact (Cranial nerves II-XII)  Sensory: normal to LT  Motor: Spontaneous movement and following commands except LUE      Lab Results:  Results from last 7 days   Lab Units 01/17/24  0446 01/16/24  1239 01/16/24  0636 01/16/24  0424 01/15/24  0455   WBC Thousand/uL 6.71  --   --  6.39 8.28   HEMOGLOBIN g/dL 8.7* 8.5* 6.4* 6.6* 7.5*   HEMATOCRIT % 27.3* 26.2* 20.1* 20.5* 24.2*   PLATELETS Thousands/uL 206  --   --  220 266   NEUTROS PCT % 71  --   --  69 79*   MONOS PCT % 8  --   --  7 7   EOS PCT % 3  --   --  3 1     Results from last 7 days   Lab Units 01/17/24  0446 01/16/24  0424 01/15/24  0455   SODIUM mmol/L 147 140 142   POTASSIUM mmol/L 4.2 4.1 4.3   CHLORIDE mmol/L 114* 110* 104   CO2 mmol/L 31 30 32   BUN mg/dL 35* 38* 37*   CREATININE mg/dL 0.91 0.96 0.96   CALCIUM mg/dL 8.7 8.6 8.6     Results from last 7 days   Lab Units 01/17/24  0446 01/15/24  0455 01/13/24  0507   MAGNESIUM mg/dL 2.2 2.3 2.3     Results from last 7 days   Lab Units 01/15/24  0455 01/13/24  0507 01/12/24  0532   PHOSPHORUS mg/dL 3.5 4.0 3.4         No results found for: \"TROPONINT\"  ABG:  Lab Results   Component Value Date    PHART 7.405 01/16/2024    HZB0EHJ 42.3 01/16/2024    PO2ART 122.5 01/16/2024    VJO5TDO 25.9 01/16/2024    BEART 1.0 01/16/2024    SOURCE Line, Arterial 01/16/2024       Imaging Studies: I have personally reviewed pertinent reports.   and I have personally reviewed pertinent films in PACS    XR chest portable ICU    Result Date: 1/6/2024  Impression: Endotracheal tube tip 3.4 cm above the ceasar. Workstation performed: [id number]     CTA head w wo contrast    Result Date: 1/5/2024  Impression: Stable vascularity when compared with prior CT " angiogram from 12/30/2023. No large vessel occlusion. Mild atherosclerotic change present. There is no left posterior inferior cerebellar artery visualized within the posterior fossa in this patient with a known PICA infarct. No venous abnormality identified. Workstation performed: [id number]     CT head wo contrast    Result Date: 1/5/2024  Impression: Similar parenchymal changes from left PICA infarct. Petechial hemorrhage with some mild hemorrhagic conversion in the left cerebellar hemisphere. Previous right side  shunt catheter has been withdrawn. Extensive intraventricular blood as detailed above. Developing hydrocephalus. Narrowing of sulci bilaterally. No convincing uncal herniation. No tonsillar herniation (suboccipital craniectomy). I personally discussed this study with Violet Trejo on 1/5/2024 5:24 PM. Workstation performed: [id number]     CT head wo contrast    Result Date: 1/5/2024  Impression: Status post decompressive suboccipital craniectomy for a PICA distribution infarct with slightly improved mass effect on the fourth ventricle and improving hydrocephalus with ventriculostomy in place. Overall degree of hemorrhage within the left cerebellum and vermis is similar to the prior examination. No new areas of acute intracranial hemorrhage identified. Workstation performed: [id number]     XR chest portable    Result Date: 1/4/2024  Impression: Tubes and lines in adequate position. No acute pulmonary pathology. Workstation performed: [id number]     XR chest portable    Result Date: 1/4/2024  Impression: Endotracheal tube in the proximal right mainstem bronchus. Workstation performed: [id number]     X-ray chest 1 view    Result Date: 1/4/2024  Impression: Tubes and lines as described above. Low lung volumes demonstrated without consolidation or other acute pulmonary findings. Workstation performed: [id number]     CT head wo contrast    Result Date: 1/3/2024  Impression: Status post interval  decompressive suboccipital craniectomy with expected subjacent postsurgical changes/pneumocephalus. Similar appearance of evolving left PICA territory infarct and associated edema/mass effect and with probable petechial cortical hemorrhage compared to the most immediate prior study. Similar appearance focal parenchymal hematoma in the left anterior cerebellar vermis adjacent to the fourth ventricle compared to the most immediate prior study. No new hemorrhage or mass effect. Minimally improved hydrocephalus status post placement of right frontal approach ventricular catheter. Workstation performed: [id number]     XR chest portable    Result Date: 1/3/2024  Impression: Mild left base opacity. Aspiration not excluded. Workstation performed: [id number]     CT head wo contrast    Result Date: 1/3/2024  Impression: Unchanged hydrocephalus status post placement of right frontal ventriculostomy catheter with tip near the foramen of Monro. Small amount of pneumocephalus in the frontal horn of the right lateral ventricle. Unchanged left PCA infarct with compression of the fourth ventricle and unchanged focal parenchymal hemorrhage adjacent to the fourth ventricle. The study was marked in EPIC for immediate notification. Workstation performed: [id number]     CT head wo contrast    Result Date: 1/3/2024  Impression: Evolving left PCA infarct with increased left cerebellar hypodensity and compression of the fourth ventricle resulting in hydrocephalus with increased focal parenchymal hemorrhage adjacent to the fourth ventricle. Neurosurgery consult recommended. I personally discussed this study with AMBAR KOEHLER on 1/3/2024 10:09 AM. Workstation performed: [id number]     XR chest portable    Result Date: 1/1/2024  Impression: Right jugular catheter at cavoatrial junction with no pneumothorax. Chronic scarring with no acute disease. Workstation performed: [id number]     MRI brain wo contrast    Result Date:  12/31/2023  Impression: Acute infarct in left PICA territory with small acute parenchymal hematoma in anterior aspect of left cerebellar vermis, petechial cortical hemorrhage along left posterior cerebellum, and similar compressive mass effect on fourth ventricle. No obstructive hydrocephalus. Recommend consultation with neurosurgery. 1.6 cm intramuscular lesion in left temporalis muscle, indeterminate. Differential includes intramuscular epidermoid cyst, hemangioma, myxoma, among other differentials. Mild chronic microangiopathy. The study was marked in EPIC for immediate notification. Workstation performed: [id number]     CT head wo contrast    Result Date: 12/31/2023  Impression: Redemonstration of evolving acute PICA distribution left cerebellar infarct. Mass effect on the fourth ventricle is slightly increased. No hydrocephalus. Persistent increased attenuation in the anterior aspect of the cerebellar infarct that could represent residual contrast staining but petechial hemorrhage not excluded. Recommend close interval follow-up CT and/or further evaluation with MRI. The study was marked in EPIC for immediate notification. Workstation performed: [id number]       EKG, Pathology, and Other Studies: I have personally reviewed pertinent reports.      VTE Pharmacologic Prophylaxis: Sequential compression device (Venodyne)     VTE Mechanical Prophylaxis: sequential compression device

## 2024-01-17 NOTE — PROGRESS NOTES
[facility]  Progress Note: Critical Care  Name: Debbie Moody 83 y.o. 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 Stable hydrocephalus. Evolving subacute  L PICA territory infarct,stable petechial hemorrhage, vasogenic edema and mass effect.   CTH (1/15) - Evolving left PICA infarct with petechial hemorrhage; extensive diffuse IVHs with stable ventriculomegaly and transependymal CSF resorption. Stable left transfrontal ventriculostomy catheter. Unchanged 0.5 cm right to left midline shift. Suboccipital pseudomeningocele is noted.   Echo EF 70% mild hypertrophy  A1C 8.3  /172/54/141  Plan:   Neuro/Neurosx consulted  Stat CTH for any change in GCS >2pts  EVD @ 0 still with significant drainage  Na 145-155  SBP <160  Hold asa for at least 2 wks post op  Ancef ppx while EVD in place     Diagnosis: Encephalopathy  Plan:   Cont provigil  Neuro checks      CV:   Diagnosis: HTN/HLD  Plan:   Home vasotec held  Cont lisinopril/Norvasc/Coreg/statin  Diagnosis: hypotension  Significantly improved  Albumin to improve pressures if needed      Pulm:  Diagnosis: Acute hypoxic resp failure  Intubated 1/3 Extubated 1/4  Reintubated 1/5 Extubated 1/12  Patient w/ continued rhonchi but improved from initial exams  Plan:   Wean oxygen for sats >92%  Diagnosis: hypercapnia/stroke  Plan: BiPAP overnight to treat hypercarbia/hypercapnia, to prevent steal from stroke bed       GI:   Diagnosis: Dysphagia  Plan:   NGT cont TF  Speech consulted -patient cleared for full liquid diet HTL started 1/17       :   Diagnosis: CKD 3  baseline cr 1-1.3  Plan:   At baseline       F/E/N:   No active issues       Heme/Onc:   Diagnosis: Anemia baseline hgb 9-10  Plan:   No s/s bleeding  cont home iron  Currently stable  Lab Results   Component Value Date    HGB 8.7 (L) 01/17/2024    HCT 27.3 (L) 01/17/2024            Endo:   Diagnosis: DM2  A1C > 8 on admission  Plan:   Holding home glipizide/Januvia  Lantus 15 bid  Humalog 3 U q6  SSI     Lab Results   Component Value Date    GLUC 95 01/17/2024    POCGLU 148 (H) 01/18/2024     ID:   No active issues       MSK/Skin:   T/p q 2  PT/OT    Disposition: Critical care    ICU Core Measures     Vented Patient  VAP Bundle  VAP bundle ordered     A: Assess, Prevent, and Manage Pain Has pain been assessed? Yes  Need for changes to pain regimen? No   B: Both Spontaneous Awakening Trials (SATs) and Spontaneous Breathing Trials (SBTs) Plan to perform spontaneous awakening trial today? N/A   Plan to perform spontaneous breathing trial today? N/A   Obvious barriers to extubation? NA   C: Choice of Sedation RASS Goal: 0 Alert and Calm  Need for changes to sedation or analgesia regimen? No   D: Delirium CAM-ICU: Unable to perform secondary to Acute cognitive dysfunction   E: Early Mobility  Plan for early mobility? Yes   F: Family Engagement Plan for family engagement today? Yes       Antibiotic Review: Post op requirements     Review of Invasive Devices:      Central access plan: Will obtain peripheral access and discontinue prior to transfer      Prophylaxis:  VTE VTE covered by:  heparin (porcine), Subcutaneous, 5,000 Units at 01/18/24 0609       Stress Ulcer  not ordered        Significant 24hr Events     24hr events: patient's pressures improved. Started BiPAP intermittently - some daytime, should have qhs     Subjective     Review of Systems   Unable to perform ROS: Mental status change        Objective                            Vitals I/O      Most Recent Min/Max in 24hrs   Temp 97.6 °F (36.4 °C) Temp  Min:  97.6 °F (36.4 °C)  Max: 99.3 °F (37.4 °C)   Pulse 82 Pulse  Min: 72  Max: 86   Resp 19 Resp  Min: 12  Max: 19   /57 BP  Min: 142/49  Max: 165/57   O2 Sat 99 % SpO2  Min: 98 %  Max: 100 %      Intake/Output Summary (Last 24 hours) at 1/18/2024 0800  Last data filed at 1/18/2024 0600  Gross per 24 hour   Intake 1440 ml   Output 825 ml   Net 615 ml       Diet Enteral/Parenteral; Tube Feeding with Oral Diet; Glucerna 1.2; Continuous; 45; Prosource Protein Liquid - Two Packets; 40; Saline; Every 6 hours; Regular; Regular House; Full Liquid    Invasive Monitoring           Physical Exam   Physical Exam  Eyes:      Pupils: Pupils are equal, round, and reactive to light.   Skin:     General: Skin is warm and dry.   HENT:      Head: Normocephalic and atraumatic.      Comments: EVD 0       Mouth/Throat:      Mouth: Mucous membranes are moist.   Cardiovascular:      Rate and Rhythm: Normal rate and regular rhythm.      Pulses: Normal pulses.      Heart sounds: Normal heart sounds.   Musculoskeletal:         General: No swelling.      Right lower leg: No edema.      Left lower leg: No edema.   Abdominal: General: Bowel sounds are normal. There is no distension.      Palpations: Abdomen is soft.      Tenderness: There is no abdominal tenderness.   Constitutional:       General: She is not in acute distress.     Comments: Sleepy, BiPAP on   Pulmonary:      Effort: Pulmonary effort is normal. No respiratory distress.      Breath sounds: Rhonchi (improving) present.   Neurological:      Mental Status: She is lethargic.      GCS: GCS eye subscore is 2. GCS verbal subscore is 1. GCS motor subscore is 6.      Comments: Sleepier. 4/5 RUE grasp. Does lift arm off bed. LUE w/d to pain, some spontaneous, lifted off bed once. BLE wiggles toes. Speaks softly intermittently, not this morning (incomprehensible behind BiPAP).          Diagnostic Studies      EKG: tele  Imaging:  I have personally reviewed pertinent reports.   and I have  personally reviewed pertinent films in PACS     Medications:  Scheduled PRN   amLODIPine, 10 mg, Daily  atorvastatin, 80 mg, QPM  carvedilol, 12.5 mg, BID With Meals  cefazolin, 1,000 mg, Q8H  chlorhexidine, 15 mL, Q12H JUAN ANTONIO  ferrous sulfate, 325 mg, Daily With Breakfast  formoterol, 20 mcg, Q12H  heparin (porcine), 5,000 Units, Q8H JUAN ANTONIO  insulin glargine, 15 Units, Q12H JUAN ANTONIO  insulin lispro, 3 Units, Q6H  insulin lispro, 4-20 Units, Q6H JUAN ANTONIO  levalbuterol, 1.25 mg, TID  lisinopril, 40 mg, Daily  miconazole, , BID  modafinil, 200 mg, Daily  polyethylene glycol, 17 g, BID  senna-docusate sodium, 2 tablet, BID  sodium chloride, 4 mL, TID      acetaminophen, 650 mg, Q6H PRN  bisacodyl, 10 mg, Daily PRN  hydrALAZINE, 10 mg, Q4H PRN  ondansetron, 4 mg, Q6H PRN       Continuous          Labs:    CBC    Recent Labs     01/16/24  1239 01/17/24  0446   WBC  --  6.71   HGB 8.5* 8.7*   HCT 26.2* 27.3*   PLT  --  206     BMP    Recent Labs     01/17/24  0446   SODIUM 147   K 4.2   *   CO2 31   AGAP 2   BUN 35*   CREATININE 0.91   CALCIUM 8.7       Coags    No recent results     Additional Electrolytes  Recent Labs     01/17/24  0446   MG 2.2   CAIONIZED 1.15          Blood Gas    Recent Labs     01/16/24  1520   PHART 7.405   [id number] 42.3   PO2ART 122.5   [id number] 25.9   BEART 1.0   SOURCE Line, Arterial     Recent Labs     01/16/24  1520   SOURCE Line, Arterial    LFTs  No recent results    Infectious  No recent results  Glucose  Recent Labs     01/17/24  0446   GLUC 95             Orin Enriquez MD

## 2024-01-17 NOTE — PROGRESS NOTES
PHYSICAL MEDICINE AND REHABILITATION PROGRESS NOTE  Debbie Moody 83 y.o. 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  Recommend bilateral multipodus boots.   EVD remains in place at this time. She will require inpatient rehabilitation once stable, level to be determined. She continues on provigil to hopefully improve alertness. Will continue to follow functional and medical progress at this time.     Medical Co-morbidities Plan:  Headache with nausea and vomiting  Hypertension   Hyperlipidemia   Acute hypoxic respiratory failure improved  FERCHO on CKD stage 3  Anemia   Diabetes type 2  Bowel plan: Incontinent, LBM 1/15/2024  Bladder plan: Roberts for accurate I&Os  DVT ppx: heparin SQ and SCD    Thank you for this consultation.  Do not hesitate to contact service with further questions.      BENTON Nye  PM&R    I have spent a total time of 15 minutes on 01/17/24 in caring for this patient including Counseling / Coordination of care, Documenting in the medical record, Obtaining or reviewing history  , and Communicating with other healthcare professionals .    Subjective/Interval history:  The patient did require reintubation on 1/5 and was extubated on 1/12/2024. She required EVD replacement on 1/14/2024 due to being clogged. Repeat CT head on 1/15/2024 showed evolving left cerebellar PICA infarct with slightly increased size of the left cerebellum parenchymal hematoma. Stable mass effect. Stable extensive diffuse  "intraventricular hemorrhage with ventriculomegaly and transpendymal CSF resorption. Unchanged 5 mm right to left midline shift. PM&R continues to follow for rehabilitation recommendations.     The patient was seen in the ICU. She is lethargic however will awaken to voice. She is currently not following commands but is very fatigued. EVD remains in place. She will need therapy re-evaluations once helmet is received.     Review of Systems: 10 point ROS unobtainable due to mental status.     Current level of function:  Physical Therapy: pending re-evaluation   Occupational Therapy: Pending re-evaluation   Speech Therapy: NPO, tube feeding       Physical Exam:  BP (!) 147/39   Pulse 76   Temp 98.5 °F (36.9 °C) (Axillary)   Resp 12   Ht 4' 10\" (1.473 m)   Wt 64.2 kg (141 lb 8.6 oz)   LMP  (LMP Unknown)   SpO2 100%   BMI 29.58 kg/m²        Intake/Output Summary (Last 24 hours) at 1/17/2024 1115  Last data filed at 1/17/2024 0600  Gross per 24 hour   Intake 1423.33 ml   Output 1079 ml   Net 344.33 ml       Body mass index is 29.58 kg/m².      Physical Exam  Constitutional:       Appearance: She is ill-appearing.   HENT:      Head:      Comments: EVD in place      Right Ear: External ear normal.      Left Ear: External ear normal.      Nose:      Comments: NG tube      Mouth/Throat:      Mouth: Mucous membranes are moist.      Pharynx: Oropharynx is clear.   Pulmonary:      Effort: Pulmonary effort is normal. No respiratory distress.   Abdominal:      General: There is no distension.   Musculoskeletal:      Comments: Generally weak throughout, moving all extremities    Skin:     General: Skin is warm and dry.   Neurological:      Mental Status: She is easily aroused. She is lethargic and disoriented.   Psychiatric:         Cognition and Memory: Cognition is impaired. Memory is impaired.        Social History:    Social History     Socioeconomic History    Marital status: /Civil Union     Spouse name: Not on " file    Number of children: Not on file    Years of education: Not on file    Highest education level: Not on file   Occupational History    Not on file   Tobacco Use    Smoking status: Never    Smokeless tobacco: Never   Vaping Use    Vaping status: Never Used   Substance and Sexual Activity    Alcohol use: Not Currently     Comment: Rarely    Drug use: Never    Sexual activity: Not Currently   Other Topics Concern    Not on file   Social History Narrative    Always uses seatbelt    No caffeine use     Social Determinants of Health     Financial Resource Strain: Low Risk  (9/1/2022)    Overall Financial Resource Strain (CARDIA)     Difficulty of Paying Living Expenses: Not hard at all   Food Insecurity: No Food Insecurity (1/1/2024)    Hunger Vital Sign     Worried About Running Out of Food in the Last Year: Never true     Ran Out of Food in the Last Year: Never true   Transportation Needs: No Transportation Needs (1/1/2024)    PRAPARE - Transportation     Lack of Transportation (Medical): No     Lack of Transportation (Non-Medical): No   Physical Activity: Not on file   Stress: Not on file   Social Connections: Not on file   Intimate Partner Violence: Not on file   Housing Stability: Low Risk  (1/1/2024)    Housing Stability Vital Sign     Unable to Pay for Housing in the Last Year: No     Number of Places Lived in the Last Year: 1     Unstable Housing in the Last Year: No        Family History:    Family History   Problem Relation Age of Onset    Diabetes Mother     No Known Problems Father          Medications:     Current Facility-Administered Medications:     acetaminophen (TYLENOL) tablet 650 mg, 650 mg, Oral, Q6H PRN, Miguel Negron DO    amLODIPine (NORVASC) tablet 10 mg, 10 mg, Oral, Daily, Cherise Berman MD, 10 mg at 01/17/24 1011    atorvastatin (LIPITOR) tablet 80 mg, 80 mg, Oral, QPM, David Higgins MD, 80 mg at 01/16/24 1821    bisacodyl (DULCOLAX) rectal suppository 10 mg, 10 mg, Rectal, Daily PRN,  Cherise Berman MD, 10 mg at 01/10/24 0441    carvedilol (COREG) tablet 12.5 mg, 12.5 mg, Oral, BID With Meals, Miguel Negron DO, 12.5 mg at 01/17/24 1011    ceFAZolin (ANCEF) IVPB (premix in dextrose) 1,000 mg 50 mL, 1,000 mg, Intravenous, Q8H, Miguel Negron DO, Last Rate: 100 mL/hr at 01/17/24 1011, 1,000 mg at 01/17/24 1011    chlorhexidine (PERIDEX) 0.12 % oral rinse 15 mL, 15 mL, Mouth/Throat, Q12H JUAN ANTONIO, David Higgins MD, 15 mL at 01/17/24 1011    ferrous sulfate tablet 325 mg, 325 mg, Oral, Daily With Breakfast, David Higgins MD, 325 mg at 01/17/24 1012    formoterol (PERFOROMIST) nebulizer solution 20 mcg, 20 mcg, Nebulization, Q12H, Miguel Negron DO, 20 mcg at 01/17/24 0729    heparin (porcine) subcutaneous injection 5,000 Units, 5,000 Units, Subcutaneous, Q8H JUAN ANTONIO, Miguel Negron DO, 5,000 Units at 01/17/24 0557    hydrALAZINE (APRESOLINE) injection 10 mg, 10 mg, Intravenous, Q4H PRN, Miguel Negron DO, 10 mg at 01/15/24 0008    insulin glargine (LANTUS) subcutaneous injection 15 Units 0.15 mL, 15 Units, Subcutaneous, Q12H [facility], [place], 15 Units at 01/17/24 1056    insulin lispro (HumaLOG) 100 units/mL subcutaneous injection 3 Units, 3 Units, Subcutaneous, Q6H, Alanna Barry MD    insulin lispro (HumaLOG) 100 units/mL subcutaneous injection 4-20 Units, 4-20 Units, Subcutaneous, Q6H JUAN ANTONIO, 4 Units at 01/16/24 1239 **AND** Fingerstick Glucose (POCT), , , Q6H, Miguel Negron DO    levalbuterol (XOPENEX) inhalation solution 1.25 mg, 1.25 mg, Nebulization, TID, Bill Felipe MD, 1.25 mg at 01/17/24 0729    lisinopril (ZESTRIL) tablet 40 mg, 40 mg, Oral, Daily, Miguel Negron DO, 40 mg at 01/17/24 1011    miconazole 2 % cream, , Topical, BID, Miguel Negron DO, Given at 01/16/24 1822    modafinil (PROVIGIL) tablet 200 mg, 200 mg, Oral, Daily, Cherise Berman MD, 200 mg at 01/17/24 1011    ondansetron (ZOFRAN) injection 4 mg, 4 mg, Intravenous, Q6H PRN,  David Higgins MD, 4 mg at 01/05/24 1453    polyethylene glycol (MIRALAX) packet 17 g, 17 g, Per NG Tube, BID, Miguel Negron DO, 17 g at 01/16/24 0909    senna-docusate sodium (SENOKOT S) 8.6-50 mg per tablet 2 tablet, 2 tablet, Per NG Tube, BID, Miguel Negron DO, 2 tablet at 01/16/24 0909    sodium chloride 3 % inhalation solution 4 mL, 4 mL, Nebulization, TID, Ирина Guerrero MD, 4 mL at 01/17/24 0729    Past Medical History:     Past Medical History:   Diagnosis Date    Acute kidney injury (HCC)     Acute respiratory failure with hypoxia (HCC) 12/16/2021    Broken arm     hairline fracture/ 2 places///   right arm    Diabetes mellitus (HCC)     Diverticulitis     Pneumothorax- Resolved     Postherpetic neuralgia         Past Surgical History:     Past Surgical History:   Procedure Laterality Date    CHOLECYSTECTOMY      COLON SURGERY      CRANIECTOMY N/A 1/3/2024    Procedure: SUBOCCIPITAL CRANIECTOMY FOR POSTERIOR FOSSA DECOMPRESSION; DISPOSE OF BONE FLAP;  Surgeon: David Higgins MD;  Location: [facility];  Service: Neurosurgery    HERNIA REPAIR      IR EMBOLIZATION (SPECIFY VESSEL OR SITE)  1/12/2022    ROTATOR CUFF REPAIR Right     VARICOSE VEIN SURGERY      Impression:  2/12/16 Jake Sarabia         Allergies:     Allergies   Allergen Reactions    Ciprofloxacin GI Intolerance and Headache     Confusion, arm weakness, dizziness, headache    Meperidine GI Intolerance, Hallucinations and Other (See Comments)     Demarol  Reaction Date:Unknown    Propoxyphene GI Intolerance           LABORATORY RESULTS:      Lab Results   Component Value Date    HGB 8.7 (L) 01/17/2024    HCT 27.3 (L) 01/17/2024    WBC 6.71 01/17/2024     Lab Results   Component Value Date    BUN 35 (H) 01/17/2024    K 4.2 01/17/2024     (H) 01/17/2024    GLUCOSE 155 (H) 01/03/2024    CREATININE 0.91 01/17/2024     Lab Results   Component Value Date    PROTIME 14.5 01/03/2024    INR 1.14 01/03/2024        DIAGNOSTIC STUDIES:  Reviewed  XR chest portable ICU    Result Date: 1/6/2024  Impression: Endotracheal tube tip 3.4 cm above the ceasar. Workstation performed: [id number]     CTA head w wo contrast    Result Date: 1/5/2024  Impression: Stable vascularity when compared with prior CT angiogram from 12/30/2023. No large vessel occlusion. Mild atherosclerotic change present. There is no left posterior inferior cerebellar artery visualized within the posterior fossa in this patient with a known PICA infarct. No venous abnormality identified. Workstation performed: [id number]     CT head wo contrast    Result Date: 1/5/2024  Impression: Similar parenchymal changes from left PICA infarct. Petechial hemorrhage with some mild hemorrhagic conversion in the left cerebellar hemisphere. Previous right side  shunt catheter has been withdrawn. Extensive intraventricular blood as detailed above. Developing hydrocephalus. Narrowing of sulci bilaterally. No convincing uncal herniation. No tonsillar herniation (suboccipital craniectomy). I personally discussed this study with Violet Trejo on 1/5/2024 5:24 PM. Workstation performed: [id number]     CT head wo contrast    Result Date: 1/5/2024  Impression: Status post decompressive suboccipital craniectomy for a PICA distribution infarct with slightly improved mass effect on the fourth ventricle and improving hydrocephalus with ventriculostomy in place. Overall degree of hemorrhage within the left cerebellum and vermis is similar to the prior examination. No new areas of acute intracranial hemorrhage identified. Workstation performed: [id number]     XR chest portable    Result Date: 1/4/2024  Impression: Tubes and lines in adequate position. No acute pulmonary pathology. Workstation performed: [id number]     XR chest portable    Result Date: 1/4/2024  Impression: Endotracheal tube in the proximal right mainstem bronchus. Workstation performed: [id number]     X-ray chest 1 view    Result Date:  1/4/2024  Impression: Tubes and lines as described above. Low lung volumes demonstrated without consolidation or other acute pulmonary findings. Workstation performed: [id number]     CT head wo contrast    Result Date: 1/3/2024  Impression: Status post interval decompressive suboccipital craniectomy with expected subjacent postsurgical changes/pneumocephalus. Similar appearance of evolving left PICA territory infarct and associated edema/mass effect and with probable petechial cortical hemorrhage compared to the most immediate prior study. Similar appearance focal parenchymal hematoma in the left anterior cerebellar vermis adjacent to the fourth ventricle compared to the most immediate prior study. No new hemorrhage or mass effect. Minimally improved hydrocephalus status post placement of right frontal approach ventricular catheter. Workstation performed: [id number]     XR chest portable    Result Date: 1/3/2024  Impression: Mild left base opacity. Aspiration not excluded. Workstation performed: [id number]     CT head wo contrast    Result Date: 1/3/2024  Impression: Unchanged hydrocephalus status post placement of right frontal ventriculostomy catheter with tip near the foramen of Monro. Small amount of pneumocephalus in the frontal horn of the right lateral ventricle. Unchanged left PCA infarct with compression of the fourth ventricle and unchanged focal parenchymal hemorrhage adjacent to the fourth ventricle. The study was marked in EPIC for immediate notification. Workstation performed: [id number]     CT head wo contrast    Result Date: 1/3/2024  Impression: Evolving left PCA infarct with increased left cerebellar hypodensity and compression of the fourth ventricle resulting in hydrocephalus with increased focal parenchymal hemorrhage adjacent to the fourth ventricle. Neurosurgery consult recommended. I personally discussed this study with AMBAR KOEHLER on 1/3/2024 10:09 AM. Workstation performed:  [id number]     XR chest portable    Result Date: 1/1/2024  Impression: Right jugular catheter at cavoatrial junction with no pneumothorax. Chronic scarring with no acute disease. Workstation performed: [id number]     MRI brain wo contrast    Result Date: 12/31/2023  Impression: Acute infarct in left PICA territory with small acute parenchymal hematoma in anterior aspect of left cerebellar vermis, petechial cortical hemorrhage along left posterior cerebellum, and similar compressive mass effect on fourth ventricle. No obstructive hydrocephalus. Recommend consultation with neurosurgery. 1.6 cm intramuscular lesion in left temporalis muscle, indeterminate. Differential includes intramuscular epidermoid cyst, hemangioma, myxoma, among other differentials. Mild chronic microangiopathy. The study was marked in EPIC for immediate notification. Workstation performed: [id number]     CT head wo contrast    Result Date: 12/31/2023  Impression: Redemonstration of evolving acute PICA distribution left cerebellar infarct. Mass effect on the fourth ventricle is slightly increased. No hydrocephalus. Persistent increased attenuation in the anterior aspect of the cerebellar infarct that could represent residual contrast staining but petechial hemorrhage not excluded. Recommend close interval follow-up CT and/or further evaluation with MRI. The study was marked in EPIC for immediate notification. Workstation performed: [id number]

## 2024-01-18 NOTE — PLAN OF CARE
Problem: PAIN - ADULT  Goal: Verbalizes/displays adequate comfort level or baseline comfort level  Description: Interventions:  - Encourage patient to monitor pain and request assistance  - Assess pain using appropriate pain scale  - Administer analgesics based on type and severity of pain and evaluate response  - Implement non-pharmacological measures as appropriate and evaluate response  - Consider cultural and social influences on pain and pain management  - Notify physician/advanced practitioner if interventions unsuccessful or patient reports new pain  Outcome: Progressing     Problem: SAFETY ADULT  Goal: Patient will remain free of falls  Description: INTERVENTIONS:  - Educate patient/family on patient safety including physical limitations  - Instruct patient to call for assistance with activity   - Consult OT/PT to assist with strengthening/mobility   - Keep Call bell within reach  - Keep bed low and locked with side rails adjusted as appropriate  - Keep care items and personal belongings within reach  - Initiate and maintain comfort rounds  - Make Fall Risk Sign visible to staff  - Offer Toileting every  Hours, in advance of need  - Initiate/Maintain alarm  - Obtain necessary fall risk management equipment:   - Apply yellow socks and bracelet for high fall risk patients  - Consider moving patient to room near nurses station  Outcome: Progressing

## 2024-01-18 NOTE — PLAN OF CARE
Problem: PAIN - ADULT  Goal: Verbalizes/displays adequate comfort level or baseline comfort level  Description: Interventions:  - Encourage patient to monitor pain and request assistance  - Assess pain using appropriate pain scale  - Administer analgesics based on type and severity of pain and evaluate response  - Implement non-pharmacological measures as appropriate and evaluate response  - Consider cultural and social influences on pain and pain management  - Notify physician/advanced practitioner if interventions unsuccessful or patient reports new pain  Outcome: Progressing     Problem: INFECTION - ADULT  Goal: Absence or prevention of progression during hospitalization  Description: INTERVENTIONS:  - Assess and monitor for signs and symptoms of infection  - Monitor lab/diagnostic results  - Monitor all insertion sites, i.e. indwelling lines, tubes, and drains  - Monitor endotracheal if appropriate and nasal secretions for changes in amount and color  - Bromide appropriate cooling/warming therapies per order  - Administer medications as ordered  - Instruct and encourage patient and family to use good hand hygiene technique  - Identify and instruct in appropriate isolation precautions for identified infection/condition  Outcome: Progressing  Goal: Absence of fever/infection during neutropenic period  Description: INTERVENTIONS:  - Monitor WBC    Outcome: Progressing     Problem: SAFETY ADULT  Goal: Patient will remain free of falls  Description: INTERVENTIONS:  - Educate patient/family on patient safety including physical limitations  - Instruct patient to call for assistance with activity   - Consult OT/PT to assist with strengthening/mobility   - Keep Call bell within reach  - Keep bed low and locked with side rails adjusted as appropriate  - Keep care items and personal belongings within reach  - Initiate and maintain comfort rounds  - Make Fall Risk Sign visible to staff  - Offer Toileting every 2 Hours,  in advance of need  - Initiate/Maintain bed alarm  - Obtain necessary fall risk management equipment: bed alarm  - Apply yellow socks and bracelet for high fall risk patients  - Consider moving patient to room near nurses station  Outcome: Progressing  Goal: Maintain or return to baseline ADL function  Description: INTERVENTIONS:  -  Assess patient's ability to carry out ADLs; assess patient's baseline for ADL function and identify physical deficits which impact ability to perform ADLs (bathing, care of mouth/teeth, toileting, grooming, dressing, etc.)  - Assess/evaluate cause of self-care deficits   - Assess range of motion  - Assess patient's mobility; develop plan if impaired  - Assess patient's need for assistive devices and provide as appropriate  - Encourage maximum independence but intervene and supervise when necessary  - Involve family in performance of ADLs  - Assess for home care needs following discharge   - Consider OT consult to assist with ADL evaluation and planning for discharge  - Provide patient education as appropriate  Outcome: Progressing  Goal: Maintains/Returns to pre admission functional level  Description: INTERVENTIONS:  - Perform AM-PAC 6 Click Basic Mobility/ Daily Activity assessment daily.  - Set and communicate daily mobility goal to care team and patient/family/caregiver.   - Collaborate with rehabilitation services on mobility goals if consulted  - Perform Range of Motion 3 times a day.  - Reposition patient every 2 hours.  - Dangle patient 2 times a day  - Stand patient 2 times a day  - Ambulate patient 2 times a day  - Out of bed to chair 2 times a day   - Out of bed for meals 3 times a day  - Out of bed for toileting  - Record patient progress and toleration of activity level   Outcome: Progressing     Problem: DISCHARGE PLANNING  Goal: Discharge to home or other facility with appropriate resources  Description: INTERVENTIONS:  - Identify barriers to discharge w/patient and  caregiver  - Arrange for needed discharge resources and transportation as appropriate  - Identify discharge learning needs (meds, wound care, etc.)  - Arrange for interpretive services to assist at discharge as needed  - Refer to Case Management Department for coordinating discharge planning if the patient needs post-hospital services based on physician/advanced practitioner order or complex needs related to functional status, cognitive ability, or social support system  Outcome: Progressing     Problem: Knowledge Deficit  Goal: Patient/family/caregiver demonstrates understanding of disease process, treatment plan, medications, and discharge instructions  Description: Complete learning assessment and assess knowledge base.  Interventions:  - Provide teaching at level of understanding  - Provide teaching via preferred learning methods  Outcome: Progressing     Problem: Neurological Deficit  Goal: Neurological status is stable or improving  Description: Interventions:  - Monitor and assess patient's level of consciousness, motor function, sensory function, and level of assistance needed for ADLs.   - Monitor and report changes from baseline. Collaborate with interdisciplinary team to initiate plan and implement interventions as ordered.   - Provide and maintain a safe environment.  - Consider seizure precautions.  - Consider fall precautions.  - Consider aspiration precautions.  - Consider bleeding precautions.  Outcome: Progressing     Problem: Activity Intolerance/Impaired Mobility  Goal: Mobility/activity is maintained at optimum level for patient  Description: Interventions:  - Assess and monitor patient  barriers to mobility and need for assistive/adaptive devices.  - Assess patient's emotional response to limitations.  - Collaborate with interdisciplinary team and initiate plans and interventions as ordered.  - Encourage independent activity per ability.  - Maintain proper body alignment.  - Perform  active/passive rom as tolerated/ordered.  - Plan activities to conserve energy.  - Turn patient as appropriate  Outcome: Progressing     Problem: Communication Impairment  Goal: Ability to express needs and understand communication  Description: Assess patient's communication skills and ability to understand information.  Patient will demonstrate use of effective communication techniques, alternative methods of communication and understanding even if not able to speak.     - Encourage communication and provide alternate methods of communication as needed.  - Collaborate with case management/ for discharge needs.  - Include patient/family/caregiver in decisions related to communication.  Outcome: Progressing     Problem: Potential for Aspiration  Goal: Ventilated patient's risk of aspiration is minimized  Description: Assess and monitor vital signs, respiratory status, airway cuff pressure, and labs (WBC).  Monitor for signs of aspiration (tachypnea, cough, rales, wheezing, cyanosis, fever).    - Elevate head of bed 30 degrees if patient has tube feeding.  - Monitor tube feeding.  Outcome: Progressing     Problem: Nutrition  Goal: Nutrition/Hydration status is improving  Description: Monitor and assess patient's nutrition/hydration status for malnutrition (ex- brittle hair, bruises, dry skin, pale skin and conjunctiva, muscle wasting, smooth red tongue, and disorientation). Collaborate with interdisciplinary team and initiate plan and interventions as ordered.  Monitor patient's weight and dietary intake as ordered or per policy. Utilize nutrition screening tool and intervene per policy. Determine patient's food preferences and provide high-protein, high-caloric foods as appropriate.     - Assist patient with eating.  - Allow adequate time for meals.  - Encourage patient to take dietary supplement as ordered.  - Collaborate with clinical nutritionist.  - Include patient/family/caregiver in decisions  related to nutrition.  Outcome: Progressing     Problem: Prexisting or High Potential for Compromised Skin Integrity  Goal: Skin integrity is maintained or improved  Description: INTERVENTIONS:  - Identify patients at risk for skin breakdown  - Assess and monitor skin integrity  - Assess and monitor nutrition and hydration status  - Monitor labs   - Assess for incontinence   - Turn and reposition patient  - Assist with mobility/ambulation  - Relieve pressure over bony prominences  - Avoid friction and shearing  - Provide appropriate hygiene as needed including keeping skin clean and dry  - Evaluate need for skin moisturizer/barrier cream  - Collaborate with interdisciplinary team   - Patient/family teaching  - Consider wound care consult   Outcome: Progressing     Problem: Nutrition/Hydration-ADULT  Goal: Nutrient/Hydration intake appropriate for improving, restoring or maintaining nutritional needs  Description: Monitor and assess patient's nutrition/hydration status for malnutrition. Collaborate with interdisciplinary team and initiate plan and interventions as ordered.  Monitor patient's weight and dietary intake as ordered or per policy. Utilize nutrition screening tool and intervene as necessary. Determine patient's food preferences and provide high-protein, high-caloric foods as appropriate.     INTERVENTIONS:  - Monitor oral intake, urinary output, labs, and treatment plans  - Assess nutrition and hydration status and recommend course of action  - Evaluate amount of meals eaten  - Assist patient with eating if necessary   - Allow adequate time for meals  - Recommend/ encourage appropriate diets, oral nutritional supplements, and vitamin/mineral supplements  - Order, calculate, and assess calorie counts as needed  - Recommend, monitor, and adjust tube feedings and TPN/PPN based on assessed needs  - Assess need for intravenous fluids  - Provide specific nutrition/hydration education as appropriate  - Include  patient/family/caregiver in decisions related to nutrition  Outcome: Progressing     Problem: SAFETY,RESTRAINT: NV/NON-SELF DESTRUCTIVE BEHAVIOR  Goal: Remains free of harm/injury (restraint for non violent/non self-detsructive behavior)  Description: INTERVENTIONS:  - Instruct patient/family regarding restraint use   - Assess and monitor physiologic and psychological status   - Provide interventions and comfort measures to meet assessed patient needs   - Identify and implement measures to help patient regain control  - Assess readiness for release of restraint   Outcome: Progressing  Goal: Returns to optimal restraint-free functioning  Description: INTERVENTIONS:  - Assess the patient's behavior and symptoms that indicate continued need for restraint  - Identify and implement measures to help patient regain control  - Assess readiness for release of restraint   Outcome: Progressing     Problem: COPING  Goal: Pt/Family able to verbalize concerns and demonstrate effective coping strategies  Description: INTERVENTIONS:  - Assist patient/family to identify coping skills, available support systems and cultural and spiritual values  - Provide emotional support, including active listening and acknowledgement of concerns of patient and caregivers  - Reduce environmental stimuli, as able  - Provide patient education  - Assess for spiritual pain/suffering and initiate spiritual care, including notification of Pastoral Care or nancie based community as needed  - Assess effectiveness of coping strategies  Outcome: Progressing  Goal: Will report anxiety at manageable levels  Description: INTERVENTIONS:  - Administer medication as ordered  - Teach and encourage coping skills  - Provide emotional support  - Assess patient/family for anxiety and ability to cope  Outcome: Progressing     Problem: NEUROSENSORY - ADULT  Goal: Achieves stable or improved neurological status  Description: INTERVENTIONS  - Monitor and report changes in  neurological status  - Monitor vital signs such as temperature, blood pressure, glucose, and any other labs ordered   - Initiate measures to prevent increased intracranial pressure  - Monitor for seizure activity and implement precautions if appropriate      Outcome: Progressing  Goal: Remains free of injury related to seizures activity  Description: INTERVENTIONS  - Maintain airway, patient safety  and administer oxygen as ordered  - Monitor patient for seizure activity, document and report duration and description of seizure to physician/advanced practitioner  - If seizure occurs,  ensure patient safety during seizure  - Reorient patient post seizure  - Seizure pads on all 4 side rails  - Instruct patient/family to notify RN of any seizure activity including if an aura is experienced  - Instruct patient/family to call for assistance with activity based on nursing assessment  - Administer anti-seizure medications if ordered    Outcome: Progressing  Goal: Achieves maximal functionality and self care  Description: INTERVENTIONS  - Monitor swallowing and airway patency with patient fatigue and changes in neurological status  - Encourage and assist patient to increase activity and self care.   - Encourage visually impaired, hearing impaired and aphasic patients to use assistive/communication devices  Outcome: Progressing     Problem: CARDIOVASCULAR - ADULT  Goal: Maintains optimal cardiac output and hemodynamic stability  Description: INTERVENTIONS:  - Monitor I/O, vital signs and rhythm  - Monitor for S/S and trends of decreased cardiac output  - Administer and titrate ordered vasoactive medications to optimize hemodynamic stability  - Assess quality of pulses, skin color and temperature  - Assess for signs of decreased coronary artery perfusion  - Instruct patient to report change in severity of symptoms  Outcome: Progressing  Goal: Absence of cardiac dysrhythmias or at baseline rhythm  Description: INTERVENTIONS:  -  Continuous cardiac monitoring, vital signs, obtain 12 lead EKG if ordered  - Administer antiarrhythmic and heart rate control medications as ordered  - Monitor electrolytes and administer replacement therapy as ordered  Outcome: Progressing     Problem: RESPIRATORY - ADULT  Goal: Achieves optimal ventilation and oxygenation  Description: INTERVENTIONS:  - Assess for changes in respiratory status  - Assess for changes in mentation and behavior  - Position to facilitate oxygenation and minimize respiratory effort  - Oxygen administered by appropriate delivery if ordered  - Initiate smoking cessation education as indicated  - Encourage broncho-pulmonary hygiene including cough, deep breathe, Incentive Spirometry  - Assess the need for suctioning and aspirate as needed  - Assess and instruct to report SOB or any respiratory difficulty  - Respiratory Therapy support as indicated  Outcome: Progressing     Problem: GASTROINTESTINAL - ADULT  Goal: Maintains or returns to baseline bowel function  Description: INTERVENTIONS:  - Assess bowel function  - Encourage oral fluids to ensure adequate hydration  - Administer IV fluids if ordered to ensure adequate hydration  - Administer ordered medications as needed  - Encourage mobilization and activity  - Consider nutritional services referral to assist patient with adequate nutrition and appropriate food choices  Outcome: Progressing  Goal: Maintains adequate nutritional intake  Description: INTERVENTIONS:  - Monitor percentage of each meal consumed  - Identify factors contributing to decreased intake, treat as appropriate  - Assist with meals as needed  - Monitor I&O, weight, and lab values if indicated  - Obtain nutrition services referral as needed  Outcome: Progressing     Problem: GENITOURINARY - ADULT  Goal: Maintains or returns to baseline urinary function  Description: INTERVENTIONS:  - Assess urinary function  - Encourage oral fluids to ensure adequate hydration if  ordered  - Administer IV fluids as ordered to ensure adequate hydration  - Administer ordered medications as needed  - Offer frequent toileting  - Follow urinary retention protocol if ordered  Outcome: Progressing  Goal: Absence of urinary retention  Description: INTERVENTIONS:  - Assess patient’s ability to void and empty bladder  - Monitor I/O  - Bladder scan as needed  - Discuss with physician/AP medications to alleviate retention as needed  - Discuss catheterization for long term situations as appropriate  Outcome: Progressing     Problem: METABOLIC, FLUID AND ELECTROLYTES - ADULT  Goal: Electrolytes maintained within normal limits  Description: INTERVENTIONS:  - Monitor labs and assess patient for signs and symptoms of electrolyte imbalances  - Administer electrolyte replacement as ordered  - Monitor response to electrolyte replacements, including repeat lab results as appropriate  - Instruct patient on fluid and nutrition as appropriate  Outcome: Progressing  Goal: Fluid balance maintained  Description: INTERVENTIONS:  - Monitor labs   - Monitor I/O and WT  - Instruct patient on fluid and nutrition as appropriate  - Assess for signs & symptoms of volume excess or deficit  Outcome: Progressing  Goal: Glucose maintained within target range  Description: INTERVENTIONS:  - Monitor Blood Glucose as ordered  - Assess for signs and symptoms of hyperglycemia and hypoglycemia  - Administer ordered medications to maintain glucose within target range  - Assess nutritional intake and initiate nutrition service referral as needed  Outcome: Progressing     Problem: SKIN/TISSUE INTEGRITY - ADULT  Goal: Skin Integrity remains intact(Skin Breakdown Prevention)  Description: Assess:  -Perform Chepe assessment every shift  -Clean and moisturize skin every shift  -Inspect skin when repositioning, toileting, and assisting with ADLS  -Assess under medical devices such as bracelets every shift  -Assess extremities for adequate  circulation and sensation     Bed Management:  -Have minimal linens on bed & keep smooth, unwrinkled  -Change linens as needed when moist or perspiring  -Avoid sitting or lying in one position for more than 2 hours while in bed  -Keep HOB at 30 degrees     Toileting:  -Offer bedside commode  -Assess for incontinence every shift  -Use incontinent care products after each incontinent episode such as foaming skin cleanser    Activity:  -Mobilize patient 3 times a day  -Encourage activity and walks on unit  -Encourage or provide ROM exercises   -Turn and reposition patient every 2 Hours  -Use appropriate equipment to lift or move patient in bed  -Instruct/ Assist with weight shifting every 2 hours when out of bed in chair  -Consider limitation of chair time 4 hour intervals    Skin Care:  -Avoid use of baby powder, tape, friction and shearing, hot water or constrictive clothing  -Relieve pressure over bony prominences using allevyns  -Do not massage red bony areas      Outcome: Progressing  Goal: Incision(s), wounds(s) or drain site(s) healing without S/S of infection  Description: INTERVENTIONS  - Assess and document dressing, incision, wound bed, drain sites and surrounding tissue  - Provide patient and family education  - Perform skin care/dressing changes every shift  Outcome: Progressing     Problem: HEMATOLOGIC - ADULT  Goal: Maintains hematologic stability  Description: INTERVENTIONS  - Assess for signs and symptoms of bleeding or hemorrhage  - Monitor labs  - Administer supportive blood products/factors as ordered and appropriate  Outcome: Progressing     Problem: MUSCULOSKELETAL - ADULT  Goal: Maintain or return mobility to safest level of function  Description: INTERVENTIONS:  - Assess patient's ability to carry out ADLs; assess patient's baseline for ADL function and identify physical deficits which impact ability to perform ADLs (bathing, care of mouth/teeth, toileting, grooming, dressing, etc.)  -  Assess/evaluate cause of self-care deficits   - Assess range of motion  - Assess patient's mobility  - Assess patient's need for assistive devices and provide as appropriate  - Encourage maximum independence but intervene and supervise when necessary  - Involve family in performance of ADLs  - Assess for home care needs following discharge   - Consider OT consult to assist with ADL evaluation and planning for discharge  - Provide patient education as appropriate  Outcome: Progressing  Goal: Maintain proper alignment of affected body part  Description: INTERVENTIONS:  - Support, maintain and protect limb and body alignment  - Provide patient/ family with appropriate education  Outcome: Progressing

## 2024-01-18 NOTE — PHYSICAL THERAPY NOTE
Physical Therapy Re-Evaluation     Patient's Name: Debbie Moody    Admitting Diagnosis  Acute CVA (cerebrovascular accident) (HCC)    Problem List  Patient Active Problem List   Diagnosis    Groin pain, chronic, left    Type 2 diabetes mellitus with stage 3b chronic kidney disease, without long-term current use of insulin (HCC)    Hypertensive kidney disease with chronic kidney disease stage III (HCC)    Abnormal ECG    Articular cartilage disorder of shoulder region    Anxiety    Disorder of shoulder    Essential hypertension    Hypercholesterolemia    Irritable bowel syndrome    Localized, primary osteoarthritis of hand    Sciatica    Simple chronic anemia    Overweight (BMI 25.0-29.9)    Ambulatory dysfunction    Chronic kidney disease, stage 3b (HCC)    Hypertriglyceridemia    Pulmonary fibrosis (HCC)    Acute CVA (cerebrovascular accident) ([facility])    Encephalopathy    Status post craniectomy       Past Medical History  Past Medical History:   Diagnosis Date    Acute kidney injury (HCC)     Acute respiratory failure with hypoxia (HCC) 12/16/2021    Broken arm     hairline fracture/ 2 places///   right arm    Diabetes mellitus (HCC)     Diverticulitis     Pneumothorax- Resolved     Postherpetic neuralgia        Past Surgical History  Past Surgical History:   Procedure Laterality Date    CHOLECYSTECTOMY      COLON SURGERY      CRANIECTOMY N/A 1/3/2024    Procedure: SUBOCCIPITAL CRANIECTOMY FOR POSTERIOR FOSSA DECOMPRESSION; DISPOSE OF BONE FLAP;  Surgeon: David Higgins MD;  Location: [facility];  Service: Neurosurgery    HERNIA REPAIR      IR EMBOLIZATION (SPECIFY VESSEL OR SITE)  1/12/2022    ROTATOR CUFF REPAIR Right     VARICOSE VEIN SURGERY      Impression:  2/12/16 Jake Sarabia        01/18/24 1013   PT Last Visit   PT Visit Date 01/18/24   Note Type   Note type Re-Evaluation   Pain Assessment   Pain Assessment Tool 0-10   Pain Score No Pain   Restrictions/Precautions   Weight Bearing Precautions Per Order No    Braces or Orthoses   (No helmet per Nsx)   Other Precautions Bed Alarm;Chair Alarm;Telemetry;Multiple lines;Fall Risk;Pain   Prior Function   Level of [place] Independent with functional mobility   Lives With Spouse;Daughter   Receives Help From Family   Falls in the last 6 months 0   Vocational Retired   Cognition   Orientation Level Oriented to person;Oriented to place   Subjective   Subjective Pt agreeable to PT re-eval   RLE Assessment   RLE Assessment   (grossly 3-/5 with movement)   LLE Assessment   LLE Assessment   (grossly 0/5 with movement)   Coordination   Movements are Fluid and Coordinated 0   Coordination and Movement Description ataxia   Bed Mobility   Supine to Sit 2  Maximal assistance   Additional items Assist x 2;HOB elevated   Sit to Supine Unable to assess   Additional Comments Pt left resting in chair, call bell in reach, chair alarm active   Transfers   Sit to Stand 2  Maximal assistance   Additional items Assist x 2;Increased time required   Stand to Sit 2  Maximal assistance   Additional items Assist x 2;Increased time required   Stand pivot 2  Maximal assistance   Additional items Assist x 2;Increased time required   Ambulation/Elevation   Gait pattern Excessively slow;Short stride;Shuffling;Decreased foot clearance   Gait Assistance 2  Maximal assist   Additional items Assist x 2;Verbal cues   Assistive Device Other (Comment)  (B/L HHA)   Distance 3'   Stair Management Assistance Not tested   Balance   Static Sitting Poor +   Dynamic Sitting Poor   Static Standing Poor -   Ambulatory Zero   Endurance Deficit   Endurance Deficit Yes   Endurance Deficit Description fatigue   Activity Tolerance   Activity Tolerance Patient limited by fatigue;Patient limited by pain   Medical Staff Made Aware OT for D/C planning   Nurse Made Aware yes, nsg gave clearance to work with pt   Assessment   Prognosis Good   Problem List Decreased strength;Decreased range of motion;Decreased endurance;Impaired  balance;Decreased mobility;Decreased coordination;Decreased cognition;Impaired judgement;Decreased safety awareness;Pain   Assessment Pt is 83 y.o. female seen for PT re-evaluation s/p admit to [facility] on 12/31/2023 w/ Acute CVA (cerebrovascular accident) (HCC) now s/p craniectomy and prolonged intubation. PT consulted to assess pt's functional mobility and d/c needs.Please find objective findings from PT assessment regarding body systems outlined above with impairments and limitations including weakness, decreased ROM, impaired balance, decreased endurance, impaired coordination, gait deviations, pain, decreased activity tolerance, decreased functional mobility tolerance, altered sensation, decreased safety awareness, impaired judgement, fall risk, impaired tone, decreased skin integrity, and decreased cognition. Pt required increased A for bed mobility with no noted strength on R side. Required increased A for sitting balance. Required B/L knee block with noted LE strength. Poor tolerance for upright mobility with decreased activity tolerance. The following objective measures performed on IE also reveal limitations: The patient's AM-PAC Basic Mobility Inpatient Short Form Raw Score is 6, Standardized Score is 32.23. A standardized score less than 42.9 suggests the patient may benefit from discharge to post-acute rehabilitation services. Please also refer to the recommendation of the Physical Therapist for safe discharge planning. Pt's clinical presentation is currently unstable/unpredictable seen in pt's presentation of critical care monitoring. Pt to benefit from continued PT tx to address deficits as defined above and maximize level of functional independent mobility and consistency. From PT/mobility standpoint, recommendation at time of d/c would be post acute rehabilitation services pending progress in order to facilitate return to [facility].   Barriers to Discharge Inaccessible home  environment;Decreased caregiver support   Goals   Patient Goals Unable to verbalize   STG Expiration Date 01/30/24   Short Term Goal #1 1. Complete bed mobility with MinAx1 to decrease caregiver burden. 2. Tolerate sitting EOB x 25 min to complete ADLs. 3. Complete transfers with MOd Ax1 to decrease caregiver burden. 4. Complete 25' of W/C mobility with Amos to decrease caregiver burden. 5. PT to see for gait as appropriate.   Plan   Treatment/Interventions OT;Spoke to case management;Spoke to nursing;Gait training;Patient/family training;Bed mobility;Endurance training;LE strengthening/ROM;Functional transfer training   PT Frequency 3-5x/wk   Discharge Recommendation   Rehab Resource Intensity Level, PT I (Maximum Resource Intensity)   AM-PAC Basic Mobility Inpatient   Turning in Flat Bed Without Bedrails 1   Lying on Back to Sitting on Edge of Flat Bed Without Bedrails 1   Moving Bed to Chair 1   Standing Up From Chair Using Arms 1   Walk in Room 1   Climb 3-5 Stairs With Railing 1   Basic Mobility Inpatient Raw Score 6   Turning Head Towards Sound 2   Follow Simple Instructions 2   Low Function Basic Mobility Raw Score  10   Low Function Basic Mobility Standardized Score  14.65   Highest Level Of Mobility   JH-HLM Goal 2: Bed activities/Dependent transfer   JH-HLM Achieved 4: Move to chair/commode           Monica Link, PT

## 2024-01-18 NOTE — SPEECH THERAPY NOTE
Speech Language/Pathology    Speech/Language Pathology Progress Note    Patient Name: Debbie Moody  Today's Date: 1/18/2024     Problem List  Principal Problem:    Acute CVA (cerebrovascular accident) (HCC)  Active Problems:    Type 2 diabetes mellitus with stage 3b chronic kidney disease, without long-term current use of insulin (HCC)    Anxiety    Essential hypertension    Hypercholesterolemia    Simple chronic anemia    Overweight (BMI 25.0-29.9)    Chronic kidney disease, stage 3b (HCC)    Pulmonary fibrosis (HCC)    Encephalopathy    Status post craniectomy       Past Medical History  Past Medical History:   Diagnosis Date    Acute kidney injury (HCC)     Acute respiratory failure with hypoxia (HCC) 12/16/2021    Broken arm     hairline fracture/ 2 places///   right arm    Diabetes mellitus (HCC)     Diverticulitis     Pneumothorax- Resolved     Postherpetic neuralgia         Past Surgical History  Past Surgical History:   Procedure Laterality Date    CHOLECYSTECTOMY      COLON SURGERY      CRANIECTOMY N/A 1/3/2024    Procedure: SUBOCCIPITAL CRANIECTOMY FOR POSTERIOR FOSSA DECOMPRESSION; DISPOSE OF BONE FLAP;  Surgeon: David Higgins MD;  Location[facility];  Service: Neurosurgery    HERNIA REPAIR      IR EMBOLIZATION (SPECIFY VESSEL OR SITE)  1/12/2022    ROTATOR CUFF REPAIR Right     VARICOSE VEIN SURGERY      Impression:  2/12/16 Jake Sarabia     Subjective:  Patient asleep in bed.     Objective:  Patient seen for dysphagia therapy. She is observed with increased lethargy and does not respond to attempts to wake her. Oral care is performed and moisturizer is given. Patient initially resisted oral care but eventually tolerated well. Eyes briefly opened but immediately closed. Patient too lethargic for PO trials. RN reports good tolerance of cream of wheat and thickened liquids this AM.     Assessment:  Patient tolerated oral care but is too lethargic for PO.     Plan/Recommendations:  Continue ST. Continue  with full liquids and honey thick liquids as able. May need to consider alternate needs of nutrition.

## 2024-01-18 NOTE — PROGRESS NOTES
[facility]  Progress Note  Name: Debbie Moody I  MRN: [id number]  Unit/Bed#: ICU 04 I Date of Admission: 12/31/2023   Date of Service: 1/18/2024 I Hospital Day: 18    Assessment/Plan   * Acute CVA (cerebrovascular accident) (HCC)  Assessment & Plan  15 Days Post-Op Procedure(s):  SUBOCCIPITAL CRANIECTOMY FOR POSTERIOR FOSSA DECOMPRESSION; DISPOSE OF BONE FLAP (LULA, 1/3)  S/p EVD replacement on 1/5/24 2/2 extensive IVH, replaced 1/14 due to not draining/clogged  Presented with nausea, headache and ataxia. 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  On exam, GCS 11. Spontaneous eye opening. Follows commands on right. Nodding yes and no to questions.    Imaging:  CT head wo, 1/15/2023: Evolving left cerebellar PICC infarct with slightly increased size of the left cerebellum parenchymal hematoma. Stable mass effect. Stable extensive diffuse intraventricular hemorrhage with ventriculomegaly and transependymal CSF resorption. Unchanged 5 mm right to left midline shift.    Plan  Continue regular neurologic checks.   Plan for repeat CT head Monday  STAT CTH for decline in exam greater than 2 points in 1 hour  EVD was re-inserted on 1/5/24, replaced 1/14.  EVD open at 0. Maintain at this level at this time.   Ancef 2 g q 8 hrs while drain is in place, as EVD continues to be flushed regularly proximally and distally.     ICP < 20, ICP -3 to 12/24H. 1 in room when clamped with good waveform  145 mL/24 hours.   Ongoing medical management per primary team.   Na goal > 145-155.   SBP < 160  Pain control per primary team.   Neurology was consulted for stroke management.   ASA d/c 1/3/23, will re-assess when to start after repeat imaging Monday  PT/OT/PMR evaluation as able  DVT PPX: SCD,  "[facility]    Neurosurgery will continue to follow. Please call with questions or concerns.                        Subjective/Objective     Subjective: Patient with NAEO.    Objective: Patient awake on BIPAP. FC RUE/BLE. Localizes LUE. EVD functioning well.         Invasive Devices       Peripheral Intravenous Line  Duration             Long-Dwell Peripheral IV (Midline) 01/03/24 Left Cephalic Vein 14 days    Peripheral IV 01/16/24 Left Wrist 1 day              Arterial Line  Duration             Arterial Line 01/15/24 Radial 3 days              Drain  Duration             Ventriculostomy/Subdural Ventricular drainage catheter Left Frontal region 12 days    External Urinary Catheter 5 days    NG/OG/Enteral Tube Nasogastric Right nare 5 days                    Vitals: Blood pressure 114/70, pulse 91, temperature 97.6 °F (36.4 °C), temperature source Axillary, resp. rate 19, height 4' 10\" (1.473 m), weight 64.2 kg (141 lb 8.6 oz), SpO2 99%, not currently breastfeeding.,Body mass index is 29.58 kg/m².    Hemodynamic Monitoring: MAP: Arterial Line MAP (mmHg): 70 mmHg, ICP Mean: ICP Mean (mmHg): -3 mmHg    General appearance: alert, appears stated age, cooperative and no distress  Head: L frontal EVD site CDI. EVD functioning well at 0 with red SS drainage. L frontal EVD sutures removed today.   Eyes: eyes open spontaneously. Does not track on command. PERRL.  Neck: posterior fossa incision CDI. Staples removed today without difficulty.  Lungs: non labored breathing  Heart: regular heart rate  Neurologic:   Mental status: GCS 11 (E4, V1, M6)  Cranial nerves: grossly intact (Cranial nerves II-XII)  Sensory: normal to crude touch x4  Motor: FC RUE, BLE (wiggled toes and showed 2 fingers). Localized with LUE to PS.    Lab Results: I have personally reviewed pertinent results.      Results from last 7 days   Lab Units 01/17/24  0446 01/16/24  1239 01/16/24  0636 01/16/24  0424 01/15/24  0455   WBC Thousand/uL 6.71  --   --  6.39 " "8.28   HEMOGLOBIN g/dL 8.7* 8.5* 6.4* 6.6* 7.5*   HEMATOCRIT % 27.3* 26.2* 20.1* 20.5* 24.2*   PLATELETS Thousands/uL 206  --   --  220 266   NEUTROS PCT % 71  --   --  69 79*   MONOS PCT % 8  --   --  7 7   EOS PCT % 3  --   --  3 1     Results from last 7 days   Lab Units 01/17/24  0446 01/16/24  0424 01/15/24  0455   POTASSIUM mmol/L 4.2 4.1 4.3   CHLORIDE mmol/L 114* 110* 104   CO2 mmol/L 31 30 32   BUN mg/dL 35* 38* 37*   CREATININE mg/dL 0.91 0.96 0.96   CALCIUM mg/dL 8.7 8.6 8.6     Results from last 7 days   Lab Units 01/17/24  0446 01/15/24  0455 01/13/24  0507   MAGNESIUM mg/dL 2.2 2.3 2.3     Results from last 7 days   Lab Units 01/15/24  0455 01/13/24  0507 01/12/24  0532   PHOSPHORUS mg/dL 3.5 4.0 3.4         No results found for: \"TROPONINT\"  ABG:  Lab Results   Component Value Date    PHART 7.405 01/16/2024    VKG0SVT 42.3 01/16/2024    PO2ART 122.5 01/16/2024    SGD2KDP 25.9 01/16/2024    BEART 1.0 01/16/2024    SOURCE Line, Arterial 01/16/2024       Imaging Studies: I have personally reviewed pertinent reports.   and I have personally reviewed pertinent films in PACS    XR abdomen 1 view kub    Result Date: 1/16/2024  Narrative: ABDOMEN INDICATION:   ng tube placement. COMPARISON:  None VIEWS:  AP supine Centered at the diaphragm. FINDINGS: Embolism coils are noted overlying the right upper quadrant. An NG tube has been placed. The sideport and tip are below the EG junction. The visualized bowel appears nonobstructed. No discernible free air on this supine study.  Upright or left lateral decubitus imaging is more sensitive to detect subtle free air in the appropriate setting. No pathologic calcifications or soft tissue masses. Visualized lung bases are clear. Moderate upper lumbar scoliosis.     Impression: NG tube tip and sideport below the EG junction. Limited evaluation of the upper abdomen shows no additional significant abnormalities. Workstation performed: [id number]     CT head wo " contrast    Result Date: 1/15/2024  Narrative: CT BRAIN - WITHOUT CONTRAST INDICATION:   Mental status change, persistent or worsening change in neuro status. COMPARISON: CT head 1/14/2024. TECHNIQUE:  CT examination of the brain was performed.  Multiplanar 2D reformatted images were created from the source data. Radiation dose length product (DLP) for this visit:  753.99 mGy-cm .  This examination, like all CT scans performed in the [facility], was performed utilizing techniques to minimize radiation dose exposure, including the use of iterative  reconstruction and automated exposure control. IMAGE QUALITY:  Diagnostic. FINDINGS: PARENCHYMA: Stable postoperative changes status post suboccipital craniectomy for posterior fossa decompression related to the evolving left cerebellar PICA infarct. Slight increased size of the evolving hyperdense left cerebellum parenchymal hematoma measuring 1.3 x 1.2 cm (2/8), previously 1.1 x 1.1 cm. Stable mass effect with effacement of the fourth ventricle. Stable extensive intraventricular hemorrhage in the right lateral ventricle with mass effect. Stable 5 mm right to left midline shift. Stable extensive diffuse intraventricular hemorrhage in the third and left lateral ventricles. Stable transependymal CSF resorption. Stable left frontal ventriculostomy catheter terminating in the third ventricle. Minimally decreased pneumocephalus in the frontal horn of the left lateral ventricle. Stable small amount of hemorrhage along the tract for the removed right frontal ventriculostomy. Stable chronic microangiopathy. VENTRICLES AND EXTRA-AXIAL SPACES: Stable right greater than left lateral and third ventriculomegaly. The fourth ventricle remains collapsed. VISUALIZED ORBITS: Normal visualized orbits. PARANASAL SINUSES: Normal visualized paranasal sinuses. CALVARIUM AND EXTRACRANIAL SOFT TISSUES:  Stable postoperative changes status post suboccipital craniectomy with  adjacent suboccipital pseudomeningocele.     Impression: Evolving left cerebellar PICC infarct with slightly increased size of the left cerebellum parenchymal hematoma. Stable mass effect. Stable extensive diffuse intraventricular hemorrhage with ventriculomegaly and transependymal CSF resorption. Unchanged 5 mm right to left midline shift. The study was marked in EPIC for immediate notification. Workstation performed: [id number]     CT head wo contrast    Result Date: 1/15/2024  Narrative: CT BRAIN - WITHOUT CONTRAST INDICATION:   Intracranial shunt placement, follow up Post EVD COMPARISON: CT from earlier the same date. MRI dated 1/9/2024. TECHNIQUE:  CT examination of the brain was performed.  Multiplanar 2D reformatted images were created from the source data. Radiation dose length product (DLP) for this visit:  826.58 mGy-cm .  This examination, like all CT scans performed in the [facility], was performed utilizing techniques to minimize radiation dose exposure, including the use of iterative  reconstruction and automated exposure control. IMAGE QUALITY:  Diagnostic. FINDINGS: PARENCHYMA: Stable postop changes status post suboccipital craniectomy and C1 laminectomy. Stable evolving subacute left cerebellar PICA distribution infarct with stable 1.2 cm hyperdense parenchymal hematoma. Stable evolving hypodense parenchymal hematoma at the anterior medial aspect of the left cerebellum. Stable mass effect with effacement of the fourth ventricle.. Stable extensive intraventricular hemorrhage in the right lateral ventricle with mass effect and 5 mm of right to left shift. Hemorrhage in the third and left lateral ventricles is mildly decreased. Stable transependymal flow CSF. Stable small amount of hemorrhage along the tract for the removed right frontal ventriculostomy. Stable chronic microangiopathy. VENTRICLES AND EXTRA-AXIAL SPACES: Left frontal ventriculostomy terminating in the third ventricle is  unchanged in position. New small droplet of air within the left lateral ventricle. Intraventricular hemorrhage more pronounced in the right lateral ventricle as above. Stable dilatation of the right lateral ventricle. Left lateral ventricle is slightly decreased in size. VISUALIZED ORBITS: Bilateral lens replacement. PARANASAL SINUSES: Normal visualized paranasal sinuses. CALVARIUM AND EXTRACRANIAL SOFT TISSUES: Stable postoperative changes status post suboccipital craniectomy with adjacent suboccipital pseudomeningocele.     Impression: Stable dilated right lateral ventricle with stable extensive intraventricular hemorrhage. Hemorrhage within the third and left lateral ventricles is slightly decreased. Mildly decreased dilatation of the left lateral ventricle. Stable evolving hemorrhagic left PICA distribution infarct. Stable mass effect. Workstation performed: [id number]     CT head wo contrast    Result Date: 1/14/2024  Narrative: CT BRAIN - WITHOUT CONTRAST INDICATION:   Stroke, follow up Mental status change, persistent or worsening EVALUATE worsened mental status and poor waveform w/ EVD. COMPARISON: MRI brain 1/9/2024. CT head 1/7/24 TECHNIQUE:  CT examination of the brain was performed.  Multiplanar 2D reformatted images were created from the source data. Radiation dose length product (DLP) for this visit:  891.51 mGy-cm .  This examination, like all CT scans performed in the [facility], was performed utilizing techniques to minimize radiation dose exposure, including the use of iterative  reconstruction and automated exposure control. IMAGE QUALITY:  Diagnostic. FINDINGS: PARENCHYMA: Suboccipital craniectomy is again noted for posterior fossa decompression related to left PICA stroke with associated petechial hemorrhage on the recent MRI. A new focal 1.1 cm hemorrhage is noted within the left cerebellum (series 2: 9). Diffuse intraventricular hemorrhages are again noted with ventriculomegaly  and transependymal CSF resorption. Persistent 0.5 cm right to left midline shift. Stable left transfrontal ventriculostomy catheter terminating within the third ventricle. Resolving hemorrhage along the right transfrontal ventriculostomy tract. VENTRICLES AND EXTRA-AXIAL SPACES: Stable right greater than left lateral and third ventriculomegaly. The fourth ventricle remains collapsed. VISUALIZED ORBITS: Normal visualized orbits. PARANASAL SINUSES: Normal visualized paranasal sinuses. CALVARIUM AND EXTRACRANIAL SOFT TISSUES: Suboccipital craniectomy. Suboccipital pseudomeningocele is noted.     Impression: Evolving left PICA infarct with petechial hemorrhage noted on the MRI and posterior fossa decompression. New small hemorrhages noted within the left cerebellum. Extensive diffuse intraventricular hemorrhages with stable ventriculomegaly and transependymal CSF resorption. Stable left transfrontal ventriculostomy catheter. Unchanged 0.5 cm right to left midline shift. Suboccipital pseudomeningocele is noted. Workstation performed: [id number]     MRI brain wo contrast    Result Date: 1/10/2024  Narrative: MRI BRAIN WITHOUT CONTRAST INDICATION: New left-sided weakness.. COMPARISON:   12/31/2023 and 1/7/2024 TECHNIQUE:  Multiplanar, multisequence imaging of the brain was performed. IMAGE QUALITY:  Diagnostic. FINDINGS: BRAIN PARENCHYMA: Stable hemorrhage and vasogenic edema in the left cerebellar hemisphere and vermis. Stable T2/FLAIR hyperintensity in the bilateral temporal occipital periventricular white matter suggesting vasogenic edema and/or transependymal flow of CSF. Stable T2/FLAIR hyperintense foci in the periventricular and subcortical white matter consistent with microangiopathic disease. Mild signal hyperintensity along the left frontal EVD tract and right frontal catheter suggesting vasogenic edema. No restricted diffusion to indicate an acute infarct. Stable effacement of the left cerebellar folia and mild  herniation through the craniectomy defect. Stable right to left midline shift of 4 mm. VENTRICLES: Stable distention of the right greater than left lateral and third ventricles. Stable partial effacement of the fourth ventricle. Extensive hemorrhage in the right lateral ventricle. Stable hemorrhage in the third ventricle, cerebral aqueduct  and fourth ventricle. Left frontal EVD position is stable. Small left anterior frontal subdural fluid collection measuring 5 mm in thickness SELLA AND PITUITARY GLAND:  Normal. ORBITS:  Normal. PARANASAL SINUSES: Fluid in the nasal cavity and nasopharynx secondary to intubation. VASCULATURE:  Evaluation of the major intracranial vasculature demonstrates appropriate flow voids. CALVARIUM AND SKULL BASE: Postsurgical change from suboccipital craniectomy. EXTRACRANIAL SOFT TISSUES: Small suboccipital pseudomeningocele.     Impression: 1. Stable exam demonstrating extensive intraventricular hemorrhage with surrounding vasogenic edema and/or transependymal flow of CSF. Stable hydrocephalus. 2. Evolving subacute infarct in the left cerebellar hemisphere. Stable petechial hemorrhage, vasogenic edema and mass effect. Workstation performed: [id number]     XR chest portable ICU    Result Date: 1/8/2024  Narrative: CHEST INDICATION:   ETT placement. COMPARISON: CXR 1/5/2024 and 1/3/2024, chest CT 1/20/2022. EXAM PERFORMED/VIEWS:  XR CHEST PORTABLE ICU. FINDINGS: ET tube 3 cm above the ceasar. NG tube in stomach. Right jugular catheter in upper SVC. Cardiomediastinal silhouette normal. Mild bilateral atelectasis and scarring with no definite acute disease. No effusion or pneumothorax. Embolization coils in the lower right anterior chest wall. Upper abdomen normal. Bones normal for age.     Impression: Mild bilateral atelectasis and scar with no definite acute disease. ET tube 3 cm above the ceasar. Workstation performed: [id number]     CT head wo contrast    Result Date: 1/7/2024  Narrative:  CT BRAIN - WITHOUT CONTRAST INDICATION:   Stroke, follow up f/u IVH. COMPARISON: 1/6/2024. TECHNIQUE:  CT examination of the brain was performed.  Multiplanar 2D reformatted images were created from the source data. Radiation dose length product (DLP) for this visit:  872 mGy-cm .  This examination, like all CT scans performed in the [facility], was performed utilizing techniques to minimize radiation dose exposure, including the use of iterative reconstruction and automated exposure control. IMAGE QUALITY:  Diagnostic. FINDINGS: PARENCHYMA: Postsurgical changes from suboccipital decompression craniectomy again noted with evolving left PICA infarction and petechial hemorrhages/parenchymal hemorrhages in the anterior vermis region. Mass effect with effacement of the basilar cisterns. Extensive intraventricular hemorrhage most pronounced in the right lateral ventricle again noted without significant interval change. Left frontal ventriculostomy catheter in stable positioning with tip in the third ventricle. Right frontal ICP  monitor is stable. VENTRICLES AND EXTRA-AXIAL SPACES: Extensive intraventricular hemorrhage again noted without significant interval change with prominent hydrocephalus and transependymal CSF flow. VISUALIZED ORBITS: Normal visualized orbits. PARANASAL SINUSES: Normal visualized paranasal sinuses. CALVARIUM AND EXTRACRANIAL SOFT TISSUES: Unremarkable.     Impression: Extensive intracranial hemorrhage without significant interval change compared to the prior study of the same day as detailed above. Workstation performed: [id number]     CT head wo contrast    Result Date: 1/6/2024  Narrative: CT BRAIN - WITHOUT CONTRAST INDICATION:   Stroke, follow up evaluate IVH. COMPARISON: CT performed yesterday. TECHNIQUE:  CT examination of the brain was performed.  Multiplanar 2D reformatted images were created from the source data. Radiation dose length product (DLP) for this visit:  818.61  mGy-cm .  This examination, like all CT scans performed in the [facility], was performed utilizing techniques to minimize radiation dose exposure, including the use of iterative  reconstruction and automated exposure control. IMAGE QUALITY:  Diagnostic. FINDINGS: PARENCHYMA: Stable postoperative changes status post suboccipital decompressive craniectomy.. Redemonstration of evolving left PICA infarct with stable petechial hemorrhage and more focal parenchymal hemorrhage in the anterior vermis. Increasing mass effect with prepontine, perimesencephalic and quadrigeminal plate cisterns... Diffuse extensive intraventricular hemorrhage most pronounced in the right lateral ventricle is increased. Left frontal ventriculostomy has been placed with the tip in the third ventricle. Right frontal ICP monitor is seen. VENTRICLES AND EXTRA-AXIAL SPACES: Interval placement of left frontal ventriculostomy with tip in the third ventricle. Diffuse extensive intraventricular hemorrhage is increased. Hydrocephalus is increased. Transependymal flow of CSF is again seen. VISUALIZED ORBITS: Normal visualized orbits. PARANASAL SINUSES: Normal visualized paranasal sinuses. CALVARIUM AND EXTRACRANIAL SOFT TISSUES:  Normal.     Impression: Status post suboccipital decompression for large left PICA distribution infarct with hemorrhagic transformation. Parenchymal hemorrhage is stable. Extensive intraventricular hemorrhage most pronounced in the right lateral ventricle is increased. Increased hydrocephalus with transependymal flow of CSF despite placement of left frontal ventriculostomy. Increased mass effect as described. I personally discussed this study with Eyad Wong on 1/6/2024 7:26 PM. Workstation performed: [id number]     XR chest portable ICU    Result Date: 1/6/2024  Narrative: CHEST INDICATION:   Post intubation, check ETT placement. COMPARISON: Chest radiograph 1/3/2024 EXAM PERFORMED/VIEWS:  XR CHEST PORTABLE ICU  FINDINGS: Endotracheal tube tip 3.4 cm above the ceasar. Right IJ central venous catheter tip in the mid SVC. Enteric catheter courses below the diaphragm and curls into the gastric fundus. External monitoring leads project over the chest. Cardiomediastinal silhouette appears unremarkable. Low lung volumes with mild bibasilar atelectasis.  No pneumothorax or pleural effusion. Osseous structures appear within normal limits for patient age.     Impression: Endotracheal tube tip 3.4 cm above the ceasar. Workstation performed: [id number]     CTA head w wo contrast    Result Date: 1/5/2024  Narrative: CTA BRAIN WITH CONTRAST INDICATION: Intracranial hemorrhage. COMPARISON:   CT angiogram dated 12/30/2023. CT brain performed earlier today. TECHNIQUE:   Post contrast imaging was performed after administration of iodinated contrast through the neck and brain. Post contrast axial 0.625 mm images timed to opacify the arterial system. 3D rendering was performed on an independent workstation.   MIP reconstructions performed. Coronal reconstructions were performed of the noncontrast portion of the brain. Radiation dose length product (DLP) for this visit:  842.98 mGy-cm .  This examination, like all CT scans performed in the [facility], was performed utilizing techniques to minimize radiation dose exposure, including the use of iterative  reconstruction and automated exposure control. IV Contrast:  85 mL of iohexol (OMNIPAQUE) IMAGE QUALITY:   Diagnostic FINDINGS: INTRACRANIAL VASCULATURE INTERNAL CAROTID ARTERIES: Mild calcification and tortuosity of the intracranial internal carotid arteries. Mild narrowing of the paraclinoid segments bilaterally. ANTERIOR CIRCULATION:  Symmetric A1 segments and anterior cerebral arteries with normal enhancement.  Normal anterior communicating artery. MIDDLE CEREBRAL ARTERY CIRCULATION:  M1 segment and middle cerebral artery branches demonstrate normal enhancement bilaterally.  DISTAL VERTEBRAL ARTERIES: Calcification of the V4 segments. Only mild narrowing is noted, right slightly greater than left. There is a normal posterior inferior cerebellar artery on the right. The left correlate is not visualized. BASILAR ARTERY: Tortuosity of the basilar artery with no focal stenosis. Normal superior cerebellar arteries. POSTERIOR CEREBRAL ARTERIES: Both posterior cerebral arteries arises from the basilar tip.  Both arteries demonstrate normal enhancement. DURAL VENOUS SINUSES:  Normal. NON VASCULAR ANATOMY BONY STRUCTURES: Since the prior CT angiogram the patient has undergone an occipital craniectomy for decompression of the left cerebellar infarct. Other findings: Extensive intraventricular hemorrhage is noted within the lateral ventricles, third ventricle and fourth ventricle. See recent CT scan performed approximately 15 minutes prior to this exam.     Impression: Stable vascularity when compared with prior CT angiogram from 12/30/2023. No large vessel occlusion. Mild atherosclerotic change present. There is no left posterior inferior cerebellar artery visualized within the posterior fossa in this patient with a known PICA infarct. No venous abnormality identified. Workstation performed: [id number]     CT head wo contrast    Result Date: 1/5/2024  Narrative: CT BRAIN - WITHOUT CONTRAST INDICATION:   Mental status change, unknown cause Acute change in mental status. COMPARISON: January 5, 2024 at 11:35 a.m. TECHNIQUE:  CT examination of the brain was performed.  Multiplanar 2D reformatted images were created from the source data. Radiation dose length product (DLP) for this visit:  815 mGy-cm .  This examination, like all CT scans performed in the [facility], was performed utilizing techniques to minimize radiation dose exposure, including the use of iterative reconstruction and automated exposure control. IMAGE QUALITY:  Diagnostic. FINDINGS: PARENCHYMA: There is again an  evolving left PICA distribution infarct involving left cerebellum with regions of petechial hemorrhage. Similar amounts of blood in the fourth ventricle. New blood in the quadrigeminal plate cistern. Small region of hemorrhagic conversion anterior/inferior left cerebellar hemisphere near the fourth ventricle. Similar postoperative changes in the base of the occiput. Postop pneumocephalus appears similar. Chronic microvascular ischemic changes again seen in both cerebral hemispheres. Previous intraventricular shunt has been removed revealing a narrow tract of edema with small amounts of blood tracking from the body of right lateral ventricle to the frontal calvarial surface. VENTRICLES AND EXTRA-AXIAL SPACES: Compared to the previous exam there is now extensive intraventricular blood which appears most pronounced in the body of the right lateral ventricle outlining the choroid plexus and extending into the temporal horn and atrium of right lateral ventricle and into the right frontal horn and occipital horn. There is also extensive intraventricular blood in the third ventricle extending into the fourth ventricle and into the body of the left lateral ventricle, left frontal horn, atrium of left lateral ventricle. Ventricles have increased in size since prior exam suggesting developing hydrocephalus. VISUALIZED ORBITS: Stable postop changes. PARANASAL SINUSES: Normal visualized paranasal sinuses. CALVARIUM AND EXTRACRANIAL SOFT TISSUES: Patient is again status post recent decompressive suboccipital craniectomy and right frontal albino hole.     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 Petechial hemorrhage with some mild hemorrhagic conversion in the left cerebellar hemisphere. Previous right side  shunt catheter has been withdrawn. Extensive intraventricular blood as detailed above. Developing hydrocephalus. Narrowing of sulci bilaterally. No convincing uncal herniation. No tonsillar herniation  (suboccipital craniectomy). I personally discussed this study with Violet Trejo on 1/5/2024 5:24 PM. Workstation performed: [id number]     CT head wo contrast    Result Date: 1/5/2024  Narrative: CT BRAIN - WITHOUT CONTRAST INDICATION:   Craniotomy, post-op s/p craniectomy. COMPARISON: 1/3/2024 TECHNIQUE:  CT examination of the brain was performed.  Multiplanar 2D reformatted images were created from the source data. Radiation dose length product (DLP) for this visit:  862.05 mGy-cm .  This examination, like all CT scans performed in the [facility], was performed utilizing techniques to minimize radiation dose exposure, including the use of iterative  reconstruction and automated exposure control. IMAGE QUALITY:  Diagnostic. FINDINGS: PARENCHYMA: There is an evolving left PICA distribution infarct with stable areas of petechial hemorrhage as well as more focal parenchymal hemorrhage involving the left cerebellar vermis. Degree of hemorrhage is grossly stable since the prior exam. Slightly improved mass effect on the fourth ventricle. Pneumocephalus has improved. No CT evidence for new large acute vascular distribution infarct. No midline shift. VENTRICLES AND EXTRA-AXIAL SPACES: Right frontal approach ventriculostomy catheter with its tip unchanged in position. Degree of hydrocephalus has improved with mild prominence of the temporal horns noted. VISUALIZED ORBITS: The patient is status post bilateral cataract surgery. PARANASAL SINUSES: Normal visualized paranasal sinuses. CALVARIUM AND EXTRACRANIAL SOFT TISSUES: Status post decompressive suboccipital craniectomy.     Impression: Status post decompressive suboccipital craniectomy for a PICA distribution infarct with slightly improved mass effect on the fourth ventricle and improving hydrocephalus with ventriculostomy in place. Overall degree of hemorrhage within the left cerebellum and vermis is similar to the prior examination. No new areas of  acute intracranial hemorrhage identified. Workstation performed: [id number]     XR chest portable    Result Date: 1/4/2024  Narrative: CHEST INDICATION: verify ETT position after pulling ETT back 2cm from previous imaging. COMPARISON: Several prior chest radiographs, the most recent from earlier the same day. TECHNIQUE: XR CHEST PORTABLE Images - 2. FINDINGS: There is an endotracheal tube with its tip 2.5 cm above the ceasar. An enteric tube is seen projecting over the region of the stomach. A right internal jugular central venous catheter has its tip in the region of SVC. Lungs are hypoinflated, limiting evaluation. No discrete airspace opacities. No pleural effusions. No evidence of pneumothorax. Cardiac silhouette not accurately accessed on this projection. Embolization coils project over the right upper quadrant.     Impression: Tubes and lines in adequate position. No acute pulmonary pathology. Workstation performed: [id number]     XR chest portable    Result Date: 1/4/2024  Narrative: CHEST INDICATION:   verify ETT placement. COMPARISON: Prior chest x-ray performed earlier the same day. EXAM PERFORMED/VIEWS:  XR CHEST PORTABLE FINDINGS: Endotracheal tube remains in the proximal right mainstem bronchus. Note that a subsequent chest x-ray shows appropriate positioning. Right central venous catheter is unchanged. Cardiomediastinal silhouette appears unremarkable. Right perihilar opacity may be related to subsegmental atelectasis versus low lung volumes. No pneumothorax or pleural effusion. Osseous structures appear within normal limits for patient age.     Impression: Endotracheal tube in the proximal right mainstem bronchus. Workstation performed: [id number]     X-ray chest 1 view    Result Date: 1/4/2024  Narrative: CHEST INDICATION:   Endotracheal tube positioning. COMPARISON:  1/3/2024 EXAM PERFORMED/VIEWS:  XR CHEST 1 VIEW FINDINGS: Endotracheal tube tip projects over the right mainstem bronchus and should  be retracted. Follow-up radiograph at the time of dictation demonstrates retraction to the distal trachea. Right IJ central line tip projects over the caval atrial junction. Nasogastric tube tip left upper quadrant likely within the stomach. Cardiomediastinal silhouette appears unremarkable. Low lung volumes present. No consolidation, pneumothorax or pleural effusion. Osseous structures appear within normal limits for patient age.     Impression: Tubes and lines as described above. Low lung volumes demonstrated without consolidation or other acute pulmonary findings. Workstation performed: [id number]     CT head wo contrast    Result Date: 1/3/2024  Narrative: CT BRAIN - WITHOUT CONTRAST INDICATION:   Intracranial shunt placement, follow up re-evaluate post-op intracranial edema. COMPARISON: CT head examinations dated earlier same day. TECHNIQUE:  CT examination of the brain was performed.  Multiplanar 2D reformatted images were created from the source data. Radiation dose length product (DLP) for this visit:  878.45 mGy-cm .  This examination, like all CT scans performed in the [facility], was performed utilizing techniques to minimize radiation dose exposure, including the use of iterative  reconstruction and automated exposure control. IMAGE QUALITY:  Diagnostic. FINDINGS: PARENCHYMA/VENTRICLES/EXTRA-AXIAL COMPARTMENT: Patient is now status post interval decompressive suboccipital craniectomy with expected subjacent postsurgical changes and pneumocephalus. Reidentified evolving left PICA territory infarct with similar associated edema and mass effect including effacement/compression of the fourth ventricle. Similar appearance of focal 9 mm parenchymal hematoma at the left anterior vermis adjacent to the fourth ventricle. Similar probable associated petechial hemorrhage within the left posterior cerebellum. Reidentified right frontal approach ventricular catheter with distal tip terminating near  the right aspect of the foramen of Funes. Persistent hydrocephalus perhaps with minimal improvement, bifrontal diameter measures 2.8 cm (previously 3 cm when measured in a similar manner). Unchanged tiny focus of gas within the right frontal horn. Similar appearance probable transependymal CSF flow. No new parenchymal hemorrhage or mass effect. VISUALIZED ORBITS: Normal visualized orbits. PARANASAL SINUSES: Normal visualized paranasal sinuses. CALVARIUM AND EXTRACRANIAL SOFT TISSUES: Suboccipital craniectomy changes with overlying surgical staple line.     Impression: Status post interval decompressive suboccipital craniectomy with expected subjacent postsurgical changes/pneumocephalus. Similar appearance of evolving left PICA territory infarct and associated edema/mass effect and with probable petechial cortical hemorrhage compared to the most immediate prior study. Similar appearance focal parenchymal hematoma in the left anterior cerebellar vermis adjacent to the fourth ventricle compared to the most immediate prior study. No new hemorrhage or mass effect. Minimally improved hydrocephalus status post placement of right frontal approach ventricular catheter. Workstation performed: [id number]     XR chest portable    Result Date: 1/3/2024  Narrative: CHEST INDICATION:   difficulty clearing secretions; concern for aspiration. COMPARISON: CXR 12/31/2023 and 12/30/2023, CTA neck 12/30/2023, abdomen CT  12/06/2023, chest CT 1/20/2022. EXAM PERFORMED/VIEWS:  XR CHEST PORTABLE. FINDINGS: Mild cardiomegaly. Mild increased opacity in the left base. Mild fibrosis. No effusion or pneumothorax. Chronic elevation of the right diaphragm. Embolization coils projecting over the right upper quadrant. Bones normal for age.     Impression: Mild left base opacity. Aspiration not excluded. Workstation performed: [id number]     CT head wo contrast    Result Date: 1/3/2024  Narrative: CT BRAIN - WITHOUT CONTRAST INDICATION:    Hydrocephalus s/p EVD placement, hydrocephalus, exam change. COMPARISON: CT head 1/3/2024 at 8:55 a.m. TECHNIQUE:  CT examination of the brain was performed.  Multiplanar 2D reformatted images were created from the source data. Radiation dose length product (DLP) for this visit:  901.18 mGy-cm .  This examination, like all CT scans performed in the [facility], was performed utilizing techniques to minimize radiation dose exposure, including the use of iterative  reconstruction and automated exposure control. IMAGE QUALITY:  Diagnostic. FINDINGS: PARENCHYMA: Unchanged focal parenchymal hemorrhage adjacent to the fourth ventricle measuring 0.9 cm (2/11). Unchanged left PCA infarct with left cerebellar hypodensity and mass effect with compression of the fourth ventricle. No new intraparenchymal hemorrhage identified. No midline shift. VENTRICLES AND EXTRA-AXIAL SPACES: Interval placement of a right frontal ventriculostomy catheter with the tip of the catheter near the foramen of Monro. Small amount of pneumocephalus in the frontal horn of the right lateral ventricle. Unchanged hydrocephalus. VISUALIZED ORBITS: Normal visualized orbits. PARANASAL SINUSES: Normal visualized paranasal sinuses. CALVARIUM AND EXTRACRANIAL SOFT TISSUES:  Normal.     Impression: Unchanged hydrocephalus status post placement of right frontal ventriculostomy catheter with tip near the foramen of Monro. Small amount of pneumocephalus in the frontal horn of the right lateral ventricle. Unchanged left PCA infarct with compression of the fourth ventricle and unchanged focal parenchymal hemorrhage adjacent to the fourth ventricle. The study was marked in EPIC for immediate notification. Workstation performed: [id number]     CT head wo contrast    Result Date: 1/3/2024  Narrative: CT BRAIN - WITHOUT CONTRAST INDICATION:   Mental status change, unknown cause change in mental status; recent stroke. COMPARISON: CT and MRI from  12/31/2023. TECHNIQUE:  CT examination of the brain was performed.  Multiplanar 2D reformatted images were created from the source data. Radiation dose length product (DLP) for this visit:  894.41 mGy-cm .  This examination, like [facility] CT [facility], was performed utilizing techniques to minimize radiation dose exposure, including the use of iterative  reconstruction and automated exposure control. IMAGE QUALITY:  Diagnostic. FINDINGS: PARENCHYMA: There is an increased focal hyperdensity adjacent to the fourth ventricle measuring 8 x 9 mm consistent with parenchymal hemorrhage as seen on MRI. Underlying vague hyperdensity is stable and likely residual petechial hemorrhage. There is an evolving left PCA infarct with increased left cerebellar hypodensity, and worsening mass effect with occlusion of the fourth ventricle. VENTRICLES AND EXTRA-AXIAL SPACES: There is new hydrocephalus secondary to fourth ventricle compression. The third ventricle measures 1.1 cm in transverse diameter, previously 3 mm. VISUALIZED ORBITS: Normal visualized orbits. PARANASAL SINUSES: Normal visualized paranasal sinuses. CALVARIUM AND EXTRACRANIAL SOFT TISSUES:  Normal.     Impression: Evolving left PCA infarct with increased left cerebellar hypodensity and compression of the fourth ventricle resulting in hydrocephalus with increased focal parenchymal hemorrhage adjacent to the fourth ventricle. Neurosurgery consult recommended. I personally discussed this study with AMBAR KOEHLER on 1/3/2024 10:09 AM. Workstation performed: [id number]     XR chest portable    Result Date: 1/1/2024  Narrative: CHEST INDICATION:   central line placement. COMPARISON: CXR 12/30/2023, chest CT 1/20/2022. EXAM PERFORMED/VIEWS:  XR CHEST PORTABLE. FINDINGS: Right jugular catheter at cavoatrial junction. Cardiomediastinal silhouette normal. Chronic scarring with no acute disease. No effusion or pneumothorax. Chronic  elevation of the right diaphragm. Embolization coils projecting over the right upper quadrant. Upper abdomen normal. Bones normal for age.     Impression: Right jugular catheter at cavoatrial junction with no pneumothorax. Chronic scarring with no acute disease. Workstation performed: [id number]     Echo complete w/ contrast if indicated    Result Date: 12/31/2023  Narrative:   Left Ventricle: Left ventricular cavity size is normal. There is mild concentric hypertrophy. The left ventricular ejection fraction is 70%. Systolic function is vigorous. Wall motion is normal.     MRI brain wo contrast    Result Date: 12/31/2023  Narrative: MRI BRAIN WITHOUT CONTRAST INDICATION: stroke (PICA). COMPARISON: CT head without contrast 12/31/2023, 12/30/2023, 11/22/2022. CTA stroke alert head and neck 12/30/2023. TECHNIQUE:  Multiplanar, multisequence imaging of the brain was performed. IMAGE QUALITY:  Diagnostic. FINDINGS: BRAIN PARENCHYMA: Acute infarct in left PICA territory with small acute parenchymal hematoma in anterior aspect of left cerebellar vermis, petechial cortical hemorrhage along left posterior cerebellum, and similar compressive mass effect on fourth ventricle. There is no discrete mass. No midline shift. Small scattered hyperintensities on T2/FLAIR imaging are noted in the periventricular and subcortical white matter demonstrating an appearance that is statistically most likely to represent mild microangiopathic change. VENTRICLES: Similar compressive mass effect on fourth ventricle. No obstructive hydrocephalus. SELLA AND PITUITARY GLAND:  Normal. ORBITS: Sequela of bilateral cataract surgery. PARANASAL SINUSES:  Normal. VASCULATURE:  Evaluation of the major intracranial vasculature demonstrates appropriate flow voids. CALVARIUM AND SKULL BASE:  Normal. EXTRACRANIAL SOFT TISSUES: 1.6 cm bright T2/FLAIR intramuscular lesion with restricted diffusion in left temporalis muscle (4:5)     Impression: Acute infarct  in left PICA territory with small acute parenchymal hematoma in anterior aspect of left cerebellar vermis, petechial cortical hemorrhage along left posterior cerebellum, and similar compressive mass effect on fourth ventricle. No obstructive hydrocephalus. Recommend consultation with neurosurgery. 1.6 cm intramuscular lesion in left temporalis muscle, indeterminate. Differential includes intramuscular epidermoid cyst, hemangioma, myxoma, among other differentials. Mild chronic microangiopathy. The study was marked in EPIC for immediate notification. Workstation performed: [id number]     XR chest 1 view portable    Result Date: 12/31/2023  Narrative: CHEST INDICATION:   Weak. COMPARISON: 9/6/2023 EXAM PERFORMED/VIEWS:  XR CHEST PORTABLE FINDINGS: Cardiomediastinal silhouette appears unremarkable. The lungs are clear. Elevated right hemidiaphragm as before. No pneumothorax or pleural effusion. Osseous structures appear within normal limits for patient age.     Impression: No acute cardiopulmonary disease. Workstation performed: [id number]     CT head wo contrast    Result Date: 12/31/2023  Narrative: CT BRAIN - WITHOUT CONTRAST INDICATION:   Stroke, follow up distal left PICA occlusion. COMPARISON: CT/CTA from earlier the same date. TECHNIQUE:  CT examination of the brain was performed.  Multiplanar 2D reformatted images were created from the source data. Radiation dose length product (DLP) for this visit:  857.9 mGy-cm .  This examination, like all CT scans performed in the [facility], was performed utilizing techniques to minimize radiation dose exposure, including the use of iterative reconstruction and automated exposure control. IMAGE QUALITY:  Diagnostic. FINDINGS: PARENCHYMA: Limited due to persistent contrast opacification from earlier CTA. Redemonstration of acute left cerebellar PICA distribution infarct. Mass effect on the fourth ventricle. New increased attenuation in the ventral aspect of  the left cerebellar infarct favored represent contrast staining but petechial hemorrhage not excluded. No acute space-occupying hematoma. No shift or herniation. Mild chronic microangiopathy. VENTRICLES AND EXTRA-AXIAL SPACES: No hydrocephalus. VISUALIZED ORBITS: Normal visualized orbits. PARANASAL SINUSES: Bilateral lens replacement. CALVARIUM AND EXTRACRANIAL SOFT TISSUES:  Normal.     Impression: Redemonstration of acute left cerebellar PICA distribution infarct. Mild mass effect on the fourth ventricle. No hydrocephalus. Increased density within the anterior aspect of the infarct favored represent contrast staining rather than petechial hemorrhage. Workstation performed: [id number]     CT head wo contrast    Result Date: 12/31/2023  Narrative: CT BRAIN - WITHOUT CONTRAST INDICATION:   Stroke, follow up distal left PICA occlusion. COMPARISON: CTs and CTA from yesterday. TECHNIQUE:  CT examination of the brain was performed.  Multiplanar 2D reformatted images were created from the source data. Radiation dose length product (DLP) for this visit:  784.69 mGy-cm .  This examination, like all CT scans performed in the [facility], was performed utilizing techniques to minimize radiation dose exposure, including the use of iterative  reconstruction and automated exposure control. IMAGE QUALITY:  Diagnostic. FINDINGS: PARENCHYMA: Residual intravascular contrast.. Redemonstration of acute left cerebellar PICA distribution infarct. There is persistent increased attenuation in the anterior aspect of the left cerebellar infarct that may represent contrast staining but underlying petechial hemorrhage is not excluded. Recommend additional follow-up CTA and/or further evaluation with MRI. Mass effect on the fourth ventricle is slightly increased. No hydrocephalus. Stable diminished attenuation the periventricular and subcortical matter due to mild chronic microangiopathy. VENTRICLES AND EXTRA-AXIAL SPACES:   Normal for the patient's age. VISUALIZED ORBITS: Bilateral lens replacement. PARANASAL SINUSES: Normal visualized paranasal sinuses. CALVARIUM AND EXTRACRANIAL SOFT TISSUES:  Normal.     Impression: Redemonstration of evolving acute PICA distribution left cerebellar infarct. Mass effect on the fourth ventricle is slightly increased. No hydrocephalus. Persistent increased attenuation in the anterior aspect of the cerebellar infarct that could represent residual contrast staining but petechial hemorrhage not excluded. Recommend close interval follow-up CT and/or further evaluation with MRI. The study was marked in EPIC for immediate notification. Workstation performed: [id number]     CTA stroke alert (head/neck)    Result Date: 12/30/2023  Narrative: CTA NECK AND BRAIN WITH CONTRAST INDICATION: Stroke Alert COMPARISON:   Earlier same day CT head without contrast. CTA chest PE study TECHNIQUE:   Post contrast imaging was performed after administration of iodinated contrast through the neck and brain. Post contrast axial 0.625 mm images timed to opacify the arterial system. 3D rendering was performed on an independent workstation.   MIP reconstructions performed. Coronal reconstructions were performed of the noncontrast portion of the brain. Radiation dose length product (DLP) for this visit:  428 mGy-cm .  This examination, like all CT scans performed in the [facility], was performed utilizing techniques to minimize radiation dose exposure, including the use of iterative reconstruction and automated exposure control. IV Contrast:  85 mL of iohexol (OMNIPAQUE) IMAGE QUALITY:   Diagnostic FINDINGS: CERVICAL VASCULATURE AORTIC ARCH AND GREAT VESSELS:  Mild calcified atherosclerotic disease of the arch, proximal great vessels and visualized subclavian vessels.  No significant stenosis. RIGHT VERTEBRAL ARTERY CERVICAL SEGMENT:  Normal origin. The vessel is normal in caliber throughout the neck. LEFT  VERTEBRAL ARTERY CERVICAL SEGMENT:  Normal origin. The vessel is normal in caliber throughout the neck. RIGHT EXTRACRANIAL CAROTID SEGMENT:  Normal caliber common carotid artery with medialization proximally.  Minimal calcified atherosclerotic disease. Normal cervical internal carotid artery.  No stenosis or dissection. LEFT EXTRACRANIAL CAROTID SEGMENT:  Normal caliber common carotid artery with medialization proximally.  Minimal calcified atherosclerotic disease. Normal cervical internal carotid artery. No stenosis or dissection. NASCET criteria was used to determine the degree of internal carotid artery diameter stenosis. INTRACRANIAL VASCULATURE INTERNAL CAROTID ARTERIES:  Normal enhancement of the intracranial portions of the internal carotid arteries with mild calcified atherosclerotic disease bilaterally.  Normal ophthalmic artery origins.  Normal ICA terminus. ANTERIOR CIRCULATION:  Symmetric A1 segments and anterior cerebral arteries with normal enhancement.  Normal anterior communicating artery. MIDDLE CEREBRAL ARTERY CIRCULATION:  M1 segment and middle cerebral artery branches demonstrate normal enhancement bilaterally. DISTAL VERTEBRAL ARTERIES: Patent distal vertebral arteries. Mild stenosis in bilateral vertebral artery V4 segments due to calcified atherosclerotic disease. Posterior inferior cerebellar artery origins are normal. Occluded distal aspect of left posterior inferior cerebellar artery (PICA).  Normal vertebral basilar junction. BASILAR ARTERY:  Basilar artery is is mildly tortuous and normal in caliber.  Normal superior cerebellar arteries. POSTERIOR CEREBRAL ARTERIES: Both posterior cerebral arteries arises from the basilar tip.  Both arteries demonstrate normal enhancement.   Tiny posterior communicating arteries. VENOUS STRUCTURES: Venous contamination is present with multiple collateral venous vessels throughout the neck and reflux of contrast into bilateral transverse dural venous  sinuses and confluence of sinuses. NON VASCULAR ANATOMY BONY STRUCTURES:  No acute osseous abnormality. Diffuse osteopenia. Multilevel spinal degenerative changes, moderate-to-severe at C6-C7. Grade 1 anterolisthesis C7-T1. SOFT TISSUES OF THE NECK:  Normal. THORACIC INLET: Scattered reticular opacities in visualized bilateral upper and lower lobes, likely due to chronic lung disease with postinfectious scarring given similar appearance on CTA chest PE study 1/20/2022.     Impression: Occluded distal aspect of left posterior inferior cerebellar artery (PICA). This corresponds with left PICA territory infarct seen on earlier same day head CT. Scattered reticular opacities in visualized bilateral upper and lower lobes, likely due to chronic lung disease with postinfectious scarring. Superimposed infection is difficult to exclude. Consider follow-up dedicated CT chest without contrast. Additional chronic/incidental findings as detailed above. Findings were directly discussed with Tyron Williamson 12/30/2023 at 2:54 PM. Workstation performed: [id number]     CT head without contrast    Result Date: 12/30/2023  Narrative: CT BRAIN - WITHOUT CONTRAST INDICATION:   N/V. Per epic chart review: 83-year-old female presents emergency room complaining of posterior headache, body aches abdominal pain vomiting nausea and generalized weakness. COMPARISON: CT head without contrast 11/22/2022. TECHNIQUE:  CT examination of the brain was performed.  Multiplanar 2D reformatted images were created from the source data. Radiation dose length product (DLP) for this visit:  881 mGy-cm .  This examination, like all CT scans performed in the [facility], was performed utilizing techniques to minimize radiation dose exposure, including the use of iterative reconstruction and automated exposure control. IMAGE QUALITY:  Diagnostic. 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 Decreased attenuation is noted in  periventricular and subcortical white matter demonstrating an appearance that is statistically most likely to represent mild microangiopathic change.  No CT signs of acute infarction.  No intracranial mass, mass effect or midline shift.  No acute parenchymal hemorrhage. Arterial calcifications of carotid siphons and bilateral intradural vertebral arteries. VENTRICLES AND EXTRA-AXIAL SPACES:  Normal for the patient's age. VISUALIZED ORBITS: Sequela of bilateral cataract surgery. PARANASAL SINUSES: Normal visualized paranasal sinuses. CALVARIUM AND EXTRACRANIAL SOFT TISSUES:  Normal.     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 I personally discussed this study with SOFY DE LEON JR on 12/30/2023 2:07 PM. Workstation performed: [id number]     EKG, Pathology, and Other Studies: I have personally reviewed pertinent reports.      VTE Pharmacologic Prophylaxis: Heparin    VTE Mechanical Prophylaxis: sequential compression device

## 2024-01-18 NOTE — ASSESSMENT & PLAN NOTE
15 Days Post-Op Procedure(s):  SUBOCCIPITAL CRANIECTOMY FOR POSTERIOR FOSSA DECOMPRESSION; DISPOSE OF BONE FLAP (LULA, 1/3)  S/p EVD replacement on 1/5/24 2/2 extensive IVH, replaced 1/14 due to not draining/clogged  Presented with nausea, headache and ataxia. 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  On exam, GCS 11. Spontaneous eye opening. Follows commands on right. Nodding yes and no to questions.    Imaging:  CT head wo, 1/15/2023: Evolving left cerebellar PICC infarct with slightly increased size of the left cerebellum parenchymal hematoma. Stable mass effect. Stable extensive diffuse intraventricular hemorrhage with ventriculomegaly and transependymal CSF resorption. Unchanged 5 mm right to left midline shift.    Plan  Continue regular neurologic checks.   Plan for repeat CT head Monday  STAT CTH for decline in exam greater than 2 points in 1 hour  EVD was re-inserted on 1/5/24, replaced 1/14.  EVD open at 0. Maintain at this level at this time.   Ancef 2 g q 8 hrs while drain is in place, as EVD continues to be flushed regularly proximally and distally.     ICP < 20, ICP -3 to 12/24H. 1 in room when clamped with good waveform  145 mL/24 hours.   Ongoing medical management per primary team.   Na goal > 145-155.   SBP < 160  Pain control per primary team.   Neurology was consulted for stroke management.   ASA d/c 1/3/23, will re-assess when to start after repeat imaging Monday  PT/OT/PMR evaluation as able  DVT PPX: SCD, SQH    Neurosurgery will continue to follow. Please call with questions or concerns.

## 2024-01-18 NOTE — OCCUPATIONAL THERAPY NOTE
Occupational Therapy Re-Evaluation     Debbie Moody    1/18/2024    Principal Problem:    Acute CVA (cerebrovascular accident) (HCC)  Active Problems:    Type 2 diabetes mellitus with stage 3b chronic kidney disease, without long-term current use of insulin (HCC)    Anxiety    Essential hypertension    Hypercholesterolemia    Simple chronic anemia    Overweight (BMI 25.0-29.9)    Chronic kidney disease, stage 3b (HCC)    Pulmonary fibrosis (HCC)    Encephalopathy    Status post craniectomy      @hPMHl@    Past Surgical History:   Procedure Laterality Date    CHOLECYSTECTOMY      COLON SURGERY      CRANIECTOMY N/A 1/3/2024    Procedure: SUBOCCIPITAL CRANIECTOMY FOR POSTERIOR FOSSA DECOMPRESSION; DISPOSE OF BONE FLAP;  Surgeon: David Higgins MD;  Location: [facility];  Service: Neurosurgery    HERNIA REPAIR      IR EMBOLIZATION (SPECIFY VESSEL OR SITE)  1/12/2022    ROTATOR CUFF REPAIR Right     VARICOSE VEIN SURGERY      Impression:  2/12/16 Jake Sarabia        01/18/24 1012   OT Last Visit   OT Visit Date 01/18/24   Note Type   Note type (S)  Re-Evaluation   Pain Assessment   Pain Assessment Tool 0-10   Pain Score No Pain   Restrictions/Precautions   Weight Bearing Precautions Per Order No   Braces or Orthoses   (no helmet per NSx)   Other Precautions Cognitive;Chair Alarm;Bed Alarm;Aspiration;Multiple lines;Telemetry;Seizure;O2;Fall Risk;Pain;Visual impairment  ([place]; EVD; keofed)   Home Living   Type of Home House   Home Layout Two level;Stairs to enter with rails  (10 TRAM)   Bathroom Shower/Tub Walk-in shower   Bathroom Toilet Standard   Bathroom Equipment Grab bars in shower;Shower chair;Toilet raiser;Grab bars around toilet   Bathroom Accessibility Accessible   Home Equipment Walker;Cane   Additional Comments see initial OT eval for additional info   Prior Function   Level of [place] Independent with functional mobility;Independent with ADLs;Independent with IADLS   Lives With Spouse;Daughter  (has 7  kids; takes care of youngest dght who is disabled)   Receives Help From Family   IADLs Independent with driving;Independent with meal prep;Independent with medication management   Falls in the last 6 months 0   Vocational Retired   Comments see IE for additional information   Lifestyle   Autonomy I with ADLS and IADLS +    Reciprocal Relationships supportive spouse   Service to Others pt reports her DTR is disabled and her and her  provide her physical assist with ADLs and mobiltiy   Intrinsic Gratification will continue to assess   General   Family/Caregiver Present Yes  (dght)   ADL   Eating Assistance Unable to assess   Eating Deficit   (keofed)   Grooming Assistance 2  Maximal Assistance   UB Bathing Assistance 2  Maximal Assistance   LB Bathing Assistance 1  Total Assistance   LB Bathing Deficit Right upper leg;Left upper leg;Buttocks;Perineal area;Right lower leg including foot;Left lower leg including foot   UB Dressing Assistance 1  Total Assistance   UB Dressing Deficit Verbal cueing;Supervision/safety;Increased time to complete;Thread RUE;Thread LUE;Fasteners;Pull around back   LB Dressing Assistance 1  Total Assistance   LB Dressing Deficit Don/doff L sock;Don/doff R sock   Toileting Assistance  1  Total Assistance   Toileting Deficit Perineal hygiene  (incontinent)   Functional Assistance 2  Maximal Assistance   Functional Deficit Steadying;Supervision/safety;Verbal cueing;Increased time to complete  (Ax2)   Bed Mobility   Supine to Sit 2  Maximal assistance   Additional items Assist x 2;HOB elevated;Increased time required;Verbal cues;LE management   Sit to Supine Unable to assess   Additional Comments pt sat EOB w/ Min-Mod A for sitting balance/trunk control; sits w/ head/neck in flexed position. Unable to actively extend while seated EOB.   Transfers   Sit to Stand 2  Maximal assistance   Additional items Assist x 2;Increased time required;Verbal cues   Stand to Sit 2  Maximal assistance  "  Additional items Assist x 2;Increased time required;Verbal cues   Stand pivot 2  Maximal assistance   Additional items Assist x 2;Increased time required;Verbal cues   Additional Comments B/L HHA used; and B/L knee block.   Functional Mobility   Additional Comments Unable to assess; not appropriate @ this time.   Balance   Static Sitting Poor +   Dynamic Sitting Poor   Static Standing Poor -   Dynamic Standing Poor -   Ambulatory Zero   Activity Tolerance   Activity Tolerance Patient limited by fatigue;Other (Comment)  (cognition)   Medical Staff Made Aware PT, Monica and SPT, Isngh; NSx, Carol   Nurse Made Aware yes, Sheila GONZALEZ Assessment   RUE Assessment X  (squeezes hand; grossly 2/5 strength)   LUE Assessment   LUE Assessment X  (0/5)   Hand Function   Gross Motor Coordination Impaired  (L>R)   Fine Motor Coordination Impaired  (L>R)   Sensation   Light Touch No apparent deficits   Vision - Complex Assessment   Additional Comments unable to formally assess; pt has eyes closed majority of session; intermittently opens   Psychosocial   Psychosocial (WDL) X   Patient Behaviors/Mood Cooperative   Cognition   Overall Cognitive Status Impaired   Arousal/Participation Arousable;Cooperative;Lethargic   Attention Difficulty attending to directions   Orientation Level Oriented to person;Oriented to place;Disoriented to time   Memory Unable to assess   Following Commands Follows one step commands with increased time or repetition   Comments pt is mostly nonverbal; cooperative. Intermittently nods head yes/no to simple questions. Follows roughly 60% of 1 step commands on R side. Overall very lethargic. Stated words \"okay\" and when asked how many children she has she was able to say \"seven\",   Assessment   Limitation Decreased ADL status;Decreased UE ROM;Decreased UE strength;Decreased Safe judgement during ADL;Decreased cognition;Decreased endurance;Visual deficit;Decreased fine motor control;Decreased self-care " "trans;Decreased high-level ADLs;Non-func L UE   Prognosis Fair   Assessment Pt is a 82 y/o female seen for OT re-reval s/p \"Suboccipital craniectomy for posterior fossa decompression \" on 1/3. Pt is s/p EVD 1/5 and replaced on 1/14. No helmet needed per Nsx. Pt initially admitted for nausea/ataxis and found to have L PICA infarct. Pt seen for initial OT evaluation on 1/1 and was performing at a level of Mod A x2 for ADLS/transfers and functional mobility. At time of re-eval, pt performing at a level of Max A UB ADLS, Total A LB ADLS, Max a x2 bed mobility, Mod A EOB sitting, Max A x2 transfers w/ B/L HHA and knee block. Pt remains limited 2* impaired cognition, vision, activity tolerance, endurance, forward functional reach, balance, strength, ROM, functional mobility, and decreased I w/ ADLS. Recommend inpt rehab, upon D/C. Pt continues to benefit from immediate inpatient skilled OT services 3-5x/wk. Will continue to follow to address goals stated below to be met within the next 10-14 days:   Goals   Patient Goals unable to state; mostly nonverbal at this time   LTG Time Frame 10-14   Long Term Goal #1 see below listed goals   Plan   Treatment Interventions ADL retraining;Visual perceptual retraining;Functional transfer training;UE strengthening/ROM;Endurance training;Cognitive reorientation;Patient/family training;Equipment evaluation/education;Fine motor coordination activities;Compensatory technique education;Neuromuscular reeducation;Continued evaluation;Energy conservation;Activityengagement   Goal Expiration Date 02/01/24   OT Frequency 3-5x/wk   Discharge Recommendation   Rehab Resource Intensity Level, OT I (Maximum Resource Intensity)   Additional Comments  The patient's raw score on the AM-PAC Daily Activity Inpatient Short Form is 8. A raw score of less than 19 suggests the patient may benefit from discharge to post-acute rehabilitation services. Please refer to the recommendation of the Occupational " Therapist for safe discharge planning.   Additional Comments 2 Pt seen as a co-session due to the patient's co-morbidities, clinically unstable presentation, and present impairments which are a regression from the patient's baseline.   AM-PAC Daily Activity Inpatient   Lower Body Dressing 1   Bathing 1   Toileting 1   Upper Body Dressing 2   Grooming 2   Eating 1   Daily Activity Raw Score 8   Turning Head Towards Sound 2   Follow Simple Instructions 2   Low Function Daily Activity Raw Score 12   Low Function Daily Activity Standardized Score  21.38   AM-PAC Applied Cognition Inpatient   Following a Speech/Presentation 1   Understanding Ordinary Conversation 2   Taking Medications 2   Remembering Where Things Are Placed or Put Away 2   Remembering List of 4-5 Errands 1   Taking Care of Complicated Tasks 1   Applied Cognition Raw Score 9   Applied Cognition Standardized Score 22.48   Additional Treatment Session   Start Time 0950   End Time 1012   Treatment Assessment Pt seen for additional OT tx session w/ focus on static/dynamic sitting balance, LB bathing, UB/LB ADLS, toileting, and 1 step command following. Pt incontinent upon standing from EOB, required total A for hygiene. Total A for LB dressing (don/doffed socks)and Max A for UB dressing (don/doffed gown). Pt rolled L/R in recliner chair w/ max a x1; assist to maintain side lying position. Pt requires Max A x1 for transfers and positioning upright in chair. Intermittently continues to nod head yes/no; occasionally opens eyes and gives 1 word responses w/ Max VC for prompting. Pt left OOB in recliner w/ all needs in reach. Will continue to follow to work towards below listed goals:   Additional Treatment Day 1   End of Consult   Education Provided Yes;Family or social support of family present for education by provider   Patient Position at End of Consult Bedside chair;Bed/Chair alarm activated;All needs within reach   Nurse Communication Nurse aware of  consult       GOALS    1) Pt will complete rolling left/right in bed with Min assist, as prerequisite for further engagement in ADLS   2) Pt will complete supine to sit transfer with Mod A using B/L UEs to initiate bed mobility   3) Pt will tolerate sitting at EOB 20 minutes with S assist and stable vital signs, as prerequisite for further engagement in ADLS   4) Pt will complete grooming task with Min assist and increased time to increase independence in functional tasks  5) Pt will improve UB fx'l use/ROM to WFL and strength 1/2 MMT via AROM/AAROM/PROM in all planes as tolerated s/p skilled education of HEP w/o cues for carryover   6) Pt will complete UB ADLS with Min A and use of AD/DME as needed to increase independence in functional tasks  7) Pt will complete LB ADLS with Mod A and use of AD/DME as needed to increase independence in functional tasks  8) Pt will complete sit to stand transfer / sit pivot transfer / stand pivot transfer with Mod  assist on/off all ADL surfaces   9) Pt will follow 100% simple one step verbal commands and be A/Ox4 consistently t/o use of external environmental cues to increase awareness for functional tasks  10) Pt will tolerate mod manual NDT facilitation t/o hemibody during fx'l I/ADL/leisure tasks w/ mod I to improve fx'l engagement increase neuroplastic recovery  11) Pt will engage in ongoing  assessments, screens, and activities t/o fx'l I/ADL/leisure tasks w/ G participation to A w/ adaptation and accomodations or rule out visual perceptual impairments    ** OTR to assess functional mobility when appropriate    Anna Rivera MS, OTR/L

## 2024-01-18 NOTE — PLAN OF CARE
"  Problem: OCCUPATIONAL THERAPY ADULT  Goal: Performs self-care activities at highest level of function for planned discharge setting.  See evaluation for individualized goals.  Description: Treatment Interventions: ADL retraining, Visual perceptual retraining, Functional transfer training, UE strengthening/ROM, Endurance training, Patient/family training, Equipment evaluation/education, Neuromuscular reeducation, Compensatory technique education, Continued evaluation, Energy conservation, Activityengagement          See flowsheet documentation for full assessment, interventions and recommendations.   Note: Limitation: Decreased ADL status, Decreased UE ROM, Decreased UE strength, Decreased Safe judgement during ADL, Decreased cognition, Decreased endurance, Visual deficit, Decreased fine motor control, Decreased self-care trans, Decreased high-level ADLs, Non-func L UE  Prognosis: Fair  Assessment: Pt is a 82 y/o female seen for OT re-reval s/p \"Suboccipital craniectomy for posterior fossa decompression \" on 1/3. Pt is s/p EVD 1/5 and replaced on 1/14. No helmet needed per Nsx. Pt initially admitted for nausea/ataxis and found to have L PICA infarct. Pt seen for initial OT evaluation on 1/1 and was performing at a level of Mod A x2 for ADLS/transfers and functional mobility. At time of re-eval, pt performing at a level of Max A UB ADLS, Total A LB ADLS, Max a x2 bed mobility, Mod A EOB sitting, Max A x2 transfers w/ B/L HHA and knee block. Pt remains limited 2* impaired cognition, vision, activity tolerance, endurance, forward functional reach, balance, strength, ROM, functional mobility, and decreased I w/ ADLS. Recommend inpt rehab, upon D/C. Pt continues to benefit from immediate inpatient skilled OT services 3-5x/wk. Will continue to follow to address goals stated below to be met within the next 10-14 days:     Rehab Resource Intensity Level, OT: I (Maximum Resource Intensity)        Anna Rivera MS, " OTR/L

## 2024-01-18 NOTE — PLAN OF CARE
Problem: PHYSICAL THERAPY ADULT  Goal: Performs mobility at highest level of function for planned discharge setting.  See evaluation for individualized goals.  Description: Treatment/Interventions: Functional transfer training, LE strengthening/ROM, Elevations, Therapeutic exercise, Endurance training, Patient/family training, Bed mobility, Equipment eval/education, Gait training, Compensatory technique education, Spoke to nursing, OT          See flowsheet documentation for full assessment, interventions and recommendations.  Note: Prognosis: Good  Problem List: Decreased strength, Decreased range of motion, Decreased endurance, Impaired balance, Decreased mobility, Decreased coordination, Decreased cognition, Impaired judgement, Decreased safety awareness, Pain  Assessment: Pt is 83 y.o. female seen for PT re-evaluation s/p admit to [facility] w/ Acute CVA (cerebrovascular accident) (HCC) now s/p craniectomy and prolonged intubation. PT consulted to assess pt's functional mobility and d/c needs.Please find objective findings from PT assessment regarding body systems outlined above with impairments and limitations including weakness, decreased ROM, impaired balance, decreased endurance, impaired coordination, gait deviations, pain, decreased activity tolerance, decreased functional mobility tolerance, altered sensation, decreased safety awareness, impaired judgement, fall risk, impaired tone, decreased skin integrity, and decreased cognition. Pt required increased A for bed mobility with no noted strength on R side. Required increased A for sitting balance. Required B/L knee block with noted LE strength. Poor tolerance for upright mobility with decreased activity tolerance. The following objective measures performed on IE also reveal limitations: The patient's AM-PAC Basic Mobility Inpatient Short Form Raw Score is 6, Standardized Score is 32.23. A standardized score less than 42.9  suggests the patient may benefit from discharge to post-acute rehabilitation services. Please also refer to the recommendation of the Physical Therapist for safe discharge planning. Pt's clinical presentation is currently unstable/unpredictable seen in pt's presentation of critical care monitoring. Pt to benefit from continued PT tx to address deficits as defined above and maximize level of functional independent mobility and consistency. From PT/mobility standpoint, recommendation at time of d/c would be post acute rehabilitation services pending progress in order to facilitate return to PLOF.  Barriers to Discharge: Inaccessible home environment, Decreased caregiver support     Rehab Resource Intensity Level, PT: I (Maximum Resource Intensity)    See flowsheet documentation for full assessment.

## 2024-01-19 NOTE — PROGRESS NOTES
[facility]  Progress Note: Critical Care  Name: Debbie Moody 83 y.o. 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 Stable hydrocephalus. Evolving subacute  L PICA territory infarct,stable petechial hemorrhage, vasogenic edema and mass effect.   CTH (1/15) - Evolving left PICA infarct with petechial hemorrhage; extensive diffuse IVHs with stable ventriculomegaly and transependymal CSF resorption. Stable left transfrontal ventriculostomy catheter. Unchanged 0.5 cm right to left midline shift. Suboccipital pseudomeningocele is noted.   Echo EF 70% mild hypertrophy; A1C 8.3; /172/54/141  Plan:   Neuro/Neurosx consulted  Stat CTH for any change in GCS >2pts  EVD @ 0 still with significant drainage - plan for CTH Monday 1/22 and weaning thereafter  Na 145-155  SBP <160  Hold asa for at least 2 wks post op  Ancef ppx while EVD in place  If unable to wean may need VPS and if unable to support nutrition w/ po may need PEG - will continue to monitor to coordinate placement     Diagnosis: Encephalopathy  Plan:   Cont provigil  Neuro checks      CV:   Diagnosis: HTN/HLD  Plan:   Home vasotec held  Cont lisinopril/Norvasc/Coreg/statin  Diagnosis: hypotension - significantly improved  Albumin to improve pressures if needed      Pulm:  Diagnosis: Acute hypoxic resp failure  Intubated 1/3 Extubated 1/4; Reintubated 1/5 Extubated 1/12  Patient w/ continued rhonchi but improved from initial exams  Plan:   Wean oxygen for sats >92%  Diagnosis: hypercapnia/stroke  Plan:   BiPAP overnight to treat hypercarbia/hypercapnia,  to prevent steal from stroke bed       GI:   Diagnosis: Dysphagia  Plan:   NGT cont TF - at this time patient cannot support her nutrition from po intake alone  Speech consulted -patient cleared for full liquid diet HTL started 1/17 but po intake was poor before her stroke  If unable to wean may need VPS and if unable to support nutrition w/ po may need PEG - will continue to monitor to coordinate placement       :   Diagnosis: CKD 3 baseline cr 1-1.3  Plan:   At baseline  Lab Results   Component Value Date    CREATININE 0.90 01/19/2024    CREATININE 0.91 01/17/2024       F/E/N:   No active issues       Heme/Onc:   Diagnosis: Anemia baseline hgb 9-10  Plan:   No s/s bleeding  cont home iron  Currently stable  Lab Results   Component Value Date    HGB 8.7 (L) 01/17/2024    HCT 27.3 (L) 01/17/2024          Endo:   Diagnosis: DM2  A1C > 8 on admission  Plan:   Holding home glipizide/Januvia  Lantus 15 bid  Humalog 3 U q6  SSI   Lab Results   Component Value Date    GLUC 95 01/17/2024    POCGLU 168 (H) 01/19/2024       ID:   No active issues       MSK/Skin:   T/p q 2  PT/OT  Bruising at injections sites - change injection sites    Disposition: Critical care    ICU Core Measures     Vented Patient  VAP Bundle  VAP bundle ordered     A: Assess, Prevent, and Manage Pain Has pain been assessed? Yes  Need for changes to pain regimen? No   B: Both Spontaneous Awakening Trials (SATs) and Spontaneous Breathing Trials (SBTs) Plan to perform spontaneous awakening trial today? N/A   Plan to perform spontaneous breathing trial today? N/A   Obvious barriers to extubation? NA   C: Choice of Sedation RASS Goal: N/A patient not on sedation  Need for changes to sedation or analgesia regimen? No   D: Delirium CAM-ICU: Unable to perform secondary to Acute cognitive dysfunction   E: Early Mobility  Plan for early mobility? Yes   F: Family Engagement Plan for family engagement today? Yes       Antibiotic Review: Post op requirements      Review of Invasive Devices:      Central access plan: Will obtain peripheral access and discontinue prior to transfer      Prophylaxis:  VTE VTE covered by:  heparin (porcine), Subcutaneous, 5,000 Units at 01/19/24 0547       Stress Ulcer  not ordered        Significant 24hr Events     24hr events: improving mentation, continues on qhs BiPAP     Subjective     Review of Systems   Unable to perform ROS: Mental status change        Objective                            Vitals I/O      Most Recent Min/Max in 24hrs   Temp 98.4 °F (36.9 °C) Temp  Min: 97.5 °F (36.4 °C)  Max: 98.9 °F (37.2 °C)   Pulse 78 Pulse  Min: 66  Max: 91   Resp 14 Resp  Min: 8  Max: 24   /68 BP  Min: 114/70  Max: 119/68   O2 Sat 97 % SpO2  Min: 95 %  Max: 100 %      Intake/Output Summary (Last 24 hours) at 1/19/2024 0555  Last data filed at 1/19/2024 0500  Gross per 24 hour   Intake 1591 ml   Output 457 ml   Net 1134 ml       Diet Enteral/Parenteral; Tube Feeding with Oral Diet; Glucerna 1.2; Continuous; 45; Prosource Protein Liquid - One Packet; 40; Saline; Every 4 hours; Regular; Regular House; Honey Thick Liquid, Full Liquid    Invasive Monitoring           Physical Exam   Physical Exam  Eyes:      Pupils: Pupils are equal, round, and reactive to light.   Skin:     General: Skin is warm and dry.      Comments: Abdominal bruising around injection sites   HENT:      Head: Normocephalic and atraumatic.      Comments: EVD 0       Mouth/Throat:      Mouth: Mucous membranes are moist.   Cardiovascular:      Rate and Rhythm: Normal rate and regular rhythm.      Pulses: Normal pulses.      Heart sounds: Normal heart sounds.   Musculoskeletal:         General: No swelling.      Right lower leg: No edema.      Left lower leg: No edema.   Abdominal: General: Bowel sounds are normal. There is no distension.      Palpations: Abdomen is soft.      Tenderness: There is no abdominal tenderness.   Constitutional:       General: She is not in acute  distress.     Comments: Sleepy, BiPAP on   Pulmonary:      Effort: Pulmonary effort is normal. No respiratory distress.   Secretions are abnormal  (improving).Neurological:      Mental Status: She is lethargic.      GCS: GCS eye subscore is 2. GCS verbal subscore is 1. GCS motor subscore is 6.      Comments: BUE antigravity, RUE cross midline, LUE lifted off bed once. BLE wiggles toes to command ( RLE > LLE). Says name, says hi to command          Diagnostic Studies      EKG: tele  Imaging:  I have personally reviewed pertinent reports.   and I have personally reviewed pertinent films in PACS     Medications:  Scheduled PRN   amLODIPine, 10 mg, Daily  atorvastatin, 80 mg, QPM  carvedilol, 12.5 mg, BID With Meals  cefazolin, 1,000 mg, Q8H  chlorhexidine, 15 mL, Q12H JUAN ANTONIO  ferrous sulfate, 325 mg, Daily With Breakfast  formoterol, 20 mcg, Q12H  heparin (porcine), 5,000 Units, Q8H JUAN ANTONIO  insulin glargine, 15 Units, Q12H JUAN ANTONIO  insulin lispro, 3 Units, Q6H  insulin lispro, 4-20 Units, Q6H JUAN ANTONIO  levalbuterol, 1.25 mg, TID  lisinopril, 40 mg, Daily  miconazole, , BID  modafinil, 200 mg, Daily  polyethylene glycol, 17 g, BID  senna-docusate sodium, 2 tablet, BID  sodium chloride, 4 mL, TID      acetaminophen, 650 mg, Q6H PRN  bisacodyl, 10 mg, Daily PRN  hydrALAZINE, 10 mg, Q4H PRN  ondansetron, 4 mg, Q6H PRN       Continuous          Labs:    CBC    No recent results    BMP    No recent results      Coags    No recent results     Additional Electrolytes  No recent results         Blood Gas    No recent results    No recent results   LFTs  No recent results    Infectious  No recent results  Glucose  No recent results            Orin Enriquez MD

## 2024-01-19 NOTE — SPEECH THERAPY NOTE
"Speech Language/Pathology  Speech-Language Pathology Progress Note      Patient Name: Debbie Moody    Today's Date: 1/19/2024      Subjective:  Pt was lethargic and slow to respond. She was sitting upright in bed. Patient stated \"Richelle\". When asked who was in the picture in her room. She alerts for the session but quickly falls asleep.      Objective:  Pt was seen today for dysphagia therapy. Current diet is Honey thick full liquids.  She has an ng in place w/ tf. Pt was on 2L O2 via nasal cannula. Oral care had already been completed. Focus of today's session was to maximize PO intake safety, determine potential for diet texture advancement, determine potential for liquid consistency advancement, and dx rx for ongoing diet . Textures offered today included puree, nectar thick liquid via tsp, and honey thick liquid via tsp.  Swallow function:   Pt awakens, to name, minimally responds.  Voice is low volume and mildly weak.  Fair retrieval w/ liquids, mult swallows.  No overt coughing w/ several tsps of honey and nectar.  Slow transfers w/ the puree but able to fully clear the material.  Mult swallows, no overt coughing.  She then began to shake her head no to additional trials.  Took ~1/2 the applesauce prior to declining.     Assessment:  Pt w/ fluctuating mentation but awake for trials today. Able to tolerate puree, ht, nt though remains at risk given her ANGE. Would be appropriate to resume puree at this time when she is alert.  Will continue to need the NG to supplement    Plan:  Change diet texture to puree and honey thick liquids. Continue ST follow up. Subsequent sessions to focus on maximizing PO intake safety, determining potential for diet texture advancement, determining potential for liquid consistency advancement, and MBS when pt is appropriate .  "

## 2024-01-19 NOTE — PLAN OF CARE
Problem: PAIN - ADULT  Goal: Verbalizes/displays adequate comfort level or baseline comfort level  Description: Interventions:  - Encourage patient to monitor pain and request assistance  - Assess pain using appropriate pain scale  - Administer analgesics based on type and severity of pain and evaluate response  - Implement non-pharmacological measures as appropriate and evaluate response  - Consider cultural and social influences on pain and pain management  - Notify physician/advanced practitioner if interventions unsuccessful or patient reports new pain  Outcome: Progressing     Problem: INFECTION - ADULT  Goal: Absence or prevention of progression during hospitalization  Description: INTERVENTIONS:  - Assess and monitor for signs and symptoms of infection  - Monitor lab/diagnostic results  - Monitor all insertion sites, i.e. indwelling lines, tubes, and drains  - Monitor endotracheal if appropriate and nasal secretions for changes in amount and color  - [place] appropriate cooling/warming therapies per order  - Administer medications as ordered  - Instruct and encourage patient and family to use good hand hygiene technique  - Identify and instruct in appropriate isolation precautions for identified infection/condition  Outcome: Progressing     Problem: SAFETY ADULT  Goal: Patient will remain free of falls  Description: INTERVENTIONS:  - Educate patient/family on patient safety including physical limitations  - Instruct patient to call for assistance with activity   - Consult OT/PT to assist with strengthening/mobility   - Keep Call bell within reach  - Keep bed low and locked with side rails adjusted as appropriate  - Keep care items and personal belongings within reach  - Initiate and maintain comfort rounds  - Make Fall Risk Sign visible to staff  - Offer Toileting every 2 Hours, in advance of need  - Apply yellow socks and bracelet for high fall risk patients  - Consider moving patient to room near nurses  station  Outcome: Progressing  Goal: Maintain or return to baseline ADL function  Description: INTERVENTIONS:  -  Assess patient's ability to carry out ADLs; assess patient's baseline for ADL function and identify physical deficits which impact ability to perform ADLs (bathing, care of mouth/teeth, toileting, grooming, dressing, etc.)  - Assess/evaluate cause of self-care deficits   - Assess range of motion  - Assess patient's mobility; develop plan if impaired  - Assess patient's need for assistive devices and provide as appropriate  - Encourage maximum independence but intervene and supervise when necessary  - Involve family in performance of ADLs  - Assess for home care needs following discharge   - Consider OT consult to assist with ADL evaluation and planning for discharge  - Provide patient education as appropriate  Outcome: Progressing  Goal: Maintains/Returns to pre admission functional level  Description: INTERVENTIONS:  - Perform AM-PAC 6 Click Basic Mobility/ Daily Activity assessment daily.  - Set and communicate daily mobility goal to care team and patient/family/caregiver.   - Collaborate with rehabilitation services on mobility goals if consulted  - Reposition patient every 2 hours.  - Out of bed for toileting  - Record patient progress and toleration of activity level   Outcome: Progressing     Problem: DISCHARGE PLANNING  Goal: Discharge to home or other facility with appropriate resources  Description: INTERVENTIONS:  - Identify barriers to discharge w/patient and caregiver  - Arrange for needed discharge resources and transportation as appropriate  - Identify discharge learning needs (meds, wound care, etc.)  - Arrange for interpretive services to assist at discharge as needed  - Refer to Case Management Department for coordinating discharge planning if the patient needs post-hospital services based on physician/advanced practitioner order or complex needs related to functional status, cognitive  ability, or social support system  Outcome: Progressing     Problem: Knowledge Deficit  Goal: Patient/family/caregiver demonstrates understanding of disease process, treatment plan, medications, and discharge instructions  Description: Complete learning assessment and assess knowledge base.  Interventions:  - Provide teaching at level of understanding  - Provide teaching via preferred learning methods  Outcome: Progressing     Problem: Neurological Deficit  Goal: Neurological status is stable or improving  Description: Interventions:  - Monitor and assess patient's level of consciousness, motor function, sensory function, and level of assistance needed for ADLs.   - Monitor and report changes from baseline. Collaborate with interdisciplinary team to initiate plan and implement interventions as ordered.   - Provide and maintain a safe environment.  - Consider seizure precautions.  - Consider fall precautions.  - Consider aspiration precautions.  - Consider bleeding precautions.  Outcome: Progressing     Problem: Activity Intolerance/Impaired Mobility  Goal: Mobility/activity is maintained at optimum level for patient  Description: Interventions:  - Assess and monitor patient  barriers to mobility and need for assistive/adaptive devices.  - Assess patient's emotional response to limitations.  - Collaborate with interdisciplinary team and initiate plans and interventions as ordered.  - Encourage independent activity per ability.  - Maintain proper body alignment.  - Perform active/passive rom as tolerated/ordered.  - Plan activities to conserve energy.  - Turn patient as appropriate  Outcome: Progressing     Problem: Communication Impairment  Goal: Ability to express needs and understand communication  Description: Assess patient's communication skills and ability to understand information.  Patient will demonstrate use of effective communication techniques, alternative methods of communication and understanding even  if not able to speak.     - Encourage communication and provide alternate methods of communication as needed.  - Collaborate with case management/ for discharge needs.  - Include patient/family/caregiver in decisions related to communication.  Outcome: Progressing     Problem: Nutrition  Goal: Nutrition/Hydration status is improving  Description: Monitor and assess patient's nutrition/hydration status for malnutrition (ex- brittle hair, bruises, dry skin, pale skin and conjunctiva, muscle wasting, smooth red tongue, and disorientation). Collaborate with interdisciplinary team and initiate plan and interventions as ordered.  Monitor patient's weight and dietary intake as ordered or per policy. Utilize nutrition screening tool and intervene per policy. Determine patient's food preferences and provide high-protein, high-caloric foods as appropriate.     - Assist patient with eating.  - Allow adequate time for meals.  - Encourage patient to take dietary supplement as ordered.  - Collaborate with clinical nutritionist.  - Include patient/family/caregiver in decisions related to nutrition.  Outcome: Progressing     Problem: Prexisting or High Potential for Compromised Skin Integrity  Goal: Skin integrity is maintained or improved  Description: INTERVENTIONS:  - Identify patients at risk for skin breakdown  - Assess and monitor skin integrity  - Assess and monitor nutrition and hydration status  - Monitor labs   - Assess for incontinence   - Turn and reposition patient  - Assist with mobility/ambulation  - Relieve pressure over bony prominences  - Avoid friction and shearing  - Provide appropriate hygiene as needed including keeping skin clean and dry  - Evaluate need for skin moisturizer/barrier cream  - Collaborate with interdisciplinary team   - Patient/family teaching  - Consider wound care consult   Outcome: Progressing     Problem: Nutrition/Hydration-ADULT  Goal: Nutrient/Hydration intake appropriate  for improving, restoring or maintaining nutritional needs  Description: Monitor and assess patient's nutrition/hydration status for malnutrition. Collaborate with interdisciplinary team and initiate plan and interventions as ordered.  Monitor patient's weight and dietary intake as ordered or per policy. Utilize nutrition screening tool and intervene as necessary. Determine patient's food preferences and provide high-protein, high-caloric foods as appropriate.     INTERVENTIONS:  - Monitor oral intake, urinary output, labs, and treatment plans  - Assess nutrition and hydration status and recommend course of action  - Evaluate amount of meals eaten  - Assist patient with eating if necessary   - Allow adequate time for meals  - Recommend/ encourage appropriate diets, oral nutritional supplements, and vitamin/mineral supplements  - Order, calculate, and assess calorie counts as needed  - Recommend, monitor, and adjust tube feedings and TPN/PPN based on assessed needs  - Assess need for intravenous fluids  - Provide specific nutrition/hydration education as appropriate  - Include patient/family/caregiver in decisions related to nutrition  Outcome: Progressing     Problem: RESPIRATORY - ADULT  Goal: Achieves optimal ventilation and oxygenation  Description: INTERVENTIONS:  - Assess for changes in respiratory status  - Assess for changes in mentation and behavior  - Position to facilitate oxygenation and minimize respiratory effort  - Oxygen administered by appropriate delivery if ordered  - Initiate smoking cessation education as indicated  - Encourage broncho-pulmonary hygiene including cough, deep breathe, Incentive Spirometry  - Assess the need for suctioning and aspirate as needed  - Assess and instruct to report SOB or any respiratory difficulty  - Respiratory Therapy support as indicated  Outcome: Progressing

## 2024-01-19 NOTE — PROGRESS NOTES
[facility]  Progress Note: Critical Care  Name: Debbie Moody 83 y.o. 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 Stable hydrocephalus. Evolving subacute  L PICA territory infarct,stable petechial hemorrhage, vasogenic edema and mass effect.   CTH (1/15) - Evolving left PICA infarct with petechial hemorrhage; extensive diffuse IVHs with stable ventriculomegaly and transependymal CSF resorption. Stable left transfrontal ventriculostomy catheter. Unchanged 0.5 cm right to left midline shift. Suboccipital pseudomeningocele is noted.   Echo EF 70% mild hypertrophy; A1C 8.3; /172/54/141  Plan:   Neuro/Neurosx consulted  Stat CTH for any change in GCS >2pts  EVD @ 0 still with significant drainage - plan for CTH Monday 1/22 and weaning thereafter  Na 145-155  SBP <160  Hold asa for at least 2 wks post op  Ancef ppx while EVD in place  If unable to wean may need VPS and if unable to support nutrition w/ po may need PEG - will continue to monitor to coordinate placement  Reassess for ASA restart Monday after imaging     Diagnosis: Encephalopathy  Plan:   Cont provigil  Neuro checks      CV:   Diagnosis: HTN/HLD  Plan:   Home vasotec held  Cont lisinopril/Norvasc/Coreg/statin  Diagnosis: hypotension - significantly improved  Albumin to improve pressures if needed      Pulm:  Diagnosis: Acute hypoxic resp failure  Intubated 1/3 Extubated 1/4; Reintubated 1/5 Extubated 1/12  Patient w/ continued rhonchi but improved from initial exams  Plan:   Wean oxygen for sats >92%  Diagnosis: hypercapnia/stroke  Plan:    BiPAP overnight to treat hypercarbia/hypercapnia, to prevent steal from stroke bed       GI:   Diagnosis: Dysphagia  Plan:   NGT cont TF - at this time patient cannot support her nutrition from po intake alone  Speech consulted -patient cleared for full liquid diet HTL started 1/17 but po intake was poor before her stroke  If unable to wean may need VPS and if unable to support nutrition w/ po may need PEG - have broached this discussion with daughter       :   Diagnosis: CKD 3 baseline cr 1-1.3  Plan:   At baseline  Lab Results   Component Value Date    CREATININE 0.90 01/19/2024    CREATININE 0.91 01/17/2024       F/E/N:   No active issues       Heme/Onc:   Diagnosis: Anemia baseline hgb 9-10  Plan:   No s/s bleeding  cont home iron  Currently stable  Lab Results   Component Value Date    HGB 8.7 (L) 01/19/2024    HCT 27.7 (L) 01/19/2024          Endo:   Diagnosis: DM2  A1C > 8 on admission  Plan:   Holding home glipizide/Januvia  Lantus 15 bid  Humalog 3 U q6  SSI   Lab Results   Component Value Date    GLUC 159 (H) 01/19/2024    POCGLU 137 01/20/2024       ID:   No active issues       MSK/Skin:   Will change injection sites due to abdominal bruising    Disposition: Critical care    ICU Core Measures       VAP Bundle  VAP bundle ordered     A: Assess, Prevent, and Manage Pain Has pain been assessed? Yes  Need for changes to pain regimen? No   B: Both Spontaneous Awakening Trials (SATs) and Spontaneous Breathing Trials (SBTs) Plan to perform spontaneous awakening trial today? N/A   Plan to perform spontaneous breathing trial today? N/A   Obvious barriers to extubation? NA   C: Choice of Sedation RASS Goal: N/A patient not on sedation  Need for changes to sedation or analgesia regimen? NA   D: Delirium CAM-ICU: Unable to perform secondary to Acute cognitive dysfunction   E: Early Mobility  Plan for early mobility? Yes   F: Family Engagement Plan for family engagement today? Yes       Antibiotic Review: Post op  requirements     Review of Invasive Devices:      Central access plan: Will obtain peripheral access and discontinue prior to transfer      Prophylaxis:  VTE VTE covered by:  heparin (porcine), Subcutaneous, 5,000 Units at 01/20/24 0550       Stress Ulcer  not ordered        Significant 24hr Events     24hr events: improving mentation, continues on qhs BiPAP, changing tube feeds to nightly to encourage po intake     Subjective     Review of Systems   Unable to perform ROS: Mental status change        Objective                            Vitals I/O      Most Recent Min/Max in 24hrs   Temp 99.5 °F (37.5 °C) Temp  Min: 98.1 °F (36.7 °C)  Max: 99.5 °F (37.5 °C)   Pulse 80 Pulse  Min: 72  Max: 90   Resp 15 Resp  Min: 12  Max: 19   BP (!) 137/48 BP  Min: 137/48  Max: 154/59   O2 Sat 97 % SpO2  Min: 95 %  Max: 97 %      Intake/Output Summary (Last 24 hours) at 1/20/2024 1157  Last data filed at 1/20/2024 0600  Gross per 24 hour   Intake 1185 ml   Output 811 ml   Net 374 ml       Diet Enteral/Parenteral; Tube Feeding with Oral Diet; Glucerna 1.2; Continuous; 45; Prosource Protein Liquid - One Packet; 40; Saline; Every 4 hours; Regular; Regular House; Honey Thick Liquid, Full Liquid    Invasive Monitoring           Physical Exam   Physical Exam  Eyes:      Pupils: Pupils are equal, round, and reactive to light.   Skin:     General: Skin is warm and dry.      Comments: Abdominal bruising around injection sites   HENT:      Head: Normocephalic and atraumatic.      Comments: EVD 0       Mouth/Throat:      Mouth: Mucous membranes are moist.   Cardiovascular:      Rate and Rhythm: Normal rate and regular rhythm.      Pulses: Normal pulses.      Heart sounds: Normal heart sounds.   Musculoskeletal:         General: No swelling.      Right lower leg: No edema.      Left lower leg: No edema.   Abdominal: General: Bowel sounds are normal. There is no distension.      Palpations: Abdomen is soft.      Tenderness: There is no abdominal  tenderness.   Constitutional:       General: She is not in acute distress.     Comments: Sleepy, BiPAP on   Pulmonary:      Effort: Pulmonary effort is normal. No respiratory distress.   Secretions are abnormal  (improving).Neurological:      Mental Status: She is lethargic.      GCS: GCS eye subscore is 2. GCS verbal subscore is 1. GCS motor subscore is 6.      Comments: BUE antigravity, RUE cross midline, LUE lifted off bed once. BLE wiggles toes to command ( RLE > LLE). Says name, says hi to command          Diagnostic Studies      EKG: tele  Imaging:  I have personally reviewed pertinent reports.   and I have personally reviewed pertinent films in PACS     Medications:  Scheduled PRN   amLODIPine, 10 mg, Daily  atorvastatin, 80 mg, QPM  carvedilol, 12.5 mg, BID With Meals  cefazolin, 1,000 mg, Q8H  chlorhexidine, 15 mL, Q12H JUAN ANTONIO  ferrous sulfate, 325 mg, Daily With Breakfast  formoterol, 20 mcg, Q12H  heparin (porcine), 5,000 Units, Q8H JUAN ANTONIO  insulin glargine, 15 Units, Q12H JUAN ANTONIO  insulin lispro, 3 Units, Q6H  insulin lispro, 4-20 Units, Q6H JUAN ANTONIO  levalbuterol, 1.25 mg, TID  lisinopril, 40 mg, Daily  miconazole, , BID  modafinil, 200 mg, Daily  polyethylene glycol, 17 g, BID  senna-docusate sodium, 2 tablet, BID  sodium chloride, 4 mL, TID      acetaminophen, 650 mg, Q6H PRN  bisacodyl, 10 mg, Daily PRN  hydrALAZINE, 10 mg, Q4H PRN  ondansetron, 4 mg, Q6H PRN       Continuous          Labs:    CBC    Recent Labs     01/19/24  0543   WBC 5.52   HGB 8.7*   HCT 27.7*          BMP    Recent Labs     01/19/24  0543   SODIUM 147   K 4.4      CO2 32   AGAP 7   BUN 43*   CREATININE 0.90   CALCIUM 8.9         Coags    No recent results     Additional Electrolytes  Recent Labs     01/19/24  0543   MG 2.2   PHOS 3.4            Blood Gas    No recent results    No recent results   LFTs  No recent results    Infectious  No recent results  Glucose  Recent Labs     01/19/24  0543   GLUC 159*               Orin Enriquez,  MD

## 2024-01-19 NOTE — PROGRESS NOTES
[facility]  Progress Note  Name: Debbie Moody I  MRN: [id number]  Unit/Bed#: ICU 04 I Date of Admission: 12/31/2023   Date of Service: 1/19/2024 I Hospital Day: 19    Assessment/Plan   * Acute CVA (cerebrovascular accident) (HCC)  Assessment & Plan  16 Days Post-Op Procedure(s):  SUBOCCIPITAL CRANIECTOMY FOR POSTERIOR FOSSA DECOMPRESSION; DISPOSE OF BONE FLAP (LULA, 1/3)  S/p EVD replacement on 1/5/24 2/2 extensive IVH, replaced 1/14 due to not draining/clogged  Presented with nausea, headache and ataxia. 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  On exam, GCS 12 Spontaneous eye opening. Follows commands on right. Nodding yes and no to questions and attempting to make sounds.    Imaging:  CT head wo, 1/15/2023: Evolving left cerebellar PICC infarct with slightly increased size of the left cerebellum parenchymal hematoma. Stable mass effect. Stable extensive diffuse intraventricular hemorrhage with ventriculomegaly and transependymal CSF resorption. Unchanged 5 mm right to left midline shift.    Plan  Continue regular neurologic checks.   Plan for repeat CT head Monday 1/22.  STAT CTH for decline in exam greater than 2 points in 1 hour  EVD was re-inserted on 1/5/24, replaced 1/14.  EVD open at 0. Maintain at this level at this time.   Ancef 2 g q 8 hrs while drain is in place, as EVD continues to be flushed regularly proximally and distally.     ICP < 20, ICP 3 to 8 over 24H  189 mL/24 hours.   Ongoing medical management per primary team.   Na goal > 145-155.   SBP < 160  Pain control per primary team.   Neurology was consulted for stroke management.   ASA d/c 1/3/23, will re-assess when to start after repeat imaging Monday  PT/OT/PMR evaluation as able  DVT PPX: SCD,  "[facility]    Neurosurgery will continue to follow. Please call with questions or concerns.              Subjective/Objective   Chief Complaint: N/A    Subjective: N/A    Objective: NAD. Left frontal EVD in place. SOC Incision clean, dry and well-approximated. No drainage or dehiscence. Following commands on right.      I/O         01/17 0701  01/18 0700 01/18 0701  01/19 0700 01/19 0701 01/20 0700    P.O.  60     I.V. (mL/kg)  20 (0.3)     Blood       NG/ 540     Feedings 1080 971     Total Intake(mL/kg) 1570 (24.5) 1591 (24.8)     Urine (mL/kg/hr) 700 (0.5) 350 (0.2)     Emesis/NG output 0 0     Drains 145 189 20    Stool 0 0     Total Output 845 539 20    Net +725 +1052 -20           Unmeasured Urine Occurrence 1 x 4 x     Unmeasured Stool Occurrence 0 x 2 x             Invasive Devices       Peripheral Intravenous Line  Duration             Long-Dwell Peripheral IV (Midline) 01/03/24 Left Cephalic Vein 15 days    Peripheral IV 01/16/24 Left Wrist 2 days              Arterial Line  Duration             Arterial Line 01/15/24 Radial 4 days              Drain  Duration             Ventriculostomy/Subdural Ventricular drainage catheter Left Frontal region 13 days    NG/OG/Enteral Tube Nasogastric 12 Fr Right nare 1 day    External Urinary Catheter <1 day                    Physical Exam:  Vitals: Blood pressure 119/68, pulse 78, temperature 98.4 °F (36.9 °C), temperature source Axillary, resp. rate 15, height 4' 10\" (1.473 m), weight 64.2 kg (141 lb 8.6 oz), SpO2 99%, not currently breastfeeding.,Body mass index is 29.58 kg/m².    Hemodynamic Monitoring: MAP: Arterial Line MAP (mmHg): 88 mmHg, CPP: CPP Cuff-Calculated (mmHg): 98, CVP:  , ICP Mean: ICP Mean (mmHg): 6 mmHg    General appearance: alert, critically ill.  Head: left frontal eVD  Eyes: PERRL  Neck: SOC Incision clean, dry and well-approximated. No drainage or dehiscence.  Back: no kyphosis present, no tenderness to percussion or palpation  Lungs: non " "labored breathing, 2L via NC  Heart: regular heart rate  Neurologic:   Mental status: GCS 12  Cranial nerves: grossly intact (Cranial nerves II-XII)  Sensory: normal to crude touch  Motor: responds to commands on right.       Lab Results:  Results from last 7 days   Lab Units 01/19/24  0543 01/17/24  0446 01/16/24  1239 01/16/24  0636 01/16/24  0424   WBC Thousand/uL 5.52 6.71  --   --  6.39   HEMOGLOBIN g/dL 8.7* 8.7* 8.5*   < > 6.6*   HEMATOCRIT % 27.7* 27.3* 26.2*   < > 20.5*   PLATELETS Thousands/uL 219 206  --   --  220   NEUTROS PCT % 68 71  --   --  69   MONOS PCT % 8 8  --   --  7   EOS PCT % 4 3  --   --  3    < > = values in this interval not displayed.     Results from last 7 days   Lab Units 01/19/24  0543 01/17/24  0446 01/16/24  0424   SODIUM mmol/L 147 147 140   POTASSIUM mmol/L 4.4 4.2 4.1   CHLORIDE mmol/L 108 114* 110*   CO2 mmol/L 32 31 30   BUN mg/dL 43* 35* 38*   CREATININE mg/dL 0.90 0.91 0.96   CALCIUM mg/dL 8.9 8.7 8.6     Results from last 7 days   Lab Units 01/19/24  0543 01/17/24  0446 01/15/24  0455   MAGNESIUM mg/dL 2.2 2.2 2.3     Results from last 7 days   Lab Units 01/19/24  0543 01/15/24  0455 01/13/24  0507   PHOSPHORUS mg/dL 3.4 3.5 4.0         No results found for: \"TROPONINT\"  ABG:  Lab Results   Component Value Date    PHART 7.405 01/16/2024    XPS0IRV 42.3 01/16/2024    PO2ART 122.5 01/16/2024    ABC3SIQ 25.9 01/16/2024    BEART 1.0 01/16/2024    SOURCE Line, Arterial 01/16/2024       Imaging Studies: I have personally reviewed pertinent reports.   and I have personally reviewed pertinent films in PACS    XR chest portable ICU    Result Date: 1/6/2024  Impression: Endotracheal tube tip 3.4 cm above the ceasar. Workstation performed: [id number]     CTA head w wo contrast    Result Date: 1/5/2024  Impression: Stable vascularity when compared with prior CT angiogram from 12/30/2023. No large vessel occlusion. Mild atherosclerotic change present. There is no left posterior inferior " cerebellar artery visualized within the posterior fossa in this patient with a known PICA infarct. No venous abnormality identified. Workstation performed: [id number]     CT head wo contrast    Result Date: 1/5/2024  Impression: Similar parenchymal changes from left PICA infarct. Petechial hemorrhage with some mild hemorrhagic conversion in the left cerebellar hemisphere. Previous right side  shunt catheter has been withdrawn. Extensive intraventricular blood as detailed above. Developing hydrocephalus. Narrowing of sulci bilaterally. No convincing uncal herniation. No tonsillar herniation (suboccipital craniectomy). I personally discussed this study with Violet Trejo on 1/5/2024 5:24 PM. Workstation performed: [id number]     CT head wo contrast    Result Date: 1/5/2024  Impression: Status post decompressive suboccipital craniectomy for a PICA distribution infarct with slightly improved mass effect on the fourth ventricle and improving hydrocephalus with ventriculostomy in place. Overall degree of hemorrhage within the left cerebellum and vermis is similar to the prior examination. No new areas of acute intracranial hemorrhage identified. Workstation performed: [id number]     XR chest portable    Result Date: 1/4/2024  Impression: Tubes and lines in adequate position. No acute pulmonary pathology. Workstation performed: [id number]     XR chest portable    Result Date: 1/4/2024  Impression: Endotracheal tube in the proximal right mainstem bronchus. Workstation performed: [id number]     X-ray chest 1 view    Result Date: 1/4/2024  Impression: Tubes and lines as described above. Low lung volumes demonstrated without consolidation or other acute pulmonary findings. Workstation performed: [id number]     CT head wo contrast    Result Date: 1/3/2024  Impression: Status post interval decompressive suboccipital craniectomy with expected subjacent postsurgical changes/pneumocephalus. Similar appearance of evolving left  PICA territory infarct and associated edema/mass effect and with probable petechial cortical hemorrhage compared to the most immediate prior study. Similar appearance focal parenchymal hematoma in the left anterior cerebellar vermis adjacent to the fourth ventricle compared to the most immediate prior study. No new hemorrhage or mass effect. Minimally improved hydrocephalus status post placement of right frontal approach ventricular catheter. Workstation performed: [id number]     XR chest portable    Result Date: 1/3/2024  Impression: Mild left base opacity. Aspiration not excluded. Workstation performed: [id number]     CT head wo contrast    Result Date: 1/3/2024  Impression: Unchanged hydrocephalus status post placement of right frontal ventriculostomy catheter with tip near the foramen of Monro. Small amount of pneumocephalus in the frontal horn of the right lateral ventricle. Unchanged left PCA infarct with compression of the fourth ventricle and unchanged focal parenchymal hemorrhage adjacent to the fourth ventricle. The study was marked in EPIC for immediate notification. Workstation performed: [id number]     CT head wo contrast    Result Date: 1/3/2024  Impression: Evolving left PCA infarct with increased left cerebellar hypodensity and compression of the fourth ventricle resulting in hydrocephalus with increased focal parenchymal hemorrhage adjacent to the fourth ventricle. Neurosurgery consult recommended. I personally discussed this study with AMBAR KOEHLER on 1/3/2024 10:09 AM. Workstation performed: [id number]     XR chest portable    Result Date: 1/1/2024  Impression: Right jugular catheter at cavoatrial junction with no pneumothorax. Chronic scarring with no acute disease. Workstation performed: [id number]     MRI brain wo contrast    Result Date: 12/31/2023  Impression: Acute infarct in left PICA territory with small acute parenchymal hematoma in anterior aspect of left cerebellar vermis,  petechial cortical hemorrhage along left posterior cerebellum, and similar compressive mass effect on fourth ventricle. No obstructive hydrocephalus. Recommend consultation with neurosurgery. 1.6 cm intramuscular lesion in left temporalis muscle, indeterminate. Differential includes intramuscular epidermoid cyst, hemangioma, myxoma, among other differentials. Mild chronic microangiopathy. The study was marked in EPIC for immediate notification. Workstation performed: [id number]     CT head wo contrast    Result Date: 12/31/2023  Impression: Redemonstration of evolving acute PICA distribution left cerebellar infarct. Mass effect on the fourth ventricle is slightly increased. No hydrocephalus. Persistent increased attenuation in the anterior aspect of the cerebellar infarct that could represent residual contrast staining but petechial hemorrhage not excluded. Recommend close interval follow-up CT and/or further evaluation with MRI. The study was marked in EPIC for immediate notification. Workstation performed: [id number]       EKG, Pathology, and Other Studies: I have personally reviewed pertinent reports.      VTE Pharmacologic Prophylaxis: Sequential compression device (Venodyne)  and Heparin    VTE Mechanical Prophylaxis: sequential compression device

## 2024-01-19 NOTE — ASSESSMENT & PLAN NOTE
16 Days Post-Op Procedure(s):  SUBOCCIPITAL CRANIECTOMY FOR POSTERIOR FOSSA DECOMPRESSION; DISPOSE OF BONE FLAP (LULA, 1/3)  S/p EVD replacement on 1/5/24 2/2 extensive IVH, replaced 1/14 due to not draining/clogged  Presented with nausea, headache and ataxia. 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  On exam, GCS 12 Spontaneous eye opening. Follows commands on right. Nodding yes and no to questions and attempting to make sounds.    Imaging:  CT head wo, 1/15/2023: Evolving left cerebellar PICC infarct with slightly increased size of the left cerebellum parenchymal hematoma. Stable mass effect. Stable extensive diffuse intraventricular hemorrhage with ventriculomegaly and transependymal CSF resorption. Unchanged 5 mm right to left midline shift.    Plan  Continue regular neurologic checks.   Plan for repeat CT head Monday 1/22.  STAT CTH for decline in exam greater than 2 points in 1 hour  EVD was re-inserted on 1/5/24, replaced 1/14.  EVD open at 0. Maintain at this level at this time.   Ancef 2 g q 8 hrs while drain is in place, as EVD continues to be flushed regularly proximally and distally.     ICP < 20, ICP 3 to 8 over 24H  189 mL/24 hours.   Ongoing medical management per primary team.   Na goal > 145-155.   SBP < 160  Pain control per primary team.   Neurology was consulted for stroke management.   ASA d/c 1/3/23, will re-assess when to start after repeat imaging Monday  PT/OT/PMR evaluation as able  DVT PPX: SCD, [facility]    Neurosurgery will continue to follow. Please call with questions or concerns.

## 2024-01-19 NOTE — PLAN OF CARE
Problem: PAIN - ADULT  Goal: Verbalizes/displays adequate comfort level or baseline comfort level  Description: Interventions:  - Encourage patient to monitor pain and request assistance  - Assess pain using appropriate pain scale  - Administer analgesics based on type and severity of pain and evaluate response  - Implement non-pharmacological measures as appropriate and evaluate response  - Consider cultural and social influences on pain and pain management  - Notify physician/advanced practitioner if interventions unsuccessful or patient reports new pain  Outcome: Progressing     Problem: INFECTION - ADULT  Goal: Absence or prevention of progression during hospitalization  Description: INTERVENTIONS:  - Assess and monitor for signs and symptoms of infection  - Monitor lab/diagnostic results  - Monitor all insertion sites, i.e. indwelling lines, tubes, and drains  - Monitor endotracheal if appropriate and nasal secretions for changes in amount and color  - [place] appropriate cooling/warming therapies per order  - Administer medications as ordered  - Instruct and encourage patient and family to use good hand hygiene technique  - Identify and instruct in appropriate isolation precautions for identified infection/condition  Outcome: Progressing  Goal: Absence of fever/infection during neutropenic period  Description: INTERVENTIONS:  - Monitor WBC    Outcome: Progressing     Problem: SAFETY ADULT  Goal: Patient will remain free of falls  Description: INTERVENTIONS:  - Educate patient/family on patient safety including physical limitations  - Instruct patient to call for assistance with activity   - Consult OT/PT to assist with strengthening/mobility   - Keep Call bell within reach  - Keep bed low and locked with side rails adjusted as appropriate  - Keep care items and personal belongings within reach  - Initiate and maintain comfort rounds  - Make Fall Risk Sign visible to staff  - Offer Toileting every  Hours,  in advance of need  - Initiate/Maintain alarm  - Obtain necessary fall risk management equipment:   - Apply yellow socks and bracelet for high fall risk patients  - Consider moving patient to room near nurses station  Outcome: Progressing  Goal: Maintain or return to baseline ADL function  Description: INTERVENTIONS:  -  Assess patient's ability to carry out ADLs; assess patient's baseline for ADL function and identify physical deficits which impact ability to perform ADLs (bathing, care of mouth/teeth, toileting, grooming, dressing, etc.)  - Assess/evaluate cause of self-care deficits   - Assess range of motion  - Assess patient's mobility; develop plan if impaired  - Assess patient's need for assistive devices and provide as appropriate  - Encourage maximum independence but intervene and supervise when necessary  - Involve family in performance of ADLs  - Assess for home care needs following discharge   - Consider OT consult to assist with ADL evaluation and planning for discharge  - Provide patient education as appropriate  Outcome: Progressing  Goal: Maintains/Returns to pre admission functional level  Description: INTERVENTIONS:  - Perform AM-PAC 6 Click Basic Mobility/ Daily Activity assessment daily.  - Set and communicate daily mobility goal to care team and patient/family/caregiver.   - Collaborate with rehabilitation services on mobility goals if consulted  - Perform Range of Motion  times a day.  - Reposition patient every  hours.  - Dangle patient  times a day  - Stand patient  times a day  - Ambulate patient  times a day  - Out of bed to chair  times a day   - Out of bed for meals  times a day  - Out of bed for toileting  - Record patient progress and toleration of activity level   Outcome: Progressing     Problem: SAFETY ADULT  Goal: Patient will remain free of falls  Description: INTERVENTIONS:  - Educate patient/family on patient safety including physical limitations  - Instruct patient to call for  assistance with activity   - Consult OT/PT to assist with strengthening/mobility   - Keep Call bell within reach  - Keep bed low and locked with side rails adjusted as appropriate  - Keep care items and personal belongings within reach  - Initiate and maintain comfort rounds  - Make Fall Risk Sign visible to staff  - Offer Toileting every  Hours, in advance of need  - Initiate/Maintain alarm  - Obtain necessary fall risk management equipment:   - Apply yellow socks and bracelet for high fall risk patients  - Consider moving patient to room near nurses station  Outcome: Progressing  Goal: Maintain or return to baseline ADL function  Description: INTERVENTIONS:  -  Assess patient's ability to carry out ADLs; assess patient's baseline for ADL function and identify physical deficits which impact ability to perform ADLs (bathing, care of mouth/teeth, toileting, grooming, dressing, etc.)  - Assess/evaluate cause of self-care deficits   - Assess range of motion  - Assess patient's mobility; develop plan if impaired  - Assess patient's need for assistive devices and provide as appropriate  - Encourage maximum independence but intervene and supervise when necessary  - Involve family in performance of ADLs  - Assess for home care needs following discharge   - Consider OT consult to assist with ADL evaluation and planning for discharge  - Provide patient education as appropriate  Outcome: Progressing  Goal: Maintains/Returns to pre admission functional level  Description: INTERVENTIONS:  - Perform AM-PAC 6 Click Basic Mobility/ Daily Activity assessment daily.  - Set and communicate daily mobility goal to care team and patient/family/caregiver.   - Collaborate with rehabilitation services on mobility goals if consulted  - Perform Range of Motion  times a day.  - Reposition patient every  hours.  - Dangle patient  times a day  - Stand patient  times a day  - Ambulate patient  times a day  - Out of bed to chair  times a day   -  Out of bed for meals times a day  - Out of bed for toileting  - Record patient progress and toleration of activity level   Outcome: Progressing     Problem: DISCHARGE PLANNING  Goal: Discharge to home or other facility with appropriate resources  Description: INTERVENTIONS:  - Identify barriers to discharge w/patient and caregiver  - Arrange for needed discharge resources and transportation as appropriate  - Identify discharge learning needs (meds, wound care, etc.)  - Arrange for interpretive services to assist at discharge as needed  - Refer to Case Management Department for coordinating discharge planning if the patient needs post-hospital services based on physician/advanced practitioner order or complex needs related to functional status, cognitive ability, or social support system  Outcome: Progressing     Problem: Knowledge Deficit  Goal: Patient/family/caregiver demonstrates understanding of disease process, treatment plan, medications, and discharge instructions  Description: Complete learning assessment and assess knowledge base.  Interventions:  - Provide teaching at level of understanding  - Provide teaching via preferred learning methods  Outcome: Progressing     Problem: Neurological Deficit  Goal: Neurological status is stable or improving  Description: Interventions:  - Monitor and assess patient's level of consciousness, motor function, sensory function, and level of assistance needed for ADLs.   - Monitor and report changes from baseline. Collaborate with interdisciplinary team to initiate plan and implement interventions as ordered.   - Provide and maintain a safe environment.  - Consider seizure precautions.  - Consider fall precautions.  - Consider aspiration precautions.  - Consider bleeding precautions.  Outcome: Progressing     Problem: Activity Intolerance/Impaired Mobility  Goal: Mobility/activity is maintained at optimum level for patient  Description: Interventions:  - Assess and monitor  patient  barriers to mobility and need for assistive/adaptive devices.  - Assess patient's emotional response to limitations.  - Collaborate with interdisciplinary team and initiate plans and interventions as ordered.  - Encourage independent activity per ability.  - Maintain proper body alignment.  - Perform active/passive rom as tolerated/ordered.  - Plan activities to conserve energy.  - Turn patient as appropriate  Outcome: Progressing

## 2024-01-20 NOTE — PROGRESS NOTES
[facility]  Progress Note: Critical Care  Name: Debbie Moody 83 y.o. 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 Stable hydrocephalus. Evolving subacute  L PICA territory infarct,stable petechial hemorrhage, vasogenic edema and mass effect.   CTH (1/15) - Evolving left PICA infarct with petechial hemorrhage; extensive diffuse IVHs with stable ventriculomegaly and transependymal CSF resorption. Stable left transfrontal ventriculostomy catheter. Unchanged 0.5 cm right to left midline shift. Suboccipital pseudomeningocele is noted.   Echo EF 70% mild hypertrophy; A1C 8.3; /172/54/141  Plan:   Neuro/Neurosx consulted  Stat CTH for any change in GCS >2pts  EVD @ 0 still with significant drainage - plan for CTH Monday 1/22 and weaning thereafter  Na 145-155  SBP <160  Hold asa for at least 2 wks post op  Ancef ppx while EVD in place  If unable to wean may need VPS and if unable to support nutrition w/ po may need PEG - will continue to monitor to coordinate placement  Reassess for ASA restart Monday after imaging     Diagnosis: Encephalopathy  Plan:   Cont provigil  Neuro checks      CV:   Diagnosis: HTN/HLD  Plan:   Home vasotec held  Cont lisinopril/Norvasc/Coreg/statin  Diagnosis: hypotension - significantly improved  Albumin to improve pressures if needed      Pulm:  Diagnosis: Acute hypoxic resp failure  Intubated 1/3 Extubated 1/4; Reintubated 1/5 Extubated 1/12  Patient w/ continued rhonchi but improved from initial exams  Plan:   Wean oxygen for sats >92%  Diagnosis: hypercapnia/stroke  Plan:    BiPAP overnight to treat hypercarbia/hypercapnia, to prevent steal from stroke bed       GI:   Diagnosis: Dysphagia  Plan:   NGT cont TF - at this time patient cannot support her nutrition from po intake alone  Speech consulted -patient cleared for full liquid diet HTL started 1/17 but po intake was poor before her stroke  If unable to wean may need VPS and if unable to support nutrition w/ po may need PEG - have broached this discussion with daughter       :   Diagnosis: CKD 3 baseline cr 1-1.3  Plan:   At baseline  Lab Results   Component Value Date    CREATININE 0.90 01/19/2024    CREATININE 0.91 01/17/2024       F/E/N:   No active issues       Heme/Onc:   Diagnosis: Anemia baseline hgb 9-10  Plan:   No s/s bleeding  cont home iron  Currently stable  Lab Results   Component Value Date    HGB 9.2 (L) 01/21/2024    HCT 29.4 (L) 01/21/2024          Endo:   Diagnosis: DM2  A1C > 8 on admission  Plan:   Holding home glipizide/Januvia  Lantus 15 bid  Humalog 3 U q6  SSI   Lab Results   Component Value Date    GLUC 159 (H) 01/19/2024    POCGLU 195 (H) 01/21/2024       ID:   No active issues       MSK/Skin:   Will change injection sites due to abdominal bruising    Disposition: Critical care    ICU Core Measures       VAP Bundle  VAP bundle ordered     A: Assess, Prevent, and Manage Pain Has pain been assessed? Yes  Need for changes to pain regimen? No   B: Both Spontaneous Awakening Trials (SATs) and Spontaneous Breathing Trials (SBTs) Plan to perform spontaneous awakening trial today? N/A   Plan to perform spontaneous breathing trial today? N/A   Obvious barriers to extubation? NA   C: Choice of Sedation RASS Goal: N/A patient not on sedation  Need for changes to sedation or analgesia regimen? NA   D: Delirium CAM-ICU: Unable to perform secondary to Acute cognitive dysfunction   E: Early Mobility  Plan for early mobility? Yes   F: Family Engagement Plan for family engagement today? Yes       Antibiotic Review: Post  op requirements     Review of Invasive Devices:      Central access plan: Will obtain peripheral access and discontinue prior to transfer      Prophylaxis:  VTE VTE covered by:  heparin (porcine), Subcutaneous, 5,000 Units at 01/21/24 0615       Stress Ulcer  not ordered        Significant 24hr Events     24hr events: improving mentation, continues on qhs BiPAP, changing tube feeds to nightly to encourage po intake     Subjective     Review of Systems   Unable to perform ROS: Mental status change        Objective                            Vitals I/O      Most Recent Min/Max in 24hrs   Temp 99 °F (37.2 °C) Temp  Min: 98 °F (36.7 °C)  Max: 99 °F (37.2 °C)   Pulse 90 Pulse  Min: 76  Max: 94   Resp 16 Resp  Min: 12  Max: 26   /52 BP  Min: 133/52  Max: 137/48   O2 Sat 97 % SpO2  Min: 94 %  Max: 97 %      Intake/Output Summary (Last 24 hours) at 1/21/2024 0642  Last data filed at 1/21/2024 0301  Gross per 24 hour   Intake 465 ml   Output 810 ml   Net -345 ml       Diet Enteral/Parenteral; Tube Feeding with Oral Diet; Glucerna 1.2; Continuous; 45; Prosource Protein Liquid - One Packet; 40; Saline; Every 4 hours; Regular; Regular House; Honey Thick Liquid, Full Liquid    Invasive Monitoring           Physical Exam   Physical Exam  Eyes:      Pupils: Pupils are equal, round, and reactive to light.   Skin:     General: Skin is warm and dry.      Comments: Abdominal bruising around injection sites   HENT:      Head: Normocephalic and atraumatic.      Comments: EVD 0       Mouth/Throat:      Mouth: Mucous membranes are moist.   Cardiovascular:      Rate and Rhythm: Normal rate and regular rhythm.      Pulses: Normal pulses.      Heart sounds: Normal heart sounds.   Musculoskeletal:         General: No swelling.      Right lower leg: No edema.      Left lower leg: No edema.   Abdominal: General: Bowel sounds are normal. There is no distension.      Palpations: Abdomen is soft.      Tenderness: There is no abdominal  tenderness.   Constitutional:       General: She is not in acute distress.     Comments: Sleepy, BiPAP on   Pulmonary:      Effort: Pulmonary effort is normal. No respiratory distress.   Secretions are abnormal  (improving).Neurological:      Mental Status: She is lethargic.      GCS: GCS eye subscore is 2. GCS verbal subscore is 1. GCS motor subscore is 6.      Comments: BUE antigravity, RUE cross midline, LUE lifted off bed once. BLE wiggles toes to command ( RLE > LLE). Says name, says hi to command           Diagnostic Studies      EKG: tele  Imaging:  I have personally reviewed pertinent reports.   and I have personally reviewed pertinent films in PACS     Medications:  Scheduled PRN   amLODIPine, 10 mg, Daily  atorvastatin, 80 mg, QPM  carvedilol, 12.5 mg, BID With Meals  cefazolin, 1,000 mg, Q8H  chlorhexidine, 15 mL, Q12H JUAN ANTONIO  ferrous sulfate, 325 mg, Daily With Breakfast  formoterol, 20 mcg, Q12H  heparin (porcine), 5,000 Units, Q8H JUAN ANTONIO  insulin glargine, 15 Units, Q12H JUAN ANTONIO  insulin lispro, 3 Units, Q6H  insulin lispro, 4-20 Units, Q6H JUAN ANTONIO  levalbuterol, 1.25 mg, TID  lisinopril, 40 mg, Daily  miconazole, , BID  modafinil, 200 mg, Daily  polyethylene glycol, 17 g, BID  senna-docusate sodium, 2 tablet, BID  sodium chloride, 4 mL, TID      acetaminophen, 650 mg, Q6H PRN  bisacodyl, 10 mg, Daily PRN  hydrALAZINE, 10 mg, Q4H PRN  ondansetron, 4 mg, Q6H PRN       Continuous          Labs:    CBC    Recent Labs     01/21/24  0605   WBC 8.03   HGB 9.2*   HCT 29.4*          BMP    No recent results        Coags    No recent results     Additional Electrolytes  No recent results           Blood Gas    No recent results    No recent results   LFTs  No recent results    Infectious  No recent results  Glucose  No recent results              Wilfred Glass

## 2024-01-20 NOTE — PROGRESS NOTES
[facility]  Progress Note  Name: Debbie Moody I  MRN: [id number]  Unit/Bed#: ICU 04 I Date of Admission: 12/31/2023   Date of Service: 1/120/2024 I Hospital Day: 2-        Assessment/Plan   * Acute CVA (cerebrovascular accident) (HCC)  Assessment & Plan  18 Days Post-Op Procedure(s):  SUBOCCIPITAL CRANIECTOMY FOR POSTERIOR FOSSA DECOMPRESSION; DISPOSE OF BONE FLAP (LULA, 1/3)  S/p EVD replacement on 1/5/24 2/2 extensive IVH, replaced 1/14 due to not draining/clogged  Presented with nausea, headache and ataxia. 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  On exam, GCS 11 Spontaneous eye opening. Follows commands on right. Nodding yes and no to questions and attempting to make sounds.  No significant clinical changes  EVD working well, fluid remains red, thin  Imaging:  CT head wo, 1/15/2023: Evolving left cerebellar PICC infarct with slightly increased size of the left cerebellum parenchymal hematoma. Stable mass effect. Stable extensive diffuse intraventricular hemorrhage with ventriculomegaly and transependymal CSF resorption. Unchanged 5 mm right to left midline shift.     Plan  Continue regular neurologic checks.   Plan for repeat CT head Monday 1/22.  STAT CTH for decline in exam greater than 2 points in 1 hour  EVD was re-inserted on 1/5/24, replaced 1/14.  EVD open at 0. Maintain at this level at this time.   Ancef 2 g q 8 hrs while drain is in place, as EVD continues to be flushed regularly proximally and distally.     ICP < 20, ICP 3 to 8 over 24H  Ongoing medical management per primary team.   Na goal > 145-155.   SBP < 160  Pain control per primary team.   Neurology was consulted for stroke management.   ASA d/c 1/3/23, will re-assess when to start after repeat imaging  Monday  PT/OT/PMR evaluation as able  DVT PPX: SCD, SQH     Neurosurgery will continue to follow. Please call with questions or concerns.

## 2024-01-20 NOTE — PLAN OF CARE
Problem: PAIN - ADULT  Goal: Verbalizes/displays adequate comfort level or baseline comfort level  Description: Interventions:  - Encourage patient to monitor pain and request assistance  - Assess pain using appropriate pain scale  - Administer analgesics based on type and severity of pain and evaluate response  - Implement non-pharmacological measures as appropriate and evaluate response  - Consider cultural and social influences on pain and pain management  - Notify physician/advanced practitioner if interventions unsuccessful or patient reports new pain  Outcome: Progressing     Problem: INFECTION - ADULT  Goal: Absence or prevention of progression during hospitalization  Description: INTERVENTIONS:  - Assess and monitor for signs and symptoms of infection  - Monitor lab/diagnostic results  - Monitor all insertion sites, i.e. indwelling lines, tubes, and drains  - Monitor endotracheal if appropriate and nasal secretions for changes in amount and color  - [place] appropriate cooling/warming therapies per order  - Administer medications as ordered  - Instruct and encourage patient and family to use good hand hygiene technique  - Identify and instruct in appropriate isolation precautions for identified infection/condition  Outcome: Progressing  Goal: Absence of fever/infection during neutropenic period  Description: INTERVENTIONS:  - Monitor WBC    Outcome: Progressing     Problem: SAFETY ADULT  Goal: Patient will remain free of falls  Description: INTERVENTIONS:  - Educate patient/family on patient safety including physical limitations  - Instruct patient to call for assistance with activity   - Consult OT/PT to assist with strengthening/mobility   - Keep Call bell within reach  - Keep bed low and locked with side rails adjusted as appropriate  - Keep care items and personal belongings within reach  - Initiate and maintain comfort rounds  - Make Fall Risk Sign visible to staff  - Offer Toileting every  Hours,  in advance of need  - Initiate/Maintain alarm  - Obtain necessary fall risk management equipment:   - Apply yellow socks and bracelet for high fall risk patients  - Consider moving patient to room near nurses station  Outcome: Progressing  Goal: Maintain or return to baseline ADL function  Description: INTERVENTIONS:  -  Assess patient's ability to carry out ADLs; assess patient's baseline for ADL function and identify physical deficits which impact ability to perform ADLs (bathing, care of mouth/teeth, toileting, grooming, dressing, etc.)  - Assess/evaluate cause of self-care deficits   - Assess range of motion  - Assess patient's mobility; develop plan if impaired  - Assess patient's need for assistive devices and provide as appropriate  - Encourage maximum independence but intervene and supervise when necessary  - Involve family in performance of ADLs  - Assess for home care needs following discharge   - Consider OT consult to assist with ADL evaluation and planning for discharge  - Provide patient education as appropriate  Outcome: Progressing  Goal: Maintains/Returns to pre admission functional level  Description: INTERVENTIONS:  - Perform AM-PAC 6 Click Basic Mobility/ Daily Activity assessment daily.  - Set and communicate daily mobility goal to care team and patient/family/caregiver.   - Collaborate with rehabilitation services on mobility goals if consulted  - Perform Range of Motion  times a day.  - Reposition patient every  hours.  - Dangle patient  times a day  - Stand patient  times a day  - Ambulate patient  times a day  - Out of bed to chair  times a day   - Out of bed for meals times a day  - Out of bed for toileting  - Record patient progress and toleration of activity level   Outcome: Progressing     Problem: DISCHARGE PLANNING  Goal: Discharge to home or other facility with appropriate resources  Description: INTERVENTIONS:  - Identify barriers to discharge w/patient and caregiver  - Arrange for  needed discharge resources and transportation as appropriate  - Identify discharge learning needs (meds, wound care, etc.)  - Arrange for interpretive services to assist at discharge as needed  - Refer to Case Management Department for coordinating discharge planning if the patient needs post-hospital services based on physician/advanced practitioner order or complex needs related to functional status, cognitive ability, or social support system  Outcome: Progressing     Problem: Knowledge Deficit  Goal: Patient/family/caregiver demonstrates understanding of disease process, treatment plan, medications, and discharge instructions  Description: Complete learning assessment and assess knowledge base.  Interventions:  - Provide teaching at level of understanding  - Provide teaching via preferred learning methods  Outcome: Progressing     Problem: Neurological Deficit  Goal: Neurological status is stable or improving  Description: Interventions:  - Monitor and assess patient's level of consciousness, motor function, sensory function, and level of assistance needed for ADLs.   - Monitor and report changes from baseline. Collaborate with interdisciplinary team to initiate plan and implement interventions as ordered.   - Provide and maintain a safe environment.  - Consider seizure precautions.  - Consider fall precautions.  - Consider aspiration precautions.  - Consider bleeding precautions.  Outcome: Progressing

## 2024-01-20 NOTE — PLAN OF CARE
Problem: PAIN - ADULT  Goal: Verbalizes/displays adequate comfort level or baseline comfort level  Description: Interventions:  - Encourage patient to monitor pain and request assistance  - Assess pain using appropriate pain scale  - Administer analgesics based on type and severity of pain and evaluate response  - Implement non-pharmacological measures as appropriate and evaluate response  - Consider cultural and social influences on pain and pain management  - Notify physician/advanced practitioner if interventions unsuccessful or patient reports new pain  Outcome: Progressing     Problem: INFECTION - ADULT  Goal: Absence or prevention of progression during hospitalization  Description: INTERVENTIONS:  - Assess and monitor for signs and symptoms of infection  - Monitor lab/diagnostic results  - Monitor all insertion sites, i.e. indwelling lines, tubes, and drains  - Monitor endotracheal if appropriate and nasal secretions for changes in amount and color  - Victor appropriate cooling/warming therapies per order  - Administer medications as ordered  - Instruct and encourage patient and family to use good hand hygiene technique  - Identify and instruct in appropriate isolation precautions for identified infection/condition  Outcome: Progressing     Problem: SAFETY ADULT  Goal: Patient will remain free of falls  Description: INTERVENTIONS:  - Educate patient/family on patient safety including physical limitations  - Instruct patient to call for assistance with activity   - Consult OT/PT to assist with strengthening/mobility   - Keep Call bell within reach  - Keep bed low and locked with side rails adjusted as appropriate  - Keep care items and personal belongings within reach  - Initiate and maintain comfort rounds  - Make Fall Risk Sign visible to staff  - Offer Toileting every 2 Hours, in advance of need  - Apply yellow socks and bracelet for high fall risk patients  - Consider moving patient to room near nurses  station  Outcome: Progressing  Goal: Maintain or return to baseline ADL function  Description: INTERVENTIONS:  -  Assess patient's ability to carry out ADLs; assess patient's baseline for ADL function and identify physical deficits which impact ability to perform ADLs (bathing, care of mouth/teeth, toileting, grooming, dressing, etc.)  - Assess/evaluate cause of self-care deficits   - Assess range of motion  - Assess patient's mobility; develop plan if impaired  - Assess patient's need for assistive devices and provide as appropriate  - Encourage maximum independence but intervene and supervise when necessary  - Involve family in performance of ADLs  - Assess for home care needs following discharge   - Consider OT consult to assist with ADL evaluation and planning for discharge  - Provide patient education as appropriate  Outcome: Progressing  Goal: Maintains/Returns to pre admission functional level  Description: INTERVENTIONS:  - Perform AM-PAC 6 Click Basic Mobility/ Daily Activity assessment daily.  - Set and communicate daily mobility goal to care team and patient/family/caregiver.   - Collaborate with rehabilitation services on mobility goals if consulted  - Reposition patient every 2 hours.  - Out of bed for toileting  - Record patient progress and toleration of activity level   Outcome: Progressing     Problem: DISCHARGE PLANNING  Goal: Discharge to home or other facility with appropriate resources  Description: INTERVENTIONS:  - Identify barriers to discharge w/patient and caregiver  - Arrange for needed discharge resources and transportation as appropriate  - Identify discharge learning needs (meds, wound care, etc.)  - Arrange for interpretive services to assist at discharge as needed  - Refer to Case Management Department for coordinating discharge planning if the patient needs post-hospital services based on physician/advanced practitioner order or complex needs related to functional status, cognitive  ability, or social support system  Outcome: Progressing     Problem: Knowledge Deficit  Goal: Patient/family/caregiver demonstrates understanding of disease process, treatment plan, medications, and discharge instructions  Description: Complete learning assessment and assess knowledge base.  Interventions:  - Provide teaching at level of understanding  - Provide teaching via preferred learning methods  Outcome: Progressing     Problem: Neurological Deficit  Goal: Neurological status is stable or improving  Description: Interventions:  - Monitor and assess patient's level of consciousness, motor function, sensory function, and level of assistance needed for ADLs.   - Monitor and report changes from baseline. Collaborate with interdisciplinary team to initiate plan and implement interventions as ordered.   - Provide and maintain a safe environment.  - Consider seizure precautions.  - Consider fall precautions.  - Consider aspiration precautions.  - Consider bleeding precautions.  Outcome: Progressing     Problem: Activity Intolerance/Impaired Mobility  Goal: Mobility/activity is maintained at optimum level for patient  Description: Interventions:  - Assess and monitor patient  barriers to mobility and need for assistive/adaptive devices.  - Assess patient's emotional response to limitations.  - Collaborate with interdisciplinary team and initiate plans and interventions as ordered.  - Encourage independent activity per ability.  - Maintain proper body alignment.  - Perform active/passive rom as tolerated/ordered.  - Plan activities to conserve energy.  - Turn patient as appropriate  Outcome: Progressing     Problem: Communication Impairment  Goal: Ability to express needs and understand communication  Description: Assess patient's communication skills and ability to understand information.  Patient will demonstrate use of effective communication techniques, alternative methods of communication and understanding even  if not able to speak.     - Encourage communication and provide alternate methods of communication as needed.  - Collaborate with case management/ for discharge needs.  - Include patient/family/caregiver in decisions related to communication.  Outcome: Progressing     Problem: Nutrition  Goal: Nutrition/Hydration status is improving  Description: Monitor and assess patient's nutrition/hydration status for malnutrition (ex- brittle hair, bruises, dry skin, pale skin and conjunctiva, muscle wasting, smooth red tongue, and disorientation). Collaborate with interdisciplinary team and initiate plan and interventions as ordered.  Monitor patient's weight and dietary intake as ordered or per policy. Utilize nutrition screening tool and intervene per policy. Determine patient's food preferences and provide high-protein, high-caloric foods as appropriate.     - Assist patient with eating.  - Allow adequate time for meals.  - Encourage patient to take dietary supplement as ordered.  - Collaborate with clinical nutritionist.  - Include patient/family/caregiver in decisions related to nutrition.  Outcome: Progressing     Problem: Prexisting or High Potential for Compromised Skin Integrity  Goal: Skin integrity is maintained or improved  Description: INTERVENTIONS:  - Identify patients at risk for skin breakdown  - Assess and monitor skin integrity  - Assess and monitor nutrition and hydration status  - Monitor labs   - Assess for incontinence   - Turn and reposition patient  - Assist with mobility/ambulation  - Relieve pressure over bony prominences  - Avoid friction and shearing  - Provide appropriate hygiene as needed including keeping skin clean and dry  - Evaluate need for skin moisturizer/barrier cream  - Collaborate with interdisciplinary team   - Patient/family teaching  - Consider wound care consult   Outcome: Progressing     Problem: Nutrition/Hydration-ADULT  Goal: Nutrient/Hydration intake appropriate  for improving, restoring or maintaining nutritional needs  Description: Monitor and assess patient's nutrition/hydration status for malnutrition. Collaborate with interdisciplinary team and initiate plan and interventions as ordered.  Monitor patient's weight and dietary intake as ordered or per policy. Utilize nutrition screening tool and intervene as necessary. Determine patient's food preferences and provide high-protein, high-caloric foods as appropriate.     INTERVENTIONS:  - Monitor oral intake, urinary output, labs, and treatment plans  - Assess nutrition and hydration status and recommend course of action  - Evaluate amount of meals eaten  - Assist patient with eating if necessary   - Allow adequate time for meals  - Recommend/ encourage appropriate diets, oral nutritional supplements, and vitamin/mineral supplements  - Order, calculate, and assess calorie counts as needed  - Recommend, monitor, and adjust tube feedings and TPN/PPN based on assessed needs  - Assess need for intravenous fluids  - Provide specific nutrition/hydration education as appropriate  - Include patient/family/caregiver in decisions related to nutrition  Outcome: Progressing

## 2024-01-21 NOTE — TREATMENT PLAN
Notified by ICU team for concern that EVD is no longer draining.  On my arrival to the bedside, noted thicker, red blood clotted in the tubing.  EVD lowered to the floor and no drainage noted.  Waveform now dampened.  EVD was unable to be flushed proximately due to significant resistance.     On exam, the patient is largely unchanged.  She is a little bit sleepier, but nursing states this is consistent with her typical fluctuations throughout the day.  The patient opens her eyes to stimulation.  She continues to not appropriately to questions with prompting.  She continues to follow commands with the right upper extremity and right lower extremity.     Will plan for repeat CT head at this time  -Will review CT results once completed and discussed with Dr. Goldberg

## 2024-01-21 NOTE — PROGRESS NOTES
[facility]  Progress Note  Name: Debbie Moody I  MRN: [id number]  Unit/Bed#: ICU 04 I Date of Admission: 12/31/2023   Date of Service: 1/21/2024 I Hospital Day: 21    Assessment/Plan   * Acute CVA (cerebrovascular accident) (HCC)  Assessment & Plan  POD#18 - s/p SOC for posterior fossa decompression (LULA 1/3)   Hydrocephalus requiring EVD   Complicated by extensive IVH  S/p EVD replacement on 1/5/24 2/2 extensive IVH, replaced 1/14 due to not draining/clogged  Presented with nausea, headache and ataxia. 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  Current exam: GCS 11, opens eyes spont, tracking, FC in the RUE and RLE, no verbal response today     Imaging:  CT head wo, 1/15/2023: Evolving left cerebellar PICC infarct with slightly increased size of the left cerebellum parenchymal hematoma. Stable mass effect. Stable extensive diffuse intraventricular hemorrhage with ventriculomegaly and transependymal CSF resorption. Unchanged 5 mm right to left midline shift.    Plan:  Continue to closely monitor neuro exam   Frequent neuro checks per primary team   Repeat STAT CTH with any acute decline in GCS > 2pts or more in 1hr   Maintain normotensive BP goals, SBP < 160   No acute neurosurgical intervention indicated at this time   Case and imaging reviewed this am on rounds   Pt remains stable in neuro exam today    1 EVD in place @ 0   125cc/24hrs, dark serosanuinous output  Draining well, continue to closely monitor output   ICP 4-6 at time of exam, well controlled overnight   Maintain ICP < 20   Continue ancef 2g q 8hrs while EVD is in place   Plan for repeat CT head Monday 1/22.  Ongoing medical management per primary team.   Na goal > 145-155.   SBP < 160  Pain control per primary team.  "  Neurology was consulted for stroke management.   ASA d/c 1/3/23, will re-assess when to start after repeat imaging Monday  PT/OT/PMR evaluation as able  DVT PPX: SCD, SQH    Neurosurgery will continue to follow. Please call with questions or concerns.            Subjective/Objective   Chief Complaint: Minimal verbal response    Subjective: Patient seen and examined this a.m. on rounds.  No acute events overnight.  EVD remains at 0 and is functioning well.  ICP at time of exam between 4 and 6.  EVD drains briskly when lowered to the floor.  Patient remains neurologically stable this morning.  She denies any headaches or pain at this time.    Objective: Ill-appearing, elderly female in ICU bed.  No acute distress.    I/O         01/19 0701 01/20 0700 01/20 0701 01/21 0700 01/21 0701 01/22 0700    P.O. 30 15     I.V. (mL/kg)       NG/ 220     Feedings 1035 360     Total Intake(mL/kg) 1185 (18.5) 595 (9.3)     Urine (mL/kg/hr) 720 (0.5) 700 (0.5)     Emesis/NG output       Drains 111 125     Stool 0 0     Total Output 831 825     Net +354 -230            Unmeasured Urine Occurrence 1 x 2 x     Unmeasured Stool Occurrence 0 x 1 x           Invasive Devices       Peripheral Intravenous Line  Duration             Long-Dwell Peripheral IV (Midline) 01/03/24 Left Cephalic Vein 17 days              Arterial Line  Duration             Arterial Line 01/15/24 Radial 6 days              Drain  Duration             Ventriculostomy/Subdural Ventricular drainage catheter Left Frontal region 15 days    NG/OG/Enteral Tube Nasogastric 12 Fr Right nare 3 days    External Urinary Catheter 2 days                  Physical Exam:  Vitals: Blood pressure 140/54, pulse 92, temperature 99 °F (37.2 °C), temperature source Oral, resp. rate 22, height 4' 10\" (1.473 m), weight 64.2 kg (141 lb 8.6 oz), SpO2 97%, not currently breastfeeding.,Body mass index is 29.58 kg/m².    General appearance: alert, appears stated age, cooperative and " "no distress  Head: Normocephalic, without obvious abnormality, atraumatic  Eyes: EOMI, PERRL  -Eyes open spontaneously, tracking appropriately  Neck: supple, symmetrical, trachea midline and NT  Back: no kyphosis present, no tenderness to percussion or palpation  Lungs: non labored breathing, no respiratory distress on nasal cannula  Heart: regular heart rate  Neurologic:   -GCS 11  -Opens eyes spontaneously, tracking appropriately  -Nods yes and no appropriately  -Minimal to no verbal response appreciated  -Following commands with the right upper extremity and right lower extremity    Lab Results:  Results from last 7 days   Lab Units 01/21/24  0605 01/19/24  0543 01/17/24  0446   WBC Thousand/uL 8.03 5.52 6.71   HEMOGLOBIN g/dL 9.2* 8.7* 8.7*   HEMATOCRIT % 29.4* 27.7* 27.3*   PLATELETS Thousands/uL 214 219 206   NEUTROS PCT % 82* 68 71   MONOS PCT % 7 8 8   EOS PCT % 1 4 3     Results from last 7 days   Lab Units 01/21/24  0605 01/19/24  0543 01/17/24  0446   SODIUM mmol/L 145 147 147   POTASSIUM mmol/L 4.2 4.4 4.2   CHLORIDE mmol/L 106 108 114*   CO2 mmol/L 30 32 31   BUN mg/dL 49* 43* 35*   CREATININE mg/dL 1.13 0.90 0.91   CALCIUM mg/dL 9.0 8.9 8.7     Results from last 7 days   Lab Units 01/21/24  0605 01/19/24  0543 01/17/24  0446   MAGNESIUM mg/dL 2.4 2.2 2.2     Results from last 7 days   Lab Units 01/21/24  0605 01/19/24  0543 01/15/24  0455   PHOSPHORUS mg/dL 4.3* 3.4 3.5         No results found for: \"TROPONINT\"  ABG:  Lab Results   Component Value Date    PHART 7.405 01/16/2024    LBX1EPJ 42.3 01/16/2024    PO2ART 122.5 01/16/2024    HAZ5YTC 25.9 01/16/2024    BEART 1.0 01/16/2024    SOURCE Line, Arterial 01/16/2024       Imaging Studies: I have personally reviewed pertinent reports.   and I have personally reviewed pertinent films in PACS    XR chest portable ICU    Result Date: 1/6/2024  Impression: Endotracheal tube tip 3.4 cm above the ceasar. Workstation performed: [id number]     CTA head w wo " contrast    Result Date: 1/5/2024  Impression: Stable vascularity when compared with prior CT angiogram from 12/30/2023. No large vessel occlusion. Mild atherosclerotic change present. There is no left posterior inferior cerebellar artery visualized within the posterior fossa in this patient with a known PICA infarct. No venous abnormality identified. Workstation performed: [id number]     CT head wo contrast    Result Date: 1/5/2024  Impression: Similar parenchymal changes from left PICA infarct. Petechial hemorrhage with some mild hemorrhagic conversion in the left cerebellar hemisphere. Previous right side  shunt catheter has been withdrawn. Extensive intraventricular blood as detailed above. Developing hydrocephalus. Narrowing of sulci bilaterally. No convincing uncal herniation. No tonsillar herniation (suboccipital craniectomy). I personally discussed this study with Violet Trejo on 1/5/2024 5:24 PM. Workstation performed: [id number]     CT head wo contrast    Result Date: 1/5/2024  Impression: Status post decompressive suboccipital craniectomy for a PICA distribution infarct with slightly improved mass effect on the fourth ventricle and improving hydrocephalus with ventriculostomy in place. Overall degree of hemorrhage within the left cerebellum and vermis is similar to the prior examination. No new areas of acute intracranial hemorrhage identified. Workstation performed: [id number]     XR chest portable    Result Date: 1/4/2024  Impression: Tubes and lines in adequate position. No acute pulmonary pathology. Workstation performed: [id number]     XR chest portable    Result Date: 1/4/2024  Impression: Endotracheal tube in the proximal right mainstem bronchus. Workstation performed: [id number]     X-ray chest 1 view    Result Date: 1/4/2024  Impression: Tubes and lines as described above. Low lung volumes demonstrated without consolidation or other acute pulmonary findings. Workstation performed: [id number]      CT head wo contrast    Result Date: 1/3/2024  Impression: Status post interval decompressive suboccipital craniectomy with expected subjacent postsurgical changes/pneumocephalus. Similar appearance of evolving left PICA territory infarct and associated edema/mass effect and with probable petechial cortical hemorrhage compared to the most immediate prior study. Similar appearance focal parenchymal hematoma in the left anterior cerebellar vermis adjacent to the fourth ventricle compared to the most immediate prior study. No new hemorrhage or mass effect. Minimally improved hydrocephalus status post placement of right frontal approach ventricular catheter. Workstation performed: [id number]     XR chest portable    Result Date: 1/3/2024  Impression: Mild left base opacity. Aspiration not excluded. Workstation performed: [id number]     CT head wo contrast    Result Date: 1/3/2024  Impression: Unchanged hydrocephalus status post placement of right frontal ventriculostomy catheter with tip near the foramen of Monro. Small amount of pneumocephalus in the frontal horn of the right lateral ventricle. Unchanged left PCA infarct with compression of the fourth ventricle and unchanged focal parenchymal hemorrhage adjacent to the fourth ventricle. The study was marked in EPIC for immediate notification. Workstation performed: [id number]     CT head wo contrast    Result Date: 1/3/2024  Impression: Evolving left PCA infarct with increased left cerebellar hypodensity and compression of the fourth ventricle resulting in hydrocephalus with increased focal parenchymal hemorrhage adjacent to the fourth ventricle. Neurosurgery consult recommended. I personally discussed this study with AMBAR KOEHLER on 1/3/2024 10:09 AM. Workstation performed: [id number]     XR chest portable    Result Date: 1/1/2024  Impression: Right jugular catheter at cavoatrial junction with no pneumothorax. Chronic scarring with no acute disease.  Workstation performed: [id number]     MRI brain wo contrast    Result Date: 12/31/2023  Impression: Acute infarct in left PICA territory with small acute parenchymal hematoma in anterior aspect of left cerebellar vermis, petechial cortical hemorrhage along left posterior cerebellum, and similar compressive mass effect on fourth ventricle. No obstructive hydrocephalus. Recommend consultation with neurosurgery. 1.6 cm intramuscular lesion in left temporalis muscle, indeterminate. Differential includes intramuscular epidermoid cyst, hemangioma, myxoma, among other differentials. Mild chronic microangiopathy. The study was marked in EPIC for immediate notification. Workstation performed: [id number]     CT head wo contrast    Result Date: 12/31/2023  Impression: Redemonstration of evolving acute PICA distribution left cerebellar infarct. Mass effect on the fourth ventricle is slightly increased. No hydrocephalus. Persistent increased attenuation in the anterior aspect of the cerebellar infarct that could represent residual contrast staining but petechial hemorrhage not excluded. Recommend close interval follow-up CT and/or further evaluation with MRI. The study was marked in EPIC for immediate notification. Workstation performed: [id number]       EKG, Pathology, and Other Studies: I have personally reviewed pertinent reports.      VTE Pharmacologic Prophylaxis: Sequential compression device (Venodyne)  and Heparin    VTE Mechanical Prophylaxis: sequential compression device

## 2024-01-21 NOTE — PLAN OF CARE
Problem: PAIN - ADULT  Goal: Verbalizes/displays adequate comfort level or baseline comfort level  Description: Interventions:  - Encourage patient to monitor pain and request assistance  - Assess pain using appropriate pain scale  - Administer analgesics based on type and severity of pain and evaluate response  - Implement non-pharmacological measures as appropriate and evaluate response  - Consider cultural and social influences on pain and pain management  - Notify physician/advanced practitioner if interventions unsuccessful or patient reports new pain  Outcome: Progressing     Problem: INFECTION - ADULT  Goal: Absence or prevention of progression during hospitalization  Description: INTERVENTIONS:  - Assess and monitor for signs and symptoms of infection  - Monitor lab/diagnostic results  - Monitor all insertion sites, i.e. indwelling lines, tubes, and drains  - Monitor endotracheal if appropriate and nasal secretions for changes in amount and color  - [place] appropriate cooling/warming therapies per order  - Administer medications as ordered  - Instruct and encourage patient and family to use good hand hygiene technique  - Identify and instruct in appropriate isolation precautions for identified infection/condition  Outcome: Progressing  Goal: Absence of fever/infection during neutropenic period  Description: INTERVENTIONS:  - Monitor WBC    Outcome: Progressing     Problem: SAFETY ADULT  Goal: Patient will remain free of falls  Description: INTERVENTIONS:  - Educate patient/family on patient safety including physical limitations  - Instruct patient to call for assistance with activity   - Consult OT/PT to assist with strengthening/mobility   - Keep Call bell within reach  - Keep bed low and locked with side rails adjusted as appropriate  - Keep care items and personal belongings within reach  - Initiate and maintain comfort rounds  - Make Fall Risk Sign visible to staff  - Offer Toileting every  Hours,  in advance of need  - Initiate/Maintain alarm  - Obtain necessary fall risk management equipment:   - Apply yellow socks and bracelet for high fall risk patients  - Consider moving patient to room near nurses station  Outcome: Progressing  Goal: Maintain or return to baseline ADL function  Description: INTERVENTIONS:  -  Assess patient's ability to carry out ADLs; assess patient's baseline for ADL function and identify physical deficits which impact ability to perform ADLs (bathing, care of mouth/teeth, toileting, grooming, dressing, etc.)  - Assess/evaluate cause of self-care deficits   - Assess range of motion  - Assess patient's mobility; develop plan if impaired  - Assess patient's need for assistive devices and provide as appropriate  - Encourage maximum independence but intervene and supervise when necessary  - Involve family in performance of ADLs  - Assess for home care needs following discharge   - Consider OT consult to assist with ADL evaluation and planning for discharge  - Provide patient education as appropriate  Outcome: Progressing  Goal: Maintains/Returns to pre admission functional level  Description: INTERVENTIONS:  - Perform AM-PAC 6 Click Basic Mobility/ Daily Activity assessment daily.  - Set and communicate daily mobility goal to care team and patient/family/caregiver.   - Collaborate with rehabilitation services on mobility goals if consulted  - Perform Range of Motion  times a day.  - Reposition patient every  hours.  - Dangle patient  times a day  - Stand patient  times a day  - Ambulate patient  times a day  - Out of bed to chair times a day   - Out of bed for meals times a day  - Out of bed for toileting  - Record patient progress and toleration of activity level   Outcome: Progressing     Problem: DISCHARGE PLANNING  Goal: Discharge to home or other facility with appropriate resources  Description: INTERVENTIONS:  - Identify barriers to discharge w/patient and caregiver  - Arrange for  needed discharge resources and transportation as appropriate  - Identify discharge learning needs (meds, wound care, etc.)  - Arrange for interpretive services to assist at discharge as needed  - Refer to Case Management Department for coordinating discharge planning if the patient needs post-hospital services based on physician/advanced practitioner order or complex needs related to functional status, cognitive ability, or social support system  Outcome: Progressing     Problem: Knowledge Deficit  Goal: Patient/family/caregiver demonstrates understanding of disease process, treatment plan, medications, and discharge instructions  Description: Complete learning assessment and assess knowledge base.  Interventions:  - Provide teaching at level of understanding  - Provide teaching via preferred learning methods  Outcome: Progressing     Problem: Activity Intolerance/Impaired Mobility  Goal: Mobility/activity is maintained at optimum level for patient  Description: Interventions:  - Assess and monitor patient  barriers to mobility and need for assistive/adaptive devices.  - Assess patient's emotional response to limitations.  - Collaborate with interdisciplinary team and initiate plans and interventions as ordered.  - Encourage independent activity per ability.  - Maintain proper body alignment.  - Perform active/passive rom as tolerated/ordered.  - Plan activities to conserve energy.  - Turn patient as appropriate  Outcome: Progressing     Problem: Neurological Deficit  Goal: Neurological status is stable or improving  Description: Interventions:  - Monitor and assess patient's level of consciousness, motor function, sensory function, and level of assistance needed for ADLs.   - Monitor and report changes from baseline. Collaborate with interdisciplinary team to initiate plan and implement interventions as ordered.   - Provide and maintain a safe environment.  - Consider seizure precautions.  - Consider fall  precautions.  - Consider aspiration precautions.  - Consider bleeding precautions.  Outcome: Progressing

## 2024-01-21 NOTE — ASSESSMENT & PLAN NOTE
POD#18 - s/p SOC for posterior fossa decompression (LULA 1/3)   Hydrocephalus requiring EVD   Complicated by extensive IVH  S/p EVD replacement on 1/5/24 2/2 extensive IVH, replaced 1/14 due to not draining/clogged  Presented with nausea, headache and ataxia. 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  Current exam: GCS 11, opens eyes spont, tracking, FC in the RUE and RLE, no verbal response today     Imaging:  CT head wo, 1/15/2023: Evolving left cerebellar PICC infarct with slightly increased size of the left cerebellum parenchymal hematoma. Stable mass effect. Stable extensive diffuse intraventricular hemorrhage with ventriculomegaly and transependymal CSF resorption. Unchanged 5 mm right to left midline shift.    Plan:  Continue to closely monitor neuro exam   Frequent neuro checks per primary team   Repeat STAT CTH with any acute decline in GCS > 2pts or more in 1hr   Maintain normotensive BP goals, SBP < 160   No acute neurosurgical intervention indicated at this time   Case and imaging reviewed this am on rounds   Pt remains stable in neuro exam today    1 EVD in place @ 0   125cc/24hrs, dark serosanuinous output  Draining well, continue to closely monitor output   ICP 4-6 at time of exam, well controlled overnight   Maintain ICP < 20   Continue ancef 2g q 8hrs while EVD is in place   Plan for repeat CT head Monday 1/22.  Ongoing medical management per primary team.   Na goal > 145-155.   SBP < 160  Pain control per primary team.   Neurology was consulted for stroke management.   ASA d/c 1/3/23, will re-assess when to start after repeat imaging Monday  PT/OT/PMR evaluation as able  DVT PPX: SCD, SQH    Neurosurgery will continue to follow. Please call with questions or concerns.

## 2024-01-22 NOTE — PROGRESS NOTES
EVD no longer draining csf fluid, last time drained, 2200. Dropped set lower, no drainage initiated. Made EUSEBIO Trinidad aware.

## 2024-01-22 NOTE — UTILIZATION REVIEW
Continued Stay Review    Date: 1/20                          Current Patient Class: IP  Current Level of Care: Critical Care    HPI:83 y.o. female initially admitted on 12/31.     Assessment/Plan: Improving mentation, continue on BiPAP qHS. Changed tube feeds to overnight to encourage PO intake during the day. EVD @ 0. On exam, improving secretions, lethargic, GCS 9, b/l UE antigravity, RUE cross midline, LUE lifted off bed, wiggles toes b/l to command, can say name and hi on command. Plan: reassess for ASA resumption on Monday after repeat imaging; continue current meds, maintain EVD @ 0 (wean planned to start 1/22 after imaging), IV ABX, SBP < 160, continue current meds, BiPAP qHS, supportive care. Trend labs, replete electrolytes as needed; blood glucose checks q6h. DW, I&O, q4h neuro checks.     Vital Signs:   Date/Time Temp Pulse Resp BP Arterial Line BP MAP SpO2 FiO2 (%) O2 Device ICP Mean (mmHg) CPP [place]-Calculated (mmHg)   01/20/24 2300 -- 78 15 -- 140/50 84 mmHg 97 % -- -- 7 mmHg 77   01/20/24 2200 -- 86 26 Abnormal  -- 146/64 94 mmHg 97 % -- -- 14 mmHg 80   01/20/24 2100 -- 78 16 -- 130/56 82 mmHg 97 % -- -- 7 mmHg 75   01/20/24 2000 99 °F (37.2 °C) 80 15 -- 126/48 76 mmHg 96 % 21 CPAP 9 mmHg 67   01/20/24 1900 -- 76 14 -- 104/38 60 mmHg Abnormal  96 % -- -- 7 mmHg 53   01/20/24 1800 -- 78 13 -- 114/42 68 mmHg 96 % -- -- 6 mmHg 62   01/20/24 1714 98 °F (36.7 °C) 86 15 133/52 128/50 78 mmHg 96 % -- -- 7 mmHg 71   01/20/24 1700 -- 84 12 -- 126/48 76 mmHg 96 % -- -- 5 mmHg 71   01/20/24 1600 -- 86 12 -- 128/48 76 mmHg 96 % -- -- 7 mmHg 69   01/20/24 1532 -- -- -- -- -- -- 95 % -- -- -- --   01/20/24 1500 -- 86 17 -- 130/48 76 mmHg 94 % -- -- 7 mmHg 69   01/20/24 1400 -- 94 17 -- 126/48 74 mmHg 94 % -- -- 8 mmHg 66   01/20/24 1300 -- 82 18 -- 128/44 72 mmHg 96 % -- -- 8 mmHg 64   01/20/24 1200 -- 82 15 -- 136/50 78 mmHg 97 % -- -- 4 mmHg 74   01/20/24 1000 99 °F (37.2 °C) 82 13 -- 116/44 68 mmHg 95 % -- -- 8  mmHg 60   01/20/24 0900 -- 84 19 -- 144/50 82 mmHg 96 % -- -- 8 mmHg 74   01/20/24 0848 -- 80 -- 137/48 Abnormal  -- -- -- -- -- -- --   01/20/24 0813 -- -- -- -- -- -- -- -- -- -- --   01/20/24 0600 -- 86 15 -- 150/52 84 mmHg 97 % -- -- 9 mmHg 75   01/20/24 0500 -- 84 16 -- 144/54 84 mmHg 97 % -- -- 11 mmHg 73   01/20/24 0400 99.5 °F (37.5 °C) 90 17 -- 142/52 82 mmHg 97 % -- CPAP 10 mmHg 72   01/20/24 0311 -- -- -- -- -- -- 96 % -- -- -- --   01/20/24 0300 -- 82 16 -- 144/48 78 mmHg 97 % -- -- 9 mmHg 69   01/20/24 0200 -- 86 16 -- 138/50 80 mmHg 96 % -- -- 8 mmHg 72   01/20/24 0100 -- 84 17 -- 150/52 82 mmHg 97 % -- -- 12 mmHg 70   01/20/24 0000 99 °F (37.2 °C) 84 17 -- 132/46 72 mmHg 97 % -- CPAP 13 mmHg 59       Harjit Coma Scale  Date and Time Eye Opening Best Verbal Response Best Motor Response Harjit Coma Scale Score   01/20/24 2000 3 1 6 10   01/20/24 1600 3 4 6 13   01/20/24 1200 3 4 6 13   01/20/24 0800 3 4 6 13   01/20/24 0400 3 4 6 13   01/20/24 0000 3 4 6 13       Pertinent Labs/Diagnostic Results:   Results from last 7 days   Lab Units 01/22/24  0625 01/21/24  0605 01/19/24  0543 01/17/24  0446 01/16/24  1239   WBC Thousand/uL 3.59* 8.03 5.52 6.71  --    HEMOGLOBIN g/dL 8.6* 9.2* 8.7* 8.7* 8.5*   HEMATOCRIT % 27.8* 29.4* 27.7* 27.3* 26.2*   PLATELETS Thousands/uL 182 214 219 206  --    NEUTROS ABS Thousands/µL  --  6.58 3.78 4.85  --          Results from last 7 days   Lab Units 01/22/24  0625 01/21/24  0605 01/19/24  0543 01/17/24  0446 01/16/24  0424   SODIUM mmol/L 147 145 147 147 140   POTASSIUM mmol/L 4.3 4.2 4.4 4.2 4.1   CHLORIDE mmol/L 109* 106 108 114* 110*   CO2 mmol/L 29 30 32 31 30   ANION GAP mmol/L 9 9 7 2 0   BUN mg/dL 60* 49* 43* 35* 38*   CREATININE mg/dL 1.34* 1.13 0.90 0.91 0.96   EGFR ml/min/1.73sq m 36 45 59 58 55   CALCIUM mg/dL 8.6 9.0 8.9 8.7 8.6   CALCIUM, IONIZED mmol/L  --   --   --  1.15  --    MAGNESIUM mg/dL 2.6 2.4 2.2 2.2  --    PHOSPHORUS mg/dL 4.6* 4.3* 3.4  --   --           Results from last 7 days   Lab Units 01/22/24  1159 01/22/24  0933 01/21/24  2231 01/21/24  1735 01/21/24  1142 01/21/24  0609 01/21/24  0050 01/20/24  1720 01/20/24  1146 01/20/24  0523 01/20/24  0031 01/19/24  1809   POC GLUCOSE mg/dl 109 156* 97 109 103 195* 198* 106 137 89 134 264*     Results from last 7 days   Lab Units 01/22/24  0625 01/21/24  0605 01/19/24  0543 01/17/24  0446 01/16/24  0424   GLUCOSE RANDOM mg/dL 146* 185* 159* 95 150*     Results from last 7 days   Lab Units 01/16/24  1520   PH ART  7.405   PCO2 ART mm Hg 42.3   PO2 ART mm Hg 122.5   HCO3 ART mmol/L 25.9   BASE EXC ART mmol/L 1.0   O2 CONTENT ART mL/dL 14.3*   O2 HGB, ARTERIAL % 97.1*   ABG SOURCE  Line, Arterial     Results from last 7 days   Lab Units 01/16/24  0424   FERRITIN ng/mL 321*   IRON SATURATION % 28   IRON ug/dL 45*   TIBC ug/dL 158*         Medications:   Scheduled Medications:  amLODIPine, 10 mg, Oral, Daily  atorvastatin, 80 mg, Oral, QPM  carvedilol, 12.5 mg, Oral, BID With Meals  cefazolin, 1,000 mg, Intravenous, Q8H  chlorhexidine, 15 mL, Mouth/Throat, Q12H JUAN ANTONIO  ferrous sulfate, 325 mg, Oral, Daily  formoterol, 20 mcg, Nebulization, Q12H  heparin, 5,000 units, Subcutaneous, Q8H JUAN ANTONIO  insulin glargine, 15 Units, Subcutaneous, Q12H JUAN ANTONIO  insulin lispro, 3 Units, Subcutaneous, Q6H JUAN ANTONIO  insulin lispro, 4-20 Units, Subcutaneous, Q6H JUAN ANTONIO  levalbuterol, 1.25 mg, Nebulization, TID  lisinopril, 40 mg, Oral, Daily  miconazole, , Topical, BID  modafinil, 200 mg, Oral, Daily  polyethylene glycol, 17 g, Per NG Tube, BID  senna-docusate sodium, 2 tablet, Oral, BID  sodium chloride 3 %, 4 mL, Nebulization, TID    Continuous IV Infusions: none    PRN Meds:  acetaminophen, 650 mg, Oral, Q6H PRN  bisacodyl, 10 mg, Rectal, Daily PRN  hydrALAZINE, 10 mg, Intravenous, Q4H PRN  ondansetron, 4 mg, Intravenous, Q6H PRN  senna-docusate sodium, 2 tablet, Per NG Tube, BID PRN        Discharge Plan: TBD    Network Utilization Review  Department  ATTENTION: Please call with any questions or concerns to [phone] and carefully listen to the prompts so that you are directed to the right person. All voicemails are confidential.   For Discharge needs, contact Care Management DC Support Team at [phone] opt. [phone]  Send all requests for admission clinical reviews, approved or denied determinations and any other requests to dedicated fax number below belonging to the [place] where the patient is receiving treatment. List of dedicated fax numbers for the Facilities:  FACILITY NAME UR FAX NUMBER   ADMISSION DENIALS (Administrative/Medical Necessity) [phone]   DISCHARGE SUPPORT TEAM (NETWORK) [phone]   PARENT CHILD HEALTH (Maternity/NICU/Pediatrics) [phone]   [facility] [phone]   [facility] [phone]   [facility] [phone]   [facility] [phone]   [facility] [phone]   [facility] [phone]   [facility] [phone]   [facility] [phone]   [facility] [phone]   [facility] [phone]   [facility] [phone]

## 2024-01-22 NOTE — ACP (ADVANCE CARE PLANNING)
Unfortunately Debbie became more obtunded this afternoon in setting of non-functioning EVD  Multiple attempts were made by neurosurgery to insert a new EVD but each time- it clotted off  I discussed with Debbie's  Viraj and given her current situation- he would like to focus only on comfort at the present time  No resuscitation, no intubation  I called his daughter Unique and informed her of her father's decisions.  Unfortunately, Viraj is unable to visit [facility] and I am unclear if Unique plans to visit.  They know Debbie could pass on at any time.    Code status changed

## 2024-01-22 NOTE — OCCUPATIONAL THERAPY NOTE
Occupational Therapy         Patient Name: Debbie Moody  Today's Date: 1/22/2024 01/22/24 1100   OT Last Visit   OT Visit Date 01/22/24   Note Type   Note Type Cancelled Session   Cancel Reasons Medical status  (RN request to defer 2* issues with EVD - will hold)       .Lea Steele

## 2024-01-22 NOTE — PROGRESS NOTES
[facility]  Progress Note  Name: Debbie Moody I  MRN: [id number]  Unit/Bed#: ICU 04 I Date of Admission: 12/31/2023   Date of Service: 1/22/2024 I Hospital Day: 22    Assessment/Plan   * Acute CVA (cerebrovascular accident) (HCC)  Assessment & Plan  s/p SOC for posterior fossa decompression (LULA 1/3/24)   Hydrocephalus requiring EVD   Complicated by extensive IVH  S/p EVD replacement on 1/5/24 2/2 extensive IVH, replaced 1/14 due to not draining/clogged  Presented with nausea, headache and ataxia. 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  Current exam: GCS 9-10 E3, V1 M5-6 opens eyes to voice, FC in the RUE only, no verbal response today.     Imaging:  CT head wo 1/22/24: Compared to yesterday, resolved midline shift. Stable appearance hemorrhagic left occipital infarct status post decompression, hydrocephalus and intraventricular hemorrhage. Left frontal EVD in stable position. Increased ventricles compared to CTH on 1/21/24.   CT head wo, 1/15/2023: Evolving left cerebellar PICC infarct with slightly increased size of the left cerebellum parenchymal hematoma. Stable mass effect. Stable extensive diffuse intraventricular hemorrhage with ventriculomegaly and transependymal CSF resorption. Unchanged 5 mm right to left midline shift.    Plan:  Continue to closely monitor neuro exam   Frequent neuro checks per primary team   Repeat STAT CTH with any acute decline in GCS > 2pts or more in 1hr   Maintain normotensive BP goals, SBP < 160    1 EVD replaced 1/21/24,  EVD @ 0.   20 cc overnight.   Not draining well, no wave form after flushing.   EVD may need to be replaced.    Maintain ICP < 20   Continue ancef 2g q 8hrs while EVD is in place.    Ongoing medical management per primary  "team.   Na goal > 145-155.   SBP < 160  Pain control per primary team.   Neurology was consulted for stroke management.   ASA d/c 1/3/23, will re-assess when to start.   PT/OT/PMR evaluation as able  DVT PPX: SCD, SQH- hold for possible EVD replacement.     Neurosurgery will continue to follow. Please call with questions or concerns.                Subjective/Objective     Chief Complaint: Pt non-verbal. Follow up s/p SOC crani for cerebellar stroke, follow up for IVH.     Subjective: Pt non-verbal today. Remains extubated. Per report, pt had left frontal Evd replaced yesterday evening. Has small amount of drainage but stopped draining about midnight. Pt had EVD flushed at 1 am, 5 cc drainage after then stopped.     Objective: Drowsy, No acute distress.     I/O         01/20 0701  01/21 0700 01/21 0701  01/22 0700 01/22 0701  01/23 0700    P.O. 15 0 83    I.V. (mL/kg)  150 (2.3) 10 (0.2)    NG/ 180     Feedings 360 450 90    Total Intake(mL/kg) 595 (9.3) 780 (12.1) 183 (2.9)    Urine (mL/kg/hr) 700 (0.5) 400 (0.3)     Drains 125 63     Stool 0 0     Total Output 825 463     Net -230 +317 +183           Unmeasured Urine Occurrence 2 x 2 x     Unmeasured Stool Occurrence 1 x 1 x             Invasive Devices       Peripheral Intravenous Line  Duration             Long-Dwell Peripheral IV (Midline) 01/03/24 Left Cephalic Vein 18 days              Arterial Line  Duration             Arterial Line 01/15/24 Radial 7 days              Drain  Duration             Ventriculostomy/Subdural Ventricular drainage catheter Left Frontal region 16 days    NG/OG/Enteral Tube Nasogastric 12 Fr Right nare 4 days    External Urinary Catheter 3 days                    Physical Exam:  Vitals: Blood pressure 145/82, pulse 93, temperature 98.8 °F (37.1 °C), temperature source Rectal, resp. rate 14, height 4' 10\" (1.473 m), weight 64.2 kg (141 lb 8.6 oz), SpO2 99%, not currently breastfeeding.,Body mass index is 29.58 kg/m².    General " "appearance:  Appears stated age  Head: Normocephalic, Left frontal EVD in place.   Eyes: Right pupil  approx 2 mm, left pupil approx 3 mm, reactive to light bilaterally. Does not track well in the room.   Neck: supple, symmetrical, trachea midline   Lungs: non labored breathing  Heart: regular heart rate  Neurologic:   Mental status: GCS 9-10 E3 V1 M5-6.   Cranial nerves: Limited.   Sensory: Withdraws to pain RUE, LLE.   Motor:  RUE to command, did not follow other commands. Withdraws to pain RUE, LLE > RLE. Did not withdraw LUE.   Reflexes: 2+ and symmetric  Coordination: Limited.       Lab Results:  Results from last 7 days   Lab Units 01/22/24  0625 01/21/24  0605 01/19/24  0543 01/17/24  0446   WBC Thousand/uL 3.59* 8.03 5.52 6.71   HEMOGLOBIN g/dL 8.6* 9.2* 8.7* 8.7*   HEMATOCRIT % 27.8* 29.4* 27.7* 27.3*   PLATELETS Thousands/uL 182 214 219 206   NEUTROS PCT %  --  82* 68 71   MONOS PCT %  --  7 8 8   EOS PCT %  --  1 4 3     Results from last 7 days   Lab Units 01/22/24  0625 01/21/24  0605 01/19/24  0543   POTASSIUM mmol/L 4.3 4.2 4.4   CHLORIDE mmol/L 109* 106 108   CO2 mmol/L 29 30 32   BUN mg/dL 60* 49* 43*   CREATININE mg/dL 1.34* 1.13 0.90   CALCIUM mg/dL 8.6 9.0 8.9     Results from last 7 days   Lab Units 01/22/24  0625 01/21/24  0605 01/19/24  0543   MAGNESIUM mg/dL 2.6 2.4 2.2     Results from last 7 days   Lab Units 01/22/24  0625 01/21/24  0605 01/19/24  0543   PHOSPHORUS mg/dL 4.6* 4.3* 3.4         No results found for: \"TROPONINT\"  ABG:  Lab Results   Component Value Date    PHART 7.405 01/16/2024    IAU3HZM 42.3 01/16/2024    PO2ART 122.5 01/16/2024    CDJ0VNP 25.9 01/16/2024    BEART 1.0 01/16/2024    SOURCE Line, Arterial 01/16/2024       Imaging Studies: I have personally reviewed pertinent reports and I have personally reviewed pertinent films in PACS    EKG, Pathology, and Other Studies: I have personally reviewed pertinent reports.    \  PLEASE NOTE:  This encounter may have been " completed utilizing the [employer]Fluency Direct Speech Voice Recognition Software. Grammatical errors, random word insertions, pronoun errors and incomplete sentences are occasional consequences of the system due to software limitations, ambient noise and hardware issues.These may be missed by proof reading prior to affixing electronic signature. Any questions or concerns about the content, text or information contained within the body of this dictation should be directly addressed to the advanced practitioner or physician for clarification.

## 2024-01-22 NOTE — PROGRESS NOTES
The patient is now being transported to the CT Scan department to have a CT Scan of the head done.

## 2024-01-22 NOTE — PROGRESS NOTES
[facility]  Progress Note: Critical Care  Name: Debbie Moody 83 y.o. female I MRN: [id number]  Unit/Bed#: ICU 04 I Date of Admission: 12/31/2023   Date of Service: 1/22/2024 I Hospital Day: 22  ***refresh all links and update date***    Assessment/Plan    Neuro:   Diagnosis: Acute L cerebellar CVA w/ edema s/p SOC on 1/3 with IVH  1/5 EVD placed 2/2 extensive IVH; 1/14-1/15 EVD blocked and replaced  MRI 1/10-Stable exam w/ extensive IVH with surrounding vasogenic edema and/or transependymal flow of CSF. Stable hydrocephalus. Evolving subacute  L PICA territory infarct,stable petechial hemorrhage, vasogenic edema and mass effect.   CTH (1/15) - Evolving left PICA infarct with petechial hemorrhage; extensive diffuse IVHs with stable ventriculomegaly and transependymal CSF resorption. Stable left transfrontal ventriculostomy catheter. Unchanged 0.5 cm right to left midline shift. Suboccipital pseudomeningocele is noted.   1/22: multiple reinsertions of EVD for functioning failure; CTh with worsening hydrocephalus evidence almost immediately.  Track hemorrhage on CTH  Echo EF 70% mild hypertrophy; A1C 8.3; /172/54/141  Plan:   Neuro/Neurosx consulted, appreciate recs  Stat CTH for any change in GCS >2pts  Na 145-155  SBP <160  Hold asa for at least 2 wks post op  Ancef ppx while EVD in place  Will need VPS and PEG.  Discussed PEG with daughter and will proceed per their wishes     Diagnosis: Encephalopathy  Plan:   Cont provigil  Neuro checks      CV:   Diagnosis: HTN/HLD  Plan:   Home vasotec held  Cont lisinopril/Norvasc/Coreg/statin  Diagnosis: hypotension - improved  Albumin to improve pressures if needed      Pulm:  Diagnosis: Acute hypoxic resp failure  Intubated 1/3 Extubated 1/4; Reintubated 1/5 Extubated 1/12  Patient w/ continued rhonchi but improved from initial exams  Plan:   Wean oxygen for sats >92%  Diagnosis: hypercapnia/stroke  Plan:   BiPAP overnight to  treat hypercarbia/hypercapnia, to prevent steal from stroke bed       GI:   Diagnosis: Dysphagia  Plan:   Speech consulted -patient cleared for full liquid diet HTL started 1/17 but po intake was poor before her stroke  Will need VPS and PEG - have broached this discussion with daughter, she is amenable to both       :   Diagnosis: CKD 3 baseline cr 1-1.3  Appeared on 1/22 labs to be dry - s/p FWF added back to regimen  Plan:   At baseline  Lab Results   Component Value Date    CREATININE 1.34 (H) 01/22/2024    CREATININE 1.13 01/21/2024       F/E/N:   No active issues       Heme/Onc:   Diagnosis: Anemia baseline hgb 9-10  Plan:   No s/s bleeding  cont home iron  Currently stable  Lab Results   Component Value Date    HGB 8.6 (L) 01/22/2024    HCT 27.8 (L) 01/22/2024          Endo:   Diagnosis: DM2  A1C > 8 on admission  Plan:   Holding home glipizide/Januvia  Lantus 15 bid  Humalog 3 U q6  SSI   Lab Results   Component Value Date    GLUC 146 (H) 01/22/2024    POCGLU 156 (H) 01/22/2024       ID:   No active issues       MSK/Skin:   Will change injection sites due to abdominal bruising    Disposition: Critical care    ICU Core Measures       VAP Bundle  VAP bundle ordered     A: Assess, Prevent, and Manage Pain Has pain been assessed? Yes  Need for changes to pain regimen? No   B: Both Spontaneous Awakening Trials (SATs) and Spontaneous Breathing Trials (SBTs) Plan to perform spontaneous awakening trial today? N/A   Plan to perform spontaneous breathing trial today? N/A   Obvious barriers to extubation? NA   C: Choice of Sedation RASS Goal: N/A patient not on sedation  Need for changes to sedation or analgesia regimen? NA   D: Delirium CAM-ICU: Unable to perform secondary to Acute cognitive dysfunction   E: Early Mobility  Plan for early mobility? Yes   F: Family Engagement Plan for family engagement today? Yes     { I DISAPPEARING TIP I   Active Antibiotics  cefazolin, Intravenous    Current Micro Results I  :23530}  Antibiotic Review: Post op requirements     Review of Invasive Devices:  {I DISAPPEARING TIP I Click here to view LDAs I:73167}    Central access plan: Will obtain peripheral access and discontinue prior to transfer      Prophylaxis:  VTE VTE covered by:    None       Stress Ulcer  not ordered     { I DISAPPEARING TIP I Update Problem List daily:72029}   Significant 24hr Events     24hr events: ***     Subjective     Review of Systems   Unable to perform ROS: Mental status change        Objective                            Vitals I/O      Most Recent Min/Max in 24hrs   Temp 98.8 °F (37.1 °C) Temp  Min: 98.7 °F (37.1 °C)  Max: 99.7 °F (37.6 °C)   Pulse 93 Pulse  Min: 78  Max: 106   Resp 14 Resp  Min: 14  Max: 23   /82 BP  Min: 136/50  Max: 145/82   O2 Sat 99 % SpO2  Min: 87 %  Max: 100 %      Intake/Output Summary (Last 24 hours) at 1/22/2024 1133  Last data filed at 1/22/2024 1001  Gross per 24 hour   Intake 863 ml   Output 443 ml   Net 420 ml       Diet Enteral/Parenteral; Tube Feeding No Oral Diet; Glucerna 1.2; Continuous; 45; Prosource Protein Liquid - One Packet; 100; Water; Every 4 hours    Invasive Monitoring           Physical Exam ***   Physical Exam  Eyes:      Pupils: Pupils are equal, round, and reactive to light.   Skin:     General: Skin is warm and dry.      Comments: Abdominal bruising around injection sites   HENT:      Head: Normocephalic and atraumatic.      Comments: EVD 0 but not draining       Mouth/Throat:      Mouth: Mucous membranes are moist.   Cardiovascular:      Rate and Rhythm: Normal rate and regular rhythm.      Pulses: Normal pulses.      Heart sounds: Normal heart sounds.   Musculoskeletal:         General: No swelling.      Right lower leg: No edema.      Left lower leg: No edema.   Abdominal: General: Bowel sounds are normal. There is no distension.      Palpations: Abdomen is soft.      Tenderness: There is no abdominal tenderness.   Constitutional:       General:  She is not in acute distress.     Comments: Sleepy, BiPAP on   Pulmonary:      Effort: Pulmonary effort is normal. No respiratory distress.   Secretions are abnormal  (improving).Neurological:      Mental Status: She is lethargic.      GCS: GCS eye subscore is 2. GCS verbal subscore is 1. GCS motor subscore is 6.      Comments: Sleepier today, does not follow commands.  More difficult to arouse.  BUE less movement, antigravity, RUE cross midline. BLE wiggles toes to command ( RLE > LLE).  No verbal output today.  Does not answer questions reliably.          Diagnostic Studies    {IDisappearing Data Review I RadiologyI Cardiology:75347}  EKG: tele  Imaging: CTh x 5 I have personally reviewed pertinent reports.   and I have personally reviewed pertinent films in PACS     Medications:  Scheduled PRN   amLODIPine, 10 mg, Daily  atorvastatin, 80 mg, QPM  carvedilol, 6.25 mg, BID With Meals  cefazolin, 1,000 mg, Q12H  chlorhexidine, 15 mL, Q12H JUAN ANTONIO  [START ON 1/23/2024] ferrous sulfate, 325 mg, Every Other Day  formoterol, 20 mcg, Q12H  insulin glargine, 15 Units, Q12H JUAN ANTONIO  insulin lispro, 4-20 Units, 4x Daily (AC & HS)  levalbuterol, 1.25 mg, TID  miconazole, , BID  modafinil, 200 mg, Daily  polyethylene glycol, 17 g, BID      acetaminophen, 650 mg, Q6H PRN  bisacodyl, 10 mg, Daily PRN  hydrALAZINE, 10 mg, Q4H PRN  labetalol, 10 mg, Q6H PRN  ondansetron, 4 mg, Q6H PRN  senna-docusate sodium, 2 tablet, BID PRN       Continuous          Labs:  { I Disappearing Data Review I Results Review I Micro :58559}  CBC    Recent Labs     01/21/24  0605 01/22/24  0625   WBC 8.03 3.59*   HGB 9.2* 8.6*   HCT 29.4* 27.8*    182       BMP    Recent Labs     01/21/24  0605 01/22/24  0625   SODIUM 145 147   K 4.2 4.3    109*   CO2 30 29   AGAP 9 9   BUN 49* 60*   CREATININE 1.13 1.34*   CALCIUM 9.0 8.6           Coags    No recent results     Additional Electrolytes  Recent Labs     01/21/24  0605 01/22/24  0625   MG 2.4 2.6    PHOS 4.3* 4.6*              Blood Gas    No recent results    No recent results   LFTs  No recent results    Infectious  No recent results  Glucose  Recent Labs     01/21/24  0605 01/22/24  0625   GLUC 185* 146*                 Orin Enriquez MD

## 2024-01-22 NOTE — PROGRESS NOTES
CT head post procedure of EVD replacement was reviewed earlier, stable bleed, no track hemorrhage, small pneumocephalus present.  Called to bedside about pt's EVD not draining. No waveform present.  Proximal EVD port draped in sterile fashion and cleaned with chloroprep, and sterile groves.   EVD flushed distally no resistance. Was able to aspirate 23cc proximally. Also flushed proximally towards head, no resistance. Waveform now present , but still no drainage at this time.  -Fio2 increased to help blow off the pneumocephalus to see if it will help improve drainage.   Pt's exam the same wax and wane: squeezes with right hand and nods to questions. Neurosurg notified , plane is to re-eval in the morning.    Ирина Guerrero MD

## 2024-01-22 NOTE — PROGRESS NOTES
The Neurosurgery Service (Charlene Oliver PA-C) has been notified the patient's EVD is not draining any fluid and the ICP waveform is flat.

## 2024-01-22 NOTE — PHYSICAL THERAPY NOTE
Physical Therapy Cancellation Note    PT orders received chart review completed. Pt is currently pending nsx to look at EVD and not appropriate to participate in skilled PT at this time. PT will follow and treat as medically appropriate.     01/22/24 1500   Note Type   Note type Cancelled Session   Cancel Reasons Medical status       Monica Link, PT

## 2024-01-22 NOTE — PROCEDURES
Preoperative diagnosis: Hydrocephalus    Postoperative diagnosis: Same    Procedure: Left frontal ventriculostomy via new twist drill albino hole    Complications: None    Procedure performed in ICU    Findings; Moderate opening pressure. Good flow at the conclusion of procedure into burotrol    Brief history: This is a 83-year-old female with a history of cerebellar stroke.  She decompensated on post hospital day #5.  She had multiple ventriculostomy is placed and then replaced.  She also had suboccipital craniectomy.  Her ventriculostomy was changed last night.  It was placed through the same tract in the left frontal lobe.  Unfortunately, shortly after placement it stopped working.  This morning CT study shows that her ventricles were bigger.  She needed insertion of a new EVD.  I obtained full informed consent from her daughter Unique via phone.  This was witnessed by our advanced practitioner Kimberlee.  I explained to her that the patient EVD stopped working.  She was aware of this.  She consented for me to place a new ventriculostomy through a separate bur hole.  I explained to her the risks of the procedure included, but were not limited, death, paralysis, small or large intracranial hemorrhage requiring further intervention/s, catheter infection, catheter obstruction, brain swelling, as well as the other risks.  The patients daughter consented to the procedure and wanted us to proceed.    Procedure: The procedure was performed in the intensive care unit.  The left ventriculostomy catheter and surrounding skin was prepped and draped in the usual sterile fashion.  We next were able to cut the sutures and remove the ventriculostomy catheter without complications.  The prior incision was opened.  There was very good hemostasis during this part of the procedure.  We attempted to replace a ventriculostomy catheter through the same site.  Unfortunately, the catheter was continuously clogged with blood and brain tissue.   This site was used on a number of occasions prior.  I used some sterile saline to pass it through the ventriculostomy catheter after it was removed from the brain.  It was very difficult to get any flow from the distal slits.  This was because it was being consumed by blood products as well as devitalized brain tissue.  After making a few attempts, I decided that it was not a good idea to continue to use the site.  I removed the catheter from the brain.  I was able to staple close the incision.  At that point I obtained a CT scan of the brain to make sure that there was no new large hemorrhage that would preclude me from placing another catheter from a left frontal approach.  The CT scan showed some blood products along the tract, but nothing major.  At that point when the patient returned from the CAT scan her, I reprepped the entire left side of the scalp.  I went approximately 2 and half centimeters behind the prior bur hole site.  I formed a new twist drill bur hole at that site.  The area was prepped and draped prior to commencing.  I was able to take a new ventriculostomy catheter and easily passing into the left ventricle.  It was moderate amount of pressure.  The ventriculostomy catheter was hooked up to the Neosho trial.  There was very good spontaneous flow into the Neosho trial.  At that point we were able to secure the catheter to the skin using multiple sutures.  The ventriculostomy catheter was dressed with 4 x 4 gauze and Tegaderm.  The patient did get pain medications and sedatives prior to the procedure.  She tolerated it very well.  We will send her down for a new CAT scan brain now that the new ventriculostomy catheter was inserted.    Richy Macias MD

## 2024-01-22 NOTE — SPEECH THERAPY NOTE
Attempted to see patient for dysphagia therapy. Patient undergoing bedside procedure. She is currently NPO. Unable to see for dysphagia therapy today. Will f/u as able.    Follow Up Medication Management Appointment    Began Buprenorphine: 3/11/20   Last visit date: 7/1/2020  Frequency of visits: biweekly    Patient report:  Status: patient reports good since getting out of group home. He is working with GI for hep c tx. If doing ok with suboxone and 2 week follow up.    Slips/Lapses: THC  Narcan at home? No. Declined.   Cravings/Urges: denies   Barriers/stressors:  Not having a phone or vehicle. Working on trust with significant other.   Sleep: sleeping poorly, feels like he is always looking over his shoulder.  Current counseling/community resource involvement: none at this time, no longer considering inpatient treatment. Will let me know if this changes.     Rapid UDS results:  Positive for: BUP and THC    Results of UDS are unexpected   Sample sent for quantification? YEs    Medication Count:  PDMP was reviewed as a delegate: No unexpected activity noted. Missing dispensing from 5/7/2020- not reported on PDMP. Call placed to [facility] dispensing site to confirm. Dispensed on \"5/7 or 5/8\".   Dose: 1.5 of the 8mg films daily  Last fill date: 7/1/2020  Medication last dispensed on: 7/1/2020  Total number remaining from last fill: 0  Number of wrappers: 42  Med count was correct correct for this encounter.     Strikes: None     Follow Up Note to Medication Assisted Treatment    Chief Complaint:  Chief Complaint   Patient presents with   • Medication Management       History of Present Illness:  Trevor Smith is here for follow up of medication assisted treatment with opiods.  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       Physical Exam:  Vitals:    07/15/20 0914   BP: 118/78   Pulse: 68   Resp: 16       Appears well and is in no distress.  Mental Status Exam:   General Appearance: Unremarkable  Orientation: Fully oriented.  Affect: appropriate  Mood: normal  Motor Activity: normal  Gait/Posture: normal  Thought Processes: appropriate   Psychotic: None  Speech: normal  Suicidal Ideations: absent  Homicidal Ideations: absent  Memory: intact  Attention Span/Concentration: good  Judgment: good  Insight: good  Reliability: good  Lungs are CTA  CV   RRR no murmur      Impression:  Opioid use disorder, moderate, in early remission on maintenance therapy.  Chronic MJ use  Hep C    Plan:  See in two weeks      PDMP reviewed; therapy appropriate, no aberrant behavior identified, prescription given.    Risk of Relapse: Moderate    Risk of assessment for harm to self/suicide risk: Not at significant or imminent risk    Risk of assessment of harm to others: Not at significant or imminent risk    Follow up: 2 weeks   Keisha send out results from 7/15/2020 received 7/22/2020:    Buprenorphine: 1440 ng/mL  Norpurenorphine: 1590 ng/mL  Thc: 3480 ng/mL    Result is unexpected but acceptable.     Next office visit: 7/29/2020       No

## 2024-01-22 NOTE — PROGRESS NOTES
[facility]  Progress Note: Critical Care  Name: Debbie Moody 83 y.o. female I MRN: [id number]  Unit/Bed#: ICU 04 I Date of Admission: 12/31/2023   Date of Service: 1/22/2024 I Hospital Day: 22    Assessment/Plan    Neuro:   Diagnosis: Acute L cerebellar CVA w/ edema s/p suboccipital craniectomy on 1/3 with IVH  1/5 EVD placed 2/2 extensive IVH; 1/14-1/15 EVD blocked and replaced  MRI 1/10-Stable exam w/ extensive IVH with surrounding vasogenic edema and/or transependymal flow of CSF. Stable hydrocephalus. Evolving subacute  L PICA territory infarct,stable petechial hemorrhage, vasogenic edema and mass effect.   CTH (1/15) - Evolving left PICA infarct with petechial hemorrhage; extensive diffuse IVHs with stable ventriculomegaly and transependymal CSF resorption. Stable left transfrontal ventriculostomy catheter. Unchanged 0.5 cm right to left midline shift. Suboccipital pseudomeningocele is noted.   Echo EF 70% mild hypertrophy; A1C 8.3; /172/54/141  Plan:   Neuro/Neurosx consulted, appreciate recs  Stat CTH for any change in GCS >2pts  Na 145-155  SBP <160  Hold asa for at least 2 wks post op  Ancef ppx while EVD in place  Patient's EVD stopped functioning overnight 1/21 to 1/22 and was nonfunctioning again in the a.m. 1/22.  CTh with worsening hydrocephalus evidence almost immediately.  Will need VPS and PEG.  Discussed PEG with daughter and will proceed per their wishes     Diagnosis: Encephalopathy  Plan:   Cont provigil  Neuro checks      CV:   Diagnosis: HTN/HLD  Plan:   Home vasotec held  Cont lisinopril/Norvasc/Coreg/statin  Diagnosis: hypotension - significantly improved  Albumin to improve pressures if needed      Pulm:  Diagnosis: Acute hypoxic resp failure  Intubated 1/3 Extubated 1/4; Reintubated 1/5 Extubated 1/12  Patient w/ continued rhonchi but improved from initial exams  Plan:   Wean oxygen for sats >92%  Diagnosis: hypercapnia/stroke  Plan:   BiPAP  overnight to treat hypercarbia/hypercapnia, to prevent steal from stroke bed       GI:   Diagnosis: Dysphagia  Plan:   Speech consulted -patient cleared for full liquid diet HTL started 1/17 but po intake was poor before her stroke  Will need VPS and PEG - have broached this discussion with daughter, she is amenable to both       :   Diagnosis: CKD 3 baseline cr 1-1.3  Plan:   At baseline  Lab Results   Component Value Date    CREATININE 1.13 01/21/2024    CREATININE 0.90 01/19/2024       F/E/N:   No active issues       Heme/Onc:   Diagnosis: Anemia baseline hgb 9-10  Plan:   No s/s bleeding  cont home iron  Currently stable  Lab Results   Component Value Date    HGB 8.6 (L) 01/22/2024    HCT 27.8 (L) 01/22/2024          Endo:   Diagnosis: DM2  A1C > 8 on admission  Plan:   Holding home glipizide/Januvia  Lantus 15 bid  Humalog 3 U q6  SSI   Lab Results   Component Value Date    GLUC 185 (H) 01/21/2024    POCGLU 97 01/21/2024       ID:   No active issues       MSK/Skin:   Will change injection sites due to abdominal bruising    Disposition: Critical care    ICU Core Measures       VAP Bundle  VAP bundle ordered     A: Assess, Prevent, and Manage Pain Has pain been assessed? Yes  Need for changes to pain regimen? No   B: Both Spontaneous Awakening Trials (SATs) and Spontaneous Breathing Trials (SBTs) Plan to perform spontaneous awakening trial today? N/A   Plan to perform spontaneous breathing trial today? N/A   Obvious barriers to extubation? NA   C: Choice of Sedation RASS Goal: N/A patient not on sedation  Need for changes to sedation or analgesia regimen? NA   D: Delirium CAM-ICU: Unable to perform secondary to Acute cognitive dysfunction   E: Early Mobility  Plan for early mobility? Yes   F: Family Engagement Plan for family engagement today? Yes       Antibiotic Review: Post op requirements     Review of Invasive Devices:      Central access plan: Will obtain peripheral access and discontinue prior to  transfer      Prophylaxis:  VTE VTE covered by:  heparin (porcine), Subcutaneous, 5,000 Units at 01/22/24 0628       Stress Ulcer  not ordered        Significant 24hr Events     24hr events: Patient with worsening mentation overnight, and EVD nonfunctioning.  Per neurosurgery will exchange EVD today, obtain samples.  Reaching out to GI about PEG.     Subjective     Review of Systems   Unable to perform ROS: Mental status change        Objective                            Vitals I/O      Most Recent Min/Max in 24hrs   Temp 99.7 °F (37.6 °C) Temp  Min: 98.6 °F (37 °C)  Max: 99.7 °F (37.6 °C)   Pulse 98 Pulse  Min: 78  Max: 106   Resp 17 Resp  Min: 15  Max: 23   /50 BP  Min: 82/43  Max: 140/54   O2 Sat 99 % SpO2  Min: 87 %  Max: 100 %      Intake/Output Summary (Last 24 hours) at 1/22/2024 0641  Last data filed at 1/22/2024 0200  Gross per 24 hour   Intake 560 ml   Output 458 ml   Net 102 ml       Diet Enteral/Parenteral; Tube Feeding with Oral Diet; Glucerna 1.2; Continuous; 45; Prosource Protein Liquid - One Packet; 40; Saline; Every 4 hours; Regular; Regular House; Honey Thick Liquid, Full Liquid    Invasive Monitoring           Physical Exam   Physical Exam  Eyes:      Pupils: Pupils are equal, round, and reactive to light.   Skin:     General: Skin is warm and dry.      Comments: Abdominal bruising around injection sites   HENT:      Head: Normocephalic and atraumatic.      Comments: EVD 0 but not draining       Mouth/Throat:      Mouth: Mucous membranes are moist.   Cardiovascular:      Rate and Rhythm: Normal rate and regular rhythm.      Pulses: Normal pulses.      Heart sounds: Normal heart sounds.   Musculoskeletal:         General: No swelling.      Right lower leg: No edema.      Left lower leg: No edema.   Abdominal: General: Bowel sounds are normal. There is no distension.      Palpations: Abdomen is soft.      Tenderness: There is no abdominal tenderness.   Constitutional:       General: She is not  in acute distress.     Comments: Sleepy, BiPAP on   Pulmonary:      Effort: Pulmonary effort is normal. No respiratory distress.   Secretions are abnormal  (improving).Neurological:      Mental Status: She is lethargic.      GCS: GCS eye subscore is 2. GCS verbal subscore is 1. GCS motor subscore is 6.      Comments: Sleepier today, does not follow commands.  More difficult to arouse.  BUE less movement, antigravity, RUE cross midline. BLE wiggles toes to command ( RLE > LLE).  No verbal output today.  Does not answer questions reliably.          Diagnostic Studies      EKG: tele  Imaging: CTh x 3 I have personally reviewed pertinent reports.   and I have personally reviewed pertinent films in PACS     Medications:  Scheduled PRN   amLODIPine, 10 mg, Daily  atorvastatin, 80 mg, QPM  carvedilol, 6.25 mg, BID With Meals  cefazolin, 1,000 mg, Q8H  chlorhexidine, 15 mL, Q12H JUAN ANTONIO  ferrous sulfate, 325 mg, Daily With Breakfast  formoterol, 20 mcg, Q12H  heparin (porcine), 5,000 Units, Q8H JUAN ANTONIO  insulin glargine, 15 Units, Q12H JUAN ANTONIO  insulin lispro, 4-20 Units, 4x Daily (AC & HS)  levalbuterol, 1.25 mg, TID  miconazole, , BID  modafinil, 200 mg, Daily  polyethylene glycol, 17 g, BID      acetaminophen, 650 mg, Q6H PRN  bisacodyl, 10 mg, Daily PRN  hydrALAZINE, 10 mg, Q4H PRN  ondansetron, 4 mg, Q6H PRN  senna-docusate sodium, 2 tablet, BID PRN       Continuous          Labs:    CBC    Recent Labs     01/21/24  0605 01/22/24  0625   WBC 8.03 3.59*   HGB 9.2* 8.6*   HCT 29.4* 27.8*    182       BMP    Recent Labs     01/21/24  0605   SODIUM 145   K 4.2      CO2 30   AGAP 9   BUN 49*   CREATININE 1.13   CALCIUM 9.0           Coags    No recent results     Additional Electrolytes  Recent Labs     01/21/24  0605   MG 2.4   PHOS 4.3*              Blood Gas    No recent results    No recent results   LFTs  No recent results    Infectious  No recent results  Glucose  Recent Labs     01/21/24  0605   GLUC 185*                  Orin Enriquez MD

## 2024-01-22 NOTE — PROGRESS NOTES
I came on service earlier today as the hospital doctor and was informed about Debbie Moody.  She is an 82 y/o female that was admitted to [facility] a few weeks ago for a large cerebellar infarct.  During her first 5 days of her hospital stay she did very well.  She remarkably was able to hold her own and was eventually transferred out of the ICU as I understand.  During her initial hospitalization she did receive some hyperosmolar therapy that seemed to help.  She was subsequently transferred to the floor and deteriorated.  She was transferred back to the intensive care unit, had a ventriculostomy placed, and ultimately ended up getting a suboccipital craniectomy.  Since that time she has had numerous insertion and reinsertion of ventriculostomy catheters.  Initially, she had a right frontal ventriculostomy catheter that hemorrhaged when it was pulled.  She developed a large intraventricular hemorrhage, and a new EVD was placed on the left side.  I believe she has had a few replacements of this catheter, the most recent yesterday. The left sided catheter stopped working during the day and was reprtedly changed out around 7 pm. It then stopped working again around midnight per report, was flushed around that time, drained out about 5 cc and then again stopped working at midnight. This morning she remained much sleepier, was not following commands as briskly, and reportedly was worse neurologically.      This morning when I arrived on service, I was informed about this, and reinserted a new left frontal ventriculostomy through a new twist drill site, as flushing the catheter did not help. The output from the catheter was very robust initially, but this ultimately stopped working as well.  CT scan obtained after insertion showed some new edema and hemorrhage around the tract site, as well as at the tip of the catheter near the foramen of Funes.  I came back to the ICU and attempted to flush it again, but  "was unsuccessful. She just had two ventriculostomy cathetrs placed within a 12 hour span that each clotted.     Considering these events, as well as the fact that her neurological exam has been declining for the last day or so per report. I felt compelled to contact the patient's  and explain the circumstances.  I informed him that I felt very uncomfortable replacing a new catheter on the left side based on the new edema and hemorrhage.  I felt that there was a very high likelihood that she could develop a life-threatening intraparenchymal hemorrhage considering the new bleeding and edema that was present after the repeated catheter attempts on that side.  Instead, I offered to place a new catheter on the right side. I did, however, take a good amount of time explaining the critical nature of her current condition and that I felt that there was a high likelihood this could clot as well. The patients  told me that he fully understood the critical nature of all of this and that there was a high chance she may not survive this. I did, however, offer to place the EVD despite this on the right side  and he said to go ahead \"and give it one more shot\". He understood there were no guarantees this would work. He said that he would speak to his family and explain to them the critical nature of all of this and that he would want no further repeated interventions considering her current condition and the low chance that it may ultimately help. He consented to the placement of the right frontal EVD.     I went ahead and prepped and draped the area. I removed the old nonfunctioning left EVD catheter and closed the incision with staples. She was oozing from this sight significantly, as if she was in DIC. Eventually I did successfully stop the bleeding with suture. I then reopened the prior right frontal EVD site with a knife. I enlarged the prior twist drill hole with a larger drill bit, as it was hard to identify. " After the site was enlarged, I was able to easily cannulate the ventricle.  Unfortunately, the output was all bloody.  Within just a few minutes the ventriculostomy catheter clotted again. She subsequently went into severe bradycardia. I fear she was unable to further protect her airway considering her declining exam..Dr. Earl was present throughout the entire procedure and saw the blood coming out of the catheter. Considering the low likelihood of success, he followed up the phone conversation that I had with the family and explained the circumstances. They decided to make the patient comfort care, as per my discussions with him after he had a chance to speak to them. I then removed the clotted, nonfunctioning catheter and made sure I had good hemostasis in this extremely challenging case. The ICU  team then updated the patients status into the system.     Richy Macias

## 2024-01-22 NOTE — PROGRESS NOTES
Dr. Macias returned to see the patient after the CT Scan was done. A new EVD has been inserted by Dr. Macias and it is currently clamped. The patient has now been transported to the CT Scan department to have a CT Scan of the Head done.

## 2024-01-22 NOTE — PLAN OF CARE
PT meeting needs via TF.  Problem: Nutrition/Hydration-ADULT  Goal: Nutrient/Hydration intake appropriate for improving, restoring or maintaining nutritional needs  Description: Monitor and assess patient's nutrition/hydration status for malnutrition. Collaborate with interdisciplinary team and initiate plan and interventions as ordered.  Monitor patient's weight and dietary intake as ordered or per policy. Utilize nutrition screening tool and intervene as necessary. Determine patient's food preferences and provide high-protein, high-caloric foods as appropriate.     INTERVENTIONS:  - Monitor oral intake, urinary output, labs, and treatment plans  - Assess nutrition and hydration status and recommend course of action  - Evaluate amount of meals eaten  - Assist patient with eating if necessary   - Allow adequate time for meals  - Recommend/ encourage appropriate diets, oral nutritional supplements, and vitamin/mineral supplements  - Order, calculate, and assess calorie counts as needed  - Recommend, monitor, and adjust tube feedings and TPN/PPN based on assessed needs  - Assess need for intravenous fluids  - Provide specific nutrition/hydration education as appropriate  - Include patient/family/caregiver in decisions related to nutrition  Outcome: Progressing

## 2024-01-22 NOTE — PROGRESS NOTES
Dr. Macias has been here with the patient. He removed the EVD from the patient and attempted to insert a new EVD. The patient is now being transported to the CT Scan department to have a CT Scan of the head done.

## 2024-01-22 NOTE — PROGRESS NOTES
CT complete. The EVD is in the ventricle near the region of the foramen of Monro. There is a small hemorrhage along the tract of the new ventriculostomy catheter and a smaller one where the prior one was removed. Currently EVD functioning. We will continue to follow her closely. I suspect she will ultimately require shunt which can liely be placed on the right side, the currently  side, although did have an EVD placed there in the past that bled when it was removed. The ventricles have cleared out some, but still some blood in there that would not make shunting an option just yet. I did call the patients  and updated him on the new CT findings. I explained that this is not that uncommon, especially with the number of times she needed these catheters replaced, and he thanked me for the update. I also informed him that the EVD is now functioning well and is nicely inside the ventricle near the foramen of Monro. We will follow closely with you.     Richy Macias

## 2024-01-22 NOTE — PROCEDURES
Clinical Note    The goals and alternatives to insertion of an external ventricular drain were discussed with family. The risks were discussed.    Once all questions were answered to their satisfaction, they asked to proceed with surgery.    Procedure Note    The left sided EVD tube was flushed under sterile conditions with saline.  No return of CSF was noted.    5mg Morphine and 5mg valium were given for sedation prior to the procedure    The skin was then prepped and draped in usual sterile fashion.The incision was reopeneed    The old EVD tube was removed.  The holes contained clot.  A ventriculostomy catheter was placed perpendicular to the outer table of the skull and advanced in the mid pupillary line to 7 cm from the inner table of the skull. There was egress of CSF, pink tinged as previous.  The ventriculostomy catheter was then tunneled approximately 5 cm using a trocar to an exit site.    Skin was closed with nylon and silk was used  as a drain stitch at the exit site. The hub was attached to the distal end of the ventriculostomy catheter and secured with a tie. This was connected to a Buretrol and left open at 0 mmHg above the external auditory meatus. Dressings were applied by the nurse.    All sharps were discarded at the end of the case and no complications. The patient will remain in ICU for ongoing observation.     I anticipate intermittent flow of CSF

## 2024-01-22 NOTE — CONSULTS
Consultation - General Surgery   Debbie Moody 83 y.o. female MRN: [id number]  Unit/Bed#: ICU 04 Encounter: [id number]    Assessment/Plan     Assessment:  84 yo F with PMH of DM, Diverticulitis, open cholecystectomy, Robotic assisted LAR, hernia repair, and rotator cuff surgery who presented with cerebellar stroke with IVH s/p suboccipital craniectomy on 1/3/24. General surgery consulted for PEG placement.    Plan:  Based on CT imaging of the abdomen back in September 2023, there may be interposed transverse colon mesentery between abdominal wall and stomach, this in addition to extensive surgical history including open cholecystectomy, robotic LAR and hernia repair may make endoscopic G tube placement not feasible, can attempt insufflation of the stomach and transillumination to determine if safe window exists, if this is unsuccessful may require laparoscopic or open G tube placement  Timing of attempted PEG tube placement TBD, given patient will likely require  shunt with Neurosurgery will attempt to expedite in order to allow at least one week between PEG placement and  shunt placement  Rest of care per Neuro Critical Care team    History of Present Illness     HPI:  Debbie Moody is a 83 y.o. female who presented with cerebellar stroke and ultimately required suboccipital craniectomy and EVD placement. Patient was intubated on 1/3 and subsequently extubated on 1/4. Unfortunately, she was reintuabted on 1/5 and ultimately re-extubated on 1/12. Currently, she continues with waxing and waning mental status and has been unable to take PO. General surgery is now consulted for PEG placement. She has an extensive past surgical history including open cholecystectomy, robotic LAR and hernia repair. Additionally, she currently remains with EVD in place but will likely require a  shunt with Neurosurgery.    Inpatient consult to Acute Care Surgery  Consult performed by: Ton Burt MD  Consult ordered by: Orin  MD Mina        Review of Systems   Unable to perform ROS: Mental status change         Historical Information   Past Medical History:   Diagnosis Date    Acute kidney injury (HCC)     Acute respiratory failure with hypoxia (HCC) 12/16/2021    Broken arm     hairline fracture/ 2 places///   right arm    Diabetes mellitus (HCC)     Diverticulitis     Pneumothorax- Resolved     Postherpetic neuralgia      Past Surgical History:   Procedure Laterality Date    CHOLECYSTECTOMY      COLON SURGERY      CRANIECTOMY N/A 1/3/2024    Procedure: SUBOCCIPITAL CRANIECTOMY FOR POSTERIOR FOSSA DECOMPRESSION; DISPOSE OF BONE FLAP;  Surgeon: David Higgins MD;  Location: [facility];  Service: Neurosurgery    HERNIA REPAIR      IR EMBOLIZATION (SPECIFY VESSEL OR SITE)  1/12/2022    ROTATOR CUFF REPAIR Right     VARICOSE VEIN SURGERY      Impression:  2/12/16 Jake Sarabia     Social History   Social History     Substance and Sexual Activity   Alcohol Use Not Currently    Comment: Rarely     Social History     Substance and Sexual Activity   Drug Use Never     E-Cigarette/Vaping    E-Cigarette Use Never User      E-Cigarette/Vaping Substances    Nicotine No     THC No     CBD No     Flavoring No      Social History     Tobacco Use   Smoking Status Never   Smokeless Tobacco Never     Family History:   Family History   Problem Relation Age of Onset    Diabetes Mother     No Known Problems Father        Meds/Allergies   current meds:   Current Facility-Administered Medications   Medication Dose Route Frequency    acetaminophen (TYLENOL) tablet 650 mg  650 mg Oral Q6H PRN    amLODIPine (NORVASC) tablet 10 mg  10 mg Oral Daily    atorvastatin (LIPITOR) tablet 80 mg  80 mg Oral QPM    bisacodyl (DULCOLAX) rectal suppository 10 mg  10 mg Rectal Daily PRN    carvedilol (COREG) tablet 6.25 mg  6.25 mg Oral BID With Meals    ceFAZolin (ANCEF) IVPB (premix in dextrose) 1,000 mg 50 mL  1,000 mg Intravenous Q12H    chlorhexidine (PERIDEX) 0.12 % oral  rinse 15 mL  15 mL Mouth/Throat Q12H [facility]    [START ON 1/23/2024] ferrous sulfate tablet 325 mg  325 mg Oral Every Other Day    formoterol (PERFOROMIST) nebulizer solution 20 mcg  20 mcg Nebulization Q12H    hydrALAZINE (APRESOLINE) injection 10 mg  10 mg Intravenous Q4H PRN    insulin glargine (LANTUS) subcutaneous injection 15 Units 0.15 mL  15 Units Subcutaneous Q12H JUAN ANTONIO    insulin lispro (HumaLOG) 100 units/mL subcutaneous injection 4-20 Units  4-20 Units Subcutaneous 4x Daily (AC & HS)    labetalol (NORMODYNE) injection 10 mg  10 mg Intravenous Q6H PRN    levalbuterol (XOPENEX) inhalation solution 1.25 mg  1.25 mg Nebulization TID    miconazole 2 % cream   Topical BID    midazolam (VERSED) injection 1 mg  1 mg Intravenous Once    modafinil (PROVIGIL) tablet 200 mg  200 mg Oral Daily    ondansetron (ZOFRAN) injection 4 mg  4 mg Intravenous Q6H PRN    polyethylene glycol (MIRALAX) packet 17 g  17 g Per NG Tube BID    senna-docusate sodium (SENOKOT S) 8.6-50 mg per tablet 2 tablet  2 tablet Per NG Tube BID PRN    and PTA meds:   Prior to Admission Medications   Prescriptions Last Dose Informant Patient Reported? Taking?   ALPRAZolam (XANAX) 0.25 mg tablet   No No   Sig: Take 1 tablet (0.25 mg total) by mouth daily at bedtime as needed for anxiety   Biotin 10 MG CAPS  Self Yes No   Sig: Take 1 capsule by mouth in the morning   Diclofenac Sodium (VOLTAREN) 1 %   No No   Sig: Apply 2 g topically 2 (two) times a day as needed (as needed twice a day)   Multiple Vitamin (multivitamin) tablet  Self Yes No   Sig: Take 1 tablet by mouth daily   calcium carbonate (TUMS) 500 mg chewable tablet  Self Yes No   Sig: Chew 1 tablet if needed   cholecalciferol (VITAMIN D3) 25 mcg (1,000 units) tablet  Self Yes No   Sig: daily   dicyclomine (BENTYL) 20 mg tablet   No No   Sig: Take 1 tablet (20 mg total) by mouth 4 (four) times a day as needed (cramps)   enalapril (VASOTEC) 10 mg tablet   No No   Sig: Take 1 tablet (10 mg total) by  "mouth daily   ferrous sulfate 325 (65 Fe) mg tablet  Self Yes No   Sig: Take 325 mg by mouth daily with breakfast   glipiZIDE (GLUCOTROL XL) 10 mg 24 hr tablet   No No   Sig: Take 1 tablet (10 mg total) by mouth daily   ketoconazole (NIZORAL) 2 % shampoo   No No   Sig: Use 2-3 times a week   lovastatin (MEVACOR) 40 MG tablet   No No   Sig: Take 1 tablet (40 mg total) by mouth daily at bedtime   sitaGLIPtin (Januvia) 100 mg tablet   No No   Sig: Take 1 tablet (100 mg total) by mouth daily   vitamin E, tocopherol, 1,000 units capsule   No No   Sig: Take 1 capsule (1,000 Units total) by mouth daily      Facility-Administered Medications: None     Allergies   Allergen Reactions    Ciprofloxacin GI Intolerance and Headache     Confusion, arm weakness, dizziness, headache    Meperidine GI Intolerance, Hallucinations and Other (See Comments)     Demarol  Reaction Date:Unknown    Propoxyphene GI Intolerance       Objective   First Vitals:   Blood Pressure: 148/68 (12/31/23 1730)  Pulse: 86 (12/31/23 1730)  Temperature: 98.5 °F (36.9 °C) (12/31/23 1730)  Temp Source: Oral (12/31/23 1730)  Respirations: 14 (12/31/23 1730)  Height: 4' 10\" (147.3 cm) (12/31/23 1730)  Weight - Scale: 56.2 kg (123 lb 14.4 oz) (12/31/23 1730)  SpO2: 96 % (12/31/23 1730)    Current Vitals:   Blood Pressure: 148/77 (01/22/24 1630)  Pulse: 82 (01/22/24 1630)  Temperature: 98.5 °F (36.9 °C) (01/22/24 1600)  Temp Source: Rectal (01/22/24 1600)  Respirations: 15 (01/22/24 1630)  Height: 4' 10\" (147.3 cm) (01/02/24 2300)  Weight - Scale: 64.2 kg (141 lb 8.6 oz) (01/03/24 0600)  SpO2: 100 % (01/22/24 1630)      Intake/Output Summary (Last 24 hours) at 1/22/2024 4348  Last data filed at 1/22/2024 1600  Gross per 24 hour   Intake 1518 ml   Output 1140 ml   Net 378 ml       Invasive Devices       Peripheral Intravenous Line  Duration             Long-Dwell Peripheral IV (Midline) 01/03/24 Left Cephalic Vein 19 days              Arterial Line  Duration        "      Arterial Line 01/15/24 Radial 7 days              Drain  Duration             External Urinary Catheter 4 days    NG/OG/Enteral Tube Nasogastric 12 Fr Right nare 4 days                    Physical Exam  Constitutional:       Appearance: Normal appearance.   HENT:      Head:      Comments: EVD in place  Incision c/d/i     Right Ear: External ear normal.      Left Ear: External ear normal.      Nose: Nose normal.      Mouth/Throat:      Mouth: Mucous membranes are moist.      Pharynx: Oropharynx is clear.   Eyes:      Conjunctiva/sclera: Conjunctivae normal.      Pupils: Pupils are equal, round, and reactive to light.   Cardiovascular:      Rate and Rhythm: Normal rate and regular rhythm.      Pulses: Normal pulses.   Pulmonary:      Effort: Pulmonary effort is normal.      Comments: On NC  Abdominal:      General: Abdomen is flat.      Palpations: Abdomen is soft.      Tenderness: There is no abdominal tenderness.      Comments: R subcostal incision well-healed  Ecchymosis 2/2 to recent heparin injections   Musculoskeletal:         General: Normal range of motion.      Cervical back: Normal range of motion.   Skin:     General: Skin is warm and dry.   Neurological:      Mental Status: She is alert.      Comments: Sleeping comfortably           Lab Results: I have personally reviewed pertinent lab results.    Imaging: I have personally reviewed pertinent reports.    EKG, Pathology, and Other Studies: I have personally reviewed pertinent reports.

## 2024-01-22 NOTE — ASSESSMENT & PLAN NOTE
s/p SOC for posterior fossa decompression (LULA 1/3/24)   Hydrocephalus requiring EVD   Complicated by extensive IVH  S/p EVD replacement on 1/5/24 2/2 extensive IVH, replaced 1/14 due to not draining/clogged  Presented with nausea, headache and ataxia. 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  Current exam: GCS 9-10 E3, V1 M5-6 opens eyes to voice, FC in the RUE only, no verbal response today.     Imaging:  CT head wo 1/22/24: Compared to yesterday, resolved midline shift. Stable appearance hemorrhagic left occipital infarct status post decompression, hydrocephalus and intraventricular hemorrhage. Left frontal EVD in stable position. Increased ventricles compared to CTH on 1/21/24.   CT head wo, 1/15/2023: Evolving left cerebellar PICC infarct with slightly increased size of the left cerebellum parenchymal hematoma. Stable mass effect. Stable extensive diffuse intraventricular hemorrhage with ventriculomegaly and transependymal CSF resorption. Unchanged 5 mm right to left midline shift.    Plan:  Continue to closely monitor neuro exam   Frequent neuro checks per primary team   Repeat STAT CTH with any acute decline in GCS > 2pts or more in 1hr   Maintain normotensive BP goals, SBP < 160    1 EVD replaced 1/21/24,  EVD @ 0.   20 cc overnight.   Not draining well, no wave form after flushing.   EVD may need to be replaced.    Maintain ICP < 20   Continue ancef 2g q 8hrs while EVD is in place.    Ongoing medical management per primary team.   Na goal > 145-155.   SBP < 160  Pain control per primary team.   Neurology was consulted for stroke management.   ASA d/c 1/3/23, will re-assess when to start.   PT/OT/PMR evaluation as able  DVT PPX: SCD, SQH- hold for possible EVD replacement.     Neurosurgery will continue to  follow. Please call with questions or concerns.

## 2024-01-23 NOTE — PLAN OF CARE
Problem: SAFETY ADULT  Goal: Patient will remain free of falls  Description: INTERVENTIONS:  - Educate patient/family on patient safety including physical limitations  - Instruct patient to call for assistance with activity   - Consult OT/PT to assist with strengthening/mobility   - Keep Call bell within reach  - Keep bed low and locked with side rails adjusted as appropriate  - Keep care items and personal belongings within reach  - Initiate and maintain comfort rounds  - Apply yellow socks and bracelet for high fall risk patients  - Consider moving patient to room near nurses station  Outcome: Progressing

## 2024-01-23 NOTE — PROGRESS NOTES
Pastoral Care Progress Note    2024  Patient: Debbie Moody : 1940  Admission Date & Time: 2023 1726  MRN: [id number] CSN: [id number]      Pt unresponsive now comfort care; , children & grandson present praying the Rosary.  confirmed a  has anointed his wife.  remains available tig stacie NAVNEET  on-duty for assistance.             Chaplaincy Interventions Utilized:   Empowerment: Clarified, confirmed, or reviewed information from treatment team  and Provided anticipatory guidance    Exploration: Explored emotional needs & resources and Explored spiritual needs & resources    Collaboration: Encouraged adherence to treatment plan    Relationship Building: Cultivated a relationship of care and support, Listened empathically, and Provided silent and supportive presence    Ritual: Provided prayer    Chaplaincy Outcomes Achieved:  Distress reduced, Expressed peace, and Expressed ultimate hope    Spiritual Coping Strategies Utilized:   Spiritual comfort

## 2024-01-23 NOTE — PROGRESS NOTES
Critical Care Interval Transfer Note:    Please refer to progress note from earlier today for full details.     Barriers to discharge:   Comfort care      Consults: IP CONSULT TO PHYSICAL MEDICINE REHAB  IP CONSULT TO CASE MANAGEMENT  IP CONSULT TO NUTRITION SERVICES  IP CONSULT TO NEUROSURGERY  IP CONSULT TO NEUROLOGY  IP CONSULT TO PICC TEAM  IP CONSULT TO NUTRITION SERVICES  IP CONSULT TO PICC TEAM  IP CONSULT TO PICC TEAM  IP CONSULT TO ACUTE CARE SURGERY    Recommended to review admission imaging for incidental findings and document in discharge navigator: comfort care      Discharge Plan: Anticipate discharge tomorrow to comfort care             Patient seen and evaluated by Critical Care today and deemed to be appropriate for transfer to Med Surg. Spoke to Dr. Bocsh from [facility] to accept transfer. Critical care can be contacted via [employer] with any questions or concerns.

## 2024-01-23 NOTE — PROGRESS NOTES
Nurse called for passed patient's family. Attempted to visit family at bedside. Multiple family members present at bedside. Family expressed  visited patient for comfort care. They thanked for stop by patient's room.   01/23/24 1100   Clinical Encounter Type   Visited With Patient and family together;Health care provider   Routine Visit Introduction   Crisis Visit Death   Referral From Nurse

## 2024-01-23 NOTE — PROGRESS NOTES
Patient report has been called to the [facility] 7 Nursing unit. The patient will be transferred to Room 728 as a Level 4 Comfort Care status patient. The patient's family is here and is aware the patient will be transferred.

## 2024-01-24 PROBLEM — Z71.89 GOALS OF CARE, COUNSELING/DISCUSSION: Status: ACTIVE | Noted: 2024-01-24

## 2024-01-24 NOTE — DISCHARGE SUMMARY
[facility]  Discharge- Debbie Moody 1940, 83 y.o. female MRN: [id number]  [facility] Encounter: [id number]  Primary Care Provider: Wild Stafford DO   Date and time admitted to hospital: 12/31/2023  5:26 PM    Goals of care, counseling/discussion  Assessment & Plan  Comfort care measures and orders ordered    * Acute CVA (cerebrovascular accident) (HCC)  Assessment & Plan  s/p SOC for posterior fossa decompression (LULA 1/3/24)   S/p EVD replacement on 1/5/24 2/2 extensive IVH, replaced 1/14 due to not draining/clogged  Presented with nausea, headache and ataxia. Was found to have left cerebellar stroke secondary to PICA Was initially GCS 15, nonfocal until 1/3/24 when she developed acute exam decline requiring SOC, and further decline on 1/5/24 when pt developed extensive IVH.  Patient required multiple EVD changes while inpatient.  Ultimately transition to comfort care             Medical Problems       Resolved Problems  Date Reviewed: 1/24/2024            Resolved    Diabetes (HCC) 1/2/2024     Resolved by  Marva Gonzalez MD    FERCHO (acute kidney injury) ([facility]) 1/13/2024     Resolved by  BENTON Ash        Discharging Physician / Practitioner: Hansa Monte MD  PCP: Wild Stafford DO  Admission Date:   Admission Orders (From admission, onward)       Ordered        12/31/23 1854  Inpatient Admission  Once                          Discharge Date: 01/23/24    Consultations During Hospital Stay:  Neurology  Neurosurgery  Physical medicine and rehab  Nutrition  General surgery    Procedures Performed:   Multiple EVD placements  Craniectomy    Significant Findings / Test Results:   Large CVA    Complications: Intraventricular hemorrhage    Reason for Admission: Stroke    Hospital Course:   Debbie Moody is a 83 y.o. female patient who originally presented to the hospital on 12/31/2023 due to Large cerebral infarct.  During the first days  "patient was doing well and was transferred out of the ICU.  She deteriorated and was transferred back to the ICU where she had a ventriculostomy placed and ultimately a suboccipital craniectomy.  Since intervention patient required numerous removals and re-insertions of ventriculostomy catheters due to clotting.  Course was complicated by large large intraventricular hemorrhage.  On  it was noted that left-sided catheter was not working and multiple attempts to flush by ICU staff and neurosurgery were unsuccessful.  Given that patient had 2 ventriculostomy catheters placed within a 12-hour span that each clotted, neurosurgery discussed with family members and a right frontal EVD was decided to be placed after family understood the risk of large intraparenchymal bleeding. Unfortunately procedure patient developed significant bleeding and patient became bradycardic and hypoxic.  Ultimately she was transition to comfort care given poor prognosis.    Please see above list of diagnoses and related plan for additional information.     Condition at Discharge:      Discharge Day Visit / Exam:   Subjective: Resting comfortably  Vitals: Blood Pressure: 152/71 (24 1820)  Pulse: 66 (24 0800)  Temperature: 98.5 °F (36.9 °C) (24 1600)  Temp Source: Rectal (24 1600)  Respirations: 15 (24 0800)  Height: 4' 10\" (147.3 cm) (24 2300)  Weight - Scale: 64.2 kg (141 lb 8.6 oz) (24 0600)  SpO2: (!) 80 % (24 0800)  Exam:   Physical Exam  HENT:      Mouth/Throat:      Mouth: Mucous membranes are dry.   Eyes:      Comments: Pupils not reactive to light   Cardiovascular:      Comments: Asystole  Pulmonary:      Comments: No breath sounds  Neurological:      Comments: Not responding to stimuli              Discussion with Family: Updated  (, son, and daughter) at bedside.    Discharge instructions/Information to patient and family:   See after visit summary for " information provided to patient and family.      Provisions for Follow-Up Care:  See after visit summary for information related to follow-up care and any pertinent home health orders.      Mobility at time of Discharge:   Basic Mobility Inpatient Raw Score: 8  JH-HLM Goal: 3: Sit at edge of bed  JH-HLM Achieved: 1: Laying in bed  HLM Goal NOT achieved. Continue to encourage mobility in post discharge setting.     Disposition:   Other:     Planned Readmission: None     Discharge Statement:  I spent 35 minutes discharging the patient. This time was spent on the day of discharge. I had direct contact with the patient on the day of discharge. Greater than 50% of the total time was spent examining patient, answering all patient questions, arranging and discussing plan of care with patient as well as directly providing post-discharge instructions.  Additional time then spent on discharge activities.    Discharge Medications:  See after visit summary for reconciled discharge medications provided to patient and/or family.      **Please Note: This note may have been constructed using a voice recognition system**

## 2024-01-24 NOTE — UTILIZATION REVIEW
NOTIFICATION OF ADMISSION DISCHARGE   This is a Notification of Discharge from [facility]. Please be advised that this patient has been discharge from our facility. Below you will find the admission and discharge date and time including the patient’s disposition.   UTILIZATION REVIEW CONTACT:  Massimo Grace  Utilization   Network Utilization Review Department  Phone: [phone] x carefully listen to the prompts. All voicemails are confidential.  Email: [email or web address]     ADMISSION INFORMATION  PRESENTATION DATE: 2023  5:26 PM  OBERVATION ADMISSION DATE:   INPATIENT ADMISSION DATE: 23  5:26 PM   DISCHARGE DATE: 2024  2:22 PM   DISPOSITION:    Network Utilization Review Department  ATTENTION: Please call with any questions or concerns to [phone] and carefully listen to the prompts so that you are directed to the right person. All voicemails are confidential.   For Discharge needs, contact Care Management DC Support Team at [phone] opt. 2  Send all requests for admission clinical reviews, approved or denied determinations and any other requests to dedicated fax number below belonging to the campus where the patient is receiving treatment. List of dedicated fax numbers for the Facilities:  FACILITY NAME UR FAX NUMBER   ADMISSION DENIALS (Administrative/Medical Necessity) [phone]   DISCHARGE SUPPORT TEAM ([facility]) [phone]   PARENT CHILD HEALTH (Maternity/NICU/Pediatrics) [phone]   [facility] [phone]   [facility] [phone]   [facility] [phone]   [facility] [phone]   [facility] [phone]   [facility] [phone]   [facility] [phone]   [facility] [phone]   [facility]  [facility] [phone]   [facility] [phone]   [facility] [phone]

## 2024-01-24 NOTE — ASSESSMENT & PLAN NOTE
s/p SOC for posterior fossa decompression (LULA 1/3/24)   S/p EVD replacement on 1/5/24 2/2 extensive IVH, replaced 1/14 due to not draining/clogged  Presented with nausea, headache and ataxia. Was found to have left cerebellar stroke secondary to PICA Was initially GCS 15, nonfocal until 1/3/24 when she developed acute exam decline requiring SOC, and further decline on 1/5/24 when pt developed extensive IVH.  Patient required multiple EVD changes while inpatient.  Ultimately transition to comfort care

## 2024-01-26 ENCOUNTER — VBI (OUTPATIENT)
Dept: ADMINISTRATIVE | Facility: OTHER | Age: 84
End: 2024-01-26

## 2024-01-28 NOTE — DEATH NOTE
INPATIENT DEATH NOTE  Debbie Moody 83 y.o. female MRN: [id number]  Unit/Bed#: [facility] 728-01 Encounter: [id number]    Date, Time and Cause of Death    Date of Death: 24  Time of Death: 11:42 AM  Preliminary Cause of Death: Cerebellar infarct (HCC)  Entered by: Hansa Monte[YL1.1]       Attribution       YL1.1 Hansa Monte MD 24 08:36             Patient's Information  Pronounced by: Dr. Monte  Did the patient's death occur in the ED?: No  Did the patient's death occur in the OR?: No  Did the patient's death occur less than 10 days post-op?: No  Did the patient's death occur within 24 hours of admission?: No  Was code status DNR at the time of death?: Yes    PHYSICAL EXAM:  Unresponsive to noxious stimuli, Breath sounds absent, Carotid pulse absent, Heart sounds absent, and Pupillary light reflex absent    Medical Examiner notification criteria:  No   Medical Examiner's office notified?:  No, does not meet ME notification criteria   Medical Examiner accepted case?:  No  Name of Medical Examiner: N/A    Family Notification  Was the family notified?: Yes  Date Notified: 24  Time Notified: 1142  Notified by: Dr. Monte   Relationship to Patient: Spouse, Daughter, Son  Family Notification Route: At bedside  Was the family told to contact a  home?: Yes    Autopsy Options:  Decision for post-mortem examination not yet made by next of kin.    Primary Service Attending Physician notified?:  yes - Attending:  No att. providers found    Physician/Resident responsible for completing Discharge Summary:  Dr. Monte

## 2024-02-18 NOTE — PROGRESS NOTES
Assessment/Plan:    No problem-specific Assessment & Plan notes found for this encounter  Diagnoses and all orders for this visit:    Hemoperitoneum  Comments:  resolved    Acute blood loss anemia  Comments:  resolved    Asymptomatic postmenopausal state  -     DXA bone density spine hip and pelvis; Future    Screening for diabetic retinopathy    Type 2 diabetes mellitus without complication, without long-term current use of insulin ([facility] [address] )  -     Diabetic foot exam; Future  -     Glucose, fasting; Future  -     Hemoglobin A1C; Future          PHQ-2/9 Depression Screening    Little interest or pleasure in doing things: 0 - not at all  Feeling down, depressed, or hopeless: 0 - not at all  PHQ-2 Score: 0  PHQ-2 Interpretation: Negative depression screen        TCM Call (since 12/27/2021)     Date and time call was made  1/24/2022  9:32 AM    Hospital care reviewed  Records reviewed        Patient was hospitialized at  [facility] - CLOSED        Date of Admission  01/12/21    Date of discharge  01/21/22    Diagnosis  acute blood loss anemia    Disposition  Home    Were the patients medications reviewed and updated  Yes    Current Symptoms  Fatigue    Fatigue severity  Mild      TCM Call (since 12/27/2021)     Post hospital issues  None    Should patient be enrolled in anticoag monitoring? No    Scheduled for follow up? Yes    Did you obtain your prescribed medications  Yes    Do you need help managing your prescriptions or medications  No    Is transportation to your appointment needed  No    I have advised the patient to call PCP with any new or worsening symptoms  Lindsey TIRADO          Subjective:      Patient ID: Karyle Hermanns is a [de-identified] y o  female      Hospital follow up, pt had Covid with a right pneumothorax, pt had a chest tube placed which ruptured an artery and caused a hemoperitenium, pt has mostly recovery and states she feels tired, pt is undergoing PT to recover her strength      The following portions of the patient's history were reviewed and updated as appropriate: allergies, current medications, past family history, past medical history, past social history, past surgical history and problem list     Review of Systems   Constitutional: Positive for fatigue  Negative for chills and fever  Respiratory: Negative for shortness of breath and wheezing  Cardiovascular: Negative for chest pain and palpitations  Gastrointestinal: Negative for abdominal pain, blood in stool, constipation, nausea and vomiting  Objective:    /76 (BP Location: Left arm, Patient Position: Sitting)   Pulse 84   Temp 97 6 °F (36 4 °C) (Tympanic)   Ht 5' (1 524 m)   Wt 52 1 kg (114 lb 12 8 oz)   LMP  (LMP Unknown)   SpO2 96% Comment: 2 liters while traveling  BMI 22 42 kg/m²      Physical Exam  Vitals and nursing note reviewed  Constitutional:       General: She is not in acute distress  Appearance: Normal appearance  She is not ill-appearing or diaphoretic  HENT:      Head: Normocephalic and atraumatic  Eyes:      Conjunctiva/sclera: Conjunctivae normal    Cardiovascular:      Rate and Rhythm: Normal rate and regular rhythm  Heart sounds: Normal heart sounds  No murmur heard  Pulmonary:      Effort: Pulmonary effort is normal  No respiratory distress  Breath sounds: Normal breath sounds  No wheezing, rhonchi or rales  Abdominal:      General: There is no distension  Palpations: Abdomen is soft  There is no mass  Tenderness: There is no abdominal tenderness  There is no guarding or rebound  Musculoskeletal:      Cervical back: Normal range of motion and neck supple  No rigidity  Right lower leg: No edema  Left lower leg: No edema  Lymphadenopathy:      Cervical: No cervical adenopathy  Neurological:      Mental Status: She is alert and oriented to person, place, and time     Psychiatric:         Mood and Affect: Mood normal          Behavior: Behavior normal  Thought Content:  Thought content normal          Judgment: Judgment normal  1

## 2024-07-29 NOTE — ASSESSMENT & PLAN NOTE
Results and RTW guidelines:    COVID RESULT:    [x] Viewed by employee in [employer].  RTW plan and instructions as indicated on triage call.  Manager notified.  Estimated RTW date:   [] Discussed with employee   [] Unable to reach by phone.  Sent via [employer] message      Test type:    [x] Rapid         [] Alinity         [x] Outside test:       [x] NEGATIVE     Ordered Alinity retest?  []Yes   [x] No (skip to RTW)   Ordered Rapid retest?   []Yes   [x] No (skip to RTW)           Dated to be taken:      If Yes, PLACE ORDER NOW and instruct the following:  -Originally Symptomatic or Now Symptoms:   -RTW when sx improve- fever free for 24 hours w/o medications, Diarrhea/Vomiting for 24 hours w/o medications    -Originally  Asymptomatic  -Asymptomatic AND Vaccinated or Unvaccinated or Prior infection in past 90 days:   -May work and continue to monitor symptoms for the next 10 days.                                         -Alinity test day 2, Alinity test day 5 (day 0 - exposure)      Notes:     RTW PLAN:    []  If COVID positive results, off work minimum of 5 days from positive test or onset of symptoms (day 0)        On day 5, if asymptomatic or mildly symptomatic (with improving symptoms) may return to work day 6          On day 5, if symptomatic, call Employee Health for RTW screening        []  COVID positive result - call Employee Health on day 5 after symptom onset.  The employee needs to be cleared by Employee Health to RTW.  [x] RTW immediately, continue to monitor for sx  [] RTW when sx improve; must be fever free for 24 hours w/o medications, Diarrhea/Vomiting free for 24 hours w/o medications  [] Alinity ordered; continue to monitor sx and call for new/worsening sx.  Discuss RTW guidelines with manager  [] May continue to work  [] Follow up with PCP  [] Home until further instruction from hotline with Alinity results  INSTRUCTIONS PROVIDED:  [x]  Plan as noted above  []  Length of time to obtain results   []   · Takes enalapril at home - consider holding lisinopril  · Monitor BP Quarantine instructions  []  Masking protocol   []  S/S of worsening infection/condition and importance of prompt medical re-evaluation including when to seek emergency care  [] If symptoms develop, stay home and call hotline for rapid test order    Estimated RTW date:      [] The employee voiced understanding of above plan/instructions  [x] Manager Notified

## 2024-08-12 NOTE — ASSESSMENT & PLAN NOTE
[facility] Approved    Filling Pharmacy: [facility]  Additional Information: Pharmacy contacted, left detailed message with approval information.       · In the setting of COVID 19 viral PNA and R ptx s/p R chest tube   · Continue HFNC 85% 50L  · Continue COVID-19 protocol as noted above  · PRN Xopenex nebs  · Titrate O2 to maintain SpO2 > 88%   · Aggressive pulmonary hygiene

## 2025-05-08 NOTE — ASSESSMENT & PLAN NOTE
Home regimen: Enalapril 10mg daily   Systolic (24hrs), Av , Min:128 , Max:176   Diastolic (24hrs), Av, Min:55, Max:97    Plan  - Continue Lisinopril 10mg daily   - Hydralazine 10mg q6h prn for SBP >200  - Per Neuro maintain SBP <150   Physical Therapy    Visit Type: initial evaluation    Relevant History/Co-morbidities: Pt admitted with lower GI bleed    Pt lives with son in 1 story home with ramped entrance.  At baseline pt pivot transfers to w/c (since getting ill earlier this year), but previously was able to ambulation short distances in the home with supervision    SUBJECTIVE  Patient verbally agrees to allow the following to be present during session: son  Pt reports being tired  Patient / Family Goal: return to previous functional status and maximize function    Pain   Patient reports pain is not an issue/concern.     OBJECTIVE     Cognitive Status   Level of Consciousness   - drowsy  Affect/Behavior    - cooperative and pleasant  Orientation    - Oriented to: person, place and situation  Functional Communication   - Forms of Communication: verbal  Attention Span    - Attention: intact  Following Direction   - follows all commands and directions consistently    Patient Activity Tolerance: 1 to 1 activity to rest      Range of Motion (ROM)   (degrees unless noted; active unless noted; norms in ( ); negative=lacking to 0, positive=beyond 0)  WFL: LLE, RLE    Strength  (out of 5 unless noted, standard test position unless noted)   Comments / Details: No focal strength deficits noted in BLEs, at least 3/5, but generalized weakness throughout        Sitting Balance  (VINEET = base of support)  Static      - Trial 1 details: supervision    Standing Balance  (VINEET = base of support)  Firm Surface: Double Leg      - Static, Eyes Open       - Trial 1 details: with double UE support and moderate assist (posterior lean)     - Dynamic, Eyes Open       - Trial 1 details: with double UE support and moderate assist  Initial posterior lean      Sensation/Dermatome Testing:    Intact: LLE light touch, RLE light touch    Bed Mobility  - Supine to sit: minimal assist, with verbal cues, with tactile cues  With use of bed rails from elevated head of bed.  Assist  with trunk and for scooting to EOB  Transfers  Assistive devices: gait belt, hand hold  - Sit to stand: moderate assist, with verbal cues  - Stand to sit: with verbal cues, minimal assist, moderate assist  - Stand pivot: moderate assist, with verbal cues, minimal assist, 2 person  Cues for hand placement    Ambulation / Gait  - Assistive device: gait belt, 1 person and hand hold  - Distance (feet unless otherwise indicated): 2  - Assist Level: moderate assist and with verbal cues  - Description: shuffling, unsteady and decreased murali/pace      Interventions     Training provided: bed mobility training, safety training, gait training, transfer training and activity tolerance    Skilled input: Verbal instruction/cues, tactile instruction/cues and as detailed above  Verbal Consent: Writer verbally educated and received verbal consent for hand placement, positioning of patient, and techniques to be performed today from patient          Education:   - Present and ready to learn: patient  Education provided during session:  - Results of above outlined education: Verbalizes understanding and Needs reinforcement    ASSESSMENT   Patient will benefit from skilled therapy to address listed impairments and functional limitations.  Interfering components: decreased activity tolerance    Discharge needs based on today's assessment:   - Current level of function: slightly below baseline level of function   - Therapy needs at discharge: therapy 5 or more times per week   - Activities of daily living (ADLs) requiring support at discharge: bed mobility, transfers, ambulation and stairs   - Impairments that require further therapy intervention: strength, balance and activity tolerance    AM-PAC  - Generalized Prior Level of Function: Needs a little help ([facility] 12-21)       Key: MOD A=moderate assistance, IND/MOD I=independent/modified independent  - Generalized Current Level of Function:  Current Mobility Score: 12       AM-PAC  Scoring Key= >21 Modified Independent; 20-21 Supervision; 18-19 Minimal assist; 13-18 Moderate assist; 9-12 Max assist; <9 Total assist            Predicted patient presentation: Low (stable) - Patient comorbidities and complexities, as defined above, will have little effect on progress for prescribed plan of care.    PLAN (while hospitalized)  Suggestions for next session as indicated: Generalized strengthening, bed mobility, sitting balance, transfer training, standing balance with 2ww, pre-gait activities - advance as tolerated.    PT Frequency: 3-5 x per week    Visit # since seen by PT:  0    PT/OT Mobility Equipment for Discharge: Pt has ww and transport w/c    Interventions: bed mobility, functional transfer training, gait training, strengthening, balance, safety education, patient/family training, HEP train/position, equipment eval/education, compensatory technique education, body mechanics, endurance training and stairs retraining  Agreement to plan and goals: patient agrees with goals and treatment plan        GOALS  Review Date: 5/15/2025  Long Term Goals: (to be met by time of discharge from hospital)  Sit to supine: Patient will complete sit to supine modified independent.  Supine to sit: Patient will complete supine to sit modified independent.  Sit to stand: Patient will complete sit to stand transfer with modified independent.   Stand to sit: Patient will complete stand to sit transfer with modified independent.   Ambulation (even): Patient will ambulate on even surface for 150 feet with least restrictive device, modified independent.   Home program: patient independent with home program as instructed.         Patient at End of Session:   Location: in chair  Safety measures: alarm system in place/re-engaged, call light within reach and equipment intact  Handoff to: nurse      Therapy procedure time and total treatment time can be found documented on the Time Entry flowsheet

## (undated) DEVICE — TUBING SUCTION 5MM X 12 FT

## (undated) DEVICE — GLOVE SRG BIOGEL 7.5

## (undated) DEVICE — SUT NUROLON 4-0 TF CR/8 18 IN C584D

## (undated) DEVICE — GLOVE INDICATOR PI UNDERGLOVE SZ 7.5 BLUE

## (undated) DEVICE — TOOL MR8-14MH30 MR8 14CM MATCH 3MM: Brand: MIDAS REX MR8

## (undated) DEVICE — INTENDED FOR TISSUE SEPARATION, AND OTHER PROCEDURES THAT REQUIRE A SHARP SURGICAL BLADE TO PUNCTURE OR CUT.: Brand: BARD-PARKER ® CARBON RIB-BACK BLADES

## (undated) DEVICE — SURGICEL 4 X 8IN

## (undated) DEVICE — ACRA CLIP SINGLE CARTRIDGE

## (undated) DEVICE — PAD GROUNDING ADULT

## (undated) DEVICE — TELFA ADHESIVE ISLAND DRESSING: Brand: TELFA

## (undated) DEVICE — PERFORATOR CRANIAL 14MM

## (undated) DEVICE — ANTIBACTERIAL VIOLET BRAIDED (POLYGLACTIN 910), SYNTHETIC ABSORBABLE SUTURE: Brand: COATED VICRYL

## (undated) DEVICE — NEURO PATTIES 1/2 X 1 1/2

## (undated) DEVICE — DRESSING MEPILEX AG BORDER POST-OP 4 X 8 IN

## (undated) DEVICE — SUT ETHILON 3-0 FS-1 18 IN 663G

## (undated) DEVICE — TOOL MR8-F2/7TA23 MR8 F2/7CM TAPER 2.3MM: Brand: MIDAS REX MR8

## (undated) DEVICE — PROXIMATE SKIN STAPLERS (35 WIDE) CONTAINS 35 STAINLESS STEEL STAPLES (FIXED HEAD): Brand: PROXIMATE

## (undated) DEVICE — OCCLUSIVE GAUZE STRIP,3% BISMUTH TRIBROMOPHENATE IN PETROLATUM BLEND: Brand: XEROFORM

## (undated) DEVICE — SEALANT FIBRIN VISTASEAL 10ML

## (undated) DEVICE — INTENDED FOR TISSUE SEPARATION, AND OTHER PROCEDURES THAT REQUIRE A SHARP SURGICAL BLADE TO PUNCTURE OR CUT.: Brand: BARD-PARKER SAFETY BLADES SIZE 10, STERILE

## (undated) DEVICE — DRAPE INTESTINAL ISOLATION BAG

## (undated) DEVICE — CHLORAPREP HI-LITE 26ML ORANGE

## (undated) DEVICE — DRAPE SHEET THREE QUARTER

## (undated) DEVICE — 3M™ IOBAN™ 2 ANTIMICROBIAL INCISE DRAPE 6650EZ: Brand: IOBAN™ 2

## (undated) DEVICE — PACK CRANIOTOMY PBDS RF

## (undated) DEVICE — SKIN MARKER DUAL TIP WITH RULER CAP, FLEXIBLE RULER AND LABELS: Brand: DEVON

## (undated) DEVICE — LIGHT HANDLE COVER SLEEVE DISP BLUE STELLAR

## (undated) DEVICE — HEMOSTATIC MATRIX SURGIFLO 8ML W/THROMBIN